# Patient Record
Sex: MALE | Race: WHITE | NOT HISPANIC OR LATINO | Employment: UNEMPLOYED | URBAN - METROPOLITAN AREA
[De-identification: names, ages, dates, MRNs, and addresses within clinical notes are randomized per-mention and may not be internally consistent; named-entity substitution may affect disease eponyms.]

---

## 2019-03-08 ENCOUNTER — OFFICE VISIT - HEALTHEAST (OUTPATIENT)
Dept: ADDICTION MEDICINE | Facility: CLINIC | Age: 34
End: 2019-03-08

## 2019-03-08 DIAGNOSIS — F10.20 ALCOHOL USE DISORDER, SEVERE, DEPENDENCE (H): ICD-10-CM

## 2019-03-19 ENCOUNTER — COMMUNICATION - HEALTHEAST (OUTPATIENT)
Dept: ADDICTION MEDICINE | Facility: CLINIC | Age: 34
End: 2019-03-19

## 2019-03-20 ENCOUNTER — COMMUNICATION - HEALTHEAST (OUTPATIENT)
Dept: ADDICTION MEDICINE | Facility: CLINIC | Age: 34
End: 2019-03-20

## 2019-03-22 ENCOUNTER — COMMUNICATION - HEALTHEAST (OUTPATIENT)
Dept: ADDICTION MEDICINE | Facility: CLINIC | Age: 34
End: 2019-03-22

## 2019-03-26 ENCOUNTER — HOSPITAL ENCOUNTER (INPATIENT)
Facility: CLINIC | Age: 34
LOS: 3 days | Discharge: HOME | DRG: 897 | End: 2019-03-30
Attending: EMERGENCY MEDICINE | Admitting: HOSPITALIST
Payer: COMMERCIAL

## 2019-03-26 DIAGNOSIS — E83.42 HYPOMAGNESEMIA: ICD-10-CM

## 2019-03-26 DIAGNOSIS — I10 ESSENTIAL HYPERTENSION: Primary | ICD-10-CM

## 2019-03-26 DIAGNOSIS — F10.930 ALCOHOL WITHDRAWAL SYNDROME WITHOUT COMPLICATION (H): ICD-10-CM

## 2019-03-26 DIAGNOSIS — F41.9 ANXIETY: ICD-10-CM

## 2019-03-26 DIAGNOSIS — H57.9 ALLERGIC EYE REACTION: ICD-10-CM

## 2019-03-26 LAB
ALBUMIN SERPL-MCNC: 4.2 G/DL (ref 3.4–5)
ALBUMIN UR-MCNC: 100 MG/DL
ALCOHOL BREATH TEST: 0 (ref 0–0.01)
ALP SERPL-CCNC: 108 U/L (ref 40–150)
ALT SERPL W P-5'-P-CCNC: 107 U/L (ref 0–70)
AMORPH CRY #/AREA URNS HPF: ABNORMAL /HPF
AMPHETAMINES UR QL SCN: NEGATIVE
ANION GAP SERPL CALCULATED.3IONS-SCNC: 17 MMOL/L (ref 3–14)
APPEARANCE UR: ABNORMAL
AST SERPL W P-5'-P-CCNC: 115 U/L (ref 0–45)
BARBITURATES UR QL: NEGATIVE
BASOPHILS # BLD AUTO: 0 10E9/L (ref 0–0.2)
BASOPHILS NFR BLD AUTO: 0.5 %
BENZODIAZ UR QL: NEGATIVE
BILIRUB SERPL-MCNC: 1.3 MG/DL (ref 0.2–1.3)
BILIRUB UR QL STRIP: NEGATIVE
BUN SERPL-MCNC: 11 MG/DL (ref 7–30)
CALCIUM SERPL-MCNC: 10.1 MG/DL (ref 8.5–10.1)
CANNABINOIDS UR QL SCN: NEGATIVE
CHLORIDE SERPL-SCNC: 98 MMOL/L (ref 94–109)
CO2 SERPL-SCNC: 24 MMOL/L (ref 20–32)
COCAINE UR QL: NEGATIVE
COLOR UR AUTO: ABNORMAL
CREAT SERPL-MCNC: 0.81 MG/DL (ref 0.66–1.25)
DIFFERENTIAL METHOD BLD: ABNORMAL
EOSINOPHIL # BLD AUTO: 0 10E9/L (ref 0–0.7)
EOSINOPHIL NFR BLD AUTO: 0.1 %
ERYTHROCYTE [DISTWIDTH] IN BLOOD BY AUTOMATED COUNT: 14.2 % (ref 10–15)
ETHANOL UR QL SCN: POSITIVE
GFR SERPL CREATININE-BSD FRML MDRD: >90 ML/MIN/{1.73_M2}
GLUCOSE SERPL-MCNC: 166 MG/DL (ref 70–99)
GLUCOSE UR STRIP-MCNC: 30 MG/DL
HCT VFR BLD AUTO: 42.7 % (ref 40–53)
HGB BLD-MCNC: 14.6 G/DL (ref 13.3–17.7)
HGB UR QL STRIP: NEGATIVE
HYALINE CASTS #/AREA URNS LPF: 5 /LPF (ref 0–2)
IMM GRANULOCYTES # BLD: 0 10E9/L (ref 0–0.4)
IMM GRANULOCYTES NFR BLD: 0.3 %
KETONES UR STRIP-MCNC: 40 MG/DL
LEUKOCYTE ESTERASE UR QL STRIP: NEGATIVE
LYMPHOCYTES # BLD AUTO: 0.5 10E9/L (ref 0.8–5.3)
LYMPHOCYTES NFR BLD AUTO: 7.3 %
MAGNESIUM SERPL-MCNC: 1.5 MG/DL (ref 1.6–2.3)
MCH RBC QN AUTO: 32.2 PG (ref 26.5–33)
MCHC RBC AUTO-ENTMCNC: 34.2 G/DL (ref 31.5–36.5)
MCV RBC AUTO: 94 FL (ref 78–100)
MONOCYTES # BLD AUTO: 0.6 10E9/L (ref 0–1.3)
MONOCYTES NFR BLD AUTO: 7.7 %
MUCOUS THREADS #/AREA URNS LPF: PRESENT /LPF
NEUTROPHILS # BLD AUTO: 6.1 10E9/L (ref 1.6–8.3)
NEUTROPHILS NFR BLD AUTO: 84.1 %
NITRATE UR QL: NEGATIVE
NRBC # BLD AUTO: 0 10*3/UL
NRBC BLD AUTO-RTO: 0 /100
OPIATES UR QL SCN: NEGATIVE
PH UR STRIP: 7.5 PH (ref 5–7)
PLATELET # BLD AUTO: 136 10E9/L (ref 150–450)
POTASSIUM SERPL-SCNC: 3.9 MMOL/L (ref 3.4–5.3)
PROT SERPL-MCNC: 7.8 G/DL (ref 6.8–8.8)
RBC # BLD AUTO: 4.54 10E12/L (ref 4.4–5.9)
RBC #/AREA URNS AUTO: 0 /HPF (ref 0–2)
SODIUM SERPL-SCNC: 139 MMOL/L (ref 133–144)
SOURCE: ABNORMAL
SP GR UR STRIP: 1.02 (ref 1–1.03)
UROBILINOGEN UR STRIP-MCNC: 2 MG/DL (ref 0–2)
WBC # BLD AUTO: 7.3 10E9/L (ref 4–11)
WBC #/AREA URNS AUTO: <1 /HPF (ref 0–5)

## 2019-03-26 PROCEDURE — 25000125 ZZHC RX 250: Performed by: EMERGENCY MEDICINE

## 2019-03-26 PROCEDURE — 96365 THER/PROPH/DIAG IV INF INIT: CPT | Performed by: EMERGENCY MEDICINE

## 2019-03-26 PROCEDURE — 25000128 H RX IP 250 OP 636: Performed by: EMERGENCY MEDICINE

## 2019-03-26 PROCEDURE — 80053 COMPREHEN METABOLIC PANEL: CPT | Performed by: EMERGENCY MEDICINE

## 2019-03-26 PROCEDURE — 25000132 ZZH RX MED GY IP 250 OP 250 PS 637: Performed by: EMERGENCY MEDICINE

## 2019-03-26 PROCEDURE — 83690 ASSAY OF LIPASE: CPT | Performed by: EMERGENCY MEDICINE

## 2019-03-26 PROCEDURE — 99285 EMERGENCY DEPT VISIT HI MDM: CPT | Mod: Z6 | Performed by: EMERGENCY MEDICINE

## 2019-03-26 PROCEDURE — 85025 COMPLETE CBC W/AUTO DIFF WBC: CPT | Performed by: EMERGENCY MEDICINE

## 2019-03-26 PROCEDURE — 83735 ASSAY OF MAGNESIUM: CPT | Performed by: EMERGENCY MEDICINE

## 2019-03-26 PROCEDURE — 80320 DRUG SCREEN QUANTALCOHOLS: CPT | Performed by: EMERGENCY MEDICINE

## 2019-03-26 PROCEDURE — 99285 EMERGENCY DEPT VISIT HI MDM: CPT | Mod: 25 | Performed by: EMERGENCY MEDICINE

## 2019-03-26 PROCEDURE — 96375 TX/PRO/DX INJ NEW DRUG ADDON: CPT | Performed by: EMERGENCY MEDICINE

## 2019-03-26 PROCEDURE — 81001 URINALYSIS AUTO W/SCOPE: CPT | Performed by: EMERGENCY MEDICINE

## 2019-03-26 PROCEDURE — 96361 HYDRATE IV INFUSION ADD-ON: CPT | Performed by: EMERGENCY MEDICINE

## 2019-03-26 PROCEDURE — 25800030 ZZH RX IP 258 OP 636: Performed by: EMERGENCY MEDICINE

## 2019-03-26 PROCEDURE — 96366 THER/PROPH/DIAG IV INF ADDON: CPT | Performed by: EMERGENCY MEDICINE

## 2019-03-26 PROCEDURE — 80307 DRUG TEST PRSMV CHEM ANLYZR: CPT | Performed by: EMERGENCY MEDICINE

## 2019-03-26 RX ORDER — FOLIC ACID 1 MG/1
1 TABLET ORAL ONCE
Status: COMPLETED | OUTPATIENT
Start: 2019-03-26 | End: 2019-03-26

## 2019-03-26 RX ORDER — LORAZEPAM 2 MG/ML
0.5 INJECTION INTRAMUSCULAR ONCE
Status: DISCONTINUED | OUTPATIENT
Start: 2019-03-26 | End: 2019-03-26

## 2019-03-26 RX ORDER — MULTIPLE VITAMINS W/ MINERALS TAB 9MG-400MCG
1 TAB ORAL ONCE
Status: COMPLETED | OUTPATIENT
Start: 2019-03-26 | End: 2019-03-26

## 2019-03-26 RX ORDER — LORAZEPAM 1 MG/1
1-4 TABLET ORAL EVERY 30 MIN PRN
Status: DISCONTINUED | OUTPATIENT
Start: 2019-03-26 | End: 2019-03-27 | Stop reason: ALTCHOICE

## 2019-03-26 RX ORDER — LORAZEPAM 2 MG/ML
1 INJECTION INTRAMUSCULAR ONCE
Status: COMPLETED | OUTPATIENT
Start: 2019-03-26 | End: 2019-03-26

## 2019-03-26 RX ORDER — CALCIUM CARBONATE 500 MG/1
500 TABLET, CHEWABLE ORAL ONCE
Status: COMPLETED | OUTPATIENT
Start: 2019-03-26 | End: 2019-03-26

## 2019-03-26 RX ORDER — LANOLIN ALCOHOL/MO/W.PET/CERES
100 CREAM (GRAM) TOPICAL ONCE
Status: COMPLETED | OUTPATIENT
Start: 2019-03-26 | End: 2019-03-26

## 2019-03-26 RX ORDER — LORAZEPAM 1 MG/1
1 TABLET ORAL ONCE
Status: COMPLETED | OUTPATIENT
Start: 2019-03-26 | End: 2019-03-26

## 2019-03-26 RX ORDER — SODIUM CHLORIDE 9 MG/ML
1000 INJECTION, SOLUTION INTRAVENOUS CONTINUOUS
Status: DISCONTINUED | OUTPATIENT
Start: 2019-03-26 | End: 2019-03-30 | Stop reason: HOSPADM

## 2019-03-26 RX ORDER — LORAZEPAM 1 MG/1
1 TABLET ORAL ONCE
Status: DISCONTINUED | OUTPATIENT
Start: 2019-03-26 | End: 2019-03-26

## 2019-03-26 RX ADMIN — LORAZEPAM 1 MG: 1 TABLET ORAL at 19:42

## 2019-03-26 RX ADMIN — Medication 2 G: at 17:08

## 2019-03-26 RX ADMIN — CALCIUM CARBONATE (ANTACID) CHEW TAB 500 MG 500 MG: 500 CHEW TAB at 23:47

## 2019-03-26 RX ADMIN — FOLIC ACID 1 MG: 1 TABLET ORAL at 17:02

## 2019-03-26 RX ADMIN — SODIUM CHLORIDE 1000 ML: 9 INJECTION, SOLUTION INTRAVENOUS at 18:22

## 2019-03-26 RX ADMIN — MULTIPLE VITAMINS W/ MINERALS TAB 1 TABLET: TAB at 17:02

## 2019-03-26 RX ADMIN — LORAZEPAM 1 MG: 1 TABLET ORAL at 22:25

## 2019-03-26 RX ADMIN — SODIUM CHLORIDE 1000 ML: 9 INJECTION, SOLUTION INTRAVENOUS at 22:27

## 2019-03-26 RX ADMIN — SODIUM CHLORIDE 1000 ML: 9 INJECTION, SOLUTION INTRAVENOUS at 16:17

## 2019-03-26 RX ADMIN — Medication 100 MG: at 17:02

## 2019-03-26 RX ADMIN — LORAZEPAM 1 MG: 2 INJECTION INTRAMUSCULAR; INTRAVENOUS at 17:02

## 2019-03-26 ASSESSMENT — ENCOUNTER SYMPTOMS
APPETITE CHANGE: 1
FEVER: 0
VOMITING: 1
HEADACHES: 0
NAUSEA: 1
ABDOMINAL PAIN: 1

## 2019-03-26 NOTE — ED PROVIDER NOTES
[facility] EMERGENCY DEPARTMENT ([facility])    3/26/19        History     Chief Complaint   Patient presents with     Withdrawal     last drink over 24 hours ago.      The history is provided by the patient, a relative and medical records.     Nikhil Rios is a 33 year old male with a past medical history significant for alcohol abuse who presents to the Emergency Department with alcohol withdrawal symptoms. Patient reports that he drinks 1 pint a day for around the past year. He reports that it has progressed over the year and that his last drink was 24 hours ago. Patient denies any other drug use or suicidal ideations. Patient is currently feeling nausea with intermittent vomiting in the last few days. He does state some mild abdominal discomfort and achiness. Patient reports that his last BM was yesterday. He states that he has been eating less than usual, but has been staying hydrated. He denies any fevers, chest pain or headache. Patient does report falling 1 week ago while drunk.  Did not strike his head during the time.  He is currently considering detox but is not sure yet.    I have reviewed the Medications, Allergies, Past Medical and Surgical History, and Social History in the [employer] system.    PAST MEDICAL HISTORY:   Past Medical History:   Diagnosis Date     Substance abuse (H)        PAST SURGICAL HISTORY: History reviewed. No pertinent surgical history.    FAMILY HISTORY: No family history on file.    SOCIAL HISTORY:   Social History     Tobacco Use     Smoking status: Current Some Day Smoker     Smokeless tobacco: Never Used   Substance Use Topics     Alcohol use: Yes     Comment: 1 to 750ml daily     Current Facility-Administered Medications   Medication     0.9% sodium chloride BOLUS    Followed by     sodium chloride 0.9% infusion     folic acid (FOLVITE) tablet 1 mg     LORazepam (ATIVAN) tablet 1 mg     multivitamin w/minerals (THERA-VIT-M) tablet 1 tablet     vitamin B1 (THIAMINE) tablet  100 mg     No current outpatient medications on file.      No Known Allergies      Review of Systems   Constitutional: Positive for appetite change (Loss). Negative for fever.   Cardiovascular: Negative for chest pain.   Gastrointestinal: Positive for abdominal pain, nausea and vomiting.   Neurological: Negative for headaches.   Psychiatric/Behavioral: Negative for suicidal ideas.   All other systems reviewed and are negative.      Physical Exam   BP: 118/61  Pulse: 79  Temp: 98.3  F (36.8  C)  Resp: 18  Weight: (unable to get)  SpO2: 98 %      Physical Exam   General: patient is alert and oriented , tremulous and anxious appearing  Head: atraumatic and normocephalic   EENT: dry mucus membranes without tonsillar erythema or exudates, pupils 3 mm, equal round and reactive, extraocular movements intact, no nystagmus  Neck: supple with full range of motion  Cardiovascular: regular rate and rhythm, no murmur appreciated, extremities warm and well perfused, no lower extremity edema  Pulmonary: lungs clear to auscultation bilaterally   Abdomen: soft, non-tender, nondistended  Musculoskeletal: normal range of motion   Neurological: alert and oriented, moving all extremities symmetrically, significant tongue fasciculations and tremulousness of the extremities  Skin: warm, dry   Psych: Maintains eye contact, normal speech in rate and tone, does not appear to be responding to internal stimuli, denies any visual or auditory hallucinations, denies any suicidal ideation      ED Course   3:55 PM  The patient was seen and examined by Tori Pa MD in Room ED09.        Procedures             Critical Care time:  none             Labs Ordered and Resulted from Time of ED Arrival Up to the Time of Departure from the ED   ALCOHOL BREATH TEST POCT - Normal   DRUG ABUSE SCREEN 6 CHEM DEP URINE ([facility])            Assessments & Plan (with Medical Decision Making)   33-year-old male with a history of alcohol dependence who presents to the  Emergency Department for withdrawal symptoms.  He currently is experiencing acute withdrawal symptoms with MSSA of 13.  He reports recent nausea and intermittent vomiting in the setting of alcohol use.  Denies any ongoing symptoms of GI bleed.  His abdominal exam is quite benign without any tenderness to palpation.  He denies any recent traumatic injury.  Labs obtained which were significant for ALT of 107, AST of 115, total bilirubin of 1.3 and lipase of 885.  Labs are consistent with chronic alcohol use.  His hemoglobin is within normal limits at 14.6 with no leukocytosis.  He was given IV fluids, thiamine, folate, multivitamin and lorazepam for withdrawal.  Discussed with detox and unfortunately we do not have a detox bed available at this time.  He denies any acute mental health concerns.  On reassessment continues to have significant withdrawal symptoms with elevated MSSA score despite lorazepam.  We will plan to admit to medicine for continued management of withdrawal symptoms.      This part of the medical record was transcribed by Sameer Butler Medical Scribe, from a dictation done by Tori Pa MD.       I have reviewed the nursing notes.    I have reviewed the findings, diagnosis, plan and need for follow up with the patient.       Medication List      There are no discharge medications for this visit.         Final diagnoses:   Alcohol withdrawal syndrome without complication (H)   Hypomagnesemia     I, Sameer Butler, am serving as a trained medical scribe to document services personally performed by Tori Pa MD, based on the provider's statements to me.   ITori MD, was physically present and have reviewed and verified the accuracy of this note documented by Sameer Butler.    3/26/2019   [facility], [place], EMERGENCY DEPARTMENT     Tori Pa MD  03/27/19 0224

## 2019-03-27 ENCOUNTER — APPOINTMENT (OUTPATIENT)
Dept: ULTRASOUND IMAGING | Facility: CLINIC | Age: 34
DRG: 897 | End: 2019-03-27
Attending: HOSPITALIST
Payer: COMMERCIAL

## 2019-03-27 PROBLEM — F10.939 ALCOHOL WITHDRAWAL (H): Status: ACTIVE | Noted: 2019-03-27

## 2019-03-27 LAB
ALBUMIN SERPL-MCNC: 3.9 G/DL (ref 3.4–5)
ALP SERPL-CCNC: 96 U/L (ref 40–150)
ALT SERPL W P-5'-P-CCNC: 96 U/L (ref 0–70)
ANION GAP SERPL CALCULATED.3IONS-SCNC: 13 MMOL/L (ref 3–14)
AST SERPL W P-5'-P-CCNC: 99 U/L (ref 0–45)
BILIRUB SERPL-MCNC: 1.1 MG/DL (ref 0.2–1.3)
BUN SERPL-MCNC: 8 MG/DL (ref 7–30)
CALCIUM SERPL-MCNC: 9.2 MG/DL (ref 8.5–10.1)
CHLORIDE SERPL-SCNC: 105 MMOL/L (ref 94–109)
CO2 SERPL-SCNC: 20 MMOL/L (ref 20–32)
CREAT SERPL-MCNC: 0.77 MG/DL (ref 0.66–1.25)
ERYTHROCYTE [DISTWIDTH] IN BLOOD BY AUTOMATED COUNT: 14.4 % (ref 10–15)
GFR SERPL CREATININE-BSD FRML MDRD: >90 ML/MIN/{1.73_M2}
GLUCOSE SERPL-MCNC: 95 MG/DL (ref 70–99)
HCT VFR BLD AUTO: 41.7 % (ref 40–53)
HGB BLD-MCNC: 14.1 G/DL (ref 13.3–17.7)
INTERPRETATION ECG - MUSE: NORMAL
LIPASE SERPL-CCNC: 1043 U/L (ref 73–393)
LIPASE SERPL-CCNC: 885 U/L (ref 73–393)
MAGNESIUM SERPL-MCNC: 2.3 MG/DL (ref 1.6–2.3)
MCH RBC QN AUTO: 32.3 PG (ref 26.5–33)
MCHC RBC AUTO-ENTMCNC: 33.8 G/DL (ref 31.5–36.5)
MCV RBC AUTO: 96 FL (ref 78–100)
PLATELET # BLD AUTO: 117 10E9/L (ref 150–450)
POTASSIUM SERPL-SCNC: 4.1 MMOL/L (ref 3.4–5.3)
PROT SERPL-MCNC: 7.2 G/DL (ref 6.8–8.8)
RBC # BLD AUTO: 4.36 10E12/L (ref 4.4–5.9)
SODIUM SERPL-SCNC: 138 MMOL/L (ref 133–144)
WBC # BLD AUTO: 6.8 10E9/L (ref 4–11)

## 2019-03-27 PROCEDURE — 83690 ASSAY OF LIPASE: CPT | Performed by: HOSPITALIST

## 2019-03-27 PROCEDURE — 76705 ECHO EXAM OF ABDOMEN: CPT

## 2019-03-27 PROCEDURE — 99207 ZZC CDG-MDM COMPONENT: MEETS LOW - DOWN CODED: CPT | Performed by: HOSPITALIST

## 2019-03-27 PROCEDURE — 85027 COMPLETE CBC AUTOMATED: CPT | Performed by: HOSPITALIST

## 2019-03-27 PROCEDURE — 12000001 ZZH R&B MED SURG/OB UMMC

## 2019-03-27 PROCEDURE — 25000132 ZZH RX MED GY IP 250 OP 250 PS 637: Performed by: INTERNAL MEDICINE

## 2019-03-27 PROCEDURE — 25000128 H RX IP 250 OP 636: Performed by: HOSPITALIST

## 2019-03-27 PROCEDURE — 83735 ASSAY OF MAGNESIUM: CPT | Performed by: HOSPITALIST

## 2019-03-27 PROCEDURE — HZ2ZZZZ DETOXIFICATION SERVICES FOR SUBSTANCE ABUSE TREATMENT: ICD-10-PCS | Performed by: INTERNAL MEDICINE

## 2019-03-27 PROCEDURE — 36415 COLL VENOUS BLD VENIPUNCTURE: CPT | Performed by: HOSPITALIST

## 2019-03-27 PROCEDURE — 25000132 ZZH RX MED GY IP 250 OP 250 PS 637: Performed by: HOSPITALIST

## 2019-03-27 PROCEDURE — 99222 1ST HOSP IP/OBS MODERATE 55: CPT | Mod: AI | Performed by: HOSPITALIST

## 2019-03-27 PROCEDURE — 80053 COMPREHEN METABOLIC PANEL: CPT | Performed by: HOSPITALIST

## 2019-03-27 PROCEDURE — 40000007 ZZH STATISTIC ADULT CD FACE TO FACE-NO CHRG

## 2019-03-27 RX ORDER — SODIUM CHLORIDE AND POTASSIUM CHLORIDE 150; 900 MG/100ML; MG/100ML
INJECTION, SOLUTION INTRAVENOUS CONTINUOUS
Status: DISCONTINUED | OUTPATIENT
Start: 2019-03-27 | End: 2019-03-28

## 2019-03-27 RX ORDER — LANOLIN ALCOHOL/MO/W.PET/CERES
100 CREAM (GRAM) TOPICAL DAILY
Status: DISCONTINUED | OUTPATIENT
Start: 2019-03-27 | End: 2019-03-30 | Stop reason: HOSPADM

## 2019-03-27 RX ORDER — PROCHLORPERAZINE MALEATE 5 MG
10 TABLET ORAL EVERY 6 HOURS PRN
Status: DISCONTINUED | OUTPATIENT
Start: 2019-03-27 | End: 2019-03-30 | Stop reason: HOSPADM

## 2019-03-27 RX ORDER — LABETALOL 20 MG/4 ML (5 MG/ML) INTRAVENOUS SYRINGE
10
Status: DISCONTINUED | OUTPATIENT
Start: 2019-03-27 | End: 2019-03-27

## 2019-03-27 RX ORDER — ONDANSETRON 4 MG/1
4 TABLET, ORALLY DISINTEGRATING ORAL EVERY 6 HOURS PRN
Status: DISCONTINUED | OUTPATIENT
Start: 2019-03-27 | End: 2019-03-30 | Stop reason: HOSPADM

## 2019-03-27 RX ORDER — ONDANSETRON 2 MG/ML
4 INJECTION INTRAMUSCULAR; INTRAVENOUS EVERY 6 HOURS PRN
Status: DISCONTINUED | OUTPATIENT
Start: 2019-03-27 | End: 2019-03-30 | Stop reason: HOSPADM

## 2019-03-27 RX ORDER — FOLIC ACID 1 MG/1
1 TABLET ORAL DAILY
Status: DISCONTINUED | OUTPATIENT
Start: 2019-03-27 | End: 2019-03-30 | Stop reason: HOSPADM

## 2019-03-27 RX ORDER — PROCHLORPERAZINE 25 MG
25 SUPPOSITORY, RECTAL RECTAL EVERY 12 HOURS PRN
Status: DISCONTINUED | OUTPATIENT
Start: 2019-03-27 | End: 2019-03-30 | Stop reason: HOSPADM

## 2019-03-27 RX ORDER — ATENOLOL 50 MG/1
50 TABLET ORAL DAILY PRN
Status: DISCONTINUED | OUTPATIENT
Start: 2019-03-27 | End: 2019-03-30 | Stop reason: HOSPADM

## 2019-03-27 RX ORDER — MULTIPLE VITAMINS W/ MINERALS TAB 9MG-400MCG
1 TAB ORAL DAILY
Status: DISCONTINUED | OUTPATIENT
Start: 2019-03-27 | End: 2019-03-30 | Stop reason: HOSPADM

## 2019-03-27 RX ORDER — HYDRALAZINE HYDROCHLORIDE 25 MG/1
25 TABLET, FILM COATED ORAL 4 TIMES DAILY PRN
Status: DISCONTINUED | OUTPATIENT
Start: 2019-03-27 | End: 2019-03-29

## 2019-03-27 RX ORDER — POLYETHYLENE GLYCOL 3350 17 G/17G
17 POWDER, FOR SOLUTION ORAL DAILY PRN
Status: DISCONTINUED | OUTPATIENT
Start: 2019-03-27 | End: 2019-03-30 | Stop reason: HOSPADM

## 2019-03-27 RX ORDER — ALUMINA, MAGNESIA, AND SIMETHICONE 2400; 2400; 240 MG/30ML; MG/30ML; MG/30ML
15 SUSPENSION ORAL EVERY 4 HOURS PRN
Status: DISCONTINUED | OUTPATIENT
Start: 2019-03-27 | End: 2019-03-30 | Stop reason: HOSPADM

## 2019-03-27 RX ORDER — ACETAMINOPHEN 325 MG/1
650 TABLET ORAL EVERY 4 HOURS PRN
Status: DISCONTINUED | OUTPATIENT
Start: 2019-03-27 | End: 2019-03-30 | Stop reason: HOSPADM

## 2019-03-27 RX ORDER — HYDRALAZINE HYDROCHLORIDE 25 MG/1
25 TABLET, FILM COATED ORAL 4 TIMES DAILY PRN
Status: DISCONTINUED | OUTPATIENT
Start: 2019-03-27 | End: 2019-03-27

## 2019-03-27 RX ORDER — DIAZEPAM 5 MG
5-20 TABLET ORAL EVERY 30 MIN PRN
Status: DISCONTINUED | OUTPATIENT
Start: 2019-03-27 | End: 2019-03-30 | Stop reason: HOSPADM

## 2019-03-27 RX ORDER — NALOXONE HYDROCHLORIDE 0.4 MG/ML
.1-.4 INJECTION, SOLUTION INTRAMUSCULAR; INTRAVENOUS; SUBCUTANEOUS
Status: DISCONTINUED | OUTPATIENT
Start: 2019-03-27 | End: 2019-03-30 | Stop reason: HOSPADM

## 2019-03-27 RX ORDER — LABETALOL 20 MG/4 ML (5 MG/ML) INTRAVENOUS SYRINGE
10 EVERY 4 HOURS PRN
Status: DISCONTINUED | OUTPATIENT
Start: 2019-03-27 | End: 2019-03-30 | Stop reason: HOSPADM

## 2019-03-27 RX ORDER — PANTOPRAZOLE SODIUM 40 MG/1
40 TABLET, DELAYED RELEASE ORAL
Status: DISCONTINUED | OUTPATIENT
Start: 2019-03-27 | End: 2019-03-30 | Stop reason: HOSPADM

## 2019-03-27 RX ADMIN — HYDRALAZINE HYDROCHLORIDE 25 MG: 25 TABLET ORAL at 17:13

## 2019-03-27 RX ADMIN — DEXTRAN 70 AND HYPROMELLOSE 2910 1 DROP: 1; 3 SOLUTION/ DROPS OPHTHALMIC at 17:36

## 2019-03-27 RX ADMIN — ENOXAPARIN SODIUM 40 MG: 40 INJECTION SUBCUTANEOUS at 11:40

## 2019-03-27 RX ADMIN — DIAZEPAM 10 MG: 5 TABLET ORAL at 05:56

## 2019-03-27 RX ADMIN — DEXTRAN 70 AND HYPROMELLOSE 2910 1 DROP: 1; 3 SOLUTION/ DROPS OPHTHALMIC at 18:58

## 2019-03-27 RX ADMIN — PANTOPRAZOLE SODIUM 40 MG: 40 TABLET, DELAYED RELEASE ORAL at 09:01

## 2019-03-27 RX ADMIN — DEXTRAN 70 AND HYPROMELLOSE 2910 1 DROP: 1; 3 SOLUTION/ DROPS OPHTHALMIC at 20:42

## 2019-03-27 RX ADMIN — DIAZEPAM 5 MG: 5 TABLET ORAL at 17:34

## 2019-03-27 RX ADMIN — DIAZEPAM 5 MG: 5 TABLET ORAL at 09:02

## 2019-03-27 RX ADMIN — POTASSIUM CHLORIDE AND SODIUM CHLORIDE: 900; 150 INJECTION, SOLUTION INTRAVENOUS at 01:22

## 2019-03-27 RX ADMIN — POTASSIUM CHLORIDE AND SODIUM CHLORIDE: 900; 150 INJECTION, SOLUTION INTRAVENOUS at 09:18

## 2019-03-27 RX ADMIN — DIAZEPAM 10 MG: 5 TABLET ORAL at 11:45

## 2019-03-27 RX ADMIN — DIAZEPAM 10 MG: 5 TABLET ORAL at 21:01

## 2019-03-27 RX ADMIN — FOLIC ACID 1 MG: 1 TABLET ORAL at 09:01

## 2019-03-27 RX ADMIN — HYDRALAZINE HYDROCHLORIDE 25 MG: 25 TABLET ORAL at 23:17

## 2019-03-27 RX ADMIN — HYDRALAZINE HYDROCHLORIDE 25 MG: 25 TABLET ORAL at 11:45

## 2019-03-27 RX ADMIN — DIAZEPAM 10 MG: 5 TABLET ORAL at 01:22

## 2019-03-27 RX ADMIN — Medication 100 MG: at 09:01

## 2019-03-27 RX ADMIN — MULTIPLE VITAMINS W/ MINERALS TAB 1 TABLET: TAB at 09:01

## 2019-03-27 RX ADMIN — POTASSIUM CHLORIDE AND SODIUM CHLORIDE: 900; 150 INJECTION, SOLUTION INTRAVENOUS at 17:07

## 2019-03-27 RX ADMIN — HYDRALAZINE HYDROCHLORIDE 25 MG: 25 TABLET ORAL at 09:18

## 2019-03-27 ASSESSMENT — ACTIVITIES OF DAILY LIVING (ADL)
ADLS_ACUITY_SCORE: 10
ADLS_ACUITY_SCORE: 12
ADLS_ACUITY_SCORE: 10

## 2019-03-27 NOTE — CONSULTS
3/27/2019    Writer met with pt to discuss CD options. Pt reported he had a CD assessment last week at [facility] [place], unsure of with who. Pt reports he is weighing is options between inpatient and outpatient at this time as he is trying to obtain a job and has interviews lined up. Pt was unable to sign a release as he was unsure of who he had an assessment with (writer would be able to help facilitate care). Pt did not want any additional resources from writer. Writer left pt with business card if he changes his mind or wants further resources.     Marisel Duenas, [facility]  [phone]

## 2019-03-27 NOTE — PLAN OF CARE
Patient A & O x 4. Neuros and CMS intact except patient is having withdrawal symptoms with tremors. Patient on MSSA protocol and last scored a 6. PO Valium given per protocol. VSS except BP elevated 159/110 and afebrile, SpO2 99% on room air (see flowsheet), MD aware. PO PRN Hydralazine given as ordered. LS clear, BS + x 4, patient passing flatus and had a BM yesterday. Patient urinating good amounts of clear yellow urine. Patient denies pain. Left PIV infusing. Patient on a regular diet and tolerating it well. Patient up ad sergio with assist of 1 to bathroom. Bed alarm on. Hgb 14.1 this AM, MD aware. Abd US completed this shift. Patient able to make needs known. Call light within reach. Will continue with POC.

## 2019-03-27 NOTE — H&P
[facility], [place]    History and Physical  Hospitalist       Date of Admission:  3/26/2019  Date of Service (when I saw the patient): 03/27/19    Assessment & Plan      Nikhil Rios is a 33 year old male who presents with shaking and vomiting, headache, sweating, dizzy.  He has not drunk for the last 24 hours because of the vomiting.  He drinks vodka on daily basis around a pint per day. He is noted to have alcohol withdrawal      Alcohol withdrawal.  Patient will be started on MSSA protocol with Valium.  His mag was low that will be repleted.  Correct electrolytes and received a banana bag.  Aggressive hydration.  Supportive care.  CD consult tomorrow.     Nausea and vomiting.  Could be from alcohol intoxication GERD gastritis esophagitis peptic ulcer disease pancreatitis ketoacidosis from alcohol starvation ketoacidosis etc. in the differential diagnosis.  Symptoms have abated.  Aggressive hydration recommended under    Alcohol abuse recommend cessation.  Counseling provided.    Hypomagnesemia will be repleted    Elevated liver enzymes likely from drinking.  Check liver ultrasound and acute hepatitis serologies.    Tobacco use he smokes 1-2 sick cigars while he is drinking; recommend cessation      Addendum: Lipase level is borderline high. He does not have much abdominal pain. Okay to feed. Likely elevated from vomiting itself or there may be mild pancreatitis which is now better. In either case okay to feed.       DVT Prophylaxis: Enoxaparin (Lovenox) SQ  Code Status: Full Code    Disposition: 2-3 days    Td Upton MD            Primary Care Physician   Physician No Ref-Primary    Chief Complaint   shaking and vomiting      History is obtained from the patient    History of Present Illness      Nikhil Rios is a 33 year old healthy male who presents with headache dizziness vomiting tremulous and shaking.  He is sweating as well.  He drinks of vodka a pint a day.  He drinks  most days.  He has been vomiting for the last 5 days.  He has not been able to keep anything down.  Today morning he started having dizziness and then started shaking tremors and not feeling well which prompted him to come to the ER for further evaluation.  His alcohol level now was 0 last alcoholic drink was 24 hours prior to my visit today.    He has not tried to quit drinking alcohol before.  There is no history of alcohol withdrawal seizure.  He has not tried any D addiction programs before but is now interested in 1 right now.    Denies any fever sweats or chills no cough or phlegm.  No abdominal pain or diarrhea no bowel bowel or bladder problems.     No bleeding in the stool no hematemesis.  No skin rash or joint aches.        Past Medical History    I have reviewed this patient's medical history and updated it with pertinent information if needed.   Past Medical History:   Diagnosis Date     Substance abuse (H)        Past Surgical History   I have reviewed this patient's surgical history and updated it with pertinent information if needed.  History reviewed. No pertinent surgical history.    Prior to Admission Medications   None     Allergies   No Known Allergies    Social History   I have reviewed this patient's social history and updated it with pertinent information if needed. Nikhil Rios  reports that he has been smoking.  he has never used smokeless tobacco. He reports that he drinks alcohol. He reports that he does not use drugs.    Family History   I have reviewed this patient's family history and updated it with pertinent information if needed.   No family history on file.    Review of Systems   The 10 point Review of Systems is negative other than noted in the HPI or here.     Physical Exam   Temp: 99.2  F (37.3  C) Temp src: Tympanic BP: (!) 150/105 Pulse: 88 Heart Rate: 85 Resp: 18 SpO2: 98 % O2 Device: None (Room air)    Vital Signs with Ranges  Temp:  [98.3  F (36.8  C)-99.2  F (37.3  C)] 99.2   F (37.3  C)  Pulse:  [79-93] 88  Heart Rate:  [85] 85  Resp:  [18] 18  BP: (118-163)/() 150/105  SpO2:  [96 %-100 %] 98 %  225 lbs 0 oz    Constitutional: Awake, alert, cooperative, no apparent distress.  Eyes: Conjunctiva and pupils examined and normal.  HEENT: dry mucous membranes  Respiratory: Clear to auscultation bilaterally, no crackles or wheezing.  Cardiovascular: Regular rate and rhythm, normal S1 and S2, and no murmur noted.  GI: Soft, non-distended, non-tender, normal bowel sounds.  Lymph/Hematologic: No anterior cervical or supraclavicular adenopathy.  Skin: No rashes, no cyanosis, no edema.  Musculoskeletal: No joint swelling, erythema or tenderness.  Neurologic: Cranial nerves 2-12 intact, normal strength and sensation.  Psychiatric: Anxious, Alert, oriented to person, place and time, no obvious anxiety or depression.      Data   Data reviewed today:  I personally reviewed no images or EKG's today.  Recent Labs   Lab 03/26/19  1542   WBC 7.3   HGB 14.6   MCV 94   *      POTASSIUM 3.9   CHLORIDE 98   CO2 24   BUN 11   CR 0.81   ANIONGAP 17*   KEELEY 10.1   *   ALBUMIN 4.2   PROTTOTAL 7.8   BILITOTAL 1.3   ALKPHOS 108   *   *       No results found for this or any previous visit (from the past 24 hour(s)).

## 2019-03-27 NOTE — PROGRESS NOTES
Social Work Services Progress Note    Hospital Day: 2    Collaborated with:  Marisel Duenas, [facility], 10A IDT, Discharge plan    Data:  Discharge plan, CD Resources, Mother requested to speak w/SW    Intervention:  Per pt's bedside RN Ignacia Liu, pt did not provide consent for SW to speak w/his mother. SW explained to mother that conversation could include general information resources or programs and that she could share information w/SW but SW could not confirm or elaborate on pt's care or treatment plan.     Pt's mother verbalized understanding and shared that pt has been working to get into CD treatment. He has had a CD Eval at [facility] as he was interested in pursuing outpt cd treatment at [facility] in [place].  Pt has been living with her for about 6 months and has been stating intent to find a job. She is grateful he is willing to enter CD treatment but [facility] was for inpt CD treatment.      SW educated mother on general information about process for getting into CD treatment, respecting/honoring what the patient is willing to do to engage in sobriety (ie: outpt vs inpt), medical team recommendations for CD treatment when people are hospitalized due to health issues related to or caused by their addiction or substance use disorder.   She identifies as being a support for pt to enter into cd treatment and engage in sobriety.        Assessment:  pt has been working to enter cd treatment    Plan:    Anticipated Disposition:  to be determined    Barriers to d/c plan:  Medical stability    Follow Up:  SW con't to follow for CD Resources and further assistance with discharge plan

## 2019-03-27 NOTE — ED NOTES
.  [facility], [place]   ED Nurse to Floor Handoff     Nikhil Rios is a 33 year old male who speaks English and lives with others,  in a home  They arrived in the ED by car from home    ED Chief Complaint: Withdrawal (last drink over 24 hours ago. )    ED Dx;   Final diagnoses:   Alcohol withdrawal syndrome without complication (H)   Hypomagnesemia         Needed?: No    Allergies: No Known Allergies.  Past Medical Hx:   Past Medical History:   Diagnosis Date     Substance abuse (H)       Baseline Mental status: WDL  Current Mental Status changes: at basesline    Infection present or suspected this encounter: no  Sepsis suspected: No  Isolation type: No active isolations     Activity level - Baseline/Home:  Independent  Activity Level - Current:   Stand with Assist  Pt is very shakey and wobbly due to withdrawals and is a fall risk    Bariatric equipment needed?: No    In the ED these meds were given:   Medications   0.9% sodium chloride BOLUS (0 mLs Intravenous Stopped 3/26/19 1822)     Followed by   sodium chloride 0.9% infusion (1,000 mLs Intravenous New Bag 3/26/19 2227)   LORazepam (ATIVAN) tablet 1-4 mg (1 mg Oral Given 3/26/19 2225)   vitamin B1 (THIAMINE) tablet 100 mg (100 mg Oral Given 3/26/19 1702)   folic acid (FOLVITE) tablet 1 mg (1 mg Oral Given 3/26/19 1702)   multivitamin w/minerals (THERA-VIT-M) tablet 1 tablet (1 tablet Oral Given 3/26/19 1702)   magnesium sulfate 2 g in NS intermittent infusion ([employer] or [facility]) (0 g Intravenous Stopped 3/26/19 1942)   LORazepam (ATIVAN) injection 1 mg (1 mg Intravenous Given 3/26/19 1702)   LORazepam (ATIVAN) tablet 1 mg (1 mg Oral Given 3/26/19 1942)       Drips running?  Yes    Home pump  No    Current LDAs  Peripheral IV 03/26/19 Left Hand (Active)   Number of days: 0       Labs results:   Labs Ordered and Resulted from Time of ED Arrival Up to the Time of Departure from the ED   DRUG ABUSE SCREEN 6 CHEM DEP URINE  ([facility]) - Abnormal; Notable for the following components:       Result Value    Ethanol Qual Urine Positive (*)     All other components within normal limits   CBC WITH PLATELETS DIFFERENTIAL - Abnormal; Notable for the following components:    Platelet Count 136 (*)     Absolute Lymphocytes 0.5 (*)     All other components within normal limits   COMPREHENSIVE METABOLIC PANEL - Abnormal; Notable for the following components:    Anion Gap 17 (*)     Glucose 166 (*)      (*)      (*)     All other components within normal limits   MAGNESIUM - Abnormal; Notable for the following components:    Magnesium 1.5 (*)     All other components within normal limits   ROUTINE UA WITH MICROSCOPIC - Abnormal; Notable for the following components:    Glucose Urine 30 (*)     Ketones Urine 40 (*)     pH Urine 7.5 (*)     Protein Albumin Urine 100 (*)     Mucous Urine Present (*)     Hyaline Casts 5 (*)     Amorphous Crystals Few (*)     All other components within normal limits   ALCOHOL BREATH TEST POCT - Normal   MSSA SCORE AND VS   NOTIFY       Imaging Studies: No results found for this or any previous visit (from the past 24 hour(s)).    Recent vital signs:   BP (!) 163/108   Pulse 85   Temp 99.2  F (37.3  C) (Tympanic)   Resp 18   Wt 102.1 kg (225 lb)   SpO2 97%     Cardiac Rhythm: Normal Sinus  Pt needs tele? No  Skin/wound Issues: None    Code Status: Full Code    Pain control: pt had none    Nausea control: good    Abnormal labs/tests/findings requiring intervention:   Needed magnesium replaced    Family present during ED course? Yes   Family Comments/Social Situation comments: Mom is here and very supportive and helpful  Mom's name is Veronique  [phone] home  [phone] cell    Tasks needing completion: None    Edyta Vasquez, RN  [phone]  [phone] [facility] ED

## 2019-03-27 NOTE — PROGRESS NOTES
[facility]    Medicine Progress Note - Hospitalist Service       Date of Admission:  3/26/2019  Assessment & Plan         Nikhil Rios is a 33 year old male who presents with shaking and vomiting, headache, sweating, dizzy.  He has not drunk for the last 24 hours because of the vomiting.  He drinks vodka on daily basis around a pint per day. He is noted to have alcohol withdrawal  Admitted 3/26/2019     Alcohol dependency, withdrawal.    -Continue MSSA with Valium   -Multivitamin, thiamine, folic acid daily   -, CD consult   -Fall precautions        Nausea and vomiting.  Could be from alcohol withdrawal, alcoholic gastropathy.   No pain abdomen this morning   Nausea vomiting better   Unlikely acute pancreatitis   Ultrasound abdomen: Fatty liver     -Supportive management  -IV fluids, antiemetics as needed  -Follow-up electrolytes and replace as needed    High blood pressure: No prior history.  Suspect related to alcohol withdrawal.  Hydralazine and labetalol as needed.  Treat alcohol withdrawal  Monitor     Hypomagnesemia: Replaced     Elevated liver enzymes likely from drinking.    Ultrasound abdomen: Fatty liver  LFTs trending down     Tobacco use he smokes 1-2 sick cigars while he is drinking; recommend cessation         DVT Prophylaxis: Enoxaparin (Lovenox) subcutaneous, discontinue as patient ambulatory  Code Status: Full Code  Full code  Disposition: 2-3 days        Disposition Plan   Expected discharge: 2 - 3 days, recommended to prior living arrangement versus inpatient treatment once Medically optimized..  Entered: Dion Welsh MD 03/27/2019, 5:23 PM       The patient's care was discussed with the Bedside Nurse and , mother at bedside..    Dion Welsh MD  Hospitalist Service  [facility]    ______________________________________________________________________    Interval History     HPI  reviewed  Overall feels better  Shakes, tremors present  No hallucinations  No fever chills  Nausea vomiting better  No pain abdomen  No chest pain or shortness of breath  Tolerating oral intake, voiding well.    Data reviewed today: I reviewed all medications, new labs and imaging results over the last 24 hours. I personally reviewed the Ultrasound abdomen image(s) showing Enlarged, fatty liver.    Physical Exam   Vital Signs: Temp: 98.6  F (37  C) Temp src: Oral BP: (!) 140/113 Pulse: 86 Heart Rate: 93 Resp: 18 SpO2: 98 % O2 Device: None (Room air)    Weight: 225 lbs 0 oz  General Appearance: Anxious, tremulous  Respiratory: Nonlabored, clear bilateral  Cardiovascular: S1-S2 regular, mild tachycardia  GI: Soft, nontender, nondistended, bowel sounds present  Skin: No new rash no jaundice.   Other: No pedal edema, distally warm, well-perfused    Data   Recent Labs   Lab 03/27/19  0541 03/26/19  1542   WBC 6.8 7.3   HGB 14.1 14.6   MCV 96 94   * 136*    139   POTASSIUM 4.1 3.9   CHLORIDE 105 98   CO2 20 24   BUN 8 11   CR 0.77 0.81   ANIONGAP 13 17*   KEELEY 9.2 10.1   GLC 95 166*   ALBUMIN 3.9 4.2   PROTTOTAL 7.2 7.8   BILITOTAL 1.1 1.3   ALKPHOS 96 108   ALT 96* 107*   AST 99* 115*   LIPASE 1,043* 885*     Recent Results (from the past 24 hour(s))   US Abdomen Limited    Narrative    US ABDOMEN LIMITED   3/27/2019 7:59 AM      HISTORY: abnormal LFTs, ETOH abuse, ETOH abuse, abnormal LFTs    COMPARISON: None    FINDINGS: The liver has a diffusely increased echotexture with no  focal abnormality. Liver span is 18.4. Visualized portions of the  pancreas are normal. The gallbladder is normal. Sonographic Cantrell's  sign is negative. There are no gallstones. Common duct measures 0.4  millimeters. The right kidney measures 12.9 centimeters. There is no  hydronephrosis.      Impression    IMPRESSION: Enlarged, fatty liver.    ALEXI SOLIS MD     Medications     0.9% sodium chloride + KCl 20 mEq/L 125 mL/hr at  03/27/19 1707     sodium chloride 1,000 mL (03/26/19 4529)       folic acid  1 mg Oral Daily     [START ON 3/28/2019] influenza vaccine adult (product based on age)  0.5 mL Intramuscular Prior to discharge     multivitamin w/minerals  1 tablet Oral Daily     pantoprazole  40 mg Oral QAM AC     vitamin B1  100 mg Oral Daily

## 2019-03-27 NOTE — PLAN OF CARE
Pt. admitted from ED at 0100 . Pt. accompanied by Mother, Veronique and arrived with personal belongings. Report was taken from Carmen HODGE RN.     Pt. is A&Ox4. VSS ex high BP. 02 sats are 98% on room air. Lung sounds are clear bilaterally with both anterior and posterior. Bowel sounds are active in all 4 quadrants. CMS and Neuros are intact. Denies numbness and tingling in all extremities. Pt denies pain. Pt. denies nausea, CP, SOB, lightheadedness, and dizziness. Pt is on a regular diet and appetite was good - pt ate [employer] before admission with no issue.    PIV is patent and infusing in L hand. Pt. educated on use and purpose of the incentive spirometer. Pt. is oriented to the room and call light system and the call light is within reach. Continue to monitor.    BP (!) 158/108   Pulse 98   Temp 98.7  F (37.1  C) (Oral)   Resp 18   Wt 102.1 kg (225 lb)   SpO2 97%     Pt is independent but is noticeably more tremulous when moving around and getting up - otherwise pretty steady on his feet.    MSSA at 0100: 10, given 10mg of Valium  MSSA at 0500: 13, given 10mg of Valium  Scoring mainly for tremor and sweating. Pt did sleep once he got settled and slept until lab came this am.

## 2019-03-27 NOTE — PROVIDER NOTIFICATION
Patients BP elevated this shift. 164/119, HR 99, 159/110, HR 97, 174/123, . Dr. Welsh is aware. PO PRN Hydralazine & IV Labetalol ordered. PO Hydralazine 25 mg given as ordered x 2, PO Valium given as ordered per MSSA protocol.

## 2019-03-28 LAB
ALBUMIN SERPL-MCNC: 3.4 G/DL (ref 3.4–5)
ALP SERPL-CCNC: 88 U/L (ref 40–150)
ALT SERPL W P-5'-P-CCNC: 73 U/L (ref 0–70)
ANION GAP SERPL CALCULATED.3IONS-SCNC: 10 MMOL/L (ref 3–14)
AST SERPL W P-5'-P-CCNC: 62 U/L (ref 0–45)
BILIRUB DIRECT SERPL-MCNC: 0.3 MG/DL (ref 0–0.2)
BILIRUB SERPL-MCNC: 0.9 MG/DL (ref 0.2–1.3)
BUN SERPL-MCNC: 6 MG/DL (ref 7–30)
CALCIUM SERPL-MCNC: 8.5 MG/DL (ref 8.5–10.1)
CHLORIDE SERPL-SCNC: 105 MMOL/L (ref 94–109)
CO2 SERPL-SCNC: 24 MMOL/L (ref 20–32)
CREAT SERPL-MCNC: 0.84 MG/DL (ref 0.66–1.25)
ERYTHROCYTE [DISTWIDTH] IN BLOOD BY AUTOMATED COUNT: 14.2 % (ref 10–15)
GFR SERPL CREATININE-BSD FRML MDRD: >90 ML/MIN/{1.73_M2}
GLUCOSE SERPL-MCNC: 86 MG/DL (ref 70–99)
HCT VFR BLD AUTO: 38.3 % (ref 40–53)
HGB BLD-MCNC: 13 G/DL (ref 13.3–17.7)
MAGNESIUM SERPL-MCNC: 2 MG/DL (ref 1.6–2.3)
MCH RBC QN AUTO: 32.7 PG (ref 26.5–33)
MCHC RBC AUTO-ENTMCNC: 33.9 G/DL (ref 31.5–36.5)
MCV RBC AUTO: 96 FL (ref 78–100)
PHOSPHATE SERPL-MCNC: 4.2 MG/DL (ref 2.5–4.5)
PLATELET # BLD AUTO: 95 10E9/L (ref 150–450)
POTASSIUM SERPL-SCNC: 4.1 MMOL/L (ref 3.4–5.3)
PROT SERPL-MCNC: 6.6 G/DL (ref 6.8–8.8)
RBC # BLD AUTO: 3.98 10E12/L (ref 4.4–5.9)
SODIUM SERPL-SCNC: 139 MMOL/L (ref 133–144)
WBC # BLD AUTO: 5.8 10E9/L (ref 4–11)

## 2019-03-28 PROCEDURE — 99232 SBSQ HOSP IP/OBS MODERATE 35: CPT | Performed by: INTERNAL MEDICINE

## 2019-03-28 PROCEDURE — 36415 COLL VENOUS BLD VENIPUNCTURE: CPT | Performed by: HOSPITALIST

## 2019-03-28 PROCEDURE — 25000132 ZZH RX MED GY IP 250 OP 250 PS 637: Performed by: HOSPITALIST

## 2019-03-28 PROCEDURE — 82248 BILIRUBIN DIRECT: CPT | Performed by: HOSPITALIST

## 2019-03-28 PROCEDURE — 85027 COMPLETE CBC AUTOMATED: CPT | Performed by: HOSPITALIST

## 2019-03-28 PROCEDURE — 83735 ASSAY OF MAGNESIUM: CPT | Performed by: HOSPITALIST

## 2019-03-28 PROCEDURE — 12000001 ZZH R&B MED SURG/OB UMMC

## 2019-03-28 PROCEDURE — 86709 HEPATITIS A IGM ANTIBODY: CPT | Performed by: HOSPITALIST

## 2019-03-28 PROCEDURE — 25000132 ZZH RX MED GY IP 250 OP 250 PS 637: Performed by: INTERNAL MEDICINE

## 2019-03-28 PROCEDURE — 84100 ASSAY OF PHOSPHORUS: CPT | Performed by: HOSPITALIST

## 2019-03-28 PROCEDURE — 25000128 H RX IP 250 OP 636: Performed by: HOSPITALIST

## 2019-03-28 PROCEDURE — 80053 COMPREHEN METABOLIC PANEL: CPT | Performed by: HOSPITALIST

## 2019-03-28 PROCEDURE — 87340 HEPATITIS B SURFACE AG IA: CPT | Performed by: HOSPITALIST

## 2019-03-28 PROCEDURE — 86705 HEP B CORE ANTIBODY IGM: CPT | Performed by: HOSPITALIST

## 2019-03-28 PROCEDURE — 86803 HEPATITIS C AB TEST: CPT | Performed by: HOSPITALIST

## 2019-03-28 RX ORDER — AMLODIPINE BESYLATE 5 MG/1
5 TABLET ORAL DAILY
Status: DISCONTINUED | OUTPATIENT
Start: 2019-03-28 | End: 2019-03-30 | Stop reason: HOSPADM

## 2019-03-28 RX ORDER — METOPROLOL TARTRATE 25 MG/1
25 TABLET, FILM COATED ORAL 2 TIMES DAILY
Status: DISCONTINUED | OUTPATIENT
Start: 2019-03-28 | End: 2019-03-28

## 2019-03-28 RX ORDER — HYDROXYZINE HYDROCHLORIDE 25 MG/1
25 TABLET, FILM COATED ORAL 3 TIMES DAILY PRN
Status: DISCONTINUED | OUTPATIENT
Start: 2019-03-28 | End: 2019-03-30 | Stop reason: HOSPADM

## 2019-03-28 RX ADMIN — DIAZEPAM 5 MG: 5 TABLET ORAL at 12:00

## 2019-03-28 RX ADMIN — DIAZEPAM 5 MG: 5 TABLET ORAL at 01:23

## 2019-03-28 RX ADMIN — DIAZEPAM 5 MG: 5 TABLET ORAL at 06:11

## 2019-03-28 RX ADMIN — MULTIPLE VITAMINS W/ MINERALS TAB 1 TABLET: TAB at 08:21

## 2019-03-28 RX ADMIN — DIAZEPAM 5 MG: 5 TABLET ORAL at 13:40

## 2019-03-28 RX ADMIN — HYDRALAZINE HYDROCHLORIDE 25 MG: 25 TABLET ORAL at 12:00

## 2019-03-28 RX ADMIN — Medication 100 MG: at 08:21

## 2019-03-28 RX ADMIN — HYDROXYZINE HYDROCHLORIDE 25 MG: 25 TABLET ORAL at 20:08

## 2019-03-28 RX ADMIN — KETOTIFEN FUMARATE 1 DROP: 0.35 SOLUTION/ DROPS OPHTHALMIC at 12:00

## 2019-03-28 RX ADMIN — LABETALOL 20 MG/4 ML (5 MG/ML) INTRAVENOUS SYRINGE 10 MG: at 08:28

## 2019-03-28 RX ADMIN — FOLIC ACID 1 MG: 1 TABLET ORAL at 08:21

## 2019-03-28 RX ADMIN — HYDRALAZINE HYDROCHLORIDE 25 MG: 25 TABLET ORAL at 06:12

## 2019-03-28 RX ADMIN — DIAZEPAM 5 MG: 5 TABLET ORAL at 21:59

## 2019-03-28 RX ADMIN — HYDROXYZINE HYDROCHLORIDE 25 MG: 25 TABLET ORAL at 13:29

## 2019-03-28 RX ADMIN — AMLODIPINE BESYLATE 5 MG: 5 TABLET ORAL at 13:40

## 2019-03-28 RX ADMIN — PANTOPRAZOLE SODIUM 40 MG: 40 TABLET, DELAYED RELEASE ORAL at 08:28

## 2019-03-28 RX ADMIN — DEXTRAN 70 AND HYPROMELLOSE 2910 1 DROP: 1; 3 SOLUTION/ DROPS OPHTHALMIC at 13:29

## 2019-03-28 RX ADMIN — HYDROXYZINE HYDROCHLORIDE 25 MG: 25 TABLET ORAL at 12:00

## 2019-03-28 ASSESSMENT — ACTIVITIES OF DAILY LIVING (ADL)
ADLS_ACUITY_SCORE: 10

## 2019-03-28 NOTE — PROVIDER NOTIFICATION
Patients BP's elevated again today. 162/109, HR 87, 150/114, HR 87, 160/102, HR 92, 150/114, . Dr. Welsh aware. IV Labetalol 10 mg x 1 given as ordered and PO Hydralazine 25 mg x 1 given as ordered. PO Norvasc 5 mg scheduled started this shift.

## 2019-03-28 NOTE — PLAN OF CARE
A/O x 4. Has mild tremors. MSSA 8 ,gave 5 mg valium. Up with SBA to the bathroom and voiding well. Mother is here. BP cont to be elevated. Cont to assess. Call light is in reach.     0614: MSSA score 9 and BP elevated. Gave 5 mg Valium and PO hydralazine. Will reassess. Up to bathroom to void. Reports sleeping well overnight.     0700: BP improved after hydralazine 154 98, less tremulous

## 2019-03-28 NOTE — PROGRESS NOTES
[facility]    Medicine Progress Note - Hospitalist Service       Date of Admission:  3/26/2019  Assessment & Plan         Nikhil Rios is a 33 year old male who presents with shaking and vomiting, headache, sweating, dizzy.  He has not drunk for the last 24 hours PTA  because of the vomiting.  He drinks vodka on daily basis around a pint per day. He is noted to have alcohol withdrawal  Admitted 3/26/2019     Alcohol dependency, withdrawal.    -Continue MSSA with Valium   -Multivitamin, thiamine, folic acid daily   -, CD consult   -Fall precautions        Nausea and vomiting.  Could be from alcohol withdrawal, alcoholic gastropathy.   No pain abdomen this morning   Nausea vomiting better   Unlikely acute pancreatitis   Ultrasound abdomen: enlarged. Fatty liver     -Supportive management  -IV fluids, antiemetics as needed   -Follow-up electrolytes and replace as needed    High blood pressure: No prior history.  Suspect related to alcohol withdrawal.  Hydralazine and labetalol as needed.  3/28/2019: amlodipine 5 mg daily  Treat alcohol withdrawal  Monitor     Hypomagnesemia: Replace as needed     Elevated liver enzymes likely from drinking.    Ultrasound abdomen: Fatty liver  LFTs trending down      Tobacco use he smokes 1-2 sick cigars while he is drinking; recommend cessation     Dry, itchy eyes  Allergies;     - hydroxyzine prn  - artificial tears prn  - Ketotifen ED      DVT Prophylaxis: PCD  Code Status: Full Code  Full code      Disposition Plan   Expected discharge: 2 - 3 days, recommended to prior living arrangement  Once detoxed.  Entered: Dion Welsh MD 03/28/2019, 4:46 PM       The patient's care was discussed with the Bedside Nurse and , mother at bedside..    Dion Welsh MD  Hospitalist Service  [facility]    ______________________________________________________________________    Interval History      Overall feels better  Shakes, tremors present  No hallucinations  No fever chills  Nausea vomiting better  No pain abdomen  Tolerating oral intake, voiding well.  bl itchy, dry eyes. No vision complaint.   No chest pain or shortness of breath    Data reviewed today: I reviewed all medications, new labs and imaging results over the last 24 hours. I personally reviewed 03.27:  the Ultrasound abdomen image(s) showing Enlarged, fatty liver.    Physical Exam   Vital Signs: Temp: 98.6  F (37  C) Temp src: Oral BP: (!) 141/96(nurse notified) Pulse: 87 Heart Rate: 111 Resp: 16 SpO2: 97 % O2 Device: None (Room air)    Weight: 225 lbs 0 oz     General Appearance: Anxious, tremulous  Respiratory: Nonlabored, clear bilateral  Cardiovascular: S1-S2 regular,   GI: Soft, nontender, nondistended, bowel sounds present  Skin: No new rash no jaundice.   Other: No pedal edema, distally warm, well-perfused    Data   Recent Labs   Lab 03/28/19  0638 03/27/19  0541 03/26/19  1542   WBC 5.8 6.8 7.3   HGB 13.0* 14.1 14.6   MCV 96 96 94   PLT 95* 117* 136*    138 139   POTASSIUM 4.1 4.1 3.9   CHLORIDE 105 105 98   CO2 24 20 24   BUN 6* 8 11   CR 0.84 0.77 0.81   ANIONGAP 10 13 17*   KEELEY 8.5 9.2 10.1   GLC 86 95 166*   ALBUMIN 3.4 3.9 4.2   PROTTOTAL 6.6* 7.2 7.8   BILITOTAL 0.9 1.1 1.3   ALKPHOS 88 96 108   ALT 73* 96* 107*   AST 62* 99* 115*   LIPASE  --  1,043* 885*     No results found for this or any previous visit (from the past 24 hour(s)).  Medications     sodium chloride 1,000 mL (03/26/19 2227)       amLODIPine  5 mg Oral Daily     folic acid  1 mg Oral Daily     influenza vaccine adult (product based on age)  0.5 mL Intramuscular Prior to discharge     multivitamin w/minerals  1 tablet Oral Daily     pantoprazole  40 mg Oral QAM AC     vitamin B1  100 mg Oral Daily

## 2019-03-28 NOTE — PLAN OF CARE
Pt A&O x's 4. BP elevated, hydralazine given x2.  Denies headache. Afebrile. 02 sats in 90 on RA. Lungs clear. Denies SOB, CP or nausea. Tolerating regular diet. Bowel sound active in all quadrants.last  BM on 3/27 passing gas. Voiding into toilet. No complain of pain. CMS intact, denies N/T. PIV patent and infusing.Pt slept between care and is able to make needs known, call light with in reach. Will continue to monitor.

## 2019-03-28 NOTE — PLAN OF CARE
Patient A & O x 4. Neuros and CMS intact except patient is having withdrawal symptoms with tremors. Patients eyes are red and very itchy today. Artificial tears and Zaditor anti-itch eye drops PRN given as ordered. PO Atarax 25 mg PRN given x 2 as ordered this shift. Patient on MSSA protocol and last scored a 9. PO Valium given per protocol. VSS except BP elevated 150/114 and afebrile, SpO2 96% on room air (see flowsheet), MD aware. PO PRN Hydralazine 25 mg and IV PRN Labetalol 10 mg given as ordered. Norvasc 5 mg po scheduled started this shift. LS clear, BS + x 4, patient passing flatus and had a BM yesterday. Patient urinating good amounts of clear yellow urine. Patient denies pain. Left PIV saline locked. Patient on a regular diet and tolerating it well. Patient up ad sergio independently to bathroom. Patient showered this shift. Hgb 13.0, K+ 4.1, Mg 2.0 this AM, MD aware. Patient able to make needs known. Call light within reach. Will continue with POC.

## 2019-03-29 LAB
HAV IGM SERPL QL IA: NONREACTIVE
HBV CORE IGM SERPL QL IA: NONREACTIVE
HBV SURFACE AG SERPL QL IA: NONREACTIVE
HCV AB SERPL QL IA: NONREACTIVE

## 2019-03-29 PROCEDURE — 12000001 ZZH R&B MED SURG/OB UMMC

## 2019-03-29 PROCEDURE — 25000132 ZZH RX MED GY IP 250 OP 250 PS 637: Performed by: HOSPITALIST

## 2019-03-29 PROCEDURE — 25000132 ZZH RX MED GY IP 250 OP 250 PS 637: Performed by: STUDENT IN AN ORGANIZED HEALTH CARE EDUCATION/TRAINING PROGRAM

## 2019-03-29 PROCEDURE — 99232 SBSQ HOSP IP/OBS MODERATE 35: CPT | Performed by: INTERNAL MEDICINE

## 2019-03-29 PROCEDURE — 25000132 ZZH RX MED GY IP 250 OP 250 PS 637: Performed by: INTERNAL MEDICINE

## 2019-03-29 RX ORDER — CARBOXYMETHYLCELLULOSE SODIUM 5 MG/ML
1 SOLUTION/ DROPS OPHTHALMIC 4 TIMES DAILY
Status: DISCONTINUED | OUTPATIENT
Start: 2019-03-29 | End: 2019-03-30 | Stop reason: HOSPADM

## 2019-03-29 RX ORDER — HYDRALAZINE HYDROCHLORIDE 25 MG/1
25 TABLET, FILM COATED ORAL EVERY 4 HOURS PRN
Status: DISCONTINUED | OUTPATIENT
Start: 2019-03-29 | End: 2019-03-30 | Stop reason: HOSPADM

## 2019-03-29 RX ADMIN — KETOTIFEN FUMARATE 1 DROP: 0.35 SOLUTION/ DROPS OPHTHALMIC at 20:44

## 2019-03-29 RX ADMIN — HYDRALAZINE HYDROCHLORIDE 25 MG: 25 TABLET ORAL at 13:38

## 2019-03-29 RX ADMIN — ATENOLOL 50 MG: 50 TABLET ORAL at 10:07

## 2019-03-29 RX ADMIN — AMLODIPINE BESYLATE 5 MG: 5 TABLET ORAL at 08:45

## 2019-03-29 RX ADMIN — FOLIC ACID 1 MG: 1 TABLET ORAL at 08:45

## 2019-03-29 RX ADMIN — KETOTIFEN FUMARATE 1 DROP: 0.35 SOLUTION/ DROPS OPHTHALMIC at 08:44

## 2019-03-29 RX ADMIN — DIAZEPAM 5 MG: 5 TABLET ORAL at 00:43

## 2019-03-29 RX ADMIN — DIAZEPAM 5 MG: 5 TABLET ORAL at 13:38

## 2019-03-29 RX ADMIN — DEXTRAN 70 AND HYPROMELLOSE 2910 1 DROP: 1; 3 SOLUTION/ DROPS OPHTHALMIC at 20:44

## 2019-03-29 RX ADMIN — KETOTIFEN FUMARATE 1 DROP: 0.35 SOLUTION/ DROPS OPHTHALMIC at 00:36

## 2019-03-29 RX ADMIN — Medication 100 MG: at 08:45

## 2019-03-29 RX ADMIN — DIAZEPAM 5 MG: 5 TABLET ORAL at 10:07

## 2019-03-29 RX ADMIN — HYDRALAZINE HYDROCHLORIDE 25 MG: 25 TABLET ORAL at 23:09

## 2019-03-29 RX ADMIN — PANTOPRAZOLE SODIUM 40 MG: 40 TABLET, DELAYED RELEASE ORAL at 08:45

## 2019-03-29 RX ADMIN — MULTIPLE VITAMINS W/ MINERALS TAB 1 TABLET: TAB at 08:45

## 2019-03-29 ASSESSMENT — ACTIVITIES OF DAILY LIVING (ADL)
ADLS_ACUITY_SCORE: 10

## 2019-03-29 NOTE — PLAN OF CARE
[id number]:pt is upset about discharge plan. Pt A&O x's 4. VSS, BP slightly elevated. Afebrile. 02 sats in the 90s on RA. Lungs clear. Denies SOB, chest or  nausea. Tolerating regular diet. Bowel sound active in all quadrants. No BM but passing gas. Voiding adequately into toilet. Denies Pain when asked.  CMS intact, denies N/T. PIV patent and SL. Pt up indepndely ambulating in hallway.  Pt is able to make needs known, call light with in reach. Will continue to monitor.     Pt was pacing in hallway, agitated , pt verbalized wanting to discharge.  Kesha updated and seen the pt.

## 2019-03-29 NOTE — PLAN OF CARE
BP better this shift. Appetite good. Valium given x1 this shift for MSSA 9. Pleasant and cooperative.. Up undependently. Complains of eyes itching not much relief from eye drops. Atarax given x1.

## 2019-03-29 NOTE — PROGRESS NOTES
[facility], [place]    Medicine Progress Note - Hospitalist Service       Date of Admission:  3/26/2019  Assessment & Plan         Nikhil Rios is a 33 year old male who presents with shaking and vomiting, headache, sweating, dizzy.  He has not drunk for the last 24 hours PTA  because of the vomiting.  He drinks vodka on daily basis around a pint per day. He is noted to have alcohol withdrawal  Admitted 3/26/2019     Alcohol dependency, withdrawal.    -Continue MSSA with Valium   -Multivitamin, thiamine, folic acid daily   -, CD consult   -Fall precautions        Nausea and vomiting.  Could be from alcohol withdrawal, alcoholic gastropathy.   Nausea vomiting better   Unlikely acute pancreatitis   Ultrasound abdomen: enlarged. Fatty liver     -Supportive management  -IV fluids, antiemetics as needed   -Follow-up electrolytes and replace as needed    High blood pressure: No prior history.  Suspect related to alcohol withdrawal.  Hydralazine and labetalol as needed. 3/28/2019: amlodipine 5 mg daily  Treat alcohol withdrawal  Monitor     Hypomagnesemia: Replace as needed     Elevated liver enzymes likely from drinking.  Ultrasound abdomen: Fatty liver  LFTs trending down      Tobacco use  recommend cessation     Dry, itchy eyes  Eyelid swelling. Conj injection:   ? Allergies vs infection.     - hydroxyzine prn  - artificial tears prn  - Ketotifen eye drops  - Ophthalmology consultation 3/29/2019        DVT Prophylaxis: PCD  Code Status: Full Code  Full code      Disposition Plan   Expected discharge: 2 - 3 days, recommended to prior living arrangement  Once detoxed.  Entered: Dion Welsh MD 03/29/2019, 9:22 AM       The patient's care was discussed with the Bedside Nurse and RN, mother on phone.      Addendum:     Pt still actively withdrawing and using diazepam. Tremulous, anxious. HBP. Insisted about discharge. Explained patient and mother on phone that he is not ready, not  safe for discharge, still needing diazepam, may put his life at risk if we discharge now.   May have to put him on 72 hr hold, if tries to leave. Mother agrees. D/w RN.     Dion Welsh MD  Hospitalist Service  [facility], [place]    ______________________________________________________________________    Interval History   Says  Feels better  Shakes, tremors still present  Anxious.   No hallucinations  No fever chills  Nausea vomiting better  No pain abdomen  Tolerating oral intake, voiding well.  bl itchy, dry eyes. Now some redness w swollen lower eyelid w mild discharge.   No vision complaint. No deep eye pain.   No chest pain or shortness of breath    Data reviewed today: I reviewed all medications, new labs and imaging results over the last 24 hours. I personally reviewed 03.27:  the Ultrasound abdomen image(s) showing Enlarged, fatty liver.    Physical Exam   Vital Signs: Temp: 98.5  F (36.9  C) Temp src: Oral BP: (!) 143/103 Pulse: 97 Heart Rate: 119 Resp: 14 SpO2: 93 % O2 Device: None (Room air)    Weight: 225 lbs 0 oz     General Appearance: Anxious, tremulous  HEENT: bl mild conj injection. Mild discharge. Lower eye lid L>R swelling.   Respiratory: Nonlabored, clear bilateral  Cardiovascular: S1-S2 regular,   GI: Soft, nontender, nondistended, bowel sounds present  Skin: No new rash no jaundice.   Other: No pedal edema, distally warm, well-perfused    Data   Recent Labs   Lab 03/28/19  0638 03/27/19  0541 03/26/19  1542   WBC 5.8 6.8 7.3   HGB 13.0* 14.1 14.6   MCV 96 96 94   PLT 95* 117* 136*    138 139   POTASSIUM 4.1 4.1 3.9   CHLORIDE 105 105 98   CO2 24 20 24   BUN 6* 8 11   CR 0.84 0.77 0.81   ANIONGAP 10 13 17*   KEELEY 8.5 9.2 10.1   GLC 86 95 166*   ALBUMIN 3.4 3.9 4.2   PROTTOTAL 6.6* 7.2 7.8   BILITOTAL 0.9 1.1 1.3   ALKPHOS 88 96 108   ALT 73* 96* 107*   AST 62* 99* 115*   LIPASE  --  1,043* 885*     No results found for this or any previous visit (from the  past 24 hour(s)).  Medications     sodium chloride 1,000 mL (03/26/19 2227)       amLODIPine  5 mg Oral Daily     folic acid  1 mg Oral Daily     influenza vaccine adult (product based on age)  0.5 mL Intramuscular Prior to discharge     multivitamin w/minerals  1 tablet Oral Daily     pantoprazole  40 mg Oral QAM AC     vitamin B1  100 mg Oral Daily

## 2019-03-29 NOTE — PLAN OF CARE
A/O x 4. No report of pain. Reports itchy eyes, eye gtt given.  VSS. BP still trends on the higher side, 150/99. MSSA 8, 5 mg valium given, tremulous. Up to bathroom independently. Call light in reach, cont to assess.

## 2019-03-29 NOTE — PLAN OF CARE
D: Pt  A/O x3. VSS ( HI BP - dr notified / NEW meds given). Lungs- CL, no c/o chest pain/ SOB. sats= 93 % RA.  Bowel+, passing gas. PP- +. No edema. CMS- intact.Pt has tremors . Pt taking PO REG food/ fluids well. Voiding Good amounts in BR. Pt up in room/ walks halls- INDEPENDENTLY. Pain/CAPA - denies. Pt MSSA= 13 and  14. Pt has very ITCHY eyes- eye consult put out.  A: con't to monitor. Call light in reach. Pt able to make needs known.

## 2019-03-29 NOTE — CONSULTS
OPHTHALMOLOGY CONSULT NOTE  03/29/19    Patient: Nikhil Rios  Consulted by: Dr. Welsh, primary medicine team  Reason for Consult: Bilateral eye itching, pain    HISTORY OF PRESENTING ILLNESS:     Nikhil Rios is a 33 year old male who is currently inpatient for treatment for EtOH withdrawal. He has been experiencing N, V - presumed d/t EtOH withdrawal. Also w/ hypomagnesemia, managed by primary team.    Primary team consults ophthalmology for evaluation of B/L eye itching, discomfort, mild eyelid mattering, and mild redness. Per pt's report, for the past 2-3 days his eyes have felt itching as primary syx, w/ mild mucus discharge present in the AM and not copious at any point during the day. He denies change in vision, loss of vision, or pain in the eyes besides itching on both eyes. Reports he has had itching of the eyes previously, but that it seems worse now than what he has experienced before. Has used Visine allergy previously w/ success for itching. Has not used this on this admission. Has used ATs TID for past 2 days w/ little help.    Does endorse mild cough, and longstanding seasonal allergies.    Review of systems were otherwise negative except for that which has been stated above.      OCULAR/MEDICAL/SURGICAL HISTORIES:     Past Ocular History:  denies    Past Medical History:   Diagnosis Date     Substance abuse (H)        History reviewed. No pertinent surgical history.    EXAMINATION:     Base Eye Exam     Visual Acuity (near card)       Right Left    Near sc J1+ J1+          Tonometry (Tonopen, 10:59 AM)       Right Left    Pressure 13 12          Pupils       Pupils Dark Light Shape React APD    Right PERRL 8 5 Round yes  None    Left PERRL 8 5 Round yes None          Visual Fields       Left Right     Full Full          Extraocular Movement       Right Left     Full, Ortho Full, Ortho          Neuro/Psych     Oriented x3:  Yes    Mood/Affect:  Normal          Dilation     Both eyes:  1.0%  Mydriacyl, 2.5% Demar Synephrine @ 11:00 AM            Slit Lamp and Fundus Exam     External Exam       Right Left    External noted mild tremor at rest noted mild tremor at rest          Slit Lamp Exam       Right Left    Lids/Lashes tr edema, erythema of lids, mild lash debris tr edema, erythema of lids, mild lash debris    Conjunctiva/Sclera mild injection, no discharge, no staining, few follicles on palpebral conj mild injection, no discharge, no staining, few follicles on palpebral conj    Cornea Clear Clear    Anterior Chamber Deep and quiet Deep and quiet    Iris round and reactive, pronounced hippus on pupil exam, dilates well round and reactive, pronounced hippus on pupil exam, dilates well    Lens Clear Clear    Vitreous Normal Normal          Fundus Exam       Right Left    Disc Normal Normal    C/D Ratio 0.2 0.2    Macula Normal Normal    Vessels Normal Normal    Periphery Normal Normal                Labs/Studies/Imaging Performed  None pertinent     ASSESSMENT/PLAN:     Nikhil Rios is a 33 year old male who presents with:    1. Allergic conjunctivitis, each eye  -Pt w/ itching as most prominent syx by far  -Noted to have h/o seasonal allergies  -Normal vision, normal eye exam including dilated eye exam    PLAN  -START Zaditor BID (antihistamine eyedrops) (ordered)  -START artificial tears QID (ordered)  -START oral antihistamines per primary team recommedations  -Will sign off - contact us if any further concerns or questions    It is our pleasure to participate in this patient's care and treatment. Please contact us with any further questions or concerns.    Seen and Discussed with Tim Leavitt MD - PGY3, Ophthalmology resident.    Dr. Alejandrina Victoria available for consultation    Js Tai MD   PGY-2 Ophthalmology  [phone]    Teaching Statement  I did not examine the patient, but was available to see the patient if needed. I have discussed the case with the resident and the assessment and  plan as documented seems reasonable to me.    Alejandrina Victoria MD  Comprehensive Ophthalmology & Ocular Pathology  Department of Ophthalmology and Visual Neurosciences  [email or web address]  Pager [phone]

## 2019-03-30 VITALS
WEIGHT: 225 LBS | HEART RATE: 98 BPM | DIASTOLIC BLOOD PRESSURE: 104 MMHG | RESPIRATION RATE: 18 BRPM | TEMPERATURE: 98.2 F | OXYGEN SATURATION: 97 % | SYSTOLIC BLOOD PRESSURE: 143 MMHG

## 2019-03-30 LAB
CREAT SERPL-MCNC: 1.07 MG/DL (ref 0.66–1.25)
GFR SERPL CREATININE-BSD FRML MDRD: >90 ML/MIN/{1.73_M2}
PLATELET # BLD AUTO: 153 10E9/L (ref 150–450)

## 2019-03-30 PROCEDURE — 36415 COLL VENOUS BLD VENIPUNCTURE: CPT | Performed by: HOSPITALIST

## 2019-03-30 PROCEDURE — 25000132 ZZH RX MED GY IP 250 OP 250 PS 637: Performed by: HOSPITALIST

## 2019-03-30 PROCEDURE — 82565 ASSAY OF CREATININE: CPT | Performed by: HOSPITALIST

## 2019-03-30 PROCEDURE — 99239 HOSP IP/OBS DSCHRG MGMT >30: CPT | Performed by: INTERNAL MEDICINE

## 2019-03-30 PROCEDURE — 85049 AUTOMATED PLATELET COUNT: CPT | Performed by: HOSPITALIST

## 2019-03-30 PROCEDURE — 25000132 ZZH RX MED GY IP 250 OP 250 PS 637: Performed by: STUDENT IN AN ORGANIZED HEALTH CARE EDUCATION/TRAINING PROGRAM

## 2019-03-30 PROCEDURE — 25000132 ZZH RX MED GY IP 250 OP 250 PS 637: Performed by: INTERNAL MEDICINE

## 2019-03-30 RX ORDER — CARBOXYMETHYLCELLULOSE SODIUM 5 MG/ML
1 SOLUTION/ DROPS OPHTHALMIC 4 TIMES DAILY
Qty: 1 BOTTLE | Refills: 1 | Status: ON HOLD | OUTPATIENT
Start: 2019-03-30 | End: 2019-06-04

## 2019-03-30 RX ORDER — MULTIPLE VITAMINS W/ MINERALS TAB 9MG-400MCG
1 TAB ORAL DAILY
Qty: 30 TABLET | Refills: 0 | Status: ON HOLD | OUTPATIENT
Start: 2019-03-31 | End: 2019-06-04

## 2019-03-30 RX ORDER — HYDROXYZINE HYDROCHLORIDE 25 MG/1
25 TABLET, FILM COATED ORAL 3 TIMES DAILY PRN
Qty: 30 TABLET | Refills: 0 | Status: ON HOLD | OUTPATIENT
Start: 2019-03-30 | End: 2019-06-04

## 2019-03-30 RX ORDER — AMLODIPINE BESYLATE 5 MG/1
5 TABLET ORAL DAILY
Qty: 30 TABLET | Refills: 0 | Status: ON HOLD | OUTPATIENT
Start: 2019-03-31 | End: 2019-06-04

## 2019-03-30 RX ORDER — FOLIC ACID 1 MG/1
1 TABLET ORAL DAILY
Qty: 30 TABLET | Refills: 0 | Status: ON HOLD | OUTPATIENT
Start: 2019-03-31 | End: 2019-06-04

## 2019-03-30 RX ADMIN — FOLIC ACID 1 MG: 1 TABLET ORAL at 08:13

## 2019-03-30 RX ADMIN — AMLODIPINE BESYLATE 5 MG: 5 TABLET ORAL at 08:13

## 2019-03-30 RX ADMIN — CARBOXYMETHYLCELLULOSE SODIUM 1 DROP: 5 SOLUTION/ DROPS OPHTHALMIC at 08:13

## 2019-03-30 RX ADMIN — Medication 100 MG: at 08:13

## 2019-03-30 RX ADMIN — KETOTIFEN FUMARATE 1 DROP: 0.35 SOLUTION/ DROPS OPHTHALMIC at 08:13

## 2019-03-30 RX ADMIN — PANTOPRAZOLE SODIUM 40 MG: 40 TABLET, DELAYED RELEASE ORAL at 06:59

## 2019-03-30 RX ADMIN — MULTIPLE VITAMINS W/ MINERALS TAB 1 TABLET: TAB at 08:13

## 2019-03-30 ASSESSMENT — ACTIVITIES OF DAILY LIVING (ADL)
ADLS_ACUITY_SCORE: 10

## 2019-03-30 NOTE — DISCHARGE SUMMARY
[facility], [place]  Hospitalist Discharge Summary       Date of Admission:  3/26/2019  Date of Discharge:  3/30/2019  Discharging Provider: Dion Welsh MD      Discharge Diagnoses     Alcohol dependency  Alcohol withdrawal  Anxiety disorder.   High bp ? Withdrawal, anxiety related.   Allergic eye disorder.     Follow-ups Needed After Discharge   Follow-up Appointments     Adult [facility] Follow-up and recommended labs and tests      Follow up with primary care provider, Physician No Ref-Primary, within 7   days for hospital follow- up.  The following labs/tests are recommended:   BP, LFT.  Follow-up on ? Anxiety disorder.     Ambulatory BP monitoring.     Outpatient Chemical dependency program for Alcohol dependency.   Appointments on [place] and/or [facility] (with [facility] or [facility]   provider or service). Call [phone] if you haven't heard regarding   these appointments within 7 days of discharge.             Unresulted Labs Ordered in the Past 30 Days of this Admission     No orders found from 1/25/2019 to 3/27/2019.          Discharge Disposition   Discharged to home  Condition at discharge: Stable    Hospital Course         Nikhil Rios is a 33 year old male who presents with shaking and vomiting, headache, sweating, dizzy.  He has not drunk for the last 24 hours PTA  because of the vomiting.  He drinks vodka on daily basis around a pint per day. He is noted to have alcohol withdrawal  Admitted 3/26/2019     Alcohol dependency, withdrawal.    -treated with MSSA with Valium   -Multivitamin, thiamine, folic acid daily   -, CD consult - pt to follow-up outpatient CD program. Sw, patient, mother on board.   -Fall precautions - no fall or seizure.   - on discharge day, pt still w tremors, anxious however not scoring high enough to get diazepam. Mother and patient comfortable with discharge planning.   Discharge on hydroxyzine prn for anxiety. Pt also takes it for  allergies.        Nausea and vomiting.  Could be from alcohol withdrawal, alcoholic gastropathy.   Nausea vomiting better   Unlikely acute pancreatitis   Ultrasound abdomen: enlarged. Fatty liver      -Supportive management  -IV fluids, antiemetics as needed given  -Follow-up electrolytes and replaced as needed     High blood pressure: No prior history.  Suspect related to alcohol withdrawal.  Hydralazine and labetalol as needed. 3/28/2019: amlodipine 5 mg daily  Treat alcohol withdrawal  Ambulatory bp monitor. Titrate med as needed.      Hypomagnesemia: Replaced as needed     Elevated liver enzymes likely from drinking.  Ultrasound abdomen: Fatty liver  LFTs trending down      Tobacco use  recommend cessation     Dry, itchy eyes  Eyelid swelling. Conj injection:   ? Allergies vs infection.      - hydroxyzine prn  - artificial tears prn  - Ketotifen eye drops  - Ophthalmology consultation 3/29/2019- done.          Consultations This Hospital Stay   CHEMICAL DEPENDENCY IP CONSULT  SOCIAL WORK IP CONSULT  OPHTHALMOLOGY IP CONSULT    Code Status   Full Code    Time Spent on this Encounter   Dion HOPSON, personally saw the patient today and spent greater than 30 minutes discharging this patient.       Dion Welsh MD  [facility], [place]  ______________________________________________________________________    Interval History     Says  Feels better  Mild Shakes, tremors still present.Anxious.   No hallucinations  No fever chills  Nausea vomiting better  No pain abdomen  Tolerating oral intake, voiding well.  bl itchy, dry eyes. Now some redness w swollen lower eyelid. No discharge now- improving.    No vision complaint. No deep eye pain.   No chest pain or shortness of breath    Requesting discharge home.   Mother present at bedside.     4 point ROS including Respi, CV, GI and , other than that noted above is negative      Physical Exam   Vital Signs: Temp: 98.2  F (36.8  C)  Temp src: Oral BP: (!) 143/104 Pulse: 98 Heart Rate: 94 Resp: 18 SpO2: 97 % O2 Device: None (Room air)    Weight: 225 lbs 0 oz    General Appearance: alert, interactive, Anxious,  HEENT: bl mild conj injection. Lower eye lid L>R swelling -- improving.   Respiratory: Nonlabored, clear bilateral  Cardiovascular: S1-S2 regular,   GI: Soft, nontender, nondistended,   Skin: No new rash no jaundice.   Other:  No pedal edema, distally warm, well-perfused  Neuro: grossly intact.              Primary Care Physician   Physician No Ref-Primary    Immunizations   Meds to Give Prior to Discharge (From admission, onward)    Start     Stop Status Route Frequency Ordered    03/28/19 1000  influenza quadrivalent (PF) vacc (FLUZONE or Flulaval or FLUARIX) injection 0.5 mL      -- Verified IM PRIOR TO DISCHARGE 03/27/19 0110          Discharge Orders      Reason for your hospital stay    Alcohol dependency, withdrawal.   Allergic eye reaction   Hypertension   ? Anxiety disorder.  Enlarged fatty liver.   Elevated liver functions: trending down.     Adult [facility] Follow-up and recommended labs and tests    Follow up with primary care provider, Physician No Ref-Primary, within 7 days for hospital follow- up.  The following labs/tests are recommended: BP, LFT.  Follow-up on ? Anxiety disorder.     Ambulatory BP monitoring.     Outpatient Chemical dependency program for Alcohol dependency.   Appointments on [place] and/or [facility] (with [facility] or [facility] provider or service). Call [phone] if you haven't heard regarding these appointments within 7 days of discharge.     Activity    Your activity upon discharge: activity as tolerated and no driving for today.     Monitor and record    blood pressure     Diet    Follow this diet upon discharge: Orders Placed This Encounter      Combination Diet Regular Diet Adult       Significant Results and Procedures   Most Recent 3 CBC's:  Recent Labs   Lab Test 03/30/19  0705 03/28/19  0638  03/27/19  0541 03/26/19  1542   WBC  --  5.8 6.8 7.3   HGB  --  13.0* 14.1 14.6   MCV  --  96 96 94    95* 117* 136*     Most Recent 3 BMP's:  Recent Labs   Lab Test 03/30/19  0705 03/28/19  0638 03/27/19  0541 03/26/19  1542   NA  --  139 138 139   POTASSIUM  --  4.1 4.1 3.9   CHLORIDE  --  105 105 98   CO2  --  24 20 24   BUN  --  6* 8 11   CR 1.07 0.84 0.77 0.81   ANIONGAP  --  10 13 17*   KEELEY  --  8.5 9.2 10.1   GLC  --  86 95 166*     Most Recent 2 LFT's:  Recent Labs   Lab Test 03/28/19  0638 03/27/19  0541   AST 62* 99*   ALT 73* 96*   ALKPHOS 88 96   BILITOTAL 0.9 1.1     Most Recent 3 INR's:No lab results found.,   Results for orders placed or performed during the hospital encounter of 03/26/19   US Abdomen Limited    Narrative    US ABDOMEN LIMITED   3/27/2019 7:59 AM      HISTORY: abnormal LFTs, ETOH abuse, ETOH abuse, abnormal LFTs    COMPARISON: None    FINDINGS: The liver has a diffusely increased echotexture with no  focal abnormality. Liver span is 18.4. Visualized portions of the  pancreas are normal. The gallbladder is normal. Sonographic Cantrell's  sign is negative. There are no gallstones. Common duct measures 0.4  millimeters. The right kidney measures 12.9 centimeters. There is no  hydronephrosis.      Impression    IMPRESSION: Enlarged, fatty liver.    ALEXI SOLIS MD       Discharge Medications   Current Discharge Medication List      START taking these medications    Details   amLODIPine (NORVASC) 5 MG tablet Take 1 tablet (5 mg) by mouth daily  Qty: 30 tablet, Refills: 0    Comments: Hold for systolic blood pressure <110, diastolic BP<60  Associated Diagnoses: Essential hypertension      Carboxymethylcellulose Sod PF (REFRESH PLUS) 0.5 % SOLN ophthalmic solution Place 1 drop into both eyes 4 times daily  Qty: 1 Bottle, Refills: 1    Associated Diagnoses: Allergic eye reaction      folic acid (FOLVITE) 1 MG tablet Take 1 tablet (1 mg) by mouth daily  Qty: 30 tablet, Refills: 0     Associated Diagnoses: Alcoholism /alcohol abuse (H)      hydrOXYzine (ATARAX) 25 MG tablet Take 1 tablet (25 mg) by mouth 3 times daily as needed for itching or anxiety  Qty: 30 tablet, Refills: 0    Associated Diagnoses: Allergic eye reaction; Anxiety      ketotifen (ZADITOR/REFRESH ANTI-ITCH) 0.025 % ophthalmic solution Place 1 drop into both eyes 2 times daily  Qty: 1 Bottle, Refills: 1    Associated Diagnoses: Allergic eye reaction      multivitamin w/minerals (THERA-VIT-M) tablet Take 1 tablet by mouth daily  Qty: 30 tablet, Refills: 0    Associated Diagnoses: Alcoholism /alcohol abuse (H)           Allergies   No Known Allergies

## 2019-03-30 NOTE — PLAN OF CARE
Pt is alert and oriented X4. BP elevated at bedtime 159/109 - PRN hydralazine administered - /91 on recheck. Vitals are otherwise stable and within normal limits. MSSA score 6 and 7 overnight - continues to be somewhat tremulous but gait is steady - ambulating independently in room and phipps. Lung sounds are clear. Bowel sounds normoacitve. Voiding spontaneously without difficulty. PIV is saline locked. CMS/neuros are intact. Able to make needs known. Will continue with plan of care.

## 2019-03-30 NOTE — PLAN OF CARE
Pt very anxious for discharge home. Discharge instructions reviewed with patient and mother. Blood pressure monitoring reviewed with patient. PT states he will purchase blood pressure machine to monitor blood pressure at home. Proper technique in using blood pressure machine reviewed. Pt to schedule appointment with primary MD on Monday 4/1/19. Pt states he does not have a primary MD ans has changed insurances. Pt to call insurance for MD availability of policy. Anxiety and withdrawal symptoms reviewed. Pt at present has tremors noted in hands but feels comfortable in going home. PT instructed not to drink. Pt to contact chemical dependency program for bed availability.Conjunctivitis of eye reviewed with patient. Care of eyes and eye gtt's reviewed. Pt states he understands all instructions. Discharged home with all belongings. Pt refused w/c or assist of  and wanted to walk to his mothers vehicle.    warm

## 2019-03-30 NOTE — DISCHARGE INSTRUCTIONS
CMP  Recent Labs   Lab 03/30/19  0705 03/28/19  0638 03/27/19  0541 03/26/19  1542   NA  --  139 138 139   POTASSIUM  --  4.1 4.1 3.9   CHLORIDE  --  105 105 98   CO2  --  24 20 24   ANIONGAP  --  10 13 17*   GLC  --  86 95 166*   BUN  --  6* 8 11   CR 1.07 0.84 0.77 0.81   GFRESTIMATED >90 >90 >90 >90   GFRESTBLACK >90 >90 >90 >90   KEELEY  --  8.5 9.2 10.1   MAG  --  2.0 2.3 1.5*   PHOS  --  4.2  --   --    PROTTOTAL  --  6.6* 7.2 7.8   ALBUMIN  --  3.4 3.9 4.2   BILITOTAL  --  0.9 1.1 1.3   ALKPHOS  --  88 96 108   AST  --  62* 99* 115*   ALT  --  73* 96* 107*     CBC  Recent Labs   Lab 03/30/19  0705 03/28/19  0638 03/27/19  0541 03/26/19  1542   WBC  --  5.8 6.8 7.3   RBC  --  3.98* 4.36* 4.54   HGB  --  13.0* 14.1 14.6   HCT  --  38.3* 41.7 42.7   MCV  --  96 96 94   MCH  --  32.7 32.3 32.2   MCHC  --  33.9 33.8 34.2   RDW  --  14.2 14.4 14.2    95* 117* 136*     Results for orders placed or performed during the hospital encounter of 03/26/19   US Abdomen Limited    Narrative    US ABDOMEN LIMITED   3/27/2019 7:59 AM      HISTORY: abnormal LFTs, ETOH abuse, ETOH abuse, abnormal LFTs    COMPARISON: None    FINDINGS: The liver has a diffusely increased echotexture with no  focal abnormality. Liver span is 18.4. Visualized portions of the  pancreas are normal. The gallbladder is normal. Sonographic Cantrell's  sign is negative. There are no gallstones. Common duct measures 0.4  millimeters. The right kidney measures 12.9 centimeters. There is no  hydronephrosis.      Impression    IMPRESSION: Enlarged, fatty liver.

## 2019-03-30 NOTE — PLAN OF CARE
Justo Johnston, RN   Registered Nurse   Nursing   Plan of Care   Signed   Date of Service:  3/29/2019  1:26 PM   Creation Time:  3/29/2019  1:26 PM                       ?Hide copied text    ?Vimal for details          D: Pt  A/O x3. VSS ( HI BP - dr notified). Lungs- CL, no c/o chest pain/ SOB. sats= 93 % RA.  Bowel+, passing gas. PP- +. No edema. CMS- intact.Pt has tremors . Pt taking PO REG food/ fluids well. Voiding Good amounts in BR. Pt up in room/ walks halls- INDEPENDENTLY. Pain/CAPA - denies. Pt MSSA= 7 Pt has very ITCHY eyes still eye gtt started.Pt DCd to home  all DCd instructions reviewed with pt. Med sent to HIS local pharmD.  A: con't to monitor. Call light in reach. Pt able to make needs known.Pt to DCd  soon

## 2019-03-31 ENCOUNTER — PATIENT OUTREACH (OUTPATIENT)
Dept: CARE COORDINATION | Facility: CLINIC | Age: 34
End: 2019-03-31

## 2019-04-02 NOTE — PROGRESS NOTES
Attempted to contact patient x3. Phone number on file is not working. Will close post-hospital call at this time.

## 2019-04-19 ENCOUNTER — TELEPHONE (OUTPATIENT)
Dept: FAMILY MEDICINE | Facility: CLINIC | Age: 34
End: 2019-04-19

## 2019-04-19 NOTE — TELEPHONE ENCOUNTER
I called to check up on the pt and help setup a hospital follow up.  The pt did not answer his phone, so I left a vm for the pt to give me a call back.

## 2019-04-19 NOTE — TELEPHONE ENCOUNTER
I called to cek up on the pt and help the pt setup a hospital follow up.  The pt did not answer his phone, so I left a vm for the pt to give me a call back.

## 2019-04-22 ENCOUNTER — TELEPHONE (OUTPATIENT)
Dept: FAMILY MEDICINE | Facility: CLINIC | Age: 34
End: 2019-04-22

## 2019-04-22 NOTE — TELEPHONE ENCOUNTER
I called to check up on the pt and help the pt setup a hospital follow up.  The pt did not answer his phone, so I left a vm for the pt to give me a call back.

## 2019-05-18 ENCOUNTER — HOSPITAL ENCOUNTER (INPATIENT)
Facility: CLINIC | Age: 34
LOS: 3 days | Discharge: HOME | DRG: 897 | End: 2019-05-21
Attending: INTERNAL MEDICINE | Admitting: INTERNAL MEDICINE
Payer: COMMERCIAL

## 2019-05-18 DIAGNOSIS — F10.230 ALCOHOL DEPENDENCE WITH UNCOMPLICATED WITHDRAWAL (H): Primary | ICD-10-CM

## 2019-05-18 DIAGNOSIS — F10.939 ALCOHOL WITHDRAWAL, WITH UNSPECIFIED COMPLICATION (H): ICD-10-CM

## 2019-05-18 DIAGNOSIS — H57.9 ALLERGIC EYE REACTION: ICD-10-CM

## 2019-05-18 DIAGNOSIS — F10.10 ALCOHOL ABUSE: ICD-10-CM

## 2019-05-18 DIAGNOSIS — F10.229 ALCOHOL DEPENDENCE WITH INTOXICATION WITH COMPLICATION (H): ICD-10-CM

## 2019-05-18 LAB
ALBUMIN SERPL-MCNC: 3.5 G/DL (ref 3.4–5)
ALBUMIN UR-MCNC: 30 MG/DL
ALCOHOL BREATH TEST: 0.22 (ref 0–0.01)
ALP SERPL-CCNC: 141 U/L (ref 40–150)
ALT SERPL W P-5'-P-CCNC: 62 U/L (ref 0–70)
AMPHETAMINES UR QL SCN: NEGATIVE
ANION GAP SERPL CALCULATED.3IONS-SCNC: 17 MMOL/L (ref 3–14)
APPEARANCE UR: CLEAR
AST SERPL W P-5'-P-CCNC: 109 U/L (ref 0–45)
BARBITURATES UR QL: NEGATIVE
BASOPHILS # BLD AUTO: 0.1 10E9/L (ref 0–0.2)
BASOPHILS NFR BLD AUTO: 0.5 %
BENZODIAZ UR QL: POSITIVE
BILIRUB SERPL-MCNC: 0.6 MG/DL (ref 0.2–1.3)
BILIRUB UR QL STRIP: NEGATIVE
BUN SERPL-MCNC: 5 MG/DL (ref 7–30)
CALCIUM SERPL-MCNC: 8.5 MG/DL (ref 8.5–10.1)
CANNABINOIDS UR QL SCN: NEGATIVE
CHLORIDE SERPL-SCNC: 100 MMOL/L (ref 94–109)
CO2 SERPL-SCNC: 23 MMOL/L (ref 20–32)
COCAINE UR QL: NEGATIVE
COLOR UR AUTO: YELLOW
CREAT SERPL-MCNC: 0.86 MG/DL (ref 0.66–1.25)
DIFFERENTIAL METHOD BLD: ABNORMAL
EOSINOPHIL # BLD AUTO: 0.2 10E9/L (ref 0–0.7)
EOSINOPHIL NFR BLD AUTO: 2.1 %
ERYTHROCYTE [DISTWIDTH] IN BLOOD BY AUTOMATED COUNT: 13.8 % (ref 10–15)
ETHANOL SERPL-MCNC: 0.26 G/DL
ETHANOL UR QL SCN: POSITIVE
GFR SERPL CREATININE-BSD FRML MDRD: >90 ML/MIN/{1.73_M2}
GLUCOSE SERPL-MCNC: 139 MG/DL (ref 70–99)
GLUCOSE UR STRIP-MCNC: 300 MG/DL
HCT VFR BLD AUTO: 44.1 % (ref 40–53)
HGB BLD-MCNC: 15.3 G/DL (ref 13.3–17.7)
HGB UR QL STRIP: NEGATIVE
IMM GRANULOCYTES # BLD: 0.1 10E9/L (ref 0–0.4)
IMM GRANULOCYTES NFR BLD: 0.6 %
KETONES UR STRIP-MCNC: NEGATIVE MG/DL
LACTATE BLD-SCNC: 2.9 MMOL/L (ref 0.7–2)
LACTATE BLD-SCNC: 3.4 MMOL/L (ref 0.7–2)
LACTATE BLD-SCNC: 4.7 MMOL/L (ref 0.7–2)
LEUKOCYTE ESTERASE UR QL STRIP: NEGATIVE
LIPASE SERPL-CCNC: 1442 U/L (ref 73–393)
LYMPHOCYTES # BLD AUTO: 2.7 10E9/L (ref 0.8–5.3)
LYMPHOCYTES NFR BLD AUTO: 28.6 %
MAGNESIUM SERPL-MCNC: 2 MG/DL (ref 1.6–2.3)
MCH RBC QN AUTO: 32.8 PG (ref 26.5–33)
MCHC RBC AUTO-ENTMCNC: 34.7 G/DL (ref 31.5–36.5)
MCV RBC AUTO: 95 FL (ref 78–100)
MONOCYTES # BLD AUTO: 0.5 10E9/L (ref 0–1.3)
MONOCYTES NFR BLD AUTO: 5.3 %
MUCOUS THREADS #/AREA URNS LPF: PRESENT /LPF
NEUTROPHILS # BLD AUTO: 5.8 10E9/L (ref 1.6–8.3)
NEUTROPHILS NFR BLD AUTO: 62.9 %
NITRATE UR QL: NEGATIVE
NRBC # BLD AUTO: 0 10*3/UL
NRBC BLD AUTO-RTO: 0 /100
OPIATES UR QL SCN: NEGATIVE
PH UR STRIP: 7.5 PH (ref 5–7)
PLATELET # BLD AUTO: 135 10E9/L (ref 150–450)
POTASSIUM SERPL-SCNC: 3.3 MMOL/L (ref 3.4–5.3)
PROCALCITONIN SERPL-MCNC: 0.07 NG/ML
PROT SERPL-MCNC: 8 G/DL (ref 6.8–8.8)
RBC # BLD AUTO: 4.66 10E12/L (ref 4.4–5.9)
RBC #/AREA URNS AUTO: 1 /HPF (ref 0–2)
SODIUM SERPL-SCNC: 140 MMOL/L (ref 133–144)
SOURCE: ABNORMAL
SP GR UR STRIP: 1.01 (ref 1–1.03)
UROBILINOGEN UR STRIP-MCNC: 4 MG/DL (ref 0–2)
WBC # BLD AUTO: 9.3 10E9/L (ref 4–11)
WBC #/AREA URNS AUTO: 1 /HPF (ref 0–5)

## 2019-05-18 PROCEDURE — 25000132 ZZH RX MED GY IP 250 OP 250 PS 637: Performed by: INTERNAL MEDICINE

## 2019-05-18 PROCEDURE — 80320 DRUG SCREEN QUANTALCOHOLS: CPT | Performed by: INTERNAL MEDICINE

## 2019-05-18 PROCEDURE — 99207 ZZC MOONLIGHTING INDICATOR: CPT | Performed by: HOSPITALIST

## 2019-05-18 PROCEDURE — 25800025 ZZH RX 258: Performed by: INTERNAL MEDICINE

## 2019-05-18 PROCEDURE — 82075 ASSAY OF BREATH ETHANOL: CPT | Performed by: INTERNAL MEDICINE

## 2019-05-18 PROCEDURE — 93005 ELECTROCARDIOGRAM TRACING: CPT | Performed by: INTERNAL MEDICINE

## 2019-05-18 PROCEDURE — 80307 DRUG TEST PRSMV CHEM ANLYZR: CPT | Performed by: INTERNAL MEDICINE

## 2019-05-18 PROCEDURE — 83605 ASSAY OF LACTIC ACID: CPT | Performed by: HOSPITALIST

## 2019-05-18 PROCEDURE — 81001 URINALYSIS AUTO W/SCOPE: CPT | Performed by: INTERNAL MEDICINE

## 2019-05-18 PROCEDURE — 12000001 ZZH R&B MED SURG/OB UMMC

## 2019-05-18 PROCEDURE — 84145 PROCALCITONIN (PCT): CPT | Performed by: HOSPITALIST

## 2019-05-18 PROCEDURE — 96375 TX/PRO/DX INJ NEW DRUG ADDON: CPT | Performed by: INTERNAL MEDICINE

## 2019-05-18 PROCEDURE — 87040 BLOOD CULTURE FOR BACTERIA: CPT | Performed by: HOSPITALIST

## 2019-05-18 PROCEDURE — 83735 ASSAY OF MAGNESIUM: CPT | Performed by: INTERNAL MEDICINE

## 2019-05-18 PROCEDURE — 25000128 H RX IP 250 OP 636: Performed by: INTERNAL MEDICINE

## 2019-05-18 PROCEDURE — 85025 COMPLETE CBC W/AUTO DIFF WBC: CPT | Performed by: INTERNAL MEDICINE

## 2019-05-18 PROCEDURE — 99285 EMERGENCY DEPT VISIT HI MDM: CPT | Mod: 25 | Performed by: INTERNAL MEDICINE

## 2019-05-18 PROCEDURE — 83690 ASSAY OF LIPASE: CPT | Performed by: HOSPITALIST

## 2019-05-18 PROCEDURE — 25800030 ZZH RX IP 258 OP 636

## 2019-05-18 PROCEDURE — 36415 COLL VENOUS BLD VENIPUNCTURE: CPT | Performed by: HOSPITALIST

## 2019-05-18 PROCEDURE — 93010 ELECTROCARDIOGRAM REPORT: CPT | Mod: Z6 | Performed by: INTERNAL MEDICINE

## 2019-05-18 PROCEDURE — 25000132 ZZH RX MED GY IP 250 OP 250 PS 637: Performed by: HOSPITALIST

## 2019-05-18 PROCEDURE — 96366 THER/PROPH/DIAG IV INF ADDON: CPT | Performed by: INTERNAL MEDICINE

## 2019-05-18 PROCEDURE — 80053 COMPREHEN METABOLIC PANEL: CPT | Performed by: INTERNAL MEDICINE

## 2019-05-18 PROCEDURE — HZ2ZZZZ DETOXIFICATION SERVICES FOR SUBSTANCE ABUSE TREATMENT: ICD-10-PCS | Performed by: INTERNAL MEDICINE

## 2019-05-18 PROCEDURE — 25000128 H RX IP 250 OP 636: Performed by: HOSPITALIST

## 2019-05-18 PROCEDURE — 25000125 ZZHC RX 250: Performed by: INTERNAL MEDICINE

## 2019-05-18 PROCEDURE — 96361 HYDRATE IV INFUSION ADD-ON: CPT | Performed by: INTERNAL MEDICINE

## 2019-05-18 PROCEDURE — 96365 THER/PROPH/DIAG IV INF INIT: CPT | Performed by: INTERNAL MEDICINE

## 2019-05-18 RX ORDER — PROCHLORPERAZINE 25 MG
25 SUPPOSITORY, RECTAL RECTAL EVERY 12 HOURS PRN
Status: DISCONTINUED | OUTPATIENT
Start: 2019-05-18 | End: 2019-05-21 | Stop reason: HOSPADM

## 2019-05-18 RX ORDER — DEXTROSE MONOHYDRATE, SODIUM CHLORIDE, AND POTASSIUM CHLORIDE 50; 1.49; 9 G/1000ML; G/1000ML; G/1000ML
INJECTION, SOLUTION INTRAVENOUS CONTINUOUS
Status: DISCONTINUED | OUTPATIENT
Start: 2019-05-18 | End: 2019-05-18

## 2019-05-18 RX ORDER — GABAPENTIN 300 MG/1
CAPSULE ORAL
Qty: 10 CAPSULE | Refills: 0 | Status: SHIPPED | OUTPATIENT
Start: 2019-05-18 | End: 2019-05-21

## 2019-05-18 RX ORDER — SODIUM CHLORIDE AND POTASSIUM CHLORIDE 150; 900 MG/100ML; MG/100ML
INJECTION, SOLUTION INTRAVENOUS CONTINUOUS
Status: DISCONTINUED | OUTPATIENT
Start: 2019-05-18 | End: 2019-05-21 | Stop reason: HOSPADM

## 2019-05-18 RX ORDER — LANOLIN ALCOHOL/MO/W.PET/CERES
100 CREAM (GRAM) TOPICAL DAILY
Status: DISCONTINUED | OUTPATIENT
Start: 2019-05-18 | End: 2019-05-21 | Stop reason: HOSPADM

## 2019-05-18 RX ORDER — ONDANSETRON 2 MG/ML
4 INJECTION INTRAMUSCULAR; INTRAVENOUS EVERY 6 HOURS PRN
Status: DISCONTINUED | OUTPATIENT
Start: 2019-05-18 | End: 2019-05-21 | Stop reason: HOSPADM

## 2019-05-18 RX ORDER — MULTIPLE VITAMINS W/ MINERALS TAB 9MG-400MCG
1 TAB ORAL DAILY
Status: DISCONTINUED | OUTPATIENT
Start: 2019-05-18 | End: 2019-05-21 | Stop reason: HOSPADM

## 2019-05-18 RX ORDER — ATENOLOL 50 MG/1
50 TABLET ORAL DAILY PRN
Status: DISCONTINUED | OUTPATIENT
Start: 2019-05-18 | End: 2019-05-18

## 2019-05-18 RX ORDER — DIAZEPAM 5 MG
5-20 TABLET ORAL EVERY 30 MIN PRN
Status: DISCONTINUED | OUTPATIENT
Start: 2019-05-18 | End: 2019-05-21 | Stop reason: HOSPADM

## 2019-05-18 RX ORDER — PROCHLORPERAZINE MALEATE 5 MG
10 TABLET ORAL EVERY 6 HOURS PRN
Status: DISCONTINUED | OUTPATIENT
Start: 2019-05-18 | End: 2019-05-21 | Stop reason: HOSPADM

## 2019-05-18 RX ORDER — LORAZEPAM 1 MG/1
1 TABLET ORAL ONCE
Status: DISCONTINUED | OUTPATIENT
Start: 2019-05-18 | End: 2019-05-21 | Stop reason: HOSPADM

## 2019-05-18 RX ORDER — CLONIDINE HYDROCHLORIDE 0.1 MG/1
0.1 TABLET ORAL 2 TIMES DAILY PRN
Status: DISCONTINUED | OUTPATIENT
Start: 2019-05-18 | End: 2019-05-21 | Stop reason: HOSPADM

## 2019-05-18 RX ORDER — SODIUM CHLORIDE 9 MG/ML
INJECTION, SOLUTION INTRAVENOUS
Status: DISCONTINUED
Start: 2019-05-18 | End: 2019-05-18 | Stop reason: HOSPADM

## 2019-05-18 RX ORDER — LORAZEPAM 1 MG/1
1 TABLET ORAL ONCE
Status: COMPLETED | OUTPATIENT
Start: 2019-05-18 | End: 2019-05-18

## 2019-05-18 RX ORDER — ALUMINA, MAGNESIA, AND SIMETHICONE 2400; 2400; 240 MG/30ML; MG/30ML; MG/30ML
15 SUSPENSION ORAL EVERY 4 HOURS PRN
Status: DISCONTINUED | OUTPATIENT
Start: 2019-05-18 | End: 2019-05-21 | Stop reason: HOSPADM

## 2019-05-18 RX ORDER — GABAPENTIN 300 MG/1
300 CAPSULE ORAL ONCE
Status: COMPLETED | OUTPATIENT
Start: 2019-05-18 | End: 2019-05-18

## 2019-05-18 RX ORDER — SODIUM CHLORIDE 9 MG/ML
1000 INJECTION, SOLUTION INTRAVENOUS CONTINUOUS
Status: DISCONTINUED | OUTPATIENT
Start: 2019-05-18 | End: 2019-05-21 | Stop reason: HOSPADM

## 2019-05-18 RX ORDER — ATENOLOL 50 MG/1
50 TABLET ORAL DAILY PRN
Status: DISCONTINUED | OUTPATIENT
Start: 2019-05-18 | End: 2019-05-21 | Stop reason: HOSPADM

## 2019-05-18 RX ORDER — PANTOPRAZOLE SODIUM 40 MG/1
40 TABLET, DELAYED RELEASE ORAL
Status: DISCONTINUED | OUTPATIENT
Start: 2019-05-18 | End: 2019-05-21 | Stop reason: HOSPADM

## 2019-05-18 RX ORDER — POTASSIUM CHLORIDE 750 MG/1
40 TABLET, EXTENDED RELEASE ORAL ONCE
Status: COMPLETED | OUTPATIENT
Start: 2019-05-18 | End: 2019-05-18

## 2019-05-18 RX ORDER — ONDANSETRON 4 MG/1
4 TABLET, ORALLY DISINTEGRATING ORAL EVERY 6 HOURS PRN
Status: DISCONTINUED | OUTPATIENT
Start: 2019-05-18 | End: 2019-05-21 | Stop reason: HOSPADM

## 2019-05-18 RX ORDER — FOLIC ACID 1 MG/1
1 TABLET ORAL DAILY
Status: DISCONTINUED | OUTPATIENT
Start: 2019-05-18 | End: 2019-05-21 | Stop reason: HOSPADM

## 2019-05-18 RX ORDER — LOPERAMIDE HCL 2 MG
2 CAPSULE ORAL 4 TIMES DAILY PRN
Status: DISCONTINUED | OUTPATIENT
Start: 2019-05-18 | End: 2019-05-21 | Stop reason: HOSPADM

## 2019-05-18 RX ORDER — NALOXONE HYDROCHLORIDE 0.4 MG/ML
.1-.4 INJECTION, SOLUTION INTRAMUSCULAR; INTRAVENOUS; SUBCUTANEOUS
Status: DISCONTINUED | OUTPATIENT
Start: 2019-05-18 | End: 2019-05-21 | Stop reason: HOSPADM

## 2019-05-18 RX ORDER — METOCLOPRAMIDE HYDROCHLORIDE 5 MG/ML
10 INJECTION INTRAMUSCULAR; INTRAVENOUS ONCE
Status: COMPLETED | OUTPATIENT
Start: 2019-05-18 | End: 2019-05-18

## 2019-05-18 RX ORDER — ONDANSETRON 2 MG/ML
4 INJECTION INTRAMUSCULAR; INTRAVENOUS ONCE
Status: COMPLETED | OUTPATIENT
Start: 2019-05-18 | End: 2019-05-18

## 2019-05-18 RX ADMIN — SODIUM CHLORIDE 1000 ML: 9 INJECTION, SOLUTION INTRAVENOUS at 10:30

## 2019-05-18 RX ADMIN — METOCLOPRAMIDE 10 MG: 5 INJECTION, SOLUTION INTRAMUSCULAR; INTRAVENOUS at 11:17

## 2019-05-18 RX ADMIN — PANTOPRAZOLE SODIUM 40 MG: 40 TABLET, DELAYED RELEASE ORAL at 19:09

## 2019-05-18 RX ADMIN — POTASSIUM CHLORIDE AND SODIUM CHLORIDE: 900; 150 INJECTION, SOLUTION INTRAVENOUS at 19:07

## 2019-05-18 RX ADMIN — SODIUM CHLORIDE 1000 ML: 9 INJECTION, SOLUTION INTRAVENOUS at 14:56

## 2019-05-18 RX ADMIN — SODIUM CHLORIDE 1500 ML: 9 INJECTION, SOLUTION INTRAVENOUS at 19:35

## 2019-05-18 RX ADMIN — SODIUM CHLORIDE 1000 ML: 9 INJECTION, SOLUTION INTRAVENOUS at 16:17

## 2019-05-18 RX ADMIN — ONDANSETRON 4 MG: 2 INJECTION INTRAMUSCULAR; INTRAVENOUS at 14:55

## 2019-05-18 RX ADMIN — GABAPENTIN 300 MG: 300 CAPSULE ORAL at 13:20

## 2019-05-18 RX ADMIN — DIAZEPAM 5 MG: 5 TABLET ORAL at 23:23

## 2019-05-18 RX ADMIN — FOLIC ACID 1 MG: 1 TABLET ORAL at 19:09

## 2019-05-18 RX ADMIN — LORAZEPAM 1 MG: 1 TABLET ORAL at 16:59

## 2019-05-18 RX ADMIN — ATENOLOL 50 MG: 50 TABLET ORAL at 19:34

## 2019-05-18 RX ADMIN — DIAZEPAM 5 MG: 5 TABLET ORAL at 19:09

## 2019-05-18 RX ADMIN — THIAMINE HCL TAB 100 MG 100 MG: 100 TAB at 19:09

## 2019-05-18 RX ADMIN — Medication 1000 ML: at 10:30

## 2019-05-18 RX ADMIN — LORAZEPAM 1 MG: 1 TABLET ORAL at 16:36

## 2019-05-18 RX ADMIN — ENOXAPARIN SODIUM 40 MG: 40 INJECTION SUBCUTANEOUS at 19:10

## 2019-05-18 RX ADMIN — POTASSIUM CHLORIDE 40 MEQ: 750 TABLET, EXTENDED RELEASE ORAL at 19:09

## 2019-05-18 RX ADMIN — THIAMINE HYDROCHLORIDE: 100 INJECTION, SOLUTION INTRAMUSCULAR; INTRAVENOUS at 11:47

## 2019-05-18 RX ADMIN — DIAZEPAM 5 MG: 5 TABLET ORAL at 21:27

## 2019-05-18 RX ADMIN — MULTIPLE VITAMINS W/ MINERALS TAB 1 TABLET: TAB at 19:09

## 2019-05-18 ASSESSMENT — ENCOUNTER SYMPTOMS
VOMITING: 1
ABDOMINAL PAIN: 0
NAUSEA: 1
TREMORS: 1

## 2019-05-18 ASSESSMENT — ACTIVITIES OF DAILY LIVING (ADL): ADLS_ACUITY_SCORE: 10

## 2019-05-18 NOTE — PHARMACY-ADMISSION MEDICATION HISTORY
Admission medication history for the May 18, 2019 admission has been completed by pharmacy.     Interview sources:  patient, pharmacy dispense records     Medication compliance: poor, filled meds     Preferred Outpatient Pharmacy: [facility]     Additional medication history information:   All PTA medications below were prescribed at hospital discharge on 3/30/19. Pt reports he refilled the medications but never took them due to ongoing ETOH. Med list consistent with [facility] discharge med list from 4/18/19.    [place] :   lorazepam 1mg tablets dispensed on 4-18-19 for quantity #6 tablets (2 day supply)   - Did not add to PTA list since only a 2 day supply in April      Prior to Admission Medication List:  Prior to Admission medications    Medication Sig Last Dose Taking? Auth Provider          amLODIPine (NORVASC) 5 MG tablet Take 1 tablet (5 mg) by mouth daily not taking  Dion Welsh MD   Carboxymethylcellulose Sod PF (REFRESH PLUS) 0.5 % SOLN ophthalmic solution Place 1 drop into both eyes 4 times daily not taking  Dion Welsh MD   folic acid (FOLVITE) 1 MG tablet Take 1 tablet (1 mg) by mouth daily not taking  Dion Welsh MD   hydrOXYzine (ATARAX) 25 MG tablet Take 1 tablet (25 mg) by mouth 3 times daily as needed for itching or anxiety not taking  Dion Welsh MD   ketotifen (ZADITOR/REFRESH ANTI-ITCH) 0.025 % ophthalmic solution Place 1 drop into both eyes 2 times daily not taking  Dion Welsh MD   multivitamin w/minerals (THERA-VIT-M) tablet Take 1 tablet by mouth daily not taking  Dion Welsh MD       Time spent: 15 minutes    Medication history completed by:   Tori Snyder, PharmD, BCPP  [facility]  Available daily from 1-9 PM: phone [phone], ascom *[phone], pager [phone]

## 2019-05-18 NOTE — DISCHARGE INSTRUCTIONS
Please make an appointment to follow up with Your Chemical Dependency Program as soon as possible even if entirely better.

## 2019-05-18 NOTE — ED NOTES
Pt states he wants help with his drinking but does not want to go a detox center and only wants to stay here.  Pt states the last time he was here he was set up to get treatment and didn't go.  Later the Pt mother in the rm and talked to about treatment options and stated in front of his mom that he was never given any follow up instructions at his recent ED visits and at detox, so he didn't know what to do.

## 2019-05-18 NOTE — ED NOTES
[facility], [place]   ED Nurse to Floor Handoff     Nikhil Rios is a 33 year old male who speaks English and lives with family members,  in a home  They arrived in the ED by car from home    ED Chief Complaint: Alcohol Intoxication (last drink - pt does not know.  has been vomting. dehydrated)    ED Dx;   Final diagnoses:   Alcohol abuse   Alcohol dependence with intoxication with complication (H)   Alcohol withdrawal, with unspecified complication (H)         Needed?: No    Allergies: No Known Allergies.  Past Medical Hx:   Past Medical History:   Diagnosis Date     Substance abuse (H)       Baseline Mental status: WDL  Current Mental Status changes: at basesline    Infection present or suspected this encounter: no  Sepsis suspected: No  Isolation type: No active isolations     Activity level - Baseline/Home:  Independent  Activity Level - Current:   Stand with Assist    Bariatric equipment needed?: No    In the ED these meds were given:   Medications   0.9% sodium chloride BOLUS (0 mLs Intravenous Stopped 5/18/19 1119)     Followed by   sodium chloride 0.9% infusion (has no administration in time range)   0.9% sodium chloride BOLUS (0 mLs Intravenous Hold 5/18/19 1126)   LORazepam (ATIVAN) tablet 1 mg (1 mg Oral Not Given 5/18/19 1630)   dextrose 5% and 0.45% NaCl 1,000 mL with INFUVITE ADULT 10 mL, thiamine 100 mg, folic acid 1 mg infusion ( Intravenous Stopped 5/18/19 1420)   metoclopramide (REGLAN) injection 10 mg (10 mg Intravenous Given 5/18/19 1117)   gabapentin (NEURONTIN) capsule 300 mg (300 mg Oral Given 5/18/19 1320)   ondansetron (ZOFRAN) injection 4 mg (4 mg Intravenous Given 5/18/19 1455)   0.9% sodium chloride BOLUS (0 mLs Intravenous Stopped 5/18/19 1633)   0.9% sodium chloride BOLUS (1,000 mLs Intravenous New Bag 5/18/19 1617)   LORazepam (ATIVAN) tablet 1 mg (1 mg Oral Given 5/18/19 1636)   LORazepam (ATIVAN) tablet 1 mg (1 mg Oral Given 5/18/19 1659)        Drips running?  Yes    Home pump  No    Current LDAs  Peripheral IV 05/18/19 Right Hand (Active)   Number of days: 0       Labs results:   Labs Ordered and Resulted from Time of ED Arrival Up to the Time of Departure from the ED   CBC WITH PLATELETS DIFFERENTIAL - Abnormal; Notable for the following components:       Result Value    Platelet Count 135 (*)     All other components within normal limits   COMPREHENSIVE METABOLIC PANEL - Abnormal; Notable for the following components:    Potassium 3.3 (*)     Anion Gap 17 (*)     Glucose 139 (*)     Urea Nitrogen 5 (*)      (*)     All other components within normal limits   ALCOHOL ETHYL - Abnormal; Notable for the following components:    Ethanol g/dL 0.26 (*)     All other components within normal limits   ALCOHOL BREATH TEST POCT - Abnormal; Notable for the following components:    Alcohol Breath Test 0.217 (*)     All other components within normal limits   MAGNESIUM   DRUG ABUSE SCREEN 6 CHEM DEP URINE ([facility])   ROUTINE UA WITH MICROSCOPIC   PERIPHERAL IV CATHETER   CARDIAC CONTINUOUS MONITORING       Imaging Studies: No results found for this or any previous visit (from the past 24 hour(s)).    Recent vital signs:   /64   Pulse 120   Temp 98.8  F (37.1  C) (Axillary)   Resp 18   Wt 101.6 kg (224 lb)   SpO2 90%             Cardiac Rhythm: Tachycardia  Pt needs tele? Yes  Skin/wound Issues: None    Code Status: Full Code    Pain control: pt had none    Nausea control: good    Abnormal labs/tests/findings requiring intervention: K+ Low, SHERITA 0.26    Family present during ED course? Yes   Family Comments/Social Situation comments: Mom not here at time of admit    Tasks needing completion: UA needs to be collected    Senait Baptiste, RN  [phone] [facility]

## 2019-05-18 NOTE — ED PROVIDER NOTES
History     Chief Complaint   Patient presents with     Alcohol Intoxication     last drink - pt does not know.  has been vomting. dehydrated     HPI  Nikhil Rios is a 33 year old male with a history of alcohol abuse & frequent ED visits (admitted to [facility] on 4/15/19 for alcohol & again on 5/3/19 with resultant detox at [facility]) who presents with his mother for evaluation of alcohol withdrawal. He states he has been drinking about 750 mL of vodka a day for the past 1.5 months. Patient reports his last drink was late last night, early this morning. Currently, he complains of tremors, nausea, and vomiting. Denies abdominal pain or history of withdrawal seizures.     Past Medical History:   Diagnosis Date     Substance abuse (H)        History reviewed. No pertinent surgical history.    No family history on file.    Social History     Tobacco Use     Smoking status: Current Some Day Smoker     Smokeless tobacco: Never Used   Substance Use Topics     Alcohol use: Yes     Comment: 1 to 750ml daily       Current Facility-Administered Medications   Medication     0.9% sodium chloride BOLUS     LORazepam (ATIVAN) tablet 1 mg     sodium chloride 0.9% infusion     Current Outpatient Medications   Medication     gabapentin (NEURONTIN) 300 MG capsule     amLODIPine (NORVASC) 5 MG tablet     Carboxymethylcellulose Sod PF (REFRESH PLUS) 0.5 % SOLN ophthalmic solution     folic acid (FOLVITE) 1 MG tablet     hydrOXYzine (ATARAX) 25 MG tablet     ketotifen (ZADITOR/REFRESH ANTI-ITCH) 0.025 % ophthalmic solution     multivitamin w/minerals (THERA-VIT-M) tablet      No Known Allergies    I have reviewed the Medications, Allergies, Past Medical and Surgical History, and Social History in the Epic system.    Review of Systems   Gastrointestinal: Positive for nausea and vomiting. Negative for abdominal pain.   Neurological: Positive for tremors.   All other systems reviewed and are negative.      Physical Exam   BP:  91/67  Pulse: 130  Temp: 98.5  F (36.9  C)  Resp: 16  Weight: 101.6 kg (224 lb)  SpO2: 98 %      Physical Exam   Constitutional: He is oriented to person, place, and time. No distress.   HENT:   Head: Atraumatic.   Mouth/Throat: Oropharynx is clear and moist.   Eyes: Pupils are equal, round, and reactive to light. No scleral icterus.   Neck: Neck supple. No JVD present.   Cardiovascular: Regular rhythm, normal heart sounds and intact distal pulses. Tachycardia present. Exam reveals no gallop and no friction rub.   No murmur heard.  Pulmonary/Chest: Effort normal and breath sounds normal. No stridor. No respiratory distress. He has no wheezes. He has no rales. He exhibits no tenderness.   Abdominal: Soft. Bowel sounds are normal. He exhibits no distension and no mass. There is no tenderness. There is no rebound and no guarding. No hernia.   Musculoskeletal: He exhibits no edema or tenderness.   Neurological: He is alert and oriented to person, place, and time. No cranial nerve deficit.   Skin: Skin is warm. No rash noted. He is not diaphoretic.   Psychiatric: He has a normal mood and affect. His behavior is normal. Judgment and thought content normal.       ED Course        Procedures       10:58 AM  The patient was seen and examined by Dr. Alfaro in [place] [address].          EKG Interpretation:      Interpreted by Zenon Alfaro M.D.  Time reviewed: 11:08  Symptoms at time of EKG: Tremors, nausea, vomiting   Rhythm: sinus tachycardia  Rate: Tachycardia, 106 bpm  Axis: Normal  Ectopy: none  Conduction: normal  ST Segments/ T Waves: No ST-T wave changes  Q Waves: none  Comparison to prior: Unchanged from 3/2019    Clinical Impression: no acute changes    Results for orders placed or performed during the hospital encounter of 05/18/19 (from the past 24 hour(s))   Alcohol breath test POCT     Status: Abnormal    Collection Time: 05/18/19 10:06 AM   Result Value Ref Range    Alcohol Breath Test 0.217 (A) 0.00 - 0.01   CBC with  platelets differential     Status: Abnormal    Collection Time: 05/18/19 10:36 AM   Result Value Ref Range    WBC 9.3 4.0 - 11.0 10e9/L    RBC Count 4.66 4.4 - 5.9 10e12/L    Hemoglobin 15.3 13.3 - 17.7 g/dL    Hematocrit 44.1 40.0 - 53.0 %    MCV 95 78 - 100 fl    MCH 32.8 26.5 - 33.0 pg    MCHC 34.7 31.5 - 36.5 g/dL    RDW 13.8 10.0 - 15.0 %    Platelet Count 135 (L) 150 - 450 10e9/L    Diff Method Automated Method     % Neutrophils 62.9 %    % Lymphocytes 28.6 %    % Monocytes 5.3 %    % Eosinophils 2.1 %    % Basophils 0.5 %    % Immature Granulocytes 0.6 %    Nucleated RBCs 0 0 /100    Absolute Neutrophil 5.8 1.6 - 8.3 10e9/L    Absolute Lymphocytes 2.7 0.8 - 5.3 10e9/L    Absolute Monocytes 0.5 0.0 - 1.3 10e9/L    Absolute Eosinophils 0.2 0.0 - 0.7 10e9/L    Absolute Basophils 0.1 0.0 - 0.2 10e9/L    Abs Immature Granulocytes 0.1 0 - 0.4 10e9/L    Absolute Nucleated RBC 0.0    Comprehensive metabolic panel     Status: Abnormal    Collection Time: 05/18/19 10:36 AM   Result Value Ref Range    Sodium 140 133 - 144 mmol/L    Potassium 3.3 (L) 3.4 - 5.3 mmol/L    Chloride 100 94 - 109 mmol/L    Carbon Dioxide 23 20 - 32 mmol/L    Anion Gap 17 (H) 3 - 14 mmol/L    Glucose 139 (H) 70 - 99 mg/dL    Urea Nitrogen 5 (L) 7 - 30 mg/dL    Creatinine 0.86 0.66 - 1.25 mg/dL    GFR Estimate >90 >60 mL/min/[1.73_m2]    GFR Estimate If Black >90 >60 mL/min/[1.73_m2]    Calcium 8.5 8.5 - 10.1 mg/dL    Bilirubin Total 0.6 0.2 - 1.3 mg/dL    Albumin 3.5 3.4 - 5.0 g/dL    Protein Total 8.0 6.8 - 8.8 g/dL    Alkaline Phosphatase 141 40 - 150 U/L    ALT 62 0 - 70 U/L     (H) 0 - 45 U/L   Alcohol ethyl     Status: Abnormal    Collection Time: 05/18/19 10:36 AM   Result Value Ref Range    Ethanol g/dL 0.26 (H) <0.01 g/dL   Magnesium     Status: None    Collection Time: 05/18/19 10:36 AM   Result Value Ref Range    Magnesium 2.0 1.6 - 2.3 mg/dL           Labs Ordered and Resulted from Time of ED Arrival Up to the Time of  Departure from the ED   CBC WITH PLATELETS DIFFERENTIAL - Abnormal; Notable for the following components:       Result Value    Platelet Count 135 (*)     All other components within normal limits   COMPREHENSIVE METABOLIC PANEL - Abnormal; Notable for the following components:    Potassium 3.3 (*)     Anion Gap 17 (*)     Glucose 139 (*)     Urea Nitrogen 5 (*)      (*)     All other components within normal limits   ALCOHOL ETHYL - Abnormal; Notable for the following components:    Ethanol g/dL 0.26 (*)     All other components within normal limits   ALCOHOL BREATH TEST POCT - Abnormal; Notable for the following components:    Alcohol Breath Test 0.217 (*)     All other components within normal limits   MAGNESIUM   DRUG ABUSE SCREEN 6 CHEM DEP URINE ([facility])   ROUTINE UA WITH MICROSCOPIC   PERIPHERAL IV CATHETER   CARDIAC CONTINUOUS MONITORING            Assessments & Plan (with Medical Decision Making)  Alcohol withdraw with early DT in the pt with dependence and intoxication, initially saved a Detox bed per his mother but pt declined and wish to be discharged to home with neurontin, given reglan and felt better-took neurontin then vomited and more tachycardic to 's-tolerated po ativan with improvement to , now changed his mind again to be admitted for Detox, no Detox bed available, D/W Hospitalist-admit on tele.       I have reviewed the nursing notes.    I have reviewed the findings, diagnosis, plan and need for follow up with the patient.       Medication List      Started    gabapentin 300 MG capsule  Commonly known as:  NEURONTIN  One every 6 hours for 4 times then every 8 hours for 3 times then every 12 hours for two times then once.            Final diagnoses:   Alcohol abuse   Alcohol dependence with intoxication with complication (H)   Alcohol withdrawal, with unspecified complication (H)   INéstor, am serving as a trained medical scribe to document services personally performed by  Zenon Alfaro MD, based on the provider's statements to me.   I, Zenon Alfaro MD, was physically present and have reviewed and verified the accuracy of this note documented by Néstor Mcgraw.    5/18/2019   [facility], [place], EMERGENCY DEPARTMENT     Zenon Alfaro MD  05/18/19 7113

## 2019-05-18 NOTE — H&P
[facility], [place]    History and Physical  Hospitalist       Date of Admission:  5/18/2019  Date of Service (when I saw the patient): 05/18/19    Assessment & Plan     quintin Rios is a 33 year old male with HX of Urticareia, Allergic rhinits and alcohol abuse & frequent ED visits (admitted to [facility] on 4/15/19 for alcohol & again on 5/3/19 with resultant detox at [facility]). He was brought in by his mom with  With tremors, nausea, and vomiting. He has been drinking about 750 mL of vodka a day for the past 1.5 months. Patient reports his last drink was late last night, early this morning.  BAL was 0.26 g/dl on admit.     Alcohol intoxication, Alcohol Dependence, Alcohol withdrawal    -[facility] protocol with valium. No ho withdrawal seziures. Last drink was 2 am on 05/18/19.  -mild AST elevation likely from ETOH. Check INR in Am  -liver US 2 months ago showed fatty liver Dz  -Protonix daily  -CD consult. May need commitment. His mom wants him to do inpatient treatment. He did out pt treatment program 3 years ago. He has been drinking for last two years and heavy lately.     High Lipase: He had vomiting. It may be early pancreatitis from ETOH or due to vomiting. Monitor. On clear liquid diet. Recheck tomorrow. If develops no abdominal pain overnight, ADAT to low fat in AM.     Hypokalemia: Replace. Not on protocol. Recheck in AM    Lactic Acidosis: He is not septic. High LA likely due to ETOH intoxication and withdrawal. Give 1.5 L IVF bolus. On maintenance IVF. Recheck in 2 hr. Septic work up ordered. (BC and UA)    DVT Prophylaxis: Enoxaparin (Lovenox) SQ and Defer to primary service  Code Status: Full Code    Disposition: 2-3 days    Td Upton MD    Primary Care Physician   Physician No Ref-Primary    Chief Complaint     Alcohol intoxication    History is obtained from the patient    History of Present Illness      Nikhil Rios is a 33 year old male with HX of Urticareia,  Allergic rhinits and alcohol abuse & frequent ED visits (admitted to [facility] on 4/15/19 for alcohol & again on 5/3/19 with resultant detox at [facility]). He was brought in by his mom with  With tremors, nausea, and vomiting. He has been drinking about 750 mL of vodka a day for the past 1.5 months. Patient reports his last drink was late last night, early this morning. Denies abdominal pain or history of withdrawal seizures.     Past Medical History       Varicella without mention of complication   childhood     Allergic rhinitis, cause unspecified   OTC med prn     Closed dislocation of shoulder, unspecified site 1999   right     Urticaria 3/25/2010     Past Surgical History         Hx wisdom teeth extraction     Prior to Admission Medications   Prior to Admission Medications   Prescriptions Last Dose Informant Patient Reported? Taking?   Carboxymethylcellulose Sod PF (REFRESH PLUS) 0.5 % SOLN ophthalmic solution not taking  No No   Sig: Place 1 drop into both eyes 4 times daily   amLODIPine (NORVASC) 5 MG tablet not taking  No No   Sig: Take 1 tablet (5 mg) by mouth daily   folic acid (FOLVITE) 1 MG tablet not taking  No No   Sig: Take 1 tablet (1 mg) by mouth daily   hydrOXYzine (ATARAX) 25 MG tablet not taking  No No   Sig: Take 1 tablet (25 mg) by mouth 3 times daily as needed for itching or anxiety   ketotifen (ZADITOR/REFRESH ANTI-ITCH) 0.025 % ophthalmic solution not taking  No No   Sig: Place 1 drop into both eyes 2 times daily   multivitamin w/minerals (THERA-VIT-M) tablet not taking  No No   Sig: Take 1 tablet by mouth daily      Facility-Administered Medications: None     Allergies   No Known Allergies    Social History   I have reviewed this patient's social history and updated it with pertinent information if needed. Nikhil Rios  reports that he has been smoking.  He has never used smokeless tobacco. He reports that he drinks alcohol. He reports that he does not use drugs.    Family History   I  have reviewed this patient's family history and updated it with pertinent information if needed.   No family history on file.    Review of Systems   The 10 point Review of Systems is negative other than noted in the HPI or here.     Physical Exam   Temp: 99.2  F (37.3  C) Temp src: Axillary BP: (!) 153/98 Pulse: 112 Heart Rate: 110 Resp: 18 SpO2: 97 % O2 Device: None (Room air)    Vital Signs with Ranges  Temp:  [98.5  F (36.9  C)-99.2  F (37.3  C)] 99.2  F (37.3  C)  Pulse:  [112-130] 112  Heart Rate:  [110-114] 110  Resp:  [15-21] 18  BP: ()/(64-98) 153/98  SpO2:  [90 %-100 %] 97 %  224 lbs 0 oz    Constitutional:  Awake, alert, cooperative, no apparent distress.  Eyes: Conjunctiva and pupils examined and normal.  HEENT: Moist mucous membranes, normal dentition.  Respiratory: Clear to auscultation bilaterally, no crackles or wheezing.  Cardiovascular: Regular rate and rhythm, normal S1 and S2, and no murmur noted.  GI: Soft, non-distended, non-tender, normal bowel sounds.  Lymph/Hematologic: No anterior cervical or supraclavicular adenopathy.  Skin: No rashes, no cyanosis, no edema.  Musculoskeletal: No joint swelling, erythema or tenderness.  Neurologic: Cranial nerves 2-12 intact, normal strength and sensation.  Psychiatric: Alert, oriented to person, place and time, no obvious anxiety or depression.    Data   Data reviewed today:  I personally reviewed no images or EKG's today.  Recent Labs   Lab 05/18/19  1036   WBC 9.3   HGB 15.3   MCV 95   *      POTASSIUM 3.3*   CHLORIDE 100   CO2 23   BUN 5*   CR 0.86   ANIONGAP 17*   KEELEY 8.5   *   ALBUMIN 3.5   PROTTOTAL 8.0   BILITOTAL 0.6   ALKPHOS 141   ALT 62   *       No results found for this or any previous visit (from the past 24 hour(s)).

## 2019-05-18 NOTE — ED NOTES
Pt nausea is improved and given more to drink.  ED provider came and talk to Pt about treatment options.

## 2019-05-19 LAB
ALBUMIN SERPL-MCNC: 2.9 G/DL (ref 3.4–5)
ALP SERPL-CCNC: 114 U/L (ref 40–150)
ALT SERPL W P-5'-P-CCNC: 49 U/L (ref 0–70)
ANION GAP SERPL CALCULATED.3IONS-SCNC: 8 MMOL/L (ref 3–14)
AST SERPL W P-5'-P-CCNC: 90 U/L (ref 0–45)
BILIRUB SERPL-MCNC: 0.9 MG/DL (ref 0.2–1.3)
BUN SERPL-MCNC: 5 MG/DL (ref 7–30)
C COLI+JEJUNI+LARI FUSA STL QL NAA+PROBE: NOT DETECTED
CALCIUM SERPL-MCNC: 7.8 MG/DL (ref 8.5–10.1)
CHLORIDE SERPL-SCNC: 107 MMOL/L (ref 94–109)
CO2 SERPL-SCNC: 24 MMOL/L (ref 20–32)
CREAT SERPL-MCNC: 0.69 MG/DL (ref 0.66–1.25)
EC STX1 GENE STL QL NAA+PROBE: NOT DETECTED
EC STX2 GENE STL QL NAA+PROBE: NOT DETECTED
ENTERIC PATHOGEN COMMENT: NORMAL
ERYTHROCYTE [DISTWIDTH] IN BLOOD BY AUTOMATED COUNT: 14.3 % (ref 10–15)
GFR SERPL CREATININE-BSD FRML MDRD: >90 ML/MIN/{1.73_M2}
GLUCOSE SERPL-MCNC: 88 MG/DL (ref 70–99)
HCT VFR BLD AUTO: 40.5 % (ref 40–53)
HGB BLD-MCNC: 13 G/DL (ref 13.3–17.7)
INTERPRETATION ECG - MUSE: NORMAL
LACTATE BLD-SCNC: 1.8 MMOL/L (ref 0.7–2)
LIPASE SERPL-CCNC: 1072 U/L (ref 73–393)
MCH RBC QN AUTO: 31.6 PG (ref 26.5–33)
MCHC RBC AUTO-ENTMCNC: 32.1 G/DL (ref 31.5–36.5)
MCV RBC AUTO: 99 FL (ref 78–100)
NOROV GI+II ORF1-ORF2 JNC STL QL NAA+PR: NOT DETECTED
PLATELET # BLD AUTO: 85 10E9/L (ref 150–450)
POTASSIUM SERPL-SCNC: 4.1 MMOL/L (ref 3.4–5.3)
PROT SERPL-MCNC: 6.6 G/DL (ref 6.8–8.8)
RBC # BLD AUTO: 4.11 10E12/L (ref 4.4–5.9)
RVA NSP5 STL QL NAA+PROBE: NOT DETECTED
SALMONELLA SP RPOD STL QL NAA+PROBE: NOT DETECTED
SHIGELLA SP+EIEC IPAH STL QL NAA+PROBE: NOT DETECTED
SODIUM SERPL-SCNC: 139 MMOL/L (ref 133–144)
V CHOL+PARA RFBL+TRKH+TNAA STL QL NAA+PR: NOT DETECTED
WBC # BLD AUTO: 8.3 10E9/L (ref 4–11)
Y ENTERO RECN STL QL NAA+PROBE: NOT DETECTED

## 2019-05-19 PROCEDURE — 80053 COMPREHEN METABOLIC PANEL: CPT | Performed by: HOSPITALIST

## 2019-05-19 PROCEDURE — 99233 SBSQ HOSP IP/OBS HIGH 50: CPT | Performed by: INTERNAL MEDICINE

## 2019-05-19 PROCEDURE — 83605 ASSAY OF LACTIC ACID: CPT | Performed by: HOSPITALIST

## 2019-05-19 PROCEDURE — 99207 ZZC CDG-HISTORY COMP: MEETS EXP. PROBLEM FOCUSED - DOWN CODED LACK OF HPI: CPT | Performed by: INTERNAL MEDICINE

## 2019-05-19 PROCEDURE — 85027 COMPLETE CBC AUTOMATED: CPT | Performed by: HOSPITALIST

## 2019-05-19 PROCEDURE — 25800030 ZZH RX IP 258 OP 636: Performed by: INTERNAL MEDICINE

## 2019-05-19 PROCEDURE — 83690 ASSAY OF LIPASE: CPT | Performed by: HOSPITALIST

## 2019-05-19 PROCEDURE — 12000001 ZZH R&B MED SURG/OB UMMC

## 2019-05-19 PROCEDURE — 25000132 ZZH RX MED GY IP 250 OP 250 PS 637: Performed by: HOSPITALIST

## 2019-05-19 PROCEDURE — 87506 IADNA-DNA/RNA PROBE TQ 6-11: CPT | Performed by: HOSPITALIST

## 2019-05-19 PROCEDURE — 25000128 H RX IP 250 OP 636: Performed by: HOSPITALIST

## 2019-05-19 RX ADMIN — LOPERAMIDE HYDROCHLORIDE 2 MG: 2 CAPSULE ORAL at 08:20

## 2019-05-19 RX ADMIN — Medication 1 MG: at 00:49

## 2019-05-19 RX ADMIN — FOLIC ACID 1 MG: 1 TABLET ORAL at 08:15

## 2019-05-19 RX ADMIN — ATENOLOL 50 MG: 50 TABLET ORAL at 20:12

## 2019-05-19 RX ADMIN — DIAZEPAM 10 MG: 5 TABLET ORAL at 13:24

## 2019-05-19 RX ADMIN — DIAZEPAM 10 MG: 5 TABLET ORAL at 08:14

## 2019-05-19 RX ADMIN — DIAZEPAM 5 MG: 5 TABLET ORAL at 20:12

## 2019-05-19 RX ADMIN — DIAZEPAM 10 MG: 5 TABLET ORAL at 10:21

## 2019-05-19 RX ADMIN — LOPERAMIDE HYDROCHLORIDE 2 MG: 2 CAPSULE ORAL at 13:24

## 2019-05-19 RX ADMIN — MULTIPLE VITAMINS W/ MINERALS TAB 1 TABLET: TAB at 08:15

## 2019-05-19 RX ADMIN — ENOXAPARIN SODIUM 40 MG: 40 INJECTION SUBCUTANEOUS at 19:06

## 2019-05-19 RX ADMIN — POTASSIUM CHLORIDE AND SODIUM CHLORIDE: 900; 150 INJECTION, SOLUTION INTRAVENOUS at 16:14

## 2019-05-19 RX ADMIN — DIAZEPAM 5 MG: 5 TABLET ORAL at 00:49

## 2019-05-19 RX ADMIN — SODIUM CHLORIDE 1000 ML: 9 INJECTION, SOLUTION INTRAVENOUS at 08:13

## 2019-05-19 RX ADMIN — PANTOPRAZOLE SODIUM 40 MG: 40 TABLET, DELAYED RELEASE ORAL at 08:15

## 2019-05-19 RX ADMIN — DIAZEPAM 5 MG: 5 TABLET ORAL at 16:16

## 2019-05-19 RX ADMIN — THIAMINE HCL TAB 100 MG 100 MG: 100 TAB at 08:15

## 2019-05-19 RX ADMIN — POTASSIUM CHLORIDE AND SODIUM CHLORIDE: 900; 150 INJECTION, SOLUTION INTRAVENOUS at 00:00

## 2019-05-19 RX ADMIN — DIAZEPAM 10 MG: 5 TABLET ORAL at 07:01

## 2019-05-19 ASSESSMENT — ACTIVITIES OF DAILY LIVING (ADL)
ADLS_ACUITY_SCORE: 10

## 2019-05-19 NOTE — PLAN OF CARE
"Pt feeling a little better. MSSA 16-22, valium given as ordered. Pt did not sleep \"all night\". Tolerated clear diet no nausea, but poor appetite. Still having loose stools. Up SBA. 11:00 pt sleeping. 13:30 up still poor appetite. MSSA 23. Still shaky, yawning. Stools decreasing.   "

## 2019-05-19 NOTE — PLAN OF CARE
Pt A/O x 4. Mother is resting at the bedside. IVF infusing. MSSA score was a 14, 5 mg valium given. Pt is tremulous, mildy unsteady on feet. Stool sample collected. No report of pain. Call light in reach. Cont to assess.

## 2019-05-19 NOTE — PLAN OF CARE
Pt arrived on unit from ED by cart at 18:20  Alert and oriented x4,  denies n/v, reflux, CP or SOB.  LA 3.4-->4.7--> NS 1.5 L bolus given -->2.9 ( moonlighter aware)  Up with A1 and gait belt, used bathroom , voiding w/o difficulty  but loose stool x4 ( moonlighter notified). Prn Imodium ordered.  Tele --ST and then NSR.  BP and HR controlled with atenolol 50mg prn.  MSSA 10-14, moderate tremors and mild diaphoresis.  Valium 5mg given x 3.   Tolerates clear diet well.   Lovenox given for DVT ppx.  Will continue to monitor.

## 2019-05-20 ENCOUNTER — APPOINTMENT (OUTPATIENT)
Dept: ULTRASOUND IMAGING | Facility: CLINIC | Age: 34
DRG: 897 | End: 2019-05-20
Attending: INTERNAL MEDICINE
Payer: COMMERCIAL

## 2019-05-20 LAB
HGB BLD-MCNC: 15.4 G/DL (ref 13.3–17.7)
LIPASE SERPL-CCNC: 904 U/L (ref 73–393)
TRIGL SERPL-MCNC: 292 MG/DL

## 2019-05-20 PROCEDURE — 40000007 ZZH STATISTIC ADULT CD FACE TO FACE-NO CHRG

## 2019-05-20 PROCEDURE — 25000132 ZZH RX MED GY IP 250 OP 250 PS 637: Performed by: HOSPITALIST

## 2019-05-20 PROCEDURE — 99207 ZZC CDG-EXAM COMPONENT: MEETS DETAILED - DOWN CODED: CPT | Performed by: INTERNAL MEDICINE

## 2019-05-20 PROCEDURE — 83690 ASSAY OF LIPASE: CPT | Performed by: INTERNAL MEDICINE

## 2019-05-20 PROCEDURE — 25000132 ZZH RX MED GY IP 250 OP 250 PS 637: Performed by: INTERNAL MEDICINE

## 2019-05-20 PROCEDURE — 25000128 H RX IP 250 OP 636: Performed by: HOSPITALIST

## 2019-05-20 PROCEDURE — 76700 US EXAM ABDOM COMPLETE: CPT

## 2019-05-20 PROCEDURE — 12000001 ZZH R&B MED SURG/OB UMMC

## 2019-05-20 PROCEDURE — 99232 SBSQ HOSP IP/OBS MODERATE 35: CPT | Performed by: INTERNAL MEDICINE

## 2019-05-20 PROCEDURE — 36415 COLL VENOUS BLD VENIPUNCTURE: CPT | Performed by: INTERNAL MEDICINE

## 2019-05-20 PROCEDURE — 85018 HEMOGLOBIN: CPT | Performed by: INTERNAL MEDICINE

## 2019-05-20 PROCEDURE — 84478 ASSAY OF TRIGLYCERIDES: CPT | Performed by: INTERNAL MEDICINE

## 2019-05-20 RX ORDER — DIPHENHYDRAMINE HCL 25 MG
25 CAPSULE ORAL ONCE
Status: COMPLETED | OUTPATIENT
Start: 2019-05-20 | End: 2019-05-20

## 2019-05-20 RX ORDER — GABAPENTIN 600 MG/1
300 TABLET ORAL 3 TIMES DAILY
Status: DISCONTINUED | OUTPATIENT
Start: 2019-05-20 | End: 2019-05-21 | Stop reason: HOSPADM

## 2019-05-20 RX ADMIN — MULTIPLE VITAMINS W/ MINERALS TAB 1 TABLET: TAB at 07:46

## 2019-05-20 RX ADMIN — DIAZEPAM 5 MG: 5 TABLET ORAL at 00:19

## 2019-05-20 RX ADMIN — DIPHENHYDRAMINE HYDROCHLORIDE 25 MG: 25 CAPSULE ORAL at 21:07

## 2019-05-20 RX ADMIN — DIAZEPAM 5 MG: 5 TABLET ORAL at 09:04

## 2019-05-20 RX ADMIN — DIAZEPAM 5 MG: 5 TABLET ORAL at 02:40

## 2019-05-20 RX ADMIN — Medication 300 MG: at 10:59

## 2019-05-20 RX ADMIN — PANTOPRAZOLE SODIUM 40 MG: 40 TABLET, DELAYED RELEASE ORAL at 07:46

## 2019-05-20 RX ADMIN — THIAMINE HCL TAB 100 MG 100 MG: 100 TAB at 07:46

## 2019-05-20 RX ADMIN — Medication 300 MG: at 21:07

## 2019-05-20 RX ADMIN — Medication 300 MG: at 14:02

## 2019-05-20 RX ADMIN — FOLIC ACID 1 MG: 1 TABLET ORAL at 07:46

## 2019-05-20 RX ADMIN — POTASSIUM CHLORIDE AND SODIUM CHLORIDE: 900; 150 INJECTION, SOLUTION INTRAVENOUS at 02:28

## 2019-05-20 ASSESSMENT — ACTIVITIES OF DAILY LIVING (ADL)
ADLS_ACUITY_SCORE: 10

## 2019-05-20 NOTE — PLAN OF CARE
Pt feeling much better today, scoring 11 on MSSA, medicated with 5mg Valium this am.  Pt declined Valium this afternoon due to wanting to discharge tomorrow and didn't feel he needed it.  Up I in room, tolerating reg diet, VSS, slightly tachy at times.  Pt states he has a treatment center picked out and is ready to go when able medically.  Will cont to monitor and medicate prn.

## 2019-05-20 NOTE — CONSULTS
5/20/2019    Writer met with pt and completed CD eval. Patient reported he has already tried calling [facility] (with [place]) as that is where he wants to go for treatment. Patient's motivation for treatment may not be a high as he has verbally reported. Patient did sign a MITRA so writer can fax assessment to [facility].     Marisel Duenas, [facility]  [phone]

## 2019-05-20 NOTE — PLAN OF CARE
's , afebrile, BP stable.   MSSA score 14, valium 5mg x2 , Atenolol 50mg given and HR down to 90's  Denies n/v, reflux, CP or SOB.    No abdominal pain.  Appetite improved.Tolerates clear liquid well. IVF infusing at 125ml/hr  Negative  enteric bacteria test --off isolation. No more diarrhea this evening.    Tele - ST or NSR. Potassium 4.1, Lactic acid 1.8, lipase trending down.  Mother staying room.   Up independently and steady gait . Pt  took a shower.  CD eval tomorrow. Will continue to monitor

## 2019-05-20 NOTE — PLAN OF CARE
Alert and oriented X 4. Able to make needs known. Call light in reach. Denies pain. MSSA score 11-14. Received Valium 5 mg PO X 2 per MSSA protocol. Tolerating clears. Denies nausea, headache, dizziness, chest pain or SOB. Up to BR independently. Voiding without difficulty. No BM this shift. PIV patent, IV fluids infusing as ordered. Appears to be resting off and on during night. Mom at bedside, rooming in. Continue with plan of care.

## 2019-05-20 NOTE — PROGRESS NOTES
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 are they willing to do to support you? Who is upset or angry about you making changes in your alcohol or drug use? How big a problem is this for you?      Couple friends and family.     7B. This table is provided to record information about the person s relationships and available support It is not necessary to ask each item; only to get a comprehensive picture of their support system.  How often can you count on the following people when you need someone?   Partner / Spouse The patient does not have a current partner or spouse.   Parent(s)/Aunt(s)/Uncle(s)/Grandparents Always supportive   Sibling(s)/Cousin(s) Usually supportive   Child(valencia) The patient doesn't have any children.   Other relative(s) The patient doesn't have any current contact with other relatives.   Friend(s)/neighbor(s) Usually supportive   Child(valencia) s father(s)/mother(s) The patient doesn't have any children.   Support group member(s) The patient denied having any current involvement with 12-step or other support group meetings.   Community of orlin members The patient denied having any current involvement with community orlin members.   /counselor/therapist/healer The patient denied having any current involvement with a  , counselor, therapist or healer.   Other (specify) No     8A. What is your current living situation?     Patient reports he lives with a roommate, also stays with his mom.     8B. What is your long term plan for where you will be living?     Pt did not report.     8C. Tell me about your living environment/neighborhood? Ask enough follow up questions to determine safety, criminal activity, availability of alcohol and drugs, supportive or antagonistic to the person making changes.      Patient denied concerns.     9. Criminal justice history: Gather current/recent history and any significant history related to substance use--Arrests? Convictions? Circumstances? Alcohol or drug involvement? Sentences? Still on probation or parole? Expectations of the court? Current court order? Any sex offenses - lifetime? What level? (DSM)    Disorderly Conduct 04/2017   [facility] - Not on probation, everything is resolved.     Commitment: None   Registered Sex Offender: Denies     10. What obstacles exist to participating in treatment? (Time off work, childcare, funding, transportation, pending [facility] time, living situation)     The patient denied having any obstacles for participating in substance abuse treatment.    Dimension VI Ratings   Recovery environment - The placing authority must use the criteria in Dimension VI to determine a client s recovery environment.   RISK DESCRIPTIONS - Severity rating: 3 Client is not engaged in structured, meaningful activity and the client's peers, family, significant other, and living environment are unsupportive, or there is significant criminal justice system involvement.    REASONS SEVERITY WAS ASSIGNED - (What support does the person have for making changes? What structure/stability does the person have in his or her daily life that will increase the likelihood that changes can be sustained? What problems exist in the person s environment that will jeopardize getting/staying  clean and sober?)     Patient is currently unemployed. Patient has not worked for about the past year and a half. Patient reports his drinking has increased since then. 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 Patient reports he lives with his roommate, but also stays with his mom. Patient lacks any daily structured, meaningful activity. Patient reports one past legal issue, denies any current legal concerns. Patient reports his family is supportive of him.       Client Choice/Exceptions   Would you like services specific to language, age, gender, culture, Restoration preference, race, ethnicity, sexual orientation or disability?  No    What particular treatment choices and options would you like to have? None    Do you have a preference for a particular treatment program? [facility]    Criteria for Diagnosis     Criteria for Diagnosis  DSM-5 Criteria for Substance Use Disorder  Instructions: Determine whether the client currently meets the criteria for Substance Use Disorder using the diagnostic criteria in the DSM-V pp.[phone]. Current means during the most recent 12 months outside a facility that controls access to substances    Category of Substance Severity (ICD-10 Code / DSM 5 Code)     Alcohol Use Disorder Severe  (10.20) (303.90)   Cannabis Use Disorder The patient does not meet the criteria for a Cannabis use disorder.   Hallucinogen Use Disorder The patient does not meet the criteria for a Hallucinogen use disorder.   Inhalant Use Disorder The patient does not meet the criteria for an Inhalant use disorder.   Opioid Use Disorder The patient does not meet the criteria for an Opioid use disorder.   Sedative, Hypnotic, or Anxiolytic Use Disorder The patient does not meet the criteria for a Sedative/Hypnotic use disorder.   Stimulant Related Disorder The patient does not meet the criteria for a Stimulant use disorder.   Tobacco Use Disorder The patient does not meet the criteria for a Tobacco use disorder.    Other (or unknown) Substance Use Disorder The patient does not meet the criteria for a Other (or unknown) Substance use disorder.       Collateral Contact Summary   Number of contacts made: 1    Contact with referring person:  No    If court related records were reviewed, summarize here: No court records had been reviewed at the time of this documentation.    Information from collateral contacts supported/largely agreed with information from the client and associated risk ratings.      Rule 25 Assessment Summary and Plan   's Recommendation    1) Abstain from alcohol and all illicit drugs and mood altering drugs unless prescribed by a healthcare giver familiar with their addiction.  2) Enter and complete residential treatment at [facility] or [facility] and follow counselor recommendations.  3) Arrange for sober supportive living upon discharge.  4) Develop a sober supportive network.  5) Continue to receive appropriate medical and psychiatric services as needed.     Collateral Contacts     Name:    [facility]   Relationship:    Medical Records   Phone Number:  None Releases:    Yes     [facility] [place]  Hospitalist Discharge Summary       Date of Admission:  5/18/2019  Date of Discharge:  5/21/2019  Discharging Provider: Jami Delacruz MD     Discharge Diagnoses     Etoh abuse   Etoh withdrawal  Elevated AST   Elevated Lipase  lactic acidosis    Discharge Disposition      Discharged to home  Condition at discharge: [facility]     Jack is a 33-year-old male with history of allergic rhinitis and alcohol abuse and frequent ER visits who was brought in by his mother with tremors nausea and vomiting he had been drinking about [address] for the last month and a half his BAL on admission was 0.26  he was started on ANKITA a protocol with Valium he does not have any history of withdrawal seizures he did have a mild AST elevation likely to alcohol he was seen by the  chemical counselor.  He was given IVF for lactic acidosis     Physical Exam     Vital Signs: Temp: 99.1  F (37.3  C) Temp src: Oral BP: 130/78 Pulse: 109   Resp: 18 SpO2: 94 % O2 Device: None (Room air)    Weight: 224 lbs 0 oz  General Appearance:  Alert, interactive  Respiratory: clear bilaterally   Cardiovascular: RRR  GI: soft , bs positive   Skin: no rash   Primary Care Physician      Physician No Ref-Primary    Discharge Orders             Reason for your hospital stay     You were admitted for detox from Etoh .you were given fluids and medication to help with symptoms.  You should refrain from any further etoh and should seek chem dep treatment. You also had elevation of your liver function tests and Lipase likely from toxic effects of etoh          Follow Up and recommended labs and tests     CBC , CMP in one week   PCP in one [facility]  Chem dep treatment          Activity     As tolerated         Diet     regular       Discharge Medications          Current Discharge Medication List            START taking these medications     Details   gabapentin (NEURONTIN) 300 MG capsule One every 6 hours for 4 times then every 8 hours for 3 times then every 12 hours for two times then once.  Qty: 10 capsule, Refills: 0           CONTINUE these medications which have NOT CHANGED     Details   amLODIPine (NORVASC) 5 MG tablet Take 1 tablet (5 mg) by mouth daily  Qty: 30 tablet, Refills: 0     Comments: Hold for systolic blood pressure <110, diastolic BP<60  Associated Diagnoses: Essential hypertension       Carboxymethylcellulose Sod PF (REFRESH PLUS) 0.5 % SOLN ophthalmic solution Place 1 drop into both eyes 4 times daily  Qty: 1 Bottle, Refills: 1     Associated Diagnoses: Allergic eye reaction       folic acid (FOLVITE) 1 MG tablet Take 1 tablet (1 mg) by mouth daily  Qty: 30 tablet, Refills: 0     Associated Diagnoses: Alcoholism /alcohol abuse (H)       hydrOXYzine (ATARAX) 25 MG tablet Take 1 tablet (25 mg) by mouth 3  times daily as needed for itching or anxiety  Qty: 30 tablet, Refills: 0     Associated Diagnoses: Allergic eye reaction; Anxiety       ketotifen (ZADITOR/REFRESH ANTI-ITCH) 0.025 % ophthalmic solution Place 1 drop into both eyes 2 times daily  Qty: 1 Bottle, Refills: 1     Associated Diagnoses: Allergic eye reaction       multivitamin w/minerals (THERA-VIT-M) tablet Take 1 tablet by mouth daily  Qty: 30 tablet, Refills: 0     Associated Diagnoses: Alcoholism /alcohol abuse (H)              ollateral Contacts      A problematic pattern of alcohol/drug use leading to clinically significant impairment or distress, as manifested by at least two of the following, occurring within a 12-month period:    1.) Alcohol/drug is often taken in larger amounts or over a longer period than was intended.  2.) There is a persistent desire or unsuccessful efforts to cut down or control alcohol/drug use  3.) A great deal of time is spent in activities necessary to obtain alcohol, use alcohol, or recover from its effects.  4.) Craving, or a strong desire or urge to use alcohol/drug  5.) Recurrent alcohol/drug use resulting in a failure to fulfill major role obligations at work, school or home.  6.) Continued alcohol use despite having persistent or recurrent social or interpersonal problems caused or exacerbated by the effects of alcohol/drug.  7.) Important social, occupational, or recreational activities are given up or reduced because of alcohol/drug use.  9.) Alcohol/drug use is continued despite knowledge of having a persistent or recurrent physical or psychological problem that is likely to have been caused or exacerbated by alcohol.  10.) Tolerance, as defined by either of the following: A need for markedly increased amounts of alcohol/drug to achieve intoxication or desired effect. and A markedly diminished effect with continued use of the same amount of alcohol/drug.  11.) Withdrawal, as manifested by either of the following:  The characteristic withdrawal syndrome for alcohol/drug (refer to Criteria A and B of the criteria set for alcohol/drug withdrawal). and Alcohol/drug (or a closely related substance, such as a benzodiazepine) is taken to relieve or avoid withdrawal symptoms.      Specify if: In early remission:  After full criteria for alcohol/drug use disorder were previously met, none of the criteria for alcohol/drug use disorder have been met for at least 3 months but for less than 12 months (with the exception that Criterion A4,  Craving or a strong desire or urge to use alcohol/drug  may be met).     In sustained remission:   After full criteria for alcohol use disorder were previously met, none of the criteria for alcohol/drug use disorder have been met at any time during a period of 12 months or longer (with the exception that Criterion A4,  Craving or strong desire or urge to use alcohol/drug  may be met).   Specify if:   This additional specifier is used if the individual is in an environment where access to alcohol is restricted.    Mild: Presence of 2-3 symptoms  Moderate: Presence of 4-5 symptoms  Severe: Presence of 6 or more symptoms

## 2019-05-20 NOTE — PROGRESS NOTES
[facility]    Medicine Progress Note - Hospitalist Service       Date of Admission:  5/18/2019  Assessment & Plan   # ETOH withdrawal:  - monitor on telemetry, seizure and fall precautions  - MSS alcohol withdrawal protocol. Add Gabapentin 300mg tid to help both withdrawal and anxiety  - Chemical dependency consult   - MVT, Thiamine, Folate.   - voluntary to go to treatment  - elevated AST: likely ETOH related- monitor, also getting USS    # elevaled lipase-no abdo pain-will get USS and triglyceride level    # LA- resolved  Diet: Regular Diet Adult    DVT Prophylaxis: Ambulate every shift  Mars Catheter: not present  Code Status: Full Code      Disposition Plan   Expected discharge: 1-2, recommended to prior living arrangement once done with detox and treatment center in place.  Entered: Emeterio Hopkins MD, MD 05/20/2019, 10:17 AM       The patient's care was discussed with the Bedside Nurse, Care Coordinator/ and Patient.    Emeterio Hopkins MD, MD  Hospitalist Service  [facility]    ______________________________________________________________________    Interval History   Feels better. Still required Valium last night. Denies any nausea/ abdo pain.   4 point ROS done and neg unless mentioned    Data reviewed today: I reviewed all medications, new labs and imaging results over the last 24 hours. I personally reviewed no images or EKG's today.    Physical Exam   Vital Signs: Temp: 98.6  F (37  C) Temp src: Oral BP: (!) 131/91(Lying) Pulse: 105   Resp: 18 SpO2: 96 % O2 Device: None (Room air)    Weight: 224 lbs 0 oz  HEENT- NAD, ELIJAH,MMM  Neck- supple, no JVD elevation  CVS- I+II+ no m/r/g  RS- CTABS  Abdo- soft, no tenderness . No g/r/r  Ext- no edema   Tremors+     Data   BMP  Recent Labs   Lab 05/19/19  0558 05/18/19  1036    140   POTASSIUM 4.1 3.3*   CHLORIDE 107 100   KEELEY 7.8* 8.5   CO2 24 23   BUN 5* 5*   CR 0.69  0.86   GLC 88 139*     CBC  Recent Labs   Lab 05/20/19  0543 05/19/19  0558 05/18/19  1036   WBC  --  8.3 9.3   RBC  --  4.11* 4.66   HGB 15.4 13.0* 15.3   HCT  --  40.5 44.1   MCV  --  99 95   MCH  --  31.6 32.8   MCHC  --  32.1 34.7   RDW  --  14.3 13.8   PLT  --  85* 135*     INRNo lab results found in last 7 days.  LFTs  Recent Labs   Lab 05/19/19  0558 05/18/19  1036   ALKPHOS 114 141   AST 90* 109*   ALT 49 62   BILITOTAL 0.9 0.6   PROTTOTAL 6.6* 8.0   ALBUMIN 2.9* 3.5      PANC  Recent Labs   Lab 05/20/19  0543 05/19/19  0558 05/18/19  1849   LIPASE 904* 1,072* 1,442*
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
## 2019-05-21 ENCOUNTER — PATIENT OUTREACH (OUTPATIENT)
Dept: CARE COORDINATION | Facility: CLINIC | Age: 34
End: 2019-05-21

## 2019-05-21 VITALS
HEART RATE: 69 BPM | WEIGHT: 224 LBS | DIASTOLIC BLOOD PRESSURE: 79 MMHG | OXYGEN SATURATION: 97 % | SYSTOLIC BLOOD PRESSURE: 126 MMHG | TEMPERATURE: 97.7 F | RESPIRATION RATE: 18 BRPM

## 2019-05-21 LAB
ALBUMIN SERPL-MCNC: 3 G/DL (ref 3.4–5)
ALP SERPL-CCNC: 113 U/L (ref 40–150)
ALT SERPL W P-5'-P-CCNC: 74 U/L (ref 0–70)
ANION GAP SERPL CALCULATED.3IONS-SCNC: 11 MMOL/L (ref 3–14)
AST SERPL W P-5'-P-CCNC: 93 U/L (ref 0–45)
BILIRUB SERPL-MCNC: 0.7 MG/DL (ref 0.2–1.3)
BUN SERPL-MCNC: 11 MG/DL (ref 7–30)
CALCIUM SERPL-MCNC: 8.8 MG/DL (ref 8.5–10.1)
CHLORIDE SERPL-SCNC: 105 MMOL/L (ref 94–109)
CO2 SERPL-SCNC: 21 MMOL/L (ref 20–32)
CREAT SERPL-MCNC: 0.89 MG/DL (ref 0.66–1.25)
ERYTHROCYTE [DISTWIDTH] IN BLOOD BY AUTOMATED COUNT: 14 % (ref 10–15)
GFR SERPL CREATININE-BSD FRML MDRD: >90 ML/MIN/{1.73_M2}
GLUCOSE SERPL-MCNC: 104 MG/DL (ref 70–99)
HCT VFR BLD AUTO: 42.6 % (ref 40–53)
HGB BLD-MCNC: 14.5 G/DL (ref 13.3–17.7)
MCH RBC QN AUTO: 33 PG (ref 26.5–33)
MCHC RBC AUTO-ENTMCNC: 34 G/DL (ref 31.5–36.5)
MCV RBC AUTO: 97 FL (ref 78–100)
PLATELET # BLD AUTO: 129 10E9/L (ref 150–450)
POTASSIUM SERPL-SCNC: 3.3 MMOL/L (ref 3.4–5.3)
PROT SERPL-MCNC: 7 G/DL (ref 6.8–8.8)
RBC # BLD AUTO: 4.39 10E12/L (ref 4.4–5.9)
SODIUM SERPL-SCNC: 137 MMOL/L (ref 133–144)
WBC # BLD AUTO: 6.8 10E9/L (ref 4–11)

## 2019-05-21 PROCEDURE — 85027 COMPLETE CBC AUTOMATED: CPT | Performed by: INTERNAL MEDICINE

## 2019-05-21 PROCEDURE — 25000132 ZZH RX MED GY IP 250 OP 250 PS 637: Performed by: INTERNAL MEDICINE

## 2019-05-21 PROCEDURE — 80053 COMPREHEN METABOLIC PANEL: CPT | Performed by: INTERNAL MEDICINE

## 2019-05-21 PROCEDURE — 36415 COLL VENOUS BLD VENIPUNCTURE: CPT | Performed by: INTERNAL MEDICINE

## 2019-05-21 PROCEDURE — 25000132 ZZH RX MED GY IP 250 OP 250 PS 637: Performed by: HOSPITALIST

## 2019-05-21 PROCEDURE — 99239 HOSP IP/OBS DSCHRG MGMT >30: CPT | Performed by: INTERNAL MEDICINE

## 2019-05-21 RX ORDER — GABAPENTIN 600 MG/1
300 TABLET ORAL 3 TIMES DAILY
Qty: 90 TABLET | Refills: 0 | Status: ON HOLD | OUTPATIENT
Start: 2019-05-21 | End: 2019-06-04

## 2019-05-21 RX ADMIN — Medication 300 MG: at 09:09

## 2019-05-21 RX ADMIN — THIAMINE HCL TAB 100 MG 100 MG: 100 TAB at 09:10

## 2019-05-21 RX ADMIN — PANTOPRAZOLE SODIUM 40 MG: 40 TABLET, DELAYED RELEASE ORAL at 09:10

## 2019-05-21 RX ADMIN — FOLIC ACID 1 MG: 1 TABLET ORAL at 09:10

## 2019-05-21 RX ADMIN — MULTIPLE VITAMINS W/ MINERALS TAB 1 TABLET: TAB at 09:10

## 2019-05-21 ASSESSMENT — ACTIVITIES OF DAILY LIVING (ADL)
ADLS_ACUITY_SCORE: 10

## 2019-05-21 NOTE — PLAN OF CARE
Pt independently walking up and down hallways and hanging out in the lounge. Denies pain, lungs CTA, voiding spontaneously and BS+. MSSA 6 this shift. Given one time dose of benadryl due to itching nose and eyes. Continue to monitor and follow plan of care. Able to make needs known and call light within reach. Mother in room. Plan to discharge tomorrow.

## 2019-05-21 NOTE — PLAN OF CARE
Pt A&Ox4, VSS, LS clear, BS active- pt tolerating a regular diet. CMS/Neuros intact, pt denies any numbness/tingling. Denies chest pain/SOB. No longer going through withdrawals- pt adequate for discharge, all discharge paperwork reviewed with patient and all questions answered- pt D/Shiv unit around 11:30am.

## 2019-05-21 NOTE — PLAN OF CARE
VSS, afebrile, A&Ox4 when awake, pleasant, sleeping the entire shift, family member in the room, denies pain, SOB, CP or dizziness, voiding per pt report, able to make needs known, will continue to monitor and assist.

## 2019-05-21 NOTE — DISCHARGE SUMMARY
[facility] [place]  Hospitalist Discharge Summary       Date of Admission:  5/18/2019  Date of Discharge:  5/21/2019  Discharging Provider: Jami Delacruz MD      Discharge Diagnoses   Etoh abuse   Etoh withdrawal  Elevated AST and ALT likely etoh related   Elevated Lipase with out abdominal pain   lactic acidosis ; resolved       Follow-ups Needed After Discharge   Triglycerides in 1 month   Cbc and cmp in one week         Discharge Disposition   Discharged to home  Condition at discharge: [facility]   Nikhil is a 33-year-old male with history of allergic rhinitis and alcohol abuse and frequent ER visits who was brought in by his mother with tremors nausea and vomiting he had been drinking about [address] for the last month and a half his BAL on admission was 0.26  he was started on ANKITA a protocol with Valium he does not have any history of withdrawal seizures he did have a mild AST elevation likely to alcohol he was seen by the chemical counselor.  He was given IVF for lactic acidosis     Consultations This Hospital Stay   MEDICATION HISTORY IP PHARMACY CONSULT  CHEMICAL DEPENDENCY IP CONSULT  CHEMICAL DEPENDENCY IP CONSULT    Code Status   Full Code    Time Spent on this Encounter   I, Jami Delacruz, personally saw the patient today and spent greater than 30 minutes discharging this patient.       Jami Delacruz MD  [facility] [place]  ______________________________________________________________________    Physical Exam   Vital Signs: Temp: 97.7  F (36.5  C) Temp src: Oral BP: 130/78 Pulse: 109   Resp: 18 SpO2: 94 % O2 Device: None (Room air)    Weight: 224 lbs 0 oz  General Appearance: Alert, interactive  Respiratory: clear bilaterally   Cardiovascular: RRR  GI: soft , bs positive   Skin: no rash          Primary Care Physician   Physician No Ref-Primary    Discharge Orders      Reason for your hospital stay    You  were admitted for detox from Etoh .you were given fluids and medication to help with symptoms.  You should refrain from any further etoh and should seek chem dep treatment. You also had elevation of your liver function tests and Lipase likely from toxic effects of etoh     Follow Up and recommended labs and tests    CBC , CMP in one week   PCP in one [facility]  Chem dep treatment     Activity    As tolerated     Diet    regular           Discharge Medications   Current Discharge Medication List      START taking these medications    Details   gabapentin (NEURONTIN) 600 MG tablet Take 0.5 tablets (300 mg) by mouth 3 times daily  Qty: 90 tablet, Refills: 0    Associated Diagnoses: Alcohol dependence with uncomplicated withdrawal (H)         CONTINUE these medications which have NOT CHANGED    Details   amLODIPine (NORVASC) 5 MG tablet Take 1 tablet (5 mg) by mouth daily  Qty: 30 tablet, Refills: 0    Comments: Hold for systolic blood pressure <110, diastolic BP<60  Associated Diagnoses: Essential hypertension      Carboxymethylcellulose Sod PF (REFRESH PLUS) 0.5 % SOLN ophthalmic solution Place 1 drop into both eyes 4 times daily  Qty: 1 Bottle, Refills: 1    Associated Diagnoses: Allergic eye reaction      folic acid (FOLVITE) 1 MG tablet Take 1 tablet (1 mg) by mouth daily  Qty: 30 tablet, Refills: 0    Associated Diagnoses: Alcoholism /alcohol abuse (H)      hydrOXYzine (ATARAX) 25 MG tablet Take 1 tablet (25 mg) by mouth 3 times daily as needed for itching or anxiety  Qty: 30 tablet, Refills: 0    Associated Diagnoses: Allergic eye reaction; Anxiety      ketotifen (ZADITOR/REFRESH ANTI-ITCH) 0.025 % ophthalmic solution Place 1 drop into both eyes 2 times daily  Qty: 1 Bottle, Refills: 1    Associated Diagnoses: Allergic eye reaction      multivitamin w/minerals (THERA-VIT-M) tablet Take 1 tablet by mouth daily  Qty: 30 tablet, Refills: 0    Associated Diagnoses: Alcoholism /alcohol abuse (H)           Allergies   No  Known Allergies

## 2019-05-22 ENCOUNTER — TELEPHONE (OUTPATIENT)
Dept: BEHAVIORAL HEALTH | Facility: CLINIC | Age: 34
End: 2019-05-22

## 2019-05-24 LAB
BACTERIA SPEC CULT: NO GROWTH
BACTERIA SPEC CULT: NO GROWTH
Lab: NORMAL
Lab: NORMAL
SPECIMEN SOURCE: NORMAL
SPECIMEN SOURCE: NORMAL

## 2019-05-30 ENCOUNTER — HOSPITAL ENCOUNTER (EMERGENCY)
Facility: CLINIC | Age: 34
Discharge: HOME | End: 2019-05-30
Payer: COMMERCIAL

## 2019-05-31 ENCOUNTER — HOSPITAL ENCOUNTER (INPATIENT)
Facility: CLINIC | Age: 34
LOS: 3 days | Discharge: PSYCHIATRIC OR SUBSTANCE USE FACILITY | DRG: 896 | End: 2019-06-04
Attending: EMERGENCY MEDICINE | Admitting: INTERNAL MEDICINE
Payer: COMMERCIAL

## 2019-05-31 DIAGNOSIS — I10 ESSENTIAL HYPERTENSION: ICD-10-CM

## 2019-05-31 DIAGNOSIS — F10.239 ALCOHOL DEPENDENCE WITH WITHDRAWAL WITH COMPLICATION (H): ICD-10-CM

## 2019-05-31 DIAGNOSIS — H57.9 ALLERGIC EYE REACTION: ICD-10-CM

## 2019-05-31 DIAGNOSIS — R79.89 ELEVATED TROPONIN I LEVEL: ICD-10-CM

## 2019-05-31 LAB
ALBUMIN SERPL-MCNC: 3.6 G/DL (ref 3.4–5)
ALP SERPL-CCNC: 130 U/L (ref 40–150)
ALT SERPL W P-5'-P-CCNC: 102 U/L (ref 0–70)
AMPHETAMINES UR QL SCN: NEGATIVE
ANION GAP SERPL CALCULATED.3IONS-SCNC: 16 MMOL/L (ref 3–14)
AST SERPL W P-5'-P-CCNC: 132 U/L (ref 0–45)
BARBITURATES UR QL: NEGATIVE
BASOPHILS # BLD AUTO: 0.1 10E9/L (ref 0–0.2)
BASOPHILS NFR BLD AUTO: 1.3 %
BENZODIAZ UR QL: POSITIVE
BILIRUB SERPL-MCNC: 0.7 MG/DL (ref 0.2–1.3)
BUN SERPL-MCNC: 5 MG/DL (ref 7–30)
CALCIUM SERPL-MCNC: 8.8 MG/DL (ref 8.5–10.1)
CANNABINOIDS UR QL SCN: NEGATIVE
CHLORIDE SERPL-SCNC: 102 MMOL/L (ref 94–109)
CO2 SERPL-SCNC: 23 MMOL/L (ref 20–32)
COCAINE UR QL: NEGATIVE
CREAT SERPL-MCNC: 0.84 MG/DL (ref 0.66–1.25)
DIFFERENTIAL METHOD BLD: NORMAL
EOSINOPHIL # BLD AUTO: 0 10E9/L (ref 0–0.7)
EOSINOPHIL NFR BLD AUTO: 0.5 %
ERYTHROCYTE [DISTWIDTH] IN BLOOD BY AUTOMATED COUNT: 13.8 % (ref 10–15)
ETHANOL SERPL-MCNC: 0.02 G/DL
ETHANOL UR QL SCN: POSITIVE
GFR SERPL CREATININE-BSD FRML MDRD: >90 ML/MIN/{1.73_M2}
GLUCOSE SERPL-MCNC: 111 MG/DL (ref 70–99)
HCT VFR BLD AUTO: 42.1 % (ref 40–53)
HGB BLD-MCNC: 14.2 G/DL (ref 13.3–17.7)
IMM GRANULOCYTES # BLD: 0 10E9/L (ref 0–0.4)
IMM GRANULOCYTES NFR BLD: 0.5 %
LIPASE SERPL-CCNC: 584 U/L (ref 73–393)
LYMPHOCYTES # BLD AUTO: 1.5 10E9/L (ref 0.8–5.3)
LYMPHOCYTES NFR BLD AUTO: 18.1 %
MCH RBC QN AUTO: 31.9 PG (ref 26.5–33)
MCHC RBC AUTO-ENTMCNC: 33.7 G/DL (ref 31.5–36.5)
MCV RBC AUTO: 95 FL (ref 78–100)
MONOCYTES # BLD AUTO: 0.6 10E9/L (ref 0–1.3)
MONOCYTES NFR BLD AUTO: 6.9 %
NEUTROPHILS # BLD AUTO: 6.2 10E9/L (ref 1.6–8.3)
NEUTROPHILS NFR BLD AUTO: 72.7 %
NRBC # BLD AUTO: 0 10*3/UL
NRBC BLD AUTO-RTO: 0 /100
OPIATES UR QL SCN: NEGATIVE
PLATELET # BLD AUTO: 312 10E9/L (ref 150–450)
POTASSIUM SERPL-SCNC: 3.6 MMOL/L (ref 3.4–5.3)
PROT SERPL-MCNC: 7.8 G/DL (ref 6.8–8.8)
RBC # BLD AUTO: 4.45 10E12/L (ref 4.4–5.9)
SODIUM SERPL-SCNC: 141 MMOL/L (ref 133–144)
TROPONIN I SERPL-MCNC: 0.08 UG/L (ref 0–0.04)
WBC # BLD AUTO: 8.5 10E9/L (ref 4–11)

## 2019-05-31 PROCEDURE — 93010 ELECTROCARDIOGRAM REPORT: CPT | Mod: Z6 | Performed by: EMERGENCY MEDICINE

## 2019-05-31 PROCEDURE — 80320 DRUG SCREEN QUANTALCOHOLS: CPT | Performed by: EMERGENCY MEDICINE

## 2019-05-31 PROCEDURE — 99291 CRITICAL CARE FIRST HOUR: CPT | Mod: 25 | Performed by: EMERGENCY MEDICINE

## 2019-05-31 PROCEDURE — 80053 COMPREHEN METABOLIC PANEL: CPT | Performed by: EMERGENCY MEDICINE

## 2019-05-31 PROCEDURE — 96366 THER/PROPH/DIAG IV INF ADDON: CPT | Performed by: EMERGENCY MEDICINE

## 2019-05-31 PROCEDURE — 93005 ELECTROCARDIOGRAM TRACING: CPT | Performed by: EMERGENCY MEDICINE

## 2019-05-31 PROCEDURE — 99285 EMERGENCY DEPT VISIT HI MDM: CPT | Mod: 25 | Performed by: EMERGENCY MEDICINE

## 2019-05-31 PROCEDURE — 25000128 H RX IP 250 OP 636: Performed by: EMERGENCY MEDICINE

## 2019-05-31 PROCEDURE — 84484 ASSAY OF TROPONIN QUANT: CPT | Performed by: EMERGENCY MEDICINE

## 2019-05-31 PROCEDURE — 83690 ASSAY OF LIPASE: CPT | Performed by: EMERGENCY MEDICINE

## 2019-05-31 PROCEDURE — 25000132 ZZH RX MED GY IP 250 OP 250 PS 637: Performed by: EMERGENCY MEDICINE

## 2019-05-31 PROCEDURE — 85025 COMPLETE CBC W/AUTO DIFF WBC: CPT | Performed by: EMERGENCY MEDICINE

## 2019-05-31 PROCEDURE — 25000125 ZZHC RX 250: Performed by: EMERGENCY MEDICINE

## 2019-05-31 PROCEDURE — 96375 TX/PRO/DX INJ NEW DRUG ADDON: CPT | Performed by: EMERGENCY MEDICINE

## 2019-05-31 PROCEDURE — 96365 THER/PROPH/DIAG IV INF INIT: CPT | Performed by: EMERGENCY MEDICINE

## 2019-05-31 PROCEDURE — 25800025 ZZH RX 258: Performed by: EMERGENCY MEDICINE

## 2019-05-31 PROCEDURE — 80307 DRUG TEST PRSMV CHEM ANLYZR: CPT | Performed by: EMERGENCY MEDICINE

## 2019-05-31 PROCEDURE — HZ2ZZZZ DETOXIFICATION SERVICES FOR SUBSTANCE ABUSE TREATMENT: ICD-10-PCS | Performed by: INTERNAL MEDICINE

## 2019-05-31 RX ORDER — LORAZEPAM 1 MG/1
1-4 TABLET ORAL EVERY 30 MIN PRN
Status: DISCONTINUED | OUTPATIENT
Start: 2019-05-31 | End: 2019-06-01

## 2019-05-31 RX ORDER — FOLIC ACID 1 MG/1
1 TABLET ORAL DAILY
Status: DISCONTINUED | OUTPATIENT
Start: 2019-06-01 | End: 2019-06-01

## 2019-05-31 RX ORDER — LANOLIN ALCOHOL/MO/W.PET/CERES
100 CREAM (GRAM) TOPICAL DAILY
Status: DISCONTINUED | OUTPATIENT
Start: 2019-06-01 | End: 2019-06-01

## 2019-05-31 RX ORDER — MULTIPLE VITAMINS W/ MINERALS TAB 9MG-400MCG
1 TAB ORAL DAILY
Status: DISCONTINUED | OUTPATIENT
Start: 2019-06-01 | End: 2019-06-01

## 2019-05-31 RX ORDER — LORAZEPAM 2 MG/ML
2 INJECTION INTRAMUSCULAR ONCE
Status: COMPLETED | OUTPATIENT
Start: 2019-05-31 | End: 2019-05-31

## 2019-05-31 RX ORDER — METOCLOPRAMIDE HYDROCHLORIDE 5 MG/ML
5 INJECTION INTRAMUSCULAR; INTRAVENOUS ONCE
Status: COMPLETED | OUTPATIENT
Start: 2019-05-31 | End: 2019-05-31

## 2019-05-31 RX ORDER — ATENOLOL 50 MG/1
50 TABLET ORAL DAILY PRN
Status: DISCONTINUED | OUTPATIENT
Start: 2019-05-31 | End: 2019-06-01

## 2019-05-31 RX ADMIN — ASPIRIN 325 MG: 325 TABLET, COATED ORAL at 22:28

## 2019-05-31 RX ADMIN — LORAZEPAM 2 MG: 2 INJECTION INTRAMUSCULAR; INTRAVENOUS at 19:32

## 2019-05-31 RX ADMIN — THIAMINE HYDROCHLORIDE: 100 INJECTION, SOLUTION INTRAMUSCULAR; INTRAVENOUS at 19:47

## 2019-05-31 RX ADMIN — LORAZEPAM 1 MG: 1 TABLET ORAL at 20:54

## 2019-05-31 RX ADMIN — METOCLOPRAMIDE 5 MG: 5 INJECTION, SOLUTION INTRAMUSCULAR; INTRAVENOUS at 19:36

## 2019-05-31 RX ADMIN — LORAZEPAM 1 MG: 1 TABLET ORAL at 22:56

## 2019-05-31 ASSESSMENT — ENCOUNTER SYMPTOMS
FEVER: 0
HEADACHES: 0
COUGH: 0
EYE REDNESS: 0
ARTHRALGIAS: 0
CHILLS: 0
TREMORS: 1
LIGHT-HEADEDNESS: 0
CONFUSION: 0
POLYDIPSIA: 0
BRUISES/BLEEDS EASILY: 0
COLOR CHANGE: 0
DIFFICULTY URINATING: 0
DIAPHORESIS: 1
VOMITING: 1
SHORTNESS OF BREATH: 0
NAUSEA: 1
ABDOMINAL PAIN: 0
ADENOPATHY: 0
NECK STIFFNESS: 0

## 2019-06-01 ENCOUNTER — APPOINTMENT (OUTPATIENT)
Dept: CARDIOLOGY | Facility: CLINIC | Age: 34
DRG: 896 | End: 2019-06-01
Payer: COMMERCIAL

## 2019-06-01 PROBLEM — I24.9 ACS (ACUTE CORONARY SYNDROME) (H): Status: ACTIVE | Noted: 2019-06-01

## 2019-06-01 LAB
ALBUMIN SERPL-MCNC: 3.4 G/DL (ref 3.4–5)
ALP SERPL-CCNC: 116 U/L (ref 40–150)
ALT SERPL W P-5'-P-CCNC: 94 U/L (ref 0–70)
ANION GAP SERPL CALCULATED.3IONS-SCNC: 7 MMOL/L (ref 3–14)
AST SERPL W P-5'-P-CCNC: 107 U/L (ref 0–45)
BILIRUB SERPL-MCNC: 0.9 MG/DL (ref 0.2–1.3)
BUN SERPL-MCNC: 6 MG/DL (ref 7–30)
CALCIUM SERPL-MCNC: 8.9 MG/DL (ref 8.5–10.1)
CHLORIDE SERPL-SCNC: 102 MMOL/L (ref 94–109)
CK SERPL-CCNC: 128 U/L (ref 30–300)
CO2 SERPL-SCNC: 27 MMOL/L (ref 20–32)
CREAT SERPL-MCNC: 0.79 MG/DL (ref 0.66–1.25)
ERYTHROCYTE [DISTWIDTH] IN BLOOD BY AUTOMATED COUNT: 14 % (ref 10–15)
GFR SERPL CREATININE-BSD FRML MDRD: >90 ML/MIN/{1.73_M2}
GLUCOSE SERPL-MCNC: 90 MG/DL (ref 70–99)
GRAM STN SPEC: ABNORMAL
GRAM STN SPEC: ABNORMAL
HCT VFR BLD AUTO: 40.6 % (ref 40–53)
HGB BLD-MCNC: 13.3 G/DL (ref 13.3–17.7)
LACTATE BLD-SCNC: 1.8 MMOL/L (ref 0.7–2)
Lab: ABNORMAL
MAGNESIUM SERPL-MCNC: 1.3 MG/DL (ref 1.6–2.3)
MAGNESIUM SERPL-MCNC: 3 MG/DL (ref 1.6–2.3)
MCH RBC QN AUTO: 31.8 PG (ref 26.5–33)
MCHC RBC AUTO-ENTMCNC: 32.8 G/DL (ref 31.5–36.5)
MCV RBC AUTO: 97 FL (ref 78–100)
PHOSPHATE SERPL-MCNC: 2.7 MG/DL (ref 2.5–4.5)
PLATELET # BLD AUTO: 240 10E9/L (ref 150–450)
POTASSIUM SERPL-SCNC: 3.5 MMOL/L (ref 3.4–5.3)
PROT SERPL-MCNC: 7.3 G/DL (ref 6.8–8.8)
RBC # BLD AUTO: 4.18 10E12/L (ref 4.4–5.9)
SODIUM SERPL-SCNC: 136 MMOL/L (ref 133–144)
SPECIMEN SOURCE: ABNORMAL
TROPONIN I SERPL-MCNC: 0.04 UG/L (ref 0–0.04)
WBC # BLD AUTO: 7.8 10E9/L (ref 4–11)

## 2019-06-01 PROCEDURE — 25800030 ZZH RX IP 258 OP 636: Performed by: HOSPITALIST

## 2019-06-01 PROCEDURE — 85027 COMPLETE CBC AUTOMATED: CPT | Performed by: INTERNAL MEDICINE

## 2019-06-01 PROCEDURE — 36415 COLL VENOUS BLD VENIPUNCTURE: CPT | Performed by: INTERNAL MEDICINE

## 2019-06-01 PROCEDURE — 87070 CULTURE OTHR SPECIMN AEROBIC: CPT | Performed by: HOSPITALIST

## 2019-06-01 PROCEDURE — 21400000 ZZH R&B CCU UMMC

## 2019-06-01 PROCEDURE — 25000132 ZZH RX MED GY IP 250 OP 250 PS 637: Performed by: INTERNAL MEDICINE

## 2019-06-01 PROCEDURE — 93306 TTE W/DOPPLER COMPLETE: CPT | Mod: 26 | Performed by: INTERNAL MEDICINE

## 2019-06-01 PROCEDURE — 25000128 H RX IP 250 OP 636: Performed by: INTERNAL MEDICINE

## 2019-06-01 PROCEDURE — 93005 ELECTROCARDIOGRAM TRACING: CPT

## 2019-06-01 PROCEDURE — 82550 ASSAY OF CK (CPK): CPT | Performed by: INTERNAL MEDICINE

## 2019-06-01 PROCEDURE — 99232 SBSQ HOSP IP/OBS MODERATE 35: CPT | Performed by: HOSPITALIST

## 2019-06-01 PROCEDURE — 87205 SMEAR GRAM STAIN: CPT | Performed by: HOSPITALIST

## 2019-06-01 PROCEDURE — 12000001 ZZH R&B MED SURG/OB UMMC

## 2019-06-01 PROCEDURE — 87077 CULTURE AEROBIC IDENTIFY: CPT | Performed by: HOSPITALIST

## 2019-06-01 PROCEDURE — 93010 ELECTROCARDIOGRAM REPORT: CPT | Performed by: INTERNAL MEDICINE

## 2019-06-01 PROCEDURE — 80053 COMPREHEN METABOLIC PANEL: CPT | Performed by: INTERNAL MEDICINE

## 2019-06-01 PROCEDURE — 25000132 ZZH RX MED GY IP 250 OP 250 PS 637: Performed by: HOSPITALIST

## 2019-06-01 PROCEDURE — 25800030 ZZH RX IP 258 OP 636: Performed by: INTERNAL MEDICINE

## 2019-06-01 PROCEDURE — 25000128 H RX IP 250 OP 636: Performed by: HOSPITALIST

## 2019-06-01 PROCEDURE — 83735 ASSAY OF MAGNESIUM: CPT | Performed by: INTERNAL MEDICINE

## 2019-06-01 PROCEDURE — 99207 ZZC CDG-MDM COMPONENT: MEETS LOW - DOWN CODED: CPT | Performed by: HOSPITALIST

## 2019-06-01 PROCEDURE — 25500064 ZZH RX 255 OP 636: Performed by: INTERNAL MEDICINE

## 2019-06-01 PROCEDURE — 40000264 ECHOCARDIOGRAM COMPLETE

## 2019-06-01 PROCEDURE — 87186 SC STD MICRODIL/AGAR DIL: CPT | Performed by: HOSPITALIST

## 2019-06-01 PROCEDURE — 84100 ASSAY OF PHOSPHORUS: CPT | Performed by: INTERNAL MEDICINE

## 2019-06-01 PROCEDURE — 84484 ASSAY OF TROPONIN QUANT: CPT | Performed by: INTERNAL MEDICINE

## 2019-06-01 PROCEDURE — 83605 ASSAY OF LACTIC ACID: CPT | Performed by: INTERNAL MEDICINE

## 2019-06-01 PROCEDURE — 83735 ASSAY OF MAGNESIUM: CPT | Performed by: STUDENT IN AN ORGANIZED HEALTH CARE EDUCATION/TRAINING PROGRAM

## 2019-06-01 PROCEDURE — 99222 1ST HOSP IP/OBS MODERATE 55: CPT | Mod: 25 | Performed by: INTERNAL MEDICINE

## 2019-06-01 RX ORDER — MAGNESIUM SULFATE HEPTAHYDRATE 40 MG/ML
4 INJECTION, SOLUTION INTRAVENOUS EVERY 4 HOURS PRN
Status: DISCONTINUED | OUTPATIENT
Start: 2019-06-01 | End: 2019-06-04 | Stop reason: HOSPADM

## 2019-06-01 RX ORDER — ALUMINA, MAGNESIA, AND SIMETHICONE 2400; 2400; 240 MG/30ML; MG/30ML; MG/30ML
30 SUSPENSION ORAL EVERY 4 HOURS PRN
Status: DISCONTINUED | OUTPATIENT
Start: 2019-06-01 | End: 2019-06-04 | Stop reason: HOSPADM

## 2019-06-01 RX ORDER — LANOLIN ALCOHOL/MO/W.PET/CERES
100 CREAM (GRAM) TOPICAL DAILY
Status: DISCONTINUED | OUTPATIENT
Start: 2019-06-01 | End: 2019-06-04 | Stop reason: HOSPADM

## 2019-06-01 RX ORDER — DIAZEPAM 5 MG
5-20 TABLET ORAL EVERY 30 MIN PRN
Status: DISCONTINUED | OUTPATIENT
Start: 2019-06-01 | End: 2019-06-04 | Stop reason: HOSPADM

## 2019-06-01 RX ORDER — POTASSIUM CHLORIDE 7.45 MG/ML
10 INJECTION INTRAVENOUS
Status: DISCONTINUED | OUTPATIENT
Start: 2019-06-01 | End: 2019-06-03

## 2019-06-01 RX ORDER — FOLIC ACID 1 MG/1
1 TABLET ORAL DAILY
Status: DISCONTINUED | OUTPATIENT
Start: 2019-06-01 | End: 2019-06-01

## 2019-06-01 RX ORDER — ACETAMINOPHEN 325 MG/1
650 TABLET ORAL EVERY 4 HOURS PRN
Status: DISCONTINUED | OUTPATIENT
Start: 2019-06-01 | End: 2019-06-04 | Stop reason: HOSPADM

## 2019-06-01 RX ORDER — HYDROXYZINE HYDROCHLORIDE 25 MG/1
25 TABLET, FILM COATED ORAL 3 TIMES DAILY PRN
Status: DISCONTINUED | OUTPATIENT
Start: 2019-06-01 | End: 2019-06-04 | Stop reason: HOSPADM

## 2019-06-01 RX ORDER — ASPIRIN 325 MG
325 TABLET ORAL DAILY
Status: DISCONTINUED | OUTPATIENT
Start: 2019-06-02 | End: 2019-06-01

## 2019-06-01 RX ORDER — GABAPENTIN 300 MG/1
300 CAPSULE ORAL 3 TIMES DAILY
Status: DISCONTINUED | OUTPATIENT
Start: 2019-06-01 | End: 2019-06-04 | Stop reason: HOSPADM

## 2019-06-01 RX ORDER — ASPIRIN 81 MG/1
81 TABLET, CHEWABLE ORAL DAILY
Status: DISCONTINUED | OUTPATIENT
Start: 2019-06-01 | End: 2019-06-04 | Stop reason: HOSPADM

## 2019-06-01 RX ORDER — LANOLIN ALCOHOL/MO/W.PET/CERES
100 CREAM (GRAM) TOPICAL DAILY
Status: DISCONTINUED | OUTPATIENT
Start: 2019-06-01 | End: 2019-06-01

## 2019-06-01 RX ORDER — LORAZEPAM 0.5 MG/1
1-2 TABLET ORAL EVERY 30 MIN PRN
Status: DISCONTINUED | OUTPATIENT
Start: 2019-06-01 | End: 2019-06-01

## 2019-06-01 RX ORDER — ACETAMINOPHEN 650 MG/1
650 SUPPOSITORY RECTAL EVERY 4 HOURS PRN
Status: DISCONTINUED | OUTPATIENT
Start: 2019-06-01 | End: 2019-06-01

## 2019-06-01 RX ORDER — POTASSIUM CHLORIDE 29.8 MG/ML
20 INJECTION INTRAVENOUS
Status: DISCONTINUED | OUTPATIENT
Start: 2019-06-01 | End: 2019-06-03

## 2019-06-01 RX ORDER — POTASSIUM CHLORIDE 1.5 G/1.58G
20-40 POWDER, FOR SOLUTION ORAL
Status: DISCONTINUED | OUTPATIENT
Start: 2019-06-01 | End: 2019-06-03

## 2019-06-01 RX ORDER — CLONIDINE HYDROCHLORIDE 0.1 MG/1
0.1 TABLET ORAL 2 TIMES DAILY
Status: DISCONTINUED | OUTPATIENT
Start: 2019-06-01 | End: 2019-06-04 | Stop reason: HOSPADM

## 2019-06-01 RX ORDER — LABETALOL 20 MG/4 ML (5 MG/ML) INTRAVENOUS SYRINGE
10 ONCE
Status: COMPLETED | OUTPATIENT
Start: 2019-06-01 | End: 2019-06-01

## 2019-06-01 RX ORDER — SODIUM CHLORIDE, SODIUM LACTATE, POTASSIUM CHLORIDE, CALCIUM CHLORIDE 600; 310; 30; 20 MG/100ML; MG/100ML; MG/100ML; MG/100ML
INJECTION, SOLUTION INTRAVENOUS CONTINUOUS
Status: DISCONTINUED | OUTPATIENT
Start: 2019-06-01 | End: 2019-06-01

## 2019-06-01 RX ORDER — MULTIPLE VITAMINS W/ MINERALS TAB 9MG-400MCG
1 TAB ORAL DAILY
Status: DISCONTINUED | OUTPATIENT
Start: 2019-06-01 | End: 2019-06-04 | Stop reason: HOSPADM

## 2019-06-01 RX ORDER — POTASSIUM CHLORIDE 750 MG/1
20-40 TABLET, EXTENDED RELEASE ORAL
Status: DISCONTINUED | OUTPATIENT
Start: 2019-06-01 | End: 2019-06-03

## 2019-06-01 RX ORDER — NALOXONE HYDROCHLORIDE 0.4 MG/ML
.1-.4 INJECTION, SOLUTION INTRAMUSCULAR; INTRAVENOUS; SUBCUTANEOUS
Status: DISCONTINUED | OUTPATIENT
Start: 2019-06-01 | End: 2019-06-04 | Stop reason: HOSPADM

## 2019-06-01 RX ORDER — LIDOCAINE 40 MG/G
CREAM TOPICAL
Status: DISCONTINUED | OUTPATIENT
Start: 2019-06-01 | End: 2019-06-04 | Stop reason: HOSPADM

## 2019-06-01 RX ORDER — POTASSIUM CL/LIDO/0.9 % NACL 10MEQ/0.1L
10 INTRAVENOUS SOLUTION, PIGGYBACK (ML) INTRAVENOUS
Status: DISCONTINUED | OUTPATIENT
Start: 2019-06-01 | End: 2019-06-03

## 2019-06-01 RX ORDER — FOLIC ACID 1 MG/1
1 TABLET ORAL DAILY
Status: DISCONTINUED | OUTPATIENT
Start: 2019-06-01 | End: 2019-06-04 | Stop reason: HOSPADM

## 2019-06-01 RX ORDER — ATENOLOL 50 MG/1
50 TABLET ORAL DAILY PRN
Status: DISCONTINUED | OUTPATIENT
Start: 2019-06-01 | End: 2019-06-04 | Stop reason: HOSPADM

## 2019-06-01 RX ORDER — CARBOXYMETHYLCELLULOSE SODIUM 5 MG/ML
1 SOLUTION/ DROPS OPHTHALMIC 4 TIMES DAILY
Status: DISCONTINUED | OUTPATIENT
Start: 2019-06-01 | End: 2019-06-04 | Stop reason: HOSPADM

## 2019-06-01 RX ORDER — OLANZAPINE 5 MG/1
5 TABLET, ORALLY DISINTEGRATING ORAL EVERY 6 HOURS PRN
Status: DISCONTINUED | OUTPATIENT
Start: 2019-06-01 | End: 2019-06-04 | Stop reason: HOSPADM

## 2019-06-01 RX ORDER — LABETALOL 20 MG/4 ML (5 MG/ML) INTRAVENOUS SYRINGE
10 EVERY 4 HOURS PRN
Status: DISCONTINUED | OUTPATIENT
Start: 2019-06-01 | End: 2019-06-04 | Stop reason: HOSPADM

## 2019-06-01 RX ORDER — AMLODIPINE BESYLATE 5 MG/1
5 TABLET ORAL DAILY
Status: DISCONTINUED | OUTPATIENT
Start: 2019-06-01 | End: 2019-06-02

## 2019-06-01 RX ORDER — SODIUM CHLORIDE 9 MG/ML
INJECTION, SOLUTION INTRAVENOUS CONTINUOUS
Status: DISCONTINUED | OUTPATIENT
Start: 2019-06-01 | End: 2019-06-02

## 2019-06-01 RX ORDER — PANTOPRAZOLE SODIUM 40 MG/1
40 TABLET, DELAYED RELEASE ORAL
Status: DISCONTINUED | OUTPATIENT
Start: 2019-06-01 | End: 2019-06-04 | Stop reason: HOSPADM

## 2019-06-01 RX ORDER — MULTIPLE VITAMINS W/ MINERALS TAB 9MG-400MCG
1 TAB ORAL DAILY
Status: DISCONTINUED | OUTPATIENT
Start: 2019-06-01 | End: 2019-06-01

## 2019-06-01 RX ORDER — CARBOXYMETHYLCELLULOSE SODIUM 10 MG/ML
1 GEL OPHTHALMIC 3 TIMES DAILY
Status: DISCONTINUED | OUTPATIENT
Start: 2019-06-01 | End: 2019-06-01

## 2019-06-01 RX ADMIN — POTASSIUM CHLORIDE 20 MEQ: 750 TABLET, EXTENDED RELEASE ORAL at 03:20

## 2019-06-01 RX ADMIN — LORAZEPAM 1 MG: 0.5 TABLET ORAL at 03:20

## 2019-06-01 RX ADMIN — CARBOXYMETHYLCELLULOSE SODIUM 1 DROP: 5 SOLUTION/ DROPS OPHTHALMIC at 11:58

## 2019-06-01 RX ADMIN — FOLIC ACID 1 MG: 1 TABLET ORAL at 08:25

## 2019-06-01 RX ADMIN — FOLIC ACID 1 MG: 1 TABLET ORAL at 18:41

## 2019-06-01 RX ADMIN — Medication 100 MG: at 18:41

## 2019-06-01 RX ADMIN — LABETALOL 20 MG/4 ML (5 MG/ML) INTRAVENOUS SYRINGE 10 MG: at 04:54

## 2019-06-01 RX ADMIN — LABETALOL 20 MG/4 ML (5 MG/ML) INTRAVENOUS SYRINGE 10 MG: at 19:05

## 2019-06-01 RX ADMIN — CARBOXYMETHYLCELLULOSE SODIUM 1 DROP: 5 SOLUTION/ DROPS OPHTHALMIC at 09:09

## 2019-06-01 RX ADMIN — GABAPENTIN 300 MG: 300 CAPSULE ORAL at 13:38

## 2019-06-01 RX ADMIN — GABAPENTIN 300 MG: 300 CAPSULE ORAL at 08:25

## 2019-06-01 RX ADMIN — KETOTIFEN FUMARATE 1 DROP: 0.35 SOLUTION/ DROPS OPHTHALMIC at 19:44

## 2019-06-01 RX ADMIN — LORAZEPAM 2 MG: 0.5 TABLET ORAL at 02:15

## 2019-06-01 RX ADMIN — ASPIRIN 81 MG CHEWABLE TABLET 81 MG: 81 TABLET CHEWABLE at 08:25

## 2019-06-01 RX ADMIN — CARBOXYMETHYLCELLULOSE SODIUM 1 DROP: 5 SOLUTION/ DROPS OPHTHALMIC at 19:43

## 2019-06-01 RX ADMIN — Medication 100 MG: at 08:25

## 2019-06-01 RX ADMIN — GABAPENTIN 300 MG: 300 CAPSULE ORAL at 19:44

## 2019-06-01 RX ADMIN — MULTIPLE VITAMINS W/ MINERALS TAB 1 TABLET: TAB at 08:25

## 2019-06-01 RX ADMIN — DIAZEPAM 5 MG: 5 TABLET ORAL at 19:44

## 2019-06-01 RX ADMIN — AMLODIPINE BESYLATE 5 MG: 5 TABLET ORAL at 06:25

## 2019-06-01 RX ADMIN — MAGNESIUM SULFATE HEPTAHYDRATE 4 G: 40 INJECTION, SOLUTION INTRAVENOUS at 09:37

## 2019-06-01 RX ADMIN — KETOTIFEN FUMARATE 1 DROP: 0.35 SOLUTION/ DROPS OPHTHALMIC at 08:26

## 2019-06-01 RX ADMIN — HUMAN ALBUMIN MICROSPHERES AND PERFLUTREN 6 ML: 10; .22 INJECTION, SOLUTION INTRAVENOUS at 09:15

## 2019-06-01 RX ADMIN — SODIUM CHLORIDE: 9 INJECTION, SOLUTION INTRAVENOUS at 20:52

## 2019-06-01 RX ADMIN — SODIUM CHLORIDE, POTASSIUM CHLORIDE, SODIUM LACTATE AND CALCIUM CHLORIDE 100 ML/HR: 600; 310; 30; 20 INJECTION, SOLUTION INTRAVENOUS at 02:22

## 2019-06-01 RX ADMIN — CARBOXYMETHYLCELLULOSE SODIUM 1 DROP: 5 SOLUTION/ DROPS OPHTHALMIC at 17:47

## 2019-06-01 RX ADMIN — MULTIPLE VITAMINS W/ MINERALS TAB 1 TABLET: TAB at 18:41

## 2019-06-01 RX ADMIN — CLONIDINE HYDROCHLORIDE 0.1 MG: 0.1 TABLET ORAL at 19:57

## 2019-06-01 RX ADMIN — LABETALOL 20 MG/4 ML (5 MG/ML) INTRAVENOUS SYRINGE 10 MG: at 06:26

## 2019-06-01 RX ADMIN — PANTOPRAZOLE SODIUM 40 MG: 40 TABLET, DELAYED RELEASE ORAL at 19:54

## 2019-06-01 ASSESSMENT — ACTIVITIES OF DAILY LIVING (ADL)
ADLS_ACUITY_SCORE: 10

## 2019-06-01 NOTE — PROVIDER NOTIFICATION
Moonlighter notified of /105 and . Prn labetolol and atenolol ordered.  Labetolol given per order.
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
## 2019-06-01 NOTE — ED NOTES
6C requested ED to treat BP. Dr. Villa informed of 6C RN request and declined to treat BP. Patient HR not sustaining over 100 bpm and does not meet parameters for atenolol. 6C updated on this information.

## 2019-06-01 NOTE — PROGRESS NOTES
Cardiology Transfer / Update Note:    Subjective:  - no acute events overnight  - mild anxiety / tremors this AM improved with overnight Ativan    BP (!) 139/99 (BP Location: Right arm)   Pulse 89   Temp 98.8  F (37.1  C) (Oral)   Resp 18   Wt 101.5 kg (223 lb 12.8 oz)   SpO2 97%    NAD, A&O x 3  CTAB, RRR, no LE edema, WWP    Labs / ECG  - reviewed, trop 0.045  - TTE: EF low/normal    Assessment and Plan:  1) Alcohol dependence with acute intoxication and withdrawal  - CIWA, still requiring Ativan  - replenish lytes    2) Troponinemia:  - incidentally found to be elevated  - TTE without acute issue, EF on some views looks low/normal (EF 50-55%)  - possibility of early / subtle alcoholic cardiomyopathy but currently compensated  - no need for particular treatment of this other than abstinence    3) Elevated LFT's / Lipase:  - smoldering alcoholic inflammation, improved    At this point, there are no cardiology needs. Abstinence will be paramount. Discussed with mother that she would prefer he stay inpatient if possible until his scheduled chemical dependency admission in [place] on June 5th. Explained admission inpatient may not be possible unless there are inpatient needs (still requiring Ativan).    Disposition: he will transfer to [facility] under internal medicine service, accepted by Dr. Sandip Frausto. Appreciate medicine team assistance.    Discussed with Dr. Macedo.  Willis Stratton, Cardiology Fellow

## 2019-06-01 NOTE — PROGRESS NOTES
D: Alcohol withdrawal with PMH of alcohol dependence and HTN.    I: Gave medications as scheduled. Oriented patient to room, unit, medications, etc. Gave IV labetalol x2 and 0800 atenolol early per MD. Gave ativan 3mg per [facility] protocol.    A: AOx4. High BPs. HR 70s-80s. NSR. RA. SBA. No complaints of pain. Voiding and had BM x1 this shift. [facility] protocol. Dual RN skin check completed upon admission. R lower extremity wound and scab. Numbness in left foot.     P: Treat for alcohol withdrawal and discharge to inpatient alcohol dependence program. Scheduled to go to treatment center on Wednesday. Continue to monitor and assess and notify team of any changes.

## 2019-06-01 NOTE — PROVIDER NOTIFICATION
Patient is having rising temperatures and blood pressures despite receiving 3mg ativan for CIWA protocol and having decreased symptoms of withdrawal. Provider requested an EKG be done and give IV labetalol 10mg. Gave IV labetalol at 0500 and released EKG orders around 0445. Will continue to monitor and assess.

## 2019-06-01 NOTE — PLAN OF CARE
VSS, except high BPs, SR.  A&Ox4, on RA.  No complaints of pain.  CIWA q4h.  Echo completed.  Magnesium (1.3) replaced.  Recheck 3.0.  Service notified.  Pt up ad sergio.  [facility] to transport pt to [facility] at 1700.  Report called to Irena GUO on 10A.

## 2019-06-01 NOTE — H&P
[facility]    History and Physical - Cards Night Float Service        Date of Admission:  5/31/2019    Assessment & Plan   Nikhil Rios is a 33 year old male with a PMHx of alcohol dependence and HTN with recent hospitalizations for withdrawal who present with alcohol withdrawal and found to have elevated troponin and EKG changes.    # Alcohol withdrawal  # Hx of alcohol dependence  Young male with 2 year h/o severe alcoholism with recent hospitalizations for withdrawal. P/w tremors, emesis, tachycardia, diaphoresis starting 12 hrs after last drink with patient seen around 36 hrs from last drink. Patient scheduled for inpatient alcohol treatment at [facility] in [place] on 6/02. He endorses intention to quit.   - [facility] protocol   - Daily PO thiamine and folate  - Consider chem dep consult in AM    # Elevated troponin   Troponin of 0.079 with sinus arrhythmia with T wave inversion I, aVL, V2, V3, V4 and V5. Suspect demand ischemia due to alcohol withdrawal.  - Repeat trop 0.045.  - TTE in AM    # Elevated lipase   No abdominal pain. Recent US abdomen with limited assessment of the pancreas. Has been elevated in past. Triglycerides elevated but not severely.   - Monitor    # Elevated liver enzymes  Likely alcohol hepatitis. Recent US showing hepatic steatosis.  - Daily LFTs    # HTN  - Continue amlodipine     Diet: NPO for Medical/Clinical Reasons Except for: Meds, Ice Chips    Fluids: LR at 100 ml/h  DVT Prophylaxis: Ambulate every shift  Mars Catheter: not present  Code Status: Full Code      Disposition Plan  Most likely transfer to medicine in AM.  Expected discharge: 2 - 3 days, recommended to prior living arrangement once resolution of alcohol withdrawal.  Entered: Oskar Escudero MD 06/01/2019, 2:21 AM       Patient to be staffed in AM.    Oskar Escudero MD  Cardiology Night Float Service  [facility]  Pager: [phone]  Please see  sticky note for cross cover information  ______________________________________________________________________    Chief Complaint   Alcohol withdrawal, Elevated troponin     History is obtained from the patient    History of Present Illness   Nikhil Rios is a 33 year old male with a PMHx of alcohol dependence with recent hospitalizations who present with alcohol withdrawal and found to have elevated troponin and EKG changes.    Patient says he started drinking about 2 years ago. He usually drinks 750 ml of Vodka every day. He has only ever been able to abstain for about 2 days. He develops withdrawal symptoms within 12 hours from his last drink. 2 nights ago, patient consumed 750 ml of Vodka. He woke up yesterday morning with nausea, NBNB emesis (up to 8 times), tremors and itching of his face. These are his usual withdrawal symptoms. He tried drinking Gatorade but could not keep anything down. He had moderate abdominal pain only when vomiting. He denies fever, chest pain, hallucinations, seizures or a history thereof, limb weakness, SOB, leg swelling or fall. He presented to [facility] for help.   Patient is scheduled to start an inpatient chemical dependency program at [facility] in [place] on 6/05.     At [facility] ED, his BP was 167/111, HR 95. He was very tremulous and diaphoretic. Labs notable for lipase 584, , , troponin 0.079,  And ethanol 0.02. EKG was reported as showing sinus arrhythmia with T wave inversion I, aVL, V2, V3, V4 and V5. Patient received  mg x1, IV ativan, folate and thiamine per MSSA scale. He was then transferred to cardiology for evaluation.      Review of Systems    The 10 point Review of Systems is negative other than noted in the HPI or here.     Past Medical History    I have reviewed this patient's medical history and updated it with pertinent information if needed.   Past Medical History:   Diagnosis Date     Hypertension      Substance abuse (H)          Past Surgical History   I have reviewed this patient's surgical history and updated it with pertinent information if needed.  History reviewed. No pertinent surgical history.     Social History   I have reviewed this patient's social history and updated it with pertinent information if needed. Nikhil Rios  reports that he has been smoking.  He has been smoking about 0.25 packs per day. He has never used smokeless tobacco. He reports that he drinks alcohol. He reports that he does not use drugs.    Family History   I have reviewed this patient's family history and updated it with pertinent information if needed.   History reviewed. No pertinent family history.    Prior to Admission Medications   Prior to Admission Medications   Prescriptions Last Dose Informant Patient Reported? Taking?   Carboxymethylcellulose Sod PF (REFRESH PLUS) 0.5 % SOLN ophthalmic solution   No No   Sig: Place 1 drop into both eyes 4 times daily   amLODIPine (NORVASC) 5 MG tablet   No No   Sig: Take 1 tablet (5 mg) by mouth daily   folic acid (FOLVITE) 1 MG tablet   No No   Sig: Take 1 tablet (1 mg) by mouth daily   gabapentin (NEURONTIN) 600 MG tablet   No No   Sig: Take 0.5 tablets (300 mg) by mouth 3 times daily   hydrOXYzine (ATARAX) 25 MG tablet   No No   Sig: Take 1 tablet (25 mg) by mouth 3 times daily as needed for itching or anxiety   ketotifen (ZADITOR/REFRESH ANTI-ITCH) 0.025 % ophthalmic solution   No No   Sig: Place 1 drop into both eyes 2 times daily   multivitamin w/minerals (THERA-VIT-M) tablet   No No   Sig: Take 1 tablet by mouth daily      Facility-Administered Medications: None     Allergies   Allergies   Allergen Reactions     Pollen Extract Rash     grass tree pollen       Physical Exam   Vital Signs: Temp: 98.7  F (37.1  C) Temp src: Oral BP: (!) 146/94 Pulse: 77 Heart Rate: 77 Resp: 18 SpO2: 96 % O2 Device: None (Room air)    Weight: 223 lbs 12.8 oz    Constitutional: awake, alert, cooperative, no apparent distress,  and appears stated age  HEENT: NCAT, PERRLA  Hematologic / Lymphatic: no cervical lymphadenopathy  Respiratory: No increased work of breathing, good air exchange, clear to auscultation bilaterally, no crackles or wheezing  Cardiovascular: Normal apical impulse, regular rate and rhythm, normal S1 and S2, no S3 or S4, and no murmur noted  GI: No scars, normal bowel sounds, soft, non-distended, non-tender, no masses palpated, no hepatosplenomegally  Skin: no bruising or bleeding. Diaphoretic.   Musculoskeletal: There is no redness, warmth, or swelling of the joints.  Full range of motion noted.  Motor strength is 5 out of 5 all extremities bilaterally.  Tone is normal.  Neurologic: Awake, alert, oriented to name, place and time.  Cranial nerves II-XII are grossly intact.  Motor is 5 out of 5 bilaterally. Coarse temors. No asterixis.   Neuropsychiatric: General: normal, calm and normal eye contact    Data   Data reviewed today: I reviewed all medications, new labs and imaging results over the last 24 hours. I personally reviewed no images or EKG's today.    Recent Labs   Lab 05/31/19 1927   WBC 8.5   HGB 14.2   MCV 95         POTASSIUM 3.6   CHLORIDE 102   CO2 23   BUN 5*   CR 0.84   ANIONGAP 16*   KEELEY 8.8   *   ALBUMIN 3.6   PROTTOTAL 7.8   BILITOTAL 0.7   ALKPHOS 130   *   *   LIPASE 584*   TROPI 0.079*

## 2019-06-01 NOTE — PROGRESS NOTES
Admission: 6/1/19 0031   Diagnosis: Alcohol withdrawal    Admitted from: ED [place]   Via: Stretcher  Accompanied: EMS  Belongings: Phone & , wallet, keys, electric shaver, two plastic straws, a bottle of water, a hat, boxers, tshirt, sandals. Sent medications home with mom.  Admission Profile: Complete  Teaching: Call don't fall, menu, patient rights handout, vitals Q4 hours, use urinal to measure output.  Access: Peripheral IV in left arm  Telemetry: On  Ht/Wt: 223 lb

## 2019-06-01 NOTE — ED PROVIDER NOTES
"  History     Chief Complaint   Patient presents with     Delirium Tremens (DTS)     HPI  Nikhil Rios is a 33 year old male with a history of alcohol dependence and hypertension, who presents to the Emergency Department with alcohol withdrawal  with nausea, vomiting, and tremors. The patient states \"I've been suffering all day, laying on the couch\" and his last alcohol was about 30 hours ago. The patient is shaking and reports vomiting all day, stating \"everything I drink, I throw up\". He reports mild to moderate, sharp, upper, mid-abdominal pain with vomiting only.  Otherwise, he denies any abdominal pain when not vomiting as well as chest pain or shortness of breath.  No fevers. He presents for help through his withdrawal. He reports alcohol use for 2 years. He also has a right lower extremity wound from a drunken night. He denies history of diabetes. He states he is not good at taking his medications but did take gabapentin today, however, is unsure how effective it has been since he has been vomiting often. He states that he is starting an inpatient program \"[facility] in [place]\" on Wednesday.    I have reviewed the Medications, Allergies, Past Medical and Surgical History, and Social History in the [employer] system.  Past Medical History:   Diagnosis Date     Hypertension      Substance abuse (H)        History reviewed. No pertinent surgical history.    History reviewed. No pertinent family history.    Social History     Tobacco Use     Smoking status: Current Some Day Smoker     Packs/day: 0.25     Smokeless tobacco: Never Used   Substance Use Topics     Alcohol use: Yes     Comment: 1 to 750ml daily       Current Facility-Administered Medications   Medication     atenolol (TENORMIN) tablet 50 mg     [START ON 6/1/2019] folic acid (FOLVITE) tablet 1 mg     LORazepam (ATIVAN) tablet 1-4 mg     [START ON 6/1/2019] multivitamin w/minerals (THERA-VIT-M) tablet 1 tablet     [START ON 6/1/2019] vitamin B1 (THIAMINE) " tablet 100 mg     Current Outpatient Medications   Medication     amLODIPine (NORVASC) 5 MG tablet     Carboxymethylcellulose Sod PF (REFRESH PLUS) 0.5 % SOLN ophthalmic solution     folic acid (FOLVITE) 1 MG tablet     gabapentin (NEURONTIN) 600 MG tablet     hydrOXYzine (ATARAX) 25 MG tablet     ketotifen (ZADITOR/REFRESH ANTI-ITCH) 0.025 % ophthalmic solution     multivitamin w/minerals (THERA-VIT-M) tablet        Allergies   Allergen Reactions     Pollen Extract Rash     grass tree pollen       Review of Systems   Constitutional: Positive for diaphoresis. Negative for chills and fever.   HENT: Negative for congestion.    Eyes: Negative for redness.   Respiratory: Negative for cough and shortness of breath.    Cardiovascular: Negative for chest pain.   Gastrointestinal: Positive for nausea and vomiting. Negative for abdominal pain.   Endocrine: Negative for polydipsia and polyuria.   Genitourinary: Negative for difficulty urinating.   Musculoskeletal: Negative for arthralgias and neck stiffness.   Skin: Negative for color change.   Allergic/Immunologic: Negative for immunocompromised state.   Neurological: Positive for tremors. Negative for light-headedness and headaches.   Hematological: Negative for adenopathy. Does not bruise/bleed easily.   Psychiatric/Behavioral: Negative for confusion.   All other systems reviewed and are negative.      Physical Exam   BP: 152/89  Pulse: 89  Heart Rate: 132  Temp: 98.3  F (36.8  C)  Resp: 16  Weight: 104.3 kg (230 lb)  SpO2: 96 %      Physical Exam   Constitutional: He is oriented to person, place, and time. He appears well-developed and well-nourished. He appears distressed ( Mild distress from nausea and tremors).   HENT:   Head: Normocephalic and atraumatic.   Mouth/Throat: No oropharyngeal exudate.   Dry mucous membranes   Eyes: Pupils are equal, round, and reactive to light. Conjunctivae and EOM are normal. No scleral icterus.   Neck: Normal range of motion. Neck  supple.   Cardiovascular: Normal heart sounds and intact distal pulses.   Tachycardia   Pulmonary/Chest: Effort normal and breath sounds normal. No respiratory distress.   Abdominal: Soft. Bowel sounds are normal. He exhibits no distension and no mass. There is no tenderness. There is no rebound and no guarding.   Musculoskeletal: Normal range of motion. He exhibits no edema or tenderness.   Neurological: He is alert and oriented to person, place, and time. No cranial nerve deficit or sensory deficit. He exhibits normal muscle tone. Coordination normal.   Mild to moderate tremor in upper extremities.   Skin: Skin is warm. No rash noted. He is not diaphoretic.   Healing abrasion wound on anterior right leg.   Psychiatric: He has a normal mood and affect. His behavior is normal. Judgment and thought content normal.   No suicidal homicidal ideations.  No signs of active hallucinations.   Nursing note and vitals reviewed.      ED Course        Procedures             EKG Interpretation:      Interpreted by Dayan Villa  Time reviewed: 8:20 PM  Symptoms at time of EKG: Nausea, vomiting, tachycardia, palpitations  Rhythm: sinus arrhythmia  Rate: Normal  Axis: Normal  Ectopy: none  Conduction: QT prolongation  ST Segments/ T Waves: T wave inversion I, aVL, V2, V3, V4 and V5  Q Waves: none  Comparison to prior: Different than old EKG done on May 18, 2019    Clinical Impression: Sinus arrhythmia with anterior-septal and lateral ischemic changes        Critical Care time:  was 45 minutes for this patient excluding procedures.             Labs Ordered and Resulted from Time of ED Arrival Up to the Time of Departure from the ED   COMPREHENSIVE METABOLIC PANEL - Abnormal; Notable for the following components:       Result Value    Anion Gap 16 (*)     Glucose 111 (*)     Urea Nitrogen 5 (*)      (*)      (*)     All other components within normal limits   LIPASE - Abnormal; Notable for the following components:     Lipase 584 (*)     All other components within normal limits   ALCOHOL ETHYL - Abnormal; Notable for the following components:    Ethanol g/dL 0.02 (*)     All other components within normal limits   DRUG ABUSE SCREEN 6 CHEM DEP URINE ([facility]) - Abnormal; Notable for the following components:    Benzodiazepine Qual Urine Positive (*)     Ethanol Qual Urine Positive (*)     All other components within normal limits   TROPONIN I - Abnormal; Notable for the following components:    Troponin I ES 0.079 (*)     All other components within normal limits   CBC WITH PLATELETS DIFFERENTIAL   PERIPHERAL IV CATHETER   CARDIAC CONTINUOUS MONITORING   PULSE OXIMETRY NURSING   MSSA SCORE AND VS   NOTIFY   PULSE OXIMETRY NURSING   ALCOHOL BREATH TEST POCT            Assessments & Plan (with Medical Decision Making)   This is a 33 year old male presenting with nausea, vomiting and tremors. Differential diagnosis: acute alcohol withdrawal, alcohol dependence, alcoholism, dehydration, electrolyte abnormalities, acute pancreatitis.    After thorough history and physical examination, patient appears to be in mild distress due to nausea and tremors. I believe that he is in acute alcohol withdrawal and I will treat him with IV ativan, IV Reglan, and a bolus of IV banana bag. I will obtain laboratory studies for further diagnostic evaluation. Patient agrees with the plan.    Patient's laboratory studies returned without any evidence of leukocytosis, WBC is normal at 8,500. There is no evidence of anemia, hemoglobin is normal at 14.2.  Electrolytes show no evidence of dehydration, creatinine is normal at 0.84. LFT's are elevated with AST of 132, and ALT of 102 consistent with alcoholic hepatitis. Lipase is somewhat elevated 584.  EKG was obtained due to concerning T wave in appearance on cardiac monitoring and it showed diffuse lateral and anterior-septal ischemic changes.  Troponin was obtained which was elevated 0.079.  I reassessed the  patient and he is adamant that he never had any chest pain or shortness of breath.  Given his acute alcohol withdrawal with significant hypertension I suspect that this is likely due to demand ischemia from elevated blood pressure and acute alcohol withdrawal.  I did discuss his case with cardiology staff on-call, Dr. Dee, who agreed to my assessment and accepted the patient to cardiology service for further observation and management.  He was given oral enteric-coated aspirin. Ethanol is minimally detectable at at 0.02. Patients symptoms did improve significantly after IV ativan and he was able to tolerate PO. Liquids subsequently. He was placed on MSSA scale and he will be given a dose of additional ativan.     This part of the medical record was transcribed by Chani Aguila, Medical Scribe, from a dictation done by Dr. Villa.   I have reviewed the nursing notes.    I have reviewed the findings, diagnosis, plan and need for follow up with the patient.       Medication List      There are no discharge medications for this visit.         Final diagnoses:   Alcohol dependence with withdrawal with complication (H)   Elevated troponin I level     I, Chani Aguila, am serving as a trained medical scribe to document services personally performed by Dayan Villa MD, based on the provider's statements to me.   I, Dayan Villa MD, was physically present and have reviewed and verified the accuracy of this note documented by Chani Aguila.    5/31/2019   [facility], [place], EMERGENCY DEPARTMENT     Dayan Villa MD  05/31/19 1571

## 2019-06-01 NOTE — PROVIDER NOTIFICATION
Dr. Escudero was notified of /94 1 hour post labetalol administration and of EKG findings. Ordered another one time dose of labetalol and to give 0800 atenolol early.

## 2019-06-02 LAB
ALBUMIN SERPL-MCNC: 3.1 G/DL (ref 3.4–5)
ALP SERPL-CCNC: 104 U/L (ref 40–150)
ALT SERPL W P-5'-P-CCNC: 78 U/L (ref 0–70)
ANION GAP SERPL CALCULATED.3IONS-SCNC: 10 MMOL/L (ref 3–14)
AST SERPL W P-5'-P-CCNC: 95 U/L (ref 0–45)
BILIRUB SERPL-MCNC: 0.9 MG/DL (ref 0.2–1.3)
BUN SERPL-MCNC: 6 MG/DL (ref 7–30)
CALCIUM SERPL-MCNC: 8.3 MG/DL (ref 8.5–10.1)
CHLORIDE SERPL-SCNC: 107 MMOL/L (ref 94–109)
CO2 SERPL-SCNC: 22 MMOL/L (ref 20–32)
CREAT SERPL-MCNC: 0.77 MG/DL (ref 0.66–1.25)
ERYTHROCYTE [DISTWIDTH] IN BLOOD BY AUTOMATED COUNT: 14.3 % (ref 10–15)
GFR SERPL CREATININE-BSD FRML MDRD: >90 ML/MIN/{1.73_M2}
GLUCOSE SERPL-MCNC: 94 MG/DL (ref 70–99)
HCT VFR BLD AUTO: 41.2 % (ref 40–53)
HGB BLD-MCNC: 13.6 G/DL (ref 13.3–17.7)
INTERPRETATION ECG - MUSE: NORMAL
LIPASE SERPL-CCNC: 1061 U/L (ref 73–393)
MCH RBC QN AUTO: 32.4 PG (ref 26.5–33)
MCHC RBC AUTO-ENTMCNC: 33 G/DL (ref 31.5–36.5)
MCV RBC AUTO: 98 FL (ref 78–100)
PLATELET # BLD AUTO: 157 10E9/L (ref 150–450)
POTASSIUM SERPL-SCNC: 3.8 MMOL/L (ref 3.4–5.3)
PROT SERPL-MCNC: 6.9 G/DL (ref 6.8–8.8)
RBC # BLD AUTO: 4.2 10E12/L (ref 4.4–5.9)
SODIUM SERPL-SCNC: 139 MMOL/L (ref 133–144)
WBC # BLD AUTO: 7.1 10E9/L (ref 4–11)

## 2019-06-02 PROCEDURE — 25000132 ZZH RX MED GY IP 250 OP 250 PS 637: Performed by: HOSPITALIST

## 2019-06-02 PROCEDURE — 93005 ELECTROCARDIOGRAM TRACING: CPT

## 2019-06-02 PROCEDURE — 36415 COLL VENOUS BLD VENIPUNCTURE: CPT | Performed by: HOSPITALIST

## 2019-06-02 PROCEDURE — 99207 ZZC CDG-MDM COMPONENT: MEETS LOW - DOWN CODED: CPT | Performed by: HOSPITALIST

## 2019-06-02 PROCEDURE — 25000132 ZZH RX MED GY IP 250 OP 250 PS 637: Performed by: INTERNAL MEDICINE

## 2019-06-02 PROCEDURE — 25000125 ZZHC RX 250: Performed by: HOSPITALIST

## 2019-06-02 PROCEDURE — 12000001 ZZH R&B MED SURG/OB UMMC

## 2019-06-02 PROCEDURE — 80053 COMPREHEN METABOLIC PANEL: CPT | Performed by: HOSPITALIST

## 2019-06-02 PROCEDURE — 83690 ASSAY OF LIPASE: CPT | Performed by: HOSPITALIST

## 2019-06-02 PROCEDURE — 93010 ELECTROCARDIOGRAM REPORT: CPT | Performed by: INTERNAL MEDICINE

## 2019-06-02 PROCEDURE — 85027 COMPLETE CBC AUTOMATED: CPT | Performed by: HOSPITALIST

## 2019-06-02 PROCEDURE — 99232 SBSQ HOSP IP/OBS MODERATE 35: CPT | Performed by: HOSPITALIST

## 2019-06-02 RX ORDER — AMLODIPINE BESYLATE 5 MG/1
5 TABLET ORAL ONCE
Status: COMPLETED | OUTPATIENT
Start: 2019-06-02 | End: 2019-06-02

## 2019-06-02 RX ORDER — NEOMYCIN SULFATE, POLYMYXIN B SULFATE, AND DEXAMETHASONE 3.5; 10000; 1 MG/G; [USP'U]/G; MG/G
OINTMENT OPHTHALMIC AT BEDTIME
Status: DISCONTINUED | OUTPATIENT
Start: 2019-06-05 | End: 2019-06-04 | Stop reason: HOSPADM

## 2019-06-02 RX ORDER — NEOMYCIN SULFATE, POLYMYXIN B SULFATE, AND DEXAMETHASONE 3.5; 10000; 1 MG/G; [USP'U]/G; MG/G
OINTMENT OPHTHALMIC
Status: DISCONTINUED | OUTPATIENT
Start: 2019-06-02 | End: 2019-06-02

## 2019-06-02 RX ORDER — NEOMYCIN SULFATE, POLYMYXIN B SULFATE, AND DEXAMETHASONE 3.5; 10000; 1 MG/G; [USP'U]/G; MG/G
OINTMENT OPHTHALMIC
Status: DISCONTINUED | OUTPATIENT
Start: 2019-06-02 | End: 2019-06-04 | Stop reason: HOSPADM

## 2019-06-02 RX ORDER — AMLODIPINE BESYLATE 10 MG/1
10 TABLET ORAL DAILY
Status: DISCONTINUED | OUTPATIENT
Start: 2019-06-03 | End: 2019-06-04 | Stop reason: HOSPADM

## 2019-06-02 RX ORDER — CLINDAMYCIN HCL 300 MG
300 CAPSULE ORAL EVERY 6 HOURS SCHEDULED
Status: DISCONTINUED | OUTPATIENT
Start: 2019-06-02 | End: 2019-06-04 | Stop reason: HOSPADM

## 2019-06-02 RX ADMIN — GABAPENTIN 300 MG: 300 CAPSULE ORAL at 13:58

## 2019-06-02 RX ADMIN — CARBOXYMETHYLCELLULOSE SODIUM 1 DROP: 5 SOLUTION/ DROPS OPHTHALMIC at 11:05

## 2019-06-02 RX ADMIN — CLONIDINE HYDROCHLORIDE 0.1 MG: 0.1 TABLET ORAL at 08:01

## 2019-06-02 RX ADMIN — CARBOXYMETHYLCELLULOSE SODIUM 1 DROP: 5 SOLUTION/ DROPS OPHTHALMIC at 22:28

## 2019-06-02 RX ADMIN — NEOMYCIN SULFATE, POLYMYXIN B SULFATE, AND DEXAMETHASONE: 3.5; 10000; 1 OINTMENT OPHTHALMIC at 17:50

## 2019-06-02 RX ADMIN — GABAPENTIN 300 MG: 300 CAPSULE ORAL at 22:27

## 2019-06-02 RX ADMIN — KETOTIFEN FUMARATE 1 DROP: 0.35 SOLUTION/ DROPS OPHTHALMIC at 08:23

## 2019-06-02 RX ADMIN — MULTIPLE VITAMINS W/ MINERALS TAB 1 TABLET: TAB at 07:59

## 2019-06-02 RX ADMIN — Medication 100 MG: at 08:00

## 2019-06-02 RX ADMIN — GABAPENTIN 300 MG: 300 CAPSULE ORAL at 08:01

## 2019-06-02 RX ADMIN — AMLODIPINE BESYLATE 5 MG: 5 TABLET ORAL at 09:30

## 2019-06-02 RX ADMIN — ASPIRIN 81 MG CHEWABLE TABLET 81 MG: 81 TABLET CHEWABLE at 08:00

## 2019-06-02 RX ADMIN — AMLODIPINE BESYLATE 5 MG: 5 TABLET ORAL at 07:59

## 2019-06-02 RX ADMIN — NEOMYCIN, POLYMYXIN B SULFATES, DEXAMETHASONE: 1; 3.5; 1 OINTMENT OPHTHALMIC at 11:05

## 2019-06-02 RX ADMIN — NEOMYCIN SULFATE, POLYMYXIN B SULFATE, AND DEXAMETHASONE: 3.5; 10000; 1 OINTMENT OPHTHALMIC at 12:00

## 2019-06-02 RX ADMIN — FOLIC ACID 1 MG: 1 TABLET ORAL at 08:01

## 2019-06-02 RX ADMIN — CARBOXYMETHYLCELLULOSE SODIUM 1 DROP: 5 SOLUTION/ DROPS OPHTHALMIC at 08:22

## 2019-06-02 RX ADMIN — CLINDAMYCIN HYDROCHLORIDE 300 MG: 300 CAPSULE ORAL at 17:50

## 2019-06-02 RX ADMIN — CLINDAMYCIN HYDROCHLORIDE 300 MG: 300 CAPSULE ORAL at 11:04

## 2019-06-02 RX ADMIN — PANTOPRAZOLE SODIUM 40 MG: 40 TABLET, DELAYED RELEASE ORAL at 08:01

## 2019-06-02 RX ADMIN — CLONIDINE HYDROCHLORIDE 0.1 MG: 0.1 TABLET ORAL at 22:27

## 2019-06-02 RX ADMIN — KETOTIFEN FUMARATE 1 DROP: 0.35 SOLUTION/ DROPS OPHTHALMIC at 22:28

## 2019-06-02 ASSESSMENT — ACTIVITIES OF DAILY LIVING (ADL)
ADLS_ACUITY_SCORE: 10

## 2019-06-02 NOTE — PROGRESS NOTES
[facility], [place]    Hospitalist Progress Note    Date of Service (when I saw the patient): 06/01/2019    Assessment & Plan      Nikhil Rios is a 33 year old male with a PMHx HTN, HX of Urticareia, Allergic rhinits and alcohol abuse & frequent ED visits (admitted to [facility] on 4/15/19 for alcohol & again on 5/3/19 with resultant detox at [facility]) with recent hospitalizations for withdrawal at [facility] from 05/18/19 to 05/21/19. He wanted to try out pt program for deaddiction but he failed. He started drinking again. He came back to the ER last night and was admitted to [place] cardiology for troponin elevation. He had several episodes of vomiting, abdominal pain when he presented to the ER. He was found to have elevated troponin and EKG changes. That lead to the transfer to the [place]. He was seen by cardiology. They cycled his trops which were trending down. ECHO was done when shoed mild decrease in EF which is felt to be from ETOH. He is transferred back to [facility] for withdrawal management.        # Alcohol withdrawal  # Hx of alcohol dependence    - [facility] protocol with valium. Star clonidine 0.1 mg BID with hold parameters.   - Daily PO thiamine and folate and MV  - plan to keep him inpatient until he goes to inpatient rehab. He has very high risk of relapsing back as noted by severe ER visits and hospitalization.   - Patient is scheduled to start an inpatient chemical dependency program at [facility] in [place] on 6/05.       # Elevated troponin: Troponin of 0.079 with sinus arrhythmia with T wave inversion I, aVL, V2, V3, V4 and V5. Suspect demand ischemia due to alcohol withdrawal. Was seen by cardiology and ECHO showed EF at 50 % with mild reduction in EF felt to be due to ETOH.        # Elevated lipase. He had abdominal pain on admit to ED. Lipase on Admit was 584. Recent US abdomen with limited assessment of the pancreas. Has been elevated in past.  Triglycerides elevated but not severely.   -no abdominal pain right now. Monitor.      # Elevated liver enzymes: Likely alcohol hepatitis. Recent US showing hepatic steatosis.     # HTN: Diastolic BP running high. Start clonidine for withdrawal. Continue amlodipine    # Red eyes: Allergies Vs conjunctivitis; Ct Kitotifen drop from home.     # Right shin wound. Has Scabbed. Pus drained out on pressing. Mild redness noted around the scab. The scab is big itself. We will send for cultures. Start oral clindamycin.      Diet: regular    Fluids: LR at 100 ml/h  DVT Prophylaxis: Ambulate every shift  Mars Catheter: not present  Code Status: Full Code       Disposition Plan: Transfer to inpatient rehab on coming wednesday    Td Upton MD    Interval History     Some tremors, no HA. No vomiting. No chest pain. No abdominal pain. Itchy, red eyes. Watery.     -Data reviewed today: I reviewed all new labs and imaging results over the last 24 hours. I personally reviewed no images or EKG's today.    Physical Exam   Temp: 98.5  F (36.9  C) Temp src: Oral BP: (!) 146/102 Pulse: 87 Heart Rate: 81 Resp: 16 SpO2: 95 % O2 Device: None (Room air)    Vitals:    05/31/19 1907 06/01/19 0021   Weight: 104.3 kg (230 lb) 101.5 kg (223 lb 12.8 oz)     Vital Signs with Ranges  Temp:  [98.5  F (36.9  C)-100.2  F (37.9  C)] 98.5  F (36.9  C)  Pulse:  [] 87  Heart Rate:  [] 81  Resp:  [13-26] 16  BP: (134-179)/() 146/102  SpO2:  [92 %-97 %] 95 %  I/O last 3 completed shifts:  In: 100 [I.V.:100]  Out: 100 [Urine:100]    BL eyes are red, watery and itchy  Constitutional:  Awake, alert, cooperative, no apparent distress  Respiratory: Clear to auscultation bilaterally, no crackles or wheezing  Cardiovascular: Regular rate and rhythm, normal S1 and S2, and no murmur noted  GI: Normal bowel sounds, soft, non-distended, non-tender  Skin/Integumen:  Wound noted on right shin. Has scabbed. Pus noted coming out on pressing. Mild  redness noted.     Medications        Reason anticoagulation order not selected       - MEDICATION INSTRUCTIONS -       ACE/ARB/ARNI NOT PRESCRIBED       BETA BLOCKER NOT PRESCRIBED       STATIN NOT PRESCRIBED         amLODIPine  5 mg Oral Daily     aspirin  81 mg Oral Daily     carboxymethylcellulose PF  1 drop Both Eyes 4x Daily     folic acid  1 mg Oral Daily     gabapentin  300 mg Oral TID     ketotifen  1 drop Both Eyes BID     multivitamin w/minerals  1 tablet Oral Daily     sodium chloride (PF)  10 mL Intracatheter Once     sodium chloride (PF)  3 mL Intracatheter Q8H     vitamin B1  100 mg Oral Daily       Data   Recent Labs   Lab 06/01/19  0356 06/01/19  0231 05/31/19  1927   WBC 7.8  --  8.5   HGB 13.3  --  14.2   MCV 97  --  95     --  312     --  141   POTASSIUM 3.5  --  3.6   CHLORIDE 102  --  102   CO2 27  --  23   BUN 6*  --  5*   CR 0.79  --  0.84   ANIONGAP 7  --  16*   KEELEY 8.9  --  8.8   GLC 90  --  111*   ALBUMIN 3.4  --  3.6   PROTTOTAL 7.3  --  7.8   BILITOTAL 0.9  --  0.7   ALKPHOS 116  --  130   ALT 94*  --  102*   *  --  132*   LIPASE  --   --  584*   TROPI  --  0.045 0.079*       No results found for this or any previous visit (from the past 24 hour(s)).

## 2019-06-02 NOTE — PROGRESS NOTES
[facility], [place]    Hospitalist Progress Note    Date of Service (when I saw the patient): 06/02/2019    Assessment & Plan      Nikhil Rios is a 33 year old male with a PMHx HTN, HX of Urticareia, Allergic rhinits and alcohol abuse & frequent ED visits (admitted to [facility] on 4/15/19 for alcohol & again on 5/3/19 with resultant detox at [facility]) with recent hospitalizations for withdrawal at [facility] from 05/18/19 to 05/21/19. He wanted to try out pt program for deaddiction but he failed. He started drinking again. He came back to the ER last night and was admitted to [place] cardiology for troponin elevation. He had several episodes of vomiting, abdominal pain when he presented to the ER. He was found to have elevated troponin and EKG changes. That lead to the transfer to the [place]. He was seen by cardiology. They cycled his trops which were trending down. ECHO was done when shoed mild decrease in EF which is felt to be from ETOH. He is transferred back to [facility] for withdrawal management.        # Alcohol withdrawal  # Hx of alcohol dependence    - Ct MSSA protocol with valium. Ct clonidine 0.1 mg BID with hold parameters.   - Daily PO thiamine and folate and MV  - He has very high risk of relapsing back as noted by severe ER visits and hospitalization. Favor  Keeping in till Wednesday but he does not want to stay. We will reassess tomorrow.   - Patient is scheduled to start an inpatient chemical dependency program at [facility] in [place] on 6/05.   - stop tele      # Elevated troponin: Troponin of 0.079 with sinus arrhythmia with T wave inversion I, aVL, V2, V3, V4 and V5. Suspect demand ischemia due to alcohol withdrawal. Was seen by cardiology and ECHO showed EF at 50 % with mild reduction in EF felt to be due to ETOH.        # Elevated lipase. He had abdominal pain on admit to ED. Lipase on Admit was 584. Recent US abdomen with limited assessment of the  pancreas. Has been elevated in past. Triglycerides elevated but not severely.   -no abdominal pain right now. Monitor.   -regular diet     # Elevated liver enzymes: Likely alcohol hepatitis. Recent US showing hepatic steatosis.     # HTN: Diastolic BP running high.Ct clonidine for withdrawal. Continue amlodipine, may increase dose to 10 mg daily    # Red eyes: Allergies Vs conjunctivitis; Ct Kitotifen drop from home.     # Right shin wound. Has Scabbed. Pus drained out on pressing. Mild redness noted around the scab. The scab is big itself. We will send for cultures. Start oral clindamycin.      Diet: regular    Fluids: Stop  DVT Prophylaxis: Ambulate every shift  Mars Catheter: not present  Code Status: Full Code       Disposition Plan: Transfer to inpatient rehab on coming wednesday    Td Upton MD    Interval History     Feels better. No HA. No tremors. Low on MSSA. Leg wound is wrapped.     -Data reviewed today: I reviewed all new labs and imaging results over the last 24 hours. I personally reviewed no images or EKG's today.    Physical Exam   Temp: 97.6  F (36.4  C) Temp src: Oral BP: (!) 152/101 Pulse: 78 Heart Rate: 81 Resp: 16 SpO2: 98 % O2 Device: None (Room air)    Vitals:    05/31/19 1907 06/01/19 0021 06/02/19 0026   Weight: 104.3 kg (230 lb) 101.5 kg (223 lb 12.8 oz) 101.9 kg (224 lb 11.2 oz)     Vital Signs with Ranges  Temp:  [97.6  F (36.4  C)-98.9  F (37.2  C)] 97.6  F (36.4  C)  Pulse:  [] 78  Heart Rate:  [81] 81  Resp:  [16] 16  BP: (134-152)/() 152/101  SpO2:  [95 %-98 %] 98 %  I/O last 3 completed shifts:  In: 470 [P.O.:370; I.V.:100]  Out: -     BL eyes are red, watery and itchy  Constitutional:  Awake, alert, cooperative, no apparent distress  Respiratory: Clear to auscultation bilaterally, no crackles or wheezing  Cardiovascular: Regular rate and rhythm, normal S1 and S2, and no murmur noted  GI: Normal bowel sounds, soft, non-distended, non-tender  Skin/Integumen:   Wound noted on right shin. Has scabbed. Pus noted coming out on pressing. Mild redness noted.     Medications        Reason anticoagulation order not selected       - MEDICATION INSTRUCTIONS -       ACE/ARB/ARNI NOT PRESCRIBED       BETA BLOCKER NOT PRESCRIBED       STATIN NOT PRESCRIBED         amLODIPine  5 mg Oral Daily     aspirin  81 mg Oral Daily     carboxymethylcellulose PF  1 drop Both Eyes 4x Daily     clindamycin  300 mg Oral Q6H RILEY     cloNIDine  0.1 mg Oral BID     folic acid  1 mg Oral Daily     gabapentin  300 mg Oral TID     ketotifen  1 drop Both Eyes BID     multivitamin w/minerals  1 tablet Oral Daily     pantoprazole  40 mg Oral QAM AC     sodium chloride (PF)  10 mL Intracatheter Once     sodium chloride (PF)  3 mL Intracatheter Q8H     vitamin B1  100 mg Oral Daily       Data   Recent Labs   Lab 06/02/19  0549 06/01/19  0356 06/01/19  0231 05/31/19  1927   WBC 7.1 7.8  --  8.5   HGB 13.6 13.3  --  14.2   MCV 98 97  --  95    240  --  312    136  --  141   POTASSIUM 3.8 3.5  --  3.6   CHLORIDE 107 102  --  102   CO2 22 27  --  23   BUN 6* 6*  --  5*   CR 0.77 0.79  --  0.84   ANIONGAP 10 7  --  16*   KEELEY 8.3* 8.9  --  8.8   GLC 94 90  --  111*   ALBUMIN 3.1* 3.4  --  3.6   PROTTOTAL 6.9 7.3  --  7.8   BILITOTAL 0.9 0.9  --  0.7   ALKPHOS 104 116  --  130   ALT 78* 94*  --  102*   AST 95* 107*  --  132*   LIPASE 1,061*  --   --  584*   TROPI  --   --  0.045 0.079*       No results found for this or any previous visit (from the past 24 hour(s)).

## 2019-06-02 NOTE — PLAN OF CARE
Assumed care of pt @ 1730 from [address] [place] [address]     VS: Elevated BP and HR; decreased w/prn labetalol and clonidine  Alert and oriented x4  MSSA 7 & 8; 5 mg valium given    O2: >95% on RA; continuous pulse ox    Output: Voiding spontaneously in toilet x1   Last BM: 6/1   Activity: Up with SBA  Ambulated in phipps x2   Skin: Intact w/exception of R shin wound/scab; wound culture collected r/t weeping from center of scab    Pain: Denies    CMS: Intact; denies n/t    Dressing: Lazo wound covered with gauze  Cleansed with microklenz    Diet: Regular; tolerating well with good appetite   LDA: L forearm PIV infusing NS @ 125 mL/hr    Equipment: IV pole    Plan: Inpatient treatment on Wednesday 6/5 per provider    Additional Info: Mother in room; staying overnight. Bilateral eye redness; scheduled drops given. Pt positive for tremor and is wearing telemetry. Able to make needs known. Calm and cooperative with nursing cares.

## 2019-06-02 NOTE — PLAN OF CARE
A/O x 4. MSSA 5, no valium  needed. No pain reported. Pt reported feeling much better. No nausea or emesis. Reports drinking adequate fluids and declines further IV fluids, asked that IV be discontinued. Was upset that IV was making it difficult to sleep because of periodic beeping due to placement of IV. Up independently in the room. Voiding well. Mother is sleeping at the bedside. Cont to assess.

## 2019-06-02 NOTE — PLAN OF CARE
Pt up ad sergio in room and hallway. Sometimes anxious and found pacing hallway. Expressing desire to leave hospital. Pt states he has chemical dependency treatment scheduled on Wednesday. Importance of staying in hospital reviewed with patient. MD discussed with patient. Plan to keep patient at least one more day in hospital and reevaluate. Tremors noted in hands. Refused valium. BP elevated  and MD aware. Additional dose of amlodipine  5mg given. Recheck of /98. Patient c/o itchy eyes. Eyes at times reddened because of rubbing. Eye ointment ordered and given. Mother staying in room. Abrasion on shin covered. CDI. Scab on knee healing and PALOMA. Denies numbness and tingling in leg.

## 2019-06-03 ENCOUNTER — APPOINTMENT (OUTPATIENT)
Dept: GENERAL RADIOLOGY | Facility: CLINIC | Age: 34
DRG: 896 | End: 2019-06-03
Attending: HOSPITALIST
Payer: COMMERCIAL

## 2019-06-03 LAB
BACTERIA SPEC CULT: ABNORMAL
BACTERIA SPEC CULT: ABNORMAL
INTERPRETATION ECG - MUSE: NORMAL
INTERPRETATION ECG - MUSE: NORMAL
Lab: ABNORMAL
SPECIMEN SOURCE: ABNORMAL

## 2019-06-03 PROCEDURE — G0463 HOSPITAL OUTPT CLINIC VISIT: HCPCS | Mod: 25

## 2019-06-03 PROCEDURE — 12000001 ZZH R&B MED SURG/OB UMMC

## 2019-06-03 PROCEDURE — 25000132 ZZH RX MED GY IP 250 OP 250 PS 637: Performed by: HOSPITALIST

## 2019-06-03 PROCEDURE — 25000125 ZZHC RX 250: Performed by: HOSPITALIST

## 2019-06-03 PROCEDURE — 97602 WOUND(S) CARE NON-SELECTIVE: CPT

## 2019-06-03 PROCEDURE — 73590 X-RAY EXAM OF LOWER LEG: CPT | Mod: RT

## 2019-06-03 PROCEDURE — 25000132 ZZH RX MED GY IP 250 OP 250 PS 637: Performed by: INTERNAL MEDICINE

## 2019-06-03 PROCEDURE — 99232 SBSQ HOSP IP/OBS MODERATE 35: CPT | Performed by: HOSPITALIST

## 2019-06-03 RX ADMIN — KETOTIFEN FUMARATE 1 DROP: 0.35 SOLUTION/ DROPS OPHTHALMIC at 20:46

## 2019-06-03 RX ADMIN — NEOMYCIN SULFATE, POLYMYXIN B SULFATE, AND DEXAMETHASONE: 3.5; 10000; 1 OINTMENT OPHTHALMIC at 13:24

## 2019-06-03 RX ADMIN — CLINDAMYCIN HYDROCHLORIDE 300 MG: 300 CAPSULE ORAL at 05:59

## 2019-06-03 RX ADMIN — GABAPENTIN 300 MG: 300 CAPSULE ORAL at 10:01

## 2019-06-03 RX ADMIN — CLINDAMYCIN HYDROCHLORIDE 300 MG: 300 CAPSULE ORAL at 00:17

## 2019-06-03 RX ADMIN — ASPIRIN 81 MG CHEWABLE TABLET 81 MG: 81 TABLET CHEWABLE at 10:00

## 2019-06-03 RX ADMIN — GABAPENTIN 300 MG: 300 CAPSULE ORAL at 13:11

## 2019-06-03 RX ADMIN — CLINDAMYCIN HYDROCHLORIDE 300 MG: 300 CAPSULE ORAL at 17:51

## 2019-06-03 RX ADMIN — GABAPENTIN 300 MG: 300 CAPSULE ORAL at 20:43

## 2019-06-03 RX ADMIN — Medication 100 MG: at 10:01

## 2019-06-03 RX ADMIN — NEOMYCIN SULFATE, POLYMYXIN B SULFATE, AND DEXAMETHASONE: 3.5; 10000; 1 OINTMENT OPHTHALMIC at 17:51

## 2019-06-03 RX ADMIN — AMLODIPINE BESYLATE 10 MG: 10 TABLET ORAL at 10:01

## 2019-06-03 RX ADMIN — CLONIDINE HYDROCHLORIDE 0.1 MG: 0.1 TABLET ORAL at 10:01

## 2019-06-03 RX ADMIN — NEOMYCIN SULFATE, POLYMYXIN B SULFATE, AND DEXAMETHASONE: 3.5; 10000; 1 OINTMENT OPHTHALMIC at 10:03

## 2019-06-03 RX ADMIN — CARBOXYMETHYLCELLULOSE SODIUM 1 DROP: 5 SOLUTION/ DROPS OPHTHALMIC at 10:02

## 2019-06-03 RX ADMIN — CARBOXYMETHYLCELLULOSE SODIUM 1 DROP: 5 SOLUTION/ DROPS OPHTHALMIC at 16:13

## 2019-06-03 RX ADMIN — CLONIDINE HYDROCHLORIDE 0.1 MG: 0.1 TABLET ORAL at 20:43

## 2019-06-03 RX ADMIN — CARBOXYMETHYLCELLULOSE SODIUM 1 DROP: 5 SOLUTION/ DROPS OPHTHALMIC at 20:43

## 2019-06-03 RX ADMIN — PANTOPRAZOLE SODIUM 40 MG: 40 TABLET, DELAYED RELEASE ORAL at 10:01

## 2019-06-03 RX ADMIN — FOLIC ACID 1 MG: 1 TABLET ORAL at 10:01

## 2019-06-03 RX ADMIN — KETOTIFEN FUMARATE 1 DROP: 0.35 SOLUTION/ DROPS OPHTHALMIC at 00:28

## 2019-06-03 RX ADMIN — MULTIPLE VITAMINS W/ MINERALS TAB 1 TABLET: TAB at 10:01

## 2019-06-03 RX ADMIN — CARBOXYMETHYLCELLULOSE SODIUM 1 DROP: 5 SOLUTION/ DROPS OPHTHALMIC at 13:12

## 2019-06-03 RX ADMIN — CLINDAMYCIN HYDROCHLORIDE 300 MG: 300 CAPSULE ORAL at 13:11

## 2019-06-03 ASSESSMENT — ACTIVITIES OF DAILY LIVING (ADL)
ADLS_ACUITY_SCORE: 10

## 2019-06-03 NOTE — CONSULTS
Social Work: Assessment with Discharge Plan    Patient Name:  Quyen Rios  :  1985  Age:  33 year old  MRN:  [id number]  Risk/Complexity Score:  Filed Complexity Screen Score: 7  Completed assessment with:  Pt, 10A IDT    Presenting Information   Reason for Referral:  Discharge plan  Date of Intake:  Valery 3, 2019  Referral Source:  Physician  Decision Maker:  pt  Alternate Decision Maker:  Not identified during interview    Living Situation:  House  Previous Functional Status:  Independent  Patient and family understanding of hospitalization:  Seeking detox prior to getting into CD Treatment on .  Cultural/Language/Spiritual Considerations:  , Agreeing to CD treatment through [place]  Adjustment to Illness:  Hoping to get to CD treatment Wed, 19    Physical Health  Reason for Admission:    1. Alcohol dependence with withdrawal with complication (H)    2. Elevated troponin I level      Services Needed/Recommended:  Other:  chem Dep Treatment    Mental Health/Chemical Dependency  Diagnosis:  Alcohol Use disorder, severe  Support/Services in Place:  Plans on CD treatment at [place]  Services Needed/Recommended:  CD treatment    Support System  Significant relationship at present time:  Pt has had supportive visitors from friends/family  Family of origin is available for support:  yes  Other support available:  friends  Gaps in support system:  None noted  Patient is caregiver to:  None     Provider Information   Primary Care Physician:  No Ref-Primary, Physician   None   Clinic:  No address on file      :  None noted    Financial   Income Source:  Not identified  Financial Concerns:  None noted  Insurance:    Payor/Plan Subscriber Name Rel Member # Group #   [employer] QUYEN RIOS  [id number]      [address]       Discharge Plan   Patient and family discharge goal:  Discharge home or [place] CD Treatment  Provided education on discharge plan:  YES  Patient  "agreeable to discharge plan:  YES  Barriers to discharge:  Medical stability    Discharge Recommendations   Anticipated Disposition:  [facility]  Transportation Needs:  Other:  to be determined    Additional comments   SW introduced role/reason for visit. Pt was receptive. He is excited about plan for entering CD Treatment on Wed, 6/5. He was hoping to be able to discharge home to \"take care of a few things prior to CD Treatment\". Pt verbalized need to be medically stable for treatment. He denied needing other resources or support, verbalized understanding SW is available per request/referral. YUSRA con't to follow  "

## 2019-06-03 NOTE — PROGRESS NOTES
[facility] [place]    Hospitalist Progress Note    Date of Service (when I saw the patient): 06/03/2019    Assessment & Plan      Nikhil Rios is a 33 year old male with a PMHx HTN, HX of Urticareia, Allergic rhinits and alcohol abuse & frequent ED visits (admitted to [facility] on 4/15/19 for alcohol & again on 5/3/19 with resultant detox at [facility]) with recent hospitalizations for withdrawal at [facility] from 05/18/19 to 05/21/19. He wanted to try out pt program for deaddiction but he failed. He started drinking again. He came back to the ER last night and was admitted to [place] cardiology for troponin elevation. He had several episodes of vomiting, abdominal pain when he presented to the ER. He was found to have elevated troponin and EKG changes. That lead to the transfer to the [place]. He was seen by cardiology. They cycled his trops which were trending down. ECHO was done when shoed mild decrease in EF which is felt to be from ETOH. He is transferred back to [facility] for withdrawal management.        # Alcohol withdrawal  # Hx of alcohol dependence    - Ct MSSA protocol with valium. Ct clonidine 0.1 mg BID with hold parameters.   - Daily PO thiamine and folate and MV  - He has very high risk of relapsing back as noted by severe ER visits and hospitalization. Favor  Keeping in till Wednesday but he does not want to stay. We will reassess tomorrow.   - Patient is scheduled to start an inpatient chemical dependency program at [facility] in [place] on 6/05.   - stop tele  - BP have improved. Can possibly come off clonidine and continue on 10 mg norvasc at discharge with follow up.       # Elevated troponin: Troponin of 0.079 with sinus arrhythmia with T wave inversion I, aVL, V2, V3, V4 and V5. Suspect demand ischemia due to alcohol withdrawal. Was seen by cardiology and ECHO showed EF at 50 % with mild reduction in EF felt to be due to ETOH.        # Elevated lipase. He  had abdominal pain on admit to ED. Lipase on Admit was 584, next day it went up to 1000. Recent US abdomen with limited assessment of the pancreas. He has fatty steatosis of liver.  Has been elevated in past. Triglycerides elevated but not severely.   -no abdominal pain right now. Monitor.   -regular diet     # Elevated liver enzymes: Likely alcohol hepatitis. Recent US showing hepatic steatosis.     # HTN: Diastolic BP running high while he was withdrawing. Has gotten better with clonidine.  Continue amlodipine dose of which was increased to 10 mg daily form 5 mg due to uncontrolled BP. At the discharge, may come off clonidine and continue 10 mg norvasc with close follow up    # Red eyes: Allergies Vs conjunctivitis; Ct Kitotifen drop from home.     # Right shin wound. Had Scabbed which is not deroofed. Pus drained out on pressing on admission. There was surrounding,  mild redness noted around the scab. The scab was big itself. Pus was sent for culture, is growing step and staph. Follow up susceptibility.  Recommend 10 day total course. The wound is sitting right on the tibia bone. Xray did not show osteomyelitis, but recommend treating with right Ab given the potential of osteomyelitis if not treated properly. Seen by WOCN. Dressing recs per WOCN.      Diet: regular    Fluids: Stop  DVT Prophylaxis: Ambulate every shift  Mars Catheter: not present  Code Status: Full Code       Disposition Plan: Transfer to inpatient rehab on coming Wednesday if patient willing to stay, otherwise, discharge home tomorrow when susceptibilities are back.     Td Upton MD    Interval History     His withdrawal is better. Shin wound I healing. Seen by WOCN. De roofed and drained pus. No fever.     -Data reviewed today: I reviewed all new labs and imaging results over the last 24 hours. I personally reviewed no images or EKG's today.    Physical Exam   Temp: 97  F (36.1  C) Temp src: Oral BP: 129/86 Pulse: 78 Heart Rate: 90  Resp: 16 SpO2: 98 % O2 Device: None (Room air)    Vitals:    06/01/19 0021 06/02/19 0026 06/03/19 0356   Weight: 101.5 kg (223 lb 12.8 oz) 101.9 kg (224 lb 11.2 oz) 101.2 kg (223 lb)     Vital Signs with Ranges  Temp:  [97  F (36.1  C)-99.2  F (37.3  C)] 97  F (36.1  C)  Pulse:  [64-86] 78  Heart Rate:  [90] 90  Resp:  [16] 16  BP: (129-148)/(86-96) 129/86  SpO2:  [98 %] 98 %  No intake/output data recorded.    BL eyes are red, watery and itchy  Constitutional:  Awake, alert, cooperative, no apparent distress  Respiratory: Clear to auscultation bilaterally, no crackles or wheezing  Cardiovascular: Regular rate and rhythm, normal S1 and S2, and no murmur noted  GI: Normal bowel sounds, soft, non-distended, non-tender  Skin/Integumen:  Wound noted on right shin. Looks better than I thought.Sourrind redness is better. Pus drained out.     Medications        Reason anticoagulation order not selected       - MEDICATION INSTRUCTIONS -       ACE/ARB/ARNI NOT PRESCRIBED       BETA BLOCKER NOT PRESCRIBED       STATIN NOT PRESCRIBED         amLODIPine  10 mg Oral Daily     aspirin  81 mg Oral Daily     carboxymethylcellulose PF  1 drop Both Eyes 4x Daily     clindamycin  300 mg Oral Q6H RILEY     cloNIDine  0.1 mg Oral BID     folic acid  1 mg Oral Daily     gabapentin  300 mg Oral TID     ketotifen  1 drop Both Eyes BID     multivitamin w/minerals  1 tablet Oral Daily     [START ON 6/5/2019] neomycin-polymyxin-dexamethasone   Both Eyes At Bedtime     neomycin-polymyxin-dexamethasone   Both Eyes TID     pantoprazole  40 mg Oral QAM AC     sodium chloride (PF)  10 mL Intracatheter Once     sodium chloride (PF)  3 mL Intracatheter Q8H     vitamin B1  100 mg Oral Daily       Data   Recent Labs   Lab 06/02/19  0549 06/01/19  0356 06/01/19  0231 05/31/19 1927   WBC 7.1 7.8  --  8.5   HGB 13.6 13.3  --  14.2   MCV 98 97  --  95    240  --  312    136  --  141   POTASSIUM 3.8 3.5  --  3.6   CHLORIDE 107 102  --  102    CO2 22 27  --  23   BUN 6* 6*  --  5*   CR 0.77 0.79  --  0.84   ANIONGAP 10 7  --  16*   KEELEY 8.3* 8.9  --  8.8   GLC 94 90  --  111*   ALBUMIN 3.1* 3.4  --  3.6   PROTTOTAL 6.9 7.3  --  7.8   BILITOTAL 0.9 0.9  --  0.7   ALKPHOS 104 116  --  130   ALT 78* 94*  --  102*   AST 95* 107*  --  132*   LIPASE 1,061*  --   --  584*   TROPI  --   --  0.045 0.079*       Recent Results (from the past 24 hour(s))   XR Tibia & Fibula Right 2 Views    Narrative    Exam: XR TIBIA & FIBULA RT 2 VW, 6/3/2019 1:02 PM    Indication: right leg wound    Comparison: None    Findings:   No displaced fracture. No periosteal reaction. No erosions.      Impression    Impression: No acute osseous abnormality.    ANGIE CRUZ MD

## 2019-06-03 NOTE — PROGRESS NOTES
A/Ox4, vss, LS clear denies CP, SOB, BS active LBM 6/2, regular diet. PIV SL, right shin dressing CDI. Daily dressing change. Independent in the room and hallway. Had X-ray today. See result review.  CD treatment  At [facility] on 6/5. Wanting to discharge home to take care of some business, before admitting to the CD treatment center. Able to call appropriately Will continue to monitor.

## 2019-06-03 NOTE — PROGRESS NOTES
[facility] Nurse Inpatient Wound Assessment   Reason for consultation: Evaluate and treat right shin wound     Assessment  Right shin wound due to Trauma- scraped leg on concrete step approximately 2 weeks ago  Status: initial assessment    Treatment Plan  Right shin wound: Daily: wash wound with wound cleanser and gauze. Apply a thin layer of Medihoney to wound bed followed with Adaptic or Vaseline gauze. Cover with gauze or Mepilex. OK to shower, perform dressing change after shower.    Orders Written  [facility] Nurse follow-up plan:weekly  Nursing to notify the Provider(s) and re-consult the [facility] Nurse if wound(s) deteriorates or new skin concern.    Patient History  According to provider note(s): Nikhil Rios is a 33 year old male with a PMHx HTN, HX of Urticareia, Allergic rhinits and alcohol abuse & frequent ED visits (admitted to [facility] on 4/15/19 for alcohol & again on 5/3/19 with resultant detox at [facility]) with recent hospitalizations for withdrawal at [facility] from 05/18/19 to 05/21/19. He wanted to try out pt program for deaddiction but he failed. He started drinking again. He came back to the ER last night and was admitted to [facility] cardiology for troponin elevation. He had several episodes of vomiting, abdominal pain when he presented to the ER. He was found to have elevated troponin and EKG changes. That lead to the transfer to the [facility]. He was seen by cardiology. They cycled his trops which were trending down. ECHO was done when shoed mild decrease in EF which is felt to be from ETOH. He is transferred back to [facility] for withdrawal management.     Objective Data  Containment of urine/stool: Continent of bowel and Continent of bladder    Active Diet Order  Orders Placed This Encounter      Regular Diet Adult      Output:   No intake/output data recorded.    Risk Assessment:   Sensory Perception: 4-->no impairment  Moisture: 4-->rarely moist  Activity: 3-->walks occasionally  Mobility: 4-->no  limitation  Nutrition: 3-->adequate  Friction and Shear: 3-->no apparent problem  Rashad Score: 21                          Labs:   Recent Labs   Lab 06/02/19  0549   ALBUMIN 3.1*   HGB 13.6   WBC 7.1       Physical Exam  Skin inspection: focused bilateral lower legs    Wound Location:  Right shin    6/3/19    Date of last photo 6/3/2019  Wound History: Per patient, he scraped his leg on a concrete step a few weeks ago. Wound in photo is after washing away thick, crusty scab.  Measurements (length x width x depth, in cm) 2.5 cm x 2.5 cm  x  0.3 cm   Wound Base: 100 % granulation tissue  Tunneling N/A  Undermining N/A  Palpation of the wound bed: normal   Periwound skin: intact with some adherent drainage, new scar tissue at edges  Color: pink  Temperature: normal   Drainage:, small  Description of drainage: serosanguinous  Odor: none  Pain: during dressing change, burning    Interventions  Current support surface: Standard  Atmos Air mattress  Current off-loading measures: Pillows under calves  Visual inspection of wound(s) completed  Wound Care: done per plan of care  Supplies: ordered: Medihoney and Mepilex  Education provided: wound progress and Infection prevention   Discussed plan of care with Patient and Nurse    Ana Mata RN, CWOCN

## 2019-06-03 NOTE — PLAN OF CARE
VSS. A & O x 4. Lung sounds clear. Denies SOB. Denies pain. Denies any n/t. MSSA score of 4. Pt reporting eyes itching, eye drops given. Lazo dressing CDI. Up independently. LBM 6/2/19. Regular diet. Family in room. Call light within reach, able to make needs known. Possible discharge today, plan is for treatment center Wednesday.

## 2019-06-04 ENCOUNTER — PATIENT OUTREACH (OUTPATIENT)
Dept: CARE COORDINATION | Facility: CLINIC | Age: 34
End: 2019-06-04

## 2019-06-04 VITALS
TEMPERATURE: 98.9 F | SYSTOLIC BLOOD PRESSURE: 130 MMHG | HEART RATE: 74 BPM | WEIGHT: 223 LBS | OXYGEN SATURATION: 97 % | RESPIRATION RATE: 16 BRPM | DIASTOLIC BLOOD PRESSURE: 86 MMHG

## 2019-06-04 PROCEDURE — 25000132 ZZH RX MED GY IP 250 OP 250 PS 637: Performed by: INTERNAL MEDICINE

## 2019-06-04 PROCEDURE — 99239 HOSP IP/OBS DSCHRG MGMT >30: CPT | Performed by: INTERNAL MEDICINE

## 2019-06-04 PROCEDURE — 25000132 ZZH RX MED GY IP 250 OP 250 PS 637: Performed by: HOSPITALIST

## 2019-06-04 RX ORDER — GABAPENTIN 300 MG/1
300 CAPSULE ORAL 3 TIMES DAILY
Qty: 90 CAPSULE | Refills: 0 | Status: ON HOLD | OUTPATIENT
Start: 2019-06-04 | End: 2019-07-16

## 2019-06-04 RX ORDER — NEOMYCIN SULFATE, POLYMYXIN B SULFATE, AND DEXAMETHASONE 3.5; 10000; 1 MG/G; [USP'U]/G; MG/G
0.5 OINTMENT OPHTHALMIC AT BEDTIME
Qty: 1 TUBE | Refills: 0 | Status: ON HOLD | OUTPATIENT
Start: 2019-06-05 | End: 2019-07-16

## 2019-06-04 RX ORDER — FOLIC ACID 1 MG/1
1 TABLET ORAL DAILY
Qty: 30 TABLET | Refills: 0 | Status: ON HOLD | OUTPATIENT
Start: 2019-06-04 | End: 2019-07-18

## 2019-06-04 RX ORDER — CLINDAMYCIN HCL 300 MG
300 CAPSULE ORAL EVERY 6 HOURS
Qty: 16 CAPSULE | Refills: 0 | Status: ON HOLD | OUTPATIENT
Start: 2019-06-04 | End: 2019-06-17

## 2019-06-04 RX ORDER — LANOLIN ALCOHOL/MO/W.PET/CERES
100 CREAM (GRAM) TOPICAL DAILY
Qty: 30 TABLET | Refills: 0 | Status: ON HOLD | OUTPATIENT
Start: 2019-06-05 | End: 2019-07-16

## 2019-06-04 RX ORDER — AMLODIPINE BESYLATE 5 MG/1
5 TABLET ORAL DAILY
Qty: 30 TABLET | Refills: 0 | Status: ON HOLD | OUTPATIENT
Start: 2019-06-04 | End: 2019-07-18

## 2019-06-04 RX ORDER — ASPIRIN 81 MG/1
81 TABLET, CHEWABLE ORAL DAILY
Qty: 30 TABLET | Refills: 0 | Status: ON HOLD | OUTPATIENT
Start: 2019-06-05 | End: 2019-07-18

## 2019-06-04 RX ORDER — CLONIDINE HYDROCHLORIDE 0.1 MG/1
0.1 TABLET ORAL 2 TIMES DAILY
Qty: 60 TABLET | Refills: 0 | Status: ON HOLD | OUTPATIENT
Start: 2019-06-04 | End: 2019-07-18

## 2019-06-04 RX ORDER — MULTIPLE VITAMINS W/ MINERALS TAB 9MG-400MCG
1 TAB ORAL DAILY
Qty: 30 TABLET | Refills: 0 | Status: ON HOLD | OUTPATIENT
Start: 2019-06-04 | End: 2019-07-16

## 2019-06-04 RX ORDER — PANTOPRAZOLE SODIUM 40 MG/1
40 TABLET, DELAYED RELEASE ORAL
Qty: 30 TABLET | Refills: 0 | Status: ON HOLD | OUTPATIENT
Start: 2019-06-05 | End: 2019-06-17

## 2019-06-04 RX ORDER — CARBOXYMETHYLCELLULOSE SODIUM 5 MG/ML
1 SOLUTION/ DROPS OPHTHALMIC 4 TIMES DAILY
Qty: 1 BOTTLE | Refills: 1 | Status: SHIPPED | OUTPATIENT
Start: 2019-06-04 | End: 2019-06-13

## 2019-06-04 RX ADMIN — AMLODIPINE BESYLATE 10 MG: 10 TABLET ORAL at 08:27

## 2019-06-04 RX ADMIN — CLINDAMYCIN HYDROCHLORIDE 300 MG: 300 CAPSULE ORAL at 06:39

## 2019-06-04 RX ADMIN — KETOTIFEN FUMARATE 1 DROP: 0.35 SOLUTION/ DROPS OPHTHALMIC at 06:42

## 2019-06-04 RX ADMIN — CLINDAMYCIN HYDROCHLORIDE 300 MG: 300 CAPSULE ORAL at 00:11

## 2019-06-04 RX ADMIN — CLONIDINE HYDROCHLORIDE 0.1 MG: 0.1 TABLET ORAL at 08:28

## 2019-06-04 RX ADMIN — MULTIPLE VITAMINS W/ MINERALS TAB 1 TABLET: TAB at 08:28

## 2019-06-04 RX ADMIN — GABAPENTIN 300 MG: 300 CAPSULE ORAL at 08:28

## 2019-06-04 RX ADMIN — Medication 100 MG: at 08:28

## 2019-06-04 RX ADMIN — NEOMYCIN SULFATE, POLYMYXIN B SULFATE, AND DEXAMETHASONE: 3.5; 10000; 1 OINTMENT OPHTHALMIC at 06:43

## 2019-06-04 RX ADMIN — KETOTIFEN FUMARATE 1 DROP: 0.35 SOLUTION/ DROPS OPHTHALMIC at 08:28

## 2019-06-04 RX ADMIN — PANTOPRAZOLE SODIUM 40 MG: 40 TABLET, DELAYED RELEASE ORAL at 08:28

## 2019-06-04 RX ADMIN — NEOMYCIN SULFATE, POLYMYXIN B SULFATE, AND DEXAMETHASONE: 3.5; 10000; 1 OINTMENT OPHTHALMIC at 08:28

## 2019-06-04 RX ADMIN — ASPIRIN 81 MG CHEWABLE TABLET 81 MG: 81 TABLET CHEWABLE at 08:27

## 2019-06-04 RX ADMIN — CARBOXYMETHYLCELLULOSE SODIUM 1 DROP: 5 SOLUTION/ DROPS OPHTHALMIC at 08:28

## 2019-06-04 RX ADMIN — FOLIC ACID 1 MG: 1 TABLET ORAL at 08:27

## 2019-06-04 ASSESSMENT — ACTIVITIES OF DAILY LIVING (ADL)
ADLS_ACUITY_SCORE: 10

## 2019-06-04 NOTE — DISCHARGE SUMMARY
[facility], [place]  Hospitalist Discharge Summary       Date of Admission:  5/31/2019  Date of Discharge:  6/4/2019  Discharging Provider: Jhonny Gao MD      Discharge Diagnoses    Acute alcohol withdrawal with chronic alcohol abuse  Alcohol Dependence  Elevated Troponins due to demand ischemia  Acute alcohol associated pancreatitis  Elevated liver enzymes  Hypertension  Rt shin wound with abscess formation         Follow-ups Needed After Discharge   Admission to inpatient chemical dependency unit on 6/5   Follow up with PCP after inpatient chemical dependency treatment plan       Discharge Disposition   Discharged to home  Condition at discharge: Stable    Hospital Course   Nikhil Rios is a 33 year old male with a PMHx HTN, HX of Urticareia, Allergic rhinits and alcohol abuse & frequent ED visits (admitted to [facility] on 4/15/19 for alcohol & again on 5/3/19 with resultant detox at [facility]) with recent hospitalizations for withdrawal at [facility] from 05/18/19 to 05/21/19. He wanted to try out pt program for deaddiction but he failed. He started drinking again. He came back to the ER  On 6/1/2019  and was admitted to [facility] cardiology team  for troponin elevation. He had several episodes of vomiting, abdominal pain when he presented to the ER on the [facility] . He was found to have elevated troponin and EKG changes. That lead to the transfer to the [facility]. He was seen by cardiology. They cycled his trops which trended  down. ECHO was done when shoed mild decrease in EF which is felt to be from ETOH. He was  transferred back to [facility] for withdrawal management.         # Alcohol withdrawal  # Hx of alcohol dependence   Treated with MSSA protocol with valium. Now stopped as phase of acute withdrawal is complete   Ct clonidine 0.1 mg BID with hold parameters.   - Daily PO thiamine and folate and MV  - He has very high risk of relapsing back as noted by  severe ER visits and hospitalization.  - Patient is scheduled to start an inpatient chemical dependency program at [facility] in [place] on 6/05.   -  Stable BP on clonidine and continue on 10 mg norvasc at discharge with follow up.       # Elevated troponin: Troponin of 0.079 with sinus arrhythmia with T wave inversion I, aVL, V2, V3, V4 and V5. Suspect demand ischemia due to alcohol withdrawal. Was seen by cardiology and ECHO showed EF at 50 % with mild reduction in EF felt to be due to ETOH.         #  Acute alcohol related pancreatitis  Evidebced by elevated lipase and  abdominal pain on admit to ED. Lipase on Admit was 584, next day it went up to 1000. Recent US abdomen with limited assessment of the pancreas. He has fatty steatosis of liver.  Has been elevated in past. Triglycerides elevated but not severely.   -no abdominal pain right now. Monitor.   -regular diet     # Elevated liver enzymes:   Likely alcohol hepatitis. Recent US showing hepatic steatosis.     # HTN: Diastolic BP running high while he was withdrawing. Has gotten better with clonidine.  Continue amlodipine dose of which was increased to 10 mg daily form 5 mg due to uncontrolled BP. At the discharge,  continued clonidine and continue 10 mg norvasc with close follow up     # Red eyes: Allergies Vs conjunctivitis; Ct Kitotifen drop from home.      # Right shin wound. Had Scabbed which is not deroofed. Pus drained out on pressing on admission. There was surrounding,  mild redness noted around the scab. The scab was big itself. Pus was sent for culture, is growing strep pyogens, pan sensitive   Recommend 10 day total course. The wound is sitting right on the tibia bone. Xray did not show osteomyelitis, but recommend treating with right Ab given the potential of osteomyelitis if not treated properly. Seen by WOCN. Dressing recs per WOCN.   Complete a total of 10 day course of clindamycin      Diet: regular    Fluids: Stop  DVT  Prophylaxis: Ambulate every shift  Mars Catheter: not present  Code Status: Full Code               Consultations This Hospital Stay   CARDIAC REHAB IP CONSULT  WOUND OSTOMY CONTINENCE NURSE  IP CONSULT  SMOKING CESSATION PROGRAM IP CONSULT    Code Status   Full Code    Time Spent on this Encounter   I, Cosmeelizabeth Aayush Gao, personally saw the patient today and spent greater than 30 minutes discharging this patient.       Jhonny Gao MD  [facility], [place]  ______________________________________________________________________    Physical Exam   Vital Signs: Temp: 98.9  F (37.2  C) Temp src: Oral BP: 130/86 Pulse: 74 Heart Rate: 101 Resp: 16 SpO2: 97 % O2 Device: None (Room air)    Weight: 223 lbs 0 oz  General Appearance: Awake, alert and not in distress  Respiratory: Clear breath sounds bilaterally   Cardiovascular: Normal heart sounds. No murmurs   GI: Soft, non tender. Normal bowel sounds   Skin: No bruising or bleeding          Primary Care Physician   Physician No Ref-Primary    Discharge Orders      Reason for your hospital stay    Acute alcohol withdrawal  Rt leg wound with abscess     Adult [facility] Follow-up and recommended labs and tests    Please present yourself to the inpatient chemical dependency unit as planned for tomorrow ( 6/5)  Follow up with your PCP after inpatient chem dep treatment     Appointments on [place] and/or [facility] (with [facility] provider or service). Call [phone] if you haven't heard regarding these appointments within 7 days of discharge.     Activity    Your activity upon discharge: activity as tolerated     When to contact your care team    Please seek medical attention if you develop chest pain/ SOB/ fever or chills     Wound care and dressings    Instructions to care for your wound at home:  Rt Leg wound  Right shin wound: Daily: wash wound with wound cleanser and gauze. Apply a thin layer of Medihoney  to wound bed followed with Adaptic or Vaseline gauze. Cover with gauze or Mepilex. OK to shower, perform dressing change after shower.     Full Code     Diet    Follow this diet upon discharge: Orders Placed This Encounter      Regular Diet Adult           Discharge Medications   Current Discharge Medication List      START taking these medications    Details   aspirin (ASA) 81 MG chewable tablet Take 1 tablet (81 mg) by mouth daily  Qty: 30 tablet, Refills: 0    Associated Diagnoses: Alcohol dependence with withdrawal with complication (H)      clindamycin (CLEOCIN) 300 MG capsule Take 1 capsule (300 mg) by mouth every 6 hours for 4 days  Qty: 16 capsule, Refills: 0    Associated Diagnoses: Alcohol dependence with withdrawal with complication (H)      cloNIDine (CATAPRES) 0.1 MG tablet Take 1 tablet (0.1 mg) by mouth 2 times daily  Qty: 60 tablet, Refills: 0    Associated Diagnoses: Alcohol dependence with withdrawal with complication (H)      gabapentin (NEURONTIN) 300 MG capsule Take 1 capsule (300 mg) by mouth 3 times daily  Qty: 90 capsule, Refills: 0    Associated Diagnoses: Alcohol dependence with withdrawal with complication (H)      neomycin-polymyxin-dexamethasone (MAXITROL) 3.5-29731-7.1 ophthalmic ointment Place 0.5 inches into both eyes At Bedtime  Qty: 1 Tube, Refills: 0    Associated Diagnoses: Alcohol dependence with withdrawal with complication (H)      pantoprazole (PROTONIX) 40 MG EC tablet Take 1 tablet (40 mg) by mouth every morning (before breakfast)  Qty: 30 tablet, Refills: 0    Associated Diagnoses: Alcohol dependence with withdrawal with complication (H)      vitamin B1 (THIAMINE) 100 MG tablet Take 1 tablet (100 mg) by mouth daily  Qty: 30 tablet, Refills: 0    Associated Diagnoses: Alcohol dependence with withdrawal with complication (H)         CONTINUE these medications which have CHANGED    Details   amLODIPine (NORVASC) 5 MG tablet Take 1 tablet (5 mg) by mouth daily  Qty: 30 tablet,  Refills: 0    Associated Diagnoses: Essential hypertension      carboxymethylcellulose PF (REFRESH PLUS) 0.5 % SOLN ophthalmic solution Place 1 drop into both eyes 4 times daily  Qty: 1 Bottle, Refills: 1    Associated Diagnoses: Allergic eye reaction      folic acid (FOLVITE) 1 MG tablet Take 1 tablet (1 mg) by mouth daily  Qty: 30 tablet, Refills: 0    Associated Diagnoses: Alcoholism /alcohol abuse (H)      ketotifen (ZADITOR/REFRESH ANTI-ITCH) 0.025 % ophthalmic solution Place 1 drop into both eyes 2 times daily  Qty: 1 Bottle, Refills: 1    Associated Diagnoses: Allergic eye reaction      multivitamin w/minerals (THERA-VIT-M) tablet Take 1 tablet by mouth daily  Qty: 30 tablet, Refills: 0    Associated Diagnoses: Alcoholism /alcohol abuse (H)         STOP taking these medications       gabapentin (NEURONTIN) 600 MG tablet Comments:   Reason for Stopping:         hydrOXYzine (ATARAX) 25 MG tablet Comments:   Reason for Stopping:             Allergies   Allergies   Allergen Reactions     Pollen Extract Rash     grass tree pollen

## 2019-06-04 NOTE — PLAN OF CARE
A/O x 4. No report of pain. Calm and cooperative. Dressing to anterior calf c/d/I. MSSA 5. PO clindamycin given as ordered. Cont to assess.

## 2019-06-04 NOTE — PLAN OF CARE
Pt up independently. Dressing change done yesterday and pt will complete today after shower at home. Tolerating regular diet. PIV removed. Denies pain. MSSA 3. Pt discharging home today. Given woundcare supplies. Writer reviewed all discharge paperwork and meds with pt. Pt given discharge meds. Discharging home at 1115.

## 2019-06-04 NOTE — PLAN OF CARE
Vitals: /86 (BP Location: Left arm)   Pulse 78   Temp 97  F (36.1  C) (Oral)   Resp 16   Wt 101.2 kg (223 lb)   SpO2 98%   A&O x 4.  Lung sounds clear. Denies CP, SOB.   Output: Voiding spontenously without difficulty.  Last BM: 6/2   Activity: Independent   Skin: Intact ex wound on chin and scab on knee   Pain: Denies   CMS/Neuro: Intact.   Dressing: CDI   Diet: Regular   LDA: L AC PIV - SL   Equipment:    Plan/Add'l info: Able to make needs known. Call light within reach. Continue with POC.  Discharge plan: home tomorrow pending wound cultures

## 2019-06-07 ENCOUNTER — TELEPHONE (OUTPATIENT)
Dept: NURSING | Facility: CLINIC | Age: 34
End: 2019-06-07

## 2019-06-07 NOTE — TELEPHONE ENCOUNTER
Mom Veronique calling for recommendations regarding her son and detox/treatment. Patient was not present so unable to give exact information. Advised ER if needed tonight, otherwise follow up with PCP next week.  Geneva Quesada RN [facility]

## 2019-06-13 ENCOUNTER — HOSPITAL ENCOUNTER (INPATIENT)
Facility: CLINIC | Age: 34
LOS: 3 days | Discharge: HOME | DRG: 897 | End: 2019-06-17
Attending: EMERGENCY MEDICINE | Admitting: PSYCHIATRY & NEUROLOGY
Payer: COMMERCIAL

## 2019-06-13 DIAGNOSIS — F10.239 ALCOHOL DEPENDENCE WITH WITHDRAWAL WITH COMPLICATION (H): ICD-10-CM

## 2019-06-13 LAB
ALBUMIN SERPL-MCNC: 4 G/DL (ref 3.4–5)
ALCOHOL BREATH TEST: 0 (ref 0–0.01)
ALP SERPL-CCNC: 116 U/L (ref 40–150)
ALT SERPL W P-5'-P-CCNC: 135 U/L (ref 0–70)
AMPHETAMINES UR QL SCN: NEGATIVE
ANION GAP SERPL CALCULATED.3IONS-SCNC: 15 MMOL/L (ref 3–14)
AST SERPL W P-5'-P-CCNC: 121 U/L (ref 0–45)
BARBITURATES UR QL: NEGATIVE
BASOPHILS # BLD AUTO: 0.1 10E9/L (ref 0–0.2)
BASOPHILS NFR BLD AUTO: 0.6 %
BENZODIAZ UR QL: POSITIVE
BILIRUB SERPL-MCNC: 1.1 MG/DL (ref 0.2–1.3)
BUN SERPL-MCNC: 5 MG/DL (ref 7–30)
CALCIUM SERPL-MCNC: 9.3 MG/DL (ref 8.5–10.1)
CANNABINOIDS UR QL SCN: NEGATIVE
CHLORIDE SERPL-SCNC: 98 MMOL/L (ref 94–109)
CO2 SERPL-SCNC: 24 MMOL/L (ref 20–32)
COCAINE UR QL: NEGATIVE
CREAT SERPL-MCNC: 0.9 MG/DL (ref 0.66–1.25)
DIFFERENTIAL METHOD BLD: NORMAL
EOSINOPHIL # BLD AUTO: 0 10E9/L (ref 0–0.7)
EOSINOPHIL NFR BLD AUTO: 0.3 %
ERYTHROCYTE [DISTWIDTH] IN BLOOD BY AUTOMATED COUNT: 13.8 % (ref 10–15)
ETHANOL UR QL SCN: NEGATIVE
GFR SERPL CREATININE-BSD FRML MDRD: >90 ML/MIN/{1.73_M2}
GLUCOSE SERPL-MCNC: 124 MG/DL (ref 70–99)
HCT VFR BLD AUTO: 45 % (ref 40–53)
HGB BLD-MCNC: 15.6 G/DL (ref 13.3–17.7)
IMM GRANULOCYTES # BLD: 0 10E9/L (ref 0–0.4)
IMM GRANULOCYTES NFR BLD: 0.1 %
LIPASE SERPL-CCNC: 414 U/L (ref 73–393)
LYMPHOCYTES # BLD AUTO: 2 10E9/L (ref 0.8–5.3)
LYMPHOCYTES NFR BLD AUTO: 20.1 %
MCH RBC QN AUTO: 32.8 PG (ref 26.5–33)
MCHC RBC AUTO-ENTMCNC: 34.7 G/DL (ref 31.5–36.5)
MCV RBC AUTO: 95 FL (ref 78–100)
MONOCYTES # BLD AUTO: 0.9 10E9/L (ref 0–1.3)
MONOCYTES NFR BLD AUTO: 8.9 %
NEUTROPHILS # BLD AUTO: 7 10E9/L (ref 1.6–8.3)
NEUTROPHILS NFR BLD AUTO: 70 %
NRBC # BLD AUTO: 0 10*3/UL
NRBC BLD AUTO-RTO: 0 /100
OPIATES UR QL SCN: NEGATIVE
PLATELET # BLD AUTO: 346 10E9/L (ref 150–450)
POTASSIUM SERPL-SCNC: 3.7 MMOL/L (ref 3.4–5.3)
PROT SERPL-MCNC: 8.1 G/DL (ref 6.8–8.8)
RBC # BLD AUTO: 4.76 10E12/L (ref 4.4–5.9)
SODIUM SERPL-SCNC: 137 MMOL/L (ref 133–144)
WBC # BLD AUTO: 10 10E9/L (ref 4–11)

## 2019-06-13 PROCEDURE — 25000128 H RX IP 250 OP 636: Performed by: EMERGENCY MEDICINE

## 2019-06-13 PROCEDURE — 99285 EMERGENCY DEPT VISIT HI MDM: CPT | Mod: 25 | Performed by: EMERGENCY MEDICINE

## 2019-06-13 PROCEDURE — 80307 DRUG TEST PRSMV CHEM ANLYZR: CPT | Performed by: EMERGENCY MEDICINE

## 2019-06-13 PROCEDURE — 25000125 ZZHC RX 250: Performed by: EMERGENCY MEDICINE

## 2019-06-13 PROCEDURE — 96374 THER/PROPH/DIAG INJ IV PUSH: CPT | Performed by: EMERGENCY MEDICINE

## 2019-06-13 PROCEDURE — 80320 DRUG SCREEN QUANTALCOHOLS: CPT | Performed by: EMERGENCY MEDICINE

## 2019-06-13 PROCEDURE — HZ2ZZZZ DETOXIFICATION SERVICES FOR SUBSTANCE ABUSE TREATMENT: ICD-10-PCS | Performed by: PSYCHIATRY & NEUROLOGY

## 2019-06-13 PROCEDURE — 96375 TX/PRO/DX INJ NEW DRUG ADDON: CPT | Performed by: EMERGENCY MEDICINE

## 2019-06-13 PROCEDURE — 25000132 ZZH RX MED GY IP 250 OP 250 PS 637: Performed by: EMERGENCY MEDICINE

## 2019-06-13 PROCEDURE — 99291 CRITICAL CARE FIRST HOUR: CPT | Mod: Z6 | Performed by: EMERGENCY MEDICINE

## 2019-06-13 PROCEDURE — 25800025 ZZH RX 258: Performed by: EMERGENCY MEDICINE

## 2019-06-13 PROCEDURE — 85025 COMPLETE CBC W/AUTO DIFF WBC: CPT | Performed by: EMERGENCY MEDICINE

## 2019-06-13 PROCEDURE — 80053 COMPREHEN METABOLIC PANEL: CPT | Performed by: EMERGENCY MEDICINE

## 2019-06-13 PROCEDURE — 25000128 H RX IP 250 OP 636: Performed by: FAMILY MEDICINE

## 2019-06-13 PROCEDURE — 86803 HEPATITIS C AB TEST: CPT | Performed by: EMERGENCY MEDICINE

## 2019-06-13 PROCEDURE — 83690 ASSAY OF LIPASE: CPT | Performed by: EMERGENCY MEDICINE

## 2019-06-13 PROCEDURE — 82075 ASSAY OF BREATH ETHANOL: CPT | Performed by: EMERGENCY MEDICINE

## 2019-06-13 RX ORDER — CLINDAMYCIN HCL 300 MG
300 CAPSULE ORAL EVERY 6 HOURS
Status: ON HOLD | COMMUNITY
End: 2019-06-17

## 2019-06-13 RX ORDER — LORAZEPAM 1 MG/1
1-4 TABLET ORAL EVERY 30 MIN PRN
Status: DISCONTINUED | OUTPATIENT
Start: 2019-06-13 | End: 2019-06-14 | Stop reason: ALTCHOICE

## 2019-06-13 RX ORDER — LORAZEPAM 2 MG/ML
2 INJECTION INTRAMUSCULAR ONCE
Status: COMPLETED | OUTPATIENT
Start: 2019-06-13 | End: 2019-06-13

## 2019-06-13 RX ORDER — LANOLIN ALCOHOL/MO/W.PET/CERES
100 CREAM (GRAM) TOPICAL DAILY
Status: DISCONTINUED | OUTPATIENT
Start: 2019-06-13 | End: 2019-06-14

## 2019-06-13 RX ORDER — ONDANSETRON 2 MG/ML
4 INJECTION INTRAMUSCULAR; INTRAVENOUS
Status: COMPLETED | OUTPATIENT
Start: 2019-06-13 | End: 2019-06-13

## 2019-06-13 RX ORDER — FOLIC ACID 1 MG/1
1 TABLET ORAL DAILY
Status: DISCONTINUED | OUTPATIENT
Start: 2019-06-13 | End: 2019-06-17 | Stop reason: HOSPADM

## 2019-06-13 RX ORDER — MULTIPLE VITAMINS W/ MINERALS TAB 9MG-400MCG
1 TAB ORAL DAILY
Status: DISCONTINUED | OUTPATIENT
Start: 2019-06-13 | End: 2019-06-14

## 2019-06-13 RX ADMIN — Medication 100 MG: at 16:54

## 2019-06-13 RX ADMIN — FOLIC ACID: 5 INJECTION, SOLUTION INTRAMUSCULAR; INTRAVENOUS; SUBCUTANEOUS at 11:59

## 2019-06-13 RX ADMIN — FOLIC ACID 1 MG: 1 TABLET ORAL at 16:54

## 2019-06-13 RX ADMIN — LORAZEPAM 2 MG: 1 TABLET ORAL at 16:54

## 2019-06-13 RX ADMIN — LORAZEPAM 2 MG: 1 TABLET ORAL at 21:12

## 2019-06-13 RX ADMIN — MULTIPLE VITAMINS W/ MINERALS TAB 1 TABLET: TAB at 16:54

## 2019-06-13 RX ADMIN — ONDANSETRON 4 MG: 2 INJECTION INTRAMUSCULAR; INTRAVENOUS at 11:30

## 2019-06-13 RX ADMIN — LORAZEPAM 2 MG: 2 INJECTION INTRAMUSCULAR; INTRAVENOUS at 11:26

## 2019-06-13 ASSESSMENT — ENCOUNTER SYMPTOMS
DIFFICULTY URINATING: 0
CONFUSION: 0
BACK PAIN: 0
COLOR CHANGE: 0
ARTHRALGIAS: 0
BRUISES/BLEEDS EASILY: 0
NAUSEA: 1
FEVER: 0
VOMITING: 1
HEADACHES: 0
ABDOMINAL PAIN: 0
SHORTNESS OF BREATH: 0
LIGHT-HEADEDNESS: 0
CHILLS: 0
ADENOPATHY: 0
NECK STIFFNESS: 0
POLYDIPSIA: 0
EYE REDNESS: 0

## 2019-06-13 ASSESSMENT — ACTIVITIES OF DAILY LIVING (ADL)
TOILETING: 0-->INDEPENDENT
BATHING: 0-->INDEPENDENT
PRIOR_FUNCTIONAL_LEVEL_COMMENT: FAIR
WHICH_OF_THE_ABOVE_FUNCTIONAL_RISKS_HAD_A_RECENT_ONSET_OR_CHANGE?: AMBULATION
RETIRED_COMMUNICATION: 0-->UNDERSTANDS/COMMUNICATES WITHOUT DIFFICULTY
RETIRED_EATING: 0-->INDEPENDENT
SWALLOWING: 0-->SWALLOWS FOODS/LIQUIDS WITHOUT DIFFICULTY
DRESS: 0-->INDEPENDENT

## 2019-06-13 NOTE — PHARMACY-ADMISSION MEDICATION HISTORY
Admission medication history for the June 13, 2019 admission has been completed by pharmacy.     Interview sources:  patient, 6/4 hospital discharge summary     Reliability of source: good    Medication compliance: poor d/t ETOH binge    Preferred Outpatient Pharmacy: [facility]     Changes made to PTA medication list (reason)  Added: clindamycin  Deleted: Refresh eye drops (pt reported he does not use)  Changed: none    Additional medication history information:   All PTA meds below were filled on 6/4/19 at hospital discharge. Patient reports he took them for a few days, but then stopped due to ETOH binge. Clindamycin was prescribed for shin infection, but he did not complete the last 3 days of therapy.     [place] :   Gabapentin 300mg capsules dispensed on 6-4-19 for quantity #90 capsules (30 day supply)     Prior to Admission Medication List:  Prior to Admission medications    Medication Sig Last Dose Taking? Auth Provider   amLODIPine (NORVASC) 5 MG tablet Take 1 tablet (5 mg) by mouth daily Past Week Yes Aayush Galeano MD   aspirin (ASA) 81 MG chewable tablet Take 1 tablet (81 mg) by mouth daily Past Week Yes Aayush Galeano MD   clindamycin (CLEOCIN) 300 MG capsule Take 300 mg by mouth every 6 hours For shin infection 6/4-6/8 6/4/2019 Yes Unknown, Entered By History   cloNIDine (CATAPRES) 0.1 MG tablet Take 1 tablet (0.1 mg) by mouth 2 times daily Past Week Yes Aayush Galeano MD   folic acid (FOLVITE) 1 MG tablet Take 1 tablet (1 mg) by mouth daily Past Week Yes Aayush Galeano MD   gabapentin (NEURONTIN) 300 MG capsule Take 1 capsule (300 mg) by mouth 3 times daily Past Week Yes Aayush Galeano MD   ketotifen (ZADITOR/REFRESH ANTI-ITCH) 0.025 % ophthalmic solution Place 1 drop into both eyes 2 times daily Past Week Yes Aayush Galeano MD   multivitamin w/minerals (THERA-VIT-M) tablet Take 1 tablet by mouth daily Past Week  Yes Aayush Galeano MD   neomycin-polymyxin-dexamethasone (MAXITROL) 3.5-66327-8.1 ophthalmic ointment Place 0.5 inches into both eyes At Bedtime Past Week Yes Aayush Galeano MD   pantoprazole (PROTONIX) 40 MG EC tablet Take 1 tablet (40 mg) by mouth every morning (before breakfast) Past Week Yes Aayush Galeano MD   vitamin B1 (THIAMINE) 100 MG tablet Take 1 tablet (100 mg) by mouth daily Past Week Yes Aayush Galeano MD       Time spent: 20 minutes    Medication history completed by:   Tori Snyder, PharmD, [facility]  [facility]  Available daily from 1-9 PM: phone [phone], ascom *[phone], pager [phone]

## 2019-06-13 NOTE — ED PROVIDER NOTES
History     Chief Complaint   Patient presents with     Alcohol Problem     pt is in withdrawal and 0.00.  mom is here and supportive,  He has a potential treatment place at [place].  Needs detox.  Has been here multiple times in last month     HPI  Nikhil Rios is a 33 year old male with a history of alcohol dependence and hypertension, who presents to the Emergency Department with concern for alcohol withdrawal, nausea, vomiting and tremulousness. Patient states that his last drink was approximately 24 hours ago. He drinks approximately a liter of vodka on a daily basis. Patient was seen and evaluated by me approximately two weeks ago and admitted to the hospital, however, after discharge from hospital he did not follow thorough with his original plan of checking into treatment plan. He continued drinking heavily. Since this morning he has been having non-bloody, non-bilious vomiting with nausea. He denies any specific site of abdominal pain. No fevers. No shortness of breath or chest pain. No other concerns or complaints.     This part of the medical record was transcribed by Geovanna Juárez Medical Scribe, from a dictation done by Noman Villa MD.       I have reviewed the Medications, Allergies, Past Medical and Surgical History, and Social History in the [employer] system.    Past Medical History:   Diagnosis Date     Hypertension      Substance abuse (H)        History reviewed. No pertinent surgical history.    No family history on file.    Social History     Tobacco Use     Smoking status: Current Some Day Smoker     Packs/day: 0.25     Smokeless tobacco: Never Used   Substance Use Topics     Alcohol use: Yes     Comment: 1 to 750ml daily     No current facility-administered medications for this encounter.      Current Outpatient Medications   Medication     amLODIPine (NORVASC) 5 MG tablet     aspirin (ASA) 81 MG chewable tablet     clindamycin (CLEOCIN) 300 MG capsule     cloNIDine (CATAPRES) 0.1 MG tablet      folic acid (FOLVITE) 1 MG tablet     gabapentin (NEURONTIN) 300 MG capsule     ketotifen (ZADITOR/REFRESH ANTI-ITCH) 0.025 % ophthalmic solution     multivitamin w/minerals (THERA-VIT-M) tablet     neomycin-polymyxin-dexamethasone (MAXITROL) 3.5-15727-9.1 ophthalmic ointment     pantoprazole (PROTONIX) 40 MG EC tablet     vitamin B1 (THIAMINE) 100 MG tablet        Allergies   Allergen Reactions     Pollen Extract Rash     grass tree pollen       Review of Systems   Constitutional: Negative for chills and fever.   HENT: Negative for congestion.    Eyes: Negative for redness.   Respiratory: Negative for shortness of breath.    Cardiovascular: Negative for chest pain.   Gastrointestinal: Positive for nausea and vomiting. Negative for abdominal pain.   Endocrine: Negative for polydipsia and polyuria.   Genitourinary: Negative for difficulty urinating.   Musculoskeletal: Negative for arthralgias, back pain and neck stiffness.   Skin: Negative for color change.   Allergic/Immunologic: Negative for immunocompromised state.   Neurological: Negative for light-headedness and headaches.   Hematological: Negative for adenopathy. Does not bruise/bleed easily.   Psychiatric/Behavioral: Negative for confusion.   All other systems reviewed and are negative.      Physical Exam   BP: 179/75  Pulse: 115  Temp: 98.4  F (36.9  C)  Resp: 22  Weight: 99.8 kg (220 lb)  SpO2: 99 %      Physical Exam   Constitutional: He is oriented to person, place, and time. He appears distressed ( Mild, from nausea and tremulousness).   HENT:   Head: Normocephalic and atraumatic.   Dry mucous membranes.   Eyes: Pupils are equal, round, and reactive to light. Conjunctivae and EOM are normal. No scleral icterus.   Neck: Normal range of motion.   Cardiovascular: Normal heart sounds and intact distal pulses.   Tachycardia   Pulmonary/Chest: Effort normal and breath sounds normal. No respiratory distress.   Abdominal: Soft. Bowel sounds are normal. He exhibits  no distension and no mass. There is no tenderness. There is no rebound and no guarding. No hernia.   Musculoskeletal: Normal range of motion. He exhibits no edema or tenderness.   Neurological: He is alert and oriented to person, place, and time. He has normal strength. No cranial nerve deficit or sensory deficit. He exhibits normal muscle tone. Coordination normal. GCS eye subscore is 4. GCS verbal subscore is 5. GCS motor subscore is 6.   Mild, diffuse body tremors, mainly prominent in upper extremities.   Skin: Skin is warm. No rash noted. He is not diaphoretic.   Psychiatric: He has a normal mood and affect. His behavior is normal. Judgment and thought content normal.   Nursing note and vitals reviewed.      ED Course        Procedures             Critical Care time:  was 35 minutes for this patient excluding procedures.             Labs Ordered and Resulted from Time of ED Arrival Up to the Time of Departure from the ED   COMPREHENSIVE METABOLIC PANEL - Abnormal; Notable for the following components:       Result Value    Anion Gap 15 (*)     Glucose 124 (*)     Urea Nitrogen 5 (*)      (*)      (*)     All other components within normal limits   LIPASE - Abnormal; Notable for the following components:    Lipase 414 (*)     All other components within normal limits   ALCOHOL BREATH TEST POCT - Normal   CBC WITH PLATELETS DIFFERENTIAL   DRUG ABUSE SCREEN 6 CHEM DEP URINE ([facility])   PERIPHERAL IV CATHETER   CARDIAC CONTINUOUS MONITORING   PULSE OXIMETRY NURSING            Assessments & Plan (with Medical Decision Making)   This is a 33 year old male presenting with nausea, vomiting and tremors. Differential diagnosis: alcohol withdrawal, alcohol dependence, dehydration, acute pancreatitis, electrolyte abnormalities.    After thorough history and physical examination, patient appears to be mildly distressed and I suspect that he is in alcohol withdrawal. I will treat him with IV Ativan and obtain  laboratory studies, and hydrate him with a bolus of intravenous banana bag. He agrees with the plan.     Patient's laboratory studies returned with no evidence of leukocytosis, WBC is normal at 10,000.  There is no anemia, hemoglobin is normal at 15.6.  Electrolytes show normal creatinine of 0.9, however, there is a slight elevation of anion gap at 15 likely due to starvation ketosis. LFTs are elevated which has been chronic for the patient likely due to his alcoholism. Lipase is slightly elevated 414. Alcohol level is undetectable. Patient s symptom have resolved after IV Ativan and his vital signs have normalized. At this time he is stable for detox admission.      This part of the medical record was transcribed by Geovanna Juárez, Medical Scribe, from a dictation done by Noman Villa MD.         I have reviewed the nursing notes.    I have reviewed the findings, diagnosis, plan and need for follow up with the patient.       Medication List      There are no discharge medications for this visit.         Final diagnoses:   Alcohol dependence with withdrawal with complication (H)   I was physically present and have reviewed and verified the accuracy of this note documented by my scribe.    Dayan Villa MD        6/13/2019   [facility], [place], EMERGENCY DEPARTMENT     Dayan Villa MD  06/13/19 6218

## 2019-06-14 PROCEDURE — 99221 1ST HOSP IP/OBS SF/LOW 40: CPT | Mod: AI | Performed by: PSYCHIATRY & NEUROLOGY

## 2019-06-14 PROCEDURE — H2032 ACTIVITY THERAPY, PER 15 MIN: HCPCS

## 2019-06-14 PROCEDURE — 12800008 ZZH R&B CD ADULT

## 2019-06-14 PROCEDURE — 99207 ZZC CDG-HISTORY COMP: MEETS EXP. PROBLEM FOCUSED - DOWN CODED LACK OF HPI: CPT | Performed by: PHYSICIAN ASSISTANT

## 2019-06-14 PROCEDURE — 25000132 ZZH RX MED GY IP 250 OP 250 PS 637: Performed by: CLINICAL NURSE SPECIALIST

## 2019-06-14 PROCEDURE — 25000132 ZZH RX MED GY IP 250 OP 250 PS 637: Performed by: EMERGENCY MEDICINE

## 2019-06-14 PROCEDURE — 99207 ZZC CDG-HISTORY COMP: MEETS EXP. PROB FOCUSED- DOWN CODED LACK OF PFSH: CPT | Performed by: PSYCHIATRY & NEUROLOGY

## 2019-06-14 PROCEDURE — 99232 SBSQ HOSP IP/OBS MODERATE 35: CPT | Performed by: PHYSICIAN ASSISTANT

## 2019-06-14 RX ORDER — DIAZEPAM 5 MG
5-20 TABLET ORAL EVERY 30 MIN PRN
Status: DISCONTINUED | OUTPATIENT
Start: 2019-06-14 | End: 2019-06-17

## 2019-06-14 RX ORDER — GABAPENTIN 300 MG/1
300 CAPSULE ORAL 3 TIMES DAILY
Status: DISCONTINUED | OUTPATIENT
Start: 2019-06-14 | End: 2019-06-17 | Stop reason: HOSPADM

## 2019-06-14 RX ORDER — HYDROXYZINE HYDROCHLORIDE 25 MG/1
25 TABLET, FILM COATED ORAL EVERY 4 HOURS PRN
Status: DISCONTINUED | OUTPATIENT
Start: 2019-06-14 | End: 2019-06-17 | Stop reason: HOSPADM

## 2019-06-14 RX ORDER — LOPERAMIDE HCL 2 MG
2 CAPSULE ORAL 4 TIMES DAILY PRN
Status: DISCONTINUED | OUTPATIENT
Start: 2019-06-14 | End: 2019-06-17 | Stop reason: HOSPADM

## 2019-06-14 RX ORDER — CLONIDINE HYDROCHLORIDE 0.1 MG/1
0.1 TABLET ORAL 2 TIMES DAILY
Status: DISCONTINUED | OUTPATIENT
Start: 2019-06-14 | End: 2019-06-17 | Stop reason: HOSPADM

## 2019-06-14 RX ORDER — MULTIPLE VITAMINS W/ MINERALS TAB 9MG-400MCG
1 TAB ORAL DAILY
Status: DISCONTINUED | OUTPATIENT
Start: 2019-06-14 | End: 2019-06-17 | Stop reason: HOSPADM

## 2019-06-14 RX ORDER — FOLIC ACID 1 MG/1
1 TABLET ORAL DAILY
Status: DISCONTINUED | OUTPATIENT
Start: 2019-06-14 | End: 2019-06-14

## 2019-06-14 RX ORDER — ATENOLOL 50 MG/1
50 TABLET ORAL DAILY PRN
Status: DISCONTINUED | OUTPATIENT
Start: 2019-06-14 | End: 2019-06-17

## 2019-06-14 RX ORDER — LANOLIN ALCOHOL/MO/W.PET/CERES
100 CREAM (GRAM) TOPICAL DAILY
Status: DISCONTINUED | OUTPATIENT
Start: 2019-06-14 | End: 2019-06-17 | Stop reason: HOSPADM

## 2019-06-14 RX ORDER — ASPIRIN 81 MG/1
81 TABLET, CHEWABLE ORAL DAILY
Status: DISCONTINUED | OUTPATIENT
Start: 2019-06-14 | End: 2019-06-17 | Stop reason: HOSPADM

## 2019-06-14 RX ORDER — AMLODIPINE BESYLATE 5 MG/1
5 TABLET ORAL DAILY
Status: DISCONTINUED | OUTPATIENT
Start: 2019-06-14 | End: 2019-06-17 | Stop reason: HOSPADM

## 2019-06-14 RX ORDER — NEOMYCIN SULFATE, POLYMYXIN B SULFATE, AND DEXAMETHASONE 3.5; 10000; 1 MG/G; [USP'U]/G; MG/G
0.5 OINTMENT OPHTHALMIC AT BEDTIME
Status: DISCONTINUED | OUTPATIENT
Start: 2019-06-14 | End: 2019-06-17 | Stop reason: HOSPADM

## 2019-06-14 RX ORDER — PANTOPRAZOLE SODIUM 40 MG/1
40 TABLET, DELAYED RELEASE ORAL
Status: DISCONTINUED | OUTPATIENT
Start: 2019-06-14 | End: 2019-06-14

## 2019-06-14 RX ADMIN — DIAZEPAM 10 MG: 5 TABLET ORAL at 06:14

## 2019-06-14 RX ADMIN — HYDROXYZINE HYDROCHLORIDE 25 MG: 25 TABLET ORAL at 01:56

## 2019-06-14 RX ADMIN — GABAPENTIN 300 MG: 300 CAPSULE ORAL at 16:21

## 2019-06-14 RX ADMIN — DIAZEPAM 10 MG: 5 TABLET ORAL at 08:28

## 2019-06-14 RX ADMIN — CLONIDINE HYDROCHLORIDE 0.1 MG: 0.1 TABLET ORAL at 20:43

## 2019-06-14 RX ADMIN — DIAZEPAM 10 MG: 5 TABLET ORAL at 16:13

## 2019-06-14 RX ADMIN — PANTOPRAZOLE SODIUM 40 MG: 40 TABLET, DELAYED RELEASE ORAL at 06:40

## 2019-06-14 RX ADMIN — DIAZEPAM 10 MG: 5 TABLET ORAL at 12:12

## 2019-06-14 RX ADMIN — GABAPENTIN 300 MG: 300 CAPSULE ORAL at 21:58

## 2019-06-14 RX ADMIN — CLONIDINE HYDROCHLORIDE 0.1 MG: 0.1 TABLET ORAL at 08:28

## 2019-06-14 RX ADMIN — MULTIPLE VITAMINS W/ MINERALS TAB 1 TABLET: TAB at 08:28

## 2019-06-14 RX ADMIN — DIAZEPAM 10 MG: 5 TABLET ORAL at 01:56

## 2019-06-14 RX ADMIN — FOLIC ACID 1 MG: 1 TABLET ORAL at 08:28

## 2019-06-14 RX ADMIN — GABAPENTIN 300 MG: 300 CAPSULE ORAL at 08:28

## 2019-06-14 RX ADMIN — THIAMINE HCL TAB 100 MG 100 MG: 100 TAB at 08:28

## 2019-06-14 RX ADMIN — ASPIRIN 81 MG CHEWABLE TABLET 81 MG: 81 TABLET CHEWABLE at 08:27

## 2019-06-14 RX ADMIN — AMLODIPINE BESYLATE 5 MG: 5 TABLET ORAL at 08:31

## 2019-06-14 ASSESSMENT — MIFFLIN-ST. JEOR: SCORE: 1975.04

## 2019-06-14 NOTE — PROGRESS NOTES
Writer met with pt to discuss aftercare plans. Pt reports he is interested in treatment at [place] ([facility]). Pt reports most recent assessment was 1.5 years prior. Pt will need new assessment and was shown paperwork to complete for assessment and referral. Pt reports he will work on this when feeling better and turn into RN when complete. Weekend CM please follow-up with [facility] assessment and fax to [facility] for funding and [facility] for review.  CM continues

## 2019-06-14 NOTE — CONSULTS
Consult Date:  06/14/2019      HISTORY OF PRESENT ILLNESS:  The patient is a 33-year-old male with history of alcohol use disorder admitted to station 3A for alcohol abuse and detoxification.  Drinking on average 750 mL of hard liquor daily, however, with breathalyzer on admission negative.  An Internal Medicine consultation was ordered by Dr. Sue to assess medical problems including hypertension.  At this time, Nikhil states he feels good and denies fever, chills, chest pain, shortness of breath, abdominal pain, nausea, bowel or bladder concerns.      PAST MEDICAL HISTORY:   1.  Alcohol use disorder and other psychiatric history per Dr. Sue.   2.  Hypertension.   3.  Allergic rhinitis and conjunctivitis.   4.  Denies history of other chronic medical problems including cardiopulmonary disease and diabetes.      PAST SURGICAL HISTORY:  None.      ADMISSION MEDICATIONS:  Reviewed and listed in the medication reconciliation list.      ALLERGIES:  NO KNOWN DRUG ALLERGIES.      SOCIAL HISTORY:  Single.  No children.  Lives in [place].  Currently unemployed.  Denies smoking cigarettes.      FAMILY HISTORY:  Reviewed and noncontributory.      REVIEW OF SYSTEMS:  Ten-point review of systems negative except as stated above in history of present illness.      PHYSICAL EXAMINATION:   GENERAL:  Nontoxic-appearing young man in no acute distress.   VITAL SIGNS:  Stable.  Temperature afebrile, pulse in the 90s, blood pressure 140s/90s.   HEENT:  Negative.   NECK:  Supple.  No cervical lymphadenopathy or thyromegaly.   LUNGS:  Clear.   CARDIOVASCULAR:  Regular rate and rhythm, no murmurs appreciated.   ABDOMEN:  Soft, nontender.   EXTREMITIES:  No edema.   SKIN:  Reveals scabbed-over abrasions on his lower extremities without signs of infection.   NEUROLOGIC:  He is awake, alert and oriented x3.  Cranial nerves grossly intact.  No acute focal deficits noted.      LABORATORY DATA:  Urine drug screen positive for  benzodiazepines.  Anion gap 15, glucose 124, , .  Otherwise, CBC and rest of comprehensive metabolic panel unremarkable.  Lipase level slightly elevated at 414.  Breathalyzer negative.      ASSESSMENT:     1.  Alcohol abuse and withdrawal per Dr. Sue.   2.  Hypertension, stable with current pressures elevated, likely due to withdrawal and anxiety.   3.  Acute transaminitis, most likely secondary to his heavy alcohol abuse prior to admission; however, not unusual; 1:2 ALT to AST ratio.      PLAN:  Continue with prior to admission antihypertensive medications with parameters to hold.  Blood pressures will be monitored closely.  Repeat hepatic panel tomorrow a.m.  Will add onto labs already drawn a hepatitis C screen.  No further medical intervention indicated at this time.  The patient is medically stable, and Medicine will follow up on results of repeat labs and if negative, will sign off.  Please feel free to call with questions.      Thank you for this consultation.      Khurram Dumas PA-C  Internal Medicine Hospitalist   [facility]  [phone]            D: 2019   T: 2019   MT:       Name:     QUYEN QUIROZ   MRN:      [id number]        Account:       [id number]   :      1985           Consult Date:  2019      Document: [id number]       cc: Daniel Sue MD

## 2019-06-14 NOTE — PROGRESS NOTES
Pt came seeking help detoxing from alcohol. Has been drinking 750 mg ml per day for about a year. Went to OP CD therapy in 2017. Has fallen several times in the last weeks, has an abrasion on R shin which is healing and for which he had been prescribed Cleosin (but did not complete the antibiotic). Area scabbed over and does not look infected at this time. Pt having alcohol withdrawal, scored 11, medicated with Valium 10 mg and Vistaril 25 mg. Denies any suicidal ideation or intent.

## 2019-06-14 NOTE — PROGRESS NOTES
06/14/19 0124   Patient Belongings   Did you bring any home meds/supplements to the hospital?  No   Patient Belongings locker   Patient Belongings Put in Hospital Secure Location (Security or Locker, etc.) clothing   Belongings Search Yes   Clothing Search Yes   Second Staff Yoseph Campos All patient's belongings are in a bin and his discharge bin.     BIN:  Shirts, underwears, can of pepsi, gatorade, powder, lotion. Slippers and razor.    DISCHARGE BIN:  Phone and     A               Admission:  I am responsible for any personal items that are not sent to the safe or pharmacy.  [facility] is not responsible for loss, theft or damage of any property in my possession.    Signature:  _________________________________ Date: _______  Time: _____                                              Staff Signature:  ____________________________ Date: ________  Time: _____      2nd Staff person, if patient is unable/unwilling to sign:    Signature: ________________________________ Date: ________  Time: _____     Discharge:  [facility] has returned all of my personal belongings:    Signature: _________________________________ Date: ________  Time: _____                                          Staff Signature:  ____________________________ Date: ________  Time: _____

## 2019-06-14 NOTE — PLAN OF CARE
Continues in detox status on alcohol withdrawal protocol. MSSA scores 10 and 9. Medicated x2 with Valium 10 mg. Appetite good Taking fluids well. Denies suicide ideation and thoughts of self harm. Low energy. Slept much of the day.

## 2019-06-14 NOTE — ED NOTES
ED to Behavioral Floor Handoff    SITUATION  Nikhil Rios is a 33 year old male who speaks English and lives in a home unknown The patient arrived in the ED by private car from home with a complaint of Alcohol Problem (pt is in withdrawal and 0.00.  mom is here and supportive,  He has a potential treatment place at [place].  Needs detox.  Has been here multiple times in last month)  .The patient's current symptoms started/worsened 2 year(s) ago and during this time the symptoms have increased.   In the ED, pt was diagnosed with   Final diagnoses:   Alcohol dependence with withdrawal with complication (H)        Initial vitals were: BP: 179/75  Pulse: 115  Temp: 98.4  F (36.9  C)  Resp: 22  Weight: 99.8 kg (220 lb)  SpO2: 99 %   --------  Is the patient diabetic? No   If yes, last blood glucose? --     If yes, was this treated in the ED? --  --------  Is the patient inebriated (ETOH) No or Impaired on other substances? No  MSSA done? Yes  Last MSSA score: 09  Were withdrawal symptoms treated? Yes  Does the patient have a seizure history? No. If yes, date of most recent seizure--  --------  Is the patient patient experiencing suicidal ideation? denies current or recent suicidal ideation     Homicidal ideation? denies current or recent homicidal ideation or behaviors.    Self-injurious behavior/urges? denies current or recent self injurious behavior or ideation.  ------  Was pt aggressive in the ED No  Was a code called No  Is the pt now cooperative? Yes  -------  Meds given in ED:   Medications   LORazepam (ATIVAN) tablet 1-4 mg (2 mg Oral Given 6/13/19 2112)   folic acid (FOLVITE) tablet 1 mg (1 mg Oral Given 6/13/19 1654)   multivitamin w/minerals (THERA-VIT-M) tablet 1 tablet (1 tablet Oral Given 6/13/19 1654)   vitamin B1 (THIAMINE) tablet 100 mg (100 mg Oral Given 6/13/19 1654)   dextrose 5% and 0.45% NaCl 1,000 mL with INFUVITE ADULT 10 mL, thiamine 100 mg, folic acid 1 mg infusion ( Intravenous Stopped 6/13/19  1200)   LORazepam (ATIVAN) injection 2 mg (2 mg Intravenous Given 6/13/19 1126)   ondansetron (ZOFRAN) injection 4 mg (4 mg Intravenous Given 6/13/19 1130)      Family present during ED course? Yes  Family currently present? No    BACKGROUND  Does the patient have a cognitive impairment or developmental disability? No  Allergies:   Allergies   Allergen Reactions     Pollen Extract Rash     grass tree pollen   .   Social demographics are   Social History     Socioeconomic History     Marital status: Single     Spouse name: None     Number of children: None     Years of education: None     Highest education level: None   Occupational History     None   Social Needs     Financial resource strain: None     Food insecurity:     Worry: None     Inability: None     Transportation needs:     Medical: None     Non-medical: None   Tobacco Use     Smoking status: Current Some Day Smoker     Packs/day: 0.25     Smokeless tobacco: Never Used   Substance and Sexual Activity     Alcohol use: Yes     Comment: 1 to 750ml daily     Drug use: No     Sexual activity: None   Lifestyle     Physical activity:     Days per week: None     Minutes per session: None     Stress: None   Relationships     Social connections:     Talks on phone: None     Gets together: None     Attends Jewish service: None     Active member of club or organization: None     Attends meetings of clubs or organizations: None     Relationship status: None     Intimate partner violence:     Fear of current or ex partner: None     Emotionally abused: None     Physically abused: None     Forced sexual activity: None   Other Topics Concern     None   Social History Narrative     None        ASSESSMENT  Labs results   Labs Ordered and Resulted from Time of ED Arrival Up to the Time of Departure from the ED   COMPREHENSIVE METABOLIC PANEL - Abnormal; Notable for the following components:       Result Value    Anion Gap 15 (*)     Glucose 124 (*)     Urea Nitrogen 5 (*)       (*)      (*)     All other components within normal limits   LIPASE - Abnormal; Notable for the following components:    Lipase 414 (*)     All other components within normal limits   ALCOHOL BREATH TEST POCT - Normal   CBC WITH PLATELETS DIFFERENTIAL   DRUG ABUSE SCREEN 6 CHEM DEP URINE ([facility])   PERIPHERAL IV CATHETER   CARDIAC CONTINUOUS MONITORING   PULSE OXIMETRY NURSING   MSSA SCORE AND VS   NOTIFY      Imaging Studies: No results found for this or any previous visit (from the past 24 hour(s)).   Most recent vital signs BP (!) 145/110   Pulse 107   Temp 99.2  F (37.3  C) (Oral)   Resp 18   Wt 99.8 kg (220 lb)   SpO2 96%    Abnormal labs/tests/findings requiring intervention:---   Pain control: good  Nausea control: good    RECOMMENDATION  Are any infection precautions needed (MRSA, VRE, etc.)? No If yes, what infection? --  ---  Does the patient have mobility issues? independently. If yes, what device does the pt use? ---  ---  Is patient on 72 hour hold or commitment? No If on 72 hour hold, have hold and rights been given to patient? N/A  Are admitting orders written if after 10 p.m. ?N/A  Tasks needing to be completed:---     Thelma Zapata   ascom-- [phone] [facility]   [phone] [facility]

## 2019-06-14 NOTE — PLAN OF CARE
Behavioral Team Discussion: (6/14/2019)    Continued Stay Criteria/Rationale: Patient admitted for Chemical Use Issues.  Plan: The following services will be provided to the patient; psychiatric assessment, medication management, therapeutic milieu, individual and group support, and skills groups.   Participants: 3A Provider: Dr. Daniel Sue MD; 3A RN's: Angelina Celeste RN; 3A CM's: Paula Jo.  Summary/Recommendation: Providers will assess today for treatment recommendations, discharge planning, and aftercare plans. CM will meet with pt for discharge planning.   Medical/Physical: History of hypertension  Precautions:   Behavioral Orders   Procedures     Code 1 - Restrict to Unit     Fall precautions     Routine Programming     As clinically indicated     Status 15     Every 15 minutes.     Withdrawal precautions     Rationale for change in precautions or plan: N/A  Progress: Initial.

## 2019-06-14 NOTE — H&P
"[facility], [place]   Psychiatric History and Physical  Admission date: 6/13/2019        Chief Complaint:   Alcohol        HPI:     The patient is a 34yo male with a history of alcohol use disorder who was admitted after drinking up to 1 liter daily for the past year. Was recently hospitalized medically for alcohol related complications. He reports that he is doing \"pretty cosby good.\" Says that he was having a lot of withdrawal symptoms this past Wednesday and Thursday but got Ativan and IV fluids in the ER so feels much better since then. Mood is good. Sleeping well. Appetite is better as his nausea has decreased. Denies SI or HI. Denies any history of withdrawal seizures or DTs. Somewhat open to the idea of Naltrexone.          Past Psychiatric History:     No history of suicide attempts.         Substance Use and History:     Alcohol use disorder. No history of withdrawal seizures or DTs.   Denies illicit drug use.   Saw a CD therapist in 2017        Past Medical History:   PAST MEDICAL HISTORY:   Past Medical History:   Diagnosis Date     Hypertension      Substance abuse (H)        PAST SURGICAL HISTORY: History reviewed. No pertinent surgical history.          Family History:   FAMILY HISTORY: No family history on file.        Social History:   Please see the full psychosocial profile from the clinical treatment coordinator.   SOCIAL HISTORY:   Social History     Tobacco Use     Smoking status: Current Some Day Smoker     Packs/day: 0.25     Smokeless tobacco: Never Used   Substance Use Topics     Alcohol use: Yes     Comment: 1 to 750ml daily            Physical ROS:   The patient endorsed nausea. The remainder of 10-point review of systems was negative except as noted in HPI.         PTA Medications:     Medications Prior to Admission   Medication Sig Dispense Refill Last Dose     amLODIPine (NORVASC) 5 MG tablet Take 1 tablet (5 mg) by mouth daily 30 tablet 0 Past Week     " aspirin (ASA) 81 MG chewable tablet Take 1 tablet (81 mg) by mouth daily 30 tablet 0 Past Week     clindamycin (CLEOCIN) 300 MG capsule Take 300 mg by mouth every 6 hours For shin infection -2019     cloNIDine (CATAPRES) 0.1 MG tablet Take 1 tablet (0.1 mg) by mouth 2 times daily 60 tablet 0 Past Week     folic acid (FOLVITE) 1 MG tablet Take 1 tablet (1 mg) by mouth daily 30 tablet 0 Past Week     gabapentin (NEURONTIN) 300 MG capsule Take 1 capsule (300 mg) by mouth 3 times daily 90 capsule 0 Past Week     ketotifen (ZADITOR/REFRESH ANTI-ITCH) 0.025 % ophthalmic solution Place 1 drop into both eyes 2 times daily 1 Bottle 1 Past Week     multivitamin w/minerals (THERA-VIT-M) tablet Take 1 tablet by mouth daily 30 tablet 0 Past Week     neomycin-polymyxin-dexamethasone (MAXITROL) 3.5-36510-2.1 ophthalmic ointment Place 0.5 inches into both eyes At Bedtime 1 Tube 0 Past Week     pantoprazole (PROTONIX) 40 MG EC tablet Take 1 tablet (40 mg) by mouth every morning (before breakfast) 30 tablet 0 Past Week     vitamin B1 (THIAMINE) 100 MG tablet Take 1 tablet (100 mg) by mouth daily 30 tablet 0 Past Week     [] clindamycin (CLEOCIN) 300 MG capsule Take 1 capsule (300 mg) by mouth every 6 hours for 4 days 16 capsule 0           Allergies:     Allergies   Allergen Reactions     Pollen Extract Rash     grass tree pollen          Labs:     Recent Results (from the past 48 hour(s))   Alcohol breath test POCT    Collection Time: 19 11:02 AM   Result Value Ref Range    Alcohol Breath Test 0.00 0.00 - 0.01   CBC with platelets differential    Collection Time: 19 11:22 AM   Result Value Ref Range    WBC 10.0 4.0 - 11.0 10e9/L    RBC Count 4.76 4.4 - 5.9 10e12/L    Hemoglobin 15.6 13.3 - 17.7 g/dL    Hematocrit 45.0 40.0 - 53.0 %    MCV 95 78 - 100 fl    MCH 32.8 26.5 - 33.0 pg    MCHC 34.7 31.5 - 36.5 g/dL    RDW 13.8 10.0 - 15.0 %    Platelet Count 346 150 - 450 10e9/L    Diff Method Automated  "Method     % Neutrophils 70.0 %    % Lymphocytes 20.1 %    % Monocytes 8.9 %    % Eosinophils 0.3 %    % Basophils 0.6 %    % Immature Granulocytes 0.1 %    Nucleated RBCs 0 0 /100    Absolute Neutrophil 7.0 1.6 - 8.3 10e9/L    Absolute Lymphocytes 2.0 0.8 - 5.3 10e9/L    Absolute Monocytes 0.9 0.0 - 1.3 10e9/L    Absolute Eosinophils 0.0 0.0 - 0.7 10e9/L    Absolute Basophils 0.1 0.0 - 0.2 10e9/L    Abs Immature Granulocytes 0.0 0 - 0.4 10e9/L    Absolute Nucleated RBC 0.0    Comprehensive metabolic panel    Collection Time: 06/13/19 11:22 AM   Result Value Ref Range    Sodium 137 133 - 144 mmol/L    Potassium 3.7 3.4 - 5.3 mmol/L    Chloride 98 94 - 109 mmol/L    Carbon Dioxide 24 20 - 32 mmol/L    Anion Gap 15 (H) 3 - 14 mmol/L    Glucose 124 (H) 70 - 99 mg/dL    Urea Nitrogen 5 (L) 7 - 30 mg/dL    Creatinine 0.90 0.66 - 1.25 mg/dL    GFR Estimate >90 >60 mL/min/[1.73_m2]    GFR Estimate If Black >90 >60 mL/min/[1.73_m2]    Calcium 9.3 8.5 - 10.1 mg/dL    Bilirubin Total 1.1 0.2 - 1.3 mg/dL    Albumin 4.0 3.4 - 5.0 g/dL    Protein Total 8.1 6.8 - 8.8 g/dL    Alkaline Phosphatase 116 40 - 150 U/L     (H) 0 - 70 U/L     (H) 0 - 45 U/L   Lipase    Collection Time: 06/13/19 11:22 AM   Result Value Ref Range    Lipase 414 (H) 73 - 393 U/L   Drug abuse screen 6 urine (chem dep)    Collection Time: 06/13/19  9:16 PM   Result Value Ref Range    Amphetamine Qual Urine Negative NEG^Negative    Barbiturates Qual Urine Negative NEG^Negative    Benzodiazepine Qual Urine Positive (A) NEG^Negative    Cannabinoids Qual Urine Negative NEG^Negative    Cocaine Qual Urine Negative NEG^Negative    Ethanol Qual Urine Negative NEG^Negative    Opiates Qualitative Urine Negative NEG^Negative          Physical and Psychiatric Examination:     BP (!) 142/96   Pulse 90   Temp 98.3  F (36.8  C) (Oral)   Resp 16   Ht 1.803 m (5' 11\")   Wt 99.8 kg (220 lb)   SpO2 97%   BMI 30.68 kg/m    Weight is 220 lbs 0 oz  Body mass " index is 30.68 kg/m .    Physical Exam:  I have reviewed the physical exam as documented by by the medical team and agree with findings and assessment and have no additional findings to add at this time.    Mental Status Exam:  Appearance: awake, alert and adequately groomed  Attitude:  cooperative  Eye Contact:  good  Mood:  good  Affect:  mood congruent  Speech:  clear, coherent  Language: fluent and intact in English  Psychomotor, Gait, Musculoskeletal:  no evidence of tardive dyskinesia, dystonia, or tics  Thought Process:  goal oriented  Associations:  no loose associations  Thought Content:  no evidence of suicidal ideation or homicidal ideation and no evidence of psychotic thought  Insight:  fair  Judgement:  intact  Oriented to:  time, person, and place  Attention Span and Concentration:  intact  Recent and Remote Memory:  fair  Fund of Knowledge:  appropriate         Admission Diagnoses:   Alcohol dependence with withdrawal with complication (H)         Assessment & Plan:     1) Continue MSSA protocol.   2) Patient to be provided information on Naltrexone.   3) Continue gabapentin.     Disposition Plan   Reason for ongoing admission: requires detoxification from substance that poses a risk of bodily harm during withdrawal period  Discharge location: Chemical dependency treatment facility  Discharge Medications: not ordered  Follow-up Appointments: not scheduled  Legal Status: voluntary  Entered by: Daniel Sue on 6/14/2019 at 9:34 AM

## 2019-06-15 LAB
ALBUMIN SERPL-MCNC: 3.3 G/DL (ref 3.4–5)
ALP SERPL-CCNC: 90 U/L (ref 40–150)
ALT SERPL W P-5'-P-CCNC: 126 U/L (ref 0–70)
AST SERPL W P-5'-P-CCNC: 113 U/L (ref 0–45)
BILIRUB DIRECT SERPL-MCNC: 0.3 MG/DL (ref 0–0.2)
BILIRUB SERPL-MCNC: 0.9 MG/DL (ref 0.2–1.3)
PROT SERPL-MCNC: 7 G/DL (ref 6.8–8.8)

## 2019-06-15 PROCEDURE — 25000132 ZZH RX MED GY IP 250 OP 250 PS 637: Performed by: CLINICAL NURSE SPECIALIST

## 2019-06-15 PROCEDURE — 80076 HEPATIC FUNCTION PANEL: CPT | Performed by: PHYSICIAN ASSISTANT

## 2019-06-15 PROCEDURE — 25000132 ZZH RX MED GY IP 250 OP 250 PS 637: Performed by: PHYSICIAN ASSISTANT

## 2019-06-15 PROCEDURE — 36415 COLL VENOUS BLD VENIPUNCTURE: CPT | Performed by: PHYSICIAN ASSISTANT

## 2019-06-15 PROCEDURE — 12800008 ZZH R&B CD ADULT

## 2019-06-15 PROCEDURE — 25000132 ZZH RX MED GY IP 250 OP 250 PS 637: Performed by: EMERGENCY MEDICINE

## 2019-06-15 PROCEDURE — 25000132 ZZH RX MED GY IP 250 OP 250 PS 637: Performed by: PSYCHIATRY & NEUROLOGY

## 2019-06-15 PROCEDURE — H2035 A/D TX PROGRAM, PER HOUR: HCPCS | Mod: HQ

## 2019-06-15 RX ORDER — LORATADINE 10 MG/1
10 TABLET ORAL DAILY
Status: DISCONTINUED | OUTPATIENT
Start: 2019-06-15 | End: 2019-06-17 | Stop reason: HOSPADM

## 2019-06-15 RX ADMIN — AMLODIPINE BESYLATE 5 MG: 5 TABLET ORAL at 08:21

## 2019-06-15 RX ADMIN — HYDROXYZINE HYDROCHLORIDE 25 MG: 25 TABLET ORAL at 22:01

## 2019-06-15 RX ADMIN — MULTIPLE VITAMINS W/ MINERALS TAB 1 TABLET: TAB at 08:21

## 2019-06-15 RX ADMIN — THIAMINE HCL TAB 100 MG 100 MG: 100 TAB at 08:21

## 2019-06-15 RX ADMIN — CLONIDINE HYDROCHLORIDE 0.1 MG: 0.1 TABLET ORAL at 08:21

## 2019-06-15 RX ADMIN — GABAPENTIN 300 MG: 300 CAPSULE ORAL at 22:01

## 2019-06-15 RX ADMIN — DIAZEPAM 10 MG: 5 TABLET ORAL at 00:07

## 2019-06-15 RX ADMIN — ASPIRIN 81 MG CHEWABLE TABLET 81 MG: 81 TABLET CHEWABLE at 08:21

## 2019-06-15 RX ADMIN — GABAPENTIN 300 MG: 300 CAPSULE ORAL at 08:21

## 2019-06-15 RX ADMIN — CLONIDINE HYDROCHLORIDE 0.1 MG: 0.1 TABLET ORAL at 20:08

## 2019-06-15 RX ADMIN — LORATADINE 10 MG: 10 TABLET ORAL at 14:34

## 2019-06-15 RX ADMIN — GABAPENTIN 300 MG: 300 CAPSULE ORAL at 16:14

## 2019-06-15 RX ADMIN — FOLIC ACID 1 MG: 1 TABLET ORAL at 08:21

## 2019-06-15 RX ADMIN — HYDROXYZINE HYDROCHLORIDE 25 MG: 25 TABLET ORAL at 00:07

## 2019-06-15 ASSESSMENT — ACTIVITIES OF DAILY LIVING (ADL)
HYGIENE/GROOMING: INDEPENDENT
DRESS: INDEPENDENT
ORAL_HYGIENE: INDEPENDENT
DRESS: INDEPENDENT
HYGIENE/GROOMING: INDEPENDENT
ORAL_HYGIENE: INDEPENDENT
LAUNDRY: WITH SUPERVISION

## 2019-06-15 NOTE — PROGRESS NOTES
Pt checked in with the group but sat at another table, he listened but did not work on art work. He seemed to be working on unit paperwork.

## 2019-06-16 LAB — HCV AB SERPL QL IA: NONREACTIVE

## 2019-06-16 PROCEDURE — 25000132 ZZH RX MED GY IP 250 OP 250 PS 637: Performed by: PSYCHIATRY & NEUROLOGY

## 2019-06-16 PROCEDURE — 25000132 ZZH RX MED GY IP 250 OP 250 PS 637: Performed by: PHYSICIAN ASSISTANT

## 2019-06-16 PROCEDURE — 25000132 ZZH RX MED GY IP 250 OP 250 PS 637: Performed by: CLINICAL NURSE SPECIALIST

## 2019-06-16 PROCEDURE — 25000132 ZZH RX MED GY IP 250 OP 250 PS 637: Performed by: EMERGENCY MEDICINE

## 2019-06-16 PROCEDURE — 12800008 ZZH R&B CD ADULT

## 2019-06-16 RX ADMIN — ASPIRIN 81 MG CHEWABLE TABLET 81 MG: 81 TABLET CHEWABLE at 09:13

## 2019-06-16 RX ADMIN — LORATADINE 10 MG: 10 TABLET ORAL at 09:16

## 2019-06-16 RX ADMIN — AMLODIPINE BESYLATE 5 MG: 5 TABLET ORAL at 09:16

## 2019-06-16 RX ADMIN — GABAPENTIN 300 MG: 300 CAPSULE ORAL at 16:06

## 2019-06-16 RX ADMIN — FOLIC ACID 1 MG: 1 TABLET ORAL at 09:14

## 2019-06-16 RX ADMIN — MULTIPLE VITAMINS W/ MINERALS TAB 1 TABLET: TAB at 09:16

## 2019-06-16 RX ADMIN — GABAPENTIN 300 MG: 300 CAPSULE ORAL at 21:44

## 2019-06-16 RX ADMIN — CLONIDINE HYDROCHLORIDE 0.1 MG: 0.1 TABLET ORAL at 09:16

## 2019-06-16 RX ADMIN — CLONIDINE HYDROCHLORIDE 0.1 MG: 0.1 TABLET ORAL at 20:28

## 2019-06-16 RX ADMIN — GABAPENTIN 300 MG: 300 CAPSULE ORAL at 09:16

## 2019-06-16 RX ADMIN — THIAMINE HCL TAB 100 MG 100 MG: 100 TAB at 09:17

## 2019-06-16 RX ADMIN — HYDROXYZINE HYDROCHLORIDE 25 MG: 25 TABLET ORAL at 21:44

## 2019-06-16 ASSESSMENT — ACTIVITIES OF DAILY LIVING (ADL)
ORAL_HYGIENE: INDEPENDENT
HYGIENE/GROOMING: INDEPENDENT
HYGIENE/GROOMING: INDEPENDENT
ORAL_HYGIENE: INDEPENDENT
DRESS: INDEPENDENT
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
## 2019-06-16 NOTE — PROGRESS NOTES
06/15/19 [id number]   Group Therapy Session   Group Attendance attended group session   Total Time (minutes) 40   Group Type psychotherapeutic   Group Topic Covered   (self-reflection)   Patient Participation/Contribution cooperative with task   Psychotherapy group goals: Identify and share responses to 4 questions (fears, loves/likes, things to let go, wants). Nikhil arrived late. He was actively engaged in the activity and group discussion. Mood pleasant, friendly. Affect congruent.

## 2019-06-16 NOTE — PLAN OF CARE
Pt continues to be monitored for alcohol withdrawal. MSSA scores this shift 7 and 6. Patient states that he wants to go home prior to going to treatment. Attempted to reflect with patient that he has been hospitalized four times recently with severe alcohol withdrawal and discharged stating he was going to go to treatment at [facility] and then relapsed, asked pt what would be different this time. Pt states that he cannot keep doing this, writer advised that it may be best then to go door to door to treatment as going home has not worked out in the past. Pt was not receptive of conversation and states he needs to go home first. Pt has seasonal allergies and will be given PRN hydroxyzine at [facility] to help.  Breanna Martinez RN
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
## 2019-06-16 NOTE — PROGRESS NOTES
Patient out of detox status.Spent most of the day in TV lounge socializing with peers on unit. Denies suicide ideation and thoughts of self harm. Pleasant and cooperative. Offers no complaints of physical discomfort. Met with  to discuss treatment plan.

## 2019-06-17 ENCOUNTER — RECORDS - HEALTHEAST (OUTPATIENT)
Dept: ADMINISTRATIVE | Facility: OTHER | Age: 34
End: 2019-06-17

## 2019-06-17 VITALS
WEIGHT: 220 LBS | RESPIRATION RATE: 16 BRPM | TEMPERATURE: 98.3 F | HEIGHT: 71 IN | DIASTOLIC BLOOD PRESSURE: 100 MMHG | SYSTOLIC BLOOD PRESSURE: 138 MMHG | HEART RATE: 107 BPM | BODY MASS INDEX: 30.8 KG/M2 | OXYGEN SATURATION: 96 %

## 2019-06-17 PROCEDURE — 25000132 ZZH RX MED GY IP 250 OP 250 PS 637: Performed by: PSYCHIATRY & NEUROLOGY

## 2019-06-17 PROCEDURE — 99238 HOSP IP/OBS DSCHRG MGMT 30/<: CPT | Performed by: PSYCHIATRY & NEUROLOGY

## 2019-06-17 PROCEDURE — 25000132 ZZH RX MED GY IP 250 OP 250 PS 637: Performed by: PHYSICIAN ASSISTANT

## 2019-06-17 PROCEDURE — 25000132 ZZH RX MED GY IP 250 OP 250 PS 637: Performed by: EMERGENCY MEDICINE

## 2019-06-17 PROCEDURE — 25000132 ZZH RX MED GY IP 250 OP 250 PS 637: Performed by: CLINICAL NURSE SPECIALIST

## 2019-06-17 RX ADMIN — MULTIPLE VITAMINS W/ MINERALS TAB 1 TABLET: TAB at 08:33

## 2019-06-17 RX ADMIN — FOLIC ACID 1 MG: 1 TABLET ORAL at 08:33

## 2019-06-17 RX ADMIN — THIAMINE HCL TAB 100 MG 100 MG: 100 TAB at 08:33

## 2019-06-17 RX ADMIN — AMLODIPINE BESYLATE 5 MG: 5 TABLET ORAL at 08:33

## 2019-06-17 RX ADMIN — GABAPENTIN 300 MG: 300 CAPSULE ORAL at 08:33

## 2019-06-17 RX ADMIN — LORATADINE 10 MG: 10 TABLET ORAL at 08:33

## 2019-06-17 RX ADMIN — CLONIDINE HYDROCHLORIDE 0.1 MG: 0.1 TABLET ORAL at 08:34

## 2019-06-17 RX ADMIN — ASPIRIN 81 MG CHEWABLE TABLET 81 MG: 81 TABLET CHEWABLE at 08:33

## 2019-06-17 ASSESSMENT — ACTIVITIES OF DAILY LIVING (ADL)
ORAL_HYGIENE: INDEPENDENT
LAUNDRY: WITH SUPERVISION
HYGIENE/GROOMING: INDEPENDENT
DRESS: INDEPENDENT

## 2019-06-17 NOTE — DISCHARGE SUMMARY
Patient discharged at 1300 to home. Patient picked up by Mother. Patient will be staying with his mother until a bed is available at [facility]. All patient belongings from room, locker, and security sent with patient. Patient escorted off the unit by psych associate, Tere.     This RN went over discharge instructions, teachings, patient's labs, and unit recommendations with patient. Patient reports he has all of his scheduled medications at home already, so he did not need any discharge medications. Patient verbalizes and demonstrates understanding of all teachings. Patient verbalizes understanding of medication instructions and indications. Patient denies thoughts of harm towards self or others. Patient denies auditory/visual hallucinations. Pt denies any current medication side effects or medical issues at this time. Prior to discharge, patient was quite pleasant and cooperative. Patient reports feeling happy and hopeful for the future.     Patient denies any further questions and is now discharged at this time.

## 2019-06-17 NOTE — DISCHARGE SUMMARY
"Psychiatric Discharge Summary    Nikhil Rios MRN# [id number]   Age: 33 year old YOB: 1985     Date of Admission:  6/13/2019  Date of Discharge:  6/17/2019  1:01 PM  Admitting Physician:  Daniel Sue MD  Discharge Physician:  Daniel Sue MD          Event Leading to Hospitalization:   The patient is a 32yo male with a history of alcohol use disorder who was admitted after drinking up to 1 liter daily for the past year. Was recently hospitalized medically for alcohol related complications. He reports that he is doing \"pretty cosby good.\" Says that he was having a lot of withdrawal symptoms this past Wednesday and Thursday but got Ativan and IV fluids in the ER so feels much better since then. Mood is good. Sleeping well. Appetite is better as his nausea has decreased. Denies SI or HI. Denies any history of withdrawal seizures or DTs. Somewhat open to the idea of Naltrexone.         See Admission note by Daniel Sue MD for additional details.          DIagnoses:     Alcohol dependence with withdrawal with complication (H)         Labs:        Lab Results   Component Value Date     06/13/2019    Lab Results   Component Value Date    CHLORIDE 98 06/13/2019    Lab Results   Component Value Date    BUN 5 06/13/2019      Lab Results   Component Value Date    POTASSIUM 3.7 06/13/2019    Lab Results   Component Value Date    CO2 24 06/13/2019    Lab Results   Component Value Date    CR 0.90 06/13/2019          Lab Results   Component Value Date    WBC 10.0 06/13/2019    HGB 15.6 06/13/2019    HCT 45.0 06/13/2019    MCV 95 06/13/2019     06/13/2019     Lab Results   Component Value Date     (H) 06/15/2019     (H) 06/15/2019    ALKPHOS 90 06/15/2019    BILITOTAL 0.9 06/15/2019     No results found for: TSH         Consults:   Consultation during this admission received from internal medicine:    ASSESSMENT:     1.  Alcohol abuse and withdrawal per Dr. Sue. "   2.  Hypertension, stable with current pressures elevated, likely due to withdrawal and anxiety.   3.  Acute transaminitis, most likely secondary to his heavy alcohol abuse prior to admission; however, not unusual; 1:2 ALT to AST ratio.      PLAN:  Continue with prior to admission antihypertensive medications with parameters to hold.  Blood pressures will be monitored closely.  Repeat hepatic panel tomorrow a.m.  Will add onto labs already drawn a hepatitis C screen.  No further medical intervention indicated at this time.  The patient is medically stable, and Medicine will follow up on results of repeat labs and if negative, will sign off.  Please feel free to call with questions.          Hospital Course:   Nikhil Rios was admitted to [facility] with attending Daniel Sue MD as a voluntary patient. The patient was placed under status 15 (15 minute checks) to ensure patient safety.   MSSA protocol was initiated due to the patient's history of alcohol abuse and concern for withdrawal symptoms.  CBC, BMP and utox obtained.    All outpatient medications were continued. The patient declined starting Antabuse or Naltrexone.     Nikhil Rios did participate in groups and was visible in the milieu.     The patient's symptoms of withdrawal improved. CTC spoke with patient's mother who was in agreement with discharge. Patient plans to stay with her until a bed opens up at [place].     Nikhil Rios was released to home. At the time of discharge Nikhil Rios was determined to not be a danger to himself or others. At the current time of discharge, the patient does not meet criteria for involuntary hospitalization. On the day of discharge, the patient reports that they do not have suicidal or homicidal ideation and would never hurt themselves or others. Steps taken to minimize risk include: assessing patient s behavior and thought process daily during hospital stay, discharging patient with adequate plan for follow up  for mental and physical health and discussing safety plan of returning to the hospital should the patient ever have thoughts of harming themselves or others. Therefore, based on all available evidence including the factors cited above, the patient does not appear to be at imminent risk for self-harm, and is appropriate for outpatient level of care.           Discharge Medications:     Current Discharge Medication List      CONTINUE these medications which have NOT CHANGED    Details   amLODIPine (NORVASC) 5 MG tablet Take 1 tablet (5 mg) by mouth daily  Qty: 30 tablet, Refills: 0    Associated Diagnoses: Essential hypertension      aspirin (ASA) 81 MG chewable tablet Take 1 tablet (81 mg) by mouth daily  Qty: 30 tablet, Refills: 0    Associated Diagnoses: Alcohol dependence with withdrawal with complication (H)      cloNIDine (CATAPRES) 0.1 MG tablet Take 1 tablet (0.1 mg) by mouth 2 times daily  Qty: 60 tablet, Refills: 0    Associated Diagnoses: Alcohol dependence with withdrawal with complication (H)      folic acid (FOLVITE) 1 MG tablet Take 1 tablet (1 mg) by mouth daily  Qty: 30 tablet, Refills: 0    Associated Diagnoses: Alcoholism /alcohol abuse (H)      gabapentin (NEURONTIN) 300 MG capsule Take 1 capsule (300 mg) by mouth 3 times daily  Qty: 90 capsule, Refills: 0    Associated Diagnoses: Alcohol dependence with withdrawal with complication (H)      ketotifen (ZADITOR/REFRESH ANTI-ITCH) 0.025 % ophthalmic solution Place 1 drop into both eyes 2 times daily  Qty: 1 Bottle, Refills: 1    Associated Diagnoses: Allergic eye reaction      multivitamin w/minerals (THERA-VIT-M) tablet Take 1 tablet by mouth daily  Qty: 30 tablet, Refills: 0    Associated Diagnoses: Alcoholism /alcohol abuse (H)      neomycin-polymyxin-dexamethasone (MAXITROL) 3.5-16506-0.1 ophthalmic ointment Place 0.5 inches into both eyes At Bedtime  Qty: 1 Tube, Refills: 0    Associated Diagnoses: Alcohol dependence with withdrawal with  complication (H)      vitamin B1 (THIAMINE) 100 MG tablet Take 1 tablet (100 mg) by mouth daily  Qty: 30 tablet, Refills: 0    Associated Diagnoses: Alcohol dependence with withdrawal with complication (H)         STOP taking these medications       clindamycin (CLEOCIN) 300 MG capsule Comments:   Reason for Stopping:         clindamycin (CLEOCIN) 300 MG capsule Comments:   Reason for Stopping:         pantoprazole (PROTONIX) 40 MG EC tablet Comments:   Reason for Stopping:                    Psychiatric Examination:   Appearance:  awake, alert and adequately groomed  Attitude:  cooperative  Eye Contact:  good  Mood:  good  Affect:  mood congruent  Speech:  clear, coherent  Psychomotor Behavior:  no evidence of tardive dyskinesia, dystonia, or tics  Thought Process:  goal oriented  Associations:  no loose associations  Thought Content:  no evidence of suicidal ideation or homicidal ideation and no evidence of psychotic thought  Insight:  partial  Judgment:  fair  Oriented to:  time, person, and place  Attention Span and Concentration:  intact  Recent and Remote Memory:  fair  Language: Able to read and write  Fund of Knowledge: appropriate  Muscle Strength and Tone: normal  Gait and Station: Normal         Discharge Plan:   Continue medications as above.     Major Treatments, Procedures and Findings: Detoxification and stabilization. Chemical dependency assessment and referral     Symptoms to Report:  If  you experience feeling more anxiety, confusion, losing more sleep, mood declining or thoughts of suicide, notify your treatment team or notify your primary physician. IF ANY OF THE SYMPTOMS YOU ARE EXPERIENCING ARE A MEDICAL EMERGENCY CALL 911 IMMEDIATELY.      Lifestyle Adjustment: Adjust your lifestyle to get enough sleep, relaxation, exercise and  good nutrition. Continue to develop healthy coping skills to  decrease stress and promote a sober living environment. No use of alcohol, illegal drugs or addictive  medications other than what is currently prescribed. AA, NA, and  Sponsor are excellent resources for support.                                                                  Primary Care Follow-up:  Dr. Philip Eastonemile Zeng  [phone]   July 15, 2019 @ 10:00AM      Treatment Follow-Up:  [facility]: [place]  [address] [place] [address]  [place] Phone: [phone]  *Please follow-up to discuss admission by calling the above number     Resources:   Crisis Intervention: 757.743.1090 or 135-719-0306 (TTY: 640.588.7828).  Call anytime for help.  National [place] on Mental Illness (www.[place].veronica.org): 375.488.5291 or 641-579-7951.  Alcoholics Anonymous (www.alcoholics-anonymous.org): Check your phone book for your local chapter.  Suicide Awareness Voices of Education (SAVE) (www.save.org): 430-491-LBGJ (0045)  National Suicide Prevention Line (www.mentalhealthmn.org): 745-370-ZVOS (0299)  Mental Health Consumer/Survivor Network of [place] (www.mhcsn.net): 819.966.1744 or 149-606-6448  Mental Health Association of [place] (www.mentalhealth.org): 295.755.5266 or 943-163-4773      General Medication Instructions:   See your medication sheet(s) for instructions.   Take all medicines as directed.  Make no changes unless your doctor suggests them.   Go to all your doctor visits.  Be sure to have all your required lab tests. This way, your medicines can be refilled on time.  Do not use any drugs not prescribed by your doctor.  Avoid alcohol.    Attestation:  The patient was seen and evaluated by me. I spent less than 30 minutes on discharge day activities. Daniel Sue MD

## 2019-06-17 NOTE — DISCHARGE INSTRUCTIONS
Behavioral Discharge Planning and Instructions    Summary: You were admitted to [facility] on 6/13/19 for detoxification from alcohol. A medical exam was performed that included lab work. Case management assessment with recommendation for continuing care. You are being discharged to home with a plan to follow-up with treatment at [place]. Please take care and make your recovery a priority Nikhil!    Main Diagnosis: Per Dr. Daniel Sue MD;  1. Alcohol use Disorder    Major Treatments, Procedures and Findings: Detoxification and stabilization. Chemical dependency assessment and referral    Symptoms to Report:  If  you experience feeling more anxiety, confusion, losing more sleep, mood declining or thoughts of suicide, notify your treatment team or notify your primary physician. IF ANY OF THE SYMPTOMS YOU ARE EXPERIENCING ARE A MEDICAL EMERGENCY CALL 911 IMMEDIATELY.     Lifestyle Adjustment: Adjust your lifestyle to get enough sleep, relaxation, exercise and  good nutrition. Continue to develop healthy coping skills to  decrease stress and promote a sober living environment. No use of alcohol, illegal drugs or addictive medications other than what is currently prescribed. AA, NA, and  Sponsor are excellent resources for support.                                                                  Primary Care Follow-up:  [facility]  [phone]   July 15, 2019 @ 10:00AM     Treatment Follow-Up:  [facility]: [place] [address] [place] Phone: [phone]  *Please follow-up to discuss admission by calling the above number    Resources:   Crisis Intervention: 388.673.2400 or 597-110-0486 (TTY: 259.992.8116).  Call anytime for help.  National [place] on Mental Illness (www.mn.veronica.org): 135.843.8273 or 448-277-7408.  Alcoholics Anonymous (www.alcoholics-anonymous.org): Check your phone book for your local chapter.  Suicide Awareness Voices of  Education (SAVE) (www.save.org): 357-356-MEPL (7283)  National Suicide Prevention Line (www.mentalhealthmn.org): 262-608-PBNI (8882)  Mental Health Consumer/Survivor Network of [place] (www.mhcsn.net): 296.652.4661 or 966-555-8367  Mental Health Association of [place] (www.mentalhealth.org): 221.422.7396 or 880-852-9326     General Medication Instructions:   See your medication sheet(s) for instructions.   Take all medicines as directed.  Make no changes unless your doctor suggests them.   Go to all your doctor visits.  Be sure to have all your required lab tests. This way, your medicines can be refilled on time.  Do not use any drugs not prescribed by your doctor.  Avoid alcohol.    Please Note:  If you have any questions at anytime after you are discharged please call the [facility] [place] detox unit 3A at [phone].  Please take this discharge folder with you to all your follow up appointments, it contains your lab results, diagnosis, medication list and discharge recommendations.

## 2019-06-17 NOTE — PROGRESS NOTES
VSS and eating well. Pleasant and cooperative. Sitting in the lounge much of pm and did attend AA meeting.

## 2019-06-17 NOTE — PROGRESS NOTES
Placed call to pt's mother, she is agreeable to have pt discharge to stay with her until bed is available at [place]. Pt will be discharged. AVS complete.

## 2019-06-21 NOTE — PROGRESS NOTES
Note      6/21/2019     Loretta with [facility] care coordination called and reported that pt is requesting to do outpatient treatment at this time. He wants his assessment faxed to [facility] in [place]. Loretta reports he has not been in contact with [facility].   Writer reported she could make referral to [facility], but is not changing recommendations as he and his mom gave verbal report earlier this week he would wait at his mom's until a bed opened at [facility]. He has had a detox admit and hospital admit since [facility] saw him on 05/20. Loretta reported she understood, and [facility] said they would review his assessment and is not sure they will accept him yet.   She asked if he wanted to go to + what would need to happen. Writer reported if it's within 45 days of his assessment on 05/20, he can contact me. If it's after 45 days and assuming he doesn't need detox he needs to contact Behavioral Intake and writer provided her with both numbers.      Marisel Duenas, NASIR

## 2019-06-28 ENCOUNTER — HOSPITAL ENCOUNTER (INPATIENT)
Facility: CLINIC | Age: 34
LOS: 2 days | Discharge: HOME | DRG: 897 | End: 2019-06-30
Attending: EMERGENCY MEDICINE | Admitting: PSYCHIATRY & NEUROLOGY
Payer: COMMERCIAL

## 2019-06-28 DIAGNOSIS — F19.20 CHEMICAL DEPENDENCY (H): Primary | ICD-10-CM

## 2019-06-28 DIAGNOSIS — F10.930 ALCOHOL WITHDRAWAL, UNCOMPLICATED (H): ICD-10-CM

## 2019-06-28 LAB
ALBUMIN SERPL-MCNC: 4 G/DL (ref 3.4–5)
ALCOHOL BREATH TEST: 0.11 (ref 0–0.01)
ALP SERPL-CCNC: 104 U/L (ref 40–150)
ALT SERPL W P-5'-P-CCNC: 96 U/L (ref 0–70)
ANION GAP SERPL CALCULATED.3IONS-SCNC: 20 MMOL/L (ref 3–14)
AST SERPL W P-5'-P-CCNC: 88 U/L (ref 0–45)
BASOPHILS # BLD AUTO: 0 10E9/L (ref 0–0.2)
BASOPHILS NFR BLD AUTO: 0.5 %
BILIRUB SERPL-MCNC: 0.8 MG/DL (ref 0.2–1.3)
BUN SERPL-MCNC: 7 MG/DL (ref 7–30)
CALCIUM SERPL-MCNC: 8.7 MG/DL (ref 8.5–10.1)
CHLORIDE SERPL-SCNC: 97 MMOL/L (ref 94–109)
CO2 SERPL-SCNC: 19 MMOL/L (ref 20–32)
CREAT SERPL-MCNC: 0.81 MG/DL (ref 0.66–1.25)
DIFFERENTIAL METHOD BLD: NORMAL
EOSINOPHIL # BLD AUTO: 0.1 10E9/L (ref 0–0.7)
EOSINOPHIL NFR BLD AUTO: 0.6 %
ERYTHROCYTE [DISTWIDTH] IN BLOOD BY AUTOMATED COUNT: 13.8 % (ref 10–15)
GFR SERPL CREATININE-BSD FRML MDRD: >90 ML/MIN/{1.73_M2}
GLUCOSE SERPL-MCNC: 101 MG/DL (ref 70–99)
HCT VFR BLD AUTO: 43 % (ref 40–53)
HGB BLD-MCNC: 15.1 G/DL (ref 13.3–17.7)
IMM GRANULOCYTES # BLD: 0 10E9/L (ref 0–0.4)
IMM GRANULOCYTES NFR BLD: 0.2 %
LYMPHOCYTES # BLD AUTO: 1.4 10E9/L (ref 0.8–5.3)
LYMPHOCYTES NFR BLD AUTO: 16 %
MCH RBC QN AUTO: 32.7 PG (ref 26.5–33)
MCHC RBC AUTO-ENTMCNC: 35.1 G/DL (ref 31.5–36.5)
MCV RBC AUTO: 93 FL (ref 78–100)
MONOCYTES # BLD AUTO: 0.5 10E9/L (ref 0–1.3)
MONOCYTES NFR BLD AUTO: 5.5 %
NEUTROPHILS # BLD AUTO: 6.7 10E9/L (ref 1.6–8.3)
NEUTROPHILS NFR BLD AUTO: 77.2 %
NRBC # BLD AUTO: 0 10*3/UL
NRBC BLD AUTO-RTO: 0 /100
PLATELET # BLD AUTO: 224 10E9/L (ref 150–450)
POTASSIUM SERPL-SCNC: 3.7 MMOL/L (ref 3.4–5.3)
PROT SERPL-MCNC: 8.1 G/DL (ref 6.8–8.8)
RBC # BLD AUTO: 4.62 10E12/L (ref 4.4–5.9)
SODIUM SERPL-SCNC: 136 MMOL/L (ref 133–144)
TSH SERPL DL<=0.005 MIU/L-ACNC: 1.58 MU/L (ref 0.4–4)
WBC # BLD AUTO: 8.7 10E9/L (ref 4–11)

## 2019-06-28 PROCEDURE — HZ2ZZZZ DETOXIFICATION SERVICES FOR SUBSTANCE ABUSE TREATMENT: ICD-10-PCS | Performed by: INTERNAL MEDICINE

## 2019-06-28 PROCEDURE — 25000132 ZZH RX MED GY IP 250 OP 250 PS 637: Performed by: INTERNAL MEDICINE

## 2019-06-28 PROCEDURE — 25800030 ZZH RX IP 258 OP 636: Performed by: INTERNAL MEDICINE

## 2019-06-28 PROCEDURE — 96361 HYDRATE IV INFUSION ADD-ON: CPT | Performed by: EMERGENCY MEDICINE

## 2019-06-28 PROCEDURE — 99285 EMERGENCY DEPT VISIT HI MDM: CPT | Mod: Z6 | Performed by: EMERGENCY MEDICINE

## 2019-06-28 PROCEDURE — 25000128 H RX IP 250 OP 636: Performed by: EMERGENCY MEDICINE

## 2019-06-28 PROCEDURE — 25000128 H RX IP 250 OP 636: Performed by: INTERNAL MEDICINE

## 2019-06-28 PROCEDURE — 96375 TX/PRO/DX INJ NEW DRUG ADDON: CPT | Performed by: EMERGENCY MEDICINE

## 2019-06-28 PROCEDURE — 99207 ZZC MOONLIGHTING INDICATOR: CPT | Performed by: INTERNAL MEDICINE

## 2019-06-28 PROCEDURE — 25000132 ZZH RX MED GY IP 250 OP 250 PS 637: Performed by: EMERGENCY MEDICINE

## 2019-06-28 PROCEDURE — 12000001 ZZH R&B MED SURG/OB UMMC

## 2019-06-28 PROCEDURE — 99285 EMERGENCY DEPT VISIT HI MDM: CPT | Mod: 25 | Performed by: EMERGENCY MEDICINE

## 2019-06-28 PROCEDURE — 85025 COMPLETE CBC W/AUTO DIFF WBC: CPT | Performed by: EMERGENCY MEDICINE

## 2019-06-28 PROCEDURE — 25000125 ZZHC RX 250: Performed by: INTERNAL MEDICINE

## 2019-06-28 PROCEDURE — 96374 THER/PROPH/DIAG INJ IV PUSH: CPT | Performed by: EMERGENCY MEDICINE

## 2019-06-28 PROCEDURE — 84443 ASSAY THYROID STIM HORMONE: CPT | Performed by: EMERGENCY MEDICINE

## 2019-06-28 PROCEDURE — 80053 COMPREHEN METABOLIC PANEL: CPT | Performed by: EMERGENCY MEDICINE

## 2019-06-28 RX ORDER — CLONIDINE HYDROCHLORIDE 0.1 MG/1
0.1 TABLET ORAL 2 TIMES DAILY
Status: DISCONTINUED | OUTPATIENT
Start: 2019-06-29 | End: 2019-06-28

## 2019-06-28 RX ORDER — LANOLIN ALCOHOL/MO/W.PET/CERES
100 CREAM (GRAM) TOPICAL DAILY
Status: DISCONTINUED | OUTPATIENT
Start: 2019-06-29 | End: 2019-06-30 | Stop reason: HOSPADM

## 2019-06-28 RX ORDER — LIDOCAINE 40 MG/G
CREAM TOPICAL
Status: DISCONTINUED | OUTPATIENT
Start: 2019-06-28 | End: 2019-06-30 | Stop reason: HOSPADM

## 2019-06-28 RX ORDER — MULTIPLE VITAMINS W/ MINERALS TAB 9MG-400MCG
1 TAB ORAL DAILY
Status: DISCONTINUED | OUTPATIENT
Start: 2019-06-29 | End: 2019-06-30 | Stop reason: HOSPADM

## 2019-06-28 RX ORDER — ATENOLOL 25 MG/1
50 TABLET ORAL DAILY PRN
Status: DISCONTINUED | OUTPATIENT
Start: 2019-06-28 | End: 2019-06-30 | Stop reason: HOSPADM

## 2019-06-28 RX ORDER — NALOXONE HYDROCHLORIDE 0.4 MG/ML
.1-.4 INJECTION, SOLUTION INTRAMUSCULAR; INTRAVENOUS; SUBCUTANEOUS
Status: DISCONTINUED | OUTPATIENT
Start: 2019-06-28 | End: 2019-06-30 | Stop reason: HOSPADM

## 2019-06-28 RX ORDER — LORAZEPAM 1 MG/1
1 TABLET ORAL
Status: DISCONTINUED | OUTPATIENT
Start: 2019-06-28 | End: 2019-06-28 | Stop reason: ALTCHOICE

## 2019-06-28 RX ORDER — ONDANSETRON 2 MG/ML
4 INJECTION INTRAMUSCULAR; INTRAVENOUS EVERY 6 HOURS PRN
Status: DISCONTINUED | OUTPATIENT
Start: 2019-06-28 | End: 2019-06-30 | Stop reason: HOSPADM

## 2019-06-28 RX ORDER — GABAPENTIN 300 MG/1
300 CAPSULE ORAL 3 TIMES DAILY
Status: DISCONTINUED | OUTPATIENT
Start: 2019-06-28 | End: 2019-06-28

## 2019-06-28 RX ORDER — CLONIDINE HYDROCHLORIDE 0.1 MG/1
0.1 TABLET ORAL ONCE
Status: COMPLETED | OUTPATIENT
Start: 2019-06-28 | End: 2019-06-28

## 2019-06-28 RX ORDER — DIAZEPAM 5 MG
5-20 TABLET ORAL EVERY 30 MIN PRN
Status: DISCONTINUED | OUTPATIENT
Start: 2019-06-28 | End: 2019-06-30 | Stop reason: HOSPADM

## 2019-06-28 RX ORDER — LORAZEPAM 1 MG/1
1 TABLET ORAL ONCE
Status: COMPLETED | OUTPATIENT
Start: 2019-06-28 | End: 2019-06-28

## 2019-06-28 RX ORDER — ONDANSETRON 4 MG/1
4 TABLET, ORALLY DISINTEGRATING ORAL EVERY 6 HOURS PRN
Status: DISCONTINUED | OUTPATIENT
Start: 2019-06-28 | End: 2019-06-30 | Stop reason: HOSPADM

## 2019-06-28 RX ORDER — LORAZEPAM 1 MG/1
1-4 TABLET ORAL EVERY 30 MIN PRN
Status: DISCONTINUED | OUTPATIENT
Start: 2019-06-28 | End: 2019-06-28

## 2019-06-28 RX ORDER — ACETAMINOPHEN 325 MG/1
650 TABLET ORAL EVERY 6 HOURS PRN
Status: DISCONTINUED | OUTPATIENT
Start: 2019-06-28 | End: 2019-06-30 | Stop reason: HOSPADM

## 2019-06-28 RX ORDER — FOLIC ACID 1 MG/1
1 TABLET ORAL DAILY
Status: DISCONTINUED | OUTPATIENT
Start: 2019-06-29 | End: 2019-06-30 | Stop reason: HOSPADM

## 2019-06-28 RX ORDER — LORAZEPAM 2 MG/ML
1 INJECTION INTRAMUSCULAR ONCE
Status: COMPLETED | OUTPATIENT
Start: 2019-06-28 | End: 2019-06-28

## 2019-06-28 RX ORDER — AMLODIPINE BESYLATE 5 MG/1
5 TABLET ORAL DAILY
Status: DISCONTINUED | OUTPATIENT
Start: 2019-06-29 | End: 2019-06-30 | Stop reason: HOSPADM

## 2019-06-28 RX ORDER — SODIUM CHLORIDE, SODIUM LACTATE, POTASSIUM CHLORIDE, CALCIUM CHLORIDE 600; 310; 30; 20 MG/100ML; MG/100ML; MG/100ML; MG/100ML
INJECTION, SOLUTION INTRAVENOUS CONTINUOUS
Status: DISCONTINUED | OUTPATIENT
Start: 2019-06-28 | End: 2019-06-30 | Stop reason: HOSPADM

## 2019-06-28 RX ORDER — HYDROXYZINE HYDROCHLORIDE 25 MG/1
25 TABLET, FILM COATED ORAL 3 TIMES DAILY PRN
Status: DISCONTINUED | OUTPATIENT
Start: 2019-06-28 | End: 2019-06-30 | Stop reason: HOSPADM

## 2019-06-28 RX ORDER — ONDANSETRON 2 MG/ML
8 INJECTION INTRAMUSCULAR; INTRAVENOUS ONCE
Status: COMPLETED | OUTPATIENT
Start: 2019-06-28 | End: 2019-06-28

## 2019-06-28 RX ORDER — CLONIDINE HYDROCHLORIDE 0.1 MG/1
0.1 TABLET ORAL 2 TIMES DAILY PRN
Status: DISCONTINUED | OUTPATIENT
Start: 2019-06-28 | End: 2019-06-30 | Stop reason: HOSPADM

## 2019-06-28 RX ORDER — CALCIUM CARBONATE 500 MG/1
1000 TABLET, CHEWABLE ORAL 4 TIMES DAILY PRN
Status: DISCONTINUED | OUTPATIENT
Start: 2019-06-28 | End: 2019-06-30 | Stop reason: HOSPADM

## 2019-06-28 RX ADMIN — LORAZEPAM 1 MG: 2 INJECTION INTRAMUSCULAR; INTRAVENOUS at 15:59

## 2019-06-28 RX ADMIN — SODIUM CHLORIDE, POTASSIUM CHLORIDE, SODIUM LACTATE AND CALCIUM CHLORIDE: 600; 310; 30; 20 INJECTION, SOLUTION INTRAVENOUS at 22:32

## 2019-06-28 RX ADMIN — LORAZEPAM 1 MG: 1 TABLET ORAL at 18:09

## 2019-06-28 RX ADMIN — LORAZEPAM 1 MG: 1 TABLET ORAL at 19:12

## 2019-06-28 RX ADMIN — DIAZEPAM 5 MG: 5 TABLET ORAL at 22:31

## 2019-06-28 RX ADMIN — HYDROXYZINE HYDROCHLORIDE 25 MG: 25 TABLET ORAL at 23:41

## 2019-06-28 RX ADMIN — SODIUM CHLORIDE 1000 ML: 9 INJECTION, SOLUTION INTRAVENOUS at 15:59

## 2019-06-28 RX ADMIN — ONDANSETRON 8 MG: 2 INJECTION INTRAMUSCULAR; INTRAVENOUS at 15:59

## 2019-06-28 RX ADMIN — CLONIDINE HYDROCHLORIDE 0.1 MG: 0.1 TABLET ORAL at 20:49

## 2019-06-28 RX ADMIN — LORAZEPAM 2 MG: 1 TABLET ORAL at 20:49

## 2019-06-28 RX ADMIN — FOLIC ACID: 5 INJECTION, SOLUTION INTRAMUSCULAR; INTRAVENOUS; SUBCUTANEOUS at 22:32

## 2019-06-28 ASSESSMENT — ENCOUNTER SYMPTOMS
CHILLS: 1
TREMORS: 1
DIAPHORESIS: 1
SHORTNESS OF BREATH: 0
FEVER: 0
VOMITING: 1
NAUSEA: 1

## 2019-06-28 NOTE — ED PROVIDER NOTES
[facility] EMERGENCY DEPARTMENT ([facility])    6/28/19     ED 17 3:19 PM   History     Chief Complaint   Patient presents with     Alcohol Intoxication     Pt has been drinking and states that he is in withdrawal     The history is provided by the patient, a parent and medical records. History limited by: intoxicated.     Nikhil Riso is a 33 year old male who presents with alcohol intoxication. He has a history of heavy alcohol use. He has been drinking alcohol every day except while in [place] detox a few weeks ago. He was inpatient from 6/13-6/17/19 and discharged to care of mother to wait for entry to treatment. He relapsed immediately afterwards. He has been drinking 750 mL vodka daily, last use was at 12-1AM today. He has nausea, vomiting, tremors, anxiety, diaphoresis. No suicidal or homicidal ideation, no auditory hallucinations or visual hallucinations. No other substances used. No history of alcohol withdrawal seizures. He is here because he wants to get into detox, and has outpatient treatment scheduled with [facility] on 7/1/19.   No fever, cough. He has cold sweats. No chest pain or shortness of breath. No history of kidney disease, heart disease, lung disease or liver disease.  He did not call ahead for detox bed. He asks if he could stay here in the Emergency Department and watch TV for a few days. He has had some falls in the distant past but none recently. He would like ice chips. Patient owns multiple properties but stays with mother.     I have reviewed the Medications, Allergies, Past Medical and Surgical History, and Social History in the [employer] system.  PAST MEDICAL HISTORY:   Past Medical History:   Diagnosis Date     Hypertension      Substance abuse (H)        PAST SURGICAL HISTORY: History reviewed. No pertinent surgical history.    FAMILY HISTORY: History reviewed. No pertinent family history.    SOCIAL HISTORY:   Social History     Tobacco Use     Smoking status: Current  Some Day Smoker     Packs/day: 0.25     Smokeless tobacco: Never Used   Substance Use Topics     Alcohol use: Yes     Comment: 1 to 750ml daily of vodka       Patient's Medications   New Prescriptions    No medications on file   Previous Medications    AMLODIPINE (NORVASC) 5 MG TABLET    Take 1 tablet (5 mg) by mouth daily    ASPIRIN (ASA) 81 MG CHEWABLE TABLET    Take 1 tablet (81 mg) by mouth daily    CLONIDINE (CATAPRES) 0.1 MG TABLET    Take 1 tablet (0.1 mg) by mouth 2 times daily    FOLIC ACID (FOLVITE) 1 MG TABLET    Take 1 tablet (1 mg) by mouth daily    GABAPENTIN (NEURONTIN) 300 MG CAPSULE    Take 1 capsule (300 mg) by mouth 3 times daily    KETOTIFEN (ZADITOR/REFRESH ANTI-ITCH) 0.025 % OPHTHALMIC SOLUTION    Place 1 drop into both eyes 2 times daily    MULTIVITAMIN W/MINERALS (THERA-VIT-M) TABLET    Take 1 tablet by mouth daily    NEOMYCIN-POLYMYXIN-DEXAMETHASONE (MAXITROL) 3.5-18469-5.1 OPHTHALMIC OINTMENT    Place 0.5 inches into both eyes At Bedtime    VITAMIN B1 (THIAMINE) 100 MG TABLET    Take 1 tablet (100 mg) by mouth daily   Modified Medications    No medications on file   Discontinued Medications    No medications on file          Allergies   Allergen Reactions     Pollen Extract Rash     grass tree pollen        Review of Systems   Constitutional: Positive for chills and diaphoresis. Negative for fever.   Respiratory: Negative for shortness of breath.    Cardiovascular: Negative for chest pain.   Gastrointestinal: Positive for nausea and vomiting.   Neurological: Positive for tremors.   All other systems reviewed and are negative.      Physical Exam   BP: 107/59  Pulse: 99  Temp: 98  F (36.7  C)  Resp: 18  Weight: 100.7 kg (222 lb)  SpO2: 100 %      Physical Exam  GEN:  Alert, well developed, no acute distress, but is tremulous  HEENT:  PERRL, EOMI, Mucous membranes are moist.   Cardio:  RRR, no murmur, radial pulses equal bilaterally  PULM:  Lungs clear, good air movement, no wheezes, rales    Abd:  Soft, normal bowel sounds, no focal tenderness  Back exam:  No CVA tenderness  Musculoskeletal:  normal range of motion, no lower extremity swelling or calf tenderness  Neuro:  Alert and oriented X3, Follows commands, moving all extremities spontaneously   Skin:  Warm, dry   ED Course        Procedures           Critical Care time:  none  He was given initially IV Ativan and IV Zofran for his nausea and withdrawal symptoms.  He was also given IV fluids because of recent vomiting and poor oral intake.  Patient was later able to tolerate oral fluids and oral Ativan.    Labs are normal except as shown.   Labs Ordered and Resulted from Time of ED Arrival Up to the Time of Departure from the ED   COMPREHENSIVE METABOLIC PANEL - Abnormal; Notable for the following components:       Result Value    Carbon Dioxide 19 (*)     Anion Gap 20 (*)     Glucose 101 (*)     ALT 96 (*)     AST 88 (*)     All other components within normal limits   ALCOHOL BREATH TEST POCT - Abnormal; Notable for the following components:    Alcohol Breath Test 0.114 (*)     All other components within normal limits   CBC WITH PLATELETS DIFFERENTIAL   TSH WITH FREE T4 REFLEX   DRUG ABUSE SCREEN 6 CHEM DEP URINE ([facility])           Assessments & Plan (with Medical Decision Making)   Patient presents seeking admission to detox.  He is having some alcohol withdrawal symptoms.  At this time, there are no beds available in the detox unit.  Patient is now tolerating oral liquids and oral Ativan, he continues to have tachycardia, tremulousness, somewhat diaphoretic.  I do think he is having significant alcohol withdrawal symptoms.  Since there are no beds available in the detox unit, patient will be admitted to medical bed.    I have reviewed the nursing notes.    I have reviewed the findings, diagnosis, plan and need for follow up with the patient.       Medication List      There are no discharge medications for this visit.         Final diagnoses:    Alcohol withdrawal, uncomplicated (H)     I, Tequila Banegas, am serving as a trained medical scribe to document services personally performed by Roxann Blevins MD based on the provider's statements to me on June 28, 2019.  This document has been checked and approved by the attending provider.    I, Roaxnn Blevins MD, was physically present and have reviewed and verified the accuracy of this note documented by Tequila Banegas, medical scribe.     6/28/2019   [facility], [place], EMERGENCY DEPARTMENT     Roxann Blevins MD  06/28/19 [id number]

## 2019-06-29 LAB
AMPHETAMINES UR QL SCN: NEGATIVE
ANION GAP SERPL CALCULATED.3IONS-SCNC: 12 MMOL/L (ref 3–14)
BARBITURATES UR QL: NEGATIVE
BENZODIAZ UR QL: POSITIVE
BUN SERPL-MCNC: 5 MG/DL (ref 7–30)
CALCIUM SERPL-MCNC: 8 MG/DL (ref 8.5–10.1)
CANNABINOIDS UR QL SCN: NEGATIVE
CHLORIDE SERPL-SCNC: 101 MMOL/L (ref 94–109)
CO2 SERPL-SCNC: 24 MMOL/L (ref 20–32)
COCAINE UR QL: NEGATIVE
CREAT SERPL-MCNC: 0.84 MG/DL (ref 0.66–1.25)
ERYTHROCYTE [DISTWIDTH] IN BLOOD BY AUTOMATED COUNT: 13.9 % (ref 10–15)
ETHANOL UR QL SCN: NEGATIVE
GFR SERPL CREATININE-BSD FRML MDRD: >90 ML/MIN/{1.73_M2}
GLUCOSE SERPL-MCNC: 92 MG/DL (ref 70–99)
HCT VFR BLD AUTO: 39.7 % (ref 40–53)
HGB BLD-MCNC: 13.1 G/DL (ref 13.3–17.7)
MCH RBC QN AUTO: 31.5 PG (ref 26.5–33)
MCHC RBC AUTO-ENTMCNC: 33 G/DL (ref 31.5–36.5)
MCV RBC AUTO: 95 FL (ref 78–100)
OPIATES UR QL SCN: NEGATIVE
PLATELET # BLD AUTO: 156 10E9/L (ref 150–450)
POTASSIUM SERPL-SCNC: 3.6 MMOL/L (ref 3.4–5.3)
RBC # BLD AUTO: 4.16 10E12/L (ref 4.4–5.9)
SODIUM SERPL-SCNC: 137 MMOL/L (ref 133–144)
WBC # BLD AUTO: 5.2 10E9/L (ref 4–11)

## 2019-06-29 PROCEDURE — 25800030 ZZH RX IP 258 OP 636: Performed by: INTERNAL MEDICINE

## 2019-06-29 PROCEDURE — 99222 1ST HOSP IP/OBS MODERATE 55: CPT | Mod: AI | Performed by: INTERNAL MEDICINE

## 2019-06-29 PROCEDURE — 85027 COMPLETE CBC AUTOMATED: CPT | Performed by: INTERNAL MEDICINE

## 2019-06-29 PROCEDURE — 80320 DRUG SCREEN QUANTALCOHOLS: CPT | Performed by: EMERGENCY MEDICINE

## 2019-06-29 PROCEDURE — 36415 COLL VENOUS BLD VENIPUNCTURE: CPT | Performed by: INTERNAL MEDICINE

## 2019-06-29 PROCEDURE — 80307 DRUG TEST PRSMV CHEM ANLYZR: CPT | Performed by: EMERGENCY MEDICINE

## 2019-06-29 PROCEDURE — 80048 BASIC METABOLIC PNL TOTAL CA: CPT | Performed by: INTERNAL MEDICINE

## 2019-06-29 PROCEDURE — 12000001 ZZH R&B MED SURG/OB UMMC

## 2019-06-29 PROCEDURE — 25000132 ZZH RX MED GY IP 250 OP 250 PS 637: Performed by: INTERNAL MEDICINE

## 2019-06-29 RX ORDER — PANTOPRAZOLE SODIUM 40 MG/1
40 TABLET, DELAYED RELEASE ORAL
Status: DISCONTINUED | OUTPATIENT
Start: 2019-06-29 | End: 2019-06-30 | Stop reason: HOSPADM

## 2019-06-29 RX ADMIN — FOLIC ACID 1 MG: 1 TABLET ORAL at 07:53

## 2019-06-29 RX ADMIN — MULTIPLE VITAMINS W/ MINERALS TAB 1 TABLET: TAB at 07:53

## 2019-06-29 RX ADMIN — CALCIUM CARBONATE (ANTACID) CHEW TAB 500 MG 1000 MG: 500 CHEW TAB at 06:23

## 2019-06-29 RX ADMIN — HYDROXYZINE HYDROCHLORIDE 25 MG: 25 TABLET ORAL at 10:11

## 2019-06-29 RX ADMIN — DIAZEPAM 5 MG: 5 TABLET ORAL at 10:08

## 2019-06-29 RX ADMIN — THIAMINE HCL TAB 100 MG 100 MG: 100 TAB at 07:53

## 2019-06-29 RX ADMIN — AMLODIPINE BESYLATE 5 MG: 5 TABLET ORAL at 07:53

## 2019-06-29 RX ADMIN — DIAZEPAM 5 MG: 5 TABLET ORAL at 06:23

## 2019-06-29 RX ADMIN — DIAZEPAM 5 MG: 5 TABLET ORAL at 02:35

## 2019-06-29 RX ADMIN — HYDROXYZINE HYDROCHLORIDE 25 MG: 25 TABLET ORAL at 18:34

## 2019-06-29 RX ADMIN — PANTOPRAZOLE SODIUM 40 MG: 40 TABLET, DELAYED RELEASE ORAL at 07:53

## 2019-06-29 RX ADMIN — DEXTRAN 70, AND HYPROMELLOSE 2910 1 DROP: 1; 3 SOLUTION/ DROPS OPHTHALMIC at 20:29

## 2019-06-29 RX ADMIN — SODIUM CHLORIDE, POTASSIUM CHLORIDE, SODIUM LACTATE AND CALCIUM CHLORIDE: 600; 310; 30; 20 INJECTION, SOLUTION INTRAVENOUS at 12:20

## 2019-06-29 ASSESSMENT — ACTIVITIES OF DAILY LIVING (ADL)
ADLS_ACUITY_SCORE: 10

## 2019-06-29 NOTE — PLAN OF CARE
Pt. A&Ox4. VSS. Afebrile. Pt states last drink was 6/28 around 12am or 1am. MSSA score 8-9 overnight. Given prn 5mg Valium x3. Lung sounds CTA. Maintaining sats on RA. Bowel sounds active, LBM 6/28 per pt report. PP+ DP+. CMS and neuro's are intact. Denies numbness and tingling in all extremities. Denies nausea, shortness of breath, and chest pain. Voided 600mL this am. Pt up with SBA. PIV is patent and infusing. Declined PCDs. Call light is within reach, pt able to make needs known and is resting comfortably between cares. Will continue to monitor.

## 2019-06-29 NOTE — ED NOTES
[facility], [place]   ED Nurse to Floor Handoff     Nikhil Rios is a 33 year old male who speaks English and lives with family members,  in a home  They arrived in the ED by car from home    ED Chief Complaint: Alcohol Intoxication (Pt has been drinking and states that he is in withdrawal)    ED Dx;   Final diagnoses:   Alcohol withdrawal, uncomplicated (H)         Needed?: No    Allergies:   Allergies   Allergen Reactions     Pollen Extract Rash     grass tree pollen   .  Past Medical Hx:   Past Medical History:   Diagnosis Date     Hypertension      Substance abuse (H)       Baseline Mental status: WDL  Current Mental Status changes: at basesline    Infection present or suspected this encounter: no  Sepsis suspected: No  Isolation type: No active isolations     Activity level - Baseline/Home:  Independent  Activity Level - Current:   Stand with Assist    Bariatric equipment needed?: No    In the ED these meds were given:   Medications   LORazepam (ATIVAN) tablet 1 mg (1 mg Oral Given 6/28/19 1912)   0.9% sodium chloride BOLUS (0 mLs Intravenous Stopped 6/28/19 1705)   ondansetron (ZOFRAN) injection 8 mg (8 mg Intravenous Given 6/28/19 1559)   LORazepam (ATIVAN) injection 1 mg (1 mg Intravenous Given 6/28/19 1559)   LORazepam (ATIVAN) tablet 1 mg (1 mg Oral Given 6/28/19 1809)       Drips running?  No    Home pump  No    Current LDAs  Peripheral IV 06/28/19 Right Upper forearm (Active)   Site Assessment WDL 6/28/2019  4:08 PM   Line Status Infusing 6/28/2019  4:08 PM   Phlebitis Scale 0-->no symptoms 6/28/2019  4:08 PM   Number of days: 0       Wound 05/20/19 Anterior;Mid Other (Comment) Abrasion(s) Anterior shin scab  (Active)   Number of days: 39       Wound Anterior;Right Tibial Other (comment) from a fall at home per pt report (Active)   Number of days:        Wound 06/03/19 Anterior;Right Knee Abrasion(s) Nickel sized scabbed area R knee (Active)   Number of days: 25        Labs results:   Labs Ordered and Resulted from Time of ED Arrival Up to the Time of Departure from the ED   COMPREHENSIVE METABOLIC PANEL - Abnormal; Notable for the following components:       Result Value    Carbon Dioxide 19 (*)     Anion Gap 20 (*)     Glucose 101 (*)     ALT 96 (*)     AST 88 (*)     All other components within normal limits   ALCOHOL BREATH TEST POCT - Abnormal; Notable for the following components:    Alcohol Breath Test 0.114 (*)     All other components within normal limits   CBC WITH PLATELETS DIFFERENTIAL   TSH WITH FREE T4 REFLEX   DRUG ABUSE SCREEN 6 CHEM DEP URINE ([facility])       Imaging Studies: No results found for this or any previous visit (from the past 24 hour(s)).    Recent vital signs:   /90   Pulse 131   Temp 99.2  F (37.3  C) (Oral)   Resp 18   Wt 100.7 kg (222 lb)   SpO2 95%   BMI 30.96 kg/m              Cardiac Rhythm: Other (no tele performed in ER)  Pt needs tele? No  Skin/wound Issues: None    Code Status: Full Code    Pain control: fair    Nausea control: good    Abnormal labs/tests/findings requiring intervention: Withdrawal treated with IV ativan x1, PO ativan x2. IV zofran x1.     Family present during ED course? Yes   Family Comments/Social Situation comments: Mom present     Tasks needing completion: None    SATHYA WINN RN  [facility] --   [phone] [place] [facility]  [phone] [facility]

## 2019-06-29 NOTE — PLAN OF CARE
VS: VSS   O2: >90% on RA   Output: Voiding adequately in BR   Last BM: 6/29   Activity: Up ind   Up for meals? Yes   Skin: Intact; blister L middle finger   Pain: Denies   CMS: Numbness L foot. Pt states it is d/t alcohol use and subsides with alcohol cessation   Diet: Regular.   LDA: PIV infusing LR at 125   Equipment: IV pole   Plan: TBD. Pt has OP treatment set up for Monday    Additional Info: MSSA 8 today. 5mg valium administered.

## 2019-06-29 NOTE — H&P
[facility], [place]    History and Physical - Hospitalist Service       Date of Admission:  6/28/2019    Assessment & Plan   Nikhil Rios is a 33 year old male admitted on 6/28/2019.     He has history of alcohol dependency, hypertension.   Admitted with alcohol withdrawal.    #Alcohol dependency  #Alcohol intoxication- ABT 0.114 on adm.   #Alcohol withdrawal  # Mild high anion gap metabolic acidosis and Mild transaminitis suspect related to alcohol use.     Last use few hours prior to coming to the ED  Patient with anxiety, tremors, shakes, nausea vomiting-now better  No hallucinations.  No history of withdrawal seizures  Hemodynamics stable except tachycardia.    - IV banana bag x 1 L.   - Treat alc withdrawal using CIWA/MSSA protocol.   - MVI, thiamine, folic acid daily.   - Fall precautions.  - Sw and CD consult.   - Antiemetics prn  - IVF until adequate oral intake  -Follow-up electrolytes and replace as needed  -Hydroxyzine as needed for anxiety, itching    # High blood pressure:   # Sinus tachycardia   No prior history.  Suspect related to alcohol withdrawal.   Patient was not taking medications at home (was not taking amlodipine or clonidine)  TSH: wnl    -Continue amlodipine 5 mg daily for now, titrate as needed  -Clonidine as needed for high blood pressure more than 170 systolic or more than 110 diastolic  -Atenolol as needed for tachycardia    # Allergic rhinitis and conjunctivitis.   - nasal saline spray prn  - Artificial tears prn.        Diet: Combination Diet Regular Diet Adult    DVT Prophylaxis: Pneumatic Compression Devices  Mars Catheter: not present  Code Status: Full Code      Disposition Plan   Expected discharge: 2 - 3 days, recommended to tbd once Detoxed, medical supination, safe disposition plan..  Entered: Dion Welsh MD 06/28/2019, 9:36 PM     The patient's care was discussed with the Patient and RN.    Dion Welsh MD  [facility]  [facility]  [place]  ______________________________________________________________________    Chief Complaint   Alcohol intoxication.   Alcohol withdrawal.     History is obtained from the patient, electronic health record and emergency department physician.  Limited as patient is a poor historian    History of Present Illness     Nikhil Rios is a 33 year old male who presents with alcohol intoxication. He has a history of heavy alcohol use. He has been drinking alcohol every day except while in [facility] a few weeks ago. He was inpatient from 6/13-6/17/19 and discharged to care of mother to wait for entry to treatment. He relapsed immediately afterwards. He has been drinking 750 mL vodka daily, last use was at 12-1AM today. He has nausea, vomiting, tremors, anxiety, diaphoresis. No suicidal or homicidal ideation, no auditory hallucinations or visual hallucinations. No other substances used. No history of alcohol withdrawal seizures.     He is here because he wants to get into detox, and has outpatient treatment scheduled with [facility] on 7/1/19.   No fever, cough. He has cold sweats. No chest pain or shortness of breath. No history of kidney disease, heart disease, lung disease or liver disease.  He did not call ahead for detox bed. He has had some falls in the distant past but none recently.       Review of Systems    The 10 point Review of Systems is negative other than noted in the HPI or here.     Past Medical History    I have reviewed this patient's medical history and updated it with pertinent information if needed.   Past Medical History:   Diagnosis Date     Hypertension      Substance abuse (H)        Past Surgical History   I have reviewed this patient's surgical history and updated it with pertinent information if needed.  History reviewed. No pertinent surgical history.    Social History   I have reviewed this patient's social history and updated it with pertinent  information if needed.  Social History     Tobacco Use     Smoking status: Current Some Day Smoker     Packs/day: 0.25     Smokeless tobacco: Never Used   Substance Use Topics     Alcohol use: Yes     Comment: 1 to 750ml daily of vodka     Drug use: No       Family History   I have reviewed this patient's family history and updated it with pertinent information if needed.   History reviewed. No pertinent family history.    Prior to Admission Medications   Prior to Admission Medications   Prescriptions Last Dose Informant Patient Reported? Taking?   amLODIPine (NORVASC) 5 MG tablet Past Month at Unknown time Pharmacy No Yes   Sig: Take 1 tablet (5 mg) by mouth daily   aspirin (ASA) 81 MG chewable tablet Past Month at Unknown time Pharmacy No Yes   Sig: Take 1 tablet (81 mg) by mouth daily   cloNIDine (CATAPRES) 0.1 MG tablet Past Month at Unknown time Pharmacy No Yes   Sig: Take 1 tablet (0.1 mg) by mouth 2 times daily   folic acid (FOLVITE) 1 MG tablet Past Month at Unknown time Pharmacy No Yes   Sig: Take 1 tablet (1 mg) by mouth daily   gabapentin (NEURONTIN) 300 MG capsule Past Month at Unknown time Pharmacy No Yes   Sig: Take 1 capsule (300 mg) by mouth 3 times daily   ketotifen (ZADITOR/REFRESH ANTI-ITCH) 0.025 % ophthalmic solution Past Month at Unknown time Pharmacy No Yes   Sig: Place 1 drop into both eyes 2 times daily   multivitamin w/minerals (THERA-VIT-M) tablet Past Month at Unknown time Pharmacy No Yes   Sig: Take 1 tablet by mouth daily   neomycin-polymyxin-dexamethasone (MAXITROL) 3.5-01594-7.1 ophthalmic ointment Past Month at Unknown time Pharmacy No Yes   Sig: Place 0.5 inches into both eyes At Bedtime   vitamin B1 (THIAMINE) 100 MG tablet Past Month at Unknown time Pharmacy No Yes   Sig: Take 1 tablet (100 mg) by mouth daily      Facility-Administered Medications: None     Allergies   Allergies   Allergen Reactions     Pollen Extract Rash     grass tree pollen       Physical Exam   Vital Signs:  Temp: 99.1  F (37.3  C) Temp src: Oral BP: (!) 161/96 Pulse: 129   Resp: 16 SpO2: 96 % O2 Device: None (Room air)    Weight: 222 lbs 0 oz      General: alert, interactive, anxious. tremulous  HEENT: AT/NC, Anicteric, Moist MM  Neck: Supple. JVD not appreciated.   Respi/Chest: Non labored, CTA BL.  CVS/Heart: S1S2 regular, tachycardia,   GI/Abd: Soft, non tender, non distended, BS +, No g/r .  MSK/Extremities: Distally warm, well perfused.     Neuro: AO x 4, tremors. Grossly intact.   Psychiatry: Stable mood.   Skin: no rash on exposed areas.         Data   Data reviewed today: I reviewed all medications, new labs and imaging results over the last 24 hours. I personally reviewed no images or EKG's today.    Recent Labs   Lab 06/28/19  1549   WBC 8.7   HGB 15.1   MCV 93         POTASSIUM 3.7   CHLORIDE 97   CO2 19*   BUN 7   CR 0.81   ANIONGAP 20*   KEELEY 8.7   *   ALBUMIN 4.0   PROTTOTAL 8.1   BILITOTAL 0.8   ALKPHOS 104   ALT 96*   AST 88*     No results found for this or any previous visit (from the past 24 hour(s)).

## 2019-06-29 NOTE — PHARMACY-ADMISSION MEDICATION HISTORY
Admission medication history for the June 28, 2019 admission is complete.     Interview Sources:  Patient, patient's family, discharged from [facility] on 06/17/19    Reliability of Source:  Good    Medication Adherence:  Poor - the patient has not take his medications since his discharge from the hospital on 06/17/19    Changes made to PTA medication list (reason)  Added: none  Deleted: none  Changed: none    Additional medication history information:   The patient was not a reliable historian. He reported not taking his medications since discharge from the hospital on 06/17/19. His family talked about the antibiotic he was taking for his shin but it was completed during his previous hospitalization.     Gabapentin 300 mg last filled 06/04/19 for quantity 90 for 30 day supply    Prior to Admission Medication List:  Prior to Admission medications    Medication Sig Last Dose Taking? Auth Provider   amLODIPine (NORVASC) 5 MG tablet Take 1 tablet (5 mg) by mouth daily Past Month at Unknown time Yes Aayush Galeano MD   aspirin (ASA) 81 MG chewable tablet Take 1 tablet (81 mg) by mouth daily Past Month at Unknown time Yes Aayush Galeano MD   cloNIDine (CATAPRES) 0.1 MG tablet Take 1 tablet (0.1 mg) by mouth 2 times daily Past Month at Unknown time Yes Aayush Galeano MD   folic acid (FOLVITE) 1 MG tablet Take 1 tablet (1 mg) by mouth daily Past Month at Unknown time Yes Aayush Galeano MD   gabapentin (NEURONTIN) 300 MG capsule Take 1 capsule (300 mg) by mouth 3 times daily Past Month at Unknown time Yes Aayush Galeano MD   ketotifen (ZADITOR/REFRESH ANTI-ITCH) 0.025 % ophthalmic solution Place 1 drop into both eyes 2 times daily Past Month at Unknown time Yes Aayush Galeano MD   multivitamin w/minerals (THERA-VIT-M) tablet Take 1 tablet by mouth daily Past Month at Unknown time Yes Aayush Galeano MD    neomycin-polymyxin-dexamethasone (MAXITROL) 3.5-78053-4.1 ophthalmic ointment Place 0.5 inches into both eyes At Bedtime Past Month at Unknown time Yes Aayush Galeano MD   vitamin B1 (THIAMINE) 100 MG tablet Take 1 tablet (100 mg) by mouth daily Past Month at Unknown time Yes Aayush Galeano MD       Time spent: 20 minutes    Medication history completed by:   Nena Alberts, PharmD, [facility]  [facility]  Available daily from 1-9 PM: phone [phone], ascom *[phone], pager [phone]

## 2019-06-29 NOTE — CONSULTS
Social Work: Assessment with Discharge Plan    Patient Name:  Quyen Rios  :  1985  Age:  33 year old  MRN:  [id number]  Risk/Complexity Score:     Completed assessment with:  Patient    Presenting Information   Reason for Referral:  Substance abuse concerns  Date of Intake:  2019  Referral Source:  Physician  Decision Maker:  Patient  Alternate Decision Maker:  YAHIRLORRAINE, Mother  Health Care Directive:  Declined completing  Living Situation:  House  Previous Functional Status:  Independent  Patient and family understanding of hospitalization:  Described hospitalization as being result of unmanageable withdrawal symptoms  Cultural/Language/Spiritual Considerations:  No considerations requested  Adjustment to Illness:  Pt reports excessive drinking due to boredom; feels able to control if has other options ie. work, socialization    Physical Health  Reason for Admission:    1. Alcohol withdrawal, uncomplicated (H)      Services Needed/Recommended:  Other:  CD Treatment recommended before hospital stay    Mental Health/Chemical Dependency  Diagnosis:  ETOH dependency  Support/Services in Place:  Currently scheduled for outpatient treatment through [facility]  Services Needed/Recommended:  Follow plan of treatment intake     Support System  Significant relationship at present time:  Mother  Family of origin is available for support:  yes  Other support available:  Has been in AA previously and willing to try inpatient treatment if recommneded  Gaps in support system:  Did not describe any  Patient is caregiver to:  None     Provider Information   Primary Care Physician:  No Ref-Primary, Physician   None   Clinic:  No address on file      :  unknown    Financial   Income Source:  No income  Financial Concerns:  Has not worked in last year and does not have any income  Insurance:    Payor/Plan Subscriber Name Rel Member # Group #   [employer] [facility]* QUYEN RIOS  [id number]       [address]       Discharge Plan   Patient and family discharge goal:  Pt would like to discharge to treatment program  Provided education on discharge plan:  YES  Patient agreeable to discharge plan:  YES  Barriers to discharge:  Pt was not clear on when intake appointment is on Monday    Discharge Recommendations   Anticipated Disposition:  discharge home to start outpatient treatment  Transportation Needs:  Family:  Mother will       Additional comments   Pt reports drinking 750ml of Vodka daily. Pt recently had a roommate and income is an issue as patient has not worked and does not receive sliding scale. Pt states his consumption would be greatly reduced if he had a job, as he only drinks because he is bored. He also stated he would be more motivated toward sobriety if he spent more time at the gym.. Pt reports needing to discharge Monday in order to start outpatient treatment program.    Joe CAST  Weekend SW  Pager: [phone]  On Call Pager: [phone] 4pm - Midnight

## 2019-06-30 ENCOUNTER — PATIENT OUTREACH (OUTPATIENT)
Dept: CARE COORDINATION | Facility: CLINIC | Age: 34
End: 2019-06-30

## 2019-06-30 VITALS
TEMPERATURE: 97.8 F | OXYGEN SATURATION: 95 % | BODY MASS INDEX: 30.96 KG/M2 | DIASTOLIC BLOOD PRESSURE: 87 MMHG | RESPIRATION RATE: 14 BRPM | HEART RATE: 84 BPM | WEIGHT: 222 LBS | SYSTOLIC BLOOD PRESSURE: 130 MMHG

## 2019-06-30 LAB
ALBUMIN SERPL-MCNC: 3.4 G/DL (ref 3.4–5)
ALP SERPL-CCNC: 80 U/L (ref 40–150)
ALT SERPL W P-5'-P-CCNC: 74 U/L (ref 0–70)
ANION GAP SERPL CALCULATED.3IONS-SCNC: 11 MMOL/L (ref 3–14)
AST SERPL W P-5'-P-CCNC: 70 U/L (ref 0–45)
BILIRUB SERPL-MCNC: 0.7 MG/DL (ref 0.2–1.3)
BUN SERPL-MCNC: 4 MG/DL (ref 7–30)
CALCIUM SERPL-MCNC: 9.1 MG/DL (ref 8.5–10.1)
CHLORIDE SERPL-SCNC: 104 MMOL/L (ref 94–109)
CO2 SERPL-SCNC: 22 MMOL/L (ref 20–32)
CREAT SERPL-MCNC: 0.82 MG/DL (ref 0.66–1.25)
GFR SERPL CREATININE-BSD FRML MDRD: >90 ML/MIN/{1.73_M2}
GLUCOSE SERPL-MCNC: 91 MG/DL (ref 70–99)
POTASSIUM SERPL-SCNC: 3.5 MMOL/L (ref 3.4–5.3)
PROT SERPL-MCNC: 6.9 G/DL (ref 6.8–8.8)
SODIUM SERPL-SCNC: 137 MMOL/L (ref 133–144)

## 2019-06-30 PROCEDURE — 36415 COLL VENOUS BLD VENIPUNCTURE: CPT | Performed by: INTERNAL MEDICINE

## 2019-06-30 PROCEDURE — 80053 COMPREHEN METABOLIC PANEL: CPT | Performed by: INTERNAL MEDICINE

## 2019-06-30 PROCEDURE — 99238 HOSP IP/OBS DSCHRG MGMT 30/<: CPT | Performed by: INTERNAL MEDICINE

## 2019-06-30 PROCEDURE — 25000132 ZZH RX MED GY IP 250 OP 250 PS 637: Performed by: INTERNAL MEDICINE

## 2019-06-30 RX ORDER — HYDROXYZINE HYDROCHLORIDE 25 MG/1
25 TABLET, FILM COATED ORAL 3 TIMES DAILY PRN
Qty: 20 TABLET | Refills: 0 | Status: ON HOLD | OUTPATIENT
Start: 2019-06-30 | End: 2019-07-16

## 2019-06-30 RX ADMIN — AMLODIPINE BESYLATE 5 MG: 5 TABLET ORAL at 08:47

## 2019-06-30 RX ADMIN — MULTIPLE VITAMINS W/ MINERALS TAB 1 TABLET: TAB at 08:47

## 2019-06-30 RX ADMIN — PANTOPRAZOLE SODIUM 40 MG: 40 TABLET, DELAYED RELEASE ORAL at 08:47

## 2019-06-30 RX ADMIN — DEXTRAN 70, AND HYPROMELLOSE 2910 1 DROP: 1; 3 SOLUTION/ DROPS OPHTHALMIC at 00:32

## 2019-06-30 RX ADMIN — HYDROXYZINE HYDROCHLORIDE 25 MG: 25 TABLET ORAL at 08:47

## 2019-06-30 RX ADMIN — DEXTRAN 70, AND HYPROMELLOSE 2910 1 DROP: 1; 3 SOLUTION/ DROPS OPHTHALMIC at 06:44

## 2019-06-30 RX ADMIN — DEXTRAN 70, AND HYPROMELLOSE 2910 1 DROP: 1; 3 SOLUTION/ DROPS OPHTHALMIC at 08:47

## 2019-06-30 RX ADMIN — FOLIC ACID 1 MG: 1 TABLET ORAL at 08:47

## 2019-06-30 RX ADMIN — THIAMINE HCL TAB 100 MG 100 MG: 100 TAB at 08:47

## 2019-06-30 RX ADMIN — HYDROXYZINE HYDROCHLORIDE 25 MG: 25 TABLET ORAL at 00:33

## 2019-06-30 ASSESSMENT — ACTIVITIES OF DAILY LIVING (ADL)
ADLS_ACUITY_SCORE: 10

## 2019-06-30 NOTE — PLAN OF CARE
Pt A/O X 4. Afebrile. VSS. /87 (BP Location: Left arm)   Pulse 85   Temp 99.5  F (37.5  C) (Oral)   Resp 16  SpO2 98%. Lungs- clear and equal bilaterally. Denies nausea, shortness of breath, and chest pain. MSSA score 5 x 2. Continues to have slight tremor. C/o itching, runny nose, and tearing eyes. PRN hydralazine administered and itching decreased. Artificial tears requested for irritated and watery eyes. Bowels active in all four quadrants. Voiding adequately per pt. Pt reported 5 episodes of diarrhea since midnight. Ambulated independently in hallway this shift with steady gait. Denies pain. Pt is on a Regular diet and appetite was good this shift. PIV is patent in the right upper forearm and saline locked per pt request. Continuous lactated ringer infused at 125cc/hr until 2000. Pt declined continued IV fluids at this time. PO fluid intake good. Continue to monitor.

## 2019-06-30 NOTE — PLAN OF CARE
VS: VSS   O2: >90% on RA   Output: Adequate in BR   Last BM: 6/30.    Activity: Up ind   Up for meals? Yes   Skin: CDI   Pain: Denies   CMS: L foot numbness improving   Dressing: NA   Diet: Regular   LDA: PIV removed   Equipment: NA   Plan: Home today   Additional Info: Pt was cleared for discharge to home. Pt was given medication, follow up and discharge instructions. Pt has OP treatment scheduled tomorrow.

## 2019-06-30 NOTE — DISCHARGE SUMMARY
[facility], [place]  Hospitalist Discharge Summary       Date of Admission:  6/28/2019  Date of Discharge:  6/30/2019  Discharging Provider: Jhonny Gao MD      Discharge Diagnoses   Chronic alcohol abuse with acute alcohol withdrawal syndrome   Alcohol dependency  Mild high anion gap metabolic acidosis and Mild transaminitis   Hypertension  Sinus tachycardia  Allergic rhinitis and conjunctivitis.    Follow-ups Needed After Discharge   Follow-up Appointments     Adult [facility] Follow-up and recommended labs and tests      Please follow up with your appointment for intake for chemical dependency   scheduled for tomorrow ( July 1st)    Please follow up with you PCP as necessary     Appointments on [place] and/or [facility] (with [facility] or [facility]   provider or service). Call [phone] if you haven't heard regarding   these appointments within 7 days of discharge.             Discharge Disposition   Discharged to home  Condition at discharge: Stable    Hospital Course    Nikhil Rios was admitted to the medical unit for detox as he was not suitable for a regular detox unit with high BP's, tachycardia and h/o significant withdrawal symptoms in the past   he was detoxed with MSSA protocol and Valium  He received a total of 20 mg of Valium, in addition  to Multivitamin IV infusion and BP medications  On the day of discharge, he felt well in himself  He already has an intake appointment for chemical dependency treatment tomorrow ( July 1st)   His labs were largely within normal limits     Consultations This Hospital Stay   MEDICATION HISTORY IP PHARMACY CONSULT  SOCIAL WORK IP CONSULT  CHEMICAL DEPENDENCY IP CONSULT    Code Status   Full Code    Time Spent on this Encounter   IJhonny, personally saw the patient today and spent less  than 30 minutes discharging this patient.       Jhonny Gao MD  [facility],  [place]  ______________________________________________________________________    Physical Exam   Vital Signs: Temp: 97.8  F (36.6  C) Temp src: Oral BP: 130/87 Pulse: 84   Resp: 14 SpO2: 95 % O2 Device: None (Room air)    Weight: 222 lbs 0 oz  CVS: Normal Heart sounds   RS: Clear breath sounds bilaterally   Abdomen: Soft and non tender. Normal bowel sounds.   Neuro: Alert and orientated X 3        Primary Care Physician   Physician No Ref-Primary    Discharge Orders      Reason for your hospital stay    Alcohol detox     Adult [facility] Follow-up and recommended labs and tests    Please follow up with your appointment for intake for chemical dependency scheduled for tomorrow ( July 1st)    Please follow up with you PCP as necessary     Appointments on [place] and/or [facility] (with [facility] or [facility] provider or service). Call [phone] if you haven't heard regarding these appointments within 7 days of discharge.     Activity    Your activity upon discharge: activity as tolerated     Full Code     Diet    Follow this diet upon discharge: Orders Placed This Encounter      Combination Diet Regular Diet Adult       Significant Results and Procedures   Results for orders placed or performed during the hospital encounter of 05/31/19   XR Tibia & Fibula Right 2 Views    Narrative    Exam: XR TIBIA & FIBULA RT 2 VW, 6/3/2019 1:02 PM    Indication: right leg wound    Comparison: None    Findings:   No displaced fracture. No periosteal reaction. No erosions.      Impression    Impression: No acute osseous abnormality.    ANGIE CRUZ MD       Discharge Medications   Current Discharge Medication List      START taking these medications    Details   hydrOXYzine (ATARAX) 25 MG tablet Take 1 tablet (25 mg) by mouth 3 times daily as needed for itching or anxiety  Qty: 20 tablet, Refills: 0    Associated Diagnoses: Chemical dependency (H)      hypromellose-dextran (ARTIFICAL TEARS) 0.1-0.3 % ophthalmic solution Place 1  drop into both eyes every hour as needed for dry eyes  Qty: 15 mL, Refills: 0    Associated Diagnoses: Chemical dependency (H)         CONTINUE these medications which have NOT CHANGED    Details   amLODIPine (NORVASC) 5 MG tablet Take 1 tablet (5 mg) by mouth daily  Qty: 30 tablet, Refills: 0    Associated Diagnoses: Essential hypertension      aspirin (ASA) 81 MG chewable tablet Take 1 tablet (81 mg) by mouth daily  Qty: 30 tablet, Refills: 0    Associated Diagnoses: Alcohol dependence with withdrawal with complication (H)      cloNIDine (CATAPRES) 0.1 MG tablet Take 1 tablet (0.1 mg) by mouth 2 times daily  Qty: 60 tablet, Refills: 0    Associated Diagnoses: Alcohol dependence with withdrawal with complication (H)      folic acid (FOLVITE) 1 MG tablet Take 1 tablet (1 mg) by mouth daily  Qty: 30 tablet, Refills: 0    Associated Diagnoses: Alcoholism /alcohol abuse (H)      gabapentin (NEURONTIN) 300 MG capsule Take 1 capsule (300 mg) by mouth 3 times daily  Qty: 90 capsule, Refills: 0    Associated Diagnoses: Alcohol dependence with withdrawal with complication (H)      ketotifen (ZADITOR/REFRESH ANTI-ITCH) 0.025 % ophthalmic solution Place 1 drop into both eyes 2 times daily  Qty: 1 Bottle, Refills: 1    Associated Diagnoses: Allergic eye reaction      multivitamin w/minerals (THERA-VIT-M) tablet Take 1 tablet by mouth daily  Qty: 30 tablet, Refills: 0    Associated Diagnoses: Alcoholism /alcohol abuse (H)      neomycin-polymyxin-dexamethasone (MAXITROL) 3.5-28583-9.1 ophthalmic ointment Place 0.5 inches into both eyes At Bedtime  Qty: 1 Tube, Refills: 0    Associated Diagnoses: Alcohol dependence with withdrawal with complication (H)      vitamin B1 (THIAMINE) 100 MG tablet Take 1 tablet (100 mg) by mouth daily  Qty: 30 tablet, Refills: 0    Associated Diagnoses: Alcohol dependence with withdrawal with complication (H)           Allergies   Allergies   Allergen Reactions     Pollen Extract Rash     grass tree  pollen

## 2019-06-30 NOTE — PLAN OF CARE
VS stable. O2 in upper 90s on room air. CMS intact ex mild numbness in LLE. MSSA score of 5 X1, continues to have slight tremor. Denies pain. Skin WDL. Tolerating regular diet. Experiencing loose stools, pt states this is improving. Up independently in room. PRN hydralazine and artifical tears given. Pt refused continuous fluids, PIV patent, saline locked. Plan is to discharge for outpatient chemical dependency treatment starting on 7/1/2019.

## 2019-07-07 ENCOUNTER — HOSPITAL ENCOUNTER (EMERGENCY)
Facility: CLINIC | Age: 34
Discharge: HOME | End: 2019-07-07
Attending: EMERGENCY MEDICINE | Admitting: EMERGENCY MEDICINE
Payer: COMMERCIAL

## 2019-07-07 VITALS
OXYGEN SATURATION: 97 % | RESPIRATION RATE: 17 BRPM | WEIGHT: 226.3 LBS | HEART RATE: 105 BPM | HEIGHT: 71 IN | TEMPERATURE: 97.7 F | BODY MASS INDEX: 31.68 KG/M2 | DIASTOLIC BLOOD PRESSURE: 86 MMHG | SYSTOLIC BLOOD PRESSURE: 139 MMHG

## 2019-07-07 DIAGNOSIS — F10.220 ALCOHOL DEPENDENCE WITH UNCOMPLICATED INTOXICATION (H): ICD-10-CM

## 2019-07-07 DIAGNOSIS — F10.920 ALCOHOLIC INTOXICATION WITHOUT COMPLICATION (H): ICD-10-CM

## 2019-07-07 LAB
ALBUMIN SERPL-MCNC: 3.8 G/DL (ref 3.4–5)
ALCOHOL BREATH TEST: 0.14 (ref 0–0.01)
ALCOHOL BREATH TEST: 0.33 (ref 0–0.01)
ALP SERPL-CCNC: 93 U/L (ref 40–150)
ALT SERPL W P-5'-P-CCNC: 138 U/L (ref 0–70)
ANION GAP SERPL CALCULATED.3IONS-SCNC: 12 MMOL/L (ref 3–14)
AST SERPL W P-5'-P-CCNC: 110 U/L (ref 0–45)
BASOPHILS # BLD AUTO: 0 10E9/L (ref 0–0.2)
BASOPHILS NFR BLD AUTO: 0.4 %
BILIRUB SERPL-MCNC: 0.4 MG/DL (ref 0.2–1.3)
BUN SERPL-MCNC: 6 MG/DL (ref 7–30)
CALCIUM SERPL-MCNC: 8.8 MG/DL (ref 8.5–10.1)
CHLORIDE SERPL-SCNC: 105 MMOL/L (ref 94–109)
CO2 SERPL-SCNC: 28 MMOL/L (ref 20–32)
CREAT SERPL-MCNC: 0.94 MG/DL (ref 0.66–1.25)
DIFFERENTIAL METHOD BLD: NORMAL
EOSINOPHIL # BLD AUTO: 0.1 10E9/L (ref 0–0.7)
EOSINOPHIL NFR BLD AUTO: 1.9 %
ERYTHROCYTE [DISTWIDTH] IN BLOOD BY AUTOMATED COUNT: 14.8 % (ref 10–15)
ETHANOL SERPL-MCNC: 0.3 G/DL
GFR SERPL CREATININE-BSD FRML MDRD: >90 ML/MIN/{1.73_M2}
GLUCOSE SERPL-MCNC: 94 MG/DL (ref 70–99)
HCT VFR BLD AUTO: 44.1 % (ref 40–53)
HGB BLD-MCNC: 14.9 G/DL (ref 13.3–17.7)
IMM GRANULOCYTES # BLD: 0 10E9/L (ref 0–0.4)
IMM GRANULOCYTES NFR BLD: 0.2 %
LYMPHOCYTES # BLD AUTO: 2.4 10E9/L (ref 0.8–5.3)
LYMPHOCYTES NFR BLD AUTO: 51.9 %
MAGNESIUM SERPL-MCNC: 2 MG/DL (ref 1.6–2.3)
MCH RBC QN AUTO: 32 PG (ref 26.5–33)
MCHC RBC AUTO-ENTMCNC: 33.8 G/DL (ref 31.5–36.5)
MCV RBC AUTO: 95 FL (ref 78–100)
MONOCYTES # BLD AUTO: 0.5 10E9/L (ref 0–1.3)
MONOCYTES NFR BLD AUTO: 11.7 %
NEUTROPHILS # BLD AUTO: 1.6 10E9/L (ref 1.6–8.3)
NEUTROPHILS NFR BLD AUTO: 33.9 %
NRBC # BLD AUTO: 0 10*3/UL
NRBC BLD AUTO-RTO: 0 /100
PLATELET # BLD AUTO: 284 10E9/L (ref 150–450)
POTASSIUM SERPL-SCNC: 3.6 MMOL/L (ref 3.4–5.3)
PROT SERPL-MCNC: 7.7 G/DL (ref 6.8–8.8)
RBC # BLD AUTO: 4.66 10E12/L (ref 4.4–5.9)
SODIUM SERPL-SCNC: 145 MMOL/L (ref 133–144)
WBC # BLD AUTO: 4.6 10E9/L (ref 4–11)

## 2019-07-07 PROCEDURE — 99284 EMERGENCY DEPT VISIT MOD MDM: CPT | Mod: Z6 | Performed by: EMERGENCY MEDICINE

## 2019-07-07 PROCEDURE — 25000125 ZZHC RX 250: Performed by: EMERGENCY MEDICINE

## 2019-07-07 PROCEDURE — 85025 COMPLETE CBC W/AUTO DIFF WBC: CPT | Performed by: EMERGENCY MEDICINE

## 2019-07-07 PROCEDURE — 83735 ASSAY OF MAGNESIUM: CPT | Performed by: EMERGENCY MEDICINE

## 2019-07-07 PROCEDURE — 82075 ASSAY OF BREATH ETHANOL: CPT

## 2019-07-07 PROCEDURE — 82075 ASSAY OF BREATH ETHANOL: CPT | Mod: 91

## 2019-07-07 PROCEDURE — 96365 THER/PROPH/DIAG IV INF INIT: CPT

## 2019-07-07 PROCEDURE — 99284 EMERGENCY DEPT VISIT MOD MDM: CPT | Mod: 25

## 2019-07-07 PROCEDURE — 80320 DRUG SCREEN QUANTALCOHOLS: CPT | Performed by: EMERGENCY MEDICINE

## 2019-07-07 PROCEDURE — 25800030 ZZH RX IP 258 OP 636: Performed by: EMERGENCY MEDICINE

## 2019-07-07 PROCEDURE — 25000132 ZZH RX MED GY IP 250 OP 250 PS 637: Performed by: EMERGENCY MEDICINE

## 2019-07-07 PROCEDURE — 80053 COMPREHEN METABOLIC PANEL: CPT | Performed by: EMERGENCY MEDICINE

## 2019-07-07 PROCEDURE — 25000128 H RX IP 250 OP 636: Performed by: EMERGENCY MEDICINE

## 2019-07-07 RX ORDER — GABAPENTIN 400 MG/1
400 CAPSULE ORAL 3 TIMES DAILY
Qty: 12 CAPSULE | Refills: 0 | Status: ON HOLD | OUTPATIENT
Start: 2019-07-07 | End: 2019-07-18

## 2019-07-07 RX ORDER — DIAZEPAM 5 MG
5 TABLET ORAL ONCE
Status: COMPLETED | OUTPATIENT
Start: 2019-07-07 | End: 2019-07-07

## 2019-07-07 RX ORDER — LORAZEPAM 1 MG/1
1 TABLET ORAL ONCE
Status: COMPLETED | OUTPATIENT
Start: 2019-07-07 | End: 2019-07-07

## 2019-07-07 RX ADMIN — LORAZEPAM 1 MG: 1 TABLET ORAL at 05:47

## 2019-07-07 RX ADMIN — FOLIC ACID: 5 INJECTION, SOLUTION INTRAMUSCULAR; INTRAVENOUS; SUBCUTANEOUS at 05:37

## 2019-07-07 RX ADMIN — DIAZEPAM 5 MG: 5 TABLET ORAL at 09:54

## 2019-07-07 ASSESSMENT — ENCOUNTER SYMPTOMS
SPEECH DIFFICULTY: 0
NECK STIFFNESS: 0
BRUISES/BLEEDS EASILY: 0
EYE REDNESS: 0
CONFUSION: 0
FEVER: 0
COUGH: 0
RHINORRHEA: 0
ABDOMINAL PAIN: 0
BACK PAIN: 0
SEIZURES: 0
AGITATION: 0
FACIAL SWELLING: 0
COLOR CHANGE: 0
ARTHRALGIAS: 0
MYALGIAS: 0
NAUSEA: 0
HALLUCINATIONS: 0
DIZZINESS: 0
SHORTNESS OF BREATH: 0
DIAPHORESIS: 0
APPETITE CHANGE: 0
NECK PAIN: 0
CHILLS: 0
NERVOUS/ANXIOUS: 1
WEAKNESS: 0
FLANK PAIN: 0
SORE THROAT: 0
DIARRHEA: 0
DIFFICULTY URINATING: 0
FATIGUE: 0
TREMORS: 1
VOMITING: 0
CHEST TIGHTNESS: 0
PALPITATIONS: 0
HEMATURIA: 0
LIGHT-HEADEDNESS: 0
WOUND: 0
HEADACHES: 0

## 2019-07-07 ASSESSMENT — MIFFLIN-ST. JEOR: SCORE: 2003.62

## 2019-07-07 NOTE — ED NOTES
The patient is now clinically sober.  He was given valium 5 mg po for early withdrawal symptoms.  His mother is here and they are ready to go home.  He has gabapentin at home but doesn't take it.  We talked about taking a 5 day gabapentin taper for alcohol withdrawal and he would like to do that.  This was prescribed. We discussed not taking it while take the gabapentin he has at home.  Again he doesn't take the gabapentin he has at home.      Génesis Le MD  07/07/19 1037

## 2019-07-07 NOTE — ED PROVIDER NOTES
"  History     Chief Complaint   Patient presents with     Alcohol Problem     drinks 750 ml of vodka daily. Seeking detox.     HPI  Nikhil Rios is a 33 year old male with history of alcohol dependence who presents after recent relapse. Drinks alcohol daily to 750 ml of vodka. He is not sure of time of last use. Denies drug use. No recent illness or injury. No history of seizures or delirium or hallucinations. He is scheduled to start an outpatient detox program tomorrow.     I have reviewed the Medications, Allergies, Past Medical and Surgical History, and Social History in the [facility] system.  Past Medical History:   Diagnosis Date     Hypertension      Substance abuse (H)        Review of Systems   Constitutional: Negative for appetite change, chills, diaphoresis, fatigue and fever.   HENT: Negative for congestion, facial swelling, mouth sores, rhinorrhea and sore throat.    Eyes: Negative for redness and visual disturbance.   Respiratory: Negative for cough, chest tightness and shortness of breath.    Cardiovascular: Negative for chest pain and palpitations.   Gastrointestinal: Negative for abdominal pain, diarrhea, nausea and vomiting.   Genitourinary: Negative for difficulty urinating, flank pain and hematuria.   Musculoskeletal: Negative for arthralgias, back pain, myalgias, neck pain and neck stiffness.   Skin: Negative for color change, rash and wound.   Allergic/Immunologic: Negative for immunocompromised state.   Neurological: Positive for tremors. Negative for dizziness, seizures, syncope, speech difficulty, weakness, light-headedness and headaches.   Hematological: Does not bruise/bleed easily.   Psychiatric/Behavioral: Negative for agitation, confusion, hallucinations, self-injury and suicidal ideas. The patient is nervous/anxious.    All other systems reviewed and are negative.      Physical Exam   BP: 136/88  Pulse: 121  Temp: 97.3  F (36.3  C)  Resp: 18  Height: 180.3 cm (5' 11\")  Weight: 102.6 kg (226 " lb 4.8 oz)  SpO2: 96 %      Physical Exam   Constitutional: He is oriented to person, place, and time. He appears well-developed and well-nourished. No distress.   HENT:   Head: Normocephalic and atraumatic.   Nose: Nose normal.   Mouth/Throat: Oropharynx is clear and moist.   Eyes: Pupils are equal, round, and reactive to light. Conjunctivae and EOM are normal.   Neck: Normal range of motion. Neck supple.   Cardiovascular: Normal rate, regular rhythm, normal heart sounds and intact distal pulses.   Pulmonary/Chest: Effort normal and breath sounds normal. No respiratory distress. He exhibits no tenderness.   Abdominal: Soft. There is no tenderness.   Musculoskeletal: Normal range of motion. He exhibits no edema or tenderness.        Cervical back: He exhibits no tenderness.        Thoracic back: He exhibits no tenderness.        Lumbar back: He exhibits no tenderness.   Neurological: He is oriented to person, place, and time. He has normal strength and normal reflexes. He displays no tremor. No cranial nerve deficit or sensory deficit. He displays no seizure activity. Coordination normal.   Drowsy, responds to voice.   Skin: Skin is warm and dry.   Psychiatric: His behavior is normal. His mood appears anxious. His speech is slurred. He is not actively hallucinating. Thought content is not paranoid and not delusional. Cognition and memory are impaired. He expresses no homicidal and no suicidal ideation.   Nursing note and vitals reviewed.      ED Course        Procedures             Critical Care time:  none             Labs Ordered and Resulted from Time of ED Arrival Up to the Time of Departure from the ED   ALCOHOL BREATH TEST POCT - Abnormal; Notable for the following components:       Result Value    Alcohol Breath Test 0.326 (*)     All other components within normal limits   ALCOHOL ETHYL   CBC WITH PLATELETS DIFFERENTIAL   COMPREHENSIVE METABOLIC PANEL   MAGNESIUM   DRUG ABUSE SCREEN 6 CHEM DEP URINE ([facility])    PULSE OXIMETRY NURSING     Medications   sodium chloride 0.9 % 1,000 mL with INFUVITE ADULT 10 mL, thiamine 100 mg, folic acid 1 mg infusion ( Intravenous New Bag 7/7/19 1137)   LORazepam (ATIVAN) tablet 1 mg (1 mg Oral Given 7/7/19 2603)            Assessments & Plan (with Medical Decision Making)   Alcohol intoxication. No evidence of severe withdrawal at this time. No detox beds are available. He is scheduled to start an outpatient program tomorrow. Will treat with banana bag and lorazepam if needed. Anticipate discharge when sober and with sober ride.    I have reviewed the nursing notes.    I have reviewed the findings, diagnosis, plan and need for follow up with the patient.       Medication List      There are no discharge medications for this visit.         Final diagnoses:   Alcoholic intoxication without complication (H)       7/7/2019   [facility], [place], EMERGENCY DEPARTMENT     Khoi Poon MD  07/07/19 0559

## 2019-07-07 NOTE — DISCHARGE INSTRUCTIONS
Avoid excessive alcohol use.  Do not drive.  Start treatment program tomorrow.  Return if persistent symptoms.  You can take gabapentin taper to help with alcohol withdrawal symptoms.  You have been prescribed gabapentin in the past.  However since you are not taking your gabapentin, take the prescribed taper for 5 days.

## 2019-07-07 NOTE — ED NOTES
Patient alert/oriented. Stable on feet. VSS on RA, mild tachycardia. AVS reviewed. Prescription provided for gabapentin. Patient discharged home with mom (). Safe to discharge.

## 2019-07-07 NOTE — ED AVS SNAPSHOT
[facility], [place], Emergency Department  [address]  [place] [address]  Phone:  [phone]  Fax:  [phone]                                    Nikhil Rios   MRN: [id number]    Department:  [facility], Emergency Department   Date of Visit:  7/7/2019           After Visit Summary Signature Page    I have received my discharge instructions, and my questions have been answered. I have discussed any challenges I see with this plan with the nurse or doctor.    ..........................................................................................................................................  Patient/Patient Representative Signature      ..........................................................................................................................................  Patient Representative Print Name and Relationship to Patient    ..................................................               ................................................  Date                                   Time    ..........................................................................................................................................  Reviewed by Signature/Title    ...................................................              ..............................................  Date                                               Time          22EPIC Rev 08/18

## 2019-07-15 ENCOUNTER — HOSPITAL ENCOUNTER (INPATIENT)
Facility: CLINIC | Age: 34
LOS: 3 days | Discharge: HOME | DRG: 897 | End: 2019-07-19
Attending: EMERGENCY MEDICINE | Admitting: PSYCHIATRY & NEUROLOGY
Payer: COMMERCIAL

## 2019-07-15 DIAGNOSIS — F10.230 ALCOHOL DEPENDENCE WITH UNCOMPLICATED WITHDRAWAL (H): Primary | ICD-10-CM

## 2019-07-15 DIAGNOSIS — I10 HYPERTENSION, UNSPECIFIED TYPE: ICD-10-CM

## 2019-07-15 DIAGNOSIS — F19.20 CHEMICAL DEPENDENCY (H): ICD-10-CM

## 2019-07-15 DIAGNOSIS — F10.220 ALCOHOL DEPENDENCE WITH UNCOMPLICATED INTOXICATION (H): ICD-10-CM

## 2019-07-15 DIAGNOSIS — F10.239 ALCOHOL DEPENDENCE WITH WITHDRAWAL WITH COMPLICATION (H): ICD-10-CM

## 2019-07-15 DIAGNOSIS — F10.920 ALCOHOL INTOXICATION, UNCOMPLICATED (H): ICD-10-CM

## 2019-07-15 DIAGNOSIS — I10 ESSENTIAL HYPERTENSION: ICD-10-CM

## 2019-07-15 LAB
ALBUMIN SERPL-MCNC: 3.8 G/DL (ref 3.4–5)
ALCOHOL BREATH TEST: 0.26 (ref 0–0.01)
ALCOHOL BREATH TEST: 0.31 (ref 0–0.01)
ALP SERPL-CCNC: 84 U/L (ref 40–150)
ALT SERPL W P-5'-P-CCNC: 133 U/L (ref 0–70)
ANION GAP SERPL CALCULATED.3IONS-SCNC: 11 MMOL/L (ref 3–14)
AST SERPL W P-5'-P-CCNC: 86 U/L (ref 0–45)
BASOPHILS # BLD AUTO: 0.1 10E9/L (ref 0–0.2)
BASOPHILS NFR BLD AUTO: 1.4 %
BILIRUB SERPL-MCNC: 0.3 MG/DL (ref 0.2–1.3)
BUN SERPL-MCNC: 5 MG/DL (ref 7–30)
CALCIUM SERPL-MCNC: 8.5 MG/DL (ref 8.5–10.1)
CHLORIDE SERPL-SCNC: 110 MMOL/L (ref 94–109)
CO2 SERPL-SCNC: 23 MMOL/L (ref 20–32)
CREAT SERPL-MCNC: 0.7 MG/DL (ref 0.66–1.25)
DIFFERENTIAL METHOD BLD: NORMAL
EOSINOPHIL # BLD AUTO: 0.2 10E9/L (ref 0–0.7)
EOSINOPHIL NFR BLD AUTO: 2.6 %
ERYTHROCYTE [DISTWIDTH] IN BLOOD BY AUTOMATED COUNT: 14.7 % (ref 10–15)
GFR SERPL CREATININE-BSD FRML MDRD: >90 ML/MIN/{1.73_M2}
GLUCOSE SERPL-MCNC: 96 MG/DL (ref 70–99)
HCT VFR BLD AUTO: 42.9 % (ref 40–53)
HGB BLD-MCNC: 14.6 G/DL (ref 13.3–17.7)
IMM GRANULOCYTES # BLD: 0 10E9/L (ref 0–0.4)
IMM GRANULOCYTES NFR BLD: 0.2 %
LIPASE SERPL-CCNC: 260 U/L (ref 73–393)
LYMPHOCYTES # BLD AUTO: 2.7 10E9/L (ref 0.8–5.3)
LYMPHOCYTES NFR BLD AUTO: 41.7 %
MAGNESIUM SERPL-MCNC: 2.3 MG/DL (ref 1.6–2.3)
MCH RBC QN AUTO: 32.4 PG (ref 26.5–33)
MCHC RBC AUTO-ENTMCNC: 34 G/DL (ref 31.5–36.5)
MCV RBC AUTO: 95 FL (ref 78–100)
MONOCYTES # BLD AUTO: 0.6 10E9/L (ref 0–1.3)
MONOCYTES NFR BLD AUTO: 8.8 %
NEUTROPHILS # BLD AUTO: 2.9 10E9/L (ref 1.6–8.3)
NEUTROPHILS NFR BLD AUTO: 45.3 %
NRBC # BLD AUTO: 0 10*3/UL
NRBC BLD AUTO-RTO: 0 /100
PLATELET # BLD AUTO: 359 10E9/L (ref 150–450)
POTASSIUM SERPL-SCNC: 3.7 MMOL/L (ref 3.4–5.3)
PROT SERPL-MCNC: 7.9 G/DL (ref 6.8–8.8)
RBC # BLD AUTO: 4.51 10E12/L (ref 4.4–5.9)
SODIUM SERPL-SCNC: 144 MMOL/L (ref 133–144)
TROPONIN I SERPL-MCNC: <0.015 UG/L (ref 0–0.04)
WBC # BLD AUTO: 6.5 10E9/L (ref 4–11)

## 2019-07-15 PROCEDURE — 85025 COMPLETE CBC W/AUTO DIFF WBC: CPT | Performed by: EMERGENCY MEDICINE

## 2019-07-15 PROCEDURE — 99285 EMERGENCY DEPT VISIT HI MDM: CPT | Mod: 25 | Performed by: EMERGENCY MEDICINE

## 2019-07-15 PROCEDURE — 84484 ASSAY OF TROPONIN QUANT: CPT | Performed by: EMERGENCY MEDICINE

## 2019-07-15 PROCEDURE — 83690 ASSAY OF LIPASE: CPT | Performed by: EMERGENCY MEDICINE

## 2019-07-15 PROCEDURE — 93010 ELECTROCARDIOGRAM REPORT: CPT | Mod: Z6 | Performed by: EMERGENCY MEDICINE

## 2019-07-15 PROCEDURE — 83735 ASSAY OF MAGNESIUM: CPT | Performed by: EMERGENCY MEDICINE

## 2019-07-15 PROCEDURE — 82075 ASSAY OF BREATH ETHANOL: CPT | Performed by: EMERGENCY MEDICINE

## 2019-07-15 PROCEDURE — 93005 ELECTROCARDIOGRAM TRACING: CPT | Performed by: EMERGENCY MEDICINE

## 2019-07-15 PROCEDURE — 82075 ASSAY OF BREATH ETHANOL: CPT | Mod: 91 | Performed by: EMERGENCY MEDICINE

## 2019-07-15 PROCEDURE — 80053 COMPREHEN METABOLIC PANEL: CPT | Performed by: EMERGENCY MEDICINE

## 2019-07-15 PROCEDURE — HZ2ZZZZ DETOXIFICATION SERVICES FOR SUBSTANCE ABUSE TREATMENT: ICD-10-PCS | Performed by: PSYCHIATRY & NEUROLOGY

## 2019-07-15 ASSESSMENT — ENCOUNTER SYMPTOMS
DIAPHORESIS: 1
VOMITING: 0
ABDOMINAL PAIN: 0
HALLUCINATIONS: 0
SHORTNESS OF BREATH: 0
CHILLS: 1
FEVER: 0
DIARRHEA: 0
NAUSEA: 1
BLOOD IN STOOL: 0
ANAL BLEEDING: 0

## 2019-07-15 NOTE — ED NOTES
Pt talking with the provider at this time.  Pt as in triage is stating he wants to go to treatment.

## 2019-07-15 NOTE — ED PROVIDER NOTES
"    [facility] [place]    7/15/19        History     Chief Complaint   Patient presents with     Alcohol Intoxication     Detox from alcohol, last drink just prior to arrival, intoxicated.     The history is provided by the patient and a parent. The history is limited by the condition of the patient (alcohol intoxication).     Nikhil Rios is a 33 year old male with a PMHx notable for HTN and substance abuse who presents to the Emergency Department due to alcohol intoxication. Patient reports that he does not necessarily want to stay in the hospital or go to detox. He states that the reason he came to the ED was because he was \"not feeling well\". He reports feeling nauseous but denies vomiting. He also denies shortness of breath, chest pain or abdominal pain. He has had diarrhea, but denies black or bloody stools. He notes occasionally feeling chilled and having the sweats but denies fevers. Patient states that he consumes a 750 ml bottle of liquor per day and has been doing so for the past two years. He denies recent falls and his mother notes that his last fall was one month ago. Patient denies withdrawal seizures or hallucinations. Patient's mother states that the patient was supposed to begin outpatient treatment but has not done so yet. She reports that he was taking gabapentin after his last ED visit which did help but he notes that he felt \"awful\" on the medication. Patient denies suicidal or homicidal ideation. He also denies illicit drug use.      No current facility-administered medications for this encounter.      Current Outpatient Medications   Medication     amLODIPine (NORVASC) 5 MG tablet     aspirin (ASA) 81 MG chewable tablet     cloNIDine (CATAPRES) 0.1 MG tablet     folic acid (FOLVITE) 1 MG tablet     gabapentin (NEURONTIN) 300 MG capsule     gabapentin (NEURONTIN) 400 MG capsule     hydrOXYzine (ATARAX) 25 MG tablet     hypromellose-dextran (ARTIFICAL TEARS) " 0.1-0.3 % ophthalmic solution     ketotifen (ZADITOR/REFRESH ANTI-ITCH) 0.025 % ophthalmic solution     multivitamin w/minerals (THERA-VIT-M) tablet     neomycin-polymyxin-dexamethasone (MAXITROL) 3.5-05660-2.1 ophthalmic ointment     vitamin B1 (THIAMINE) 100 MG tablet     Past Medical History:   Diagnosis Date     Hypertension      Substance abuse (H)        History reviewed. No pertinent surgical history.    History reviewed. No pertinent family history.    Social History     Tobacco Use     Smoking status: Current Some Day Smoker     Packs/day: 0.25     Smokeless tobacco: Never Used   Substance Use Topics     Alcohol use: Yes     Comment: 750ml daily of vodka     Allergies   Allergen Reactions     Pollen Extract Rash     grass tree pollen       I have reviewed the Medications, Allergies, Past Medical and Surgical History, and Social History in the Epic system.    Review of Systems   Constitutional: Positive for chills and diaphoresis. Negative for fever.   Respiratory: Negative for shortness of breath.    Cardiovascular: Negative for chest pain.   Gastrointestinal: Positive for nausea. Negative for abdominal pain, anal bleeding, blood in stool, diarrhea and vomiting.   Psychiatric/Behavioral: Negative for hallucinations and suicidal ideas.        Negative for homicidal ideations   All other systems reviewed and are negative.      Physical Exam   BP: 135/78  Pulse: 92  Heart Rate: 92  Resp: 18  Weight: 99.8 kg (220 lb)  SpO2: 93 %      Physical Exam  General: patient is alert and oriented and in no acute distress, slurring of speech  Head: atraumatic and normocephalic   EENT: moist mucus membranes without tonsillar erythema or exudates, pupils 3 mm, equal round and reactive, sclera anicteric  Neck: supple   Cardiovascular: regular rate and rhythm, no murmur appreciated, extremities warm and well perfused, no lower extremity edema  Pulmonary: lungs clear to auscultation bilaterally   Abdomen: soft, non-tender,  "nondistended  Musculoskeletal: normal range of motion   Neurological: alert, slurred speech, no facial droop, moving all extremities symmetrically, gait normal   Skin: warm, dry   Psych: Slurred speech but normal in rate and tone, linear thought process, does not appear to be responding to internal stimuli, denies suicidal ideation    ED Course   4:42 PM  The patient was seen and examined by Tori Pa MD in Room HW01.        Procedures             EKG Interpretation:      Interpreted by Tori Pa  Time reviewed: 8600  Symptoms at time of EKG: none   Rhythm: normal sinus   Rate: normal  Axis: normal  Ectopy: none  Conduction: normal  ST Segments/ T Waves: No ST-T wave changes  Q Waves: none  Comparison to prior: Unchanged    Clinical Impression: normal EKG          Critical Care time:  none             Labs Ordered and Resulted from Time of ED Arrival Up to the Time of Departure from the ED   ALCOHOL BREATH TEST POCT - Abnormal; Notable for the following components:       Result Value    Alcohol Breath Test 0.306 (*)     All other components within normal limits            Assessments & Plan (with Medical Decision Making)   Mr. Rios is a 33-year-old male with a history of alcohol dependence who presents to the Emergency Department with alcohol intoxication and considering possible detox from alcohol use.  The patient reports a general sense of feeling \"off\" after starting gabapentin for outpatient detox.  He has subsequently stopped taking gabapentin and has been drinking regularly.  Currently he is intoxicated with an alcohol level of 0.306.  He denies any recent falls or traumatic injury, and on exam he does not have any external evidence of trauma.  He is hemodynamically stable and afebrile.  Baseline labs were drawn which are notable for slight elevation in LFTs with an ALT of 133, AST of 86.  He denies any acute mental health concerns.  Certainly his symptoms may have been secondary to medication " side effect in the setting of initiation of gabapentin versus ongoing alcohol use.  He does not have any significant electrolyte abnormalities.  No evidence of pancreatitis.  His EKG shows no acute ischemic changes and troponin is negative.  He did have a recent echocardiogram with borderline function.  He does not appear volume overloaded at this time.  Patient was observed in the Emergency Department and on reevaluation is interested in completing detox at this time.  Patient will be admitted voluntarily to detox for further management.    This part of the medical record was transcribed by Emiliano Mcallister, Medical Scribe, from a dictation done by Tori Pa MD.       I have reviewed the nursing notes.    I have reviewed the findings, diagnosis, plan and need for follow up with the patient.       Medication List      There are no discharge medications for this visit.         Final diagnoses:   Alcohol intoxication, uncomplicated (H)   I, Mary Garcia, am serving as a trained medical scribe to document services personally performed by Tori Pa MD, based on the provider's statements to me.   I, Tori Pa MD, was physically present and have reviewed and verified the accuracy of this note documented by Mary Garcia.    7/15/2019   [facility], EMERGENCY DEPARTMENT     Tori Pa MD  07/16/19 0222

## 2019-07-15 NOTE — ED AVS SNAPSHOT
[facility], [place], Emergency Department  [address]  [place] [address]  Phone:  [phone]  Fax:  [phone]                                    Nikhil Rios   MRN: [id number]    Department:  [facility], Emergency Department   Date of Visit:  7/15/2019           After Visit Summary Signature Page    I have received my discharge instructions, and my questions have been answered. I have discussed any challenges I see with this plan with the nurse or doctor.    ..........................................................................................................................................  Patient/Patient Representative Signature      ..........................................................................................................................................  Patient Representative Print Name and Relationship to Patient    ..................................................               ................................................  Date                                   Time    ..........................................................................................................................................  Reviewed by Signature/Title    ...................................................              ..............................................  Date                                               Time          22EPIC Rev 08/18

## 2019-07-15 NOTE — ED NOTES
Pt stated to the providers about feeling not right recently and had been started on gabapentin.  It was decided that the Pt needs a cardiac workup and PT moved to room 20.  Another RN is going to start the PT IV and Pt report given to SAMUEL GUO who will be taking over the PT care.

## 2019-07-16 PROBLEM — Z92.89 S/P ALCOHOL DETOXIFICATION: Status: ACTIVE | Noted: 2019-07-16

## 2019-07-16 LAB
ALBUMIN UR-MCNC: 10 MG/DL
AMPHETAMINES UR QL SCN: NEGATIVE
APPEARANCE UR: CLEAR
BACTERIA #/AREA URNS HPF: ABNORMAL /HPF
BARBITURATES UR QL: NEGATIVE
BENZODIAZ UR QL: POSITIVE
BILIRUB UR QL STRIP: NEGATIVE
CANNABINOIDS UR QL SCN: NEGATIVE
COCAINE UR QL: NEGATIVE
COLOR UR AUTO: YELLOW
ETHANOL UR QL SCN: POSITIVE
GLUCOSE UR STRIP-MCNC: NEGATIVE MG/DL
HGB UR QL STRIP: NEGATIVE
HYALINE CASTS #/AREA URNS LPF: 1 /LPF (ref 0–2)
INTERPRETATION ECG - MUSE: NORMAL
KETONES UR STRIP-MCNC: NEGATIVE MG/DL
LEUKOCYTE ESTERASE UR QL STRIP: NEGATIVE
MUCOUS THREADS #/AREA URNS LPF: PRESENT /LPF
NITRATE UR QL: NEGATIVE
OPIATES UR QL SCN: NEGATIVE
PH UR STRIP: 6 PH (ref 5–7)
RBC #/AREA URNS AUTO: <1 /HPF (ref 0–2)
SOURCE: ABNORMAL
SP GR UR STRIP: 1.01 (ref 1–1.03)
SQUAMOUS #/AREA URNS AUTO: <1 /HPF (ref 0–1)
UROBILINOGEN UR STRIP-MCNC: NORMAL MG/DL (ref 0–2)
WBC #/AREA URNS AUTO: 2 /HPF (ref 0–5)

## 2019-07-16 PROCEDURE — 99207 ZZC NO BILLABLE SERVICE THIS VISIT: CPT | Performed by: PHYSICIAN ASSISTANT

## 2019-07-16 PROCEDURE — 25000132 ZZH RX MED GY IP 250 OP 250 PS 637: Performed by: NURSE PRACTITIONER

## 2019-07-16 PROCEDURE — 25000132 ZZH RX MED GY IP 250 OP 250 PS 637: Performed by: PSYCHIATRY & NEUROLOGY

## 2019-07-16 PROCEDURE — 12800008 ZZH R&B CD ADULT

## 2019-07-16 PROCEDURE — 25000132 ZZH RX MED GY IP 250 OP 250 PS 637: Performed by: PHYSICIAN ASSISTANT

## 2019-07-16 PROCEDURE — 99207 ZZC CDG-MDM COMPONENT: MEETS MODERATE - DOWN CODED: CPT | Performed by: PSYCHIATRY & NEUROLOGY

## 2019-07-16 PROCEDURE — 99222 1ST HOSP IP/OBS MODERATE 55: CPT | Mod: AI | Performed by: PSYCHIATRY & NEUROLOGY

## 2019-07-16 PROCEDURE — 80320 DRUG SCREEN QUANTALCOHOLS: CPT | Performed by: EMERGENCY MEDICINE

## 2019-07-16 PROCEDURE — 80307 DRUG TEST PRSMV CHEM ANLYZR: CPT | Performed by: EMERGENCY MEDICINE

## 2019-07-16 PROCEDURE — 81001 URINALYSIS AUTO W/SCOPE: CPT | Performed by: EMERGENCY MEDICINE

## 2019-07-16 RX ORDER — CLONIDINE HYDROCHLORIDE 0.1 MG/1
0.1 TABLET ORAL 2 TIMES DAILY
Status: DISCONTINUED | OUTPATIENT
Start: 2019-07-16 | End: 2019-07-19 | Stop reason: HOSPADM

## 2019-07-16 RX ORDER — ALUMINA, MAGNESIA, AND SIMETHICONE 2400; 2400; 240 MG/30ML; MG/30ML; MG/30ML
30 SUSPENSION ORAL EVERY 4 HOURS PRN
Status: DISCONTINUED | OUTPATIENT
Start: 2019-07-16 | End: 2019-07-19 | Stop reason: HOSPADM

## 2019-07-16 RX ORDER — BISACODYL 10 MG
10 SUPPOSITORY, RECTAL RECTAL DAILY PRN
Status: DISCONTINUED | OUTPATIENT
Start: 2019-07-16 | End: 2019-07-19 | Stop reason: HOSPADM

## 2019-07-16 RX ORDER — ACETAMINOPHEN 325 MG/1
650 TABLET ORAL EVERY 4 HOURS PRN
Status: DISCONTINUED | OUTPATIENT
Start: 2019-07-16 | End: 2019-07-19 | Stop reason: HOSPADM

## 2019-07-16 RX ORDER — ATENOLOL 50 MG/1
50 TABLET ORAL DAILY PRN
Status: DISCONTINUED | OUTPATIENT
Start: 2019-07-16 | End: 2019-07-19 | Stop reason: HOSPADM

## 2019-07-16 RX ORDER — DIAZEPAM 5 MG
5-20 TABLET ORAL EVERY 30 MIN PRN
Status: DISCONTINUED | OUTPATIENT
Start: 2019-07-16 | End: 2019-07-19 | Stop reason: HOSPADM

## 2019-07-16 RX ORDER — ASPIRIN 81 MG/1
81 TABLET, CHEWABLE ORAL DAILY
Status: DISCONTINUED | OUTPATIENT
Start: 2019-07-16 | End: 2019-07-19 | Stop reason: HOSPADM

## 2019-07-16 RX ORDER — MULTIPLE VITAMINS W/ MINERALS TAB 9MG-400MCG
1 TAB ORAL DAILY
Status: DISCONTINUED | OUTPATIENT
Start: 2019-07-16 | End: 2019-07-16

## 2019-07-16 RX ORDER — LANOLIN ALCOHOL/MO/W.PET/CERES
100 CREAM (GRAM) TOPICAL DAILY
Status: DISCONTINUED | OUTPATIENT
Start: 2019-07-16 | End: 2019-07-19 | Stop reason: HOSPADM

## 2019-07-16 RX ORDER — TRAZODONE HYDROCHLORIDE 50 MG/1
50 TABLET, FILM COATED ORAL
Status: DISCONTINUED | OUTPATIENT
Start: 2019-07-16 | End: 2019-07-19 | Stop reason: HOSPADM

## 2019-07-16 RX ORDER — CLONIDINE HYDROCHLORIDE 0.1 MG/1
0.1 TABLET ORAL DAILY PRN
Status: DISCONTINUED | OUTPATIENT
Start: 2019-07-16 | End: 2019-07-19 | Stop reason: HOSPADM

## 2019-07-16 RX ORDER — NEOMYCIN SULFATE, POLYMYXIN B SULFATE, AND DEXAMETHASONE 3.5; 10000; 1 MG/G; [USP'U]/G; MG/G
0.5 OINTMENT OPHTHALMIC AT BEDTIME
Status: DISCONTINUED | OUTPATIENT
Start: 2019-07-16 | End: 2019-07-16 | Stop reason: CLARIF

## 2019-07-16 RX ORDER — MULTIPLE VITAMINS W/ MINERALS TAB 9MG-400MCG
1 TAB ORAL DAILY
Status: DISCONTINUED | OUTPATIENT
Start: 2019-07-16 | End: 2019-07-19 | Stop reason: HOSPADM

## 2019-07-16 RX ORDER — HYDROXYZINE HYDROCHLORIDE 25 MG/1
25 TABLET, FILM COATED ORAL 3 TIMES DAILY PRN
Status: ON HOLD | COMMUNITY
End: 2019-07-18

## 2019-07-16 RX ORDER — CLONIDINE HYDROCHLORIDE 0.1 MG/1
0.1 TABLET ORAL 2 TIMES DAILY PRN
Status: DISCONTINUED | OUTPATIENT
Start: 2019-07-16 | End: 2019-07-16

## 2019-07-16 RX ORDER — AMLODIPINE BESYLATE 5 MG/1
5 TABLET ORAL DAILY
Status: DISCONTINUED | OUTPATIENT
Start: 2019-07-16 | End: 2019-07-19 | Stop reason: HOSPADM

## 2019-07-16 RX ORDER — HYDROXYZINE HYDROCHLORIDE 25 MG/1
25 TABLET, FILM COATED ORAL EVERY 4 HOURS PRN
Status: DISCONTINUED | OUTPATIENT
Start: 2019-07-16 | End: 2019-07-19 | Stop reason: HOSPADM

## 2019-07-16 RX ORDER — FOLIC ACID 1 MG/1
1 TABLET ORAL DAILY
Status: DISCONTINUED | OUTPATIENT
Start: 2019-07-16 | End: 2019-07-19 | Stop reason: HOSPADM

## 2019-07-16 RX ORDER — LANOLIN ALCOHOL/MO/W.PET/CERES
100 CREAM (GRAM) TOPICAL DAILY
Status: DISCONTINUED | OUTPATIENT
Start: 2019-07-16 | End: 2019-07-16

## 2019-07-16 RX ADMIN — THIAMINE HCL TAB 100 MG 100 MG: 100 TAB at 08:54

## 2019-07-16 RX ADMIN — DIAZEPAM 10 MG: 5 TABLET ORAL at 11:07

## 2019-07-16 RX ADMIN — KETOTIFEN FUMARATE 1 DROP: 0.35 SOLUTION/ DROPS OPHTHALMIC at 20:23

## 2019-07-16 RX ADMIN — HYDROXYZINE HYDROCHLORIDE 25 MG: 25 TABLET ORAL at 20:23

## 2019-07-16 RX ADMIN — DIAZEPAM 10 MG: 5 TABLET ORAL at 09:59

## 2019-07-16 RX ADMIN — CLONIDINE HYDROCHLORIDE 0.1 MG: 0.1 TABLET ORAL at 20:23

## 2019-07-16 RX ADMIN — FOLIC ACID 1 MG: 1 TABLET ORAL at 08:54

## 2019-07-16 RX ADMIN — DIAZEPAM 10 MG: 5 TABLET ORAL at 03:44

## 2019-07-16 RX ADMIN — DIAZEPAM 10 MG: 5 TABLET ORAL at 20:23

## 2019-07-16 RX ADMIN — DIAZEPAM 10 MG: 5 TABLET ORAL at 12:17

## 2019-07-16 RX ADMIN — DIAZEPAM 10 MG: 5 TABLET ORAL at 18:24

## 2019-07-16 RX ADMIN — MULTIPLE VITAMINS W/ MINERALS TAB 1 TABLET: TAB at 08:54

## 2019-07-16 RX ADMIN — DIAZEPAM 10 MG: 5 TABLET ORAL at 08:54

## 2019-07-16 RX ADMIN — ASPIRIN 81 MG CHEWABLE TABLET 81 MG: 81 TABLET CHEWABLE at 09:59

## 2019-07-16 RX ADMIN — DIAZEPAM 10 MG: 5 TABLET ORAL at 14:45

## 2019-07-16 RX ADMIN — DIAZEPAM 10 MG: 5 TABLET ORAL at 05:46

## 2019-07-16 RX ADMIN — AMLODIPINE BESYLATE 5 MG: 5 TABLET ORAL at 09:59

## 2019-07-16 RX ADMIN — TRAZODONE HYDROCHLORIDE 50 MG: 50 TABLET ORAL at 03:44

## 2019-07-16 ASSESSMENT — ACTIVITIES OF DAILY LIVING (ADL)
TOILETING: 0-->INDEPENDENT
DRESS: 0-->INDEPENDENT
HYGIENE/GROOMING: INDEPENDENT
RETIRED_EATING: 0-->INDEPENDENT
SWALLOWING: 0-->SWALLOWS FOODS/LIQUIDS WITHOUT DIFFICULTY
DRESS: INDEPENDENT
FALL_HISTORY_WITHIN_LAST_SIX_MONTHS: NO
HYGIENE/GROOMING: INDEPENDENT
BATHING: 0-->INDEPENDENT
RETIRED_COMMUNICATION: 0-->UNDERSTANDS/COMMUNICATES WITHOUT DIFFICULTY
LAUNDRY: WITH SUPERVISION
COGNITION: 0 - NO COGNITION ISSUES REPORTED
ORAL_HYGIENE: INDEPENDENT
DRESS: INDEPENDENT
TRANSFERRING: 0-->INDEPENDENT
HYGIENE/GROOMING: INDEPENDENT
ORAL_HYGIENE: INDEPENDENT
AMBULATION: 0-->INDEPENDENT
LAUNDRY: WITH SUPERVISION

## 2019-07-16 ASSESSMENT — MIFFLIN-ST. JEOR: SCORE: 1975.04

## 2019-07-16 NOTE — PLAN OF CARE
S: Pt.was admitted from [facility] adult ED for alcohol detox. His alcohol breath test was .257.    B: Pt.stated he started as a social drinker before his drinking became an addiction. He identified recent stressors as his girlfriend being deported and loss of employment.     A: Elevated AST & ALT. Appears to be in active alcohol withdrawal. Started on MSSA. 15 minutes safety checks initiated. Denied SI/SIB/hallucinations. Denied any medical concerns at this time.     R: Appears alert and oriented. Cooperative with admission search and unit orientation. Will continue to monitor.

## 2019-07-16 NOTE — DISCHARGE INSTRUCTIONS
Behavioral Discharge Planning and Instructions  THANK YOU FOR CHOOSING THE [address]  [phone]    Summary: You were admitted to [facility] on 7/15/19 for detoxification from alcohol.  A medical exam was performed that included lab work. You have met with a  and opted to enter [facility].  Please take care and make your recovery a priority, Nikhil!      Main Diagnosis: Per Dr. Ava Lew MD;  303.90 (F10.20) Alcohol Use Disorder Severe      Major Treatments, Procedures and Findings:  You were detoxed from alcohol with the Modified Selective Severity Protocol using Valium. You have met with a  to develop a treatment plan for discharge.  You have had labs drawn and a copy of those labs will be sent home with you.  Please bring your lab results with to your follow up doctor appointment.      Continue to monitor your blood pressure. Please review handouts provided on hypertension    Symptoms to Report:  If you experience more anxiety, confusion, sleeplessness, deep sadness or thoughts of suicide, notify your treatment team or notify your primary care physician. IF ANY OF THE SYMPTOMS YOU ARE EXPERIENCING ARE A MEDICAL EMERGENCY CALL 911 IMMEDIATELY.     Lifestyle Adjustment: Health Action Plan:  1.Create a daily schedule  2. Eat Healthy  3. Plan Enjoyable Sober Activities  4. Use Problem Solving Skills and Deal with Issues as they Arise.   5. Be Physically Active  6. Take your medications as prescribed  7. Get enough restful sleep  8. Practice Relaxation  9. Spend time with Supportive People  10. No use of alcohol, illegal drugs or addictive medications other than what is currently prescribed.   11.AA, NA Sponsor are excellent resources for support      Keeping hands clean is one of the most important steps we can take to avoid getting sick and spreading germs to others.  Please wash your hands frequently.       Primary Provider:    [facility] -  Azuc[facility]bs  Appointment 8- @ 10:00 am     Medical clinic in [place], [place]   Address: [address], [place] [address]   Phone: [phone]    Disposition: [facility]      DISCHARGE RESOURCES:  -SMART Recovery - self management for addiction recovery:  www.smartrecovery.org    -Pathways ~ A Health Crisis Resource & Support Center: 448.198.2643.  -[facility] [phone]   -[facility] Behavioral Intake 645-591-0821 or 010-363-8379.  -Suicide Awareness Voices of Education (SAVE) (www.save.org): 115-055-YBDM (1090)  -National Suicide Prevention Line (www.mentalhealthmn.org): 840-908-TSWS (4801)  -National Gabriels on Mental Illness (www.[place].veronica.org): 777.566.5550 or 205-817-1414.  -Zwcj6mhfo: text the word LIFE to 80391 for immediate support and crisis intervention  -Mental Health Consumer/Survivor Network of [place] (www.mhcsn.net): 368.812.1109 or 453-708-9912  -Mental Health Association of [place] (www.mentalhealth.org): 661.496.2227 or 037-973-4969     -Substance Abuse and Mental Health Services (www.samhsa.gov)  -Harm Reduction Coalition (www. Harmreduction.org)  -www.prescribetoprevent.org or http://prescribetoprevent.org/video  -Poison control 3-817-897-6737   **[place] Opioid Prevention Coalition: www.opioidcoalition.org    Sober Support Group Information:  AA/NA & Sponsor/Support  -Alcoholics Anonymous (www.alcoholics-anonymous.org): for local information 24 hours/day  -AA Intergroup service office in [place] (http://www.aastpaul.org/) 999.190.7448  -AA Intergroup service office in [facility]: 752.650.7760. (http://www.aamin[place].org/)  -Narcotics Anonymous (www.nam[place].org) (858) 355-7440   **Sober Fun Activities: www.sober"[employer]activities.OluKai.[employer]//[place]    [place] Recovery Connection (MRC)  [facility] connects people seeking recovery to resources that help foster and sustain long-term recovery.  Whether you are seeking  resources for treatment, transportation, housing, job training, education, health care or other pathways to recovery, [facility] is a great place to start.    Phone: 508.707.2988.  www.[place]Schedule Savvy.Unsilo (Great listing of all types of recovery and non-recovery related resources)      General Medication Instructions:   See your medication sheet(s) for instructions.   Take all medicines as directed.  Make no changes unless your doctor suggests them.   Go to all your doctor visits.  Be sure to have all your required lab tests. This way, your medicines can be refilled on time.  Do not use any drugs not prescribed by your provider.  AA/NA and Sponsors are excellent resources for support  Avoid alcohol.    Any follow up concerns:  Nursing questions call the Unit -[facility] [phone]  Medical Record call [phone]  Outpatient Behavioral Intake call [phone]  LP+ Wait List/Bed Availability call [phone]    The entire treatment team has appreciated the opportunity to work with you Nikhil.  We wish you the best in the future and with your lifelong recovery goals. Please bring this discharge folder with you to all follow up appointments.  It contains your lab results, diagnosis, medication list and discharge recommendations.    THANK YOU FOR CHOOSING THE [facility]

## 2019-07-16 NOTE — ED NOTES
ED to Behavioral Floor Handoff    SITUATION  Nikhil Rios is a 33 year old male who speaks English and lives in a home alone The patient arrived in the ED by private car from home with a complaint of Alcohol Intoxication (Detox from alcohol, last drink just prior to arrival, intoxicated.)  .The patient's current symptoms started/worsened 2 week(s) ago and during this time the symptoms have increased.   In the ED, pt was diagnosed with   Final diagnoses:   Alcohol intoxication, uncomplicated (H)        Initial vitals were: BP: 135/78  Pulse: 92  Heart Rate: 92  Temp: 97.9  F (36.6  C)  Resp: 18  Weight: 99.8 kg (220 lb)  SpO2: 93 %   --------  Is the patient diabetic? No   If yes, last blood glucose? --     If yes, was this treated in the ED? --  --------  Is the patient inebriated (ETOH) Yes or Impaired on other substances? No  MSSA done? Yes  Last MSSA score:3   Were withdrawal symptoms treated? No  Does the patient have a seizure history? No. If yes, date of most recent seizure--  --------  Is the patient patient experiencing suicidal ideation? denies current or recent suicidal ideation     Homicidal ideation? denies current or recent homicidal ideation or behaviors.    Self-injurious behavior/urges? denies current or recent self injurious behavior or ideation.  ------  Was pt aggressive in the ED No  Was a code called No  Is the pt now cooperative? Yes  -------  Meds given in ED: Medications - No data to display   Family present during ED course? Yes  Family currently present? No    BACKGROUND  Does the patient have a cognitive impairment or developmental disability? No  Allergies:   Allergies   Allergen Reactions     Pollen Extract Rash     grass tree pollen   .   Social demographics are   Social History     Socioeconomic History     Marital status: Single     Spouse name: None     Number of children: None     Years of education: None     Highest education level: None   Occupational History     None   Social  Needs     Financial resource strain: None     Food insecurity:     Worry: None     Inability: None     Transportation needs:     Medical: None     Non-medical: None   Tobacco Use     Smoking status: Current Some Day Smoker     Packs/day: 0.25     Smokeless tobacco: Never Used   Substance and Sexual Activity     Alcohol use: Yes     Comment: 750ml daily of vodka     Drug use: No     Sexual activity: None   Lifestyle     Physical activity:     Days per week: None     Minutes per session: None     Stress: None   Relationships     Social connections:     Talks on phone: None     Gets together: None     Attends Quaker service: None     Active member of club or organization: None     Attends meetings of clubs or organizations: None     Relationship status: None     Intimate partner violence:     Fear of current or ex partner: None     Emotionally abused: None     Physically abused: None     Forced sexual activity: None   Other Topics Concern     None   Social History Narrative     None        ASSESSMENT  Labs results   Labs Ordered and Resulted from Time of ED Arrival Up to the Time of Departure from the ED   COMPREHENSIVE METABOLIC PANEL - Abnormal; Notable for the following components:       Result Value    Chloride 110 (*)     Urea Nitrogen 5 (*)      (*)     AST 86 (*)     All other components within normal limits   DRUG ABUSE SCREEN 6 CHEM DEP URINE ([facility]) - Abnormal; Notable for the following components:    Benzodiazepine Qual Urine Positive (*)     Ethanol Qual Urine Positive (*)     All other components within normal limits   ROUTINE UA WITH MICROSCOPIC - Abnormal; Notable for the following components:    Protein Albumin Urine 10 (*)     Bacteria Urine Few (*)     Mucous Urine Present (*)     All other components within normal limits   ALCOHOL BREATH TEST POCT - Abnormal; Notable for the following components:    Alcohol Breath Test 0.306 (*)     All other components within normal limits   ALCOHOL BREATH  TEST POCT - Abnormal; Notable for the following components:    Alcohol Breath Test 0.257 (*)     All other components within normal limits   CBC WITH PLATELETS DIFFERENTIAL   TROPONIN I   MAGNESIUM   LIPASE      Imaging Studies: No results found for this or any previous visit (from the past 24 hour(s)).   Most recent vital signs BP (!) 151/94   Pulse 89   Temp 98.2  F (36.8  C) (Oral)   Resp 16   Wt 99.8 kg (220 lb)   SpO2 98%   BMI 30.68 kg/m     Abnormal labs/tests/findings requiring intervention:---   Pain control: good  Nausea control: pt had none    RECOMMENDATION  Are any infection precautions needed (MRSA, VRE, etc.)? No If yes, what infection? --  ---  Does the patient have mobility issues? independently. If yes, what device does the pt use? ---  ---  Is patient on 72 hour hold or commitment? No If on 72 hour hold, have hold and rights been given to patient? No  Are admitting orders written if after 10 p.m. ?N/A  Tasks needing to be completed:---     Amy Fuentes   [facility]-- [id number] [place] [facility]   3-2016 [facility] ED
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
## 2019-07-16 NOTE — PHARMACY-ADMISSION MEDICATION HISTORY
Admission medication history for the July 16, 2019 admission is complete.     Interview Sources:  Patient, dispense report    Reliability of Source: Good    Medication Adherence: Poor (see additional information below)    [facility]    Changes made to PTA medication list (reason)  Added: n/a  Deleted:     Gabapentin 300 mg cap - 1 tid, per pt report not taking    Multivitamin tab - 1 daily, per pt report not taking    Hypromellose-dextran 0.1-0.3% opht soln - 1 drop ou q1h prn dry eyes, per pt report not using    Neomycin-polymyxin-dexamethasone 3.5-61334-7.1 opht oint - 0.5 inch strip ou at bedtime, per pt report not using  Changed:     Hydroxyzine 25 mg tablet - clarified prn indication to itching and allergies per pt report    Ketotifen 0.025% opht soln from 1 drop ou bid to 1 drop daily prn itching    Additional medication history information:   Pt reports that he was previously not on any medications and then he was suddenly prescribed many and he tried them for a few days before stopping most of them. Pt reports that he is only taking gabapentin (prescribed 7/7/19 #12 for 5 DS, he reports having a couple left), hydroxyzine prn, and ketotifen eyedrops prn.    Prior to Admission Medication List:  Prior to Admission medications    Medication Sig Last Dose Taking? Auth Provider   gabapentin (NEURONTIN) 400 MG capsule Take 1 capsule (400 mg) by mouth 3 times daily Take 400 mg by mouth tid for 3 days, then take 400 mg by mouth bid for 1 day, then take 400 mg by mouth once daily for 1 day. Past Week Yes Génesis Le MD   hydrOXYzine (ATARAX) 25 MG tablet Take 25 mg by mouth 3 times daily as needed for itching or other (allergies)  Past Month Yes Unknown, Entered By History   ketotifen (ZADITOR/REFRESH ANTI-ITCH) 0.025 % ophthalmic solution Place 1 drop into both eyes daily as needed for itching or dry eyes 7/15/2019 Yes Unknown, Entered By History    amLODIPine (NORVASC) 5 MG tablet Take 1 tablet (5 mg) by mouth daily More than a month  Aayush Galeano MD   aspirin (ASA) 81 MG chewable tablet Take 1 tablet (81 mg) by mouth daily More than a month  Aayush Galeano MD   cloNIDine (CATAPRES) 0.1 MG tablet Take 1 tablet (0.1 mg) by mouth 2 times daily More than a month  Aayush Galeano MD   folic acid (FOLVITE) 1 MG tablet Take 1 tablet (1 mg) by mouth daily More than a month  Aayush Galeano MD       Time spent: 30 minutes    Medication history completed by:   Alyssa ArriagaD IV Student
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
## 2019-07-16 NOTE — CONSULTS
Circumstances of recent discharge and re-admittance noted. Please refer to recent medicine H&P by Dr. Welsh in charting dated 06/28 and discharge summary by Dr Barry dated 06/30, which was reviewed by this writer and is up to date. Please call the on-call [facility] for any f/u medical concerns if they arise.     In short, Nikhil Rios is a 33 year old male with a past medical history of alcohol abuse, detox, HTN who is admitted to station 3A for detox from alcohol. Reviewed past admissions and med recs.     Diagnoses:  #Alcohol abuse, detoxification: In ED, Utox positive for ethanol, breath test 0.306. Consumes 750 ml of vodka per day per ED notes. Recent detox 06/28-06/30 on medical unit. Denies h/o DTs or WD seizures. VS w/ elevated BP (147/104), normal- slightly elevated HR, afebrile.   -Agree with MSSA w/ valium  -Agree with MVi, folate, thiamine  -If significant HTN w/ detox (SBP >180 and/or DBP >110), Recommend Clonidine 0.1 mg PO daily PRN for BP >180/110 (ordered for you)  #HTN: Diagnosed at prior detox visits. At some point prescribed amlodipine 5 mg qday, clonidine 0.1 mg BID, but does not appear compliant.   -Continue amlodipine and scheduled clonidine  -Notify medicine if BP persistently >140/90 once out of detox (patient care order placed)  #Transaminitis: ALT of 133, AST of 86, improved from 1 week ago. US from 05/2019 w/ hepatomegaly with steatosis, biliary sludge w/o abnormality.   -Repeat w/ PCP w/ in 4 weeks of discharge to ensure continued improvement (order placed in navigator)    Chanell Handy PA-C

## 2019-07-16 NOTE — PROGRESS NOTES
07/16/19 0246   Patient Belongings   Did you bring any home meds/supplements to the hospital?  No   Patient Belongings locker;sent to security per site process   Patient Belongings Put in Hospital Secure Location (Security or Locker, etc.) clothing;cell phone/electronics;purse/wallet;other (see comments)   Belongings Search Yes   Clothing Search Yes   Second Staff Cedrick FISHER and Desmond CHOE   *Valuables sent to security in envelope #[id number]:  -[place], [employer] Visa debit card, [employer] card, insurance cards x 4 and cash = $1.00    Items placed in blue box in medication room:  -Black wallet, black iPhone in black case and phone  w/ wall port    Items placed in storage bin in even side storage room on 3AW:  -Black t-shirt, black short w/ strings, black sandals and keys x 7 on ring    A               Admission:  I am responsible for any personal items that are not sent to the safe or pharmacy.  [facility] is not responsible for loss, theft or damage of any property in my possession.    Signature:  _________________________________ Date: _______  Time: _____                                              Staff Signature:  ____________________________ Date: ________  Time: _____      2nd Staff person, if patient is unable/unwilling to sign:    Signature: ________________________________ Date: ________  Time: _____     Discharge:  [facility] has returned all of my personal belongings:    Signature: _________________________________ Date: ________  Time: _____                                          Staff Signature:  ____________________________ Date: ________  Time: _____

## 2019-07-16 NOTE — PLAN OF CARE
Behavioral Team Discussion: (7/16/2019)    Continued Stay Criteria/Rationale: Patient admitted for alcohol Use Disorder.  Plan: The following services will be provided to the patient; psychiatric assessment, medication management, therapeutic milieu, individual and group support, and skills groups.   Participants: 3A Provider: Dr. Ava Lew MD; 3A RN's: Donna Da Silva, RN; 3A CM's: Shelley Catalan .  Summary/Recommendation: Providers will assess today for treatment recommendations, discharge planning, and aftercare plans. CM will meet with pt for discharge planning.   Medical/Physical: HTN  Precautions:   Behavioral Orders   Procedures     Code 1 - Restrict to Unit     Routine Programming     As clinically indicated     Status 15     Every 15 minutes.     Withdrawal precautions     Rationale for change in precautions or plan: N/A  Progress: No Change.

## 2019-07-16 NOTE — PLAN OF CARE
Problem: Substance Withdrawal  Goal: Substance Withdrawal  Description  Signs and symptoms of listed problems will be absent or manageable.    1. Detoxification from Alcohol using the [facility] valium protocol  2. Patient will complete assessment paperwork  3.  Patient will meet with  to discuss treatment and discharge planning  4. Physical examination and Lab evaluation by MD  5. Patients oral intake will be greater than 75 % of meals to meet estimated needs  6. Adequate fluid intake     7/16/2019 0318 by Immanuel Horta RN  Outcome: No Change  Pt being monitored for alcohol withdrawal. Pt has significant hand and tongue tremors. Pt alert and oriented. Reports some anxiety. Denies hallucinations. VS elevated. Pt was started back on his antihypertensive medications. Pt states he prior to admission he was not taking his home medications.   Pt medicated x 4 with 10 mg of valium this shift so far. No oversedation noted.   Pt appetite is good. Denies SI. Pt states he was to start a outpatient CD program.   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   Discharge plans pending.
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
## 2019-07-17 LAB
CHOLEST SERPL-MCNC: 251 MG/DL
GGT SERPL-CCNC: 490 U/L (ref 0–75)
HDLC SERPL-MCNC: 53 MG/DL
LDLC SERPL CALC-MCNC: 154 MG/DL
NONHDLC SERPL-MCNC: 198 MG/DL
TRIGL SERPL-MCNC: 220 MG/DL
TSH SERPL DL<=0.005 MIU/L-ACNC: 2.58 MU/L (ref 0.4–4)
VIT B12 SERPL-MCNC: 504 PG/ML (ref 193–986)

## 2019-07-17 PROCEDURE — 80061 LIPID PANEL: CPT | Performed by: NURSE PRACTITIONER

## 2019-07-17 PROCEDURE — 82607 VITAMIN B-12: CPT | Performed by: NURSE PRACTITIONER

## 2019-07-17 PROCEDURE — 82977 ASSAY OF GGT: CPT | Performed by: NURSE PRACTITIONER

## 2019-07-17 PROCEDURE — 25000132 ZZH RX MED GY IP 250 OP 250 PS 637: Performed by: NURSE PRACTITIONER

## 2019-07-17 PROCEDURE — 84443 ASSAY THYROID STIM HORMONE: CPT | Performed by: NURSE PRACTITIONER

## 2019-07-17 PROCEDURE — 25000132 ZZH RX MED GY IP 250 OP 250 PS 637: Performed by: PSYCHIATRY & NEUROLOGY

## 2019-07-17 PROCEDURE — 36415 COLL VENOUS BLD VENIPUNCTURE: CPT | Performed by: NURSE PRACTITIONER

## 2019-07-17 PROCEDURE — 25000132 ZZH RX MED GY IP 250 OP 250 PS 637: Performed by: PHYSICIAN ASSISTANT

## 2019-07-17 PROCEDURE — 12800008 ZZH R&B CD ADULT

## 2019-07-17 RX ADMIN — THIAMINE HCL TAB 100 MG 100 MG: 100 TAB at 08:34

## 2019-07-17 RX ADMIN — DIAZEPAM 10 MG: 5 TABLET ORAL at 12:11

## 2019-07-17 RX ADMIN — DIAZEPAM 10 MG: 5 TABLET ORAL at 08:33

## 2019-07-17 RX ADMIN — MULTIPLE VITAMINS W/ MINERALS TAB 1 TABLET: TAB at 08:33

## 2019-07-17 RX ADMIN — FOLIC ACID 1 MG: 1 TABLET ORAL at 08:34

## 2019-07-17 RX ADMIN — ASPIRIN 81 MG CHEWABLE TABLET 81 MG: 81 TABLET CHEWABLE at 08:33

## 2019-07-17 RX ADMIN — HYDROXYZINE HYDROCHLORIDE 25 MG: 25 TABLET ORAL at 22:08

## 2019-07-17 RX ADMIN — DIAZEPAM 10 MG: 5 TABLET ORAL at 00:45

## 2019-07-17 RX ADMIN — CLONIDINE HYDROCHLORIDE 0.1 MG: 0.1 TABLET ORAL at 20:13

## 2019-07-17 RX ADMIN — DIAZEPAM 10 MG: 5 TABLET ORAL at 04:12

## 2019-07-17 RX ADMIN — CLONIDINE HYDROCHLORIDE 0.1 MG: 0.1 TABLET ORAL at 08:33

## 2019-07-17 RX ADMIN — AMLODIPINE BESYLATE 5 MG: 5 TABLET ORAL at 08:34

## 2019-07-17 RX ADMIN — KETOTIFEN FUMARATE 1 DROP: 0.35 SOLUTION/ DROPS OPHTHALMIC at 22:08

## 2019-07-17 ASSESSMENT — ACTIVITIES OF DAILY LIVING (ADL)
LAUNDRY: WITH SUPERVISION
HYGIENE/GROOMING: INDEPENDENT
ORAL_HYGIENE: INDEPENDENT
DRESS: INDEPENDENT
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
## 2019-07-17 NOTE — PLAN OF CARE
"  Problem: Substance Withdrawal  Goal: Substance Withdrawal  Description  Signs and symptoms of listed problems will be absent or manageable.    1. Detoxification from Alcohol using the [facility] valium protocol  2. Patient will complete assessment paperwork  3.  Patient will meet with  to discuss treatment and discharge planning  4. Physical examination and Lab evaluation by MD  5. Patients oral intake will be greater than 75 % of meals to meet estimated needs  6. Adequate fluid intake     Outcome: No Change  Pt medicated x 2 with valium 10 mg this shift. Pt has received a total of 130 mg of valium since admission. No oversedation.   Pt out on unit. Pt anxious to get discharged and go to [facility]. States he gets \"claustrophobic\" being locked up. Pt denies SI.   Pt declined offer of AA big book. BP is lower today.      "
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
## 2019-07-18 PROCEDURE — 25000132 ZZH RX MED GY IP 250 OP 250 PS 637: Performed by: PHYSICIAN ASSISTANT

## 2019-07-18 PROCEDURE — H2035 A/D TX PROGRAM, PER HOUR: HCPCS

## 2019-07-18 PROCEDURE — 25000132 ZZH RX MED GY IP 250 OP 250 PS 637: Performed by: PSYCHIATRY & NEUROLOGY

## 2019-07-18 PROCEDURE — H0001 ALCOHOL AND/OR DRUG ASSESS: HCPCS

## 2019-07-18 PROCEDURE — 12800008 ZZH R&B CD ADULT

## 2019-07-18 PROCEDURE — 99231 SBSQ HOSP IP/OBS SF/LOW 25: CPT | Performed by: PSYCHIATRY & NEUROLOGY

## 2019-07-18 PROCEDURE — 25000132 ZZH RX MED GY IP 250 OP 250 PS 637: Performed by: NURSE PRACTITIONER

## 2019-07-18 RX ORDER — ASPIRIN 81 MG/1
81 TABLET, CHEWABLE ORAL DAILY
Qty: 30 TABLET | Refills: 0 | Status: ON HOLD | OUTPATIENT
Start: 2019-07-18 | End: 2020-01-17

## 2019-07-18 RX ORDER — HYDROXYZINE HYDROCHLORIDE 25 MG/1
25 TABLET, FILM COATED ORAL 3 TIMES DAILY PRN
Qty: 30 TABLET | Refills: 0 | Status: SHIPPED | OUTPATIENT
Start: 2019-07-18 | End: 2020-01-07 | Stop reason: DRUGHIGH

## 2019-07-18 RX ORDER — CLONIDINE HYDROCHLORIDE 0.1 MG/1
0.1 TABLET ORAL 2 TIMES DAILY
Qty: 60 TABLET | Refills: 0 | Status: ON HOLD | OUTPATIENT
Start: 2019-07-18 | End: 2020-01-17

## 2019-07-18 RX ORDER — AMLODIPINE BESYLATE 5 MG/1
5 TABLET ORAL DAILY
Qty: 30 TABLET | Refills: 0 | Status: ON HOLD | OUTPATIENT
Start: 2019-07-18 | End: 2020-01-02

## 2019-07-18 RX ORDER — MULTIPLE VITAMINS W/ MINERALS TAB 9MG-400MCG
1 TAB ORAL DAILY
Qty: 30 TABLET | Refills: 0 | Status: ON HOLD | OUTPATIENT
Start: 2019-07-19 | End: 2020-01-17

## 2019-07-18 RX ADMIN — HYDROXYZINE HYDROCHLORIDE 25 MG: 25 TABLET ORAL at 22:16

## 2019-07-18 RX ADMIN — AMLODIPINE BESYLATE 5 MG: 5 TABLET ORAL at 08:40

## 2019-07-18 RX ADMIN — THIAMINE HCL TAB 100 MG 100 MG: 100 TAB at 08:41

## 2019-07-18 RX ADMIN — CLONIDINE HYDROCHLORIDE 0.1 MG: 0.1 TABLET ORAL at 08:41

## 2019-07-18 RX ADMIN — CLONIDINE HYDROCHLORIDE 0.1 MG: 0.1 TABLET ORAL at 20:12

## 2019-07-18 RX ADMIN — KETOTIFEN FUMARATE 1 DROP: 0.35 SOLUTION/ DROPS OPHTHALMIC at 22:16

## 2019-07-18 RX ADMIN — MULTIPLE VITAMINS W/ MINERALS TAB 1 TABLET: TAB at 08:41

## 2019-07-18 RX ADMIN — ASPIRIN 81 MG CHEWABLE TABLET 81 MG: 81 TABLET CHEWABLE at 08:41

## 2019-07-18 RX ADMIN — FOLIC ACID 1 MG: 1 TABLET ORAL at 08:41

## 2019-07-18 ASSESSMENT — ACTIVITIES OF DAILY LIVING (ADL)
DRESS: INDEPENDENT
LAUNDRY: WITH SUPERVISION
ORAL_HYGIENE: INDEPENDENT
HYGIENE/GROOMING: INDEPENDENT

## 2019-07-18 NOTE — PROGRESS NOTES
Met with Pt and completed assessment.  Pt will dishcarge to LP tomorrow, 7/19/19.  Clinical faxed to [employer] for funding.  Pt informed, RN informed.

## 2019-07-18 NOTE — PROGRESS NOTES
"[facility], [place]   Psychiatric Progress Note    Interim history   This is a 33 year old male withalcoholuse dx  severe.Pt seen in rounds. Patient's mood is good  Energy Level:MODERATE  Sleep:No concerns, sleeps well through night  Appetite:fair motivation interest improved  Denied any Suicidal/homicidal ideation/plan intent.  Denied any psychosis  No prior suicde attempts  No access to gun    Tolerating meds and has no side effects.              Medications:     Current Facility-Administered Medications   Medication     acetaminophen (TYLENOL) tablet 650 mg     alum & mag hydroxide-simethicone (MYLANTA ES/MAALOX  ES) suspension 30 mL     amLODIPine (NORVASC) tablet 5 mg     aspirin (ASA) chewable tablet 81 mg     atenolol (TENORMIN) tablet 50 mg     bisacodyl (DULCOLAX) Suppository 10 mg     cloNIDine (CATAPRES) tablet 0.1 mg     cloNIDine (CATAPRES) tablet 0.1 mg     diazepam (VALIUM) tablet 5-20 mg     folic acid (FOLVITE) tablet 1 mg     hydrOXYzine (ATARAX) tablet 25 mg     hypromellose-dextran (ARTIFICAL TEARS) 0.1-0.3 % ophthalmic solution 1 drop     ketotifen (ZADITOR/REFRESH ANTI-ITCH) 0.025 % ophthalmic solution 1 drop     magnesium hydroxide (MILK OF MAGNESIA) suspension 30 mL     multivitamin w/minerals (THERA-VIT-M) tablet 1 tablet     traZODone (DESYREL) tablet 50 mg     vitamin B1 (THIAMINE) tablet 100 mg             Allergies:     Allergies   Allergen Reactions     Pollen Extract Rash     grass tree pollen            Psychiatric Examination:   Blood pressure (!) 135/100, pulse 104, temperature 97.5  F (36.4  C), resp. rate 16, height 1.803 m (5' 11\"), weight 99.8 kg (220 lb), SpO2 99 %.  Weight is 220 lbs 0 oz  Body mass index is 30.68 kg/m .    Appearance:  awake, alert and adequately groomed  Attitude:  cooperative  Eye Contact:  good  Mood:  better  Affect:  appropriate and in normal range and mood congruent  Speech:  clear, coherent rate /rhythm are " good  Psychomotor Behavior:  no evidence of tardive dyskinesia, dystonia, or tics and intact station, gait and muscle tone  Throught Process:  logical  Associations:  no loose associations  Thought Content:  no evidence of suicidal ideation or homicidal ideation, no evidence of psychotic thought, no auditory hallucinations present and no visual hallucinations present  Insight:  fair  Judgement:  fair  Oriented to:  time, person, and place  Attention Span and Concentration:  intact  Recent and Remote Memory:  intact  Language fund of knowledge are adequate         Labs:     No results found for: NTBNPI, NTBNP  Lab Results   Component Value Date    WBC 6.5 07/15/2019    HGB 14.6 07/15/2019    HCT 42.9 07/15/2019    MCV 95 07/15/2019     07/15/2019     Lab Results   Component Value Date    TSH 2.58 07/17/2019            Dx alcohol use dx severe  Plan   Pt is out of detox     Continue present meds  Laboratory/Imaging: reviewed with patient   Consults: internal medicine consult reviewed  Patient will be treated in therapeutic milieu with appropriate individual and group therapies as described.  PDMP CHECKED     Medical diagnoses to be addressed this admission:    Plan:  Diagnoses:  #Alcohol abuse, detoxification: In ED, Utox positive for ethanol, breath test 0.306. Consumes 750 ml of vodka per day per ED notes. Recent detox 06/28-06/30 on medical unit. Denies h/o DTs or WD seizures. VS w/ elevated BP (147/104), normal- slightly elevated HR, afebrile.   -Agree with MSSA w/ valium  -Agree with MVi, folate, thiamine  -If significant HTN w/ detox (SBP >180 and/or DBP >110), Recommend Clonidine 0.1 mg PO daily PRN for BP >180/110 (ordered for you)  #HTN: Diagnosed at prior detox visits. At some point prescribed amlodipine 5 mg qday, clonidine 0.1 mg BID, but does not appear compliant.   -Continue amlodipine and scheduled clonidine  -Notify medicine if BP persistently >140/90 once out of detox (patient care order  placed)  #Transaminitis: ALT of 133, AST of 86, improved from 1 week ago. US from 05/2019 w/ hepatomegaly with steatosis, biliary sludge w/o abnormality.   -Repeat w/ PCP w/ in 4 weeks of discharge to ensure continued improvement (order placed in navigator)     Chanell Handy PA-C         Legal Status: voluntary    Safety Assessment:   Checks:  15 min  Precautions: withdrawal precautions  Pt has not required locked seclusion or restraints in the past 24 hours to maintain safety, please refer to RN documentation for further details.  Discussed with patient many issues of addiction,triggers, relapse, and establishing a solid recovery program.  Able to give informed consent:  YES   Discussed Risks/Benefits/Side Effects/Alternatives: YES    After discussion of the indications, risks, benefits, alternatives and consequences of no treatment, the patient elects to complete detox and do trt.

## 2019-07-18 NOTE — PLAN OF CARE
No medications for alcohol withdrawal x 24 hours. Alcohol withdrawal is complete. Pt plans to enter the LP program tomorrow. Reports his mood as calm and that he is ready to get going with the treatment process. Pt out on unit attending unit programming. Social with peers. BP is stable. Continues on BP medications. Pt assisted in connecting with a primary care provider and making an appointment for after discharge.

## 2019-07-18 NOTE — PLAN OF CARE
Problem: Substance Withdrawal  Goal: Substance Withdrawal  Description  Signs and symptoms of listed problems will be absent or manageable.    1. Detoxification from Alcohol using the [facility] valium protocol  2. Patient will complete assessment paperwork  3.  Patient will meet with  to discuss treatment and discharge planning  4. Physical examination and Lab evaluation by MD  5. Patients oral intake will be greater than 75 % of meals to meet estimated needs  6. Adequate fluid intake     Outcome: Improving

## 2019-07-19 ENCOUNTER — HOSPITAL ENCOUNTER (OUTPATIENT)
Dept: BEHAVIORAL HEALTH | Facility: CLINIC | Age: 34
End: 2019-07-19
Attending: FAMILY MEDICINE
Payer: COMMERCIAL

## 2019-07-19 ENCOUNTER — BEH TREATMENT PLAN (OUTPATIENT)
Dept: BEHAVIORAL HEALTH | Facility: CLINIC | Age: 34
End: 2019-07-19
Attending: FAMILY MEDICINE

## 2019-07-19 VITALS
SYSTOLIC BLOOD PRESSURE: 125 MMHG | WEIGHT: 220 LBS | DIASTOLIC BLOOD PRESSURE: 92 MMHG | OXYGEN SATURATION: 99 % | HEART RATE: 96 BPM | TEMPERATURE: 97.2 F | RESPIRATION RATE: 16 BRPM | HEIGHT: 71 IN | BODY MASS INDEX: 30.8 KG/M2

## 2019-07-19 PROCEDURE — 25000132 ZZH RX MED GY IP 250 OP 250 PS 637: Performed by: PHYSICIAN ASSISTANT

## 2019-07-19 PROCEDURE — 25000132 ZZH RX MED GY IP 250 OP 250 PS 637: Performed by: NURSE PRACTITIONER

## 2019-07-19 PROCEDURE — H2035 A/D TX PROGRAM, PER HOUR: HCPCS

## 2019-07-19 PROCEDURE — 10020000 ZZH LODGING PLUS FACILITY CHARGE ADULT

## 2019-07-19 PROCEDURE — 25000132 ZZH RX MED GY IP 250 OP 250 PS 637: Performed by: PSYCHIATRY & NEUROLOGY

## 2019-07-19 RX ORDER — MAGNESIUM HYDROXIDE/ALUMINUM HYDROXICE/SIMETHICONE 120; 1200; 1200 MG/30ML; MG/30ML; MG/30ML
30 SUSPENSION ORAL EVERY 6 HOURS PRN
COMMUNITY
End: 2019-08-10

## 2019-07-19 RX ORDER — ACETAMINOPHEN 325 MG/1
325-650 TABLET ORAL EVERY 4 HOURS PRN
COMMUNITY
End: 2019-08-10

## 2019-07-19 RX ORDER — AMOXICILLIN 250 MG
2 CAPSULE ORAL DAILY PRN
COMMUNITY
End: 2019-08-10

## 2019-07-19 RX ORDER — LORATADINE 10 MG/1
10 TABLET ORAL DAILY
COMMUNITY
End: 2019-08-10

## 2019-07-19 RX ORDER — IBUPROFEN 200 MG
200-400 TABLET ORAL EVERY 6 HOURS PRN
COMMUNITY
End: 2019-08-10

## 2019-07-19 RX ORDER — LANOLIN ALCOHOL/MO/W.PET/CERES
3 CREAM (GRAM) TOPICAL
COMMUNITY
End: 2019-08-10

## 2019-07-19 RX ADMIN — THIAMINE HCL TAB 100 MG 100 MG: 100 TAB at 08:27

## 2019-07-19 RX ADMIN — CLONIDINE HYDROCHLORIDE 0.1 MG: 0.1 TABLET ORAL at 08:27

## 2019-07-19 RX ADMIN — AMLODIPINE BESYLATE 5 MG: 5 TABLET ORAL at 08:27

## 2019-07-19 RX ADMIN — MULTIPLE VITAMINS W/ MINERALS TAB 1 TABLET: TAB at 08:27

## 2019-07-19 RX ADMIN — ASPIRIN 81 MG CHEWABLE TABLET 81 MG: 81 TABLET CHEWABLE at 08:27

## 2019-07-19 RX ADMIN — FOLIC ACID 1 MG: 1 TABLET ORAL at 08:27

## 2019-07-19 ASSESSMENT — ANXIETY QUESTIONNAIRES
3. WORRYING TOO MUCH ABOUT DIFFERENT THINGS: SEVERAL DAYS
1. FEELING NERVOUS, ANXIOUS, OR ON EDGE: SEVERAL DAYS
GAD7 TOTAL SCORE: 3
4. TROUBLE RELAXING: NOT AT ALL
6. BECOMING EASILY ANNOYED OR IRRITABLE: NOT AT ALL
7. FEELING AFRAID AS IF SOMETHING AWFUL MIGHT HAPPEN: NOT AT ALL
5. BEING SO RESTLESS THAT IT IS HARD TO SIT STILL: NOT AT ALL
2. NOT BEING ABLE TO STOP OR CONTROL WORRYING: SEVERAL DAYS

## 2019-07-19 ASSESSMENT — ACTIVITIES OF DAILY LIVING (ADL)
ORAL_HYGIENE: INDEPENDENT
HYGIENE/GROOMING: INDEPENDENT
DRESS: INDEPENDENT
LAUNDRY: WITH SUPERVISION

## 2019-07-19 NOTE — PROGRESS NOTES
Progress Note    This patient had a comprehensive/Rule 25 assessment on 7/17/2019 completed by CITLALLI Quevedo.  This patient was seen for a face to face update of the comprehensive/Rule 25 assessment on 7/19/2019 by CITLALLI More:  Yes    Alcohol/Drug use since the last CD evaluation (include date of last use):     No additional substances use since the last CD evaluation     Please note any other clinical changes since the last CD evaluation (such as medication changes, additional legal charges, detoxification admissions, overdoses, etc.)     No significant changes since the last CD evaluation       ASAM Dimensions Original scores Current Scores   I.) Intoxication and Withdrawal: 1 1   II.) Biomedical:  2 2   III.) Emotional and Behavioral:  2 2   IV.) Readiness to Change:  2 2   V.) Relapse Potential: 4 4   VI.) Recovery Environmental: 3 3     Please list clinical justifications for the above ASAM score changes since the original comprehensive assessment:     None of the ASAM scores on the six dimensions had changed since the original comprehensive assessment was completed on 7/17/2019.       Current SHERITA: Current UA:     Not taken     Not taken       PHQ-9, SILVANA-7   PHQ-9 on 7/19/2019 SILVANA-7 on 7/19/2019   Not taken   The patient's SILVANA-7 score was 3 out of 21, indicating minimal anxiety.       [place]-Suicide Severity Rating Scale Reassessment   Have you ever wished you were dead or that you could go to sleep and not wake up?  Past Month:  No     Have you actually had any thoughts of killing yourself?  Past Month:  No     Have you been thinking about how you might do this?     Past Month:  No   Lifetime:  No   Have you had these thoughts and had some intention of acting on them?     Past Month:  No   Lifetime:  No   Have you started to work out the details of how to kill yourself?   Past Month:  No   Lifetime:  No   Do you intend to carry out this plan?   No     When you have the thoughts how  "long do they last?  The patient denied ever having any suicidal thoughts in life.     Are there things - anyone or anything (i.e. family, Mosque, pain of death) that stopped you from wanting to die or acting on thoughts of suicide?  Does not apply       2008  [facility] [place] [facility] [employer]  Used with permission by Rona Rivas, PhD.       Guide to C-SSRS Risk Ratings   NO IDEATION:  with no active thoughts IDEATION: with a wish to die. IDEATION: with active thoughts. Risk Ratings   If Yes No No 0 - Very Low Risk   If NA Yes No 1 - Low Risk   If NA Yes Yes 2 - Low/moderate risk   IDEATION: associated thoughts of methods without intent or plan INTENT: Intent to follow through on suicide PLAN: Plan to follow through on suicide Risk Ratings cont...   If Yes No No 3 - Moderate Risk   If Yes Yes No 4 - High Risk   If Yes Yes Yes 5 - High Risk   The patient's ADDITIONAL RISK FACTORS and lack of PROTECTIVE FACTORS may increase their overall suicide risk ratings.     Additional Risk Factors:    Tendency to be socially isolated and/or cut off from the support of others     A recent loss that was significant to the patient, i.e. loss of job, loss of home, divorce, break-up, etc.     A triggering event(s) leading to humiliation, shame or despair   Protective Factors:    Having people in his/her life that would prevent the patient from considering a suicide attempt (i.e. young children, spouse, parents, etc.)     An absence of chronic health problems or stable and well treated chronic health issues     Having easy access to supportive family members      Risk Status   0. - Very Low Risk:  Evaluation Counselors:  Document in Epic / SBAR to counselor \"Very Low Risk\".      Treatment Counselors:  Reassess upon admission as applicable, assess weekly in progress notes under Dimension 3 and summarize in Discharge / Treatment summary under Dimension 3.     Additional information to support suicide risk rating: There " was no additional information to provide at this time.

## 2019-07-19 NOTE — PROGRESS NOTES
"  Patient:  Nikhil Rios    Date: July 19, 2019    Comprehensive Assessment UPDATE        Comprehensive Summary Update and Review  Counselor met with patient on 7/19/2019 and reviewed the Comprehensive Assessment.    The following updates have been made:Other:   Dimension 1: Due to no noted withdrawal concerns, patient's risk rating was lowered from \"1\" to \"0\".  Dimension 2: Patient denied to counselor any concerns that would interfere with full participation in treatment programming, thus patient's risk rating was lowered from \"2\" to \"1\".  Dimension 6: Due to having independent housing and denying legal concerns, as well as having supportive family members, patient's risk was lowered from \"3\" to \"2\"    CITLALLI Avery    "
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
## 2019-07-19 NOTE — PROGRESS NOTES
[facility] Nursing Health Assessment      Vital signs:     There were no vitals taken for this visit.      Transfer from 3A/detox    Counselor: Lina  Drug of Choice: alcohol  Last use: 7/15/2019  [facility] at 10am  Psychiatrist/therapist: none    Medical history/current conditions:  Hypertension    H&P Screen:  H&P within the last 90 days: Yes.  Date: 7/19/2019 Location: 3A/detox      Mental Health diagnosis: none  Medication compliant?: na  Recent sucidal thoughts? no     When? na  Current thought of self-harm? no    Plan? na    Pain assessment:   Pt. Experiencing pain at this time?  No      Nursing Assessment Summary:  No acute issues    On-going nursing intervention required?   No    Acute care visit recommended: no

## 2019-07-19 NOTE — PLAN OF CARE
"Pt being discharged to [facility]. Alcohol withdrawal is complete. Pt + hand tremors still. States he did have baseline hand tremors always.   Pt reports mood as\"Good, just anxious to get on with the second chapter and get it done.\"  Denies SI. Reports sleep and appetite as good.   Reviewed discharge instructions with patient and he verbalized understanding.   See discharge instructions.  Pt has a follow up medical appointment.   "

## 2019-07-19 NOTE — PROGRESS NOTES
Name: Nikhil Rios  Date: 7/19/2019  Medical Record: [id number]    Envelope Number: [id number]    List of Contents (List each item separately in new row):   Cell Phone Black      Admission:  I am responsible for any personal items that are not sent to the safe or pharmacy.  [facility] is not responsible for loss, theft or damage of any property in my possession.      Patient Signature:  ___________________________________________       Date/Time:__________________________    Staff Signature: __________________________________       Date/Time:__________________________    2nd Staff person, if patient is unable/unwilling to sign:      __________________________________________________________       Date/Time: __________________________      Discharge:  [facility] has returned all of my personal belongings:    Patient Signature: ________________________________________     Date/Time: ____________________________________    Staff Signature: ______________________________________     Date/Time:_____________________________________

## 2019-07-19 NOTE — DISCHARGE SUMMARY
Admit Date:     07/15/2019   Discharge Date:     07/19/2019      More than 35 minutes was spent on this summary, doing the discharge instructions, discharge medications, and discharge mental status examination.      DISCHARGE DIAGNOSES:   Axis I:  Alcohol use disorder, severe.      Please see detailed admission by Dr. Lew on 7/16/2019.        HOSPITAL COURSE:  During the hospitalization, the patient was seen by Chanell Dhaliwal.  On 7/16/2019, patient had hypertension.  He had elevated liver enzymes.  He completed detox.  His energy, motivation, sleep and interest improved.  He was initially ambivalent about treatment but later, he agreed to do treatment.  A civil commitment was not considered for that reason.  He had lab work done, which was normal.  Comprehensive metabolic panel:  Cholesterol is 251; HDL is 151; non-HDL was 198; triglycerides are 220.  During the hospitalization, the patient's energy, motivation, sleep and interest improved.  He did not have any suicidal or homicidal ideation, plan or intent.      DISCHARGE DISPOSITION:  The patient going to [facility].      DISCHARGE MENTAL STATUS EXAMINATION:  The patient is alert and oriented x3.  Recent and remote memory and language are all adequate.  No loose associations.  The patient is stable to be discharged.               DISCHARGE MENTAL STATUS EXAMINATION:  The patient is alert, oriented x3.  Good fund of knowledge.  Good use of language.  Recent and remote memory, language, fund of knowledge are all adequate.  Euthymic mood congruent affect  Speech normal rate/rhythm linear tp no loose asso,The patient does not have any active suicidal or homicidal ideation.  Does not have any auditory or visual hallucination.  Fair insight/judgment At this time, the patient was stable to be discharged.        Pt was not determined to not be a danger to himself or others. At the current time of discharge, the patient does not meet criteria for involuntary  hospitalization. On the day of discharge, the patient reports that they do not have suicidal or homicidal ideation and would never hurt themselves or others. Steps taken to minimize risk include: assessing patient s behavior and thought process daily during hospital stay, discharging patient with adequate plan for follow up for mental and physical health and discussing safety plan of returning to the hospital should the patient ever have thoughts of harming themselves or others. Therefore, based on all available evidence including the factors cited above, the patient does not appear to be at imminent risk for self-harm, and is appropriate for outpatient level of care.     Educated about side effects/risk vs benefits /alternative including non treatment.Pt consented to be on medication.     .Total time spent on discharge summary more than 35 min  More than  20 min  planning, coordination of care, medication reconciliation and performance of physical exam on day of discharge.Care was coordinated with unit RN and unit therapist       Nikhil Rios   Home Medication Instructions KEVIN:[id number]    Printed on:07/22/19 1028   Medication Information                      amLODIPine (NORVASC) 5 MG tablet  Take 1 tablet (5 mg) by mouth daily             aspirin (ASA) 81 MG chewable tablet  Take 1 tablet (81 mg) by mouth daily             cloNIDine (CATAPRES) 0.1 MG tablet  Take 1 tablet (0.1 mg) by mouth 2 times daily             hydrOXYzine (ATARAX) 25 MG tablet  Take 1 tablet (25 mg) by mouth 3 times daily as needed for itching or other (allergies)             hypromellose-dextran (ARTIFICAL TEARS) 0.1-0.3 % ophthalmic solution  Place 1 drop into both eyes every hour as needed for dry eyes             ketotifen (ZADITOR/REFRESH ANTI-ITCH) 0.025 % ophthalmic solution  Place 1 drop into both eyes daily as needed for itching or dry eyes             multivitamin w/minerals (THERA-VIT-M) tablet  Take 1 tablet by mouth daily                ELMIRA JETER MD             D: 2019   T: 2019   MT: SHELBIE      Name:     QUYEN QUIROZ   MRN:      [id number]        Account:        [id number]   :      1985           Admit Date:     07/15/2019                                  Discharge Date: 2019      Document: [id number]
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
## 2019-07-20 ENCOUNTER — HOSPITAL ENCOUNTER (OUTPATIENT)
Dept: BEHAVIORAL HEALTH | Facility: CLINIC | Age: 34
End: 2019-07-20
Attending: FAMILY MEDICINE
Payer: COMMERCIAL

## 2019-07-20 PROCEDURE — H2035 A/D TX PROGRAM, PER HOUR: HCPCS | Mod: HQ

## 2019-07-20 PROCEDURE — 10020000 ZZH LODGING PLUS FACILITY CHARGE ADULT

## 2019-07-20 ASSESSMENT — ANXIETY QUESTIONNAIRES: GAD7 TOTAL SCORE: 3

## 2019-07-21 ENCOUNTER — HOSPITAL ENCOUNTER (OUTPATIENT)
Dept: BEHAVIORAL HEALTH | Facility: CLINIC | Age: 34
End: 2019-07-21
Attending: FAMILY MEDICINE
Payer: COMMERCIAL

## 2019-07-21 PROCEDURE — 10020000 ZZH LODGING PLUS FACILITY CHARGE ADULT

## 2019-07-21 PROCEDURE — H2035 A/D TX PROGRAM, PER HOUR: HCPCS | Mod: HQ

## 2019-07-22 ENCOUNTER — HOSPITAL ENCOUNTER (OUTPATIENT)
Dept: BEHAVIORAL HEALTH | Facility: CLINIC | Age: 34
End: 2019-07-22
Attending: FAMILY MEDICINE
Payer: COMMERCIAL

## 2019-07-22 PROCEDURE — H2035 A/D TX PROGRAM, PER HOUR: HCPCS | Mod: HQ

## 2019-07-22 PROCEDURE — H2035 A/D TX PROGRAM, PER HOUR: HCPCS

## 2019-07-22 PROCEDURE — 10020000 ZZH LODGING PLUS FACILITY CHARGE ADULT

## 2019-07-22 NOTE — PROGRESS NOTES
Name: Nikhil Rios  Date: 7/21/2019  Medical Record: [id number]    Envelope Number: [id number]  Cellphone black   white         (Repackaged from envelope # [id number]    List of Contents (List each item separately in new row):    Admission:  I am responsible for any personal items that are not sent to the safe or pharmacy.  [facility] is not responsible for loss, theft or damage of any property in my possession.      Patient Signature:  ___________________________________________       Date/Time:__________________________    Staff Signature: __________________________________       Date/Time:__________________________    2nd Staff person, if patient is unable/unwilling to sign:      __________________________________________________________       Date/Time: __________________________      Discharge:  [facility] has returned all of my personal belongings:    Patient Signature: ________________________________________     Date/Time: ____________________________________    Staff Signature: ______________________________________     Date/Time:_____________________________________

## 2019-07-23 ENCOUNTER — HOSPITAL ENCOUNTER (OUTPATIENT)
Dept: BEHAVIORAL HEALTH | Facility: CLINIC | Age: 34
End: 2019-07-23
Attending: FAMILY MEDICINE
Payer: COMMERCIAL

## 2019-07-23 PROCEDURE — 10020000 ZZH LODGING PLUS FACILITY CHARGE ADULT

## 2019-07-23 PROCEDURE — H2035 A/D TX PROGRAM, PER HOUR: HCPCS

## 2019-07-23 PROCEDURE — H2035 A/D TX PROGRAM, PER HOUR: HCPCS | Mod: HQ
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
## 2019-07-23 NOTE — PROGRESS NOTES
[facility]                                                SAFETY PLAN:  Step 1: Warning signs / cues (Thoughts, images, mood, situation, behavior) that a crisis may be developing:    Thoughts: I drink in excess when I believe the withdrawal will be bad    Images: N/A    Thinking Processes: I go to get help or not, If not I will likely suffer    Mood: Uncomfortable    Behaviors: Bored, Isolated    Situations: False sense of enhancing an activity  Step 2: Coping strategies - Things I can do to take my mind off of my problems without contacting another person (relaxation technique, physical activity):    Distress Tolerance Strategies:  Breathing techniques, watch sports, cook, fishing    Physical Activities: Gardening, exercise, lift weights, walk    Focus on helpful thoughts:  Positive self-talk, writ down how I feel in that moment  Step 3: People and social settings that provide distraction:   Name: Mom  Phone: [phone]   Name: Yoseph Rios Phone: [phone]   Name: Jimmy Hurtado Phone: [phone]    Social Setting: [employer], sporting events, family get togethers   Step 4: Remind myself of people and things that are important to me and worth living for:    Laughing and enjoying good company, also making others laugh, the santos of loving others and being loved.   Step 5: When I am in crisis, I can ask these people to help me use my safety plan:   Name: Mom  Phone: [phone]   Name: Yoseph Rios Phone: [phone]   Name: Jimmy Hurtado Phone: [phone]  Step 6: Making the environment safe:     Being with people who respect my sobriety  Step 7: Professionals or agencies I can contact during a crisis:  911, crisis text line, health partners    Suicide Prevention Lifeline: 9-467-514-TALK (2740)    Crisis Text Line: Text [place] to 944786    Local Crisis Services: [facility]    Call 911 or go to my nearest emergency department.   I helped develop this safety plan and agree to use it when  needed.  I have been given a copy of this plan.      Client signature : Patient safety plan was entered into Epic. A signed copy will be scanned into the media section.  Today s date:  7/23/2019  Adapted from Safety Plan Template 2008 Heather Matos and Ricky Robertson is reprinted with the express permission of the authors.  No portion of the Safety Plan Template may be reproduced without the express, written permission.  You can contact the authors at [email or web address] or [email or web address].     CITLALLI Avery

## 2019-07-24 ENCOUNTER — HOSPITAL ENCOUNTER (OUTPATIENT)
Dept: BEHAVIORAL HEALTH | Facility: CLINIC | Age: 34
End: 2019-07-24
Attending: FAMILY MEDICINE
Payer: COMMERCIAL

## 2019-07-24 PROCEDURE — H2035 A/D TX PROGRAM, PER HOUR: HCPCS | Mod: HQ

## 2019-07-24 PROCEDURE — 10020000 ZZH LODGING PLUS FACILITY CHARGE ADULT

## 2019-07-24 PROCEDURE — H2035 A/D TX PROGRAM, PER HOUR: HCPCS

## 2019-07-25 ENCOUNTER — HOSPITAL ENCOUNTER (OUTPATIENT)
Dept: BEHAVIORAL HEALTH | Facility: CLINIC | Age: 34
End: 2019-07-25
Attending: FAMILY MEDICINE
Payer: COMMERCIAL

## 2019-07-25 PROCEDURE — 10020000 ZZH LODGING PLUS FACILITY CHARGE ADULT

## 2019-07-25 PROCEDURE — H2035 A/D TX PROGRAM, PER HOUR: HCPCS | Mod: HQ

## 2019-07-25 PROCEDURE — H2035 A/D TX PROGRAM, PER HOUR: HCPCS

## 2019-07-25 NOTE — PROGRESS NOTES
Patient:  Nikhil Rios            Adult CD Progress Note and Treatment Plan Review     Attendance  Please refer to OP BEH CD Adult Attendance Record Documentation Flowsheet    Support group attended this week: yes    Reporting sobriety:  yes    Treatment Plan     Treatment Plan Review competed on: 7/25/2019       Client preferred learning style:   Visual  Hands on  Demonstration    Staff Members contributing CITLALLI Avery                         Received Supervision: None    Client: contributed to goals and plan.    Client received copy of plan/revised plan: Yes    Client agrees with plan/revised plan: Yes    Changes to Treatment Plan: Yes    New Goals added since last review All goals are new due to this being patient's first progress note    Goals worked on since last review Patient is currently working to identify the consequences of his use and has focused on identifying healthy relational boundaries     Strategies effective: yes    Strategies need these changes: No changes needed at this time    1) Care Coordination Activities:  Patient is scheduled to meet with staff counselor  2) Medical, Mental Health and other appointments the client attended: Patient is scheduled to attend individual therapy with staff therapist.   3) Medication issues: No concerns at this time  4) Physical and mental health problems: Patient denies any concerns  5) Any changes in Vulnerable Adult Status?  No If yes, add to treatment plan and individual abuse prevention plan.  6) Review and evaluation of the individual abuse prevention plan: Current IAPP for this program is adequate for this client    ASAM Risk Rating:    Dimension 1, 0: Patient reports his substance of use as Alcohol. Patient reports his last date of use as 7/14/2019. Patient denies any withdrawal symptoms that would interfere with full participation in treatment programming at this time. Patient will continue to be monitored throughout treatment.     Dimension 2,  "1: Patient reports medical diagnoses of allergies and hypertension. Patient reports that these diagnoses do not interfere with full participation in treatment programming. Due to the above, patient's risk rating was lowered from \"1\" to \"0\". Patient reports that he is currently medication compliant. Patient appears able to access medical aid as needed.  Patient will continue to be monitored throughout treatment. Patient attended staff RN lecture on 7/21/2019.    Dimension 3, 2: Patient denies a formal mental health diagnosis. Patient is scheduled to meet with staff therapist while at [facility]. Upon admissions patient was given a suicidal risk screening. Patient was rated as \"Very Low Risk\". Patient denies any suicidal thoughts or ideations at this time. Patient will continue to be monitored throughout treatment.     Dimension 4, 2: Upon admission patient verbally reports being motivated for long-term recovery. Patient presents as engaged in group processing, however patient appears to minimize substance use. Patient is currently working to identify the consequences of his use in order to increase internal motivation for recovery.    Dimension 5, 4: Upon admission Patient denies any significant periods of sobriety. Patient appears to hold limited knowledge of relapse warning signs. Patient is unsure whether to consider outpatient programming at this time.     Dimension 6, 2: Upon admission patient reports living in independent housing. Upon admission patient reports being unemployed. Patient denies any legal concerns at this time. Patient attended and participated in a relationships workshop. Patient focused on identifying healthy relational boundaries. Patient continues to attend 12-step meetings and have positive interactions with male peers.     Guide to Risk Ratings for Suicidality:   IDEATION: Active thoughts of suicide? INTENT: Intent to follow on suicide? PLAN: Plan to follow through on suicide? Level of " Risk:   IF Yes Yes Yes Patient = High Emergent   IF Yes Yes No Patient = High Urgent/Non-Emergent   IF Yes No No Patient = Moderate Non-Urgent   IF No No   No Patient = Low Risk   The patient's ADDITIONAL RISK FACTORS and lack of PROTECTIVE FACTORS may increase their overall suicide risk ratings.     Patient's/client's current risk rating:  Low Risk    Family Involvement:   Patient declined to participate in family week programming.    Data:   offered feedback   good insight   client did actively participate    Intervention:   Aftercare planning  Cognitive Behavioral Therapy  Counselor feedback  Education  Emotional management  Group feedback  Relapse prevention  Twelve Step facilitation  Client & counselor reviewed and signed ISP & assessment summary  Mental health education    Assessment:   Stages of Change Model  Precontemplation    Appears/Sounds:  Cooperative  Engaged  Ambivalent    Plan:  Focus on recovery environment  Monitor emotional/physical health  Continue working through treatment plan goals.       CITLALLI Avery

## 2019-07-26 ENCOUNTER — HOSPITAL ENCOUNTER (OUTPATIENT)
Dept: BEHAVIORAL HEALTH | Facility: CLINIC | Age: 34
End: 2019-07-26
Attending: FAMILY MEDICINE
Payer: COMMERCIAL

## 2019-07-26 PROCEDURE — H2035 A/D TX PROGRAM, PER HOUR: HCPCS

## 2019-07-26 PROCEDURE — 10020000 ZZH LODGING PLUS FACILITY CHARGE ADULT

## 2019-07-26 PROCEDURE — H2035 A/D TX PROGRAM, PER HOUR: HCPCS | Mod: HQ

## 2019-07-26 NOTE — PROGRESS NOTES
Name: Nikhil Rios  Date: 7/26/2019  Medical Record: [id number]    Envelope Number: [id number]    List of Contents (List each item separately in new row):   Cell phone balck ;  white - Repackaged #[id number]    Admission:  I am responsible for any personal items that are not sent to the safe or pharmacy.  [facility] is not responsible for loss, theft or damage of any property in my possession.      Patient Signature:  ___________________________________________       Date/Time:__________________________    Staff Signature: __________________________________       Date/Time:__________________________    2nd Staff person, if patient is unable/unwilling to sign:      __________________________________________________________       Date/Time: __________________________      Discharge:  [facility] has returned all of my personal belongings:    Patient Signature: ________________________________________     Date/Time: ____________________________________    Staff Signature: ______________________________________     Date/Time:_____________________________________

## 2019-07-27 ENCOUNTER — HOSPITAL ENCOUNTER (OUTPATIENT)
Dept: BEHAVIORAL HEALTH | Facility: CLINIC | Age: 34
End: 2019-07-27
Attending: FAMILY MEDICINE
Payer: COMMERCIAL

## 2019-07-27 PROCEDURE — 10020000 ZZH LODGING PLUS FACILITY CHARGE ADULT

## 2019-07-27 PROCEDURE — H2035 A/D TX PROGRAM, PER HOUR: HCPCS | Mod: HQ

## 2019-07-28 ENCOUNTER — HOSPITAL ENCOUNTER (OUTPATIENT)
Dept: BEHAVIORAL HEALTH | Facility: CLINIC | Age: 34
End: 2019-07-28
Attending: FAMILY MEDICINE
Payer: COMMERCIAL

## 2019-07-28 PROCEDURE — 10020000 ZZH LODGING PLUS FACILITY CHARGE ADULT

## 2019-07-28 PROCEDURE — H2035 A/D TX PROGRAM, PER HOUR: HCPCS | Mod: HQ

## 2019-07-29 ENCOUNTER — HOSPITAL ENCOUNTER (OUTPATIENT)
Dept: BEHAVIORAL HEALTH | Facility: CLINIC | Age: 34
End: 2019-07-29
Attending: FAMILY MEDICINE
Payer: COMMERCIAL

## 2019-07-29 PROCEDURE — 10020000 ZZH LODGING PLUS FACILITY CHARGE ADULT

## 2019-07-29 PROCEDURE — H2035 A/D TX PROGRAM, PER HOUR: HCPCS

## 2019-07-29 PROCEDURE — H2035 A/D TX PROGRAM, PER HOUR: HCPCS | Mod: HQ

## 2019-07-30 ENCOUNTER — HOSPITAL ENCOUNTER (OUTPATIENT)
Dept: BEHAVIORAL HEALTH | Facility: CLINIC | Age: 34
End: 2019-07-30
Attending: FAMILY MEDICINE
Payer: COMMERCIAL

## 2019-07-30 PROCEDURE — H2035 A/D TX PROGRAM, PER HOUR: HCPCS | Mod: HQ

## 2019-07-30 PROCEDURE — 10020000 ZZH LODGING PLUS FACILITY CHARGE ADULT

## 2019-07-30 PROCEDURE — H2035 A/D TX PROGRAM, PER HOUR: HCPCS

## 2019-07-31 ENCOUNTER — HOSPITAL ENCOUNTER (OUTPATIENT)
Dept: BEHAVIORAL HEALTH | Facility: CLINIC | Age: 34
End: 2019-07-31
Attending: FAMILY MEDICINE
Payer: COMMERCIAL

## 2019-07-31 PROCEDURE — H2035 A/D TX PROGRAM, PER HOUR: HCPCS | Mod: HQ

## 2019-07-31 PROCEDURE — H2035 A/D TX PROGRAM, PER HOUR: HCPCS

## 2019-07-31 PROCEDURE — 10020000 ZZH LODGING PLUS FACILITY CHARGE ADULT
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
## 2019-07-31 NOTE — PROGRESS NOTES
Patient:  Nikhil Rios            Adult CD Progress Note and Treatment Plan Review     Attendance  Please refer to OP [facility] CD Adult Attendance Record Documentation Flowsheet    Support group attended this week: yes    Reporting sobriety:  yes    Treatment Plan     Treatment Plan Review competed on: 7/31/2019    Client preferred learning style:   Visual  Hands on  Demonstration    Staff Members contributing CITLALLI Avery                         Received Supervision: None    Client: contributed to goals and plan.    Client received copy of plan/revised plan: Yes    Client agrees with plan/revised plan: Yes    Changes to Treatment Plan: None    New Goals added since last review No additional G    Goals worked on since last review Patient is currently working to identify the consequences of his use and has focused on identifying healthy relational boundaries     Strategies effective: yes    Strategies need these changes: No changes needed at this time    1) Care Coordination Activities:  Patient is scheduled to meet with staff counselor  2) Medical, Mental Health and other appointments the client attended: Patient attended individual therapy with staff therapist.  3) Medication issues: No concerns at this time  4) Physical and mental health problems: Patient denies any concerns  5) Any changes in Vulnerable Adult Status?  No If yes, add to treatment plan and individual abuse prevention plan.  6) Review and evaluation of the individual abuse prevention plan: Current IAPP for this program is adequate for this client    ASAM Risk Rating:    Dimension 1, 0: Patient reports his substance of use as Alcohol. Patient reports his last date of use as 7/14/2019. Patient denies any withdrawal symptoms that would interfere with full participation in treatment programming at this time. Patient will continue to be monitored throughout treatment.     Dimension 2, 1: Patient reports medical diagnoses of allergies and  "hypertension. Patient reports that these diagnoses do not interfere with full participation in treatment programming. Patient reports that he is currently medication compliant. Patient appears able to access medical aid as needed.  Patient will continue to be monitored throughout treatment. Patient attended staff RN lecture on 7/28/2019.    Dimension 3, 2: Patient denies a formal mental health diagnosis.Patient met with staff therapist. Patient reports this as a healthy therapeutic relationship. Patient presents in an increased positive mood. Upon admissions patient was given a suicidal risk screening. Patient was rated as \"Very Low Risk\". Patient denies any suicidal thoughts or ideations at this time. Patient will continue to be monitored throughout treatment.     Dimension 4, 1: Upon admission patient verbally reports being motivated for long-term recovery. When patient entered into treatment patient presented as minimizing his use, however, following completion of assignment titled \"Drug Use History, patient presents with understanding of the severity of his addiction. Patient was able to name the consequences of his use and identify the progression of addictive behaviors. Due to the above, patient's risk rating was lowered from \"2\" to \"1\".     Dimension 5, 4: Upon admission Patient denies any significant periods of sobriety. Patient appears to hold limited knowledge of relapse warning signs. Patient is unsure whether to consider outpatient programming at this time. Patient attended and participated in a relapse prevention workshop on 7/27/2019. Patient focused on identifying the impact of the mental stage of relapse.     Dimension 6, 2: Upon admission patient reports living in independent housing. Upon admission patient reports being unemployed. Patient denies any legal concerns at this time. Patient continues to attend 12-step meetings and have positive interactions with male peers.     Guide to Risk Ratings " for Suicidality:   IDEATION: Active thoughts of suicide? INTENT: Intent to follow on suicide? PLAN: Plan to follow through on suicide? Level of Risk:   IF Yes Yes Yes Patient = High Emergent   IF Yes Yes No Patient = High Urgent/Non-Emergent   IF Yes No No Patient = Moderate Non-Urgent   IF No No   No Patient = Low Risk   The patient's ADDITIONAL RISK FACTORS and lack of PROTECTIVE FACTORS may increase their overall suicide risk ratings.     Patient's/client's current risk rating:  Low Risk    Family Involvement:   Patient declined to participate in family week programming.    Data:   offered feedback   good insight   client did actively participate    Intervention:   Aftercare planning  Cognitive Behavioral Therapy  Counselor feedback  Education  Emotional management  Group feedback  Relapse prevention  Twelve Step facilitation  Mental health education    Assessment:   Stages of Change Model  Precontemplation    Appears/Sounds:  Cooperative  Motivated  Engaged    Plan:  Focus on recovery environment  Monitor emotional/physical health  Continue working through treatment plan goals.       CITLALLI Avery

## 2019-08-01 ENCOUNTER — HOSPITAL ENCOUNTER (OUTPATIENT)
Dept: BEHAVIORAL HEALTH | Facility: CLINIC | Age: 34
End: 2019-08-01
Attending: FAMILY MEDICINE
Payer: COMMERCIAL

## 2019-08-01 PROCEDURE — H2035 A/D TX PROGRAM, PER HOUR: HCPCS | Mod: HQ

## 2019-08-01 PROCEDURE — H2035 A/D TX PROGRAM, PER HOUR: HCPCS

## 2019-08-01 PROCEDURE — 10020000 ZZH LODGING PLUS FACILITY CHARGE ADULT

## 2019-08-02 ENCOUNTER — HOSPITAL ENCOUNTER (OUTPATIENT)
Dept: BEHAVIORAL HEALTH | Facility: CLINIC | Age: 34
End: 2019-08-02
Attending: FAMILY MEDICINE
Payer: COMMERCIAL

## 2019-08-02 PROCEDURE — H2035 A/D TX PROGRAM, PER HOUR: HCPCS

## 2019-08-02 PROCEDURE — H2035 A/D TX PROGRAM, PER HOUR: HCPCS | Mod: HQ

## 2019-08-02 PROCEDURE — 10020000 ZZH LODGING PLUS FACILITY CHARGE ADULT

## 2019-08-03 ENCOUNTER — HOSPITAL ENCOUNTER (OUTPATIENT)
Dept: BEHAVIORAL HEALTH | Facility: CLINIC | Age: 34
End: 2019-08-03
Attending: FAMILY MEDICINE
Payer: COMMERCIAL

## 2019-08-03 PROCEDURE — H2035 A/D TX PROGRAM, PER HOUR: HCPCS

## 2019-08-03 PROCEDURE — 10020000 ZZH LODGING PLUS FACILITY CHARGE ADULT

## 2019-08-04 ENCOUNTER — HOSPITAL ENCOUNTER (OUTPATIENT)
Dept: BEHAVIORAL HEALTH | Facility: CLINIC | Age: 34
End: 2019-08-04
Attending: FAMILY MEDICINE
Payer: COMMERCIAL

## 2019-08-04 PROCEDURE — H2035 A/D TX PROGRAM, PER HOUR: HCPCS

## 2019-08-04 PROCEDURE — 10020000 ZZH LODGING PLUS FACILITY CHARGE ADULT

## 2019-08-05 ENCOUNTER — HOSPITAL ENCOUNTER (OUTPATIENT)
Dept: BEHAVIORAL HEALTH | Facility: CLINIC | Age: 34
End: 2019-08-05
Attending: FAMILY MEDICINE
Payer: COMMERCIAL

## 2019-08-05 PROCEDURE — H2035 A/D TX PROGRAM, PER HOUR: HCPCS

## 2019-08-05 PROCEDURE — 10020000 ZZH LODGING PLUS FACILITY CHARGE ADULT

## 2019-08-05 PROCEDURE — H2035 A/D TX PROGRAM, PER HOUR: HCPCS | Mod: HQ

## 2019-08-06 ENCOUNTER — HOSPITAL ENCOUNTER (OUTPATIENT)
Dept: BEHAVIORAL HEALTH | Facility: CLINIC | Age: 34
End: 2019-08-06
Attending: FAMILY MEDICINE
Payer: COMMERCIAL

## 2019-08-06 PROCEDURE — H2035 A/D TX PROGRAM, PER HOUR: HCPCS | Mod: HQ

## 2019-08-06 PROCEDURE — 10020000 ZZH LODGING PLUS FACILITY CHARGE ADULT

## 2019-08-06 PROCEDURE — H2035 A/D TX PROGRAM, PER HOUR: HCPCS

## 2019-08-07 ENCOUNTER — HOSPITAL ENCOUNTER (OUTPATIENT)
Dept: BEHAVIORAL HEALTH | Facility: CLINIC | Age: 34
End: 2019-08-07
Attending: FAMILY MEDICINE
Payer: COMMERCIAL

## 2019-08-07 PROCEDURE — H2035 A/D TX PROGRAM, PER HOUR: HCPCS

## 2019-08-07 PROCEDURE — H2035 A/D TX PROGRAM, PER HOUR: HCPCS | Mod: HQ

## 2019-08-07 PROCEDURE — 10020000 ZZH LODGING PLUS FACILITY CHARGE ADULT

## 2019-08-08 ENCOUNTER — HOSPITAL ENCOUNTER (OUTPATIENT)
Dept: BEHAVIORAL HEALTH | Facility: CLINIC | Age: 34
End: 2019-08-08
Attending: FAMILY MEDICINE
Payer: COMMERCIAL

## 2019-08-08 PROCEDURE — 10020000 ZZH LODGING PLUS FACILITY CHARGE ADULT

## 2019-08-08 PROCEDURE — H2035 A/D TX PROGRAM, PER HOUR: HCPCS | Mod: HQ

## 2019-08-08 PROCEDURE — H2035 A/D TX PROGRAM, PER HOUR: HCPCS

## 2019-08-08 NOTE — PROGRESS NOTES
Patient:  Nikhil Rios            Adult CD Progress Note and Treatment Plan Review     Attendance  Please refer to OP [facility] CD Adult Attendance Record Documentation Flowsheet    Support group attended this week: yes    Reporting sobriety:  yes    Treatment Plan     Treatment Plan Review competed on: 8/8/2019    Client preferred learning style:   Visual  Hands on  Demonstration    Staff Members contributing CITLALLI Avery                         Received Supervision: None    Client: contributed to goals and plan.    Client received copy of plan/revised plan: Yes    Client agrees with plan/revised plan: Yes    Changes to Treatment Plan: None    New Goals added since last review No additional Goals added    Goals worked on since last review Patient is working to identify the differences between triggers and warning signs. Patient is also working to identify alternatives for addictive behaviors.      Strategies effective: yes    Strategies need these changes: No changes needed at this time    1) Care Coordination Activities:  Patient signed MITRA for [facility] and information was faxed over  2) Medical, Mental Health and other appointments the client attended: Patient attended individual therapy with staff therapist.  3) Medication issues: No concerns at this time  4) Physical and mental health problems: Patient denies any concerns  5) Any changes in Vulnerable Adult Status?  No If yes, add to treatment plan and individual abuse prevention plan.  6) Review and evaluation of the individual abuse prevention plan: Current IAPP for this program is adequate for this client    ASAM Risk Rating:    Dimension 1, 0: Patient reports his substance of use as Alcohol. Patient reports his last date of use as 7/14/2019. Patient denies any withdrawal symptoms that would interfere with full participation in treatment programming at this time. Patient will continue to be monitored throughout treatment.     Dimension 2,  "0: Patient reports medical diagnoses of allergies and hypertension. Patient reports that these diagnoses do not interfere with full participation in treatment programming. Due to this, patient's risk rating was lowered from \"1\" to \"0\". Patient reports that he is currently medication compliant. Patient appears able to access medical aid as needed.  Patient will continue to be monitored throughout treatment. Patient attended staff RN lecture on 8/4/2019.    Dimension 3, 2: Patient denies a formal mental health diagnosis.Patient met with staff therapist Yasmeen Brown. Patient reports this as a healthy therapeutic relationship. Patient reports an increase in feelings of boredom. Upon admissions patient was given a suicidal risk screening. Patient was rated as \"Very Low Risk\". Patient denies any suicidal thoughts or ideations at this time. Patient will continue to be monitored throughout treatment.     Dimension 4, 1: Patient continues to present as motivated for recovery. Patient is encouraging to peers and presents as open to feedback from both counselors and peers.     Dimension 5, 4: Upon admission Patient denies any significant periods of sobriety.Patient is working with counselor to secure outpatient programming. Patient signed an MITRA for [facility] but, at the time of this note, has not heard back. Patient is working to complete assignment titled \"Alternatives to Addictive Behavior\". Patient attended and participated in a relapse prevention workshop. Patient focused on identifying the differences between relapse triggers and warning signs.     Dimension 6, 2: Upon admission patient reports living in independent housing and plans to return following treatment. Upon admission patient reports being unemployed. Patient denies any legal concerns at this time. Patient continues to attend 12-step meetings and have positive interactions with male peers.     Guide to Risk Ratings for Suicidality: "   IDEATION: Active thoughts of suicide? INTENT: Intent to follow on suicide? PLAN: Plan to follow through on suicide? Level of Risk:   IF Yes Yes Yes Patient = High Emergent   IF Yes Yes No Patient = High Urgent/Non-Emergent   IF Yes No No Patient = Moderate Non-Urgent   IF No No   No Patient = Low Risk   The patient's ADDITIONAL RISK FACTORS and lack of PROTECTIVE FACTORS may increase their overall suicide risk ratings.     Patient's/client's current risk rating:  Low Risk    Family Involvement:   Patient declined to participate in family week programming.    Data:   offered feedback   good insight   client did actively participate    Intervention:   Aftercare planning  Cognitive Behavioral Therapy  Counselor feedback  Education  Emotional management  Group feedback  Relapse prevention  Twelve Step facilitation  Mental health education    Assessment:   Stages of Change Model  Precontemplation    Appears/Sounds:  Cooperative  Motivated  Engaged    Plan:  Focus on recovery environment  Monitor emotional/physical health  Continue working through treatment plan goals.       CITLALLI Avery

## 2019-08-09 ENCOUNTER — HOSPITAL ENCOUNTER (OUTPATIENT)
Dept: BEHAVIORAL HEALTH | Facility: CLINIC | Age: 34
End: 2019-08-09
Attending: FAMILY MEDICINE
Payer: COMMERCIAL

## 2019-08-09 PROCEDURE — H2035 A/D TX PROGRAM, PER HOUR: HCPCS | Mod: HQ

## 2019-08-09 PROCEDURE — 10020000 ZZH LODGING PLUS FACILITY CHARGE ADULT

## 2019-08-09 PROCEDURE — H2035 A/D TX PROGRAM, PER HOUR: HCPCS

## 2019-08-09 NOTE — PROGRESS NOTES
Care Coordination Note  8/9/2019 11:43 AM  Length of Care Coordination service: 5 minutes    Person(s) contacted/involved: [facility]  Type of assistance provided: Facilitation of referrals to substance use disorder services.  Narrative/Outcome:   Patient signed MITRA for [facility]. Information was faxed and received. Patient accepted into their program. Start date and time is 10:00am on monday 8/19/2019. Phone: [phone]    CITLALLI Avery

## 2019-08-10 ENCOUNTER — HOSPITAL ENCOUNTER (OUTPATIENT)
Dept: BEHAVIORAL HEALTH | Facility: CLINIC | Age: 34
End: 2019-08-10
Attending: FAMILY MEDICINE
Payer: COMMERCIAL

## 2019-08-10 DIAGNOSIS — F41.9 ANXIETY: Primary | ICD-10-CM

## 2019-08-10 DIAGNOSIS — I24.9 ACS (ACUTE CORONARY SYNDROME) (H): ICD-10-CM

## 2019-08-10 DIAGNOSIS — F19.20 CHEMICAL DEPENDENCY (H): ICD-10-CM

## 2019-08-10 PROCEDURE — 10020000 ZZH LODGING PLUS FACILITY CHARGE ADULT

## 2019-08-10 PROCEDURE — H2035 A/D TX PROGRAM, PER HOUR: HCPCS

## 2019-08-10 RX ORDER — ASPIRIN 81 MG/1
81 TABLET, CHEWABLE ORAL DAILY
Qty: 30 TABLET | Refills: 0 | Status: ON HOLD | OUTPATIENT
Start: 2019-08-10 | End: 2020-01-02

## 2019-08-10 RX ORDER — HYDROXYZINE HYDROCHLORIDE 25 MG/1
25 TABLET, FILM COATED ORAL 3 TIMES DAILY PRN
Qty: 90 TABLET | Refills: 0 | Status: ON HOLD | OUTPATIENT
Start: 2019-08-10 | End: 2020-01-02

## 2019-08-10 RX ORDER — AMLODIPINE BESYLATE 5 MG/1
5 TABLET ORAL DAILY
Qty: 30 TABLET | Refills: 0 | Status: ON HOLD | OUTPATIENT
Start: 2019-08-10 | End: 2020-01-02

## 2019-08-10 RX ORDER — MULTIPLE VITAMINS W/ MINERALS TAB 9MG-400MCG
1 TAB ORAL DAILY
Qty: 30 TABLET | Refills: 0 | Status: ON HOLD | OUTPATIENT
Start: 2019-08-10 | End: 2020-01-02

## 2019-08-10 RX ORDER — CLONIDINE HYDROCHLORIDE 0.1 MG/1
0.1 TABLET ORAL 2 TIMES DAILY
Qty: 60 TABLET | Refills: 0 | Status: ON HOLD | OUTPATIENT
Start: 2019-08-10 | End: 2020-01-02

## 2019-08-10 NOTE — IP AVS SNAPSHOT
Medication List       Patient:  QUYEN QUIROZ   :  1985   Physician:  No Ref-Primary, Physician           This is your record.  Keep this with you and show to your community pharmacist(s) and physician(s) at each visit.     Allergies:  POLLEN EXTRACT - Rash               Medications  Valid as of: August 10, 2019 -  8:06 AM    Generic Name Brand Name Tablet Size Instructions for use    amLODIPine Besylate NORVASC 5 MG Take 1 tablet (5 mg) by mouth daily        Aspirin ASA 81 MG Take 1 tablet (81 mg) by mouth daily        cloNIDine HCl CATAPRES 0.1 MG Take 1 tablet (0.1 mg) by mouth 2 times daily        Dextran 70-Hypromellose ARTIFICAL TEARS 0.1-0.3 % Place 1 drop into both eyes every hour as needed for dry eyes        hydrOXYzine HCl ATARAX 25 MG Take 1 tablet (25 mg) by mouth 3 times daily as needed for itching or other (allergies)        Ketotifen Fumarate ZADITOR/REFRESH ANTI-ITCH 0.025 % Place 1 drop into both eyes daily as needed for itching or dry eyes        Multiple Vitamins-Minerals THERA-VIT-M  Take 1 tablet by mouth daily        .           .           .           .

## 2019-08-10 NOTE — IP AVS SNAPSHOT
"                  MRN:[id number]                      After Visit Summary   8/10/2019    Nikhil Rios    MRN: [id number]           Visit Information        Provider Department      8/10/2019  7:15 AM ADULT LODGING PLUS A [facility]        Your next 10 appointments already scheduled    Aug 11, 2019  7:15 AM CDT  Treatment with ADULT LODGING PLUS A  [facility] ([facility]) [address] [place] [phone]  [phone]      Aug 12, 2019  7:15 AM CDT  Treatment with ADULT LODGING PLUS A  [facility] ([facility]) [address] [place] [id number]  [phone]      Aug 13, 2019  7:15 AM CDT  Treatment with ADULT LODGING PLUS A  [facility] ([facility]) [address] [place] [id number]  [phone]      Aug 14, 2019  7:15 AM CDT  Treatment with ADULT LODGING PLUS A  [facility] ([facility]) [address] [place] [phone]  [phone]      Aug 15, 2019  7:15 AM CDT  Treatment with ADULT LODGING PLUS A  [facility] ([facility]) [address] [place] [id number]  [phone]         MyChart Information    [employer] lets you send messages to your doctor, view your test results, renew your prescriptions, schedule appointments and more. To sign up, go to [email or web address] . Click on \"Log in\" on the left side of the screen, which will take you to the Welcome page. Then click on \"Sign up Now\" on the right side of the page.     You will be asked to enter the access code listed below, as well as some personal information. Please follow the directions to create your username and password.     Your access code " is: [email or web address]  Expires: 10/9/2019  8:06 AM     Your access code will  in 60 days. If you need help or a new code, please call your [facility] clinic or [phone].       Care EveryWhere ID    This is your Care EveryWhere ID. This could be used by other organizations to access your [facility] medical records  [id number]       Equal Access to Services    HAMMAD BURDEN : Faheem Nicole, ny ceballos, carlo kaalmaedgardo duncan, laureano leong . So [facility] [phone].    ATENCIÓN: Si habla español, tiene a alarcon disposición servicios gratuitos de asistencia lingüística. Llame al [phone].    We comply with applicable federal civil rights laws and [place] laws. We do not discriminate on the basis of race, color, national origin, age, disability, sex, sexual orientation, or gender identity.

## 2019-08-10 NOTE — PROGRESS NOTES
Nursing Discharge Planning Meeting    Writer completed discharge planning meeting with patient. Discharge is planned for 8/16/19. Pt will return home with outpatient programming.     Discussed appropriate follow up care to manage MATHEW, MH, and to obtain medication refills. Patient given a copy of their current medications for reference. Questions were answered at this time and the patient verbalized an understanding of the post-discharge follow up plan.    Patient has an appt with PCP to establish care and manage refills on 8/27/19. Pt was started on multiple medications at detox 3A and will be out before appt. Specifically, Pt is out of Hydroxyzine and will be out of all other medications 8/18. RN sent message to Dr. Rosa addressing need for refills.     Continue to support patient in discharge planning as needed to assure appropriate continuity of care.     Tobacco Cessation  Patient participated in the nicotine replacement therapy for tobacco cessation or reduction during their treatment programming: No    The patient was provided with community resources for follow-up to continue tobacco cessation support once in the community. Also the patient was encouraged to discuss their tobacco cessation efforts with the primary care provider.

## 2019-08-11 ENCOUNTER — HOSPITAL ENCOUNTER (OUTPATIENT)
Dept: BEHAVIORAL HEALTH | Facility: CLINIC | Age: 34
End: 2019-08-11
Attending: FAMILY MEDICINE
Payer: COMMERCIAL

## 2019-08-11 PROCEDURE — 10020000 ZZH LODGING PLUS FACILITY CHARGE ADULT

## 2019-08-11 PROCEDURE — H2035 A/D TX PROGRAM, PER HOUR: HCPCS

## 2019-08-12 ENCOUNTER — HOSPITAL ENCOUNTER (OUTPATIENT)
Dept: BEHAVIORAL HEALTH | Facility: CLINIC | Age: 34
End: 2019-08-12
Attending: FAMILY MEDICINE
Payer: COMMERCIAL

## 2019-08-12 PROCEDURE — H2035 A/D TX PROGRAM, PER HOUR: HCPCS | Mod: HQ

## 2019-08-12 PROCEDURE — H2035 A/D TX PROGRAM, PER HOUR: HCPCS

## 2019-08-12 PROCEDURE — 10020000 ZZH LODGING PLUS FACILITY CHARGE ADULT

## 2019-08-13 ENCOUNTER — HOSPITAL ENCOUNTER (OUTPATIENT)
Dept: BEHAVIORAL HEALTH | Facility: CLINIC | Age: 34
End: 2019-08-13
Attending: FAMILY MEDICINE
Payer: COMMERCIAL

## 2019-08-13 PROCEDURE — H2035 A/D TX PROGRAM, PER HOUR: HCPCS | Mod: HQ

## 2019-08-13 PROCEDURE — H2035 A/D TX PROGRAM, PER HOUR: HCPCS

## 2019-08-13 PROCEDURE — 10020000 ZZH LODGING PLUS FACILITY CHARGE ADULT

## 2019-08-13 NOTE — PROGRESS NOTES
INDIVIDUAL SESSION SUMMARY    D) Met with client on 8/13/19 from 12:30pm-1:30pm.     Client discussed their recovery plan. Client expressed an understanding of the risks involved in having a minimal recovery plan. Client feels cautious and confident in their ability to stay sober without a strong sober support network and sponsor. Client and therapists discussed the risks involved in the patients recovery plan, the reasons as to why the client does not feel the need to have a strong sober support system, and the defense mechanisms that are being utilized by the client. Client and therapist discussed the consequences of the patients chemical use, along with their hopes for their future.    I) Individual session with client. Provided client with verbal interventions including: curiosity and support. Discussed the benefits of: sober support, self awareness, humility, and insight. Provided psycho-education on: defense mechanisms and relapse prevention.  Discussed the importance of recovery behaviors such as utilizing sponsorship, sober support network, going to meetings, daily rituals, and goal setting. Highlighted client's strengths including: intellect and motivation.      A) Client appears to have experienced early attachment disruption, resulting in lack of trust and emotional awareness, loss of self accountability. Client appears emotionally constricted and to lack skills for emotional regulation and stress management. Client appears to lack a sober support network. Client lacks daily structure. Client appears to have moderate motivation at this time and would benefit from continuing support to help with relapse prevention, emotional regulation, impulse control, and increasing self-esteem. Client tends towards self-defeating, self-sabotaging patterns as demonstrated by not connecting with people and sole use of their defenses.     P) No future sessions scheduled. This therapist has provided the client with several  therapist referrals to contact in the community.    Yasmeen Brown, Student Intern  8/13/2019

## 2019-08-14 ENCOUNTER — HOSPITAL ENCOUNTER (OUTPATIENT)
Dept: BEHAVIORAL HEALTH | Facility: CLINIC | Age: 34
End: 2019-08-14
Attending: FAMILY MEDICINE
Payer: COMMERCIAL

## 2019-08-14 PROCEDURE — H2035 A/D TX PROGRAM, PER HOUR: HCPCS | Mod: HQ

## 2019-08-14 PROCEDURE — 10020000 ZZH LODGING PLUS FACILITY CHARGE ADULT

## 2019-08-14 PROCEDURE — H2035 A/D TX PROGRAM, PER HOUR: HCPCS

## 2019-08-15 ENCOUNTER — HOSPITAL ENCOUNTER (OUTPATIENT)
Dept: BEHAVIORAL HEALTH | Facility: CLINIC | Age: 34
End: 2019-08-15
Attending: FAMILY MEDICINE
Payer: COMMERCIAL

## 2019-08-15 PROCEDURE — 10020000 ZZH LODGING PLUS FACILITY CHARGE ADULT

## 2019-08-15 PROCEDURE — H2035 A/D TX PROGRAM, PER HOUR: HCPCS

## 2019-08-15 PROCEDURE — H2035 A/D TX PROGRAM, PER HOUR: HCPCS | Mod: HQ

## 2019-08-16 NOTE — PROGRESS NOTES
[address] and [address]oors  [place] [address]      Nikhil Rios, 1985, was admitted for evaluation/treatment of chemical dependency at [facility].  This person took part in these program(s):    ______ The Inpatient Program   ______ The Outpatient Program   ___X__ The Lodging Plus Program   ______ Lodging Day Outpatient     Date admitted: 07/19/2019  Date discharged: 08/16/2019    Type of discharge:   ___X__ Satisfactory - completed evaluation / treatment   ______ Discharged without completing   ______ Behavioral discharge   ______ Transferred to another chemical dependency program   ______ Transferred to another type of service   ______ Left against medical advice (AMA) / [place]     Comments:   -Maintain abstinence from all mood altering substances  -Attend a minimum of 2 AA/NA/Support meetings each week  -Maintain regular contact with male sponsor/mentor  -Maintain medication compliance  -Remain law abiding   -Attend all medical appointments and follow all recommendations  -Obtain a mental health clinician and follow all recommendations  -Admit into [facility] and follow recommendations   -Date: 8/19/2019   -Time: 10:00am orientation   -Phone: [phone]   -Location: [address] [place] [address]      Counselor: CITLALLI Avery                       Date: 8/16/2019             Time: 7:10 AM

## 2019-08-16 NOTE — PROGRESS NOTES
[facility]     Patient: Nikhil Rios  MRN: [id number]   : 1985 Age: 33 year old Sex: male   -  Focus of Treatment / Discharge Recommendations  Personal Safety/ Management of Symptoms    * Follow your safety plan.  Report increased symptoms to your care team and /or go to the nearest Emergency Department.   Call crisis lines as needed:       [facility] [phone]                [facility] [phone]    [facility] [phone]               Crisis Connection [phone]    [facility] [phone]              [facility] [phone]    [facility] [phone]          National Suicide Prevention [phone]    [facility] [phone]            Suicide Prevention [phone]    [facility] [phone]    Abstinence/Relapse Prevention  * Take all medicines as directed.  Carry a current list of medicines with you.  * Use coping skills: nutrition, rest, exercise, spirituality, journal, meditation, engage in activities you enjoy, seek sober support  * Do not use illicit (street) drugs, controlled substances (narcotics) or alcohol.    Develop/Improve Independent Living/Socialization Skills: ensure living environment is conducive to recovery    Community Resources/Supports:     [facility] Alcoholics Anonymous:  [phone] ( 24 hours a day)    [place]  Alcoholics Anonymous : [phone]    Narcotics Anonymous : [address] ( [phone])    [place] Recovery Connection: Free recovery resources.  [phone]    Access chats, online meetings, discussions and healthy check-in activities any day, any time, from anywhere. Participate as anonymously as you like. In order to access this resource ,  Go to: [email or web address]   Click on the In recovery tab. Then click on Social Community. Click on The daily Pledge: people helping people  to join.      Discharge Planning:  -Maintain abstinence from  all mood altering substances  -Attend a minimum of 2 AA/NA/Support meetings each week  -Maintain regular contact with male sponsor/mentor  -Maintain medication compliance  -Remain law abiding   -Attend all medical appointments and follow all recommendations  -Obtain a mental health clinician and follow all recommendations  -Admit into [facility] and follow recommendations   -Date: 8/19/2019   -Time: 10:00am orientation   -Phone: [phone]   -Location: [address] [place] [address]       Client Signature:_______________________   Date / Time:___________    Staff Signature:________________________   Date / Time:___________

## 2019-08-16 NOTE — DISCHARGE SUMMARY
CHEMICAL DEPENDENCY DISCHARGE SUMMARY    PATIENT NAME:  Nikhil Rios  :  1985    EVALUATION COUNSELOR: CITLALLI Quevedo  TREATMENT COUNSELORS: CITLALLI Avery  REFERRAL SOURCE:  Self  PROGRAM:  [facility] Depend[facility]  ADMISSION DATE: 2019  DATE OF LAST SESSION: 08/15/2019  DISCHARGE DATE: 2019  ADMISSION DIAGNOSIS:    303.90 (F10.20) - Alcohol Use Disorder Severe    DISCHARGE DIAGNOSIS:  303.90 (F10.20) - Alcohol Use Disorder Severe    DISCHARGE STATUS: Patient completed the [facility] with staff approval  LAST USE DATE: Patient reported last date of use as 2019  DAYS OF TREATMENT COMPLETED:  Patient completed 28 days of treatment    PRESENTING INFORMATION:  Patient entered into [facility] on 2019. Patient transferred directly to [facility] on 2019. Patient reported having entered into one outpatient treatment prior to [facility], but no inpatient facilities. Patient denied any significant periods of sobriety. Patient denied any formal mental health diagnoses.    READMITTANCE INFORMATION: If additional substance use treatment is required, patient may re-admit into the [facility] program following 30 days from date of discharge.     SERVICES PROVIDED: Our services included assessment, treatment planning and education regarding chemical dependency, mental illness, relationships, and relapse prevention. The patient also participated in individual therapy, group therapy, recovery oriented workshops, spiritual care counseling, recovery skills training and aftercare planning.    ISSUES ADDRESS IN TREATMENT:    DIMENSION 1 - ACUTE INTOXICATION/WITHDRAWAL POTENTIAL  ADMISSION RISK RATIN  DISCHARGE RISK RATIN  Patient entered into [facility] on 2019. Patient transferred directly to [facility] on 2019. Patient reported his substance of use as Alcohol. Patient reported  "his last date of use as 2019. Patient denied any withdrawal symptoms throughout treatment.     DIMENSION 2 - BIOMEDICAL COMPLICATIONS AND CONDITIONS  ADMISSION RISK RATIN  DISCHARGE RISK RATIN  Upon admission patient reported having high blood pressure from use. Throughout treatment this resolved. Patient maintained medication compliance throughout treatment. At time of discharge, patient appeared able to access medical aid as needed.     DIMENSION 3 - EMOTIONAL, BEHAVIORAL, COGNITIVE CONDITIONS AND COMPLICATIONS  ADMISSION RISK RATIN  DISCHARGE RISK RATIN  Upon admission patient denied a formal mental health diagnosis. Upon admission patient reported increased stress due to unemployment. To address this concern patient met with staff therapist Yasmeen Brown. Patient reported this as a healthy therapeutic relationship. Patient was able to address his stress in a manner supportive to recovery through skills such as exercise. Due to developing skills to manage his stress, patient's risk rating was lowered from \"2\" to \"1\". Upon admission patient was given a suicidal risk screening. Patient was rated as \"Very Low Risk\". Upon discharge patient denied any suicidal thoughts or ideations.     DIMENSION 4 - READINESS FOR CHANGE  ADMISSION RISK RATIN  DISCHARGE RISK RATIN  Upon admission patient reported that his primary motivation for treatment was a desire to get better so he could return to work. Patient presented with minimal internal motivation for sobriety. To support patient in increasing his overall internal motivation, patient completed and presented assignment titled \"Drug Use History\". Patient focused on identifying the various consequences of his use so as to increase his overall motivation for sobriety and avoid future consequences. Patient also completed and presented assignment titled \"3 by 3\". Patient focused on identifying what his life would look like in three years if he " "remained sober versus three years if he continued to use. Patient worked to identify the positive impact that sobriety could have on his life. Lastly, patient completed and presented assignment titled \"First Step\" in which patient focused on identifying what it meant to be powerless to your addiction. Throughout treatment patient's understanding of the disease of addiction increased as patient was able to identify his addictive tendencies. Due to the above, patient's risk rating was lowered from \"2\" to \"1\".     DIMENSION 5 - RELAPSE, CONTINUED USE AND CONTINUED PROBLEM POTENTIAL   ADMISSION RISK RATIN  DISCHARGE RISK RATING: 3  Upon admission patient reported having attended one outpatient treatment prior to entering into [facility]. Patient denied any significant periods of sobriety. To support patient in obtaining extended sobriety, patient worked with staff counselor to review aftercare options. At time of discharge, patient secured outpatient programming through [facility] to begin on 2019. Due to patient denying any significant periods of sobriety, patient also presented with limited insight into relapse triggers and warning signs. To first support patient in identifying the differences between these two terms, Patient attended and participated in two relapse prevention workshops. Patient worked to identify coping skills to reduce the overall impact of individual triggers. patient also completed and presented assignment titled \"Relpse Prevention Plan\" which asked that patient combine all of his coping skills into one packet to have easy access once he has completed [facility]. Upon admission patient reported that boredom was difficult for him and supported his continued use. To address this, patient first completed assignment titled \"Self-Sabotage\". Patient focused on identifying various behaviors and thought patterns that had supported past use. Patient also completed and presented " "assignment titled \"Alternatives to Addictive Behavior\" in which patient focused on identifying the benefits of using so as to achieve these benefits in a manner supportive of recovery. Lastly, patient completed and presented assignment titled \"Feelings and Defenses\". As it was patient's first inpatient treatment, patient presented with multiple defense mechanisms. Through this assignment patient focused on reducing the use of these mechanisms. Due to patient securing aftercare programming, and due to patient having identified various warning singes, patient's risk rating was lowered from \"4\" to \"3\".     DIMENSION 6 - RECOVERY ENVIRONMENT  ADMISSION RISK RATIN  DISCHARGE RISK RATIN  Upon admission patient reported having independent housing which he planned to return to following treatment. Patient reported being unemployed upon entering treatment. To address this patient completed and presented assignment titled \"Setting and Pursuing Goals\" in which patient focused on identifying specific steps to gain employment. Patient was also giving the assignment titled \"Grandiosoty\" to review due to patient being uncertain about gaining employment at a lower level than previous jobs. Due to no significant periods of sobriety patient presented with a minimal sober support network. To support patient in growing this, patient was required to attend  Three AA/NA meetings each week. At time of admission patient reported damaged relationships due to his use. To begin healing these relationships, patient attended and participated in two relationships workshops. Patient focused on identifying the various relational boundaries and healthy communication skills.     STRENGTHS: Patient presented as highly intelligent. Patient was honest and engaged in group therapy. Patient was supportive of peers and timely on all groups and assignments.     LIVING ARRANGEMENTS AT DISCHARGE: Patient will return to his independent " housing.    PROGNOSIS:  Prognosis for this patient is Favorable at this time. This can be maintained if the patient follows the continuing care recommendations below.      CONTINUING CARE RECOMMENDATIONS AND REFERRALS:  1. Maintain abstinence from all mood altering substances  2. Attend a minimum of 2 AA/NA/Support meetings each week  3. Maintain regular contact with male sponsor/mentor  4. Maintain medication compliance  5. Remain law abiding   6. Attend all medical appointments and follow all recommendations  7. Obtain a mental health clinician and follow all recommendations  8. Admit into [facility] and follow recommendations              -Date: 8/19/2019              -Time: 10:00am orientation              -Phone: [phone]              -Location: [address] [place] [address]      This information has been disclosed to you from records protected by Federal confidentiality rules (42 CFR part 2). The Federal rules prohibit you from making any further disclosure of this information unless further disclosure is expressly permitted by the written consent of the person to whom it pertains or as otherwise permitted by 42 CFR part 2. A general authorization for the release of medical or other information is NOT sufficient for this purpose. The Federal rules restrict any use of the information to criminally investigate or prosecute any alcohol or drug abuse patient.       CITLALLI Avery

## 2019-08-27 ENCOUNTER — COMMUNICATION - HEALTHEAST (OUTPATIENT)
Dept: TELEHEALTH | Facility: CLINIC | Age: 34
End: 2019-08-27

## 2019-08-27 ENCOUNTER — OFFICE VISIT - HEALTHEAST (OUTPATIENT)
Dept: FAMILY MEDICINE | Facility: CLINIC | Age: 34
End: 2019-08-27

## 2019-08-27 DIAGNOSIS — I10 HYPERTENSION, UNSPECIFIED TYPE: ICD-10-CM

## 2019-08-27 DIAGNOSIS — F10.20 ALCOHOL DEPENDENCE, CONTINUOUS DRINKING BEHAVIOR (H): ICD-10-CM

## 2019-08-27 DIAGNOSIS — F41.9 ANXIETY: ICD-10-CM

## 2019-08-27 ASSESSMENT — MIFFLIN-ST. JEOR: SCORE: 1969.13

## 2019-09-10 ENCOUNTER — COMMUNICATION - HEALTHEAST (OUTPATIENT)
Dept: INTERNAL MEDICINE | Facility: CLINIC | Age: 34
End: 2019-09-10

## 2019-10-28 ENCOUNTER — COMMUNICATION - HEALTHEAST (OUTPATIENT)
Dept: PHARMACY | Facility: CLINIC | Age: 34
End: 2019-10-28

## 2019-12-20 ENCOUNTER — TRANSFERRED RECORDS (OUTPATIENT)
Dept: HEALTH INFORMATION MANAGEMENT | Facility: CLINIC | Age: 34
End: 2019-12-20

## 2020-01-01 ENCOUNTER — HOSPITAL ENCOUNTER (INPATIENT)
Facility: CLINIC | Age: 35
LOS: 1 days | Discharge: PSYCHIATRIC OR SUBSTANCE USE FACILITY | DRG: 897 | End: 2020-01-02
Attending: EMERGENCY MEDICINE | Admitting: PSYCHIATRY & NEUROLOGY
Payer: COMMERCIAL

## 2020-01-01 DIAGNOSIS — F10.920 ALCOHOLIC INTOXICATION WITHOUT COMPLICATION (H): ICD-10-CM

## 2020-01-01 DIAGNOSIS — F10.939 ALCOHOL WITHDRAWAL WITH COMPLICATION WITH INPATIENT TREATMENT, WITH UNSPECIFIED COMPLICATION (H): Primary | ICD-10-CM

## 2020-01-01 LAB
ALBUMIN SERPL-MCNC: 3.5 G/DL (ref 3.4–5)
ALCOHOL BREATH TEST: 0.29 (ref 0–0.01)
ALP SERPL-CCNC: 81 U/L (ref 40–150)
ALT SERPL W P-5'-P-CCNC: 68 U/L (ref 0–70)
AMPHETAMINES UR QL SCN: NEGATIVE
ANION GAP SERPL CALCULATED.3IONS-SCNC: 9 MMOL/L (ref 3–14)
AST SERPL W P-5'-P-CCNC: 72 U/L (ref 0–45)
AST SERPL W P-5'-P-CCNC: ABNORMAL U/L (ref 0–45)
BARBITURATES UR QL: NEGATIVE
BASOPHILS # BLD AUTO: 0.1 10E9/L (ref 0–0.2)
BASOPHILS NFR BLD AUTO: 0.9 %
BENZODIAZ UR QL: POSITIVE
BILIRUB SERPL-MCNC: 0.2 MG/DL (ref 0.2–1.3)
BUN SERPL-MCNC: 7 MG/DL (ref 7–30)
CALCIUM SERPL-MCNC: 8.2 MG/DL (ref 8.5–10.1)
CANNABINOIDS UR QL SCN: NEGATIVE
CHLORIDE SERPL-SCNC: 108 MMOL/L (ref 94–109)
CHOLEST SERPL-MCNC: 247 MG/DL
CO2 SERPL-SCNC: 25 MMOL/L (ref 20–32)
COCAINE UR QL: NEGATIVE
CREAT SERPL-MCNC: 0.79 MG/DL (ref 0.66–1.25)
DIFFERENTIAL METHOD BLD: ABNORMAL
EOSINOPHIL # BLD AUTO: 0.5 10E9/L (ref 0–0.7)
EOSINOPHIL NFR BLD AUTO: 6.1 %
ERYTHROCYTE [DISTWIDTH] IN BLOOD BY AUTOMATED COUNT: 14.1 % (ref 10–15)
ETHANOL SERPL-MCNC: 0.31 G/DL
ETHANOL UR QL SCN: POSITIVE
GFR SERPL CREATININE-BSD FRML MDRD: >90 ML/MIN/{1.73_M2}
GGT SERPL-CCNC: 254 U/L (ref 0–75)
GLUCOSE SERPL-MCNC: 121 MG/DL (ref 70–99)
HCT VFR BLD AUTO: 39.2 % (ref 40–53)
HDLC SERPL-MCNC: 29 MG/DL
HGB BLD-MCNC: 12.8 G/DL (ref 13.3–17.7)
IMM GRANULOCYTES # BLD: 0 10E9/L (ref 0–0.4)
IMM GRANULOCYTES NFR BLD: 0.3 %
LDLC SERPL CALC-MCNC: ABNORMAL MG/DL
LIPASE SERPL-CCNC: 980 U/L (ref 73–393)
LYMPHOCYTES # BLD AUTO: 3.4 10E9/L (ref 0.8–5.3)
LYMPHOCYTES NFR BLD AUTO: 46 %
MCH RBC QN AUTO: 31.9 PG (ref 26.5–33)
MCHC RBC AUTO-ENTMCNC: 32.7 G/DL (ref 31.5–36.5)
MCV RBC AUTO: 98 FL (ref 78–100)
MONOCYTES # BLD AUTO: 0.5 10E9/L (ref 0–1.3)
MONOCYTES NFR BLD AUTO: 6.5 %
NEUTROPHILS # BLD AUTO: 3 10E9/L (ref 1.6–8.3)
NEUTROPHILS NFR BLD AUTO: 40.2 %
NONHDLC SERPL-MCNC: 218 MG/DL
NRBC # BLD AUTO: 0 10*3/UL
NRBC BLD AUTO-RTO: 1 /100
OPIATES UR QL SCN: NEGATIVE
PLATELET # BLD AUTO: 229 10E9/L (ref 150–450)
POTASSIUM SERPL-SCNC: 4.2 MMOL/L (ref 3.4–5.3)
PROT SERPL-MCNC: 7.5 G/DL (ref 6.8–8.8)
RBC # BLD AUTO: 4.01 10E12/L (ref 4.4–5.9)
SODIUM SERPL-SCNC: 142 MMOL/L (ref 133–144)
T4 FREE SERPL-MCNC: 0.71 NG/DL (ref 0.76–1.46)
TRIGL SERPL-MCNC: 671 MG/DL
TSH SERPL DL<=0.005 MIU/L-ACNC: 4.01 MU/L (ref 0.4–4)
VIT B12 SERPL-MCNC: 345 PG/ML (ref 193–986)
WBC # BLD AUTO: 7.4 10E9/L (ref 4–11)

## 2020-01-01 PROCEDURE — 83690 ASSAY OF LIPASE: CPT | Performed by: EMERGENCY MEDICINE

## 2020-01-01 PROCEDURE — 80320 DRUG SCREEN QUANTALCOHOLS: CPT | Performed by: EMERGENCY MEDICINE

## 2020-01-01 PROCEDURE — 82607 VITAMIN B-12: CPT | Performed by: CLINICAL NURSE SPECIALIST

## 2020-01-01 PROCEDURE — 36415 COLL VENOUS BLD VENIPUNCTURE: CPT | Performed by: CLINICAL NURSE SPECIALIST

## 2020-01-01 PROCEDURE — 84443 ASSAY THYROID STIM HORMONE: CPT | Performed by: CLINICAL NURSE SPECIALIST

## 2020-01-01 PROCEDURE — 96365 THER/PROPH/DIAG IV INF INIT: CPT | Performed by: EMERGENCY MEDICINE

## 2020-01-01 PROCEDURE — 80307 DRUG TEST PRSMV CHEM ANLYZR: CPT | Performed by: EMERGENCY MEDICINE

## 2020-01-01 PROCEDURE — 12800008 ZZH R&B CD ADULT

## 2020-01-01 PROCEDURE — 85025 COMPLETE CBC W/AUTO DIFF WBC: CPT | Performed by: EMERGENCY MEDICINE

## 2020-01-01 PROCEDURE — 25000132 ZZH RX MED GY IP 250 OP 250 PS 637: Performed by: PHYSICIAN ASSISTANT

## 2020-01-01 PROCEDURE — 80061 LIPID PANEL: CPT | Performed by: CLINICAL NURSE SPECIALIST

## 2020-01-01 PROCEDURE — 99285 EMERGENCY DEPT VISIT HI MDM: CPT | Mod: 25 | Performed by: EMERGENCY MEDICINE

## 2020-01-01 PROCEDURE — 99207 ZZC CONSULT E&M CHANGED TO INITIAL LEVEL: CPT | Performed by: PHYSICIAN ASSISTANT

## 2020-01-01 PROCEDURE — 99285 EMERGENCY DEPT VISIT HI MDM: CPT | Mod: Z6 | Performed by: EMERGENCY MEDICINE

## 2020-01-01 PROCEDURE — 25800030 ZZH RX IP 258 OP 636: Performed by: EMERGENCY MEDICINE

## 2020-01-01 PROCEDURE — 80053 COMPREHEN METABOLIC PANEL: CPT | Performed by: EMERGENCY MEDICINE

## 2020-01-01 PROCEDURE — 25000132 ZZH RX MED GY IP 250 OP 250 PS 637: Performed by: CLINICAL NURSE SPECIALIST

## 2020-01-01 PROCEDURE — 99221 1ST HOSP IP/OBS SF/LOW 40: CPT | Performed by: PHYSICIAN ASSISTANT

## 2020-01-01 PROCEDURE — 84450 TRANSFERASE (AST) (SGOT): CPT | Performed by: EMERGENCY MEDICINE

## 2020-01-01 PROCEDURE — 84439 ASSAY OF FREE THYROXINE: CPT | Performed by: CLINICAL NURSE SPECIALIST

## 2020-01-01 PROCEDURE — 99222 1ST HOSP IP/OBS MODERATE 55: CPT | Mod: AI | Performed by: PSYCHIATRY & NEUROLOGY

## 2020-01-01 PROCEDURE — 25000128 H RX IP 250 OP 636: Performed by: EMERGENCY MEDICINE

## 2020-01-01 PROCEDURE — HZ2ZZZZ DETOXIFICATION SERVICES FOR SUBSTANCE ABUSE TREATMENT: ICD-10-PCS | Performed by: PSYCHIATRY & NEUROLOGY

## 2020-01-01 PROCEDURE — 82977 ASSAY OF GGT: CPT | Performed by: CLINICAL NURSE SPECIALIST

## 2020-01-01 RX ORDER — HYDROXYZINE HYDROCHLORIDE 25 MG/1
25 TABLET, FILM COATED ORAL EVERY 4 HOURS PRN
Status: DISCONTINUED | OUTPATIENT
Start: 2020-01-01 | End: 2020-01-02 | Stop reason: HOSPADM

## 2020-01-01 RX ORDER — TRAZODONE HYDROCHLORIDE 50 MG/1
50 TABLET, FILM COATED ORAL AT BEDTIME
Status: ON HOLD | COMMUNITY
End: 2020-01-17

## 2020-01-01 RX ORDER — ATENOLOL 50 MG/1
50 TABLET ORAL DAILY PRN
Status: DISCONTINUED | OUTPATIENT
Start: 2020-01-01 | End: 2020-01-01

## 2020-01-01 RX ORDER — ACETAMINOPHEN 325 MG/1
650 TABLET ORAL EVERY 4 HOURS PRN
Status: DISCONTINUED | OUTPATIENT
Start: 2020-01-01 | End: 2020-01-02 | Stop reason: HOSPADM

## 2020-01-01 RX ORDER — TRAZODONE HYDROCHLORIDE 50 MG/1
50 TABLET, FILM COATED ORAL
Status: DISCONTINUED | OUTPATIENT
Start: 2020-01-01 | End: 2020-01-01 | Stop reason: ALTCHOICE

## 2020-01-01 RX ORDER — TRAZODONE HYDROCHLORIDE 50 MG/1
50 TABLET, FILM COATED ORAL AT BEDTIME
Status: DISCONTINUED | OUTPATIENT
Start: 2020-01-01 | End: 2020-01-02 | Stop reason: HOSPADM

## 2020-01-01 RX ORDER — BISACODYL 10 MG
10 SUPPOSITORY, RECTAL RECTAL DAILY PRN
Status: DISCONTINUED | OUTPATIENT
Start: 2020-01-01 | End: 2020-01-02 | Stop reason: HOSPADM

## 2020-01-01 RX ORDER — FOLIC ACID 1 MG/1
1 TABLET ORAL DAILY
Status: DISCONTINUED | OUTPATIENT
Start: 2020-01-01 | End: 2020-01-02 | Stop reason: HOSPADM

## 2020-01-01 RX ORDER — ESCITALOPRAM OXALATE 10 MG/1
10 TABLET ORAL DAILY
COMMUNITY
End: 2020-01-16

## 2020-01-01 RX ORDER — ALUMINA, MAGNESIA, AND SIMETHICONE 2400; 2400; 240 MG/30ML; MG/30ML; MG/30ML
30 SUSPENSION ORAL EVERY 4 HOURS PRN
Status: DISCONTINUED | OUTPATIENT
Start: 2020-01-01 | End: 2020-01-02 | Stop reason: HOSPADM

## 2020-01-01 RX ORDER — AMLODIPINE BESYLATE 10 MG/1
10 TABLET ORAL DAILY
Status: DISCONTINUED | OUTPATIENT
Start: 2020-01-01 | End: 2020-01-02 | Stop reason: HOSPADM

## 2020-01-01 RX ORDER — ASPIRIN 81 MG/1
81 TABLET, CHEWABLE ORAL DAILY
Status: DISCONTINUED | OUTPATIENT
Start: 2020-01-01 | End: 2020-01-02 | Stop reason: HOSPADM

## 2020-01-01 RX ORDER — GABAPENTIN 300 MG/1
300 CAPSULE ORAL 3 TIMES DAILY
Status: DISCONTINUED | OUTPATIENT
Start: 2020-01-01 | End: 2020-01-02 | Stop reason: HOSPADM

## 2020-01-01 RX ORDER — PROCHLORPERAZINE MALEATE 5 MG
5 TABLET ORAL EVERY 8 HOURS PRN
Status: DISCONTINUED | OUTPATIENT
Start: 2020-01-01 | End: 2020-01-02 | Stop reason: HOSPADM

## 2020-01-01 RX ORDER — CLONIDINE HYDROCHLORIDE 0.1 MG/1
0.1 TABLET ORAL 2 TIMES DAILY
Status: DISCONTINUED | OUTPATIENT
Start: 2020-01-01 | End: 2020-01-02 | Stop reason: HOSPADM

## 2020-01-01 RX ORDER — LANOLIN ALCOHOL/MO/W.PET/CERES
100 CREAM (GRAM) TOPICAL DAILY
Status: DISCONTINUED | OUTPATIENT
Start: 2020-01-01 | End: 2020-01-02 | Stop reason: HOSPADM

## 2020-01-01 RX ORDER — METOPROLOL TARTRATE 50 MG
50 TABLET ORAL 2 TIMES DAILY
Status: ON HOLD | COMMUNITY
Start: 2019-12-19 | End: 2020-01-17

## 2020-01-01 RX ORDER — NALTREXONE HYDROCHLORIDE 50 MG/1
50 TABLET, FILM COATED ORAL DAILY
Status: DISCONTINUED | OUTPATIENT
Start: 2020-01-01 | End: 2020-01-02 | Stop reason: HOSPADM

## 2020-01-01 RX ORDER — NALTREXONE HYDROCHLORIDE 50 MG/1
50 TABLET, FILM COATED ORAL DAILY
Status: ON HOLD | COMMUNITY
Start: 2019-12-13 | End: 2020-01-17

## 2020-01-01 RX ORDER — NICOTINE 21 MG/24HR
1 PATCH, TRANSDERMAL 24 HOURS TRANSDERMAL DAILY
Status: DISCONTINUED | OUTPATIENT
Start: 2020-01-01 | End: 2020-01-02

## 2020-01-01 RX ORDER — MULTIPLE VITAMINS W/ MINERALS TAB 9MG-400MCG
1 TAB ORAL DAILY
Status: DISCONTINUED | OUTPATIENT
Start: 2020-01-01 | End: 2020-01-01

## 2020-01-01 RX ORDER — GABAPENTIN 100 MG/1
300 CAPSULE ORAL 3 TIMES DAILY
Status: ON HOLD | COMMUNITY
End: 2020-01-17

## 2020-01-01 RX ORDER — MENTHOL
LOZENGE MUCOUS MEMBRANE DAILY PRN
Status: DISCONTINUED | OUTPATIENT
Start: 2020-01-01 | End: 2020-01-02 | Stop reason: HOSPADM

## 2020-01-01 RX ORDER — MULTIPLE VITAMINS W/ MINERALS TAB 9MG-400MCG
1 TAB ORAL DAILY
Status: DISCONTINUED | OUTPATIENT
Start: 2020-01-01 | End: 2020-01-02 | Stop reason: HOSPADM

## 2020-01-01 RX ORDER — SODIUM CHLORIDE 9 MG/ML
1000 INJECTION, SOLUTION INTRAVENOUS CONTINUOUS
Status: DISCONTINUED | OUTPATIENT
Start: 2020-01-01 | End: 2020-01-01 | Stop reason: ALTCHOICE

## 2020-01-01 RX ORDER — DIAZEPAM 5 MG
5-20 TABLET ORAL EVERY 30 MIN PRN
Status: DISCONTINUED | OUTPATIENT
Start: 2020-01-01 | End: 2020-01-02 | Stop reason: HOSPADM

## 2020-01-01 RX ORDER — ESCITALOPRAM OXALATE 10 MG/1
10 TABLET ORAL DAILY
Status: DISCONTINUED | OUTPATIENT
Start: 2020-01-01 | End: 2020-01-02 | Stop reason: HOSPADM

## 2020-01-01 RX ORDER — METOPROLOL TARTRATE 100 MG
100 TABLET ORAL 2 TIMES DAILY
Status: DISCONTINUED | OUTPATIENT
Start: 2020-01-01 | End: 2020-01-02 | Stop reason: HOSPADM

## 2020-01-01 RX ADMIN — CLONIDINE HYDROCHLORIDE 0.1 MG: 0.1 TABLET ORAL at 08:34

## 2020-01-01 RX ADMIN — HYDROXYZINE HYDROCHLORIDE 25 MG: 25 TABLET, FILM COATED ORAL at 15:15

## 2020-01-01 RX ADMIN — GABAPENTIN 300 MG: 300 CAPSULE ORAL at 20:20

## 2020-01-01 RX ADMIN — MULTIPLE VITAMINS W/ MINERALS TAB 1 TABLET: TAB at 08:34

## 2020-01-01 RX ADMIN — FAMOTIDINE 20 MG: 20 INJECTION, SOLUTION INTRAVENOUS at 01:43

## 2020-01-01 RX ADMIN — GABAPENTIN 300 MG: 300 CAPSULE ORAL at 08:34

## 2020-01-01 RX ADMIN — AMLODIPINE BESYLATE 10 MG: 10 TABLET ORAL at 08:34

## 2020-01-01 RX ADMIN — CLONIDINE HYDROCHLORIDE 0.1 MG: 0.1 TABLET ORAL at 20:20

## 2020-01-01 RX ADMIN — NALTREXONE HYDROCHLORIDE 50 MG: 50 TABLET, FILM COATED ORAL at 08:33

## 2020-01-01 RX ADMIN — DIAZEPAM 10 MG: 5 TABLET ORAL at 16:13

## 2020-01-01 RX ADMIN — DIAZEPAM 10 MG: 5 TABLET ORAL at 11:52

## 2020-01-01 RX ADMIN — ESCITALOPRAM OXALATE 10 MG: 10 TABLET ORAL at 08:34

## 2020-01-01 RX ADMIN — METOPROLOL TARTRATE 100 MG: 100 TABLET, FILM COATED ORAL at 20:20

## 2020-01-01 RX ADMIN — FOLIC ACID 1 MG: 1 TABLET ORAL at 08:34

## 2020-01-01 RX ADMIN — DIAZEPAM 10 MG: 5 TABLET ORAL at 08:33

## 2020-01-01 RX ADMIN — OMEPRAZOLE 20 MG: 20 CAPSULE, DELAYED RELEASE ORAL at 08:33

## 2020-01-01 RX ADMIN — ASPIRIN 81 MG CHEWABLE TABLET 81 MG: 81 TABLET CHEWABLE at 08:33

## 2020-01-01 RX ADMIN — DIAZEPAM 10 MG: 5 TABLET ORAL at 20:20

## 2020-01-01 RX ADMIN — OMEPRAZOLE 20 MG: 20 CAPSULE, DELAYED RELEASE ORAL at 16:13

## 2020-01-01 RX ADMIN — NICOTINE 1 PATCH: 21 PATCH, EXTENDED RELEASE TRANSDERMAL at 08:34

## 2020-01-01 RX ADMIN — Medication 100 MG: at 08:34

## 2020-01-01 RX ADMIN — GABAPENTIN 300 MG: 300 CAPSULE ORAL at 13:07

## 2020-01-01 RX ADMIN — METOPROLOL TARTRATE 100 MG: 100 TABLET, FILM COATED ORAL at 10:48

## 2020-01-01 RX ADMIN — SODIUM CHLORIDE 1000 ML: 9 INJECTION, SOLUTION INTRAVENOUS at 01:15

## 2020-01-01 RX ADMIN — TRAZODONE HYDROCHLORIDE 50 MG: 50 TABLET ORAL at 21:47

## 2020-01-01 RX ADMIN — DIAZEPAM 10 MG: 5 TABLET ORAL at 06:00

## 2020-01-01 ASSESSMENT — ACTIVITIES OF DAILY LIVING (ADL)
COGNITION: 0 - NO COGNITION ISSUES REPORTED
HYGIENE/GROOMING: INDEPENDENT
DRESS: INDEPENDENT
RETIRED_EATING: 0-->INDEPENDENT
HYGIENE/GROOMING: INDEPENDENT
DRESS: 0-->INDEPENDENT
AMBULATION: 0-->INDEPENDENT
ORAL_HYGIENE: INDEPENDENT
DRESS: INDEPENDENT
SWALLOWING: 0-->SWALLOWS FOODS/LIQUIDS WITHOUT DIFFICULTY
LAUNDRY: WITH SUPERVISION
TRANSFERRING: 0-->INDEPENDENT
RETIRED_COMMUNICATION: 0-->UNDERSTANDS/COMMUNICATES WITHOUT DIFFICULTY
LAUNDRY: WITH SUPERVISION
ORAL_HYGIENE: INDEPENDENT
FALL_HISTORY_WITHIN_LAST_SIX_MONTHS: NO
TOILETING: 0-->INDEPENDENT
BATHING: 0-->INDEPENDENT

## 2020-01-01 ASSESSMENT — ENCOUNTER SYMPTOMS
VOMITING: 1
NAUSEA: 1
FLANK PAIN: 1

## 2020-01-01 ASSESSMENT — MIFFLIN-ST. JEOR: SCORE: 1992.72

## 2020-01-01 NOTE — ED PROVIDER NOTES
[facility] EMERGENCY DEPARTMENT ([facility])    1/01/20        History     Chief Complaint   Patient presents with     Alcohol Problem     Patient here for detox. Patient would like treatment following detox.      The history is provided by the patient and medical records.     Nikhil Rios is a 34 year old male with a history of substance use, alcohol-induced pancreatitis, and hypertension who presents to the Emergency Department seeking detox from alcohol. Tonight, he states that he drank 500 mL of hard alcohol. No other drug use. He denies any history of withdrawal seizures or DTs but does endorse prior hallucinations after given medication while int  hospital before (he thinks this was dilaudid). Patient denies any suicidal or homicidal ideation. He does report that he is angry and wants to punch someone that he met in [facility] but otherwise denies any thoughts of aggression here. Patient also adds that he has been vomiting every single day with the exception of today. He endorses nausea all day today but denies any episodes of emesis or abdominal pain here in the ED. Patient reports that he currently takes gabapentin 100 mg, amlodipine 5 mg, hydroxyzine 25 mg, omeprazole 20 mg, metoprolol 50 mg, naltrexone 50 mg, and lexapro 10 mg.       Records reviewed, patient has been admitted 5 times in the past month for alcohol-related symptoms.  He was most recently seen on 12/16/2019 at [facility] for alcohol withdrawal. Prior to his discharge, a SUDS referral was placed for him to complete a rule 25 assessment. He was also encouraged to follow up with his mental health  at [facility] He was encouraged to  Naltrexone prescription for his alcohol use disorder from the pharmacy and to continue thiamine, folic acid, multivitamin.        I have reviewed the Medications, Allergies, Past Medical and Surgical History, and Social History in the [facility] [address] [address] system.  Past Medical  History:   Diagnosis Date     Hypertension      Substance abuse (H)      History reviewed. No pertinent surgical history.  No current facility-administered medications for this encounter.      Current Outpatient Medications   Medication     amLODIPine (NORVASC) 5 MG tablet     amLODIPine (NORVASC) 5 MG tablet     aspirin (ASA) 81 MG chewable tablet     aspirin (ASA) 81 MG chewable tablet     cloNIDine (CATAPRES) 0.1 MG tablet     cloNIDine (CATAPRES) 0.1 MG tablet     hydrOXYzine (ATARAX) 25 MG tablet     hydrOXYzine (ATARAX) 25 MG tablet     hypromellose-dextran (ARTIFICAL TEARS) 0.1-0.3 % ophthalmic solution     ketotifen (ZADITOR/REFRESH ANTI-ITCH) 0.025 % ophthalmic solution     multivitamin w/minerals (THERA-VIT-M) tablet     multivitamin w/minerals (THERA-VIT-M) tablet        Allergies   Allergen Reactions     Pollen Extract Rash     grass tree pollen     Social History     Socioeconomic History     Marital status: Single     Spouse name: Not on file     Number of children: Not on file     Years of education: Not on file     Highest education level: Not on file   Occupational History     Not on file   Social Needs     Financial resource strain: Not on file     Food insecurity:     Worry: Not on file     Inability: Not on file     Transportation needs:     Medical: Not on file     Non-medical: Not on file   Tobacco Use     Smoking status: Current Some Day Smoker     Packs/day: 0.25     Smokeless tobacco: Never Used   Substance and Sexual Activity     Alcohol use: Yes     Comment: 750ml daily of vodka     Drug use: No     Sexual activity: Not on file   Lifestyle     Physical activity:     Days per week: Not on file     Minutes per session: Not on file     Stress: Not on file   Relationships     Social connections:     Talks on phone: Not on file     Gets together: Not on file     Attends Buddhist service: Not on file     Active member of club or organization: Not on file     Attends meetings of clubs or  organizations: Not on file     Relationship status: Not on file     Intimate partner violence:     Fear of current or ex partner: Not on file     Emotionally abused: Not on file     Physically abused: Not on file     Forced sexual activity: Not on file   Other Topics Concern     Not on file   Social History Narrative     Not on file       Review of Systems   Gastrointestinal: Positive for nausea and vomiting.   Genitourinary: Positive for flank pain (left).   Psychiatric/Behavioral: Negative for self-injury and suicidal ideas.   All other systems reviewed and are negative.  General: No fevers or chills  Skin: No rash or diaphoresis  Eyes: No eye redness or discharge  Ears/Nose/Throat: No rhinorrhea or nasal congestion  Respiratory: No cough or SOB  Cardiovascular: No chest pain or palpitations  Musculoskeletal: No arthralgias or myalgias  Neurologic: No numbness or weakness  Hematologic/Lymphatic/Immunologic: No leg swelling, no easy bruising/bleeding  Endocrine: No polyuria/polydypsia      Physical Exam   BP: (!) 140/82  Pulse: 114  Temp: 98.1  F (36.7  C)  SpO2: 95 %      Physical Exam  General: Well nourished, well developed, NAD  HEENT: EOMI, anicteric. NCAT, MMM  Neck: no jugular venous distension, supple, nl ROM  Cardiac: Regular rate and rhythm. No murmurs, rubs, or gallops. Normal S1, S2.  Intact peripheral pulses  Pulm: CTAB, no stridor, wheezes, rales, rhonchi  Abd: Soft, mild epigastric ttp without rebound or guarding, nondistended.  No masses palpated.    Skin: Warm and dry to the touch.  No rash  Extremities: No LE edema, no cyanosis, w/w/p  Neuro: A&Ox3, no gross focal deficits      ED Course          Procedures             Critical Care time:  none             Labs Ordered and Resulted from Time of ED Arrival Up to the Time of Departure from the ED   CBC WITH PLATELETS DIFFERENTIAL - Abnormal; Notable for the following components:       Result Value    RBC Count 4.01 (*)     Hemoglobin 12.8 (*)      Hematocrit 39.2 (*)     Nucleated RBCs 1 (*)     All other components within normal limits   COMPREHENSIVE METABOLIC PANEL - Abnormal; Notable for the following components:    Glucose 121 (*)     Calcium 8.2 (*)     All other components within normal limits   ALCOHOL ETHYL - Abnormal; Notable for the following components:    Ethanol g/dL 0.31 (*)     All other components within normal limits   DRUG ABUSE SCREEN 6 CHEM DEP URINE ([facility]) - Abnormal; Notable for the following components:    Benzodiazepine Qual Urine Positive (*)     Ethanol Qual Urine Positive (*)     All other components within normal limits   AST - Abnormal; Notable for the following components:    AST 72 (*)     All other components within normal limits   LIPASE - Abnormal; Notable for the following components:    Lipase 980 (*)     All other components within normal limits   ALCOHOL BREATH TEST POCT - Abnormal; Notable for the following components:    Alcohol Breath Test 0.290 (*)     All other components within normal limits          Results for orders placed or performed during the hospital encounter of 01/01/20 (from the past 24 hour(s))   Alcohol breath test POCT   Result Value Ref Range    Alcohol Breath Test 0.290 (A) 0.00 - 0.01   CBC with platelets differential   Result Value Ref Range    WBC 7.4 4.0 - 11.0 10e9/L    RBC Count 4.01 (L) 4.4 - 5.9 10e12/L    Hemoglobin 12.8 (L) 13.3 - 17.7 g/dL    Hematocrit 39.2 (L) 40.0 - 53.0 %    MCV 98 78 - 100 fl    MCH 31.9 26.5 - 33.0 pg    MCHC 32.7 31.5 - 36.5 g/dL    RDW 14.1 10.0 - 15.0 %    Platelet Count 229 150 - 450 10e9/L    Diff Method Automated Method     % Neutrophils 40.2 %    % Lymphocytes 46.0 %    % Monocytes 6.5 %    % Eosinophils 6.1 %    % Basophils 0.9 %    % Immature Granulocytes 0.3 %    Nucleated RBCs 1 (H) 0 /100    Absolute Neutrophil 3.0 1.6 - 8.3 10e9/L    Absolute Lymphocytes 3.4 0.8 - 5.3 10e9/L    Absolute Monocytes 0.5 0.0 - 1.3 10e9/L    Absolute Eosinophils 0.5 0.0 -  0.7 10e9/L    Absolute Basophils 0.1 0.0 - 0.2 10e9/L    Abs Immature Granulocytes 0.0 0 - 0.4 10e9/L    Absolute Nucleated RBC 0.0    Comprehensive metabolic panel   Result Value Ref Range    Sodium 142 133 - 144 mmol/L    Potassium 4.2 3.4 - 5.3 mmol/L    Chloride 108 94 - 109 mmol/L    Carbon Dioxide 25 20 - 32 mmol/L    Anion Gap 9 3 - 14 mmol/L    Glucose 121 (H) 70 - 99 mg/dL    Urea Nitrogen 7 7 - 30 mg/dL    Creatinine 0.79 0.66 - 1.25 mg/dL    GFR Estimate >90 >60 mL/min/[1.73_m2]    GFR Estimate If Black >90 >60 mL/min/[1.73_m2]    Calcium 8.2 (L) 8.5 - 10.1 mg/dL    Bilirubin Total 0.2 0.2 - 1.3 mg/dL    Albumin 3.5 3.4 - 5.0 g/dL    Protein Total 7.5 6.8 - 8.8 g/dL    Alkaline Phosphatase 81 40 - 150 U/L    ALT 68 0 - 70 U/L    AST Unsatisfactory specimen - hemolyzed 0 - 45 U/L   Alcohol ethyl   Result Value Ref Range    Ethanol g/dL 0.31 (HH) <0.01 g/dL   Lipase   Result Value Ref Range    Lipase 980 (H) 73 - 393 U/L   Drug abuse screen 6 urine (chem dep)   Result Value Ref Range    Amphetamine Qual Urine Negative NEG^Negative    Barbiturates Qual Urine Negative NEG^Negative    Benzodiazepine Qual Urine Positive (A) NEG^Negative    Cannabinoids Qual Urine Negative NEG^Negative    Cocaine Qual Urine Negative NEG^Negative    Ethanol Qual Urine Positive (A) NEG^Negative    Opiates Qualitative Urine Negative NEG^Negative   AST   Result Value Ref Range    AST 72 (H) 0 - 45 U/L       Labs, vital signs, and imaging studies were reviewed by me.    Assessments & Plan (with Medical Decision Making)   The pt is a 35 yo male who presents for alcohol detox. Labs ordered for medical clearance. ETOH 0.31    0053 Pt was d/w detox intake, they have a bed on hold for him    UDS positive for alcohol and benzodiazepines. Labs also remarkable for elevated lipase, however this appears to be chronically elevated and pt's nausea symptoms are at baseline.     I have reviewed the nursing notes.    I have reviewed the findings,  diagnosis, plan and need for follow up with the patient.  Patient discussed with detox intake, to be admitted to their service for further management. Plan was discussed with patient who understands and agrees with plan.       Current Discharge Medication List          Final diagnoses:   Alcoholic intoxication without complication (H)       1/1/2020   [facility] [place], EMERGENCY DEPARTMENT     Vanda Azul MD  01/01/20 0736

## 2020-01-01 NOTE — PROGRESS NOTES
Writer met with pt to discuss aftercare plans. Pt reports that he is scheduled to admit to [facility] when out-of-detox. Reports that [facility] is aware he is at [facility] and they have a bed saved for him. Pt signed MITRA for [facility], placed in chart. CM to follow-up with coordinating admission with [facility] on 1/2/20 when business is open after the holiday. Team note will be completed on 1/2/20 when physician is in.

## 2020-01-01 NOTE — PLAN OF CARE
Problem: Substance Withdrawal  Goal: Substance Withdrawal  Description  Signs and symptoms of listed problems will be absent or manageable.  Outcome: No Change     Problem: Substance Withdrawal  Goal: Social and Therapeutic (Substance Withdrawal)  Description  Signs and symptoms of listed problems will be absent or manageable.  Outcome: No Change    Admission Notes:    Admitted in [facility] (CD Tx) this 34 year old male patient from [facility] ED seeking for detox from alcohol. Patient reports that he drinks  approximately 500-750 ml of Vodka daily and his last use was last night at 10. Patient denies any chemical use but uses tobacco at least daily. Patient has history of alcohol-induced pancreatitis; hypertension and substance abuse.  Patient's utox was positive for benzodiazepines. Patient denies SI/SIB nor hallucinations. Patient is jobless and lives with his mother. Patient says that he has [facility]. He reports that he was angry that he wanted to punch someone he met in the [facility]. Before hospitalization for detox, patient reports that he is ready to be admitted in [facility] in [place], however, he wants to be detoxed first here on the unit. Patient is alert and oriented x 4. His MSSA score upon admission was 9. His /85; P-108. Valium 10 mgs given orally as withdrawal protocol. Patient was made comfortable in bed after a brief orientation to the unit.

## 2020-01-01 NOTE — PROGRESS NOTES
01/01/20 0517   Patient Belongings   Did you bring any home meds/supplements to the hospital?  No   Patient Belongings locker   Patient Belongings Put in Hospital Secure Location (Security or Locker, etc.) cell phone/electronics;clothing;keys;wallet;shoes   Belongings Search Yes   Clothing Search Yes   Second Staff Cedrick   Comment All of patient's belongings are in a bin and his discharge bin. His important cards were sent to security.     BIN: Shorts, sweater and shoes    DISCHARGE BIN:  Phone, , Keys and  Wallet     SECURITY ENV # [id number] - [place] Drivers License and a Health [employer] Card    A               Admission:  I am responsible for any personal items that are not sent to the safe or pharmacy.  [facility] is not responsible for loss, theft or damage of any property in my possession.    Signature:  _________________________________ Date: _______  Time: _____                                              Staff Signature:  ____________________________ Date: ________  Time: _____      2nd Staff person, if patient is unable/unwilling to sign:    Signature: ________________________________ Date: ________  Time: _____     Discharge:  [facility] has returned all of my personal belongings:    Signature: _________________________________ Date: ________  Time: _____                                          Staff Signature:  ____________________________ Date: ________  Time: _____

## 2020-01-01 NOTE — H&P
"Admitted:     01/01/2020      LEGAL STATUS AND REASON FOR ADMISSION:  The patient was admitted on a voluntary status after presenting to the Emergency Department seeking admission for alcohol detoxification.      SOURCES FOR INTAKE:  The patient's interview and review of available medical records.      CHIEF COMPLAINT:  \"I'm scheduled to go back to treatment and they wanted me to detox first.\"      HISTORY OF PRESENT ILLNESS:  Nikhil Rios is a 34-year-old  male with history of alcoholism and alcoholic pancreatitis, who presented to the Emergency Department seeking admission for alcohol detoxification.  The patient lives with his mother.  He has been unemployed for 2 years.  He denies legal history.  [facility] was in [facility].  He completed CD treatment at [facility] last summer.  He was at [facility] for detox about a week ago.  He stayed there for about 3-4 days but tells me that he relapsed on alcohol immediately after discharge.  He has been in contact with [facility] and plans to start treatment once he completed detox.  Denies legal history.      The patient quit smoking about a year ago.  He has had history of heavy cannabis use, but been sober for about 5 years.  No history of cocaine, methamphetamine and heroin use.  No prescribed or over-the-counter medication misuse.  The patient's drug of choice is alcohol.  He stopped drinking when he was 16 years old, became habitual in his 20s.  Longest sobriety was for only 30 days.  He has been drinking quite heavily in the past 2 years.  He stated that since relapsing he has been drinking daily about 500-750 mL of hard liquor.  He has had severe withdrawals, but no DTs or seizures.  He acknowledged progressive use, use despite negative consequences, and lack of control.  He has history of alcoholic pancreatitis.  He plans to start CD treatment at [facility] after he completes detoxification.      PSYCHIATRIC HISTORY:  The patient had a psychiatrist and a therapist " in the past but not recently.  He was recently started on Lexapro and gabapentin.  He has been diagnosed with depression.  He denies prior suicidal attempts and self-harming behavior.  No inpatient hospitalization for mental illness.      Apart from the recent relapse and inability to maintain sobriety, the patient reported that he has been doing actually well.  He denied any recent bouts of depression, anxiety or suicidal thoughts.  He is sleeping well.  Appetite has been poor.  Energy, motivation, concentration and focus fluctuates but generally at baseline.  He reported no history or symptoms related to luisa, psychosis, PTSD or OCD.  No panic attacks or agoraphobia.      PAST MEDICAL HISTORY:  The patient tells me that he usually experiences hypertension only when he is going through detoxification.  He has history of alcoholic pancreatitis.  He denied surgical history.  Denied medical allergy.  Denied history of head trauma, concussion and seizures.      FAMILY HISTORY:  The patient denied family history of mental illness and chemical dependency.      SOCIAL HISTORY:  The patient grew up in [place], was raised by both parents with 2 siblings.  Denied childhood abuse and neglect.  Graduated high school on time and completed 4 years of college.  He is not , has no kids.  He currently resides with his mother and is unemployed.  He denies legal history.      PHYSICAL EXAMINATION:  Please refer to the physical exam done by Chanell Handy done on 01/01/2020.      REVIEW OF SYSTEMS:  A 12-point review of system was obtained and negative except for HPI.      LABORATORY DATA:  CBC, CMP, lipid profile were within normal limits except for calcium 8.2, AST 72, cholesterol 247, , HDL 29.  Lipase 980, nonHDL 218.  Nonfasting glucose 121.  Hemoglobin 12.8, hematocrit 39.2, RBC count 4.01.  TSH was 4.01, free T4 of 0.71, triglyceride 671.  Urine drug screen was positive for alcohol and  benzodiazepine.  Alcohol level at the Emergency Department was 0.31.      VITAL SIGNS:  Temperature 98.1, respiratory rate 16, heart rate 122, blood pressure 149/111.      PSYCHIATRIC EXAMINATION:  A 34-year-old  male appears his stated age, cooperative, pleasant, and fully engaged.  Exhibited limited hygiene and grooming level.  Tremulous, but no other major psychomotor abnormalities or tics noted.  Speech:  Normal pace, volume and clarity.  Gait and muscle tone within normal limits.  Mood described as euthymic with full, reactive and engaged affect.  His thought process was linear and goal directed.  Thought content:  He denied current thought of suicide and urges to self-harm, denied hallucination and perceptual disturbances, no paranoid, grandiose or looseness of association noted.  His sensorium was clear.  He was oriented to time, place, person and situation.  Immediate and remote memory intact.  Language and fund of knowledge adequate for age and level of training.  Concentration and focus intact.  Insight and judgment fair to good for safety at the current level of care.      DIAGNOSES:   1.  Alcohol use disorder, severe.   2.  Major depressive disorder versus alcohol-induced mood disorder.   3.  History of alcoholic pancreatitis.      PLAN AND RECOMMENDATIONS:  The patient was admitted on a voluntary status after presenting to the Emergency Department seeking admission for detox.  The patient is set up to start CD treatment at [facility] after he completes detoxification.  After reviewing proposed plan and medication profile, patient agreed to the followin.  Lexapro continued at 10 mg daily.   2.  Gabapentin continued at 300 mg 3 times a day.   3.  Naltrexone continued at 50 mg daily.   4.  Trazodone continued at 50 mg at bedtime.   5.  The patient was placed on alcohol withdrawal protocol including MSSA-Valium p.r.n., multivitamin, folic acid and thiamine.   6.  P.r.n. hydroxyzine for anxiety  will be offered.      Internal Medicine consult was obtained to address physical complaint and for health maintenance.  Psychosocial assessment was obtained by clinical  who will assist with setting up post-discharge care.  The patient will be granted discharge once completed detox and establishes safety in the community.         CHRISTOPHER MUNOZ MD             D: 2020   T: 2020   MT: MANDO      Name:     QUYEN QUIROZ   MRN:      [id number]        Account:      [id number]   :      1985        Admitted:     2020                   Document: [id number]

## 2020-01-01 NOTE — CONSULTS
"  Internal Medicine Initial Visit    Nikhil Rios MRN# [id number]   Age: 34 year old YOB: 1985   Date of Admission: 1/1/2020    ADMIT DATE: 1/1/2020  DATE OF CONSULT: 1/1/2020    PCP: No Ref-Primary, Physician    REQUESTING SERVICE: chem dep  REASON FOR CONSULT: alcohol abuse, abnormal labs, h/o pancreatitis    CHIEF COMPLAINT: alcohol    HPI: Nikhil Rios is a 34 year old male with a past medical history of HTN, alcohol dependence c/b pancreatitis, withdrawal who is admitted to station 3A for detox from alcohol.     Per ED note, \"Nikhil Rios is a 34 year old male with a history of substance use, alcohol-induced pancreatitis, and hypertension who presents to the Emergency Department seeking detox from alcohol. Tonight, he states that he drank 500 mL of hard alcohol. No other drug use. He denies any history of withdrawal seizures or DTs but does endorse prior hallucinations after given medication while int he hospital before (he thinks this was dilaudid). Patient denies any suicidal or homicidal ideation. He does report that he is angry and wants to punch someone that he met in [facility] but otherwise denies any thoughts of aggression here. Patient also adds that he has been vomiting every single day with the exception of today. He endorses nausea all day today but denies any episodes of emesis or abdominal pain here in the ED. Patient reports that he currently takes gabapentin 100 mg, amlodipine 5 mg, hydroxyzine 25 mg, omeprazole 20 mg, metoprolol 50 mg, naltrexone 50 mg, and lexapro 10 mg.     Records reviewed, patient has been admitted 5 times in the past month for alcohol-related symptoms.  He was most recently seen on 12/16/2019 at [facility] for alcohol withdrawal. Prior to his discharge, a SUDS referral was placed for him to complete a rule 25 assessment. He was also encouraged to follow up with his mental health  at [facility] ([facility]). He was encouraged to  " "Naltrexone prescription for his alcohol use disorder from the pharmacy and to continue thiamine, folic acid, multivitamin\".    The patient notes he has been consuming 1/2 L to 750 ml of vodka daily for some time, last use last night, no h/o DTs or WD seizures. He notes he currently is feeling tremulous, but no other WD symptoms. He denies any abdominal pain, notes he has been having some nausea and vomiting almost daily, last vomited this morning- it was the OJ he had consumed. No hematemesis. Notes normal BMs. Going daily, no dark tarry stool or blood noted. He notes a h/o pancreatitis, no current abdominal pain. Notes he has a h/o HTN with detox, but not when he isn't drinking. He denies any dyspnea or trouble breathing. Notes some mild nasal congestion since coming in and feeling like it's dry in here. No fevers/chills or sore throat. No h/o endoscopies. Denies any other medical issues. No HLD, DM2, kidney issues.     ROS:   10 point ROS asked and otherwise negative, with exceptions as noted above in HPI.     PMH:  Past Medical History:   Diagnosis Date     Hypertension      Substance abuse (H)        PSH:  History reviewed. No pertinent surgical history.    MEDICATIONS  No current facility-administered medications on file prior to encounter.   amLODIPine (NORVASC) 5 MG tablet, Take 1 tablet (5 mg) by mouth daily (Patient taking differently: Take 10 mg by mouth daily )  escitalopram (LEXAPRO) 10 MG tablet, Take 10 mg by mouth daily  folic acid 5 MG CAPS, Take 1 mg by mouth daily   gabapentin (NEURONTIN) 100 MG capsule, Take 300 mg by mouth 3 times daily  hydrOXYzine (ATARAX) 25 MG tablet, Take 1 tablet (25 mg) by mouth 3 times daily as needed for itching or other (allergies) (Patient taking differently: Take 10 mg by mouth every 6 hours as needed for itching or other (allergies) )  metoprolol tartrate (LOPRESSOR) 50 MG tablet, Take 100 mg by mouth 2 times daily  naltrexone (DEPADE/REVIA) 50 MG tablet, Take 50 mg " by mouth daily  omeprazole (PRILOSEC) 20 MG DR capsule, Take 1 capsule by mouth 2 times daily (before meals)  amLODIPine (NORVASC) 5 MG tablet, Take 1 tablet (5 mg) by mouth daily  aspirin (ASA) 81 MG chewable tablet, Take 1 tablet (81 mg) by mouth daily  aspirin (ASA) 81 MG chewable tablet, Take 1 tablet (81 mg) by mouth daily  cloNIDine (CATAPRES) 0.1 MG tablet, Take 1 tablet (0.1 mg) by mouth 2 times daily  cloNIDine (CATAPRES) 0.1 MG tablet, Take 1 tablet (0.1 mg) by mouth 2 times daily  hydrOXYzine (ATARAX) 25 MG tablet, Take 1 tablet (25 mg) by mouth 3 times daily as needed for itching or anxiety  hypromellose-dextran (ARTIFICAL TEARS) 0.1-0.3 % ophthalmic solution, Place 1 drop into both eyes every hour as needed for dry eyes  ketotifen (ZADITOR/REFRESH ANTI-ITCH) 0.025 % ophthalmic solution, Place 1 drop into both eyes daily as needed for itching or dry eyes  multivitamin w/minerals (THERA-VIT-M) tablet, Take 1 tablet by mouth daily  multivitamin w/minerals (THERA-VIT-M) tablet, Take 1 tablet by mouth daily  traZODone (DESYREL) 50 MG tablet, Take 50 mg by mouth At Bedtime        ALLERGIES:     Allergies   Allergen Reactions     Pollen Extract Rash     grass tree pollen       FAMILY HISTORY:  Reviewed and updated in Epic.     SOCIAL HISTORY:  Social History     Socioeconomic History     Marital status: Single     Spouse name: None     Number of children: None     Years of education: None     Highest education level: None   Occupational History     None   Social Needs     Financial resource strain: None     Food insecurity:     Worry: None     Inability: None     Transportation needs:     Medical: None     Non-medical: None   Tobacco Use     Smoking status: Current Some Day Smoker     Packs/day: 0.25     Smokeless tobacco: Never Used   Substance and Sexual Activity     Alcohol use: Yes     Comment: 750ml daily of vodka     Drug use: No     Sexual activity: None   Lifestyle     Physical activity:     Days per  "week: None     Minutes per session: None     Stress: None   Relationships     Social connections:     Talks on phone: None     Gets together: None     Attends Mandaen service: None     Active member of club or organization: None     Attends meetings of clubs or organizations: None     Relationship status: None     Intimate partner violence:     Fear of current or ex partner: None     Emotionally abused: None     Physically abused: None     Forced sexual activity: None   Other Topics Concern     None   Social History Narrative     None       PHYSICAL EXAM:  Blood pressure (!) 149/111, pulse 108, temperature 98.1  F (36.7  C), temperature source Oral, resp. rate 16, height 1.803 m (5' 11\"), weight 102.1 kg (225 lb), SpO2 95 %.    GENERAL: Alert and orientated x 3. Appears in no acute distress. Lying comfortably in bed.   HEENT: Anicteric sclera. Mucous membranes moist and without lesions.   CV: Tachycardic rate, regular rhythm, S1S2, no murmurs appreciated.  RESPIRATORY: Respirations regular, even, and unlabored. Lungs CTAB with no wheezing, rales, rhonchi.   GI: Soft and non distended with normoactive bowel sounds present in all quadrants. No tenderness, rebound, guarding. Liver palpated 2 cm inferior to distal rib, no tenderness.   EXTREMITIES: No peripheral edema.  NEUROLOGIC: No focal deficits.   MUSCULOSKELETAL: No joint swelling or tenderness.   SKIN: No jaundice. No rashes or lesions.     LABS:  CMP  Recent Labs   Lab 01/01/20  0207 01/01/20  0116   NA  --  142   POTASSIUM  --  4.2   CHLORIDE  --  108   CO2  --  25   ANIONGAP  --  9   GLC  --  121*   BUN  --  7   CR  --  0.79   GFRESTIMATED  --  >90   GFRESTBLACK  --  >90   KEELEY  --  8.2*   PROTTOTAL  --  7.5   ALBUMIN  --  3.5   BILITOTAL  --  0.2   ALKPHOS  --  81   AST 72* Unsatisfactory specimen - hemolyzed   ALT  --  68     CBC  Recent Labs   Lab 01/01/20  0116   WBC 7.4   RBC 4.01*   HGB 12.8*   HCT 39.2*   MCV 98   MCH 31.9   MCHC 32.7   RDW 14.1   PLT " 229     INRNo lab results found in last 7 days.    IMAGING: None to review from this admission  CT scan OSH 11/29/2019  1.  Moderate inflammatory stranding and edema about the pancreas, mostly about the pancreatic head. Findings would be in keeping with the provided history of pancreatitis. No evidence for drainable fluid collections or abscess formation. No obvious   pseudocyst or focal pancreatic lesions allowing for limitations of a noncontrast study.  2.  Diffuse fatty infiltration of the liver.  3.  High density material in the gallbladder likely reflecting stone or sludge material. No biliary dilatation.    ASSESSMENT & RECOMMENDATIONS:  #Alcohol abuse, detoxification: In ED, Utox positive for ethanol, breath test 0.290. Consumes 0.5-.75 L per day. Last use yesterday. Denies h/o DTs or WD seizures. Current WD symptoms include tremor. VS w/ elevated BP (149/111 this AM), elevated HR, afebrile. AST mildly elevated, .   -Agree with MSSA w/ Valium  -Agree with MVi, folate, thiamine  -If elevated BP, Recommend Clonidine 0.1 mg PO BID PRN for BP >180/110  -F/U with PCP w/ in 4 weeks of discharge for repeat heaptic panel  #Hypertriglyceridemia: TG of 671, Tchol of 247. Previously 250 in 07/2019. Likely due to alcohol excess. Also on BB. No h/o DM2. Thyroid studies just mildly abnormal.   -Repeat w/ PCP w/ in 2-3 weeks of discharge & when abstaining from alcohol, if still elevated will need to consider medication management  #Borderline TFTs: TSH mildly elevated at 4.01 w/ free T4 mildly low at 0.71.   -Repeat w/ PCP w/ in 6 weeks of discharge  #Elevated lipase: Lipase elevated to 980. TG as above. Denies abdominal pain. N/V.   -Monitor for abdominal pain, persistent n/v   -Consider CT scan if fevers, abdominal pain, persistent n/v to assess for complications of previous pancreatitis (patient care order placed)  #Normocytic anemia: Hgb of 12.8 w/ MCV of 98. Previously 14.6 in 07/2019, but c/w labs done at  in  12/2019. Denies s/s of blood loss.   -Repeat w/ PCP w/ in 2-3 weeks of discharge  #HTN: PTA maintained on amlodipine and metoprolol. Off of metoprolol when sober per patient. BP elevated this AM.  -Continue meds  -Notify medicine if BP persistently >160/90 (>1 hour after meds) despite being out of detox   #GERD: Continue omeprazole. Could be cause of N/V. Add GI cocktail PRN. Notify medicine if n/v persists.     I appreciate the opportunity to participate in the care of this patient. Medicine will sign off, please notify on call REZA if any intercurrent medical issues arise.     Chanell Handy PA-C

## 2020-01-01 NOTE — ED NOTES
ED to Behavioral Floor Handoff    SITUATION  Nikhil Rios is a 34 year old male who speaks English and lives in a home with family members The patient arrived in the ED by private car from home with a complaint of Alcohol Problem (Patient here for detox. Patient would like treatment following detox. )  .The patient's current symptoms started/worsened 3 year(s) ago and during this time the symptoms have been the same.  In the ED, pt was diagnosed with   Final diagnoses:   None        Initial vitals were: BP: (!) 140/82  Pulse: 114  Temp: 98.1  F (36.7  C)  SpO2: 95 %   --------  Is the patient diabetic? No   If yes, last blood glucose? --     If yes, was this treated in the ED? --  --------  Is the patient inebriated (ETOH) Yes or Impaired on other substances? No  MSSA done? Yes  Last MSSA score: --    Were withdrawal symptoms treated? No  Does the patient have a seizure history? No. If yes, date of most recent seizure--  --------  Is the patient patient experiencing suicidal ideation? denies current or recent suicidal ideation     Homicidal ideation? denies current or recent homicidal ideation or behaviors.    Self-injurious behavior/urges? denies current or recent self injurious behavior or ideation.  ------  Was pt aggressive in the ED No  Was a code called No  Is the pt now cooperative? Yes  -------  Meds given in ED:   Medications   0.9% sodium chloride BOLUS (1,000 mLs Intravenous New Bag 1/1/20 0115)     Followed by   sodium chloride 0.9% infusion (has no administration in time range)   famotidine (PEPCID) infusion 20 mg (20 mg Intravenous New Bag 1/1/20 0143)      Family present during ED course? Yes  Family currently present? No    BACKGROUND  Does the patient have a cognitive impairment or developmental disability? No  Allergies:   Allergies   Allergen Reactions     Pollen Extract Rash     grass tree pollen   .   Social demographics are   Social History     Socioeconomic History     Marital status: Single      Spouse name: None     Number of children: None     Years of education: None     Highest education level: None   Occupational History     None   Social Needs     Financial resource strain: None     Food insecurity:     Worry: None     Inability: None     Transportation needs:     Medical: None     Non-medical: None   Tobacco Use     Smoking status: Current Some Day Smoker     Packs/day: 0.25     Smokeless tobacco: Never Used   Substance and Sexual Activity     Alcohol use: Yes     Comment: 750ml daily of vodka     Drug use: No     Sexual activity: None   Lifestyle     Physical activity:     Days per week: None     Minutes per session: None     Stress: None   Relationships     Social connections:     Talks on phone: None     Gets together: None     Attends Latter day service: None     Active member of club or organization: None     Attends meetings of clubs or organizations: None     Relationship status: None     Intimate partner violence:     Fear of current or ex partner: None     Emotionally abused: None     Physically abused: None     Forced sexual activity: None   Other Topics Concern     None   Social History Narrative     None        ASSESSMENT  Labs results   Labs Ordered and Resulted from Time of ED Arrival Up to the Time of Departure from the ED   CBC WITH PLATELETS DIFFERENTIAL - Abnormal; Notable for the following components:       Result Value    RBC Count 4.01 (*)     Hemoglobin 12.8 (*)     Hematocrit 39.2 (*)     Nucleated RBCs 1 (*)     All other components within normal limits   COMPREHENSIVE METABOLIC PANEL - Abnormal; Notable for the following components:    Glucose 121 (*)     Calcium 8.2 (*)     All other components within normal limits   ALCOHOL ETHYL - Abnormal; Notable for the following components:    Ethanol g/dL 0.31 (*)     All other components within normal limits   DRUG ABUSE SCREEN 6 CHEM DEP URINE ([facility]) - Abnormal; Notable for the following components:    Benzodiazepine Qual Urine  Positive (*)     All other components within normal limits   LIPASE - Abnormal; Notable for the following components:    Lipase 980 (*)     All other components within normal limits   ALCOHOL BREATH TEST POCT - Abnormal; Notable for the following components:    Alcohol Breath Test 0.290 (*)     All other components within normal limits   AST      Imaging Studies: No results found for this or any previous visit (from the past 24 hour(s)).   Most recent vital signs BP (!) 140/82   Pulse 114   Temp 98.1  F (36.7  C) (Oral)   SpO2 95%    Abnormal labs/tests/findings requiring intervention:---   Pain control: pt had none  Nausea control: pt had none    RECOMMENDATION  Are any infection precautions needed (MRSA, VRE, etc.)? No If yes, what infection? --  ---  Does the patient have mobility issues? independently. If yes, what device does the pt use? ---  ---  Is patient on 72 hour hold or commitment? Yes If on 72 hour hold, have hold and rights been given to patient? N/A  Are admitting orders written if after 10 p.m. ?N/A  Tasks needing to be completed:---     Za Mariee RN   ascom--    [phone] [place] ED   [phone] [facility] ED

## 2020-01-02 VITALS
SYSTOLIC BLOOD PRESSURE: 128 MMHG | DIASTOLIC BLOOD PRESSURE: 84 MMHG | BODY MASS INDEX: 31.5 KG/M2 | WEIGHT: 225 LBS | OXYGEN SATURATION: 98 % | HEART RATE: 84 BPM | RESPIRATION RATE: 16 BRPM | TEMPERATURE: 99.1 F | HEIGHT: 71 IN

## 2020-01-02 PROCEDURE — 99239 HOSP IP/OBS DSCHRG MGMT >30: CPT | Performed by: PSYCHIATRY & NEUROLOGY

## 2020-01-02 PROCEDURE — 25000132 ZZH RX MED GY IP 250 OP 250 PS 637: Performed by: CLINICAL NURSE SPECIALIST

## 2020-01-02 PROCEDURE — 25000132 ZZH RX MED GY IP 250 OP 250 PS 637: Performed by: PHYSICIAN ASSISTANT

## 2020-01-02 RX ORDER — AMLODIPINE BESYLATE 10 MG/1
10 TABLET ORAL DAILY
Qty: 30 TABLET | Refills: 0 | Status: ON HOLD | OUTPATIENT
Start: 2020-01-03 | End: 2020-01-17

## 2020-01-02 RX ADMIN — AMLODIPINE BESYLATE 10 MG: 10 TABLET ORAL at 08:35

## 2020-01-02 RX ADMIN — FOLIC ACID 1 MG: 1 TABLET ORAL at 08:35

## 2020-01-02 RX ADMIN — ESCITALOPRAM OXALATE 10 MG: 10 TABLET ORAL at 08:35

## 2020-01-02 RX ADMIN — MULTIPLE VITAMINS W/ MINERALS TAB 1 TABLET: TAB at 08:35

## 2020-01-02 RX ADMIN — ASPIRIN 81 MG CHEWABLE TABLET 81 MG: 81 TABLET CHEWABLE at 08:35

## 2020-01-02 RX ADMIN — METOPROLOL TARTRATE 100 MG: 100 TABLET, FILM COATED ORAL at 20:23

## 2020-01-02 RX ADMIN — CLONIDINE HYDROCHLORIDE 0.1 MG: 0.1 TABLET ORAL at 20:23

## 2020-01-02 RX ADMIN — NALTREXONE HYDROCHLORIDE 50 MG: 50 TABLET, FILM COATED ORAL at 08:35

## 2020-01-02 RX ADMIN — METOPROLOL TARTRATE 100 MG: 100 TABLET, FILM COATED ORAL at 08:35

## 2020-01-02 RX ADMIN — Medication 100 MG: at 08:35

## 2020-01-02 RX ADMIN — GABAPENTIN 300 MG: 300 CAPSULE ORAL at 20:23

## 2020-01-02 RX ADMIN — GABAPENTIN 300 MG: 300 CAPSULE ORAL at 14:02

## 2020-01-02 RX ADMIN — CLONIDINE HYDROCHLORIDE 0.1 MG: 0.1 TABLET ORAL at 08:35

## 2020-01-02 RX ADMIN — OMEPRAZOLE 20 MG: 20 CAPSULE, DELAYED RELEASE ORAL at 08:35

## 2020-01-02 RX ADMIN — OMEPRAZOLE 20 MG: 20 CAPSULE, DELAYED RELEASE ORAL at 16:21

## 2020-01-02 RX ADMIN — GABAPENTIN 300 MG: 300 CAPSULE ORAL at 08:35

## 2020-01-02 ASSESSMENT — ACTIVITIES OF DAILY LIVING (ADL)
HYGIENE/GROOMING: INDEPENDENT
ORAL_HYGIENE: INDEPENDENT
DRESS: INDEPENDENT

## 2020-01-02 NOTE — DISCHARGE INSTRUCTIONS
Behavioral Discharge Planning and Instructions  THANK YOU FOR CHOOSING THE [facility]  3A  [phone]    Summary: You were admitted to Station 3A on 1/1/20 for detoxification from alcohol.  A medical exam was performed that included lab work. You have met with a  and opted to enter treatment at [facility].  Please take care and make your recovery a priority, Nikhil!    [facility] / [facility] [address] , [place] [address] /[phone]      Main Diagnosis: Per Dr. Ava Lew MD;  303.90 (F10.20) Alcohol Use Disorder Severe      Major Treatments, Procedures and Findings:  You were detoxed from alcohol with the [facility] protocol using Valium. You have met with a  to develop a treatment plan for discharge.  You have had labs drawn and a copy of those labs will be sent home with you.  Please bring your lab results with to your follow up doctor appointment.    Symptoms to Report:  If you experience more anxiety, confusion, sleeplessness, deep sadness or thoughts of suicide, notify your treatment team or notify your primary care physician. IF ANY OF THE SYMPTOMS YOU ARE EXPERIENCING ARE A MEDICAL EMERGENCY CALL 911 IMMEDIATELY.     Lifestyle Adjustment: Adjust your lifestyle to get enough sleep, relaxation, exercise and  good nutrition. Continue to develop healthy coping skills to decrease stress and promote a sober living environment. Do not use alcohol, illegal drugs or addictive medications other than what is currently prescribed. AA, NA, and  Sponsor are excellent resources for support.     Primary Provider:  Awilda Bailey MD    [address] [place] [address]    Phone: [phone]    Wednesday, Jan 15th @ 5:40 PM.    Disposition: [facility]      DISCHARGE RESOURCES:  -SMART Recovery - self management for addiction recovery:  www.smartrecovery.org    -Pathways ~ A Health Crisis Resource & Support Center: 959.542.6699.  -[facility] [phone]   -[facility] [facility] 542-568-7698 or [phone].  -Suicide Awareness Voices of Education (SAVE) (www.save.org): 360-084-MSVT (4548)  -National Suicide Prevention Line (www.mentalhealthmn.org): 661-834-IAHY (6396)  -National [place] on Mental Illness (www.[place].veronica.org): 221.915.8951 or 856-339-0481.  -Hufe8kaqj: text the word LIFE to 04113 for immediate support and crisis intervention  -Mental Health Consumer/Survivor Network of [place] (www.mhcsn.net): 801.478.3371 or 949-492-9935  -Mental Health Association of [place] (www.mentalhealth.org): 100.530.2775 or 374-179-3527     -Substance Abuse and Mental Health Services (www.samhsa.gov)  -Harm Reduction Coalition (www. Harmreduction.org)  -www.prescribetoprevent.org or http://prescribetoprevent.org/video  -Poison control 6-456-833-0363   **[place] Opioid Prevention Coalition: www.opioidcoalition.org    Sober Support Group Information:  AA/RINA & Sponsor/Support  -Alcoholics Anonymous (www.alcoholics-anonymous.org): for local information 24 hours/day  -AA Intergroup service office in [place] (http://www.aastpaul.org/) 198.616.1499  -AA Intergroup service office in [facility]: 208.343.2576. (http://www.aamin[place].org/)  -Narcotics Anonymous (www.nam[place].org) (217) 860-5146   **Sober Fun Activities: www.sober-activities.[employer].NumberFour/[facility]//[facility] Recovery Connection (MRC)  [facility] connects people seeking recovery to resources that help foster and sustain long-term recovery.  Whether you are seeking resources for treatment, transportation, housing, job training, education, health care or other pathways to recovery, [facility] is a great place to start.    Phone: 394.435.6086.  www.[place]Material Mix.KKBOX (Great listing of all types of recovery and non-recovery related resources)      General Medication Instructions:   See your medication sheet(s) for instructions.   Take all medicines as directed.  Make  no changes unless your doctor suggests them.   Avoid alcohol.    Any follow up concerns:  Nursing questions call the [facility] [phone]  Medical Record call [phone]  Outpatient Behavioral Intake call [phone]  LP+ Wait List/Bed Availability call [phone]    The entire treatment team has appreciated the opportunity to work with you Nikhil.  We wish you the best in the future and with your lifelong recovery goals. Please bring this discharge folder with you to all follow up appointments.  It contains your lab results, diagnosis, medication list and discharge recommendations.    THANK YOU FOR CHOOSING THE [facility]

## 2020-01-02 NOTE — PLAN OF CARE
Behavioral Team Discussion: (1/2/2020)    Continued Stay Criteria/Rationale: Patient admitted for alcohol withdrawal and  Use Disorder.  Plan: The following services will be provided to the patient; psychiatric assessment, medication management, therapeutic milieu, individual and group support, and skills groups.   Participants: 3A Provider: Dr. Ava Lew MD; 3A RN's: Saurabh Gamboa, RN; 3A CM's: Shelley Catalan .  Summary/Recommendation: Providers will assess today for treatment recommendations, discharge planning, and aftercare plans. CM will meet with pt for discharge planning.   Medical/Physical: alcohol-induced pancreatitis, and hypertension  Precautions:   Behavioral Orders   Procedures     Assault precautions     Code 1 - Restrict to Unit     Routine Programming     As clinically indicated     Status 15     Every 15 minutes.     Withdrawal precautions     Rationale for change in precautions or plan: N/A  Progress: No Change.

## 2020-01-02 NOTE — DISCHARGE SUMMARY
Admit Date:     01/01/2020   Discharge Date:     1/2/20      More than 35 minutes spent on discharge summary, doing the discharge instructions, discharge medications, discharge mental status examination.      DISCHARGE DIAGNOSES:   1.  Alcohol use disorder, severe.   2.  Alcohol withdrawal, completed.  It was complicated by the fact that the patient had low hemoglobin.  Lipase was 980, AST 72.    3.  Major depressive disorder, recurrent, without psychotic features.   4.  History of alcoholic pancreatitis.  Please see detailed admission note by Dr. Sandhu on 01/01/2020.      HOSPITAL COURSE:  During the hospitalization, the patient was detoxed off alcohol using [facility] protocol on Valium.  The patient is anticipated to finish detoxification.  He was seen by Chanell Handy on 01/01/2020.  The patient was continued on his medications.  He was not sure what he wanted to do in terms of treatment.  He just wants to finish the detox.  He was continued on Lexapro during his stay and metoprolol and naltrexone.           DISCHARGE FOLLOWUP:  He is going to treatment at [facility].      DISCHARGE MENTAL STATUS EXAMINATION:  The patient is alert, oriented x3.  Recent and remote memory, language and fund of knowledge are all adequate.  No loose associations.  The patient does not have any active suicidal or homicidal ideation, plan or intent.  The patient is stable to be discharged.               DISCHARGE MENTAL STATUS EXAMINATION:  The patient is alert, oriented x3.  Good fund of knowledge.  Good use of language.  Recent and remote memory, language, fund of knowledge are all adequate.  Euthymic mood congruent affect  Speech normal rate/rhythm linear tp no loose asso,The patient does not have any active suicidal or homicidal ideation.  Does not have any auditory or visual hallucination.  Fair insight/judgment At this time, the patient was stable to be discharged.        Pt was not determined to not be a danger to himself or  others. At the current time of discharge, the patient does not meet criteria for involuntary hospitalization. On the day of discharge, the patient reports that they do not have suicidal or homicidal ideation and would never hurt themselves or others. Steps taken to minimize risk include: assessing patient s behavior and thought process daily during hospital stay, discharging patient with adequate plan for follow up for mental and physical health and discussing safety plan of returning to the hospital should the patient ever have thoughts of harming themselves or others. Therefore, based on all available evidence including the factors cited above, the patient does not appear to be at imminent risk for self-harm, and is appropriate for outpatient level of care.     Educated about side effects/risk vs benefits /alternative including non treatment.Pt consented to be on medication.     .Total time spent on discharge summary more than 35 min  More than  20 min  planning, coordination of care, medication reconciliation and performance of physical exam on day of discharge.Care was coordinated with unit RN and unit therapist       Nikhil Rios   Home Medication Instructions KEVIN:[id number]    Printed on:01/03/20 6537   Medication Information                      amLODIPine (NORVASC) 10 MG tablet  Take 1 tablet (10 mg) by mouth daily             aspirin (ASA) 81 MG chewable tablet  Take 1 tablet (81 mg) by mouth daily             cloNIDine (CATAPRES) 0.1 MG tablet  Take 1 tablet (0.1 mg) by mouth 2 times daily             escitalopram (LEXAPRO) 10 MG tablet  Take 10 mg by mouth daily             folic acid 5 MG CAPS  Take 1 mg by mouth daily              gabapentin (NEURONTIN) 100 MG capsule  Take 300 mg by mouth 3 times daily             hydrOXYzine (ATARAX) 25 MG tablet  Take 1 tablet (25 mg) by mouth 3 times daily as needed for itching or other (allergies)             hypromellose-dextran (ARTIFICAL TEARS) 0.1-0.3 %  ophthalmic solution  Place 1 drop into both eyes every hour as needed for dry eyes             ketotifen (ZADITOR/REFRESH ANTI-ITCH) 0.025 % ophthalmic solution  Place 1 drop into both eyes daily as needed for itching or dry eyes             metoprolol tartrate (LOPRESSOR) 50 MG tablet  Take 100 mg by mouth 2 times daily             multivitamin w/minerals (THERA-VIT-M) tablet  Take 1 tablet by mouth daily             naltrexone (DEPADE/REVIA) 50 MG tablet  Take 50 mg by mouth daily             omeprazole (PRILOSEC) 20 MG DR capsule  Take 1 capsule by mouth 2 times daily (before meals)             sodium chloride (OCEAN) 0.65 % nasal spray  Spray 1 spray into both nostrils every hour as needed for congestion             traZODone (DESYREL) 50 MG tablet  Take 50 mg by mouth At Bedtime             Primary Provider:  Awilda Bailey MD    [address] [place] [address]    Phone: [phone]     @ 5:40 PM.     Disposition: [facility]  ELMIRA JETER MD             D: 2020   T: 2020   MT: FRANCISCA      Name:     QUYEN QUIROZ   MRN:      [id number]        Account:        [id number]   :      1985           Admit Date:     2020                                  Discharge Date:       Document: [id number]

## 2020-01-07 ENCOUNTER — HOSPITAL ENCOUNTER (INPATIENT)
Facility: CLINIC | Age: 35
LOS: 1 days | Discharge: HOME | DRG: 897 | End: 2020-01-07
Attending: EMERGENCY MEDICINE | Admitting: PSYCHIATRY & NEUROLOGY
Payer: COMMERCIAL

## 2020-01-07 VITALS
SYSTOLIC BLOOD PRESSURE: 143 MMHG | TEMPERATURE: 97.3 F | DIASTOLIC BLOOD PRESSURE: 88 MMHG | RESPIRATION RATE: 18 BRPM | OXYGEN SATURATION: 93 % | HEART RATE: 98 BPM

## 2020-01-07 DIAGNOSIS — F10.920 ALCOHOLIC INTOXICATION WITHOUT COMPLICATION (H): ICD-10-CM

## 2020-01-07 PROBLEM — F10.239 ALCOHOL DEPENDENCE WITH WITHDRAWAL WITH COMPLICATION (H): Status: ACTIVE | Noted: 2020-01-07

## 2020-01-07 LAB
ALBUMIN SERPL-MCNC: 3.8 G/DL (ref 3.4–5)
ALCOHOL BREATH TEST: 0.33 (ref 0–0.01)
ALP SERPL-CCNC: 89 U/L (ref 40–150)
ALT SERPL W P-5'-P-CCNC: 75 U/L (ref 0–70)
ANION GAP SERPL CALCULATED.3IONS-SCNC: 12 MMOL/L (ref 3–14)
AST SERPL W P-5'-P-CCNC: 68 U/L (ref 0–45)
BASOPHILS # BLD AUTO: 0.1 10E9/L (ref 0–0.2)
BASOPHILS NFR BLD AUTO: 1.5 %
BILIRUB SERPL-MCNC: 0.2 MG/DL (ref 0.2–1.3)
BUN SERPL-MCNC: 8 MG/DL (ref 7–30)
CALCIUM SERPL-MCNC: 8.3 MG/DL (ref 8.5–10.1)
CHLORIDE SERPL-SCNC: 107 MMOL/L (ref 94–109)
CO2 SERPL-SCNC: 24 MMOL/L (ref 20–32)
CREAT SERPL-MCNC: 0.84 MG/DL (ref 0.66–1.25)
DIFFERENTIAL METHOD BLD: ABNORMAL
EOSINOPHIL # BLD AUTO: 0.3 10E9/L (ref 0–0.7)
EOSINOPHIL NFR BLD AUTO: 5.7 %
ERYTHROCYTE [DISTWIDTH] IN BLOOD BY AUTOMATED COUNT: 14.5 % (ref 10–15)
GFR SERPL CREATININE-BSD FRML MDRD: >90 ML/MIN/{1.73_M2}
GLUCOSE SERPL-MCNC: 137 MG/DL (ref 70–99)
HCT VFR BLD AUTO: 41.9 % (ref 40–53)
HGB BLD-MCNC: 14 G/DL (ref 13.3–17.7)
IMM GRANULOCYTES # BLD: 0 10E9/L (ref 0–0.4)
IMM GRANULOCYTES NFR BLD: 0.4 %
LYMPHOCYTES # BLD AUTO: 2.7 10E9/L (ref 0.8–5.3)
LYMPHOCYTES NFR BLD AUTO: 50.2 %
MCH RBC QN AUTO: 32.7 PG (ref 26.5–33)
MCHC RBC AUTO-ENTMCNC: 33.4 G/DL (ref 31.5–36.5)
MCV RBC AUTO: 98 FL (ref 78–100)
MONOCYTES # BLD AUTO: 0.3 10E9/L (ref 0–1.3)
MONOCYTES NFR BLD AUTO: 4.8 %
NEUTROPHILS # BLD AUTO: 2.1 10E9/L (ref 1.6–8.3)
NEUTROPHILS NFR BLD AUTO: 37.4 %
NRBC # BLD AUTO: 0 10*3/UL
NRBC BLD AUTO-RTO: 0 /100
PLATELET # BLD AUTO: 240 10E9/L (ref 150–450)
POTASSIUM SERPL-SCNC: 3.7 MMOL/L (ref 3.4–5.3)
PROT SERPL-MCNC: 7.9 G/DL (ref 6.8–8.8)
RBC # BLD AUTO: 4.28 10E12/L (ref 4.4–5.9)
SODIUM SERPL-SCNC: 143 MMOL/L (ref 133–144)
WBC # BLD AUTO: 5.5 10E9/L (ref 4–11)

## 2020-01-07 PROCEDURE — 82977 ASSAY OF GGT: CPT | Performed by: PSYCHIATRY & NEUROLOGY

## 2020-01-07 PROCEDURE — 85025 COMPLETE CBC W/AUTO DIFF WBC: CPT | Performed by: EMERGENCY MEDICINE

## 2020-01-07 PROCEDURE — 12000001 ZZH R&B MED SURG/OB UMMC

## 2020-01-07 PROCEDURE — HZ2ZZZZ DETOXIFICATION SERVICES FOR SUBSTANCE ABUSE TREATMENT: ICD-10-PCS | Performed by: EMERGENCY MEDICINE

## 2020-01-07 PROCEDURE — 82075 ASSAY OF BREATH ETHANOL: CPT | Performed by: EMERGENCY MEDICINE

## 2020-01-07 PROCEDURE — 99284 EMERGENCY DEPT VISIT MOD MDM: CPT | Mod: Z6 | Performed by: EMERGENCY MEDICINE

## 2020-01-07 PROCEDURE — 80053 COMPREHEN METABOLIC PANEL: CPT | Performed by: EMERGENCY MEDICINE

## 2020-01-07 PROCEDURE — 99284 EMERGENCY DEPT VISIT MOD MDM: CPT | Performed by: EMERGENCY MEDICINE

## 2020-01-07 PROCEDURE — 25000132 ZZH RX MED GY IP 250 OP 250 PS 637: Performed by: EMERGENCY MEDICINE

## 2020-01-07 PROCEDURE — 99283 EMERGENCY DEPT VISIT LOW MDM: CPT | Performed by: EMERGENCY MEDICINE

## 2020-01-07 RX ORDER — BISACODYL 10 MG
10 SUPPOSITORY, RECTAL RECTAL DAILY PRN
Status: DISCONTINUED | OUTPATIENT
Start: 2020-01-07 | End: 2020-01-07 | Stop reason: CLARIF

## 2020-01-07 RX ORDER — FOLIC ACID 1 MG/1
1 TABLET ORAL DAILY
Status: DISCONTINUED | OUTPATIENT
Start: 2020-01-08 | End: 2020-01-07 | Stop reason: CLARIF

## 2020-01-07 RX ORDER — ONDANSETRON 4 MG/1
4 TABLET, ORALLY DISINTEGRATING ORAL EVERY 6 HOURS PRN
Status: DISCONTINUED | OUTPATIENT
Start: 2020-01-07 | End: 2020-01-07 | Stop reason: CLARIF

## 2020-01-07 RX ORDER — FOLIC ACID 1 MG/1
1 TABLET ORAL ONCE
Status: COMPLETED | OUTPATIENT
Start: 2020-01-07 | End: 2020-01-07

## 2020-01-07 RX ORDER — DIAZEPAM 5 MG
5-20 TABLET ORAL EVERY 30 MIN PRN
Status: DISCONTINUED | OUTPATIENT
Start: 2020-01-07 | End: 2020-01-07

## 2020-01-07 RX ORDER — HYDROXYZINE HYDROCHLORIDE 10 MG/1
10 TABLET, FILM COATED ORAL EVERY 6 HOURS PRN
Status: ON HOLD | COMMUNITY
End: 2020-01-17

## 2020-01-07 RX ORDER — CETIRIZINE HYDROCHLORIDE 5 MG/1
5 TABLET ORAL DAILY
Status: DISCONTINUED | OUTPATIENT
Start: 2020-01-08 | End: 2020-01-07 | Stop reason: CLARIF

## 2020-01-07 RX ORDER — TRAZODONE HYDROCHLORIDE 50 MG/1
50 TABLET, FILM COATED ORAL
Status: DISCONTINUED | OUTPATIENT
Start: 2020-01-07 | End: 2020-01-07 | Stop reason: CLARIF

## 2020-01-07 RX ORDER — HYDROXYZINE HYDROCHLORIDE 25 MG/1
25 TABLET, FILM COATED ORAL EVERY 4 HOURS PRN
Status: DISCONTINUED | OUTPATIENT
Start: 2020-01-07 | End: 2020-01-07 | Stop reason: CLARIF

## 2020-01-07 RX ORDER — ATENOLOL 50 MG/1
50 TABLET ORAL DAILY PRN
Status: DISCONTINUED | OUTPATIENT
Start: 2020-01-07 | End: 2020-01-07 | Stop reason: CLARIF

## 2020-01-07 RX ORDER — LANOLIN ALCOHOL/MO/W.PET/CERES
100 CREAM (GRAM) TOPICAL DAILY
Status: DISCONTINUED | OUTPATIENT
Start: 2020-01-08 | End: 2020-01-07 | Stop reason: CLARIF

## 2020-01-07 RX ORDER — LANOLIN ALCOHOL/MO/W.PET/CERES
100 CREAM (GRAM) TOPICAL ONCE
Status: COMPLETED | OUTPATIENT
Start: 2020-01-07 | End: 2020-01-07

## 2020-01-07 RX ORDER — ALUMINA, MAGNESIA, AND SIMETHICONE 2400; 2400; 240 MG/30ML; MG/30ML; MG/30ML
30 SUSPENSION ORAL EVERY 4 HOURS PRN
Status: DISCONTINUED | OUTPATIENT
Start: 2020-01-07 | End: 2020-01-07 | Stop reason: CLARIF

## 2020-01-07 RX ORDER — MULTIPLE VITAMINS W/ MINERALS TAB 9MG-400MCG
1 TAB ORAL DAILY
Status: DISCONTINUED | OUTPATIENT
Start: 2020-01-08 | End: 2020-01-07 | Stop reason: CLARIF

## 2020-01-07 RX ORDER — ACETAMINOPHEN 325 MG/1
650 TABLET ORAL EVERY 4 HOURS PRN
Status: DISCONTINUED | OUTPATIENT
Start: 2020-01-07 | End: 2020-01-07 | Stop reason: CLARIF

## 2020-01-07 RX ORDER — LOPERAMIDE HCL 2 MG
2 CAPSULE ORAL 4 TIMES DAILY PRN
Status: DISCONTINUED | OUTPATIENT
Start: 2020-01-07 | End: 2020-01-07 | Stop reason: CLARIF

## 2020-01-07 RX ORDER — DIAZEPAM 5 MG
5-20 TABLET ORAL EVERY 30 MIN PRN
Status: DISCONTINUED | OUTPATIENT
Start: 2020-01-07 | End: 2020-01-07 | Stop reason: CLARIF

## 2020-01-07 RX ADMIN — Medication 100 MG: at 17:53

## 2020-01-07 RX ADMIN — FOLIC ACID 1 MG: 1 TABLET ORAL at 17:53

## 2020-01-07 ASSESSMENT — ENCOUNTER SYMPTOMS: HEADACHES: 1

## 2020-01-07 NOTE — ED PROVIDER NOTES
[facility] [place] [place] [facility])     January 7, 2020    History     Chief Complaint   Patient presents with     Drug / Alcohol Assessment     HPI  Nikhil Rios is a 34 year old male with a history of HTN and alcohol use disorder who presents to the ED seeking detox for alcohol use. Patient reports he is planning on going to [facility] for rehab, but has to go through detox first. He states he has been drinking 500-750 mL of vodka daily for at least the past 5 days, but he does not specify how many days he has been drinking. Patient reports his last drink was just prior to arrival. He currently complains of a headache. He denies head injury. He denies previous history of alcohol withdrawal seizures or DTs.     PAST MEDICAL HISTORY  Past Medical History:   Diagnosis Date     Hypertension      Substance abuse (H)      PAST SURGICAL HISTORY  History reviewed. No pertinent surgical history.  FAMILY HISTORY  History reviewed. No pertinent family history.  SOCIAL HISTORY  Social History     Tobacco Use     Smoking status: Current Some Day Smoker     Packs/day: 0.25     Smokeless tobacco: Never Used   Substance Use Topics     Alcohol use: Yes     Comment: 750ml daily of vodka     MEDICATIONS  No current facility-administered medications for this encounter.      Current Outpatient Medications   Medication     amLODIPine (NORVASC) 10 MG tablet     aspirin (ASA) 81 MG chewable tablet     cloNIDine (CATAPRES) 0.1 MG tablet     escitalopram (LEXAPRO) 10 MG tablet     folic acid 5 MG CAPS     gabapentin (NEURONTIN) 100 MG capsule     hydrOXYzine (ATARAX) 25 MG tablet     hypromellose-dextran (ARTIFICAL TEARS) 0.1-0.3 % ophthalmic solution     ketotifen (ZADITOR/REFRESH ANTI-ITCH) 0.025 % ophthalmic solution     metoprolol tartrate (LOPRESSOR) 50 MG tablet     multivitamin w/minerals (THERA-VIT-M) tablet     naltrexone (DEPADE/REVIA) 50 MG tablet     omeprazole (PRILOSEC) 20 MG DR capsule     sodium chloride  (OCEAN) 0.65 % nasal spray     traZODone (DESYREL) 50 MG tablet     ALLERGIES  Allergies   Allergen Reactions     Pollen Extract Rash     grass tree pollen       I have reviewed the Medications, Allergies, Past Medical and Surgical History, and Social History in the Epic system.    Review of Systems   Neurological: Positive for headaches.        Positive for alcohol intoxication.   All other systems reviewed and are negative.      Physical Exam   BP: (!) 143/81  Pulse: 96  Temp: 96.5  F (35.8  C)  Resp: 18  SpO2: 100 %      Physical Exam     Physical Exam   Constitutional: oriented to person, place, and time. appears well-developed and well-nourished.   HENT:   Head: Normocephalic and atraumatic.   Neck: Normal range of motion.   Pulmonary/Chest: Effort normal. No respiratory distress.   Cardiac: No murmurs, rubs, gallops. RRR.  Abdominal: Abdomen soft, nontender, nondistended. No rebound tenderness.  MSK: Long bones without deformity or evidence of trauma  Neurological: alert and oriented to person, place, and time. No nystagmus.  No clonus.  Gait is stable.  Pupils 4 mm reactive to light bilaterally.  Skin: Skin is warm and dry.   Psychiatric:  normal mood and affect.  behavior is normal. Thought content normal.       ED Course        Procedures               Labs Ordered and Resulted from Time of ED Arrival Up to the Time of Departure from the ED   ALCOHOL BREATH TEST POCT - Abnormal; Notable for the following components:       Result Value    Alcohol Breath Test 0.332 (*)     All other components within normal limits            Assessments & Plan (with Medical Decision Making)   MDM  Patient is presenting with request for detox from alcohol.  He is intoxicated however he is ambulating.  He has no signs of acute withdrawal.  He is given oral vitamins.  Recently had labs, will not repeat as he has no symptoms at this point.  Patient be admitted detox for further care.  I have reviewed the nursing notes.    I have  reviewed the findings, diagnosis, plan and need for follow up with the patient.  Addendum: Patient would like to be discharged.  He is not exhibiting signs of withdrawal at this point.  Patient is aware that he may go into withdrawal if he goes home.  Discussed that he may return at any point and he may call to reserve a bed.  Patient agrees with this plan.  He is able to get a ride home with mother.  She is sober.  He has no acute needs, he is not suicidal homicidal.  New Prescriptions    No medications on file       Final diagnoses:   Alcoholic intoxication without complication (H)     ILeni, am serving as a trained medical scribe to document services personally performed by Arsalan Joy MD, based on the provider's statements to me.      Arsalan HOPSON MD, was physically present and have reviewed and verified the accuracy of this note documented by Leni Pearce.     1/7/2020   [facility], EMERGENCY DEPARTMENT     Arsalan Joy MD  01/07/20 1229       Arsalan Joy MD  01/07/20 2815

## 2020-01-07 NOTE — PHARMACY-ADMISSION MEDICATION HISTORY
Admission medication history for the January 7, 2020 admission is complete.     Interview Sources:  - Patient  - Patient's mother  - Outside Meds Dispense Report  - [facility]    Reliability of Source:  - Moderate historian; patient/patient's mother were able to verify medication names, strengths, and doses, but seemed unsure about last doses.    Medication Adherence:  - Poor; patient reports frequently missing doses due to EtOH consumption.    Current Outpatient Pharmacy:   - [facility] [place]    Changes made to PTA medication list (reason)  Added: None  Deleted:   - Artificial Tears solution; 1 drop into both eyes Q1H PRN dry eyes (stopped per patient)  - Ketotifen 0.025% solution; 1 drop into both eyes every day PRN dry eyes (stopped per patient)  - Sodium Chloride 0.65% nasal spray; 1 spray into both nostrils Q1H PRN congestion (stopped per patient)  Changed: None    Additional medication history information:   None    Prior to Admission Medication List:  Prior to Admission medications    Medication Sig Last Dose Taking? Auth Provider   amLODIPine (NORVASC) 10 MG tablet Take 1 tablet (10 mg) by mouth daily 1/7/2020 at AM Yes Ava Lew MD   cloNIDine (CATAPRES) 0.1 MG tablet Take 1 tablet (0.1 mg) by mouth 2 times daily Past Month Yes Ava Lew MD   escitalopram (LEXAPRO) 10 MG tablet Take 10 mg by mouth daily Past Month Yes Reported, Patient   folic acid 5 MG CAPS Take 1 mg by mouth daily  1/7/2020 at AM Yes Reported, Patient   gabapentin (NEURONTIN) 100 MG capsule Take 300 mg by mouth 3 times daily 1/7/2020 at AM Yes Reported, Patient   hydrOXYzine (ATARAX) 10 MG tablet Take 10 mg by mouth every 6 hours as needed for anxiety 1/7/2020 at AM Yes Unknown, Entered By History   metoprolol tartrate (LOPRESSOR) 50 MG tablet Take 100 mg by mouth 2 times daily Past Week Yes Reported, Patient   multivitamin w/minerals (THERA-VIT-M) tablet Take 1 tablet by mouth daily Past Week Yes Vipin  MD Ava   naltrexone (DEPADE/REVIA) 50 MG tablet Take 50 mg by mouth daily 1/6/2020 Yes Reported, Patient   omeprazole (PRILOSEC) 20 MG DR capsule Take 1 capsule by mouth 2 times daily (before meals) 1/6/2020 Yes Reported, Patient   traZODone (DESYREL) 50 MG tablet Take 50 mg by mouth At Bedtime Past Week Yes Reported, Patient   aspirin (ASA) 81 MG chewable tablet Take 1 tablet (81 mg) by mouth daily More than a month  Ava Lew MD     Time spent: 20 minutes    Medication history completed by:   Tasha Schumacher, Pharmacy Intern

## 2020-01-07 NOTE — ED AVS SNAPSHOT
[facility], [place], Emergency Department  [address]  [facility] [place] [address]  Phone:  [phone]  Fax:  [phone]                                    Nikhil Rios   MRN: [id number]    Department:  [facility], Emergency Department   Date of Visit:  1/7/2020           After Visit Summary Signature Page    I have received my discharge instructions, and my questions have been answered. I have discussed any challenges I see with this plan with the nurse or doctor.    ..........................................................................................................................................  Patient/Patient Representative Signature      ..........................................................................................................................................  Patient Representative Print Name and Relationship to Patient    ..................................................               ................................................  Date                                   Time    ..........................................................................................................................................  Reviewed by Signature/Title    ...................................................              ..............................................  Date                                               Time          22EPIC Rev 08/18

## 2020-01-08 NOTE — ED NOTES
ED to Behavioral Floor Handoff    SITUATION  Nikhil Rios is a 34 year old male who speaks English and lives in a home with others The patient arrived in the ED by private car from home with a complaint of Drug / Alcohol Assessment  .The patient's current symptoms started/worsened 3 week(s) ago and during this time the symptoms have increased.   In the ED, pt was diagnosed with   Final diagnoses:   Alcoholic intoxication without complication (H)        Initial vitals were: BP: (!) 143/81  Pulse: 96  Temp: 96.5  F (35.8  C)  Resp: 18  SpO2: 100 %   --------  Is the patient diabetic? No   If yes, last blood glucose? --     If yes, was this treated in the ED? --  --------  Is the patient inebriated (ETOH) Yes or Impaired on other substances? No  MSSA done? Yes  Last MSSA score: --    Were withdrawal symptoms treated? No  Does the patient have a seizure history? No. If yes, date of most recent seizure--  --------  Is the patient patient experiencing suicidal ideation? denies current or recent suicidal ideation     Homicidal ideation? denies current or recent homicidal ideation or behaviors.    Self-injurious behavior/urges? denies current or recent self injurious behavior or ideation.  ------  Was pt aggressive in the ED No  Was a code called No  Is the pt now cooperative? Yes  -------  Meds given in ED:   Medications   vitamin B1 (THIAMINE) tablet 100 mg (100 mg Oral Given 1/7/20 1753)   folic acid (FOLVITE) tablet 1 mg (1 mg Oral Given 1/7/20 1753)      Family present during ED course? Yes  Family currently present? Yes    BACKGROUND  Does the patient have a cognitive impairment or developmental disability? No  Allergies:   Allergies   Allergen Reactions     Pollen Extract Rash     grass tree pollen   .   Social demographics are   Social History     Socioeconomic History     Marital status: Single     Spouse name: None     Number of children: None     Years of education: None     Highest education level: None    Occupational History     None   Social Needs     Financial resource strain: None     Food insecurity:     Worry: None     Inability: None     Transportation needs:     Medical: None     Non-medical: None   Tobacco Use     Smoking status: Current Some Day Smoker     Packs/day: 0.25     Smokeless tobacco: Never Used   Substance and Sexual Activity     Alcohol use: Yes     Comment: 750ml daily of vodka     Drug use: No     Sexual activity: None   Lifestyle     Physical activity:     Days per week: None     Minutes per session: None     Stress: None   Relationships     Social connections:     Talks on phone: None     Gets together: None     Attends Sikhism service: None     Active member of club or organization: None     Attends meetings of clubs or organizations: None     Relationship status: None     Intimate partner violence:     Fear of current or ex partner: None     Emotionally abused: None     Physically abused: None     Forced sexual activity: None   Other Topics Concern     None   Social History Narrative     None        ASSESSMENT  Labs results   Labs Ordered and Resulted from Time of ED Arrival Up to the Time of Departure from the ED   ALCOHOL BREATH TEST POCT - Abnormal; Notable for the following components:       Result Value    Alcohol Breath Test 0.332 (*)     All other components within normal limits      Imaging Studies: No results found for this or any previous visit (from the past 24 hour(s)).   Most recent vital signs BP (!) 143/81   Pulse 96   Temp 96.5  F (35.8  C) (Oral)   Resp 18   SpO2 100%    Abnormal labs/tests/findings requiring intervention:---   Pain control: pt had none  Nausea control: good    RECOMMENDATION  Are any infection precautions needed (MRSA, VRE, etc.)? No If yes, what infection? --  ---  Does the patient have mobility issues? independently. If yes, what device does the pt use? ---  ---  Is patient on 72 hour hold or commitment? No If on 72 hour hold, have hold and  rights been given to patient? N/A  Are admitting orders written if after 10 p.m. ?N/A  Tasks needing to be completed:---     Emmanuel Hurd RN    [phone] [facility]

## 2020-01-08 NOTE — DISCHARGE INSTRUCTIONS
Please call emergency department to see if there is a bed available for detox prior to coming to the emergency department    You may always return if you have any further concerns

## 2020-01-09 ENCOUNTER — HOSPITAL ENCOUNTER (EMERGENCY)
Facility: CLINIC | Age: 35
Discharge: HOME | End: 2020-01-09
Attending: EMERGENCY MEDICINE | Admitting: EMERGENCY MEDICINE
Payer: COMMERCIAL

## 2020-01-09 VITALS
SYSTOLIC BLOOD PRESSURE: 129 MMHG | OXYGEN SATURATION: 95 % | RESPIRATION RATE: 16 BRPM | DIASTOLIC BLOOD PRESSURE: 80 MMHG | TEMPERATURE: 98.6 F | HEART RATE: 80 BPM

## 2020-01-09 DIAGNOSIS — F10.220 ALCOHOL DEPENDENCE WITH UNCOMPLICATED INTOXICATION (H): ICD-10-CM

## 2020-01-09 LAB
ALCOHOL BREATH TEST: 0.37 (ref 0–0.01)
AMPHETAMINES UR QL SCN: NEGATIVE
BARBITURATES UR QL: NEGATIVE
BENZODIAZ UR QL: POSITIVE
CANNABINOIDS UR QL SCN: NEGATIVE
COCAINE UR QL: NEGATIVE
ETHANOL UR QL SCN: POSITIVE
OPIATES UR QL SCN: NEGATIVE

## 2020-01-09 PROCEDURE — 80307 DRUG TEST PRSMV CHEM ANLYZR: CPT | Performed by: FAMILY MEDICINE

## 2020-01-09 PROCEDURE — 99284 EMERGENCY DEPT VISIT MOD MDM: CPT | Mod: Z6 | Performed by: EMERGENCY MEDICINE

## 2020-01-09 PROCEDURE — 80320 DRUG SCREEN QUANTALCOHOLS: CPT | Performed by: FAMILY MEDICINE

## 2020-01-09 PROCEDURE — 82075 ASSAY OF BREATH ETHANOL: CPT | Performed by: EMERGENCY MEDICINE

## 2020-01-09 PROCEDURE — 99283 EMERGENCY DEPT VISIT LOW MDM: CPT | Performed by: EMERGENCY MEDICINE

## 2020-01-09 PROCEDURE — 25000132 ZZH RX MED GY IP 250 OP 250 PS 637: Performed by: EMERGENCY MEDICINE

## 2020-01-09 RX ORDER — MULTIPLE VITAMINS W/ MINERALS TAB 9MG-400MCG
1 TAB ORAL ONCE
Status: COMPLETED | OUTPATIENT
Start: 2020-01-09 | End: 2020-01-09

## 2020-01-09 RX ORDER — FOLIC ACID 1 MG/1
1 TABLET ORAL ONCE
Status: COMPLETED | OUTPATIENT
Start: 2020-01-09 | End: 2020-01-09

## 2020-01-09 RX ORDER — LANOLIN ALCOHOL/MO/W.PET/CERES
100 CREAM (GRAM) TOPICAL ONCE
Status: COMPLETED | OUTPATIENT
Start: 2020-01-09 | End: 2020-01-09

## 2020-01-09 RX ADMIN — MULTIPLE VITAMINS W/ MINERALS TAB 1 TABLET: TAB at 19:25

## 2020-01-09 RX ADMIN — Medication 100 MG: at 19:25

## 2020-01-09 RX ADMIN — FOLIC ACID 1 MG: 1 TABLET ORAL at 19:25

## 2020-01-09 ASSESSMENT — ENCOUNTER SYMPTOMS
NECK STIFFNESS: 0
FEVER: 0
HEADACHES: 0
ABDOMINAL PAIN: 0
DIFFICULTY URINATING: 0
CONFUSION: 0
ARTHRALGIAS: 0
COLOR CHANGE: 0
EYE REDNESS: 0
SHORTNESS OF BREATH: 0

## 2020-01-09 NOTE — ED AVS SNAPSHOT
[facility], [place], Emergency Department  [address]  [facility] [place] [address]  Phone:  [phone]  Fax:  [phone]                                    Nikhil Rios   MRN: [id number]    Department:  [facility], Emergency Department   Date of Visit:  1/9/2020           After Visit Summary Signature Page    I have received my discharge instructions, and my questions have been answered. I have discussed any challenges I see with this plan with the nurse or doctor.    ..........................................................................................................................................  Patient/Patient Representative Signature      ..........................................................................................................................................  Patient Representative Print Name and Relationship to Patient    ..................................................               ................................................  Date                                   Time    ..........................................................................................................................................  Reviewed by Signature/Title    ...................................................              ..............................................  Date                                               Time          22EPIC Rev 08/18

## 2020-01-10 NOTE — ED PROVIDER NOTES
History     Chief Complaint   Patient presents with     Addiction Problem     detox: etoh: 750 ml daily: no hx seizure: last drink today:     HPI  Nikhil Rios is a 34 year old male who presents to the emergency department seeking detox from alcohol.  The patient states he is been drinking alcohol daily for the past 7 days.  Patient states he has been drinking 750 mL of vodka per day.  He reports a history of alcohol withdrawal that is included tremors in the past.  He denies a history of alcohol withdrawal seizures and DTs.  The patient denies any symptoms of depression or suicide ideation.  He denies any recent fall or injury.  He denies any recent illness including fever, cough, vomiting, and diarrhea.  He was here 2 days ago and a detox bed was obtained for him but he ultimately left before being admitted to detox.    I have reviewed the Medications, Allergies, Past Medical and Surgical History, and Social History in the Epic system.    Review of Systems   Constitutional: Negative for fever.   HENT: Negative for congestion.    Eyes: Negative for redness.   Respiratory: Negative for shortness of breath.    Cardiovascular: Negative for chest pain.   Gastrointestinal: Negative for abdominal pain.   Genitourinary: Negative for difficulty urinating.   Musculoskeletal: Negative for arthralgias and neck stiffness.   Skin: Negative for color change.   Neurological: Negative for headaches.   Psychiatric/Behavioral: Negative for confusion.   All other systems reviewed and are negative.      Physical Exam   BP: 136/80  Pulse: 88  Temp: 97.2  F (36.2  C)  Resp: 16  SpO2: 97 %      Physical Exam  Vitals signs and nursing note reviewed.   Constitutional:       General: He is not in acute distress.     Appearance: Normal appearance. He is not diaphoretic.   HENT:      Head: Normocephalic and atraumatic.      Nose: Nose normal.      Mouth/Throat:      Mouth: Mucous membranes are moist.   Eyes:      General: No scleral  icterus.     Pupils: Pupils are equal, round, and reactive to light.   Cardiovascular:      Rate and Rhythm: Normal rate and regular rhythm.      Heart sounds: Normal heart sounds.   Pulmonary:      Effort: Pulmonary effort is normal. No respiratory distress.      Breath sounds: Normal breath sounds.   Abdominal:      General: Bowel sounds are normal.      Palpations: Abdomen is soft.      Tenderness: There is no abdominal tenderness.   Musculoskeletal: Normal range of motion.         General: No tenderness.   Skin:     General: Skin is warm.      Capillary Refill: Capillary refill takes less than 2 seconds.      Findings: No rash.   Neurological:      Mental Status: He is alert.      Motor: Motor function is intact.   Psychiatric:         Mood and Affect: Mood normal.         Speech: Speech is slurred.         ED Course        Procedures            Critical Care time:    Results for orders placed or performed during the hospital encounter of 01/09/20   Drug abuse screen 6 urine (tox)     Status: Abnormal   Result Value Ref Range    Amphetamine Qual Urine Negative NEG^Negative    Barbiturates Qual Urine Negative NEG^Negative    Benzodiazepine Qual Urine Positive (A) NEG^Negative    Cannabinoids Qual Urine Negative NEG^Negative    Cocaine Qual Urine Negative NEG^Negative    Ethanol Qual Urine Positive (A) NEG^Negative    Opiates Qualitative Urine Negative NEG^Negative   Alcohol breath test POCT     Status: Abnormal   Result Value Ref Range    Alcohol Breath Test 0.370 (A) 0.00 - 0.01        No detox beds here.    Brother found bed at [facility] and will transport patient.    Medications   vitamin B1 (THIAMINE) tablet 100 mg (100 mg Oral Given 1/9/20 1925)   folic acid (FOLVITE) tablet 1 mg (1 mg Oral Given 1/9/20 1925)   multivitamin w/minerals (THERA-VIT-M) tablet 1 tablet (1 tablet Oral Given 1/9/20 1925)           Assessments & Plan (with Medical Decision Making)   34 year old male with history of alcohol  abuse and dependence to the emergency department seeking detox.  He was here 2 days ago and declined a bed that was available to him.  He now returns with an additional 2 days of daily alcohol use.  His alcohol withdrawal symptoms have included tremors in the past.  No history of alcohol withdrawal seizures or DTs.  The patient is intoxicated here with a breathalyzer of 0.37.  He does not have any medical concerns and has a normal physical exam.  There are no detox beds available here.  He was given oral thiamine, folate, multivitamins.  His brother was able to find a bed at [facility] and will transport the patient there.  He was given the [place] recovery services phone number to arrange intake as the patient would like [facility] after detox.    I have reviewed the nursing notes.    I have reviewed the findings, diagnosis, plan and need for follow up with the patient.    Discharge Medication List as of 1/9/2020  7:21 PM          Final diagnoses:   Alcohol dependence with uncomplicated intoxication (H)       1/9/2020   [facility], [place], EMERGENCY DEPARTMENT     Tim Kennedy MD  01/10/20 0122

## 2020-01-10 NOTE — ED NOTES
Patient has bed on hold at [facility]. Report called to Luzmaria GUO. Patient's brother Yoseph to transport patient.

## 2020-01-10 NOTE — ED NOTES
Patient presents to the ed requesting detox. Patient states he would eventually [facility] into [facility].

## 2020-01-10 NOTE — DISCHARGE INSTRUCTIONS
Follow-up for outpatient assessment for [facility] after you complete detox.  Call [facility] services to schedule: [phone].    Return to the emergency department if any concerns.

## 2020-01-13 ENCOUNTER — RECORDS - HEALTHEAST (OUTPATIENT)
Dept: ADMINISTRATIVE | Facility: OTHER | Age: 35
End: 2020-01-13

## 2020-01-13 ENCOUNTER — TELEPHONE (OUTPATIENT)
Dept: BEHAVIORAL HEALTH | Facility: CLINIC | Age: 35
End: 2020-01-13

## 2020-01-13 NOTE — TELEPHONE ENCOUNTER
LP Screening phone call to: Nikhil Rios, MR #: [id number]    MALE - A voice message was left for this patient on 1/13/2020 at 12:20 pm instructing him to call this counselor back.  Additional information was needed to complete his LP screening.  This counselor will await a return call from this patient.      Funding:   The patient does NOT have a current funding source and CAN NOT be screening for the LP program at this time   Recent use history:   Alc : TBD   Withdrawal risk:   TBD when the patient calls back.   Medical issues:   TBD when the patient calls back.   Mental Health issues:   TBD when the patient calls back.   Current medications:    TBD when the patient calls back.   SI/HI:   TBD when the patient calls back.   Registered offender:   TBD when the patient calls back.   Results:   TBD when the patient calls back.

## 2020-01-16 ENCOUNTER — HOSPITAL ENCOUNTER (INPATIENT)
Facility: CLINIC | Age: 35
LOS: 4 days | Discharge: PSYCHIATRIC OR SUBSTANCE USE FACILITY | DRG: 897 | End: 2020-01-20
Attending: EMERGENCY MEDICINE | Admitting: PSYCHIATRY & NEUROLOGY
Payer: COMMERCIAL

## 2020-01-16 DIAGNOSIS — F10.239 ALCOHOL DEPENDENCE WITH WITHDRAWAL WITH COMPLICATION (H): ICD-10-CM

## 2020-01-16 DIAGNOSIS — F10.230 ALCOHOL DEPENDENCE WITH UNCOMPLICATED WITHDRAWAL (H): ICD-10-CM

## 2020-01-16 DIAGNOSIS — F10.939 ALCOHOL WITHDRAWAL WITH COMPLICATION WITH INPATIENT TREATMENT, WITH UNSPECIFIED COMPLICATION (H): ICD-10-CM

## 2020-01-16 DIAGNOSIS — F10.10 ALCOHOL ABUSE: ICD-10-CM

## 2020-01-16 LAB
ALBUMIN SERPL-MCNC: 4.2 G/DL (ref 3.4–5)
ALCOHOL BREATH TEST: 0.33 (ref 0–0.01)
ALP SERPL-CCNC: 94 U/L (ref 40–150)
ALT SERPL W P-5'-P-CCNC: 70 U/L (ref 0–70)
AMPHETAMINES UR QL SCN: NEGATIVE
ANION GAP SERPL CALCULATED.3IONS-SCNC: 13 MMOL/L (ref 3–14)
AST SERPL W P-5'-P-CCNC: ABNORMAL U/L (ref 0–45)
BARBITURATES UR QL: NEGATIVE
BASOPHILS # BLD AUTO: 0.1 10E9/L (ref 0–0.2)
BASOPHILS NFR BLD AUTO: 1.7 %
BENZODIAZ UR QL: POSITIVE
BILIRUB SERPL-MCNC: 0.4 MG/DL (ref 0.2–1.3)
BUN SERPL-MCNC: 9 MG/DL (ref 7–30)
CALCIUM SERPL-MCNC: 7.9 MG/DL (ref 8.5–10.1)
CANNABINOIDS UR QL SCN: NEGATIVE
CHLORIDE SERPL-SCNC: 105 MMOL/L (ref 94–109)
CO2 SERPL-SCNC: 22 MMOL/L (ref 20–32)
COCAINE UR QL: NEGATIVE
CREAT SERPL-MCNC: 0.88 MG/DL (ref 0.66–1.25)
DIFFERENTIAL METHOD BLD: NORMAL
EOSINOPHIL # BLD AUTO: 0.4 10E9/L (ref 0–0.7)
EOSINOPHIL NFR BLD AUTO: 8.1 %
ERYTHROCYTE [DISTWIDTH] IN BLOOD BY AUTOMATED COUNT: 14.5 % (ref 10–15)
ETHANOL UR QL SCN: POSITIVE
GFR SERPL CREATININE-BSD FRML MDRD: >90 ML/MIN/{1.73_M2}
GLUCOSE SERPL-MCNC: 149 MG/DL (ref 70–99)
HCT VFR BLD AUTO: 45.9 % (ref 40–53)
HGB BLD-MCNC: 15.3 G/DL (ref 13.3–17.7)
IMM GRANULOCYTES # BLD: 0.1 10E9/L (ref 0–0.4)
IMM GRANULOCYTES NFR BLD: 1 %
LYMPHOCYTES # BLD AUTO: 1.9 10E9/L (ref 0.8–5.3)
LYMPHOCYTES NFR BLD AUTO: 39.7 %
MCH RBC QN AUTO: 32.8 PG (ref 26.5–33)
MCHC RBC AUTO-ENTMCNC: 33.3 G/DL (ref 31.5–36.5)
MCV RBC AUTO: 98 FL (ref 78–100)
MONOCYTES # BLD AUTO: 0.2 10E9/L (ref 0–1.3)
MONOCYTES NFR BLD AUTO: 4.8 %
NEUTROPHILS # BLD AUTO: 2.2 10E9/L (ref 1.6–8.3)
NEUTROPHILS NFR BLD AUTO: 44.7 %
NRBC # BLD AUTO: 0 10*3/UL
NRBC BLD AUTO-RTO: 0 /100
OPIATES UR QL SCN: NEGATIVE
PLATELET # BLD AUTO: 214 10E9/L (ref 150–450)
POTASSIUM SERPL-SCNC: 4.2 MMOL/L (ref 3.4–5.3)
PROT SERPL-MCNC: 8.3 G/DL (ref 6.8–8.8)
RBC # BLD AUTO: 4.67 10E12/L (ref 4.4–5.9)
SODIUM SERPL-SCNC: 140 MMOL/L (ref 133–144)
WBC # BLD AUTO: 4.8 10E9/L (ref 4–11)

## 2020-01-16 PROCEDURE — 80053 COMPREHEN METABOLIC PANEL: CPT | Performed by: EMERGENCY MEDICINE

## 2020-01-16 PROCEDURE — 85025 COMPLETE CBC W/AUTO DIFF WBC: CPT | Performed by: EMERGENCY MEDICINE

## 2020-01-16 PROCEDURE — 80307 DRUG TEST PRSMV CHEM ANLYZR: CPT | Performed by: FAMILY MEDICINE

## 2020-01-16 PROCEDURE — 82075 ASSAY OF BREATH ETHANOL: CPT | Performed by: EMERGENCY MEDICINE

## 2020-01-16 PROCEDURE — 99284 EMERGENCY DEPT VISIT MOD MDM: CPT | Mod: Z6 | Performed by: EMERGENCY MEDICINE

## 2020-01-16 PROCEDURE — 12800008 ZZH R&B CD ADULT

## 2020-01-16 PROCEDURE — 99285 EMERGENCY DEPT VISIT HI MDM: CPT | Performed by: EMERGENCY MEDICINE

## 2020-01-16 PROCEDURE — 25000132 ZZH RX MED GY IP 250 OP 250 PS 637: Performed by: NURSE PRACTITIONER

## 2020-01-16 PROCEDURE — HZ2ZZZZ DETOXIFICATION SERVICES FOR SUBSTANCE ABUSE TREATMENT: ICD-10-PCS | Performed by: PSYCHIATRY & NEUROLOGY

## 2020-01-16 PROCEDURE — 80320 DRUG SCREEN QUANTALCOHOLS: CPT | Performed by: FAMILY MEDICINE

## 2020-01-16 RX ORDER — AMLODIPINE BESYLATE 10 MG/1
10 TABLET ORAL DAILY
Status: DISCONTINUED | OUTPATIENT
Start: 2020-01-17 | End: 2020-01-17

## 2020-01-16 RX ORDER — GABAPENTIN 300 MG/1
300 CAPSULE ORAL 3 TIMES DAILY
Status: DISCONTINUED | OUTPATIENT
Start: 2020-01-16 | End: 2020-01-20 | Stop reason: HOSPADM

## 2020-01-16 RX ORDER — ACETAMINOPHEN 325 MG/1
650 TABLET ORAL EVERY 4 HOURS PRN
Status: DISCONTINUED | OUTPATIENT
Start: 2020-01-16 | End: 2020-01-20 | Stop reason: HOSPADM

## 2020-01-16 RX ORDER — ESCITALOPRAM OXALATE 10 MG/1
10 TABLET ORAL DAILY
Status: DISCONTINUED | OUTPATIENT
Start: 2020-01-17 | End: 2020-01-20 | Stop reason: HOSPADM

## 2020-01-16 RX ORDER — FOLIC ACID 1 MG/1
1 TABLET ORAL DAILY
Status: ON HOLD | COMMUNITY
End: 2020-01-17

## 2020-01-16 RX ORDER — DIAZEPAM 5 MG
5-20 TABLET ORAL EVERY 30 MIN PRN
Status: DISCONTINUED | OUTPATIENT
Start: 2020-01-16 | End: 2020-01-20 | Stop reason: HOSPADM

## 2020-01-16 RX ORDER — MULTIPLE VITAMINS W/ MINERALS TAB 9MG-400MCG
1 TAB ORAL DAILY
Status: DISCONTINUED | OUTPATIENT
Start: 2020-01-17 | End: 2020-01-20 | Stop reason: HOSPADM

## 2020-01-16 RX ORDER — ALUMINA, MAGNESIA, AND SIMETHICONE 2400; 2400; 240 MG/30ML; MG/30ML; MG/30ML
30 SUSPENSION ORAL EVERY 4 HOURS PRN
Status: DISCONTINUED | OUTPATIENT
Start: 2020-01-16 | End: 2020-01-20 | Stop reason: HOSPADM

## 2020-01-16 RX ORDER — ATENOLOL 50 MG/1
50 TABLET ORAL DAILY PRN
Status: DISCONTINUED | OUTPATIENT
Start: 2020-01-16 | End: 2020-01-17

## 2020-01-16 RX ORDER — ASPIRIN 81 MG/1
81 TABLET, CHEWABLE ORAL DAILY
Status: DISCONTINUED | OUTPATIENT
Start: 2020-01-17 | End: 2020-01-20 | Stop reason: HOSPADM

## 2020-01-16 RX ORDER — ESCITALOPRAM OXALATE 10 MG/1
10 TABLET ORAL DAILY
Status: ON HOLD | COMMUNITY
End: 2020-01-17

## 2020-01-16 RX ORDER — CLONIDINE HYDROCHLORIDE 0.1 MG/1
0.1 TABLET ORAL 2 TIMES DAILY
Status: DISCONTINUED | OUTPATIENT
Start: 2020-01-16 | End: 2020-01-20 | Stop reason: HOSPADM

## 2020-01-16 RX ORDER — FOLIC ACID 1 MG/1
1 TABLET ORAL DAILY
Status: DISCONTINUED | OUTPATIENT
Start: 2020-01-17 | End: 2020-01-20 | Stop reason: HOSPADM

## 2020-01-16 RX ORDER — TRAZODONE HYDROCHLORIDE 50 MG/1
50 TABLET, FILM COATED ORAL
Status: DISCONTINUED | OUTPATIENT
Start: 2020-01-16 | End: 2020-01-20 | Stop reason: HOSPADM

## 2020-01-16 RX ORDER — HYDROXYZINE HYDROCHLORIDE 25 MG/1
25 TABLET, FILM COATED ORAL EVERY 4 HOURS PRN
Status: DISCONTINUED | OUTPATIENT
Start: 2020-01-16 | End: 2020-01-16 | Stop reason: ALTCHOICE

## 2020-01-16 RX ORDER — BISACODYL 10 MG
10 SUPPOSITORY, RECTAL RECTAL DAILY PRN
Status: DISCONTINUED | OUTPATIENT
Start: 2020-01-16 | End: 2020-01-20 | Stop reason: HOSPADM

## 2020-01-16 RX ORDER — LANOLIN ALCOHOL/MO/W.PET/CERES
100 CREAM (GRAM) TOPICAL DAILY
Status: DISCONTINUED | OUTPATIENT
Start: 2020-01-17 | End: 2020-01-20 | Stop reason: HOSPADM

## 2020-01-16 RX ORDER — ONDANSETRON 4 MG/1
4 TABLET, ORALLY DISINTEGRATING ORAL EVERY 6 HOURS PRN
Status: DISCONTINUED | OUTPATIENT
Start: 2020-01-16 | End: 2020-01-20 | Stop reason: HOSPADM

## 2020-01-16 RX ORDER — ESCITALOPRAM OXALATE 20 MG/1
20 TABLET ORAL DAILY
COMMUNITY
End: 2020-01-16

## 2020-01-16 RX ORDER — HYDROXYZINE HYDROCHLORIDE 10 MG/1
10 TABLET, FILM COATED ORAL EVERY 6 HOURS PRN
Status: DISCONTINUED | OUTPATIENT
Start: 2020-01-16 | End: 2020-01-17

## 2020-01-16 RX ADMIN — TRAZODONE HYDROCHLORIDE 50 MG: 50 TABLET ORAL at 22:25

## 2020-01-16 RX ADMIN — HYDROXYZINE HYDROCHLORIDE 10 MG: 10 TABLET ORAL at 23:40

## 2020-01-16 RX ADMIN — ATENOLOL 50 MG: 50 TABLET ORAL at 22:25

## 2020-01-16 RX ADMIN — DIAZEPAM 10 MG: 5 TABLET ORAL at 23:40

## 2020-01-16 RX ADMIN — GABAPENTIN 300 MG: 300 CAPSULE ORAL at 22:25

## 2020-01-16 RX ADMIN — DIAZEPAM 10 MG: 5 TABLET ORAL at 22:25

## 2020-01-16 ASSESSMENT — ENCOUNTER SYMPTOMS
CONFUSION: 0
ABDOMINAL PAIN: 0
NECK STIFFNESS: 0
HEADACHES: 0
DIFFICULTY URINATING: 0
EYE REDNESS: 0
SHORTNESS OF BREATH: 0
FEVER: 0
ARTHRALGIAS: 0
COLOR CHANGE: 0

## 2020-01-16 ASSESSMENT — ACTIVITIES OF DAILY LIVING (ADL)
DRESS: INDEPENDENT
HYGIENE/GROOMING: INDEPENDENT
ORAL_HYGIENE: INDEPENDENT
LAUNDRY: WITH SUPERVISION

## 2020-01-16 NOTE — TELEPHONE ENCOUNTER
Patient called to check on his status for L+. Requested another message be sent to Venkata MARTINEZ Patient has been calling daily regarding L+.

## 2020-01-16 NOTE — TELEPHONE ENCOUNTER
LP SCREEN TELEPHONE NOTE   Nikhil Rios paperwork was reviewed by CITLALLI Flanenry and the patient was deemed ELIGIBLE for the LP program.     Medical: The patient is medically stable and did not appear to need a medical screening with the LP RN at this time.     Insurance: Health [facility]STLD[facility] [place] - A prior authorization is needed for the LP program. Alejandrina from [facility] has authorized 21 days of the LP program.  The admission counselor NEEDS to notify [facility] of the patient's admission date on the day/evening of the admission with a routed telephone note.  [facility] will notify [facility] of the admission to activate the authorization for treatment services.      This will be a: HALF evaluation. (LP UPDATE NOTE, ADDENDUM, VAA, MITRA's, ISP, DANBRITTANI, & SBAR)     IV use or Pregnant: This patient is not pregnant or an IV drug user.     Business office: The patient has [facility] [place]/PMAP and would NOT need to consult with anyone in [place]'s business office about the out of pocket costs of the LP program.     List: This patient CAN be placed on the COMMUNITY LP Waiting List at this time.       Group: [facility]     Additional Info as needed: NA     The best current contact telephone number for the patient is: [phone]       Thanks,  CITLALLI Flannery   1/16/2020

## 2020-01-16 NOTE — TELEPHONE ENCOUNTER
LP SCREEN TELEPHONE NOTE   Nikhil Rios paperwork was reviewed by CITLALLI Flannery and the patient was deemed ELIGIBLE for the LP program.     Medical: The patient is medically stable and did not appear to need a medical screening with the LP RN at this time.     Insurance: Health [email or web address]: [place] - A prior authorization is needed for the LP program. Alejandrina from [facility] has authorized 21 days of the LP program.  The admission counselor NEEDS to notify [facility] of the patient's admission date on the day/evening of the admission with a routed telephone note.  [facility] will notify [facility] of the admission to activate the authorization for treatment services.      This will be a: HALF evaluation. (LP UPDATE NOTE, ADDENDUM, VAA, MITRA's, ISP, DANBRITTANI, & SBAR)     IV use or Pregnant: This patient is not pregnant or an IV drug user.     Business office: The patient has [facility] [place]/PMAP and would NOT need to consult with anyone in [place]'s business office about the out of pocket costs of the LP program.     List: This patient CAN be placed on the COMMUNITY LP Waiting List at this time.       Group: [facility]     Additional Info as needed: NA     The best current contact telephone number for the patient is: [phone]       Thanks,  CITLALLI Flannery   1/16/2020

## 2020-01-16 NOTE — TELEPHONE ENCOUNTER
Email received from UR team on 1/16/20:    Received a voicemail from Alejandrina at  at [phone] who received a fax from [facility] regarding admission request for L+. An authorization was issued on 12/23/19 and the last day for patient to be admitted is 1/21/20. Once admitted, call the triage number of [phone] to initiate the auth for the specific location.    Please screen if appropriate for L+. Message routed to Venkata SAENZ for screening.

## 2020-01-17 PROCEDURE — 25000132 ZZH RX MED GY IP 250 OP 250 PS 637: Performed by: PSYCHIATRY & NEUROLOGY

## 2020-01-17 PROCEDURE — 12800008 ZZH R&B CD ADULT

## 2020-01-17 PROCEDURE — 25000132 ZZH RX MED GY IP 250 OP 250 PS 637: Performed by: NURSE PRACTITIONER

## 2020-01-17 PROCEDURE — 99223 1ST HOSP IP/OBS HIGH 75: CPT | Mod: AI | Performed by: PSYCHIATRY & NEUROLOGY

## 2020-01-17 PROCEDURE — 25000132 ZZH RX MED GY IP 250 OP 250 PS 637: Performed by: PHYSICIAN ASSISTANT

## 2020-01-17 RX ORDER — HYDROXYZINE HYDROCHLORIDE 25 MG/1
25 TABLET, FILM COATED ORAL EVERY 4 HOURS PRN
Status: DISCONTINUED | OUTPATIENT
Start: 2020-01-17 | End: 2020-01-20 | Stop reason: HOSPADM

## 2020-01-17 RX ORDER — CLONIDINE HYDROCHLORIDE 0.1 MG/1
0.1 TABLET ORAL 2 TIMES DAILY
Qty: 60 TABLET | Refills: 0 | Status: SHIPPED | OUTPATIENT
Start: 2020-01-17 | End: 2020-01-20

## 2020-01-17 RX ORDER — ESCITALOPRAM OXALATE 10 MG/1
10 TABLET ORAL DAILY
Qty: 30 TABLET | Refills: 0 | Status: SHIPPED | OUTPATIENT
Start: 2020-01-17 | End: 2020-01-20

## 2020-01-17 RX ORDER — ASPIRIN 81 MG/1
81 TABLET, CHEWABLE ORAL DAILY
Qty: 30 TABLET | Refills: 0 | Status: SHIPPED | OUTPATIENT
Start: 2020-01-17 | End: 2020-01-20

## 2020-01-17 RX ORDER — TRAZODONE HYDROCHLORIDE 50 MG/1
50 TABLET, FILM COATED ORAL AT BEDTIME
Qty: 30 TABLET | Refills: 0 | Status: SHIPPED | OUTPATIENT
Start: 2020-01-17 | End: 2020-01-20

## 2020-01-17 RX ORDER — MULTIPLE VITAMINS W/ MINERALS TAB 9MG-400MCG
1 TAB ORAL DAILY
Qty: 30 TABLET | Refills: 0 | Status: SHIPPED | OUTPATIENT
Start: 2020-01-17 | End: 2020-01-20

## 2020-01-17 RX ORDER — GABAPENTIN 300 MG/1
300 CAPSULE ORAL 3 TIMES DAILY
Qty: 90 CAPSULE | Refills: 0 | Status: SHIPPED | OUTPATIENT
Start: 2020-01-17 | End: 2020-01-20

## 2020-01-17 RX ORDER — METOPROLOL TARTRATE 50 MG
50 TABLET ORAL 2 TIMES DAILY
Qty: 30 TABLET | Refills: 0 | Status: SHIPPED | OUTPATIENT
Start: 2020-01-17 | End: 2020-01-20

## 2020-01-17 RX ORDER — LANOLIN ALCOHOL/MO/W.PET/CERES
100 CREAM (GRAM) TOPICAL DAILY
Qty: 30 TABLET | Refills: 0 | Status: SHIPPED | OUTPATIENT
Start: 2020-01-17 | End: 2020-03-26

## 2020-01-17 RX ORDER — AMLODIPINE BESYLATE 10 MG/1
10 TABLET ORAL DAILY
Qty: 30 TABLET | Refills: 0 | Status: SHIPPED | OUTPATIENT
Start: 2020-01-17 | End: 2020-03-26

## 2020-01-17 RX ORDER — NALTREXONE HYDROCHLORIDE 50 MG/1
50 TABLET, FILM COATED ORAL DAILY
Qty: 30 TABLET | Refills: 0 | Status: SHIPPED | OUTPATIENT
Start: 2020-01-17 | End: 2020-01-20

## 2020-01-17 RX ADMIN — ASPIRIN 81 MG CHEWABLE TABLET 81 MG: 81 TABLET CHEWABLE at 09:07

## 2020-01-17 RX ADMIN — DIAZEPAM 10 MG: 5 TABLET ORAL at 09:08

## 2020-01-17 RX ADMIN — Medication 50 MG: at 10:04

## 2020-01-17 RX ADMIN — HYDROXYZINE HYDROCHLORIDE 25 MG: 25 TABLET, FILM COATED ORAL at 20:30

## 2020-01-17 RX ADMIN — MULTIPLE VITAMINS W/ MINERALS TAB 1 TABLET: TAB at 09:07

## 2020-01-17 RX ADMIN — GABAPENTIN 300 MG: 300 CAPSULE ORAL at 14:13

## 2020-01-17 RX ADMIN — CLONIDINE HYDROCHLORIDE 0.1 MG: 0.1 TABLET ORAL at 20:30

## 2020-01-17 RX ADMIN — TRAZODONE HYDROCHLORIDE 50 MG: 50 TABLET ORAL at 22:02

## 2020-01-17 RX ADMIN — OMEPRAZOLE 20 MG: 20 CAPSULE, DELAYED RELEASE ORAL at 09:07

## 2020-01-17 RX ADMIN — ESCITALOPRAM OXALATE 10 MG: 10 TABLET ORAL at 09:08

## 2020-01-17 RX ADMIN — DIAZEPAM 10 MG: 5 TABLET ORAL at 20:31

## 2020-01-17 RX ADMIN — DIAZEPAM 10 MG: 5 TABLET ORAL at 12:41

## 2020-01-17 RX ADMIN — Medication 50 MG: at 20:37

## 2020-01-17 RX ADMIN — OMEPRAZOLE 20 MG: 20 CAPSULE, DELAYED RELEASE ORAL at 16:16

## 2020-01-17 RX ADMIN — GABAPENTIN 300 MG: 300 CAPSULE ORAL at 09:08

## 2020-01-17 RX ADMIN — FOLIC ACID 1 MG: 1 TABLET ORAL at 09:07

## 2020-01-17 RX ADMIN — SALINE NASAL SPRAY 1 SPRAY: 1.5 SOLUTION NASAL at 22:11

## 2020-01-17 RX ADMIN — CLONIDINE HYDROCHLORIDE 0.1 MG: 0.1 TABLET ORAL at 09:08

## 2020-01-17 RX ADMIN — GABAPENTIN 300 MG: 300 CAPSULE ORAL at 20:32

## 2020-01-17 RX ADMIN — AMLODIPINE BESYLATE 10 MG: 10 TABLET ORAL at 09:07

## 2020-01-17 RX ADMIN — DIAZEPAM 10 MG: 5 TABLET ORAL at 03:50

## 2020-01-17 RX ADMIN — THIAMINE HCL TAB 100 MG 100 MG: 100 TAB at 09:09

## 2020-01-17 ASSESSMENT — ACTIVITIES OF DAILY LIVING (ADL)
HYGIENE/GROOMING: INDEPENDENT
LAUNDRY: WITH SUPERVISION
DRESS: INDEPENDENT
ORAL_HYGIENE: INDEPENDENT

## 2020-01-17 NOTE — PLAN OF CARE
The patient continues in withdrawal and has been medicated twice with Valium this shift.  He denies that he has any thoughts of suicide.  He is hoping that he can go to [facility] on Monday.  He states that he does have support.

## 2020-01-17 NOTE — PLAN OF CARE
"ASHLEE Rios is a 34 year old year old male with a chief complaint of Alcohol Problem (seeking detox from alcohol; last drink was today at about 1400; has detox bed on hold per intake; pt is loud but cooperative and swearing; drinks 750 mL of vodka daily. No hx of seizures. )    S = Situation:   Drinking 750 ml vodka daily. Last drink 2 pm today. BAL 0.327 at 5:40 pm. Utox positive benzos and ETOH. Denies hx of withdrawal seizures or DT's.  B  = Background:   Pt last on detox unit 1/02/20. States he was unable to get into [facility] after detox and started drinking. [facility] and hopes to go there after detox.  Denies SI/SIB/HI.    Vital Signs: /78   Pulse 130   Temp 98.2  F (36.8  C) (Oral)   Resp 16   Ht 1.803 m (5' 11\")   Wt 108 kg (238 lb 1.6 oz)   SpO2 95%   BMI 33.21 kg/m    A  =  Assessment:   Cooperative with admission.  Alert and orientated x 3.  MSSA score 13. Pulse 130. Valium 10 mg and Atenolol 50 mg given.  R =   Request or Recommendation:   Continue to monitor for withdrawal symptoms.  15 minute checks for safety.  Placed on withdrawal precautions.  MSSA for alcohol withdrawal.    Pt is on 3 antihypertensive medications, Catapres, Lopressor, and Norvasc. Please evaluate in am.  "

## 2020-01-17 NOTE — PLAN OF CARE
Behavioral Team Discussion: (1/17/2020)    Continued Stay Criteria/Rationale: Patient admitted for Chemical Use Issues.  Plan: The following services will be provided to the patient; psychiatric assessment, medication management, therapeutic milieu, individual and group support, and skills groups.   Participants: 3A Provider: Dr. Ava Lew MD; 3A RN's: Sally Selisker, RN; 3A CM's: Paula Jo.  Summary/Recommendation: Providers will assess today for treatment recommendations, discharge planning, and aftercare plans. CM will meet with pt for discharge planning.   Medical/Physical: Per ED note:  history of substance use, alcohol-induced pancreatitis, and hypertension   Precautions:   Behavioral Orders   Procedures     Code 1 - Restrict to Unit     Routine Programming     As clinically indicated     Status 15     Every 15 minutes.     Withdrawal precautions     Rationale for change in precautions or plan: N/A  Progress: Initial.

## 2020-01-17 NOTE — ED PROVIDER NOTES
History     Chief Complaint   Patient presents with     Alcohol Problem     seeking detox from alcohol; last drink was today at about 1400; has detox bed on hold per intake; pt is loud but cooperative and swearing; drinks 750 mL of vodka daily. No hx of seizures.      HPI  Nikhil Rios is a 34 year old male who presents for detox from alcohol.  Patient states he drinks a liter bottle of vodka per day.  He does have a history of withdrawal symptoms he gets shakes if he stops drinking but is never had a seizure.  No other drug use.  Last drink was approximately 1400 today.  Patient denies any suicidal ideation.  He has no homicidal ideation does state he is angry with a friend but is not planning to harm him.  Patient does have a detox bed on hold.  Other symptoms noted.    I have reviewed the Medications, Allergies, Past Medical and Surgical History, and Social History in the Epic system.    Review of Systems   Constitutional: Negative for fever.   HENT: Negative for congestion.    Eyes: Negative for redness.   Respiratory: Negative for shortness of breath.    Cardiovascular: Negative for chest pain.   Gastrointestinal: Negative for abdominal pain.   Genitourinary: Negative for difficulty urinating.   Musculoskeletal: Negative for arthralgias and neck stiffness.   Skin: Negative for color change.   Neurological: Negative for headaches.   Psychiatric/Behavioral: Negative for confusion.       Physical Exam   BP: 130/85  Pulse: 135  Temp: 98.1  F (36.7  C)  Resp: 20  Weight: 108 kg (238 lb 1.6 oz)  SpO2: 95 %      Physical Exam  Vitals signs and nursing note reviewed.   Constitutional:       General: He is not in acute distress.     Appearance: He is well-developed. He is not diaphoretic.   HENT:      Head: Normocephalic and atraumatic.      Mouth/Throat:      Pharynx: No oropharyngeal exudate.   Eyes:      General: No scleral icterus.        Right eye: No discharge.         Left eye: No discharge.      Pupils: Pupils  are equal, round, and reactive to light.   Neck:      Musculoskeletal: Normal range of motion and neck supple.   Cardiovascular:      Rate and Rhythm: Regular rhythm. Tachycardia present.      Heart sounds: Normal heart sounds. No murmur. No friction rub. No gallop.    Pulmonary:      Effort: Pulmonary effort is normal. No respiratory distress.      Breath sounds: Normal breath sounds. No wheezing.   Chest:      Chest wall: No tenderness.   Abdominal:      General: Bowel sounds are normal. There is no distension.      Palpations: Abdomen is soft.      Tenderness: There is no abdominal tenderness.   Musculoskeletal: Normal range of motion.         General: No tenderness or deformity.   Skin:     General: Skin is warm and dry.      Coloration: Skin is not pale.      Findings: No erythema or rash.   Neurological:      Mental Status: He is alert and oriented to person, place, and time.      Cranial Nerves: No cranial nerve deficit.         ED Course        Procedures             Critical Care time:  none             Labs Ordered and Resulted from Time of ED Arrival Up to the Time of Departure from the ED   ALCOHOL BREATH TEST POCT - Abnormal; Notable for the following components:       Result Value    Alcohol Breath Test 0.327 (*)     All other components within normal limits   DRUG ABUSE SCREEN 6 CHEM DEP URINE ([facility])   CBC WITH PLATELETS DIFFERENTIAL   COMPREHENSIVE METABOLIC PANEL            Assessments & Plan (with Medical Decision Making)   This is a 34-year-old male presents for detox and alcohol.  No other drug use.  Last drink was approximately 4 hours ago.  Patient is currently awake alert and cooperative.  He is somewhat tachycardic.  Alcohol level is 0.327.  Lab work is pending at this time. We will admit to detox.    I have reviewed the nursing notes.    I have reviewed the findings, diagnosis, plan and need for follow up with the patient.    New Prescriptions    No medications on file       Final diagnoses:    Alcohol abuse       1/16/2020   [facility], [place], EMERGENCY DEPARTMENT     Urban Montoya,   01/17/20 0114

## 2020-01-17 NOTE — ED NOTES
ED to Behavioral Floor Handoff    SITUATION  Nikhil Rios is a 34 year old male who speaks English and lives in a home with others The patient arrived in the ED by private car from home with a complaint of Alcohol Problem (seeking detox from alcohol; last drink was today at about 1400; has detox bed on hold per intake; pt is loud but cooperative and swearing; drinks 750 mL of vodka daily. No hx of seizures. )  .The patient's current symptoms started/worsened 1 week(s) ago and during this time the symptoms have increased.   In the ED, pt was diagnosed with   Final diagnoses:   Alcohol abuse        Initial vitals were: BP: 130/85  Pulse: 135  Temp: 98.1  F (36.7  C)  Resp: 20  Weight: 108 kg (238 lb 1.6 oz)  SpO2: 95 %   --------  Is the patient diabetic? No   If yes, last blood glucose? --     If yes, was this treated in the ED? --  --------  Is the patient inebriated (ETOH) Yes or Impaired on other substances? No  MSSA done? Yes  Last MSSA score: --    Were withdrawal symptoms treated? N/A  Does the patient have a seizure history? No. If yes, date of most recent seizure--  --------  Is the patient patient experiencing suicidal ideation? denies current or recent suicidal ideation     Homicidal ideation? denies current or recent homicidal ideation or behaviors.    Self-injurious behavior/urges? denies current or recent self injurious behavior or ideation.  ------  Was pt aggressive in the ED No  Was a code called No  Is the pt now cooperative? Yes  -------  Meds given in ED: Medications - No data to display   Family present during ED course? Yes  Family currently present? Yes    BACKGROUND  Does the patient have a cognitive impairment or developmental disability? No  Allergies:   Allergies   Allergen Reactions     Pollen Extract Rash     grass tree pollen   .   Social demographics are   Social History     Socioeconomic History     Marital status: Single     Spouse name: None     Number of children: None     Years of  education: None     Highest education level: None   Occupational History     None   Social Needs     Financial resource strain: None     Food insecurity:     Worry: None     Inability: None     Transportation needs:     Medical: None     Non-medical: None   Tobacco Use     Smoking status: Former Smoker     Packs/day: 0.25     Smokeless tobacco: Never Used   Substance and Sexual Activity     Alcohol use: Yes     Comment: 750ml daily of vodka     Drug use: No     Sexual activity: None   Lifestyle     Physical activity:     Days per week: None     Minutes per session: None     Stress: None   Relationships     Social connections:     Talks on phone: None     Gets together: None     Attends Mu-ism service: None     Active member of club or organization: None     Attends meetings of clubs or organizations: None     Relationship status: None     Intimate partner violence:     Fear of current or ex partner: None     Emotionally abused: None     Physically abused: None     Forced sexual activity: None   Other Topics Concern     None   Social History Narrative     None        ASSESSMENT  Labs results   Labs Ordered and Resulted from Time of ED Arrival Up to the Time of Departure from the ED   COMPREHENSIVE METABOLIC PANEL - Abnormal; Notable for the following components:       Result Value    Glucose 149 (*)     Calcium 7.9 (*)     All other components within normal limits   ALCOHOL BREATH TEST POCT - Abnormal; Notable for the following components:    Alcohol Breath Test 0.327 (*)     All other components within normal limits   CBC WITH PLATELETS DIFFERENTIAL   DRUG ABUSE SCREEN 6 CHEM DEP URINE ([facility])      Imaging Studies: No results found for this or any previous visit (from the past 24 hour(s)).   Most recent vital signs /85   Pulse 132   Temp 98.1  F (36.7  C) (Oral)   Resp 20   Wt 108 kg (238 lb 1.6 oz)   SpO2 95%   BMI 33.21 kg/m     Abnormal labs/tests/findings requiring intervention:---   Pain control:  pt had none  Nausea control: pt had none    RECOMMENDATION  Are any infection precautions needed (MRSA, VRE, etc.)? No If yes, what infection? --  ---  Does the patient have mobility issues? independently. If yes, what device does the pt use? ---  ---  Is patient on 72 hour hold or commitment? No If on 72 hour hold, have hold and rights been given to patient? N/A  Are admitting orders written if after 10 p.m. ?N/A  Tasks needing to be completed:---     Emmanuel Hurd, RN    [phone] [facility]

## 2020-01-17 NOTE — CONSULTS
[facility] Medicine Note  January 17, 2020    Circumstances of recent discharge and re-admittance noted. Please refer to recent medicine H&P in charting dated 1/1/2020, which was reviewed by this writer and is up to date. Please call the on-call PA-C for any f/u medical concerns if they arise.   Diagnoses:    Alcohol dependence and withdrawal. No hx of DTs or seizures. Defer to psych, primary team.   Hypertriglyceridemia. Likely 2/2 ETOH, needs to follow up with PCP for repeat labs once abstaining from alcohol.   Borderline TFTs. TSH was mildly elevated at 4.01 and free T4 was mildly low at 0.71 on last admission. Needs repeat TFTs with PCP within 6 weeks of discharge.   HTN. BPs normotensive. Cont pta norvasc and metoprolol.   GERD. Cont PPI.     Rolanda Clarke PA-C  Internal Medicine [facility] Hospitalist  [phone]  January 17, 2020

## 2020-01-17 NOTE — H&P
Admitted:     01/16/2020      IDENTIFYING INFORMATION:  The patient is a 34-year-old  male.  He is known to me from previous admissions.      CHIEF COMPLAINT:  Alcohol.      HISTORY OF PRESENT ILLNESS:  The patient came to the emergency room because he was drinking alcohol.  He was in the emergency room on 01/19, but left and continued to drink.  He realized that he needs to get help and came here.  Alcohol is his drug of choice.  He has been drinking 750 mL.  Last drink was yesterday with 0.327.  His U-tox was positive for benzos.      The patient started drinking alcohol at 16.  He has tolerance.  It became more of a problem in the last 3 years.  He was in [facility] around July and was sober for a month and then relapsed.  He has tolerance to alcohol, withdrawal, progressive use with loss of control, spent more time, more amount, tried to quit unsuccessfully, despite negative experiences with withdrawal when he stops drinking, including shaking, vomiting, diarrhea.  He does not smoke drugs, does not use any street drugs, does not have any suicidal or homicidal ideation, plan or intent.  He takes Lexapro for his anxiety.  He says it is situational.  The patient denies any depression, luisa, or psychosis.        PAST PSYCHIATRIC HOSPITALIZATIONS:  None.  He was in CD treatments twice, once 3 years ago and once as above.  Never had ECT, never had any civil commitment.  No access to guns.      SOCIAL HISTORY:  Born in the [facility], bachelor's from [employer].  He is unemployed, worked in IT.  He is .      PAST MEDICAL HISTORY:  A 10-point review of systems was reviewed and is negative.  The patient's vitals are as below:  Temperature of 98, pulse of 88, respiratory rate of 16, blood pressure 131/89.  The patient scored 9 yesterday on his MSSA and received 20 mg of Valium.  In all, he has required 30 mg of Valium.      MENTAL STATUS EXAMINATION:  The patient is a 34-year-old  male who  appears his stated age.  He has adequate grooming, adequate hygiene, maintains good eye contact.  He is cooperative.  He has no psychomotor abnormalities.  No gait problems.  Mood is euthymic.  Affect is congruent.  Speech is spontaneous, normal rate.  Linear thought process, no loosening of associations.  Does not have any active suicidal or homicidal ideation, plan or intent.  No auditory or visual hallucinations.  Insight and judgment are limited.  Alert, oriented x 3.  Recent and remote memory, language, and fund of knowledge are all adequate.  Attention and concentration are adequate.      DIAGNOSES:     Axis I:   1.  Alcohol use disorder, severe.   2.  Alcohol withdrawal complicated by relapsing.  He was in the emergency room on  and now continued to drink.  She has no history of DTs or seizures.      PLAN:  The patient will be detoxed off alcohol using [facility] protocol.  The patient will be seen by case management and Internal Medicine.  The patient at this time does not have any active suicidal or homicidal ideation, plan, or intent.  The patient was also in our detox on .         ELMIRA JETER MD             D: 2020   T: 2020   MT: FRANCISCA      Name:     QUYEN QUIROZ   MRN:      [id number]        Account:      [id number]   :      1985        Admitted:     2020                   Document: [id number]

## 2020-01-17 NOTE — PROGRESS NOTES
01/16/20 225   Patient Belongings   Did you bring any home meds/supplements to the hospital?  No   Patient Belongings other (see comments)   Belongings Search Yes   Clothing Search Yes   Second Staff [employer]in [employer] Jimmy   Comment See notes.    Storage bin: unsearched bag, jacket, pants, shoes.   Discharge bin: phone, wallet, keys.   Security envelope: [place] license, state farm card, [facility] ([id number])  A               Admission:  I am responsible for any personal items that are not sent to the safe or pharmacy.  [facility] is not responsible for loss, theft or damage of any property in my possession.    Signature:  _________________________________ Date: _______  Time: _____                                              Staff Signature:  ____________________________ Date: ________  Time: _____      2nd Staff person, if patient is unable/unwilling to sign:    Signature: ________________________________ Date: ________  Time: _____     Discharge:  [facility] has returned all of my personal belongings:    Signature: _________________________________ Date: ________  Time: _____                                          Staff Signature:  ____________________________ Date: ________  Time: _____

## 2020-01-17 NOTE — PHARMACY-ADMISSION MEDICATION HISTORY
Admission medication history for the 1/16/2020 admission is complete.     Interview Sources: Patient, Patient's mother, Surescripts, Chart Review (Discharge summary 1/2/20 and info from [facility] 1/13/20)    Reliability of Source: Poor - The patient was not a reliable medication historian. He states that he takes whatever is prescribed but does not know names or doses of most of his medications.    Medication Adherence: Moderate - He reports he typically takes his medications as prescribed but misses doses when consuming alcohol.    Current Outpatient Pharmacy: [id number] [place] [id number] [facility] [address]    Changes made to PTA medication list (reason)  Added: none    Deleted: none    Changed: none    Additional medication history information:   -The patient denies any additional prescription or over-the-counter medications.    Prior to Admission Medication List:  Prior to Admission medications    Medication Sig Last Dose Taking? Auth Provider   amLODIPine (NORVASC) 10 MG tablet Take 1 tablet (10 mg) by mouth daily 1/15/2020 Yes Ava Lew MD   aspirin (ASA) 81 MG chewable tablet Take 1 tablet (81 mg) by mouth daily 1/15/2020 Yes Ava Lew MD   cloNIDine (CATAPRES) 0.1 MG tablet Take 1 tablet (0.1 mg) by mouth 2 times daily 1/15/2020 Yes Ava Lew MD   escitalopram (LEXAPRO) 10 MG tablet Take 10 mg by mouth daily 1/15/2020 Yes Unknown, Entered By History   folic acid (FOLVITE) 1 MG tablet Take 1 mg by mouth daily 1/15/2020 Yes Unknown, Entered By History   gabapentin (NEURONTIN) 100 MG capsule Take 300 mg by mouth 3 times daily 1/15/2020 Yes Reported, Patient   hydrOXYzine (ATARAX) 10 MG tablet Take 10 mg by mouth every 6 hours as needed for anxiety 1/15/2020 Yes Unknown, Entered By History   metoprolol tartrate (LOPRESSOR) 50 MG tablet Take 50 mg by mouth 2 times daily  1/15/2020 Yes Reported, Patient   multivitamin w/minerals  (THERA-VIT-M) tablet Take 1 tablet by mouth daily 1/15/2020 Yes Ava Lew MD   naltrexone (DEPADE/REVIA) 50 MG tablet Take 50 mg by mouth daily 1/15/2020 Yes Reported, Patient   omeprazole (PRILOSEC) 20 MG DR capsule Take 1 capsule by mouth 2 times daily (before meals) 1/15/2020 Yes Reported, Patient   traZODone (DESYREL) 50 MG tablet Take 50 mg by mouth At Bedtime 1/15/2020 Yes Reported, Patient       Time spent: 30 minutes    Medication history completed by: Ghislaine Arteaga, PD3

## 2020-01-17 NOTE — PROGRESS NOTES
Writer met with pt to discuss aftercare plans. Pt reports he is set up to admit to LP+ when out-of-detox. Per chart review it looks as though pt has been deemed eligible for LP+ and assessment was reviewed and funding is in order through [employer]. Writer placed call to LP+ admissions coordinator Venkata to verify if additional work is needed regarding admission. Left voicemail message with Venkata and waiting for return call. AVS initiated.    Update: Received return call from Venkata in LP+ admission. Reports pt is set to admit and no further needs for admission at this time. Pt will transfer to men's bed when out-of-detox and bed is available.

## 2020-01-18 PROCEDURE — 25000132 ZZH RX MED GY IP 250 OP 250 PS 637: Performed by: PSYCHIATRY & NEUROLOGY

## 2020-01-18 PROCEDURE — 12800008 ZZH R&B CD ADULT

## 2020-01-18 PROCEDURE — 25000132 ZZH RX MED GY IP 250 OP 250 PS 637: Performed by: PHYSICIAN ASSISTANT

## 2020-01-18 PROCEDURE — 25000132 ZZH RX MED GY IP 250 OP 250 PS 637: Performed by: NURSE PRACTITIONER

## 2020-01-18 RX ADMIN — HYDROXYZINE HYDROCHLORIDE 25 MG: 25 TABLET, FILM COATED ORAL at 16:19

## 2020-01-18 RX ADMIN — MULTIPLE VITAMINS W/ MINERALS TAB 1 TABLET: TAB at 08:44

## 2020-01-18 RX ADMIN — CLONIDINE HYDROCHLORIDE 0.1 MG: 0.1 TABLET ORAL at 20:20

## 2020-01-18 RX ADMIN — GABAPENTIN 300 MG: 300 CAPSULE ORAL at 14:21

## 2020-01-18 RX ADMIN — GABAPENTIN 300 MG: 300 CAPSULE ORAL at 08:44

## 2020-01-18 RX ADMIN — TRAZODONE HYDROCHLORIDE 50 MG: 50 TABLET ORAL at 22:28

## 2020-01-18 RX ADMIN — GABAPENTIN 300 MG: 300 CAPSULE ORAL at 20:21

## 2020-01-18 RX ADMIN — OMEPRAZOLE 20 MG: 20 CAPSULE, DELAYED RELEASE ORAL at 08:44

## 2020-01-18 RX ADMIN — SALINE NASAL SPRAY 1 SPRAY: 1.5 SOLUTION NASAL at 12:27

## 2020-01-18 RX ADMIN — HYDROXYZINE HYDROCHLORIDE 25 MG: 25 TABLET, FILM COATED ORAL at 20:20

## 2020-01-18 RX ADMIN — OMEPRAZOLE 20 MG: 20 CAPSULE, DELAYED RELEASE ORAL at 16:19

## 2020-01-18 RX ADMIN — SALINE NASAL SPRAY 1 SPRAY: 1.5 SOLUTION NASAL at 22:28

## 2020-01-18 RX ADMIN — SALINE NASAL SPRAY 1 SPRAY: 1.5 SOLUTION NASAL at 18:57

## 2020-01-18 RX ADMIN — THIAMINE HCL TAB 100 MG 100 MG: 100 TAB at 08:44

## 2020-01-18 RX ADMIN — CLONIDINE HYDROCHLORIDE 0.1 MG: 0.1 TABLET ORAL at 08:44

## 2020-01-18 RX ADMIN — ESCITALOPRAM OXALATE 10 MG: 10 TABLET ORAL at 08:44

## 2020-01-18 RX ADMIN — HYDROXYZINE HYDROCHLORIDE 25 MG: 25 TABLET, FILM COATED ORAL at 11:42

## 2020-01-18 RX ADMIN — ASPIRIN 81 MG CHEWABLE TABLET 81 MG: 81 TABLET CHEWABLE at 08:44

## 2020-01-18 RX ADMIN — FOLIC ACID 1 MG: 1 TABLET ORAL at 08:44

## 2020-01-18 RX ADMIN — SALINE NASAL SPRAY 1 SPRAY: 1.5 SOLUTION NASAL at 16:19

## 2020-01-18 RX ADMIN — Medication 50 MG: at 08:44

## 2020-01-18 RX ADMIN — Medication 50 MG: at 20:20

## 2020-01-18 ASSESSMENT — ACTIVITIES OF DAILY LIVING (ADL)
LAUNDRY: WITH SUPERVISION
HYGIENE/GROOMING: INDEPENDENT
DRESS: INDEPENDENT
ORAL_HYGIENE: INDEPENDENT

## 2020-01-18 NOTE — PLAN OF CARE
The patient has spent much of the morning in bed.  He has needed Valium so far this shift and he may be able to be taken out of detox later tonight.  No thoughts of self harm.  He is optimistic about going to treatment at [facility], hopefully Monday.  Sleep and appetite good.

## 2020-01-19 PROCEDURE — 12800008 ZZH R&B CD ADULT

## 2020-01-19 PROCEDURE — 25000132 ZZH RX MED GY IP 250 OP 250 PS 637: Performed by: NURSE PRACTITIONER

## 2020-01-19 PROCEDURE — H2032 ACTIVITY THERAPY, PER 15 MIN: HCPCS

## 2020-01-19 PROCEDURE — 25000132 ZZH RX MED GY IP 250 OP 250 PS 637: Performed by: PHYSICIAN ASSISTANT

## 2020-01-19 PROCEDURE — 25000132 ZZH RX MED GY IP 250 OP 250 PS 637: Performed by: PSYCHIATRY & NEUROLOGY

## 2020-01-19 RX ADMIN — SALINE NASAL SPRAY 1 SPRAY: 1.5 SOLUTION NASAL at 20:57

## 2020-01-19 RX ADMIN — GABAPENTIN 300 MG: 300 CAPSULE ORAL at 21:58

## 2020-01-19 RX ADMIN — GABAPENTIN 300 MG: 300 CAPSULE ORAL at 08:18

## 2020-01-19 RX ADMIN — ASPIRIN 81 MG CHEWABLE TABLET 81 MG: 81 TABLET CHEWABLE at 08:18

## 2020-01-19 RX ADMIN — HYDROXYZINE HYDROCHLORIDE 25 MG: 25 TABLET, FILM COATED ORAL at 14:15

## 2020-01-19 RX ADMIN — ESCITALOPRAM OXALATE 10 MG: 10 TABLET ORAL at 08:18

## 2020-01-19 RX ADMIN — OMEPRAZOLE 20 MG: 20 CAPSULE, DELAYED RELEASE ORAL at 16:17

## 2020-01-19 RX ADMIN — HYDROXYZINE HYDROCHLORIDE 25 MG: 25 TABLET, FILM COATED ORAL at 08:20

## 2020-01-19 RX ADMIN — MULTIPLE VITAMINS W/ MINERALS TAB 1 TABLET: TAB at 08:18

## 2020-01-19 RX ADMIN — HYDROXYZINE HYDROCHLORIDE 25 MG: 25 TABLET, FILM COATED ORAL at 22:42

## 2020-01-19 RX ADMIN — CLONIDINE HYDROCHLORIDE 0.1 MG: 0.1 TABLET ORAL at 08:18

## 2020-01-19 RX ADMIN — Medication 50 MG: at 21:59

## 2020-01-19 RX ADMIN — TRAZODONE HYDROCHLORIDE 50 MG: 50 TABLET ORAL at 22:42

## 2020-01-19 RX ADMIN — SALINE NASAL SPRAY 1 SPRAY: 1.5 SOLUTION NASAL at 13:45

## 2020-01-19 RX ADMIN — THIAMINE HCL TAB 100 MG 100 MG: 100 TAB at 08:18

## 2020-01-19 RX ADMIN — Medication 50 MG: at 08:18

## 2020-01-19 RX ADMIN — GABAPENTIN 300 MG: 300 CAPSULE ORAL at 13:45

## 2020-01-19 RX ADMIN — CLONIDINE HYDROCHLORIDE 0.1 MG: 0.1 TABLET ORAL at 21:58

## 2020-01-19 RX ADMIN — FOLIC ACID 1 MG: 1 TABLET ORAL at 08:18

## 2020-01-19 RX ADMIN — HYDROXYZINE HYDROCHLORIDE 25 MG: 25 TABLET, FILM COATED ORAL at 18:26

## 2020-01-19 RX ADMIN — SALINE NASAL SPRAY 1 SPRAY: 1.5 SOLUTION NASAL at 22:43

## 2020-01-19 RX ADMIN — SALINE NASAL SPRAY 1 SPRAY: 1.5 SOLUTION NASAL at 08:20

## 2020-01-19 RX ADMIN — OMEPRAZOLE 20 MG: 20 CAPSULE, DELAYED RELEASE ORAL at 08:18

## 2020-01-19 NOTE — PLAN OF CARE
The patient continues much the same.  He spends much of his time in bed, but he is pleasant and cooperative when he is up.  His appetite and sleep are good.  He exhibits a range of affect.  He is hoping to go to [facility] tomorrow and says he is optimistic regarding treatment.  No thoughts of harm to himself.

## 2020-01-19 NOTE — PROGRESS NOTES
Patient MSSA scores less than 8 for over 24 hours, for alcohol withdrawal. Patient has not required any valium for alcohol withdrawal for over 24 hours. Patient is removed from active detox monitoring.

## 2020-01-20 ENCOUNTER — HOSPITAL ENCOUNTER (OUTPATIENT)
Dept: BEHAVIORAL HEALTH | Facility: CLINIC | Age: 35
Discharge: HOME | DRG: 897 | End: 2020-01-20
Attending: SOCIAL WORKER | Admitting: SOCIAL WORKER
Payer: COMMERCIAL

## 2020-01-20 ENCOUNTER — TELEPHONE (OUTPATIENT)
Dept: BEHAVIORAL HEALTH | Facility: CLINIC | Age: 35
End: 2020-01-20

## 2020-01-20 VITALS
WEIGHT: 238.1 LBS | HEART RATE: 73 BPM | RESPIRATION RATE: 16 BRPM | TEMPERATURE: 97.2 F | BODY MASS INDEX: 33.33 KG/M2 | HEIGHT: 71 IN | OXYGEN SATURATION: 99 % | DIASTOLIC BLOOD PRESSURE: 82 MMHG | SYSTOLIC BLOOD PRESSURE: 120 MMHG

## 2020-01-20 DIAGNOSIS — F41.9 ANXIETY: Primary | ICD-10-CM

## 2020-01-20 PROCEDURE — 25000132 ZZH RX MED GY IP 250 OP 250 PS 637: Performed by: NURSE PRACTITIONER

## 2020-01-20 PROCEDURE — 99239 HOSP IP/OBS DSCHRG MGMT >30: CPT | Performed by: PSYCHIATRY & NEUROLOGY

## 2020-01-20 PROCEDURE — 25000132 ZZH RX MED GY IP 250 OP 250 PS 637: Performed by: PHYSICIAN ASSISTANT

## 2020-01-20 PROCEDURE — H0001 ALCOHOL AND/OR DRUG ASSESS: HCPCS

## 2020-01-20 PROCEDURE — 10020000 ZZH LODGING PLUS FACILITY CHARGE ADULT

## 2020-01-20 PROCEDURE — 25000132 ZZH RX MED GY IP 250 OP 250 PS 637: Performed by: PSYCHIATRY & NEUROLOGY

## 2020-01-20 RX ORDER — LORATADINE 10 MG/1
10 TABLET ORAL DAILY PRN
COMMUNITY
End: 2020-02-14

## 2020-01-20 RX ORDER — NALTREXONE HYDROCHLORIDE 50 MG/1
50 TABLET, FILM COATED ORAL DAILY
Qty: 30 TABLET | Refills: 0 | Status: SHIPPED | OUTPATIENT
Start: 2020-01-20 | End: 2020-03-26

## 2020-01-20 RX ORDER — CLONIDINE HYDROCHLORIDE 0.1 MG/1
0.1 TABLET ORAL 2 TIMES DAILY
Qty: 60 TABLET | Refills: 0 | Status: SHIPPED | OUTPATIENT
Start: 2020-01-20 | End: 2020-03-26

## 2020-01-20 RX ORDER — ESCITALOPRAM OXALATE 10 MG/1
10 TABLET ORAL DAILY
Qty: 30 TABLET | Refills: 0 | Status: SHIPPED | OUTPATIENT
Start: 2020-01-20 | End: 2020-03-26

## 2020-01-20 RX ORDER — AMOXICILLIN 250 MG
2 CAPSULE ORAL DAILY PRN
COMMUNITY
End: 2020-02-14

## 2020-01-20 RX ORDER — METOPROLOL TARTRATE 50 MG
50 TABLET ORAL 2 TIMES DAILY
Qty: 60 TABLET | Refills: 0 | Status: SHIPPED | OUTPATIENT
Start: 2020-01-20 | End: 2020-03-26

## 2020-01-20 RX ORDER — LANOLIN ALCOHOL/MO/W.PET/CERES
3 CREAM (GRAM) TOPICAL
COMMUNITY
End: 2020-02-14

## 2020-01-20 RX ORDER — IBUPROFEN 200 MG
400 TABLET ORAL EVERY 6 HOURS PRN
COMMUNITY
End: 2020-02-14

## 2020-01-20 RX ORDER — ACETAMINOPHEN 325 MG/1
325-650 TABLET ORAL EVERY 4 HOURS PRN
COMMUNITY
End: 2020-02-14

## 2020-01-20 RX ORDER — HYDROXYZINE HYDROCHLORIDE 25 MG/1
25 TABLET, FILM COATED ORAL EVERY 4 HOURS PRN
Qty: 60 TABLET | Refills: 0 | Status: ON HOLD | OUTPATIENT
Start: 2020-01-20 | End: 2020-06-16

## 2020-01-20 RX ORDER — MAGNESIUM HYDROXIDE/ALUMINUM HYDROXICE/SIMETHICONE 120; 1200; 1200 MG/30ML; MG/30ML; MG/30ML
30 SUSPENSION ORAL EVERY 6 HOURS PRN
COMMUNITY
End: 2020-02-14

## 2020-01-20 RX ORDER — GABAPENTIN 300 MG/1
300 CAPSULE ORAL 3 TIMES DAILY
Qty: 90 CAPSULE | Refills: 0 | Status: ON HOLD | OUTPATIENT
Start: 2020-01-20 | End: 2020-10-25

## 2020-01-20 RX ORDER — MULTIPLE VITAMINS W/ MINERALS TAB 9MG-400MCG
1 TAB ORAL DAILY
Qty: 30 TABLET | Refills: 0 | Status: SHIPPED | OUTPATIENT
Start: 2020-01-20 | End: 2020-03-26

## 2020-01-20 RX ORDER — ASPIRIN 81 MG/1
81 TABLET, CHEWABLE ORAL DAILY
Qty: 30 TABLET | Refills: 0 | Status: SHIPPED | OUTPATIENT
Start: 2020-01-20 | End: 2020-03-26

## 2020-01-20 RX ORDER — TRAZODONE HYDROCHLORIDE 50 MG/1
50 TABLET, FILM COATED ORAL AT BEDTIME
Qty: 30 TABLET | Refills: 0 | Status: SHIPPED | OUTPATIENT
Start: 2020-01-20 | End: 2020-05-11

## 2020-01-20 RX ADMIN — CLONIDINE HYDROCHLORIDE 0.1 MG: 0.1 TABLET ORAL at 09:16

## 2020-01-20 RX ADMIN — OMEPRAZOLE 20 MG: 20 CAPSULE, DELAYED RELEASE ORAL at 09:16

## 2020-01-20 RX ADMIN — GABAPENTIN 300 MG: 300 CAPSULE ORAL at 14:14

## 2020-01-20 RX ADMIN — GABAPENTIN 300 MG: 300 CAPSULE ORAL at 09:15

## 2020-01-20 RX ADMIN — ESCITALOPRAM OXALATE 10 MG: 10 TABLET ORAL at 09:16

## 2020-01-20 RX ADMIN — FOLIC ACID 1 MG: 1 TABLET ORAL at 09:16

## 2020-01-20 RX ADMIN — SALINE NASAL SPRAY 1 SPRAY: 1.5 SOLUTION NASAL at 11:13

## 2020-01-20 RX ADMIN — ASPIRIN 81 MG CHEWABLE TABLET 81 MG: 81 TABLET CHEWABLE at 09:16

## 2020-01-20 RX ADMIN — MULTIPLE VITAMINS W/ MINERALS TAB 1 TABLET: TAB at 09:16

## 2020-01-20 RX ADMIN — Medication 50 MG: at 09:16

## 2020-01-20 RX ADMIN — THIAMINE HCL TAB 100 MG 100 MG: 100 TAB at 09:16

## 2020-01-20 RX ADMIN — HYDROXYZINE HYDROCHLORIDE 25 MG: 25 TABLET, FILM COATED ORAL at 11:13

## 2020-01-20 ASSESSMENT — ANXIETY QUESTIONNAIRES
GAD7 TOTAL SCORE: 3
7. FEELING AFRAID AS IF SOMETHING AWFUL MIGHT HAPPEN: NOT AT ALL
IF YOU CHECKED OFF ANY PROBLEMS ON THIS QUESTIONNAIRE, HOW DIFFICULT HAVE THESE PROBLEMS MADE IT FOR YOU TO DO YOUR WORK, TAKE CARE OF THINGS AT HOME, OR GET ALONG WITH OTHER PEOPLE: SOMEWHAT DIFFICULT
3. WORRYING TOO MUCH ABOUT DIFFERENT THINGS: SEVERAL DAYS
4. TROUBLE RELAXING: NOT AT ALL
6. BECOMING EASILY ANNOYED OR IRRITABLE: NOT AT ALL
1. FEELING NERVOUS, ANXIOUS, OR ON EDGE: SEVERAL DAYS
5. BEING SO RESTLESS THAT IT IS HARD TO SIT STILL: NOT AT ALL
2. NOT BEING ABLE TO STOP OR CONTROL WORRYING: SEVERAL DAYS

## 2020-01-20 ASSESSMENT — ACTIVITIES OF DAILY LIVING (ADL)
ORAL_HYGIENE: INDEPENDENT
HYGIENE/GROOMING: INDEPENDENT
LAUNDRY: WITH SUPERVISION
DRESS: INDEPENDENT

## 2020-01-20 ASSESSMENT — PATIENT HEALTH QUESTIONNAIRE - PHQ9: SUM OF ALL RESPONSES TO PHQ QUESTIONS 1-9: 5

## 2020-01-20 NOTE — TELEPHONE ENCOUNTER
Attn: Central Intake     This patient was admitted to the LP program on 1/20/2020.  Please call the [facility] triage line @ tel #: [phone] to notify [facility] of this patient's admission to the LP program so the authorization from [facility] for the LP program will be activated.

## 2020-01-20 NOTE — PROGRESS NOTES
Name: Nikhil Rios  Date: 1/20/2020  Medical Record: [id number]    Envelope Number: [id number]    List of Contents (List each item separately in new row):   One Cell Phone & .    Admission:  I am responsible for any personal items that are not sent to the safe or pharmacy.  [facility] is not responsible for loss, theft or damage of any property in my possession.      Patient Signature:  ___________________________________________       Date/Time:__________________________    Staff Signature: __________________________________       Date/Time:__________________________    2nd Staff person, if patient is unable/unwilling to sign:      __________________________________________________________       Date/Time: __________________________      Discharge:  [facility] has returned all of my personal belongings:    Patient Signature: ________________________________________     Date/Time: ____________________________________    Staff Signature: ______________________________________     Date/Time:_____________________________________

## 2020-01-20 NOTE — PROGRESS NOTES
01/19/20 2000   Therapeutic Recreation   Type of Intervention structured groups   Activity game   Response Participates, initiates socially appropriate   Hours 1     Pt participated in a structured Therapeutic Recreation group with a focus on leisure participation, stress reduction, and social engagement via a group game. Pt remained focused and engaged throughout full duration of group.  Showed progress in session goals. Pt mood was calm and was appropriate with interactions.  Pt added to the group discussion about the importance of healthy recreational outlets and personal experiences that help him stay sober.

## 2020-01-20 NOTE — PROGRESS NOTES
"[facility]  [address] [place] [id number]        ADULT CD ASSESSMENT ADDENDUM      Patient Name: Nikhil Rios  Cell Phone:   Home: [phone] (home)    Mobile:   Telephone Information:   Mobile [phone]       Email:  The patient is not willing to share his/her e-mail address.  Emergency Contact: Veronique Rios   Tel: [phone]    The patient reported being:  Single, no serious involvement    With which race do you identify? White    Initial Screening Questions     1. Are you currently having severe withdrawal symptoms that are putting yourself or others in danger?  No    2. Are you currently having severe medical problems that require immediate attention?  No    3. Are you currently having severe emotional or behavioral problems that are putting yourself or others at risk of harm?  No    4. Do you have sufficient reading skills that will enable you to understand written materials, including the program rules and client rights materials?  Yes     Family History and other additional information     Who raised you? (parents, grandparents, adoptive parents, step-parents, etc.)    Both Parents    Please tell me what it was like growing up in your family. (please include any history of substance abuse, mental health issues, emotional/physical/sexual abuse, forms of discipline, and support)     Pt reports he was raised by both parents, pt reports he is the youngest child. Pt reports his father passed away at age 18 by a heart attack. Pt denies family history of substance abuse or mental health. Pt denies history of childhood abuse and neglect. Pt reports childhood was good, \"we played multiple sports and lived in a good neighborhood, spent a lot of time with kids my age.\" Pt reports he felt supported by family and friends. Pt reports discipline was \"maybe being sent to my room now and again.\"    Do you have any children or Stepchildren? No    Are you being investigated by Child Protection " "Services? No    Do you have a child protection worker, probation office or ?  No    How would you describe your current finances?  Just making it    If you are having problems, (unpaid bills, bankruptcy, IRS problems) please explain:  Yes, explain: credit card debt    If working or a student are you able to function appropriately in that setting? No, explain: unemployed and not in school.     Describe your preferred learning style:  by reading, by hands-on practice and by watching someone else demonstrate    What are your some of your personal strengths?  Sociable, quick to introduce myself, personable, harmonizer \"I like to bring people together.\"    Do you currently participate in community orlin activities, such as attending Synagogue, temple, Mormonism or Jehovah's witness services?  No    How does your spirituality impact your recovery?  \"I've said some prayers in the past\"    Do you currently self-administer your medications?  Yes    Have you ever had to lie to people important to you about how much you ramirez?   No   Have you ever felt the need to bet more and more money?   No   Have you ever attempted treatment for a gambling problem?   No   Have you ever touched or fondled someone else inappropriately or forced them to have sex with you against their will?   No   Are you or have you ever been a registered sex offender?   No   Is there any history of sexual abuse in your family? No   Have you ever felt obsessed by your sexual behavior, such as having sex with many partners, masturbating often, using pornography often?   No     Have you ever received therapy or stayed in the hospital for mental health problems?   No     Have you ever hurt yourself, such as cutting, burning or hitting yourself?   No     Have you ever purged, binged or restricted yourself as a way to control your weight?   No     Are you on a special diet?   No     Do you have any concerns regarding your nutritional status?   Yes, explain: " "alcohol suppressed my appetite.      Have you had any appetite changes in the last 3 months?   Yes, explain: eating less   Have you had weight loss or weight gain of more than 10 lbs in the last 3 months?   If patient gained or lost more than 10 lbs, then refer to program RN / attending Physician for assessment.   No   Was the patient informed of BMI?    Normal, No Intervention   No   Have you engaged in any risk-taking behavior that would put you at risk for exposure to blood-borne or sexually transmitted diseases?   No   Do you have any dental problems?   No   Have you ever lived through any trauma or stressful life events?   Yes, explain: father passed way of heart attack in front of me   In the past month, have you had any of the following symptoms related to the trauma listed above? (dreams, intense memories, flashbacks, physical reactions, etc.)   Yes, explain: \"yes but unsure how intense it's been\"   Have you ever believed people were spying on you, or that someone was plotting against you or trying to hurt you?   Yes, explain: a drug addict from my past was spying on me   Have you ever believed someone was reading your mind or could hear your thoughts or that you could actually read someone's mind or hear what another person was thinking?   No   Have you ever believed that someone of some force outside of yourself was putting thoughts into your mind or made you act in a way that was not your usual self?  Have you ever though you were possessed?   No   Have you ever believed you were being sent special messages through the TV, radio or newspaper?   No   Have you ever heard things other people couldn't hear, such as voices or other noises?   No   Have you ever had visions when you were awake?  Or have you ever seen things other people couldn't see?   No   Do you have a valid 's license?    Yes     PHQ-9, SILVANA-7 and Suicide Risk Assessment   PHQ-9 on 1/20/2020 SILVANA-7 on 1/20/2020   The patient's PHQ-9 score " was 6 out of 27, indicating mild depression.   The patient's SILVANA-7 score was 3 out of 21, indicating minimal anxiety.       [place]-Suicide Severity Rating Scale   Suicide Ideation   1.) Have you ever wished you were dead or that you could go to sleep and not wake up?     Lifetime:  No   Past Month:  No     2.) Have you actually had any thoughts of killing yourself?   Lifetime:  No   Past Month:  No     3.) Have you been thinking about how you might do this?     Lifetime:  No   Past Month:  No     4.) Have you had these thoughts and had some intention of acting on them?     Lifetime:  No   Past Month:  No     5.) Have you started to work out the details of how to kill yourself?   Lifetime:  No   Past Month:  No     6.) Do you intend to carry out this plan?      Lifetime:  No   Past Month:  No     Intensity of Ideation   Intensity of ideation (1 being least severe, 5 being most severe):     Lifetime:  The patient denied ever having any suicidal thoughts in life.   Past Month:  The patient denied ever having any suicidal thoughts in life.     How often do you have these thoughts?  The patient denied ever having any suicidal thoughts in life.     When you have the thoughts how long do they last?  The patient denied ever having any suicidal thoughts in life.     Can you stop thinking about killing yourself or wanting to die if you want to?  The patient denied ever having any suicidal thoughts in life.     Are there things - anyone or anything (i.e. family, Anabaptist, pain of death) that stopped you from wanting to die or acting on thoughts of suicide?  Does not apply     What sort of reasons did you have for thinking about wanting to die or killing yourself (ie end pain, stop how you were feeling, get attention or reaction, revenge)?  Does not apply     Suicidal Behavior   (Suicide Attempt) - Have you made a suicide attempt?     Lifetime:  The patient had never made a suicide attempt.   Past Month:  The patient had  never made a suicide attempt.     Have you engaged in self-harm (non-suicidal self-injury)?  The patient denied having any history of engaging in self-harm (non-suicidal self-injury).     (Interrupted Attempt) - Has there been a time when you started to do something to end your life but someone or something stopped you before you actually did anything?  No     (Aborted or Self-Interrupted Attempt) - Has there been a time when you started to do something to try to end your life but you stopped yourself before you actually did anything?  No     (Preparatory Acts of Behavior) - Have you taken any steps towards making suicide attempt or preparing to kill yourself (such as collecting pills, getting a gun, giving valuables away or writing a suicide note)?  No     Actual Lethality/Medical Damage:  The patient denied ever making a suicidal attempt.       2008  [employer]  Used with permission by Rona Rivas, PhD.       Guide to C-SSRS Risk Ratings   NO IDEATION:  with no active thoughts IDEATION: with a wish to die. IDEATION: with active thoughts. Risk Ratings   If Yes No No 0 - Very Low Risk   If NA Yes No 1 - Low Risk   If NA Yes Yes 2 - Low/moderate risk   IDEATION: associated thoughts of methods without intent or plan INTENT: Intent to follow through on suicide PLAN: Plan to follow through on suicide Risk Ratings cont...   If Yes No No 3 - Moderate Risk   If Yes Yes No 4 - High Risk   If Yes Yes Yes 5 - High Risk   The patient's ADDITIONAL RISK FACTORS and lack of PROTECTIVE FACTORS may increase their overall suicide risk ratings.     Additional Risk Factors:    No additional risk factors   Protective Factors:    Having people in his/her life that would prevent the patient from considering a suicide attempt (i.e. young children, spouse, parents, etc.)     An absence of mental health issues or stable and well treated mental health issues     An absence of chronic health problems or  "stable and well treated chronic health issues     Having easy access to supportive family members     Having a good community support network     Risk Status   Past month: 0. - Very Low Risk:  Evaluation Counselors:  Document in Epic / SBAR to counselor \"Very Low Risk\".      Treatment Counselors:  Reassess upon admission as applicable, assess weekly in progress notes under Dimension 3 and summarize in Discharge / Treatment summary under Dimension 3.    Past 24 hours: 0. - Very Low Risk:  Evaluation Counselors:  Document in Epic / SBAR to counselor \"Very Low Risk\".      Treatment Counselors:  Reassess upon admission as applicable, assess weekly in progress notes under Dimension 3 and summarize in Discharge / Treatment summary under Dimension 3.   Additional information to support suicide risk rating: There was no additional information to provide at this time.     Mental Health Status   Physical Appearance/Attire: Appears stated age   Hygiene: well groomed   Eye Contact: at examiner   Speech Rate:  regular   Speech Volume: regular   Speech Quality: fluid   Cognitive/Perceptual:  reality based   Cognition: memory intact    Judgment: intact   Insight: intact   Orientation:  time, place, person and situation   Thought: logical    Hallucinations:  none   General Behavioral Tone: cooperative   Psychomotor Activity: no problem noted   Gait:  no problem   Mood: normal   Affect: congruence/appropriate   Counselor Notes: NA     Criteria for Diagnosis: DSM-5 Criteria for Substance Use Disorders      Alcohol Use Disorder Severe - 303.90 (F10.20)    Level of Care   I.) Intoxication and Withdrawal: 0   II.) Biomedical:  1   III.) Emotional and Behavioral:  1   IV.) Readiness to Change:  2   V.) Relapse Potential: 4   VI.) Recovery Environmental: 3     Initial Problem List     The patient lacks relapse prevention skills  The patient has poor coping skills  The patient has poor refusal skills     Patient/Client is willing to follow " treatment recommendations.  Yes    Counselor: Urszula Jo, MAXX

## 2020-01-20 NOTE — PROGRESS NOTES
Name: Nikhil Rios  Date: 1/20/2020  Medical Record: [id number]    Envelope Number: [id number]    List of Contents (List each item separately in new row):   Ketotifen Fumarate Ophthalmic Solution (Eye Drops)  Eye Allergy Relief,   Diphenhydramine HCL 25mg    Admission:  I am responsible for any personal items that are not sent to the safe or pharmacy.  [facility] is not responsible for loss, theft or damage of any property in my possession.      Patient Signature:  ___________________________________________       Date/Time:__________________________    Staff Signature: __________________________________       Date/Time:__________________________    2nd Staff person, if patient is unable/unwilling to sign:      __________________________________________________________       Date/Time: __________________________      Discharge:  [facility] has returned all of my personal belongings:    Patient Signature: ________________________________________     Date/Time: ____________________________________    Staff Signature: ______________________________________     Date/Time:_____________________________________

## 2020-01-20 NOTE — PROGRESS NOTES
Progress Note    This patient had a Assessment and Placement Summary Update on 12/23/2019 completed by Atul Ramírez.  This patient was seen for a face to face update of the Assessment and Placement Summary Update on 1/20/2020 by CITLALLI Flannery.  OUTSIDE: A paper copy of the Assessment and Placement Summary Update will be placed in the patient's paper chart until being scanned into the patient's electronic medical record in Epic under the Media tab.    Alcohol/Drug use since the last CD evaluation (include date of last use):     Alcohol: daily I liter  ESTELITA:1/16/2020 1 liter     Please note any other clinical changes since the last CD evaluation (such as medication changes, additional legal charges, detoxification admissions, overdoses, etc.)     No significant changes since the last CD evaluation       ASAM Dimensions Original scores Current Scores   I.) Intoxication and Withdrawal: 0 0   II.) Biomedical:  1 1   III.) Emotional and Behavioral:  1 1   IV.) Readiness to Change:  2 2   V.) Relapse Potential: 4 4   VI.) Recovery Environmental: 3 3     Please list clinical justifications for the above ASAM score changes since the original comprehensive assessment:     None of the ASAM scores on the six dimensions had changed since the Assessment and Placement Summary Update was completed on 12/23.2019.       Current SHERITA: Current UA:     0.000     No UA screen as the patient was a direct transfer from [facility] detoxification unit at [facility] in [place], [place].        PHQ-9, SILVANA-7   PHQ-9 on 1/20/2020 SILVANA-7 on 1/20/2020   The patient's PHQ-9 score was 5 out of 27, indicating mild depression.   The patient's SILVANA-7 score was 3 out of 21, indicating minimal anxiety.       [place]-Suicide Severity Rating Scale Reassessment   Have you ever wished you were dead or that you could go to sleep and not wake up?  Past Month:  No     Have you actually had any thoughts of killing yourself?  Past Month:  No     Have  you been thinking about how you might do this?     Past Month:  No   Lifetime:  No   Have you had these thoughts and had some intention of acting on them?     Past Month:  No   Lifetime:  No   Have you started to work out the details of how to kill yourself?   Past Month:  No   Lifetime:  No   Do you intend to carry out this plan?   No     When you have the thoughts how long do they last?  The patient denied ever having any suicidal thoughts in life.     Are there things - anyone or anything (i.e. family, Yarsanism, pain of death) that stopped you from wanting to die or acting on thoughts of suicide?  Does not apply       2008  [employer]  Used with permission by Rona Rivas, PhD.       Guide to C-SSRS Risk Ratings   NO IDEATION:  with no active thoughts IDEATION: with a wish to die. IDEATION: with active thoughts. Risk Ratings   If Yes No No 0 - Very Low Risk   If NA Yes No 1 - Low Risk   If NA Yes Yes 2 - Low/moderate risk   IDEATION: associated thoughts of methods without intent or plan INTENT: Intent to follow through on suicide PLAN: Plan to follow through on suicide Risk Ratings cont...   If Yes No No 3 - Moderate Risk   If Yes Yes No 4 - High Risk   If Yes Yes Yes 5 - High Risk   The patient's ADDITIONAL RISK FACTORS and lack of PROTECTIVE FACTORS may increase their overall suicide risk ratings.     Additional Risk Factors:    No additional risk factors   Protective Factors:    Having people in his/her life that would prevent the patient from considering a suicide attempt (i.e. young children, spouse, parents, etc.)     An absence of mental health issues or stable and well treated mental health issues     An absence of chronic health problems or stable and well treated chronic health issues     Having easy access to supportive family members     Having a good community support network     Risk Status   0. - Very Low Risk:  Evaluation Counselors:  Document in Epic / SBAR  "to counselor \"Very Low Risk\".      Treatment Counselors:  Reassess upon admission as applicable, assess weekly in progress notes under Dimension 3 and summarize in Discharge / Treatment summary under Dimension 3.     Additional information to support suicide risk rating: There was no additional information to provide at this time.     "

## 2020-01-20 NOTE — DISCHARGE SUMMARY
Admit Date:     01/16/2020   Discharge Date:    1/20/20 20     More than 35 minutes spent on discharge summary, doing the discharge instructions, discharge medication, discharge mental status examination.      DISCHARGE DIAGNOSES:  AXIS I:  Alcohol use disorder, severe;  Alcohol withdrawal, completed.      Please see detailed admission note by Dr. Lew on 01/17/2020.      HOSPITAL COURSE:  During the hospitalization, the patient was detoxed off alcohol using [facility] protocol.  He had completed detoxification.  He required 60 mg of Valium to finish the detoxification.  His energy, motivation, sleep and interest improved.  He did not have any suicidal or homicidal ideation, plan or intent.  He was continued on gabapentin, clonidine, Lexapro, aspirin.  During the hospitalization, the patient met with the  and his plan is to do treatment.  He is going to treatment at [facility].      DISCHARGE MENTAL STATUS EXAMINATION:  The patient is alert, oriented x 3.  Recent memory, language are all adequate.  No loose associations.  The patient does not have any active suicidal or  homicidal ideation, plan or intent.  The patient is stable to be discharged.            DISCHARGE MENTAL STATUS EXAMINATION:  The patient is alert, oriented x3.  Good fund of knowledge.  Good use of language.  Recent and remote memory, language, fund of knowledge are all adequate.  Euthymic mood congruent affect  Speech normal rate/rhythm linear tp no loose asso,The patient does not have any active suicidal or homicidal ideation.  Does not have any auditory or visual hallucination.  Fair insight/judgment At this time, the patient was stable to be discharged.        Pt was not determined to not be a danger to himself or others. At the current time of discharge, the patient does not meet criteria for involuntary hospitalization. On the day of discharge, the patient reports that they do not have suicidal or homicidal ideation and would  never hurt themselves or others. Steps taken to minimize risk include: assessing patient s behavior and thought process daily during hospital stay, discharging patient with adequate plan for follow up for mental and physical health and discussing safety plan of returning to the hospital should the patient ever have thoughts of harming themselves or others. Therefore, based on all available evidence including the factors cited above, the patient does not appear to be at imminent risk for self-harm, and is appropriate for outpatient level of care.     Educated about side effects/risk vs benefits /alternative including non treatment.Pt consented to be on medication.     .Total time spent on discharge summary more than 35 min  More than  20 min  planning, coordination of care, medication reconciliation and performance of physical exam on day of discharge.Care was coordinated with unit RN and unit therapist     Nikhil Rios   Home Medication Instructions KEVIN:[id number]    Printed on:01/20/20 1101   Medication Information                      amLODIPine (NORVASC) 10 MG tablet  Take 1 tablet (10 mg) by mouth daily             aspirin (ASA) 81 MG chewable tablet  Take 1 tablet (81 mg) by mouth daily             cloNIDine (CATAPRES) 0.1 MG tablet  Take 1 tablet (0.1 mg) by mouth 2 times daily             escitalopram (LEXAPRO) 10 MG tablet  Take 1 tablet (10 mg) by mouth daily             gabapentin (NEURONTIN) 300 MG capsule  Take 1 capsule (300 mg) by mouth 3 times daily             hypromellose-dextran (HYPROMELLOSE-DEXTRAN 0.3-0.1%) 0.1-0.3 % ophthalmic solution  Place 1 drop into the right eye daily as needed for dry eyes             metoprolol tartrate (LOPRESSOR) 50 MG tablet  Take 1 tablet (50 mg) by mouth 2 times daily             multivitamin w/minerals (THERA-VIT-M) tablet  Take 1 tablet by mouth daily             naltrexone (DEPADE/REVIA) 50 MG tablet  Take 1 tablet (50 mg) by mouth daily             omeprazole  (PRILOSEC) 20 MG DR capsule  Take 1 capsule (20 mg) by mouth 2 times daily (before meals)             sodium chloride (OCEAN) 0.65 % nasal spray  Spray 1 spray into both nostrils every hour as needed for congestion             traZODone (DESYREL) 50 MG tablet  Take 1 tablet (50 mg) by mouth At Bedtime             vitamin B1 (THIAMINE) 100 MG tablet  Take 1 tablet (100 mg) by mouth daily               Pt going to       [facility]             D: 2020   T: 2020   MT: MANDO      Name:     QUYEN QUIROZ   MRN:      [id number]        Account:        [id number]   :      1985           Admit Date:     2020                                  Discharge Date:       Document: [id number]

## 2020-01-20 NOTE — PROGRESS NOTES
Name: Nikhil Rios  Date: 1/20/2020  Medical Record: [id number]    Envelope Number: [id number]    List of Contents (List each item separately in new row):   Body Powder (Triple Action Relief)   2 Bottle of [place]ist (Dry [place] Plus Itch Relief)    Admission:  I am responsible for any personal items that are not sent to the safe or pharmacy.  [facility] is not responsible for loss, theft or damage of any property in my possession.      Patient Signature:  ___________________________________________       Date/Time:__________________________    Staff Signature: __________________________________       Date/Time:__________________________    [facility] Staff person, if patient is unable/unwilling to sign:      __________________________________________________________       Date/Time: __________________________      Discharge:  [facility] has returned all of my personal belongings:    Patient Signature: ________________________________________     Date/Time: ____________________________________    Staff Signature: ______________________________________     Date/Time:_____________________________________

## 2020-01-20 NOTE — PROGRESS NOTES
Discharge instructions were given and were explained to the patient who stated that he understood them.  The patient denied any thoughts of self harm.  The patient is to be escorted to the [facility] intake office at 1530.  Medications will be sent to [facility] with the patient.  He exhibits a range of affect.

## 2020-01-20 NOTE — PROGRESS NOTES
Initial Services Plan        Service Initiation Date: 1/20/2020    Immediate health and/or safety concerns: No    Identify health and safety concern(s) below and include plan to address:    None Identified    Treatment suggestions for client during the time between intake (admit date) and completion of the individual treatment plan:     Look for a sober support network, i.e. 12 step, Smart Recovery, Celebrate Recovery, etc  Tour the treatment center or outpatient clinic  Introduce yourself to your treatment group. Spend time getting to know your peers  Review your patient or client handbook  Begin working on your treatment goal list    Completed by: Ricky Bhardwaj [facility]  Date completed: 1/20/2020 at 4:12 PM

## 2020-01-20 NOTE — DISCHARGE INSTRUCTIONS
Behavioral Discharge Planning and Instructions  THANK YOU FOR CHOOSING [facility]  3A  [phone]    Summary: You were admitted to Station 3A on 1/16/20 for detoxification from alcohol.  A medical exam was performed that included lab work. You have met with a  and opted to attend treatment at [facility].  Please take care and make your recovery a daily priority, Nikhil! It was a pleasure working with you and the entire treatment team here wishes you the very best in your recovery!     Recommendation:  28 days treatment in [facility]    Main Diagnoses:  Per Dr. Ava Lew MD:  Alcohol Use Disorder-Severe    Major Treatments, Procedures and Findings:  You were treated for alcohol detoxification using [facility] protocol. You had a chemical dependency assessment. You had labs drawn and those results were reviewed with you. Please take a copy of your lab work with you to your next primary care physician appointment.    Symptoms to Report:  If you experience more anxiety, confusion, sleeplessness, deep sadness or thoughts of suicide, notify your treatment team or notify your primary care physician. IF ANY OF THE SYMPTOMS YOU ARE EXPERIENCING ARE A MEDICAL EMERGENCY CALL 911 IMMEDIATELY.     Lifestyle Adjustment: Adjust your lifestyle to get enough sleep, relaxation, exercise and good nutrition. Continue to develop healthy coping skills to decrease stress and promote a sober living environment. Do not use mood altering substances including alcohol, illegal drugs or addictive medications other than what is currently prescribed.     Disposition: [facility]    Follow-up Appointment:   [facility]  Address: [address] [place] [address]   Phone: [phone]  You are to make an appointment to be seen within 30 days of discharge in order to obtain refills of your medications.    [facility]  Admission: Monday January 20th at 3:30 pm  [address] [facility]  [place]-5th [place] [address]  [phone]    Resources:   AA/NA and Sponsors are excellent resources for support and you can find one at any support group meeting.   Alcoholics Anonymous (www.alcoholics-anonymous.org): for local information 24 hours/day  AA Intergroup service office in [place] (http://www.aastpaul.org/) [phone]  AA Intergroup service office in [facility]: [phone]. (http://www.a[place].org/)  Narcotics Anonymous (www.nam[place].org) (810) 385-6634  https://aafairviewriverside.org/meetings  SMART Recovery - self management for addiction recovery:  www.eucl3D.org  Pathways ~ A Health Crisis Resource & Support Center:  808.424.8048.  https://prescribetoprevent.org/patient-education/videos/  http://www.harmreduction.org  [facility] [phone]  Support Group:  AA/NA and Sponsor/support.  National Joshua on Mental Illness (www.[place].veronica.org): 713.528.7430 or 304-450-1686.  Alcoholics Anonymous (www.alcoholics-anonymous.org): Check your phone book for your local chapter.  Suicide Awareness Voices of Education (SAVE) (www.save.org): 744-857-UGKL (1183)  National Suicide Prevention Line (www.mentalhealthmn.org): 005-277-SVOQ (9233)  Mental Health Consumer/Survivor Network of [place] (www.mhcsn.net): 213.802.6908 or 640-106-8787  Mental Health Association of [place] (www.mentalhealth.org): 353.343.2884 or 090-297-3462   Substance Abuse and Mental Health Services (www.samhsa.gov)  [place] Opioid Prevention Coalition: www.opioidcoalition.org    [place] Recovery Connection (MRC)  [facility] connects people seeking recovery to resources that help foster and sustain long-term recovery.  Whether you are seeking resources for treatment, transportation, housing, job training, education, health care or other pathways to recovery, [facility] is a great place to start.  311.591.4161.  www.[place]arecovery.org    General Medication Instructions:   See your medication sheet(s)  for instructions.   Take all medications as prescribed.  Make no changes unless your doctor suggests them.   Go to all your doctor visits.  Be sure to have all your required lab tests. This way, your medicines can be refilled on time.  Do not use any forms of alcohol.    Please Note:  If you have any questions at anytime after you are discharged please call [facility] detox unit 3AW at [phone].  [facility], Behavioral Intake [phone]  Medical Records call [phone]  Outpatient Behavioral Intake call [phone]  [facility] Wait List/Bed Availability call [phone]    Please remember to take all of your behavioral discharge planning and lab paperwork to any follow up appointments, it contains your lab results, diagnosis, medication list and discharge recommendations.      THANK YOU FOR CHOOSING [facility]

## 2020-01-20 NOTE — PROGRESS NOTES
[facility] Nursing Health Assessment        Vital signs:      There were no vitals taken for this visit.        Transfer from 3A/detox     Counselor: Vidal  Drug of Choice: alcohol  Last use: 1/16/2020  Home clinic/MD:[facility]ota Roman  Psychiatrist/therapist: none     Medical history/current conditions:  Hypertension--related ETOH withdrawl     H&P Screen:  H&P within the last 90 days: Yes.  Date: 1/20/20 Location: 3A/detox        Mental Health diagnosis: none  Medication compliant?: na  Recent sucidal thoughts? no                                                  When? na  Current thought of self-harm? no                                           Plan? na     Pain assessment:   Pt. Experiencing pain at this time?  No        Nursing Assessment Summary:  No acute issues   declined flu shot  On-going nursing intervention required?   No     Acute care visit recommended: no

## 2020-01-20 NOTE — PROGRESS NOTES
Patient escorted to [facility] at 1530 with a psychiatric associate. Patient had no additional concerns noted.

## 2020-01-21 ENCOUNTER — HOSPITAL ENCOUNTER (OUTPATIENT)
Dept: BEHAVIORAL HEALTH | Facility: CLINIC | Age: 35
End: 2020-01-21
Attending: FAMILY MEDICINE
Payer: COMMERCIAL

## 2020-01-21 PROCEDURE — H2035 A/D TX PROGRAM, PER HOUR: HCPCS | Mod: HQ

## 2020-01-21 PROCEDURE — H2035 A/D TX PROGRAM, PER HOUR: HCPCS

## 2020-01-21 PROCEDURE — 10020000 ZZH LODGING PLUS FACILITY CHARGE ADULT

## 2020-01-21 ASSESSMENT — ANXIETY QUESTIONNAIRES: GAD7 TOTAL SCORE: 3

## 2020-01-22 ENCOUNTER — HOSPITAL ENCOUNTER (OUTPATIENT)
Dept: BEHAVIORAL HEALTH | Facility: CLINIC | Age: 35
End: 2020-01-22
Attending: FAMILY MEDICINE
Payer: COMMERCIAL

## 2020-01-22 PROCEDURE — 10020000 ZZH LODGING PLUS FACILITY CHARGE ADULT

## 2020-01-22 PROCEDURE — H2035 A/D TX PROGRAM, PER HOUR: HCPCS

## 2020-01-22 PROCEDURE — H2035 A/D TX PROGRAM, PER HOUR: HCPCS | Mod: HQ

## 2020-01-22 NOTE — PROGRESS NOTES
"Comprehensive Assessment Summary     Based on client interview, review of previous assessments and   comprehensive assessment interview the following diagnosis and recommendations are:     Patient: Nikhil Rios  MRN; [id number]   : 1985  Age: 34 year old Sex: male  Client meets criteria for:  303.90 Alcohol Dependence    Dimension One: Acute Intoxication/Withdrawal Potential     Ratin     (Consider the client's ability to cope with withdrawal symptoms and current state of intoxication)     Patient reports her substance of use is alcohol. He reports his last date of use as 2020 prior to entering unit 3A detox. No concerns.     Dimension Two: Biomedical Condition and Complications    Ratin  (Consider the degree to which any physical disorder would interfere with treatment for substance abuse, and the client's ability to tolerate any related discomfort; determine the impact of continued substance use on the unborn child if the client is pregnant)    Patient reports a history of high blood pressure due to use. Patient is able to seek medical services as needed independently.     Dimension Three: Emotional/Behavioral/Cognitive Conditions & Complications  Ratin   (Determine the degree to which any condition or complications are likely to interfere with treatment for substance abuse or with functioning in significant life areas and the likelihood of risk of harm to self or others)     Patient denies a formal mental health diagnosis. Patient reports unresesolved grief and loss related to witnessing the death of his father. Patient verbalizes insecurity and low self-esteem regarding his weight and loss of employment. Patient's SILVANA-7 indicated minimal anxiety; his PHQ-9 indicated mild depression. Patient's SI assessment at time of admission indicated \"low-risk.\" Patient denies current SI or thoughts of self-harm.     Dimension Four: Treatment Acceptance/Resistance     Ratin  (Consider the amount " "of support and encouragement necessary to keep the client involved in treatment)     Patient reports challenges with internal motivation for continued sobriety. Patient acknowledges his life is unmanageable due to alcohol use. Patient reports his family as his motivation for completing treatment. Patient appears to be in the contemplation stage of change at this time.     Dimension Five: Continued Use/Relaspe Prevention     Ratin   (Consider the degree to which the client's recognizes relapse issues and has the skills to prevent relapse of either substance use or mental health problems)     Patient reports this is his second admission to [facility] in the past six months. Patient reports he returned to alcohol use immediately following his discharge in 2019. Patient appears to have some knowledge of coping skills but difficulty with applying tools for sobriety. Patient appears to be a high risk for relapse at this time.     Dimension Six: Recovery Environment     Rating:     (Consider the degree to which key areas of the client's life are supportive of or antagonistic to treatment participation and recovery)   Patient denies current AA attendance and lacks sponsorship. Patient reports he owns his own home, although lives with his mother for \"accountability\" in sobriety. Patient lacks daily structured activity or recovery related hobbies Patient reports he is open to researching options for outpatient treatment once completing [facility].     I have reviewed the information on the assessment, psychosocial and medical history and checklist:        it is current  "

## 2020-01-22 NOTE — PROGRESS NOTES
Patient:  Nikhil Rios    Date: January 22, 2020    Comprehensive Assessment UPDATE       Comprehensive Summary Update and Review  Counselor met with patient on 1/22/2020 and reviewed the Comprehensive Assessment.    There were no changes/updates identified by patient or in chart entries.

## 2020-01-22 NOTE — PROGRESS NOTES
Writer spoke with Jose J from [facility] regarding this patient Insurance and authorization. Writer was informed that this patient was approved for 21 days effective 1/20/20. Writer notice a pending aproval. Patient insurance will up-dated/approved per Jose J.

## 2020-01-23 ENCOUNTER — HOSPITAL ENCOUNTER (OUTPATIENT)
Dept: BEHAVIORAL HEALTH | Facility: CLINIC | Age: 35
End: 2020-01-23
Attending: FAMILY MEDICINE
Payer: COMMERCIAL

## 2020-01-23 PROCEDURE — H2035 A/D TX PROGRAM, PER HOUR: HCPCS | Mod: HQ

## 2020-01-23 PROCEDURE — 10020000 ZZH LODGING PLUS FACILITY CHARGE ADULT

## 2020-01-23 PROCEDURE — H2035 A/D TX PROGRAM, PER HOUR: HCPCS

## 2020-01-23 NOTE — PROGRESS NOTES
Acknowledgement of Current Treatment Plan - Initial Treatment Plan     INITIAL TREATMENT PLAN:     1. I have participated in creating my treatment plan with my therapist / counselor on 1/23/20.     I agree with the plan as it is written in the electronic health record.    Name Signature/Date   Nikhil Rios    Name of Therapist / Counselor Signature/Date   CITLALLI Lopez      2. I have completed and reviewed my Safety Plan with my counselor and signed this on 1/21/20. I have been given the hard copy of this plan.    Patient signature/date:      _____________________________________________________________________________    3. Last Use Date: 1/16/20    Patient signature/date:     _____________________________________________________________________________

## 2020-01-24 ENCOUNTER — HOSPITAL ENCOUNTER (OUTPATIENT)
Dept: BEHAVIORAL HEALTH | Facility: CLINIC | Age: 35
End: 2020-01-24
Attending: FAMILY MEDICINE
Payer: COMMERCIAL

## 2020-01-24 PROCEDURE — H2035 A/D TX PROGRAM, PER HOUR: HCPCS | Mod: HQ

## 2020-01-24 PROCEDURE — 10020000 ZZH LODGING PLUS FACILITY CHARGE ADULT

## 2020-01-24 NOTE — PROGRESS NOTES
"Spoke with patient regarding time spent with female peer. Patient verbalized understanding that he was not to be spending 1:1 time with females on unit. Patient reported he was \"the reason why the patient is still here\" and that he talked her out of leaving [facility]. Patient will continue working on developing relationship skills as part of his treatment plan. Counselor also called and left voicemail for Kamryn Johnson at [facility] regarding Recovery Coaching as part of patient's aftercare planning.     CITLALLI Lopez  "

## 2020-01-24 NOTE — PROGRESS NOTES
Pt is spending lots of time together with one of female pt. in cafeteria one on one.  Around 9:30pm writer saw Pt. was sitting too close to female pt. in cafeteria watching ZULMA game and also walked together at 6th floor to get medications.

## 2020-01-25 ENCOUNTER — HOSPITAL ENCOUNTER (OUTPATIENT)
Dept: BEHAVIORAL HEALTH | Facility: CLINIC | Age: 35
End: 2020-01-25
Attending: FAMILY MEDICINE
Payer: COMMERCIAL

## 2020-01-25 PROCEDURE — 10020000 ZZH LODGING PLUS FACILITY CHARGE ADULT

## 2020-01-25 PROCEDURE — H2035 A/D TX PROGRAM, PER HOUR: HCPCS | Mod: HQ

## 2020-01-26 ENCOUNTER — HOSPITAL ENCOUNTER (OUTPATIENT)
Dept: BEHAVIORAL HEALTH | Facility: CLINIC | Age: 35
End: 2020-01-26
Attending: FAMILY MEDICINE
Payer: COMMERCIAL

## 2020-01-26 PROCEDURE — 10020000 ZZH LODGING PLUS FACILITY CHARGE ADULT

## 2020-01-26 PROCEDURE — H2035 A/D TX PROGRAM, PER HOUR: HCPCS | Mod: HQ

## 2020-01-27 ENCOUNTER — HOSPITAL ENCOUNTER (OUTPATIENT)
Dept: BEHAVIORAL HEALTH | Facility: CLINIC | Age: 35
End: 2020-01-27
Attending: FAMILY MEDICINE
Payer: COMMERCIAL

## 2020-01-27 PROCEDURE — 10020000 ZZH LODGING PLUS FACILITY CHARGE ADULT

## 2020-01-27 PROCEDURE — H2035 A/D TX PROGRAM, PER HOUR: HCPCS | Mod: HQ

## 2020-01-27 PROCEDURE — H2035 A/D TX PROGRAM, PER HOUR: HCPCS

## 2020-01-28 ENCOUNTER — HOSPITAL ENCOUNTER (OUTPATIENT)
Dept: BEHAVIORAL HEALTH | Facility: CLINIC | Age: 35
End: 2020-01-28
Attending: FAMILY MEDICINE
Payer: COMMERCIAL

## 2020-01-28 PROCEDURE — H2035 A/D TX PROGRAM, PER HOUR: HCPCS

## 2020-01-28 PROCEDURE — H2035 A/D TX PROGRAM, PER HOUR: HCPCS | Mod: HQ

## 2020-01-28 PROCEDURE — 10020000 ZZH LODGING PLUS FACILITY CHARGE ADULT

## 2020-01-28 NOTE — IP AVS SNAPSHOT
Medication List       Patient:  QUYEN QUIROZ   :  1985   Physician:  No Ref-Primary, Physician           This is your record.  Keep this with you and show to your community pharmacist(s) and physician(s) at each visit.     Allergies:  POLLEN EXTRACT - Rash               Medications  Valid as of: 2020 -  2:37 PM    Generic Name Brand Name Tablet Size Instructions for use    Acetaminophen TYLENOL 325 MG Take 325-650 mg by mouth every 4 hours as needed for mild pain        Alum & Mag Hydroxide-Simeth MYLANTA/MAALOX 200-200-20 MG/5ML Take 30 mLs by mouth every 6 hours as needed for indigestion        amLODIPine Besylate NORVASC 10 MG Take 1 tablet (10 mg) by mouth daily        Aspirin ASA 81 MG Take 1 tablet (81 mg) by mouth daily        cloNIDine HCl CATAPRES 0.1 MG Take 1 tablet (0.1 mg) by mouth 2 times daily        Dextran 70-Hypromellose ARTIFICAL TEARS 0.1-0.3 % Place 1 drop into the right eye daily as needed for dry eyes        Escitalopram Oxalate LEXAPRO 10 MG Take 1 tablet (10 mg) by mouth daily        Gabapentin NEURONTIN 300 MG Take 1 capsule (300 mg) by mouth 3 times daily        guaiFENesin ROBITUSSIN 20 mg/mL Take 10 mLs by mouth every 4 hours as needed for cough        hydrOXYzine HCl ATARAX 25 MG Take 1 tablet (25 mg) by mouth every 4 hours as needed for anxiety        Ibuprofen ADVIL/MOTRIN 200 MG Take 400 mg by mouth every 6 hours as needed for moderate pain        Loratadine CLARITIN 10 MG Take 10 mg by mouth daily as needed for allergies        Melatonin melatonin 3 MG Take 3 mg by mouth nightly as needed for sleep        Metoprolol Tartrate LOPRESSOR 50 MG Take 1 tablet (50 mg) by mouth 2 times daily        Multiple Vitamins-Minerals THERA-VIT-M  Take 1 tablet by mouth daily        Naltrexone HCl DEPADE/REVIA 50 MG Take 1 tablet (50 mg) by mouth daily        Omeprazole priLOSEC 20 MG Take 1 capsule (20 mg) by mouth 2 times daily (before meals)        Saline OCEAN 0.65  % Spray 1 spray into both nostrils every hour as needed for congestion        Sennosides-Docusate Sodium SENOKOT-S/PERICOLACE 8.6-50 MG Take 2 tablets by mouth daily as needed for constipation        Thiamine HCl THIAMINE 100 MG Take 1 tablet (100 mg) by mouth daily        Throat Lozenges CEPASTAT 14.5 MG Place 1 lozenge inside cheek every 2 hours as needed for moderate pain        traZODone HCl DESYREL 50 MG Take 1 tablet (50 mg) by mouth At Bedtime        .           .           .           .

## 2020-01-28 NOTE — IP AVS SNAPSHOT
MRN:[id number]                      After Visit Summary   1/28/2020    Nikhil Rios    MRN: [id number]           Visit Information        Provider Department      1/28/2020  7:15 AM [facility]ULT [facility]        Your next 10 appointments already scheduled    Jan 29, 2020  7:15 AM CST  Treatment with [facility]ULT [facility]  [facility] ([facility]) [address] [place] [address]  [phone]      Jan 30, 2020  7:15 AM CST  Treatment with [facility]ULT [facility] A  [facility] ([facility]) [address] [place] [address]  [phone]      Jan 31, 2020  7:15 AM CST  Treatment with ADULT [facility] A  [facility] ([facility]) [address] [place] [address]  [phone]      Feb 01, 2020  7:15 AM CST  Treatment with ADULT [facility] A  [facility] ([facility]) [address] [place] [address]  [phone]      Feb 02, 2020  7:15 AM CST  Treatment with ADULT [facility] A  [facility] ([facility]) [address] [place] [address]  [phone]      Feb 03, 2020  7:15 AM CST  Treatment with ADULT [facility] A  [facility] ([facility]) [address] [place] [address]  [phone]      Feb 04, 2020  7:15 AM CST  Treatment with ADULT [facility] A  [facility] ([facility]) [address] [place] [address]  [phone]      Feb 05, 2020  7:15 AM CST  Treatment with ADULT  "[facility] A  [facility] ([facility]) [facility] [address] [address]  [phone]      2020  7:15 AM CST  Treatment with ADULT [facility] A  [facility] ([facility]) [address] [place] [address]  [phone]      2020  7:15 AM CST  Treatment with ADULT [facility] A  [facility] ([facility]) [address] [place] [address]  [phone]         MyChart Information    [facility] lets you send messages to your doctor, view your test results, renew your prescriptions, schedule appointments and more. To sign up, go to [email or web address] . Click on \"Log in\" on the left side of the screen, which will take you to the Welcome page. Then click on \"Sign up Now\" on the right side of the page.     You will be asked to enter the access code listed below, as well as some personal information. Please follow the directions to create your username and password.     Your access code is: [email or web address]  Expires: 3/2/2020 12:19 PM     Your access code will  in 60 days. If you need help or a new code, please call your [facility] clinic or [phone].       Care EveryWhere ID    This is your Care EveryWhere ID. This could be used by other organizations to access your [facility] medical records  [id number]       Equal Access to Services    HAMMAD BURDEN : Hadii jackson ham hadsaltyo Sofedericaali, waaxda luqadaha, qaybta kaalmada adedread, laureano leong . So [facility] [phone].    ATENCIÓN: Si habla español, tiene a alarcon disposición servicios gratuitos de asistencia lingüística. Llame al [phone].    We comply with applicable federal and state civil rights laws, including the [place] Human Rights Act. We do not discriminate " on the basis of race, color, creed, Mormon, national origin, marital status, age, disability, sex, sexual orientation, or gender identity.

## 2020-01-28 NOTE — PROGRESS NOTES
INDIVIDUAL SESSION SUMMARY    D) Met with client on 1/28/20 from 12:30-1:30pm. Client reported some recent therapy out in [place] but was unable to remember the name of the therapist. Client reported he has not been in a relationship for 2 years and has no children. Client spoke of employment including: unemployed having been terminated from his FT job in 4/2017 and from a temp job in 2/2018. Client reported no issues with sleep. Client identified resources including: mom, 2 brothers and male friends that he has reconnected with in the past month. Client identified strengths including: intelligent, articulate, family involvement, readiness to learn, and motivated for health reasons. Client reported self-care activities including: exercise and talking to his peers. Client spoke about childhood including: rowing up with both parents, two brothers and a sister; that he watched his dad have a heart attack when he was 18 years old and performed CPR on his dad; that he has been numbing his emotions since age 16 with alcohol or pot and has never really processed all his losses. Client reported losses including: his career, end of relationship with fiance, losing his home possessions, and losing his pride. Client spoke of relationship history including: a history of moving quickly in relationships before getting to know the person. Client spoke of stressors including: fear of relapse, unemployment, guilt, and lack of stress management skills. Client reported past traumatic experience(s) or abuse including: death of his father. Client reported that he relapsed by going to the liquor store via [employer] the day he left this treatment program. Client reported to have improved motivation for sobriety demonstrated by allowing visitors to see him in treatment and being honest with his male friends about being in treatment and losing his jobs due to to his alcoholism.     I) Individual session with client. Provided client with  verbal interventions including: validation, nurturing, compassion and support. Discussed the importance of recovery behaviors such as utilizing sponsorship, sober support network, going to meetings, daily rituals, and goal setting.     A) Client appears to have unprocessed grief related to his father's death and loss of dreams for himself.  Client appears to have trouble identifying emotions and to lack skills for emotional regulation and stress management. Client appears to better understand the importance of a sober support network. Client appears to lack a daily routine, meaningful activities, and a sense of purpose. Client appears to have external and internal motivation at this time and would benefit from continuing support to help with processing past traumas, stress management, emotional regulation, impulse control, increasing resiliency and self-esteem. Client tends towards self-defeating, self-sabotaging patterns as demonstrated by not being honest.     P) Next session is scheduled for 2/4/20.  Vanda Gandhi, PATO  1/28/2020

## 2020-01-29 ENCOUNTER — HOSPITAL ENCOUNTER (OUTPATIENT)
Dept: BEHAVIORAL HEALTH | Facility: CLINIC | Age: 35
End: 2020-01-29
Attending: FAMILY MEDICINE
Payer: COMMERCIAL

## 2020-01-29 PROCEDURE — 10020000 ZZH LODGING PLUS FACILITY CHARGE ADULT

## 2020-01-29 PROCEDURE — H2035 A/D TX PROGRAM, PER HOUR: HCPCS | Mod: HQ

## 2020-01-30 ENCOUNTER — HOSPITAL ENCOUNTER (OUTPATIENT)
Dept: BEHAVIORAL HEALTH | Facility: CLINIC | Age: 35
End: 2020-01-30
Attending: FAMILY MEDICINE
Payer: COMMERCIAL

## 2020-01-30 PROCEDURE — 10020000 ZZH LODGING PLUS FACILITY CHARGE ADULT

## 2020-01-30 PROCEDURE — H2035 A/D TX PROGRAM, PER HOUR: HCPCS | Mod: HQ

## 2020-01-31 ENCOUNTER — HOSPITAL ENCOUNTER (OUTPATIENT)
Dept: BEHAVIORAL HEALTH | Facility: CLINIC | Age: 35
End: 2020-01-31
Attending: FAMILY MEDICINE
Payer: COMMERCIAL

## 2020-01-31 PROCEDURE — H2035 A/D TX PROGRAM, PER HOUR: HCPCS | Mod: HQ

## 2020-01-31 PROCEDURE — 10020000 ZZH LODGING PLUS FACILITY CHARGE ADULT

## 2020-01-31 NOTE — PROGRESS NOTES
Patient:  Nikhil Rios            Adult CD Progress Note and Treatment Plan Review     Attendance  Please refer to OP BEH CD Adult Attendance Record Documentation Flowsheet    Support group attended this week: yes    Reporting sobriety:  yes    Treatment Plan     Treatment Plan Review competed on: 1/31/20       Client preferred learning style: Hands on  Demonstration    Staff Members contributing Bettye Álvarez [facility]; Jennifer Cox [facility]; Tejal Villanueva [facility]; PATO Briggs                       Received Supervision: Yes    Client: contributed to goals and plan.    Client received copy of plan/revised plan: Yes    Client agrees with plan/revised plan: Yes    Changes to Treatment Plan: No    New Goals added since last review No additional goals added at this time    Goals worked on since last review Healthy versus unhealthy relationships, individual therapy, spirituality group, and maintaining stabilization    Strategies effective: yes    Strategies need these changes: No changes needed at this time    1) Care Coordination Activities:  None this week  2) Medical, Mental Health and other appointments the client attended: Patient met with staff therapist PATO Briggs  3) Medication issues: none reported  4) Physical and mental health problems: See dimension 2 and 3  5) Any changes in Vulnerable Adult Status?  No If yes, add to treatment plan and individual abuse prevention plan.  6) Review and evaluation of the individual abuse prevention plan: Current IAPP for this program is adequate for this client    ASAM Risk Rating:    Dimension 1, 0: Patient reports his substance of use as alcohol. Patient reports his last date of use as 1/16/20. Patient denies any withdrawal symptoms that would interfere with full participation in treatment programming at this time. Patient will continue to be monitored throughout treatment.     Dimension 2, 0: Patient denies any biomedical concerns that would interfere with full  participation in treatment programming at this time. Patient reports symptoms of vertigo and is working on scheduling Patient will continue to be monitored throughout treatment. Patient attended staff RN lecture on HIV/AIDS.    Dimension 3, 1:  Patient met with staff therapist PATO Briggs for individual therapy sessions. Patient reports daily exercise has continued to improve his mood. Patient denies any suicidal thoughts or ideations at this time. Patient will continue to be monitored throughout treatment.     Dimension 4, 2: Upon admission patient verbally reports being motivated for long-term recovery. Patient appears somewhat ambivalent regarding the need for behavioral changes in order to sustain sobriety. Patient appears to be in the contemplation stage of change at this time.     Dimension 5, 4: Patient is working to gain insight into his relapse process and obtain coping skills. Patient is practicing mindfulness based skills and reports he is maintaining focus on his recovery goals. Patient continues to be a high risk for relapse at this time.     Dimension 6, 3: Patient is attending 12-step support groups and building relationships with female peers. 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 Patient is considering outpatient treatment at [facility] after discharge from [facility].     Guide to Risk Ratings for Suicidality:   IDEATION: Active thoughts of suicide? INTENT: Intent to follow on suicide? PLAN: Plan to follow through on suicide? Level of Risk:   IF Yes Yes Yes Patient = High Emergent   IF Yes Yes No Patient = High Urgent/Non-Emergent   IF Yes No No Patient = Moderate Non-Urgent   IF No No   No Patient = Low Risk   The patient's ADDITIONAL RISK FACTORS and lack of PROTECTIVE FACTORS may increase their overall suicide risk ratings.     Patient's/client's current risk rating:  Low Risk    Family Involvement:   MITRA signed    Data:   offered  feedback client did participate  Minimal insight  Challenges with vulnerability     Intervention:   Aftercare planning  Behavior modification  Cognitive Behavioral Therapy  Counselor feedback  Education  Emotional management  Group feedback  Motivational Enhancement Therapy  Relapse prevention  Twelve Step facilitation  Mental health education    Assessment:   Stages of Change Model  Contemplation    Appears/Sounds:  Cooperative  Ambivalent  Depressed    Plan:  Focus on recovery environment  Monitor emotional/physical health  Continue working through treatment plan goals.       Bettye Álvarez, [facility]

## 2020-02-01 ENCOUNTER — HOSPITAL ENCOUNTER (OUTPATIENT)
Dept: BEHAVIORAL HEALTH | Facility: CLINIC | Age: 35
End: 2020-02-01
Attending: FAMILY MEDICINE
Payer: COMMERCIAL

## 2020-02-01 PROCEDURE — H2035 A/D TX PROGRAM, PER HOUR: HCPCS | Mod: HQ

## 2020-02-01 PROCEDURE — 10020000 ZZH LODGING PLUS FACILITY CHARGE ADULT

## 2020-02-02 ENCOUNTER — HOSPITAL ENCOUNTER (OUTPATIENT)
Dept: BEHAVIORAL HEALTH | Facility: CLINIC | Age: 35
End: 2020-02-02
Attending: FAMILY MEDICINE
Payer: COMMERCIAL

## 2020-02-02 PROCEDURE — 10020000 ZZH LODGING PLUS FACILITY CHARGE ADULT

## 2020-02-02 PROCEDURE — H2035 A/D TX PROGRAM, PER HOUR: HCPCS | Mod: HQ

## 2020-02-03 ENCOUNTER — HOSPITAL ENCOUNTER (OUTPATIENT)
Dept: BEHAVIORAL HEALTH | Facility: CLINIC | Age: 35
End: 2020-02-03
Attending: FAMILY MEDICINE
Payer: COMMERCIAL

## 2020-02-03 PROCEDURE — 10020000 ZZH LODGING PLUS FACILITY CHARGE ADULT

## 2020-02-03 PROCEDURE — H2035 A/D TX PROGRAM, PER HOUR: HCPCS

## 2020-02-03 PROCEDURE — H2035 A/D TX PROGRAM, PER HOUR: HCPCS | Mod: HQ

## 2020-02-04 ENCOUNTER — HOSPITAL ENCOUNTER (OUTPATIENT)
Dept: BEHAVIORAL HEALTH | Facility: CLINIC | Age: 35
End: 2020-02-04
Attending: FAMILY MEDICINE
Payer: COMMERCIAL

## 2020-02-04 PROCEDURE — H2035 A/D TX PROGRAM, PER HOUR: HCPCS | Mod: HQ

## 2020-02-04 PROCEDURE — 10020000 ZZH LODGING PLUS FACILITY CHARGE ADULT

## 2020-02-04 PROCEDURE — H2035 A/D TX PROGRAM, PER HOUR: HCPCS

## 2020-02-04 NOTE — PROGRESS NOTES
Patient presented drug history. Main concern generated was that when he was in treatment last year, he was discharged and went immediately to a bar and drank. Patient presents well but does not seem to have much insight into his use. He talks about losing very good paying job and living off savings but does not seemed concerned about his predicament. Continue to encourage patient to delve more deeply into consequences of his use and promote more self-honesty.

## 2020-02-05 ENCOUNTER — HOSPITAL ENCOUNTER (OUTPATIENT)
Dept: BEHAVIORAL HEALTH | Facility: CLINIC | Age: 35
End: 2020-02-05
Attending: FAMILY MEDICINE
Payer: COMMERCIAL

## 2020-02-05 PROCEDURE — 10020000 ZZH LODGING PLUS FACILITY CHARGE ADULT

## 2020-02-05 PROCEDURE — H2035 A/D TX PROGRAM, PER HOUR: HCPCS

## 2020-02-05 PROCEDURE — H2035 A/D TX PROGRAM, PER HOUR: HCPCS | Mod: HQ

## 2020-02-05 NOTE — PROGRESS NOTES
Patient: Nikhil Rios            Adult CD Progress Note and Treatment Plan Review     Attendance  Please refer to OP BEH CD Adult Attendance Record Documentation Flowsheet    Support group attended this week: Yes    Reporting sobriety:  Yes    Treatment Plan     Treatment Plan Review completed on: 2/5/2020  This review covers 2/1/2020-2/5/2020       Client preferred learning style: Hands on  Demonstration    Staff Members contributing: Bettye Álvarez, [facility]; Lore Cox, [facility]; Tejal Villanueva, [facility]; Vanda Gandhi LMFT; Gilles Keith [facility]                      Received Supervision: No    Client: Patient contributed to goals and plan.    Client received copy of plan/revised plan: Yes    Client agrees with plan/revised plan: Yes    Changes to Treatment Plan: None    New Goals added since last review: No additional goals added at this time    Goals worked on since last review: Sobriety, aftercare planning, group therapy, tx plan assignments, building a sober support network, psychoeducation.     Strategies effective: Yes    Strategies need these changes: No changes needed at this time    1) Care Coordination Activities: Patient will continue to work with LP staff to develop an aftercare plan that is conducive to recovery.  2) Medical, Mental Health and other appointments the client attended: Patient met with Addiction MD for medication management.  3) Medication issues: None reported.  4) Physical and mental health problems: None reported.  5) Any changes in Vulnerable Adult Status?  No If yes, add to treatment plan and individual abuse prevention plan.  6) Review and evaluation of the individual abuse prevention plan: Current IAPP for this program is adequate for this client    ASAM Risk Rating:    Dimension 1, 0: Patient reports his substance of use as alcohol. Patient reports his last date of use as 1/16/2020. Patient denies any withdrawal symptoms that would interfere with full participation in treatment  "programming at this time. Patient will continue to be monitored throughout treatment.     Dimension 2, 0: Patient denies any biomedical concerns that would interfere with full participation in treatment programming at this time. Patient reports that he is currently medication compliant. Patient appears able to access medical aid as needed.  Patient will continue to be monitored throughout treatment.     Dimension 3, 1: Patient denies experiencing any change in mood or stress level this week.  Patient reports \"I focus on my objective to not let things bother me and make this experience about me and my sobriety\" as his primary coping skills he uses to manage stress and difficult emotions.  Patient denies any suicidal thoughts or ideations at this time. Patient will continue to be monitored throughout treatment.     Dimension 4, 2: Patient verbally reports being motivated for long-term recovery. Patient reports \"My mood, the thoughts of getting back to my sober self in everyday life\" as his primary motivation to stay sober and in treatment this week.  Patient's group attendance and participation have been positive.  Patient presented his drug use history assignment this week.  Patient also completed \"Ending our Resentments\" and \"Forming Stable Relationships\".      Dimension 5, 4: Patient denied experiencing any cravings this week.  Patient continues to work on gaining insight into his relapse process as well as developing sober coping skills.    Dimension 6, 3: Patient's aftercare plan currently includes outpatient programming.  Patient will continue to work with  staff to develop an aftercare plan that is conducive to recovery.    Guide to Risk Ratings for Suicidality:   IDEATION: Active thoughts of suicide? INTENT: Intent to follow on suicide? PLAN: Plan to follow through on suicide? Level of Risk:   IF Yes Yes Yes Patient = High Emergent   IF Yes Yes No Patient = High Urgent/Non-Emergent   IF Yes No No Patient = " Moderate Non-Urgent   IF No No   No Patient = Low Risk   The patient's ADDITIONAL RISK FACTORS and lack of PROTECTIVE FACTORS may increase their overall suicide risk ratings.     Patient's/client's current risk rating:  Very Low Risk    Family Involvement:   None reported this week.    Data:   offered feedback client did actively participate    Intervention:   Aftercare planning  Behavior modification  Cognitive Behavioral Therapy  Counselor feedback  Education  Emotional management  Group feedback  Motivational Enhancement Therapy  Relapse prevention  Twelve Step facilitation  Mental health education    Assessment:   Stages of Change Model  Contemplation    Appears/Sounds:  Cooperative  Engaged  Ambivalent    Plan:  Focus on recovery environment  Monitor emotional/physical health  Continue working through treatment plan goals.   Continue group therapy, go to AA/NA meetings, work with sponsor, build sober support network, engage in daily structured activities, and have sober fun.    CITLALLI Posada

## 2020-02-06 ENCOUNTER — HOSPITAL ENCOUNTER (OUTPATIENT)
Dept: BEHAVIORAL HEALTH | Facility: CLINIC | Age: 35
End: 2020-02-06
Attending: FAMILY MEDICINE
Payer: COMMERCIAL

## 2020-02-06 PROCEDURE — H2035 A/D TX PROGRAM, PER HOUR: HCPCS

## 2020-02-06 PROCEDURE — 10020000 ZZH LODGING PLUS FACILITY CHARGE ADULT

## 2020-02-06 PROCEDURE — H2035 A/D TX PROGRAM, PER HOUR: HCPCS | Mod: HQ

## 2020-02-06 NOTE — PROGRESS NOTES
"INDIVIDUAL SESSION SUMMARY    D) Met with client on 2/6/20 from 9:45-10:30am. Client shared that he has been feeling positive and continues to exercise, daily. Client reported that he met with the IOP counselor, Mary Morales, and expects to start the daytime IOP group here at [place]. Client discussed how he's been working through feelings of jealousy and acceptance; that he's new to having \"platonic\" relationships with women and is wanting to change his habit of moving forward with relationships too quickly.     I) Individual session with client. Provided client with verbal interventions including: validation, nurturing, compassion and support. Discussed the importance of recovery behaviors such as utilizing sponsorship, sober support network, going to meetings, daily rituals, and goal setting. Discussed the benefits of: healthy boundaries and managing feelings of loneliness using healthy coping skills.  Therapist encouraged client to give friendships that formed in this program room to breathe; to stay focused on his recovery and increasing his network of support.      A) Client appears to have unprocessed grief related to his father's death.  Client appears to have trouble identifying emotions and to lack skills for emotional regulation and stress management. Client appears to have new insight into the importance of a an aftercare plan and a sober support network. Client appears to lack a daily routine, meaningful activities, and a sense of purpose. Client appears to have external and internal motivation at this time and would benefit from continuing support to help with processing past traumas, stress management, emotional regulation, impulse control, increasing resiliency and self-esteem. Client tends towards self-defeating, self-sabotaging patterns as demonstrated by not being honest or following though on recovery behaviors.     P) Next session is scheduled for 2/12/20.   Vanda Gandhi, " [facility]  2/6/2020

## 2020-02-07 ENCOUNTER — HOSPITAL ENCOUNTER (OUTPATIENT)
Dept: BEHAVIORAL HEALTH | Facility: CLINIC | Age: 35
End: 2020-02-07
Attending: FAMILY MEDICINE
Payer: COMMERCIAL

## 2020-02-07 PROCEDURE — 10020000 ZZH LODGING PLUS FACILITY CHARGE ADULT

## 2020-02-07 PROCEDURE — H2035 A/D TX PROGRAM, PER HOUR: HCPCS | Mod: HQ

## 2020-02-08 ENCOUNTER — HOSPITAL ENCOUNTER (OUTPATIENT)
Dept: BEHAVIORAL HEALTH | Facility: CLINIC | Age: 35
End: 2020-02-08
Attending: FAMILY MEDICINE
Payer: COMMERCIAL

## 2020-02-08 PROCEDURE — 10020000 ZZH LODGING PLUS FACILITY CHARGE ADULT

## 2020-02-08 PROCEDURE — H2035 A/D TX PROGRAM, PER HOUR: HCPCS | Mod: HQ

## 2020-02-09 ENCOUNTER — HOSPITAL ENCOUNTER (OUTPATIENT)
Dept: BEHAVIORAL HEALTH | Facility: CLINIC | Age: 35
End: 2020-02-09
Attending: FAMILY MEDICINE
Payer: COMMERCIAL

## 2020-02-09 PROCEDURE — 10020000 ZZH LODGING PLUS FACILITY CHARGE ADULT

## 2020-02-09 PROCEDURE — H2035 A/D TX PROGRAM, PER HOUR: HCPCS | Mod: HQ

## 2020-02-10 ENCOUNTER — TRANSFERRED RECORDS (OUTPATIENT)
Dept: HEALTH INFORMATION MANAGEMENT | Facility: CLINIC | Age: 35
End: 2020-02-10

## 2020-02-10 ENCOUNTER — HOSPITAL ENCOUNTER (OUTPATIENT)
Dept: BEHAVIORAL HEALTH | Facility: CLINIC | Age: 35
End: 2020-02-10
Attending: FAMILY MEDICINE
Payer: COMMERCIAL

## 2020-02-10 PROCEDURE — H2035 A/D TX PROGRAM, PER HOUR: HCPCS | Mod: HQ

## 2020-02-10 PROCEDURE — H2035 A/D TX PROGRAM, PER HOUR: HCPCS

## 2020-02-10 PROCEDURE — 10020000 ZZH LODGING PLUS FACILITY CHARGE ADULT

## 2020-02-10 NOTE — PROGRESS NOTES
Care Coordination Note  2/10/2020 3:58 PM  Length of Care Coordination service: 15 min    Person(s) contacted/involved: Kamryn from [facility] and patient's mom Veronique Rios  Type of assistance provided: Assistance in coordination with significant others to help in treatment planning process., Support accessing treatment follow up.  Narrative/Outcome: Patient's  Kamryn will meet with patient at 2pm on 2/11/20 for coordination of care. Patient's mom will meet with patient and counselor on 2/12/20 at 4:30pm for care conference.     CITLALLI Lopez

## 2020-02-11 ENCOUNTER — HOSPITAL ENCOUNTER (OUTPATIENT)
Dept: BEHAVIORAL HEALTH | Facility: CLINIC | Age: 35
End: 2020-02-11
Attending: FAMILY MEDICINE
Payer: COMMERCIAL

## 2020-02-11 PROCEDURE — H2035 A/D TX PROGRAM, PER HOUR: HCPCS | Mod: HQ

## 2020-02-11 PROCEDURE — H2035 A/D TX PROGRAM, PER HOUR: HCPCS

## 2020-02-11 PROCEDURE — 10020000 ZZH LODGING PLUS FACILITY CHARGE ADULT

## 2020-02-11 NOTE — PROGRESS NOTES
Name: Nikhil Rios  Date: 2/10/2020  Medical Record: [id number]  Envelope Number: [id number]  List of Contents (List each item separately in new row):   1 bottle Metoprolol Tartrate 50mg Tabs  Admission:  I am responsible for any personal items that are not sent to the safe or pharmacy.  [facility] is not responsible for loss, theft or damage of any property in my possession.  Patient Signature:  ___________________________________________       Date/Time:__________________________    Staff Signature: __________________________________       Date/Time:__________________________    2nd Staff person, if patient is unable/unwilling to sign:      __________________________________________________________       Date/Time: __________________________  Discharge:  [facility] has returned all of my personal belongings:  Patient Signature: ________________________________________     Date/Time: ____________________________________    Staff Signature: ______________________________________     Date/Time:_____________________________________

## 2020-02-12 ENCOUNTER — HOSPITAL ENCOUNTER (OUTPATIENT)
Dept: BEHAVIORAL HEALTH | Facility: CLINIC | Age: 35
End: 2020-02-12
Attending: FAMILY MEDICINE
Payer: COMMERCIAL

## 2020-02-12 PROCEDURE — 10020000 ZZH LODGING PLUS FACILITY CHARGE ADULT

## 2020-02-12 PROCEDURE — H2035 A/D TX PROGRAM, PER HOUR: HCPCS | Mod: HQ

## 2020-02-12 NOTE — PROGRESS NOTES
"Patient declined to attend care conference with his mother, Veronique as scheduled at 4:30p. Patient stated \"It's a waste of time\" and verbalized consent for his mom to meet with counselor. (MITRA on file). Patient's mom inquired about patient's aftercare plan, as told to her by patient. Counselor confirmed patient's plan and that patient had been strongly encouraged to look into sober living options. Counselor voiced concern regarding patient's relapse history and returning home. This writer gave patient's mom information for the [place] [facility] and the [facility] program as requested by patient's mom. Patient's discharge date is planned for 2/17/20.     CITLALLI Lopez  "

## 2020-02-12 NOTE — PROGRESS NOTES
Patient:  Nikhil Rios            Adult CD Progress Note and Treatment Plan Review     Attendance  Please refer to OP BEH CD Adult Attendance Record Documentation Flowsheet    Support group attended this week: yes    Reporting sobriety:  yes    Treatment Plan     Treatment Plan Review competed on: 2/12/20    Client preferred learning style: Visual  Hands on  Demonstration    Staff Members contributing CITLALLI Lopez; DEBBIE SrC                         Received Supervision: YES    Client: contributed to goals and plan: Yes    Client received copy of plan/revised plan: Yes    Client agrees with plan/revised plan: Yes    Changes to Treatment Plan: No    New Goals added since last review No additional goals added at this time    Goals worked on since last review Exercise, healthy nutrition, spirituality, boundary breaking, individual therapy and care conference with .   Strategies effective: yes    Strategies need these changes: No changes needed at this time    1) Care Coordination Activities:  Patient met with Kamryn from [facility] for care conference.   2) Medical, Mental Health and other appointments the client attended: None this week  3) Medication issues: No concerns  4) Physical and mental health problems: See Dimension 2 and 3  5) Any changes in Vulnerable Adult Status?  No If yes, add to treatment plan and individual abuse prevention plan.  6) Review and evaluation of the individual abuse prevention plan: Current IAPP for this program is adequate for this client    ASAM Risk Rating:  Dimension 1, 0: Patient reports their last date of use as 1/13/20. Patient denies any withdrawal symptoms that would interfere with full participation in treatment programming at this time. Patient will continue to be monitored throughout treatment.   Dimension 2, 1: Patient denies any biomedical concerns that would interfere with full participation in treatment programming at this time. Patient  "reports that he is currently medication compliant. Patient appears able to access medical aid as needed and will continue to be monitored throughout treatment. Patient attended staff RN lecture on Self-care.  Dimension 3, 1: Patient denies any changes in mood or stress this week. Patient denies any suicidal thoughts or ideations at this time. Patient will continue to be monitored throughout treatment.   Dimension 4, 2: Patient reports current motivation for treatment is for \"a sober future.\". Patient is participating in all groups and lectures and appears to be gaining internal motivation for change. Patient appears to be in the contemplation stage of change at this time.   Dimension 5, 4: Patient rates urges and cravings this week a \" 1\" on a scale of 1-10(high). Patient continues to learn and practice coping skills and verbalizes exercise and distraction techniques  have been helpful.   Dimension 6, 3: Patient is attending sober support meetings and building relationships with peers. Patient's aftercare plan at this time includes to return home and attend [facility].     Guide to Risk Ratings for Suicidality:   IDEATION: Active thoughts of suicide? INTENT: Intent to follow on suicide? PLAN: Plan to follow through on suicide? Level of Risk:   IF Yes Yes Yes Patient = High Emergent   IF Yes Yes No Patient = High Urgent/Non-Emergent   IF Yes No No Patient = Moderate Non-Urgent   IF No No   No Patient = Low Risk   The patient's ADDITIONAL RISK FACTORS and lack of PROTECTIVE FACTORS may increase their overall suicide risk ratings.     Patient's/client's current risk rating:  Low Risk    Family Involvement:   MITRA signed    Data:   offered feedback client did participate guarded    Intervention:   Aftercare planning  Behavior modification  Cognitive Behavioral Therapy  Counselor feedback  Education  Emotional management  Group feedback  Motivational Enhancement Therapy  Relapse prevention  Twelve Step " facilitation    Assessment:   Stages of Change Model  Contemplation    Appears/Sounds:  Cooperative  Engaged  Ambivalent    Plan:  Focus on recovery environment  Monitor emotional/physical health  Continue working through treatment plan goals.     CITLALLI Lopez

## 2020-02-12 NOTE — PROGRESS NOTES
"INDIVIDUAL SESSION SUMMARY    D) Met with client on 2/12/20 from 10:00-10:30am. Client shared how he's taking precautions to \"take it slow\" with a peer that recently left this treatment program. Client spoke to how he will continue to set healthy boundaries once he leaves this program and focus on his recovery as his primary concern. Client reported that he hopes to start the daytime IOP group on Tuesday 2/18. Client asked for individual therapy referrals in the community.     I) Individual session with client. Provided client with verbal interventions including: validation and support.  Therapist encouraged client to continue with individual therapy and provided several therapy referrals.     A) Client appears to have unprocessed grief related to his father's death.  Client appears to have trouble identifying emotions and to lack skills for emotional regulation and stress management. Client appears to lack insight into the dangers of starting a new relationship so early in recovery. Client appears to have new insight into the importance of a an aftercare plan and a sober support network. Client appears to lack a daily routine, meaningful activities, and a sense of purpose and may isolate in his romantic relationships. Client appears to have external and internal motivation at this time and would benefit from continuing support to help with processing past traumas, stress management, emotional regulation, impulse control, increasing resiliency and self-esteem. Client tends towards self-defeating, self-sabotaging patterns as demonstrated by not being honest or following through on recovery behaviors.     P) Next session is scheduled for 2/17/20. Provided the client with several therapy referrals.   Vanda Gandhi, PATO  2/12/2020    "

## 2020-02-13 ENCOUNTER — HOSPITAL ENCOUNTER (OUTPATIENT)
Dept: BEHAVIORAL HEALTH | Facility: CLINIC | Age: 35
End: 2020-02-13
Attending: FAMILY MEDICINE
Payer: COMMERCIAL

## 2020-02-13 PROCEDURE — H2035 A/D TX PROGRAM, PER HOUR: HCPCS | Mod: HQ

## 2020-02-13 PROCEDURE — 10020000 ZZH LODGING PLUS FACILITY CHARGE ADULT

## 2020-02-13 PROCEDURE — H2035 A/D TX PROGRAM, PER HOUR: HCPCS

## 2020-02-14 ENCOUNTER — HOSPITAL ENCOUNTER (OUTPATIENT)
Dept: BEHAVIORAL HEALTH | Facility: CLINIC | Age: 35
End: 2020-02-14
Attending: FAMILY MEDICINE
Payer: COMMERCIAL

## 2020-02-14 PROCEDURE — H2035 A/D TX PROGRAM, PER HOUR: HCPCS | Mod: HQ

## 2020-02-14 PROCEDURE — 10020000 ZZH LODGING PLUS FACILITY CHARGE ADULT

## 2020-02-15 ENCOUNTER — HOSPITAL ENCOUNTER (OUTPATIENT)
Dept: BEHAVIORAL HEALTH | Facility: CLINIC | Age: 35
End: 2020-02-15
Attending: FAMILY MEDICINE
Payer: COMMERCIAL

## 2020-02-15 PROCEDURE — H2035 A/D TX PROGRAM, PER HOUR: HCPCS | Mod: HQ

## 2020-02-15 PROCEDURE — 10020000 ZZH LODGING PLUS FACILITY CHARGE ADULT

## 2020-02-15 RX ORDER — ACAMPROSATE CALCIUM 333 MG/1
666 TABLET, DELAYED RELEASE ORAL 3 TIMES DAILY
COMMUNITY
End: 2020-03-26

## 2020-02-15 NOTE — PROGRESS NOTES
Nursing Discharge Planning Meeting    Writer completed discharge planning meeting with patient. Discharge is planned for MOnday, 2/17/20.    Discussed appropriate follow up care to manage MATHEW, MH, Medical and to obtain medication refills. Patient given a copy of their current medications for reference. Questions were answered at this time and the patient verbalized an understanding of the post-discharge follow up plan.    Patient to schedule an appointment with their PCP Dr. Sean Nuñez from [facility].    Continue to support patient in discharge planning as needed to assure appropriate continuity of care.     Tobacco Cessation  Patient participated in the nicotine replacement therapy for tobacco cessation or reduction during their treatment programming: No.  Pt does not use tobacco products

## 2020-02-16 ENCOUNTER — HOSPITAL ENCOUNTER (OUTPATIENT)
Dept: BEHAVIORAL HEALTH | Facility: CLINIC | Age: 35
End: 2020-02-16
Attending: FAMILY MEDICINE
Payer: COMMERCIAL

## 2020-02-16 PROCEDURE — 10020000 ZZH LODGING PLUS FACILITY CHARGE ADULT

## 2020-02-16 PROCEDURE — H2035 A/D TX PROGRAM, PER HOUR: HCPCS | Mod: HQ

## 2020-02-17 NOTE — PROGRESS NOTES
[facility]     Patient: Nikhil Rios  MRN: [id number]   : 1985 Age: 34 year old Sex: male   -  Focus of Treatment / Discharge Recommendations    Personal Safety/ Management of Symptoms    * Follow your safety plan.  Report increased symptoms to your care team and /or go to the nearest Emergency Department.    * Call crisis lines as needed    [facility] [phone]                [facility] [phone]  [facility] [phone]              Crisis Connection [phone]  [facility] [phone]              [facility] [phone]  [facility] [phone]          National Suicide Prevention [phone]  [facility] [phone]            Suicide Prevention [phone]  [facility] [phone]    Abstinence/Relapse Prevention  * Take all medicines as directed.  Carry a current list of medicines with you.  * Use coping skills: Physical exercise, breathing skills, mindfulness, sober support, 12-step groups, nutrition, sleep hygiene, and other sober related activities you enjoy.   * Do not use illicit (street) drugs, controlled substances (narcotics) or alcohol.    Develop/Improve Independent Living/Socialization Skills: Ensure living environment is conducive to sobriety.     Community Resources/Supports and Discharge Planning:      Follow up with psychiatrist / main caregiver: Dr. Nuñez  Next visit: TBD    Follow up with your therapist: See referrals     Go to group therapy and / or support groups at: [facility] program with CITLALLI Espitia and CITLALLI Bowie    See your medical doctor about:  Any physical or mental health concerns you may have.       Client Signature:_______________________   Date / Time:___________    Staff Signature:________________________   Date / Time:___________

## 2020-02-17 NOTE — PROGRESS NOTES
Name: Nikhil Rios  Date: 2/17/2020  Medical Record: [id number]    Envelope Number: [id number]    List of Contents (List each item separately in new row):     Metoprolol Tartrate 50 mg tabs-FOR DESTRUCTION    Admission:  I am responsible for any personal items that are not sent to the safe or pharmacy.  [facility] is not responsible for loss, theft or damage of any property in my possession.      Patient Signature:  ___________________________________________       Date/Time:__________________________    Staff Signature: __________________________________       Date/Time:__________________________    2nd Staff person, if patient is unable/unwilling to sign:      __________________________________________________________       Date/Time: __________________________      Discharge:  [facility] has returned all of my personal belongings:    Patient Signature: ________________________________________     Date/Time: ____________________________________    Staff Signature: ______________________________________     Date/Time:_____________________________________

## 2020-02-17 NOTE — PROGRESS NOTES
D) Met with client on 2/17/20 from 9:00-9:30am. Client has the community therapist referrals this therapist provided last week and intends to call them. Client reported that he will start the [facility] group tomorrow, 2/18, and will have individual sessions as past of that program. Client spoke about how he's been challenged navigating several romantic interests while in this program.     I) Therapist reinforced the importance of healthy boundaries while he's in early recovery.     A) Client appears to have trouble identifying and connecting with emotions. Client appears to lack skills for emotional regulation and stress management. Client appears to lack insight into the dangers of starting a new relationship so early in recovery. Client appears to gained understanding as to the importance of a an aftercare plan and a sober support network. Client appears to lack a daily routine, meaningful activities, and a sense of purpose and may isolate in his romantic relationships. Client appears to have external and internal motivation at this time and would benefit from continuing support to help with processing past traumas, stress management, emotional regulation, healthy relationships, impulse control, increasing resiliency and self-esteem. Client tends towards self-defeating, self-sabotaging patterns as demonstrated by not being honest or following through on recovery behaviors.     P) Client will start [facility] group tomorrow 2/18.     PATO Clay

## 2020-02-17 NOTE — PROGRESS NOTES
[address] [place] [address]          Nikhil Rios, 1985, was admitted for evaluation/treatment of chemical dependency at [facility].  This person took part in these program(s):    ______ The Inpatient Program   ______ The Outpatient Program   __X___ The Lodging Plus Program   ______ Lodging Day Outpatient       Date admitted: 1/20/2020  Date discharged: 2/17/2020    Type of discharge:   __X___ Satisfactory - completed evaluation / treatment   ______ Discharged without completing   ______ Behavioral discharge   ______ Transferred to another chemical dependency program   ______ Transferred to another type of service   ______ Left against medical advice (AMA) / [facility]             Counselor: CITLALLI Lopez                       Date: 2/17/2020             Time: 4:10 PM

## 2020-02-18 ENCOUNTER — HOSPITAL ENCOUNTER (OUTPATIENT)
Dept: BEHAVIORAL HEALTH | Facility: CLINIC | Age: 35
End: 2020-02-18
Attending: SOCIAL WORKER
Payer: COMMERCIAL

## 2020-02-18 PROCEDURE — H2035 A/D TX PROGRAM, PER HOUR: HCPCS

## 2020-02-18 PROCEDURE — H2035 A/D TX PROGRAM, PER HOUR: HCPCS | Mod: HQ

## 2020-02-18 NOTE — PROGRESS NOTES
Acknowledgement of Initial Treatment Plan     INITIAL TREATMENT PLAN:     1. I have participated in creating my treatment plan with my therapist / counselor on __________.     I agree with the plan as it is written in the electronic health record.    Name  Nikhil Rios Signature/Date   Patient     Name of Therapist / Counselor:   Mary Morales MFA, MA, [facility] Signature/Date   Counselor/Therapist        2. I have completed and reviewed my Safety Plan with my counselor and signed this on _________. I have been given the hard copy of this plan.    Patient signature/date:      _____________________________________________________________________________    3. Last Use Date: __________    Patient signature/date:     _____________________________________________________________________________

## 2020-02-18 NOTE — PROGRESS NOTES
CHEMICAL DEPENDENCY DISCHARGE SUMMARY   NAME: Nikhil Rios  : 1985    MR #: [id number]      EVALUATION COUNSELOR: This patient had a Assessment and Placement Summary Update on2019 completed by Atul Ramírez.     TREATMENT COUNSELOR:  Bettye Álvarez [facility]; Jarrod Cox  [facility];    REFERRAL SOURCE:  Transfer from [facility] (Detox)             PROGRAM:  Adult Chemical Dependency Lodging Plus    ADMISSION DATE: 2020   LAST SESSION DATE: 2020  DISCHARGE DATE: 2020     ADMISSION DIAGNOSIS:   F10.20 Alcohol Use Disorder, Moderate/Severe    DISCHARGE DIAGNOSIS:   F10.20 Alcohol Use Disorder, Moderate/Severe    DISCHARGE STATUS: Patient was discharged with staff approval.    LAST USE DATE:  2020  DAYS OF TREATMENT COMPLETED: 28     PRESENTING INFORMATION: Per ED note on 2020: Nikhil Rios is a 34 year old male who presents for detox from alcohol.  Patient states he drinks a liter bottle of vodka per day.  He does have a history of withdrawal symptoms he gets shakes if he stops drinking but is never had a seizure.  No other drug use.  Last drink was approximately 1400 today.  Patient denies any suicidal ideation.  He has no homicidal ideation does state he is angry with a friend but is not planning to harm him.  Patient does have a detox bed on hold.  Other symptoms noted.    SERVICES PROVIDED:  Services included assessment, orientation, treatment planning, individual counseling, group therapy sessions, family therapy, spiritual care counseling, aftercare planning, acupuncture, nutrition in recovery lecture, workshops focusing on relapse prevention and relationships, education on chemical dependency, mental illness, and AIDS/HIV awareness.     ISSUES ADDRESSED IN TREATMENT:    DIMENSION 1/ACUTE WITHDRAWAL ISSUES/DETOX:    Admit RR: 0        DC RR: 0   Patient reports last use date of 2020  Patient denied symptoms of post-acute withdrawal while in treatment. He attended an  "educational lecture on post-acute withdrawal and verbalized understanding of the timeline of symptoms.        DIMENSION 2/BIOMEDICAL:  Admit RR: 1           DC RR:1  Patient denied any biomedical concerns that would interfere with treatment.Patient reports a history of high blood pressure due to use. Patient plans to follow up with his primary provider as needed.     DIMENSION 3/EMOTIONAL/BEHAVIORAL:   Admit RR: 2    DC RR: 2   Patient denied a formal mental health diagnosis. Patient participated in a suicide risk screening on admission and was assessed as \"Low Risk\".  Patient completed a required safety plan the first week of treatment with primary counselor. Patient denied suicidal or homicidal ideation while in treatment. Patient worked to develop a more positive self-image by completing her  Book of Me  assignment and presented in group. Patient reported unresesolved grief and loss related to witnessing the death of his father, to process feelings of grief and loss, he completed an assignment  \"Write a letter to your father\", and to share the impact of this letter in group. He also completed another assignment \"Ending resentment\" to help his healing process. Patient while in treatment attended \"Spirituality\" group weekly to enhance his spiritual growth. He met with staff therapist a couple of times while in [facility] to process his mental health needs. Due to patient's difficulty with emotional regulation and low distress tolerance, she remains at risk rating  2.      DIMENSION 4/READINESS TO CHANGE:  Admit RR: 2        DC RR: 1    At time of admission, patient verbalized her primary motivation for treatment was for himself. Patient completed his  Consequences  assignment and identified how his alcohol use has contributed to violating his personal value system.  Patient's risk rating in this area lowered  to    1\" as he is cooperative, motivated, and ready to change.     DIMENSION 5/RELAPSE & CONTINUED " "PROBLEM POTENTIAL:  Admit RR:     4   DC RR: 3      Patient worked on assignments to identify his relapse patterns and read material on emotional regulation. Patient also worked on developing   long term sober skills by writing a page paper to identify the skills he plans to implement following treatment. Patient participated in two weekend workshops on relapse prevention and completed a relapse prevention plan. Patient completed a comprehensive daily calender for post-treatment to determine daily activities that will circumvent him being in isolation. He demonstrated the ability to verbalize high risk situations for relapse and identified detailed coping skills for triggering situations. He monitored any urges throughout treatment and discussed his Urge Tracker worksheet with counselors. Patient learned about boundaries and co-dependency and challenged his tendency to isolate by engaging with peers while in treatment. Patient continues to struggle with setting boundaries with unsupportive people in his life and would benefit from individual therapy. Patient's risk rating in this dimension lowered to a  3  based on his completion of all treatment plan goals in this area.     DIMENSION 6/RECOVERY ENVIRONMENT: Admit RR      4      DC RR: 3  Patient identified ways to improve his sober environment by  attending weekly 3; 12-step support group meetings.  Patient worked to build supportive relationships while in treatment with male peers. Patient Completed  \"Forming Stable Relationships\" assignment and attended two relationships workshops and received education on family roles in addiction, healthy versus unhealthy relationships, and codependency. Patient's risk rating lowered to a  3  based on his aftercare plans and safe housing.    STRENGTHS: The patient appeared sincere in her desire to establish a recovery lifestyle, put forth consistent effort in reaching treatment plan goals and following staff recommendations, " expressed genuine concern for other group members and freely offered support as well as encouragement, gained considerable insight into relapse prevention strategies and resources which can be utilized in recovery. Patient was honest about the struggle/ambivalence about sobriety in the beginning of treatment. Patient was able to respectfully challenge peers, about addictive thinking patterns using self as an example. Patient added spontaneity to the group using honesty and creative approach.     PROGNOSIS:  Patient has a favorable assuming that she follows all the aftercare recommendations and continues to build sober support network.    LIVING ARRANGEMENTS AT DISCHARGE: Patient was discharged home to continue treatment with [facility], starting 2/17/2020.     CONTINUING CARE RECOMMENDATIONS/REFERRALS:   1. Remain abstinent from all mood altering chemicals.  Enter aftercare at [facility]   2. Comply with expectations of extended care.   3. Monitor and comply with the advice of your doctor regarding mental and physical health, remain medication compliant.  4. Attend a minimum of two 12-step meetings weekly and obtain a male sponsor.  5. Continue investment in building sober support network in recovery.  6. Seek individual therapy with referrals given.  7. Continue to practice coping skills for emotional health and substance use relapse prevention.  8. Continue to pursue employment or volunteer opportunities.

## 2020-02-18 NOTE — PROGRESS NOTES
Client:  Nikhil Rios  MRN: [id number]  Date: 2/18/2020    Comprehensive Assessment UPDATE        Comprehensive Summary Update and Review  Counselor/Therapist met with Client on 2/18/2020 and reviewed the Comprehensive Assessment.    There were no changes/updates identified by patient or in chart entries. Client reports he completed LP+ yesterday and that there is no changes other than that.       [place] Re-Assessment:     Have you ever wished you were dead or that you could go to sleep and not wake up? Lifetime?  No   Past Month?  No     Have you actually had any thoughts of killing yourself?  Lifetime?  No   Past Month?  No     Have you been thinking about how you might do this? Lifetime?  No   Past Month?  No     Have you had these thoughts and had some intention of acting on them?  Lifetime?  No   Past Month?  No     Have you started to work out the details of how to kill yourself?  Lifetime?  No   Past Month?  No     Do you intend to carry out this plan?   N/A    When you have the thoughts how long do they last?   N/A - Denies every having suicidal thoughts at all.     Are there things - anyone or anything (ie Family, Jewish, pain of death) that stopped you from wanting to die or acting on thoughts of suicide?   Does not apply        2008  [employer]  Used with permission by Rona Rivas, PhD.        Erasto Fernandez MA, LMFT, [facility]  Licensed Psychotherapist

## 2020-02-18 NOTE — ADDENDUM NOTE
Encounter addended by: Haley Davis [facility] on: 2/18/2020 12:59 PM   Actions taken: Clinical Note Signed

## 2020-02-18 NOTE — ADDENDUM NOTE
Encounter addended by: Haley Davis [facility] on: 2/18/2020 12:44 PM   Actions taken: Pend clinical note

## 2020-02-18 NOTE — PROGRESS NOTES
Initial Services Plan        Service Initiation Date: 2/18/2020    Immediate health and/or safety concerns: No    Identify health and safety concern(s) below and include plan to address:    None Identified    Treatment suggestions for client during the time between intake (admit date) and completion of the individual treatment plan:     Look for a sober support network, i.e. 12 step, Smart Recovery, [facility] Recovery, etc  Tour the treatment center or outpatient clinic  Introduce yourself to your treatment group. Spend time getting to know your peers  Review your patient or client handbook  Begin working on your treatment goal list    Completed by: PATO Bui, [facility]  Date completed: 2/18/2020 at 9:04 AM

## 2020-02-18 NOTE — ADDENDUM NOTE
Encounter addended by: [facility] on: 2/18/2020 11:55 AM   Actions taken: Pend clinical note

## 2020-02-19 ENCOUNTER — HOSPITAL ENCOUNTER (OUTPATIENT)
Dept: BEHAVIORAL HEALTH | Facility: CLINIC | Age: 35
End: 2020-02-19
Attending: SOCIAL WORKER
Payer: COMMERCIAL

## 2020-02-19 PROCEDURE — H2035 A/D TX PROGRAM, PER HOUR: HCPCS | Mod: HQ

## 2020-02-19 ASSESSMENT — ANXIETY QUESTIONNAIRES
1. FEELING NERVOUS, ANXIOUS, OR ON EDGE: SEVERAL DAYS
4. TROUBLE RELAXING: NOT AT ALL
5. BEING SO RESTLESS THAT IT IS HARD TO SIT STILL: NOT AT ALL
IF YOU CHECKED OFF ANY PROBLEMS ON THIS QUESTIONNAIRE, HOW DIFFICULT HAVE THESE PROBLEMS MADE IT FOR YOU TO DO YOUR WORK, TAKE CARE OF THINGS AT HOME, OR GET ALONG WITH OTHER PEOPLE: NOT DIFFICULT AT ALL
6. BECOMING EASILY ANNOYED OR IRRITABLE: NOT AT ALL
GAD7 TOTAL SCORE: 1
7. FEELING AFRAID AS IF SOMETHING AWFUL MIGHT HAPPEN: NOT AT ALL
3. WORRYING TOO MUCH ABOUT DIFFERENT THINGS: NOT AT ALL
2. NOT BEING ABLE TO STOP OR CONTROL WORRYING: NOT AT ALL

## 2020-02-19 ASSESSMENT — PATIENT HEALTH QUESTIONNAIRE - PHQ9: SUM OF ALL RESPONSES TO PHQ QUESTIONS 1-9: 0

## 2020-02-19 NOTE — GROUP NOTE
Group Therapy Documentation    PATIENT'S NAME: Nikhil Rios  MRN:   [id number]  :   1985  [facility]. NUMBER: [id number]  DATE OF SERVICE: 20  START TIME:  9:00 AM  END TIME: 12:00 PM  FACILITATOR(S): Erasto Fernandez LMFT, LADC  TOPIC: BEH Group Therapy  Number of patients attending the group:  4  Group Length:  3 Hours    Group Therapy Type: Addiction, Life skill(s), Psychoeducation, and Skills/Education    Summary of Group / Topics Discussed:    Psychoeducation/Skills Personal Growth and Development (MH)  This topic will address various ways to implement and sustain overall health and wellness by focusing on the various aspects of spirituality.    Objective(s):    Patient will learn strategies to reduce stress and increase overall health and wellness.    Patient will identify a personal understanding of spirituality.     Structure (modalities, homework, worksheets, etc)     Facilitate group discussion on spirituality and how our individual values impact our thoughts, feelings and actions.    Provide psychoeducation on the need for a balanced lifestyle and ways to achieve this.    Use teach-back techniques to ensure patients understanding.    Patient will be provided handouts to enhance learning.    Expected therapeutic outcome(s):  Patient will:    Patient will sustain improved health and wellness.    Patient will integrate spirituality, values, and stress management into their daily life.     Therapeutic outcome(s) measured by:     Patient s ability to teach-back the techniques learned in group.       Group Attendance:  Attended group session    Patient's response to the group topic/interactions:  cooperative with task, discussed personal experience with topic, expressed understanding of topic, gave appropriate feedback to peers, listened actively and offered helpful suggestions to peers    Patient appeared to be Actively participating, Attentive and Engaged.        Client specific details:  Client was  able to successfully teach back skills he learned on values, sleep hygiene, and the importance of proper nutrition.

## 2020-02-19 NOTE — GROUP NOTE
Group Therapy Documentation    PATIENT'S NAME: Nikhil Rios  MRN:   [id number]  [facility]   1985  [id number]  DATE OF SERVICE: 20  START TIME:  9:00 AM  END TIME: 12:00 PM  FACILITATOR(S): Mary Morales LADC  TOPIC: BEH Group Therapy  Number of patients attending the group:  4  Group Length:  3 Hours    Group Therapy Type: Addiction and Psychotherapeutic    Summary of Group / Topics Discussed:    Cognitive Therapy Techniques, Coping Skills/Lifestyle Managemet, Choices in Recovery, Distress Tolerance, and Mindfulness      Group Attendance:  Attended group session    Patient's response to the group topic/interactions:  discussed personal experience with topic, appeared to have euphoric recall of use and verbalizations were off topic    Patient appeared to be Distracted and Off-topic.        Client specific details:  Client struggles to stay focused on topic, repeatedly goes off-topic and attempts to re-direct group, as well as persistently trying to engage group members in cross-talk.

## 2020-02-20 ASSESSMENT — ANXIETY QUESTIONNAIRES: GAD7 TOTAL SCORE: 1

## 2020-02-20 NOTE — ADDENDUM NOTE
Encounter addended by: [facility] on: 2/19/2020 6:23 PM   Actions taken: Episode edited, Charge Capture section accepted

## 2020-02-24 ENCOUNTER — HOSPITAL ENCOUNTER (OUTPATIENT)
Dept: BEHAVIORAL HEALTH | Facility: CLINIC | Age: 35
End: 2020-02-24
Attending: SOCIAL WORKER
Payer: COMMERCIAL

## 2020-02-24 PROCEDURE — H2035 A/D TX PROGRAM, PER HOUR: HCPCS | Mod: HQ

## 2020-02-24 PROCEDURE — H2035 A/D TX PROGRAM, PER HOUR: HCPCS

## 2020-02-24 NOTE — GROUP NOTE
Group Therapy Documentation    PATIENT'S NAME: Nikhil Rios  MRN:   [id number]  :   1985  [facility]. NUMBER: [id number]  DATE OF SERVICE: 20  START TIME:  9:00 AM  END TIME: 12:00 PM  FACILITATOR(S): Mary Morales LADC  TOPIC: BEH Group Therapy  Number of patients attending the group:  5  Group Length:  3 Hours    Group Therapy Type: Addiction and Psychotherapeutic    Summary of Group / Topics Discussed:    Cognitive Therapy Techniques and Thinking Errors/Negative Self-Talk                                                                              Today's group participated in a check-in process, each providing a brief update on current status. One group member requested time to process feelings of high anxiety about life decision. The group provided intense listening and presence for the group peer, and shared process was helpful and was applying to similar distress about personal decisions. The group was presented information on negative self-talk, and identified one or more automatic negative messages that impede recovery. Continued exploring topic by learning about The 5Rs of Changed Thinking to refute and replace negative self-talk.     Group Attendance:  Attended group session    Patient's response to the group topic/interactions:  discussed personal experience with topic    Patient appeared to be Actively participating.        Client specific details:  After reviewing 5Rs of Changed Thinking, Nikhil shared that his experience with addiction has made him feel like he's a failure, and he identified challenge thoughts to begin changing automatic negative thoughts.

## 2020-02-24 NOTE — ADDENDUM NOTE
Encounter addended by: Mary Morales LADC on: 2/24/2020 4:43 PM   Actions taken: Charge Capture section accepted

## 2020-02-25 ENCOUNTER — HOSPITAL ENCOUNTER (OUTPATIENT)
Dept: BEHAVIORAL HEALTH | Facility: CLINIC | Age: 35
End: 2020-02-25
Attending: SOCIAL WORKER
Payer: COMMERCIAL

## 2020-02-25 PROCEDURE — H2035 A/D TX PROGRAM, PER HOUR: HCPCS | Mod: HQ

## 2020-02-25 ASSESSMENT — PATIENT HEALTH QUESTIONNAIRE - PHQ9: SUM OF ALL RESPONSES TO PHQ QUESTIONS 1-9: 1

## 2020-02-25 ASSESSMENT — ANXIETY QUESTIONNAIRES
GAD7 TOTAL SCORE: 2
2. NOT BEING ABLE TO STOP OR CONTROL WORRYING: NOT AT ALL
3. WORRYING TOO MUCH ABOUT DIFFERENT THINGS: NOT AT ALL
7. FEELING AFRAID AS IF SOMETHING AWFUL MIGHT HAPPEN: NOT AT ALL
IF YOU CHECKED OFF ANY PROBLEMS ON THIS QUESTIONNAIRE, HOW DIFFICULT HAVE THESE PROBLEMS MADE IT FOR YOU TO DO YOUR WORK, TAKE CARE OF THINGS AT HOME, OR GET ALONG WITH OTHER PEOPLE: NOT DIFFICULT AT ALL
6. BECOMING EASILY ANNOYED OR IRRITABLE: SEVERAL DAYS
1. FEELING NERVOUS, ANXIOUS, OR ON EDGE: SEVERAL DAYS
5. BEING SO RESTLESS THAT IT IS HARD TO SIT STILL: NOT AT ALL
4. TROUBLE RELAXING: NOT AT ALL

## 2020-02-26 ENCOUNTER — HOSPITAL ENCOUNTER (OUTPATIENT)
Dept: BEHAVIORAL HEALTH | Facility: CLINIC | Age: 35
End: 2020-02-26
Attending: SOCIAL WORKER
Payer: COMMERCIAL

## 2020-02-26 PROCEDURE — H2035 A/D TX PROGRAM, PER HOUR: HCPCS | Mod: HQ

## 2020-02-26 ASSESSMENT — ANXIETY QUESTIONNAIRES: GAD7 TOTAL SCORE: 2

## 2020-02-26 NOTE — GROUP NOTE
Group Therapy Documentation    PATIENT'S NAME: Nikhil Rios  MRN:   [id number]  :   1985  [facility]. NUMBER: [id number]  DATE OF SERVICE: 20  START TIME:  9:00 AM  END TIME: 12:00 PM  FACILITATOR(S): Erasto Fernandez LMFT, LADC  TOPIC: BEH Group Therapy  Number of patients attending the group:  5  Group Length:  3 Hours    Group Therapy Type: Addiction, Life skill(s), and Psychoeducation    Summary of Group / Topics Discussed:    Cognitive Therapy Techniques, Co-occurring Illness, Coping Skills/Lifestyle Managemet, and making connections socially, environmentally, and within the community.       Group Attendance:  Attended group session    Patient's response to the group topic/interactions:  cooperative with task, expressed understanding of topic and listened actively    Patient appeared to be Attentive and Engaged.        Client specific details:  Client learned about the positive impact of making connections socially, environmentally, and within the community. Client was able to identify people as well as environments in their lives that would be supportive for their overall recovery.     Plan: Client will actively reach out to one person identified during today's group for support over the next week.

## 2020-02-26 NOTE — GROUP NOTE
Group Therapy Documentation    PATIENT'S NAME: Nikhil Rios  MRN:   [id number]  :   1985  [facility]. NUMBER: [id number]  DATE OF SERVICE: 20  START TIME:  9:00 AM  END TIME: 12:00 PM  FACILITATOR(S): Erasto Fernandez LMFT, LADC  TOPIC: BEH Group Therapy  Number of patients attending the group:  6  Group Length:  3 Hours    Group Therapy Type: Addiction, Life skill(s), and Psychoeducation    Summary of Group / Topics Discussed:    Psychoeducation/Skills Personal Growth and Development (MH)  This topic will address various ways to implement and sustain overall health and wellness by focusing on the various aspects of spirituality.    Objective(s):    Patient will learn strategies to reduce stress and increase overall health and wellness.    Patient will identify a personal understanding of spirituality.     Structure (modalities, homework, worksheets, etc)     Facilitate group discussion on spirituality and how our individual values impact our thoughts, feelings and actions.    Provide psychoeducation on the need for a balanced lifestyle and ways to achieve this.    Use teach-back techniques to ensure patients understanding.    Patient will be provided handouts to enhance learning.    Expected therapeutic outcome(s):  Patient will:    Patient will sustain improved health and wellness.    Patient will integrate spirituality, values, and stress management into their daily life.     Therapeutic outcome(s) measured by:     Patient s ability to teach-back the techniques learned in group.       Group Attendance:  Attended group session    Patient's response to the group topic/interactions:  cooperative with task, discussed personal experience with topic, expressed readiness to alter behaviors, expressed understanding of topic and listened actively    Patient appeared to be Actively participating, Attentive and Engaged.        Client specific details:    > Client also learned and practiced the following skills in  "group sessions this week; Client was taught the importance of social support as it relates to coping skills and more specifically \"Daily Social Support\" and \"Crisis Social Support\". Client was taught the \"Diversion\" coping skill and client identified situation where he could use this coping skill. Client was taught the \"Building New Habits\" coping skill as it relates to \"fostering new relationships, developing new professional skills and refocusing on existing relationships\". Client was then taught the \"Prevention\" coping skill. Client was taught about \"Avoiding Triggers/Risky Situations\" and \"Establishing a Healthy Lifestyle\". Client learned about the importance of \"Managing Emotions and Relaxation\" as it relates to coping. Client was then taught \"Deep Breathing (4-4-6)\" coping skill and client practiced using this skill in group for 3-5 minutes. Client was taught how to determine if they are breathing appropriately by placing a hand on his stomach to ensure he is breathing with belly breaths. Client was then taught the \"Journaling\" coping skill. Client was introduced to \"Therapeutic Journaling Guidelines\" and \"30 Journaling Prompts for Self-Discovery\". Client practiced \"daily log, letter writing, and gratitude\" journaling in group. Client then learned about the \"Imagery\" coping skill and practiced this skill in group for 5-minutes.   > Client was then taught about \"Healthy vs. Unhealthy Coping Strategies\". Client (group activity) identified the following unhealthy coping skills; \"drug or alcohol use, overeating, procrastination, sleeping too much or too little, social withdrawal, self-harm, and aggression\". Client along with fellow group members came up with the following healthy coping skills; \"exercise, talking about your problem/craving, healthy eating, seeking professional help, relaxation techniques (imagery, progressive muscle relaxation, and breathing exercises), using social support, and problem-solving " "techniques (including playing tape forward, pro's and con's list, and riding out the craving)\". Client then practiced writing out a \"Healthy vs. Unhealthy Coping Strategies\" decision making tree.   > Client was then taught \"How To [place] - Improving Coping Skills\". Client then learned about the following questions related to the aforementioned topic; \"So what are coping skills anyway?, Is there a wrong way to cope?, Psychological Stressors, Coping with Stressors, Improving Coping Skills, and At a loss as to what to do?\".  Client was then given a list of \"Coping Idea's\" that relate to the following areas; \"Things to Distract, Things to Soothe and Calm, Things to Help Express the Pain and Deep Emotion, Things to Help Release Physical Tension and Distress, and Things to Help Feel Supported and Connected.\" Client was then given a listing of \"101 Ways to [place] With Stress\" and circled the coping skills he already uses and underlined the coping skills he would like to try.   > Client was then taught more detailed \"Deep Breathing\" skills and/or techniques such as; \"Box Breathing/4-Squared (4x4x4), Cool Air In & Warm Air Out, Abdominal Breathing, Breathing for Stressful Situations, Breathing for Relaxation While in Traffic, Breathing to Aid Sleep, Breathing Rhythmically with Relaxation, and When Ready to End any Breathing Exercise.\"     Assessment:    > Client was observed using the body scan and reported it helped with relaxation and anxiety. Client was observed using breathing exercises that were covered again during this weeks group sessions and client reported it helped with relaxation. Client appeared very open to continued learning for lifelong sobriety.    > Client was able to \"teach-back\" the skills he learned on the Mental Health Coping Skills Group by identifying \"25 Coping Skills\" that he will use regularly, completing a \"Coping Skills Alphabet\" exercise, and then client completed a detailed \"Coping Skills Plan\". " "Client identified the following things in the following categories of his \"Coping Skills Plan\";    Client identified 3 people who are good supporters.    Client identified 3 things that may contribute to returning to use or unskillful behaviors (both mental health and/or substance use).    Client identified 3 warning signs that signal to others or to him that he isn't at his best.    Client identified that when feeling \"depressed or down\" 3 ways he can deal with this feeling along with 3 ways of dealing with the feeling of \"anxiousness\".     Client identified 3 things he can do or places he can go to feel better.    Client identified 3 person/places/things that he needs to stay away from.    Client identified 3 symptoms that other people can look for that signal things aren't going well.    Client identified 3 ways that other people can support him when symptoms return or get worse.    Client identified 3 things that would take place that would signal that he needed emergency assistance or hospitalization.      Client appeared to gain insight into the importance of identifying, practicing and using coping skills regularly to avoid daily stress and to live a life in lifelong recovery. Client along with fellow group members role played with each other how they would utilize their self identified coping skills. Client then shared with fellow group members his list of \"25 Coping Skills\" and \"Coping Skills Alphabet\". Client received feedback on his \"Coping Skills Plan\" along with his list of \"25 Coping Skills\" and \"Coping Skills Alphabet\". Client responded to all the aforementioned things he learned in today's group very favorably and reported feeling confident that he would actually utilize his self identified coping skills to gain a higher level of mastery. Client also verbalized his understanding of today's material and was able to demonstrate appropriately the use of the various breathing exercises coping skills. "     Plan: Client will practice three of the coping skills learned in group everyday for the next week.

## 2020-02-27 NOTE — PROGRESS NOTES
Case consultation:  D)  Therapist reviewed case.  DOC alcohol.  Ct is single with no children.  Doesn't appear to have any mental health issues.  P)  Consider possible transfer to [facility] if only needing [facility] tx.    Sharon Cha, RANJAN, LICSW, [facility] [place]  Clinical

## 2020-02-28 NOTE — PROGRESS NOTES
Treatment Plan Review      Total # of Phase 1 Group Sessions: 2                                 Total # of Phase 2 Group Sessions:   Total # of Phase 3 Group Sessions:   Total # of 1:1 Sessions: 1     Support group attended this week: yes     Reporting sobriety: Yes     Treatment Plan Review completed on:  2/24/2020 - Date client admitted to IOP Group     Projected discharge date: 7/12/2020     Client preferred learning style: Visual  Hands on  Verbal     Staff member(s) contributing: Mary Morales MFA, MA, CITLALLI      Received supervision: No.     Client involvement with treatment planning: contributed to goals and plan.     Client received copy of plan/revised plan: Client received copy of Initial Services Treatment Plan; admitted on 2/18/2020. Client met with counselor and completed Treatment Planning session and Treatment Plan on 2/24/2020.   Client agrees with plan/revised plan: Yes     Changes to Treatment Plan: No     New Goals added since last review: None.     Goal(s) worked on since last review: Initial plans for IOP Treatment.     Strategies effective: Yes     Treatment Coordination Activities: Consultation with Vanda Gandhi MA, PATO, psychotherapist with [facility].     Medical, Mental Health and other appointments the client attended: None.     Medication issues: Client recommended to seek consultation with medical care provider for previously prescribed Acamprosate, 333mg, to help manage urges/cravings to use alcohol.  Client prescribed Hydroxyzine, 10mg.     Physical and mental health problems: Yes - Client reports history of grief and loss. Vanda Gandhi MA, LMFT, [facility], reports that client would benefit from continuing support to help with processing past traumas, stress management, and emotional regulation.      Review and evaluation of the individual abuse prevention plan: The program's individual abuse prevention plan (IAPP) is sufficient for this client.      Substance Use  "Disorders:    303.90 (F10.20) Alcohol Use Disorder Severe     ASAM Risk Rating: (Note the rationale for risk rating changes)    Dimension 1 1 Client denies current symptoms of withdrawal. Client reports taking Acamprosate as prescribed to help manage cravings/urges to use. Reports previous use of Naltrexone was not effective so physician prescribed Acamprosate.    Dimension 2 0 Client reports history of high blood pressure due to use of alcohol.     Dimension 3 2 Client risk rating did not change at transition from [facility]: \"Due to patient's difficulty with emotional regulation and low distress tolerance, she remains at risk rating '2.'.\"    Dimension 4 2 Client verbalizes motivation for recovery, both internal and external. Client's actions present with some concern about commitment to focus on his recovery and maintain healthy boundaries with relationships and female peers in recovery.    Dimension 5 3 Risk rating provided at transition from [facility] due to client's continuing struggle with setting boundaries with unsupportive people, unhealthy boundaries with peers in recovery, and need for individual therapy to address concerns that keep him at high risk of relapse.    Dimension 6 3 Client risk rating at transition from [facility] based on aftercare plans and need for safe housing as client was recommended for sober housing but elected to return to his mother's home. Client encouraged to build supportive relationships with male peers.     Mary Morales MFA, MA, [facility] [place]  February 24, 2020    "

## 2020-02-28 NOTE — PROGRESS NOTES
Treatment Plan Review      Total # of Phase 1 Group Sessions: 2                                 Total # of Phase 2 Group Sessions:   Total # of Phase 3 Group Sessions:   Total # of 1:1 Sessions: 1     Support group attended this week: yes     Reporting sobriety: Yes     Treatment Plan Review completed on:  2/18/2020 - Date client admitted to IOP Group     Projected discharge date: 7/12/2020     Client preferred learning style: Visual  Hands on  Verbal     Staff member(s) contributing: Mary Morales MFA, MA, CITLALLI      Received supervision: No.     Client involvement with treatment planning: contributed to goals and plan.     Client received copy of plan/revised plan: Client received copy of Initial Services Treatment Plan; admitted on 2/18/2020. Client will meet with counselor to complete Treatment Plan according to schedule.     Client agrees with plan/revised plan: Yes     Changes to Treatment Plan: No     New Goals added since last review: None.     Goal(s) worked on since last review: Initial plans for IOP Treatment.     Strategies effective: Yes     Treatment Coordination Activities: Consultation with Vanda Gandhi MA, LMFT, psychotherapist with [facility].     Medical, Mental Health and other appointments the client attended: None.     Medication issues: Client recommended to seek consultation with medical care provider for previously prescribed Acamprosate, 333mg, to help manage urges/cravings to use alcohol.  Client prescribed Hydroxyzine, 10mg.     Physical and mental health problems: Yes - Client reports history of grief and loss. Vanda Gandhi MA, LMFT, [facility], reports that client would benefit from continuing support to help with processing past traumas, stress management, and emotional regulation.      Review and evaluation of the individual abuse prevention plan: The program's individual abuse prevention plan (IAPP) is sufficient for this client.      Substance Use Disorders:    303.90  "(F10.20) Alcohol Use Disorder Severe     ASAM Risk Rating: (Note the rationale for risk rating changes)    Dimension 1 1 Client denies current symptoms of withdrawal. Client reports taking Acamprosate as prescribed to help manage cravings/urges to use. Reports previous use of Naltrexone was not effective so physician prescribed Acamprosate.    Dimension 2 0 Client reports history of high blood pressure due to use of alcohol.     Dimension 3 2 Client risk rating did not change at transition from [facility]: \"Due to patient's difficulty with emotional regulation and low distress tolerance, she remains at risk rating '2.'.\"    Dimension 4 2 Client verbalizes motivation for recovery, both internal and external. Client's actions present with some concern about commitment to focus on his recovery and maintain healthy boundaries with relationships and female peers in recovery.    Dimension 5 3 Risk rating provided at transition from [facility] due to client's continuing struggle with setting boundaries with unsupportive people, unhealthy boundaries with peers in recovery, and need for individual therapy to address concerns that keep him at high risk of relapse.    Dimension 6 3 Client risk rating at transition from [facility] based on aftercare plans and need for safe housing as client was recommended for sober housing but elected to return to his mother's home. Client encouraged to build supportive relationships with male peers.     Mary Morales MFA, MA, [facility]  February 18, 2020    "

## 2020-03-02 ENCOUNTER — HOSPITAL ENCOUNTER (OUTPATIENT)
Dept: BEHAVIORAL HEALTH | Facility: CLINIC | Age: 35
End: 2020-03-02
Attending: SOCIAL WORKER
Payer: COMMERCIAL

## 2020-03-02 PROCEDURE — H2035 A/D TX PROGRAM, PER HOUR: HCPCS | Mod: HQ

## 2020-03-02 NOTE — PROGRESS NOTES
Treatment Plan Review      Treatment Plan Review: 3/02/2020      Total # of Phase 1 Group Sessions: 6                                Total # of Phase 2 Group Sessions:   Total # of Phase 3 Group Sessions:   Total # of 1:1 Sessions: 2     Support group attended this week: yes     Reporting sobriety: Yes     Treatment Plan Review completed on: 3/02/2020     Projected discharge date: 8/12/2020     Client preferred learning style: Visual  Hands on  Verbal     Staff member(s) contributing: Mary Morales MFA, MA, [facility]      Received supervision: Yes: consulted with Clinical , Sharon Cha, [facility], [facility]     Client involvement with treatment planning: contributed to goals and plan.     Client received copy of plan/revised plan: Yes    Client agrees with plan/revised plan: Yes     Changes to Treatment Plan: No     New Goals added since last review: None.     Goal(s) worked on since last review: Initial plans for IOP Treatment.     Strategies effective: Yes     Treatment Coordination Activities: PATO Bow[place], [facility]     Medical, Mental Health and other appointments the client attended: Reports medical appointment for 3/2/2020 to review prescribed medications.     Medication issues: Client recommended to seek consultation with medical care provider for previously prescribed Acamprosate, 333mg, to help manage urges/cravings to use alcohol.      Physical and mental health problems: Yes - Client reports history of grief and loss. Vanda Gandhi MA, LMFT, [facility], reports that client would benefit from continuing support to help with processing past traumas, stress management, and emotional regulation.      Review and evaluation of the individual abuse prevention plan: The program's individual abuse prevention plan (IAPP) is sufficient for this client.      Substance Use Disorders:    303.90 (F10.20) Alcohol Use Disorder Severe     ASAM Risk Rating: (Note the rationale for risk rating changes)     "Dimension 1 0 Client denies current symptoms of withdrawal. Client reports taking Acamprosate as prescribed to help manage cravings/urges to use. Reports previous use of Naltrexone was not effective so physician prescribed Acamprosate. Client denies symptoms of PAWS, but rates the following: mood swings (1), fatigue (3), irritability (1), difficulty concentrating (1).    Dimension 2 2 Client reports history of hypertension; client medical records document Alcohol-induced acute pancreatitis. Client rates current health at 8/10, noting that he is experiencing slight fatigue.     Dimension 3 2 Client risk rating did not change at transition from [facility]: \"Due to patient's difficulty with emotional regulation and low distress tolerance, she remains at risk rating '2.'.\" Client denies current SI, intent, or thoughts of self-harm. Client reports taking Lexapro and hydroxyzine as prescribed for symptoms of mental health.    Dimension 4 2 Client verbalizes motivation for recovery, both internal and external. Client's actions present with some concern about commitment to focus on his recovery and maintain healthy boundaries with relationships and female peers in recovery. Client reports primary motivation for recovery is desire for happiness.     Dimension 5 3 Risk rating provided at transition from [facility] due to client's continuing struggle with setting boundaries with unsupportive people, unhealthy boundaries with peers in recovery, and need for individual therapy to address concerns that keep him at high risk of relapse. Client reports attending more than 10 sober support meetings in the past week.     Dimension 6 3 Client reports current unemployment, applying for jobs, waiting to hear about possible interview. Client reports that he is currently living with his mother; he rates his current living situation at 1/10, with 1 being very helpful and 10 being very stressful.      Mary Morales MFA, MA, [facility]  March " 2, 2020

## 2020-03-03 NOTE — GROUP NOTE
Group Therapy Documentation    PATIENT'S NAME: Nikhil Rios  MRN:   [id number]  [facility]   1985  [id number]  DATE OF SERVICE: 3/02/20  START TIME:  9:00 AM  END TIME: 12:00 PM  FACILITATOR(S): Mary Morales LADC  TOPIC: BEH Group Therapy  Number of patients attending the group:  6  Group Length:  3 Hours    Group Therapy Type: Addiction    Summary of Group / Topics Discussed:    Cognitive Therapy Techniques and Coping Skills/Lifestyle Managemet    Group Attendance:  Attended group session    Patient's response to the group topic/interactions:  refused to comply with staff direction and verbalizations were off topic    Patient appeared to be Engaged. Distracted.       Client specific details:  Client repeatedly made sarcastic comments in response to counselor, repeatedly interrupted counselor, and did not follow group participation guidelines. Client made off-topic remarks, repeated sexual innuendoes, throughout the session.

## 2020-03-03 NOTE — PROGRESS NOTES
INDIVIDUAL THERAPY NOTE  TIME: 12:00 PM  Duration: 31 Minutes    Data: Requested client meet following group session to discuss behaviors in group. Client previously denied that he has any mental health diagnosis, and reported that he did not want to attend individual sessions with psychotherapist as required for this IOP Co-Occurring Group.    Intervention: In brief meeting following group, expressed concern about whether this group is a good fit for client, and also described how client's behaviors was detrimental to recovery of group members. Client expressed interest in transitioning to an evening IOP group.    Assessment: Previously, client was provided information on group guidelines, and he signed a copy indicating that he understood expectations. Client was also previously informed about expectations for respectful boundaries of female peers in mixed gender group. Client continues to present with disrespectful attitude and behaviors, makes frequent sarcastic and negative comments, and encourages other group members to participate. Client reports motivation for sobriety, but at this time presents as challenged by attitudes that impede his treatment and recovery.    Plan: Consulted with  Sharon Cha and JESE Glaser Counselor at [facility] for transition to evening group at that location. Left VM message for client: provided information for client to contact Jv Chapa to schedule an appointment; also requested client return call to confirm next steps.      Mary Morales MFA, MA, [facility]  March 2, 2020

## 2020-03-06 NOTE — PROGRESS NOTES
Contacted client as follow-up to transition planning, as discussed on March 2, 2020; requested client call to report decision about transition to evening IOP Group, either at [place] or [place]. Client did not contact counselor, so reached out to him. Client reported that he had not made a decision, and that he was seeking treatment options other than outpatient at [facility]. Requested client call counselor to report decision, and further to call if needed any assistance in seeking evening outpatient. Client confirmed that he would call tomorrow, March 4, 2020.    Mary Morales MFA, MA, [facility]  March 3, 2020

## 2020-03-25 ENCOUNTER — HOSPITAL ENCOUNTER (EMERGENCY)
Facility: CLINIC | Age: 35
Discharge: HOME | End: 2020-03-25
Attending: EMERGENCY MEDICINE | Admitting: EMERGENCY MEDICINE
Payer: COMMERCIAL

## 2020-03-25 ENCOUNTER — TELEPHONE (OUTPATIENT)
Dept: BEHAVIORAL HEALTH | Facility: CLINIC | Age: 35
End: 2020-03-25

## 2020-03-25 VITALS
WEIGHT: 238 LBS | SYSTOLIC BLOOD PRESSURE: 134 MMHG | RESPIRATION RATE: 18 BRPM | BODY MASS INDEX: 33.19 KG/M2 | DIASTOLIC BLOOD PRESSURE: 72 MMHG | HEART RATE: 101 BPM | OXYGEN SATURATION: 98 % | TEMPERATURE: 98.1 F

## 2020-03-25 DIAGNOSIS — F10.220 ALCOHOL DEPENDENCE WITH UNCOMPLICATED INTOXICATION (H): ICD-10-CM

## 2020-03-25 LAB
ALBUMIN SERPL-MCNC: 4.6 G/DL (ref 3.4–5)
ALP SERPL-CCNC: 86 U/L (ref 40–150)
ALT SERPL W P-5'-P-CCNC: 28 U/L (ref 0–70)
ANION GAP SERPL CALCULATED.3IONS-SCNC: 13 MMOL/L (ref 3–14)
AST SERPL W P-5'-P-CCNC: 41 U/L (ref 0–45)
BASOPHILS # BLD AUTO: 0 10E9/L (ref 0–0.2)
BASOPHILS NFR BLD AUTO: 0.3 %
BILIRUB SERPL-MCNC: 0.3 MG/DL (ref 0.2–1.3)
BUN SERPL-MCNC: 12 MG/DL (ref 7–30)
CALCIUM SERPL-MCNC: 9 MG/DL (ref 8.5–10.1)
CHLORIDE SERPL-SCNC: 105 MMOL/L (ref 94–109)
CO2 SERPL-SCNC: 24 MMOL/L (ref 20–32)
CREAT SERPL-MCNC: 0.81 MG/DL (ref 0.66–1.25)
DIFFERENTIAL METHOD BLD: NORMAL
EOSINOPHIL # BLD AUTO: 0 10E9/L (ref 0–0.7)
EOSINOPHIL NFR BLD AUTO: 0.2 %
ERYTHROCYTE [DISTWIDTH] IN BLOOD BY AUTOMATED COUNT: 12.9 % (ref 10–15)
ETHANOL SERPL-MCNC: 0.48 G/DL
GFR SERPL CREATININE-BSD FRML MDRD: >90 ML/MIN/{1.73_M2}
GLUCOSE SERPL-MCNC: 94 MG/DL (ref 70–99)
HCT VFR BLD AUTO: 48.3 % (ref 40–53)
HGB BLD-MCNC: 16.4 G/DL (ref 13.3–17.7)
IMM GRANULOCYTES # BLD: 0 10E9/L (ref 0–0.4)
IMM GRANULOCYTES NFR BLD: 0.2 %
LYMPHOCYTES # BLD AUTO: 2.6 10E9/L (ref 0.8–5.3)
LYMPHOCYTES NFR BLD AUTO: 23.9 %
MCH RBC QN AUTO: 29.9 PG (ref 26.5–33)
MCHC RBC AUTO-ENTMCNC: 34 G/DL (ref 31.5–36.5)
MCV RBC AUTO: 88 FL (ref 78–100)
MONOCYTES # BLD AUTO: 0.4 10E9/L (ref 0–1.3)
MONOCYTES NFR BLD AUTO: 3.9 %
NEUTROPHILS # BLD AUTO: 7.6 10E9/L (ref 1.6–8.3)
NEUTROPHILS NFR BLD AUTO: 71.5 %
NRBC # BLD AUTO: 0 10*3/UL
NRBC BLD AUTO-RTO: 0 /100
PLATELET # BLD AUTO: 344 10E9/L (ref 150–450)
POTASSIUM SERPL-SCNC: 4 MMOL/L (ref 3.4–5.3)
PROT SERPL-MCNC: 8.2 G/DL (ref 6.8–8.8)
RBC # BLD AUTO: 5.48 10E12/L (ref 4.4–5.9)
SODIUM SERPL-SCNC: 142 MMOL/L (ref 133–144)
WBC # BLD AUTO: 10.7 10E9/L (ref 4–11)

## 2020-03-25 PROCEDURE — 96360 HYDRATION IV INFUSION INIT: CPT | Performed by: EMERGENCY MEDICINE

## 2020-03-25 PROCEDURE — 96361 HYDRATE IV INFUSION ADD-ON: CPT | Performed by: FAMILY MEDICINE

## 2020-03-25 PROCEDURE — 80053 COMPREHEN METABOLIC PANEL: CPT | Performed by: FAMILY MEDICINE

## 2020-03-25 PROCEDURE — 99284 EMERGENCY DEPT VISIT MOD MDM: CPT | Mod: 25 | Performed by: EMERGENCY MEDICINE

## 2020-03-25 PROCEDURE — 99285 EMERGENCY DEPT VISIT HI MDM: CPT | Mod: Z6 | Performed by: EMERGENCY MEDICINE

## 2020-03-25 PROCEDURE — 85025 COMPLETE CBC W/AUTO DIFF WBC: CPT | Performed by: FAMILY MEDICINE

## 2020-03-25 PROCEDURE — 80320 DRUG SCREEN QUANTALCOHOLS: CPT | Performed by: FAMILY MEDICINE

## 2020-03-25 PROCEDURE — 99284 EMERGENCY DEPT VISIT MOD MDM: CPT | Mod: 25 | Performed by: FAMILY MEDICINE

## 2020-03-25 PROCEDURE — 25800030 ZZH RX IP 258 OP 636

## 2020-03-25 PROCEDURE — 96361 HYDRATE IV INFUSION ADD-ON: CPT | Performed by: EMERGENCY MEDICINE

## 2020-03-25 PROCEDURE — 96360 HYDRATION IV INFUSION INIT: CPT | Performed by: FAMILY MEDICINE

## 2020-03-25 RX ORDER — SODIUM CHLORIDE 9 MG/ML
INJECTION, SOLUTION INTRAVENOUS
Status: DISCONTINUED
Start: 2020-03-25 | End: 2020-03-25 | Stop reason: HOSPADM

## 2020-03-25 RX ORDER — SODIUM CHLORIDE 9 MG/ML
1000 INJECTION, SOLUTION INTRAVENOUS CONTINUOUS
Status: DISCONTINUED | OUTPATIENT
Start: 2020-03-25 | End: 2020-03-25 | Stop reason: HOSPADM

## 2020-03-25 RX ADMIN — SODIUM CHLORIDE 1000 ML: 9 INJECTION, SOLUTION INTRAVENOUS at 11:45

## 2020-03-25 RX ADMIN — Medication 1000 ML: at 11:45

## 2020-03-25 NOTE — ED PROVIDER NOTES
[facility] EMERGENCY DEPARTMENT ([facility])    3/25/20       History     Chief Complaint   Patient presents with     Drug / Alcohol Assessment     The history is provided by a parent and medical records. The history is limited by the condition of the patient (Intoxicated).     Nikhil Rios is a 34 year old male with a medical history significant for alcohol abuse who presents to the Emergency Department for evaluation of alcohol intoxication.  We were unable to obtain any history from the patient due to the level of intoxication.  The mother dropped the patient off and did tell the nurse that the patient passed out while in the car.  There is no concern for trauma according to the mother.    Per review of patient's chart, patient was recently at [facility] Emergency Department on 3/23/2024 acute alcohol intoxication.  At that time, it was documented that the patient had been sober for 69 days and relapsed the day prior to this Emergency Department visit, patient had drank 1 L of liquor.  The patient became uncontrollable at home and mother called 911 at that time.  Patient's blood alcohol level at that time was 501.    I have reviewed the Medications, Allergies, Past Medical and Surgical History, and Social History in the [employer] system.  PAST MEDICAL HISTORY:   Past Medical History:   Diagnosis Date     Hypertension      Substance abuse (H)        PAST SURGICAL HISTORY: No past surgical history on file.    Past medical history, past surgical history, medications, and allergies were reviewed with the patient. Additional pertinent items: None    FAMILY HISTORY: No family history on file.    SOCIAL HISTORY:   Social History     Tobacco Use     Smoking status: Former Smoker     Packs/day: 0.25     Smokeless tobacco: Never Used   Substance Use Topics     Alcohol use: Yes     Comment: 750ml daily of vodka     Social history was reviewed with the patient. Additional pertinent items: None      Patient's Medications    New Prescriptions    No medications on file   Previous Medications    ACAMPROSATE (CAMPRAL) 333 MG EC TABLET    Take 666 mg by mouth 3 times daily Provider Dr. Nuñez from     AMLODIPINE (NORVASC) 10 MG TABLET    Take 1 tablet (10 mg) by mouth daily    ASPIRIN (ASA) 81 MG CHEWABLE TABLET    Take 1 tablet (81 mg) by mouth daily    CLONIDINE (CATAPRES) 0.1 MG TABLET    Take 1 tablet (0.1 mg) by mouth 2 times daily    ESCITALOPRAM (LEXAPRO) 10 MG TABLET    Take 1 tablet (10 mg) by mouth daily    GABAPENTIN (NEURONTIN) 300 MG CAPSULE    Take 1 capsule (300 mg) by mouth 3 times daily    HYDROXYZINE (ATARAX) 25 MG TABLET    Take 1 tablet (25 mg) by mouth every 4 hours as needed for anxiety    HYPROMELLOSE-DEXTRAN (HYPROMELLOSE-DEXTRAN 0.3-0.1%) 0.1-0.3 % OPHTHALMIC SOLUTION    Place 1 drop into the right eye daily as needed for dry eyes    METOPROLOL TARTRATE (LOPRESSOR) 50 MG TABLET    Take 1 tablet (50 mg) by mouth 2 times daily    MULTIVITAMIN W/MINERALS (THERA-VIT-M) TABLET    Take 1 tablet by mouth daily    NALTREXONE (DEPADE/REVIA) 50 MG TABLET    Take 1 tablet (50 mg) by mouth daily    OMEPRAZOLE (PRILOSEC) 20 MG DR CAPSULE    Take 1 capsule (20 mg) by mouth 2 times daily (before meals)    SODIUM CHLORIDE (OCEAN) 0.65 % NASAL SPRAY    Spray 1 spray into both nostrils every hour as needed for congestion    TRAZODONE (DESYREL) 50 MG TABLET    Take 1 tablet (50 mg) by mouth At Bedtime    VITAMIN B1 (THIAMINE) 100 MG TABLET    Take 1 tablet (100 mg) by mouth daily   Modified Medications    No medications on file   Discontinued Medications    No medications on file          Allergies   Allergen Reactions     Pollen Extract Rash     grass tree pollen        Review of Systems   Unable to perform ROS: Mental status change (Intoxicated)       Physical Exam   BP: 125/87  Pulse: 85  Temp: 98.1  F (36.7  C)  Resp: 16  Weight: 108 kg (238 lb)  SpO2: 98 %      Physical Exam  Vitals signs and nursing note reviewed.    Constitutional:       General: He is not in acute distress.     Appearance: He is well-developed.   HENT:      Head: Normocephalic.   Eyes:      Extraocular Movements: Extraocular movements intact.   Neck:      Musculoskeletal: Neck supple.   Cardiovascular:      Rate and Rhythm: Normal rate.   Pulmonary:      Effort: No respiratory distress.      Breath sounds: Normal breath sounds.   Musculoskeletal:         General: No deformity.   Skin:     General: Skin is dry.   Neurological:      Mental Status: He is alert.      Comments: Patient appears very intoxicated, makes eye contact not answering questions   Psychiatric:         Behavior: Behavior normal.         ED Course   12:30 PM  The patient was seen and examined by Silas Dick DO in Room ED10.        Procedures           Results for orders placed or performed during the hospital encounter of 03/25/20 (from the past 24 hour(s))   Comprehensive metabolic panel   Result Value Ref Range    Sodium 142 133 - 144 mmol/L    Potassium 4.0 3.4 - 5.3 mmol/L    Chloride 105 94 - 109 mmol/L    Carbon Dioxide 24 20 - 32 mmol/L    Anion Gap 13 3 - 14 mmol/L    Glucose 94 70 - 99 mg/dL    Urea Nitrogen 12 7 - 30 mg/dL    Creatinine 0.81 0.66 - 1.25 mg/dL    GFR Estimate >90 >60 mL/min/[1.73_m2]    GFR Estimate If Black >90 >60 mL/min/[1.73_m2]    Calcium 9.0 8.5 - 10.1 mg/dL    Bilirubin Total 0.3 0.2 - 1.3 mg/dL    Albumin 4.6 3.4 - 5.0 g/dL    Protein Total 8.2 6.8 - 8.8 g/dL    Alkaline Phosphatase 86 40 - 150 U/L    ALT 28 0 - 70 U/L    AST 41 0 - 45 U/L   CBC with platelets differential   Result Value Ref Range    WBC 10.7 4.0 - 11.0 10e9/L    RBC Count 5.48 4.4 - 5.9 10e12/L    Hemoglobin 16.4 13.3 - 17.7 g/dL    Hematocrit 48.3 40.0 - 53.0 %    MCV 88 78 - 100 fl    MCH 29.9 26.5 - 33.0 pg    MCHC 34.0 31.5 - 36.5 g/dL    RDW 12.9 10.0 - 15.0 %    Platelet Count 344 150 - 450 10e9/L    Diff Method Automated Method     % Neutrophils 71.5 %    % Lymphocytes 23.9 %     % Monocytes 3.9 %    % Eosinophils 0.2 %    % Basophils 0.3 %    % Immature Granulocytes 0.2 %    Nucleated RBCs 0 0 /100    Absolute Neutrophil 7.6 1.6 - 8.3 10e9/L    Absolute Lymphocytes 2.6 0.8 - 5.3 10e9/L    Absolute Monocytes 0.4 0.0 - 1.3 10e9/L    Absolute Eosinophils 0.0 0.0 - 0.7 10e9/L    Absolute Basophils 0.0 0.0 - 0.2 10e9/L    Abs Immature Granulocytes 0.0 0 - 0.4 10e9/L    Absolute Nucleated RBC 0.0    Alcohol ethyl   Result Value Ref Range    Ethanol g/dL 0.48 (HH) <0.01 g/dL     Medications   0.9% sodium chloride BOLUS (0 mLs Intravenous Stopped 3/25/20 1610)     Followed by   sodium chloride 0.9% infusion (has no administration in time range)             Assessments & Plan (with Medical Decision Making)   34-year-old male presents to us with a chief complaint of alcohol intoxication.  His mother reports he needs to get into treatment.  He is very intoxicated here and is not communicating with us or providing history.  There are no external signs of trauma.  Vital signs are normal and patient does make eye contact but then go back to sleep.  We will observe him until he becomes more alert and is communicating with us at which point we will see if he wants to go into the detox unit.      Patient reevaluated approximately 5 and half hours after arrival.  He is now communicating and speaking to me.  He states that he does not want to go to detox.  He still appears quite intoxicated so we will continue to observe him until he is clinically sober at which point he can be discharged.  Patient's care will be signed out to evening provider pending clinical sobriety.      I have reviewed the nursing notes.    I have reviewed the findings, diagnosis, plan and need for follow up with the patient.    New Prescriptions    No medications on file       Final diagnoses:   Alcohol dependence with uncomplicated intoxication (H)     I, Emiliano Mcallister, am serving as a trained medical scribe to document services  personally performed by Silas Dick DO, based on the provider's statements to me.     I, Silas Dick DO, was physically present and have reviewed and verified the accuracy of this note documented by Emiliano Mcallister.    3/25/2020   [facility], [place], EMERGENCY DEPARTMENT     Silas Dick DO  03/25/20 4961

## 2020-03-25 NOTE — ED AVS SNAPSHOT
[facility], [place], Emergency Department  [address]  [facility] [place] [address]  Phone:  [phone]  Fax:  [phone]                                    Nikhil Rios   MRN: [id number]    Department:  [facility], Emergency Department   Date of Visit:  3/25/2020           After Visit Summary Signature Page    I have received my discharge instructions, and my questions have been answered. I have discussed any challenges I see with this plan with the nurse or doctor.    ..........................................................................................................................................  Patient/Patient Representative Signature      ..........................................................................................................................................  Patient Representative Print Name and Relationship to Patient    ..................................................               ................................................  Date                                   Time    ..........................................................................................................................................  Reviewed by Signature/Title    ...................................................              ..............................................  Date                                               Time          22EPIC Rev 08/18

## 2020-03-25 NOTE — ED TRIAGE NOTES
Pt was brought in by his mother and had to be extracated from the car due to the Pt being to intoxicated to ambulate.  Pt was placed in w/c and brought back to room 10.  Pt responds to pain by grimacing and flinching. HOB elevated slightly.  IV placed and labs obtained.  IV fluid infusing.

## 2020-03-25 NOTE — DISCHARGE INSTRUCTIONS
Follow-up with your primary care provider.  Return to the emergency department as needed for any new or worsening symptoms.    -If you change your mind about detox please contact a Detox Center as soon as can be arranged:   --- [facility] (here): [phone]  --- [address] [place] [address]: [phone]  --- [facility]: [phone] (They do not use suboxone or methadone)  --- [place] [facility]: [phone] (They do not use suboxone or methadone)

## 2020-03-26 ENCOUNTER — HOSPITAL ENCOUNTER (INPATIENT)
Facility: CLINIC | Age: 35
LOS: 2 days | Discharge: HOME | DRG: 897 | End: 2020-03-28
Attending: EMERGENCY MEDICINE | Admitting: PSYCHIATRY & NEUROLOGY
Payer: COMMERCIAL

## 2020-03-26 ENCOUNTER — TELEPHONE (OUTPATIENT)
Dept: BEHAVIORAL HEALTH | Facility: CLINIC | Age: 35
End: 2020-03-26

## 2020-03-26 DIAGNOSIS — F10.239 ALCOHOL DEPENDENCE WITH WITHDRAWAL WITH COMPLICATION (H): Primary | ICD-10-CM

## 2020-03-26 DIAGNOSIS — F10.220 ALCOHOL DEPENDENCE WITH UNCOMPLICATED INTOXICATION (H): ICD-10-CM

## 2020-03-26 PROBLEM — F19.20 CHEMICAL DEPENDENCY (H): Status: ACTIVE | Noted: 2019-06-14

## 2020-03-26 LAB
ALBUMIN SERPL-MCNC: 4.2 G/DL (ref 3.4–5)
ALCOHOL BREATH TEST: 0.21 (ref 0–0.01)
ALCOHOL BREATH TEST: 0.36 (ref 0–0.01)
ALP SERPL-CCNC: 81 U/L (ref 40–150)
ALT SERPL W P-5'-P-CCNC: 28 U/L (ref 0–70)
AMPHETAMINES UR QL SCN: NEGATIVE
ANION GAP SERPL CALCULATED.3IONS-SCNC: 11 MMOL/L (ref 3–14)
AST SERPL W P-5'-P-CCNC: 44 U/L (ref 0–45)
BARBITURATES UR QL: NEGATIVE
BASOPHILS # BLD AUTO: 0 10E9/L (ref 0–0.2)
BASOPHILS NFR BLD AUTO: 0.4 %
BENZODIAZ UR QL: NEGATIVE
BILIRUB SERPL-MCNC: 0.2 MG/DL (ref 0.2–1.3)
BUN SERPL-MCNC: 12 MG/DL (ref 7–30)
CALCIUM SERPL-MCNC: 9 MG/DL (ref 8.5–10.1)
CANNABINOIDS UR QL SCN: NEGATIVE
CHLORIDE SERPL-SCNC: 107 MMOL/L (ref 94–109)
CO2 SERPL-SCNC: 28 MMOL/L (ref 20–32)
COCAINE UR QL: NEGATIVE
CREAT SERPL-MCNC: 0.83 MG/DL (ref 0.66–1.25)
DIFFERENTIAL METHOD BLD: NORMAL
EOSINOPHIL # BLD AUTO: 0 10E9/L (ref 0–0.7)
EOSINOPHIL NFR BLD AUTO: 0.1 %
ERYTHROCYTE [DISTWIDTH] IN BLOOD BY AUTOMATED COUNT: 12.7 % (ref 10–15)
ETHANOL UR QL SCN: POSITIVE
GFR SERPL CREATININE-BSD FRML MDRD: >90 ML/MIN/{1.73_M2}
GLUCOSE SERPL-MCNC: 91 MG/DL (ref 70–99)
HCT VFR BLD AUTO: 44.8 % (ref 40–53)
HGB BLD-MCNC: 15.7 G/DL (ref 13.3–17.7)
IMM GRANULOCYTES # BLD: 0 10E9/L (ref 0–0.4)
IMM GRANULOCYTES NFR BLD: 0.1 %
LYMPHOCYTES # BLD AUTO: 3.1 10E9/L (ref 0.8–5.3)
LYMPHOCYTES NFR BLD AUTO: 34.4 %
MCH RBC QN AUTO: 30.4 PG (ref 26.5–33)
MCHC RBC AUTO-ENTMCNC: 35 G/DL (ref 31.5–36.5)
MCV RBC AUTO: 87 FL (ref 78–100)
MONOCYTES # BLD AUTO: 0.3 10E9/L (ref 0–1.3)
MONOCYTES NFR BLD AUTO: 2.9 %
NEUTROPHILS # BLD AUTO: 5.7 10E9/L (ref 1.6–8.3)
NEUTROPHILS NFR BLD AUTO: 62.1 %
NRBC # BLD AUTO: 0 10*3/UL
NRBC BLD AUTO-RTO: 0 /100
OPIATES UR QL SCN: NEGATIVE
PLATELET # BLD AUTO: 317 10E9/L (ref 150–450)
POTASSIUM SERPL-SCNC: 4.1 MMOL/L (ref 3.4–5.3)
PROT SERPL-MCNC: 7.7 G/DL (ref 6.8–8.8)
RBC # BLD AUTO: 5.17 10E12/L (ref 4.4–5.9)
SODIUM SERPL-SCNC: 146 MMOL/L (ref 133–144)
WBC # BLD AUTO: 9.1 10E9/L (ref 4–11)

## 2020-03-26 PROCEDURE — 80053 COMPREHEN METABOLIC PANEL: CPT | Performed by: EMERGENCY MEDICINE

## 2020-03-26 PROCEDURE — 25000132 ZZH RX MED GY IP 250 OP 250 PS 637: Performed by: EMERGENCY MEDICINE

## 2020-03-26 PROCEDURE — HZ2ZZZZ DETOXIFICATION SERVICES FOR SUBSTANCE ABUSE TREATMENT: ICD-10-PCS | Performed by: PSYCHIATRY & NEUROLOGY

## 2020-03-26 PROCEDURE — 25000132 ZZH RX MED GY IP 250 OP 250 PS 637: Performed by: NURSE PRACTITIONER

## 2020-03-26 PROCEDURE — 80320 DRUG SCREEN QUANTALCOHOLS: CPT | Performed by: FAMILY MEDICINE

## 2020-03-26 PROCEDURE — 12800008 ZZH R&B CD ADULT

## 2020-03-26 PROCEDURE — 82075 ASSAY OF BREATH ETHANOL: CPT | Mod: 91 | Performed by: EMERGENCY MEDICINE

## 2020-03-26 PROCEDURE — 80307 DRUG TEST PRSMV CHEM ANLYZR: CPT | Performed by: FAMILY MEDICINE

## 2020-03-26 PROCEDURE — 99284 EMERGENCY DEPT VISIT MOD MDM: CPT | Mod: Z6 | Performed by: EMERGENCY MEDICINE

## 2020-03-26 PROCEDURE — 36415 COLL VENOUS BLD VENIPUNCTURE: CPT | Performed by: EMERGENCY MEDICINE

## 2020-03-26 PROCEDURE — 99285 EMERGENCY DEPT VISIT HI MDM: CPT | Mod: 25 | Performed by: EMERGENCY MEDICINE

## 2020-03-26 PROCEDURE — 82075 ASSAY OF BREATH ETHANOL: CPT | Performed by: EMERGENCY MEDICINE

## 2020-03-26 PROCEDURE — 85025 COMPLETE CBC W/AUTO DIFF WBC: CPT | Performed by: EMERGENCY MEDICINE

## 2020-03-26 RX ORDER — BISACODYL 10 MG
10 SUPPOSITORY, RECTAL RECTAL DAILY PRN
Status: DISCONTINUED | OUTPATIENT
Start: 2020-03-26 | End: 2020-03-28 | Stop reason: HOSPADM

## 2020-03-26 RX ORDER — HYDROXYZINE HYDROCHLORIDE 25 MG/1
25 TABLET, FILM COATED ORAL EVERY 4 HOURS PRN
Status: DISCONTINUED | OUTPATIENT
Start: 2020-03-26 | End: 2020-03-28 | Stop reason: HOSPADM

## 2020-03-26 RX ORDER — ATENOLOL 50 MG/1
50 TABLET ORAL DAILY PRN
Status: DISCONTINUED | OUTPATIENT
Start: 2020-03-26 | End: 2020-03-28

## 2020-03-26 RX ORDER — TRAZODONE HYDROCHLORIDE 50 MG/1
50 TABLET, FILM COATED ORAL AT BEDTIME
Status: DISCONTINUED | OUTPATIENT
Start: 2020-03-26 | End: 2020-03-28 | Stop reason: HOSPADM

## 2020-03-26 RX ORDER — FOLIC ACID 1 MG/1
1 TABLET ORAL DAILY
Status: DISCONTINUED | OUTPATIENT
Start: 2020-03-27 | End: 2020-03-28 | Stop reason: HOSPADM

## 2020-03-26 RX ORDER — GABAPENTIN 300 MG/1
300 CAPSULE ORAL 3 TIMES DAILY
Status: DISCONTINUED | OUTPATIENT
Start: 2020-03-26 | End: 2020-03-28 | Stop reason: HOSPADM

## 2020-03-26 RX ORDER — DIAZEPAM 5 MG
5 TABLET ORAL ONCE
Status: COMPLETED | OUTPATIENT
Start: 2020-03-26 | End: 2020-03-26

## 2020-03-26 RX ORDER — MULTIPLE VITAMINS W/ MINERALS TAB 9MG-400MCG
1 TAB ORAL DAILY
Status: DISCONTINUED | OUTPATIENT
Start: 2020-03-27 | End: 2020-03-28 | Stop reason: HOSPADM

## 2020-03-26 RX ORDER — DIAZEPAM 5 MG
5-20 TABLET ORAL EVERY 30 MIN PRN
Status: DISCONTINUED | OUTPATIENT
Start: 2020-03-26 | End: 2020-03-28

## 2020-03-26 RX ORDER — ONDANSETRON 4 MG/1
4 TABLET, ORALLY DISINTEGRATING ORAL EVERY 6 HOURS PRN
Status: DISCONTINUED | OUTPATIENT
Start: 2020-03-26 | End: 2020-03-28 | Stop reason: HOSPADM

## 2020-03-26 RX ORDER — LOPERAMIDE HCL 2 MG
4 CAPSULE ORAL 4 TIMES DAILY PRN
Status: DISCONTINUED | OUTPATIENT
Start: 2020-03-26 | End: 2020-03-28 | Stop reason: HOSPADM

## 2020-03-26 RX ORDER — HYDROXYZINE HYDROCHLORIDE 25 MG/1
25 TABLET, FILM COATED ORAL 4 TIMES DAILY PRN
Status: DISCONTINUED | OUTPATIENT
Start: 2020-03-26 | End: 2020-03-26

## 2020-03-26 RX ORDER — ALUMINA, MAGNESIA, AND SIMETHICONE 2400; 2400; 240 MG/30ML; MG/30ML; MG/30ML
30 SUSPENSION ORAL EVERY 4 HOURS PRN
Status: DISCONTINUED | OUTPATIENT
Start: 2020-03-26 | End: 2020-03-28 | Stop reason: HOSPADM

## 2020-03-26 RX ORDER — ACETAMINOPHEN 325 MG/1
650 TABLET ORAL EVERY 4 HOURS PRN
Status: DISCONTINUED | OUTPATIENT
Start: 2020-03-26 | End: 2020-03-28 | Stop reason: HOSPADM

## 2020-03-26 RX ORDER — LANOLIN ALCOHOL/MO/W.PET/CERES
100 CREAM (GRAM) TOPICAL DAILY
Status: DISCONTINUED | OUTPATIENT
Start: 2020-03-27 | End: 2020-03-28 | Stop reason: HOSPADM

## 2020-03-26 RX ADMIN — HYDROXYZINE HYDROCHLORIDE 25 MG: 25 TABLET ORAL at 15:39

## 2020-03-26 RX ADMIN — DIAZEPAM 5 MG: 5 TABLET ORAL at 16:33

## 2020-03-26 RX ADMIN — GABAPENTIN 300 MG: 300 CAPSULE ORAL at 22:14

## 2020-03-26 RX ADMIN — TRAZODONE HYDROCHLORIDE 50 MG: 50 TABLET ORAL at 22:14

## 2020-03-26 RX ADMIN — HYDROXYZINE HYDROCHLORIDE 25 MG: 25 TABLET ORAL at 12:55

## 2020-03-26 ASSESSMENT — MIFFLIN-ST. JEOR: SCORE: 1933.76

## 2020-03-26 ASSESSMENT — ACTIVITIES OF DAILY LIVING (ADL)
HYGIENE/GROOMING: HANDWASHING;INDEPENDENT
DRESS: STREET CLOTHES;INDEPENDENT
ORAL_HYGIENE: INDEPENDENT

## 2020-03-26 NOTE — ED NOTES
ED to Behavioral Floor Handoff    SITUATION  Nikhil Rios is a 34 year old male who speaks English and lives in a home with family members The patient arrived in the ED by private car from home with a complaint of Alcohol Intoxication (Pt appears highly intoxicated, requesting detox, dropped off by his mother. Pt denies history of seizures. Unable to state how much he's been drinking. )  .The patient's current symptoms started/worsened 2 week(s) ago and during this time the symptoms have increased.   In the ED, pt was diagnosed with   Final diagnoses:   None        Initial vitals were: BP: 130/86  Pulse: 82  Temp: 97.7  F (36.5  C)  Resp: 16  SpO2: 94 %   --------  Is the patient diabetic? No   If yes, last blood glucose? --     If yes, was this treated in the ED? --  --------  Is the patient inebriated (ETOH) Yes or Impaired on other substances? No  MSSA done? Yes  Last MSSA score: --    Were withdrawal symptoms treated? N/A  Does the patient have a seizure history? No. If yes, date of most recent seizure--  --------  Is the patient patient experiencing suicidal ideation? denies current or recent suicidal ideation     Homicidal ideation? denies current or recent homicidal ideation or behaviors.    Self-injurious behavior/urges? denies current or recent self injurious behavior or ideation.  ------  Was pt aggressive in the ED No  Was a code called No  Is the pt now cooperative? Yes  -------  Meds given in ED:   Medications   hydrOXYzine (ATARAX) tablet 25 mg (25 mg Oral Given 3/26/20 1255)      Family present during ED course? No  Family currently present? No    BACKGROUND  Does the patient have a cognitive impairment or developmental disability? No  Allergies:   Allergies   Allergen Reactions     Pollen Extract Rash     grass tree pollen   .   Social demographics are   Social History     Socioeconomic History     Marital status: Single     Spouse name: None     Number of children: None     Years of education: None      Highest education level: None   Occupational History     None   Social Needs     Financial resource strain: None     Food insecurity     Worry: None     Inability: None     Transportation needs     Medical: None     Non-medical: None   Tobacco Use     Smoking status: Former Smoker     Packs/day: 0.25     Smokeless tobacco: Never Used   Substance and Sexual Activity     Alcohol use: Yes     Comment: 750ml daily of vodka     Drug use: No     Sexual activity: None   Lifestyle     Physical activity     Days per week: None     Minutes per session: None     Stress: None   Relationships     Social connections     Talks on phone: None     Gets together: None     Attends [employer] service: None     Active member of club or organization: None     Attends meetings of clubs or organizations: None     Relationship status: None     Intimate partner violence     Fear of current or ex partner: None     Emotionally abused: None     Physically abused: None     Forced sexual activity: None   Other Topics Concern     None   Social History Narrative     None        ASSESSMENT  Labs results   Labs Ordered and Resulted from Time of ED Arrival Up to the Time of Departure from the ED   ALCOHOL BREATH TEST POCT - Abnormal; Notable for the following components:       Result Value    Alcohol Breath Test 0.364 (*)     All other components within normal limits   DRUG ABUSE SCREEN 6 CHEM DEP URINE ([facility])      Imaging Studies: No results found for this or any previous visit (from the past 24 hour(s)).   Most recent vital signs /86   Pulse 87   Temp 98.2  F (36.8  C) (Oral)   Resp 16   SpO2 99%    Abnormal labs/tests/findings requiring intervention:---   Pain control: pt had none  Nausea control: pt had none    RECOMMENDATION  Are any infection precautions needed (MRSA, VRE, etc.)? No If yes, what infection? --  ---  Does the patient have mobility issues? independently. If yes, what device does the pt use? ---  ---  Is patient on 72  hour hold or commitment? No If on 72 hour hold, have hold and rights been given to patient? N/A  Are admitting orders written if after 10 p.m. ?N/A  Tasks needing to be completed:---     TATIANNA SANON, SARI    [phone] [facility]   [phone] [facility]

## 2020-03-26 NOTE — ED PROVIDER NOTES
"    [place] EMERGENCY DEPARTMENT ([facility])  3/26/20    History     Chief Complaint   Patient presents with     Alcohol Intoxication     Pt appears highly intoxicated, requesting detox, dropped off by his mother. Pt denies history of seizures. Unable to state how much he's been drinking.      The history is provided by the patient and medical records. The history is limited by the condition of the patient (Alcohol intoxication).     Nikhil Rios is a 34 year old male with a past medical history significant for alcohol abuse who presents here to the Emergency Department due to alcohol intoxication. Patient reports that he is \"not good\". Reports that his mother dropped him off. Patient doesn't want detox. Denies other drug use. Patient appears highly intoxicated.    Per chart review patient was seen here yesterday in the ED intoxicated. Mother reported that he needed to get into treatment. Patient was very intoxicated on arrival and was unable to communicate or provide history. There was no signs of trauma. After 5 1/2 hours patient was reevaluated and began communicating and speaking. He didn't want to go to detox at that time. He was discharged when clinically sober.    I have reviewed the Medications, Allergies, Past Medical and Surgical History, and Social History in the [employer] system.    PAST MEDICAL HISTORY:   Past Medical History:   Diagnosis Date     Hypertension      Substance abuse (H)        PAST SURGICAL HISTORY: History reviewed. No pertinent surgical history.    Past medical history, past surgical history, medications, and allergies were reviewed with the patient. Additional pertinent items: None    FAMILY HISTORY: History reviewed. No pertinent family history.    SOCIAL HISTORY:   Social History     Tobacco Use     Smoking status: Former Smoker     Packs/day: 0.25     Smokeless tobacco: Never Used   Substance Use Topics     Alcohol use: Yes     Comment: 750ml daily of vodka     Social history " was reviewed with the patient. Additional pertinent items: None      Patient's Medications   New Prescriptions    No medications on file   Previous Medications    ACAMPROSATE (CAMPRAL) 333 MG EC TABLET    Take 666 mg by mouth 3 times daily Provider Dr. Nuñez from     AMLODIPINE (NORVASC) 10 MG TABLET    Take 1 tablet (10 mg) by mouth daily    ASPIRIN (ASA) 81 MG CHEWABLE TABLET    Take 1 tablet (81 mg) by mouth daily    CLONIDINE (CATAPRES) 0.1 MG TABLET    Take 1 tablet (0.1 mg) by mouth 2 times daily    ESCITALOPRAM (LEXAPRO) 10 MG TABLET    Take 1 tablet (10 mg) by mouth daily    GABAPENTIN (NEURONTIN) 300 MG CAPSULE    Take 1 capsule (300 mg) by mouth 3 times daily    HYDROXYZINE (ATARAX) 25 MG TABLET    Take 1 tablet (25 mg) by mouth every 4 hours as needed for anxiety    HYPROMELLOSE-DEXTRAN (HYPROMELLOSE-DEXTRAN 0.3-0.1%) 0.1-0.3 % OPHTHALMIC SOLUTION    Place 1 drop into the right eye daily as needed for dry eyes    METOPROLOL TARTRATE (LOPRESSOR) 50 MG TABLET    Take 1 tablet (50 mg) by mouth 2 times daily    MULTIVITAMIN W/MINERALS (THERA-VIT-M) TABLET    Take 1 tablet by mouth daily    NALTREXONE (DEPADE/REVIA) 50 MG TABLET    Take 1 tablet (50 mg) by mouth daily    OMEPRAZOLE (PRILOSEC) 20 MG DR CAPSULE    Take 1 capsule (20 mg) by mouth 2 times daily (before meals)    SODIUM CHLORIDE (OCEAN) 0.65 % NASAL SPRAY    Spray 1 spray into both nostrils every hour as needed for congestion    TRAZODONE (DESYREL) 50 MG TABLET    Take 1 tablet (50 mg) by mouth At Bedtime    VITAMIN B1 (THIAMINE) 100 MG TABLET    Take 1 tablet (100 mg) by mouth daily   Modified Medications    No medications on file   Discontinued Medications    No medications on file          Allergies   Allergen Reactions     Pollen Extract Rash     grass tree pollen        Review of Systems   Unable to perform ROS: Mental status change (Alcohol intoxication)       Physical Exam   BP: 130/86  Pulse: 82  Temp: 97.7  F (36.5  C)  Resp: 16  SpO2:  94 %      Physical Exam  Vitals signs and nursing note reviewed.   Constitutional:       General: He is not in acute distress.     Appearance: He is well-developed.   HENT:      Head: Normocephalic.   Eyes:      Extraocular Movements: Extraocular movements intact.   Neck:      Musculoskeletal: Neck supple.   Pulmonary:      Effort: No respiratory distress.   Musculoskeletal:         General: No deformity.   Skin:     General: Skin is dry.   Neurological:      Mental Status: He is alert.      Comments: Oriented, slurred speech consistent with intoxication   Psychiatric:         Behavior: Behavior normal.         ED Course   9:31 AM  The patient was seen and examined by Silas Dick DO in Room ED09.        Procedures           Results for orders placed or performed during the hospital encounter of 03/26/20 (from the past 24 hour(s))   Alcohol breath test POCT   Result Value Ref Range    Alcohol Breath Test 0.364 (A) 0.00 - 0.01   CBC with platelets differential   Result Value Ref Range    WBC 9.1 4.0 - 11.0 10e9/L    RBC Count 5.17 4.4 - 5.9 10e12/L    Hemoglobin 15.7 13.3 - 17.7 g/dL    Hematocrit 44.8 40.0 - 53.0 %    MCV 87 78 - 100 fl    MCH 30.4 26.5 - 33.0 pg    MCHC 35.0 31.5 - 36.5 g/dL    RDW 12.7 10.0 - 15.0 %    Platelet Count 317 150 - 450 10e9/L    Diff Method Automated Method     % Neutrophils 62.1 %    % Lymphocytes 34.4 %    % Monocytes 2.9 %    % Eosinophils 0.1 %    % Basophils 0.4 %    % Immature Granulocytes 0.1 %    Nucleated RBCs 0 0 /100    Absolute Neutrophil 5.7 1.6 - 8.3 10e9/L    Absolute Lymphocytes 3.1 0.8 - 5.3 10e9/L    Absolute Monocytes 0.3 0.0 - 1.3 10e9/L    Absolute Eosinophils 0.0 0.0 - 0.7 10e9/L    Absolute Basophils 0.0 0.0 - 0.2 10e9/L    Abs Immature Granulocytes 0.0 0 - 0.4 10e9/L    Absolute Nucleated RBC 0.0    Comprehensive metabolic panel   Result Value Ref Range    Sodium 146 (H) 133 - 144 mmol/L    Potassium 4.1 3.4 - 5.3 mmol/L    Chloride 107 94 - 109 mmol/L     Carbon Dioxide PENDING 20 - 32 mmol/L    Anion Gap PENDING 3 - 14 mmol/L    Glucose PENDING 70 - 99 mg/dL    Urea Nitrogen PENDING 7 - 30 mg/dL    Creatinine PENDING 0.66 - 1.25 mg/dL    GFR Estimate PENDING >60 mL/min/[1.73_m2]    GFR Estimate If Black PENDING >60 mL/min/[1.73_m2]    Calcium PENDING 8.5 - 10.1 mg/dL    Bilirubin Total PENDING 0.2 - 1.3 mg/dL    Albumin PENDING 3.4 - 5.0 g/dL    Protein Total PENDING 6.8 - 8.8 g/dL    Alkaline Phosphatase PENDING 40 - 150 U/L    ALT PENDING 0 - 70 U/L    AST PENDING 0 - 45 U/L     Medications   hydrOXYzine (ATARAX) tablet 25 mg (25 mg Oral Given 3/26/20 1255)             Assessments & Plan (with Medical Decision Making)   34-year-old male presents to us with a chief complaint of alcohol intoxication.  He was quite intoxicated and initially difficult to communicate with.  He was observed for several hours and reassessed.  At this point he is willing to come into the detox unit.  Patient is not actively suicidal or homicidal and has no significant withdrawal symptoms at this time.  Patient does not have a recent history of alcohol withdrawal seizures or DTs.  Patient is medically cleared and clinical report was given to mental health intake.  We do have a bed available on the detox unit.    I have reviewed the nursing notes.    I have reviewed the findings, diagnosis, plan and need for follow up with the patient.    New Prescriptions    No medications on file       Final diagnoses:   Alcohol dependence with uncomplicated intoxication (H)     Sameer HOPSON, am serving as a trained medical scribe to document services personally performed by Silas Dick DO, based on the provider's statements to me.   Silas HOPSON DO, was physically present and have reviewed and verified the accuracy of this note documented by Sameer Butler.    3/26/2020   [facility], [place], EMERGENCY DEPARTMENT     Silas Dick DO  03/26/20 8549

## 2020-03-26 NOTE — TELEPHONE ENCOUNTER
Update:    4:56PM- ED staff called to confirm that Pt ambulates independently w/ a stable gait.  Pt is medically cleared.     Drug screen was positive for alcohol.  Alcohol breath was 0.364.  Labs were normal.     Travel screen was completed on 3/25/20.      5:27PM- [facility] accepted for [facility].  Unit and ED notified. Unit will call ED when ready.      Patient cleared and ready for behavioral bed placement: Yes

## 2020-03-26 NOTE — PHARMACY-ADMISSION MEDICATION HISTORY
Admission medication history for the March 26, 2020 admission is complete.     Interview Sources:  Surescripts, patient, past prescriber notes    Reliability of Source: Moderate   -  Reliable information came from Surescripts and past prescriber notes  - The patient was a poor historian of his medications    Medication Adherence: Not assessed    Current Outpatient Pharmacy: [id number] in [place], [place]    Changes made to PTA medication list (reason)  Added: N/A  Deleted:   - Acamprosate 333 mg EC tab: Take 666 mg PO TID (not taking per patient - last filled 2/10/20 for 30 DS)  - Amlodipine 10 mg tab: Take 1 tab PO every day (Dr. Kennedy per patient - last filled 1/2/20 for 30 DS)  - Aspirin 81 mg chewable: Take 1 tab PO every day (DR. Kennedy per Family Practice note on 3/3/20)  - Clonidine 0.1 mg tab: Take 1 tab PO BID (Dr. Kennedy per patient  - last filled 1/17/20 for 30 DS)  - Escitalopram 10 mg tab: Take 1 tab PO every day (Dr. Kennedy per Family Practice note on 3/3/20)  - Metoprolol tartrate 50 mg tab: Take 1 tab POQD (Dr. Kennedy per Family Practice note on 3/3/20)  - Naltrexone 50 mg tab: Take 1 tab PO every day (not taking per patient)  - Omeprazole 20 mg cap: Take 1 cap PO BID before meals (Not taking per patient - last filled 1/17/20 for 30 DS)  -  Sodium chloride 0.65% nasal spray: Spray 1 spray into both nostrils every hour as needed for congestion (not taking per patient)  - Vitamin B1 (thiamine) 100 mg tab: Take 1 tab PO every day (Dr. Kennedy per Family Practice note on 3/3/20)  Changed: N/A    Additional medication history information:   - Amlodipine: The patient reports that this medication was discontinued because it is only prescribed for his blood pressure when he is going through withdrawal.  - Clonidine: The patient reports experiencing side effects from this medication and this is the reason it was discontinued by the provider.  - Gabapentin: The patient reports only taking one dose today.      Prior to  Admission Medication List:  Prior to Admission medications    Medication Sig Last Dose Taking? Auth Provider   gabapentin (NEURONTIN) 300 MG capsule Take 1 capsule (300 mg) by mouth 3 times daily 3/26/2020 Yes Ava Lew MD   hydrOXYzine (ATARAX) 25 MG tablet Take 1 tablet (25 mg) by mouth every 4 hours as needed for anxiety 3/26/2020 Yes Ava Lew MD   hypromellose-dextran (HYPROMELLOSE-DEXTRAN 0.3-0.1%) 0.1-0.3 % ophthalmic solution Place 1 drop into the right eye daily as needed for dry eyes 3/26/2020 Yes Ava Lew MD   traZODone (DESYREL) 50 MG tablet Take 1 tablet (50 mg) by mouth At Bedtime 3/25/2020 Yes Ava Lew MD       Time spent: 20 minutes    Medication history completed by:   Swati Maldonado - Pharmacy Intern

## 2020-03-26 NOTE — ED NOTES
Pt stated he wanted to leave and contacted his mother after the ED MD stated the Pt could go home with a sober ride. Pt mother arrived and Pt cleared to be discharge.

## 2020-03-26 NOTE — TELEPHONE ENCOUNTER
S: Dr Gipson gave clinical saying pt was bib his mom to er seeking etoh detox.  B: pt was in the er seeking detox yesterday but wanted to leave after he was eval'ed so was discharged. Pt has reportedly been drinking 1 liter of vodka per day for the past few weeks. Pt denies drug use. Utox pendng collection. Breath is .365. No hx of seizures. No chronic med prob's.   A: vol, coop, oriented x3, denies SI, NOT MED CLEARED, NOT WALKING INDEPENDENTLY; at 12:50 pm, Brandan said he expects pt to be cleared in about 2 more hours.   R: Brandan agreed to call intake back once pt is med cleared. kobe

## 2020-03-27 PROBLEM — F10.982 ALCOHOL-INDUCED INSOMNIA (H): Status: ACTIVE | Noted: 2020-03-03

## 2020-03-27 PROBLEM — K85.20 ALCOHOL-INDUCED ACUTE PANCREATITIS: Status: ACTIVE | Noted: 2019-11-28

## 2020-03-27 PROBLEM — F10.20 ALCOHOL USE DISORDER, SEVERE, DEPENDENCE (H): Status: ACTIVE | Noted: 2019-04-15

## 2020-03-27 PROBLEM — J30.9 ALLERGIC RHINITIS: Status: ACTIVE | Noted: 2020-03-27

## 2020-03-27 PROBLEM — F10.939 ALCOHOL WITHDRAWAL SYNDROME (H): Status: ACTIVE | Noted: 2019-03-27

## 2020-03-27 PROCEDURE — 99221 1ST HOSP IP/OBS SF/LOW 40: CPT | Performed by: PHYSICIAN ASSISTANT

## 2020-03-27 PROCEDURE — H2032 ACTIVITY THERAPY, PER 15 MIN: HCPCS

## 2020-03-27 PROCEDURE — 25000132 ZZH RX MED GY IP 250 OP 250 PS 637: Performed by: NURSE PRACTITIONER

## 2020-03-27 PROCEDURE — 99207 ZZC CONSULT E&M CHANGED TO INITIAL LEVEL: CPT | Performed by: PHYSICIAN ASSISTANT

## 2020-03-27 PROCEDURE — 12800008 ZZH R&B CD ADULT

## 2020-03-27 PROCEDURE — 25000132 ZZH RX MED GY IP 250 OP 250 PS 637: Performed by: PSYCHIATRY & NEUROLOGY

## 2020-03-27 RX ORDER — FOLIC ACID 1 MG/1
1 TABLET ORAL DAILY
Qty: 90 TABLET | Refills: 3 | Status: SHIPPED | OUTPATIENT
Start: 2020-03-28 | End: 2020-05-11

## 2020-03-27 RX ORDER — MULTIPLE VITAMINS W/ MINERALS TAB 9MG-400MCG
1 TAB ORAL DAILY
Qty: 90 EACH | Refills: 3 | Status: SHIPPED | OUTPATIENT
Start: 2020-03-28 | End: 2020-05-11

## 2020-03-27 RX ORDER — ACAMPROSATE CALCIUM 333 MG/1
666 TABLET, DELAYED RELEASE ORAL 3 TIMES DAILY
Qty: 180 TABLET | Refills: 3 | Status: ON HOLD | OUTPATIENT
Start: 2020-03-27 | End: 2020-10-25

## 2020-03-27 RX ORDER — ACAMPROSATE CALCIUM 333 MG/1
666 TABLET, DELAYED RELEASE ORAL 3 TIMES DAILY
Status: DISCONTINUED | OUTPATIENT
Start: 2020-03-27 | End: 2020-03-28 | Stop reason: HOSPADM

## 2020-03-27 RX ORDER — LANOLIN ALCOHOL/MO/W.PET/CERES
100 CREAM (GRAM) TOPICAL DAILY
Qty: 90 TABLET | Refills: 3 | Status: SHIPPED | OUTPATIENT
Start: 2020-03-28 | End: 2020-05-11

## 2020-03-27 RX ADMIN — GABAPENTIN 300 MG: 300 CAPSULE ORAL at 20:15

## 2020-03-27 RX ADMIN — GABAPENTIN 300 MG: 300 CAPSULE ORAL at 09:21

## 2020-03-27 RX ADMIN — GABAPENTIN 300 MG: 300 CAPSULE ORAL at 14:04

## 2020-03-27 RX ADMIN — MULTIPLE VITAMINS W/ MINERALS TAB 1 TABLET: TAB at 09:21

## 2020-03-27 RX ADMIN — HYDROXYZINE HYDROCHLORIDE 25 MG: 25 TABLET ORAL at 04:25

## 2020-03-27 RX ADMIN — THIAMINE HCL TAB 100 MG 100 MG: 100 TAB at 09:21

## 2020-03-27 RX ADMIN — HYDROXYZINE HYDROCHLORIDE 25 MG: 25 TABLET ORAL at 20:15

## 2020-03-27 RX ADMIN — ACAMPROSATE CALCIUM ENTERIC-COATED 666 MG: 333 TABLET, DELAYED RELEASE ORAL at 14:04

## 2020-03-27 RX ADMIN — FOLIC ACID 1 MG: 1 TABLET ORAL at 09:21

## 2020-03-27 RX ADMIN — ACAMPROSATE CALCIUM ENTERIC-COATED 666 MG: 333 TABLET, DELAYED RELEASE ORAL at 20:15

## 2020-03-27 RX ADMIN — TRAZODONE HYDROCHLORIDE 50 MG: 50 TABLET ORAL at 21:52

## 2020-03-27 ASSESSMENT — ACTIVITIES OF DAILY LIVING (ADL)
HYGIENE/GROOMING: INDEPENDENT
ORAL_HYGIENE: INDEPENDENT

## 2020-03-27 NOTE — PLAN OF CARE
Behavioral Team Discussion: (3/27/2020)    Continued Stay Criteria/Rationale: Patient admitted for alcohol withdrawal and alcohol Use Disorder.  Plan: The following services will be provided to the patient; psychiatric assessment, medication management, therapeutic milieu, individual and group support, and skills groups.   Participants: 3A Provider: Dr. Daniel Sue MD; 3A RN's: Saurabh Gamboa RN; 3A CM's: Shelley Catalan .  Summary/Recommendation: Providers will assess today for treatment recommendations, discharge planning, and aftercare plans. CM will meet with pt for discharge planning.   Medical/Physical: none noted  Precautions:   Behavioral Orders   Procedures     Code 1 - Restrict to Unit     Routine Programming     As clinically indicated     Status 15     Every 15 minutes.     Withdrawal precautions     Rationale for change in precautions or plan: N/A  Progress: No Change.

## 2020-03-27 NOTE — PROGRESS NOTES
Met with Pt to initiate discharge planning.  Pt is declining assessment and referral to treatment.  Pt states AA was working well for him and he attends sometimes 2 meetings a day.  Pt is hoping for discharge as soon as possible.

## 2020-03-27 NOTE — PROGRESS NOTES
Pt report from [facility]        Nikhil Rios, 34M Powered by   Narx Report   Resources  Date: 3/27/2020   Download PDF   Nikhil Rios          Risk Indicators   NARX SCORES   Narcotic   040   Sedative   070   Stimulant   000   Explanation and Guidance   OVERDOSE RISK SCORE   150   (Range 000-999)   Explanation and Guidance   ADDITIONAL RISK INDICATORS ( 0 )   Explanation and Guidance   This NarxCare report is based on search criteria supplied and the data entered by the dispensing pharmacy. For more information about any prescription, please contact the dispensing pharmacy or the prescriber. NarxCare scores and reports are intended to aid, not replace, medical decision making. None of the information presented should be used as sole justification for providing or refusing to provide medications. The information on this report is not warranted as accurate or complete.   Graphs   RX GRAPH   Narcotic Buprenorphine Sedative Stimulant Other   All PrescribersPrescribers6 - Ava Lew5 - Daisy Jj4 - Génesis Le, M3 - Aayush Campos2 - Jami Delacruz MD1 - Daisy AgudelooTimeline03/[id number]     Buprenorphine mg   [id number]   Morphine MgEq (MME)   [id number]   Lorazepam MgEq (LME)   [id number]   *Per CDC guidance, the MME conversion factors prescribed or provided as part of the medication-assisted treatment for opioid use disorder should not be used to benchmark against dosage thresholds meant for opioids prescribed for pain. Buprenorphine products have no agreed upon morphine equivalency, and as partial opioid agonists, are not expected to be associated with overdose risk in the same dose-dependent manner as doses for full agonist opioids. MME = morphine milligram equivalents. LME = Lorazepam milligram equivalents. mg = dose in milligrams.   Summary   Summary   Total Prescriptions: 6   Total Prescribers: 6   Total Pharmacies: 2   Narcotics*   (excluding  buprenorphine)  Current Qty: 0   Current MME/day: 0.00   30 Day Avg MME/day: 0.00   Sedatives*   Current Qty: 0   Current LME/day: 0.00   30 Day Avg LME/day: 0.00   Buprenorphine*   Current Qty: 0   Current mg/day: 0.00   30 Day Avg mg/day: 0.00   Rx Data   PRESCRIPTIONS   Total Prescriptions: 6   Total Private Pay: 0     Fill Date ID Written Drug Qty Days Prescriber Rx # Pharmacy Refill Daily Dose * Pymt Type    01/17/2020  2  01/17/2020  Gabapentin 300 Mg Capsule    90.00 30 Sr Ari  [id number]  [facility] ([id number])  0/0  [place]icaid [place]   11/23/2019  1  11/23/2019  Lorazepam 1 Mg Tablet    6.00 2 Ca Anusha  [id number]  [facility] ([id number])  0/0 3.00 LME [place]   07/07/2019  2  07/07/2019  Gabapentin 400 Mg Capsule    12.00 5 Ca Hal  [id number]  [facility] ([id number])  0/0  [place]icaid [place]   06/04/2019  2  06/04/2019  Gabapentin 300 Mg Capsule    90.00 30 Th Jose  [id number]  [facility] ([id number])  0/0  [place]icaid [place]   05/21/2019  1  05/21/2019  Gabapentin 600 Mg Tablet    45.00 30 Am Sha  [id number]  [facility] ([id number])  0/0  [place]   04/18/2019  1  04/18/2019  Lorazepam 1 Mg Tablet    6.00 2 He Dale  [id number]  [facility] ([id number])  0/0 3.00 L[place]   *Per CDC guidance, the MME conversion factors prescribed or provided as part of the medication-assisted treatment for opioid use disorder should not be used to benchmark against dosage thresholds meant for opioids prescribed for pain. Buprenorphine products have no agreed upon morphine equivalency, and as partial opioid agonists, are not expected to be associated with overdose risk in the same dose-dependent manner as doses for full agonist opioids. MME = morphine milligram equivalents. LME = Lorazepam milligram equivalents. mg = dose in milligrams.   Providers   Total Providers: 6     Name Address City State Zipcode Phone   Daisy Colbert [address] [place] [address] [phone]   Jami Delacruz MD [address] [facility] [address] [phone]   [facility] [address]  [facility] [address] [phone]   Génesis Le MD [address] [facility] [address] [phone]   Daisy Jj  [address] [address] [facility] [address] [phone]   Ava Lew [address] [facility] [address] [phone]   Pharmacies   Total Pharmacies: 2     Name Address City State Zipcode Phone   [facility] [phone]) [address] [place] [address] [phone]   [facility] [phone]) [address] [facility] [address] [phone]       The report provided is based upon the search criteria entered and the corresponding data as it has been reported by dispenser(s). If erroneous information is identified or additional information is needed, please contact the dispenser or the prescriber provided on the report. Note, federal regulation (CFR Title 42: Part 2) prevents federally funded opioid treatment programs (OTPs) from releasing certain patient data. As such, controlled substances dispensed from OTPs for medication-assisted treatment will not appear in the [place]  report. Morphine milligram equivalent (MME) conversion factors published by the CDC are used in the MME calculation. Per the CDC, the MME conversion factor is intended only for analytic purposes where prescription data are used to retrospectively calculate daily MME to inform analyses of risks associated with opioid prescribing. This value does not constitute clinical guidance or recommendations for converting patients from one form of opioid analgesic to another. Per the CDC, the conversion factors for drugs prescribed or provided, as part of medication-assisted treatment for opioid use disorder should not be used to benchmark against MME dosage thresholds meant for opioids prescribed for pain. Buprenorphine products listed in the CDC s MME file do not have an associated conversion factor. Lastly, the CDC notes, in clinical practice, calculating MME for methadone  often involves a sliding-scale approach, whereby the conversion factor increases with increasing dose. The conversion factor of 3 for methadone presented in this file could underestimate MME for a given patient. This report contains confidential information, including patient identifiers, and is not a public record. The information on this report must be treated as protected health information and is only to be disclosed to others as authorized by applicable state and Federal regulations.   Powered By     [place] Prescription Monitoring Program  [place] [employer]  [address] [place] [address] [address]  [place] [address]  6 [phone]     [employer] 2020. All Rights Reserved.

## 2020-03-27 NOTE — CONSULTS
Consult Date:  03/27/2020      HISTORY OF PRESENT ILLNESS:  The patient is a pleasant 34-year-old gentleman admitted to station 3A for alcohol abuse and detoxification.  He states she was drinking very hard for 2 days in a row approximately starting 4 days ago.  Blood alcohol level on admission 2 days ago 0.48.  An Internal Medicine consultation was ordered by Dr. Sue to assess medical problems including hypernatremia.  At this time, Nikhil feels much better since admission and denies fever, chills, chest pain, shortness of breath, cough, abdominal pain, nausea, bowel or bladder concerns.      PAST MEDICAL HISTORY:   1.  Alcohol use disorder and other psychiatric history per Dr. Sue.   2.  Allergic rhinitis.   3.  Denies history of major medical problems including cardiopulmonary disease, hypertension and diabetes.      PAST SURGICAL HISTORY:  Ponca teeth extraction.      ADMISSION MEDICATIONS:  Reviewed and listed in the medication reconciliation list.      ALLERGIES:  NO KNOWN DRUG ALLERGIES.      SOCIAL HISTORY:  Single.  No children.  Currently living with his mother.  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  Denies smoking cigarettes.      FAMILY HISTORY:  Reviewed and noncontributory.      REVIEW OF SYSTEMS:  Ten-point review of systems negative except as stated above in history of present illness.      PHYSICAL EXAMINATION:   GENERAL:  Nontoxic appearing young man in no acute distress.   VITAL SIGNS:  Stable.  Temperature afebrile, pulse in the 80s, blood pressure 117/82.   HEENT:  Negative.   NECK:  Supple.  No cervical lymphadenopathy or thyromegaly.   LUNGS:  Clear.   CARDIOVASCULAR:  Regular rate and rhythm.   ABDOMEN:  Soft, nontender.   EXTREMITIES:  No edema.   SKIN:  No rash on exposed areas.   NEUROLOGIC:  He is awake, alert and oriented x3.  Cranial nerves grossly intact.  Motor strength is symmetric.  He is not tremulous.      LABORATORY DATA:  Blood alcohol level on admission  0.48.  Urine drug screen positive for alcohol.  Repeat comprehensive metabolic panel reveals a slightly elevated sodium of 146, otherwise CBC and rest of comprehensive metabolic panel normal.      ASSESSMENT:   1.  Alcohol abuse and withdrawal per Dr. Sue.   2.  Hypernatremia, mild and most likely secondary to alcohol-induced hypovolemia and expect to resolve as patient increases p.o. fluid intake.   3.  History of allergic rhinitis, seasonal and currently without symptoms.   4.  Otherwise, overall apparent normally good physical health.      PLAN:  No medical intervention indicated at this time.  Encourage increased p.o. fluid intake.  No further medical recommendations at this time.  The patient is medically stable and Medicine signing off.  Please feel free to call with questions.      Thank you for this consultation.         TRAVIS IGNACIO PA-C             D: 2020   T: 2020   MT: FRANCISCA      Name:     QUYEN QUIROZ   MRN:      [id number]        Account:       [id number]   :      1985           Consult Date:  2020      Document: [id number]

## 2020-03-27 NOTE — PLAN OF CARE
Problem: Substance Withdrawal  Goal: Substance Withdrawal  Description: Signs and symptoms of listed problems will be absent or manageable.  Outcome: No Change     S:Pt is admitted via ED for Detox from alcohol.  B: Pt reports drinking 1 pint a day for 2 days.  He denies any medical issues or h/o withdrawal seizures. He denies depression but endorses some anxiety. He reports that his PCP stopped a bunch of his medications as he was doing well, sober for 65 days.  Recently completed treatment at  and reports he was doing IOP but did not get along with his counselor so left the program.  He is not interested in treatment and plans to return to  and get a sponsor. He has not worked for 2 years and lives his mother.  He reports he can return home.  A: Pt MSSA 5 on admission. Affect is irritable, minimal answers to admission questions. Pt denies any depression, no SI and denies any needs or concerns.   P: Monitor and TX for alcohol withdrawal per MSSA.

## 2020-03-27 NOTE — H&P
"Admitted:     03/26/2020      HISTORY OF PRESENT ILLNESS:  Mr. Nikhil Rios, date of birth, 1985, is a 34-year-old man admitted to [facility] detox unit on 03/26/2020.  He was admitted to detoxify from alcohol.      He states that he put himself in treatment now because his use was becoming out of control.  He states, \"I wanted to put it into check.\"      He had previously been at [facility] on 03/23/2020 intoxicated and presented to the Emergency Room intoxicated last night.      He notes that he had been \"clean 70 days\" and \"made a stupid decision that led to [facility],\" and then relapsed again.  He has been in [facility] twice.  Last time he left was 70 days ago.  He was also in [facility] last summer.  Alcohol problems have been worse for the last 2 years, correlating with situational issues regarding his girlfriend who was deported.  He also had a friend move into his house who turned out to be a meth and heroin addict, and the friend and his friend's cleaned out everything of value from the patient's house.      He has been prescribed naltrexone in the past and is currently prescribed Campral.  He has not taken Antabuse.      He does suffer from some anxiety, which he feels in both his head and body with ruminating and worry.      VITAL SIGNS:  Temperature 98.5, blood pressure 137/89, pulse 66, respirations 16, SpO2 of 95 on room air.        His problem list consists primarily of alcohol issues.      LABORATORY:  Labwork shows an unremarkable chemistry profile with glucose of 91, CBC unremarkable, toxicology positive for and alcohol level in the Emergency Room of 0.364.  EKG was reviewed from 07/2019, which showed QTc of 452 and normal sinus rhythm.  His chart lists Lexapro as a medication for him.  He states he is taking gabapentin for the anxiety and feels that it works well.      REVIEW OF SYSTEMS:  Reviewed from the Emergency Room, as well as physical examination, " which was positive only for intoxication.      ALLERGIES:  HE IS ALLERGIC TO POLLEN EXTRACT.      MEDICATIONS:  Prilosec, trazodone, Lopressor, metoprolol, eyedrops, clonidine, Lexapro and Campral, all as noted above.      MENTAL STATUS EXAMINATION:  Shows an alert, cooperative man.  He states that he would like to leave as soon as possible, even by tonight.  He is slightly disheveled.  Speech rate and volume are normal.  There are no signs of psychosis.  His orientation is intact, and cognitions are intact.  He denies being suicidal.      Since admission, he has received no doses of Valium so far.      DIAGNOSTIC IMPRESSION:   1.  Alcohol use disorder, severe with withdrawal.   2.  Generalized anxiety disorder.  His alcohol use has complicated by the anxiety and the severe intoxication.      PLAN:  Discharge to his outpatient program when he is out of detoxification.  He states the Campral works when he takes it, and we will encourage him to take it on a more regular basis as well.         JEANNINE SUE MD             D: 2020   T: 2020   MT:       Name:     QUYEN QUIROZ   MRN:      [id number]        Account:      [id number]   :      1985        Admitted:     2020                   Document: [id number]       cc: Primary Care Physician

## 2020-03-27 NOTE — PROGRESS NOTES
03/26/20 1929   Patient Belongings   Patient Belongings other (see comments)   Belongings Search Yes   Clothing Search Yes   Second Staff Jimmy     STORAGE BIN:   Shoes, coat, earbuds, sweatpants    MED ROOM BIN:   Cell phone  A             Admission:  I am responsible for any personal items that are not sent to the safe or pharmacy.  [facility] is not responsible for loss, theft or damage of any property in my possession.    Signature:  _________________________________ Date: _______  Time: _____                                              Staff Signature:  ____________________________ Date: ________  Time: _____      2nd Staff person, if patient is unable/unwilling to sign:    Signature: ________________________________ Date: ________  Time: _____   Discharge:  [facility] has returned all of my personal belongings:    Signature: _________________________________ Date: ________  Time: _____                                          Staff Signature:  ____________________________ Date: ________  Time: _____

## 2020-03-27 NOTE — PROGRESS NOTES
Patient's MSSA scores were 2 and 4. Patient did not receive prn Valium this shift. Will continue to monitor and update if there are changes.

## 2020-03-28 VITALS
BODY MASS INDEX: 29.68 KG/M2 | RESPIRATION RATE: 16 BRPM | WEIGHT: 212 LBS | TEMPERATURE: 98.6 F | OXYGEN SATURATION: 98 % | DIASTOLIC BLOOD PRESSURE: 102 MMHG | HEIGHT: 71 IN | SYSTOLIC BLOOD PRESSURE: 149 MMHG | HEART RATE: 72 BPM

## 2020-03-28 PROCEDURE — 25000132 ZZH RX MED GY IP 250 OP 250 PS 637: Performed by: PSYCHIATRY & NEUROLOGY

## 2020-03-28 PROCEDURE — 99239 HOSP IP/OBS DSCHRG MGMT >30: CPT | Mod: GT | Performed by: PSYCHIATRY & NEUROLOGY

## 2020-03-28 PROCEDURE — 25000132 ZZH RX MED GY IP 250 OP 250 PS 637: Performed by: NURSE PRACTITIONER

## 2020-03-28 RX ADMIN — MULTIPLE VITAMINS W/ MINERALS TAB 1 TABLET: TAB at 08:27

## 2020-03-28 RX ADMIN — ACAMPROSATE CALCIUM ENTERIC-COATED 666 MG: 333 TABLET, DELAYED RELEASE ORAL at 08:27

## 2020-03-28 RX ADMIN — ACAMPROSATE CALCIUM ENTERIC-COATED 666 MG: 333 TABLET, DELAYED RELEASE ORAL at 13:23

## 2020-03-28 RX ADMIN — THIAMINE HCL TAB 100 MG 100 MG: 100 TAB at 08:26

## 2020-03-28 RX ADMIN — GABAPENTIN 300 MG: 300 CAPSULE ORAL at 13:23

## 2020-03-28 RX ADMIN — GABAPENTIN 300 MG: 300 CAPSULE ORAL at 08:26

## 2020-03-28 RX ADMIN — FOLIC ACID 1 MG: 1 TABLET ORAL at 08:26

## 2020-03-28 ASSESSMENT — ACTIVITIES OF DAILY LIVING (ADL)
HYGIENE/GROOMING: INDEPENDENT
ORAL_HYGIENE: INDEPENDENT
DRESS: INDEPENDENT
LAUNDRY: WITH SUPERVISION

## 2020-03-28 NOTE — PLAN OF CARE
Discharge instructions were given and were explained to the patient who stated that he understood them.  The patient denied any thoughts of self harm.  The patient left the unit at 1330 and was going to be brought back to his mother's home by his mother.  He plans to return to his 12 step groups upon discharge. Campral was sent home with the patient, and the vitamins were returned to the pharmacy at the patient's request.

## 2020-03-28 NOTE — DISCHARGE SUMMARY
"Psychiatric Discharge Summary    Nikhil Rios MRN# [id number]   Age: 34 year old YOB: 1985     Date of Admission:  3/26/2020  Date of Discharge:  3/28/2020  1:36 PM  Admitting Physician:  Mark Alberto MD  Discharge Physician:  Daniel Sue MD          Event Leading to Hospitalization:   HISTORY OF PRESENT ILLNESS:  Mr. Nikhil Rios, date of birth, 1985, is a 34-year-old man admitted to [facility] detox unit on 03/26/2020.  He was admitted to detoxify from alcohol.      He states that he put himself in treatment now because his use was becoming out of control.  He states, \"I wanted to put it into check.\"      He had previously been at [facility] on 03/23/2020 intoxicated and presented to the Emergency Room intoxicated last night.      He notes that he had been \"clean 70 days\" and \"made a stupid decision that led to [facility],\" and then relapsed again.  He has been in [facility] twice.  Last time he left was 70 days ago.  He was also in [facility] last summer.  Alcohol problems have been worse for the last 2 years, correlating with situational issues regarding his girlfriend who was deported.  He also had a friend move into his house who turned out to be a meth and heroin addict, and the friend and his friend's cleaned out everything of value from the patient's house.      He has been prescribed naltrexone in the past and is currently prescribed Campral.  He has not taken Antabuse.      He does suffer from some anxiety, which he feels in both his head and body with ruminating and worry.        See Admission note for additional details.          Diagnoses:     1.  Alcohol use disorder, severe with withdrawal.   2.  Generalized anxiety disorder.  His alcohol use has complicated by the anxiety and the severe intoxication.          Labs:        Lab Results   Component Value Date     03/26/2020    Lab Results   Component Value Date    CHLORIDE 107 " 03/26/2020    Lab Results   Component Value Date    BUN 12 03/26/2020      Lab Results   Component Value Date    POTASSIUM 4.1 03/26/2020    Lab Results   Component Value Date    CO2 28 03/26/2020    Lab Results   Component Value Date    CR 0.83 03/26/2020          Lab Results   Component Value Date    WBC 9.1 03/26/2020    HGB 15.7 03/26/2020    HCT 44.8 03/26/2020    MCV 87 03/26/2020     03/26/2020     Lab Results   Component Value Date    AST 44 03/26/2020    ALT 28 03/26/2020     (H) 01/01/2020    ALKPHOS 81 03/26/2020    BILITOTAL 0.2 03/26/2020     Lab Results   Component Value Date    TSH 4.01 (H) 01/01/2020            Consults:   Consultation during this admission received from internal medicine:  ASSESSMENT:   1.  Alcohol abuse and withdrawal per Dr. Sue.   2.  Hypernatremia, mild and most likely secondary to alcohol-induced hypovolemia and expect to resolve as patient increases p.o. fluid intake.   3.  History of allergic rhinitis, seasonal and currently without symptoms.   4.  Otherwise, overall apparent normally good physical health.      PLAN:  No medical intervention indicated at this time.  Encourage increased p.o. fluid intake.  No further medical recommendations at this time.  The patient is medically stable and Medicine signing off.  Please feel free to call with questions.               Hospital Course:   Nikhil Rios was admitted to [facility] with attending Daniel Sue MD as a voluntary patient. The patient was placed under status 15 (15 minute checks) to ensure patient safety.   MSSA protocol was initiated due to the patient's history of alcohol abuse and concern for withdrawal symptoms.  CBC, BMP and utox obtained.    All outpatient medications were continued. The patient resumed Campral after detox was completed.     Nikhil Rios did participate in groups and was visible in the milieu.     The patient's symptoms of withdrawal improved.     Nikhil iRos was released to  home. At the time of discharge Nikhil Rios was determined to not be a danger to himself or others. At the current time of discharge, the patient does not meet criteria for involuntary hospitalization. On the day of discharge, the patient reports that they do not have suicidal or homicidal ideation and would never hurt themselves or others. Steps taken to minimize risk include: assessing patient s behavior and thought process daily during hospital stay, discharging patient with adequate plan for follow up for mental and physical health and discussing safety plan of returning to the hospital should the patient ever have thoughts of harming themselves or others. Therefore, based on all available evidence including the factors cited above, the patient does not appear to be at imminent risk for self-harm, and is appropriate for outpatient level of care.           Discharge Medications:     Current Discharge Medication List      START taking these medications    Details   acamprosate (CAMPRAL) 333 MG EC tablet Take 2 tablets (666 mg) by mouth 3 times daily  Qty: 180 tablet, Refills: 3    Associated Diagnoses: Alcohol dependence with withdrawal with complication (H)      folic acid (FOLVITE) 1 MG tablet Take 1 tablet (1 mg) by mouth daily  Qty: 90 tablet, Refills: 3    Associated Diagnoses: Alcohol dependence with withdrawal with complication (H)      multivitamin w/minerals (THERA-VIT-M) tablet Take 1 tablet by mouth daily  Qty: 90 each, Refills: 3    Associated Diagnoses: Alcohol dependence with withdrawal with complication (H)      vitamin B1 (THIAMINE) 100 MG tablet Take 1 tablet (100 mg) by mouth daily  Qty: 90 tablet, Refills: 3    Associated Diagnoses: Alcohol dependence with withdrawal with complication (H)         CONTINUE these medications which have NOT CHANGED    Details   gabapentin (NEURONTIN) 300 MG capsule Take 1 capsule (300 mg) by mouth 3 times daily  Qty: 90 capsule, Refills: 0    Associated Diagnoses:  Alcohol dependence with withdrawal with complication (H)      hydrOXYzine (ATARAX) 25 MG tablet Take 1 tablet (25 mg) by mouth every 4 hours as needed for anxiety  Qty: 60 tablet, Refills: 0    Comments: LP delivery  Associated Diagnoses: Anxiety      hypromellose-dextran (HYPROMELLOSE-DEXTRAN 0.3-0.1%) 0.1-0.3 % ophthalmic solution Place 1 drop into the right eye daily as needed for dry eyes  Qty: 15 mL, Refills: 0    Associated Diagnoses: Alcohol dependence with uncomplicated withdrawal (H)      traZODone (DESYREL) 50 MG tablet Take 1 tablet (50 mg) by mouth At Bedtime  Qty: 30 tablet, Refills: 0    Associated Diagnoses: Alcohol dependence with withdrawal with complication (H)                  Psychiatric Examination:   Appearance:  awake, alert and adequately groomed  Attitude:  cooperative  Eye Contact:  good  Mood:  good  Affect:  mood congruent  Speech:  clear, coherent  Psychomotor Behavior:  no evidence of tardive dyskinesia, dystonia, or tics  Thought Process:  goal oriented  Associations:  no loose associations  Thought Content:  no evidence of suicidal ideation or homicidal ideation and no evidence of psychotic thought  Insight:  fair  Judgment:  intact  Oriented to:  time, person, and place  Attention Span and Concentration:  intact  Recent and Remote Memory:  fair  Language: Able to read and write  Fund of Knowledge: appropriate  Muscle Strength and Tone: normal  Gait and Station: Normal         Discharge Plan:   Continue medications as above.     Major Treatments, Procedures and Findings:  You were detoxed from alcohol with the Modified Selective Severity Protocol using Valium. You have met with a  to develop a treatment plan for discharge.  You have had labs drawn and a copy of those labs will be sent home with you.  Please bring your lab results with to your follow up doctor appointment.     Symptoms to Report:  If you experience more anxiety, confusion, sleeplessness, deep sadness or  thoughts of suicide, notify your treatment team or notify your primary care physician. IF ANY OF THE SYMPTOMS YOU ARE EXPERIENCING ARE A MEDICAL EMERGENCY CALL 911 IMMEDIATELY.      Lifestyle Adjustment: Adjust your lifestyle to get enough sleep, relaxation, exercise and  good nutrition. Continue to develop healthy coping skills to decrease stress and promote a sober living environment. Do not use alcohol, illegal drugs or addictive medications other than what is currently prescribed. AA, NA, and  Sponsor are excellent resources for support.      Primary Provider:  Dr. Sean Nuñez  [facility]  [address] [place] [address]  Phone:  [phone]  Keep you appointment for next week in order to obtain refills of your medications.     Disposition: Home/AA        DISCHARGE RESOURCES:  -SMART Recovery - self management for addiction recovery:  www.smartrecovery.org    -Pathways ~ A Health Crisis Resource & Support Center: 402.960.9392.  -[facility] [phone]   -[facility] Behavioral Intake 061-546-9276 or 072-831-9769.  -Suicide Awareness Voices of Education (SAVE) (www.save.org): 033-818-YDHL (2383)  -National Suicide Prevention Line (www.mentalhealthmn.org): 075-361-HYYZ (5412)  -National [place] on Mental Illness (www.[place].veronica.org): 964.930.2095 or 826-163-3709.  -Xpaz1pqvn: text the word LIFE to 08765 for immediate support and crisis intervention  -Mental Health Consumer/Survivor Network of [place] (www.mhcsn.net): 794.697.6547 or 185-521-1663  -Mental Health Association of [place] (www.mentalhealth.org): 902.457.3027 or 392-582-1997     -Substance Abuse and Mental Health Services (www.samhsa.gov)  -Harm Reduction Coalition (www. Harmreduction.org)  -www.prescribetoprevent.org or http://prescribetoprevent.org/video  -Poison control 5-750-750-6454   **[place] Opioid Prevention Coalition: www.opioidcoalition.org    Attestation:    Telemedicine Visit: The patient's  condition can be safely assessed and treated via synchronous audio and visual telemedicine encounter.      Start Time: 1230  Stop Time: 1235    Reason for Telemedicine Visit: Covid-19    Originating Site (Patient Location): [facility]    Distant Site (Provider Location): Provider Remote Setting    Consent:  The patient/guardian has verbally consented to: the potential risks and benefits of telemedicine (video visit) versus in person care; bill my insurance or make self-payment for services provided; and responsibility for payment of non-covered services.     Mode of Communication:  Video Conference via Skype    As the provider I attest to compliance with applicable laws and regulations related to telemedicine.          The patient was seen and evaluated by me. I spent more than 30 minutes on discharge day activities. Daniel Sue MD

## 2020-03-28 NOTE — DISCHARGE INSTRUCTIONS
Behavioral Discharge Planning and Instructions  THANK YOU FOR CHOOSING THE [facility]  3A  [phone]    Summary: You were admitted to [facility] on 3/26/20 for detoxification from alcohol.  A medical exam was performed that included lab work. You have met with a  and opted to discharge to home.  You have declined assessment and referral.  Please take care and make your recovery a priority, Nikhil!    Recommendation:  If you need more support to maintain sobriety call [phone] to schedule a chemical assessment and follow the recommendations of that assessment.      Main Diagnosis: Per Dr. Linette MD;  303.90 (F10.20) Alcohol Use Disorder Severe      Major Treatments, Procedures and Findings:  You were detoxed from alcohol with the Modified Selective Severity Protocol using Valium. You have met with a  to develop a treatment plan for discharge.  You have had labs drawn and a copy of those labs will be sent home with you.  Please bring your lab results with to your follow up doctor appointment.    Symptoms to Report:  If you experience more anxiety, confusion, sleeplessness, deep sadness or thoughts of suicide, notify your treatment team or notify your primary care physician. IF ANY OF THE SYMPTOMS YOU ARE EXPERIENCING ARE A MEDICAL EMERGENCY CALL 911 IMMEDIATELY.     Lifestyle Adjustment: Adjust your lifestyle to get enough sleep, relaxation, exercise and  good nutrition. Continue to develop healthy coping skills to decrease stress and promote a sober living environment. Do not use alcohol, illegal drugs or addictive medications other than what is currently prescribed. AA, NA, and  Sponsor are excellent resources for support.     Primary Provider:  Dr. Sean Nuñez  [place] [place] [id number] [address] [place], [place] [address] [place] [address]  Phone:  [phone]  Keep you appointment for next week in order to obtain refills of your medications.    Disposition:  Home/AA      DISCHARGE RESOURCES:  -SMART Recovery - self management for addiction recovery:  www.smartrecovery.org    -Pathways ~ A Health Crisis Resource & Support Center: 118.733.5825.  -[facility] 183-463-2993   -[facility] Behavioral Intake 168-095-8220 or 185-402-3156.  -Suicide Awareness Voices of Education (SAVE) (www.save.org): 512-542-HKWF (5383)  -National Suicide Prevention Line (www.mentalhealthmn.org): 822-729-CUCL (0224)  -National [place] on Mental Illness (www.[place].veronica.org): 172.351.1906 or 809-798-7113.  -Ntmm3vfrn: text the word LIFE to 10940 for immediate support and crisis intervention  -Mental Health Consumer/Survivor Network of [place] (www.mhcsn.net): 832.255.4598 or 490-510-7065  -Mental Health Association of [place] (www.mentalhealth.org): 253.164.7352 or 166-539-2780     -Substance Abuse and Mental Health Services (www.samhsa.gov)  -Harm Reduction Coalition (www. Harmreduction.org)  -www.prescribetoprevent.org or http://prescribetoprevent.org/video  -Poison control 5-955-035-3466   **[place] Opioid Prevention Coalition: www.opioidcoalition.org    Sober Support Group Information:  AA/NA & Sponsor/Support  -Alcoholics Anonymous (www.alcoholics-anonymous.org): for local information 24 hours/day  -AA Intergroup service office in [place] (http://www.aastpaul.org/) [phone]  -AA Intergroup service office in [facility]: [phone]. (http://www.aamin[place].org/)  -Narcotics Anonymous (www.naminn[place].org) (991) 518-7498   **Sober Fun Activities: www.soberMaxTrafficactivities.[employer]/[facility]//[facility] Recovery Connection (MRC)  MRC connects people seeking recovery to resources that help foster and sustain long-term recovery.  Whether you are seeking resources for treatment, transportation, housing, job training, education, health care or other pathways to recovery, [facility] is a great place to start.    Phone: 695.791.2901.  www.[facility]  (Great listing of all types of recovery and non-recovery related resources)      General Medication Instructions:   See your medication sheet(s) for instructions.   Take all medicines as directed.  Make no changes unless your doctor suggests them.   Go to all your doctor visits.  Be sure to have all your required lab tests. This way, your medicines can be refilled on time.  Do not use any drugs not prescribed by your provider.  AA/NA and Sponsors are excellent resources for support  Avoid alcohol.    Any follow up concerns:  Nursing questions call the Unit -[facility] [phone]  Medical Record call [phone]  Outpatient Behavioral Intake call [phone]  LP+ Wait List/Bed Availability call [phone]    The entire treatment team has appreciated the opportunity to work with you Nikhil.  We wish you the best in the future and with your lifelong recovery goals. Please bring this discharge folder with you to all follow up appointments.  It contains your lab results, diagnosis, medication list and discharge recommendations.    THANK YOU FOR CHOOSING THE [facility]

## 2020-04-02 ENCOUNTER — HOSPITAL ENCOUNTER (EMERGENCY)
Facility: CLINIC | Age: 35
Discharge: LEFT BEFORE DISCHARGE | End: 2020-04-02
Payer: COMMERCIAL

## 2020-04-02 VITALS — TEMPERATURE: 96.9 F | OXYGEN SATURATION: 97 %

## 2020-04-02 NOTE — ED TRIAGE NOTES
"Patient stated that all he wants is his vitals to be checked. He states \"I don't want to be here, and I don't want to be locked down.\" Writer explained to patient and reminded him that any MH/CD patients would be searched, and that if he blew over the legal limit he would be placed on a [place] until cleared by and MD. Patient states \"Well I want to leave now\". Patient denies SI/HI and does not appear obviously intoxicated. Patient stepped outside to to talk to mother.   "

## 2020-04-02 NOTE — ED TRIAGE NOTES
"Patient returns and remains hesitiant to come in. He states he does not want to be locked up. And states that he is lonely person and does not want to be in a room by himself. Writer re-explained what our policy was regarding ETOH patients. Explained that if his behavior was apprproate there would be no reason for him to be \"locked down\". Explained that we are happy to have him evaluated if he so choose. Patient now sitting debating whether to come in or not.   "

## 2020-04-02 NOTE — ED NOTES
Bed: ED16A  Expected date:   Expected time:   Means of arrival:   Comments:  [facility] [address] 34 yo m elbow lac/narc use

## 2020-04-05 ENCOUNTER — HOSPITAL ENCOUNTER (EMERGENCY)
Facility: CLINIC | Age: 35
Discharge: HOME | End: 2020-04-05
Attending: EMERGENCY MEDICINE | Admitting: FAMILY MEDICINE
Payer: COMMERCIAL

## 2020-04-05 VITALS
DIASTOLIC BLOOD PRESSURE: 77 MMHG | TEMPERATURE: 97.7 F | HEART RATE: 100 BPM | RESPIRATION RATE: 16 BRPM | OXYGEN SATURATION: 96 % | SYSTOLIC BLOOD PRESSURE: 137 MMHG

## 2020-04-05 DIAGNOSIS — F10.229 ALCOHOL DEPENDENCE WITH INTOXICATION WITH COMPLICATION (H): ICD-10-CM

## 2020-04-05 LAB
ALBUMIN SERPL-MCNC: 3.9 G/DL (ref 3.4–5)
ALCOHOL BREATH TEST: NORMAL (ref 0–0.01)
ALP SERPL-CCNC: 77 U/L (ref 40–150)
ALT SERPL W P-5'-P-CCNC: 44 U/L (ref 0–70)
AMPHETAMINES UR QL SCN: NEGATIVE
ANION GAP SERPL CALCULATED.3IONS-SCNC: 8 MMOL/L (ref 3–14)
AST SERPL W P-5'-P-CCNC: 38 U/L (ref 0–45)
BARBITURATES UR QL: NEGATIVE
BASOPHILS # BLD AUTO: 0 10E9/L (ref 0–0.2)
BASOPHILS NFR BLD AUTO: 0.4 %
BENZODIAZ UR QL: NEGATIVE
BILIRUB SERPL-MCNC: 0.3 MG/DL (ref 0.2–1.3)
BUN SERPL-MCNC: 8 MG/DL (ref 7–30)
CALCIUM SERPL-MCNC: 8.6 MG/DL (ref 8.5–10.1)
CANNABINOIDS UR QL SCN: NEGATIVE
CHLORIDE SERPL-SCNC: 110 MMOL/L (ref 94–109)
CO2 SERPL-SCNC: 28 MMOL/L (ref 20–32)
COCAINE UR QL: NEGATIVE
CREAT SERPL-MCNC: 0.82 MG/DL (ref 0.66–1.25)
DIFFERENTIAL METHOD BLD: NORMAL
EOSINOPHIL # BLD AUTO: 0.1 10E9/L (ref 0–0.7)
EOSINOPHIL NFR BLD AUTO: 1.6 %
ERYTHROCYTE [DISTWIDTH] IN BLOOD BY AUTOMATED COUNT: 13.1 % (ref 10–15)
ETHANOL SERPL-MCNC: 0.39 G/DL
ETHANOL UR QL SCN: POSITIVE
GFR SERPL CREATININE-BSD FRML MDRD: >90 ML/MIN/{1.73_M2}
GLUCOSE SERPL-MCNC: 99 MG/DL (ref 70–99)
HCT VFR BLD AUTO: 45.1 % (ref 40–53)
HGB BLD-MCNC: 15.3 G/DL (ref 13.3–17.7)
IMM GRANULOCYTES # BLD: 0 10E9/L (ref 0–0.4)
IMM GRANULOCYTES NFR BLD: 0.2 %
LIPASE SERPL-CCNC: 94 U/L (ref 73–393)
LYMPHOCYTES # BLD AUTO: 1.9 10E9/L (ref 0.8–5.3)
LYMPHOCYTES NFR BLD AUTO: 42.2 %
MCH RBC QN AUTO: 29.6 PG (ref 26.5–33)
MCHC RBC AUTO-ENTMCNC: 33.9 G/DL (ref 31.5–36.5)
MCV RBC AUTO: 87 FL (ref 78–100)
MONOCYTES # BLD AUTO: 0.3 10E9/L (ref 0–1.3)
MONOCYTES NFR BLD AUTO: 6.4 %
NEUTROPHILS # BLD AUTO: 2.2 10E9/L (ref 1.6–8.3)
NEUTROPHILS NFR BLD AUTO: 49.2 %
NRBC # BLD AUTO: 0 10*3/UL
NRBC BLD AUTO-RTO: 0 /100
OPIATES UR QL SCN: NEGATIVE
PLATELET # BLD AUTO: 193 10E9/L (ref 150–450)
POTASSIUM SERPL-SCNC: 4 MMOL/L (ref 3.4–5.3)
PROT SERPL-MCNC: 7.3 G/DL (ref 6.8–8.8)
RBC # BLD AUTO: 5.17 10E12/L (ref 4.4–5.9)
SODIUM SERPL-SCNC: 146 MMOL/L (ref 133–144)
WBC # BLD AUTO: 4.5 10E9/L (ref 4–11)

## 2020-04-05 PROCEDURE — 25000125 ZZHC RX 250: Performed by: FAMILY MEDICINE

## 2020-04-05 PROCEDURE — 80307 DRUG TEST PRSMV CHEM ANLYZR: CPT | Performed by: FAMILY MEDICINE

## 2020-04-05 PROCEDURE — 85025 COMPLETE CBC W/AUTO DIFF WBC: CPT | Performed by: FAMILY MEDICINE

## 2020-04-05 PROCEDURE — 80320 DRUG SCREEN QUANTALCOHOLS: CPT | Performed by: FAMILY MEDICINE

## 2020-04-05 PROCEDURE — 99285 EMERGENCY DEPT VISIT HI MDM: CPT | Mod: 25 | Performed by: FAMILY MEDICINE

## 2020-04-05 PROCEDURE — 82075 ASSAY OF BREATH ETHANOL: CPT | Performed by: FAMILY MEDICINE

## 2020-04-05 PROCEDURE — 83690 ASSAY OF LIPASE: CPT | Performed by: FAMILY MEDICINE

## 2020-04-05 PROCEDURE — 80053 COMPREHEN METABOLIC PANEL: CPT | Performed by: FAMILY MEDICINE

## 2020-04-05 PROCEDURE — 25800025 ZZH RX 258: Performed by: FAMILY MEDICINE

## 2020-04-05 PROCEDURE — 36415 COLL VENOUS BLD VENIPUNCTURE: CPT | Performed by: FAMILY MEDICINE

## 2020-04-05 PROCEDURE — 96365 THER/PROPH/DIAG IV INF INIT: CPT | Performed by: FAMILY MEDICINE

## 2020-04-05 PROCEDURE — 25800030 ZZH RX IP 258 OP 636: Performed by: FAMILY MEDICINE

## 2020-04-05 PROCEDURE — 96366 THER/PROPH/DIAG IV INF ADDON: CPT | Performed by: FAMILY MEDICINE

## 2020-04-05 PROCEDURE — 99284 EMERGENCY DEPT VISIT MOD MDM: CPT | Mod: Z6 | Performed by: FAMILY MEDICINE

## 2020-04-05 PROCEDURE — 25000128 H RX IP 250 OP 636: Performed by: FAMILY MEDICINE

## 2020-04-05 RX ADMIN — THIAMINE HYDROCHLORIDE: 100 INJECTION, SOLUTION INTRAMUSCULAR; INTRAVENOUS at 14:17

## 2020-04-05 RX ADMIN — SODIUM CHLORIDE 1000 ML: 9 INJECTION, SOLUTION INTRAVENOUS at 16:19

## 2020-04-05 ASSESSMENT — ENCOUNTER SYMPTOMS
DYSPHORIC MOOD: 0
BLOOD IN STOOL: 0
DIARRHEA: 0
CHILLS: 1
DYSURIA: 0
HEADACHES: 0
FREQUENCY: 0
FEVER: 0
NERVOUS/ANXIOUS: 0
CONSTIPATION: 0
DIFFICULTY URINATING: 0
ABDOMINAL PAIN: 0
HEMATURIA: 0

## 2020-04-05 NOTE — DISCHARGE INSTRUCTIONS
Patient being discharged from the emergency room clinically sober with plans to follow-up with Vanessa he will utilize gabapentin to help with withdrawal symptoms until he starts his treatment program.  Patient has been advised to return to the emergency room if he has any marked increase in withdrawal symptoms.

## 2020-04-05 NOTE — ED PROVIDER NOTES
[facility] EMERGENCY DEPARTMENT ([facility])    4/05/20        History     Chief Complaint   Patient presents with     Drug / Alcohol Assessment     The history is provided by the patient and medical records.     Nikhil Rios is a 34 year old male with a past medical history significant for alcohol abuse, alcohol induced acute pancreatitis, acute coronary syndrome and HTN who presents here to the Emergency Department due to alcohol intoxication. Patient was reportedly dropped of by his mother due to his alcohol intoxication. Patient reportedly does not want treatment but wants to sober up here and then go to treatment at [facility]. He was recently seen at [facility] ED yesterday due to alcohol intoxication and was last in detox here from 3/26/2020-3/28/2020. States that he has been drinking daily for the past 2 weeks and prior was sober for 70 days. States that he is using 750 mL of Vodka daily and that his last drink was earlier this morning. Denies history of depression or anxiety. Denies SI or HI. Denies other substance use. He is currently complaining of chills. Denies fevers, headache, abdominal pain, urinary symptoms or change in bowel habits.     I have reviewed the Medications, Allergies, Past Medical and Surgical History, and Social History in the [facility] system.  PAST MEDICAL HISTORY:   Past Medical History:   Diagnosis Date     Hypertension      Substance abuse (H)        PAST SURGICAL HISTORY: History reviewed. No pertinent surgical history.    Past medical history, past surgical history, medications, and allergies were reviewed with the patient. Additional pertinent items: None    FAMILY HISTORY: History reviewed. No pertinent family history.    SOCIAL HISTORY:   Social History     Tobacco Use     Smoking status: Former Smoker     Packs/day: 0.25     Smokeless tobacco: Never Used   Substance Use Topics     Alcohol use: Yes     Comment: 750ml daily of vodka     Social history was reviewed with the  patient. Additional pertinent items: None      Patient's Medications   New Prescriptions    No medications on file   Previous Medications    ACAMPROSATE (CAMPRAL) 333 MG EC TABLET    Take 2 tablets (666 mg) by mouth 3 times daily    FOLIC ACID (FOLVITE) 1 MG TABLET    Take 1 tablet (1 mg) by mouth daily    GABAPENTIN (NEURONTIN) 300 MG CAPSULE    Take 1 capsule (300 mg) by mouth 3 times daily    HYDROXYZINE (ATARAX) 25 MG TABLET    Take 1 tablet (25 mg) by mouth every 4 hours as needed for anxiety    HYPROMELLOSE-DEXTRAN (HYPROMELLOSE-DEXTRAN 0.3-0.1%) 0.1-0.3 % OPHTHALMIC SOLUTION    Place 1 drop into the right eye daily as needed for dry eyes    MULTIVITAMIN W/MINERALS (THERA-VIT-M) TABLET    Take 1 tablet by mouth daily    TRAZODONE (DESYREL) 50 MG TABLET    Take 1 tablet (50 mg) by mouth At Bedtime    VITAMIN B1 (THIAMINE) 100 MG TABLET    Take 1 tablet (100 mg) by mouth daily   Modified Medications    No medications on file   Discontinued Medications    No medications on file          Allergies   Allergen Reactions     Pollen Extract Rash     grass tree pollen        Review of Systems   Constitutional: Positive for chills. Negative for fever.   Gastrointestinal: Negative for abdominal pain, blood in stool, constipation and diarrhea.   Genitourinary: Negative for decreased urine volume, difficulty urinating, dysuria, frequency, hematuria and urgency.   Neurological: Negative for headaches.   Psychiatric/Behavioral: Negative for dysphoric mood and suicidal ideas. The patient is not nervous/anxious.         Negative for HI   All other systems reviewed and are negative.    Physical Exam   BP: 134/85  Pulse: 104  Temp: 97.6  F (36.4  C)  Resp: 18  SpO2: 99 %      Physical Exam  Constitutional:       General: He is not in acute distress.     Appearance: He is not diaphoretic.   HENT:      Head: Atraumatic.   Eyes:      General: No scleral icterus.     Pupils: Pupils are equal, round, and reactive to light.    Cardiovascular:      Heart sounds: Normal heart sounds.   Pulmonary:      Effort: No respiratory distress.      Breath sounds: Normal breath sounds.   Abdominal:      General: Bowel sounds are normal.      Palpations: Abdomen is soft.      Tenderness: There is no abdominal tenderness.   Musculoskeletal:         General: No tenderness.   Skin:     General: Skin is warm.      Findings: No rash.   Neurological:      General: No focal deficit present.      Mental Status: He is oriented to person, place, and time.      Motor: No weakness.      Coordination: Coordination normal.   Psychiatric:         Mood and Affect: Mood normal.         Behavior: Behavior is cooperative.         ED Course   2:27 PM  The patient was seen and examined by Josep Luu MD in Room ED16B.        Procedures               Results for orders placed or performed during the hospital encounter of 04/05/20 (from the past 24 hour(s))   CBC with platelets differential   Result Value Ref Range    WBC 4.5 4.0 - 11.0 10e9/L    RBC Count 5.17 4.4 - 5.9 10e12/L    Hemoglobin 15.3 13.3 - 17.7 g/dL    Hematocrit 45.1 40.0 - 53.0 %    MCV 87 78 - 100 fl    MCH 29.6 26.5 - 33.0 pg    MCHC 33.9 31.5 - 36.5 g/dL    RDW 13.1 10.0 - 15.0 %    Platelet Count 193 150 - 450 10e9/L    Diff Method Automated Method     % Neutrophils 49.2 %    % Lymphocytes 42.2 %    % Monocytes 6.4 %    % Eosinophils 1.6 %    % Basophils 0.4 %    % Immature Granulocytes 0.2 %    Nucleated RBCs 0 0 /100    Absolute Neutrophil 2.2 1.6 - 8.3 10e9/L    Absolute Lymphocytes 1.9 0.8 - 5.3 10e9/L    Absolute Monocytes 0.3 0.0 - 1.3 10e9/L    Absolute Eosinophils 0.1 0.0 - 0.7 10e9/L    Absolute Basophils 0.0 0.0 - 0.2 10e9/L    Abs Immature Granulocytes 0.0 0 - 0.4 10e9/L    Absolute Nucleated RBC 0.0    Comprehensive metabolic panel   Result Value Ref Range    Sodium 146 (H) 133 - 144 mmol/L    Potassium 4.0 3.4 - 5.3 mmol/L    Chloride 110 (H) 94 - 109 mmol/L    Carbon Dioxide 28 20  - 32 mmol/L    Anion Gap 8 3 - 14 mmol/L    Glucose 99 70 - 99 mg/dL    Urea Nitrogen 8 7 - 30 mg/dL    Creatinine 0.82 0.66 - 1.25 mg/dL    GFR Estimate >90 >60 mL/min/[1.73_m2]    GFR Estimate If Black >90 >60 mL/min/[1.73_m2]    Calcium 8.6 8.5 - 10.1 mg/dL    Bilirubin Total 0.3 0.2 - 1.3 mg/dL    Albumin 3.9 3.4 - 5.0 g/dL    Protein Total 7.3 6.8 - 8.8 g/dL    Alkaline Phosphatase 77 40 - 150 U/L    ALT 44 0 - 70 U/L    AST 38 0 - 45 U/L   Lipase   Result Value Ref Range    Lipase 94 73 - 393 U/L   Alcohol ethyl   Result Value Ref Range    Ethanol g/dL 0.39 (HH) <0.01 g/dL   Alcohol breath test POCT   Result Value Ref Range    Alcohol Breath Test 0.3`13 0.00 - 0.01   Drug abuse screen 6 urine (chem dep)   Result Value Ref Range    Amphetamine Qual Urine Negative NEG^Negative    Barbiturates Qual Urine Negative NEG^Negative    Benzodiazepine Qual Urine Negative NEG^Negative    Cannabinoids Qual Urine Negative NEG^Negative    Cocaine Qual Urine Negative NEG^Negative    Ethanol Qual Urine Positive (A) NEG^Negative    Opiates Qualitative Urine Negative NEG^Negative     Medications   0.9% sodium chloride BOLUS (1,000 mLs Intravenous New Bag 4/5/20 3979)   dextrose 5% and 0.45% NaCl 1,000 mL with Infuvite Adult 10 mL, thiamine 100 mg, folic acid 1 mg infusion ( Intravenous New Bag 4/5/20 1417)             Assessments & Plan (with Medical Decision Making)             I have reviewed the nursing notes.    I have reviewed the findings, diagnosis, plan and need for follow up with the patient.    Patient with chronic alcohol dependence and acute intoxication we offered a detox bed for the patient however further discussion patient states that he does not want to come to inpatient detox and will try detox at home using gabapentin that he already has and following up with [facility] for outpatient treatment patient will be discharged clinically sober.    Final diagnoses:   Alcohol dependence with intoxication with complication  (H)     I, Sameer Butler, am serving as a trained medical scribe to document services personally performed by Josep Luu MD, based on the provider's statements to me.   I, Josep Luu MD, was physically present and have reviewed and verified the accuracy of this note documented by Sameer Butler.    4/5/2020   [facility], EMERGENCY DEPARTMENT     Josep Luu MD  04/05/20 6868

## 2020-04-05 NOTE — ED AVS SNAPSHOT
[facility], [place], Emergency Department  [address] [place] [address]  Phone:  [phone]  Fax:  [phone]                                    Nikhil Rios   MRN: [id number]    Department:  [facility], Emergency Department   Date of Visit:  4/5/2020           After Visit Summary Signature Page    I have received my discharge instructions, and my questions have been answered. I have discussed any challenges I see with this plan with the nurse or doctor.    ..........................................................................................................................................  Patient/Patient Representative Signature      ..........................................................................................................................................  Patient Representative Print Name and Relationship to Patient    ..................................................               ................................................  Date                                   Time    ..........................................................................................................................................  Reviewed by Signature/Title    ...................................................              ..............................................  Date                                               Time          22EPIC Rev 08/18

## 2020-05-10 ENCOUNTER — APPOINTMENT (OUTPATIENT)
Dept: GENERAL RADIOLOGY | Facility: CLINIC | Age: 35
End: 2020-05-10
Attending: EMERGENCY MEDICINE
Payer: COMMERCIAL

## 2020-05-10 ENCOUNTER — HOSPITAL ENCOUNTER (EMERGENCY)
Facility: CLINIC | Age: 35
Discharge: HOME | End: 2020-05-10
Attending: EMERGENCY MEDICINE | Admitting: EMERGENCY MEDICINE
Payer: COMMERCIAL

## 2020-05-10 VITALS
BODY MASS INDEX: 30.82 KG/M2 | HEART RATE: 103 BPM | SYSTOLIC BLOOD PRESSURE: 138 MMHG | DIASTOLIC BLOOD PRESSURE: 84 MMHG | RESPIRATION RATE: 16 BRPM | WEIGHT: 221 LBS | OXYGEN SATURATION: 94 % | TEMPERATURE: 96.2 F

## 2020-05-10 DIAGNOSIS — F10.10 ETOH ABUSE: ICD-10-CM

## 2020-05-10 LAB
ALBUMIN SERPL-MCNC: 4 G/DL (ref 3.4–5)
ALBUMIN UR-MCNC: NEGATIVE MG/DL
ALCOHOL BREATH TEST: 0.4 (ref 0–0.01)
ALP SERPL-CCNC: 88 U/L (ref 40–150)
ALT SERPL W P-5'-P-CCNC: 27 U/L (ref 0–70)
AMPHETAMINES UR QL SCN: NEGATIVE
ANION GAP SERPL CALCULATED.3IONS-SCNC: 9 MMOL/L (ref 3–14)
APPEARANCE UR: CLEAR
AST SERPL W P-5'-P-CCNC: 36 U/L (ref 0–45)
BARBITURATES UR QL: NEGATIVE
BASOPHILS # BLD AUTO: 0.1 10E9/L (ref 0–0.2)
BASOPHILS NFR BLD AUTO: 0.9 %
BENZODIAZ UR QL: NEGATIVE
BILIRUB SERPL-MCNC: 0.3 MG/DL (ref 0.2–1.3)
BILIRUB UR QL STRIP: NEGATIVE
BUN SERPL-MCNC: 10 MG/DL (ref 7–30)
CALCIUM SERPL-MCNC: 8.6 MG/DL (ref 8.5–10.1)
CANNABINOIDS UR QL SCN: NEGATIVE
CHLORIDE SERPL-SCNC: 107 MMOL/L (ref 94–109)
CO2 SERPL-SCNC: 25 MMOL/L (ref 20–32)
COCAINE UR QL: NEGATIVE
COLOR UR AUTO: ABNORMAL
CREAT SERPL-MCNC: 0.8 MG/DL (ref 0.66–1.25)
DIFFERENTIAL METHOD BLD: ABNORMAL
EOSINOPHIL # BLD AUTO: 0.2 10E9/L (ref 0–0.7)
EOSINOPHIL NFR BLD AUTO: 3 %
ERYTHROCYTE [DISTWIDTH] IN BLOOD BY AUTOMATED COUNT: 15.1 % (ref 10–15)
ETHANOL UR QL SCN: POSITIVE
GFR SERPL CREATININE-BSD FRML MDRD: >90 ML/MIN/{1.73_M2}
GLUCOSE SERPL-MCNC: 130 MG/DL (ref 70–99)
GLUCOSE UR STRIP-MCNC: NEGATIVE MG/DL
HCT VFR BLD AUTO: 43.9 % (ref 40–53)
HGB BLD-MCNC: 15.2 G/DL (ref 13.3–17.7)
HGB UR QL STRIP: NEGATIVE
IMM GRANULOCYTES # BLD: 0 10E9/L (ref 0–0.4)
IMM GRANULOCYTES NFR BLD: 0.2 %
KETONES UR STRIP-MCNC: NEGATIVE MG/DL
LEUKOCYTE ESTERASE UR QL STRIP: NEGATIVE
LYMPHOCYTES # BLD AUTO: 2.6 10E9/L (ref 0.8–5.3)
LYMPHOCYTES NFR BLD AUTO: 45.2 %
MCH RBC QN AUTO: 29.5 PG (ref 26.5–33)
MCHC RBC AUTO-ENTMCNC: 34.6 G/DL (ref 31.5–36.5)
MCV RBC AUTO: 85 FL (ref 78–100)
MONOCYTES # BLD AUTO: 0.4 10E9/L (ref 0–1.3)
MONOCYTES NFR BLD AUTO: 6.1 %
MUCOUS THREADS #/AREA URNS LPF: PRESENT /LPF
NEUTROPHILS # BLD AUTO: 2.6 10E9/L (ref 1.6–8.3)
NEUTROPHILS NFR BLD AUTO: 44.6 %
NITRATE UR QL: NEGATIVE
NRBC # BLD AUTO: 0 10*3/UL
NRBC BLD AUTO-RTO: 0 /100
OPIATES UR QL SCN: NEGATIVE
PH UR STRIP: 5.5 PH (ref 5–7)
PLATELET # BLD AUTO: 313 10E9/L (ref 150–450)
POTASSIUM SERPL-SCNC: 4.3 MMOL/L (ref 3.4–5.3)
PROT SERPL-MCNC: 8 G/DL (ref 6.8–8.8)
RBC # BLD AUTO: 5.16 10E12/L (ref 4.4–5.9)
RBC #/AREA URNS AUTO: 0 /HPF (ref 0–2)
SODIUM SERPL-SCNC: 141 MMOL/L (ref 133–144)
SOURCE: ABNORMAL
SP GR UR STRIP: 1.01 (ref 1–1.03)
UROBILINOGEN UR STRIP-MCNC: NORMAL MG/DL (ref 0–2)
WBC # BLD AUTO: 5.7 10E9/L (ref 4–11)
WBC #/AREA URNS AUTO: <1 /HPF (ref 0–5)

## 2020-05-10 PROCEDURE — 93010 ELECTROCARDIOGRAM REPORT: CPT | Mod: Z6 | Performed by: EMERGENCY MEDICINE

## 2020-05-10 PROCEDURE — 80053 COMPREHEN METABOLIC PANEL: CPT | Performed by: EMERGENCY MEDICINE

## 2020-05-10 PROCEDURE — 71045 X-RAY EXAM CHEST 1 VIEW: CPT

## 2020-05-10 PROCEDURE — 80320 DRUG SCREEN QUANTALCOHOLS: CPT | Performed by: FAMILY MEDICINE

## 2020-05-10 PROCEDURE — 99284 EMERGENCY DEPT VISIT MOD MDM: CPT | Mod: 25 | Performed by: EMERGENCY MEDICINE

## 2020-05-10 PROCEDURE — 85025 COMPLETE CBC W/AUTO DIFF WBC: CPT | Performed by: EMERGENCY MEDICINE

## 2020-05-10 PROCEDURE — 96360 HYDRATION IV INFUSION INIT: CPT | Performed by: EMERGENCY MEDICINE

## 2020-05-10 PROCEDURE — 81001 URINALYSIS AUTO W/SCOPE: CPT | Mod: XU | Performed by: FAMILY MEDICINE

## 2020-05-10 PROCEDURE — 82075 ASSAY OF BREATH ETHANOL: CPT | Performed by: EMERGENCY MEDICINE

## 2020-05-10 PROCEDURE — 25800030 ZZH RX IP 258 OP 636: Performed by: EMERGENCY MEDICINE

## 2020-05-10 PROCEDURE — 80307 DRUG TEST PRSMV CHEM ANLYZR: CPT | Performed by: FAMILY MEDICINE

## 2020-05-10 PROCEDURE — 93005 ELECTROCARDIOGRAM TRACING: CPT | Performed by: EMERGENCY MEDICINE

## 2020-05-10 RX ORDER — SODIUM CHLORIDE 9 MG/ML
INJECTION, SOLUTION INTRAVENOUS CONTINUOUS
Status: DISCONTINUED | OUTPATIENT
Start: 2020-05-10 | End: 2020-05-11 | Stop reason: HOSPADM

## 2020-05-10 RX ADMIN — SODIUM CHLORIDE 1000 ML: 9 INJECTION, SOLUTION INTRAVENOUS at 21:28

## 2020-05-10 ASSESSMENT — ENCOUNTER SYMPTOMS
ABDOMINAL PAIN: 0
FATIGUE: 0
FEVER: 0
SHORTNESS OF BREATH: 0

## 2020-05-10 NOTE — ED PROVIDER NOTES
[facility] EMERGENCY DEPARTMENT ([facility])     May 10, 2020    ED Provider Note  [facility]      History     Chief Complaint   Patient presents with     Drug / Alcohol Assessment     has been drinking daily , unsure how much he's had, has no bed reserved, blew .40, denies any seizure hx from etoh withdrawal, denies any other substance abuse     HPI  Nikhil Rios is a 34 year old male with a history of alcohol use disorder and HTN who presents to the ED seeking detox for  alcohol use.  Patient tells me that he has been sober for 70 days in the last few months that he recently slipped.  Patient says he does not want detox and wants to be discharged back to home with his mom.  Patient says he lives with his mom and his mother brought him to the hospital today.  Patient says that he is been drinking heavily but has no complaints of any physical issues.  Patient denies any shortness of breath or chest pain.  Denies any fevers or injuries.  Patient denies any other drug use.  Patient denies any SI or HI.    Past Medical History  Past Medical History:   Diagnosis Date     Hypertension      Substance abuse (H)      No past surgical history on file.  acamprosate (CAMPRAL) 333 MG EC tablet  folic acid (FOLVITE) 1 MG tablet  gabapentin (NEURONTIN) 300 MG capsule  hydrOXYzine (ATARAX) 25 MG tablet  hypromellose-dextran (HYPROMELLOSE-DEXTRAN 0.3-0.1%) 0.1-0.3 % ophthalmic solution  multivitamin w/minerals (THERA-VIT-M) tablet  traZODone (DESYREL) 50 MG tablet  vitamin B1 (THIAMINE) 100 MG tablet      Allergies   Allergen Reactions     Pollen Extract Rash     grass tree pollen     Past medical history, past surgical history, medications, and allergies were reviewed with the patient. Additional pertinent items: None    Family History  No family history on file.  Family history was reviewed with the patient. Additional pertinent items: None    Social History  Social History     Tobacco Use      Smoking status: Former Smoker     Packs/day: 0.25     Smokeless tobacco: Never Used   Substance Use Topics     Alcohol use: Yes     Comment: 750ml daily of vodka     Drug use: No      Social history was reviewed with the patient. Additional pertinent items: None    Review of Systems   Constitutional: Negative for fatigue and fever.   Respiratory: Negative for shortness of breath.    Cardiovascular: Negative for chest pain.   Gastrointestinal: Negative for abdominal pain.     A complete review of systems was performed with pertinent positives and negatives noted in the HPI, and all other systems negative.    Physical Exam   BP: 137/89  Heart Rate: 135  Temp: 97.8  F (36.6  C)  Weight: 100.2 kg (221 lb)  SpO2: 90 %  Physical Exam  Constitutional:       Appearance: Normal appearance. He is well-developed. He is not ill-appearing, toxic-appearing or diaphoretic.   HENT:      Head: Normocephalic and atraumatic.      Mouth/Throat:      Mouth: Mucous membranes are moist.   Neck:      Musculoskeletal: Normal range of motion and neck supple.   Cardiovascular:      Rate and Rhythm: Normal rate and regular rhythm.      Heart sounds: Normal heart sounds.   Pulmonary:      Effort: Pulmonary effort is normal. No respiratory distress.      Breath sounds: Normal breath sounds. No stridor. No wheezing or rhonchi.   Abdominal:      General: There is no distension.      Palpations: Abdomen is soft.      Tenderness: There is no abdominal tenderness. There is no rebound.   Skin:     General: Skin is warm.   Neurological:      Mental Status: He is alert and oriented to person, place, and time.   Psychiatric:         Behavior: Behavior normal.         Thought Content: Thought content normal.         ED Course      Procedures             EKG Interpretation:      Interpreted by Winnie Hernandez MD  Time reviewed: 2111  Symptoms at time of EKG: none   Rhythm: normal sinus   Rate: normal  Axis: normal  Ectopy: none  Conduction:  normal  ST Segments/ T Waves: No ST-T wave changes  Q Waves: none  Comparison to prior: No old EKG available    Clinical Impression: normal EKG                    Results for orders placed or performed during the hospital encounter of 05/10/20   XR Chest Port 1 View     Status: None    Narrative    One view of chest radiograph  5/10/2020 6:56 PM    History:  Shortness of breath.     Comparison: None    Findings:   Portable upright AP chest radiograph.   The trachea is midline. The cardio mediastinal silhouette is not  enlarged. There are no focal pulmonary opacities. No pleural  effusions. No pneumothorax. Visualized upper abdomen is unremarkable.  There are no acute bony abnormalities.      Impression    IMPRESSION:  No acute cardiopulmonary process.    I have personally reviewed the examination and initial interpretation  and I agree with the findings.    DEJAN ENRIQUE MD   Drug abuse screen 6 urine (tox)     Status: Abnormal   Result Value Ref Range    Amphetamine Qual Urine Negative NEG^Negative    Barbiturates Qual Urine Negative NEG^Negative    Benzodiazepine Qual Urine Negative NEG^Negative    Cannabinoids Qual Urine Negative NEG^Negative    Cocaine Qual Urine Negative NEG^Negative    Ethanol Qual Urine Positive (A) NEG^Negative    Opiates Qualitative Urine Negative NEG^Negative   UA with Microscopic     Status: Abnormal   Result Value Ref Range    Color Urine Light Yellow     Appearance Urine Clear     Glucose Urine Negative NEG^Negative mg/dL    Bilirubin Urine Negative NEG^Negative    Ketones Urine Negative NEG^Negative mg/dL    Specific Gravity Urine 1.011 1.003 - 1.035    Blood Urine Negative NEG^Negative    pH Urine 5.5 5.0 - 7.0 pH    Protein Albumin Urine Negative NEG^Negative mg/dL    Urobilinogen mg/dL Normal 0.0 - 2.0 mg/dL    Nitrite Urine Negative NEG^Negative    Leukocyte Esterase Urine Negative NEG^Negative    Source Midstream Urine     WBC Urine <1 0 - 5 /HPF    RBC Urine 0 0 - 2 /HPF     Mucous Urine Present (A) NEG^Negative /LPF   CBC with platelets differential     Status: Abnormal   Result Value Ref Range    WBC 5.7 4.0 - 11.0 10e9/L    RBC Count 5.16 4.4 - 5.9 10e12/L    Hemoglobin 15.2 13.3 - 17.7 g/dL    Hematocrit 43.9 40.0 - 53.0 %    MCV 85 78 - 100 fl    MCH 29.5 26.5 - 33.0 pg    MCHC 34.6 31.5 - 36.5 g/dL    RDW 15.1 (H) 10.0 - 15.0 %    Platelet Count 313 150 - 450 10e9/L    Diff Method Automated Method     % Neutrophils 44.6 %    % Lymphocytes 45.2 %    % Monocytes 6.1 %    % Eosinophils 3.0 %    % Basophils 0.9 %    % Immature Granulocytes 0.2 %    Nucleated RBCs 0 0 /100    Absolute Neutrophil 2.6 1.6 - 8.3 10e9/L    Absolute Lymphocytes 2.6 0.8 - 5.3 10e9/L    Absolute Monocytes 0.4 0.0 - 1.3 10e9/L    Absolute Eosinophils 0.2 0.0 - 0.7 10e9/L    Absolute Basophils 0.1 0.0 - 0.2 10e9/L    Abs Immature Granulocytes 0.0 0 - 0.4 10e9/L    Absolute Nucleated RBC 0.0    Comprehensive metabolic panel     Status: Abnormal   Result Value Ref Range    Sodium 141 133 - 144 mmol/L    Potassium 4.3 3.4 - 5.3 mmol/L    Chloride 107 94 - 109 mmol/L    Carbon Dioxide 25 20 - 32 mmol/L    Anion Gap 9 3 - 14 mmol/L    Glucose 130 (H) 70 - 99 mg/dL    Urea Nitrogen 10 7 - 30 mg/dL    Creatinine 0.80 0.66 - 1.25 mg/dL    GFR Estimate >90 >60 mL/min/[1.73_m2]    GFR Estimate If Black >90 >60 mL/min/[1.73_m2]    Calcium 8.6 8.5 - 10.1 mg/dL    Bilirubin Total 0.3 0.2 - 1.3 mg/dL    Albumin 4.0 3.4 - 5.0 g/dL    Protein Total 8.0 6.8 - 8.8 g/dL    Alkaline Phosphatase 88 40 - 150 U/L    ALT 27 0 - 70 U/L    AST 36 0 - 45 U/L   Alcohol breath test POCT     Status: Abnormal   Result Value Ref Range    Alcohol Breath Test 0.400 (A) 0.00 - 0.01     Medications   0.9% sodium chloride BOLUS (1,000 mLs Intravenous New Bag 5/10/20 2126)     Followed by   0.9% sodium chloride BOLUS (has no administration in time range)     Followed by   sodium chloride 0.9% infusion (has no administration in time range)           Results for orders placed or performed during the hospital encounter of 05/10/20   Alcohol breath test POCT     Status: Abnormal   Result Value Ref Range    Alcohol Breath Test 0.400 (A) 0.00 - 0.01     Medications - No data to display     Assessments & Plan (with Medical Decision Making)   Patient is a 34-year-old male who presented to the ER due to acute alcohol intoxication.  Patient was initially very intoxicated with alcohol level of 0.4.  Patient was watched for several hours and then his mom came to pick him up so he can go home.  We are trying to discharge the patient home but on discharge vital signs he was tachycardic to the 140s.  This was when he was sitting up or standing.  When patient laying down or his heart rate went back to normal.  Feel that he was tachycardic due to being orthostatic from dehydration.  We therefore give the patient some IV fluids and check some labs which are stable.  Patient also initially had some pulse ox is in the low 90s.  Did obtain a chest x-ray which is stable.  Patient has no complaint of shortness of breath no fever no cough.  Patient sats are in the mid 90s with ambulation and he still has no dyspnea.  We will therefore discharge the patient home with instructions to return the ER if any other symptoms.  Patient stable for discharge    I have reviewed the nursing notes. I have reviewed the findings, diagnosis, plan and need for follow up with the patient.    New Prescriptions    No medications on file       Final diagnoses:   ETOH abuse     ILeni, am serving as a trained medical scribe to document services personally performed by Winnie Hernandez MD, based on the provider's statements to me.      IWinnie MD, was physically present and have reviewed and verified the accuracy of this note documented by Leni Pearce.     --  Winnie Hernandez MD  Emergency Medicine   [facility], [place], EMERGENCY DEPARTMENT  5/10/2020     Winnie Hernandez MD  05/10/20  4529

## 2020-05-10 NOTE — ED AVS SNAPSHOT
[facility], [place], Emergency Department  [address]  [place] [address]  Phone:  [phone]  Fax:  [phone]                                    Nikhil Rios   MRN: [id number]    Department:  [facility], Emergency Department   Date of Visit:  5/10/2020           After Visit Summary Signature Page    I have received my discharge instructions, and my questions have been answered. I have discussed any challenges I see with this plan with the nurse or doctor.    ..........................................................................................................................................  Patient/Patient Representative Signature      ..........................................................................................................................................  Patient Representative Print Name and Relationship to Patient    ..................................................               ................................................  Date                                   Time    ..........................................................................................................................................  Reviewed by Signature/Title    ...................................................              ..............................................  Date                                               Time          22EPIC Rev 08/18

## 2020-05-11 ENCOUNTER — HOSPITAL ENCOUNTER (EMERGENCY)
Facility: CLINIC | Age: 35
Discharge: HOME | End: 2020-05-11
Attending: EMERGENCY MEDICINE | Admitting: EMERGENCY MEDICINE
Payer: COMMERCIAL

## 2020-05-11 VITALS
DIASTOLIC BLOOD PRESSURE: 83 MMHG | OXYGEN SATURATION: 98 % | TEMPERATURE: 97.2 F | RESPIRATION RATE: 18 BRPM | SYSTOLIC BLOOD PRESSURE: 129 MMHG | HEART RATE: 104 BPM

## 2020-05-11 DIAGNOSIS — F10.129 ALCOHOL ABUSE WITH INTOXICATION (H): ICD-10-CM

## 2020-05-11 DIAGNOSIS — F10.220 ALCOHOL DEPENDENCE WITH UNCOMPLICATED INTOXICATION (H): ICD-10-CM

## 2020-05-11 DIAGNOSIS — Z03.818 ENCNTR FOR OBS FOR SUSP EXPSR TO OTH BIOLG AGENTS RULED OUT: ICD-10-CM

## 2020-05-11 LAB
ALCOHOL BREATH TEST: 0.43 (ref 0–0.01)
AMPHETAMINES UR QL SCN: NEGATIVE
BARBITURATES UR QL: NEGATIVE
BENZODIAZ UR QL: NEGATIVE
CANNABINOIDS UR QL SCN: NEGATIVE
COCAINE UR QL: NEGATIVE
ETHANOL UR QL SCN: POSITIVE
INTERPRETATION ECG - MUSE: NORMAL
OPIATES UR QL SCN: NEGATIVE
SARS-COV-2 RNA SPEC QL NAA+PROBE: NORMAL
SPECIMEN SOURCE: NORMAL

## 2020-05-11 PROCEDURE — 80320 DRUG SCREEN QUANTALCOHOLS: CPT | Performed by: FAMILY MEDICINE

## 2020-05-11 PROCEDURE — 80307 DRUG TEST PRSMV CHEM ANLYZR: CPT | Performed by: FAMILY MEDICINE

## 2020-05-11 PROCEDURE — 99285 EMERGENCY DEPT VISIT HI MDM: CPT

## 2020-05-11 PROCEDURE — 25000132 ZZH RX MED GY IP 250 OP 250 PS 637: Performed by: EMERGENCY MEDICINE

## 2020-05-11 PROCEDURE — 87635 SARS-COV-2 COVID-19 AMP PRB: CPT | Performed by: EMERGENCY MEDICINE

## 2020-05-11 PROCEDURE — 99284 EMERGENCY DEPT VISIT MOD MDM: CPT | Mod: Z6 | Performed by: EMERGENCY MEDICINE

## 2020-05-11 PROCEDURE — 82075 ASSAY OF BREATH ETHANOL: CPT

## 2020-05-11 RX ORDER — LANOLIN ALCOHOL/MO/W.PET/CERES
100 CREAM (GRAM) TOPICAL ONCE
Status: COMPLETED | OUTPATIENT
Start: 2020-05-11 | End: 2020-05-11

## 2020-05-11 RX ORDER — MULTIVITAMIN,THERAPEUTIC
1 TABLET ORAL ONCE
Status: COMPLETED | OUTPATIENT
Start: 2020-05-11 | End: 2020-05-11

## 2020-05-11 RX ORDER — HYDROXYZINE HYDROCHLORIDE 25 MG/1
25 TABLET, FILM COATED ORAL ONCE
Status: COMPLETED | OUTPATIENT
Start: 2020-05-11 | End: 2020-05-11

## 2020-05-11 RX ORDER — FOLIC ACID 1 MG/1
1 TABLET ORAL ONCE
Status: COMPLETED | OUTPATIENT
Start: 2020-05-11 | End: 2020-05-11

## 2020-05-11 RX ORDER — MAGNESIUM OXIDE 400 MG/1
800 TABLET ORAL ONCE
Status: COMPLETED | OUTPATIENT
Start: 2020-05-11 | End: 2020-05-11

## 2020-05-11 RX ORDER — TRAZODONE HYDROCHLORIDE 100 MG/1
100 TABLET ORAL AT BEDTIME
Status: ON HOLD | COMMUNITY
End: 2020-10-27

## 2020-05-11 RX ADMIN — FOLIC ACID 1 MG: 1 TABLET ORAL at 19:30

## 2020-05-11 RX ADMIN — HYDROXYZINE HYDROCHLORIDE 25 MG: 25 TABLET, FILM COATED ORAL at 20:50

## 2020-05-11 RX ADMIN — THERA TABS 1 TABLET: TAB at 19:30

## 2020-05-11 RX ADMIN — THIAMINE HCL TAB 100 MG 100 MG: 100 TAB at 19:30

## 2020-05-11 RX ADMIN — Medication 800 MG: at 19:30

## 2020-05-11 ASSESSMENT — ENCOUNTER SYMPTOMS
NAUSEA: 0
SORE THROAT: 0
VOMITING: 0
DIFFICULTY URINATING: 0
CHILLS: 0
FEVER: 0
NERVOUS/ANXIOUS: 1
ARTHRALGIAS: 0
SHORTNESS OF BREATH: 0
HEADACHES: 0
ABDOMINAL PAIN: 0
DIARRHEA: 0
CONFUSION: 0
COLOR CHANGE: 0

## 2020-05-11 NOTE — DISCHARGE INSTRUCTIONS
Please call [phone] to ask about detox bed availability.   Call every morning until a bed becomes available.   No current detox beds are available.     Return to the ER if any other problems/concerns.       Please return to the ER if you develop any cough, difficulty  breathing or fevers.      Currently your chest xray is normal.

## 2020-05-11 NOTE — ED PROVIDER NOTES
"    [facility] EMERGENCY DEPARTMENT ([facility])    5/11/20      History     Chief Complaint   Patient presents with     Alcohol Intoxication     HPI  Nikhil Rios is a 34 year old male with a past medical history of severe alcohol use disorder, alcohol withdrawal syndrome, urticaria, alcohol induced pancreatitis, acute coronary syndrome, hypertension, and substance abuse who presents to the Emergency Department for detox from alcohol.     Patient initially reported that he was here for detox.  He admits to drinking \"1 pint\" of vodka daily, but when asked how long he has been drinking for, he says \"1 day\", even though the patient was here yesterday for the same issue.  He continues to claim he has been drinking for just 1 day despite the fact that he agrees that he has been seen on multiple occasions for alcohol abuse and has been through treatment at least twice (he cannot tell me where or how long ago his treatment was).  He denies legal consequences from his drinking.  He denies any prior alcohol withdrawal seizures and denies any history of DTs.  He denies any prior mental health history though believes he has been depressed for 10 years.  He denies taking any medications for mental health.  He denies any suicidal ideation, no hallucinations.  He denies drug use.  He specifically denies fever, cough, chest pain, or shortness of breath.  He has had allergy symptoms which is typical for him this time of year with some rhinorrhea.  He denies any exposure to known ill contacts.    I spoke with his mother via phone after he consented to me calling her.  His mother states that he had some sobriety after one of his treatments, and then had a brief relapse.  He was able to quit drinking again and then relapsed again.  She estimates he has been drinking daily now for 5 days.  She is concerned about the amount he is drinking and the fact that he cannot seem to stop.  The patient has been with his sponsor most of the " day and it was quite difficult for them to convince him to come into the hospital here for detox.  Mother did call ahead for a bed and did reserve a bed.    I have reviewed the Medications, Allergies, Past Medical and Surgical History, and Social History in the [email or web address] system.  PAST MEDICAL HISTORY:   Past Medical History:   Diagnosis Date     Hypertension      Substance abuse (H)        PAST SURGICAL HISTORY: History reviewed. No pertinent surgical history.    Past medical history, past surgical history, medications, and allergies were reviewed with the patient. Additional pertinent items: None    FAMILY HISTORY: History reviewed. No pertinent family history.    SOCIAL HISTORY:   Social History     Tobacco Use     Smoking status: Former Smoker     Packs/day: 0.25     Smokeless tobacco: Never Used   Substance Use Topics     Alcohol use: Yes     Comment: 750ml daily of vodka     Social history was reviewed with the patient. Additional pertinent items: None      Patient's Medications   New Prescriptions    No medications on file   Previous Medications    ACAMPROSATE (CAMPRAL) 333 MG EC TABLET    Take 2 tablets (666 mg) by mouth 3 times daily    GABAPENTIN (NEURONTIN) 300 MG CAPSULE    Take 1 capsule (300 mg) by mouth 3 times daily    HYDROXYZINE (ATARAX) 25 MG TABLET    Take 1 tablet (25 mg) by mouth every 4 hours as needed for anxiety    HYPROMELLOSE-DEXTRAN (HYPROMELLOSE-DEXTRAN 0.3-0.1%) 0.1-0.3 % OPHTHALMIC SOLUTION    Place 1 drop into the right eye daily as needed for dry eyes    TRAZODONE (DESYREL) 100 MG TABLET    Take 100 mg by mouth At Bedtime   Modified Medications    No medications on file   Discontinued Medications    FOLIC ACID (FOLVITE) 1 MG TABLET    Take 1 tablet (1 mg) by mouth daily    MULTIVITAMIN W/MINERALS (THERA-VIT-M) TABLET    Take 1 tablet by mouth daily    TRAZODONE (DESYREL) 50 MG TABLET    Take 1 tablet (50 mg) by mouth At Bedtime    VITAMIN B1 (THIAMINE) 100 MG TABLET    Take 1 tablet (100  mg) by mouth daily          Allergies   Allergen Reactions     Pollen Extract Rash     grass tree pollen        Review of Systems   Constitutional: Negative for chills and fever.   HENT: Negative for congestion and sore throat.    Respiratory: Negative for shortness of breath.    Cardiovascular: Negative for chest pain.   Gastrointestinal: Negative for abdominal pain, diarrhea, nausea and vomiting.   Genitourinary: Negative for difficulty urinating.   Musculoskeletal: Negative for arthralgias.   Skin: Negative for color change.   Neurological: Negative for headaches.   Psychiatric/Behavioral: Negative for confusion and suicidal ideas. The patient is nervous/anxious.    All other systems reviewed and are negative.    A complete review of systems was performed with pertinent positives and negatives noted in the HPI, and all other systems negative.    Physical Exam   BP: 139/86  Pulse: 86  Heart Rate: 93  Temp: 97.2  F (36.2  C)  Resp: 18  SpO2: 100 %      Physical Exam  Vitals signs and nursing note reviewed.   Constitutional:       Appearance: He is well-developed.      Comments: Intoxicated. Slurred speech.  Having difficulty following a conversation or providing meaningful answers to questions at times.    HENT:      Head: Normocephalic.   Eyes:      Pupils: Pupils are equal, round, and reactive to light.      Comments: Vertical nystagmus noted; conjunctival injection   Cardiovascular:      Rate and Rhythm: Normal rate and regular rhythm.      Heart sounds: Normal heart sounds. No murmur. No gallop.    Pulmonary:      Effort: Pulmonary effort is normal. No respiratory distress.      Breath sounds: Normal breath sounds. No wheezing or rales.   Abdominal:      General: Bowel sounds are normal. There is no distension.      Palpations: Abdomen is soft.      Tenderness: There is no abdominal tenderness. There is no guarding or rebound.   Skin:     General: Skin is warm and dry.   Neurological:      Comments: Slurred  speech   Psychiatric:      Comments: Intoxicated. Slurred speech. Cooperative. Having difficulty following the conversation at times.  No SI.  Insight impaired.          ED Course   6:10 PM  The patient was seen and examined by Nadine Connell MD in Room ED16C.        Procedures                           Results for orders placed or performed during the hospital encounter of 05/11/20 (from the past 24 hour(s))   Alcohol breath test POCT   Result Value Ref Range    Alcohol Breath Test 0.429 (A) 0.00 - 0.01   Drug abuse screen 6 urine (tox)   Result Value Ref Range    Amphetamine Qual Urine Negative NEG^Negative    Barbiturates Qual Urine Negative NEG^Negative    Benzodiazepine Qual Urine Negative NEG^Negative    Cannabinoids Qual Urine Negative NEG^Negative    Cocaine Qual Urine Negative NEG^Negative    Ethanol Qual Urine Positive (A) NEG^Negative    Opiates Qualitative Urine Negative NEG^Negative     Medications   multivitamin, therapeutic (THERA-VIT) tablet 1 tablet (1 tablet Oral Given 5/11/20 1930)   folic acid (FOLVITE) tablet 1 mg (1 mg Oral Given 5/11/20 1930)   vitamin B1 (THIAMINE) tablet 100 mg (100 mg Oral Given 5/11/20 1930)   magnesium oxide (MAG-OX) tablet 800 mg (800 mg Oral Given 5/11/20 1930)   hydrOXYzine (ATARAX) tablet 25 mg (25 mg Oral Given 5/11/20 2050)             Assessments & Plan (with Medical Decision Making)   Patient presents for the above complaints.  On my evaluation he is obviously quite intoxicated, breathalyzer is 0.432.  He is having difficulty answering even basic questions, likely related to his severe intoxication.  He did have labs drawn yesterday, CBC and CMP are within normal limits from yesterday.  We have ordered a urine tox screen today and I have also ordered an oral rally pack for him.    I do think the patient would benefit from detox.  When he was first interviewed he stated that he was here for detox.  During the course of the interview he changed his mind and states  he wants to be discharged.  I pointed out to him that he was here yesterday for this same issue and came back today due to his need for detox.  However he continues to claim he is not interested in detox.  He is asking for discharge.  He is clearly too intoxicated to be discharged at this point.  I will keep him here in the emergency department and continue to reassess.  If at some point he opts for detox we will subsequently make arrangements for admission.    After a period of time he did indicate that he was interested in detox so we did make arrangements to admit him.  Intake was given the clinical information.  Patient began to become very medication obsessed and medication focused.  He first asked for hydroxyzine and then asked for Valium.  His breathalyzer was 0.429, he absolutely does not require benzodiazepines at this time.  Patient was dissatisfied with this.  After a period of time, he indicated to the nurse that he is no longer interested in detox and would like to be discharged.  We again double checked with the patient to ensure that he was not interested in the detox bed that he already had available and he said no.  Therefore that bed was given up.  He will be discharged in the care of a sober ride.      I have reviewed the nursing notes.    I have reviewed the findings, diagnosis, plan and need for follow up with the patient.    New Prescriptions    No medications on file       Final diagnoses:   Alcohol abuse with intoxication (H)   IJono, am serving as a trained medical scribe to document services personally performed by Nadine Connell MD, based on the provider's statements to me.      Nadine HOPSON MD, was physically present and have reviewed and verified the accuracy of this note documented by Jono Silva.     5/11/2020   [facility], EMERGENCY DEPARTMENT     Nadine Connell MD  05/11/20 5534

## 2020-05-11 NOTE — ED AVS SNAPSHOT
[facility], [place], Emergency Department  [address]  [facility] [place] [place] [address]  Phone:  [phone]  Fax:  [phone]                                    Nikhil Rios   MRN: [id number]    Department:  [facility], Emergency Department   Date of Visit:  5/11/2020           After Visit Summary Signature Page    I have received my discharge instructions, and my questions have been answered. I have discussed any challenges I see with this plan with the nurse or doctor.    ..........................................................................................................................................  Patient/Patient Representative Signature      ..........................................................................................................................................  Patient Representative Print Name and Relationship to Patient    ..................................................               ................................................  Date                                   Time    ..........................................................................................................................................  Reviewed by Signature/Title    ...................................................              ..............................................  Date                                               Time          22EPIC Rev 08/18

## 2020-05-12 LAB
SARS-COV-2 PCR COMMENT: NORMAL
SARS-COV-2 RNA SPEC QL NAA+PROBE: NEGATIVE
SPECIMEN SOURCE: NORMAL

## 2020-05-12 NOTE — ED NOTES
Patient requesting to leave and declines bed to detox. RN notified. Patient states his mother on way to come get him.

## 2020-05-12 NOTE — DISCHARGE INSTRUCTIONS
You have been seen in the emergency department today for alcohol intoxication and alcohol abuse.  We did have a detox bed available for you and made arrangements for you to be admitted.  You have changed your mind and again have indicated you do not wish to be admitted.  Please follow-up with outpatient resources at this time if you are not interested in detox here at [place].    Rule 25 Information -     To access chemical dependency treatment you must have an assessment done or updated within 30 days of starting any program. If you are intoxicated to may be required to detox at a detox facility before starting treatment.     [facility]: [phone]  [facility]: [phone]  [facility]: [phone]  [facility]: [phone]   [facility]: [phone]    The following facilities offer Rule 25 assessments -     [facility]  [phone]  [facility]   [address] [place] [address]    [facility] ([facility])  [phone]  [address] [place] [address]  *Walk in assessments also available M-F starting at 8 am.     [facility]  [phone]  [address] [facility] [address]      [facility]   [phone]  [facility]  [address] [facility] [address]    [facility] [phone]  [address] [facility] [address]    [facility]  [phone] - ahcfa 1  Multiple clinic locations    [facility] [place]   [phone]  [address] [place] [address] E  [facility] [address]    [facility]  [phone]  [address] [facility] [address]    [facility]  [phone]  [address] [facility] [address]    [facility] RULE 25 INFORMATION -   [place] - [phone]  [place] - [phone]  [place] - [phone]  [place] - [phone]  [facility] [phone]  [facility] [phone]  [place]  - [phone]  [place] - [phone]

## 2020-05-12 NOTE — ED NOTES
ED to Behavioral Floor Handoff    SITUATION  Nikhil Rios is a 34 year old male who speaks English and lives in a home with family members The patient arrived in the ED by private car from home with a complaint of Alcohol Intoxication  .The patient's current symptoms started/worsened 2 year(s) ago and during this time the symptoms have increased.   In the ED, pt was diagnosed with   Final diagnoses:   Alcohol abuse with intoxication (H)        Initial vitals were: BP: 139/86  Pulse: 86  Temp: 97.2  F (36.2  C)  Resp: 18  SpO2: 100 %   --------  Is the patient diabetic? No   If yes, last blood glucose? --     If yes, was this treated in the ED? --  --------  Is the patient inebriated (ETOH) Yes or Impaired on other substances? No  MSSA done? No  Last MSSA score: --    Were withdrawal symptoms treated? No  Does the patient have a seizure history? Yes. If yes, date of most recent seizure--  --------  Is the patient patient experiencing suicidal ideation? denies current or recent suicidal ideation     Homicidal ideation? denies current or recent homicidal ideation or behaviors.    Self-injurious behavior/urges? denies current or recent self injurious behavior or ideation.  ------  Was pt aggressive in the ED No  Was a code called No  Is the pt now cooperative? Yes  -------  Meds given in ED:   Medications   multivitamin, therapeutic (THERA-VIT) tablet 1 tablet (1 tablet Oral Given 5/11/20 1930)   folic acid (FOLVITE) tablet 1 mg (1 mg Oral Given 5/11/20 1930)   vitamin B1 (THIAMINE) tablet 100 mg (100 mg Oral Given 5/11/20 1930)   magnesium oxide (MAG-OX) tablet 800 mg (800 mg Oral Given 5/11/20 1930)   hydrOXYzine (ATARAX) tablet 25 mg (25 mg Oral Given 5/11/20 2050)      Family present during ED course? No  Family currently present? No    BACKGROUND  Does the patient have a cognitive impairment or developmental disability? No  Allergies:   Allergies   Allergen Reactions     Pollen Extract Rash     grass tree pollen   .    Social demographics are   Social History     Socioeconomic History     Marital status: Single     Spouse name: None     Number of children: None     Years of education: None     Highest education level: None   Occupational History     None   Social Needs     Financial resource strain: None     Food insecurity     Worry: None     Inability: None     Transportation needs     Medical: None     Non-medical: None   Tobacco Use     Smoking status: Former Smoker     Packs/day: 0.25     Smokeless tobacco: Never Used   Substance and Sexual Activity     Alcohol use: Yes     Comment: 750ml daily of vodka     Drug use: No     Sexual activity: None   Lifestyle     Physical activity     Days per week: None     Minutes per session: None     Stress: None   Relationships     Social connections     Talks on phone: None     Gets together: None     Attends [employer] service: None     Active member of club or organization: None     Attends meetings of clubs or organizations: None     Relationship status: None     Intimate partner violence     Fear of current or ex partner: None     Emotionally abused: None     Physically abused: None     Forced sexual activity: None   Other Topics Concern     None   Social History Narrative     None        ASSESSMENT  Labs results   Labs Ordered and Resulted from Time of ED Arrival Up to the Time of Departure from the ED   DRUG ABUSE SCREEN 6 CHEM DEP URINE ([facility]) - Abnormal; Notable for the following components:       Result Value    Ethanol Qual Urine Positive (*)     All other components within normal limits   ALCOHOL BREATH TEST POCT - Abnormal; Notable for the following components:    Alcohol Breath Test 0.429 (*)     All other components within normal limits   COVID-19 VIRUS (CORONAVIRUS) BY PCR      Imaging Studies: No results found for this or any previous visit (from the past 24 hour(s)).   Most recent vital signs /86   Pulse 98   Temp 97.2  F (36.2  C) (Oral)   Resp 18   SpO2 95%     Abnormal labs/tests/findings requiring intervention:---   Pain control: pt had none  Nausea control: pt had none    RECOMMENDATION  Are any infection precautions needed (MRSA, VRE, etc.)? No If yes, what infection? --  ---  Does the patient have mobility issues? independently. If yes, what device does the pt use? ---  ---  Is patient on 72 hour hold or commitment? No If on 72 hour hold, have hold and rights been given to patient? N/A  Are admitting orders written if after 10 p.m. ?N/A  Tasks needing to be completed:---     Za Mariee RN   ascom--    [phone] [place] ED   [phone] [facility] ED

## 2020-05-12 NOTE — PHARMACY-ADMISSION MEDICATION HISTORY
Admission medication history for the May 11, 2020 admission is complete.     Interview Sources:    Patient    Dispense Report    [facility] in [place] - [phone]    Changes made to PTA medication list (reason)  Added: None  Deleted:     Folic acid 1 mg tabs; 1T PO every day (stopped per patient)    Multivitamin tabs; 1T PO every day (stopped per patient)    Thiamine 100 mg tabs; 1T PO every day (stopped per patient)  Changed:     Trazodone 50 mg tabs; 1T PO at bedtime --> 100 mg tabs; 1T PO at bedtime (per patient and fill history)    Additional medication history information:     Reliability of Source: moderate; patient was able to verify medication names and most strengths    Current Outpatient Pharmacy: [facility] in [place], [place]    Prior to Admission Medication List:  Prior to Admission medications    Medication Sig Last Dose Taking? Auth Provider   acamprosate (CAMPRAL) 333 MG EC tablet Take 2 tablets (666 mg) by mouth 3 times daily 5/10/2020 Yes Mark Alberto MD   gabapentin (NEURONTIN) 300 MG capsule Take 1 capsule (300 mg) by mouth 3 times daily 5/10/2020 Yes Ava Lew MD   hydrOXYzine (ATARAX) 25 MG tablet Take 1 tablet (25 mg) by mouth every 4 hours as needed for anxiety 5/10/2020 Yes Ava Lew MD   hypromellose-dextran (HYPROMELLOSE-DEXTRAN 0.3-0.1%) 0.1-0.3 % ophthalmic solution Place 1 drop into the right eye daily as needed for dry eyes Past Week Yes Ava Lew MD   traZODone (DESYREL) 100 MG tablet Take 100 mg by mouth At Bedtime 5/9/2020 Yes Unknown, Entered By History     Medication history completed by:   Tasha Schumacher, Pharmacy Intern

## 2020-05-26 ENCOUNTER — COMMUNICATION - HEALTHEAST (OUTPATIENT)
Dept: SCHEDULING | Facility: CLINIC | Age: 35
End: 2020-05-26

## 2020-06-16 ENCOUNTER — HOSPITAL ENCOUNTER (INPATIENT)
Facility: CLINIC | Age: 35
LOS: 2 days | Discharge: PSYCHIATRIC OR SUBSTANCE USE FACILITY | DRG: 897 | End: 2020-06-18
Attending: EMERGENCY MEDICINE | Admitting: PSYCHIATRY & NEUROLOGY
Payer: COMMERCIAL

## 2020-06-16 ENCOUNTER — TELEPHONE (OUTPATIENT)
Dept: BEHAVIORAL HEALTH | Facility: CLINIC | Age: 35
End: 2020-06-16

## 2020-06-16 DIAGNOSIS — F10.20 ALCOHOL USE DISORDER, SEVERE, DEPENDENCE (H): ICD-10-CM

## 2020-06-16 DIAGNOSIS — Z03.818 ENCNTR FOR OBS FOR SUSP EXPSR TO OTH BIOLG AGENTS RULED OUT: ICD-10-CM

## 2020-06-16 DIAGNOSIS — F19.20 CHEMICAL DEPENDENCY (H): Primary | ICD-10-CM

## 2020-06-16 LAB
ALBUMIN SERPL-MCNC: 3.9 G/DL (ref 3.4–5)
ALCOHOL BREATH TEST: 0.29 (ref 0–0.01)
ALP SERPL-CCNC: 86 U/L (ref 40–150)
ALT SERPL W P-5'-P-CCNC: 38 U/L (ref 0–70)
ANION GAP SERPL CALCULATED.3IONS-SCNC: 9 MMOL/L (ref 3–14)
AST SERPL W P-5'-P-CCNC: 63 U/L (ref 0–45)
BASOPHILS # BLD AUTO: 0.1 10E9/L (ref 0–0.2)
BASOPHILS NFR BLD AUTO: 1.2 %
BILIRUB SERPL-MCNC: 0.3 MG/DL (ref 0.2–1.3)
BUN SERPL-MCNC: 17 MG/DL (ref 7–30)
CALCIUM SERPL-MCNC: 8.4 MG/DL (ref 8.5–10.1)
CHLORIDE SERPL-SCNC: 104 MMOL/L (ref 94–109)
CO2 SERPL-SCNC: 26 MMOL/L (ref 20–32)
CREAT SERPL-MCNC: 0.75 MG/DL (ref 0.66–1.25)
DIFFERENTIAL METHOD BLD: ABNORMAL
EOSINOPHIL # BLD AUTO: 0.3 10E9/L (ref 0–0.7)
EOSINOPHIL NFR BLD AUTO: 8.2 %
ERYTHROCYTE [DISTWIDTH] IN BLOOD BY AUTOMATED COUNT: 17.3 % (ref 10–15)
ETHANOL SERPL-MCNC: 0.33 G/DL
GFR SERPL CREATININE-BSD FRML MDRD: >90 ML/MIN/{1.73_M2}
GLUCOSE SERPL-MCNC: 105 MG/DL (ref 70–99)
HCT VFR BLD AUTO: 40.6 % (ref 40–53)
HGB BLD-MCNC: 13.8 G/DL (ref 13.3–17.7)
IMM GRANULOCYTES # BLD: 0 10E9/L (ref 0–0.4)
IMM GRANULOCYTES NFR BLD: 0.2 %
LIPASE SERPL-CCNC: 226 U/L (ref 73–393)
LYMPHOCYTES # BLD AUTO: 1.3 10E9/L (ref 0.8–5.3)
LYMPHOCYTES NFR BLD AUTO: 31.7 %
MCH RBC QN AUTO: 29.4 PG (ref 26.5–33)
MCHC RBC AUTO-ENTMCNC: 34 G/DL (ref 31.5–36.5)
MCV RBC AUTO: 87 FL (ref 78–100)
MONOCYTES # BLD AUTO: 0.5 10E9/L (ref 0–1.3)
MONOCYTES NFR BLD AUTO: 12.9 %
NEUTROPHILS # BLD AUTO: 1.9 10E9/L (ref 1.6–8.3)
NEUTROPHILS NFR BLD AUTO: 45.8 %
NRBC # BLD AUTO: 0 10*3/UL
NRBC BLD AUTO-RTO: 0 /100
PLATELET # BLD AUTO: 125 10E9/L (ref 150–450)
POTASSIUM SERPL-SCNC: 3.9 MMOL/L (ref 3.4–5.3)
PROT SERPL-MCNC: 7.8 G/DL (ref 6.8–8.8)
RBC # BLD AUTO: 4.69 10E12/L (ref 4.4–5.9)
SARS-COV-2 PCR COMMENT: NORMAL
SARS-COV-2 RNA SPEC QL NAA+PROBE: NEGATIVE
SARS-COV-2 RNA SPEC QL NAA+PROBE: NORMAL
SODIUM SERPL-SCNC: 139 MMOL/L (ref 133–144)
SPECIMEN SOURCE: NORMAL
SPECIMEN SOURCE: NORMAL
WBC # BLD AUTO: 4.2 10E9/L (ref 4–11)

## 2020-06-16 PROCEDURE — 83690 ASSAY OF LIPASE: CPT | Performed by: EMERGENCY MEDICINE

## 2020-06-16 PROCEDURE — 80320 DRUG SCREEN QUANTALCOHOLS: CPT | Performed by: EMERGENCY MEDICINE

## 2020-06-16 PROCEDURE — H2032 ACTIVITY THERAPY, PER 15 MIN: HCPCS

## 2020-06-16 PROCEDURE — U0003 INFECTIOUS AGENT DETECTION BY NUCLEIC ACID (DNA OR RNA); SEVERE ACUTE RESPIRATORY SYNDROME CORONAVIRUS 2 (SARS-COV-2) (CORONAVIRUS DISEASE [COVID-19]), AMPLIFIED PROBE TECHNIQUE, MAKING USE OF HIGH THROUGHPUT TECHNOLOGIES AS DESCRIBED BY CMS-2020-01-R: HCPCS | Performed by: EMERGENCY MEDICINE

## 2020-06-16 PROCEDURE — HZ2ZZZZ DETOXIFICATION SERVICES FOR SUBSTANCE ABUSE TREATMENT: ICD-10-PCS | Performed by: PSYCHIATRY & NEUROLOGY

## 2020-06-16 PROCEDURE — 99285 EMERGENCY DEPT VISIT HI MDM: CPT | Mod: 25 | Performed by: EMERGENCY MEDICINE

## 2020-06-16 PROCEDURE — 99284 EMERGENCY DEPT VISIT MOD MDM: CPT | Mod: Z6 | Performed by: EMERGENCY MEDICINE

## 2020-06-16 PROCEDURE — C9803 HOPD COVID-19 SPEC COLLECT: HCPCS | Performed by: EMERGENCY MEDICINE

## 2020-06-16 PROCEDURE — 80053 COMPREHEN METABOLIC PANEL: CPT | Performed by: EMERGENCY MEDICINE

## 2020-06-16 PROCEDURE — 25000128 H RX IP 250 OP 636: Performed by: EMERGENCY MEDICINE

## 2020-06-16 PROCEDURE — 12800008 ZZH R&B CD ADULT

## 2020-06-16 PROCEDURE — 25000132 ZZH RX MED GY IP 250 OP 250 PS 637: Performed by: PSYCHIATRY & NEUROLOGY

## 2020-06-16 PROCEDURE — 85025 COMPLETE CBC W/AUTO DIFF WBC: CPT | Performed by: EMERGENCY MEDICINE

## 2020-06-16 PROCEDURE — 82075 ASSAY OF BREATH ETHANOL: CPT | Performed by: EMERGENCY MEDICINE

## 2020-06-16 RX ORDER — LANOLIN ALCOHOL/MO/W.PET/CERES
100 CREAM (GRAM) TOPICAL DAILY
Status: DISCONTINUED | OUTPATIENT
Start: 2020-06-16 | End: 2020-06-18 | Stop reason: HOSPADM

## 2020-06-16 RX ORDER — ACETAMINOPHEN 325 MG/1
650 TABLET ORAL EVERY 4 HOURS PRN
Status: DISCONTINUED | OUTPATIENT
Start: 2020-06-16 | End: 2020-06-18 | Stop reason: HOSPADM

## 2020-06-16 RX ORDER — GABAPENTIN 300 MG/1
300 CAPSULE ORAL 3 TIMES DAILY
Status: DISCONTINUED | OUTPATIENT
Start: 2020-06-16 | End: 2020-06-18 | Stop reason: HOSPADM

## 2020-06-16 RX ORDER — BISACODYL 10 MG
10 SUPPOSITORY, RECTAL RECTAL DAILY PRN
Status: DISCONTINUED | OUTPATIENT
Start: 2020-06-16 | End: 2020-06-18 | Stop reason: HOSPADM

## 2020-06-16 RX ORDER — DIAZEPAM 5 MG
5-20 TABLET ORAL EVERY 30 MIN PRN
Status: DISCONTINUED | OUTPATIENT
Start: 2020-06-16 | End: 2020-06-18 | Stop reason: HOSPADM

## 2020-06-16 RX ORDER — HYDROXYZINE HYDROCHLORIDE 25 MG/1
25 TABLET, FILM COATED ORAL EVERY 4 HOURS PRN
Status: DISCONTINUED | OUTPATIENT
Start: 2020-06-16 | End: 2020-06-18 | Stop reason: HOSPADM

## 2020-06-16 RX ORDER — ATENOLOL 50 MG/1
50 TABLET ORAL DAILY PRN
Status: DISCONTINUED | OUTPATIENT
Start: 2020-06-16 | End: 2020-06-18 | Stop reason: HOSPADM

## 2020-06-16 RX ORDER — ACAMPROSATE CALCIUM 333 MG/1
666 TABLET, DELAYED RELEASE ORAL 3 TIMES DAILY
Status: DISCONTINUED | OUTPATIENT
Start: 2020-06-16 | End: 2020-06-18 | Stop reason: HOSPADM

## 2020-06-16 RX ORDER — HYDROXYZINE HYDROCHLORIDE 25 MG/1
25 TABLET, FILM COATED ORAL 3 TIMES DAILY PRN
Status: ON HOLD | COMMUNITY
End: 2020-10-27

## 2020-06-16 RX ORDER — ALUMINA, MAGNESIA, AND SIMETHICONE 2400; 2400; 240 MG/30ML; MG/30ML; MG/30ML
30 SUSPENSION ORAL EVERY 4 HOURS PRN
Status: DISCONTINUED | OUTPATIENT
Start: 2020-06-16 | End: 2020-06-18 | Stop reason: HOSPADM

## 2020-06-16 RX ORDER — TRAZODONE HYDROCHLORIDE 50 MG/1
100 TABLET, FILM COATED ORAL AT BEDTIME
Status: DISCONTINUED | OUTPATIENT
Start: 2020-06-16 | End: 2020-06-18 | Stop reason: HOSPADM

## 2020-06-16 RX ORDER — MULTIPLE VITAMINS W/ MINERALS TAB 9MG-400MCG
1 TAB ORAL DAILY
Status: DISCONTINUED | OUTPATIENT
Start: 2020-06-16 | End: 2020-06-18 | Stop reason: HOSPADM

## 2020-06-16 RX ORDER — ONDANSETRON 4 MG/1
4 TABLET, ORALLY DISINTEGRATING ORAL ONCE
Status: COMPLETED | OUTPATIENT
Start: 2020-06-16 | End: 2020-06-16

## 2020-06-16 RX ORDER — FOLIC ACID 1 MG/1
1 TABLET ORAL DAILY
Status: DISCONTINUED | OUTPATIENT
Start: 2020-06-16 | End: 2020-06-18 | Stop reason: HOSPADM

## 2020-06-16 RX ADMIN — DIAZEPAM 10 MG: 5 TABLET ORAL at 16:33

## 2020-06-16 RX ADMIN — ONDANSETRON 4 MG: 4 TABLET, ORALLY DISINTEGRATING ORAL at 12:22

## 2020-06-16 RX ADMIN — HYDROXYZINE HYDROCHLORIDE 25 MG: 25 TABLET, FILM COATED ORAL at 22:31

## 2020-06-16 RX ADMIN — ACAMPROSATE CALCIUM ENTERIC-COATED 666 MG: 333 TABLET, DELAYED RELEASE ORAL at 20:05

## 2020-06-16 RX ADMIN — TRAZODONE HYDROCHLORIDE 100 MG: 50 TABLET ORAL at 22:31

## 2020-06-16 RX ADMIN — DIAZEPAM 10 MG: 5 TABLET ORAL at 20:05

## 2020-06-16 RX ADMIN — GABAPENTIN 300 MG: 300 CAPSULE ORAL at 20:05

## 2020-06-16 ASSESSMENT — ACTIVITIES OF DAILY LIVING (ADL)
FALL_HISTORY_WITHIN_LAST_SIX_MONTHS: NO
DRESS: 0-->INDEPENDENT
HYGIENE/GROOMING: INDEPENDENT
TOILETING: 0-->INDEPENDENT
RETIRED_EATING: 0-->INDEPENDENT
SWALLOWING: 0-->SWALLOWS FOODS/LIQUIDS WITHOUT DIFFICULTY
NUMBER_OF_TIMES_PATIENT_HAS_FALLEN_WITHIN_LAST_SIX_MONTHS: 0
RETIRED_COMMUNICATION: 0-->UNDERSTANDS/COMMUNICATES WITHOUT DIFFICULTY
BATHING: 0-->INDEPENDENT

## 2020-06-16 ASSESSMENT — ENCOUNTER SYMPTOMS
SHORTNESS OF BREATH: 0
VOMITING: 0
FEVER: 0
NAUSEA: 1
ABDOMINAL PAIN: 0

## 2020-06-16 ASSESSMENT — LIFESTYLE VARIABLES: INTOXICATION: 1

## 2020-06-16 NOTE — PROGRESS NOTES
ASHLEE Rios is a 34 year old year old male with a chief complaint of Alcohol Intoxication    S = Situation:   Admit  B  = Background:   Admit for alcohol withdrawal.  Pt reports drinking 750 ml of vodka a day for the last month.  Pt was admitting to [facility] today but his breathilizer there was 0.5--he was then referred to detox.  On arrival his breathalizer > 0.3.  Pt reports that [facility] didn't work for him and he needs to try another program.    A  =  Assessment:   Vital Signs: /72   Pulse 127   Temp 98.1  F (36.7  C) (Oral)   Wt 103.1 kg (227 lb 6.4 oz)   SpO2 95%   BMI 31.72 kg/m    Alert and oriented X 3, no SI, no SIB.  Pt endorses depression and anxiety.  He reports that he medicates both with alcohol.  Pt uses continuous profanity through interview.  He makes several requests for valium and complains about the lack of care help he received in the ER.  Pt is tremulous, tachycardic and irritable--his affect is labile, his mood is irritable.  R =   Request or Recommendation:   Alcohol withdrawal monitoring, Dr. Lew to evaluate, case management to confirm progress [place] plans

## 2020-06-16 NOTE — PROGRESS NOTES
06/16/20 1547   Patient Belongings   Did you bring any home meds/supplements to the hospital?  Yes   Disposition of meds  Other (see comment)   Patient Belongings other (see comments)   Belongings Search Yes   Clothing Search Yes   Second Staff kehinde     Storage bin: shoes, sunglasses    Box in med room: wallet, phone, watch, black lace    Security env # [id number]: $40.00 cash, 2 visa cards, mastercard, american express card    Security env # [id number]: meds    A               Admission:  I am responsible for any personal items that are not sent to the safe or pharmacy.  [facility] is not responsible for loss, theft or damage of any property in my possession.    Signature:  _________________________________ Date: _______  Time: _____                                              Staff Signature:  ____________________________ Date: ________  Time: _____      2nd Staff person, if patient is unable/unwilling to sign:    Signature: ________________________________ Date: ________  Time: _____     Discharge:  [facility] has returned all of my personal belongings:    Signature: _________________________________ Date: ________  Time: _____                                          Staff Signature:  ____________________________ Date: ________  Time: _____

## 2020-06-16 NOTE — ED PROVIDER NOTES
[facility] EMERGENCY DEPARTMENT ([facility])   June 16, 2020  ED 11  11:55 AM   History     Chief Complaint   Patient presents with     Alcohol Intoxication     The history is provided by the patient and medical records.     Nikhil Rios is a 34 year old male who presents with alcohol intoxication seeking detox. Patient has a bed on hold at [facility] in [place]. He was supposed to start treatment today but went on a tenorio last night, last drank 10:30PM. They won't take him in intoxicated and he needs detox before he could enter. He states he has been drinking varying amounts, but at most he drank 750 mL of vodka a day. He's not entirely sure what his withdrawal symptoms are, notes that he had a period of sobriety for a while and that prior to that when he stopped drinking he'd develop profound shakes. He is afraid this will happen again. He does have mild nausea at this time. No abdominal pain, vomiting. No other substance abuse; no marijuana or cocaine use. No recent falls or injuries, no COVID-19 contacts, no loss of taste or smell. He lives by himself in an apartment. He does not work, is unemployed. 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 He notes he hasn't eaten all day, asks if he could have something to eat like a turkey sandwich.     PAST MEDICAL HISTORY:   Past Medical History:   Diagnosis Date     Hypertension      Substance abuse (H)        PAST SURGICAL HISTORY: No past surgical history on file.    Past medical history, past surgical history, medications, and allergies were reviewed with the patient. Additional pertinent items: None    FAMILY HISTORY: No family history on file.    SOCIAL HISTORY:   Social History     Tobacco Use     Smoking status: Former Smoker     Packs/day: 0.25     Smokeless tobacco: Never Used   Substance Use Topics     Alcohol use: Yes     Comment: 750ml daily of vodka     Social history  was reviewed with the patient. Additional pertinent items: None      Patient's Medications   New Prescriptions    No medications on file   Previous Medications    ACAMPROSATE (CAMPRAL) 333 MG EC TABLET    Take 2 tablets (666 mg) by mouth 3 times daily    GABAPENTIN (NEURONTIN) 300 MG CAPSULE    Take 1 capsule (300 mg) by mouth 3 times daily    HYDROXYZINE (ATARAX) 25 MG TABLET    Take 1 tablet (25 mg) by mouth every 4 hours as needed for anxiety    HYPROMELLOSE-DEXTRAN (HYPROMELLOSE-DEXTRAN 0.3-0.1%) 0.1-0.3 % OPHTHALMIC SOLUTION    Place 1 drop into the right eye daily as needed for dry eyes    TRAZODONE (DESYREL) 100 MG TABLET    Take 100 mg by mouth At Bedtime   Modified Medications    No medications on file   Discontinued Medications    No medications on file          Allergies   Allergen Reactions     Pollen Extract Rash     grass tree pollen        Review of Systems   Constitutional: Negative for fever.   Respiratory: Negative for shortness of breath.    Cardiovascular: Negative for chest pain.   Gastrointestinal: Positive for nausea. Negative for abdominal pain and vomiting.   All other systems reviewed and are negative.    A complete review of systems was performed with pertinent positives and negatives noted in the HPI, and all other systems negative.    Physical Exam   Pulse: 126  Temp: 99.2  F (37.3  C)  Weight: 103.1 kg (227 lb 6.4 oz)  SpO2: 97 %      Physical Exam  Constitutional:       General: He is not in acute distress.     Appearance: Normal appearance. He is not diaphoretic.   HENT:      Head: Normocephalic and atraumatic.   Eyes:      General: No scleral icterus.     Pupils: Pupils are equal, round, and reactive to light.   Cardiovascular:      Rate and Rhythm: Normal rate and regular rhythm.      Heart sounds: Normal heart sounds.   Pulmonary:      Effort: No respiratory distress.      Breath sounds: Normal breath sounds.   Abdominal:      General: Bowel sounds are normal.      Palpations:  Abdomen is soft.      Tenderness: There is no abdominal tenderness.   Musculoskeletal:         General: No tenderness.   Skin:     General: Skin is warm.      Findings: No rash.   Neurological:      General: No focal deficit present.      Mental Status: He is alert and oriented to person, place, and time.         ED Course        Procedures                   Results for orders placed or performed during the hospital encounter of 06/16/20   CBC with platelets differential     Status: Abnormal   Result Value Ref Range    WBC 4.2 4.0 - 11.0 10e9/L    RBC Count 4.69 4.4 - 5.9 10e12/L    Hemoglobin 13.8 13.3 - 17.7 g/dL    Hematocrit 40.6 40.0 - 53.0 %    MCV 87 78 - 100 fl    MCH 29.4 26.5 - 33.0 pg    MCHC 34.0 31.5 - 36.5 g/dL    RDW 17.3 (H) 10.0 - 15.0 %    Platelet Count 125 (L) 150 - 450 10e9/L    Diff Method Automated Method     % Neutrophils 45.8 %    % Lymphocytes 31.7 %    % Monocytes 12.9 %    % Eosinophils 8.2 %    % Basophils 1.2 %    % Immature Granulocytes 0.2 %    Nucleated RBCs 0 0 /100    Absolute Neutrophil 1.9 1.6 - 8.3 10e9/L    Absolute Lymphocytes 1.3 0.8 - 5.3 10e9/L    Absolute Monocytes 0.5 0.0 - 1.3 10e9/L    Absolute Eosinophils 0.3 0.0 - 0.7 10e9/L    Absolute Basophils 0.1 0.0 - 0.2 10e9/L    Abs Immature Granulocytes 0.0 0 - 0.4 10e9/L    Absolute Nucleated RBC 0.0    Comprehensive metabolic panel     Status: Abnormal   Result Value Ref Range    Sodium 139 133 - 144 mmol/L    Potassium 3.9 3.4 - 5.3 mmol/L    Chloride 104 94 - 109 mmol/L    Carbon Dioxide 26 20 - 32 mmol/L    Anion Gap 9 3 - 14 mmol/L    Glucose 105 (H) 70 - 99 mg/dL    Urea Nitrogen 17 7 - 30 mg/dL    Creatinine 0.75 0.66 - 1.25 mg/dL    GFR Estimate >90 >60 mL/min/[1.73_m2]    GFR Estimate If Black >90 >60 mL/min/[1.73_m2]    Calcium 8.4 (L) 8.5 - 10.1 mg/dL    Bilirubin Total 0.3 0.2 - 1.3 mg/dL    Albumin 3.9 3.4 - 5.0 g/dL    Protein Total 7.8 6.8 - 8.8 g/dL    Alkaline Phosphatase 86 40 - 150 U/L    ALT 38 0 - 70 U/L     AST 63 (H) 0 - 45 U/L   Lipase     Status: None   Result Value Ref Range    Lipase 226 73 - 393 U/L   Asymptomatic COVID-19 Virus (Coronavirus) by PCR     Status: None    Specimen: Nasopharyngeal   Result Value Ref Range    COVID-19 Virus PCR to [facility] - Source Nasopharyngeal     COVID-19 Virus PCR to [facility] - Result       Test received-See reflex to IDDL test SARS CoV2 (COVID-19) Virus RT-PCR   Alcohol level blood     Status: Abnormal   Result Value Ref Range    Ethanol g/dL 0.33 (HH) <0.01 g/dL   SARS-CoV-2 COVID-19 Virus (Coronavirus) RT-PCR Nasopharyngeal     Status: None    Specimen: Nasopharyngeal   Result Value Ref Range    SARS-CoV-2 Virus Specimen Source Nasopharyngeal     SARS-CoV-2 PCR Result NEGATIVE     SARS-CoV-2 PCR Comment       This automated, real-time RT-PCR assay by [employer] on the [employer] has   been given Emergency Use Authorization (EUA) for the in vitro qualitative detection of RNA   from the SARS-CoV2 virus in nasopharyngeal swabs in viral transport medium from patients   with signs and symptoms of infection who are suspected of COVID-19. The performance is   unknown in asymptomatic patients.     Alcohol breath test POCT     Status: Abnormal   Result Value Ref Range    Alcohol Breath Test 0.290 (A) 0.00 - 0.01     Medications   hydrOXYzine (ATARAX) tablet 25 mg (has no administration in time range)   diazepam (VALIUM) tablet 5-20 mg (has no administration in time range)   atenolol (TENORMIN) tablet 50 mg (has no administration in time range)   vitamin B1 (THIAMINE) tablet 100 mg (has no administration in time range)   folic acid (FOLVITE) tablet 1 mg (has no administration in time range)   multivitamin w/minerals (THERA-VIT-M) tablet 1 tablet (has no administration in time range)   acetaminophen (TYLENOL) tablet 650 mg (has no administration in time range)   alum & mag hydroxide-simethicone (MAALOX  ES) suspension 30 mL (has no administration in time range)    magnesium hydroxide (MILK OF MAGNESIA) suspension 30 mL (has no administration in time range)   bisacodyl (DULCOLAX) Suppository 10 mg (has no administration in time range)   acamprosate (CAMPRAL) EC tablet 666 mg (has no administration in time range)   gabapentin (NEURONTIN) capsule 300 mg (has no administration in time range)   traZODone (DESYREL) tablet 100 mg (has no administration in time range)   ondansetron (ZOFRAN-ODT) ODT tab 4 mg (4 mg Oral Given 6/16/20 1222)               No results found for this or any previous visit (from the past 24 hour(s)).  Medications - No data to display          Assessments & Plan (with Medical Decision Making)   Patient is a 34-year-old male who presents to the ER seeking alcohol detox bed.  Patient says he was trying to go to a residential treatment facility but he drank too much last night and they are unable to accept him until he has gone through detox.  Patient here is has no acute medical or psychiatric concerns.  The inpatient CD treatment for further care    I have reviewed the nursing notes.    I have reviewed the findings, diagnosis, plan and need for follow up with the patient.    New Prescriptions    No medications on file       Final diagnoses:   Alcoholism /alcohol abuse (H)   I, Tequila Banegas, am serving as a trained medical scribe to document services personally performed by Winnie Hernandez MD based on the provider's statements to me on June 16, 2020.  This document has been checked and approved by the attending provider.    IWinnie MD, was physically present and have reviewed and verified the accuracy of this note documented by Tequila Banegas medical scribe.       6/16/2020   [facility], EMERGENCY DEPARTMENT     Winnie Hernandez MD  06/16/20 3050

## 2020-06-16 NOTE — DISCHARGE INSTRUCTIONS
Behavioral Discharge Planning and Instructions  THANK YOU FOR CHOOSING THE [facility]  3A  [phone]    Summary: You were admitted to Station 3A on 6/16/20 for detoxification from alcohol.  A medical exam was performed that included lab work. You have met with a  and opted to enter [facility].  Please take care and make your recovery a priority, Nikhil!    Main Diagnosis: Per Dr. Ava Lew MD;  303.90 (F10.20) Alcohol Use Disorder Severe      Major Treatments, Procedures and Findings:  You were detoxed from alcohol with the Modified Selective Severity Protocol using Valium. You have met with a  to develop a treatment plan for discharge.  You have had labs drawn and a copy of those labs will be sent home with you.  Please bring your lab results with to your follow up doctor appointment.    Symptoms to Report:  If you experience more anxiety, confusion, sleeplessness, deep sadness or thoughts of suicide, notify your treatment team or notify your primary care physician. IF ANY OF THE SYMPTOMS YOU ARE EXPERIENCING ARE A MEDICAL EMERGENCY CALL 911 IMMEDIATELY.     Lifestyle Adjustment: Adjust your lifestyle to get enough sleep, relaxation, exercise and  good nutrition. Continue to develop healthy coping skills to decrease stress and promote a sober living environment. Do not use alcohol, illegal drugs or addictive medications other than what is currently prescribed. AA, NA, and  Sponsor are excellent resources for support.     Primary Provider:  [facility]  Sean Nuñez MD    [address], [place] [address] [phone]    Please follow up with primary MD  within 30 days for post hospitalization checkup.    Disposition: [facility]      Facts about COVID19 at www.cdc.gov/COVID19 and www.[place].gov/covid19    Keeping hands clean is one of the most important steps we can take to avoid getting sick and spreading germs to  "others.  Please wash your hands frequently and lather with soap for at least 20 seconds!    Resources:     Resources for on line recovery meetings:      *due to covid-19 AA/NA meetings are being held online*      AA meetings can be found online; search for them at: http://aa-intergroup.org/directory.php  AA meetings via [place] for [place] area can be found online at: https://a[place].org/find-a-meeting/holiday-closings/  NA meetings via [place] for [place] area can be found online at: https://sites.[employer].com/view/mnregionofnarcoticsanonymous/home?authuser=2    Www.iCAD  has online resources for meeting and recovery care including Podcast \"Let's Talk:Addiction & Recovery Podcasts    Www.mnrecConjur.org     DISCHARGE RESOURCES:  -SMART Recovery - self management for addiction recovery:  www.smartrecBar Harbor BioTechnologyy.org    -Pathways ~ A Health Crisis Resource & Support Center: 514.727.1911.  -[facility] 485-916-5708   -[facility] Behavioral Intake 764-104-3811 or 642-877-1028.  -Suicide Awareness Voices of Education (SAVE) (www.save.org): 893-915-AXMZ (5912)  -National Suicide Prevention Line (www.mentalhealthmn.org): 590-420-JGTY (1436)  -National [place] on Mental Illness (www.[place].veronica.org): 701.182.4129 or 401-861-1896.  -Otpp3puam: text the word LIFE to 33112 for immediate support and crisis intervention  -Mental Health Consumer/Survivor Network of [place] (www.mhcsn.net): 581.159.8825 or 776-767-3290  -Mental Health Association of [place] (www.mentalhealth.org): 306.305.2553 or 955-912-6925     -Substance Abuse and Mental Health Services (www.samhsa.gov)  -Harm Reduction Coalition (www. Harmreduction.org)  -www.prescribetoprevent.org or http://prescribetoprevent.org/video  -Poison control 1-181-715-0375   **[place] Opioid Prevention Coalition: www.opioidcoalition.org    Sober Support Group Information:  AA/RINA & Sponsor/Support  -Alcoholics Anonymous (www.alcoholics-anonymous.org): for local " information 24 hours/day  -AA Intergroup service office in [place] (http://www.aastpaul.org/) 781.123.6970  -AA Intergroup service office in [facility]: 414.965.1853. (http://www.aamin[place].org/)  -Narcotics Anonymous (www.naminn[place].org) (216) 635-8262   **Sober Fun Activities: www.soberWis.dmactivities.[employer]/[facility]//[facility] Recovery Connection ([facility])  [facility] connects people seeking recovery to resources that help foster and sustain long-term recovery.  Whether you are seeking resources for treatment, transportation, housing, job training, education, health care or other pathways to recovery, [facility] is a great place to start.    Phone: 231.643.4419.  www.[facility] (Great listing of all types of recovery and non-recovery related resources)      General Medication Instructions:   See your medication sheet(s) for instructions.   Take all medicines as directed.  Make no changes unless your doctor suggests them.     Any follow up concerns:  Nursing questions call the Unit -[facility] ([place], [place]) [phone]  Medical Record call [phone]  Outpatient Behavioral Intake call [phone]  LP+ Wait List/Bed Availability call [phone]    The entire treatment team has appreciated the opportunity to work with you Nikhil.  We wish you the best in the future and with your lifelong recovery goals. Please bring this discharge folder with you to all follow up appointments.  It contains your lab results, diagnosis, medication list and discharge recommendations.    THANK YOU FOR CHOOSING THE [facility]

## 2020-06-16 NOTE — TELEPHONE ENCOUNTER
S: [facility] ED calling with a 34 yr old male seeking detox from alcohol    B: pt is a 34 yr old male seeking detox from alcohol. Pt SHERITA upon arrival is 0.291. pt ESTELITA before arriving to the ED. Pt is drinking 750 ml for several months per MD in ED. Pt was to go to 90 day tx @ [facility] in [place] but needs to complete detox first.  Pt denies hx of DTs/seizures. Pt denies MH concerns. Pt is asymptomatic for COVID.   Labs /COVID collected and pending. Pt is medically cleared and ambulating   Utox to be collected    A: vol    R:  Awaiting labs   Patient cleared and ready for behavioral bed placement: Yes    Labs completed @ 1:15 pm    AST  0 - 45 U/L  63High      Glucose  70 - 99 mg/dL  105High      RDW  10.0 - 15.0 %  17.3High          Platelet Count  150 - 450 10e9/L  125     Paged provider @ 1:33 pm  3A/Vipin  Notified unit @ 1:35 pm  Paged ED @ 1:40 pm   Please collect utox

## 2020-06-17 PROCEDURE — 99231 SBSQ HOSP IP/OBS SF/LOW 25: CPT | Performed by: PHYSICIAN ASSISTANT

## 2020-06-17 PROCEDURE — 12800008 ZZH R&B CD ADULT

## 2020-06-17 PROCEDURE — 99207 ZZC CONSULT E&M CHANGED TO SUBSEQUENT LEVEL: CPT | Performed by: PHYSICIAN ASSISTANT

## 2020-06-17 PROCEDURE — 25000132 ZZH RX MED GY IP 250 OP 250 PS 637: Performed by: PHYSICIAN ASSISTANT

## 2020-06-17 PROCEDURE — 25000132 ZZH RX MED GY IP 250 OP 250 PS 637: Performed by: PSYCHIATRY & NEUROLOGY

## 2020-06-17 PROCEDURE — 25000132 ZZH RX MED GY IP 250 OP 250 PS 637: Performed by: NURSE PRACTITIONER

## 2020-06-17 PROCEDURE — 99223 1ST HOSP IP/OBS HIGH 75: CPT | Mod: GT | Performed by: PSYCHIATRY & NEUROLOGY

## 2020-06-17 RX ORDER — LOPERAMIDE HCL 2 MG
2 CAPSULE ORAL 4 TIMES DAILY PRN
Status: DISCONTINUED | OUTPATIENT
Start: 2020-06-17 | End: 2020-06-18 | Stop reason: HOSPADM

## 2020-06-17 RX ORDER — CARBOXYMETHYLCELLULOSE SODIUM 5 MG/ML
1 SOLUTION/ DROPS OPHTHALMIC
Status: DISCONTINUED | OUTPATIENT
Start: 2020-06-17 | End: 2020-06-18 | Stop reason: HOSPADM

## 2020-06-17 RX ADMIN — CARBOXYMETHYLCELLULOSE SODIUM 1 DROP: 5 SOLUTION/ DROPS OPHTHALMIC at 22:33

## 2020-06-17 RX ADMIN — FOLIC ACID 1 MG: 1 TABLET ORAL at 08:25

## 2020-06-17 RX ADMIN — ACAMPROSATE CALCIUM ENTERIC-COATED 666 MG: 333 TABLET, DELAYED RELEASE ORAL at 20:24

## 2020-06-17 RX ADMIN — TRAZODONE HYDROCHLORIDE 100 MG: 50 TABLET ORAL at 22:35

## 2020-06-17 RX ADMIN — CARBOXYMETHYLCELLULOSE SODIUM 1 DROP: 5 SOLUTION/ DROPS OPHTHALMIC at 16:57

## 2020-06-17 RX ADMIN — DIAZEPAM 10 MG: 5 TABLET ORAL at 00:42

## 2020-06-17 RX ADMIN — DIAZEPAM 10 MG: 5 TABLET ORAL at 12:32

## 2020-06-17 RX ADMIN — CARBOXYMETHYLCELLULOSE SODIUM 1 DROP: 5 SOLUTION/ DROPS OPHTHALMIC at 21:17

## 2020-06-17 RX ADMIN — MULTIPLE VITAMINS W/ MINERALS TAB 1 TABLET: TAB at 08:25

## 2020-06-17 RX ADMIN — HYDROXYZINE HYDROCHLORIDE 25 MG: 25 TABLET, FILM COATED ORAL at 22:56

## 2020-06-17 RX ADMIN — DIAZEPAM 10 MG: 5 TABLET ORAL at 04:21

## 2020-06-17 RX ADMIN — GABAPENTIN 300 MG: 300 CAPSULE ORAL at 20:24

## 2020-06-17 RX ADMIN — DIAZEPAM 10 MG: 5 TABLET ORAL at 09:19

## 2020-06-17 RX ADMIN — HYDROXYZINE HYDROCHLORIDE 25 MG: 25 TABLET, FILM COATED ORAL at 19:09

## 2020-06-17 RX ADMIN — HYDROXYZINE HYDROCHLORIDE 25 MG: 25 TABLET, FILM COATED ORAL at 09:25

## 2020-06-17 RX ADMIN — GABAPENTIN 300 MG: 300 CAPSULE ORAL at 08:25

## 2020-06-17 RX ADMIN — GABAPENTIN 300 MG: 300 CAPSULE ORAL at 13:11

## 2020-06-17 RX ADMIN — LOPERAMIDE HYDROCHLORIDE 2 MG: 2 CAPSULE ORAL at 20:25

## 2020-06-17 RX ADMIN — CARBOXYMETHYLCELLULOSE SODIUM 1 DROP: 5 SOLUTION/ DROPS OPHTHALMIC at 19:04

## 2020-06-17 RX ADMIN — ACAMPROSATE CALCIUM ENTERIC-COATED 666 MG: 333 TABLET, DELAYED RELEASE ORAL at 13:11

## 2020-06-17 RX ADMIN — DIAZEPAM 10 MG: 5 TABLET ORAL at 06:56

## 2020-06-17 RX ADMIN — ACAMPROSATE CALCIUM ENTERIC-COATED 666 MG: 333 TABLET, DELAYED RELEASE ORAL at 08:25

## 2020-06-17 RX ADMIN — THIAMINE HCL TAB 100 MG 100 MG: 100 TAB at 08:25

## 2020-06-17 ASSESSMENT — ACTIVITIES OF DAILY LIVING (ADL)
ORAL_HYGIENE: INDEPENDENT
HYGIENE/GROOMING: INDEPENDENT
DRESS: INDEPENDENT

## 2020-06-17 NOTE — PROGRESS NOTES
Met with Pt to initiate discharge planning.  Pt has a bed held at [facility].  Pt signed MITRA and call placed to [facility] to confirm admission.  [facility] continues to hold Pt's bed.  Request we call when able to predict discharge to coordinate admission.  Pt reports his mother will transport him.  [facility] not take admissions on the weekend.

## 2020-06-17 NOTE — PLAN OF CARE
Behavioral Team Discussion: (6/17/2020)    Continued Stay Criteria/Rationale: Patient admitted for alcohol withdrawal and alcohol Use Disorder.  Plan: The following services will be provided to the patient; psychiatric assessment, medication management, therapeutic milieu, individual and group support, and skills groups.   Participants: 3A Provider: Dr. vAa Lew MD; 3A RN's: Donna Da Silva RN; 3A CM's: Shelley Catalan .  Summary/Recommendation: Providers will assess today for treatment recommendations, discharge planning, and aftercare plans. CM will meet with pt for discharge planning.   Medical/Physical: None noted  Precautions:   Behavioral Orders   Procedures     Code 1 - Restrict to Unit     Routine Programming     As clinically indicated     Status 15     Every 15 minutes.     Withdrawal precautions     Rationale for change in precautions or plan: N/A  Progress: No Change.
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
## 2020-06-17 NOTE — PROVIDER NOTIFICATION
S:  Pt complained of itchy eyes, stated that it was caused by allergies.  There is no sign of infection (redness, matter).  He requested anti-allergy eye drops, states that he uses Loratadine at home.    B:  Pt admitted for alcohol withdrawal and detoxification.  A:  Pt with complaints of eye irritation.  R:  Writer contacted on-call Internal Medicine provider (Rolanda Clarke).  She responded and put an order in for carboxymethylcellulose PF 0.5% ophthalmic solution.  Writer requested a dose from the pharmacy, and administered it.  Pt may have it every hour PRN.

## 2020-06-17 NOTE — PLAN OF CARE
MSSA's 10, 8 & 7 this shift.  Received prn valium 10mg for first 2 MSSA's.  Pt requested/received prn hydroxyzine at 0925 for complaint of anxiety and allergy symptoms.  Medication compliant.  Denies SI/SIB/HI.  Denies A/VH's.  Pt cooperative, behaviorally controlled.    BP's elevated: @0740: 157/101, HR 94, @0913: 155/96, , @1120: 157/104, HR 86, @1445: 153/102, HR 97.  Pt has received 70mg valium since admit yesterday evening.  Pt awake/alert, reports some tiredness--does not appear overly sedated, currently watching TV in [place] w/ peers.  Will continue to monitor and support plan of care.

## 2020-06-17 NOTE — CONSULTS
[facility], [place]    Internal Medicine Initial Consult       Date of Admission: 6/16/2020  Consult Requested by: Ava Lew MD  Reason for Consult: General Medical Evaluation    Assessment & Recommendations  Nikhil Rios is a 34 year old man with a history of alcohol abuse who is admitted to [facility] for detox from alcohol.        Alcohol Abuse & Withdrawal. Management per psychiatry team.     Elevated Blood Pressure. BP 130s-160s/80s-100s. Denies baseline HTN dx but reports BPs run high in context of ETOH w/d which is likely the case currently.   - Treat ETOH w/d. Notify IM if BP persistently > 170/110.     Transaminitis. AST 63, other LFTs wnl. Suspect d/t ETOH.   - No recheck needed here unless change in clinical status.     Mild Pancytopenia. Plt 125K. Also suspect d/t ETOH abuse. No s/s bleeding.   - No recheck needed here unless change in clinical status.     Medicine will sign off, please page with any additional concerns.     Tori Alanis PA-C  Hospitalist Service  [facility]  Pager: [phone]  ______________________________________________________________________    Reason for Admission  Alcohol Abuse    Chief Complaint   Alcohol Withdrawal    History of Present Illness   History is obtained from the patient and medical record.     Nikhil Rios is a 34 year old year old man with a history of alcohol abuse admitted to behavioral health for detox from alcohol. He has a treatment bed hold but could not enter there unless detoxed.     Internal Medicine service was asked to see patient for a general medical evaluation. Currently, patient is feeling ok. Thinks his detox will be mild given his relatively recent relapse. Denies any physical concerns or recent illnesses/injuries.     Review of Systems   10 point ROS performed and negative unless otherwise noted in HPI     Past Medical History    I have reviewed this patient's medical history and updated it  "with pertinent information if needed.   Past Medical History:   Diagnosis Date     Alcohol abuse         Past Surgical History   I have reviewed this patient's surgical history and updated it with pertinent information if needed.  Past Surgical History:   Procedure Laterality Date     NO HISTORY OF SURGERY          Social History   Social History     Tobacco Use     Smoking status: Former Smoker     Packs/day: 0.25     Smokeless tobacco: Never Used   Substance Use Topics     Alcohol use: Yes     Comment: 750ml daily of vodka     Drug use: No       Family History   Reviewed and non contributory.    Medications   Medications Prior to Admission   Medication Sig Dispense Refill Last Dose     acamprosate (CAMPRAL) 333 MG EC tablet Take 2 tablets (666 mg) by mouth 3 times daily 180 tablet 3 6/15/2020 at PM     gabapentin (NEURONTIN) 300 MG capsule Take 1 capsule (300 mg) by mouth 3 times daily 90 capsule 0 6/15/2020 at PM     hydrOXYzine (ATARAX) 25 MG tablet Take 25 mg by mouth 3 times daily as needed for anxiety   Past Week     hypromellose-dextran (HYPROMELLOSE-DEXTRAN 0.3-0.1%) 0.1-0.3 % ophthalmic solution Place 1 drop into the right eye daily as needed for dry eyes 15 mL 0 6/15/2020     traZODone (DESYREL) 100 MG tablet Take 100 mg by mouth At Bedtime   6/15/2020 at PM       Allergies   Allergies   Allergen Reactions     Pollen Extract Rash     grass tree pollen       Physical Exam   BP (!) 157/104 (BP Location: Left arm)   Pulse 86   Temp 98.3  F (36.8  C) (Temporal)   Resp 16   Ht 1.803 m (5' 11\")   Wt 103.1 kg (227 lb 6.4 oz)   SpO2 94%   BMI 31.72 kg/m     GENERAL: Alert and oriented x 3. Ambulatory on unit, appears comfortable. Conversant.   HEENT: Anicteric sclera. Mucous membranes moist.   CV: RRR. S1, S2. No murmurs appreciated.   RESPIRATORY: Effort normal on room air. Lungs CTAB with no wheezing, rales, rhonchi.   GI: Abdomen soft, non distended, non tender.   NEUROLOGICAL: No focal deficits. Moves " all extremities.   EXTREMITIES: No peripheral edema. Warm and well perfused.   SKIN: No jaundice. No rashes.     Data   Data reviewed today: I reviewed all medications, new labs and imaging results over the last 24 hours.

## 2020-06-17 NOTE — PROGRESS NOTES
06/16/20 2200   Therapeutic Recreation   Type of Intervention structured groups   Activity leisure education   Response Participates, initiates socially appropriate   Hours 1     Pt participated in Therapeutic Recreation group with focus on leisure education and healthy coping strategies. Pt was fully engaged and cooperative in a group discussion. Pt participated through the entire duration of the group. Pt discussed a healthy interest, weight lifting, that he enjoyed during free time during group discussion. Pt shared with the group a positive coping strategy that was helpful with sobriety. At times, pt would join in on side talk, minimizing and laughing about ways he'd would get away with drinking. Pt shared a lot throughout group, often making it difficult for others to join in the discussion.

## 2020-06-17 NOTE — PHARMACY-ADMISSION MEDICATION HISTORY
Admission Medication History Completed by Pharmacy    See [facility] Admission Navigator for allergy information, preferred outpatient pharmacy, prior to admission medications and immunization status.     Medication History Sources:     Patient, Surescripts    Changes made to PTA medication list (reason):    Added: None    Deleted: None    Changed: Hydroxyzine - q4h prn >>> TID prn (New Rx 6/10).    Additional Information:    Patient was a reliable medication historian. Patient does not report any over the counter medication use. Fill records consistent with patient report.    Prior to Admission medications    Medication Sig Last Dose Taking? Auth Provider   acamprosate (CAMPRAL) 333 MG EC tablet Take 2 tablets (666 mg) by mouth 3 times daily 6/15/2020 at PM Yes Mark Alberto MD   gabapentin (NEURONTIN) 300 MG capsule Take 1 capsule (300 mg) by mouth 3 times daily 6/15/2020 at PM Yes Ava Lew MD   hydrOXYzine (ATARAX) 25 MG tablet Take 25 mg by mouth 3 times daily as needed for anxiety Past Week Yes Unknown, Entered By History   hypromellose-dextran (HYPROMELLOSE-DEXTRAN 0.3-0.1%) 0.1-0.3 % ophthalmic solution Place 1 drop into the right eye daily as needed for dry eyes 6/15/2020 Yes Ava Lew MD   traZODone (DESYREL) 100 MG tablet Take 100 mg by mouth At Bedtime 6/15/2020 at PM Yes Unknown, Entered By History       Date completed: 06/16/20    Medication history completed by: Yoseph Ospina

## 2020-06-18 VITALS
TEMPERATURE: 97.4 F | HEART RATE: 74 BPM | HEIGHT: 71 IN | SYSTOLIC BLOOD PRESSURE: 148 MMHG | DIASTOLIC BLOOD PRESSURE: 84 MMHG | WEIGHT: 227.4 LBS | OXYGEN SATURATION: 99 % | RESPIRATION RATE: 16 BRPM | BODY MASS INDEX: 31.84 KG/M2

## 2020-06-18 PROCEDURE — 99239 HOSP IP/OBS DSCHRG MGMT >30: CPT | Mod: GT | Performed by: PSYCHIATRY & NEUROLOGY

## 2020-06-18 PROCEDURE — 25000132 ZZH RX MED GY IP 250 OP 250 PS 637: Performed by: PHYSICIAN ASSISTANT

## 2020-06-18 PROCEDURE — 25000132 ZZH RX MED GY IP 250 OP 250 PS 637: Performed by: PSYCHIATRY & NEUROLOGY

## 2020-06-18 RX ORDER — MULTIPLE VITAMINS W/ MINERALS TAB 9MG-400MCG
1 TAB ORAL DAILY
Qty: 30 TABLET | Refills: 0 | Status: ON HOLD | OUTPATIENT
Start: 2020-06-19 | End: 2020-10-25

## 2020-06-18 RX ORDER — LANOLIN ALCOHOL/MO/W.PET/CERES
100 CREAM (GRAM) TOPICAL DAILY
Qty: 30 TABLET | Refills: 0 | Status: ON HOLD | OUTPATIENT
Start: 2020-06-19 | End: 2020-10-25

## 2020-06-18 RX ADMIN — THIAMINE HCL TAB 100 MG 100 MG: 100 TAB at 08:44

## 2020-06-18 RX ADMIN — MULTIPLE VITAMINS W/ MINERALS TAB 1 TABLET: TAB at 08:44

## 2020-06-18 RX ADMIN — HYDROXYZINE HYDROCHLORIDE 25 MG: 25 TABLET, FILM COATED ORAL at 10:18

## 2020-06-18 RX ADMIN — CARBOXYMETHYLCELLULOSE SODIUM 1 DROP: 5 SOLUTION/ DROPS OPHTHALMIC at 10:20

## 2020-06-18 RX ADMIN — CARBOXYMETHYLCELLULOSE SODIUM 1 DROP: 5 SOLUTION/ DROPS OPHTHALMIC at 08:20

## 2020-06-18 RX ADMIN — GABAPENTIN 300 MG: 300 CAPSULE ORAL at 08:44

## 2020-06-18 RX ADMIN — ACAMPROSATE CALCIUM ENTERIC-COATED 666 MG: 333 TABLET, DELAYED RELEASE ORAL at 08:44

## 2020-06-18 RX ADMIN — FOLIC ACID 1 MG: 1 TABLET ORAL at 08:44

## 2020-06-18 NOTE — PLAN OF CARE
Pt verbalized complete understanding of all discharge instructions. Pt discharged to [facility] transported by his family.

## 2020-06-18 NOTE — DISCHARGE SUMMARY
Telemedicine Visit: The patient's condition can be safely assessed and treated via synchronous audio and visual telemedicine encounter.   Start Time: 8.48  Stop Time: 8.54  Reason for Telemedicine Visit: Covid-19   Originating Site (Patient Location): [facility]  Distant Site (Provider Location): Provider Remote Setting   Consent: The patient/guardian has verbally consented to: the potential risks and benefits of telemedicine (video visit) versus in person care; bill my insurance or make self-payment for services provided; and responsibility for payment of non-covered services.   Mode of Communication: Video Conference via polycom  As the provider I attest to compliance with applicable laws and regulations related to telemedicine.       The patient/guardian has been notified of the following:   This telemedicine visit is conducted live between you and your clinician. We have found that certain health care needs can be provided without the need for a physical exam. This service lets us provide the care you need with a telemedicine conversation.

## 2020-06-18 NOTE — DISCHARGE SUMMARY
Admit Date:     06/16/2020   Discharge Date:     06/18/2020      The patient's condition can be safely treated via synchronous audiovisual telemedicine encounter.  Start time 8:48, stop time 8:54.      REASON FOR TELEMEDICINE:  COVID-19.        ORIGINATING SITE:  [address] [place] [address].      DISTANT SITE:  Provider remote setting.      DISCHARGE DIAGNOSES:   Axis I:   1.  Alcohol use disorder, severe.   2.  Alcohol withdrawal, completed.      Please see detailed admission by Dr. Lew on 06/17/2020.      HOSPITAL COURSE:  During the hospitalization, the patient completed detox.  His energy, motivation, sleep and interest improved.  He met with the [facility] for Internal Medicine consult.  The patient completed detox.      DISCHARGE DISPOSITION:  The patient going to treatment.      DISCHARGE MENTAL STATUS EXAMINATION:  The patient is alert, oriented x3.  Recent and remote memory, language and fund of knowledge adequate.  No loose associations.  The patient is stable to be discharged.                      Labs:     Recent Results (from the past 48 hour(s))   Alcohol breath test POCT    Collection Time: 06/20/20  5:26 PM   Result Value Ref Range    Alcohol Breath Test 0.443 (A) 0.00 - 0.01   Drug abuse screen 6 urine (tox)    Collection Time: 06/20/20  5:38 PM   Result Value Ref Range    Amphetamine Qual Urine Negative NEG^Negative    Barbiturates Qual Urine Negative NEG^Negative    Benzodiazepine Qual Urine Positive (A) NEG^Negative    Cannabinoids Qual Urine Negative NEG^Negative    Cocaine Qual Urine Negative NEG^Negative    Ethanol Qual Urine Positive (A) NEG^Negative    Opiates Qualitative Urine Negative NEG^Negative     Because this patient meets criteria for an Alcohol Use Disorder, I performed the following brief intervention on the date of this note:              1) Expressed concern that the patient is drinking at unhealthy levels known to increase their risk of alcohol related problems              2) Gave feedback  linking alcohol use and health, including personalized feedback explaining how alcohol use can interact with their medical and/or psychiatric problems, and with prescribed medications.              3) Advised patient to abstain.      DISCHARGE MENTAL STATUS EXAMINATION:  The patient is alert, oriented x3.  Good fund of knowledge.  Good use of language.  Recent and remote memory, language, fund of knowledge are all adequate.  Euthymic mood congruent affect  Speech normal rate/rhythm linear tp no loose asso,The patient does not have any active suicidal or homicidal ideation.  Does not have any auditory or visual hallucination.  Fair insight/judgment At this time, the patient was stable to be discharged.        Pt was not determined to not be a danger to himself or others. At the current time of discharge, the patient does not meet criteria for involuntary hospitalization. On the day of discharge, the patient reports that they do not have suicidal or homicidal ideation and would never hurt themselves or others. Steps taken to minimize risk include: assessing patient s behavior and thought process daily during hospital stay, discharging patient with adequate plan for follow up for mental and physical health and discussing safety plan of returning to the hospital should the patient ever have thoughts of harming themselves or others. Therefore, based on all available evidence including the factors cited above, the patient does not appear to be at imminent risk for self-harm, and is appropriate for outpatient level of care.     Educated about side effects/risk vs benefits /alternative including non treatment.Pt consented to be on medication.     .Total time spent on discharge summary more than 35 min  More than  20 min  planning, coordination of care, medication reconciliation and performance of physical exam on day of discharge.Care was coordinated with unit RN and unit therapist    .   Nikhil Rios   Home Medication  Instructions KEVIN:[id number]    Printed on:20 1214   Medication Information                      acamprosate (CAMPRAL) 333 MG EC tablet  Take 2 tablets (666 mg) by mouth 3 times daily             gabapentin (NEURONTIN) 300 MG capsule  Take 1 capsule (300 mg) by mouth 3 times daily             hydrOXYzine (ATARAX) 25 MG tablet  Take 25 mg by mouth 3 times daily as needed for anxiety             hypromellose-dextran (HYPROMELLOSE-DEXTRAN 0.3-0.1%) 0.1-0.3 % ophthalmic solution  Place 1 drop into the right eye daily as needed for dry eyes             multivitamin w/minerals (THERA-VIT-M) tablet  Take 1 tablet by mouth daily             traZODone (DESYREL) 100 MG tablet  Take 100 mg by mouth At Bedtime             vitamin B1 (THIAMINE) 100 MG tablet  Take 1 tablet (100 mg) by mouth daily             Primary Provider:  [facility]  Sean Nuñez MD    [address] [place] [address] [phone]    Please follow up with primary MD  within 30 days for post hospitalization checkup.     Disposition: [facility]    ELMIRA JETER MD             D: 2020   T: 2020   MT: PK      Name:     QUYEN QUIROZ   MRN:      [id number]        Account:        [id number]   :      1985           Admit Date:     2020                                  Discharge Date: 2020      Document: [id number]

## 2020-06-18 NOTE — PROGRESS NOTES
Spoke with Ritu Saba as Pt is likely OOD at noon today.  Latest Pt can arrive for admission is 1330.  Requesting MD place Pt care order for discharge when OOD and order medications if needed for smooth transition to treatment today.

## 2020-06-20 ENCOUNTER — HOSPITAL ENCOUNTER (EMERGENCY)
Facility: CLINIC | Age: 35
Discharge: HOME | End: 2020-06-21
Attending: EMERGENCY MEDICINE | Admitting: EMERGENCY MEDICINE
Payer: COMMERCIAL

## 2020-06-20 DIAGNOSIS — F10.229 ALCOHOL DEPENDENCE WITH INTOXICATION WITH COMPLICATION (H): ICD-10-CM

## 2020-06-20 DIAGNOSIS — F10.220 ALCOHOL DEPENDENCE WITH UNCOMPLICATED INTOXICATION (H): ICD-10-CM

## 2020-06-20 LAB
ALCOHOL BREATH TEST: 0.44 (ref 0–0.01)
AMPHETAMINES UR QL SCN: NEGATIVE
BARBITURATES UR QL: NEGATIVE
BENZODIAZ UR QL: POSITIVE
CANNABINOIDS UR QL SCN: NEGATIVE
COCAINE UR QL: NEGATIVE
ETHANOL UR QL SCN: POSITIVE
OPIATES UR QL SCN: NEGATIVE

## 2020-06-20 PROCEDURE — 80320 DRUG SCREEN QUANTALCOHOLS: CPT | Performed by: FAMILY MEDICINE

## 2020-06-20 PROCEDURE — 80307 DRUG TEST PRSMV CHEM ANLYZR: CPT | Performed by: FAMILY MEDICINE

## 2020-06-20 PROCEDURE — 99285 EMERGENCY DEPT VISIT HI MDM: CPT

## 2020-06-20 PROCEDURE — 82075 ASSAY OF BREATH ETHANOL: CPT

## 2020-06-20 PROCEDURE — 99283 EMERGENCY DEPT VISIT LOW MDM: CPT | Mod: Z6 | Performed by: EMERGENCY MEDICINE

## 2020-06-20 NOTE — ED AVS SNAPSHOT
[facility], [place], Emergency Department  [address]  [place] [address]  Phone:  [phone]  Fax:  [phone]                                    Nikhil Rios   MRN: [id number]    Department:  [facility], Emergency Department   Date of Visit:  6/20/2020           After Visit Summary Signature Page    I have received my discharge instructions, and my questions have been answered. I have discussed any challenges I see with this plan with the nurse or doctor.    ..........................................................................................................................................  Patient/Patient Representative Signature      ..........................................................................................................................................  Patient Representative Print Name and Relationship to Patient    ..................................................               ................................................  Date                                   Time    ..........................................................................................................................................  Reviewed by Signature/Title    ...................................................              ..............................................  Date                                               Time          22EPIC Rev 08/18

## 2020-06-21 VITALS
SYSTOLIC BLOOD PRESSURE: 137 MMHG | DIASTOLIC BLOOD PRESSURE: 89 MMHG | WEIGHT: 235 LBS | TEMPERATURE: 95.7 F | OXYGEN SATURATION: 95 % | HEART RATE: 101 BPM | BODY MASS INDEX: 32.78 KG/M2 | RESPIRATION RATE: 16 BRPM

## 2020-06-21 NOTE — ED PROVIDER NOTES
"ED Provider Note  [facility]      History     Chief Complaint   Patient presents with     Addiction Problem     ETOH:      HPI  Nikhil Rios is a 34 year old male who has a PMH notable for alcohol abuse and previous withdrawal, previous etoh-related pancreatitis, urticaria, allergic rhinitis, et al, presenting to ED in setting of alcohol intoxication (recent admission to CD/Detox 4 days prior, multiple ED visits per month for such).     Patient denies any physical symptoms or concerns, does not think is in withdrawal, though reports having a history of such. Pt had initially voiced to triage that he may want detox, and has since changed is mind, and so would just like to stay until safe/sober enough for discharge.  Denies any MH issues. Denies SI or HI. Denies any physical complaints. No fevers, chills, colds/flu illnesses, no known ill contacts. No pain, traumas or falls. No GI sx's. No other symptoms or complaints at this time. Please see ROS for further details.    Last drink was prior to arrival. Denies any other drug use. No \"street drugs\".     Past Medical History  Past Medical History:   Diagnosis Date     Alcohol abuse      Past Surgical History:   Procedure Laterality Date     NO HISTORY OF SURGERY       acamprosate (CAMPRAL) 333 MG EC tablet  gabapentin (NEURONTIN) 300 MG capsule  hydrOXYzine (ATARAX) 25 MG tablet  hypromellose-dextran (HYPROMELLOSE-DEXTRAN 0.3-0.1%) 0.1-0.3 % ophthalmic solution  multivitamin w/minerals (THERA-VIT-M) tablet  traZODone (DESYREL) 100 MG tablet  vitamin B1 (THIAMINE) 100 MG tablet      Allergies   Allergen Reactions     Pollen Extract Rash     grass tree pollen     Past medical history, past surgical history, medications, and allergies were reviewed with the patient.   Family History  No family history on file.  Family history was reviewed with the patient.     Social History  Social History     Tobacco Use     Smoking status: Former Smoker     " "Packs/day: 0.25     Smokeless tobacco: Never Used   Substance Use Topics     Alcohol use: Yes     Comment: 750ml daily of vodka     Drug use: No      Social history was reviewed with the patient. Etoh use, denies \"street drug\" use.     Review of Systems   Constitutional: Negative for fever.   Respiratory: Negative for cough and shortness of breath.    Gastrointestinal: Negative for abdominal pain, blood in stool and vomiting.   Musculoskeletal: Negative for neck pain and neck stiffness.   Skin: Negative.    Allergic/Immunologic: Negative for immunocompromised state.   Psychiatric/Behavioral: Negative for suicidal ideas.   All other systems reviewed and are negative.        Physical Exam   BP: 132/87  Pulse: 107  Heart Rate: 98  Temp: 98.7  F (37.1  C)  Resp: 16  Weight: 106.6 kg (235 lb)  SpO2: 95 %  Physical Exam  CONSTITUTIONAL: Well-developed and well-nourished. Awake and alert. Non-toxic appearance. No acute distress. Intoxicated.   HENT:   - Head: Normocephalic and atraumatic.   - Ears: Hearing and external ear grossly normal.   - Nose: Nose normal. No rhinorrhea. No epistaxis.   - Mouth/Throat: MMM  EYES: Conjunctivae and lids are normal. No scleral icterus.   NECK: Normal range of motion and phonation normal. Neck supple.  No tracheal deviation, no stridor. No edema or erythema noted.  CARDIOVASCULAR: Normal rate, regular rhythm   PULMONARY/CHEST: Normal work of breathing. No accessory muscle usage or stridor. No respiratory distress.  .  ABDOMEN: Soft, non-distended. No tenderness. No rigidity, rebound or guarding.   MUSCULOSKELETAL: Extremities warm and seemingly well perfused.   NEUROLOGIC: Awake, alert. Not disoriented. He displays no atrophy and no tremor. Normal tone. No seizure activity. GCS 15 (though intoxicated).  SKIN: Skin is warm and dry. No rash noted. No diaphoresis. No pallor.   PSYCHIATRIC: Normal mood and affect. Speech and behavior normal. Thought processes linear. Cognition and memory are " "normal.     ED Course     ED Course as of Jun 21 1303   Sat Jun 20, 2020   1907 Patient visibly intoxicated but says this alcohol level \"is weak for me\" and laughs/scoffs.   Alcohol Breath Test(!): 0.443        Assessments & Plan (with Medical Decision Making)   IMPRESSION: 34 year old male w/ PMH notable for  alcohol abuse and previous withdrawal, previous etoh-related pancreatitis, urticaria, allergic rhinitis, et al, presenting to ED in setting of alcohol intoxication (recent admission to CD/Detox 4 days prior, multiple ED visits per month for such).     Clinically, patient appears quite well. Vitals reassuring. No physical complaints or obvious findings on exam outside of intoxication.     PLAN: Clinically monitor until safe/sober. Address any needs as they come up during ED course.     RE-EVALUATION:  - Pt otherwise continues to do well here in the ED, no acute issues or apparent concerning changes in vitals or clinical appearance.    SIGNOUT:  - Patient signed out to overnight EM Physician.  - Impression at shift change: Intoxicated (etoh)  - Pending at shift change: Clinical monitoring  - Tentative plan: Monitor until sober, reasses for any med/MH/CD needs as yari and manage/advise accordingly.        ______________________________________________________________________      --  Cassidy Tirado MD   Emergency Medicine   [facility] EMERGENCY DEPARTMENT  6/20/2020     Cassidy Tirado MD  06/22/20 1714    "

## 2020-06-21 NOTE — DISCHARGE INSTRUCTIONS
If you decide you are interested in detox, call [phone] to check for inpatient bed availability.

## 2020-06-21 NOTE — ED NOTES
The patient was accepted at shift change signout with a plan to allow him to metabolize his alcohol overnight, then discharge in the morning.  He is able to ambulate independently and appears clinically sober at this point time.  He is not percent acute complaints, is discharged home at this time per plan at time of signout.  He is given the phone number for chemical dependency intake and instructed to call to check for inpatient bed availability if he decides he is interested in detox at a later time.  He is agreeable to the plan.    Dictation Disclaimer: Some of this Note has been completed with voice-recognition dictation software. Although errors are generally corrected real-time, there is the potential for a rare error to be present in the completed chart.       Christa Myers MD  06/21/20 0644

## 2020-06-22 ENCOUNTER — TELEPHONE (OUTPATIENT)
Dept: BEHAVIORAL HEALTH | Facility: CLINIC | Age: 35
End: 2020-06-22

## 2020-06-22 ASSESSMENT — ENCOUNTER SYMPTOMS
VOMITING: 0
NECK STIFFNESS: 0
ABDOMINAL PAIN: 0
COUGH: 0
SHORTNESS OF BREATH: 0
NECK PAIN: 0
FEVER: 0
BLOOD IN STOOL: 0

## 2020-06-25 ENCOUNTER — HOSPITAL ENCOUNTER (EMERGENCY)
Facility: CLINIC | Age: 35
Discharge: HOME | End: 2020-06-25
Attending: EMERGENCY MEDICINE | Admitting: EMERGENCY MEDICINE
Payer: COMMERCIAL

## 2020-06-25 VITALS
SYSTOLIC BLOOD PRESSURE: 160 MMHG | TEMPERATURE: 98 F | OXYGEN SATURATION: 100 % | RESPIRATION RATE: 16 BRPM | DIASTOLIC BLOOD PRESSURE: 95 MMHG | HEART RATE: 86 BPM

## 2020-06-25 DIAGNOSIS — F10.930 ALCOHOL WITHDRAWAL SYNDROME WITHOUT COMPLICATION (H): ICD-10-CM

## 2020-06-25 LAB
ALBUMIN SERPL-MCNC: 4.5 G/DL (ref 3.4–5)
ALCOHOL BREATH TEST: 0 (ref 0–0.01)
ALP SERPL-CCNC: 122 U/L (ref 40–150)
ALT SERPL W P-5'-P-CCNC: 44 U/L (ref 0–70)
ANION GAP SERPL CALCULATED.3IONS-SCNC: 14 MMOL/L (ref 3–14)
AST SERPL W P-5'-P-CCNC: 43 U/L (ref 0–45)
AST SERPL W P-5'-P-CCNC: ABNORMAL U/L (ref 0–45)
BASOPHILS # BLD AUTO: 0.1 10E9/L (ref 0–0.2)
BASOPHILS NFR BLD AUTO: 0.7 %
BILIRUB SERPL-MCNC: 1.6 MG/DL (ref 0.2–1.3)
BUN SERPL-MCNC: 12 MG/DL (ref 7–30)
CALCIUM SERPL-MCNC: 9.4 MG/DL (ref 8.5–10.1)
CHLORIDE SERPL-SCNC: 98 MMOL/L (ref 94–109)
CO2 SERPL-SCNC: 24 MMOL/L (ref 20–32)
CREAT SERPL-MCNC: 0.83 MG/DL (ref 0.66–1.25)
DIFFERENTIAL METHOD BLD: ABNORMAL
EOSINOPHIL # BLD AUTO: 0 10E9/L (ref 0–0.7)
EOSINOPHIL NFR BLD AUTO: 0.4 %
ERYTHROCYTE [DISTWIDTH] IN BLOOD BY AUTOMATED COUNT: 16.1 % (ref 10–15)
ETHANOL SERPL-MCNC: <0.01 G/DL
GFR SERPL CREATININE-BSD FRML MDRD: >90 ML/MIN/{1.73_M2}
GLUCOSE SERPL-MCNC: 121 MG/DL (ref 70–99)
HCT VFR BLD AUTO: 42.7 % (ref 40–53)
HGB BLD-MCNC: 14.9 G/DL (ref 13.3–17.7)
IMM GRANULOCYTES # BLD: 0 10E9/L (ref 0–0.4)
IMM GRANULOCYTES NFR BLD: 0.4 %
LIPASE SERPL-CCNC: 97 U/L (ref 73–393)
LYMPHOCYTES # BLD AUTO: 1.6 10E9/L (ref 0.8–5.3)
LYMPHOCYTES NFR BLD AUTO: 18.8 %
MCH RBC QN AUTO: 30.3 PG (ref 26.5–33)
MCHC RBC AUTO-ENTMCNC: 34.9 G/DL (ref 31.5–36.5)
MCV RBC AUTO: 87 FL (ref 78–100)
MONOCYTES # BLD AUTO: 1.2 10E9/L (ref 0–1.3)
MONOCYTES NFR BLD AUTO: 13.6 %
NEUTROPHILS # BLD AUTO: 5.6 10E9/L (ref 1.6–8.3)
NEUTROPHILS NFR BLD AUTO: 66.1 %
NRBC # BLD AUTO: 0 10*3/UL
NRBC BLD AUTO-RTO: 0 /100
PLATELET # BLD AUTO: 254 10E9/L (ref 150–450)
POTASSIUM SERPL-SCNC: 3.7 MMOL/L (ref 3.4–5.3)
POTASSIUM SERPL-SCNC: 4.2 MMOL/L (ref 3.4–5.3)
PROT SERPL-MCNC: 8.8 G/DL (ref 6.8–8.8)
RBC # BLD AUTO: 4.91 10E12/L (ref 4.4–5.9)
SODIUM SERPL-SCNC: 136 MMOL/L (ref 133–144)
WBC # BLD AUTO: 8.5 10E9/L (ref 4–11)

## 2020-06-25 PROCEDURE — 80053 COMPREHEN METABOLIC PANEL: CPT | Performed by: FAMILY MEDICINE

## 2020-06-25 PROCEDURE — 25800030 ZZH RX IP 258 OP 636: Performed by: FAMILY MEDICINE

## 2020-06-25 PROCEDURE — 99285 EMERGENCY DEPT VISIT HI MDM: CPT | Mod: Z6 | Performed by: EMERGENCY MEDICINE

## 2020-06-25 PROCEDURE — 82075 ASSAY OF BREATH ETHANOL: CPT | Performed by: EMERGENCY MEDICINE

## 2020-06-25 PROCEDURE — 25000128 H RX IP 250 OP 636: Performed by: EMERGENCY MEDICINE

## 2020-06-25 PROCEDURE — 96375 TX/PRO/DX INJ NEW DRUG ADDON: CPT | Performed by: EMERGENCY MEDICINE

## 2020-06-25 PROCEDURE — 80320 DRUG SCREEN QUANTALCOHOLS: CPT | Performed by: FAMILY MEDICINE

## 2020-06-25 PROCEDURE — 96374 THER/PROPH/DIAG INJ IV PUSH: CPT | Performed by: EMERGENCY MEDICINE

## 2020-06-25 PROCEDURE — 84450 TRANSFERASE (AST) (SGOT): CPT | Performed by: FAMILY MEDICINE

## 2020-06-25 PROCEDURE — 25000132 ZZH RX MED GY IP 250 OP 250 PS 637: Performed by: EMERGENCY MEDICINE

## 2020-06-25 PROCEDURE — 85025 COMPLETE CBC W/AUTO DIFF WBC: CPT | Performed by: FAMILY MEDICINE

## 2020-06-25 PROCEDURE — 25000128 H RX IP 250 OP 636: Performed by: FAMILY MEDICINE

## 2020-06-25 PROCEDURE — 84132 ASSAY OF SERUM POTASSIUM: CPT | Performed by: FAMILY MEDICINE

## 2020-06-25 PROCEDURE — 99285 EMERGENCY DEPT VISIT HI MDM: CPT | Mod: 25 | Performed by: EMERGENCY MEDICINE

## 2020-06-25 PROCEDURE — 83690 ASSAY OF LIPASE: CPT | Performed by: FAMILY MEDICINE

## 2020-06-25 PROCEDURE — 96361 HYDRATE IV INFUSION ADD-ON: CPT | Performed by: EMERGENCY MEDICINE

## 2020-06-25 RX ORDER — LORAZEPAM 2 MG/ML
1 INJECTION INTRAMUSCULAR ONCE
Status: COMPLETED | OUTPATIENT
Start: 2020-06-25 | End: 2020-06-25

## 2020-06-25 RX ORDER — ONDANSETRON 2 MG/ML
4 INJECTION INTRAMUSCULAR; INTRAVENOUS EVERY 30 MIN PRN
Status: DISCONTINUED | OUTPATIENT
Start: 2020-06-25 | End: 2020-06-25 | Stop reason: HOSPADM

## 2020-06-25 RX ORDER — LANOLIN ALCOHOL/MO/W.PET/CERES
100 CREAM (GRAM) TOPICAL DAILY
Status: DISCONTINUED | OUTPATIENT
Start: 2020-06-25 | End: 2020-06-25 | Stop reason: HOSPADM

## 2020-06-25 RX ORDER — SODIUM CHLORIDE 9 MG/ML
1000 INJECTION, SOLUTION INTRAVENOUS CONTINUOUS
Status: DISCONTINUED | OUTPATIENT
Start: 2020-06-25 | End: 2020-06-25 | Stop reason: HOSPADM

## 2020-06-25 RX ORDER — ONDANSETRON 2 MG/ML
4 INJECTION INTRAMUSCULAR; INTRAVENOUS ONCE
Status: COMPLETED | OUTPATIENT
Start: 2020-06-25 | End: 2020-06-25

## 2020-06-25 RX ORDER — MULTIPLE VITAMINS W/ MINERALS TAB 9MG-400MCG
1 TAB ORAL DAILY
Status: DISCONTINUED | OUTPATIENT
Start: 2020-06-25 | End: 2020-06-25 | Stop reason: HOSPADM

## 2020-06-25 RX ORDER — FOLIC ACID 1 MG/1
1 TABLET ORAL DAILY
Status: DISCONTINUED | OUTPATIENT
Start: 2020-06-25 | End: 2020-06-25 | Stop reason: HOSPADM

## 2020-06-25 RX ORDER — DIAZEPAM 5 MG
5-20 TABLET ORAL EVERY 30 MIN PRN
Status: DISCONTINUED | OUTPATIENT
Start: 2020-06-25 | End: 2020-06-25 | Stop reason: HOSPADM

## 2020-06-25 RX ADMIN — DIAZEPAM 10 MG: 5 TABLET ORAL at 16:22

## 2020-06-25 RX ADMIN — MULTIPLE VITAMINS W/ MINERALS TAB 1 TABLET: TAB at 16:36

## 2020-06-25 RX ADMIN — SODIUM CHLORIDE 1000 ML: 9 INJECTION, SOLUTION INTRAVENOUS at 16:24

## 2020-06-25 RX ADMIN — ONDANSETRON 4 MG: 2 INJECTION INTRAMUSCULAR; INTRAVENOUS at 16:05

## 2020-06-25 RX ADMIN — SODIUM CHLORIDE 1000 ML: 9 INJECTION, SOLUTION INTRAVENOUS at 16:05

## 2020-06-25 RX ADMIN — FOLIC ACID 1 MG: 1 TABLET ORAL at 16:36

## 2020-06-25 RX ADMIN — LORAZEPAM 1 MG: 2 INJECTION INTRAMUSCULAR; INTRAVENOUS at 16:09

## 2020-06-25 RX ADMIN — THIAMINE HCL TAB 100 MG 100 MG: 100 TAB at 16:36

## 2020-06-25 ASSESSMENT — ENCOUNTER SYMPTOMS
ABDOMINAL PAIN: 0
VOMITING: 1
NAUSEA: 1
SEIZURES: 0

## 2020-06-25 NOTE — ED PROVIDER NOTES
[facility] EMERGENCY DEPARTMENT ([facility])    6/25/20        History     Chief Complaint   Patient presents with     Alcohol Intoxication     last drink was yesterday 6/24/2020 at 1500, denies hx of seizures     The history is provided by the patient and medical records.     Nikhil Rios is a 34 year old male with a past medical history significant for alcohol abuse, alcohol induced acute pancreatitis, acute coronary syndrome, and HTN who presents here to the Emergency Department due to alcohol intoxication.   Patient had relapsed in March and has been struggling since that time with drinking.  He states he got out of detox here about a week and a half ago.  Reports that he stayed sober for 2 to 3 days and has since been drinking for the past 1 week.  He states he has been drinking 750 mL to 1 L of vodka daily since that time.  States his last drink was yesterday afternoon.  Reports that this morning around 11 AM he began vomiting.  He denies abdominal pain.  Patient denies history of alcohol withdrawal seizures. Patient is seeking detox for alcohol.  Patient's mother called intake earlier today and they are holding a bed on detox.  States that he smoked marijuana for the first time in 5 years recently.    I have reviewed the Medications, Allergies, Past Medical and Surgical History, and Social History in the [employer] system.  PAST MEDICAL HISTORY:   Past Medical History:   Diagnosis Date     Alcohol abuse        PAST SURGICAL HISTORY:   Past Surgical History:   Procedure Laterality Date     NO HISTORY OF SURGERY         Past medical history, past surgical history, medications, and allergies were reviewed with the patient. Additional pertinent items: Alcohol abuse, alcohol induced acute pancreatitis, acute coronary syndrome, and HTN    FAMILY HISTORY: No family history on file.    SOCIAL HISTORY:   Social History     Tobacco Use     Smoking status: Former Smoker     Packs/day: 0.25     Smokeless tobacco: Never  Used   Substance Use Topics     Alcohol use: Yes     Comment: 750ml daily of vodka     Social history was reviewed with the patient. Additional pertinent items: None      Discharge Medication List as of 6/25/2020  8:07 PM      CONTINUE these medications which have NOT CHANGED    Details   acamprosate (CAMPRAL) 333 MG EC tablet Take 2 tablets (666 mg) by mouth 3 times daily, Disp-180 tablet,R-3, E-Prescribe      gabapentin (NEURONTIN) 300 MG capsule Take 1 capsule (300 mg) by mouth 3 times daily, Disp-90 capsule, R-0, E-Prescribe      hydrOXYzine (ATARAX) 25 MG tablet Take 25 mg by mouth 3 times daily as needed for anxiety, Historical      hypromellose-dextran (HYPROMELLOSE-DEXTRAN 0.3-0.1%) 0.1-0.3 % ophthalmic solution Place 1 drop into the right eye daily as needed for dry eyes, Disp-15 mL, R-0, E-Prescribe      multivitamin w/minerals (THERA-VIT-M) tablet Take 1 tablet by mouth daily, Disp-30 tablet,R-0, E-Prescribe      traZODone (DESYREL) 100 MG tablet Take 100 mg by mouth At Bedtime, Historical      vitamin B1 (THIAMINE) 100 MG tablet Take 1 tablet (100 mg) by mouth daily, Disp-30 tablet,R-0, E-Prescribe                Allergies   Allergen Reactions     Pollen Extract Rash     grass tree pollen        Review of Systems   Gastrointestinal: Positive for nausea and vomiting. Negative for abdominal pain.   Neurological: Negative for seizures.   All other systems reviewed and are negative.      Physical Exam   BP: (!) 160/95  Pulse: 107  Temp: 98  F (36.7  C)  Resp: 18  SpO2: 96 %      Physical Exam  Constitutional:       General: He is not in acute distress.     Appearance: He is not diaphoretic.   HENT:      Head: Normocephalic.      Mouth/Throat:      Pharynx: No oropharyngeal exudate.   Eyes:      Extraocular Movements: Extraocular movements intact.   Neck:      Musculoskeletal: Neck supple.   Cardiovascular:      Heart sounds: Normal heart sounds.   Pulmonary:      Effort: No respiratory distress.      Breath  sounds: Normal breath sounds.   Abdominal:      General: There is no distension.      Palpations: Abdomen is soft.      Tenderness: There is no abdominal tenderness.   Musculoskeletal:         General: No deformity.   Skin:     General: Skin is dry.   Neurological:      Mental Status: He is alert.      Comments: Alert, moderate anxiety and mild tremors   Psychiatric:         Behavior: Behavior normal.         ED Course   4:26 PM  The patient was seen and examined by Silas Dick DO in Room ED11.     ED Course as of Jun 25 2058   Thu Jun 25, 2020   1820 Absolute Basophils: 0.1     Procedures        Results for orders placed or performed during the hospital encounter of 06/25/20 (from the past 24 hour(s))   Comprehensive metabolic panel   Result Value Ref Range    Sodium 136 133 - 144 mmol/L    Potassium 4.2 3.4 - 5.3 mmol/L    Chloride 98 94 - 109 mmol/L    Carbon Dioxide 24 20 - 32 mmol/L    Anion Gap 14 3 - 14 mmol/L    Glucose 121 (H) 70 - 99 mg/dL    Urea Nitrogen 12 7 - 30 mg/dL    Creatinine 0.83 0.66 - 1.25 mg/dL    GFR Estimate >90 >60 mL/min/[1.73_m2]    GFR Estimate If Black >90 >60 mL/min/[1.73_m2]    Calcium 9.4 8.5 - 10.1 mg/dL    Bilirubin Total 1.6 (H) 0.2 - 1.3 mg/dL    Albumin 4.5 3.4 - 5.0 g/dL    Protein Total 8.8 6.8 - 8.8 g/dL    Alkaline Phosphatase 122 40 - 150 U/L    ALT 44 0 - 70 U/L    AST Unsatisfactory specimen - hemolyzed 0 - 45 U/L   CBC with platelets differential   Result Value Ref Range    WBC 8.5 4.0 - 11.0 10e9/L    RBC Count 4.91 4.4 - 5.9 10e12/L    Hemoglobin 14.9 13.3 - 17.7 g/dL    Hematocrit 42.7 40.0 - 53.0 %    MCV 87 78 - 100 fl    MCH 30.3 26.5 - 33.0 pg    MCHC 34.9 31.5 - 36.5 g/dL    RDW 16.1 (H) 10.0 - 15.0 %    Platelet Count 254 150 - 450 10e9/L    Diff Method Automated Method     % Neutrophils 66.1 %    % Lymphocytes 18.8 %    % Monocytes 13.6 %    % Eosinophils 0.4 %    % Basophils 0.7 %    % Immature Granulocytes 0.4 %    Nucleated RBCs 0 0 /100    Absolute  Neutrophil 5.6 1.6 - 8.3 10e9/L    Absolute Lymphocytes 1.6 0.8 - 5.3 10e9/L    Absolute Monocytes 1.2 0.0 - 1.3 10e9/L    Absolute Eosinophils 0.0 0.0 - 0.7 10e9/L    Absolute Basophils 0.1 0.0 - 0.2 10e9/L    Abs Immature Granulocytes 0.0 0 - 0.4 10e9/L    Absolute Nucleated RBC 0.0    Alcohol ethyl   Result Value Ref Range    Ethanol g/dL <0.01 <0.01 g/dL   Lipase   Result Value Ref Range    Lipase 97 73 - 393 U/L   Alcohol breath test POCT   Result Value Ref Range    Alcohol Breath Test 0.00 0.00 - 0.01   AST   Result Value Ref Range    AST 43 0 - 45 U/L   Potassium   Result Value Ref Range    Potassium 3.7 3.4 - 5.3 mmol/L     Medications   0.9% sodium chloride BOLUS (0 mLs Intravenous Stopped 6/25/20 1625)     Followed by   sodium chloride 0.9% infusion (0 mLs Intravenous Stopped 6/25/20 2007)   diazepam (VALIUM) tablet 5-20 mg (10 mg Oral Given 6/25/20 1622)   vitamin B1 (THIAMINE) tablet 100 mg (100 mg Oral Given 6/25/20 1636)   multivitamin w/minerals (THERA-VIT-M) tablet 1 tablet (1 tablet Oral Given 6/25/20 1636)   folic acid (FOLVITE) tablet 1 mg (1 mg Oral Given 6/25/20 1636)   ondansetron (ZOFRAN) injection 4 mg (has no administration in time range)   ondansetron (ZOFRAN) injection 4 mg (4 mg Intravenous Given 6/25/20 1605)   LORazepam (ATIVAN) injection 1 mg (1 mg Intravenous Given 6/25/20 1609)        Results for orders placed or performed during the hospital encounter of 06/25/20   Comprehensive metabolic panel     Status: Abnormal   Result Value Ref Range    Sodium 136 133 - 144 mmol/L    Potassium 4.2 3.4 - 5.3 mmol/L    Chloride 98 94 - 109 mmol/L    Carbon Dioxide 24 20 - 32 mmol/L    Anion Gap 14 3 - 14 mmol/L    Glucose 121 (H) 70 - 99 mg/dL    Urea Nitrogen 12 7 - 30 mg/dL    Creatinine 0.83 0.66 - 1.25 mg/dL    GFR Estimate >90 >60 mL/min/[1.73_m2]    GFR Estimate If Black >90 >60 mL/min/[1.73_m2]    Calcium 9.4 8.5 - 10.1 mg/dL    Bilirubin Total 1.6 (H) 0.2 - 1.3 mg/dL    Albumin 4.5 3.4 -  5.0 g/dL    Protein Total 8.8 6.8 - 8.8 g/dL    Alkaline Phosphatase 122 40 - 150 U/L    ALT 44 0 - 70 U/L    AST Unsatisfactory specimen - hemolyzed 0 - 45 U/L   CBC with platelets differential     Status: Abnormal   Result Value Ref Range    WBC 8.5 4.0 - 11.0 10e9/L    RBC Count 4.91 4.4 - 5.9 10e12/L    Hemoglobin 14.9 13.3 - 17.7 g/dL    Hematocrit 42.7 40.0 - 53.0 %    MCV 87 78 - 100 fl    MCH 30.3 26.5 - 33.0 pg    MCHC 34.9 31.5 - 36.5 g/dL    RDW 16.1 (H) 10.0 - 15.0 %    Platelet Count 254 150 - 450 10e9/L    Diff Method Automated Method     % Neutrophils 66.1 %    % Lymphocytes 18.8 %    % Monocytes 13.6 %    % Eosinophils 0.4 %    % Basophils 0.7 %    % Immature Granulocytes 0.4 %    Nucleated RBCs 0 0 /100    Absolute Neutrophil 5.6 1.6 - 8.3 10e9/L    Absolute Lymphocytes 1.6 0.8 - 5.3 10e9/L    Absolute Monocytes 1.2 0.0 - 1.3 10e9/L    Absolute Eosinophils 0.0 0.0 - 0.7 10e9/L    Absolute Basophils 0.1 0.0 - 0.2 10e9/L    Abs Immature Granulocytes 0.0 0 - 0.4 10e9/L    Absolute Nucleated RBC 0.0    Alcohol ethyl     Status: None   Result Value Ref Range    Ethanol g/dL <0.01 <0.01 g/dL   Lipase     Status: None   Result Value Ref Range    Lipase 97 73 - 393 U/L   AST     Status: None   Result Value Ref Range    AST 43 0 - 45 U/L   Potassium     Status: None   Result Value Ref Range    Potassium 3.7 3.4 - 5.3 mmol/L   Alcohol breath test POCT     Status: Normal   Result Value Ref Range    Alcohol Breath Test 0.00 0.00 - 0.01              Assessments & Plan (with Medical Decision Making)   34-year-old male presents us with a chief complaint of nausea vomiting and alcohol withdrawal.  Differential includes but not limited to alcoholic gastritis, alcohol withdrawal, vomiting.  Alcohol level was 0 corresponding with his stated last drink of yesterday.  CMP CBC and lipase are normal.  Patient was given Zofran in addition to the Ativan followed by an oral Ativan for withdrawal.  Patient's symptoms  significantly improved.  He was feeling much better and no longer tremulous.  Explained to the patient that unfortunately we do not have a bed available in our detox unit.  We were able to locate a bed at [facility] in [place] for detox.  Patient declined to go there.  He stated he did not want to be transferred and did not want to go to [facility] detox either.  Since we did not have a bed specifically here for him he stated he want to be discharged.  I did explain to him my concern that he would go back into withdrawal or need to start drinking again.  I did strongly encourage him to take the open bed available at [facility].  He again declined.  We will discharge him with information to call for detox tomorrow morning when hopefully a bed will be available.    I have reviewed the nursing notes.    I have reviewed the findings, diagnosis, plan and need for follow up with the patient.    Discharge Medication List as of 6/25/2020  8:07 PM          Final diagnoses:   Alcohol withdrawal syndrome without complication (H)     ISameer, am serving as a trained medical scribe to document services personally performed by Silas Dick DO, based on the provider's statements to me.   Silas HOPSON DO, was physically present and have reviewed and verified the accuracy of this note documented by Sameer Butler.    6/25/2020   [facility], [facility], EMERGENCY DEPARTMENT     Silas Dick DO  06/25/20 2058

## 2020-06-25 NOTE — ED NOTES
Performed security screen. Removed Sandals, wallet, keys, sunglasses to security locker. Filled in white board.

## 2020-06-25 NOTE — ED AVS SNAPSHOT
[facility], [place], Emergency Department  [address] [place] [address]  Phone:  [phone]  Fax:  [phone]                                    Nikhil Rios   MRN: [id number]    Department:  [facility], Emergency Department   Date of Visit:  6/25/2020           After Visit Summary Signature Page    I have received my discharge instructions, and my questions have been answered. I have discussed any challenges I see with this plan with the nurse or doctor.    ..........................................................................................................................................  Patient/Patient Representative Signature      ..........................................................................................................................................  Patient Representative Print Name and Relationship to Patient    ..................................................               ................................................  Date                                   Time    ..........................................................................................................................................  Reviewed by Signature/Title    ...................................................              ..............................................  Date                                               Time          22EPIC Rev 08/18

## 2020-06-26 NOTE — DISCHARGE INSTRUCTIONS
Follow-up with your primary care provider.  Return to the emergency department as needed for any new or worsening symptoms.      - Contact a Detox Center as soon as can be arranged:   --- [facility] ([facility]): [phone]  --- [address] [place] [address]: [phone]  --- [facility]: [phone] (They do not use suboxone or methadone)  --- [facility]: [phone] (They do not use suboxone or methadone)

## 2020-08-21 ENCOUNTER — COMMUNICATION - HEALTHEAST (OUTPATIENT)
Dept: SCHEDULING | Facility: CLINIC | Age: 35
End: 2020-08-21

## 2020-10-06 ENCOUNTER — COMMUNICATION - HEALTHEAST (OUTPATIENT)
Dept: SCHEDULING | Facility: CLINIC | Age: 35
End: 2020-10-06

## 2020-10-24 ENCOUNTER — HOSPITAL ENCOUNTER (INPATIENT)
Facility: CLINIC | Age: 35
LOS: 2 days | Discharge: HOME | DRG: 897 | End: 2020-10-27
Attending: EMERGENCY MEDICINE | Admitting: PSYCHIATRY & NEUROLOGY
Payer: COMMERCIAL

## 2020-10-24 DIAGNOSIS — Z20.828 EXPOSURE TO SARS-ASSOCIATED CORONAVIRUS: ICD-10-CM

## 2020-10-24 DIAGNOSIS — F10.920 ALCOHOLIC INTOXICATION WITHOUT COMPLICATION (H): ICD-10-CM

## 2020-10-24 DIAGNOSIS — F10.230 ALCOHOL DEPENDENCE WITH UNCOMPLICATED WITHDRAWAL (H): ICD-10-CM

## 2020-10-24 PROCEDURE — 99285 EMERGENCY DEPT VISIT HI MDM: CPT | Mod: 25 | Performed by: EMERGENCY MEDICINE

## 2020-10-24 PROCEDURE — 99284 EMERGENCY DEPT VISIT MOD MDM: CPT | Performed by: EMERGENCY MEDICINE

## 2020-10-25 PROBLEM — F10.920 ALCOHOLIC INTOXICATION WITHOUT COMPLICATION (H): Status: ACTIVE | Noted: 2020-10-25

## 2020-10-25 LAB
ALBUMIN SERPL-MCNC: 3.7 G/DL (ref 3.4–5)
ALCOHOL BREATH TEST: 0.25 (ref 0–0.01)
ALP SERPL-CCNC: 104 U/L (ref 40–150)
ALT SERPL W P-5'-P-CCNC: 51 U/L (ref 0–70)
AMPHETAMINES UR QL SCN: NEGATIVE
ANION GAP SERPL CALCULATED.3IONS-SCNC: 12 MMOL/L (ref 3–14)
AST SERPL W P-5'-P-CCNC: 63 U/L (ref 0–45)
BARBITURATES UR QL: NEGATIVE
BASOPHILS # BLD AUTO: 0.1 10E9/L (ref 0–0.2)
BASOPHILS NFR BLD AUTO: 1.3 %
BENZODIAZ UR QL: POSITIVE
BILIRUB SERPL-MCNC: 0.3 MG/DL (ref 0.2–1.3)
BUN SERPL-MCNC: 10 MG/DL (ref 7–30)
CALCIUM SERPL-MCNC: 8.5 MG/DL (ref 8.5–10.1)
CANNABINOIDS UR QL SCN: NEGATIVE
CHLORIDE SERPL-SCNC: 101 MMOL/L (ref 94–109)
CHOLEST SERPL-MCNC: 215 MG/DL
CO2 SERPL-SCNC: 28 MMOL/L (ref 20–32)
COCAINE UR QL: NEGATIVE
CREAT SERPL-MCNC: 0.93 MG/DL (ref 0.66–1.25)
DIFFERENTIAL METHOD BLD: NORMAL
EOSINOPHIL # BLD AUTO: 0 10E9/L (ref 0–0.7)
EOSINOPHIL NFR BLD AUTO: 0.2 %
ERYTHROCYTE [DISTWIDTH] IN BLOOD BY AUTOMATED COUNT: 13.3 % (ref 10–15)
ETHANOL UR QL SCN: NEGATIVE
GFR SERPL CREATININE-BSD FRML MDRD: >90 ML/MIN/{1.73_M2}
GGT SERPL-CCNC: 118 U/L (ref 0–75)
GLUCOSE SERPL-MCNC: 100 MG/DL (ref 70–99)
HCT VFR BLD AUTO: 41.9 % (ref 40–53)
HDLC SERPL-MCNC: 77 MG/DL
HGB BLD-MCNC: 14.1 G/DL (ref 13.3–17.7)
IMM GRANULOCYTES # BLD: 0 10E9/L (ref 0–0.4)
IMM GRANULOCYTES NFR BLD: 0.2 %
LDLC SERPL CALC-MCNC: 114 MG/DL
LYMPHOCYTES # BLD AUTO: 1.6 10E9/L (ref 0.8–5.3)
LYMPHOCYTES NFR BLD AUTO: 33.8 %
MCH RBC QN AUTO: 28.9 PG (ref 26.5–33)
MCHC RBC AUTO-ENTMCNC: 33.7 G/DL (ref 31.5–36.5)
MCV RBC AUTO: 86 FL (ref 78–100)
MONOCYTES # BLD AUTO: 0.4 10E9/L (ref 0–1.3)
MONOCYTES NFR BLD AUTO: 8.2 %
NEUTROPHILS # BLD AUTO: 2.7 10E9/L (ref 1.6–8.3)
NEUTROPHILS NFR BLD AUTO: 56.3 %
NONHDLC SERPL-MCNC: 138 MG/DL
NRBC # BLD AUTO: 0 10*3/UL
NRBC BLD AUTO-RTO: 0 /100
OPIATES UR QL SCN: NEGATIVE
PLATELET # BLD AUTO: 172 10E9/L (ref 150–450)
POTASSIUM SERPL-SCNC: 3.4 MMOL/L (ref 3.4–5.3)
PROT SERPL-MCNC: 7.4 G/DL (ref 6.8–8.8)
RBC # BLD AUTO: 4.88 10E12/L (ref 4.4–5.9)
SARS-COV-2 RNA SPEC QL NAA+PROBE: NOT DETECTED
SODIUM SERPL-SCNC: 141 MMOL/L (ref 133–144)
SPECIMEN SOURCE: NORMAL
TRIGL SERPL-MCNC: 121 MG/DL
TSH SERPL DL<=0.005 MIU/L-ACNC: 1.3 MU/L (ref 0.4–4)
VIT B12 SERPL-MCNC: 405 PG/ML (ref 193–986)
WBC # BLD AUTO: 4.7 10E9/L (ref 4–11)

## 2020-10-25 PROCEDURE — 82075 ASSAY OF BREATH ETHANOL: CPT | Performed by: EMERGENCY MEDICINE

## 2020-10-25 PROCEDURE — C9803 HOPD COVID-19 SPEC COLLECT: HCPCS | Performed by: EMERGENCY MEDICINE

## 2020-10-25 PROCEDURE — 82977 ASSAY OF GGT: CPT | Performed by: EMERGENCY MEDICINE

## 2020-10-25 PROCEDURE — 80320 DRUG SCREEN QUANTALCOHOLS: CPT | Performed by: EMERGENCY MEDICINE

## 2020-10-25 PROCEDURE — 250N000013 HC RX MED GY IP 250 OP 250 PS 637: Performed by: EMERGENCY MEDICINE

## 2020-10-25 PROCEDURE — 84443 ASSAY THYROID STIM HORMONE: CPT | Performed by: EMERGENCY MEDICINE

## 2020-10-25 PROCEDURE — 82607 VITAMIN B-12: CPT | Performed by: EMERGENCY MEDICINE

## 2020-10-25 PROCEDURE — 80053 COMPREHEN METABOLIC PANEL: CPT | Performed by: EMERGENCY MEDICINE

## 2020-10-25 PROCEDURE — 80061 LIPID PANEL: CPT | Performed by: EMERGENCY MEDICINE

## 2020-10-25 PROCEDURE — 250N000013 HC RX MED GY IP 250 OP 250 PS 637: Performed by: PSYCHIATRY & NEUROLOGY

## 2020-10-25 PROCEDURE — 85025 COMPLETE CBC W/AUTO DIFF WBC: CPT | Performed by: EMERGENCY MEDICINE

## 2020-10-25 PROCEDURE — U0003 INFECTIOUS AGENT DETECTION BY NUCLEIC ACID (DNA OR RNA); SEVERE ACUTE RESPIRATORY SYNDROME CORONAVIRUS 2 (SARS-COV-2) (CORONAVIRUS DISEASE [COVID-19]), AMPLIFIED PROBE TECHNIQUE, MAKING USE OF HIGH THROUGHPUT TECHNOLOGIES AS DESCRIBED BY CMS-2020-01-R: HCPCS | Performed by: EMERGENCY MEDICINE

## 2020-10-25 PROCEDURE — 128N000004 HC R&B CD ADULT

## 2020-10-25 PROCEDURE — 99223 1ST HOSP IP/OBS HIGH 75: CPT | Mod: GT | Performed by: PSYCHIATRY & NEUROLOGY

## 2020-10-25 PROCEDURE — H2032 ACTIVITY THERAPY, PER 15 MIN: HCPCS

## 2020-10-25 PROCEDURE — 99207 PR CONSULT E&M CHANGED TO SUBSEQUENT LEVEL: CPT | Performed by: PHYSICIAN ASSISTANT

## 2020-10-25 PROCEDURE — 99232 SBSQ HOSP IP/OBS MODERATE 35: CPT | Performed by: PHYSICIAN ASSISTANT

## 2020-10-25 PROCEDURE — HZ2ZZZZ DETOXIFICATION SERVICES FOR SUBSTANCE ABUSE TREATMENT: ICD-10-PCS | Performed by: PSYCHIATRY & NEUROLOGY

## 2020-10-25 PROCEDURE — 80307 DRUG TEST PRSMV CHEM ANLYZR: CPT | Performed by: EMERGENCY MEDICINE

## 2020-10-25 RX ORDER — FOLIC ACID 1 MG/1
1 TABLET ORAL DAILY
Status: DISCONTINUED | OUTPATIENT
Start: 2020-10-25 | End: 2020-10-27 | Stop reason: HOSPADM

## 2020-10-25 RX ORDER — BISACODYL 10 MG
10 SUPPOSITORY, RECTAL RECTAL DAILY PRN
Status: DISCONTINUED | OUTPATIENT
Start: 2020-10-25 | End: 2020-10-27 | Stop reason: HOSPADM

## 2020-10-25 RX ORDER — LANOLIN ALCOHOL/MO/W.PET/CERES
100 CREAM (GRAM) TOPICAL DAILY
Status: DISCONTINUED | OUTPATIENT
Start: 2020-10-25 | End: 2020-10-27 | Stop reason: HOSPADM

## 2020-10-25 RX ORDER — ACETAMINOPHEN 325 MG/1
650 TABLET ORAL EVERY 4 HOURS PRN
Status: DISCONTINUED | OUTPATIENT
Start: 2020-10-25 | End: 2020-10-27 | Stop reason: HOSPADM

## 2020-10-25 RX ORDER — DIAZEPAM 5 MG
5-20 TABLET ORAL EVERY 30 MIN PRN
Status: DISCONTINUED | OUTPATIENT
Start: 2020-10-25 | End: 2020-10-27 | Stop reason: HOSPADM

## 2020-10-25 RX ORDER — ATENOLOL 50 MG/1
50 TABLET ORAL DAILY PRN
Status: DISCONTINUED | OUTPATIENT
Start: 2020-10-25 | End: 2020-10-27 | Stop reason: HOSPADM

## 2020-10-25 RX ORDER — HYDROXYZINE HYDROCHLORIDE 25 MG/1
25 TABLET, FILM COATED ORAL 3 TIMES DAILY PRN
Status: DISCONTINUED | OUTPATIENT
Start: 2020-10-25 | End: 2020-10-27 | Stop reason: HOSPADM

## 2020-10-25 RX ORDER — ALUMINA, MAGNESIA, AND SIMETHICONE 2400; 2400; 240 MG/30ML; MG/30ML; MG/30ML
30 SUSPENSION ORAL EVERY 4 HOURS PRN
Status: DISCONTINUED | OUTPATIENT
Start: 2020-10-25 | End: 2020-10-27 | Stop reason: HOSPADM

## 2020-10-25 RX ORDER — ESCITALOPRAM OXALATE 20 MG/1
20 TABLET ORAL DAILY
Status: DISCONTINUED | OUTPATIENT
Start: 2020-10-26 | End: 2020-10-27 | Stop reason: HOSPADM

## 2020-10-25 RX ORDER — LOPERAMIDE HCL 2 MG
2 CAPSULE ORAL 4 TIMES DAILY PRN
Status: DISCONTINUED | OUTPATIENT
Start: 2020-10-25 | End: 2020-10-27 | Stop reason: HOSPADM

## 2020-10-25 RX ORDER — ESCITALOPRAM OXALATE 10 MG/1
10 TABLET ORAL DAILY
Status: ON HOLD | COMMUNITY
End: 2020-10-27

## 2020-10-25 RX ORDER — DIAZEPAM 5 MG
5-20 TABLET ORAL EVERY 30 MIN PRN
Status: DISCONTINUED | OUTPATIENT
Start: 2020-10-25 | End: 2020-10-25 | Stop reason: CLARIF

## 2020-10-25 RX ORDER — ESCITALOPRAM OXALATE 10 MG/1
10 TABLET ORAL DAILY
Status: DISCONTINUED | OUTPATIENT
Start: 2020-10-25 | End: 2020-10-25

## 2020-10-25 RX ORDER — ONDANSETRON 4 MG/1
4 TABLET, ORALLY DISINTEGRATING ORAL EVERY 6 HOURS PRN
Status: DISCONTINUED | OUTPATIENT
Start: 2020-10-25 | End: 2020-10-27 | Stop reason: HOSPADM

## 2020-10-25 RX ORDER — TRAZODONE HYDROCHLORIDE 100 MG/1
100 TABLET ORAL AT BEDTIME
Status: DISCONTINUED | OUTPATIENT
Start: 2020-10-25 | End: 2020-10-27 | Stop reason: HOSPADM

## 2020-10-25 RX ORDER — MULTIPLE VITAMINS W/ MINERALS TAB 9MG-400MCG
1 TAB ORAL DAILY
Status: DISCONTINUED | OUTPATIENT
Start: 2020-10-25 | End: 2020-10-27 | Stop reason: HOSPADM

## 2020-10-25 RX ADMIN — MULTIPLE VITAMINS W/ MINERALS TAB 1 TABLET: TAB at 08:42

## 2020-10-25 RX ADMIN — DIAZEPAM 10 MG: 5 TABLET ORAL at 01:03

## 2020-10-25 RX ADMIN — HYDROXYZINE HYDROCHLORIDE 25 MG: 25 TABLET, FILM COATED ORAL at 18:51

## 2020-10-25 RX ADMIN — DIAZEPAM 10 MG: 5 TABLET ORAL at 08:41

## 2020-10-25 RX ADMIN — DIAZEPAM 10 MG: 5 TABLET ORAL at 11:25

## 2020-10-25 RX ADMIN — HYDROXYZINE HYDROCHLORIDE 25 MG: 25 TABLET, FILM COATED ORAL at 21:37

## 2020-10-25 RX ADMIN — DIAZEPAM 10 MG: 5 TABLET ORAL at 04:15

## 2020-10-25 RX ADMIN — THIAMINE HCL TAB 100 MG 100 MG: 100 TAB at 08:42

## 2020-10-25 RX ADMIN — ATENOLOL 50 MG: 50 TABLET ORAL at 05:53

## 2020-10-25 RX ADMIN — FOLIC ACID 1 MG: 1 TABLET ORAL at 08:41

## 2020-10-25 RX ADMIN — DIAZEPAM 10 MG: 5 TABLET ORAL at 16:04

## 2020-10-25 RX ADMIN — ESCITALOPRAM OXALATE 10 MG: 10 TABLET ORAL at 08:42

## 2020-10-25 RX ADMIN — TRAZODONE HYDROCHLORIDE 100 MG: 100 TABLET ORAL at 21:37

## 2020-10-25 RX ADMIN — HYDROXYZINE HYDROCHLORIDE 25 MG: 25 TABLET, FILM COATED ORAL at 14:55

## 2020-10-25 ASSESSMENT — ENCOUNTER SYMPTOMS
EYE PAIN: 0
ABDOMINAL PAIN: 0
DYSURIA: 0
FEVER: 0
DIFFICULTY URINATING: 0
HEADACHES: 0
COLOR CHANGE: 0
CHILLS: 0
DIARRHEA: 0
FREQUENCY: 0
NAUSEA: 1
ABDOMINAL DISTENTION: 0
BACK PAIN: 0
NECK PAIN: 0
CHEST TIGHTNESS: 0
SORE THROAT: 0
CONSTIPATION: 0
DYSPHORIC MOOD: 1
SHORTNESS OF BREATH: 0
ARTHRALGIAS: 0
MYALGIAS: 0
COUGH: 0
CONFUSION: 0
DIZZINESS: 0
FATIGUE: 0
PALPITATIONS: 0
WEAKNESS: 0
HALLUCINATIONS: 1
VOMITING: 1

## 2020-10-25 NOTE — ED PROVIDER NOTES
"    [facility] EMERGENCY DEPARTMENT ([facility])     October 24, 2020    History     Chief Complaint   Patient presents with     Alcohol Problem     Patient presents to ED with mother. Patient requesting detox. Last drink 10:00 in the morning. Patient drinks 750 ml of Vodka daily. Patient reports having hallucinations today.       Suicidal     The history is provided by the patient and medical records.     Nikhil Rios is a 35 year old male with a PMH for alcoholism and major depressive disorder who presents to the ED with complaint of acute alcohol intoxication seeking detoxification and suicidal ideation.    Patient reports he has been drinking 750 mL of alcohol per day with his last drink at 1000 yesterday. He notes having withdrawal seizures previously and he states seeing \"stuff\" for the last day or two. He states \"sees two door knobs\" instead of one. He denies SI or HI at this time, he states he reported SI to the triage nurse because it is situational due to him recently going from making \"90k a year to 0 a year\". Patient reports he has been in detox often, and recently got out of treatment on 10/01 from [facility] in [place]. He states he started drinking right away but did have sobriety in between. He denies fever but notes having nausea and vomiting earlier in the day due to drinking alcohol. He notes using tobacco but denies illicit drug use. Patient takes hydroxyzine, lexapro and trazodone.    PAST MEDICAL HISTORY:   Past Medical History:   Diagnosis Date     Alcohol abuse        PAST SURGICAL HISTORY:   Past Surgical History:   Procedure Laterality Date     NO HISTORY OF SURGERY         Past medical history, past surgical history, medications, and allergies were reviewed with the patient. Additional pertinent items: None    FAMILY HISTORY: No family history on file.    SOCIAL HISTORY:   Social History     Tobacco Use     Smoking status: Former Smoker     Packs/day: 0.25     Smokeless tobacco: " Never Used   Substance Use Topics     Alcohol use: Yes     Comment: 750ml daily of vodka     Social history was reviewed with the patient. Additional pertinent items: None      Patient's Medications   New Prescriptions    No medications on file   Previous Medications    ACAMPROSATE (CAMPRAL) 333 MG EC TABLET    Take 2 tablets (666 mg) by mouth 3 times daily    GABAPENTIN (NEURONTIN) 300 MG CAPSULE    Take 1 capsule (300 mg) by mouth 3 times daily    HYDROXYZINE (ATARAX) 25 MG TABLET    Take 25 mg by mouth 3 times daily as needed for anxiety    HYPROMELLOSE-DEXTRAN (HYPROMELLOSE-DEXTRAN 0.3-0.1%) 0.1-0.3 % OPHTHALMIC SOLUTION    Place 1 drop into the right eye daily as needed for dry eyes    MULTIVITAMIN W/MINERALS (THERA-VIT-M) TABLET    Take 1 tablet by mouth daily    TRAZODONE (DESYREL) 100 MG TABLET    Take 100 mg by mouth At Bedtime    VITAMIN B1 (THIAMINE) 100 MG TABLET    Take 1 tablet (100 mg) by mouth daily   Modified Medications    No medications on file   Discontinued Medications    No medications on file          Allergies   Allergen Reactions     Pollen Extract Rash     grass tree pollen        Review of Systems   Constitutional: Negative for chills, fatigue and fever.   HENT: Negative for congestion and sore throat.    Eyes: Negative for pain and visual disturbance.   Respiratory: Negative for cough, chest tightness and shortness of breath.    Cardiovascular: Negative for chest pain and palpitations.   Gastrointestinal: Positive for nausea and vomiting. Negative for abdominal distention, abdominal pain, constipation and diarrhea.   Genitourinary: Negative for difficulty urinating, dysuria, frequency and urgency.   Musculoskeletal: Negative for arthralgias, back pain, myalgias and neck pain.   Skin: Negative for color change and rash.   Neurological: Negative for dizziness, weakness and headaches.   Psychiatric/Behavioral: Positive for dysphoric mood and hallucinations. Negative for confusion and  suicidal ideas.        Addiction related problem - EtOH     A complete review of systems was performed with pertinent positives and negatives noted in the HPI, and all other systems negative.    Physical Exam   BP: (!) 158/93  Pulse: 120  Temp: 99.6  F (37.6  C)  Resp: 16  Weight: 91.7 kg (202 lb 1.6 oz)  SpO2: 98 %      Physical Exam  Vitals signs and nursing note reviewed.   Constitutional:       General: He is not in acute distress.     Appearance: Normal appearance.      Comments: Intoxicated, etoh smell   HENT:      Head: Normocephalic.      Nose: Nose normal.   Eyes:      Pupils: Pupils are equal, round, and reactive to light.   Neck:      Musculoskeletal: Normal range of motion.   Cardiovascular:      Rate and Rhythm: Normal rate and regular rhythm.   Pulmonary:      Effort: Pulmonary effort is normal.   Abdominal:      General: There is no distension.   Musculoskeletal: Normal range of motion.         General: No deformity.   Skin:     General: Skin is warm.   Neurological:      General: No focal deficit present.      Mental Status: He is alert and oriented to person, place, and time.      Gait: Gait normal.   Psychiatric:         Mood and Affect: Mood is anxious.         Behavior: Behavior is agitated.         ED Course   12:36 AM  The patient was seen and examined by Dr. Gilles Callejas in Room ED16A.         Results for orders placed or performed during the hospital encounter of 10/24/20 (from the past 24 hour(s))   Alcohol breath test POCT   Result Value Ref Range    Alcohol Breath Test 0.249 (A) 0.00 - 0.01     Medications   diazepam (VALIUM) tablet 5-20 mg (10 mg Oral Given 10/25/20 0103)             Assessments & Plan (with Medical Decision Making)   Patient presents seeking detox from acute alcohol intoxication.  Drinks approximately 750 to 1 L of hard alcohol per day.  Has a history of withdrawal seizures.  On arrival, patient is tachycardic and anxious, otherwise normal vital signs.  Overall appears  nontoxic.  His physical exam is nonfocal and unremarkable.  He is overall nondistressed.  He has no additional medical complaints at this time.    Patient will be admitted for alcohol detox.  Will monitor closely in the emergency department and treat any signs of withdrawal.  All findings and plan discussed with patient and mom.  Labs and asymptomatic Covid swab are pending.      I have reviewed the nursing notes.    I have reviewed the findings, diagnosis, plan and need for follow up with the patient.    New Prescriptions    No medications on file       Final diagnoses:   Alcoholic intoxication without complication (H)     ISreekanth, am serving as a trained medical scribe to document services personally performed by Gilles Callejas DO, based on the provider's statements to me.     Gilles HOPSON DO, was physically present and have reviewed and verified the accuracy of this note documented by Sreekanth Shook.    10/24/2020   [facility] EMERGENCY DEPARTMENT     Gilles Callejas DO  10/25/20 0109

## 2020-10-25 NOTE — PLAN OF CARE
"SBAR    S = Situation:   Nikhil Rios is a 35 year old year old male voluntarily admitted to detox from alcohol.    B  = Background:   Patient reported drinking 750ml to 1L of vodka daily; last drink was at 10am on 10/24/20. Breathalyzer in the ED was 0.249. Patient reported recently being discharged from rehab on 10/1/20 and stayed sober for one day. Stated reason for admission: \"I am here because I'm punishing myself.\" Patient endorsed visual hallucinations in the ED; denied VH on the unit. Prior seizure history.    Recent job loss; \"90k a year to 0 year.\" Patient endorsed SI to triage nurse, but declined upon admission to the unit.    A  =  Assessment:   Patient cooperative with the admission process. AOx4.      Patient denied SI/SIB/Hallucinations.    MSSA=10 at 0431; patient was given valium for the score of 11 prior to being sent up to the unit. Patient will be assessed again by morning staff. Atenolol was given for the elevated heart rate.    Patient refused flu vaccine.    Vital Signs: /84   Pulse 123   Temp 97.7  F (36.5  C) (Oral)   Resp 16   Wt 91.7 kg (202 lb 1.6 oz)   SpO2 96%   BMI 28.19 kg/m    R =   Request or Recommendation:   MSSA protocol with valium was initiated. Psychiatrist and internal medicine to assess. Precautions: withdrawal and seizure. Patient wants detox only.  "

## 2020-10-25 NOTE — ED NOTES
ED to Behavioral Floor Handoff    SITUATION  Nikhil Rios is a 35 year old male who speaks English and lives in a home with family members The patient arrived in the ED by private car from home with a complaint of Alcohol Problem (Patient presents to ED with mother. Patient requesting detox. Last drink 10:00 in the morning. Patient drinks 750 ml of Vodka daily. Patient reports having hallucinations today.  ) and Suicidal  .The patient's current symptoms started/worsened couple of days ago and during this time the symptoms have increased.   In the ED, pt was diagnosed with   Final diagnoses:   Alcoholic intoxication without complication (H)        Initial vitals were: BP: (!) 158/93  Pulse: 120  Temp: 99.6  F (37.6  C)  Resp: 16  Weight: 91.7 kg (202 lb 1.6 oz)  SpO2: 98 %   --------  Is the patient diabetic? No   If yes, last blood glucose? --     If yes, was this treated in the ED? --  --------  Is the patient inebriated (ETOH) Yes or Impaired on other substances? Yes  MSSA done? Yes  Last MSSA score: --    Were withdrawal symptoms treated? Yes  Does the patient have a seizure history? Yes. If yes, date of most recent seizure--  --------  Is the patient patient experiencing suicidal ideation? Suicidal ideatin    Homicidal ideation? denies current or recent homicidal ideation or behaviors.    Self-injurious behavior/urges? denies current or recent self injurious behavior or ideation.  ------  Was pt aggressive in the ED No  Was a code called No  Is the pt now cooperative? Yes  -------  Meds given in ED:   Medications   diazepam (VALIUM) tablet 5-20 mg (10 mg Oral Given 10/25/20 0103)      Family present during ED course? Yes  Family currently present? Yes    BACKGROUND  Does the patient have a cognitive impairment or developmental disability? No  Allergies:   Allergies   Allergen Reactions     Pollen Extract Rash     grass tree pollen   .   Social demographics are   Social History     Socioeconomic History      Marital status: Single     Spouse name: None     Number of children: None     Years of education: None     Highest education level: None   Occupational History     None   Social Needs     Financial resource strain: None     Food insecurity     Worry: None     Inability: None     Transportation needs     Medical: None     Non-medical: None   Tobacco Use     Smoking status: Former Smoker     Packs/day: 0.25     Smokeless tobacco: Never Used   Substance and Sexual Activity     Alcohol use: Yes     Comment: 750ml daily of vodka     Drug use: No     Sexual activity: None   Lifestyle     Physical activity     Days per week: None     Minutes per session: None     Stress: None   Relationships     Social connections     Talks on phone: None     Gets together: None     Attends Oriental orthodox service: None     Active member of club or organization: None     Attends meetings of clubs or organizations: None     Relationship status: None     Intimate partner violence     Fear of current or ex partner: None     Emotionally abused: None     Physically abused: None     Forced sexual activity: None   Other Topics Concern     None   Social History Narrative     None        ASSESSMENT  Labs results   Labs Ordered and Resulted from Time of ED Arrival Up to the Time of Departure from the ED   COMPREHENSIVE METABOLIC PANEL - Abnormal; Notable for the following components:       Result Value    Glucose 100 (*)     AST 63 (*)     All other components within normal limits   ALCOHOL BREATH TEST POCT - Abnormal; Notable for the following components:    Alcohol Breath Test 0.249 (*)     All other components within normal limits   CBC WITH PLATELETS DIFFERENTIAL   COVID-19 VIRUS (CORONAVIRUS) BY PCR   DRUG ABUSE SCREEN 6 CHEM DEP URINE ([facility])   MSSA SCORE AND VS   NOTIFY      Imaging Studies: No results found for this or any previous visit (from the past 24 hour(s)).   Most recent vital signs BP (!) 158/93   Pulse 133   Temp 99.6  F (37.6  C)  (Oral)   Resp 16   Wt 91.7 kg (202 lb 1.6 oz)   SpO2 98%   BMI 28.19 kg/m     Abnormal labs/tests/findings requiring intervention:---   Pain control: pt had none  Nausea control: good    RECOMMENDATION  Are any infection precautions needed (MRSA, VRE, etc.)? No If yes, what infection? --  ---  Does the patient have mobility issues? independently. If yes, what device does the pt use? ---  ---  Is patient on 72 hour hold or commitment? No If on 72 hour hold, have hold and rights been given to patient? N/A  Are admitting orders written if after 10 p.m. ?N/A  Tasks needing to be completed:---     Cleopatra Paredes, RN   [phone] [facility]

## 2020-10-25 NOTE — CONSULTS
"[facility], [place]    Internal Medicine Initial Consult       Date of Admission: 10/24/2020  Consult Requested by: Ava Lew MD  Reason for Consult: Co-Management of Chronic Medical Conditions     Assessment & Recommendations  Nikhil Rios is a 35 year old man with a past medical history of alcohol abuse, alcohol withdrawal seizures, anxiety, depression who is admitted to station 3A with alcohol withdrawal.        Alcohol Withdrawal  Anxiety/Depression - Management per psychiatry team.     Elevated AST, mild - AST 63.  Elevation likely secondary to alcohol abuse.   -Follow up with PCP at discharge    Elevated Cholesterol, LDL - Cholesterol 215, .    -Encourage lifestyle modifications  -Did not start statin therapy due to ongoing alcohol abuse and mild AST elevation  -Defer to primary care to initiate statin.     Medicine will sign off, please page with any additional concerns.     Asmita Green PA-C  Hospitalist Service  [facility]  Pager: [phone]  ______________________________________________________________________    Reason for Admission  Alcohol withdrawal; suicidal ideations    Chief Complaint   \"Alcohol Problem\"    History of Present Illness   History is obtained from the patient and medical record.     Nikihl Rios is a 35 year old year old man with a PMH as listed above admitted to behavioral health for alcohol withdrawal. Internal Medicine service was asked to see patient for a general medical evaluation. Patient states his only complaint at this time are \"pins and needles\" all over with shaking.  He believes it's related to his withdrawal.  He has had one episode of loose stool. He thinks he's had withdrawal seizures from alcohol in the past, the last being several months ago.  He denies further fever, chills, chest pain, palpitations, SOB, cough, nausea, vomiting, abdominal pain, change in urinary habit.s     Review of Systems   10 point " ROS performed and negative unless otherwise noted in HPI     Past Medical History    I have reviewed this patient's medical history and updated it with pertinent information if needed.   Past Medical History:   Diagnosis Date     Alcohol abuse         Past Surgical History   I have reviewed this patient's surgical history and updated it with pertinent information if needed.  Past Surgical History:   Procedure Laterality Date     NO HISTORY OF SURGERY          Social History   Social History     Tobacco Use     Smoking status: Former Smoker     Packs/day: 0.25     Smokeless tobacco: Never Used   Substance Use Topics     Alcohol use: Yes     Comment: 750ml daily of vodka     Drug use: No       Family History   I have reviewed this patient's family history and updated it with pertinent information if needed.   No family history on file.    Medications   Medications Prior to Admission   Medication Sig Dispense Refill Last Dose     escitalopram (LEXAPRO) 10 MG tablet Take 10 mg by mouth daily   10/24/2020 at Unknown time     hydrOXYzine (ATARAX) 25 MG tablet Take 25 mg by mouth 3 times daily as needed for anxiety   Past Week at Unknown time     traZODone (DESYREL) 100 MG tablet Take 100 mg by mouth At Bedtime   10/24/2020 at Unknown time     acamprosate (CAMPRAL) 333 MG EC tablet Take 2 tablets (666 mg) by mouth 3 times daily 180 tablet 3 Unknown at Unknown time     gabapentin (NEURONTIN) 300 MG capsule Take 1 capsule (300 mg) by mouth 3 times daily 90 capsule 0 Unknown at Unknown time     hypromellose-dextran (HYPROMELLOSE-DEXTRAN 0.3-0.1%) 0.1-0.3 % ophthalmic solution Place 1 drop into the right eye daily as needed for dry eyes 15 mL 0      multivitamin w/minerals (THERA-VIT-M) tablet Take 1 tablet by mouth daily 30 tablet 0      vitamin B1 (THIAMINE) 100 MG tablet Take 1 tablet (100 mg) by mouth daily 30 tablet 0        Allergies      Allergies   Allergen Reactions     Pollen Extract Rash     grass tree pollen        Physical Exam   /84   Pulse 123   Temp 97.7  F (36.5  C) (Oral)   Resp 16   Wt 91.7 kg (202 lb 1.6 oz)   SpO2 96%   BMI 28.19 kg/m     GENERAL: Cooperative, in NAD  HEENT: Anicteric sclera. Mucous membranes moist.   CV: RRR. S1, S2. No murmurs appreciated.   RESPIRATORY: Effort normal on room air. Lungs CTAB with no wheezing, rales, rhonchi.   GI: Abdomen soft, non distended, non tender.   NEUROLOGICAL: No focal deficits. Moves all extremities.   EXTREMITIES: No peripheral edema. Warm and well perfused.   SKIN: No jaundice. No rashes.     Data   Data reviewed today: I reviewed all medications, new labs and imaging results over the last 24 hours.   Results for QUYEN QUIROZ (MRN [id number]) as of 10/25/2020 08:04   Ref. Range 10/25/2020 01:08   Sodium Latest Ref Range: 133 - 144 mmol/L 141   Potassium Latest Ref Range: 3.4 - 5.3 mmol/L 3.4   Chloride Latest Ref Range: 94 - 109 mmol/L 101   Carbon Dioxide Latest Ref Range: 20 - 32 mmol/L 28   Urea Nitrogen Latest Ref Range: 7 - 30 mg/dL 10   Creatinine Latest Ref Range: 0.66 - 1.25 mg/dL 0.93   GFR Estimate Latest Ref Range: >60 mL/min/1.73_m2 >90   GFR Estimate If Black Latest Ref Range: >60 mL/min/1.73_m2 >90   Calcium Latest Ref Range: 8.5 - 10.1 mg/dL 8.5   Anion Gap Latest Ref Range: 3 - 14 mmol/L 12   Albumin Latest Ref Range: 3.4 - 5.0 g/dL 3.7   Protein Total Latest Ref Range: 6.8 - 8.8 g/dL 7.4   Bilirubin Total Latest Ref Range: 0.2 - 1.3 mg/dL 0.3   Alkaline Phosphatase Latest Ref Range: 40 - 150 U/L 104   ALT Latest Ref Range: 0 - 70 U/L 51   AST Latest Ref Range: 0 - 45 U/L 63 (H)   Cholesterol Latest Ref Range: <200 mg/dL 215 (H)   GGT Latest Ref Range: 0 - 75 U/L 118 (H)   HDL Cholesterol Latest Ref Range: >39 mg/dL 77   LDL Cholesterol Calculated Latest Ref Range: <100 mg/dL 114 (H)   Non HDL Cholesterol Latest Ref Range: <130 mg/dL 138 (H)   Triglycerides Latest Ref Range: <150 mg/dL 121   TSH Latest Ref Range: 0.40 - 4.00 mU/L 1.30    Glucose Latest Ref Range: 70 - 99 mg/dL 100 (H)   WBC Latest Ref Range: 4.0 - 11.0 10e9/L 4.7   Hemoglobin Latest Ref Range: 13.3 - 17.7 g/dL 14.1   Hematocrit Latest Ref Range: 40.0 - 53.0 % 41.9   Platelet Count Latest Ref Range: 150 - 450 10e9/L 172   RBC Count Latest Ref Range: 4.4 - 5.9 10e12/L 4.88   MCV Latest Ref Range: 78 - 100 fl 86   MCH Latest Ref Range: 26.5 - 33.0 pg 28.9   MCHC Latest Ref Range: 31.5 - 36.5 g/dL 33.7   RDW Latest Ref Range: 10.0 - 15.0 % 13.3   Diff Method Unknown Automated Method   % Neutrophils Latest Units: % 56.3   % Lymphocytes Latest Units: % 33.8   % Monocytes Latest Units: % 8.2   % Eosinophils Latest Units: % 0.2   % Basophils Latest Units: % 1.3   % Immature Granulocytes Latest Units: % 0.2   Nucleated RBCs Latest Ref Range: 0 /100 0   Absolute Neutrophil Latest Ref Range: 1.6 - 8.3 10e9/L 2.7   Absolute Lymphocytes Latest Ref Range: 0.8 - 5.3 10e9/L 1.6   Absolute Monocytes Latest Ref Range: 0.0 - 1.3 10e9/L 0.4   Absolute Eosinophils Latest Ref Range: 0.0 - 0.7 10e9/L 0.0   Absolute Basophils Latest Ref Range: 0.0 - 0.2 10e9/L 0.1   Abs Immature Granulocytes Latest Ref Range: 0 - 0.4 10e9/L 0.0   Absolute Nucleated RBC Unknown 0.0

## 2020-10-25 NOTE — ED NOTES
Doctor cleared pt from continuous observation.  Pt continues to be under observation until order received.
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
## 2020-10-25 NOTE — PROGRESS NOTES
10/25/20 0509   Patient Belongings   Did you bring any home meds/supplements to the hospital?  No   Patient Belongings locker   Patient Belongings Put in Hospital Secure Location (Security or Locker, etc.) cash/credit card;cell phone/electronics;clothing;shoes;wallet   Belongings Search Yes   Clothing Search Yes   T-shirt, sweatshirt, shoes, socks, shorts, sweatpants, cell phone, wallet.    Sent to Security: VISA Debit Card [id number], VISA Credit Card [id number], MasterCard Debit Card [id number].    A               Admission:  I am responsible for any personal items that are not sent to the safe or pharmacy.  [facility] is not responsible for loss, theft or damage of any property in my possession.    Signature:  _________________________________ Date: _______  Time: _____                                              Staff Signature:  ____________________________ Date: ________  Time: _____      2nd Staff person, if patient is unable/unwilling to sign:    Signature: ________________________________ Date: ________  Time: _____     Discharge:  [facility] has returned all of my personal belongings:    Signature: _________________________________ Date: ________  Time: _____                                          Staff Signature:  ____________________________ Date: ________  Time: _____

## 2020-10-25 NOTE — PHARMACY-ADMISSION MEDICATION HISTORY
Admission Medication History Completed by Pharmacy    See [facility] Admission Navigator for allergy information, preferred outpatient pharmacy, prior to admission medications and immunization status.     Medication history sources:  Patient via phone, Surescripts dispense report    Changes made to PTA medication list (reason)  Added:    Ketotifen 0.025% eye drops    Saline Nasal Spray   Deleted:     Acamprosate 333 mg EC tablets - per patient, no longer taking     Gabapentin 300 mg capsules - per patient, no longer taking     Multivitamins     Thiamine tablets   Changed: N/A    Additional medication history information:   - Patient is a moderate historian and denies taking any additional Rx/OTC medications other than the ones listed below.    Prior to Admission medications    Medication Sig Last Dose Taking? Auth Provider   escitalopram (LEXAPRO) 10 MG tablet Take 10 mg by mouth daily Past Week Yes Reported, Patient   hydrOXYzine (ATARAX) 25 MG tablet Take 25 mg by mouth 3 times daily as needed for anxiety 10/24/2020 Yes Unknown, Entered By History   hypromellose-dextran (HYPROMELLOSE-DEXTRAN 0.3-0.1%) 0.1-0.3 % ophthalmic solution Place 1 drop into the right eye daily as needed for dry eyes PRN at Unknown time Yes Ava Lew MD   ketotifen (ZADITOR) 0.025 % ophthalmic solution Place 1 drop into both eyes 2 times daily as needed for itching PRN Yes Unknown, Entered By History   sodium chloride (OCEAN) 0.65 % nasal spray Spray 1 spray into both nostrils daily as needed for congestion PRN Yes Unknown, Entered By History   traZODone (DESYREL) 100 MG tablet Take 100 mg by mouth At Bedtime 10/24/2020 Yes Unknown, Entered By History          Date completed: 10/25/20    Medication history completed by:   Kelle Youngblood, PharmD   PGY-1 Pharmacy Resident - Behavioral Health Services   General acute hospital  Emergency Department: Ascom *[phone]

## 2020-10-25 NOTE — PROGRESS NOTES
[facility] evaluation counselor met with pt to initiate discharge planning.  Pt has had a substance use evaluation over the past two. Pt reports that he plans to work with his counselor at [facility] whom he meets with once a week to get his rule 25 updated to got to treatment. He states that he just graduated on 10/1/20 at [facility] in [place], relapsed due life stressors. Pt was coached to complete assessment and referral paperwork while on the unit.  Pt's living situation is  Living arrangements: With family: with mom most the time. Pt's funding source is insurance list [employer]: [employer].    CITLALLI Rivero

## 2020-10-26 PROCEDURE — 250N000013 HC RX MED GY IP 250 OP 250 PS 637: Performed by: PSYCHIATRY & NEUROLOGY

## 2020-10-26 PROCEDURE — 128N000004 HC R&B CD ADULT

## 2020-10-26 PROCEDURE — 99232 SBSQ HOSP IP/OBS MODERATE 35: CPT | Performed by: PSYCHIATRY & NEUROLOGY

## 2020-10-26 RX ADMIN — TRAZODONE HYDROCHLORIDE 100 MG: 100 TABLET ORAL at 22:28

## 2020-10-26 RX ADMIN — FOLIC ACID 1 MG: 1 TABLET ORAL at 08:38

## 2020-10-26 RX ADMIN — ESCITALOPRAM OXALATE 20 MG: 20 TABLET ORAL at 08:38

## 2020-10-26 RX ADMIN — MULTIPLE VITAMINS W/ MINERALS TAB 1 TABLET: TAB at 08:38

## 2020-10-26 RX ADMIN — HYDROXYZINE HYDROCHLORIDE 25 MG: 25 TABLET, FILM COATED ORAL at 22:28

## 2020-10-26 RX ADMIN — THIAMINE HCL TAB 100 MG 100 MG: 100 TAB at 08:38

## 2020-10-26 RX ADMIN — HYDROXYZINE HYDROCHLORIDE 25 MG: 25 TABLET, FILM COATED ORAL at 11:03

## 2020-10-26 RX ADMIN — HYDROXYZINE HYDROCHLORIDE 25 MG: 25 TABLET, FILM COATED ORAL at 16:04

## 2020-10-26 ASSESSMENT — ACTIVITIES OF DAILY LIVING (ADL)
DRESS: SCRUBS (BEHAVIORAL HEALTH)
LAUNDRY: WITH SUPERVISION
HYGIENE/GROOMING: INDEPENDENT
ORAL_HYGIENE: INDEPENDENT

## 2020-10-26 NOTE — PLAN OF CARE
Behavioral Team Discussion: (10/26/2020)    Continued Stay Criteria/Rationale: Patient admitted for alcohol withdrawal, complicated.  Plan: The following services will be provided to the patient; psychiatric assessment, medication management, therapeutic milieu, individual and group support, and skills groups.   Participants: 3A Provider: Dr. Ava Lew MD; 3A RN's: Parveen Mike, RN; 3A CM's: Hoda Malin [facility]  Summary/Recommendation: Providers will assess today for treatment recommendations, discharge planning, and aftercare plans. CM will meet with pt for discharge planning.   Medical/Physical: Internal medicine consult completed 10/25/20.  Precautions:   Behavioral Orders   Procedures     Code 1 - Restrict to Unit     Routine Programming     As clinically indicated     Seizure precautions     Status 15     Every 15 minutes.     Withdrawal precautions     Rationale for change in precautions or plan: N/A  Progress: Improving.

## 2020-10-26 NOTE — PROGRESS NOTES
"Pt reports feeling happiness and hopeful today. Denies suicidal ideation plans or intent. MSSA scores today are 5 and 4, no valium indicated.  States plan for after detox as \"right now I'm doing 5 days a week of outpatient so I'm going to continue doing that\" at [facility].   "

## 2020-10-26 NOTE — PROGRESS NOTES
Patient woke up at around 4:15am incontinent of urine. He was given clean scrubs and sheets to change his bed.

## 2020-10-26 NOTE — PROGRESS NOTES
10/25/20 2100   Therapeutic Recreation   Type of Intervention structured groups   Activity leisure education   Response Participates, initiates socially appropriate   Hours 1     Pt actively participated in a life skills group involving a self-reflecting, group discussion on healthy leisure outlets. Pt appeared comfortable sharing positive past experiences and personal information with peers, and was respectful in listening and responding to peers. He was thoughtful with his responses to the question prompts, and was pleasant with a congruent affect.

## 2020-10-26 NOTE — DISCHARGE INSTRUCTIONS
Behavioral Discharge Planning and Instructions  THANK YOU FOR CHOOSING [address]  [phone]    Summary: You were admitted to [facility] on 10/24/2020 for detoxification from alcohol.  A medical exam was performed that included lab work. You have met with a  and opted to return to [facility] and work with your outpatient providers.  Please take care and make your recovery a daily priority, Nikhil! It was a pleasure working with you and the entire treatment team here wishes you the very best in your recovery!     Recommendation:  [facility] and follow all recommendations of your treatment providers.      Main Diagnoses:  Per Dr. Bobby Auguste;  Alcohol use disorder, severe  Alcohol withdrawal  Major depressive disorder-recurrent, moderate  Unspecified anxiety disorder    Major Treatments, Procedures and Findings:  You were treated for alcohol detoxification using valium administered based on the [facility] protocol. You did not complete a chemical dependency assessment. You had labs drawn and those results were reviewed with you. Please take a copy of your lab work with you to your next primary care physician appointment.    Symptoms to Report:  If you experience more anxiety, confusion, sleeplessness, deep sadness or thoughts of suicide, notify your treatment team or notify your primary care physician. IF ANY OF THE SYMPTOMS YOU ARE EXPERIENCING ARE A MEDICAL EMERGENCY CALL 911 IMMEDIATELY.     Lifestyle Adjustment: Adjust your lifestyle to get enough sleep, relaxation, exercise and good nutrition. Continue to develop healthy coping skills to decrease stress and promote a sober living environment. Do not use mood altering substances including alcohol, illegal drugs or addictive medications other than what is currently prescribed.     Disposition: Home    Treatment Follow-Up:   [facility]  Address: [address] [place] [id number]  Phone:  [phone]  About: Located in the heart of the recovery community, [facility] has served more than 40,000 individuals over the past fifty plus years. [facility] utilizes evidence-based practices designed to treat co-occurring substance use and mental health disorders. These include but are not limited to: group and individual counseling, gender-sensitive programming, individualized lengths of stay, recovery management skills, a connection to community resources and trauma work (i.e. Pro-longed exposure therapy, Accelerated Resolution Therapy, trauma skills groups).    Vivitrol Appointment:   Appointment date/time: Tuesday, November 3rd @ 2:40 PM  Provider/Clinic: Dr. Nuñez  Address: [facility]  Phone: [address] [facility], [place] [address]  Fax: [phone]   Indication: Vivitrol  (naltrexone for extended-release injectable suspension) is a prescription medicine used for the:    Treatment of alcohol dependence in patients who are able to abstain from alcohol in an outpatient setting. Patients should not be actively drinking at the time of initial Vivitrol administration    Prevention of relapse to opioid dependence, following opioid detoxification    Patients must be opioid-free for a minimum of 7-10 days before starting Vivitrol treatment.    Vivitrol should be part of a comprehensive management program that includes psychosocial support     Psychologist Appointment:   Appointment Date/Time: Thursday, 11/5/20, please follow up with [facility] to clarify time of appointment.         Facts about COVID19 at www.cdc.gov/COVID19 and www.[place].gov/covid19    Keeping hands clean is one of the most important steps we can take to avoid getting sick and spreading germs to others.  Please wash your hands frequently and lather with soap for at least 20 seconds!      Recovery apps for your phone to locate current in person and zoom recovery meetings  [place] - meeting palmira  AA  - meeting  palmira  Meeting guide - meeting palmira  Quick NA meeting - meeting palmira  Nimisha- has various apps    Resources:  *due to covid-19 most AA/NA meetings are being held online*  AA meetings can be found online; search for them at: http://aa-intergroup.org/directory.php  AA meetings via ZOOM for [place] area can be found online at: https://TRIA Beauty.org/find-a-meeting/holiday-closings/  NA meetings via ZOOM for [place] area can be found online at: https://sites.MyPermissions.com/view/[place]regionofnarcoticsanonymous/home?authuser=2  AA/NA and Sponsors are excellent resources for support and you can find one at any support group meeting.   Alcoholics Anonymous (www.alcoholics-anonymous.org): for local information 24 hours/day  AA Intergroup service office in [place] (http://www.aastpaul.org/) [phone]  AA Intergroup service office in [facility]: [phone]. (http://www.TRIA Beauty.org/)  Narcotics Anonymous (www.nam[place].org) (144) 346-2023  https://aafairviewriverside.org/meetings  SMART Recovery - self management for addiction recovery:  www.TekLinksrecovery.org  Pathways ~ A Health Crisis Resource & Support Center:  768.823.5467.  https://prescribetoprevent.org/patient-education/videos/  http://www.harmreduction.org  [facility] [place] [phone]  Support Group:  AA/NA and Sponsor/support.  National [place] on Mental Illness (www.[place].veronica.org): 782.369.5903 or 898-613-3708.  Alcoholics Anonymous (www.alcoholics-anonymous.org): Check your phone book for your local chapter.  Suicide Awareness Voices of Education (SAVE) (www.save.org): 697-689-QSAU (6317)  National Suicide Prevention Line (www.mentalhealthmn.org): 919-835-MUYP (4892)  Mental Health Consumer/Survivor Network of [place] (www.mhcsn.net): 719.227.2794 or 525-029-7513  Mental Health Association of [place] (www.mentalhealth.org): 147-458-8444 or 704-068-5627   Substance Abuse and Mental Health Services (www.samhsa.gov)  [place] Opioid Prevention Coalition:  www.opioidcoalition.org    [place] Recovery Connection ([facility])  [facility] connects people seeking recovery to resources that help foster and sustain long-term recovery.  Whether you are seeking resources for treatment, transportation, housing, job training, education, health care or other pathways to recovery, [facility] is a great place to start.  754.781.7241.  www.minnesotarecProspectStream.ItrybeforeIbuy    Great Pod casts for nutrition and wellness  Listen on Apple Podcasts  Dishing Up Nutrition   [facility]   Nutrition       Understand the connection between what you eat and how you feel. Hosted by licensed nutritionists and dietitians from [facility] we share practical, real-life solutions for healthier living through nutrition.     General Medication Instructions:   See your medication sheet(s) for instructions.   Take all medications as prescribed.  Make no changes unless your doctor suggests them.   Go to all your doctor visits.  Be sure to have all your required lab tests. This way, your medicines can be refilled on time.  Do not use any forms of alcohol.    Please Note:  If you have any questions at anytime after you are discharged please call [facility] detox unit [facility] at [phone].  [facility], Behavioral Intake [phone]  Medical Records call [phone]  Outpatient Behavioral Intake call [phone]  LP+ Wait List/Bed Availability call [phone]    Please remember to take all of your behavioral discharge planning and lab paperwork to any follow up appointments, it contains your lab results, diagnosis, medication list and discharge recommendations.      THANK YOU FOR CHOOSING [facility]

## 2020-10-26 NOTE — PROGRESS NOTES
Case Management Note  10/26/2020    Met with pt, discussed discharge planning. Pt reports he is enrolled in [facility] 5 days/week. Pt sees an addiction therapist @ [facility] mostly weekly. Pt has just started working with a psychologist for MH therapy through [facility], reports next appointment is 11/5. Pt has an addiction medicine provider, Dr. Nuñez @ the [facility]. Pt reports he got a vivitrol injection during his last [facility] stay 10/6-10/9. Pt willing to make appt for new Vivitrol injection.     Pt made Vivitrol appt for 11/3. Pt reports his therapist @ [facility] is looking into sober living options in conjunction with [facility] and he will likely add this as well to his recovery plan. Pt has a sponsor and also plans to attend frequent meetings. Declines any further resources needed at this time. AVS updated.     Hoda Malin, MAXX, [facility]

## 2020-10-26 NOTE — PLAN OF CARE
"  Problem: Substance Withdrawal  Goal: Substance Withdrawal  Description: Signs and symptoms of listed problems will be absent or manageable.  Outcome: No Change       Pt remains on MSSA for alcohol use. Pt scores of 8, 4 and has received 10 mg valium as of 20:00 check-in. Pt appears mildly agitated and states withdrawals are making him \"itchy\"   "

## 2020-10-26 NOTE — PROGRESS NOTES
[facility], [place]   Psychiatric Progress Note               Interim history   This is a 35 year old male with Alcohol use disorder, severe  Alcohol withdrawal  Major depressive disorder-recurrent, moderate  Unspecified anxiety disorder.Pt seen in rounds.   The patient's care was discussed with the treatment team during the daily team meeting and/or staff's chart notes were reviewed.  Staff report patient has been visible in the milieu,  no acute eventsovernight.   Staff report at 4:50 AM he was incontinent of urine also complained of itchiness    Patient reports his mood is good his sleep is good his energy and motivation are good his appetite is up to par  Good motivation interest   Denied suicidal/homicidal ideation/plan intent.denied psychosis  No prior suicde attempts  No access to gun  Pt is in alcohol withdrawal still being monitered every 4 hrs for it,   Pt mssa score are monitered  Tolerating meds and has no side effects.              Medications:     Current Facility-Administered Medications   Medication     acetaminophen (TYLENOL) tablet 650 mg     alum & mag hydroxide-simethicone (MAALOX  ES) suspension 30 mL     atenolol (TENORMIN) tablet 50 mg     bisacodyl (DULCOLAX) Suppository 10 mg     diazepam (VALIUM) tablet 5-20 mg     escitalopram (LEXAPRO) tablet 20 mg     folic acid (FOLVITE) tablet 1 mg     hydrOXYzine (ATARAX) tablet 25 mg     loperamide (IMODIUM) capsule 2 mg     magnesium hydroxide (MILK OF MAGNESIA) suspension 30 mL     multivitamin w/minerals (THERA-VIT-M) tablet 1 tablet     ondansetron (ZOFRAN-ODT) ODT tab 4 mg     thiamine (B-1) tablet 100 mg     traZODone (DESYREL) tablet 100 mg     Facility-Administered Medications Ordered in Other Encounters   Medication     Self Administer Medications: Behavioral Services             Allergies:     Allergies   Allergen Reactions     Pollen Extract Rash     grass tree pollen            Psychiatric Examination:   Blood  pressure 116/64, pulse 52, temperature 97.6  F (36.4  C), temperature source Temporal, resp. rate 16, weight 91.7 kg (202 lb 1.6 oz), SpO2 95 %.  Weight is 202 lbs 1.6 oz  Body mass index is 28.19 kg/m .    Appearance:  awake, alert and adequately groomed  Attitude:  cooperative  Eye Contact:  good  Mood:  better  Affect:  appropriate and in normal range and mood congruent  Speech:  clear, coherent rate /rhythm are good  Psychomotor Behavior:  no evidence of tardive dyskinesia, dystonia, or tics and intact station, gait and muscle tone  Throught Process:  logical  Associations:  no loose associations  Thought Content:  no evidence of suicidal ideation or homicidal ideation, no evidence of psychotic thought, no auditory hallucinations present and no visual hallucinations present  Insight:  fair  Judgement:  intact  Oriented to:  time, person, and place  Attention Span and Concentration:  intact  Recent and Remote Memory:  intact  Language fund of knowledge are adequate         Labs:     Recent Results (from the past 24 hour(s))   Drug abuse screen 6 urine (tox)    Collection Time: 10/25/20  3:00 PM   Result Value Ref Range    Amphetamine Qual Urine Negative NEG^Negative    Barbiturates Qual Urine Negative NEG^Negative    Benzodiazepine Qual Urine Positive (A) NEG^Negative    Cannabinoids Qual Urine Negative NEG^Negative    Cocaine Qual Urine Negative NEG^Negative    Ethanol Qual Urine Negative NEG^Negative    Opiates Qualitative Urine Negative NEG^Negative         DX Alcohol use disorder, severe  Alcohol withdrawal  Major depressive disorder-recurrent, moderate  Unspecified anxiety disorder     PLAN   Alcohol intoxication/withdrawal, presently is on MSSA protocol with Valium. Continue the same MSSA protocol as ordered. Continue thiamine 100 mg p.o. daily, M.V.I. one p.o. daily and folate 1 mg p.o. Daily  Will continue mssa protocal to detox off alcohol on valium,  Pt is c/o of termor , agitation poor sleep and poor  appetite, he has sweats, feels shakey  On mssa client scored scored 5 today and  needed needed 0 mg po as of yet , total dose since admission was 50    MSSA    Eating Disturbances: ate and enjoyed all of it or not applicable  Tremor: 1 - not visibly apparent but can be felt by the examiner placing his fingertip slightly against the patient's fingertips  Sleep Disturbance: slept through the night or not applicable  Clouding of Sensorium: no evidence  Hallucinations: 0 - none  Quality of Contact: 0 - awareness of examiner and people around him/her  Agitation: 0 - normal activity  Paroxysmal Sweats: 1 - barely perceptible sweating  Temperature: 99.5 or below  Pulse: 0 - 69 or below  Total MSSA Score: 3  Continue present medications  Laboratory/Imaging: reviewed with patient   Consults: internal medicine consult reviewed  Patient will be treated in therapeutic milieu with appropriate individual and group therapies as described.  PDMP CHECKED     Supportive psychotheraoy provided, ana talked about recovery enviroment, relapse prevention, triggers to use.  Discussed with patient many issues of addiction,triggers, relapse, and establishing a solid recovery program.  Asked pt to be med complinat   Medical diagnoses to be addressed this admission:    Plan:    Alcohol Withdrawal  Anxiety/Depression - Management per psychiatry team.      Elevated AST, mild - AST 63.  Elevation likely secondary to alcohol abuse.   -Follow up with PCP at discharge     Elevated Cholesterol, LDL - Cholesterol 215, .    -Encourage lifestyle modifications  -Did not start statin therapy due to ongoing alcohol abuse and mild AST elevation  -Defer to primary care to initiate statin.      Medicine will sign off, please page with any additional concern  Legal Status: voluntary    Safety Assessment:   Checks:  15 min  Precautions: withdrawal precautions  Pt has not required locked seclusion or restraints in the past 24 hours to maintain safety,  please refer to RN documentation for further details.  Discussed with patient many issues of addiction,triggers, relapse, and establishing a solid recovery program.  Able to give informed consent:  YES   Discussed Risks/Benefits/Side Effects/Alternatives: YES    After discussion of the indications, risks, benefits, alternatives and consequences of no treatment, the patient elects to complete detox and do treatment

## 2020-10-26 NOTE — PROGRESS NOTES
PDMP as of 10/26/2020:     Nikhil Rios      NARX SCORES  Narcotic  100  Sedative  170  Stimulant  000    OVERDOSE RISK SCORE     350    (Range 000-999)    ADDITIONAL RISK INDICATORS ( 0 )      This [facility] report is based on search criteria supplied and the data entered by the dispensing pharmacy. For more information about any prescription, please contact the dispensing pharmacy or the prescriber. [facility] scores and reports are intended to aid, not replace, medical decision making. None of the information presented should be used as sole justification for providing or refusing to provide medications. The information on this report is not warranted as accurate or complete.     All Prescribers    Prescribers    5 - CHAIM Green    4 - Sean Tolbert    3 - Kanchan He    2 - Ava Lew    1 - Daisy Jj    *Per CDC guidance, the MME conversion factors prescribed or provided as part of the medication-assisted treatment for opioid use disorder should not be used to benchmark against dosage thresholds meant for opioids prescribed for pain. Buprenorphine products have no agreed upon morphine equivalency, and as partial opioid agonists, are not expected to be associated with overdose risk in the same dose-dependent manner as doses for full agonist opioids. MME = morphine milligram equivalents. LME = Lorazepam milligram equivalents. mg = dose in milligrams.     Summary  Total Prescriptions:     6   Total Prescribers:     5   Total Pharmacies:     4   Narcotics* (excluding Buprenorphine)  Current Qty:     0   Current MME/day:     0.00  30 Day Avg MME/day:     0.00   Sedatives*  Current Qty:     0   Current LME/day:    0.00  30 Day Avg LME/day:    0.00  Buprenorphine*  Current Qty:     0   Current mg/day:    0.00  30 Day Avg mg/day:    0.00    PRESCRIPTIONS  Total Prescriptions:  6  Total Private Pay:  0    08/25/2020   2   08/25/2020 08/25/2020   Diazepam 10 Mg Tablet   30.00  8  Er Bernstein    [id number]   [id number]   0/0  3.75 [place]  [place]    07/14/2020   3   07/14/2020 07/14/2020   Diazepam 10 Mg Tablet   6.00  2  Ch Tomasz   [id number]   [facility] (6280[place]   0/0  3.[place]    05/25/2020   3   05/25/2020 05/25/2020   Diazepam 10 Mg Tablet   10.00  4  Sa Mary Ann   [id number]   [facility] ([id number])   0/0  2.50 [place]    04/24/2020   3   04/24/2020 04/24/2020   Gabapentin 300 Mg Capsule   90.00  30  Ch Tomasz   [id number]   [place] (6280[place]   0/2  [place]    01/17/2020   1   01/17/2020 01/22/2020   Gabapentin 300 Mg Capsule   90.00  30  Sr Ari   [id number]   [place] (1359)   0/[place]  [place]    11/23/2019   3   11/23/2019 11/23/2019   Lorazepam 1 Mg Tablet   6.00  2  Ca Anusha   [id number]   [facility] (6280)   0/0  3.00 [place]  *Per CDC guidance, the MME conversion factors prescribed or provided as part of the medication-assisted treatment for opioid use disorder should not be used to benchmark against dosage thresholds meant for opioids prescribed for pain. Buprenorphine products have no agreed upon morphine equivalency, and as partial opioid agonists, are not expected to be associated with overdose risk in the same dose-dependent manner as doses for full agonist opioids. MME = morphine milligram equivalents. LME = Lorazepam milligram equivalents. mg = dose in milligrams.     Providers  Total Providers: 5     Ava Lew  [address] [facility]  [phone]    Gustabo Emery MD  [address]   [facility]  [phone]    Kanchan He  [address] [place]  [phone]    Daisy Jj MD  [address] [address]   [facility]  [phone]    Sean Nuñez MD  [address]   [facility]  [phone]    Pharmacies  Total Pharmacies: 4     [facility] ([id number])  [address]   [facility]  [phone]    [facility] (5742) [address] [facility]  [facility]  [address]  [phone]    [facility] (8041) [address] [place]  [place]  [phone]    [employer] [facility] (7937) [address]   [place]  [place]  [address]  [phone]    The report provided is based upon the search criteria entered and the corresponding data as it has been reported by dispenser(s). If erroneous information is identified or additional information is needed, please contact the dispenser or the prescriber provided on the report. Date Sold signifies the date the prescription was sold (left the pharmacy). The absence of Date Sold does not necessarily indicate the prescription was not dispensed. Fill Date represents the date the medication was filled or prepared by the pharmacy. Note, federal regulation (CFR Title 42: Part 2) requires patient consent prior to releasing certain patient data from federally funded opioid treatment programs (OTPs). As such, controlled substances dispensed from OTPs for medication-assisted treatment may not appear in the [place]  report. Morphine milligram equivalent (MME) conversion factors published by the CDC are used in the MME calculation. Per the CDC, the MME conversion factor is intended only for analytic purposes where prescription data are used to retrospectively calculate daily MME to inform analyses of risks associated with opioid prescribing. This value does not constitute clinical guidance or recommendations for converting patients from one form of opioid analgesic to another. Per the CDC, the conversion factors for drugs prescribed or provided, as part of medication-assisted treatment for opioid use disorder should not be used to benchmark against MME dosage thresholds meant for opioids prescribed for pain. Buprenorphine products listed in the CDC s MME file do not have an associated conversion factor. Lastly, the CDC notes, in clinical practice, calculating MME for methadone often involves a sliding-scale approach, whereby the  conversion factor increases with increasing dose. The conversion factor of 3 for methadone presented in this file could underestimate MME for a given patient. This report contains confidential information, including patient identifiers, and is not a public record. The information on this report must be treated as protected health information and is only to be disclosed to others as authorized by applicable state and Federal regulations.     Powered By       [place] Prescription Monitoring Program  [place] Board of [employer]acy  [address] [place] [address] [address]  [place] [address]  6 [phone]     [employer] 2020. All Rights Reserved. Privacy Policy

## 2020-10-27 VITALS
BODY MASS INDEX: 28.19 KG/M2 | SYSTOLIC BLOOD PRESSURE: 124 MMHG | TEMPERATURE: 98 F | DIASTOLIC BLOOD PRESSURE: 87 MMHG | RESPIRATION RATE: 16 BRPM | WEIGHT: 202.1 LBS | HEART RATE: 87 BPM | OXYGEN SATURATION: 100 %

## 2020-10-27 PROCEDURE — 250N000013 HC RX MED GY IP 250 OP 250 PS 637: Performed by: PHYSICIAN ASSISTANT

## 2020-10-27 PROCEDURE — 250N000013 HC RX MED GY IP 250 OP 250 PS 637: Performed by: PSYCHIATRY & NEUROLOGY

## 2020-10-27 PROCEDURE — 99239 HOSP IP/OBS DSCHRG MGMT >30: CPT | Performed by: PSYCHIATRY & NEUROLOGY

## 2020-10-27 RX ORDER — CARBOXYMETHYLCELLULOSE SODIUM 5 MG/ML
1 SOLUTION/ DROPS OPHTHALMIC
Status: DISCONTINUED | OUTPATIENT
Start: 2020-10-27 | End: 2020-10-27 | Stop reason: HOSPADM

## 2020-10-27 RX ORDER — DIPHENHYDRAMINE HCL 25 MG
1 CAPSULE ORAL DAILY PRN
Qty: 15 ML | Refills: 0 | Status: SHIPPED | OUTPATIENT
Start: 2020-10-27 | End: 2020-10-31

## 2020-10-27 RX ORDER — TRAZODONE HYDROCHLORIDE 100 MG/1
100 TABLET ORAL AT BEDTIME
Qty: 30 TABLET | Refills: 0 | Status: ON HOLD | OUTPATIENT
Start: 2020-10-27 | End: 2021-07-20

## 2020-10-27 RX ORDER — HYDROXYZINE HYDROCHLORIDE 25 MG/1
25 TABLET, FILM COATED ORAL 3 TIMES DAILY PRN
Qty: 30 TABLET | Refills: 0 | Status: ON HOLD | OUTPATIENT
Start: 2020-10-27 | End: 2021-07-20

## 2020-10-27 RX ORDER — MULTIPLE VITAMINS W/ MINERALS TAB 9MG-400MCG
1 TAB ORAL DAILY
Qty: 30 TABLET | Refills: 0 | Status: ON HOLD | OUTPATIENT
Start: 2020-10-27 | End: 2021-07-20

## 2020-10-27 RX ORDER — ESCITALOPRAM OXALATE 20 MG/1
20 TABLET ORAL DAILY
Qty: 30 TABLET | Refills: 0 | Status: ON HOLD | OUTPATIENT
Start: 2020-10-27 | End: 2021-07-20

## 2020-10-27 RX ORDER — LANOLIN ALCOHOL/MO/W.PET/CERES
100 CREAM (GRAM) TOPICAL DAILY
Qty: 30 TABLET | Refills: 0 | Status: ON HOLD | OUTPATIENT
Start: 2020-10-27 | End: 2021-07-20

## 2020-10-27 RX ADMIN — MULTIPLE VITAMINS W/ MINERALS TAB 1 TABLET: TAB at 08:41

## 2020-10-27 RX ADMIN — FOLIC ACID 1 MG: 1 TABLET ORAL at 08:41

## 2020-10-27 RX ADMIN — THIAMINE HCL TAB 100 MG 100 MG: 100 TAB at 08:41

## 2020-10-27 RX ADMIN — CARBOXYMETHYLCELLULOSE SODIUM 1 DROP: 5 SOLUTION/ DROPS OPHTHALMIC at 10:04

## 2020-10-27 RX ADMIN — ESCITALOPRAM OXALATE 20 MG: 20 TABLET ORAL at 08:41

## 2020-10-27 NOTE — PROGRESS NOTES
Pt given copy of their discharge instructions and medication administration instructions. All discharge plans and labs were discussed with patient. Pt reports no questions at this time regarding discharge plans, labs or medications. Pt denies any suicidal ideation, plans or intent at this time. Patient discharge assisted via P.A Samanthay A directly to the lobby. Patient plan is to return home and resume his outpatient services including treatment at [facility]. Patient is discharged at this time.

## 2020-10-27 NOTE — PLAN OF CARE
Problem: Substance Withdrawal  Goal: Substance Withdrawal  Description: Signs and symptoms of listed problems will be absent or manageable.  Outcome: Completed     Pt has not required valium for 24 hours and is now Out Of Detox

## 2020-10-27 NOTE — DISCHARGE SUMMARY
"Admit Date:     10/24/2020   Discharge Date:     10/27/2020      More than 35 minutes spent on discharge summary, doing the discharge instructions, discharge medications, discharge mental status examination.    Disposition: Home     Treatment Follow-Up:   [facility]  Address: [address] [place] [address]  Phone: [phone]  About: Located in the heart of the [facility] community, [facility] has served more than 40,000 individuals over the past fifty plus years. [facility] utilizes evidence-based practices designed to treat co-occurring substance use and mental health disorders. These include but are not limited to: group and individual counseling, gender-sensitive programming, individualized lengths of stay, recovery management skills, a connection to community resources and trauma work (i.e. Pro-longed exposure therapy, Accelerated Resolution Therapy, trauma skills groups).     Vivitrol Appointment:   Appointment date/time: Tuesday, November 3rd @ 2:40 PM  Provider/Clinic: Dr. Nuñez  DISCHARGE DIAGNOSES:   Axis I:   1.  Alcohol use disorder, severe.   2.  Alcohol withdrawal, completed.   3.  Major depressive disorder, recurrent, moderate without psychosis.      Please see detailed admission note by Dr. Auguste on 10/25/2020.      HOSPITAL COURSE:  During the hospitalization, the patient's Lexapro increased to 20 mg.  He completed detox.  His energy, motivation, sleep and interest improved.  He saw Christy Green for Internal Medicine consult. \"Alcohol Withdrawal  Anxiety/Depression - Management per psychiatry team.      Elevated AST, mild - AST 63.  Elevation likely secondary to alcohol abuse.   -Follow up with PCP at discharge     Elevated Cholesterol, LDL - Cholesterol 215, .    -Encourage lifestyle modifications  -Did not start statin therapy due to ongoing alcohol abuse and mild AST elevation  -Defer to primary care to initiate statin.      Medicine will sign off,  The patient " had mildly elevated AST .  He completed detox.  His energy, motivation, sleep and interest improved.  Lexapro was changed to 30 mg.  He met with the  and his plan is to do treatment.      DISCHARGE DISPOSITION:  The patient going to mother's home.      DISCHARGE FOLLOWUP:  With [facility].  He has an appointment on 11/03 with Dr. Nuñez.  The patient is stable to be discharged.             Recent Results (from the past 240 hour(s))   Alcohol breath test POCT    Collection Time: 10/25/20 12:09 AM   Result Value Ref Range    Alcohol Breath Test 0.249 (A) 0.00 - 0.01   CBC with platelets differential    Collection Time: 10/25/20  1:08 AM   Result Value Ref Range    WBC 4.7 4.0 - 11.0 10e9/L    RBC Count 4.88 4.4 - 5.9 10e12/L    Hemoglobin 14.1 13.3 - 17.7 g/dL    Hematocrit 41.9 40.0 - 53.0 %    MCV 86 78 - 100 fl    MCH 28.9 26.5 - 33.0 pg    MCHC 33.7 31.5 - 36.5 g/dL    RDW 13.3 10.0 - 15.0 %    Platelet Count 172 150 - 450 10e9/L    Diff Method Automated Method     % Neutrophils 56.3 %    % Lymphocytes 33.8 %    % Monocytes 8.2 %    % Eosinophils 0.2 %    % Basophils 1.3 %    % Immature Granulocytes 0.2 %    Nucleated RBCs 0 0 /100    Absolute Neutrophil 2.7 1.6 - 8.3 10e9/L    Absolute Lymphocytes 1.6 0.8 - 5.3 10e9/L    Absolute Monocytes 0.4 0.0 - 1.3 10e9/L    Absolute Eosinophils 0.0 0.0 - 0.7 10e9/L    Absolute Basophils 0.1 0.0 - 0.2 10e9/L    Abs Immature Granulocytes 0.0 0 - 0.4 10e9/L    Absolute Nucleated RBC 0.0    Comprehensive metabolic panel    Collection Time: 10/25/20  1:08 AM   Result Value Ref Range    Sodium 141 133 - 144 mmol/L    Potassium 3.4 3.4 - 5.3 mmol/L    Chloride 101 94 - 109 mmol/L    Carbon Dioxide 28 20 - 32 mmol/L    Anion Gap 12 3 - 14 mmol/L    Glucose 100 (H) 70 - 99 mg/dL    Urea Nitrogen 10 7 - 30 mg/dL    Creatinine 0.93 0.66 - 1.25 mg/dL    GFR Estimate >90 >60 mL/min/[1.73_m2]    GFR Estimate If Black >90 >60 mL/min/[1.73_m2]     Calcium 8.5 8.5 - 10.1 mg/dL    Bilirubin Total 0.3 0.2 - 1.3 mg/dL    Albumin 3.7 3.4 - 5.0 g/dL    Protein Total 7.4 6.8 - 8.8 g/dL    Alkaline Phosphatase 104 40 - 150 U/L    ALT 51 0 - 70 U/L    AST 63 (H) 0 - 45 U/L   Lipid Profile    Collection Time: 10/25/20  1:08 AM   Result Value Ref Range    Cholesterol 215 (H) <200 mg/dL    Triglycerides 121 <150 mg/dL    HDL Cholesterol 77 >39 mg/dL    LDL Cholesterol Calculated 114 (H) <100 mg/dL    Non HDL Cholesterol 138 (H) <130 mg/dL   GGT    Collection Time: 10/25/20  1:08 AM   Result Value Ref Range     (H) 0 - 75 U/L   TSH with free T4 reflex    Collection Time: 10/25/20  1:08 AM   Result Value Ref Range    TSH 1.30 0.40 - 4.00 mU/L   Vitamin B12    Collection Time: 10/25/20  1:08 AM   Result Value Ref Range    Vitamin B12 405 193 - 986 pg/mL   Asymptomatic COVID-19 Virus (Coronavirus) by PCR    Collection Time: 10/25/20  1:09 AM    Specimen: Nasopharyngeal   Result Value Ref Range    COVID-19 Virus PCR to [facility] - Source Nasopharyngeal     COVID-19 Virus PCR to [facility] [place] - Result Not Detected    Drug abuse screen 6 urine (tox)    Collection Time: 10/25/20  3:00 PM   Result Value Ref Range    Amphetamine Qual Urine Negative NEG^Negative    Barbiturates Qual Urine Negative NEG^Negative    Benzodiazepine Qual Urine Positive (A) NEG^Negative    Cannabinoids Qual Urine Negative NEG^Negative    Cocaine Qual Urine Negative NEG^Negative    Ethanol Qual Urine Negative NEG^Negative    Opiates Qualitative Urine Negative NEG^Negative                 Labs:   No results found for this or any previous visit (from the past 48 hour(s)).  Because this patient meets criteria for an Alcohol Use Disorder, I performed the following brief intervention on the date of this note:              1) Expressed concern that the patient is drinking at unhealthy levels known to increase their risk of alcohol related problems              2) Gave feedback linking alcohol use and health,  including personalized feedback explaining how alcohol use can interact with their medical and/or psychiatric problems, and with prescribed medications.              3) Advised patient to abstain.      DISCHARGE MENTAL STATUS EXAMINATION:  The patient is alert, oriented x3.  Good fund of knowledge.  Good use of language.  Recent and remote memory, language, fund of knowledge are all adequate.  Euthymic mood congruent affect  Speech normal rate/rhythm linear tp no loose asso,The patient does not have any active suicidal or homicidal ideation.  Does not have any auditory or visual hallucination.  Fair insight/judgment At this time, the patient was stable to be discharged.        Pt was not determined to not be a danger to himself or others. At the current time of discharge, the patient does not meet criteria for involuntary hospitalization. On the day of discharge, the patient reports that they do not have suicidal or homicidal ideation and would never hurt themselves or others. Steps taken to minimize risk include: assessing patient s behavior and thought process daily during hospital stay, discharging patient with adequate plan for follow up for mental and physical health and discussing safety plan of returning to the hospital should the patient ever have thoughts of harming themselves or others. Therefore, based on all available evidence including the factors cited above, the patient does not appear to be at imminent risk for self-harm, and is appropriate for outpatient level of care.     Educated about side effects/risk vs benefits /alternative including non treatment.Pt consented to be on medication.     .Total time spent on discharge summary more than 35 min  More than  20 min  planning, coordination of care, medication reconciliation and performance of physical exam on day of discharge.Care was coordinated with unit RN and unit therapist    .   Nikhil Rios   Home Medication Instructions KEVIN:[id number]     Printed on:10/31/20 0948   Medication Information                      escitalopram (LEXAPRO) 20 MG tablet  Take 1 tablet (20 mg) by mouth daily             hydrOXYzine (ATARAX) 25 MG tablet  Take 1 tablet (25 mg) by mouth 3 times daily as needed for anxiety             hypromellose-dextran (HYPROMELLOSE-DEXTRAN 0.3-0.1%) 0.1-0.3 % ophthalmic solution  Place 1 drop into the right eye daily as needed for dry eyes             ketotifen (ZADITOR) 0.025 % ophthalmic solution  Place 1 drop into both eyes 2 times daily as needed for itching             multivitamin w/minerals (THERA-VIT-M) tablet  Take 1 tablet by mouth daily             sodium chloride (OCEAN) 0.65 % nasal spray  Spray 1 spray into both nostrils daily as needed for congestion             thiamine (B-1) 100 MG tablet  Take 1 tablet (100 mg) by mouth daily             traZODone (DESYREL) 100 MG tablet  Take 1 tablet (100 mg) by mouth At Bedtime               ELMIRA JETER MD             D: 10/27/2020   T: 10/27/2020   MT: LUIS      Name:     QUYEN QUIROZ   MRN:      [id number]        Account:        [id number]   :      1985           Admit Date:     10/24/2020                                  Discharge Date: 10/27/2020      Document: [id number]

## 2020-10-27 NOTE — PROGRESS NOTES
Pt having red, dry/itchy eyes. Updated Alisa with internal medicine who is going to place and eye drop order.

## 2020-10-31 ENCOUNTER — HOSPITAL ENCOUNTER (INPATIENT)
Facility: CLINIC | Age: 35
LOS: 2 days | Discharge: HOME | DRG: 897 | End: 2020-11-02
Attending: EMERGENCY MEDICINE | Admitting: PSYCHIATRY & NEUROLOGY
Payer: COMMERCIAL

## 2020-10-31 DIAGNOSIS — R45.89 DEPRESSED MOOD: ICD-10-CM

## 2020-10-31 DIAGNOSIS — F10.120 ALCOHOL ABUSE WITH UNCOMPLICATED INTOXICATION (H): ICD-10-CM

## 2020-10-31 DIAGNOSIS — Z20.828 EXPOSURE TO SARS-ASSOCIATED CORONAVIRUS: ICD-10-CM

## 2020-10-31 DIAGNOSIS — E87.6 HYPOKALEMIA: ICD-10-CM

## 2020-10-31 DIAGNOSIS — F10.929 ALCOHOLIC INTOXICATION WITH COMPLICATION (H): ICD-10-CM

## 2020-10-31 DIAGNOSIS — F10.10 ALCOHOL ABUSE: ICD-10-CM

## 2020-10-31 DIAGNOSIS — R74.01 TRANSAMINITIS: ICD-10-CM

## 2020-10-31 LAB
ALBUMIN SERPL-MCNC: 3.6 G/DL (ref 3.4–5)
ALCOHOL BREATH TEST: 0.41 (ref 0–0.01)
ALP SERPL-CCNC: 114 U/L (ref 40–150)
ALT SERPL W P-5'-P-CCNC: 115 U/L (ref 0–70)
AMPHETAMINES UR QL SCN: NEGATIVE
ANION GAP SERPL CALCULATED.3IONS-SCNC: 10 MMOL/L (ref 3–14)
AST SERPL W P-5'-P-CCNC: 98 U/L (ref 0–45)
BARBITURATES UR QL: NEGATIVE
BASOPHILS # BLD AUTO: 0.1 10E9/L (ref 0–0.2)
BASOPHILS NFR BLD AUTO: 1.4 %
BENZODIAZ UR QL: POSITIVE
BILIRUB SERPL-MCNC: 0.3 MG/DL (ref 0.2–1.3)
BUN SERPL-MCNC: 9 MG/DL (ref 7–30)
CALCIUM SERPL-MCNC: 8.2 MG/DL (ref 8.5–10.1)
CANNABINOIDS UR QL SCN: NEGATIVE
CHLORIDE SERPL-SCNC: 105 MMOL/L (ref 94–109)
CO2 SERPL-SCNC: 27 MMOL/L (ref 20–32)
COCAINE UR QL: NEGATIVE
CREAT SERPL-MCNC: 0.76 MG/DL (ref 0.66–1.25)
DIFFERENTIAL METHOD BLD: NORMAL
EOSINOPHIL # BLD AUTO: 0.1 10E9/L (ref 0–0.7)
EOSINOPHIL NFR BLD AUTO: 1.1 %
ERYTHROCYTE [DISTWIDTH] IN BLOOD BY AUTOMATED COUNT: 13.9 % (ref 10–15)
ETHANOL SERPL-MCNC: >0.3 G/DL
ETHANOL UR QL SCN: POSITIVE
GFR SERPL CREATININE-BSD FRML MDRD: >90 ML/MIN/{1.73_M2}
GGT SERPL-CCNC: 133 U/L (ref 0–75)
GLUCOSE SERPL-MCNC: 118 MG/DL (ref 70–99)
HCT VFR BLD AUTO: 46.1 % (ref 40–53)
HGB BLD-MCNC: 15 G/DL (ref 13.3–17.7)
IMM GRANULOCYTES # BLD: 0 10E9/L (ref 0–0.4)
IMM GRANULOCYTES NFR BLD: 0.2 %
LYMPHOCYTES # BLD AUTO: 1.8 10E9/L (ref 0.8–5.3)
LYMPHOCYTES NFR BLD AUTO: 40.1 %
MCH RBC QN AUTO: 28.4 PG (ref 26.5–33)
MCHC RBC AUTO-ENTMCNC: 32.5 G/DL (ref 31.5–36.5)
MCV RBC AUTO: 87 FL (ref 78–100)
MONOCYTES # BLD AUTO: 0.4 10E9/L (ref 0–1.3)
MONOCYTES NFR BLD AUTO: 9.6 %
NEUTROPHILS # BLD AUTO: 2.1 10E9/L (ref 1.6–8.3)
NEUTROPHILS NFR BLD AUTO: 47.6 %
NRBC # BLD AUTO: 0 10*3/UL
NRBC BLD AUTO-RTO: 0 /100
OPIATES UR QL SCN: NEGATIVE
PLATELET # BLD AUTO: 261 10E9/L (ref 150–450)
POTASSIUM SERPL-SCNC: 3.3 MMOL/L (ref 3.4–5.3)
PROT SERPL-MCNC: 7.5 G/DL (ref 6.8–8.8)
RBC # BLD AUTO: 5.29 10E12/L (ref 4.4–5.9)
SARS-COV-2 RNA SPEC QL NAA+PROBE: NORMAL
SODIUM SERPL-SCNC: 142 MMOL/L (ref 133–144)
SPECIMEN SOURCE: NORMAL
TSH SERPL DL<=0.005 MIU/L-ACNC: 0.72 MU/L (ref 0.4–4)
WBC # BLD AUTO: 4.4 10E9/L (ref 4–11)

## 2020-10-31 PROCEDURE — 85025 COMPLETE CBC W/AUTO DIFF WBC: CPT | Performed by: EMERGENCY MEDICINE

## 2020-10-31 PROCEDURE — 80053 COMPREHEN METABOLIC PANEL: CPT | Performed by: EMERGENCY MEDICINE

## 2020-10-31 PROCEDURE — 250N000013 HC RX MED GY IP 250 OP 250 PS 637: Performed by: PSYCHIATRY & NEUROLOGY

## 2020-10-31 PROCEDURE — 128N000004 HC R&B CD ADULT

## 2020-10-31 PROCEDURE — HZ2ZZZZ DETOXIFICATION SERVICES FOR SUBSTANCE ABUSE TREATMENT: ICD-10-PCS | Performed by: PSYCHIATRY & NEUROLOGY

## 2020-10-31 PROCEDURE — U0003 INFECTIOUS AGENT DETECTION BY NUCLEIC ACID (DNA OR RNA); SEVERE ACUTE RESPIRATORY SYNDROME CORONAVIRUS 2 (SARS-COV-2) (CORONAVIRUS DISEASE [COVID-19]), AMPLIFIED PROBE TECHNIQUE, MAKING USE OF HIGH THROUGHPUT TECHNOLOGIES AS DESCRIBED BY CMS-2020-01-R: HCPCS | Performed by: EMERGENCY MEDICINE

## 2020-10-31 PROCEDURE — 36415 COLL VENOUS BLD VENIPUNCTURE: CPT | Performed by: EMERGENCY MEDICINE

## 2020-10-31 PROCEDURE — 84443 ASSAY THYROID STIM HORMONE: CPT | Performed by: EMERGENCY MEDICINE

## 2020-10-31 PROCEDURE — 82977 ASSAY OF GGT: CPT | Performed by: EMERGENCY MEDICINE

## 2020-10-31 PROCEDURE — 250N000013 HC RX MED GY IP 250 OP 250 PS 637: Performed by: EMERGENCY MEDICINE

## 2020-10-31 PROCEDURE — C9803 HOPD COVID-19 SPEC COLLECT: HCPCS | Performed by: EMERGENCY MEDICINE

## 2020-10-31 PROCEDURE — 82075 ASSAY OF BREATH ETHANOL: CPT | Performed by: EMERGENCY MEDICINE

## 2020-10-31 PROCEDURE — 80320 DRUG SCREEN QUANTALCOHOLS: CPT | Performed by: EMERGENCY MEDICINE

## 2020-10-31 PROCEDURE — 99285 EMERGENCY DEPT VISIT HI MDM: CPT | Performed by: EMERGENCY MEDICINE

## 2020-10-31 PROCEDURE — 82607 VITAMIN B-12: CPT | Performed by: EMERGENCY MEDICINE

## 2020-10-31 PROCEDURE — 80307 DRUG TEST PRSMV CHEM ANLYZR: CPT | Performed by: EMERGENCY MEDICINE

## 2020-10-31 PROCEDURE — 99285 EMERGENCY DEPT VISIT HI MDM: CPT | Mod: 25 | Performed by: EMERGENCY MEDICINE

## 2020-10-31 RX ORDER — HYDROXYZINE HYDROCHLORIDE 25 MG/1
25 TABLET, FILM COATED ORAL EVERY 4 HOURS PRN
Status: DISCONTINUED | OUTPATIENT
Start: 2020-10-31 | End: 2020-11-02 | Stop reason: HOSPADM

## 2020-10-31 RX ORDER — LOPERAMIDE HCL 2 MG
2 CAPSULE ORAL 4 TIMES DAILY PRN
Status: DISCONTINUED | OUTPATIENT
Start: 2020-10-31 | End: 2020-11-02 | Stop reason: HOSPADM

## 2020-10-31 RX ORDER — POTASSIUM CHLORIDE 750 MG/1
40 TABLET, EXTENDED RELEASE ORAL ONCE
Status: COMPLETED | OUTPATIENT
Start: 2020-10-31 | End: 2020-10-31

## 2020-10-31 RX ORDER — ONDANSETRON 4 MG/1
4 TABLET, ORALLY DISINTEGRATING ORAL EVERY 6 HOURS PRN
Status: DISCONTINUED | OUTPATIENT
Start: 2020-10-31 | End: 2020-11-02 | Stop reason: HOSPADM

## 2020-10-31 RX ORDER — ACETAMINOPHEN 325 MG/1
650 TABLET ORAL EVERY 4 HOURS PRN
Status: DISCONTINUED | OUTPATIENT
Start: 2020-10-31 | End: 2020-11-02 | Stop reason: HOSPADM

## 2020-10-31 RX ORDER — ESCITALOPRAM OXALATE 20 MG/1
20 TABLET ORAL DAILY
Status: DISCONTINUED | OUTPATIENT
Start: 2020-11-01 | End: 2020-11-02 | Stop reason: HOSPADM

## 2020-10-31 RX ORDER — LANOLIN ALCOHOL/MO/W.PET/CERES
100 CREAM (GRAM) TOPICAL DAILY
Status: DISCONTINUED | OUTPATIENT
Start: 2020-11-01 | End: 2020-11-02 | Stop reason: HOSPADM

## 2020-10-31 RX ORDER — MULTIPLE VITAMINS W/ MINERALS TAB 9MG-400MCG
1 TAB ORAL DAILY
Status: DISCONTINUED | OUTPATIENT
Start: 2020-11-01 | End: 2020-11-02 | Stop reason: HOSPADM

## 2020-10-31 RX ORDER — DIAZEPAM 5 MG
5-20 TABLET ORAL EVERY 30 MIN PRN
Status: DISCONTINUED | OUTPATIENT
Start: 2020-10-31 | End: 2020-11-02 | Stop reason: HOSPADM

## 2020-10-31 RX ORDER — ALUMINA, MAGNESIA, AND SIMETHICONE 2400; 2400; 240 MG/30ML; MG/30ML; MG/30ML
30 SUSPENSION ORAL EVERY 4 HOURS PRN
Status: DISCONTINUED | OUTPATIENT
Start: 2020-10-31 | End: 2020-11-02 | Stop reason: HOSPADM

## 2020-10-31 RX ORDER — TRAZODONE HYDROCHLORIDE 100 MG/1
100 TABLET ORAL AT BEDTIME
Status: DISCONTINUED | OUTPATIENT
Start: 2020-10-31 | End: 2020-11-02 | Stop reason: HOSPADM

## 2020-10-31 RX ORDER — ATENOLOL 50 MG/1
50 TABLET ORAL DAILY PRN
Status: DISCONTINUED | OUTPATIENT
Start: 2020-10-31 | End: 2020-11-02 | Stop reason: HOSPADM

## 2020-10-31 RX ORDER — FOLIC ACID 1 MG/1
1 TABLET ORAL DAILY
Status: DISCONTINUED | OUTPATIENT
Start: 2020-11-01 | End: 2020-11-02 | Stop reason: HOSPADM

## 2020-10-31 RX ORDER — NICOTINE 21 MG/24HR
1 PATCH, TRANSDERMAL 24 HOURS TRANSDERMAL ONCE
Status: DISCONTINUED | OUTPATIENT
Start: 2020-10-31 | End: 2020-10-31

## 2020-10-31 RX ORDER — BISACODYL 10 MG
10 SUPPOSITORY, RECTAL RECTAL DAILY PRN
Status: DISCONTINUED | OUTPATIENT
Start: 2020-10-31 | End: 2020-11-02 | Stop reason: HOSPADM

## 2020-10-31 RX ADMIN — DIAZEPAM 5 MG: 5 TABLET ORAL at 21:47

## 2020-10-31 RX ADMIN — POTASSIUM CHLORIDE 40 MEQ: 750 TABLET, EXTENDED RELEASE ORAL at 15:36

## 2020-10-31 RX ADMIN — TRAZODONE HYDROCHLORIDE 100 MG: 100 TABLET ORAL at 21:47

## 2020-10-31 ASSESSMENT — MIFFLIN-ST. JEOR: SCORE: 1874.32

## 2020-10-31 NOTE — ED PROVIDER NOTES
"  History     Chief Complaint   Patient presents with     Depression     Alcohol Intoxication     HPI  Nikhil Rios is a 35 year old male with a past medical history of alcohol abuse, depression who presents to the emergency department with a chief complaint of depressed mood and alcohol intoxication.. The pt   Was discharged from here on 10/27 after being admitted on 10/24 for detox.  During the hospitalization, his Lexapro dose was increased to 20 mg.  The patient also takes hydroxyzine and trazodone.  There was an internal medicine consult during that hospitalization as well for elevated AST (63), thought to be secondary to patient's known alcohol abuse.  Patient also found to have elevated cholesterol, but statins were not initiated due to elevated AST and patient's history of alcohol abuse.  Patient was directed to follow-up with his PCP.  Patient was scheduled to follow-up with [facility] and was scheduled for an appointment on 11/3 with Dr. Nuñez.  Before that admission, the patient was drinking 750 mL of vodka daily.  He was also noted to have some suicidal ideation when he initially presented to the emergency department (patient reports this is due to a recent significant drop in income).  He also reported hallucinations.  The patient is again brought in by his mother due to depressed mood and alcohol intoxication.  The patient states that he is seeking detox placement again today.  The patient is unable to tell me how many days he has been drinking for, when I asked, he replies \"94.\"  However, he left detox 4 days ago.  Patient has slurred speech and is very intoxicated on arrival. He does not report any specific suicidal ideation to me today. Does have history of alcohol withdrawal seizures per chart review.    The patient was also recently admitted to treatment at [facility] on 10/1.  Patient uses tobacco products, but denies illicit drug use.    I have reviewed the " Medications, Allergies, Past Medical and Surgical History, and Social History in the [facility] [employer] system.    Past Medical History:   Diagnosis Date     Alcohol abuse      Past Surgical History:   Procedure Laterality Date     NO HISTORY OF SURGERY       No current facility-administered medications for this encounter.      Current Outpatient Medications   Medication     escitalopram (LEXAPRO) 20 MG tablet     hydrOXYzine (ATARAX) 25 MG tablet     hypromellose-dextran (HYPROMELLOSE-DEXTRAN 0.3-0.1%) 0.1-0.3 % ophthalmic solution     ketotifen (ZADITOR) 0.025 % ophthalmic solution     multivitamin w/minerals (THERA-VIT-M) tablet     sodium chloride (OCEAN) 0.65 % nasal spray     thiamine (B-1) 100 MG tablet     traZODone (DESYREL) 100 MG tablet     Facility-Administered Medications Ordered in Other Encounters   Medication     Self Administer Medications: Behavioral Services     Allergies   Allergen Reactions     Pollen Extract Rash     grass tree pollen     Past medical history, past surgical history, medications, and allergies were reviewed with the patient. Additional pertinent items: None    Social History     Socioeconomic History     Marital status: Single     Spouse name: Not on file     Number of children: Not on file     Years of education: Not on file     Highest education level: Not on file   Occupational History     Not on file   Social Needs     Financial resource strain: Not on file     Food insecurity     Worry: Not on file     Inability: Not on file     Transportation needs     Medical: Not on file     Non-medical: Not on file   Tobacco Use     Smoking status: Former Smoker     Packs/day: 0.25     Smokeless tobacco: Never Used   Substance and Sexual Activity     Alcohol use: Yes     Comment: 750ml daily of vodka     Drug use: No     Sexual activity: Not on file   Lifestyle     Physical activity     Days per week: Not on file     Minutes per session: Not on file     Stress: Not on file   Relationships     Social  connections     Talks on phone: Not on file     Gets together: Not on file     Attends [employer] service: Not on file     Active member of club or organization: Not on file     Attends meetings of clubs or organizations: Not on file     Relationship status: Not on file     Intimate partner violence     Fear of current or ex partner: Not on file     Emotionally abused: Not on file     Physically abused: Not on file     Forced sexual activity: Not on file   Other Topics Concern     Not on file   Social History Narrative     Not on file     Social history was reviewed with the patient. Additional pertinent items: None    Review of Systems  General: No fevers or chills  Skin: No rash or diaphoresis  Eyes: No eye redness or discharge  Ears/Nose/Throat: No rhinorrhea or nasal congestion  Respiratory: No cough or SOB  Cardiovascular: No chest pain or palpitations  Gastrointestinal: No nausea, vomiting, or diarrhea  Genitourinary: No urinary frequency, hematuria, or dysuria  Musculoskeletal: No arthralgias or myalgias  Neurologic: No numbness or weakness  Psychiatric: See HPI  Hematologic/Lymphatic/Immunologic: No leg swelling, no easy bruising/bleeding  Endocrine: No polyuria/polydypsia    A complete review of systems was performed with pertinent positives and negatives noted in the HPI, and all other systems negative.    Physical Exam   BP: (!) 151/98  Pulse: 107  Temp: 98.5  F (36.9  C)  Resp: 20  SpO2: 94 %      General: Well nourished, well developed, NAD  HEENT: EOMI, anicteric. NCAT, MMM  Neck: no jugular venous distension, supple, nl ROM  Cardiac: Tachycardic rate, regular rhythm. Intact peripheral pulses  Pulm: Airway patent, nonlabored breathing  Skin: Warm and dry to the touch.  No rash  Extremities: No LE edema, no cyanosis, w/w/p  Neuro: Alert, slurred speech consistent with alcohol intoxication    ED Course        Procedures                           Labs Ordered and Resulted from Time of ED Arrival Up to the  Time of Departure from the ED   DRUG ABUSE SCREEN 6 CHEM DEP URINE ([facility]) - Abnormal; Notable for the following components:       Result Value    Benzodiazepine Qual Urine Positive (*)     All other components within normal limits   COMPREHENSIVE METABOLIC PANEL - Abnormal; Notable for the following components:    Potassium 3.3 (*)     Glucose 118 (*)     Calcium 8.2 (*)      (*)     AST 98 (*)     All other components within normal limits   ALCOHOL ETHYL - Abnormal; Notable for the following components:    Ethanol g/dL >0.30 (*)     All other components within normal limits   ALCOHOL BREATH TEST POCT - Abnormal; Notable for the following components:    Alcohol Breath Test 0.411 (*)     All other components within normal limits   CBC WITH PLATELETS DIFFERENTIAL            Results for orders placed or performed during the hospital encounter of 10/31/20 (from the past 24 hour(s))   CBC with platelets differential   Result Value Ref Range    WBC 4.4 4.0 - 11.0 10e9/L    RBC Count 5.29 4.4 - 5.9 10e12/L    Hemoglobin 15.0 13.3 - 17.7 g/dL    Hematocrit 46.1 40.0 - 53.0 %    MCV 87 78 - 100 fl    MCH 28.4 26.5 - 33.0 pg    MCHC 32.5 31.5 - 36.5 g/dL    RDW 13.9 10.0 - 15.0 %    Platelet Count 261 150 - 450 10e9/L    Diff Method Automated Method     % Neutrophils 47.6 %    % Lymphocytes 40.1 %    % Monocytes 9.6 %    % Eosinophils 1.1 %    % Basophils 1.4 %    % Immature Granulocytes 0.2 %    Nucleated RBCs 0 0 /100    Absolute Neutrophil 2.1 1.6 - 8.3 10e9/L    Absolute Lymphocytes 1.8 0.8 - 5.3 10e9/L    Absolute Monocytes 0.4 0.0 - 1.3 10e9/L    Absolute Eosinophils 0.1 0.0 - 0.7 10e9/L    Absolute Basophils 0.1 0.0 - 0.2 10e9/L    Abs Immature Granulocytes 0.0 0 - 0.4 10e9/L    Absolute Nucleated RBC 0.0    Comprehensive metabolic panel   Result Value Ref Range    Sodium 142 133 - 144 mmol/L    Potassium 3.3 (L) 3.4 - 5.3 mmol/L    Chloride 105 94 - 109 mmol/L    Carbon Dioxide 27 20 - 32 mmol/L    Anion Gap 10  3 - 14 mmol/L    Glucose 118 (H) 70 - 99 mg/dL    Urea Nitrogen 9 7 - 30 mg/dL    Creatinine 0.76 0.66 - 1.25 mg/dL    GFR Estimate >90 >60 mL/min/[1.73_m2]    GFR Estimate If Black >90 >60 mL/min/[1.73_m2]    Calcium 8.2 (L) 8.5 - 10.1 mg/dL    Bilirubin Total 0.3 0.2 - 1.3 mg/dL    Albumin 3.6 3.4 - 5.0 g/dL    Protein Total 7.5 6.8 - 8.8 g/dL    Alkaline Phosphatase 114 40 - 150 U/L     (H) 0 - 70 U/L    AST 98 (H) 0 - 45 U/L   Alcohol ethyl   Result Value Ref Range    Ethanol g/dL >0.30 (HH) <0.01 g/dL   Alcohol breath test POCT   Result Value Ref Range    Alcohol Breath Test 0.411 (A) 0.00 - 0.01   Drug abuse screen 6 urine (chem dep)   Result Value Ref Range    Amphetamine Qual Urine Negative NEG^Negative    Barbiturates Qual Urine Negative NEG^Negative    Benzodiazepine Qual Urine Positive (A) NEG^Negative    Cannabinoids Qual Urine Negative NEG^Negative    Cocaine Qual Urine Negative NEG^Negative    Ethanol Qual Urine PENDING NEG^Negative    Opiates Qualitative Urine Negative NEG^Negative       Labs, vital signs, and imaging studies were reviewed by me.    Medications - No data to display    Assessments & Plan (with Medical Decision Making)   Nikhil Rios is a 35 year old male who presents with depression and alcohol intoxication.      1215 patient was discussed with detox intake, they will hold a detox bed for the patient.  Medical clearance labs were ordered.    Laboratory work-up is remarkable for significantly elevated alcohol level, transaminitis, hypokalemia (potassium was replaced in the emergency department).    I have reviewed the nursing notes.    I have reviewed the findings, diagnosis, plan and need for follow up with the patient.    Patient discussed with detox intake, to be admitted to their service for further management. Plan was discussed with patient who understands and agrees with plan.     New Prescriptions    No medications on file       Final diagnoses:   Depressed mood   Alcohol  abuse   Alcoholic intoxication with complication (H)   Hypokalemia   Transaminitis       10/31/2020   [facility] EMERGENCY DEPARTMENT     Vanda Azul MD  10/31/20 7105

## 2020-10-31 NOTE — ED NOTES
ED to Behavioral Floor Handoff    SITUATION  Nikihl Rios is a 35 year old male who speaks English and lives in a home with family members The patient arrived in the ED by private car from home with a complaint of Depression and Alcohol Intoxication  .The patient's current symptoms started/worsened a few days ago and during this time the symptoms have remained the same.   In the ED, pt was diagnosed with   Final diagnoses:   Depressed mood   Alcohol abuse   Alcoholic intoxication with complication (H)   Hypokalemia   Transaminitis        Initial vitals were: BP: (!) 151/98  Pulse: 107  Temp: 98.5  F (36.9  C)  Resp: 20  SpO2: 94 %   --------  Is the patient diabetic? No   If yes, last blood glucose? --     If yes, was this treated in the ED? --  --------  Is the patient inebriated (ETOH) Yes or Impaired on other substances? No  MSSA done? Yes  Last MSSA score: 3    Were withdrawal symptoms treated? N/A  Does the patient have a seizure history? Yes. If yes, date of most recent seizure--  --------  Is the patient patient experiencing suicidal ideation? denies current or recent suicidal ideation     Homicidal ideation? denies current or recent homicidal ideation or behaviors.    Self-injurious behavior/urges? denies current or recent self injurious behavior or ideation.  ------  Was pt aggressive in the ED No  Was a code called No  Is the pt now cooperative? Yes  -------  Meds given in ED:   Medications   nicotine (NICODERM CQ) 21 MG/24HR 24 hr patch 1 patch (1 patch Transdermal Not Given 10/31/20 1532)   nicotine polacrilex (NICORETTE) gum 4 mg (has no administration in time range)   potassium chloride ER (KLOR-CON M) CR tablet 40 mEq (40 mEq Oral Given 10/31/20 1536)      Family present during ED course? Yes  Family currently present? Yes    BACKGROUND  Does the patient have a cognitive impairment or developmental disability? No  Allergies:   Allergies   Allergen Reactions     Gramineae Pollens Itching     Pollen  Extract Rash     grass tree pollen   .   Social demographics are   Social History     Socioeconomic History     Marital status: Single     Spouse name: None     Number of children: None     Years of education: None     Highest education level: None   Occupational History     None   Social Needs     Financial resource strain: None     Food insecurity     Worry: None     Inability: None     Transportation needs     Medical: None     Non-medical: None   Tobacco Use     Smoking status: Former Smoker     Packs/day: 0.25     Smokeless tobacco: Never Used   Substance and Sexual Activity     Alcohol use: Yes     Comment: 750ml daily of vodka     Drug use: No     Sexual activity: None   Lifestyle     Physical activity     Days per week: None     Minutes per session: None     Stress: None   Relationships     Social connections     Talks on phone: None     Gets together: None     Attends [employer] service: None     Active member of club or organization: None     Attends meetings of clubs or organizations: None     Relationship status: None     Intimate partner violence     Fear of current or ex partner: None     Emotionally abused: None     Physically abused: None     Forced sexual activity: None   Other Topics Concern     None   Social History Narrative     None        ASSESSMENT  Labs results   Labs Ordered and Resulted from Time of ED Arrival Up to the Time of Departure from the ED   DRUG ABUSE SCREEN 6 CHEM DEP URINE ([facility]) - Abnormal; Notable for the following components:       Result Value    Benzodiazepine Qual Urine Positive (*)     Ethanol Qual Urine Positive (*)     All other components within normal limits   COMPREHENSIVE METABOLIC PANEL - Abnormal; Notable for the following components:    Potassium 3.3 (*)     Glucose 118 (*)     Calcium 8.2 (*)      (*)     AST 98 (*)     All other components within normal limits   ALCOHOL ETHYL - Abnormal; Notable for the following components:    Ethanol g/dL >0.30 (*)      All other components within normal limits   ALCOHOL BREATH TEST POCT - Abnormal; Notable for the following components:    Alcohol Breath Test 0.411 (*)     All other components within normal limits   CBC WITH PLATELETS DIFFERENTIAL   COVID-19 VIRUS (CORONAVIRUS) BY PCR      Imaging Studies: No results found for this or any previous visit (from the past 24 hour(s)).   Most recent vital signs BP (!) 146/109   Pulse 83   Temp 97.5  F (36.4  C) (Oral)   Resp 16   SpO2 98%    Abnormal labs/tests/findings requiring intervention:---   Pain control: pt had none  Nausea control: pt had none    RECOMMENDATION  Are any infection precautions needed (MRSA, VRE, etc.)? No If yes, what infection? --  ---  Does the patient have mobility issues? independently. If yes, what device does the pt use? ---  ---  Is patient on 72 hour hold or commitment? No If on 72 hour hold, have hold and rights been given to patient? N/A  Are admitting orders written if after 10 p.m. ?N/A  Tasks needing to be completed:---     Arcelia Contreras, RN    [phone] [place] [facility]   [phone] [facility]

## 2020-10-31 NOTE — ED NOTES
Spoke with mother of pt. She states he has had depression for a long time, got out of treatment on Oct 1. Has a few mental health appts in November.  Pt lives with mother.

## 2020-11-01 LAB
LABORATORY COMMENT REPORT: NORMAL
SARS-COV-2 RNA SPEC QL NAA+PROBE: NEGATIVE
SPECIMEN SOURCE: NORMAL
VIT B12 SERPL-MCNC: 422 PG/ML (ref 193–986)

## 2020-11-01 PROCEDURE — 128N000004 HC R&B CD ADULT

## 2020-11-01 PROCEDURE — 99221 1ST HOSP IP/OBS SF/LOW 40: CPT | Mod: AI | Performed by: PSYCHIATRY & NEUROLOGY

## 2020-11-01 PROCEDURE — H2032 ACTIVITY THERAPY, PER 15 MIN: HCPCS

## 2020-11-01 PROCEDURE — 250N000013 HC RX MED GY IP 250 OP 250 PS 637: Performed by: PSYCHIATRY & NEUROLOGY

## 2020-11-01 PROCEDURE — 99207 PR DOWN CODE DUE TO SUBSEQUENT EXAM: CPT | Performed by: PSYCHIATRY & NEUROLOGY

## 2020-11-01 RX ADMIN — THIAMINE HCL TAB 100 MG 100 MG: 100 TAB at 08:30

## 2020-11-01 RX ADMIN — MULTIPLE VITAMINS W/ MINERALS TAB 1 TABLET: TAB at 08:30

## 2020-11-01 RX ADMIN — HYDROXYZINE HYDROCHLORIDE 25 MG: 25 TABLET, FILM COATED ORAL at 21:55

## 2020-11-01 RX ADMIN — DIAZEPAM 10 MG: 5 TABLET ORAL at 04:40

## 2020-11-01 RX ADMIN — TRAZODONE HYDROCHLORIDE 100 MG: 100 TABLET ORAL at 21:55

## 2020-11-01 RX ADMIN — HYDROXYZINE HYDROCHLORIDE 25 MG: 25 TABLET, FILM COATED ORAL at 11:44

## 2020-11-01 RX ADMIN — DIAZEPAM 10 MG: 5 TABLET ORAL at 01:07

## 2020-11-01 RX ADMIN — FOLIC ACID 1 MG: 1 TABLET ORAL at 08:30

## 2020-11-01 RX ADMIN — DIAZEPAM 10 MG: 5 TABLET ORAL at 08:30

## 2020-11-01 RX ADMIN — HYDROXYZINE HYDROCHLORIDE 25 MG: 25 TABLET, FILM COATED ORAL at 16:08

## 2020-11-01 RX ADMIN — ESCITALOPRAM OXALATE 20 MG: 20 TABLET ORAL at 08:30

## 2020-11-01 RX ADMIN — POLYETHYLENE GLYCOL 400 AND PROPYLENE GLYCOL 1 DROP: 4; 3 SOLUTION/ DROPS OPHTHALMIC at 22:43

## 2020-11-01 ASSESSMENT — ACTIVITIES OF DAILY LIVING (ADL)
LAUNDRY: UNABLE TO COMPLETE
HYGIENE/GROOMING: INDEPENDENT
DRESS: INDEPENDENT
LAUNDRY: UNABLE TO COMPLETE
ORAL_HYGIENE: INDEPENDENT
ORAL_HYGIENE: INDEPENDENT
DRESS: INDEPENDENT
HYGIENE/GROOMING: INDEPENDENT

## 2020-11-01 NOTE — PROGRESS NOTES
10/31/20 1949   Patient Belongings   Did you bring any home meds/supplements to the hospital?  No   Patient Belongings other (see comments)  (storage bin, medroom bin)   Patient Belongings Put in Hospital Secure Location (Security or Locker, etc.) other (see comments)   Belongings Search Yes   Clothing Search Yes   Second Staff Pio Hanson   Storage bin: shoes, sweat pants and jacket, cigar, matches  Medroom bin: cell phone, wallet, env [id number]: 2 visa, mastercard  NO CASH  A               Admission:  I am responsible for any personal items that are not sent to the safe or pharmacy.  [facility] is not responsible for loss, theft or damage of any property in my possession.    Signature:  _________________________________ Date: _______  Time: _____                                              Staff Signature:  ____________________________ Date: ________  Time: _____      2nd Staff person, if patient is unable/unwilling to sign:    Signature: ________________________________ Date: ________  Time: _____     Discharge:  [facility] has returned all of my personal belongings:    Signature: _________________________________ Date: ________  Time: _____                                          Staff Signature:  ____________________________ Date: ________  Time: _____

## 2020-11-01 NOTE — PROGRESS NOTES
"Patient presents as agitated, angry. Patient stated, \"I don't need to be here.\" Patient is requesting discharge. Dr. Rivera (on call MD) paged and notified.   "

## 2020-11-01 NOTE — H&P
"Admitted:     10/31/2020      PSYCHIATRIC EVALUATION      The patient was seen for 31 minutes face-to-face on station 3A of [facility].  Greater than 50% of this time was spent on counseling, coordinating care, and discussing his plans to maintain sobriety in community, especially in light of his recent relapse.      HISTORY OF PRESENT ILLNESS:  The patient presents as only a partially reliable historian.  He is irritable and focused on being discharged from this unit as soon as possible.  Stated that he had drunk alcohol \"only once\" after his discharge from this facility on 10/27.  Apparently, the patient was scheduled to up with [facility] and was scheduled for an appointment on 11/03 with Dr. Nuñez.  Patient at this time was brought in by his mother due to depressed mood and alcohol intoxication.  He said that he was looking for detox placement again.  He was unable to tell the emergency room doctor how many days he had been drinking.  He had very slurred speech.  He was very intoxicated on arrival.  During the interview, it was his desire to be discharged.  He referred to Dr. Lew's, who apparently has threatened him with commitment in the past, would follow-through with this threat.      PAST PSYCHIATRIC HISTORY:  The patient has a history of consuming approximately 750 mL of vodka on a daily basis.  He reported no prior inpatient psychiatric hospitalizations and no prior suicide attempt.  However, he had been receiving outpatient treatment for major depressive disorder, anxiety.  He was receiving prescriptions of Lexapro, hydroxyzine and trazodone.  Lexapro was increased to 20 mg daily during a previous stay in detox.  The patient has a therapist and medication prescriber.  He endorses tolerance, loss of control over usage, excessive usage, withdrawal symptoms with shakes, poor appetite, and malaise.  A history of seizures is reported in computer records; " however, today, patient denied ever experiencing seizures and delirium tremens.      PAST MEDICAL HISTORY:  Alcohol use disorder.      PAST SURGICAL HISTORY:  No history of surgeries.      HOME MEDICATIONS:   1.  Lexapro 20 mg daily.   2.  Hydroxyzine 25 mg 2 times a day p.r.n. for anxiety.   3.  Artificial tears 1 drop to both eyes daily as needed for dry eyes.     4.  Ketotifen ophthalmic solution 1 drop into both eyes 2 times a day as needed for itch.   5.  Multivitamin with minerals 1 tablet daily.   6.  Thiamine 100 mg daily.   7.  Trazodone 100 mg at bedtime.     8.  Ocean 0.625% nasal spray 1 spray into both nostrils daily as needed for congestion.      LABORATORY DATA:  Potassium was decreased at 3.2, calcium 8.2, , AST 98 and .  Ethanol more than 0.3 g/dL and breathalyzer was 0.411.  Urine drug screen positive for benzodiazepines and ethanol.      For physical examination and 12-point review of systems, please refer to Dr. Azul's note from 10/31/2020.  I reviewed this note and agree with it.      VITAL SIGNS:  Temperature 98.1, respirations 16, heart rate 88, blood pressure 137/86.      MENTAL STATUS EXAMINATION:  The patient is a  male dressed in hospital garbs, was interviewed in his room.  He was sitting calmly on his bed.  Maintained good, at times tense, eye contact.  Overall, he was only partially cooperative, not very forthcoming with information about his chemical use.  He was very focused on being discharged from the hospital.  He describes his mood as okay.  Affect was restricted, irritable.  Denied suicidal or homicidal ideations.  Denied auditory or visual hallucinations.  Thought processes were linear and goal directed.  Associations are tight.  He was alert and oriented x 3.  Fund of knowledge appeared to be average with proper usage of vocabulary.  Ability to focus and concentrate and immediate short and long-term memories were intact.  Gait and posture were all  within normal limits.  Insight and judgment moderately impaired.      IMPRESSION:   1.  Alcohol use disorder, severe, with alcohol withdrawal.     2.  Major depressive disorder, recurrent, moderate severity.   3.  Unspecified anxiety disorder.      TREATMENT PLAN:  We will continue [facility] protocol with Valium for management of alcohol withdrawal symptoms.  We will continue prior to admission medications.  I had a long detailed discussion with the patient about his request to be discharged, and I advised that doing so would be unsafe given the fact of his prior history of alcohol withdrawal.  He was informed that he could file a 72-hour intent to be discharged from the hospital.  He was also advised about his right if he wants to see a different provider to request one.         JAZZ MEJIA MD             D: 2020   T: 2020   MT:       Name:     QUYEN QUIROZ   MRN:      [id number]        Account:      [id number]   :      1985        Admitted:     10/31/2020                   Document: [id number]

## 2020-11-01 NOTE — PLAN OF CARE
"Problem: Substance Withdrawal  Intervention: Substance Withdrawal  Recent Flowsheet Documentation  Taken 11/1/2020 0933 by Marlyn Driver, RN  Substance Withdrawal Interventions:   interventions implemented as appropriate   monitor substance withdrawal process   seizure precautions   encourage nutrition and hydration   maintain safety precautions   monitor need to revise level of observation   maintain safe secure environment   assist patient in completing CD Evaluation / Rule 25 Evaluation   assess medication adherance   assess patient response to medication  Alcohol Withdrawl Interventions:   monitor need for prn medication   assist patient in following safety plan   encourage participation / independence with adls   establish therapeutic relationship   assist with developing & utilizing healthy coping strategies   build upon strengths   monitory confusion, memory loss   monitor decision making ability   reorient/intervent as needed   assist with developing and utilizing coping strategies   provide emotional support    Patient is withdrawn to room this am, awaiting covid results (Pt is asymptomatic at this time). Pt reports feeling \"pissed\" and \"frustrated\" that he brought himself back to the hospital. Patient is not attending/participating in unit programming. Pt is selectively social with peers. Affect is blunted/flat, mood is tense/irritable. Patient reports feeling depressed. Patient denies SI/SIB/HI. Pt denies auditory/visual hallucinations. Patient contracts for safety on the unit. Patient is tolerating medications well, denies any current side effects.     Pt's MSSA = 9 upon first morning withdrawal assessment, patient medicated with 10mg valium x 1 per unit protocol (see MAR). Patient is visibly tremulous and diaphoretic. Patient reports a poor appetite, and fair sleep. Patient wants detox only and reports \"I have plenty of support at home.\" Rest, fluids, and food encouraged. Status 15 checks remain. " "    Blood pressure 136/82, pulse 77, temperature 98.9  F (37.2  C), temperature source Temporal, resp. rate 16, height 1.803 m (5' 11\"), weight 90.7 kg (200 lb), SpO2 98 %.   "

## 2020-11-01 NOTE — PROGRESS NOTES
"35yr M admitted to station 3A under Dr. Lew for alcohol withdrawal.    Pt was on station 3a from 10/24-10/27/20. Pt discharged and relapsed. Pt has been drinking 750ml of vodka daily for the past 4 days. In the ED pts Alcohol breath test was 0.411. In the ED pt had second thoughts about coming to 3A, but the ED doctor was able to change his mind.     Pt was cooperative during the admission process. Pt appeared intoxicated. VSS, and minimal withdrawal symptoms. Pt scored a 8 on MSSA and was given 5mg of valium at 2147.     Pt states that he came to the hospital for his depression, not to detox. Pt states he is depressed and \"stuck.\" Pt states that he drinks because he is depressed. Pt denies SI and SIB. Pt was poor historian regarding hx of withdrawal seizures, per chart review pt does have hx of seizures. Pt was placed on seizure precautions.    Continue to monitor for alcohol withdrawal per MSSA protocol.    "

## 2020-11-01 NOTE — CONSULTS
[facility] Medicine Note  November 1, 2020    Circumstances of recent discharge and re-admittance noted. Please refer to recent medicine H&P in charting dated 10/25/20, which was reviewed by this writer and is up to date. Please call the on-call PA-C for any f/u medical concerns if they arise.   Diagnoses:      # Alcohol abuse, dependence, acute withdrawal  # Transaminitis- 2/2 ongoing alcohol abuse  # Hypokalemia- potassium replaced in ED    Alis Henriquez PA-C  Internal Medicine [facility] Hospitalist  [phone]  November 1, 2020

## 2020-11-01 NOTE — PROVIDER NOTIFICATION
Patient signed a 72-hour intent to leave 11/01/20 at 1106. On call MD, Dr. Rivera, paged and notified. Nursing supervisor paged and notified.

## 2020-11-01 NOTE — PHARMACY-ADMISSION MEDICATION HISTORY
Admission Medication History Completed by Pharmacy    See [facility] Admission Navigator for allergy information, preferred outpatient pharmacy, prior to admission medications and immunization status.     Medication History Sources:     Patient, Discharge note from this hospital 10/27/2020.    Changes made to PTA medication list (reason):    Added: None    Deleted: None    Changed: artificial tears: 1 drop in right eye daily PRN ---changed to--> 1 drop in both eyes daily PRN (per pt)    Additional Information:    Pt interviewed by phone/iPad in ED. Pt was alert and able to answer questions with much concentration.    Pt reports that he needs to use his eye drops (artificial tears and ketotifen) about once a week, and his Ocean nasal spray about once a month.    Pt reports that he does not take a multivitamin or thiamine, but both of these medications were last administered here on his day of discharge (10/27/2020).     Pt reports that he forgets to take his escitalopram about once a week.    Preferred pharmacy is [id number] in [place], [place].    Prior to Admission medications    Medication Sig Last Dose Taking? Auth Provider   escitalopram (LEXAPRO) 20 MG tablet Take 1 tablet (20 mg) by mouth daily 10/31/2020 at AM Yes Ava Lew MD   hydrOXYzine (ATARAX) 25 MG tablet Take 1 tablet (25 mg) by mouth 3 times daily as needed for anxiety 10/30/2020 at Unknown time Yes Ava Lew MD   hypromellose-dextran (HYPROMELLOSE-DEXTRAN 0.3-0.1%) 0.1-0.3 % ophthalmic solution Place 1 drop into the right eye daily as needed for dry eyes 10/27/2020 at AM Yes Ava Lew MD   multivitamin w/minerals (THERA-VIT-M) tablet Take 1 tablet by mouth daily 10/27/2020 at AM Yes Ava Lew MD   thiamine (B-1) 100 MG tablet Take 1 tablet (100 mg) by mouth daily 10/27/2020 at AM Yes Ava Lew MD   traZODone (DESYREL) 100 MG tablet Take 1 tablet (100 mg) by mouth At Bedtime 10/30/2020 at HS Yes  Ava Lew MD   ketotifen (ZADITOR) 0.025 % ophthalmic solution Place 1 drop into both eyes 2 times daily as needed for itching Unknown at Unknown time  Ava Lew MD   sodium chloride (OCEAN) 0.65 % nasal spray Spray 1 spray into both nostrils daily as needed for congestion More than a month at Unknown time  Ava Lew MD       Date completed: 10/31/20    Medication history completed by: Sonia España, PD3 pharmacy intern

## 2020-11-02 VITALS
TEMPERATURE: 97.8 F | WEIGHT: 200 LBS | HEART RATE: 90 BPM | OXYGEN SATURATION: 99 % | BODY MASS INDEX: 28 KG/M2 | RESPIRATION RATE: 16 BRPM | SYSTOLIC BLOOD PRESSURE: 147 MMHG | HEIGHT: 71 IN | DIASTOLIC BLOOD PRESSURE: 98 MMHG

## 2020-11-02 PROCEDURE — 250N000013 HC RX MED GY IP 250 OP 250 PS 637: Performed by: PSYCHIATRY & NEUROLOGY

## 2020-11-02 PROCEDURE — 99239 HOSP IP/OBS DSCHRG MGMT >30: CPT | Performed by: PSYCHIATRY & NEUROLOGY

## 2020-11-02 RX ADMIN — ESCITALOPRAM OXALATE 20 MG: 20 TABLET ORAL at 08:11

## 2020-11-02 RX ADMIN — FOLIC ACID 1 MG: 1 TABLET ORAL at 08:11

## 2020-11-02 RX ADMIN — MULTIPLE VITAMINS W/ MINERALS TAB 1 TABLET: TAB at 08:11

## 2020-11-02 RX ADMIN — THIAMINE HCL TAB 100 MG 100 MG: 100 TAB at 08:11

## 2020-11-02 NOTE — DISCHARGE SUMMARY
Admit Date:     10/31/2020   Discharge Date:           More than 35 minutes spent on discharge summary, doing the discharge instructions, discharge medications, discharge mental status examination.      DISCHARGE DIAGNOSES:   Axis I:   1.  Alcohol use disorder, severe.   2.  Alcohol withdrawal, completed.      Please see detailed admission note by Dr. Rivera on 11/01/2020.      HOSPITAL COURSE:  During the hospitalization, the patient was detoxed off alcohol using MSSA protocol.  He was continued on Lexapro for his depression.  He had hypokalemia, which was replaced in the emergency room.  His energy, motivation, sleep and interest improved.  He did not have any suicidal or homicidal ideation, plan or intent.  He met with the  and his plan is to go back to his sober house.     He was seen by medicine on 11 1  As per their noteCircumstances of recent discharge and re-admittance noted. Please refer to recent medicine H&P in charting dated 10/25/20, which was reviewed by this writer and is up to date. Please call the on-call PA-C for any f/u medical concerns if they arise.   Diagnoses:       # Alcohol abuse, dependence, acute withdrawal  # Transaminitis- 2/2 ongoing alcohol abuse  # Hypokalemia- potassium replaced in ED     Labs done as below            Recent Results (from the past 240 hour(s))   Drug abuse screen 6 urine (tox)    Collection Time: 10/25/20  3:00 PM   Result Value Ref Range    Amphetamine Qual Urine Negative NEG^Negative    Barbiturates Qual Urine Negative NEG^Negative    Benzodiazepine Qual Urine Positive (A) NEG^Negative    Cannabinoids Qual Urine Negative NEG^Negative    Cocaine Qual Urine Negative NEG^Negative    Ethanol Qual Urine Negative NEG^Negative    Opiates Qualitative Urine Negative NEG^Negative   CBC with platelets differential    Collection Time: 10/31/20 12:26 PM   Result Value Ref Range    WBC 4.4 4.0 - 11.0 10e9/L    RBC Count 5.29 4.4 - 5.9 10e12/L    Hemoglobin 15.0  13.3 - 17.7 g/dL    Hematocrit 46.1 40.0 - 53.0 %    MCV 87 78 - 100 fl    MCH 28.4 26.5 - 33.0 pg    MCHC 32.5 31.5 - 36.5 g/dL    RDW 13.9 10.0 - 15.0 %    Platelet Count 261 150 - 450 10e9/L    Diff Method Automated Method     % Neutrophils 47.6 %    % Lymphocytes 40.1 %    % Monocytes 9.6 %    % Eosinophils 1.1 %    % Basophils 1.4 %    % Immature Granulocytes 0.2 %    Nucleated RBCs 0 0 /100    Absolute Neutrophil 2.1 1.6 - 8.3 10e9/L    Absolute Lymphocytes 1.8 0.8 - 5.3 10e9/L    Absolute Monocytes 0.4 0.0 - 1.3 10e9/L    Absolute Eosinophils 0.1 0.0 - 0.7 10e9/L    Absolute Basophils 0.1 0.0 - 0.2 10e9/L    Abs Immature Granulocytes 0.0 0 - 0.4 10e9/L    Absolute Nucleated RBC 0.0    Comprehensive metabolic panel    Collection Time: 10/31/20 12:26 PM   Result Value Ref Range    Sodium 142 133 - 144 mmol/L    Potassium 3.3 (L) 3.4 - 5.3 mmol/L    Chloride 105 94 - 109 mmol/L    Carbon Dioxide 27 20 - 32 mmol/L    Anion Gap 10 3 - 14 mmol/L    Glucose 118 (H) 70 - 99 mg/dL    Urea Nitrogen 9 7 - 30 mg/dL    Creatinine 0.76 0.66 - 1.25 mg/dL    GFR Estimate >90 >60 mL/min/[1.73_m2]    GFR Estimate If Black >90 >60 mL/min/[1.73_m2]    Calcium 8.2 (L) 8.5 - 10.1 mg/dL    Bilirubin Total 0.3 0.2 - 1.3 mg/dL    Albumin 3.6 3.4 - 5.0 g/dL    Protein Total 7.5 6.8 - 8.8 g/dL    Alkaline Phosphatase 114 40 - 150 U/L     (H) 0 - 70 U/L    AST 98 (H) 0 - 45 U/L   Alcohol ethyl    Collection Time: 10/31/20 12:26 PM   Result Value Ref Range    Ethanol g/dL >0.30 (HH) <0.01 g/dL   GGT    Collection Time: 10/31/20 12:26 PM   Result Value Ref Range     (H) 0 - 75 U/L   TSH with free T4 reflex    Collection Time: 10/31/20 12:26 PM   Result Value Ref Range    TSH 0.72 0.40 - 4.00 mU/L   Vitamin B12    Collection Time: 10/31/20 12:26 PM   Result Value Ref Range    Vitamin B12 422 193 - 986 pg/mL   Alcohol breath test POCT    Collection Time: 10/31/20  1:56 PM   Result Value Ref Range    Alcohol Breath Test 0.411  (A) 0.00 - 0.01   Drug abuse screen 6 urine (chem dep)    Collection Time: 10/31/20  3:56 PM   Result Value Ref Range    Amphetamine Qual Urine Negative NEG^Negative    Barbiturates Qual Urine Negative NEG^Negative    Benzodiazepine Qual Urine Positive (A) NEG^Negative    Cannabinoids Qual Urine Negative NEG^Negative    Cocaine Qual Urine Negative NEG^Negative    Ethanol Qual Urine Positive (A) NEG^Negative    Opiates Qualitative Urine Negative NEG^Negative   Asymptomatic COVID-19 Virus (Coronavirus) by PCR    Collection Time: 10/31/20  7:16 PM    Specimen: Nasopharyngeal   Result Value Ref Range    COVID-19 Virus PCR to [facility] - Source Nasopharyngeal     COVID-19 Virus PCR to [facility] - Result       Test received-See reflex to IDDL test SARS CoV2 (COVID-19) Virus RT-PCR   SARS-CoV-2 COVID-19 Virus (Coronavirus) RT-PCR Nasopharyngeal    Collection Time: 10/31/20  7:16 PM    Specimen: Nasopharyngeal   Result Value Ref Range    SARS-CoV-2 Virus Specimen Source Nasopharyngeal     SARS-CoV-2 PCR Result NEGATIVE     SARS-CoV-2 PCR Comment       Testing was performed using the Simplexa COVID-19 Direct Assay on the [employer] Liaison MDX   instrument. Additional information about this Emergency Use Authorization (EUA) assay can   be found via the Lab Guide.                 Labs:   No results found for this or any previous visit (from the past 48 hour(s)).  Because this patient meets criteria for an Alcohol Use Disorder, I performed the following brief intervention on the date of this note:              1) Expressed concern that the patient is drinking at unhealthy levels known to increase their risk of alcohol related problems              2) Gave feedback linking alcohol use and health, including personalized feedback explaining how alcohol use can interact with their medical and/or psychiatric problems, and with prescribed medications.              3) Advised patient to abstain.      DISCHARGE MENTAL STATUS EXAMINATION:   The patient is alert, oriented x3.  Good fund of knowledge.  Good use of language.  Recent and remote memory, language, fund of knowledge are all adequate.  Euthymic mood congruent affect  Speech normal rate/rhythm linear tp no loose asso,The patient does not have any active suicidal or homicidal ideation.  Does not have any auditory or visual hallucination.  Fair insight/judgment At this time, the patient was stable to be discharged.        Pt was not determined to not be a danger to himself or others. At the current time of discharge, the patient does not meet criteria for involuntary hospitalization. On the day of discharge, the patient reports that they do not have suicidal or homicidal ideation and would never hurt themselves or others. Steps taken to minimize risk include: assessing patient s behavior and thought process daily during hospital stay, discharging patient with adequate plan for follow up for mental and physical health and discussing safety plan of returning to the hospital should the patient ever have thoughts of harming themselves or others. Therefore, based on all available evidence including the factors cited above, the patient does not appear to be at imminent risk for self-harm, and is appropriate for outpatient level of care.     Educated about side effects/risk vs benefits /alternative including non treatment.Pt consented to be on medication.     .Total time spent on discharge summary more than 35 min  More than  20 min  planning, coordination of care, medication reconciliation and performance of physical exam on day of discharge.Care was coordinated with unit RN and unit therapist    .   Nikhil Rios   Home Medication Instructions KEVIN:[id number]    Printed on:11/04/20 0983   Medication Information                      escitalopram (LEXAPRO) 20 MG tablet  Take 1 tablet (20 mg) by mouth daily             hydrOXYzine (ATARAX) 25 MG tablet  Take 1 tablet (25 mg) by mouth 3 times daily as  needed for anxiety             hypromellose-dextran (ARTIFICAL TEARS) 0.1-0.3 % ophthalmic solution  Place 1 drop into both eyes daily as needed for dry eyes             ketotifen (ZADITOR) 0.025 % ophthalmic solution  Place 1 drop into both eyes 2 times daily as needed for itching             multivitamin w/minerals (THERA-VIT-M) tablet  Take 1 tablet by mouth daily             sodium chloride (OCEAN) 0.65 % nasal spray  Spray 1 spray into both nostrils daily as needed for congestion             thiamine (B-1) 100 MG tablet  Take 1 tablet (100 mg) by mouth daily             traZODone (DESYREL) 100 MG tablet  Take 1 tablet (100 mg) by mouth At Bedtime                 Disposition: Home     Treatment Follow-Up:   [facility]  Address: [address] [place] [address]  Phone: [phone]  About: Located in the heart of the [facility] community, [facility] has served more than 40,000 individuals over the past fifty plus years. [facility] utilizes evidence-based practices designed to treat co-occurring substance use and mental health disorders. These include but are not limited to: group and individual counseling, gender-sensitive programming, individualized lengths of stay, recovery management skills, a connection to community resources and trauma work (i.e. Pro-longed exposure therapy, Accelerated Resolution Therapy, trauma skills groups).     Vivitrol Appointment:   Appointment date/time: Tuesday, November 3rd @ 2:40 PM  Provider/Clinic: Dr. Nuñez  Address: [facility]  Phone: [address], [place] [address]  Fax: [phone]     Vivitrol  Indication: Vivitrol  (naltrexone for extended-release injectable suspension) is a prescription medicine used for the:       Treatment of alcohol dependence in patients who are able to abstain from alcohol in an outpatient setting. Patients should not be actively drinking at the time of initial Vivitrol  administration    Prevention of relapse to opioid dependence, following opioid detoxification    Patients must be opioid-free for a minimum of 7-10 days before starting Vivitrol treatment.    Vivitrol should be part of a comprehensive management program that includes psychosocial support      Psychologist Appointment:  Appointment Date/Time: Thursday, 20, please follow up with [facility] to clarify time of appointment.     ELMIRA JETER MD             D: 2020   T: 2020   MT: FRANCISCA      Name:     QUYEN QUIROZ   MRN:      [id number]        Account:        [id number]   :      1985           Admit Date:     10/31/2020                                  Discharge Date:       Document: [id number]

## 2020-11-02 NOTE — PROGRESS NOTES
PDMP as of 11/2/2020:     Nikhil Rios      NARX SCORES  Narcotic  100  Sedative  170  Stimulant  000    OVERDOSE RISK SCORE     350    (Range 000-999)    ADDITIONAL RISK INDICATORS ( 0 )     This NarxCare report is based on search criteria supplied and the data entered by the dispensing pharmacy. For more information about any prescription, please contact the dispensing pharmacy or the prescriber. NarxCare scores and reports are intended to aid, not replace, medical decision making. None of the information presented should be used as sole justification for providing or refusing to provide medications. The information on this report is not warranted as accurate or complete.     Prescribers    5 - CHAIM Green    4 - Sean Tolbert    3 - Kanchan He    2 - Ava Lew    1 - Daisy Jj    *Per CDC guidance, the MME conversion factors prescribed or provided as part of the medication-assisted treatment for opioid use disorder should not be used to benchmark against dosage thresholds meant for opioids prescribed for pain. Buprenorphine products have no agreed upon morphine equivalency, and as partial opioid agonists, are not expected to be associated with overdose risk in the same dose-dependent manner as doses for full agonist opioids. MME = morphine milligram equivalents. LME = Lorazepam milligram equivalents. mg = dose in milligrams.     Summary     Total Prescriptions:     6   Total Prescribers:     5   Total Pharmacies:     4   Narcotics* (excluding Buprenorphine)  Current Qty:     0   Current MME/day:     0.00  30 Day Avg MME/day:     0.00   Sedatives*  Current Qty:     0   Current LME/day:    0.00  30 Day Avg LME/day:    0.00  Buprenorphine*  Current Qty:     0   Current mg/day:    0.00  30 Day Avg mg/day:    0.00  Rx Data   PRESCRIPTIONS  Total Prescriptions:  6  Total Private Pay:  0    08/25/2020   2   08/25/2020 08/25/2020   Diazepam 10 Mg Tablet   30.00  8  Er Bernstein   [id number]   [place]  ([id number][place]   0/0  3.75 [place]  [place]    07/14/2020   3   07/14/2020 07/14/2020   Diazepam 10 Mg Tablet   6.00  2  Ch Tomasz   [id number]   Wal ([id number])   0/0  3.00 [place]    05/25/2020   3   05/25/2020 05/25/2020   Diazepam 10 Mg Tablet   10.00  4  Sa Mary Ann   [id number]   Wal ([id number])   0/0  2.50 [place]    04/24/2020   3   04/24/2020 04/24/2020   Gabapentin 300 Mg Capsule   90.00  30  Ch Tomasz   [id number]   [place] (6280)   0/2  [place]    01/17/2020   1   01/17/2020 01/22/2020   Gabapentin 300 Mg Capsule   90.00  30  Sr Ari   [id number]   [place] (7609)   0/0  [place]    11/23/2019   3   11/23/2019 11/23/2019   Lorazepam 1 Mg Tablet   6.00  2  Ca Anusha   [id number]   Wal (6280)   0/0  3.00 LME  [place]  *Per CDC guidance, the MME conversion factors prescribed or provided as part of the medication-assisted treatment for opioid use disorder should not be used to benchmark against dosage thresholds meant for opioids prescribed for pain. Buprenorphine products have no agreed upon morphine equivalency, and as partial opioid agonists, are not expected to be associated with overdose risk in the same dose-dependent manner as doses for full agonist opioids. MME = morphine milligram equivalents. LME = Lorazepam milligram equivalents. mg = dose in milligrams.     Providers  Total Providers: 5     Ava Lew  [address] [facility]  [phone]    Gustabo Emery MD  [address] [facility]    [facility]  [id number] [phone]    Kanchan He  [address] [place]  [address] [phone]    Daisy Jj MD  [address] [address]   [facility]  [address] [phone]    Sean Nuñez MD  [address] [facility]  [phone]    Pharmacies  Total Pharmacies: 4     [facility] ([id number])  [address]   [facility]  [address] [phone]    [facility] ([id number]) [address]   [place]  [facility]  [address]  [phone]    [employer] ([id number]) [address] [place]  [place]  [phone]    [facility] ([id number]) [address]   [place]  [place]  [address]  [phone]    The report provided is based upon the search criteria entered and the corresponding data as it has been reported by dispenser(s). If erroneous information is identified or additional information is needed, please contact the dispenser or the prescriber provided on the report. Date Sold signifies the date the prescription was sold (left the pharmacy). The absence of Date Sold does not necessarily indicate the prescription was not dispensed. Fill Date represents the date the medication was filled or prepared by the pharmacy. Note, federal regulation (CFR Title 42: Part 2) requires patient consent prior to releasing certain patient data from federally funded opioid treatment programs (OTPs). As such, controlled substances dispensed from OTPs for medication-assisted treatment may not appear in the [place]  report. Morphine milligram equivalent (MME) conversion factors published by the CDC are used in the MME calculation. Per the CDC, the MME conversion factor is intended only for analytic purposes where prescription data are used to retrospectively calculate daily MME to inform analyses of risks associated with opioid prescribing. This value does not constitute clinical guidance or recommendations for converting patients from one form of opioid analgesic to another. Per the CDC, the conversion factors for drugs prescribed or provided, as part of medication-assisted treatment for opioid use disorder should not be used to benchmark against MME dosage thresholds meant for opioids prescribed for pain. Buprenorphine products listed in the CDC s MME file do not have an associated conversion factor. Lastly, the CDC notes, in clinical practice, calculating MME for methadone often involves a sliding-scale approach, whereby the  conversion factor increases with increasing dose. The conversion factor of 3 for methadone presented in this file could underestimate MME for a given patient. This report contains confidential information, including patient identifiers, and is not a public record. The information on this report must be treated as protected health information and is only to be disclosed to others as authorized by applicable state and Federal regulations.     Powered By       [place] Prescription Monitoring Program  [place] Board of [employer]acy  [address] [place] [address] [address]  [place] [address]  [phone]     [employer] 2020. All Rights Reserved. Privacy Policy

## 2020-11-02 NOTE — PROGRESS NOTES
S: Patient scored less than 8 for greater than 24 hours. Pt has required no medication for withdrawal for > 24 hours,.  B: Patient admitted for alcohol withdrawal and detoxification.  A: Patient stable in the alcohol withdrawal process AEB MSSA scores < 8 for > 24 hours.  R: Patient removed from detox status per unit Protocol. Patient continues to request discharge.

## 2020-11-02 NOTE — PLAN OF CARE
Behavioral Team Discussion: (11/2/2020)    Continued Stay Criteria/Rationale: Patient admitted for alcohol withdrawal, complicated.  Plan: The following services will be provided to the patient; psychiatric assessment, medication management, therapeutic milieu, individual and group support, and skills groups.   Participants: 3A Provider: Dr. Ava Lew MD; 3A RN's: Marlyn Driver, RN; 3A CM's: Hoda Malin LPC [facility]  Summary/Recommendation: Providers will assess today for treatment recommendations, discharge planning, and aftercare plans. CM will meet with pt for discharge planning.   Medical/Physical: Internal medicine consult completed 11/1/20.  Precautions:   Behavioral Orders   Procedures     Code 1 - Restrict to Unit     Routine Programming     As clinically indicated     Seizure precautions     Status 15     Every 15 minutes.     Withdrawal precautions     Rationale for change in precautions or plan: N/A  Progress: Improving.

## 2020-11-02 NOTE — PLAN OF CARE
"  Problem: General Rehab Plan of Care  Goal: Therapeutic Recreation/Music Therapy Goal  Description: The patient and/or their representative will achieve their patient-specific goals related to the plan of care.  The patient-specific goals include: emotional expression, emotional regulation, trauma containment  Outcome: Improving   AT directive was to create a drawing of self as a landscape using drawing media of pts choice. Goals of directive are to create a personal self narrative, emotional expression and regulation, trauma containment, identifying positive strengths and goals.   Pt was an active participant, focused on task for the full duration of group.  Pt created an image of a golf course/golf green and briefly shared with group. Pt shared how sports are a passion of his. Pt also briefly shared his difficulties with consuming alcohol and \"one drink always leads to another.\"    "

## 2020-11-02 NOTE — PROGRESS NOTES
Patient discharged at 1115 to home. Patient reports he will be picked up by his Mother. All patient belongings from room, locker, and security sent with patient. Patient escorted off the unit by Psych Associate, Tere.     This RN went over discharge instructions, teachings, patient's labs, and unit recommendations with patient. This RN went over patient's prescribed medications being sent home with patient by pharmacy. Patient verbalizes and demonstrates understanding of all teachings. Patient verbalizes understanding of medication instructions and indications. Patient denies thoughts of harm towards self or others. Patient denies access to guns or drugs at home. Patient denies auditory/visual hallucinations. Pt denies any current medication side effects or medical issues at this time. Patient was encouraged to make a follow-up appointment with Primary Care Provider within one week of discharge, patient reported he would do so but declined to make an appointment while on the unit. Patient reported feeling happy and hopeful for the future prior to discharge.     Patient denies any further questions and is now discharged at this time.

## 2020-11-02 NOTE — PLAN OF CARE
"  Problem: Substance Withdrawal  Goal: Substance Withdrawal  Description: Signs and symptoms of listed problems will be absent or manageable.  Outcome: Improving     Pt MSSA scores of 6 and 4 given no valium during shift. Pt denies SI/HI/SIB. Pt attended groups this evening and interacted with peers and staff in the milieu. Pt has requested a change in psychiatrist and would like to be seen by Dr. Alberto for the rest of his stay. Pt denies any other complaints. Continue to monitor for withdrawal and BP.       BP (!) 154/94 (BP Location: Left arm)   Pulse 75   Temp 98.7  F (37.1  C) (Temporal)   Resp 16   Ht 1.803 m (5' 11\")   Wt 90.7 kg (200 lb)   SpO2 98%   BMI 27.89 kg/m      "

## 2020-12-02 ENCOUNTER — HOSPITAL ENCOUNTER (EMERGENCY)
Facility: CLINIC | Age: 35
Discharge: HOME | End: 2020-12-03
Attending: EMERGENCY MEDICINE | Admitting: EMERGENCY MEDICINE
Payer: COMMERCIAL

## 2020-12-02 DIAGNOSIS — F10.220 ALCOHOL DEPENDENCE WITH UNCOMPLICATED INTOXICATION (H): ICD-10-CM

## 2020-12-02 DIAGNOSIS — F10.920 ALCOHOLIC INTOXICATION WITHOUT COMPLICATION (H): ICD-10-CM

## 2020-12-02 LAB
ALBUMIN SERPL-MCNC: 4.3 G/DL (ref 3.4–5)
ALCOHOL BREATH TEST: 0.27 (ref 0–0.01)
ALCOHOL BREATH TEST: 0.36 (ref 0–0.01)
ALP SERPL-CCNC: 87 U/L (ref 40–150)
ALT SERPL W P-5'-P-CCNC: 92 U/L (ref 0–70)
ANION GAP SERPL CALCULATED.3IONS-SCNC: 14 MMOL/L (ref 3–14)
AST SERPL W P-5'-P-CCNC: 115 U/L (ref 0–45)
BASOPHILS # BLD AUTO: 0.1 10E9/L (ref 0–0.2)
BASOPHILS NFR BLD AUTO: 2 %
BILIRUB SERPL-MCNC: 0.6 MG/DL (ref 0.2–1.3)
BUN SERPL-MCNC: 11 MG/DL (ref 7–30)
CALCIUM SERPL-MCNC: 8.6 MG/DL (ref 8.5–10.1)
CHLORIDE SERPL-SCNC: 100 MMOL/L (ref 94–109)
CO2 SERPL-SCNC: 27 MMOL/L (ref 20–32)
CREAT SERPL-MCNC: 0.72 MG/DL (ref 0.66–1.25)
DIFFERENTIAL METHOD BLD: ABNORMAL
EOSINOPHIL # BLD AUTO: 0 10E9/L (ref 0–0.7)
EOSINOPHIL NFR BLD AUTO: 0.6 %
ERYTHROCYTE [DISTWIDTH] IN BLOOD BY AUTOMATED COUNT: 16.9 % (ref 10–15)
GFR SERPL CREATININE-BSD FRML MDRD: >90 ML/MIN/{1.73_M2}
GLUCOSE SERPL-MCNC: 147 MG/DL (ref 70–99)
HCT VFR BLD AUTO: 45.5 % (ref 40–53)
HGB BLD-MCNC: 15.3 G/DL (ref 13.3–17.7)
IMM GRANULOCYTES # BLD: 0 10E9/L (ref 0–0.4)
IMM GRANULOCYTES NFR BLD: 0.3 %
LYMPHOCYTES # BLD AUTO: 0.9 10E9/L (ref 0.8–5.3)
LYMPHOCYTES NFR BLD AUTO: 25.7 %
MCH RBC QN AUTO: 27.7 PG (ref 26.5–33)
MCHC RBC AUTO-ENTMCNC: 33.6 G/DL (ref 31.5–36.5)
MCV RBC AUTO: 82 FL (ref 78–100)
MONOCYTES # BLD AUTO: 0.3 10E9/L (ref 0–1.3)
MONOCYTES NFR BLD AUTO: 7.1 %
NEUTROPHILS # BLD AUTO: 2.3 10E9/L (ref 1.6–8.3)
NEUTROPHILS NFR BLD AUTO: 64.3 %
NRBC # BLD AUTO: 0 10*3/UL
NRBC BLD AUTO-RTO: 0 /100
PLATELET # BLD AUTO: 132 10E9/L (ref 150–450)
POTASSIUM SERPL-SCNC: 3.6 MMOL/L (ref 3.4–5.3)
PROT SERPL-MCNC: 7.8 G/DL (ref 6.8–8.8)
RBC # BLD AUTO: 5.53 10E12/L (ref 4.4–5.9)
SODIUM SERPL-SCNC: 141 MMOL/L (ref 133–144)
WBC # BLD AUTO: 3.5 10E9/L (ref 4–11)

## 2020-12-02 PROCEDURE — 258N000001 HC RX 258: Performed by: EMERGENCY MEDICINE

## 2020-12-02 PROCEDURE — 99284 EMERGENCY DEPT VISIT MOD MDM: CPT | Performed by: EMERGENCY MEDICINE

## 2020-12-02 PROCEDURE — 250N000009 HC RX 250: Performed by: EMERGENCY MEDICINE

## 2020-12-02 PROCEDURE — 99284 EMERGENCY DEPT VISIT MOD MDM: CPT | Mod: 25 | Performed by: EMERGENCY MEDICINE

## 2020-12-02 PROCEDURE — 80053 COMPREHEN METABOLIC PANEL: CPT | Performed by: EMERGENCY MEDICINE

## 2020-12-02 PROCEDURE — 250N000011 HC RX IP 250 OP 636: Performed by: EMERGENCY MEDICINE

## 2020-12-02 PROCEDURE — 96365 THER/PROPH/DIAG IV INF INIT: CPT | Performed by: EMERGENCY MEDICINE

## 2020-12-02 PROCEDURE — 85025 COMPLETE CBC W/AUTO DIFF WBC: CPT | Performed by: EMERGENCY MEDICINE

## 2020-12-02 RX ADMIN — FOLIC ACID: 5 INJECTION, SOLUTION INTRAMUSCULAR; INTRAVENOUS; SUBCUTANEOUS at 20:38

## 2020-12-02 ASSESSMENT — ENCOUNTER SYMPTOMS: CHILLS: 1

## 2020-12-02 NOTE — ED AVS SNAPSHOT
[facility] Emergency Department  [address]  [place] [address]  Phone: [phone]  Fax: [phone]                                    Nikhil Rios   MRN: [id number]    Department: [facility] Emergency Department   Date of Visit: 12/2/2020           After Visit Summary Signature Page    I have received my discharge instructions, and my questions have been answered. I have discussed any challenges I see with this plan with the nurse or doctor.    ..........................................................................................................................................  Patient/Patient Representative Signature      ..........................................................................................................................................  Patient Representative Print Name and Relationship to Patient    ..................................................               ................................................  Date                                   Time    ..........................................................................................................................................  Reviewed by Signature/Title    ...................................................              ..............................................  Date                                               Time          22EPIC Rev 08/18

## 2020-12-03 VITALS
OXYGEN SATURATION: 98 % | SYSTOLIC BLOOD PRESSURE: 144 MMHG | TEMPERATURE: 98.2 F | RESPIRATION RATE: 18 BRPM | DIASTOLIC BLOOD PRESSURE: 94 MMHG | HEART RATE: 109 BPM

## 2020-12-03 PROCEDURE — 250N000013 HC RX MED GY IP 250 OP 250 PS 637: Performed by: EMERGENCY MEDICINE

## 2020-12-03 RX ORDER — LORAZEPAM 1 MG/1
1 TABLET ORAL ONCE
Status: COMPLETED | OUTPATIENT
Start: 2020-12-03 | End: 2020-12-03

## 2020-12-03 RX ADMIN — LORAZEPAM 1 MG: 1 TABLET ORAL at 02:19

## 2020-12-03 NOTE — ED NOTES
I did except shift change signout on this patient, with a plan to discharge in the morning when sober.  He declined detox.  I was informed by the nurse that the patient was starting to look shaky.  I did go and evaluate him, he did look somewhat tremulous, though there was question of how much of this may be volitional as the tremors clearly seem to worsen while he was talking to me.  Regardless, he was given a dose of oral Ativan.  On repeat evaluation the tremors had ceased.  He initially did continue to decline detox, however, then stated that he talk to his mother and she really wanted to go to detox.  He reluctantly did agree to go to detox.  I did at that time check for detox bed availability here, and there unfortunately is no availability at this time.  We did phone [address] [place] [address] as well as [facility].  There is no availability at [facility], though there was a bed available at [facility].  The patient is declining transfer to [facility] at this time.  He does appear clinically sober, is not currently showing any significant evidence for active withdrawal.  Given that he is declining to transfer to [facility], and there were no beds available here, he will be discharged home.  He is encouraged to seek detox in the future.  I did give him the phone number to call intake to check for inpatient bed availability.  He is encouraged to return with concerns, follow-up with outpatient provider.    Dictation Disclaimer: Some of this Note has been completed with voice-recognition dictation software. Although errors are generally corrected real-time, there is the potential for a rare error to be present in the completed chart.       Christa Myers MD  12/03/20 0337

## 2020-12-03 NOTE — ED NOTES
Pt given discharge instructions. i called for patient. Gave pt blanket to wrap around himself. Pt had shoes with him. Encouraged pt to call for bed availability.

## 2020-12-03 NOTE — ED NOTES
Offered pt bed at [address] and pt declined. Pt's mother would like us to keep Nikhil until bed opens up. No bed at [facility].
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
## 2020-12-03 NOTE — ED TRIAGE NOTES
Paramedics state that mother called because son is intoxicated all the time and that he needs help. Pt states that he does not want detox.

## 2020-12-03 NOTE — DISCHARGE INSTRUCTIONS
I recommend you get into detox and then into a treatment program    There are no detox beds available at this time at [facility]  You may call the following numbers tomorrow to check on bed availability:    [facility]    [phone]  [address] [place] [address] Detox         [phone]  [facility]        [phone]

## 2021-02-26 NOTE — ED TRIAGE NOTES
Pt states he is here for detox and his last drink was PTA.  Pt states no hx of seizures coming off alcohol. Pt states he has a bed on reserve in detox.   Tarsorrhaphy Text: A tarsorrhaphy was performed using Frost sutures.

## 2021-04-01 ENCOUNTER — COMMUNICATION - HEALTHEAST (OUTPATIENT)
Dept: SCHEDULING | Facility: CLINIC | Age: 36
End: 2021-04-01

## 2021-05-08 ENCOUNTER — COMMUNICATION - HEALTHEAST (OUTPATIENT)
Dept: SCHEDULING | Facility: CLINIC | Age: 36
End: 2021-05-08

## 2021-05-27 ENCOUNTER — RECORDS - HEALTHEAST (OUTPATIENT)
Dept: ADMINISTRATIVE | Facility: CLINIC | Age: 36
End: 2021-05-27

## 2021-05-31 NOTE — PROGRESS NOTES
"Assessment/Plan:     1. Alcohol dependence, continuous drinking behavior (H)  Patient continues to actively drink alcohol post inpatient treatment.  No evidence of withdrawal at this time.  Patient declines a referral to our chemical dependency group.  He would like to speak with the care coordinator who will be visiting him today.  Declines a referral for counseling.  Highly encouraged abstinence from alcohol.    2. Hypertension, unspecified type  Well controlled in clinic today.  Encouraged better compliance with Amlodipine.  - amLODIPine (NORVASC) 10 MG tablet; Take 1 tablet (10 mg total) by mouth daily.  Dispense: 90 tablet; Refill: 1    3. Anxiety  Exacerbated by alcohol use.  No acute depressive symptoms.  May continue Hydroxyzine three times a day as needed.  - hydrOXYzine HCl (ATARAX) 10 MG tablet; Take 1 tablet (10 mg total) by mouth 3 (three) times a day as needed for anxiety.  Dispense: 90 tablet; Refill: 1        Subjective:     Nikhil Rios is a 34 y.o. male who presents for hospitalization follow up.  Patient was discharged 11 days ago from a 28 day inpatient treatment program from [facility].  I do not have any records from his treatment available to me.  He has been in the ER multiple times this year for alcohol withdrawal.  Unfortunately, patient started drinking not long after his discharge from treatment.  He is drinking hard liquor.  Unable to quantify how much alcohol he is drinking.  Says he is taking in multiple \"nips\" per day.  States his alcohol use is much less than prior to treatment.  Last alcoholic drink was yesterday.  Reports nausea and vomiting with drinking.  Denies any abdominal pain.  Voiding normally.  Patient denies any withdrawal symptoms, as he has been drinking daily.  Reports he has a \"care coordinator\" from his treatment center coming to his house today for an assessment and to help him get set up with a psychiatrist.    Patient has his own house, but also stays with " "his mom in [place] a lot.  She is his main support person.  He is not currently working.  Denies any drug use.  Smokes a cigar at times.    Patient was discharge on amlodipine, clonidine, hydroxyzine, aspirin, and a multivitamin.  He has not been taking any of these since discharge.  Thinks his blood pressure has previously been high due to withdrawal symptoms.  He does bring me in all of the prescriptions, however they have been on opened.    Patient reports feeling anxious and \"uncomfortable\".  Reports some situational depression, mostly related to his drinking.  He did not see a psychiatrist or counselor and patient treatment.  Denies any suicidal ideations.      The following portions of the patient's history were reviewed and updated as appropriate: allergies, current medications, past family history, past medical history, past social history, past surgical history and problem list.    Review of Systems  A comprehensive review of systems was performed and was otherwise negative    Objective:     /88   Pulse 100   Ht 5' 11\" (1.803 m)   Wt 220 lb 14.4 oz (100.2 kg)   BMI 30.81 kg/m      General Appearance: Alert, cooperative, no distress  Neck: Supple, symmetrical, trachea midline, no adenopathy  Lungs: Clear to auscultation bilaterally, respirations unlabored  Heart: Regular rate and rhythm, S1 and S2 normal, no murmur, rub, or gallop   Abdomen: Soft, non-tender, bowel sounds active all four quadrants,  no masses, no organomegaly  Psychologic: appropriate affective, answers all of my questions appropriately. No hallucinations, delusion, or suicidal ideations.    PHQ-9 Total Score: 7 (8/27/2019 10:00 AM)    SILVANA 7 Total Score: 6 (8/27/2019 10:00 AM)        Zoe Hobbs NP    "

## 2021-06-01 NOTE — TELEPHONE ENCOUNTER
Unfortunately, this is not something that I have experience with nor would I be comfortable managing.  I would be happy to place a referral for a chemical dependency consult.  I am not sure if this is something that they offer, but I think he could benefit regardless.    Zoe Hobbs, NP

## 2021-06-01 NOTE — TELEPHONE ENCOUNTER
Left message for patient/ patient's mother reagrding clinician's message below. Instructed to call back to let us know if they would like that chemical dependency consult.

## 2021-06-01 NOTE — TELEPHONE ENCOUNTER
Who is calling:  Patient's motherVeronique  Reason for Call:    Patient's mother is questioning if clinic offers the shot that helps decrease alcohol cravings.  Patient gave permission to share health information with his mother, Veronique.  Please reach out to patient and advise.  Date of last appointment with primary care: 8/27/19  Okay to leave a detailed message: Yes

## 2021-06-02 NOTE — TELEPHONE ENCOUNTER
Received MTM referral from patient's insurance ( Medicaid)     Attempt: 1, 10/28/2019  Result: LM    Thank you for the referral,    Nano Myers MTM Coordinator Intern           Received MTM referral from patient's insurance ( Medicaid)     Attempt: 2, 1/6/2019  Result: No answer, VM is full    Thank you for the referral,    SAMI Zaragoza Coordinator Intern

## 2021-06-03 VITALS — WEIGHT: 220.9 LBS | HEIGHT: 71 IN | BODY MASS INDEX: 30.93 KG/M2

## 2021-06-04 VITALS — WEIGHT: 225 LBS | BODY MASS INDEX: 31.38 KG/M2

## 2021-06-08 NOTE — TELEPHONE ENCOUNTER
Nikhil's mom is calling to see if he can take his Valium, even though his blood alcohol level is 0.5.  Advised to follow instructions, which are 10 mg Q 6 hours, and to follow the taper.  Per mother, patient is awake, but somewhat intoxicated.  Patient awaiting rehab.  Had been taking the Valium throughout the day as prescribed.  Mother will be around to check on him.
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
## 2021-06-12 NOTE — TELEPHONE ENCOUNTER
"Mom questions if she can bring pt back to the ED to evaluate for depression.  Pt seen in the ED today for:      Alcohol dependence with uncomplicated intoxication (H)  Clinical impression  Alcohol Intoxication  Chief complaint      Mom states depression was talked about.  Pt declined detox because he did not want to go to the [facility].     Mom states pt has not been himself for the past 5 days, \"He drinks, smokes, and sits on the couch\".  He denies suicidal ideation at this time.  Mom states his symptoms are not worse than when he was discharged today.      Disposition: See a provider within 24 hours.  Advised Mom that if she/pt want to be evaluated in the ED they can do so tonight.  Otherwise, f/u with PCP clinic tomorrow.  Call back with any new/worsening symptoms.  Patient verbalized understanding and had no further questions.      Rolanda Crum RN, FNA     Reason for Disposition    [1] Drinks alcohol daily AND [2] prior alcohol withdrawal seizures    [1] Depression AND [2] worsening (e.g.,sleeping poorly, less able to do activities of daily living)    Additional Information    Negative: Patient attempted suicide    Negative: Patient is threatening suicide now    Negative: Violent behavior, or threatening to physically hurt or kill someone    Negative: [1] Patient is very confused (disoriented, slurred speech) AND [2] no other adult (e.g., friend or family member) available    Negative: [1] Difficult to awaken or acting very confused (disoriented, slurred speech) AND [2] new onset    Negative: Sounds like a life-threatening emergency to the triager    Alcohol use, abuse or dependence, question or problem related to    Negative: Coma (e.g., not moving, not talking, not responding to stimuli)    Negative: Difficult to awaken or acting confused (e.g., disoriented, slurred speech)    Negative: Seeing, hearing, or feeling things that are not there (i.e., visual, auditory, or tactile hallucinations)    " "Negative: Slow, shallow and weak breathing    Negative: Seizure    Negative: Violent behavior, or threatening to physically hurt or kill someone    Negative: Patient attempted suicide    Negative: Threatening suicide    Negative: Sounds like a life-threatening emergency to the triager    Negative: SEVERE abdominal pain    Negative: [1] Constant abdominal pain AND [2] present > 2 hours    Negative: Blood in bowel movements (e.g., black, tarry or red blood)  (Exception: Blood on surface of BM with constipation)    Negative: [1] Vomiting AND [2] contains red blood or black (\"coffee ground\") material  (Exception: few red streaks in vomit that only happened once)    Negative: [1] Vomiting or dry heaves AND [2] occurring frequently (i.e., vomiting > 4 times in last 4 hours)    Negative: Feeling very shaky (i.e., visible tremors of hands)    Negative: Patient sounds very sick or weak to the triager    Negative: Fever > 101.5 F (38.6 C)    Negative: White of the eyes have turned yellow (i.e., jaundice)    Negative: [1] Depression AND [2] unable to do any of normal activities (e.g., self care, school, work; in comparison to baseline).    Negative: Very strange or confused behavior    Negative: Patient sounds very sick or weak to the triager    Protocols used: ALCOHOL ABUSE AND [id number], DEPRESSION-A-      "

## 2021-06-17 NOTE — TELEPHONE ENCOUNTER
"Patient's mother calling reporting patient is \"alcoholic\" and worried about him having seizure due to alcohol withdrawal. States patient is currently sleeping and does not have any withdrawal symptoms. Advised to call back if patient has symptoms for triage when patient is awake. Verbalized understanding.     Saleem Zimmerman RN  [facility]     "

## 2021-06-24 ENCOUNTER — HOSPITAL ENCOUNTER (EMERGENCY)
Dept: EMERGENCY MEDICINE | Facility: HOSPITAL | Age: 36
Discharge: HOME | End: 2021-06-25
Attending: EMERGENCY MEDICINE
Payer: MEDICAID

## 2021-06-24 DIAGNOSIS — K85.20 ALCOHOL-INDUCED ACUTE PANCREATITIS, UNSPECIFIED COMPLICATION STATUS: ICD-10-CM

## 2021-06-24 DIAGNOSIS — F10.939 ALCOHOL WITHDRAWAL SYNDROME WITH COMPLICATION (H): ICD-10-CM

## 2021-06-24 DIAGNOSIS — E83.42 HYPOMAGNESEMIA: ICD-10-CM

## 2021-06-24 DIAGNOSIS — F10.939: ICD-10-CM

## 2021-06-24 LAB
ALBUMIN SERPL-MCNC: 4.5 G/DL (ref 3.5–5)
ALP SERPL-CCNC: 90 U/L (ref 45–120)
ALT SERPL W P-5'-P-CCNC: 98 U/L (ref 0–45)
ANION GAP SERPL CALCULATED.3IONS-SCNC: 23 MMOL/L (ref 5–18)
AST SERPL W P-5'-P-CCNC: 119 U/L (ref 0–40)
BASOPHILS # BLD AUTO: 0.1 THOU/UL (ref 0–0.2)
BASOPHILS NFR BLD AUTO: 2 % (ref 0–2)
BILIRUB DIRECT SERPL-MCNC: 0.6 MG/DL
BILIRUB SERPL-MCNC: 1.5 MG/DL (ref 0–1)
BUN SERPL-MCNC: 8 MG/DL (ref 8–22)
CALCIUM SERPL-MCNC: 9.3 MG/DL (ref 8.5–10.5)
CHLORIDE BLD-SCNC: 97 MMOL/L (ref 98–107)
CO2 SERPL-SCNC: 21 MMOL/L (ref 22–31)
CREAT SERPL-MCNC: 0.82 MG/DL (ref 0.7–1.3)
EOSINOPHIL # BLD AUTO: 0 THOU/UL (ref 0–0.4)
EOSINOPHIL NFR BLD AUTO: 1 % (ref 0–6)
ERYTHROCYTE [DISTWIDTH] IN BLOOD BY AUTOMATED COUNT: 15 % (ref 11–14.5)
ETHANOL SERPL-MCNC: 30 MG/DL
GFR SERPL CREATININE-BSD FRML MDRD: >60 ML/MIN/1.73M2
GLUCOSE BLD-MCNC: 122 MG/DL (ref 70–125)
HCT VFR BLD AUTO: 39.6 % (ref 40–54)
HGB BLD-MCNC: 13.8 G/DL (ref 14–18)
IMM GRANULOCYTES # BLD: 0 THOU/UL
IMM GRANULOCYTES NFR BLD: 1 %
LIPASE SERPL-CCNC: 313 U/L (ref 0–52)
LYMPHOCYTES # BLD AUTO: 0.8 THOU/UL (ref 0.8–4.4)
LYMPHOCYTES NFR BLD AUTO: 17 % (ref 20–40)
MAGNESIUM SERPL-MCNC: 1.1 MG/DL (ref 1.8–2.6)
MCH RBC QN AUTO: 30.9 PG (ref 27–34)
MCHC RBC AUTO-ENTMCNC: 34.8 G/DL (ref 32–36)
MCV RBC AUTO: 89 FL (ref 80–100)
MONOCYTES # BLD AUTO: 0.4 THOU/UL (ref 0–0.9)
MONOCYTES NFR BLD AUTO: 9 % (ref 2–10)
NEUTROPHILS # BLD AUTO: 3.1 THOU/UL (ref 2–7.7)
NEUTROPHILS NFR BLD AUTO: 70 % (ref 50–70)
PHOSPHATE SERPL-MCNC: 3 MG/DL (ref 2.5–4.5)
PLATELET # BLD AUTO: 152 THOU/UL (ref 140–440)
PMV BLD AUTO: 9.6 FL (ref 8.5–12.5)
POTASSIUM BLD-SCNC: 3.8 MMOL/L (ref 3.5–5)
PROT SERPL-MCNC: 7.5 G/DL (ref 6–8)
RBC # BLD AUTO: 4.47 MILL/UL (ref 4.4–6.2)
SODIUM SERPL-SCNC: 141 MMOL/L (ref 136–145)
WBC: 4.4 THOU/UL (ref 4–11)

## 2021-06-24 ASSESSMENT — MIFFLIN-ST. JEOR: SCORE: 1858.44

## 2021-06-24 NOTE — PROGRESS NOTES
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 insight into his use and the issues it has cause in all aspects of his life up to this time. Patient's ongoing stress, lack of structured activity, and lack of support may indicate an increased risk of relapse/continued use at this time.          DIMENSION VI - Recovery Environment   1. Are you employed/attending school? Tell me about that.     Patient has not worked in about a year.   He reports that he is currently trying to find a job.     2A. Describe a typical day; evening for you. Work, school, social, leisure, volunteer, spiritual practices. Include time spent obtaining, using, recovering from drugs or alcohol. (DSM)     Patient reports that a typical day consists of;     Wake up, got to [place] to get coffee and to get out of the house, apply for jobs for hours, have lunch, drink, have dinner, watch TV.     2B. How often do you spend more time than you planned using or use more than you planned? (DSM)     Patient reports that he doesn't think about the amount of time he spends on drinking.   He does endorse drinking more than planned, but states that it doesn't happen very often. He reports a few times in the past year, but that he always buys what he plans on drinking.     3. How important is  "using to your social connections? Do many of your family or friends use?     Most friends drink socially.   Family drinks on occasions.     4A. Are you currently in a significant relationship?  no  4B.  If yes, how long?    4C. Sexual Orientation:  Heterosexual    5A. Who do you live with? Roommate, or stays with mom.  5B. Tell me about their alcohol/drug use and mental health issues. None.  5C. Are you concerned for your safety there? no  5D. Are you concerned about the safety of anyone else who lives with you? no    6A. Do you have children who live with you? no  If the person lives with children, ask follow-up questions to determine the person's relationship and responsibility, both legal and care giving; and what arrangements are made for supervision for the children when the person is not available.      6B. Do you have children who do not live with you?  no  If yes, ask follow up questions to learn where the children are, who has custody and what the person't relaltionship and responsibility is with these children and what hopes the person has for his or her future with these children.    7A. Who supports you in making changes in your alcohol or drug use? What are they willing to do to support you? Who is upset or angry about you making changes in your alcohol or drug use? How big a problem is this for you?      Patient reports that \"most people\" would be supportive.   No one would be upset if he stopped drinking.     7B. This table is provided to record information about the person s relationships and available support It is not necessary to ask each item; only to get a comprehensive picture of their support system.  How often can you count on the following people when you need someone?   Partner / Spouse    Parent(s)/Aunt(s)/Uncle(s)/Grandparents Always supportive   Sibling(s)/Cousin(s) Always supportive   Child(valencia)    Other relative(s)    Friend(s)/neighbor(s) Always supportive   Child(valencia) s " father(s)/mother(s)    Support group member(s)    Community of orlin members    /counselor/therapist/healer    Other (specify)      8A. What is your current living situation?     Patient lives in a house that he owns.   He recently got a roommate, and that has been going well.   Patient states that he has been spending a lot of time with his mother/at her house recently.     8B. What is your long term plan for where you will be living?    Patient plants to stay where he is currently living.     8C. Tell me about your living environment/neighborhood? Ask enough follow up questions to determine safety, criminal activity, availability of alcohol and drugs, supportive or antagonistic to the person making changes.      Safe, no concerns mentioned at this time.     9. Criminal justice history: Gather current/recent history and any significant history related to substance use--Arrests? Convictions? Circumstances? Alcohol or drug involvement? Sentences? Still on probation or parole? Expectations of the court? Current court order? Any sex offenses - lifetime? What level? (DSM)    Patient reports one charge for;   Disorderly conduct    10. What obstacles exist to participating in treatment? (Time off work, childcare, funding, transportation, pending senior living time, living situation)    Funding possibly     Dimension VI Ratings   Recovery environment - The placing authority must use the criteria in Dimension VI to determine a client s recovery environment.   RISK DESCRIPTIONS - Severity rating: 3  Client is not engaged in structured, meaningful activity and the client's peers, family , significant other, and living environment are unsupportive, or there is significant criminal justice system involvement.    REASONS SEVERITY WAS ASSIGNED - (What support does the person have for making changes? What structure/stability does the person have in his or her daily life that willincrease the likelihood that changes can be  sustained? What problems exist in the person s environment that will jeopardize getting/staying clean and sober?)     Patient is not currently working, and so appears to lack any structured or meaningful activity for his daily life currently. Patient reports that his friends and family are supportive of him and his sobriety, however he states that he has been withdrawn from them recently, and so it does not appear that he has much active support for sobriety at this time. Patient states that he frequently stays with his mother, despite having a home of his own, and so his home may not be the most supportive for his sobriety at this time. Patient is currently on probation [facility].                Client Choice/Exceptions     Would you like services specific to language, age, gender, culture, Quaker preference, race, ethnicity, sexual orientation or disability?  yes    If yes, specify:  Men's program    What particular treatment choices and options would you like to have?  Outpatient, days until he gets a job.     Do you have a preference for a particular treatment program?  No preference.         DSM-V Criteria for Substance Abuse  Instructions  Determine whether the client currently meets the criteria for a Substance Use Disorder using the diagnostic criteria in the DSM-V, pp. 481-589. Current means during the most recent 12 months outside a facility that controls access to substances.    Category of substance Severity ICD-10 Code/DSM V Code   [x]  Alcohol Use Disorder [] Mild  [] Moderate  [x] Severe [] (F10.10) (305.00)  [] (F10.20) (303.90)  [x] (F10.20) (303.90)   []  Cannabis Use Disorder [] Mild  [] Moderate  [] Severe [] (F12.10) (305.20)  [] (F12.20) (304.30)  [] (F12.20) (304.30)   [] Hallucinogen Use Disorder [] Mild  [] Moderate  [] Severe [] (F16.10) (305.30)  [] (F16.20) (304.50)  [] (F16.20) (304.50)   []  Inhalant Use Disorder [] Mild  [] Moderate  [] Severe [] (F18.10) (305.90)  []  (F18.20) (304.60)  [] (F18.20) (304.60)   []  Opioid Use Disorder [] Mild  [] Moderate  [] Severe [] (F11.10) (305.50)  [] (F11.20) (304.00)  [] (F11.20) (304.00)   []  Sedative, Hypnotic, or Anxiolytic Use Disorder [] Mild  [] Moderate  [] Severe [] (F13.10) (305.40)   [] (F13.20) (304.10)  [] (F13.20) (304.10)   []  Stimulant Related Disorders [] Mild  [] Moderate  [] Severe [] (F15.10) (305.70) Amphetamine type substance  [] (F14.10) (305.60) Cocaine  [] (F15.10) (305.70) Other or unspecified stimulant  [] (F15.20) (304.40) Amphetamine type substance  [] (F14.20) (304.20) Cocaine  [] (F15.20) (304.40) Other or unspecified stimulant  [] (F15.20) (304.40) Amphetamine type substance  [] (F14.20) (304.20) Cocaine  [] (F15.20) (304.40) Other or unspecified stimulant   []  Tobacco use Disorder [] Mild  [] Moderate  [] Severe [] (Z72.0) (305.1)  [] (F17.200) (305.1)  [] (F17.200) (305.1)   []  Other (or unknown) Substance Use Disorder [] Mild  [] Moderate  [] Severe [] (F19.10) (305.90)  [] (F19.20) (304.90)  [] (F19.20) (304.90)       Suggested Level of Care Necessary for Recovery  [x]  Inpatient  []  Extended Care [x]  Residential []  Outpatient  []  None            Collateral Contact Summary   Number of contacts made:  1  Contact with referring person:  yes     If court related records were reviewed, summarize here:  NA     [x]   Information from collateral contacts supported/largely agreed with information from the client and associated risk ratings.   []   Information from collateral contacts was significantly different from information from the client and lead to different risk ratings.      Summarize new information here:      Rule 25 Assessment Summary and Plan   's Recommendation    Based on the information gathered in this assessment and from collateral information, the client meets DSM IV criteria for Alcohol Use Disorder, Severe (F10.20).  Patient is recommended to Inpatient treatment, or Detox  followed by Residential Treatment.   Patient to comply with all probation requirements and recommendations.   Patient to remain free from use of all mood altering substances.   This assessment can be updated with additional information within a 6 month period.      Collateral Contacts     Please duplicate this page for each contact.  If this includes information which is sensitive and not public, separate this page from the rest of the assessment before sharing.  Retain the page in the assessment file.   Name    Veronique Rios Relationship    Mother Phone Number    [phone] Releases    yes       Information Provided:      Veronique reported the following;     Patient drinks vodka daily.   She believes that he drinks about a liter per day.   Patient has been drinking daily for at least the last month.   His use has peaked in the past few weeks.   Patient broke up with his fiance which has not helped things.   She feels that he should have asked for a psychiatrist/therapist along with help with his use.   Patient has mental health and substance use problems.   Patient tells her that he needs alcohol to help him sleep.   He sleeps a lot.   Patient is depressed.   He is not currently working.   He is trying to find work, and has gone on interviews, but she is unsure of his ability to work at this time.   She feels that he needs to find a job so he has a daily schedule.   She also feels that he needs to find Hoahaoism or spirituality of some kind.   He needs to get a place of his own/go back to his own home, and he needs to be independent again.   She is afraid that he may drink too much to the point of crisis.     Attempted to contact Veronique 3/11/19 @ 9331. She stated that now was not a good time, and she will return call when it works better for her.     Collateral Contacts     Name    RINA   Relationship    NA Phone Number    NA Releases    no       Information Provided:      Patient declined to sign any additional released  of information. No additional collateral information gathered at this time.           A problematic pattern of alcohol/drug use leading to clinically significant impairment or distress, as manifested by at least two of the following, occurring within a 12-month period:      Specify if: In early remission:  After full criteria for alcohol/drug use disorder were previously met, none of the criteria for alcohol/drug use disorder have been met for at least 3 months but for less than 12 months (with the exception that Criterion A4,  Craving or a strong desire or urge to use alcohol/drug  may be met).     In sustained remission:   After full criteria for alcohol use disorder were previously met, none of the criteria for alcohol/drug use disorder have been met at any time during a period of 12 months or longer (with the exception that Criterion A4,  Craving or strong desire or urge to use alcohol/drug  may be met).   Specify if:   This additional specifier is used if the individual is in an environment where access to alcohol is restricted.    Mild: Presence of 2-3 symptoms  Moderate: Presence of 4-5 symptoms  Severe: Presence of 6 or more symptoms-ALCOHOL          Staff Name and Title:  CITLALLI Tellez    Date:  3/8/2019  Time:  10:20 AM

## 2021-06-25 ENCOUNTER — COMMUNICATION - HEALTHEAST (OUTPATIENT)
Dept: SCHEDULING | Facility: CLINIC | Age: 36
End: 2021-06-25

## 2021-06-25 LAB
ANION GAP SERPL CALCULATED.3IONS-SCNC: 10 MMOL/L (ref 5–18)
ATRIAL RATE - MUSE: 78 BPM
BUN SERPL-MCNC: 8 MG/DL (ref 8–22)
CALCIUM SERPL-MCNC: 8.9 MG/DL (ref 8.5–10.5)
CHLORIDE BLD-SCNC: 101 MMOL/L (ref 98–107)
CO2 SERPL-SCNC: 28 MMOL/L (ref 22–31)
CREAT SERPL-MCNC: 0.75 MG/DL (ref 0.7–1.3)
DIASTOLIC BLOOD PRESSURE - MUSE: NORMAL
GFR SERPL CREATININE-BSD FRML MDRD: >60 ML/MIN/1.73M2
GLUCOSE BLD-MCNC: 88 MG/DL (ref 70–125)
INTERPRETATION ECG - MUSE: NORMAL
P AXIS - MUSE: 45 DEGREES
POTASSIUM BLD-SCNC: 3.8 MMOL/L (ref 3.5–5)
PR INTERVAL - MUSE: 148 MS
QRS DURATION - MUSE: 84 MS
QT - MUSE: 434 MS
QTC - MUSE: 494 MS
R AXIS - MUSE: 77 DEGREES
SARS-COV-2 PCR RESULT-HE - HISTORICAL: NEGATIVE
SODIUM SERPL-SCNC: 139 MMOL/L (ref 136–145)
SYSTOLIC BLOOD PRESSURE - MUSE: NORMAL
T AXIS - MUSE: 90 DEGREES
VENTRICULAR RATE- MUSE: 78 BPM

## 2021-06-25 NOTE — ED NOTES
"Pt states, \" I think I need to stay another night,\" pt requests to speak to doctor, Dr. Gonzalez notified.  "

## 2021-06-25 NOTE — ED NOTES
Dr Gonzalez notified pt having tremors, able to ambulate to bathroom with standby assist, pt tolerating 12 oz of water with no nausea/vomiting. Heart rate 116/ min. Mother will provided ride to pt home.

## 2021-06-26 NOTE — PROGRESS NOTES
Pharmacy Note - Admission Medication History    Pertinent Provider Information:      ______________________________________________________________________    Prior To Admission (PTA) med list completed and updated in EMR.       PTA Med List   Medication Sig Last Dose     chlordiazePOXIDE (LIBRIUM) 25 MG capsule Take 50 mg by mouth 4 (four) times a day as needed (withdrawl symptoms). prn     hydrOXYzine pamoate (VISTARIL) 50 MG capsule Take 50 mg by mouth 3 (three) times a day as needed for itching. 6/24/2021 at Unknown time     mirtazapine (REMERON) 15 MG tablet Take 15 mg by mouth at bedtime. More than a month at Unknown time     multivitamin therapeutic tablet Take 1 tablet by mouth daily. More than a month at Unknown time     ondansetron (ZOFRAN) 4 MG tablet Take 4 mg by mouth every 8 (eight) hours as needed for nausea. prn     thiamine 100 MG tablet Take 1 tablet (100 mg total) by mouth daily. More than a month at Unknown time       Information source(s): Patient, Family member and [facility]  Method of interview communication: in-person    Summary of Changes to PTA Med List  New: hydroxyzine pamoate, ondansetron  Discontinued: n/a  Changed: n/a    Patient was asked about OTC/herbal products specifically.  PTA med list reflects this.    Allergies were reviewed, assessed, and updated with the patient.      Patient does not use any multi-dose medications prior to admission.    The information provided in this note is only as accurate as the sources available at the time of the update(s).    Thank you for the opportunity to participate in the care of this patient.    Alis Allison, PharmD  6/24/2021 10:26 PM

## 2021-06-26 NOTE — ED PROVIDER NOTES
[facility] Patient Transition of Care Note    Assuming care from:  Dr Gilliam    Brief history: The patient is here for alcohol withdrawal and anion gap acidosis with DTs.  CIWA protocol.    Any outstanding medical concerns:  No.  Repeat BMP is within normal limits.  The patient feels symptomatically resolved.  Anion gap has resolved.    Has SW seen the patient:  Not needed.    Is the patient on a hold:  pt not holdable    Current plan for disposition:  Pending repeat BMP and resolution of withdrawal symptoms, patient could either be admitted if labs have not normalized her discharge home.    Safety concerns:  No, pt has been cooperative    Dietary restrictions:  None, pt can eat and drink ad sergio    Have daily medications been ordered:  no daily meds needed    Significant events during shift: No significant events.  He received fluids.  BMP in the morning shows resolution of his anion gap.  No further symptoms of withdrawal and he was transitioned to oral Valium.  We discussed alcohol treatment.  Referral was provided for the [facility] and [facility] abuse [facility].  We discussed warning signs and indications to return to the emergency department.  He understands these warning signs and will return with any concerns.      1. Alcohol withdrawal syndrome with complication (H)    2. Alcohol-induced acute pancreatitis, unspecified complication status    3. Hypomagnesemia             Kanchan He MD  06/25/21 3362

## 2021-06-26 NOTE — ED PROVIDER NOTES
EMERGENCY DEPARTMENT ENCOUNTER      NAME: Nikhil Rios  AGE: 35 y.o. male  YOB: 1985  MRN: [id number]  EVALUATION DATE & TIME: 2021  8:30 PM    PCP: Lisandro Graham DO    ED PROVIDER: Jen Gilliam DO      Chief Complaint   Patient presents with     Delirium Tremens (DTS)         FINAL IMPRESSION:  1. Alcohol withdrawal syndrome with complication (H)    2. Alcohol-induced acute pancreatitis, unspecified complication status    3. Hypomagnesemia          ED COURSE & MEDICAL DECISION MAKIN:52 PM Met with patient for initial interview and exam. Discussed initial plan for care for their stay in the emergency department.  10:10 PM I checked in and revaluated patient who reports no change in symptoms. Discussed work up results. Patient has not yet been administered valium.   10:50 PM Admitted to the Hospitalist, Dr. Le.    The patient was interviewed and examined.  History in the chart was reviewed.  35 y.o. male presents to the Emergency Department for evaluation of alcohol withdrawal.  Last drink around 24 hours prior to arrival.  Patient reports she stopped drinking so he could attend a friend's  later this week.  History of significant withdrawal including seizures.  Patient quite tremulous, initial CIWA is 25.  Labs with elevated lipase concerning for pancreatitis.  Patient without any abdominal pain.  Magnesium is low at 1.1, replaced.  QT is prolonged.  Given IV Valium, fluids, magnesium replacement.  Reportedly fell and hit his head last night, CT imaging is unremarkable.  Given severity of his withdrawal and need for close monitoring, admit to the hospitalist.  Pending bed placement, patient to be boarding in the ED.    At the conclusion of the encounter I discussed  the results of all of the tests and the disposition with patient and mother.   All questions were answered.  The patient and mother acknowledged understanding and was involved in the decision making regarding the  overall care plan.      MEDICATIONS GIVEN IN THE EMERGENCY:  Medications   cloNIDine HCL tablet 0.1 mg (CATAPRES) (0.1 mg Oral Given 6/24/21 2216)   flumazeniL injection 0.2 mg (ROMAZICON) (has no administration in time range)   folic acid tablet 1 mg (FOLVITE) (1 mg Oral Given 6/24/21 2216)   thiamine tablet 200 mg (200 mg Oral Given 6/24/21 2215)     Followed by   thiamine tablet 100 mg (has no administration in time range)     Followed by   thiamine tablet 100 mg (has no administration in time range)   multivitamin therapeutic tablet 1 tablet (1 tablet Oral Given 6/24/21 2216)   melatonin tablet 5 mg (has no administration in time range)   gabapentin capsule 1,200 mg (NEURONTIN) (1,200 mg Oral Given 6/24/21 2216)     Followed by   gabapentin capsule 900 mg (NEURONTIN) (has no administration in time range)     Followed by   gabapentin capsule 600 mg (NEURONTIN) (has no administration in time range)     Followed by   gabapentin capsule 300 mg (NEURONTIN) (has no administration in time range)     Followed by   gabapentin capsule 100 mg (NEURONTIN) (has no administration in time range)   diazePAM tablet 10 mg (VALIUM) ( Oral See Alternative 6/24/21 2328)     Or   diazePAM injection syringe 5-10 mg (for VALIUM) (10 mg Intravenous Given 6/24/21 2328)   lactated Ringers 1,000 mL (has no administration in time range)   lactated Ringers 1,000 mL (0 mL Intravenous Stopped 6/24/21 2327)   magnesium sulfate in water 2 gram/50 mL (4 %) IVPB 2 g (0 g Intravenous Stopped 6/24/21 2302)       NEW PRESCRIPTIONS STARTED AT TODAY'S ER VISIT  Current Discharge Medication List      CONTINUE these medications which have NOT CHANGED    Details   chlordiazePOXIDE (LIBRIUM) 25 MG capsule Take 50 mg by mouth 4 (four) times a day as needed (withdrawl symptoms).      hydrOXYzine pamoate (VISTARIL) 50 MG capsule Take 50 mg by mouth 3 (three) times a day as needed for itching.      mirtazapine (REMERON) 15 MG tablet Take 15 mg by mouth at  bedtime.      multivitamin therapeutic tablet Take 1 tablet by mouth daily.  Qty:  , Refills: 0    Associated Diagnoses: Alcohol abuse; Alcohol withdrawal syndrome, uncomplicated (H)      ondansetron (ZOFRAN) 4 MG tablet Take 4 mg by mouth every 8 (eight) hours as needed for nausea.      thiamine 100 MG tablet Take 1 tablet (100 mg total) by mouth daily.  Qty:  , Refills: 0    Associated Diagnoses: Alcohol abuse; Alcohol withdrawal syndrome, uncomplicated (H)                =================================================================    HPI    Patient information was obtained from: Patient    Use of : N/A      Nikhil Rios is a 35 y.o. male who presents with delirium tremens.     Heathernet reports being in alcohol withdrawl with concern for seizures. Has been drinking 750 mL of alcohol daily. Last drink was over 24 hours ago. Notes he stopped drinking after he found out his friend recently passed away. Endorses multiple emesis epsiodes. No hematemesis. Additionally concerned over two falls recently, noting yesterday evening he hit his head on a door.     Last withdrawl to this extent was on 04/20/2021. Patient reports interest in getting sober. Paitent denies fever, chills, cough, chest pain shortness of breath, hallucinations, and any other complaints at this time.     Per Chart Review: Patient was seen on 04/20/2021 for alcoholic intoxication without complication. Alcohol returned at 462.  Labs unremarkable other than borderline lipase, which is likely secondary to his continuing and chronic alcohol use. Patient given dose of Ativan x2 in the ED, as well as Zofran and LR bolus. Patient discharged home on Librium with recommendation for outpatient treatment.     REVIEW OF SYSTEMS   Review of Systems   Constitutional: Negative for chills, fatigue and fever.        Positive for alcohol withdrawl   HENT: Negative for sore throat and trouble swallowing.    Eyes: Negative for visual disturbance.    Respiratory: Negative for cough and shortness of breath.    Cardiovascular: Negative for chest pain.   Gastrointestinal: Positive for vomiting. Negative for abdominal pain, diarrhea and nausea.   Genitourinary: Negative for difficulty urinating and dysuria.   Musculoskeletal: Negative for arthralgias.   Skin: Negative for rash.   Neurological: Negative for light-headedness, numbness and headaches.   All other systems reviewed and are negative.       PAST MEDICAL HISTORY:  Past Medical History:   Diagnosis Date     Alcohol abuse      Alcohol withdrawal (H)      HLD (hyperlipidemia)      HTN (hypertension)        PAST SURGICAL HISTORY:  Past Surgical History:   Procedure Laterality Date     none             CURRENT MEDICATIONS:    No current facility-administered medications on file prior to encounter.      Current Outpatient Medications on File Prior to Encounter   Medication Sig     chlordiazePOXIDE (LIBRIUM) 25 MG capsule Take 50 mg by mouth 4 (four) times a day as needed (withdrawl symptoms).     hydrOXYzine pamoate (VISTARIL) 50 MG capsule Take 50 mg by mouth 3 (three) times a day as needed for itching.     mirtazapine (REMERON) 15 MG tablet Take 15 mg by mouth at bedtime.     multivitamin therapeutic tablet Take 1 tablet by mouth daily.     ondansetron (ZOFRAN) 4 MG tablet Take 4 mg by mouth every 8 (eight) hours as needed for nausea.     thiamine 100 MG tablet Take 1 tablet (100 mg total) by mouth daily.     [DISCONTINUED] chlordiazePOXIDE (LIBRIUM) 25 MG capsule Take 2 capsules (50 mg total) by mouth 4 (four) times a day as needed (withdrawl symptoms).       ALLERGIES:  Allergies   Allergen Reactions     Pollen Extracts Rash     grass tree pollen       FAMILY HISTORY:  Family History   Problem Relation Age of Onset     Heart attack Father        SOCIAL HISTORY:   Social History     Socioeconomic History     Marital status: Single     Spouse name: Not on file     Number of children: Not on file     Years of  "education: Not on file     Highest education level: Not on file   Occupational History     Occupation: sales   Social Needs     Financial resource strain: Not on file     Food insecurity     Worry: Not on file     Inability: Not on file     Transportation needs     Medical: Not on file     Non-medical: Not on file   Tobacco Use     Smoking status: Former Smoker     Types: Cigars     Smokeless tobacco: Never Used     Tobacco comment: occasional   Substance and Sexual Activity     Alcohol use: Yes     Comment: hx etoh abuse and withdrawal, 750 mLs vodka per day     Drug use: Not Currently     Sexual activity: Not on file   Lifestyle     Physical activity     Days per week: Not on file     Minutes per session: Not on file     Stress: Not on file   Relationships     Social connections     Talks on phone: Not on file     Gets together: Not on file     Attends [employer] service: Not on file     Active member of club or organization: Not on file     Attends meetings of clubs or organizations: Not on file     Relationship status: Not on file     Intimate partner violence     Fear of current or ex partner: Not on file     Emotionally abused: Not on file     Physically abused: Not on file     Forced sexual activity: Not on file   Other Topics Concern     Not on file   Social History Narrative     Not on file         PHYSICAL EXAM    VITALS  /79   Pulse 93   Temp 97.8  F (36.6  C) (Temporal)   Resp 17   Ht 5' 10\" (1.778 m)   Wt 200 lb (90.7 kg)   SpO2 97%   BMI 28.70 kg/m    Physical Exam  Vitals signs and nursing note reviewed.   Constitutional:       General: He is not in acute distress.     Appearance: Normal appearance. He is well-developed. He is not diaphoretic.   HENT:      Head: Normocephalic and atraumatic.      Mouth/Throat:      Comments: Tongue fasciculations visualized  Eyes:      Conjunctiva/sclera: Conjunctivae normal.      Pupils: Pupils are equal, round, and reactive to light.   Neck:      " Musculoskeletal: Neck supple.      Trachea: No tracheal deviation.   Cardiovascular:      Rate and Rhythm: Regular rhythm. Tachycardia present.      Heart sounds: Normal heart sounds.   Pulmonary:      Effort: Pulmonary effort is normal. No respiratory distress.      Breath sounds: Normal breath sounds.   Abdominal:      General: Bowel sounds are normal. There is no distension.      Palpations: Abdomen is soft.      Tenderness: There is no abdominal tenderness.   Musculoskeletal: Normal range of motion.   Skin:     General: Skin is warm and dry.   Neurological:      Mental Status: He is alert.      Motor: Tremor present.            LAB:  All pertinent labs reviewed and interpreted.  Results for orders placed or performed during the hospital encounter of 06/24/21   Basic Metabolic Panel   Result Value Ref Range    Sodium 141 136 - 145 mmol/L    Potassium 3.8 3.5 - 5.0 mmol/L    Chloride 97 (L) 98 - 107 mmol/L    CO2 21 (L) 22 - 31 mmol/L    Anion Gap, Calculation 23 (H) 5 - 18 mmol/L    Glucose 122 70 - 125 mg/dL    Calcium 9.3 8.5 - 10.5 mg/dL    BUN 8 8 - 22 mg/dL    Creatinine 0.82 0.70 - 1.30 mg/dL    GFR MDRD Af Amer >60 >60 mL/min/1.73m2    GFR MDRD Non Af Amer >60 >60 mL/min/1.73m2   Hepatic Profile   Result Value Ref Range    Bilirubin, Total 1.5 (H) 0.0 - 1.0 mg/dL    Bilirubin, Direct 0.6 (H) <=0.5 mg/dL    Protein, Total 7.5 6.0 - 8.0 g/dL    Albumin 4.5 3.5 - 5.0 g/dL    Alkaline Phosphatase 90 45 - 120 U/L     (H) 0 - 40 U/L    ALT 98 (H) 0 - 45 U/L   Lipase   Result Value Ref Range    Lipase 313 (H) 0 - 52 U/L   ETOH Level   Result Value Ref Range    Alcohol, Blood 30 (H) None detected mg/dL   Magnesium   Result Value Ref Range    Magnesium 1.1 (L) 1.8 - 2.6 mg/dL   Phosphorus   Result Value Ref Range    Phosphorus 3.0 2.5 - 4.5 mg/dL   HM1 (CBC with Diff)   Result Value Ref Range    WBC 4.4 4.0 - 11.0 thou/uL    RBC 4.47 4.40 - 6.20 mill/uL    Hemoglobin 13.8 (L) 14.0 - 18.0 g/dL    Hematocrit  39.6 (L) 40.0 - 54.0 %    MCV 89 80 - 100 fL    MCH 30.9 27.0 - 34.0 pg    MCHC 34.8 32.0 - 36.0 g/dL    RDW 15.0 (H) 11.0 - 14.5 %    Platelets 152 140 - 440 thou/uL    MPV 9.6 8.5 - 12.5 fL    Neutrophils % 70 50 - 70 %    Lymphocytes % 17 (L) 20 - 40 %    Monocytes % 9 2 - 10 %    Eosinophils % 1 0 - 6 %    Basophils % 2 0 - 2 %    Immature Granulocyte % 1 (H) <=0 %    Neutrophils Absolute 3.1 2.0 - 7.7 thou/uL    Lymphocytes Absolute 0.8 0.8 - 4.4 thou/uL    Monocytes Absolute 0.4 0.0 - 0.9 thou/uL    Eosinophils Absolute 0.0 0.0 - 0.4 thou/uL    Basophils Absolute 0.1 0.0 - 0.2 thou/uL    Immature Granulocyte Absolute 0.0 <=0.0 thou/uL       RADIOLOGY:  Reviewed all pertinent imaging. Please see official radiology report.  Ct Head Without Contrast    Result Date: 6/24/2021  EXAM: CT HEAD WO CONTRAST LOCATION: [facility] DATE/TIME: 6/24/2021 9:50 PM INDICATION: Altered mental status Fall, alcohol abuse COMPARISON: 3/12/2021 TECHNIQUE: Routine CT Head without IV contrast. Multiplanar reformats. Dose reduction techniques were used. FINDINGS: INTRACRANIAL CONTENTS: No intracranial hemorrhage, extraaxial collection, or mass effect.  No CT evidence of acute infarct. Normal parenchymal attenuation. Moderate generalized volume loss. No hydrocephalus. VISUALIZED ORBITS/SINUSES/MASTOIDS: No intraorbital abnormality. No paranasal sinus mucosal disease. No middle ear or mastoid effusion. BONES/SOFT TISSUES: No acute abnormality.     1.  No acute intracranial process. 2.  Advanced atrophy for age.      EKG:    Performed at: 22:07     Impression: Sinus rhythm with marked sinus arrhythmia. Low voltage QRS. Nonspecific T wave abnormality. Prolonged QT. Abnormal EKG. When compared with EKG from 27-NOV-2020, no significant change was found.     Rate: 78 bpm  Rhythm: Sinus rhythm with marked sinus arrhythmia.   Axis: 45  77  90  ND: 148 ms  QRS: 84 ms  QTC: 494 ms    Comparison: Compared to previous from  27-NOV-2020, no significant change was found.     I have independently review and interpreted the EKG(s)      I, Lexy Ferrara , am serving as a scribe to document services personally performed by Dr. Gilliam based on my observation and the provider's statements to me. I, Jen Gilliam, DO attest that Lexy Ferrara  is acting in a scribe capacity, has observed my performance of the services and has documented them in accordance with my direction.    Jen Gilliam DO  Emergency Medicine  [facility] EMERGENCY DEPARTMENT  [address]  [facility] [address]  Dept: [phone]  Loc: [phone]     Tejal Gilliam DO  06/24/21 8605

## 2021-06-26 NOTE — H&P
Admission History & Physical  Nikhil Rios, 1985, [id number]    [facility]  Lisandro Graham DO, [phone]   Code Status:  Prior   Extended Emergency Contact Information  Primary Emergency Contact: Veronique Rios   [facility]  Home Phone: [phone]  Mobile Phone: [phone]  Relation: Mother     Assessment and Plan:   34 yo male presents with     Alcohol withdrawal   -start on CIWA protocol  -Valium prn  -seizure precaution    Hypomagnesia  -replace as per protocol    Alcohol abuse  -SW   -encourage alcohol cessation    Elevated lipase  -?mild pancreatitis due to alcohol abuse  -pain control and recheck lipase in am    DVT prop. Low risk. Early ambulance.    Disposition plan: More than 2 midnights hospitalization expected.      Active Problems:    * No active hospital problems. *      Chief Complaint: alcohol withdrawl with concern for seizures     HPI:    Nikhil Rios is a 35 y.o. old male who presents with alcohol withdrawl with concern for seizures.      Patinet reports being in alcohol withdrawl with concern for seizures. Has been drinking 750 mL of alcohol daily. Last drink was over 24 hours ago. Notes he stopped drinking after he found out his friend recently passed away. Endorses multiple emesis epsiodes. No hematemesis. Additionally concerned over two falls recently, noting yesterday evening he hit his head on a door.      Last withdrawl to this extent was on 04/20/2021. Patient reports interest in getting sober. Paitent denies fever, chills, cough, chest pain shortness of breath, hallucinations, and any other complaints at this time.      Per Chart Review: Patient was seen on 04/20/2021 for alcoholic intoxication without complication. Alcohol returned at 462.  Labs unremarkable other than borderline lipase, which is likely secondary to his continuing and chronic alcohol use. Patient given dose of Ativan x2 in the ED, as well as Zofran and LR bolus. Patient discharged  home on Librium with recommendation for outpatient treatment.    History is provided by pt/chart review.     Medical History  Active Ambulatory (Non-Hospital) Problems    Diagnosis     Fever     Normocytic anemia     Alcohol abuse     Substance induced mood disorder (H)     Anxiety     Sleep difficulties     Withdrawal symptoms, alcohol, with unspecified complication (H)     Alcoholic fatty liver     Constipation     Internal hemorrhoid, bleeding     Thrombocytopenia (H)     Generalized anxiety disorder     Hypomagnesemia     Elevation of levels of liver transaminase levels     Hypokalemia     Alcoholic intoxication (H)     Alcohol withdrawal syndrome, uncomplicated (H)     Moderate episode of recurrent major depressive disorder (H)     Allergic rhinitis     Alcohol-induced acute pancreatitis, unspecified complication status     Alcohol-induced acute pancreatitis without infection or necrosis     Alcohol withdrawal syndrome with complication (H)     Chemical dependency (H)     Alcohol dependence with uncomplicated withdrawal (H)     Alcohol withdrawal, uncomplicated (H)     Seasonal allergies     Urticaria     Family history of diabetes mellitus     Past Medical History:   Diagnosis Date     Alcohol abuse      Alcohol withdrawal (H)      HLD (hyperlipidemia)      HTN (hypertension)      Patient Active Problem List    Diagnosis Date Noted     Fever 04/03/2021     Normocytic anemia 04/02/2021     Alcohol abuse 04/01/2021     Substance induced mood disorder (H)      Anxiety      Sleep difficulties      Withdrawal symptoms, alcohol, with unspecified complication (H) 03/31/2021     Alcoholic fatty liver 03/26/2021     Constipation 03/23/2021     Internal hemorrhoid, bleeding 03/23/2021     Thrombocytopenia (H) 01/27/2021     Generalized anxiety disorder 12/03/2020     Hypomagnesemia 12/03/2020     Elevation of levels of liver transaminase levels 10/31/2020     Hypokalemia 10/31/2020     Alcoholic intoxication (H)  10/25/2020     Alcohol withdrawal syndrome, uncomplicated (H) 08/19/2020     Moderate episode of recurrent major depressive disorder (H) 07/14/2020     Allergic rhinitis 03/27/2020     Alcohol-induced acute pancreatitis, unspecified complication status 12/09/2019     Alcohol-induced acute pancreatitis without infection or necrosis 11/28/2019     Alcohol withdrawal syndrome with complication (H) 11/02/2019     Chemical dependency (H) 06/14/2019     Alcohol dependence with uncomplicated withdrawal (H) 05/18/2019     Alcohol withdrawal, uncomplicated (H) 04/15/2019     Seasonal allergies 11/26/2013     Urticaria 03/25/2010     Family history of diabetes mellitus 11/09/2007     Surgical History  He  has a past surgical history that includes none.     Past Surgical History:   Procedure Laterality Date     none      Social History  Reviewed, and he  reports that he has quit smoking. His smoking use included cigars. He has never used smokeless tobacco. He reports current alcohol use. He reports previous drug use.  Social History     Tobacco Use     Smoking status: Former Smoker     Types: Cigars     Smokeless tobacco: Never Used     Tobacco comment: occasional   Substance Use Topics     Alcohol use: Yes     Comment: hx etoh abuse and withdrawal, 750 mLs vodka per day      Allergies  Allergies   Allergen Reactions     Pollen Extracts Rash     grass tree pollen    Family History  Reviewed, and family history includes Heart attack in his father.   Psychosocial Needs  Social History     Social History Narrative     Not on file     Additional psychosocial needs reviewed per nursing assessment.       Prior to Admission Medications   PTA Med List   Medication Sig Last Dose     chlordiazePOXIDE (LIBRIUM) 25 MG capsule Take 50 mg by mouth 4 (four) times a day as needed (withdrawl symptoms). prn     hydrOXYzine pamoate (VISTARIL) 50 MG capsule Take 50 mg by mouth 3 (three) times a day as needed for itching. 6/24/2021 at Unknown time  "    mirtazapine (REMERON) 15 MG tablet Take 15 mg by mouth at bedtime. More than a month at Unknown time     multivitamin therapeutic tablet Take 1 tablet by mouth daily. More than a month at Unknown time     ondansetron (ZOFRAN) 4 MG tablet Take 4 mg by mouth every 8 (eight) hours as needed for nausea. prn     thiamine 100 MG tablet Take 1 tablet (100 mg total) by mouth daily. More than a month at Unknown time           Review of Systems  As per HPI and other 12 ROS negative.    /83   Pulse (!) 125   Temp 97.8  F (36.6  C) (Temporal)   Resp 20   Ht 5' 10\" (1.778 m)   Wt 200 lb (90.7 kg)   SpO2 97%   BMI 28.70 kg/m      Physical Exam  Vitals signs and nursing note reviewed.   Constitutional:       Appearance: He is ill-appearing.      Comments: Tremors all over and anxious   HENT:      Head: Normocephalic and atraumatic.      Right Ear: External ear normal.      Left Ear: External ear normal.      Nose: Nose normal.      Mouth/Throat:      Mouth: Mucous membranes are moist.   Eyes:      General: No scleral icterus.        Right eye: No discharge.         Left eye: No discharge.      Pupils: Pupils are equal, round, and reactive to light.   Neck:      Musculoskeletal: Normal range of motion and neck supple. No neck rigidity.   Cardiovascular:      Rate and Rhythm: Normal rate and regular rhythm.      Pulses: Normal pulses.      Heart sounds: No murmur.   Pulmonary:      Effort: Pulmonary effort is normal. No respiratory distress.      Breath sounds: No stridor. No wheezing.   Abdominal:      General: Abdomen is flat. There is no distension.      Tenderness: There is no abdominal tenderness.   Musculoskeletal: Normal range of motion.         General: No swelling or tenderness.   Skin:     General: Skin is warm and dry.      Coloration: Skin is not jaundiced.   Neurological:      Mental Status: He is alert.      Motor: No weakness.   Psychiatric:         Mood and Affect: Mood is anxious. "         Results:  Results for QUYEN QUIROZ (MRN [id number]) as of 6/25/2021 01:13   Ref. Range 6/24/2021 20:43 6/24/2021 21:50 6/25/2021 00:06   Sodium Latest Ref Range: 136 - 145 mmol/L 141     Potassium Latest Ref Range: 3.5 - 5.0 mmol/L 3.8     Chloride Latest Ref Range: 98 - 107 mmol/L 97 (L)     CO2 Latest Ref Range: 22 - 31 mmol/L 21 (L)     Anion Gap, Calculation Latest Ref Range: 5 - 18 mmol/L 23 (H)     BUN Latest Ref Range: 8 - 22 mg/dL 8     Creatinine Latest Ref Range: 0.70 - 1.30 mg/dL 0.82     GFR MDRD Af Amer Latest Ref Range: >60 mL/min/1.73m2 >60     GFR MDRD Non Af Amer Latest Ref Range: >60 mL/min/1.73m2 >60     Calcium Latest Ref Range: 8.5 - 10.5 mg/dL 9.3     AST Latest Ref Range: 0 - 40 U/L 119 (H)     ALT Latest Ref Range: 0 - 45 U/L 98 (H)     ALBUMIN Latest Ref Range: 3.5 - 5.0 g/dL 4.5     Protein, Total Latest Ref Range: 6.0 - 8.0 g/dL 7.5     Alkaline Phosphatase Latest Ref Range: 45 - 120 U/L 90     Bilirubin, Total Latest Ref Range: 0.0 - 1.0 mg/dL 1.5 (H)     Bilirubin, Direct Latest Ref Range: <=0.5 mg/dL 0.6 (H)     Magnesium Latest Ref Range: 1.8 - 2.6 mg/dL 1.1 (L)     Phosphorus Latest Ref Range: 2.5 - 4.5 mg/dL 3.0     Alcohol, Blood Latest Ref Range: None detected mg/dL 30 (H)     Glucose Latest Ref Range: 70 - 125 mg/dL 122     Lipase Latest Ref Range: 0 - 52 U/L 313 (H)     WBC Latest Ref Range: 4.0 - 11.0 thou/uL 4.4     RBC Latest Ref Range: 4.40 - 6.20 mill/uL 4.47     Hemoglobin Latest Ref Range: 14.0 - 18.0 g/dL 13.8 (L)     Hematocrit Latest Ref Range: 40.0 - 54.0 % 39.6 (L)     MCV Latest Ref Range: 80 - 100 fL 89     MCH Latest Ref Range: 27.0 - 34.0 pg 30.9     MCHC Latest Ref Range: 32.0 - 36.0 g/dL 34.8     RDW Latest Ref Range: 11.0 - 14.5 % 15.0 (H)     Platelets Latest Ref Range: 140 - 440 thou/uL 152     MPV Latest Ref Range: 8.5 - 12.5 fL 9.6     Neutrophils % Latest Ref Range: 50 - 70 % 70     Lymphocytes % Latest Ref Range: 20 - 40 % 17 (L)     Monocytes %  Latest Ref Range: 2 - 10 % 9     Eosinophils % Latest Ref Range: 0 - 6 % 1     Basophils % Latest Ref Range: 0 - 2 % 2     Neutrophils Absolute Latest Ref Range: 2.0 - 7.7 thou/uL 3.1     Lymphocytes Absolute Latest Ref Range: 0.8 - 4.4 thou/uL 0.8     Monocytes Absolute Latest Ref Range: 0.0 - 0.9 thou/uL 0.4     Eosinophils Absolute Latest Ref Range: 0.0 - 0.4 thou/uL 0.0     Basophils Absolute Latest Ref Range: 0.0 - 0.2 thou/uL 0.1     SARS-COV-2 (COVID-19)-PCR Unknown   Rpt   SARS-CoV-2 PCR Result Latest Ref Range: Negative, Invalid    Negative   EXAM: CT HEAD WO CONTRAST  LOCATION: [facility]  DATE/TIME: 6/24/2021 9:50 PM     INDICATION: Altered mental status Fall, alcohol abuse  COMPARISON: 3/12/2021     IMPRESSION:   1.  No acute intracranial process.  2.  Advanced atrophy for age.    Lab Results personally reviewed   Imaging Results personally reviewed   Discussed with patient  Reviewed old records   Discharge Planning discussed with patient.

## 2021-07-06 VITALS — BODY MASS INDEX: 28.63 KG/M2 | HEIGHT: 70 IN | WEIGHT: 200 LBS

## 2021-07-19 ENCOUNTER — HOSPITAL ENCOUNTER (EMERGENCY)
Facility: HOSPITAL | Age: 36
Discharge: ANOTHER HOSPITAL | End: 2021-07-20
Attending: EMERGENCY MEDICINE | Admitting: EMERGENCY MEDICINE
Payer: COMMERCIAL

## 2021-07-19 DIAGNOSIS — F10.930 ALCOHOL WITHDRAWAL SYNDROME WITHOUT COMPLICATION (H): ICD-10-CM

## 2021-07-19 PROCEDURE — 96366 THER/PROPH/DIAG IV INF ADDON: CPT

## 2021-07-19 PROCEDURE — 36592 COLLECT BLOOD FROM PICC: CPT | Performed by: EMERGENCY MEDICINE

## 2021-07-19 PROCEDURE — 96375 TX/PRO/DX INJ NEW DRUG ADDON: CPT

## 2021-07-19 PROCEDURE — 96365 THER/PROPH/DIAG IV INF INIT: CPT

## 2021-07-19 PROCEDURE — 99285 EMERGENCY DEPT VISIT HI MDM: CPT | Mod: 25

## 2021-07-19 PROCEDURE — 85014 HEMATOCRIT: CPT | Performed by: EMERGENCY MEDICINE

## 2021-07-19 PROCEDURE — 96361 HYDRATE IV INFUSION ADD-ON: CPT

## 2021-07-19 PROCEDURE — 96376 TX/PRO/DX INJ SAME DRUG ADON: CPT

## 2021-07-19 PROCEDURE — C9803 HOPD COVID-19 SPEC COLLECT: HCPCS

## 2021-07-19 PROCEDURE — 36415 COLL VENOUS BLD VENIPUNCTURE: CPT | Performed by: EMERGENCY MEDICINE

## 2021-07-19 RX ORDER — LORAZEPAM 2 MG/ML
1 INJECTION INTRAMUSCULAR ONCE
Status: COMPLETED | OUTPATIENT
Start: 2021-07-20 | End: 2021-07-20

## 2021-07-20 ENCOUNTER — HOSPITAL ENCOUNTER (INPATIENT)
Facility: CLINIC | Age: 36
LOS: 3 days | Discharge: HOME | DRG: 897 | End: 2021-07-23
Payer: COMMERCIAL

## 2021-07-20 VITALS
DIASTOLIC BLOOD PRESSURE: 80 MMHG | TEMPERATURE: 97.5 F | HEART RATE: 101 BPM | OXYGEN SATURATION: 98 % | SYSTOLIC BLOOD PRESSURE: 137 MMHG | HEIGHT: 71 IN | RESPIRATION RATE: 20 BRPM | BODY MASS INDEX: 27.89 KG/M2

## 2021-07-20 PROBLEM — F10.239 ALCOHOL DEPENDENCE WITH WITHDRAWAL WITH COMPLICATION (H): Status: RESOLVED | Noted: 2020-01-07 | Resolved: 2021-07-20

## 2021-07-20 PROBLEM — F10.939 ALCOHOL WITHDRAWAL SYNDROME (H): Status: RESOLVED | Noted: 2019-03-27 | Resolved: 2021-07-20

## 2021-07-20 PROBLEM — F10.10 ALCOHOL ABUSE: Status: RESOLVED | Noted: 2020-01-16 | Resolved: 2021-07-20

## 2021-07-20 PROBLEM — K70.0 ALCOHOLIC FATTY LIVER: Status: ACTIVE | Noted: 2021-03-26

## 2021-07-20 PROBLEM — R74.01 TRANSAMINITIS: Status: ACTIVE | Noted: 2020-10-31

## 2021-07-20 PROBLEM — F41.1 GENERALIZED ANXIETY DISORDER: Status: ACTIVE | Noted: 2020-12-03

## 2021-07-20 PROBLEM — F33.1 MODERATE EPISODE OF RECURRENT MAJOR DEPRESSIVE DISORDER (H): Status: ACTIVE | Noted: 2020-07-14

## 2021-07-20 PROBLEM — F10.929 ALCOHOL USE WITH INTOXICATION WITH COMPLICATION (H): Status: RESOLVED | Noted: 2020-10-31 | Resolved: 2021-07-20

## 2021-07-20 PROBLEM — F10.920 ALCOHOLIC INTOXICATION WITHOUT COMPLICATION (H): Status: RESOLVED | Noted: 2020-10-25 | Resolved: 2021-07-20

## 2021-07-20 PROBLEM — F10.939 ALCOHOL WITHDRAWAL WITH COMPLICATION WITH INPATIENT TREATMENT, WITH UNSPECIFIED COMPLICATION (H): Status: RESOLVED | Noted: 2020-01-01 | Resolved: 2021-07-20

## 2021-07-20 PROBLEM — F10.929 ALCOHOLIC INTOXICATION WITH COMPLICATION (H): Status: RESOLVED | Noted: 2020-10-31 | Resolved: 2021-07-20

## 2021-07-20 LAB
ALBUMIN SERPL-MCNC: 4.6 G/DL (ref 3.5–5)
ALP SERPL-CCNC: 101 U/L (ref 45–120)
ALT SERPL W P-5'-P-CCNC: 73 U/L (ref 0–45)
ANION GAP SERPL CALCULATED.3IONS-SCNC: 19 MMOL/L (ref 5–18)
ANION GAP SERPL CALCULATED.3IONS-SCNC: 27 MMOL/L (ref 5–18)
AST SERPL W P-5'-P-CCNC: 97 U/L (ref 0–40)
BILIRUB DIRECT SERPL-MCNC: 0.3 MG/DL
BILIRUB SERPL-MCNC: 0.7 MG/DL (ref 0–1)
BUN SERPL-MCNC: 11 MG/DL (ref 8–22)
BUN SERPL-MCNC: 12 MG/DL (ref 8–22)
CALCIUM SERPL-MCNC: 7.8 MG/DL (ref 8.5–10.5)
CALCIUM SERPL-MCNC: 8.8 MG/DL (ref 8.5–10.5)
CHLORIDE BLD-SCNC: 101 MMOL/L (ref 98–107)
CHLORIDE BLD-SCNC: 96 MMOL/L (ref 98–107)
CO2 SERPL-SCNC: 16 MMOL/L (ref 22–31)
CO2 SERPL-SCNC: 18 MMOL/L (ref 22–31)
CREAT SERPL-MCNC: 0.66 MG/DL (ref 0.7–1.3)
CREAT SERPL-MCNC: 0.77 MG/DL (ref 0.7–1.3)
ERYTHROCYTE [DISTWIDTH] IN BLOOD BY AUTOMATED COUNT: 13.6 % (ref 10–15)
ETHANOL SERPL-MCNC: 207 MG/DL
GFR SERPL CREATININE-BSD FRML MDRD: >90 ML/MIN/1.73M2
GFR SERPL CREATININE-BSD FRML MDRD: >90 ML/MIN/1.73M2
GLUCOSE BLD-MCNC: 104 MG/DL (ref 70–125)
GLUCOSE BLD-MCNC: 122 MG/DL (ref 70–125)
HCT VFR BLD AUTO: 42.8 % (ref 40–53)
HGB BLD-MCNC: 14.9 G/DL (ref 13.3–17.7)
KETONES BLD-SCNC: 0.6 MMOL/L (ref 0–0.6)
LIPASE SERPL-CCNC: 257 U/L (ref 0–52)
MAGNESIUM SERPL-MCNC: 1.8 MG/DL (ref 1.8–2.6)
MCH RBC QN AUTO: 30.8 PG (ref 26.5–33)
MCHC RBC AUTO-ENTMCNC: 34.8 G/DL (ref 31.5–36.5)
MCV RBC AUTO: 88 FL (ref 78–100)
PLATELET # BLD AUTO: 260 10E3/UL (ref 150–450)
POTASSIUM BLD-SCNC: 4.3 MMOL/L (ref 3.5–5)
POTASSIUM BLD-SCNC: 4.9 MMOL/L (ref 3.5–5)
PROT SERPL-MCNC: 7.9 G/DL (ref 6–8)
RBC # BLD AUTO: 4.84 10E6/UL (ref 4.4–5.9)
SARS-COV-2 RNA RESP QL NAA+PROBE: NEGATIVE
SODIUM SERPL-SCNC: 138 MMOL/L (ref 136–145)
SODIUM SERPL-SCNC: 139 MMOL/L (ref 136–145)
WBC # BLD AUTO: 8.7 10E3/UL (ref 4–11)

## 2021-07-20 PROCEDURE — 82077 ASSAY SPEC XCP UR&BREATH IA: CPT | Performed by: EMERGENCY MEDICINE

## 2021-07-20 PROCEDURE — 250N000011 HC RX IP 250 OP 636: Performed by: EMERGENCY MEDICINE

## 2021-07-20 PROCEDURE — 82435 ASSAY OF BLOOD CHLORIDE: CPT | Performed by: EMERGENCY MEDICINE

## 2021-07-20 PROCEDURE — 80076 HEPATIC FUNCTION PANEL: CPT | Performed by: EMERGENCY MEDICINE

## 2021-07-20 PROCEDURE — 99223 1ST HOSP IP/OBS HIGH 75: CPT | Mod: AI

## 2021-07-20 PROCEDURE — 250N000009 HC RX 250: Performed by: EMERGENCY MEDICINE

## 2021-07-20 PROCEDURE — 36415 COLL VENOUS BLD VENIPUNCTURE: CPT | Performed by: EMERGENCY MEDICINE

## 2021-07-20 PROCEDURE — 87635 SARS-COV-2 COVID-19 AMP PRB: CPT | Performed by: EMERGENCY MEDICINE

## 2021-07-20 PROCEDURE — 83735 ASSAY OF MAGNESIUM: CPT | Performed by: EMERGENCY MEDICINE

## 2021-07-20 PROCEDURE — 250N000011 HC RX IP 250 OP 636: Performed by: PHYSICIAN ASSISTANT

## 2021-07-20 PROCEDURE — 36592 COLLECT BLOOD FROM PICC: CPT | Performed by: EMERGENCY MEDICINE

## 2021-07-20 PROCEDURE — 258N000003 HC RX IP 258 OP 636: Performed by: EMERGENCY MEDICINE

## 2021-07-20 PROCEDURE — 82010 KETONE BODYS QUAN: CPT | Performed by: PHYSICIAN ASSISTANT

## 2021-07-20 PROCEDURE — 99207 PR APP CREDIT; MD BILLING SHARED VISIT: CPT | Performed by: PHYSICIAN ASSISTANT

## 2021-07-20 PROCEDURE — 120N000001 HC R&B MED SURG/OB

## 2021-07-20 PROCEDURE — 83690 ASSAY OF LIPASE: CPT | Performed by: EMERGENCY MEDICINE

## 2021-07-20 PROCEDURE — 258N000003 HC RX IP 258 OP 636: Performed by: PHYSICIAN ASSISTANT

## 2021-07-20 PROCEDURE — 250N000013 HC RX MED GY IP 250 OP 250 PS 637: Performed by: PHYSICIAN ASSISTANT

## 2021-07-20 PROCEDURE — 36415 COLL VENOUS BLD VENIPUNCTURE: CPT | Performed by: PHYSICIAN ASSISTANT

## 2021-07-20 PROCEDURE — HZ2ZZZZ DETOXIFICATION SERVICES FOR SUBSTANCE ABUSE TREATMENT: ICD-10-PCS

## 2021-07-20 RX ORDER — ONDANSETRON 2 MG/ML
4 INJECTION INTRAMUSCULAR; INTRAVENOUS ONCE
Status: COMPLETED | OUTPATIENT
Start: 2021-07-20 | End: 2021-07-20

## 2021-07-20 RX ORDER — GABAPENTIN 600 MG/1
1200 TABLET ORAL ONCE
Status: COMPLETED | OUTPATIENT
Start: 2021-07-20 | End: 2021-07-20

## 2021-07-20 RX ORDER — LANOLIN ALCOHOL/MO/W.PET/CERES
100 CREAM (GRAM) TOPICAL DAILY
Status: DISCONTINUED | OUTPATIENT
Start: 2021-07-20 | End: 2021-07-23 | Stop reason: HOSPADM

## 2021-07-20 RX ORDER — HYDROXYZINE PAMOATE 50 MG/1
1 CAPSULE ORAL 3 TIMES DAILY PRN
COMMUNITY
Start: 2021-04-03 | End: 2021-08-07

## 2021-07-20 RX ORDER — PROCHLORPERAZINE 25 MG
25 SUPPOSITORY, RECTAL RECTAL EVERY 12 HOURS PRN
Status: DISCONTINUED | OUTPATIENT
Start: 2021-07-20 | End: 2021-07-23 | Stop reason: HOSPADM

## 2021-07-20 RX ORDER — GABAPENTIN 300 MG/1
300 CAPSULE ORAL EVERY 8 HOURS
Status: DISCONTINUED | OUTPATIENT
Start: 2021-07-25 | End: 2021-07-23 | Stop reason: HOSPADM

## 2021-07-20 RX ORDER — CLONIDINE HYDROCHLORIDE 0.1 MG/1
0.1 TABLET ORAL EVERY 8 HOURS
Status: DISCONTINUED | OUTPATIENT
Start: 2021-07-20 | End: 2021-07-20

## 2021-07-20 RX ORDER — HYDROXYZINE HYDROCHLORIDE 50 MG/1
50 TABLET, FILM COATED ORAL 3 TIMES DAILY PRN
Status: DISCONTINUED | OUTPATIENT
Start: 2021-07-20 | End: 2021-07-23 | Stop reason: HOSPADM

## 2021-07-20 RX ORDER — SODIUM CHLORIDE, SODIUM LACTATE, POTASSIUM CHLORIDE, CALCIUM CHLORIDE 600; 310; 30; 20 MG/100ML; MG/100ML; MG/100ML; MG/100ML
INJECTION, SOLUTION INTRAVENOUS CONTINUOUS
Status: DISCONTINUED | OUTPATIENT
Start: 2021-07-20 | End: 2021-07-22

## 2021-07-20 RX ORDER — MULTIPLE VITAMINS W/ MINERALS TAB 9MG-400MCG
1 TAB ORAL DAILY
Status: DISCONTINUED | OUTPATIENT
Start: 2021-07-20 | End: 2021-07-23 | Stop reason: HOSPADM

## 2021-07-20 RX ORDER — AMOXICILLIN 250 MG
1 CAPSULE ORAL 2 TIMES DAILY PRN
Status: DISCONTINUED | OUTPATIENT
Start: 2021-07-20 | End: 2021-07-23 | Stop reason: HOSPADM

## 2021-07-20 RX ORDER — AMOXICILLIN 250 MG
2 CAPSULE ORAL 2 TIMES DAILY PRN
Status: DISCONTINUED | OUTPATIENT
Start: 2021-07-20 | End: 2021-07-23 | Stop reason: HOSPADM

## 2021-07-20 RX ORDER — ACETAMINOPHEN 650 MG/1
650 SUPPOSITORY RECTAL EVERY 6 HOURS PRN
Status: DISCONTINUED | OUTPATIENT
Start: 2021-07-20 | End: 2021-07-23 | Stop reason: HOSPADM

## 2021-07-20 RX ORDER — LIDOCAINE 40 MG/G
CREAM TOPICAL
Status: DISCONTINUED | OUTPATIENT
Start: 2021-07-20 | End: 2021-07-23 | Stop reason: HOSPADM

## 2021-07-20 RX ORDER — LORAZEPAM 2 MG/ML
1-2 INJECTION INTRAMUSCULAR EVERY 30 MIN PRN
Status: DISCONTINUED | OUTPATIENT
Start: 2021-07-20 | End: 2021-07-23 | Stop reason: HOSPADM

## 2021-07-20 RX ORDER — FLUMAZENIL 0.1 MG/ML
0.2 INJECTION, SOLUTION INTRAVENOUS
Status: DISCONTINUED | OUTPATIENT
Start: 2021-07-20 | End: 2021-07-23 | Stop reason: HOSPADM

## 2021-07-20 RX ORDER — ACETAMINOPHEN 325 MG/1
650 TABLET ORAL EVERY 6 HOURS PRN
Status: DISCONTINUED | OUTPATIENT
Start: 2021-07-20 | End: 2021-07-23 | Stop reason: HOSPADM

## 2021-07-20 RX ORDER — GABAPENTIN 100 MG/1
100 CAPSULE ORAL EVERY 8 HOURS
Status: DISCONTINUED | OUTPATIENT
Start: 2021-07-27 | End: 2021-07-23 | Stop reason: HOSPADM

## 2021-07-20 RX ORDER — HALOPERIDOL 5 MG/ML
2.5-5 INJECTION INTRAMUSCULAR EVERY 6 HOURS PRN
Status: DISCONTINUED | OUTPATIENT
Start: 2021-07-20 | End: 2021-07-23 | Stop reason: HOSPADM

## 2021-07-20 RX ORDER — POLYETHYLENE GLYCOL 3350 17 G/17G
17 POWDER, FOR SOLUTION ORAL DAILY PRN
Status: DISCONTINUED | OUTPATIENT
Start: 2021-07-20 | End: 2021-07-23 | Stop reason: HOSPADM

## 2021-07-20 RX ORDER — GABAPENTIN 300 MG/1
900 CAPSULE ORAL EVERY 8 HOURS
Status: COMPLETED | OUTPATIENT
Start: 2021-07-20 | End: 2021-07-23

## 2021-07-20 RX ORDER — OLANZAPINE 5 MG/1
5-10 TABLET, ORALLY DISINTEGRATING ORAL EVERY 6 HOURS PRN
Status: DISCONTINUED | OUTPATIENT
Start: 2021-07-20 | End: 2021-07-23 | Stop reason: HOSPADM

## 2021-07-20 RX ORDER — ONDANSETRON 4 MG/1
4 TABLET, ORALLY DISINTEGRATING ORAL EVERY 6 HOURS PRN
Status: DISCONTINUED | OUTPATIENT
Start: 2021-07-20 | End: 2021-07-23 | Stop reason: HOSPADM

## 2021-07-20 RX ORDER — LORAZEPAM 2 MG/ML
1 INJECTION INTRAMUSCULAR ONCE
Status: COMPLETED | OUTPATIENT
Start: 2021-07-20 | End: 2021-07-20

## 2021-07-20 RX ORDER — LORAZEPAM 1 MG/1
1-2 TABLET ORAL EVERY 30 MIN PRN
Status: DISCONTINUED | OUTPATIENT
Start: 2021-07-20 | End: 2021-07-23 | Stop reason: HOSPADM

## 2021-07-20 RX ORDER — ONDANSETRON 2 MG/ML
4 INJECTION INTRAMUSCULAR; INTRAVENOUS EVERY 6 HOURS PRN
Status: DISCONTINUED | OUTPATIENT
Start: 2021-07-20 | End: 2021-07-23 | Stop reason: HOSPADM

## 2021-07-20 RX ORDER — GABAPENTIN 300 MG/1
600 CAPSULE ORAL EVERY 8 HOURS
Status: DISCONTINUED | OUTPATIENT
Start: 2021-07-23 | End: 2021-07-23 | Stop reason: HOSPADM

## 2021-07-20 RX ORDER — FOLIC ACID 1 MG/1
1 TABLET ORAL DAILY
Status: DISCONTINUED | OUTPATIENT
Start: 2021-07-20 | End: 2021-07-23 | Stop reason: HOSPADM

## 2021-07-20 RX ORDER — PROCHLORPERAZINE MALEATE 5 MG
10 TABLET ORAL EVERY 6 HOURS PRN
Status: DISCONTINUED | OUTPATIENT
Start: 2021-07-20 | End: 2021-07-23 | Stop reason: HOSPADM

## 2021-07-20 RX ORDER — LORAZEPAM 2 MG/ML
2 INJECTION INTRAMUSCULAR ONCE
Status: COMPLETED | OUTPATIENT
Start: 2021-07-20 | End: 2021-07-20

## 2021-07-20 RX ADMIN — LORAZEPAM 1 MG: 2 INJECTION INTRAMUSCULAR; INTRAVENOUS at 02:10

## 2021-07-20 RX ADMIN — SODIUM CHLORIDE, POTASSIUM CHLORIDE, SODIUM LACTATE AND CALCIUM CHLORIDE: 600; 310; 30; 20 INJECTION, SOLUTION INTRAVENOUS at 08:00

## 2021-07-20 RX ADMIN — LORAZEPAM 1 MG: 2 INJECTION INTRAMUSCULAR; INTRAVENOUS at 00:00

## 2021-07-20 RX ADMIN — FOLIC ACID 1 MG: 1 TABLET ORAL at 14:23

## 2021-07-20 RX ADMIN — ONDANSETRON 4 MG: 2 INJECTION INTRAMUSCULAR; INTRAVENOUS at 04:29

## 2021-07-20 RX ADMIN — LORAZEPAM 1 MG: 2 INJECTION INTRAMUSCULAR; INTRAVENOUS at 04:17

## 2021-07-20 RX ADMIN — ACETAMINOPHEN 650 MG: 325 TABLET, FILM COATED ORAL at 14:23

## 2021-07-20 RX ADMIN — LORAZEPAM 2 MG: 2 INJECTION INTRAMUSCULAR; INTRAVENOUS at 05:51

## 2021-07-20 RX ADMIN — GABAPENTIN 900 MG: 300 CAPSULE ORAL at 16:47

## 2021-07-20 RX ADMIN — LORAZEPAM 1 MG: 1 TABLET ORAL at 21:47

## 2021-07-20 RX ADMIN — LORAZEPAM 2 MG: 2 INJECTION INTRAMUSCULAR; INTRAVENOUS at 08:00

## 2021-07-20 RX ADMIN — THIAMINE HCL TAB 100 MG 100 MG: 100 TAB at 14:23

## 2021-07-20 RX ADMIN — LORAZEPAM 1 MG: 2 INJECTION INTRAMUSCULAR; INTRAVENOUS at 10:53

## 2021-07-20 RX ADMIN — Medication 1 TABLET: at 14:23

## 2021-07-20 RX ADMIN — ONDANSETRON 4 MG: 2 INJECTION INTRAMUSCULAR; INTRAVENOUS at 02:22

## 2021-07-20 RX ADMIN — SODIUM CHLORIDE 1000 ML: 9 INJECTION, SOLUTION INTRAVENOUS at 00:01

## 2021-07-20 RX ADMIN — ONDANSETRON 4 MG: 2 INJECTION INTRAMUSCULAR; INTRAVENOUS at 08:06

## 2021-07-20 RX ADMIN — GABAPENTIN 1200 MG: 600 TABLET, FILM COATED ORAL at 08:30

## 2021-07-20 RX ADMIN — LORAZEPAM 2 MG: 2 INJECTION INTRAMUSCULAR; INTRAVENOUS at 09:56

## 2021-07-20 RX ADMIN — SODIUM CHLORIDE, POTASSIUM CHLORIDE, SODIUM LACTATE AND CALCIUM CHLORIDE: 600; 310; 30; 20 INJECTION, SOLUTION INTRAVENOUS at 16:49

## 2021-07-20 RX ADMIN — SODIUM CHLORIDE 1000 ML: 9 INJECTION, SOLUTION INTRAVENOUS at 02:18

## 2021-07-20 RX ADMIN — FOLIC ACID: 5 INJECTION, SOLUTION INTRAMUSCULAR; INTRAVENOUS; SUBCUTANEOUS at 02:59

## 2021-07-20 ASSESSMENT — ACTIVITIES OF DAILY LIVING (ADL)
ADLS_ACUITY_SCORE: 11

## 2021-07-20 ASSESSMENT — MIFFLIN-ST. JEOR: SCORE: 1907.13

## 2021-07-20 NOTE — ED NOTES
Pt tolerating oral liquids well and still no emesis since just prior to first zofran admi. Pt also ambulated to restroom with assist of 1 due to pt's balance offset.

## 2021-07-20 NOTE — PROGRESS NOTES
Patient resting this afternoon. Capnography monitoring continued. Patient 93% on room air while sleeping.

## 2021-07-20 NOTE — ED NOTES
"Pt endorses He does not wish to go to detox and does not usually have to go. \"as soon as you guys think I am stable enough\" provider notified  "

## 2021-07-20 NOTE — PROVIDER NOTIFICATION
"Updated KEON Cuellar regarding current status of patient. Patient ambulated to bathroom with assist x 2 with walker and gait belt. Patient very unsteady. Vital signs recheck and patient rescored on CIWA scale. Patient now requiring 3 L nasal cannula with oxygen sat 93%.  BP (!) 150/89   Pulse 97   Temp 99.6  F (37.6  C) (Oral)   Resp 20   Ht 1.803 m (5' 11\")   Wt 94 kg (207 lb 3.7 oz)   SpO2 90%   BMI 28.90 kg/m       CIWA: 14 Nausea 1, tremor 5, sweats 1, anxiety 4, tactile disturbances (left arm and bilateral lower extremity numbness and tingling)  3 = Total 14. 2 mg IV Ativan given.    Instructed per KEON Cuellar to follow CIWA protocol and she would be assessing patient soon.     "

## 2021-07-20 NOTE — PROGRESS NOTES
Patient noted to have two brief episodes of tachycardia to the 150's.  PA updated.  Order to telemetry.

## 2021-07-20 NOTE — ED TRIAGE NOTES
Pt reports lives with mom. Last drank 2 days ago. C/o n/v. Pt states he stopped drinking because his mom dumped his alcohol out.

## 2021-07-20 NOTE — H&P
[facility]    History and Physical - Hospitalist Service       Date of Admission:  7/20/2021    Assessment & Plan      Nikhil Rios is a 35 year old male admitted on 7/20/2021. He has history of severe alcohol dependence and prior alcohol withdrawal including withdrawal seizure, PTSD, anxiety and depression.  He presents due to concerns of alcohol withdrawal.    Alcohol Dependence with acute alcohol withdrawal  Reports drinking 750 mL for past 2-3 years. Last drink ~36 hours prior to arrival. History of prior episodes of withdrawal as well as seizures. On presentation, tremulous, hypertensive, tachycardic, somewhat flushed, nauseous and some tingling of his finger. On admission, appears to be going through withdrawal, CIWA on arrival 15. Previously has been on monthly naltrexone injections and acamprosate. Has been evaluated by addition medicine previously. Also has been to various treatment facilities as well as AA.   - CIWA protocol in place with PRN ativan and scheduled gabapentin  - daily thiamine, folic acid, multivitamin therapy initiated  - monitor CBC, electrolytes, CMP  - encouraged cessation, care transition consult placed to help with resources. Patient is interested in something that focuses on his mental health as he feels this is a major component of his drinking.  - may need transfer to ICU for closer monitoring if lorazepam needs increase    Intermittent Hypoxia  Hypoxic when sleeping, requires 3 LPM.  I would awaken talking oxygenation is 94 to 96% on room air.  May have undiagnosed KRISTA vs related to lorazepam dosing (no concerning observed respiratory depression). Denies history of snoring or daytime sleepiness.  - monitor O2, continuous pulse ox  - oxygen as needed for hypoxia   - continue to monitor    ADDENDUM 3:40 PM:  Episode of tachycardia  Nursing went to evaluate due to capnography beeping, noted to have tachycardia up to 150 though resolved by the time a radial  pulse was taken.  - telemetry     Elevated Lipase  Lipase 257. Has been elevated on previous admission with alcohol withdrawal. No symptoms suggestive of pancreatitis. Suspect due to alcohol use.  - AM lipase     Mild Transaminitis  Alcoholic liver disease  ALT 73, AST 97, normal bilirubin and alk phos. Likely due to alcoholic liver disease.  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 Does not affect his ability to ambulate. He has tingling in one finger on his left hand which started with the withdrawal symptoms.  - encouraged abstinence    Anxiety and Depression  PTSD  Insomina  Not currently taking medications for this. Previously on Lexapro and trazodone. As above, hopes to find a treatment program that focuses on mental health to assist with his chemical health.  - encouraged patient to follow up on mental health, care transitions to help with resources     Diet: Regular Diet Adult  DVT Prophylaxis: Low Risk/Ambulatory with no VTE prophylaxis indicated  Mars Catheter: Not present  Central Lines: None  Code Status: Full Code    Disposition Plan   Expected discharge: 2-4 days recommended to prior living arrangement once alcohol withdrawal safely managed.     The patient's care was discussed with the Attending Physician, Dr. Mark Viera and Patient.    Ails Collado PA-C  [facility]  Securely message with the [employer] Web Console (learn more here)  Text page via [employer] Paging/Directory  ______________________________________________________________________    Chief Complaint   Alcohol withdrawal    History is obtained from the patient, review of chart    History of Present Illness   Nikhil Rios is a 35 year old male who presents with concerns of alcohol withdrawal.     Last reported drink was likely about 36 hours ago. He has been drinking about 750 mL of vodka daily for the past 2-3  years. He feels he drinks out of boredom due to his unemployment status as well as to help cope with his mental health issues. He previously has tried various treatment programs but has found that they are not helpful. He does continue to go to AA. AA actually helped him maintain a healthier relationship with alcohol for a period of time, however it no longer has been doing so.     He presented to the ED due to concerns of alcohol withdrawal with tremors, anxiety, nasuea and emesis.  He additionally has noticed tingling of his left ring finger which started with the symptoms as well.  He does have a history of numbness and tingling in his lower extremities which is longstanding, not too bothersome and does not affect his ability to ambulate.  Recently he noticed he has to start drinking earlier and earlier due to withdrawal symptoms starting.     He has many prior evaluations for alcohol withdrawal, states about a month ago he presented to [facility] for withdrawal and had a seizure at that time. He is unsure if he has previously had alcohol withdrawal related seizure.  He does not believe he has ever required the intensive care unit for his withdrawal symptoms.    Otherwise has been in his usual state of health.  States he has not been ill in quite some time.  Denies headache, fevers, chills, cold symptoms, shortness of breath, chest pain, abdominal pain or urinary changes.    On admission he has a CIWA of 15.  He has required 5 mg of Ativan so far in the first 3 hours of admission.  He feels the Ativan is helping his withdrawal symptoms.  The tingling in his finger feels mostly better.  He states he now has diarrhea which is common for him to develop when he stopped drinking.  He has required oxygen after receiving Ativan and on drifting off to sleep.  He states he does not feel short of breath or have any respiratory symptoms.  He denies any history of sleep apnea and does not have history of snoring or daytime  sleepiness.    Review of Systems    The 10 point Review of Systems is negative other than noted in the HPI or here.     Past Medical History    I have reviewed this patient's medical history and updated it with pertinent information if needed.   Past Medical History:   Diagnosis Date     Alcohol abuse      Alcohol abuse      Alcohol withdrawal (H)      Depressive disorder      HLD (hyperlipidemia)      HTN (hypertension)        Past Surgical History   I have reviewed this patient's surgical history and updated it with pertinent information if needed.  Past Surgical History:   Procedure Laterality Date     NO HISTORY OF SURGERY       OTHER SURGICAL HISTORY      none       Social History   I have reviewed this patient's social history and updated it with pertinent information if needed.  Social History     Tobacco Use     Smoking status: Former Smoker     Packs/day: 0.25     Types: Cigars, Cigars     Smokeless tobacco: Never Used     Tobacco comment: occasional   Substance Use Topics     Alcohol use: Yes     Comment: Alcoholic Drinks/day: hx etoh abuse and withdrawal, 750 mLs vodka per day     Drug use: Not Currently       Family History   I have reviewed this patient's family history and updated it with pertinent information if needed.  Family History   Problem Relation Age of Onset     Coronary Artery Disease Father        Prior to Admission Medications   Prior to Admission Medications   Prescriptions Last Dose Informant Patient Reported? Taking?   hydrOXYzine (VISTARIL) 50 MG capsule Past Week at Unknown time Self Yes Yes   Sig: Take 1 capsule by mouth 3 times daily as needed for allergies   hypromellose-dextran (ARTIFICAL TEARS) 0.1-0.3 % ophthalmic solution Past Week at Unknown time Self Yes Yes   Sig: Place 1 drop into both eyes daily as needed for dry eyes   ketotifen (ZADITOR) 0.025 % ophthalmic solution Past Week at Unknown time Self No Yes   Sig: Place 1 drop into both eyes 2 times daily as needed for  itching   naltrexone (VIVITROL) 380 MG SUSR More than a month at Unknown time Self Yes No   Sig: Inject 380 mg into the muscle every 28 days   sodium chloride (OCEAN) 0.65 % nasal spray Past Week at Unknown time Self No Yes   Sig: Spray 1 spray into both nostrils daily as needed for congestion      Facility-Administered Medications: None     Allergies   Allergies   Allergen Reactions     Gramineae Pollens Itching     Pollen Extract Rash     grass tree pollen       Physical Exam   Vital Signs: Temp: 99.6  F (37.6  C) Temp src: Oral BP: (!) 150/89 Pulse: 97   Resp: 20 SpO2: 90 % O2 Device: Nasal cannula Oxygen Delivery: 3 LPM  Weight: 207 lbs 3.72 oz    Constitutional: Laying down comfortably in bed, face flushed and somewhat sweaty, awake, alert, cooperative, no apparent distress, and appears stated age  Respiratory: No increased work of breathing, good air exchange, clear to auscultation bilaterally, no crackles or wheezing  Cardiovascular: Slightly fast rate and regular rhythm, and no murmur noted.  No lower extremity edema.  GI: normal bowel sounds, soft, non-distended, non-tender  Skin: Warm, flushed, slightly diaphoretic.  Musculoskeletal: Resting tremor noted.  Normal bulk and tone.  Neurologic: Awake, alert, oriented to name, place and time.  Resting tremor as above.  Neuropsychiatric: Calm, pleasant, cooperative.  Appropriate thought process and content.  Short-term memory is intact.    Data   Data reviewed today: I reviewed all medications, new labs and imaging results over the last 24 hours. I personally reviewed no images or EKG's today.    Recent Labs   Lab 07/20/21  0420 07/20/21  0050 07/19/21  2355   WBC  --   --  8.7   HGB  --   --  14.9   MCV  --   --  88   PLT  --   --  260    139  --    POTASSIUM 4.9 4.3  --    CHLORIDE 101 96*  --    CO2 18* 16*  --    BUN 11 12  --    CR 0.66* 0.77  --    ANIONGAP 19* 27*  --    KEELEY 7.8* 8.8  --     122  --    ALBUMIN  --  4.6  --    PROTTOTAL  --   7.9  --    BILITOTAL  --  0.7  --    ALKPHOS  --  101  --    ALT  --  73*  --    AST  --  97*  --    LIPASE  --  257*  --      No results found for this or any previous visit (from the past 24 hour(s)).

## 2021-07-20 NOTE — PLAN OF CARE
"[facility] [facility] ADMISSION NOTE    Patient admitted to room 2302 at approximately 0703 via cart from [facility]. Patient was accompanied by other:Ambulance Team.     Verbal SBAR report received from Tati GUO prior to patient arrival.     Patient trasferred to bed via Ambulance Team. Patient alert and oriented X 3. The patient is not having any pain.  . Admission vital signs: Blood pressure 119/86, pulse 101, temperature 99  F (37.2  C), temperature source Oral, resp. rate 20, height 1.803 m (5' 11\"), weight 94 kg (207 lb 3.7 oz), SpO2 94 %. Patient was oriented to plan of care, call light, bed controls, tv, telephone, bathroom, and visiting hours.     Risk Assessment    The following safety risks were identified during admission: fall. Yellow risk band applied: YES.     Skin Initial Assessment    Passed on to oncoming RN         Education    Patient has a [place] to Observation order: No  Observation education completed and documented: N/A      Leisa Nayak RN      "

## 2021-07-20 NOTE — ED PROVIDER NOTES
Emergency Department Encounter     Evaluation Date & Time:   No admission date for patient encounter.    CHIEF COMPLAINT:  Withdrawal      Triage Note:Pt reports lives with mom. Last drank 2 days ago. C/o n/v. Pt states he stopped drinking because his mom dumped his alcohol out.         Impression and Plan       FINAL IMPRESSION:    ICD-10-CM    1. Alcohol withdrawal syndrome without complication (H)  F10.230          ED COURSE & MEDICAL DECISION MAKIN:35 PM I met the patient and performed my initial interview and exam.     34 yo M, history of chronic alcohol abuse with previous withdrawal including seizures and hepatic steatosis, who presents for evaluation of alcohol withdrawal that started yesterday. He reports shaking / tremors as well as nausea with multiple episodes of vomiting. No associated abdominal pain, diarrhea, fevers. He thinks that his last drink was ~24 hours ago. He has been drinking about 750 ml of alcohol a day for the last 4-5 years.     On exam he has tachycardic rate with regular rhythm.  He does have BL hand tremors and tongue fasciculations consistent with alcohol withdrawal.    Patient placed on cardiac monitor, IV access established, blood sent for labs and IVF initiated.  Patient was given IV Ativan for withdrawal symptoms.    Labs remarkable for no leukocytosis or anemia.  He has acidosis with bicarb of 16 and elevated anion gap (27), likely secondary to dehydration from vomiting; repeat BMP after IVF with improvement in his acidosis (bicarb 18, anion gap 19).  He has mildly elevated (AST 97, ALT 73) with no laboratory evidence of biliary obstruction.  Lipase is elevated to 257; consider pancreatitis as cause of nausea and vomiting, however he denies abdominal pain and exam is benign.  Blood alcohol level is elevated to 207.    Patient requiring multiple doses of IV Ativan for withdrawal symptoms, thus decision made to admit patient for further management.  Unfortunately there are  no beds available at [facility], thus patient transferred to [facility].  Dr. Meesala accepted the patient in transfer and EMTALA forms signed.  Patient hemodynamically stable throughout ED course.      At the conclusion of the encounter I discussed the results of all the tests and the disposition. The questions were answered. The patient and family acknowledged understanding and was agreeable with the care plan.      MEDICATIONS GIVEN IN THE EMERGENCY DEPARTMENT:  Medications   0.9% sodium chloride BOLUS (0 mLs Intravenous Stopped 7/20/21 0236)   0.9% sodium chloride BOLUS (0 mLs Intravenous Stopped 7/20/21 0421)   LORazepam (ATIVAN) injection 1 mg (1 mg Intravenous Given 7/20/21 0000)   LORazepam (ATIVAN) injection 1 mg (1 mg Intravenous Given 7/20/21 0210)   ondansetron (ZOFRAN) injection 4 mg (4 mg Intravenous Given 7/20/21 0222)   sodium chloride 0.9 % 1,000 mL with Infuvite Adult 10 mL, thiamine 100 mg, folic acid 1 mg infusion ( Intravenous New Bag 7/20/21 0259)   LORazepam (ATIVAN) injection 1 mg (1 mg Intravenous Given 7/20/21 0417)   ondansetron (ZOFRAN) injection 4 mg (4 mg Intravenous Given 7/20/21 0429)   LORazepam (ATIVAN) injection 2 mg (2 mg Intravenous Given 7/20/21 0551)       NEW PRESCRIPTIONS STARTED AT TODAY'S ED VISIT:  Discharge Medication List as of 7/20/2021  6:29 AM          HPI     Information obtained from patient. No interpretor was used.            Nikhil Rios is a 35 year old male, history of chronic alcohol abuse with previous withdrawal including seizures and hepatic steatosis, who presents to this ED by car for evaluation of alcohol withdrawal.  He does not recall exactly when was his last drink, however he thinks it was approximately 24 hours ago.  His withdrawal symptoms started yesterday with shaking  / tremors.  He reports associated nausea with multiple episodes of vomiting; he was unable to keep anything down today.  He reports abdominal soreness just from vomiting,  "otherwise denies abdominal pain.  No associated diarrhea.    He has been drinking about 750 ml of alcohol a day for the last 4-5 years.     He has otherwise been in his usual state of health and denies chest pain, shortness of breath, cough, fever or other concerns.      REVIEW OF SYSTEMS:  All other systems reviewed and are negative.      Medical History     Past Medical History:   Diagnosis Date     Alcohol abuse      Alcohol abuse      Alcohol withdrawal (H)      Depressive disorder      HLD (hyperlipidemia)      HTN (hypertension)        Past Surgical History:   Procedure Laterality Date     NO HISTORY OF SURGERY       OTHER SURGICAL HISTORY      none       Family History   Problem Relation Age of Onset     Coronary Artery Disease Father        Social History     Tobacco Use     Smoking status: Former Smoker     Packs/day: 0.25     Types: Cigars, Cigars     Smokeless tobacco: Never Used     Tobacco comment: occasional   Substance Use Topics     Alcohol use: Yes     Comment: Alcoholic Drinks/day: hx etoh abuse and withdrawal, 750 mLs vodka per day     Drug use: Not Currently       No current outpatient medications on file.      Physical Exam     First Vitals:  Patient Vitals for the past 24 hrs:   BP Temp Temp src Pulse Resp SpO2 Height   07/20/21 0600 137/80 -- -- 101 20 98 % --   07/20/21 0505 (!) 149/84 -- -- -- 18 93 % --   07/20/21 0300 (!) 146/81 -- -- 110 17 96 % --   07/20/21 0100 (!) 156/81 -- -- 117 27 98 % --   07/20/21 0045 (!) 150/91 -- -- 113 15 100 % --   07/20/21 0015 139/86 -- -- 102 15 90 % --   07/20/21 0000 (!) 144/94 -- -- -- 22 97 % --   07/19/21 2345 (!) 162/88 -- -- 115 21 99 % --   07/19/21 2012 120/83 97.5  F (36.4  C) Temporal (!) 125 -- 95 % 1.803 m (5' 11\")       PHYSICAL EXAM:   Physical Exam    GENERAL: Awake, alert.  In mild to moderate acute distress.  Patient has BL hand tremors and tongue fasciculations  HEENT: Normocephalic, atraumatic. Pupils equal, round and reactive. " Conjunctiva normal.  NECK: No stridor.  PULMONARY: Symmetrical breath sounds without distress.  Lungs clear to auscultation bilaterally without wheezes, rhonchi or rales.  CARDIO: Tachycardic rate with regular rhythm.  No significant murmur, rub or gallop.  Radial pulses strong and symmetrical.  ABDOMINAL: Abdomen soft, non-distended and non-tender to palpation.   EXTREMITIES: No lower extremity swelling or edema.      NEURO: Alert and oriented to person, place and time.  Cranial nerves grossly intact.  No focal motor deficit.  He has BL hand tremors and tongue fasciculations consistent with alcohol withdrawal.  PSYCH: Patient is cooperative.    SKIN: No rashes.     Results     LAB:  All pertinent labs reviewed and interpreted  Labs Ordered and Resulted from Time of ED Arrival Up to the Time of Departure from the ED   BASIC METABOLIC PANEL - Abnormal; Notable for the following components:       Result Value    Chloride 96 (*)     Carbon Dioxide (CO2) 16 (*)     Anion Gap 27 (*)     All other components within normal limits   HEPATIC FUNCTION PANEL - Abnormal; Notable for the following components:    AST 97 (*)     ALT 73 (*)     All other components within normal limits   LIPASE - Abnormal; Notable for the following components:    Lipase 257 (*)     All other components within normal limits   ETHYL ALCOHOL LEVEL - Abnormal; Notable for the following components:    Alcohol, Blood 207 (*)     All other components within normal limits   BASIC METABOLIC PANEL - Abnormal; Notable for the following components:    Carbon Dioxide (CO2) 18 (*)     Anion Gap 19 (*)     Creatinine 0.66 (*)     Calcium 7.8 (*)     All other components within normal limits   CBC WITH PLATELETS - Normal   MAGNESIUM - Normal   PERIPHERAL IV CATHETER   CARDIAC CONTINUOUS MONITORING   MAY SALINE LOCK IV         [facility] Documentation       I, Prakash Hernandez am serving as a scribe to document services personally performed by Kristina Box  MD based on my observation and the provider's statements to me. I, Kristina Box MD attest that Prakashryanne Hernandez is acting in a scribe capacity, has observed my performance of the services and has documented them in accordance with my direction.    Kristina Box MD  Emergency Medicine  [facility] EMERGENCY DEPARTMENT         Kristina Box MD  07/20/21 6877

## 2021-07-21 LAB
ALBUMIN SERPL-MCNC: 3.3 G/DL (ref 3.4–5)
ALP SERPL-CCNC: 81 U/L (ref 40–150)
ALT SERPL W P-5'-P-CCNC: 58 U/L (ref 0–70)
ANION GAP SERPL CALCULATED.3IONS-SCNC: 7 MMOL/L (ref 3–14)
AST SERPL W P-5'-P-CCNC: 67 U/L (ref 0–45)
BILIRUB SERPL-MCNC: 0.7 MG/DL (ref 0.2–1.3)
BUN SERPL-MCNC: 8 MG/DL (ref 7–30)
CALCIUM SERPL-MCNC: 8.6 MG/DL (ref 8.5–10.1)
CHLORIDE BLD-SCNC: 103 MMOL/L (ref 94–109)
CO2 SERPL-SCNC: 27 MMOL/L (ref 20–32)
CREAT SERPL-MCNC: 0.73 MG/DL (ref 0.66–1.25)
ERYTHROCYTE [DISTWIDTH] IN BLOOD BY AUTOMATED COUNT: 13.2 % (ref 10–15)
GFR SERPL CREATININE-BSD FRML MDRD: >90 ML/MIN/1.73M2
GLUCOSE BLD-MCNC: 90 MG/DL (ref 70–99)
HCT VFR BLD AUTO: 35.1 % (ref 40–53)
HGB BLD-MCNC: 11.7 G/DL (ref 13.3–17.7)
INR PPP: 1.05 (ref 0.85–1.15)
MAGNESIUM SERPL-MCNC: 1.5 MG/DL (ref 1.6–2.3)
MCH RBC QN AUTO: 30.5 PG (ref 26.5–33)
MCHC RBC AUTO-ENTMCNC: 33.3 G/DL (ref 31.5–36.5)
MCV RBC AUTO: 92 FL (ref 78–100)
PHOSPHATE SERPL-MCNC: 2.8 MG/DL (ref 2.5–4.5)
PLATELET # BLD AUTO: 90 10E3/UL (ref 150–450)
POTASSIUM BLD-SCNC: 3.4 MMOL/L (ref 3.4–5.3)
PROT SERPL-MCNC: 6.4 G/DL (ref 6.8–8.8)
RBC # BLD AUTO: 3.83 10E6/UL (ref 4.4–5.9)
SODIUM SERPL-SCNC: 137 MMOL/L (ref 133–144)
WBC # BLD AUTO: 5.7 10E3/UL (ref 4–11)

## 2021-07-21 PROCEDURE — 84100 ASSAY OF PHOSPHORUS: CPT | Performed by: PHYSICIAN ASSISTANT

## 2021-07-21 PROCEDURE — 120N000001 HC R&B MED SURG/OB

## 2021-07-21 PROCEDURE — 250N000013 HC RX MED GY IP 250 OP 250 PS 637: Performed by: PHYSICIAN ASSISTANT

## 2021-07-21 PROCEDURE — 258N000003 HC RX IP 258 OP 636: Performed by: FAMILY MEDICINE

## 2021-07-21 PROCEDURE — 85027 COMPLETE CBC AUTOMATED: CPT | Performed by: PHYSICIAN ASSISTANT

## 2021-07-21 PROCEDURE — 36415 COLL VENOUS BLD VENIPUNCTURE: CPT | Performed by: PHYSICIAN ASSISTANT

## 2021-07-21 PROCEDURE — 250N000013 HC RX MED GY IP 250 OP 250 PS 637: Performed by: FAMILY MEDICINE

## 2021-07-21 PROCEDURE — 82040 ASSAY OF SERUM ALBUMIN: CPT | Performed by: PHYSICIAN ASSISTANT

## 2021-07-21 PROCEDURE — 258N000003 HC RX IP 258 OP 636: Performed by: PHYSICIAN ASSISTANT

## 2021-07-21 PROCEDURE — 36415 COLL VENOUS BLD VENIPUNCTURE: CPT | Performed by: FAMILY MEDICINE

## 2021-07-21 PROCEDURE — 85610 PROTHROMBIN TIME: CPT | Performed by: PHYSICIAN ASSISTANT

## 2021-07-21 PROCEDURE — 83735 ASSAY OF MAGNESIUM: CPT | Performed by: PHYSICIAN ASSISTANT

## 2021-07-21 PROCEDURE — 99233 SBSQ HOSP IP/OBS HIGH 50: CPT | Performed by: FAMILY MEDICINE

## 2021-07-21 RX ORDER — MAGNESIUM OXIDE 400 MG/1
400 TABLET ORAL 3 TIMES DAILY
Status: COMPLETED | OUTPATIENT
Start: 2021-07-21 | End: 2021-07-22

## 2021-07-21 RX ORDER — LOPERAMIDE HCL 2 MG
2 CAPSULE ORAL 4 TIMES DAILY PRN
Status: DISCONTINUED | OUTPATIENT
Start: 2021-07-21 | End: 2021-07-23 | Stop reason: HOSPADM

## 2021-07-21 RX ADMIN — LORAZEPAM 1 MG: 1 TABLET ORAL at 08:54

## 2021-07-21 RX ADMIN — LOPERAMIDE HYDROCHLORIDE 2 MG: 2 CAPSULE ORAL at 10:48

## 2021-07-21 RX ADMIN — GABAPENTIN 900 MG: 300 CAPSULE ORAL at 00:42

## 2021-07-21 RX ADMIN — SODIUM CHLORIDE, POTASSIUM CHLORIDE, SODIUM LACTATE AND CALCIUM CHLORIDE: 600; 310; 30; 20 INJECTION, SOLUTION INTRAVENOUS at 14:36

## 2021-07-21 RX ADMIN — LORAZEPAM 1 MG: 1 TABLET ORAL at 23:05

## 2021-07-21 RX ADMIN — MAGNESIUM OXIDE 400 MG: 400 TABLET ORAL at 19:50

## 2021-07-21 RX ADMIN — THIAMINE HCL TAB 100 MG 100 MG: 100 TAB at 08:45

## 2021-07-21 RX ADMIN — GABAPENTIN 900 MG: 300 CAPSULE ORAL at 08:44

## 2021-07-21 RX ADMIN — MAGNESIUM OXIDE 400 MG: 400 TABLET ORAL at 13:47

## 2021-07-21 RX ADMIN — MAGNESIUM OXIDE 400 MG: 400 TABLET ORAL at 09:48

## 2021-07-21 RX ADMIN — SODIUM CHLORIDE, POTASSIUM CHLORIDE, SODIUM LACTATE AND CALCIUM CHLORIDE: 600; 310; 30; 20 INJECTION, SOLUTION INTRAVENOUS at 02:19

## 2021-07-21 RX ADMIN — LORAZEPAM 1 MG: 1 TABLET ORAL at 20:18

## 2021-07-21 RX ADMIN — LOPERAMIDE HYDROCHLORIDE 2 MG: 2 CAPSULE ORAL at 16:29

## 2021-07-21 RX ADMIN — FOLIC ACID 1 MG: 1 TABLET ORAL at 08:45

## 2021-07-21 RX ADMIN — LORAZEPAM 1 MG: 1 TABLET ORAL at 02:18

## 2021-07-21 RX ADMIN — LOPERAMIDE HYDROCHLORIDE 2 MG: 2 CAPSULE ORAL at 21:26

## 2021-07-21 RX ADMIN — Medication 1 TABLET: at 08:44

## 2021-07-21 RX ADMIN — GABAPENTIN 900 MG: 300 CAPSULE ORAL at 16:27

## 2021-07-21 RX ADMIN — LORAZEPAM 1 MG: 1 TABLET ORAL at 12:28

## 2021-07-21 SDOH — ECONOMIC STABILITY: TRANSPORTATION INSECURITY
IN THE PAST 12 MONTHS, HAS LACK OF TRANSPORTATION KEPT YOU FROM MEETINGS, WORK, OR FROM GETTING THINGS NEEDED FOR DAILY LIVING?: NO

## 2021-07-21 SDOH — ECONOMIC STABILITY: TRANSPORTATION INSECURITY
IN THE PAST 12 MONTHS, HAS THE LACK OF TRANSPORTATION KEPT YOU FROM MEDICAL APPOINTMENTS OR FROM GETTING MEDICATIONS?: NO

## 2021-07-21 SDOH — ECONOMIC STABILITY: INCOME INSECURITY: IN THE LAST 12 MONTHS, WAS THERE A TIME WHEN YOU WERE NOT ABLE TO PAY THE MORTGAGE OR RENT ON TIME?: NO

## 2021-07-21 ASSESSMENT — ACTIVITIES OF DAILY LIVING (ADL)
ADLS_ACUITY_SCORE: 12
ADLS_ACUITY_SCORE: 12
ADLS_ACUITY_SCORE: 14
ADLS_ACUITY_SCORE: 14
ADLS_ACUITY_SCORE: 12
ADLS_ACUITY_SCORE: 14
DEPENDENT_IADLS:: INDEPENDENT

## 2021-07-21 ASSESSMENT — LIFESTYLE VARIABLES
HOW MANY STANDARD DRINKS CONTAINING ALCOHOL DO YOU HAVE ON A TYPICAL DAY: 10 OR MORE
HOW OFTEN DO YOU HAVE SIX OR MORE DRINKS ON ONE OCCASION: DAILY OR ALMOST DAILY
HOW OFTEN DO YOU HAVE A DRINK CONTAINING ALCOHOL: 4 OR MORE TIMES A WEEK

## 2021-07-21 NOTE — CONSULTS
Care Management Initial Consult    General Information  Assessment completed with: Nikhil Jordan  Type of CM/SW Visit: Initial Assessment    Primary Care Provider verified and updated as needed: Yes   Readmission within the last 30 days: no previous admission in last 30 days      Reason for Consult: substance use concerns  Advance Care Planning:  Pt lacks a HCD     Communication Assessment  Patient's communication style: spoken language (English or Bilingual)    Hearing Difficulty or Deaf: no   Wear Glasses or Blind: no    Cognitive  Cognitive/Neuro/Behavioral: WDL  Level of Consciousness: alert                   Living Environment:   People in home: parent(s)  Veronique  Current living Arrangements: house      Able to return to prior arrangements: yes    Family/Social Support:  Care provided by: self  Provides care for: no one  Marital Status: Single  Parent(s)          Description of Support System: Supportive, Involved    Support Assessment: Adequate family and caregiver support, Adequate social supports    Current Resources:   Patient receiving home care services: No     Community Resources: Chemical Dependency Services, OP Mental Health,   Equipment currently used at home: none  Supplies currently used at home: None    Employment/Financial:  Employment Status: unemployed     Employment/ Comments: 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  Financial Concerns: No concerns identified   Referral to Financial Counselor: No     Lifestyle & Psychosocial Needs:  Social Determinants of Health     Tobacco Use: Medium Risk     Smoking Tobacco Use: Former Smoker     Smokeless Tobacco Use: Never Used   Alcohol Use: Heavy Drinker     Frequency of Alcohol Consumption: 4 or more times a week     Average Number of Drinks: 10 or more     Frequency of Binge Drinking: Daily or almost daily   Financial Resource Strain:      Difficulty of Paying Living Expenses:    Food Insecurity:      Worried  About Running Out of Food in the Last Year:      Ran Out of Food in the Last Year:    Transportation Needs: No Transportation Needs     Lack of Transportation (Medical): No     Lack of Transportation (Non-Medical): No   Physical Activity:      Days of Exercise per Week:      Minutes of Exercise per Session:    Stress: Stress Concern Present     Feeling of Stress : Very much   Social Connections:      Frequency of Communication with Friends and Family:      Frequency of Social Gatherings with Friends and Family:      Attends Restoration Services:      Active Member of Clubs or Organizations:      Attends Club or Organization Meetings:      Marital Status:    Intimate Partner Violence:      Fear of Current or Ex-Partner:      Emotionally Abused:      Physically Abused:      Sexually Abused:    Depression: Not at risk     PHQ-2 Score: 0   Housing Stability: Unknown     Unable to Pay for Housing in the Last Year: No     Number of Places Lived in the Last Year: Not on file     Unstable Housing in the Last Year: No     Functional Status:  Prior to admission patient needed assistance:   Dependent ADLs:: Independent  Dependent IADLs:: Independent     Mental Health Status:  Mental Health Status: Current Concern  Mental Health Management: Hx of Medication use, Individual Therapy,     Chemical Dependency Status:  Chemical Dependency Status: Current Concern  Chemical Dependency Management: Previous treatment, previous AA, Previous Sponsor        Values/Beliefs:  Spiritual, Cultural Beliefs, Restoration Practices, Values that affect care: no         Additional Information:  Care Management received referral to assist pt with SUDS resources.  SW reviewed pt's EMR and then met with pt at bedside to introduce self/role, perform assessment, and discuss resources.    Pt shared that he has his own home, but often stays with his mom at her home in [place] & thus was sent to [facility] from [place]'s ED.    Pt shared he has been  to CD treatment in the past & is not willing to go back to inpatient treatment.  SW and pt discussed pt's ETOH use & pt shared he drinks a 750 ml of vodka/day.  Pt has had 80 days of sobriety at 1 time & states he attended AA meetings 2-3 times/day during those 80 days & had a Sponsor.    Discussed resources, including getting a Rule 25 assessment while hospitalized & pt declined, stating he has been working with a chemical health , Jeniffer Viera at [facility] for a Rule 25 assessment & together with his mom, have gotten him into a program at [facility] on Monday, July 19th, which pt states this program is 4 hours long, 4 days/week and does random UA.  SW requested to speak with either Jeniffer or [facility] regarding this plan and how our team can be supportive to pt's needs & pt hesitated, stating he needed to think about this request.  SW to check back with pt prior to discharge.    Care Management team to follow for discharge plans, but at this time, pt declines the need for [facility] SUDS resources upon discharge as pt states he has a plan of care established.    SEGUN Clark

## 2021-07-21 NOTE — PLAN OF CARE
"Pt is A/O, able to make needs known. Blood pressure 117/80, pulse 82, temperature 98  F (36.7  C), resp. rate 16, height 1.803 m (5' 11\"), weight 94 kg (207 lb 3.7 oz), SpO2 96 %.  Stable on room air. CIWA scores vary. Pt has tremors. No signs of seizure activity. Ativan given per scoring. See E-MAR. Pt denies pain. Up with one assist, walker, and gait belt. Pt having loose stools. States that this is usual when withdrawing. Tolerating PO intake without nausea or emesis. Bed alarm on for safety. Continue to assess per plan of care.  "

## 2021-07-21 NOTE — PROGRESS NOTES
Patient given 1 mg PO Ativan for CIWA = 8.  Continues to have loose stools.  Up to bathroom with assist of 1, gait belt, and walker.

## 2021-07-21 NOTE — PROGRESS NOTES
[facility]      Subjective:  From [place].  For previous CD treatments.  Previous withdrawal seizure at [facility].  Currently unemployed.    Patient has anxiety and significant tremor.  Had diarrhea this morning.  Anorexic.    Nurses feel withdrawal has improved in the last 24 hours.    Review of Systems:  CONSTITUTIONAL: Anorexia  INTEGUMENTARY/SKIN: NEGATIVE for worrisome rashes, moles or lesions  EYES: NEGATIVE for vision changes or irritation  ENT/MOUTH: NEGATIVE for ear, mouth and throat problems  RESP: NEGATIVE for significant cough or SOB  BREAST: NEGATIVE for masses, tenderness or discharge  CV: NEGATIVE for chest pain, palpitations or peripheral edema  GI: Diarrhea  : NEGATIVE for frequency, dysuria, or hematuria  MUSCULOSKELETAL: NEGATIVE for significant arthralgias or myalgia  NEURO: Anxiety, tremor, chronic paresthesias feet  ENDOCRINE: NEGATIVE for temperature intolerance, skin/hair changes  HEME: NEGATIVE for bleeding problems  PSYCHIATRIC: NEGATIVE for changes in mood or affect    Physical Exam:  Vitals Were Reviewed    Patient Vitals for the past 16 hrs:   BP Temp Temp src Pulse Resp SpO2   07/21/21 0755 (!) 135/91 98.5  F (36.9  C) Oral 87 18 97 %   07/21/21 0643 116/88 98  F (36.7  C) Oral 73 15 96 %   07/21/21 0000 117/80 98  F (36.7  C) -- 82 16 96 %   07/20/21 2330 -- -- -- 87 -- --   07/20/21 1917 138/76 98.5  F (36.9  C) Oral 105 18 97 %         Intake/Output Summary (Last 24 hours) at 7/21/2021 0902  Last data filed at 7/21/2021 0800  Gross per 24 hour   Intake 2200 ml   Output 1650 ml   Net 550 ml       GENERAL APPEARANCE: Anxious, tremor  EYES: conjunctiva clear, eyes grossly normal  RESP: Crackles right base  CV: regular rate and rhythm, normal S1 S2, no S3 or S4 and no murmur, click or rub   ABDOMEN: soft, nontender, no HSM or masses and bowel sounds normal  MS: no clubbing, cyanosis; no edema  SKIN: clear without significant rashes or lesions  NEURO: Alert,  anxious, coarse tremor    Lab:  Recent Labs   Lab Test 07/21/21  0434 07/20/21  0420    138   POTASSIUM 3.4 4.9   CHLORIDE 103 101   CO2 27 18*   ANIONGAP 7 19*   GLC 90 104   BUN 8 11   CR 0.73 0.66*   KEELEY 8.6 7.8*     CBC RESULTS:   Recent Labs   Lab Test 07/21/21 0434 07/19/21  2355   WBC 5.7 8.7   RBC 3.83* 4.84   HGB 11.7* 14.9   HCT 35.1* 42.8   PLT 90* 260       Results for orders placed or performed during the hospital encounter of 07/20/21 (from the past 24 hour(s))   CBC with platelets   Result Value Ref Range    WBC Count 5.7 4.0 - 11.0 10e3/uL    RBC Count 3.83 (L) 4.40 - 5.90 10e6/uL    Hemoglobin 11.7 (L) 13.3 - 17.7 g/dL    Hematocrit 35.1 (L) 40.0 - 53.0 %    MCV 92 78 - 100 fL    MCH 30.5 26.5 - 33.0 pg    MCHC 33.3 31.5 - 36.5 g/dL    RDW 13.2 10.0 - 15.0 %    Platelet Count 90 (L) 150 - 450 10e3/uL   INR   Result Value Ref Range    INR 1.05 0.85 - 1.15   Magnesium   Result Value Ref Range    Magnesium 1.5 (L) 1.6 - 2.3 mg/dL   Phosphorus   Result Value Ref Range    Phosphorus 2.8 2.5 - 4.5 mg/dL   Comprehensive metabolic panel   Result Value Ref Range    Sodium 137 133 - 144 mmol/L    Potassium 3.4 3.4 - 5.3 mmol/L    Chloride 103 94 - 109 mmol/L    Carbon Dioxide (CO2) 27 20 - 32 mmol/L    Anion Gap 7 3 - 14 mmol/L    Urea Nitrogen 8 7 - 30 mg/dL    Creatinine 0.73 0.66 - 1.25 mg/dL    Calcium 8.6 8.5 - 10.1 mg/dL    Glucose 90 70 - 99 mg/dL    Alkaline Phosphatase 81 40 - 150 U/L    AST 67 (H) 0 - 45 U/L    ALT 58 0 - 70 U/L    Protein Total 6.4 (L) 6.8 - 8.8 g/dL    Albumin 3.3 (L) 3.4 - 5.0 g/dL    Bilirubin Total 0.7 0.2 - 1.3 mg/dL    GFR Estimate >90 >60 mL/min/1.73m2       Assessment and Plan:    Nikhil Rios is a 35 year old male admitted on 7/20/2021. He has history of severe alcohol dependence and prior alcohol withdrawal including withdrawal seizure, PTSD, anxiety and depression.  He presents due to concerns of alcohol withdrawal.     Alcohol Dependence with acute alcohol  withdrawal  Reports drinking 750 mL for past 2-3 years. Last drink ~36 hours prior to arrival. History of prior episodes of withdrawal as well as seizures. On presentation, tremulous, hypertensive, tachycardic, somewhat flushed, nauseous and some tingling of his finger. On admission, appears to be going through withdrawal, CIWA on arrival 15. Previously has been on monthly naltrexone injections and acamprosate. Has been evaluated by addition medicine previously. Also has been to various treatment facilities as well as AA.   - CIWA protocol in place with PRN ativan and scheduled gabapentin  - daily thiamine, folic acid, multivitamin therapy initiated  - monitor CBC, electrolytes, CMP    CIWA variable overnight.  Tremor noted.  Has received lorazepam overnight.    Intermittent Hypoxia  Hypoxic when sleeping, requires 3 LPM.  I would awaken talking oxygenation is 94 to 96% on room air.  May have undiagnosed KRISTA vs related to lorazepam dosing (no concerning observed respiratory depression). Denies history of snoring or daytime sleepiness.  - monitor O2, continuous pulse ox  - oxygen as needed for hypoxia     Sat currently 97% on room air.     Episode of tachycardia  Nursing went to evaluate due to capnography beeping, noted to have tachycardia up to 150 though resolved by the time a radial pulse was taken.  - telemetry      Elevated Lipase  Lipase 257. Has been elevated on previous admission with alcohol withdrawal. No symptoms suggestive of pancreatitis. Suspect due to alcohol use.     Mild Transaminitis  Alcoholic liver disease  ALT 73, AST 97, normal bilirubin and alk phos. Likely due to alcoholic liver disease.  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 Does not affect his ability to ambulate. He has tingling in one finger on his left hand which started with the  withdrawal symptoms.  - encouraged abstinence     Anxiety and Depression  PTSD  Insomina  Not currently taking medications for this. Previously on Lexapro and trazodone. As above, hopes to find a treatment program that focuses on mental health to assist with his chemical health.  - encouraged patient to follow up on mental health, care transitions to help with resources        Diet: Regular Diet Adult  DVT Prophylaxis: Low Risk/Ambulatory with no VTE prophylaxis indicated  Mars Catheter: Not present  Central Lines: None  Code Status: Full Code     Plan  On lr -reduce rate to 50.  Continue CIWA protocol.  Only 1-2 more days here.

## 2021-07-22 LAB
ALBUMIN SERPL-MCNC: 3.6 G/DL (ref 3.4–5)
ALP SERPL-CCNC: 94 U/L (ref 40–150)
ALT SERPL W P-5'-P-CCNC: 103 U/L (ref 0–70)
ANION GAP SERPL CALCULATED.3IONS-SCNC: 8 MMOL/L (ref 3–14)
AST SERPL W P-5'-P-CCNC: 99 U/L (ref 0–45)
BILIRUB SERPL-MCNC: 0.7 MG/DL (ref 0.2–1.3)
BUN SERPL-MCNC: 7 MG/DL (ref 7–30)
CALCIUM SERPL-MCNC: 9.2 MG/DL (ref 8.5–10.1)
CHLORIDE BLD-SCNC: 103 MMOL/L (ref 94–109)
CO2 SERPL-SCNC: 27 MMOL/L (ref 20–32)
CREAT SERPL-MCNC: 0.81 MG/DL (ref 0.66–1.25)
ERYTHROCYTE [DISTWIDTH] IN BLOOD BY AUTOMATED COUNT: 12.9 % (ref 10–15)
GFR SERPL CREATININE-BSD FRML MDRD: >90 ML/MIN/1.73M2
GLUCOSE BLD-MCNC: 83 MG/DL (ref 70–99)
HCT VFR BLD AUTO: 39.1 % (ref 40–53)
HGB BLD-MCNC: 13.2 G/DL (ref 13.3–17.7)
MCH RBC QN AUTO: 31.1 PG (ref 26.5–33)
MCHC RBC AUTO-ENTMCNC: 33.8 G/DL (ref 31.5–36.5)
MCV RBC AUTO: 92 FL (ref 78–100)
PLATELET # BLD AUTO: 96 10E3/UL (ref 150–450)
POTASSIUM BLD-SCNC: 3.5 MMOL/L (ref 3.4–5.3)
PROT SERPL-MCNC: 7.2 G/DL (ref 6.8–8.8)
RBC # BLD AUTO: 4.25 10E6/UL (ref 4.4–5.9)
SODIUM SERPL-SCNC: 138 MMOL/L (ref 133–144)
WBC # BLD AUTO: 7.1 10E3/UL (ref 4–11)

## 2021-07-22 PROCEDURE — 85027 COMPLETE CBC AUTOMATED: CPT | Performed by: FAMILY MEDICINE

## 2021-07-22 PROCEDURE — 250N000013 HC RX MED GY IP 250 OP 250 PS 637: Performed by: PHYSICIAN ASSISTANT

## 2021-07-22 PROCEDURE — 82565 ASSAY OF CREATININE: CPT | Performed by: FAMILY MEDICINE

## 2021-07-22 PROCEDURE — 250N000013 HC RX MED GY IP 250 OP 250 PS 637: Performed by: FAMILY MEDICINE

## 2021-07-22 PROCEDURE — 99233 SBSQ HOSP IP/OBS HIGH 50: CPT | Performed by: FAMILY MEDICINE

## 2021-07-22 PROCEDURE — 120N000001 HC R&B MED SURG/OB

## 2021-07-22 PROCEDURE — 36415 COLL VENOUS BLD VENIPUNCTURE: CPT | Performed by: FAMILY MEDICINE

## 2021-07-22 PROCEDURE — 82040 ASSAY OF SERUM ALBUMIN: CPT | Performed by: FAMILY MEDICINE

## 2021-07-22 RX ADMIN — GABAPENTIN 900 MG: 300 CAPSULE ORAL at 00:15

## 2021-07-22 RX ADMIN — FOLIC ACID 1 MG: 1 TABLET ORAL at 08:04

## 2021-07-22 RX ADMIN — MAGNESIUM OXIDE 400 MG: 400 TABLET ORAL at 20:05

## 2021-07-22 RX ADMIN — MAGNESIUM OXIDE 400 MG: 400 TABLET ORAL at 08:04

## 2021-07-22 RX ADMIN — LOPERAMIDE HYDROCHLORIDE 2 MG: 2 CAPSULE ORAL at 11:00

## 2021-07-22 RX ADMIN — LOPERAMIDE HYDROCHLORIDE 2 MG: 2 CAPSULE ORAL at 16:28

## 2021-07-22 RX ADMIN — GABAPENTIN 900 MG: 300 CAPSULE ORAL at 08:03

## 2021-07-22 RX ADMIN — LORAZEPAM 1 MG: 1 TABLET ORAL at 13:39

## 2021-07-22 RX ADMIN — GABAPENTIN 900 MG: 300 CAPSULE ORAL at 16:28

## 2021-07-22 RX ADMIN — THIAMINE HCL TAB 100 MG 100 MG: 100 TAB at 08:04

## 2021-07-22 RX ADMIN — Medication 1 TABLET: at 08:04

## 2021-07-22 RX ADMIN — MAGNESIUM OXIDE 400 MG: 400 TABLET ORAL at 13:33

## 2021-07-22 RX ADMIN — LOPERAMIDE HYDROCHLORIDE 2 MG: 2 CAPSULE ORAL at 08:10

## 2021-07-22 RX ADMIN — LORAZEPAM 1 MG: 1 TABLET ORAL at 00:36

## 2021-07-22 ASSESSMENT — ACTIVITIES OF DAILY LIVING (ADL)
ADLS_ACUITY_SCORE: 12

## 2021-07-22 NOTE — PROGRESS NOTES
"Care Management Follow Up    Length of Stay (days): 2    Expected Discharge Date:       Concerns to be Addressed: discharge planning, substance/tobacco abuse/use     Patient plan of care discussed at interdisciplinary rounds: Yes    Anticipated Discharge Disposition: Outpatient Mental Health, Outpatient Chemical Dependency     Anticipated Discharge Services: Chemical Dependency Resources, Mental Health Resources  Anticipated Discharge DME: [facility]    Patient/family educated on Medicare website which has current facility and service quality ratings: no  Education Provided on the Discharge Plan:    Patient/Family in Agreement with the Plan: yes    Referrals Placed by CM/SW: External Care Coordination  Private pay costs discussed: Not applicable    Additional Information:  Writer met w/ Nikhil to follow up on CTS visit from 7/21. Per Nikhil, he still plans to attend the day program at [facility]  once he is discharged from the facility. Writer asked to contact either Jeniffer and/or [facility]  to discuss his plan to attend the day program. Nikhil declined stating \"I have it all under control.\" Nikhil declined any further assistance from [facility] regarding CD resources or discharge planning.      Patients Mother, Veronique did leave a message w/ [facility] this AM requesting a call back. Writer asked Nikhil for permission for [facility] to speak w/ Veronique but patient declined stating all information needed to come directly from him.       Miladys Currie, SEGUN  Care Management, [facility]  [phone]         "

## 2021-07-22 NOTE — PROGRESS NOTES
[facility]      Subjective:  He reports frequent stooling-but says he gets this with withdrawal.  Reports tremor.  Is weak.  Received Ativan overnight.  No real abdominal pain.  Drinking a lot of water and urinating a lot.    Review of Systems:  CONSTITUTIONAL: Weak tremor  INTEGUMENTARY/SKIN: NEGATIVE for worrisome rashes, moles or lesions  EYES: NEGATIVE for vision changes or irritation  ENT/MOUTH: NEGATIVE for ear, mouth and throat problems  RESP: NEGATIVE for significant cough or SOB  BREAST: NEGATIVE for masses, tenderness or discharge  CV: NEGATIVE for chest pain, palpitations or peripheral edema  GI: Diarrhea  : NEGATIVE for frequency, dysuria, or hematuria  MUSCULOSKELETAL: Sacral pain  NEURO: NEGATIVE for weakness, dizziness or paresthesias  ENDOCRINE: NEGATIVE for temperature intolerance, skin/hair changes  HEME: NEGATIVE for bleeding problems  PSYCHIATRIC: NEGATIVE for changes in mood or affect    Physical Exam:  Vitals Were Reviewed    Patient Vitals for the past 16 hrs:   BP Temp Temp src Pulse Resp SpO2   07/22/21 0306 (!) 134/91 98.3  F (36.8  C) Oral 94 16 95 %   07/21/21 2328 -- -- -- 96 -- --   07/21/21 2249 (!) 130/91 97.9  F (36.6  C) Oral 93 18 97 %   07/21/21 1930 125/84 98.7  F (37.1  C) Oral 83 18 95 %         Intake/Output Summary (Last 24 hours) at 7/22/2021 0806  Last data filed at 7/22/2021 0650  Gross per 24 hour   Intake 1712 ml   Output 850 ml   Net 862 ml       GENERAL APPEARANCE: Alert, tremor  EYES: conjunctiva clear, eyes grossly normal  RESP: lungs clear to auscultation - no rales, rhonchi or wheezes  CV: regular rate and rhythm, normal S1 S2, no S3 or S4 and no murmur, click or rub   ABDOMEN: soft, nontender, no HSM or masses and bowel sounds normal  MS: no clubbing, cyanosis; no edema  SKIN: clear without significant rashes or lesions  NEURO: Tremor, oriented, ambulatory.    Lab:  Recent Labs   Lab Test 07/22/21  0457 07/21/21  0434    137    POTASSIUM 3.5 3.4   CHLORIDE 103 103   CO2 27 27   ANIONGAP 8 7   GLC 83 90   BUN 7 8   CR 0.81 0.73   KEELEY 9.2 8.6     CBC RESULTS:   Recent Labs   Lab Test 07/22/21  0457 07/21/21  0434   WBC 7.1 5.7   RBC 4.25* 3.83*   HGB 13.2* 11.7*   HCT 39.1* 35.1*   PLT 96* 90*       Results for orders placed or performed during the hospital encounter of 07/20/21 (from the past 24 hour(s))   CBC with platelets   Result Value Ref Range    WBC Count 7.1 4.0 - 11.0 10e3/uL    RBC Count 4.25 (L) 4.40 - 5.90 10e6/uL    Hemoglobin 13.2 (L) 13.3 - 17.7 g/dL    Hematocrit 39.1 (L) 40.0 - 53.0 %    MCV 92 78 - 100 fL    MCH 31.1 26.5 - 33.0 pg    MCHC 33.8 31.5 - 36.5 g/dL    RDW 12.9 10.0 - 15.0 %    Platelet Count 96 (L) 150 - 450 10e3/uL   Comprehensive metabolic panel   Result Value Ref Range    Sodium 138 133 - 144 mmol/L    Potassium 3.5 3.4 - 5.3 mmol/L    Chloride 103 94 - 109 mmol/L    Carbon Dioxide (CO2) 27 20 - 32 mmol/L    Anion Gap 8 3 - 14 mmol/L    Urea Nitrogen 7 7 - 30 mg/dL    Creatinine 0.81 0.66 - 1.25 mg/dL    Calcium 9.2 8.5 - 10.1 mg/dL    Glucose 83 70 - 99 mg/dL    Alkaline Phosphatase 94 40 - 150 U/L    AST 99 (H) 0 - 45 U/L     (H) 0 - 70 U/L    Protein Total 7.2 6.8 - 8.8 g/dL    Albumin 3.6 3.4 - 5.0 g/dL    Bilirubin Total 0.7 0.2 - 1.3 mg/dL    GFR Estimate >90 >60 mL/min/1.73m2       Assessment and Plan:    Nikhil Rios is a 35 year old male admitted on 7/20/2021. He has history of severe alcohol dependence and prior alcohol withdrawal including withdrawal seizure, PTSD, anxiety and depression.  He presents due to concerns of alcohol withdrawal.     Alcohol Dependence with acute alcohol withdrawal  Reports drinking 750 mL for past 2-3 years. Last drink ~36 hours prior to arrival. History of prior episodes of withdrawal as well as seizures. On presentation, tremulous, hypertensive, tachycardic, somewhat flushed, nauseous and some tingling of his finger. On admission, appears to be going through  "withdrawal, CIWA on arrival 15. Previously has been on monthly naltrexone injections and acamprosate. Has been evaluated by addition medicine previously. Also has been to various treatment facilities as well as AA.   - CIWA protocol in place with PRN ativan and scheduled gabapentin  - daily thiamine, folic acid, multivitamin therapy initiated  - monitor CBC, electrolytes, CMP     CIWA 5-9 overnight.  Tremor noted.  Has received lorazepam times three overnight.     Intermittent Hypoxia  Hypoxic when sleeping, requires 3 LPM.  I would awaken talking oxygenation is 94 to 96% on room air.  May have undiagnosed KRISTA vs related to lorazepam dosing (no concerning observed respiratory depression). Denies history of snoring or daytime sleepiness.  - monitor O2, continuous pulse ox  - oxygen as needed for hypoxia      Sat currently 97% on room air.     Episode of tachycardia  Nursing went to evaluate due to capnography beeping, noted to have tachycardia up to 150 though resolved by the time a radial pulse was taken.  - telemetry     Sacral pain  Pain reported \"in the tailbone\".  Pain seems to be more in the sacral area.  He has had it for a couple of days.  It is better now.  Will observe.    Diarrhea  Persistent loose stools reported.  He says he gets this with withdrawal.  Will monitor.     Elevated Lipase  Lipase 257. Has been elevated on previous admission with alcohol withdrawal. No symptoms suggestive of pancreatitis. Suspect due to alcohol use.     Mild Transaminitis  Alcoholic liver disease  ALT 73, AST 97, normal bilirubin and alk phos. Likely due to alcoholic liver disease.  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 Does not affect his ability to ambulate. He has tingling in one finger on his left hand which started with the withdrawal symptoms.  - encouraged " abstinence     Anxiety and Depression  PTSD  Insomina  Not currently taking medications for this. Previously on Lexapro and trazodone. As above, hopes to find a treatment program that focuses on mental health to assist with his chemical health.  - encouraged patient to follow up on mental health, care transitions to help with resources        Diet: Regular Diet Adult  DVT Prophylaxis: Low Risk/Ambulatory with no VTE prophylaxis indicated  Mars Catheter: Not present  Central Lines: None  Code Status: Full Code     Plan  Stop IV fluids Continue CIWA protocol.  Only 1-2 more days here.

## 2021-07-22 NOTE — PLAN OF CARE
"Patient alert and oriented x4. Assist of 1 with gait belt and walker. Continues to have loose stools. Imodium given x1. Denies pain. CIWA scores 5-9. Ativan given x3.     BP (!) 134/91   Pulse 94   Temp 98.3  F (36.8  C) (Oral)   Resp 16   Ht 1.803 m (5' 11\")   Wt 94 kg (207 lb 3.7 oz)   SpO2 95%   BMI 28.90 kg/m      "

## 2021-07-22 NOTE — PLAN OF CARE
"Patient is oriented x4, up with SBA and walker, although improving in stability. PRN ativan administered for withdrawal symptoms around 1330. Ongoing loose stools, PRN immodium administered with minimal results in decreasing diarrhea. Clear lung sounds, VS stable, afebrile. BP (!) 129/97 (BP Location: Left arm)   Pulse 88   Temp 98.4  F (36.9  C) (Oral)   Resp 16   Ht 1.803 m (5' 11\")   Wt 94 kg (207 lb 3.7 oz)   SpO2 95%   BMI 28.90 kg/m      "

## 2021-07-23 VITALS
BODY MASS INDEX: 29.01 KG/M2 | TEMPERATURE: 98.2 F | WEIGHT: 207.23 LBS | RESPIRATION RATE: 18 BRPM | HEART RATE: 75 BPM | HEIGHT: 71 IN | SYSTOLIC BLOOD PRESSURE: 140 MMHG | DIASTOLIC BLOOD PRESSURE: 93 MMHG | OXYGEN SATURATION: 98 %

## 2021-07-23 LAB
ALBUMIN SERPL-MCNC: 3.8 G/DL (ref 3.4–5)
ALP SERPL-CCNC: 93 U/L (ref 40–150)
ALT SERPL W P-5'-P-CCNC: 95 U/L (ref 0–70)
ANION GAP SERPL CALCULATED.3IONS-SCNC: 6 MMOL/L (ref 3–14)
AST SERPL W P-5'-P-CCNC: 79 U/L (ref 0–45)
BILIRUB SERPL-MCNC: 0.5 MG/DL (ref 0.2–1.3)
BUN SERPL-MCNC: 7 MG/DL (ref 7–30)
CALCIUM SERPL-MCNC: 9.1 MG/DL (ref 8.5–10.1)
CHLORIDE BLD-SCNC: 103 MMOL/L (ref 94–109)
CO2 SERPL-SCNC: 26 MMOL/L (ref 20–32)
CREAT SERPL-MCNC: 0.72 MG/DL (ref 0.66–1.25)
ERYTHROCYTE [DISTWIDTH] IN BLOOD BY AUTOMATED COUNT: 12.9 % (ref 10–15)
GFR SERPL CREATININE-BSD FRML MDRD: >90 ML/MIN/1.73M2
GLUCOSE BLD-MCNC: 99 MG/DL (ref 70–99)
HCT VFR BLD AUTO: 39.1 % (ref 40–53)
HGB BLD-MCNC: 13.1 G/DL (ref 13.3–17.7)
MAGNESIUM SERPL-MCNC: 2.1 MG/DL (ref 1.6–2.3)
MCH RBC QN AUTO: 30.5 PG (ref 26.5–33)
MCHC RBC AUTO-ENTMCNC: 33.5 G/DL (ref 31.5–36.5)
MCV RBC AUTO: 91 FL (ref 78–100)
PLATELET # BLD AUTO: 125 10E3/UL (ref 150–450)
POTASSIUM BLD-SCNC: 3.5 MMOL/L (ref 3.4–5.3)
PROT SERPL-MCNC: 7.3 G/DL (ref 6.8–8.8)
RBC # BLD AUTO: 4.29 10E6/UL (ref 4.4–5.9)
SODIUM SERPL-SCNC: 135 MMOL/L (ref 133–144)
WBC # BLD AUTO: 7.5 10E3/UL (ref 4–11)

## 2021-07-23 PROCEDURE — 85014 HEMATOCRIT: CPT | Performed by: FAMILY MEDICINE

## 2021-07-23 PROCEDURE — 250N000013 HC RX MED GY IP 250 OP 250 PS 637: Performed by: PHYSICIAN ASSISTANT

## 2021-07-23 PROCEDURE — 83735 ASSAY OF MAGNESIUM: CPT | Performed by: FAMILY MEDICINE

## 2021-07-23 PROCEDURE — 36415 COLL VENOUS BLD VENIPUNCTURE: CPT | Performed by: FAMILY MEDICINE

## 2021-07-23 PROCEDURE — 99239 HOSP IP/OBS DSCHRG MGMT >30: CPT | Performed by: FAMILY MEDICINE

## 2021-07-23 PROCEDURE — 82040 ASSAY OF SERUM ALBUMIN: CPT | Performed by: FAMILY MEDICINE

## 2021-07-23 RX ADMIN — THIAMINE HCL TAB 100 MG 100 MG: 100 TAB at 08:46

## 2021-07-23 RX ADMIN — Medication 5 MG: at 00:14

## 2021-07-23 RX ADMIN — GABAPENTIN 900 MG: 300 CAPSULE ORAL at 08:46

## 2021-07-23 RX ADMIN — GABAPENTIN 600 MG: 300 CAPSULE ORAL at 14:54

## 2021-07-23 RX ADMIN — FOLIC ACID 1 MG: 1 TABLET ORAL at 08:46

## 2021-07-23 RX ADMIN — LORAZEPAM 1 MG: 1 TABLET ORAL at 00:14

## 2021-07-23 RX ADMIN — GABAPENTIN 900 MG: 300 CAPSULE ORAL at 00:06

## 2021-07-23 RX ADMIN — Medication 1 TABLET: at 08:46

## 2021-07-23 ASSESSMENT — ACTIVITIES OF DAILY LIVING (ADL)
ADLS_ACUITY_SCORE: 12

## 2021-07-23 NOTE — PROGRESS NOTES
[facility]      Subjective:  He says he is better.  He was treated with Ativan overnight.  But he seems to have minimal symptoms this morning.  He has some slight tremor but some of this is perhaps chronic per his history.  Diarrhea is better.    Review of Systems:  CONSTITUTIONAL: NEGATIVE for fever, chills, change in weight  ENT/MOUTH: NEGATIVE for ear, mouth and throat problems  RESP: NEGATIVE for significant cough or SOB  CV: NEGATIVE for chest pain, palpitations or peripheral edema    Physical Exam:  Vitals Were Reviewed    Patient Vitals for the past 16 hrs:   BP Temp Temp src Pulse Resp SpO2   07/23/21 0715 126/85 98.2  F (36.8  C) Oral 77 18 96 %   07/23/21 0440 127/89 98.2  F (36.8  C) Oral 89 18 95 %   07/22/21 2357 -- -- -- 79 -- --   07/22/21 2255 (!) 137/102 98.7  F (37.1  C) Oral 79 18 98 %   07/22/21 1912 (!) 147/96 -- -- 82 -- --   07/22/21 1905 (!) 144/101 98.8  F (37.1  C) Oral 95 18 96 %       No intake or output data in the 24 hours ending 07/23/21 0830    GENERAL APPEARANCE: healthy, alert and no distress  EYES: conjunctiva clear, eyes grossly normal  RESP: lungs clear to auscultation - no rales, rhonchi or wheezes  CV: regular rate and rhythm, normal S1 S2, no S3 or S4 and no murmur, click or rub   ABDOMEN: soft, nontender, no HSM or masses and bowel sounds normal  MS: no clubbing, cyanosis; no edema  SKIN: clear without significant rashes or lesions  NEURO: Mild tremor but otherwise normal exam.  Calm, alert pleasant.    Lab:  Recent Labs   Lab Test 07/23/21  0547 07/22/21  0457    138   POTASSIUM 3.5 3.5   CHLORIDE 103 103   CO2 26 27   ANIONGAP 6 8   GLC 99 83   BUN 7 7   CR 0.72 0.81   KEELEY 9.1 9.2     CBC RESULTS:   Recent Labs   Lab Test 07/23/21  0547 07/22/21  0457   WBC 7.5 7.1   RBC 4.29* 4.25*   HGB 13.1* 13.2*   HCT 39.1* 39.1*   * 96*       Results for orders placed or performed during the hospital encounter of 07/20/21 (from the past 24 hour(s))    CBC with platelets   Result Value Ref Range    WBC Count 7.5 4.0 - 11.0 10e3/uL    RBC Count 4.29 (L) 4.40 - 5.90 10e6/uL    Hemoglobin 13.1 (L) 13.3 - 17.7 g/dL    Hematocrit 39.1 (L) 40.0 - 53.0 %    MCV 91 78 - 100 fL    MCH 30.5 26.5 - 33.0 pg    MCHC 33.5 31.5 - 36.5 g/dL    RDW 12.9 10.0 - 15.0 %    Platelet Count 125 (L) 150 - 450 10e3/uL   Comprehensive metabolic panel   Result Value Ref Range    Sodium 135 133 - 144 mmol/L    Potassium 3.5 3.4 - 5.3 mmol/L    Chloride 103 94 - 109 mmol/L    Carbon Dioxide (CO2) 26 20 - 32 mmol/L    Anion Gap 6 3 - 14 mmol/L    Urea Nitrogen 7 7 - 30 mg/dL    Creatinine 0.72 0.66 - 1.25 mg/dL    Calcium 9.1 8.5 - 10.1 mg/dL    Glucose 99 70 - 99 mg/dL    Alkaline Phosphatase 93 40 - 150 U/L    AST 79 (H) 0 - 45 U/L    ALT 95 (H) 0 - 70 U/L    Protein Total 7.3 6.8 - 8.8 g/dL    Albumin 3.8 3.4 - 5.0 g/dL    Bilirubin Total 0.5 0.2 - 1.3 mg/dL    GFR Estimate >90 >60 mL/min/1.73m2   Magnesium   Result Value Ref Range    Magnesium 2.1 1.6 - 2.3 mg/dL       Assessment and Plan:    Nikhil Rios is a 35 year old male admitted on 7/20/2021. He has history of severe alcohol dependence and prior alcohol withdrawal including withdrawal seizure, PTSD, anxiety and depression.  He presents due to concerns of alcohol withdrawal.     Alcohol Dependence with acute alcohol withdrawal  Reports drinking 750 mL for past 2-3 years. Last drink ~36 hours prior to arrival. History of prior episodes of withdrawal as well as seizures. On presentation, tremulous, hypertensive, tachycardic, somewhat flushed, nauseous and some tingling of his finger. On admission, appears to be going through withdrawal, CIWA on arrival 15. Previously has been on monthly naltrexone injections and acamprosate. Has been evaluated by addition medicine previously. Also has been to various treatment facilities as well as AA.   - CIWA protocol in place with PRN ativan and scheduled gabapentin  - daily thiamine, folic acid,  "multivitamin therapy initiated  - monitor CBC, electrolytes, CMP     CIWA up to 11 overnight.  Minimal symptoms currently.     Intermittent Hypoxia  Hypoxic when sleeping, requires 3 LPM.  I would awaken talking oxygenation is 94 to 96% on room air.  May have undiagnosed KRISTA vs related to lorazepam dosing (no concerning observed respiratory depression). Denies history of snoring or daytime sleepiness.  - monitor O2, continuous pulse ox  - oxygen as needed for hypoxia      Sat currently 96% on room air.     Episode of tachycardia  Nursing went to evaluate due to capnography beeping, noted to have tachycardia up to 150 though resolved by the time a radial pulse was taken.  - telemetry      Sacral pain  Pain reported \"in the tailbone\".  Pain seems to be more in the sacral area.  He has had it for a couple of days.  It is better now.  Will observe.     Diarrhea  Persistent loose stools reported.  He says he gets this with withdrawal.  Will monitor.     Elevated Lipase  Lipase 257. Has been elevated on previous admission with alcohol withdrawal. No symptoms suggestive of pancreatitis. Suspect due to alcohol use.     Mild Transaminitis  Alcoholic liver disease  ALT 73, AST 97, normal bilirubin and alk phos. Likely due to alcoholic liver disease.  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 Does not affect his ability to ambulate. He has tingling in one finger on his left hand which started with the withdrawal symptoms.  - encouraged abstinence     Anxiety and Depression  PTSD  Insomina  Not currently taking medications for this. Previously on Lexapro and trazodone. As above, hopes to find a treatment program that focuses on mental health to assist with his chemical health.  - encouraged " patient to follow up on mental health, care transitions to help with resources        Diet: Regular Diet Adult  DVT Prophylaxis: Low Risk/Ambulatory with no VTE prophylaxis indicated  Mars Catheter: Not present  Central Lines: None  Code Status: Full Code     Plan  Observe this morning with no medication.  Discharge this afternoon if stable.

## 2021-07-23 NOTE — PROGRESS NOTES
[facility] DISCHARGE NOTE    Patient discharged to home at 4:21 PM via wheel chair. Accompanied by mother and staff. Discharge instructions reviewed with patient, opportunity offered to ask questions. Prescriptions - None ordered for discharge. All belongings sent with patient.    Marianna Mario RN

## 2021-07-23 NOTE — PROGRESS NOTES
CIWA 11 now. Most of score is for shakiness and patients anxiety. BP has been elevated all day as well. Unsure oif this correlates to withdrawl or is a new problem. Monitor.

## 2021-07-23 NOTE — PROGRESS NOTES
Went through discharge paperwork with patient and patient does not have any prescriptions to . All belongings have been returned to patient.

## 2021-07-26 NOTE — DISCHARGE SUMMARY
Service Date: 07/23/2021  Discharge Date: 07/23/2021    Nikhil Rios is a 35-year-old male with a history of chronic severe alcohol abuse.  He has had multiple episodes of withdrawal that have required hospitalization.  He had a probable alcohol-related seizure during one of those hospital stays.  His mother with whom he lives with poured out his alcohol supply on the day of admission.  He came to the ED for evaluation of nausea, vomiting, anxiety and tremor similar to his previous episodes of withdrawal.  In the ED, his alcohol level was 0.207.    He appeared to be in withdrawal and was admitted and placed on a CIWA protocol with lorazepam.  Thiamine, multivitamins, folic acid and gabapentin were used.    The patient went on to have active withdrawal and ultimately had resolution of this.  Initially, he had some hypoxia, was sleeping.  This dissipated during his stay.  He had diarrhea.  He associated this with his withdrawal and says he had it before.  Lipase was 237, but he had no symptoms suggestive of pancreatitis.    He did have some mild transaminitis related to alcohol that remained stable.  He also had thrombocytopenia that was thought to be alcohol related.    His magnesium was replaced.    He has bilateral lower extremity paresthesias, and alcohol-related neuropathy is suspected.  Ultimately, the symptoms lifted and the patient discharged to home.    ASSESSMENT:     1.  Alcohol dependence with acute alcohol withdrawal.  2.  Initial hypoxia with sleeping -- resolved.  3.  Diarrhea -- related to withdrawal.  4.  Elevated lipase -- related to alcohol use.  5.  Mild transaminitis and alcoholic liver disease.  6.  Thrombocytopenia -- suspect alcohol related.  7.  Hypomagnesemia.  8.  Bilateral lower extremity paresthesias -- suspect alcohol-related neuropathy.  9.  Anxiety, depression/posttraumatic stress disorder/insomnia.    PLAN:  The patient was discharged to home.  He was planning on abstinence.  He was  advised to seek CD treatment and go to Alcoholics Anonymous.     Discharge Medication List as of 2021 12:38 PM      CONTINUE these medications which have NOT CHANGED    Details   hydrOXYzine (VISTARIL) 50 MG capsule Take 1 capsule by mouth 3 times daily as needed for allergies, Historical      hypromellose-dextran (ARTIFICAL TEARS) 0.1-0.3 % ophthalmic solution Place 1 drop into both eyes daily as needed for dry eyes, Historical      ketotifen (ZADITOR) 0.025 % ophthalmic solution Place 1 drop into both eyes 2 times daily as needed for itching, Disp-5 mL, R-0, E-Prescribe      naltrexone (VIVITROL) 380 MG SUSR Inject 380 mg into the muscle every 28 days, Historical      sodium chloride (OCEAN) 0.65 % nasal spray Spray 1 spray into both nostrils daily as needed for congestion, Disp-15 mL, R-0, E-Prescribe           Unresulted Labs Ordered in the Past 30 Days of this Admission     No orders found from 2021 to 2021.             Greater than 30 minutes spent on this.    Mark Whaley MD        D: 2021   T: 2021   MT: MELISA    Name:     QUYEN QUIROZ  MRN:      [id number]        Account:      [id number]   :      1985           Service Date: 2021                                  Discharge Date: 2021     Document: [id number]

## 2021-07-28 ENCOUNTER — APPOINTMENT (OUTPATIENT)
Dept: CT IMAGING | Facility: HOSPITAL | Age: 36
End: 2021-07-28
Attending: EMERGENCY MEDICINE
Payer: COMMERCIAL

## 2021-07-28 ENCOUNTER — HOSPITAL ENCOUNTER (INPATIENT)
Facility: CLINIC | Age: 36
LOS: 2 days | Discharge: HOME | DRG: 897 | End: 2021-07-30
Attending: HOSPITALIST | Admitting: STUDENT IN AN ORGANIZED HEALTH CARE EDUCATION/TRAINING PROGRAM
Payer: COMMERCIAL

## 2021-07-28 ENCOUNTER — HOSPITAL ENCOUNTER (EMERGENCY)
Facility: HOSPITAL | Age: 36
Discharge: OTHER INSTITUTION | End: 2021-07-28
Attending: EMERGENCY MEDICINE | Admitting: EMERGENCY MEDICINE
Payer: COMMERCIAL

## 2021-07-28 VITALS
HEART RATE: 87 BPM | DIASTOLIC BLOOD PRESSURE: 73 MMHG | OXYGEN SATURATION: 94 % | SYSTOLIC BLOOD PRESSURE: 117 MMHG | WEIGHT: 200 LBS | TEMPERATURE: 97 F | HEIGHT: 71 IN | BODY MASS INDEX: 28 KG/M2 | RESPIRATION RATE: 18 BRPM

## 2021-07-28 DIAGNOSIS — F10.930 ALCOHOL WITHDRAWAL, UNCOMPLICATED (H): ICD-10-CM

## 2021-07-28 DIAGNOSIS — F10.920 ALCOHOL INTOXICATION, UNCOMPLICATED (H): ICD-10-CM

## 2021-07-28 LAB
ALBUMIN SERPL-MCNC: 4.1 G/DL (ref 3.5–5)
ALP SERPL-CCNC: 92 U/L (ref 45–120)
ALT SERPL W P-5'-P-CCNC: 82 U/L (ref 0–45)
ANION GAP SERPL CALCULATED.3IONS-SCNC: 15 MMOL/L (ref 5–18)
AST SERPL W P-5'-P-CCNC: 69 U/L (ref 0–40)
BASOPHILS # BLD AUTO: 0.1 10E3/UL (ref 0–0.2)
BASOPHILS NFR BLD AUTO: 2 %
BILIRUB DIRECT SERPL-MCNC: 0.2 MG/DL
BILIRUB SERPL-MCNC: 0.5 MG/DL (ref 0–1)
BUN SERPL-MCNC: 9 MG/DL (ref 8–22)
CALCIUM SERPL-MCNC: 8.6 MG/DL (ref 8.5–10.5)
CHLORIDE BLD-SCNC: 106 MMOL/L (ref 98–107)
CO2 SERPL-SCNC: 20 MMOL/L (ref 22–31)
CREAT SERPL-MCNC: 0.73 MG/DL (ref 0.7–1.3)
EOSINOPHIL # BLD AUTO: 0.1 10E3/UL (ref 0–0.7)
EOSINOPHIL NFR BLD AUTO: 1 %
ERYTHROCYTE [DISTWIDTH] IN BLOOD BY AUTOMATED COUNT: 13.1 % (ref 10–15)
ETHANOL SERPL-MCNC: 434 MG/DL
GFR SERPL CREATININE-BSD FRML MDRD: >90 ML/MIN/1.73M2
GLUCOSE BLD-MCNC: 101 MG/DL (ref 70–125)
HCT VFR BLD AUTO: 39 % (ref 40–53)
HGB BLD-MCNC: 13.5 G/DL (ref 13.3–17.7)
IMM GRANULOCYTES # BLD: 0 10E3/UL
IMM GRANULOCYTES NFR BLD: 0 %
INR PPP: 0.98 (ref 0.85–1.15)
LIPASE SERPL-CCNC: 131 U/L (ref 0–52)
LYMPHOCYTES # BLD AUTO: 2.2 10E3/UL (ref 0.8–5.3)
LYMPHOCYTES NFR BLD AUTO: 52 %
MAGNESIUM SERPL-MCNC: 1.7 MG/DL (ref 1.8–2.6)
MCH RBC QN AUTO: 30.9 PG (ref 26.5–33)
MCHC RBC AUTO-ENTMCNC: 34.6 G/DL (ref 31.5–36.5)
MCV RBC AUTO: 89 FL (ref 78–100)
MONOCYTES # BLD AUTO: 0.5 10E3/UL (ref 0–1.3)
MONOCYTES NFR BLD AUTO: 11 %
NEUTROPHILS # BLD AUTO: 1.4 10E3/UL (ref 1.6–8.3)
NEUTROPHILS NFR BLD AUTO: 34 %
NRBC # BLD AUTO: 0 10E3/UL
NRBC BLD AUTO-RTO: 0 /100
PHOSPHATE SERPL-MCNC: 2.1 MG/DL (ref 2.5–4.5)
PLATELET # BLD AUTO: 421 10E3/UL (ref 150–450)
POTASSIUM BLD-SCNC: 3.4 MMOL/L (ref 3.5–5)
POTASSIUM BLD-SCNC: 3.6 MMOL/L (ref 3.4–5.3)
PROT SERPL-MCNC: 7.3 G/DL (ref 6–8)
RBC # BLD AUTO: 4.37 10E6/UL (ref 4.4–5.9)
SARS-COV-2 RNA RESP QL NAA+PROBE: NEGATIVE
SODIUM SERPL-SCNC: 141 MMOL/L (ref 136–145)
WBC # BLD AUTO: 4.2 10E3/UL (ref 4–11)

## 2021-07-28 PROCEDURE — 82077 ASSAY SPEC XCP UR&BREATH IA: CPT | Performed by: EMERGENCY MEDICINE

## 2021-07-28 PROCEDURE — 250N000011 HC RX IP 250 OP 636: Performed by: EMERGENCY MEDICINE

## 2021-07-28 PROCEDURE — 96365 THER/PROPH/DIAG IV INF INIT: CPT

## 2021-07-28 PROCEDURE — 82248 BILIRUBIN DIRECT: CPT | Performed by: EMERGENCY MEDICINE

## 2021-07-28 PROCEDURE — 36415 COLL VENOUS BLD VENIPUNCTURE: CPT | Performed by: PHYSICIAN ASSISTANT

## 2021-07-28 PROCEDURE — 258N000003 HC RX IP 258 OP 636: Performed by: EMERGENCY MEDICINE

## 2021-07-28 PROCEDURE — 84100 ASSAY OF PHOSPHORUS: CPT | Performed by: EMERGENCY MEDICINE

## 2021-07-28 PROCEDURE — 96366 THER/PROPH/DIAG IV INF ADDON: CPT

## 2021-07-28 PROCEDURE — HZ2ZZZZ DETOXIFICATION SERVICES FOR SUBSTANCE ABUSE TREATMENT: ICD-10-PCS | Performed by: STUDENT IN AN ORGANIZED HEALTH CARE EDUCATION/TRAINING PROGRAM

## 2021-07-28 PROCEDURE — 84132 ASSAY OF SERUM POTASSIUM: CPT | Performed by: PHYSICIAN ASSISTANT

## 2021-07-28 PROCEDURE — 36415 COLL VENOUS BLD VENIPUNCTURE: CPT | Performed by: EMERGENCY MEDICINE

## 2021-07-28 PROCEDURE — 250N000011 HC RX IP 250 OP 636: Performed by: PHYSICIAN ASSISTANT

## 2021-07-28 PROCEDURE — 99285 EMERGENCY DEPT VISIT HI MDM: CPT | Mod: 25

## 2021-07-28 PROCEDURE — 85041 AUTOMATED RBC COUNT: CPT | Performed by: EMERGENCY MEDICINE

## 2021-07-28 PROCEDURE — 96375 TX/PRO/DX INJ NEW DRUG ADDON: CPT

## 2021-07-28 PROCEDURE — 250N000009 HC RX 250: Performed by: EMERGENCY MEDICINE

## 2021-07-28 PROCEDURE — 99223 1ST HOSP IP/OBS HIGH 75: CPT | Mod: AI | Performed by: PHYSICIAN ASSISTANT

## 2021-07-28 PROCEDURE — 85025 COMPLETE CBC W/AUTO DIFF WBC: CPT | Performed by: EMERGENCY MEDICINE

## 2021-07-28 PROCEDURE — 258N000003 HC RX IP 258 OP 636: Performed by: PHYSICIAN ASSISTANT

## 2021-07-28 PROCEDURE — C9803 HOPD COVID-19 SPEC COLLECT: HCPCS

## 2021-07-28 PROCEDURE — 83690 ASSAY OF LIPASE: CPT | Performed by: EMERGENCY MEDICINE

## 2021-07-28 PROCEDURE — 120N000001 HC R&B MED SURG/OB

## 2021-07-28 PROCEDURE — 87635 SARS-COV-2 COVID-19 AMP PRB: CPT | Performed by: EMERGENCY MEDICINE

## 2021-07-28 PROCEDURE — 250N000013 HC RX MED GY IP 250 OP 250 PS 637: Performed by: EMERGENCY MEDICINE

## 2021-07-28 PROCEDURE — 85610 PROTHROMBIN TIME: CPT | Performed by: EMERGENCY MEDICINE

## 2021-07-28 PROCEDURE — 80053 COMPREHEN METABOLIC PANEL: CPT | Performed by: EMERGENCY MEDICINE

## 2021-07-28 PROCEDURE — 83735 ASSAY OF MAGNESIUM: CPT | Performed by: EMERGENCY MEDICINE

## 2021-07-28 PROCEDURE — 250N000013 HC RX MED GY IP 250 OP 250 PS 637: Performed by: PHYSICIAN ASSISTANT

## 2021-07-28 PROCEDURE — 70450 CT HEAD/BRAIN W/O DYE: CPT

## 2021-07-28 RX ORDER — HALOPERIDOL 5 MG/ML
2.5-5 INJECTION INTRAMUSCULAR EVERY 6 HOURS PRN
Status: DISCONTINUED | OUTPATIENT
Start: 2021-07-28 | End: 2021-07-30 | Stop reason: HOSPADM

## 2021-07-28 RX ORDER — FOLIC ACID 1 MG/1
1 TABLET ORAL DAILY
Status: DISCONTINUED | OUTPATIENT
Start: 2021-07-28 | End: 2021-07-28 | Stop reason: HOSPADM

## 2021-07-28 RX ORDER — GABAPENTIN 300 MG/1
300 CAPSULE ORAL EVERY 8 HOURS
Status: DISCONTINUED | OUTPATIENT
Start: 2021-08-02 | End: 2021-07-29

## 2021-07-28 RX ORDER — MULTIPLE VITAMINS W/ MINERALS TAB 9MG-400MCG
1 TAB ORAL DAILY
Status: DISCONTINUED | OUTPATIENT
Start: 2021-07-29 | End: 2021-07-30 | Stop reason: HOSPADM

## 2021-07-28 RX ORDER — GABAPENTIN 100 MG/1
100 CAPSULE ORAL EVERY 8 HOURS
Status: DISCONTINUED | OUTPATIENT
Start: 2021-08-04 | End: 2021-07-29

## 2021-07-28 RX ORDER — PROCHLORPERAZINE MALEATE 5 MG
10 TABLET ORAL EVERY 6 HOURS PRN
Status: DISCONTINUED | OUTPATIENT
Start: 2021-07-28 | End: 2021-07-30 | Stop reason: HOSPADM

## 2021-07-28 RX ORDER — IBUPROFEN 600 MG/1
600 TABLET, FILM COATED ORAL EVERY 6 HOURS PRN
Status: DISCONTINUED | OUTPATIENT
Start: 2021-07-28 | End: 2021-07-30 | Stop reason: HOSPADM

## 2021-07-28 RX ORDER — MULTIPLE VITAMINS W/ MINERALS TAB 9MG-400MCG
1 TAB ORAL DAILY
Status: DISCONTINUED | OUTPATIENT
Start: 2021-07-28 | End: 2021-07-28 | Stop reason: HOSPADM

## 2021-07-28 RX ORDER — POLYETHYLENE GLYCOL 3350 17 G/17G
17 POWDER, FOR SOLUTION ORAL DAILY PRN
Status: DISCONTINUED | OUTPATIENT
Start: 2021-07-28 | End: 2021-07-30 | Stop reason: HOSPADM

## 2021-07-28 RX ORDER — LORAZEPAM 2 MG/ML
1 INJECTION INTRAMUSCULAR ONCE
Status: COMPLETED | OUTPATIENT
Start: 2021-07-28 | End: 2021-07-28

## 2021-07-28 RX ORDER — POTASSIUM CHLORIDE 1500 MG/1
40 TABLET, EXTENDED RELEASE ORAL ONCE
Status: COMPLETED | OUTPATIENT
Start: 2021-07-28 | End: 2021-07-28

## 2021-07-28 RX ORDER — LIDOCAINE 40 MG/G
CREAM TOPICAL
Status: DISCONTINUED | OUTPATIENT
Start: 2021-07-28 | End: 2021-07-30 | Stop reason: HOSPADM

## 2021-07-28 RX ORDER — HYDROXYZINE HYDROCHLORIDE 25 MG/1
50 TABLET, FILM COATED ORAL 3 TIMES DAILY PRN
Status: DISCONTINUED | OUTPATIENT
Start: 2021-07-28 | End: 2021-07-30 | Stop reason: HOSPADM

## 2021-07-28 RX ORDER — OLANZAPINE 5 MG/1
5-10 TABLET, ORALLY DISINTEGRATING ORAL EVERY 6 HOURS PRN
Status: DISCONTINUED | OUTPATIENT
Start: 2021-07-28 | End: 2021-07-30 | Stop reason: HOSPADM

## 2021-07-28 RX ORDER — GABAPENTIN 300 MG/1
900 CAPSULE ORAL EVERY 8 HOURS
Status: DISCONTINUED | OUTPATIENT
Start: 2021-07-28 | End: 2021-07-30 | Stop reason: HOSPADM

## 2021-07-28 RX ORDER — PROCHLORPERAZINE 25 MG
25 SUPPOSITORY, RECTAL RECTAL EVERY 12 HOURS PRN
Status: DISCONTINUED | OUTPATIENT
Start: 2021-07-28 | End: 2021-07-30 | Stop reason: HOSPADM

## 2021-07-28 RX ORDER — FOLIC ACID 1 MG/1
1 TABLET ORAL DAILY
Status: DISCONTINUED | OUTPATIENT
Start: 2021-07-29 | End: 2021-07-30 | Stop reason: HOSPADM

## 2021-07-28 RX ORDER — POTASSIUM CHLORIDE 1500 MG/1
40 TABLET, EXTENDED RELEASE ORAL ONCE
Status: DISCONTINUED | OUTPATIENT
Start: 2021-07-28 | End: 2021-07-28

## 2021-07-28 RX ORDER — DIAZEPAM 10 MG/2ML
5-10 INJECTION, SOLUTION INTRAMUSCULAR; INTRAVENOUS EVERY 30 MIN PRN
Status: DISCONTINUED | OUTPATIENT
Start: 2021-07-28 | End: 2021-07-30 | Stop reason: HOSPADM

## 2021-07-28 RX ORDER — DIAZEPAM 10 MG/2ML
5-10 INJECTION, SOLUTION INTRAMUSCULAR; INTRAVENOUS EVERY 30 MIN PRN
Status: DISCONTINUED | OUTPATIENT
Start: 2021-07-28 | End: 2021-07-28 | Stop reason: HOSPADM

## 2021-07-28 RX ORDER — LANOLIN ALCOHOL/MO/W.PET/CERES
100 CREAM (GRAM) TOPICAL DAILY
Status: DISCONTINUED | OUTPATIENT
Start: 2021-07-29 | End: 2021-07-30 | Stop reason: HOSPADM

## 2021-07-28 RX ORDER — FLUMAZENIL 0.1 MG/ML
0.2 INJECTION, SOLUTION INTRAVENOUS
Status: DISCONTINUED | OUTPATIENT
Start: 2021-07-28 | End: 2021-07-30 | Stop reason: HOSPADM

## 2021-07-28 RX ORDER — ACETAMINOPHEN 650 MG/1
650 SUPPOSITORY RECTAL EVERY 6 HOURS PRN
Status: DISCONTINUED | OUTPATIENT
Start: 2021-07-28 | End: 2021-07-30 | Stop reason: HOSPADM

## 2021-07-28 RX ORDER — DIAZEPAM 5 MG
10 TABLET ORAL EVERY 30 MIN PRN
Status: DISCONTINUED | OUTPATIENT
Start: 2021-07-28 | End: 2021-07-30 | Stop reason: HOSPADM

## 2021-07-28 RX ORDER — DIAZEPAM 5 MG
10 TABLET ORAL EVERY 30 MIN PRN
Status: DISCONTINUED | OUTPATIENT
Start: 2021-07-28 | End: 2021-07-28 | Stop reason: HOSPADM

## 2021-07-28 RX ORDER — SODIUM CHLORIDE, SODIUM LACTATE, POTASSIUM CHLORIDE, CALCIUM CHLORIDE 600; 310; 30; 20 MG/100ML; MG/100ML; MG/100ML; MG/100ML
INJECTION, SOLUTION INTRAVENOUS CONTINUOUS
Status: DISCONTINUED | OUTPATIENT
Start: 2021-07-28 | End: 2021-07-29

## 2021-07-28 RX ORDER — ONDANSETRON 4 MG/1
4 TABLET, ORALLY DISINTEGRATING ORAL EVERY 6 HOURS PRN
Status: DISCONTINUED | OUTPATIENT
Start: 2021-07-28 | End: 2021-07-30 | Stop reason: HOSPADM

## 2021-07-28 RX ORDER — GABAPENTIN 600 MG/1
1200 TABLET ORAL ONCE
Status: COMPLETED | OUTPATIENT
Start: 2021-07-28 | End: 2021-07-28

## 2021-07-28 RX ORDER — GABAPENTIN 300 MG/1
600 CAPSULE ORAL EVERY 8 HOURS
Status: DISCONTINUED | OUTPATIENT
Start: 2021-07-31 | End: 2021-07-29

## 2021-07-28 RX ORDER — CLONIDINE HYDROCHLORIDE 0.1 MG/1
0.1 TABLET ORAL EVERY 8 HOURS
Status: DISCONTINUED | OUTPATIENT
Start: 2021-07-28 | End: 2021-07-29

## 2021-07-28 RX ORDER — ACETAMINOPHEN 325 MG/1
650 TABLET ORAL EVERY 6 HOURS PRN
Status: DISCONTINUED | OUTPATIENT
Start: 2021-07-28 | End: 2021-07-30 | Stop reason: HOSPADM

## 2021-07-28 RX ORDER — ONDANSETRON 2 MG/ML
4 INJECTION INTRAMUSCULAR; INTRAVENOUS EVERY 6 HOURS PRN
Status: DISCONTINUED | OUTPATIENT
Start: 2021-07-28 | End: 2021-07-30 | Stop reason: HOSPADM

## 2021-07-28 RX ADMIN — FOLIC ACID: 5 INJECTION, SOLUTION INTRAMUSCULAR; INTRAVENOUS; SUBCUTANEOUS at 03:38

## 2021-07-28 RX ADMIN — POTASSIUM CHLORIDE 40 MEQ: 1500 TABLET, EXTENDED RELEASE ORAL at 12:19

## 2021-07-28 RX ADMIN — LORAZEPAM 1 MG: 2 INJECTION INTRAMUSCULAR; INTRAVENOUS at 03:37

## 2021-07-28 RX ADMIN — Medication 100 MG: at 08:25

## 2021-07-28 RX ADMIN — DIAZEPAM 10 MG: 5 TABLET ORAL at 14:56

## 2021-07-28 RX ADMIN — POTASSIUM & SODIUM PHOSPHATES POWDER PACK 280-160-250 MG 1 PACKET: 280-160-250 PACK at 12:18

## 2021-07-28 RX ADMIN — SODIUM CHLORIDE, POTASSIUM CHLORIDE, SODIUM LACTATE AND CALCIUM CHLORIDE: 600; 310; 30; 20 INJECTION, SOLUTION INTRAVENOUS at 13:29

## 2021-07-28 RX ADMIN — DIAZEPAM 10 MG: 5 TABLET ORAL at 20:40

## 2021-07-28 RX ADMIN — POTASSIUM & SODIUM PHOSPHATES POWDER PACK 280-160-250 MG 1 PACKET: 280-160-250 PACK at 21:24

## 2021-07-28 RX ADMIN — ONDANSETRON 4 MG: 2 INJECTION INTRAMUSCULAR; INTRAVENOUS at 17:51

## 2021-07-28 RX ADMIN — SODIUM CHLORIDE, POTASSIUM CHLORIDE, SODIUM LACTATE AND CALCIUM CHLORIDE: 600; 310; 30; 20 INJECTION, SOLUTION INTRAVENOUS at 21:24

## 2021-07-28 RX ADMIN — DIAZEPAM 10 MG: 5 TABLET ORAL at 11:18

## 2021-07-28 RX ADMIN — CLONIDINE HYDROCHLORIDE 0.1 MG: 0.1 TABLET ORAL at 12:19

## 2021-07-28 RX ADMIN — CLONIDINE HYDROCHLORIDE 0.1 MG: 0.1 TABLET ORAL at 20:28

## 2021-07-28 RX ADMIN — GABAPENTIN 1200 MG: 600 TABLET, FILM COATED ORAL at 12:19

## 2021-07-28 RX ADMIN — GABAPENTIN 900 MG: 300 CAPSULE ORAL at 20:28

## 2021-07-28 RX ADMIN — DIAZEPAM 10 MG: 5 TABLET ORAL at 20:02

## 2021-07-28 RX ADMIN — FOLIC ACID 1 MG: 1 TABLET ORAL at 08:25

## 2021-07-28 RX ADMIN — DIAZEPAM 5 MG: 5 INJECTION, SOLUTION INTRAMUSCULAR; INTRAVENOUS at 17:51

## 2021-07-28 ASSESSMENT — ACTIVITIES OF DAILY LIVING (ADL)
ADLS_ACUITY_SCORE: 12

## 2021-07-28 ASSESSMENT — MIFFLIN-ST. JEOR: SCORE: 1874.32

## 2021-07-28 ASSESSMENT — ENCOUNTER SYMPTOMS
VOMITING: 0
DIARRHEA: 1

## 2021-07-28 NOTE — H&P
[facility]    History and Physical - Hospitalist Service       Date of Admission:  07/28/2021    Assessment & Plan    Nikhil Rios is a 35 year old male past medical history significant for alcohol dependence with withdrawal and history of alcohol-related seizures, alcohol detoxification, alcoholic fatty liver, transaminitis, thrombocytopenia, realized anxiety disorder, major depressive disorder, and internal hemorrhoid bleeding was directly admitted to [facility] from [facility] emergency department with severe alcohol intoxication with concern of withdrawal. He was transferred to [facility] to the suds unit under observation status as no beds available at [facility].    Alcohol intoxication with withdrawal  Severe alcohol use disorder   History of alcohol-related seizures  Multiple prior hospitalizations for alcohol intoxication and withdrawal. Acutely intoxicated with alcohol level over 400. Last drink yesterday, unsure if he feels intoxicated at this point. Drinks 0.75L Vodka daily. Staff concerned he will start to withdrawal soon.  He seeks mental health and chemical dependency consultations.  He has a bruise on his head therefore head CT was completed and negative in the emergency department. Basic labs including CMP, CBC, and INR revealed mild hypokalemia, hypomagnesemia, prophosphatemia, stable elevation of AST/ALT, lipase 131. INR 0.98. Blood alcohol level 434 Per chart review the highest his alcohol level has been in our records is 504 in October 2020. No N/V, SOB, lightheadedness, dizziness, or changes to bowel or bladder. Frequent diarrhea during withdrawal historically. Hx alcohol related seizure in the past. Frequent admissions for alcohol withdrawal.  -Admit to inpatient as he is already scoring on CIWA and requiring Valium  -CIWA scoring, as needed Valium  -Gabapentin taper and PRN Zyprexa available  -Folate, thiamine, MVI, received banana bag in the  ED  -Psychiatry and chemical dependency consultations  -Regular diet, IV fluids  -Repeat labs in AM    ADDENDUM 1200  - CD prefers re-consult when almost through withdrawal an scoring low on CIWA. Reorder at that time.     Electrolyte disturbances related to alcohol intoxication - hypokalemia, hypomagnesemia, hypophosphatemia  Potassium 3.4, magnesium 1.7, phosphorus 2.1 in the ED.  -Check and replete per protocol    Generalized anxiety disorder  Major depressive disorder  PTSD  Insomnia  Previously on Lexapro and trazodone. Patient has endorsed need for mental health services.  -Psychiatry consultation as above    Other relevant history  History of transaminitis and alcoholic liver disease  History of alcohol related thrombocytopenia  Tobacco use disorder: Smokes cigars when he drinks, declines nicotine patch    COVID-19 admission screen: Negative 7/28.       Diet: Regular Diet Adult  DVT Prophylaxis: Ambulate every shift  Mars Catheter: Not present  Central Lines: None  Code Status: Full Code    Risk Factors Present on Admission                   Disposition Plan   Expected discharge: 2+ days recommended to prior living arrangement once through withdrawal and cleared by psychiatry/chemical dependency..     The patient's care was discussed with the Attending Physician, Dr. Recinos, Bedside Nurse and Patient.    Amarilys Germain PA-C  [facility]  Securely message with the [employer] Web Console (learn more here)  Text page via [employer]zi[employer] Paging/Directory      ______________________________________________________________________    Chief Complaint   Alcohol intoxication    History is obtained from the patient, electronic health record and emergency department physician    History of Present Illness   Nikhil Rios is a 35 year old male past medical history significant for severe alcohol use disorder with withdrawal and history of alcohol-related seizures, alcohol detoxification, alcoholic fatty  liver, transaminitis, thrombocytopenia, realized anxiety disorder, major depressive disorder, and internal hemorrhoid bleeding who was directly admitted to [facility] from [facility] emergency department with severe alcohol intoxication with concern of withdrawal.  Patient with multiple prior hospitalizations for alcohol intoxication and withdrawal.  Presentation today to the emergency department he is accompanied by his mother.  He is acutely intoxicated with alcohol level over 400.  Staff concerned he will start to withdrawal soon.  He seeks mental health and chemical dependency consultations.  Was noticed he is a bruise on his head therefore head CT was completed and negative in the emergency department.  Basic labs including CMP, CBC, and INR revealed mild hypokalemia, hypomagnesemia, prophosphatemia, stable elevation of AST/ALT, lipase 131.  COVID-19 -7/28.  INR 0.98.  Blood alcohol level 434 Per chart review the highest his alcohol level has been in our records is 504 in October 2020. He was given banana bag and ativan in the ED. He was transferred to [facility] to the suds unit under observation status as no beds available at [facility].    This is at least his 9th ED presentation for alcohol intoxication and withdrawal.  He was recently admitted to [facility] 7/20-7/23 with similar.  That time he was noted to have acute alcohol withdrawal, transient hypoxia sleeping, diarrhea related to withdrawal, elevated lipase and mild transaminitis, alcohol-related thrombocytopenia, alcohol related lower extremity paresthesias.  He was planning on abstinence on discharge and advised to seek chemical dependency treatment and attend alcohol Anonymous meetings.  Unfortunately he has already relapsed.    On arrival to [facility], patient was hemodynamically stable.  He denies nausea, vomiting, changes to bowel or bladder.  No lightheadedness or dizziness.  Reports he drinks 0.75 L of  vodka daily, last drink was yesterday.  Scored high in SHERITA 400 range as above however patient states he does not feel intoxicated at this time however he is unsure.  He has bruise over his right eyelid and does not know how he got this.  As above this is already been evaluated via CT which was negative.  No abdominal right upper quadrant pain.  He has already eaten a meal and peeing as usual.  He reports he would like assistance with staying sober and is frustrated with frequent admissions and continuing to drink.  He has had history of alcohol withdrawal seizures and states he has had both inpatient and outpatient treatment.  Alcohol is been a problem for him for the past 4 years approximately since his fiancée was deported.  Previously had a full-time career and was doing well however he has been unable to hold a job in the last few years.  He has supportive family at home.    Review of Systems    The 10 point Review of Systems is negative other than noted in the HPI or here.     Past Medical History    I have reviewed this patient's medical history and updated it with pertinent information if needed.   Past Medical History:   Diagnosis Date     Alcohol abuse      Alcohol abuse      Alcohol withdrawal (H)      Depressive disorder      HLD (hyperlipidemia)      HTN (hypertension)        Past Surgical History   I have reviewed this patient's surgical history and updated it with pertinent information if needed.  Past Surgical History:   Procedure Laterality Date     NO HISTORY OF SURGERY       OTHER SURGICAL HISTORY      none       Social History   I have reviewed this patient's social history and updated it with pertinent information if needed.  Social History     Tobacco Use     Smoking status: Former Smoker     Packs/day: 0.25     Types: Cigars, Cigars     Smokeless tobacco: Never Used     Tobacco comment: occasional   Substance Use Topics     Alcohol use: Yes     Alcohol/week: 17.0 standard drinks     Types: 17  Shots of liquor per week     Comment: Drinks 750ml/day or 17 shots/day; hx of withdrawl seizures     Drug use: Not Currently       Family History   I have reviewed this patient's family history and updated it with pertinent information if needed.  Family History   Problem Relation Age of Onset     Coronary Artery Disease Father        Prior to Admission Medications   Prior to Admission Medications   Prescriptions Last Dose Informant Patient Reported? Taking?   hydrOXYzine (VISTARIL) 50 MG capsule  Self Yes No   Sig: Take 1 capsule by mouth 3 times daily as needed for allergies   hypromellose-dextran (ARTIFICAL TEARS) 0.1-0.3 % ophthalmic solution  Self Yes No   Sig: Place 1 drop into both eyes daily as needed for dry eyes   ketotifen (ZADITOR) 0.025 % ophthalmic solution  Self No No   Sig: Place 1 drop into both eyes 2 times daily as needed for itching   naltrexone (VIVITROL) 380 MG SUSR  Self Yes No   Sig: Inject 380 mg into the muscle every 28 days   sodium chloride (OCEAN) 0.65 % nasal spray  Self No No   Sig: Spray 1 spray into both nostrils daily as needed for congestion      Facility-Administered Medications: None     Allergies   Allergies   Allergen Reactions     Gramineae Pollens Itching     Pollen Extract Rash     grass tree pollen       Physical Exam   Vital Signs: Temp: 98.1  F (36.7  C) Temp src: Oral BP: 122/75 Pulse: 87   Resp: 20        Weight: 0 lbs 0 oz    Physical Exam  Vitals reviewed.   Constitutional:       General: He is not in acute distress.     Appearance: Normal appearance.   Eyes:      Extraocular Movements: Extraocular movements intact.   Cardiovascular:      Rate and Rhythm: Normal rate and regular rhythm.      Pulses: Normal pulses.      Heart sounds: No murmur heard.     Pulmonary:      Effort: Pulmonary effort is normal. No respiratory distress.      Breath sounds: Normal breath sounds. No wheezing, rhonchi or rales.   Abdominal:      General: Abdomen is flat. Bowel sounds are normal.  There is no distension.      Palpations: Abdomen is soft.      Tenderness: There is no abdominal tenderness. There is no guarding or rebound.   Musculoskeletal:         General: Normal range of motion.      Right lower leg: No edema.      Left lower leg: No edema.   Skin:     General: Skin is warm and dry.   Neurological:      General: No focal deficit present.      Mental Status: He is alert.   Psychiatric:         Mood and Affect: Mood is anxious.         Speech: Speech is rapid and pressured and tangential.         Behavior: Behavior is agitated and hyperactive. Behavior is cooperative.         Judgment: Judgment is impulsive.           Data   Data reviewed today: I reviewed all medications, new labs and imaging results over the last 24 hours. I personally reviewed no images or EKG's today.    Recent Labs   Lab 07/28/21  0357 07/28/21  0207 07/23/21  0547 07/22/21  0457   WBC  --  4.2 7.5 7.1   HGB  --  13.5 13.1* 13.2*   MCV  --  89 91 92   PLT  --  421 125* 96*   INR 0.98  --   --   --      --  135 138   POTASSIUM 3.4*  --  3.5 3.5   CHLORIDE 106  --  103 103   CO2 20*  --  26 27   BUN 9  --  7 7   CR 0.73  --  0.72 0.81   ANIONGAP 15  --  6 8   KEELEY 8.6  --  9.1 9.2     --  99 83   ALBUMIN 4.1  --  3.8 3.6   PROTTOTAL 7.3  --  7.3 7.2   BILITOTAL 0.5  --  0.5 0.7   ALKPHOS 92  --  93 94   ALT 82*  --  95* 103*   AST 69*  --  79* 99*   LIPASE 131*  --   --   --      Recent Results (from the past 24 hour(s))   Head CT w/o contrast    Narrative    EXAM: CT HEAD W/O CONTRAST  LOCATION: [facility]  DATE/TIME: 7/28/2021 4:05 AM    INDICATION: Trauma. Head injury. Pain.  COMPARISON: 06/24/2021.  TECHNIQUE: Routine CT Head without IV contrast. Multiplanar reformats. Dose reduction techniques were used.    FINDINGS:  INTRACRANIAL CONTENTS: No intracranial hemorrhage, extraaxial collection, or mass effect.  No CT evidence of acute infarct. Normal parenchymal attenuation. Mild  generalized volume loss. No hydrocephalus.     VISUALIZED ORBITS/SINUSES/MASTOIDS: No intraorbital abnormality. No paranasal sinus mucosal disease. No middle ear or mastoid effusion.    BONES/SOFT TISSUES: No acute osseous abnormality. Left posterior scalp soft tissue swelling/contusion.      Impression    IMPRESSION:  1.  No acute intracranial process.  2.  Mild generalized cerebral volume loss.

## 2021-07-28 NOTE — ED TRIAGE NOTES
Pt arrives stating he feels he is in alcohol withdrawal, last drank yesterday but appears intoxicated. He is here with his mother

## 2021-07-28 NOTE — CONSULTS
7/28/2021    CD consult acknowledged and closing.  Pt's SHERITA was .434 not even 12 hours ago, pt likely still intoxicated and has not completed withdrawals. Pt has a history of needing a couple days to detox.  CD consults should not be ordered until pt is almost through withdrawal (scoring low).   Patients must be able to actively and appropriately participate in the evaluation process including: maintaining cognitive focus & recall over hour-long interview, providing responses that are coherent and accurate, and managing emotional upset.  A consult can be reordered when pt meets criteria above and requests inpatient treatment.   Per previous hospitalization, he declined inpatient treatment and was supposed to start an outpatient treatment with [facility].     CITLALLI Leyva  [email or web address]  [phone]

## 2021-07-28 NOTE — ED PROVIDER NOTES
EMERGENCY DEPARTMENT ENCOUNTER      NAME: Nikhil Rios  AGE: 35 year old male  YOB: 1985  MRN: [id number]  EVALUATION DATE & TIME: 7/28/2021  2:17 AM    PCP: Lisandro Graham    ED PROVIDER: Zelda Velez M.D.      Chief Complaint   Patient presents with     Alcohol Problem         FINAL IMPRESSION:  1. Alcohol intoxication, uncomplicated (H)    2. Alcohol withdrawal, uncomplicated (H)        MEDICAL DECISION MAKING:    Pertinent Labs & Imaging studies reviewed. (See chart for details)  ED Course as of Jul 28 0455   Wed Jul 28, 2021   0219 Afebrile.  Vital signs here with tachycardia, otherwise within normal limits.  Patient presenting intoxicated to the emergency department feeling as if he is starting to go through withdrawal.  Just discharged 5 days ago for similar.  Noted previous history of possible withdrawal seizure while hospitalized.  Patient states he last drank yesterday, he states that he has already had diarrhea throughout the day today which usually precedes his withdrawal.  He has been drinking daily since he has been discharged in the hospital.  He does report that he feels he is depressed and is trying to use alcohol as self-medication for his depression.Did review patient's admissions, has been admitted for this multiple times in the past, does go through to withdrawal.  Here he does appear to be intoxicated, does have evidence of trauma though he cannot remember his fall he is here with his mother today.Physical exam for patient today appears diaphoretic, intoxicated but no acute cardiopulmonary distress.  He has evidence of hematoma over his right eye.  Slight anisocoria with right pupil measuring 4 mm and left pupil measuring 3 mm but extraocular movements are intact without signs of entrapment.  TMs are clear bilaterally no hemotympanum.  There is no midline C-spine tenderness.  No other bony tenderness to palpation.  Otherwise exam here pertinent only for tachycardia.Patient  with evidence for fall, does have some slight anisocoria but no focal neurologic findings.  Will get CT scan of his head, start on withdrawal protocol.  Likely plan on admission.      0326 Patient here not scoring very high on CIWA at this time, blood alcohol significantly elevated at 434.  Patient agitated in the room, up and pacing constantly.  Difficult to redirect.  Given 1 mg of Ativan here so that we can continue work-up.  Still believe that he will likely need admission as he has gone through withdrawal every time he has stopped drinking.  We will plan on finding a bed.          Patient CT scan here does not reveal any abscess.  Spoke with Dr. Jacobs at [facility] who agrees to accept patient for transfer.    Critical care: 0 minutes excluding separately billable procedures.  Includes bedside management, time reviewing test results, review of records, discussing the case with staff, documenting the medical record and time spent with family members (or surrogate decision makers) discussing specific treatment issues.          ED COURSE:  2:23 AM I met with the patient for the initial interview and physical examination. Discussed plan for treatment and workup in the ED.    4:08 AM Discussed the case with Dr. Jacobs, Hospitalist at [facility].     PPE: Provider wore gloves, N95 mask, eye protection, surgical cap, and paper mask.    The importance of close follow up was discussed. We reviewed warning signs and symptoms, and I instructed Mr. Rios to return to the emergency department immediately if he develops any new or worsening symptoms. I provided additional verbal discharge instructions. Mr. Rios expressed understanding and agreement with this plan of care, his questions were answered, and he was discharged in stable condition.     MEDICATIONS GIVEN IN THE EMERGENCY:  Medications   melatonin tablet 5 mg (has no administration in time range)   diazepam (VALIUM) tablet 10 mg (has no administration in  "time range)     Or   diazepam (VALIUM) injection 5-10 mg (has no administration in time range)   thiamine tablet 100 mg (has no administration in time range)   folic acid (FOLVITE) tablet 1 mg (has no administration in time range)   multivitamin w/minerals (THERA-VIT-M) tablet 1 tablet (has no administration in time range)   sodium chloride 0.9 % 1,000 mL with Infuvite Adult 10 mL, thiamine 100 mg, folic acid 1 mg infusion ( Intravenous New Bag 7/28/21 0338)   LORazepam (ATIVAN) injection 1 mg (1 mg Intravenous Given 7/28/21 0337)       NEW PRESCRIPTIONS STARTED AT TODAY'S ER VISIT:  New Prescriptions    No medications on file          =================================================================    HPI    Patient information was obtained from: the patient and his mother    Use of : N/A       Nikhil Rios is a 35 year old male with a medical history of alcohol withdrawal, acute coronary syndrome, s/p alcohol detoxification, thrombocytopenia, and internal hemorrhoid bleeding, who presents for evaluation of an alcohol problem.    Per chart review, the patient was seen on 7/20 at [facility] by Dr. Mark Whaley for alcohol withdrawal. He was admitted for alcohol withdrawal treatment. The patient was discharged on 7/23.    The patient began the initial meeting by saying \"my life sucks.\" He was seen a week ago for alcohol withdrawal and states that he has been drinking since his discharge. He states that he has been going through the \"diarrhea-stage of withdrawal.\" The patient has experienced seizures with withdrawal before but has not had any during this episode of withdrawal. The patient denies remembering any falls, but has a bruise on his right eyelid that looks new. Patient states that his last drink was yesterday around 3-4 PM. Patient states he is all for withdrawal treatment and looking into mental health referrals.    The patient's mother has requested a referral or consult for the " patient's mental health. The patient mentioned seeing many of people in his life die, and thinks it is a contributing factor in his drinking.     REVIEW OF SYSTEMS   Review of Systems   HENT:        Positive for bruising of right eyelid   Gastrointestinal: Positive for diarrhea. Negative for vomiting.   All other systems reviewed and are negative.      PAST MEDICAL HISTORY:  Past Medical History:   Diagnosis Date     Alcohol abuse      Alcohol abuse      Alcohol withdrawal (H)      Depressive disorder      HLD (hyperlipidemia)      HTN (hypertension)        PAST SURGICAL HISTORY:  Past Surgical History:   Procedure Laterality Date     NO HISTORY OF SURGERY       OTHER SURGICAL HISTORY      none       CURRENT MEDICATIONS:      Current Facility-Administered Medications:      diazepam (VALIUM) tablet 10 mg, 10 mg, Oral, Q30 Min PRN **OR** diazepam (VALIUM) injection 5-10 mg, 5-10 mg, Intravenous, Q30 Min PRN, Cheryle Velez MD     folic acid (FOLVITE) tablet 1 mg, 1 mg, Oral, Daily, Cheryle Velez MD     melatonin tablet 5 mg, 5 mg, Oral, QPM PRN, Cheryle Velez MD     multivitamin w/minerals (THERA-VIT-M) tablet 1 tablet, 1 tablet, Oral, Daily, Cheryle Velez MD     thiamine tablet 100 mg, 100 mg, Oral, Daily, Cheryle Velez MD    Current Outpatient Medications:      hydrOXYzine (VISTARIL) 50 MG capsule, Take 1 capsule by mouth 3 times daily as needed for allergies, Disp: , Rfl:      hypromellose-dextran (ARTIFICAL TEARS) 0.1-0.3 % ophthalmic solution, Place 1 drop into both eyes daily as needed for dry eyes, Disp: , Rfl:      ketotifen (ZADITOR) 0.025 % ophthalmic solution, Place 1 drop into both eyes 2 times daily as needed for itching, Disp: 5 mL, Rfl: 0     naltrexone (VIVITROL) 380 MG SUSR, Inject 380 mg into the muscle every 28 days, Disp: , Rfl:      sodium chloride (OCEAN) 0.65 % nasal spray, Spray 1 spray into both nostrils daily as needed for congestion, Disp: 15 mL, Rfl: 0    Facility-Administered Medications  Ordered in Other Encounters:      Self Administer Medications: Behavioral Services, , Does not apply, See Admin Instructions, Kenny Forte MD    ALLERGIES:  Allergies   Allergen Reactions     Gramineae Pollens Itching     Pollen Extract Rash     grass tree pollen       FAMILY HISTORY:  Family History   Problem Relation Age of Onset     Coronary Artery Disease Father        SOCIAL HISTORY:   Social History     Socioeconomic History     Marital status: Single     Spouse name: Not on file     Number of children: Not on file     Years of education: Not on file     Highest education level: Not on file   Occupational History     Occupation: Rental property owner   Tobacco Use     Smoking status: Former Smoker     Packs/day: 0.25     Types: Cigars, Cigars     Smokeless tobacco: Never Used     Tobacco comment: occasional   Substance and Sexual Activity     Alcohol use: Yes     Alcohol/week: 17.0 standard drinks     Types: 17 Shots of liquor per week     Comment: Drinks 750ml/day or 17 shots/day; hx of withdrawl seizures     Drug use: Not Currently     Sexual activity: Not on file   Other Topics Concern     Not on file   Social History Narrative     Not on file     Social Determinants of Health     Financial Resource Strain:      Difficulty of Paying Living Expenses:    Food Insecurity:      Worried About Running Out of Food in the Last Year:      Ran Out of Food in the Last Year:    Transportation Needs: No Transportation Needs     Lack of Transportation (Medical): No     Lack of Transportation (Non-Medical): No   Physical Activity:      Days of Exercise per Week:      Minutes of Exercise per Session:    Stress: Stress Concern Present     Feeling of Stress : Very much   Social Connections:      Frequency of Communication with Friends and Family:      Frequency of Social Gatherings with Friends and Family:      Attends Rastafari Services:      Active Member of Clubs or Organizations:      Attends Club or Organization  "Meetings:      Marital Status:    Intimate Partner Violence:      Fear of Current or Ex-Partner:      Emotionally Abused:      Physically Abused:      Sexually Abused:        PHYSICAL EXAM:    Vitals: /77   Pulse 87   Temp 97  F (36.1  C)   Resp 18   Ht 1.803 m (5' 11\")   Wt 90.7 kg (200 lb)   SpO2 91%   BMI 27.89 kg/m     General:. Appears diaphoretic, intoxicated but no acute cardiopulmonary distress  HENT: Oropharynx without erythema or exudates. MMM.  TMs clear bilaterally. Evidence of hematoma over right eye  Eyes: Slight anisocoria with right pupil measuring 4 mm and left pupil measuring 3 mm but EOM are intact without signs of entrapment.   Neck: Full AROM.  No midline tenderness to palpation.  Cardiovascular: Tachycardic. Peripheral pulses 2+ bilaterally.  Chest/Pulmonary: Normal work of breathing. Lung sounds clear and equal throughout, no wheezes or crackles. No chest wall tenderness or deformities.  Abdomen: Soft, nondistended. Nontender without guarding or rebound.  Back/Spine: No CVA or midline tenderness.  Extremities: Normal ROM of all major joints. No lower extremity edema.   Skin: Warm and dry. Normal skin color.   Neuro: Speech clear. CNs grossly intact. Moves all extremities appropriately. Strength and sensation grossly intact to all extremities.   Psych: Normal affect/mood, cooperative, memory appropriate.     LAB:  All pertinent labs reviewed and interpreted.  Labs Ordered and Resulted from Time of ED Arrival Up to the Time of Departure from the ED   BASIC METABOLIC PANEL - Abnormal; Notable for the following components:       Result Value    Potassium 3.4 (*)     Carbon Dioxide (CO2) 20 (*)     All other components within normal limits   ETHYL ALCOHOL LEVEL - Abnormal; Notable for the following components:    Alcohol, Blood 434 (*)     All other components within normal limits   LIPASE - Abnormal; Notable for the following components:    Lipase 131 (*)     All other components " within normal limits   CBC WITH PLATELETS AND DIFFERENTIAL - Abnormal; Notable for the following components:    RBC Count 4.37 (*)     Hematocrit 39.0 (*)     Absolute Neutrophils 1.4 (*)     All other components within normal limits   HEPATIC FUNCTION PANEL - Abnormal; Notable for the following components:    AST 69 (*)     ALT 82 (*)     All other components within normal limits   MAGNESIUM - Abnormal; Notable for the following components:    Magnesium 1.7 (*)     All other components within normal limits   PHOSPHORUS - Abnormal; Notable for the following components:    Phosphorus 2.1 (*)     All other components within normal limits   INR - Normal   MAY SALINE LOCK IV   CIWA-AR SCALE AND VS   NOTIFY PROVIDER   COVID-19 VIRUS (CORONAVIRUS) BY PCR   CBC WITH PLATELETS & DIFFERENTIAL    Narrative:     The following orders were created for panel order CBC with platelets + differential.  Procedure                               Abnormality         Status                     ---------                               -----------         ------                     CBC with platelets and d...[[id number]]  Abnormal            Final result                 Please view results for these tests on the individual orders.       RADIOLOGY:  Head CT w/o contrast   Final Result   IMPRESSION:   1.  No acute intracranial process.   2.  Mild generalized cerebral volume loss.              I, Sonia Mack, am serving as a scribe to document services personally performed by Dr. Zelda Velez  based on my observation and the provider's statements to me. I, Zelda Velez MD attest that Sonia Mack is acting in a scribe capacity, has observed my performance of the services and has documented them in accordance with my direction.      Zelda Velez M.D.  Emergency Medicine  [facility] EMERGENCY DEPARTMENT  [address] [place] [id number] [phone]  Dept: [phone]     Agustin  MD Cheryle  07/28/21 045

## 2021-07-28 NOTE — PLAN OF CARE
Patient arrived as direct admit ~1030. A&Ox4. Up with assist of 1. Tolerating diet earlier in day, nauseated at this time, zofran given. CIWAs, valium given PRN. Denies pain.

## 2021-07-29 LAB
ALBUMIN SERPL-MCNC: 3.1 G/DL (ref 3.4–5)
ALP SERPL-CCNC: 76 U/L (ref 40–150)
ALT SERPL W P-5'-P-CCNC: 71 U/L (ref 0–70)
ANION GAP SERPL CALCULATED.3IONS-SCNC: 4 MMOL/L (ref 3–14)
AST SERPL W P-5'-P-CCNC: 52 U/L (ref 0–45)
BILIRUB SERPL-MCNC: 0.7 MG/DL (ref 0.2–1.3)
BUN SERPL-MCNC: 5 MG/DL (ref 7–30)
CALCIUM SERPL-MCNC: 8.9 MG/DL (ref 8.5–10.1)
CHLORIDE BLD-SCNC: 105 MMOL/L (ref 94–109)
CO2 SERPL-SCNC: 30 MMOL/L (ref 20–32)
CREAT SERPL-MCNC: 0.79 MG/DL (ref 0.66–1.25)
ERYTHROCYTE [DISTWIDTH] IN BLOOD BY AUTOMATED COUNT: 12.9 % (ref 10–15)
GFR SERPL CREATININE-BSD FRML MDRD: >90 ML/MIN/1.73M2
GLUCOSE BLD-MCNC: 88 MG/DL (ref 70–99)
HCT VFR BLD AUTO: 32.8 % (ref 40–53)
HGB BLD-MCNC: 10.8 G/DL (ref 13.3–17.7)
MAGNESIUM SERPL-MCNC: 1.6 MG/DL (ref 1.6–2.3)
MCH RBC QN AUTO: 29.7 PG (ref 26.5–33)
MCHC RBC AUTO-ENTMCNC: 32.9 G/DL (ref 31.5–36.5)
MCV RBC AUTO: 90 FL (ref 78–100)
PHOSPHATE SERPL-MCNC: 3.5 MG/DL (ref 2.5–4.5)
PLATELET # BLD AUTO: 344 10E3/UL (ref 150–450)
POTASSIUM BLD-SCNC: 3.6 MMOL/L (ref 3.4–5.3)
PROT SERPL-MCNC: 6 G/DL (ref 6.8–8.8)
RBC # BLD AUTO: 3.64 10E6/UL (ref 4.4–5.9)
SODIUM SERPL-SCNC: 139 MMOL/L (ref 133–144)
WBC # BLD AUTO: 4.7 10E3/UL (ref 4–11)

## 2021-07-29 PROCEDURE — 99233 SBSQ HOSP IP/OBS HIGH 50: CPT | Performed by: HOSPITALIST

## 2021-07-29 PROCEDURE — 83735 ASSAY OF MAGNESIUM: CPT | Performed by: PHYSICIAN ASSISTANT

## 2021-07-29 PROCEDURE — 120N000001 HC R&B MED SURG/OB

## 2021-07-29 PROCEDURE — 250N000013 HC RX MED GY IP 250 OP 250 PS 637: Performed by: PHYSICIAN ASSISTANT

## 2021-07-29 PROCEDURE — 85027 COMPLETE CBC AUTOMATED: CPT | Performed by: PHYSICIAN ASSISTANT

## 2021-07-29 PROCEDURE — 36415 COLL VENOUS BLD VENIPUNCTURE: CPT | Performed by: PHYSICIAN ASSISTANT

## 2021-07-29 PROCEDURE — 84100 ASSAY OF PHOSPHORUS: CPT | Performed by: PHYSICIAN ASSISTANT

## 2021-07-29 PROCEDURE — 80053 COMPREHEN METABOLIC PANEL: CPT | Performed by: PHYSICIAN ASSISTANT

## 2021-07-29 PROCEDURE — 250N000011 HC RX IP 250 OP 636: Performed by: HOSPITALIST

## 2021-07-29 PROCEDURE — 258N000003 HC RX IP 258 OP 636: Performed by: PHYSICIAN ASSISTANT

## 2021-07-29 RX ORDER — MAGNESIUM SULFATE HEPTAHYDRATE 40 MG/ML
2 INJECTION, SOLUTION INTRAVENOUS ONCE
Status: COMPLETED | OUTPATIENT
Start: 2021-07-29 | End: 2021-07-29

## 2021-07-29 RX ORDER — LOPERAMIDE HCL 2 MG
2 CAPSULE ORAL 4 TIMES DAILY PRN
Status: DISCONTINUED | OUTPATIENT
Start: 2021-07-29 | End: 2021-07-30 | Stop reason: HOSPADM

## 2021-07-29 RX ADMIN — GABAPENTIN 900 MG: 300 CAPSULE ORAL at 03:03

## 2021-07-29 RX ADMIN — GABAPENTIN 900 MG: 300 CAPSULE ORAL at 20:50

## 2021-07-29 RX ADMIN — DIAZEPAM 10 MG: 5 TABLET ORAL at 10:37

## 2021-07-29 RX ADMIN — THIAMINE HCL TAB 100 MG 100 MG: 100 TAB at 09:03

## 2021-07-29 RX ADMIN — CLONIDINE HYDROCHLORIDE 0.1 MG: 0.1 TABLET ORAL at 03:03

## 2021-07-29 RX ADMIN — CLONIDINE HYDROCHLORIDE 0.1 MG: 0.1 TABLET ORAL at 10:31

## 2021-07-29 RX ADMIN — Medication 1 TABLET: at 09:03

## 2021-07-29 RX ADMIN — GABAPENTIN 900 MG: 300 CAPSULE ORAL at 13:05

## 2021-07-29 RX ADMIN — SODIUM CHLORIDE, POTASSIUM CHLORIDE, SODIUM LACTATE AND CALCIUM CHLORIDE: 600; 310; 30; 20 INJECTION, SOLUTION INTRAVENOUS at 13:05

## 2021-07-29 RX ADMIN — FOLIC ACID 1 MG: 1 TABLET ORAL at 09:03

## 2021-07-29 RX ADMIN — SODIUM CHLORIDE, POTASSIUM CHLORIDE, SODIUM LACTATE AND CALCIUM CHLORIDE: 600; 310; 30; 20 INJECTION, SOLUTION INTRAVENOUS at 05:14

## 2021-07-29 RX ADMIN — MAGNESIUM SULFATE HEPTAHYDRATE 2 G: 40 INJECTION, SOLUTION INTRAVENOUS at 15:11

## 2021-07-29 ASSESSMENT — ACTIVITIES OF DAILY LIVING (ADL)
ADLS_ACUITY_SCORE: 12

## 2021-07-29 NOTE — CONSULTS
[facility] Consult & Liaison   7/28/2021  Nikhil Rios 1985     [facility] Consult Diagnostic Assessment:    Started at: 8:55 AM  Completed at: 10:45 AM  Patient was assessed via in-person.  Duration of face to face time with patient in minutes: 1.75 hrs  CPT code(s) utilized: 94520 - Standard diagnostic assessment    Chief Complaint and History of Presenting Problem  Patient is a  35 year old year old  male who is currently at [facility] for alcohol withdrawal. Patient was referred for consult today due to alcohol use and depression.     History of presenting problem:history of alcohol dependence with recurrent alcohol withdrawal, alcohol withdrawal seizures, alcoholic fatty liver, alcohol-induced pancreatitis, hypokalemia and hypomagnesemia, thrombocytopenia, HTN, SILVANA and depression, who presents to this ED for anxiety in the setting of alcohol use concerns. Per chart review, patient with >20 ED visits in the past year related to alcohol use, three within the last month. He reports he has been to treatment four times in the past and is not currently interested in attending another. Nikhil states he has a /LADC through [employer] who he follows with regularly. Nikhil also has been working with an addiction doctor for two years, reports being prescribed several medications, taking them inconsistently, however he does get injection of vivatrol monthly from doctor. He reports he does not find this to be helpful.     Relevant collateral information: Mom reports concerns about him relapsing again. Stating she knows that routine is going to be most important, has offered to have him volunteer at golf course and golf for free. Mom states she would like him to have official diagnostic testing to figure out what he has. Stating she thinks he has ADHD, he has never been able to sit still, wonders if his impulsivity plays role into his relapses.     Demographics, Social, and Environmental Conditions    Patient lives with family members and is their own legal guardian. Patient receives income from Other: savings and owns rental property. Patient reports he stopped working in 2017. He had been working in IT sales for several years, reports after this he started drinking heavily. Patient is not currently a student. Patient is not a  member or . Patient identifies significant interpersonal relationships which include his mom, brother and  Few friends..     Legal History  Patient has a history of or current involvement with the legal system (I.e probation, parole, arrests, incarceration): No, no DUIs or charges related to alcohol use.. There is not a history of civil commitment. However, patient does report at hospitalization at [facility] in February , they did petition and start process, however, was not supported due to him scheduling and attending treatment on his own.      Mental Health Symptoms and Substance Use  Patient identifies historical diagnoses of anxiety, depression, alcohol use disorder, and PTSD. Patient describes current mental health symptoms as: low energy, lack of motivation, negative self-talk, poor appetite, poor sleep.    Current Symptoms and Mental Health History    GAIN Short Screener (GAIN-SS) administered? NA    Attention, Hyperactivity, and Impulsivity Symptoms      Patient reported symptoms related to hyperactivity, inattention, or impulsivity? No       Anxiety Symptoms    Patient reported anxiety symptoms? Yes: Generalized Symptoms: Cognitive anxiety - feelings of doom, racing thoughts, difficulty concentrating  and Excessive worry , avoidance      Behavioral Difficulties    Patient reported behavioral difficulties? No       Mood Symptoms    Patient reported mood disorder symptoms? Yes: Appetite change/weight change , Decreased libido , Excessive guilt , Feelings of helplessness , Feelings of hopelessness , Feelings of worthlessness , Impaired concentration, Impaired  decision making , Isolative , Loss of interest / Anhedonia , Low self esteem , Risky behaviors, Sad, depressed mood  and Sleep disturbance        Eating Disorders and Appetite Disturbance      Patient reported appetite symptoms? Yes: Loss of Appetite       Patient reported appetite or eating disorder symptoms? No      Sleep Disturbance    Patient reported difficulties with sleep? Yes: Difficulty falling asleep , Difficulty staying sleep  and Other: wakes up early and has difficulty falling back asleep        Interpersonal Functioning     Patient reported difficulties that may be associated with personality and interpersonal functioning? No      Learning Disabilities/Cognitive/Developmental Disorders    Patient reported concerns related to learning disabilities, cognitive challenges, and/or developmental disorders? No       General Cognitive Impairments    Patient reported symptoms of cognitive impairments? No  If yes, complete Mini-Cog Assessment.    Psychosis Symptoms    Patient reported symptoms of psychosis? No        Trauma and Post-Traumatic Stress Disorder    Physical Abuse: No   Emotional/Psychological Abuse: No  Sexual Abuse: No  Loss of a friend or family member to suicide: No  Other Traumatic Event: Yes witnessed father have heart attack and attempted mouth to mouth CPR on him to revive him but was not able, this was in 2010.     Patient reported trauma related symptoms? Yes: Intrusions: Recurrent memories of the trauma, Recurrent distressing dreams and Intense psychological distress when you are exposed to reminders/cues of the event and Negative Cognitions/Mood: Persistent negative beliefs about oneself, others, or the world, Persistent distorted cognitions about cause/consequences of the trauma (e.g., self-blame), Persistent negative emotional state (e.g., fear, anger, shame), Diminished activity interest/participation and Feelings of detachment from others       Substance Use  Is there a history of, or  current, substance use? Yes, patient reports struggling with alcohol use for the past four years, stating it has increased overtime. More recently, patient has been drinking about 750ml of vodka daily. If not drinking patient begins to experience withdrawal symptoms. Patient reports increased tolerance. Patient reports he wakes up in the morning and had difficulty falling back to sleep. Stating he will usually wait until 8am when liquor store opens and go get bottle. He reports he does attend AA daily, going around 9am, then drinks after. Patient has required hospitalization over 20 times during the past year for alcohol use.     Have you been to chemical dependency treatment or detox before? Yes: 4 times, the last time was in February.      CAGE-AID    Have you felt you ought to cut down on your drinking or drug use? Yes     Have people annoyed you by criticizing your drinking or drug use? Yes   Have you felt bad or guilty about your drinking or drug use? Yes  Have you ever had a drink or used drugs first thing in the morning to steady your nerves or to get rid of a hangover? Yes   CAGE-AID Score: 4/4    Drug screen completed? no  BAL/Breathalyzer completed? Yes .4       History of Suicidal Ideation, Suicide Attempts, and Risk Formulation:   Suicide and Self-Harm    ESS-6  1.a. Over the past 2 weeks, have you had thoughts of killing yourself? No   1.b. Have you ever attempted to kill yourself and, if yes, when did this last happen? No  2. Recent or current suicide plan? No  3. Recent or current intent to act on ideation? No  4. Lifetime psychiatric hospitalization? No  5. Pattern of excessive substance use? Yes  6. Current irritability, agitation, or aggression? No  ESS-6 Score: 1/6    SI: N/A  Plan: No  Intent: No   Prior Attempts: No     Protective Factors: supportive family    Hopes and goals for the future: wants to find a job within field he likes and with other people    Coping Skills: What helps and doesn't  help? Working out, cooking, avoiding triggers (going to stores that have liquor stores inside)    Additional Risk Factors Related to Safety and Suicide: Yes: Active substance abuse, Depressive symptoms, Isolation, Poor decision making and Poor impulse control    Is the patient engaged in self injurious behaviors? No     Risk to Others    Aggressive/Assaultive/Homicidal Risk Factors: No     Duty to Warn? No     Was a Child Protection Report Made? No       Was a Adult Protection Report Made? No        Sexually inappropriate behavior? No        Vulnerability to sexual exploitation? No     Current Providers  Primary Care Provider: Yes [facility] Dr[facility] Graham  Psychiatrist: Yes [facility] doctor[facility] , Dr Nuñez  Therapist: No   : Yes [facility]: No   ACT Team: No   Other: No    Current psychotropic medications? No  Medication Compliant? No  Recent medication changes? No  History of Commitment? No  History of Psychiatric Hospitalizations? No   History of programmatic care? Yes 4 treatments    Relevant Medical Concerns  Patient identifies concerns with completing ADLs? No  Patient can ambulate independently? Yes  Other medical health concerns? No  History of concussion or TBI? No     Family Mental Health History   Family History of Mental Health or Chemical Dependency Issues? Yes alcohol use and mental health concerns on fathers side    Mental Status Exam:  Affect: Appropriate  Appearance: Appropriate   Attention Span/Concentration: Attentive    Eye Contact: Engaged  Fund of Knowledge: Appropriate   Language /Speech Content: Fluent  Language /Speech Volume: Normal   Language /Speech Rate/Productions: Normal   Recent Memory: variable  Remote Memory: Variable  Mood: Sad   Orientation:   Person: Yes   Place: Yes  Time of Day: Yes   Date: Yes   Situation (Do they understand why they are here?): Yes   Psychomotor Behavior: Normal , some tremors due to alcohol  withdrawal  Content: Clear  Thought Form: Intact    Therapeutic Methodologies Utilized in Assessment    Psychotherapy techniques and/or interventions used: Establishing rapport, Active listening, Apply solution-focused therapy to address current crisis, Identify additional supports and alternative coping skills, Establish a discharge plan and Brief Supportive Therapy    Clinical Summary and Recommendation  Clinical summary: Patient is a 35 year old male seen for alcohol dependence and withdrawal. Patient has insight into these concerns and expresses desire to stop drinking.Patient reports he does not have cravings or enjoy drinking anymore, he reports he mostly does it out of boredom and to manage stress he feels related to finances. Patient denied safety concerns. Patient was open to referral to therapy. Patient also reported he would talk with his doctor about starting Remron. Patient engaged in conversation about coping skills and how to create routine to prevent relapse.       Diagnosis:  F10.20 Alcohol Use disorder, severe  F33.2 Major depressive disorder, recurrent, severe  F43.12 Posttraumatic stress disorder    Recommendation/Plan:   1. Continue coordination with providers, including addiction doctor,  and primary doctor. Talk with addiction doctor about starting Remron.   2. Avoid going to stores that have liquor stores. Create a schedule and build routine to keep busy. Include going to gym, cooking, AA, and searching for jobs.     3. Appointments scheduled:  Neuropsych testing-   [address]   [phone]  8/10/2021 2:00 pm   Outpatient addiction clinic for MH and MATHEW services. Ok if addiction and mental health are concurrent, just no straight psychiatry. Services provided: Opioid Tx with suboxone, taper off pain pills, alcohol meds such as campral or vivitrol, stimulant use disorders, and more. Immediate Rule25 assessments. 245G IOP (in-person group therapy)  with AM or PM option. Therapy, Watson, MMPI testing, ADHD testing and more. CAP accredited lab tests all UA samples for levels.    EMDR Therapy- they will call to schedule with you  [facility], [facility].  [address] [place] [address], [place] [address] [phone]  4. Re-consult psychiatry as needed.

## 2021-07-29 NOTE — PLAN OF CARE
Pt is alert and oriented, has mild anxiety. Up with SBA of 1. Denies SOB & chest pain. C/O intermittent nausea. CIWA checks with valium administration. IV infusing. Will continue to monitor.

## 2021-07-29 NOTE — PLAN OF CARE
Patient alert and oriented, SBA, CIWAR protocol, valium given one time. Iv infusing, patient will discharge tomorrow.

## 2021-07-29 NOTE — UTILIZATION REVIEW
[facility] Utilization Review  Admission Status; Secondary Review Determination     Admission Date: 7/28/2021 10:36 AM      Under the authority of the Utilization Management Committee, the utilization review process indicated a secondary review on the above patient.  The review outcome is based on review of the medical records, discussions with staff, and applying clinical experience noted on the date of the review.        (X)      Inpatient Status Appropriate - This patient's medical care is consistent with medical management for inpatient care and reasonable inpatient medical practice.          RATIONALE FOR DETERMINATION   34 yo M with h/o alcohol dependence and withdrawal, and history of alcohol-related seizures, detoxification, fatty liver, transaminitis, thrombocytopenia, anxiety disorder, depression, admitted with severe alcohol intoxication and concern for withdrawal.  Alcohol level greater than 400, CT head was unremarkable, elevated INR, hypokalemia, hypomagnesemia, hypophosphatemia, he does have history of alcohol withdrawal seizures in the past.  Scoring greater than 10 on CIWA, started on Valium, gabapentin taper, thiamine, folate, multivitamin, IV fluid hydration.  Patient needs psychiatry and chemical dependency evaluations.  Patient also needs aggressive replacement of electrolytes, with anticipated hospital stay greater than 2 days, requiring IV Valium for withdrawals, needs close monitoring in the hospital and is at risk of acute decompensation, agree with inpatient status      The severity of illness, intensity of service provided, expected LOS and risk for adverse outcome make the care complex, high risk and appropriate for hospital admission.The patient requires hospital based medical care which is anticipated to require a stay of 2 or more midnights.        The information on this document is developed by the utilization review team in order for the business office to ensure  compliance.  This only denotes the appropriateness of proper admission status and does not reflect the quality of care rendered.              Sincerely,       Chema Barton MD  Physician Advisor  Utilization Review-[place]    Phone: [phone]

## 2021-07-29 NOTE — PROGRESS NOTES
[facility]    Medicine Progress Note - Hospitalist Service       Date of Admission:  7/28/2021    Assessment & Plan            Nikhil Rios is a 35 year old male past medical history significant for alcohol dependence with withdrawal and history of alcohol-related seizures, alcohol detoxification, alcoholic fatty liver, transaminitis, thrombocytopenia, realized anxiety disorder, major depressive disorder, and internal hemorrhoid bleeding was directly admitted to [facility] from [facility] emergency department with severe alcohol intoxication with concern of withdrawal. He was transferred to [facility] to the suds unit under observation status as no beds available at [facility].    Alcohol intoxication with withdrawal  Severe alcohol use disorder   History of alcohol-related seizures  Multiple prior hospitalizations for alcohol intoxication and withdrawal. Acutely intoxicated with alcohol level over 400. Last drink yesterday, unsure if he feels intoxicated at this point. Drinks 0.75L Vodka daily. Staff concerned he will start to withdrawal soon.  He seeks mental health and chemical dependency consultations.  He has a bruise on his head therefore head CT was completed and negative in the emergency department. Basic labs including CMP, CBC, and INR revealed mild hypokalemia, hypomagnesemia, prophosphatemia, stable elevation of AST/ALT, lipase 131. INR 0.98. Blood alcohol level 434 Per chart review the highest his alcohol level has been in our records is 504 in October 2020. No N/V, SOB, lightheadedness, dizziness, or changes to bowel or bladder. Frequent diarrhea during withdrawal historically. Hx alcohol related seizure in the past. Frequent admissions for alcohol withdrawal.  -continue CIWA scoring, as needed Valium  -Gabapentin and PRN Zyprexa available  -Folate, thiamine, MVI  -Psychiatry consult recs pending. Will follow up.  -Regular diet  -re-consult chem dep as  patient able to participate    Electrolyte disturbances related to alcohol intoxication - hypokalemia, hypomagnesemia, hypophosphatemia  -repleted. Discontinue fluids. Discontinue protocols.  -Repeat labs in AM    Generalized anxiety disorder  Major depressive disorder  PTSD  Insomnia  Previously on Lexapro and trazodone. Patient has endorsed need for mental health services.  -Psychiatry consult completed - note pending    Other relevant history  History of transaminitis and alcoholic liver disease  History of alcohol related thrombocytopenia  Tobacco use disorder: Smokes cigars when he drinks, declines nicotine patch    COVID-19 admission screen: Negative 7/28.         Diet: Regular Diet Adult    DVT Prophylaxis: Low Risk/Ambulatory with no VTE prophylaxis indicated  Mars Catheter: Not present  Central Lines: None  Code Status: Full Code      Disposition Plan   Expected discharge: tomorrow recommended to prior living arrangement once withdrawal resolved.      Time Spent on this Encounter   I spent >35 minutes on the unit/floor managing the care of this patient. Over 50% of my time was spent on the following:   - Counseling the patient and/or family regarding: diagnostic results, prognosis, risks and benefits of treatment options, recommended follow-up and medical compliance    The patient's care was discussed with the Bedside Nurse, Care Coordinator/, Patient and Patient's Family.    Kassi Sharma MD  Hospitalist Service  [facility]  Securely message with the Vocera Web Console (learn more here)  Text page via [employer] Paging/Directory        Clinically Significant Risk Factors Present on Admission               ______________________________________________________________________    Interval History   Patient said he felt well. [place] like diarrhea might start soon. Was interested in imodium prn. Notably tremulous but denies symptoms. Mother at bedside, requested Empath  evaluation. Per RN, psych/dec  was in the room for an hour this morning. Patient also has outpatient  or someone stopping by this afternoon. Endorses that he did not follow up on outpatient treatment appt after last discharge. He is tolerating po. Denies N/V.     Data reviewed today: I reviewed all medications, new labs and imaging results over the last 24 hours. I personally reviewed no images or EKG's today.    Physical Exam   Vital Signs: Temp: 98.2  F (36.8  C) Temp src: Oral BP: (!) 114/90 Pulse: 81   Resp: 16 SpO2: 98 % O2 Device: None (Room air)    Weight: 0 lbs 0 oz  Constitutional: Awake, alert, cooperative, no apparent distress. Tremulous.  HEENT: neck supple, no LAD noted, moist mucous membranes  Respiratory: Clear to auscultation bilaterally, no crackles or wheezing  Cardiovascular: Regular rate and rhythm, normal S1 and S2, and no murmur noted  GI: Normal bowel sounds, soft, non-distended, non-tender  Skin/Integumen: No rashes, no cyanosis, no edema  Ext: no edema, moving all extremities  Neuro: CN 2-12 intact, non focal exam    Data   Recent Labs   Lab 07/29/21  0643 07/28/21  1633 07/28/21  0357 07/28/21  0207 07/23/21  0547   WBC 4.7  --   --  4.2 7.5   HGB 10.8*  --   --  13.5 13.1*   MCV 90  --   --  89 91     --   --  421 125*   INR  --   --  0.98  --   --      --  141  --  135   POTASSIUM 3.6 3.6 3.4*  --  3.5   CHLORIDE 105  --  106  --  103   CO2 30  --  20*  --  26   BUN 5*  --  9  --  7   CR 0.79  --  0.73  --  0.72   ANIONGAP 4  --  15  --  6   KEELEY 8.9  --  8.6  --  9.1   GLC 88  --  101  --  99   ALBUMIN 3.1*  --  4.1  --  3.8   PROTTOTAL 6.0*  --  7.3  --  7.3   BILITOTAL 0.7  --  0.5  --  0.5   ALKPHOS 76  --  92  --  93   ALT 71*  --  82*  --  95*   AST 52*  --  69*  --  79*   LIPASE  --   --  131*  --   --      No results found for this or any previous visit (from the past 24 hour(s)).  Medications       folic acid  1 mg Oral Daily     gabapentin  900 mg  Oral Q8H     magnesium sulfate  2 g Intravenous Once     multivitamin w/minerals  1 tablet Oral Daily     sodium chloride (PF)  3 mL Intracatheter Q8H     thiamine  100 mg Oral Daily

## 2021-07-29 NOTE — DISCHARGE INSTRUCTIONS
Neuropsych testing-   [address]   [phone]  8/10/2021 2:00 pm   Outpatient addiction clinic for MH and MATHEW services. Ok if addiction and mental health are concurrent, just no straight psychiatry. Services provided: Opioid Tx with suboxone, taper off pain pills, alcohol meds such as campral or vivitrol, stimulant use disorders, and more. Immediate Rule25 assessments. 245G IOP (in-person group therapy) with AM or PM option. Therapy, Watson, MMPI testing, ADHD testing and more. CAP accredited lab tests all UA samples for levels.    EMDR Therapy- they will call to schedule with you  [facility], [facility].  [facility], [facility] [phone]

## 2021-07-29 NOTE — PHARMACY-ADMISSION MEDICATION HISTORY
Pharmacy Medication History  Admission medication history interview status for the 7/28/2021  admission is complete. See EPIC admission navigator for prior to admission medications     Location of Interview: Patient room  Medication history sources: Patient    Significant changes made to the medication list:  Removed artifical tears, naltrexone  Not taking ketotifen, sodium chloride nasal spray    Additional medication history information:   None    Medication reconciliation completed by provider prior to medication history? No    Time spent in this activity: 10 mintues    Prior to Admission medications    Medication Sig Last Dose Taking? Auth Provider   hydrOXYzine (VISTARIL) 50 MG capsule Take 1 capsule by mouth 3 times daily as needed for allergies PRN Yes Reported, Patient   ketotifen (ZADITOR) 0.025 % ophthalmic solution Place 1 drop into both eyes 2 times daily as needed for itching  Patient not taking: Reported on 7/29/2021 Not Taking at Unknown time  Ava Lew MD   sodium chloride (OCEAN) 0.65 % nasal spray Spray 1 spray into both nostrils daily as needed for congestion  Patient not taking: Reported on 7/29/2021 Not Taking at Unknown time  Ava Lew MD       The information provided in this note is only as accurate as the sources available at the time of update(s)

## 2021-07-30 VITALS
DIASTOLIC BLOOD PRESSURE: 84 MMHG | HEART RATE: 55 BPM | TEMPERATURE: 97.8 F | SYSTOLIC BLOOD PRESSURE: 120 MMHG | OXYGEN SATURATION: 96 % | RESPIRATION RATE: 16 BRPM

## 2021-07-30 LAB
ANION GAP SERPL CALCULATED.3IONS-SCNC: 3 MMOL/L (ref 3–14)
BUN SERPL-MCNC: 5 MG/DL (ref 7–30)
CALCIUM SERPL-MCNC: 9 MG/DL (ref 8.5–10.1)
CHLORIDE BLD-SCNC: 110 MMOL/L (ref 94–109)
CO2 SERPL-SCNC: 28 MMOL/L (ref 20–32)
CREAT SERPL-MCNC: 0.77 MG/DL (ref 0.66–1.25)
GFR SERPL CREATININE-BSD FRML MDRD: >90 ML/MIN/1.73M2
GLUCOSE BLD-MCNC: 89 MG/DL (ref 70–99)
POTASSIUM BLD-SCNC: 3.8 MMOL/L (ref 3.4–5.3)
SODIUM SERPL-SCNC: 141 MMOL/L (ref 133–144)

## 2021-07-30 PROCEDURE — 250N000013 HC RX MED GY IP 250 OP 250 PS 637: Performed by: PHYSICIAN ASSISTANT

## 2021-07-30 PROCEDURE — 36415 COLL VENOUS BLD VENIPUNCTURE: CPT | Performed by: HOSPITALIST

## 2021-07-30 PROCEDURE — 999N000216 HC STATISTIC ADULT CD FACE TO FACE-NO CHRG

## 2021-07-30 PROCEDURE — 99239 HOSP IP/OBS DSCHRG MGMT >30: CPT | Performed by: HOSPITALIST

## 2021-07-30 PROCEDURE — 80048 BASIC METABOLIC PNL TOTAL CA: CPT | Performed by: HOSPITALIST

## 2021-07-30 RX ADMIN — GABAPENTIN 900 MG: 300 CAPSULE ORAL at 04:45

## 2021-07-30 RX ADMIN — FOLIC ACID 1 MG: 1 TABLET ORAL at 08:43

## 2021-07-30 RX ADMIN — Medication 1 TABLET: at 08:43

## 2021-07-30 RX ADMIN — GABAPENTIN 900 MG: 300 CAPSULE ORAL at 12:36

## 2021-07-30 RX ADMIN — HYDROXYZINE HYDROCHLORIDE 50 MG: 25 TABLET ORAL at 00:17

## 2021-07-30 RX ADMIN — THIAMINE HCL TAB 100 MG 100 MG: 100 TAB at 08:43

## 2021-07-30 ASSESSMENT — ACTIVITIES OF DAILY LIVING (ADL)
ADLS_ACUITY_SCORE: 12

## 2021-07-30 NOTE — DISCHARGE SUMMARY
[facility]  Hospitalist Discharge Summary      Date of Admission:  7/28/2021  Date of Discharge:  7/30/2021  Discharging Provider: Kassi Sharma MD      Discharge Diagnoses   Alcohol intoxication with withdrawal  Severe alcohol use disorder   History of alcohol-related seizures  Generalized anxiety disorder  Major depressive disorder  PTSD  Insomnia    Follow-ups Needed After Discharge   Follow-up Appointments     Follow-up and recommended labs and tests       Follow up with primary care provider, Lisandro Graham, within 7 days for   hospital follow- up.  No follow up labs or test are needed. Follow up with   therapy and chem dep resources as planned.             Discharge Disposition   Discharged to home  Condition at discharge: Stable    Hospital Course           Nikhil Rios is a 35 year old male past medical history significant for alcohol dependence with withdrawal and history of alcohol-related seizures, alcohol detoxification, alcoholic fatty liver, transaminitis, thrombocytopenia, realized anxiety disorder, major depressive disorder, and internal hemorrhoid bleeding was directly admitted to [facility] from [facility] emergency department with severe alcohol intoxication with concern of withdrawal.     Alcohol intoxication with withdrawal  Severe alcohol use disorder   History of alcohol-related seizures  Multiple prior hospitalizations for alcohol intoxication and withdrawal. Acutely intoxicated with alcohol level over 400. Last drink yesterday, unsure if he feels intoxicated at this point. Drinks 0.75L Vodka daily. Staff concerned he will start to withdrawal soon.  He seeks mental health and chemical dependency consultations.  He has a bruise on his head therefore head CT was completed and negative in the emergency department. Basic labs including CMP, CBC, and INR revealed mild hypokalemia, hypomagnesemia, prophosphatemia, stable elevation of AST/ALT,  lipase 131. INR 0.98. Blood alcohol level 434 Per chart review the highest his alcohol level has been in our records is 504 in October 2020. No N/V, SOB, lightheadedness, dizziness, or changes to bowel or bladder. Frequent diarrhea during withdrawal historically. Hx alcohol related seizure in the past. Frequent admissions for alcohol withdrawal.  -has not required valium for 24 hours at time of discharge. Mildly tremulous.  -saw DEC  and chem dep and has follow up outpatient  -Regular diet    Electrolyte disturbances related to alcohol intoxication - hypokalemia, hypomagnesemia, hypophosphatemia  -repleted.     Generalized anxiety disorder  Major depressive disorder  PTSD  Insomnia  Previously on Lexapro and trazodone. Patient has endorsed need for mental health services.  -outpatient follow up per psych here    Other relevant history  History of transaminitis and alcoholic liver disease  History of alcohol related thrombocytopenia  Tobacco use disorder: Smokes cigars when he drinks, declines nicotine patch    COVID-19 admission screen: Negative 7/28.        Consultations This Hospital Stay   CARE MANAGEMENT / SOCIAL WORK IP CONSULT  CHEMICAL DEPENDENCY IP CONSULT  PSYCHIATRY IP CONSULT  CHEMICAL DEPENDENCY IP CONSULT    Code Status   Full Code    Time Spent on this Encounter   I, Kassi Sharma MD, personally saw the patient today and spent greater than 30 minutes discharging this patient.       Kassi Sharma MD  [place] [address] [facility]  [address] [place] [address]  Phone: [phone]  ______________________________________________________________________    Physical Exam   Vital Signs: Temp: 97.8  F (36.6  C) Temp src: Oral BP: 120/84 Pulse: 55   Resp: 16 SpO2: 96 % O2 Device: None (Room air)    Weight: 0 lbs 0 oz         Primary Care Physician   Lisandro Graham    Discharge Orders      Reason for your hospital stay    Alcohol withdrawal     Follow-up and  recommended labs and tests     Follow up with primary care provider, Lisandro Graham, within 7 days for hospital follow- up.  No follow up labs or test are needed. Follow up with therapy and chem dep resources as planned.     Activity    Your activity upon discharge: activity as tolerated     Diet    Follow this diet upon discharge: Orders Placed This Encounter      Regular Diet Adult       Significant Results and Procedures   Most Recent 3 CBC's:Recent Labs   Lab Test 07/29/21  0643 07/28/21  0207 07/23/21  0547   WBC 4.7 4.2 7.5   HGB 10.8* 13.5 13.1*   MCV 90 89 91    421 125*     Most Recent 3 BMP's:Recent Labs   Lab Test 07/30/21  0728 07/29/21  0643 07/28/21  1633 07/28/21  0357    139  --  141   POTASSIUM 3.8 3.6 3.6 3.4*   CHLORIDE 110* 105  --  106   CO2 28 30  --  20*   BUN 5* 5*  --  9   CR 0.77 0.79  --  0.73   ANIONGAP 3 4  --  15   KEELEY 9.0 8.9  --  8.6   GLC 89 88  --  101     Most Recent 2 LFT's:Recent Labs   Lab Test 07/29/21  0643 07/28/21  0357   AST 52* 69*   ALT 71* 82*   ALKPHOS 76 92   BILITOTAL 0.7 0.5   ,   Results for orders placed or performed during the hospital encounter of 07/28/21   Head CT w/o contrast    Narrative    EXAM: CT HEAD W/O CONTRAST  LOCATION: [facility]  DATE/TIME: 7/28/2021 4:05 AM    INDICATION: Trauma. Head injury. Pain.  COMPARISON: 06/24/2021.  TECHNIQUE: Routine CT Head without IV contrast. Multiplanar reformats. Dose reduction techniques were used.    FINDINGS:  INTRACRANIAL CONTENTS: No intracranial hemorrhage, extraaxial collection, or mass effect.  No CT evidence of acute infarct. Normal parenchymal attenuation. Mild generalized volume loss. No hydrocephalus.     VISUALIZED ORBITS/SINUSES/MASTOIDS: No intraorbital abnormality. No paranasal sinus mucosal disease. No middle ear or mastoid effusion.    BONES/SOFT TISSUES: No acute osseous abnormality. Left posterior scalp soft tissue swelling/contusion.      Impression     IMPRESSION:  1.  No acute intracranial process.  2.  Mild generalized cerebral volume loss.       Discharge Medications   Current Discharge Medication List      CONTINUE these medications which have NOT CHANGED    Details   hydrOXYzine (VISTARIL) 50 MG capsule Take 1 capsule by mouth 3 times daily as needed (allergies/anxiety)          STOP taking these medications       hypromellose-dextran (ARTIFICAL TEARS) 0.1-0.3 % ophthalmic solution Comments:   Reason for Stopping:         ketotifen (ZADITOR) 0.025 % ophthalmic solution Comments:   Reason for Stopping:         sodium chloride (OCEAN) 0.65 % nasal spray Comments:   Reason for Stopping:             Allergies   Allergies   Allergen Reactions     Gramineae Pollens Itching     Pollen Extract Rash     grass tree pollen

## 2021-07-30 NOTE — CONSULTS
7/30/2021      Spoke with pt to screen for CD services. Pt reports he is not interested in attending inpatient CD treatment at this time. Pt reports he attends AA meetings regularly and plans to continue this. Pt reports he has appointments set up for CD services. Pt declined resources at this time. Pt reports he has providers he is following up with for CD issues. Pt is able to call Mental Health and Addiction Services Line: [phone] and make an appt through [facility] after he is discharged if he would like an evaluation.    CITLALLI Badillo  Phone: [phone]  Email: [email or web address]

## 2021-08-02 ENCOUNTER — PATIENT OUTREACH (OUTPATIENT)
Dept: CARE COORDINATION | Facility: CLINIC | Age: 36
End: 2021-08-02

## 2021-08-02 DIAGNOSIS — Z71.89 OTHER SPECIFIED COUNSELING: ICD-10-CM

## 2021-08-03 ENCOUNTER — HOSPITAL ENCOUNTER (EMERGENCY)
Facility: HOSPITAL | Age: 36
Discharge: STILL ADMITTED OR ADMINISTRATIVE | End: 2021-08-04
Attending: EMERGENCY MEDICINE
Payer: COMMERCIAL

## 2021-08-03 PROCEDURE — 96360 HYDRATION IV INFUSION INIT: CPT

## 2021-08-03 PROCEDURE — 99284 EMERGENCY DEPT VISIT MOD MDM: CPT | Mod: 25

## 2021-08-03 ASSESSMENT — MIFFLIN-ST. JEOR: SCORE: 1875.3

## 2021-08-03 NOTE — PROGRESS NOTES
Clinic Care Coordination Contact  [facility]/Voicemail       Clinical Data: Care Coordinator Outreach  Outreach attempted x 2.  Left message on patient's voicemail with call back information and requested return call.  Plan: Care Coordinator will do no further outreaches at this time.

## 2021-08-04 ENCOUNTER — HOSPITAL ENCOUNTER (OUTPATIENT)
Dept: CT IMAGING | Facility: HOSPITAL | Age: 36
Discharge: HOME | End: 2021-08-04
Attending: EMERGENCY MEDICINE | Admitting: EMERGENCY MEDICINE
Payer: COMMERCIAL

## 2021-08-04 ENCOUNTER — HOSPITAL ENCOUNTER (EMERGENCY)
Facility: HOSPITAL | Age: 36
Discharge: PSYCHIATRIC OR SUBSTANCE USE FACILITY | End: 2021-08-04
Attending: EMERGENCY MEDICINE
Payer: COMMERCIAL

## 2021-08-04 ENCOUNTER — HOSPITAL ENCOUNTER (EMERGENCY)
Facility: HOSPITAL | Age: 36
Discharge: LEFT BEFORE DISCHARGE | End: 2021-08-05
Payer: COMMERCIAL

## 2021-08-04 VITALS
RESPIRATION RATE: 16 BRPM | WEIGHT: 196 LBS | DIASTOLIC BLOOD PRESSURE: 66 MMHG | SYSTOLIC BLOOD PRESSURE: 111 MMHG | BODY MASS INDEX: 27.34 KG/M2 | TEMPERATURE: 97.3 F | HEART RATE: 88 BPM | OXYGEN SATURATION: 95 %

## 2021-08-04 VITALS
HEIGHT: 70 IN | OXYGEN SATURATION: 94 % | BODY MASS INDEX: 28.06 KG/M2 | SYSTOLIC BLOOD PRESSURE: 101 MMHG | TEMPERATURE: 97.3 F | RESPIRATION RATE: 16 BRPM | WEIGHT: 196 LBS | DIASTOLIC BLOOD PRESSURE: 75 MMHG | HEART RATE: 89 BPM

## 2021-08-04 VITALS
WEIGHT: 197 LBS | BODY MASS INDEX: 28.2 KG/M2 | HEIGHT: 70 IN | HEART RATE: 120 BPM | TEMPERATURE: 98.3 F | OXYGEN SATURATION: 96 % | SYSTOLIC BLOOD PRESSURE: 150 MMHG | RESPIRATION RATE: 20 BRPM | DIASTOLIC BLOOD PRESSURE: 74 MMHG

## 2021-08-04 DIAGNOSIS — S09.90XA HEAD TRAUMA: ICD-10-CM

## 2021-08-04 DIAGNOSIS — F10.920 ALCOHOLIC INTOXICATION WITHOUT COMPLICATION (H): ICD-10-CM

## 2021-08-04 LAB
ALBUMIN SERPL-MCNC: 3.8 G/DL (ref 3.5–5)
ALP SERPL-CCNC: 73 U/L (ref 45–120)
ALT SERPL W P-5'-P-CCNC: 65 U/L (ref 0–45)
ANION GAP SERPL CALCULATED.3IONS-SCNC: 14 MMOL/L (ref 5–18)
AST SERPL W P-5'-P-CCNC: 76 U/L (ref 0–40)
BASOPHILS # BLD AUTO: 0.1 10E3/UL (ref 0–0.2)
BASOPHILS NFR BLD AUTO: 2 %
BILIRUB SERPL-MCNC: 0.3 MG/DL (ref 0–1)
BUN SERPL-MCNC: 7 MG/DL (ref 8–22)
CALCIUM SERPL-MCNC: 8.1 MG/DL (ref 8.5–10.5)
CHLORIDE BLD-SCNC: 110 MMOL/L (ref 98–107)
CO2 SERPL-SCNC: 21 MMOL/L (ref 22–31)
CREAT SERPL-MCNC: 0.74 MG/DL (ref 0.7–1.3)
EOSINOPHIL # BLD AUTO: 0.2 10E3/UL (ref 0–0.7)
EOSINOPHIL NFR BLD AUTO: 3 %
ERYTHROCYTE [DISTWIDTH] IN BLOOD BY AUTOMATED COUNT: 13.9 % (ref 10–15)
ETHANOL SERPL-MCNC: 434 MG/DL
GFR SERPL CREATININE-BSD FRML MDRD: >90 ML/MIN/1.73M2
GLUCOSE BLD-MCNC: 86 MG/DL (ref 70–125)
HCT VFR BLD AUTO: 38.2 % (ref 40–53)
HGB BLD-MCNC: 12.9 G/DL (ref 13.3–17.7)
IMM GRANULOCYTES # BLD: 0 10E3/UL
IMM GRANULOCYTES NFR BLD: 1 %
LYMPHOCYTES # BLD AUTO: 2.6 10E3/UL (ref 0.8–5.3)
LYMPHOCYTES NFR BLD AUTO: 38 %
MCH RBC QN AUTO: 30.5 PG (ref 26.5–33)
MCHC RBC AUTO-ENTMCNC: 33.8 G/DL (ref 31.5–36.5)
MCV RBC AUTO: 90 FL (ref 78–100)
MONOCYTES # BLD AUTO: 0.6 10E3/UL (ref 0–1.3)
MONOCYTES NFR BLD AUTO: 8 %
NEUTROPHILS # BLD AUTO: 3.4 10E3/UL (ref 1.6–8.3)
NEUTROPHILS NFR BLD AUTO: 48 %
NRBC # BLD AUTO: 0 10E3/UL
NRBC BLD AUTO-RTO: 0 /100
PLATELET # BLD AUTO: 201 10E3/UL (ref 150–450)
POTASSIUM BLD-SCNC: 4 MMOL/L (ref 3.5–5)
PROT SERPL-MCNC: 6.9 G/DL (ref 6–8)
RBC # BLD AUTO: 4.23 10E6/UL (ref 4.4–5.9)
SODIUM SERPL-SCNC: 145 MMOL/L (ref 136–145)
WBC # BLD AUTO: 6.9 10E3/UL (ref 4–11)

## 2021-08-04 PROCEDURE — 82077 ASSAY SPEC XCP UR&BREATH IA: CPT | Performed by: EMERGENCY MEDICINE

## 2021-08-04 PROCEDURE — 70450 CT HEAD/BRAIN W/O DYE: CPT

## 2021-08-04 PROCEDURE — 258N000003 HC RX IP 258 OP 636: Performed by: EMERGENCY MEDICINE

## 2021-08-04 PROCEDURE — 80053 COMPREHEN METABOLIC PANEL: CPT | Performed by: EMERGENCY MEDICINE

## 2021-08-04 PROCEDURE — 85025 COMPLETE CBC W/AUTO DIFF WBC: CPT | Performed by: EMERGENCY MEDICINE

## 2021-08-04 PROCEDURE — 36415 COLL VENOUS BLD VENIPUNCTURE: CPT | Performed by: EMERGENCY MEDICINE

## 2021-08-04 RX ADMIN — SODIUM CHLORIDE 1000 ML: 9 INJECTION, SOLUTION INTRAVENOUS at 01:51

## 2021-08-04 ASSESSMENT — ENCOUNTER SYMPTOMS
VOMITING: 0
CHILLS: 0
SHORTNESS OF BREATH: 0
FATIGUE: 0
DYSURIA: 0
COUGH: 0
ABDOMINAL PAIN: 0
HEADACHES: 0
COLOR CHANGE: 1
WEAKNESS: 0
FEVER: 0

## 2021-08-04 ASSESSMENT — MIFFLIN-ST. JEOR: SCORE: 1844.84

## 2021-08-04 NOTE — ED NOTES
Patient requesting water. Writer informed MD. Per MD, okdiya for patient to have water. Writer brought ice water to bedside for patient.

## 2021-08-04 NOTE — ED TRIAGE NOTES
Time Event Details User              03:00 Vitals Vital Signs - BP: 113/71 (Device Time: 03:00:00) BP - Mean: 85 (Device Time: 03:00:00) Pulse: 88 (Device Time: 03:00:00) Oximeter Heart Rate: 89 bpm (Device Time: 03:00:00) Resp: 11 (Device Time: 03:00:00) SpO2: 95 % (Device Time: 03:00:00)      03:00 Vitals Reassessment Vitals Assessment - Automatic Restart Vitals Timer: Yes       02:52 Specimens Collected Comprehensive metabolic panel - ID: [id number] Type: Blood RH     02:52 Collect Comprehensive metabolic panel Completed Comprehensive metabolic panel - Type: Blood Source: Arm, Right RH     02:52 Collect Comprehensive metabolic panel Completed Comprehensive metabolic panel - Type: Blood Source: Arm, Right RH     02:45 Imaging Exam Ended Head CT w/o contrast JOSIE     02:45 Vitals Vital Signs - BP: 101/75 (Device Time: 02:51:00) BP - Mean: 85 (Device Time: 02:51:00)      02:45 Vitals Reassessment Vitals Assessment - Automatic Restart Vitals Timer: Yes       02:41 Print Label for Comprehensive metabolic panel (Redraw - Hemolyzed) Completed Comprehensive metabolic panel - Type: Blood Source: Arm, Right RH     02:41 Imaging Exam Started Head CT w/o contrast JOSIE     02:41 0.9% sodium chloride BOLUS Stopped Route: Intravenous  Line: Peripheral IV 08/04/21 Right Lower forearm RH     02:37 Orders Acknowledged New - Head CT w/o contrast RH     02:30 Vitals Vital Signs - BP: 99/53 (Device Time: 02:30:00) BP - Mean: 71 (Device Time: 02:30:00) RH     02:30 Vitals Vital Signs - Pulse: 89 (Device Time: 02:29:00) Oximeter Heart Rate: 89 bpm (Device Time: 02:29:00) Resp: 16 (Device Time: 02:29:00) SpO2: 94 % (Device Time: 02:29:00)      02:30 Vitals Reassessment Vitals Assessment - Automatic Restart Vitals Timer: Yes  RH     02:26 Patient Ready for Rad? Patient ready? - Pt ready for radiology?: Yes  RH     02:15 [UNVALIDATED DEVICE DATA] Vital Signs - Pulse: 87  Oximeter Heart Rate: 88 bpm  Resp: 17  SpO2: 94 %        02:11 Orders Placed Head CT w/o contrast CS     02:00 Vitals Vital Signs - BP: 98/56 (Device Time: 02:00:00) BP - Mean: 70 (Device Time: 02:00:00) Pulse: 90 (Device Time: 02:00:00) Oximeter Heart Rate: 90 bpm (Device Time: 02:00:00) Resp: 17 (Device Time: 02:00:00) SpO2: 95 % (Device Time: 02:00:00) RH     02:00 Vitals Reassessment Vitals Assessment - Automatic Restart Vitals Timer: Yes       01:55 ED Notes Filed Patient requesting water. Writer informed MD. Per shirley ARBOLEDA for patient to have water. Writer brought ice water to bedside for patient.      01:55 Rounds Rounding - Rounds done on this Patient?: Yes  Call Light Within Reach?: Yes  Head of Bed Up?: Yes  Comfort Measures Offered: Fluids       01:51 Orders Completed Peripheral IV catheter RH     01:51 Orders Acknowledged New - 0.9% sodium chloride BOLUS RH     01:51 Peripheral IV 08/04/21 Right Lower forearm Assessment Infiltration Scale: 0 - No symptoms  Line Status: Saline locked  Phlebitis Scale: 0-->no symptoms  Infiltration Site Treatment Method : None  Dressing Intervention: New dressing   Site Assessment: WDL  RH     01:51 0.9% sodium chloride BOLUS New Bag Dose: 1,000 mL Route: Intravenous  Line: Peripheral IV 08/04/21 Right Lower forearm RH     01:50 Peripheral IV 08/04/21 Right Lower forearm Placed Placement Date/Time: 08/04/21 0150 Size Gauge/Length: 20 G Orientation: Right Location: Lower forearm Site Prep: Chlorhexidine Inserted by: Jaja Moreira RN Insertion attempts without ultrasound: 1 Patient Tolerance: Tolerated well RH     01:45 Vitals Vital Signs - BP: 100/55 (Device Time: 01:51:00) BP - Mean: 73 (Device Time: 01:51:00) Pulse: 94 (Device Time: 01:45:00) Oximeter Heart Rate: 95 bpm (Device Time: 01:45:00) SpO2: 95 % (Device Time: 01:45:00) RH     01:45 Vitals Reassessment Vitals Assessment - Automatic Restart Vitals Timer: Yes       01:24 CBC with platelets differential Resulted Collected: 8/4/2021 00:54  Last updated: 8/4/2021  01:24  No components filed BL     01:24 CBC with platelets and differential Resulted Abnormal Result -   Collected: 8/4/2021 00:54  Last updated: 8/4/2021 01:24  WBC Count 6.9 10e3/uL [Ref Range: 4.0 - 11.0]  RBC Count   4.23 10e6/uL  [Ref Range: 4.40 - 5.90]  Hemoglobin   12.9 g/dL  [Ref Range: 13.3 - 17.7]  Hematocrit   38.2 %  [Ref Range: 40.0 - 53.0]  MCV 90 fL [Ref Range: 78 - 100]  MCH 30.5 pg [Ref Range: 26.5 - 33.0]  MCHC 33.8 g/dL [Ref Range: 31.5 - 36.5]  RDW 13.9 % [Ref Range: 10.0 - 15.0]  Platelet Count 201 10e3/uL [Ref Range: 150 - 450]  % Neutrophils 48 %  % Lymphocytes 38 %  % Monocytes 8 %  % Eosinophils 3 %  % Basophils 2 %  % Immature Granulocytes 1 %  NRBCs per 100 WBC 0 /100 [Ref Range: <1]  Absolute Neutrophils 3.4 10e3/uL [Ref Range: 1.6 - 8.3]  Absolute Lymphocytes 2.6 10e3/uL [Ref Range: 0.8 - 5.3]  Absolute Monocytes 0.6 10e3/uL [Ref Range: 0.0 - 1.3]  Absolute Eosinophils 0.2 10e3/uL [Ref Range: 0.0 - 0.7]  Absolute Basophils 0.1 10e3/uL [Ref Range: 0.0 - 0.2]  Absolute Immature Granulocytes 0.0 10e3/uL [Ref Range: <=0.0]  Absolute NRBCs 0.0 10e3/uL BL     01:23 Ethyl Alcohol Level Resulted Abnormal Result -   Collected: 8/4/2021 00:54  Last updated: 8/4/2021 01:23  Alcohol, Blood   434 mg/dL  [Ref Range: None detected]      00:57 Vitals Vital Signs - Pulse: 93 (Device Time: 00:56:00) Oximeter Heart Rate: 93 bpm (Device Time: 00:56:00) Resp: 18 (Device Time: 00:56:00) SpO2: 90 % (Device Time: 00:56:00)      00:57 Vitals Reassessment Vitals Assessment - Automatic Restart Vitals Timer: Yes  LM     00:56 Rounds Rounding - Rounds done on this Patient?: Yes  Side Rails Up?: Yes x2  Call Light Within Reach?: Yes  Head of Bed Up?: Yes       00:54 Specimens Collected CBC with platelets and differential - ID: [id number] Type: Blood      00:54 Specimens Collected Ethyl Alcohol Level - ID: [id number] Type: Blood      00:54 Collect Ethyl Alcohol Level Completed Ethyl Alcohol Level -  Type: Blood Source: Arm, Right MH     00:54 Collect CBC with platelets and differential Completed CBC with platelets and differential - Type: Blood Source: Arm, Right MH     00:42 Orders Acknowledged New - Peripheral IV catheter MH     00:42 Orders Acknowledged New - CBC with platelets differential      00:42 Orders Acknowledged New - Comprehensive metabolic panel MH     00:42 Orders Acknowledged New - Ethyl Alcohol Level      00:42 Print Label for CBC with platelets differential Completed CBC with platelets differential - Type: Blood Source: Arm, Right MH     00:42 Print Label for Comprehensive metabolic panel Completed Comprehensive metabolic panel - Type: Blood Source: Arm, Right      00:42 Print Label for Ethyl Alcohol Level Completed Ethyl Alcohol Level - Type: Blood Source: Arm, Right      00:34 History Reviewed Sections Reviewed: Medical, Surgical, Tobacco, Alcohol, Drug Use, Sexual Activity, Custom, Social Documentation, Family, Socioeconomic MP     00:19 Orders Placed Peripheral IV catheter; CBC with platelets differential; Comprehensive metabolic panel; Ethyl Alcohol Level; 0.9% sodium chloride BOLUS CS     00:10 ED Notes Filed MD at bedside.      00:00 Vitals Vital Signs - BP: 112/63 (Device Time: 00:00:00) BP - Mean: 79 (Device Time: 00:00:00) Pulse: 103 (Device Time: 00:00:00) Oximeter Heart Rate: 104 bpm (Device Time: 00:00:00) Resp: 22 (Device Time: 00:00:00) SpO2: 91 % (Device Time: 00:00:00)      00:00 Vitals Reassessment Vitals Assessment - Automatic Restart Vitals Timer: Yes       23:45 Vitals Vital Signs - BP: 127/78 (Device Time: 23:45:00) BP - Mean: 97 (Device Time: 23:45:00) Pulse: 97 (Device Time: 23:45:00) Oximeter Heart Rate: 97 bpm (Device Time: 23:45:00) Resp: 21 (Device Time: 23:45:00) SpO2: 91 % (Device Time: 23:45:00)      23:45 Vitals Reassessment Vitals Assessment - Automatic Restart Vitals Timer: Yes       23:37 Assign Nurse Ruiz Kumar RN assigned as  "Registered Nurse      23:33 Patient roomed in ED To room [id number] NO     23:32 Triage Completed       23:32 Triage Plan Triage Plan - Patient Acuity: 2  ED Destination: Main ED   Wrist Bands Applied - Patient ID Band Applied: Yes  Allergy Band Applied: N/A  Fall Risk Band Applied: N/A  Restricted Use Band Applied: N/A       23:32 Observed Emotional State/Risk of Violent Behavior Observed Emotional State - Observed Emotional State: restless       23:31 Suicide Risk PSS-3 Suicide Risk Screen - Over the past 2 weeks have you felt down, depressed, or hopeless?: yes  Over the past 2 weeks have you had thoughts of killing yourself?: no  Have you ever attempted to kill yourself?: no       23:29 Vitals Other flowsheet entries - Restart Pain Assessment Timer: Yes       23:29 Triage Vitals Vital Signs - BP: 130/81  Patient Position: Sitting  Pulse: 122 Pulse Rate Source: Monitor  Resp: 18  SpO2: 94 %  O2 Device: None (Room air)  Temp: 97.3  F (36.3  C)  Temp src: Temporal   Reji Coma Scale - Best Eye Response: 4-->(E4) spontaneous  Best Motor Response: 6-->(M6) obeys commands  Best Verbal Response: 5-->(V5) oriented  Reji Coma Scale Score: 15   Pain/Comfort - Patient Currently in Pain: no  RASS (Ball Agitation-Sedation Scale): 0-->alert and calm   RASS - RASS (Ball Agitation-Sedation Scale): 0-->alert and calm   Weight & Height - Weight: 88.9 kg (196 lb)  Height: 177.8 cm (5' 10\")  Height Method: Stated       23:29 Vitals Reassessment Vitals Assessment - Automatic Restart Vitals Timer: Yes       23:29 Custom Formula Data CAM-ICU - Next step for nurse to take:: Continue test   Other flowsheet entries - BSA (Calculated - sq m): 2.1  BMI (Calculated): 28.12       23:26 ED Triage Notes Filed Male patient presents to ED with ETOH intoxication.  Wants to stop drinking.  Patient has been in treatment in the past. Patient states his last drink was last night.  Patient appears to be intoxicated at this " time.  Patient reports withdrawal seizures in the past.      23:26 Allergies Reviewed       23:26 Triage Started       23:26 Chief Complaints Updated Alcohol Intoxication      23:23 Arrival Complaint ETOH       23:23 Travel Screening In the last month, have you been in contact with someone who was confirmed or suspected to have Coronavirus / COVID-19? No / Unsure Have you had a COVID-19 viral test in the last 14 days? No Do you have any of the following new or worsening symptoms? None of these Have you traveled internationally or domestically in the last month? No BL     23:22 Patient arrived in ED  BL

## 2021-08-04 NOTE — ED PROVIDER NOTES
Wrong patient. Disregard any charting or lab results or imaging.     Tejal Gilliam,   08/04/21 0505

## 2021-08-04 NOTE — ED PROVIDER NOTES
EMERGENCY DEPARTMENT ENCOUNTER      NAME: Nikhil Rios  AGE: 35 year old male  YOB: 1985  MRN: [id number]  EVALUATION DATE & TIME: 8/4/2021  3:16 AM    PCP: Lisandro Graham    ED PROVIDER: Tejal Gilliam DO      Chief Complaint   Patient presents with     Alcohol Problem         FINAL IMPRESSION:  1. Alcoholic intoxication without complication (H)          ED COURSE & MEDICAL DECISION MAKING:    Pertinent Labs & Imaging studies reviewed. (See chart for details)  12:08 AM I met with the patient for the initial interview and physical examination. Discussed plan for treatment and workup in the ED. PPE: Provider wore surgical mask and gloves.   3:45 AM I rechecked and updated the patient. I aroused the patient from his slumber. He says he will go to detox.      35 year old male presents to the Emergency Department for evaluation of feeling unwell and concerned that he is in alcohol withdrawal.  Patient with history of alcohol abuse.  He presents with his mother.  He clinically is intoxicated on exam.  He denies any fevers, chills, abdominal pain, vomiting.  He reports he was seen at [facility] 1 week ago after a fall with head injury with head CT imaging.  I am able to see this.  I see a faint old appearing bruise to his right lateral orbit.  Pending some time trying to find patient's chart, given his intoxication, head CT was obtained.  Is unremarkable.  Did discover that patient's initial birthdate was wrong in the chart and his chart was merged.  Patient was several admissions for alcohol intoxication, electrolyte abnormalities, alcohol withdrawal.  Last admission within the past week.  No significant electrolyte abnormalities today.  Patient is intoxicated.  Not suicidal.  No signs of withdrawal.  Initial offered to detox, patient declined.  Offered again and he said he would go.  [facility] called and patient to be transported to detox.  If he refuses, he could be discharged with a sober  ride.    At the conclusion of the encounter I discussed the results of all of the tests and the disposition. The questions were answered. The patient or family acknowledged understanding and was agreeable with the care plan.       MEDICATIONS GIVEN IN THE EMERGENCY:  Medications - No data to display    NEW PRESCRIPTIONS STARTED AT TODAY'S ER VISIT  New Prescriptions    No medications on file          =================================================================    HPI    Patient information was obtained from: Patient and patient's mother    Use of : N/A         Nikhil Rios is a 35 year old male with a pertinent history of HTN, HLD, and alcohol abuse who presents to this ED by walk in for evaluation of alcohol withdrawal.     Per chart review, patient was seen in this ED on 7/19/2021 for evaluation of alcohol withdrawal with shaking/tremors, nausea, and vomiting. Patient had acidosis with bicarb of 16 and elevated anion gap (27). After IVF, repeat BMP with improvement in his acidosis (bicarb 18, anion gap 19). Lipase elevated to 257. Blood alcohol level 207. Given multiple doses of IV Ativan for withdrawal symptoms. Patient was transferred to [facility] for further management. Placed on a CIWA protocol with lorazepam. Thiamine, multivitamins, folic acid, and gabapentin were used. Ultimately symptoms lifted and patient was discharged to home on 7/23/2021. Patient was then seen in this ED on 7/28/2021 feeling like he was starting to go through withdrawal and with evidence of hematoma over his right eye. Blood alcohol level 434. Head CT showed no acute intracranial process. Patient was transferred to [facility] for further management. Saw DEC  and chem dep with plan for follow up outpatient. Discharged home on 7/30/2021.     Patient reports that he was prompted to present to the ER tonight because he feels unwell and like he is in alcohol withdrawal. He reports that his last drink was  "yesterday. He states, \"I do not like drinking, but when I stop I start to withdraw.\" He reports that he has had problems with alcohol use for the past 4 years and he has tried both inpatient and outpatient treatment. Mother states that the patient has been in treatment 5 times in the past 3 years, most recently in early January 2021. Patient reports that the longest he has been sober after treatment was ~80 days. He states that he resumed drinking soon after his most recent treatment earlier this year. He currently lives with his mother. Denies other drug use. Patient adds that he falls frequently, pointing to a bruise near his right eye from a fall that occurred last week. He denies suicidal ideation or additional symptoms at this time.     REVIEW OF SYSTEMS   Review of Systems   Constitutional: Negative for chills, fatigue and fever.        +Feeling generally unwell  +Reported alcohol withdrawal   Respiratory: Negative for cough and shortness of breath.    Cardiovascular: Negative for chest pain.   Gastrointestinal: Negative for abdominal pain and vomiting.   Genitourinary: Negative for dysuria.   Skin: Positive for color change (bruising near right eye).   Neurological: Negative for syncope, weakness and headaches.   All other systems reviewed and are negative.       PAST MEDICAL HISTORY:  Past Medical History:   Diagnosis Date     Alcohol abuse      Alcohol abuse      Alcohol withdrawal (H)      Depressive disorder      HLD (hyperlipidemia)      HTN (hypertension)        PAST SURGICAL HISTORY:  Past Surgical History:   Procedure Laterality Date     NO HISTORY OF SURGERY       OTHER SURGICAL HISTORY      none           CURRENT MEDICATIONS:    hydrOXYzine (VISTARIL) 50 MG capsule         ALLERGIES:  Allergies   Allergen Reactions     Gramineae Pollens Itching     Pollen Extract Rash     grass tree pollen       FAMILY HISTORY:  Family History   Problem Relation Age of Onset     Coronary Artery Disease Father  "       SOCIAL HISTORY:   Social History     Socioeconomic History     Marital status: Single     Spouse name: Not on file     Number of children: Not on file     Years of education: Not on file     Highest education level: Not on file   Occupational History     Occupation: Rental property owner   Tobacco Use     Smoking status: Current Some Day Smoker     Packs/day: 0.25     Types: Cigars     Smokeless tobacco: Never Used     Tobacco comment: occasional   Substance and Sexual Activity     Alcohol use: Yes     Alcohol/week: 17.0 standard drinks     Types: 17 Shots of liquor per week     Comment: Drinks 750ml/day or 17 shots/day; hx of withdrawl seizures     Drug use: Not Currently     Sexual activity: Not on file   Other Topics Concern     Not on file   Social History Narrative     Not on file     Social Determinants of Health     Financial Resource Strain:      Difficulty of Paying Living Expenses:    Food Insecurity:      Worried About Running Out of Food in the Last Year:      Ran Out of Food in the Last Year:    Transportation Needs: No Transportation Needs     Lack of Transportation (Medical): No     Lack of Transportation (Non-Medical): No   Physical Activity:      Days of Exercise per Week:      Minutes of Exercise per Session:    Stress: Stress Concern Present     Feeling of Stress : Very much   Social Connections:      Frequency of Communication with Friends and Family:      Frequency of Social Gatherings with Friends and Family:      Attends Buddhism Services:      Active Member of Clubs or Organizations:      Attends Club or Organization Meetings:      Marital Status:    Intimate Partner Violence:      Fear of Current or Ex-Partner:      Emotionally Abused:      Physically Abused:      Sexually Abused:        VITALS:  /63   Pulse 86   Temp 97.3  F (36.3  C) (Oral)   Resp 16   Wt 88.9 kg (196 lb)   SpO2 94%   BMI 27.34 kg/m      PHYSICAL EXAM    Physical Exam  Constitutional:       General: He  is not in acute distress.  HENT:      Head: Normocephalic and atraumatic.      Comments: Faint old-appearing bruise to right lateral orbit     Mouth/Throat:      Pharynx: Oropharynx is clear.   Eyes:      Pupils: Pupils are equal, round, and reactive to light.   Cardiovascular:      Rate and Rhythm: Normal rate and regular rhythm.      Pulses: Normal pulses.      Heart sounds: Normal heart sounds.   Pulmonary:      Effort: Pulmonary effort is normal.      Breath sounds: Normal breath sounds.   Abdominal:      General: Abdomen is flat.      Palpations: Abdomen is soft.   Musculoskeletal:         General: Normal range of motion.   Skin:     General: Skin is warm and dry.      Capillary Refill: Capillary refill takes less than 2 seconds.   Neurological:      General: No focal deficit present.      Mental Status: He is alert and oriented to person, place, and time.   Psychiatric:         Thought Content: Thought content does not include suicidal ideation.      Comments: Intoxicated           LAB:  Labs Ordered and Resulted from Time of ED Arrival Up to the Time of Departure from the ED - No data to display   All pertinent labs reviewed and interpreted.  Labs Ordered and Resulted from Time of ED Arrival Up to the Time of Departure from the ED   COMPREHENSIVE METABOLIC PANEL - Abnormal; Notable for the following components:       Result Value    Chloride 110 (*)     Carbon Dioxide (CO2) 21 (*)     Urea Nitrogen 7 (*)     Calcium 8.1 (*)     AST 76 (*)     ALT 65 (*)     All other components within normal limits   ETHYL ALCOHOL LEVEL - Abnormal; Notable for the following components:    Alcohol, Blood 434 (*)     All other components within normal limits   CBC WITH PLATELETS AND DIFFERENTIAL - Abnormal; Notable for the following components:    RBC Count 4.23 (*)     Hemoglobin 12.9 (*)     Hematocrit 38.2 (*)     All other components within normal limits   PERIPHERAL IV CATHETER   CBC WITH PLATELETS & DIFFERENTIAL     Narrative:     The following orders were created for panel order CBC with platelets differential.  Procedure                               Abnormality         Status                     ---------                               -----------         ------                     CBC with platelets and d...[[id number]]  Abnormal            Final result                 Please view results for these tests on the individual orders.         RADIOLOGY:  Reviewed all pertinent imaging. Please see official radiology report.  No orders to display   Reviewed all pertinent imaging. Please see official radiology report.  Head CT w/o contrast   Final Result   IMPRESSION:   1.  No acute intracranial process.          I, Odilia Brush, am serving as a scribe to document services personally performed by Dr. Tejal Gilliam based on my observation and the provider's statements to me. ITejal, DO attest that Odilia Brush is acting in a scribe capacity, has observed my performance of the services and has documented them in accordance with my direction.    Tejal Gilliam DO  Emergency Medicine  [facility] EMERGENCY DEPARTMENT  [address] [place] [id number] [phone]  Dept: [phone]     Tejal Gilliam DO  08/04/21 0449

## 2021-08-05 ENCOUNTER — HOSPITAL ENCOUNTER (EMERGENCY)
Facility: HOSPITAL | Age: 36
End: 2021-08-05
Payer: COMMERCIAL

## 2021-08-07 ENCOUNTER — HOSPITAL ENCOUNTER (EMERGENCY)
Facility: HOSPITAL | Age: 36
Discharge: HOME | End: 2021-08-07
Attending: EMERGENCY MEDICINE | Admitting: EMERGENCY MEDICINE
Payer: COMMERCIAL

## 2021-08-07 VITALS
RESPIRATION RATE: 18 BRPM | BODY MASS INDEX: 28.7 KG/M2 | SYSTOLIC BLOOD PRESSURE: 118 MMHG | WEIGHT: 200 LBS | HEART RATE: 84 BPM | DIASTOLIC BLOOD PRESSURE: 81 MMHG | TEMPERATURE: 97.7 F | OXYGEN SATURATION: 96 %

## 2021-08-07 DIAGNOSIS — F10.920 ALCOHOLIC INTOXICATION WITHOUT COMPLICATION (H): ICD-10-CM

## 2021-08-07 LAB
ANION GAP SERPL CALCULATED.3IONS-SCNC: 18 MMOL/L (ref 5–18)
BUN SERPL-MCNC: 6 MG/DL (ref 8–22)
CALCIUM SERPL-MCNC: 9.5 MG/DL (ref 8.5–10.5)
CHLORIDE BLD-SCNC: 104 MMOL/L (ref 98–107)
CO2 SERPL-SCNC: 26 MMOL/L (ref 22–31)
CREAT SERPL-MCNC: 0.85 MG/DL (ref 0.7–1.3)
ERYTHROCYTE [DISTWIDTH] IN BLOOD BY AUTOMATED COUNT: 13.7 % (ref 10–15)
ETHANOL SERPL-MCNC: 416 MG/DL
GFR SERPL CREATININE-BSD FRML MDRD: >90 ML/MIN/1.73M2
GLUCOSE BLD-MCNC: 96 MG/DL (ref 70–125)
HCT VFR BLD AUTO: 43.3 % (ref 40–53)
HGB BLD-MCNC: 14.6 G/DL (ref 13.3–17.7)
MCH RBC QN AUTO: 31.1 PG (ref 26.5–33)
MCHC RBC AUTO-ENTMCNC: 33.7 G/DL (ref 31.5–36.5)
MCV RBC AUTO: 92 FL (ref 78–100)
PLATELET # BLD AUTO: 325 10E3/UL (ref 150–450)
POTASSIUM BLD-SCNC: 4.5 MMOL/L (ref 3.5–5)
RBC # BLD AUTO: 4.7 10E6/UL (ref 4.4–5.9)
SODIUM SERPL-SCNC: 148 MMOL/L (ref 136–145)
WBC # BLD AUTO: 4.4 10E3/UL (ref 4–11)

## 2021-08-07 PROCEDURE — 96361 HYDRATE IV INFUSION ADD-ON: CPT

## 2021-08-07 PROCEDURE — 96376 TX/PRO/DX INJ SAME DRUG ADON: CPT

## 2021-08-07 PROCEDURE — 99285 EMERGENCY DEPT VISIT HI MDM: CPT | Mod: 25

## 2021-08-07 PROCEDURE — 82077 ASSAY SPEC XCP UR&BREATH IA: CPT | Performed by: EMERGENCY MEDICINE

## 2021-08-07 PROCEDURE — 85027 COMPLETE CBC AUTOMATED: CPT | Performed by: EMERGENCY MEDICINE

## 2021-08-07 PROCEDURE — 96375 TX/PRO/DX INJ NEW DRUG ADDON: CPT

## 2021-08-07 PROCEDURE — 96374 THER/PROPH/DIAG INJ IV PUSH: CPT

## 2021-08-07 PROCEDURE — C9803 HOPD COVID-19 SPEC COLLECT: HCPCS

## 2021-08-07 PROCEDURE — 250N000011 HC RX IP 250 OP 636: Performed by: EMERGENCY MEDICINE

## 2021-08-07 PROCEDURE — 36415 COLL VENOUS BLD VENIPUNCTURE: CPT | Performed by: EMERGENCY MEDICINE

## 2021-08-07 PROCEDURE — 96365 THER/PROPH/DIAG IV INF INIT: CPT

## 2021-08-07 PROCEDURE — 80048 BASIC METABOLIC PNL TOTAL CA: CPT | Performed by: EMERGENCY MEDICINE

## 2021-08-07 PROCEDURE — 258N000003 HC RX IP 258 OP 636: Performed by: EMERGENCY MEDICINE

## 2021-08-07 RX ORDER — LORAZEPAM 2 MG/ML
1 INJECTION INTRAMUSCULAR ONCE
Status: COMPLETED | OUTPATIENT
Start: 2021-08-07 | End: 2021-08-07

## 2021-08-07 RX ORDER — DIAZEPAM 5 MG
5 TABLET ORAL ONCE
Status: COMPLETED | OUTPATIENT
Start: 2021-08-07 | End: 2021-08-08

## 2021-08-07 RX ORDER — DIPHENHYDRAMINE HCL 25 MG
25 TABLET ORAL EVERY 6 HOURS PRN
COMMUNITY
End: 2021-08-21

## 2021-08-07 RX ORDER — HYDROXYZINE PAMOATE 50 MG/1
50 CAPSULE ORAL 3 TIMES DAILY PRN
Qty: 15 CAPSULE | Refills: 0 | Status: SHIPPED | OUTPATIENT
Start: 2021-08-07 | End: 2023-05-22

## 2021-08-07 RX ORDER — LORAZEPAM 0.5 MG/1
1 TABLET ORAL EVERY 6 HOURS PRN
Qty: 3 TABLET | Refills: 0 | Status: SHIPPED | OUTPATIENT
Start: 2021-08-07 | End: 2022-06-05

## 2021-08-07 RX ADMIN — LORAZEPAM 1 MG: 2 INJECTION INTRAMUSCULAR; INTRAVENOUS at 14:29

## 2021-08-07 RX ADMIN — LORAZEPAM 1 MG: 2 INJECTION INTRAMUSCULAR; INTRAVENOUS at 16:12

## 2021-08-07 RX ADMIN — SODIUM CHLORIDE 1000 ML: 9 INJECTION, SOLUTION INTRAVENOUS at 17:10

## 2021-08-07 RX ADMIN — SODIUM CHLORIDE 1000 ML: 9 INJECTION, SOLUTION INTRAVENOUS at 14:36

## 2021-08-07 ASSESSMENT — ACTIVITIES OF DAILY LIVING (ADL): DEPENDENT_IADLS:: TRANSPORTATION

## 2021-08-07 NOTE — DISCHARGE INSTRUCTIONS
Follow-up with your detox plan on Monday as scheduled.  In the meantime you may take the prescribed medications to help with any symptoms.  Return to the ER if you have any worsening symptoms or other concerns.

## 2021-08-07 NOTE — CONSULTS
"Care Management Initial Consult    General Information  Assessment completed with: Patient, Jack via i pad  Type of CM/SW Visit: Initial Assessment    Primary Care Provider verified and updated as needed: Yes   Readmission within the last 30 days: no previous admission in last 30 days      Reason for Consult: discharge planning  Advance Care Planning: Advance Care Planning Reviewed: other (comment) (\"don't have one\")          Communication Assessment  Patient's communication style: spoken language (English or Bilingual)             Cognitive  Cognitive/Neuro/Behavioral: WDL                      Living Environment:   People in home: alone, parent(s) (\"live alone in my own house; but often stay at Mom's house\")     Current living Arrangements: house      Able to return to prior arrangements: yes       Family/Social Support:  Care provided by: self  Provides care for: no one  Marital Status: Single  Parent(s) (\"mother\")          Description of Support System: Supportive, Involved    Support Assessment: Adequate family and caregiver support, Adequate social supports, Patient communicates needs well met    Current Resources:   Patient receiving home care services: No     Community Resources: Chemical Dependency Services, OP Mental Health,   Equipment currently used at home: none  Supplies currently used at home: None    Employment/Financial:  Employment Status: unemployed        Financial Concerns:     Referral to Financial Counselor: No       Lifestyle & Psychosocial Needs:  Social Determinants of Health     Tobacco Use: High Risk     Smoking Tobacco Use: Current Some Day Smoker     Smokeless Tobacco Use: Never Used   Alcohol Use: Heavy Drinker     Frequency of Alcohol Consumption: 4 or more times a week     Average Number of Drinks: 10 or more     Frequency of Binge Drinking: Daily or almost daily   Financial Resource Strain:      Difficulty of Paying Living Expenses:    Food Insecurity:      Worried About " "Running Out of Food in the Last Year:      Ran Out of Food in the Last Year:    Transportation Needs: No Transportation Needs     Lack of Transportation (Medical): No     Lack of Transportation (Non-Medical): No   Physical Activity:      Days of Exercise per Week:      Minutes of Exercise per Session:    Stress: Stress Concern Present     Feeling of Stress : Very much   Social Connections:      Frequency of Communication with Friends and Family:      Frequency of Social Gatherings with Friends and Family:      Attends Pentecostal Services:      Active Member of Clubs or Organizations:      Attends Club or Organization Meetings:      Marital Status:    Intimate Partner Violence:      Fear of Current or Ex-Partner:      Emotionally Abused:      Physically Abused:      Sexually Abused:    Depression: Not at risk     PHQ-2 Score: 0   Housing Stability: Unknown     Unable to Pay for Housing in the Last Year: No     Number of Places Lived in the Last Year: Not on file     Unstable Housing in the Last Year: No       Functional Status:  Prior to admission patient needed assistance:   Dependent ADLs:: Independent  Dependent IADLs:: Transportation (\"mom helps\")       Mental Health Status:          Chemical Dependency Status:                Values/Beliefs:  Spiritual, Cultural Beliefs, Pentecostal Practices, Values that affect care:                 Additional Information:  Nikhil lives in his own house or with his mother in her house some of the time also. He is independent with ADLs at baseline.    May need CD resources at discharge. He has been a CD inpatient in the past.    Mother to transport at discharge.    Tori Nayak RN      "

## 2021-08-07 NOTE — PHARMACY-ADMISSION MEDICATION HISTORY
Pharmacy Note - Admission Medication History    Pertinent Provider Information: none     ______________________________________________________________________    Prior To Admission (PTA) med list completed and updated in EMR.       PTA Med List   Medication Sig Last Dose     diphenhydrAMINE (BENADRYL) 25 MG tablet Take 25 mg by mouth every 6 hours as needed for itching or allergies unknown     hydrOXYzine (VISTARIL) 50 MG capsule Take 1 capsule by mouth 3 times daily as needed (allergies/anxiety)  unknown       Information source(s): Patient and CareEverywhere/SureScripts  Method of interview communication: in-person    Summary of Changes to PTA Med List  New: diphenhydramine  Discontinued: none  Changed: none    Patient was asked about OTC/herbal products specifically.  PTA med list reflects this.    In the past week, patient estimated taking medication this percent of the time:  greater than 90%.    Allergies were reviewed, assessed, and updated with the patient.      Patient does not use any multi-dose medications prior to admission.    The information provided in this note is only as accurate as the sources available at the time of the update(s).    Thank you for the opportunity to participate in the care of this patient.    Breanna Thomas  8/7/2021 2:52 PM

## 2021-08-07 NOTE — ED PROVIDER NOTES
EMERGENCY DEPARTMENT ENCOUnter      NAME: Nikhil Rios  AGE: 35 year old male  YOB: 1985  MRN: [id number]  EVALUATION DATE & TIME: 2021  1:46 PM    PCP: Lisandro Graham    ED PROVIDER: Basilio Medrano DO      Chief Complaint   Patient presents with     Alcohol Intoxication         FINAL IMPRESSION:  1. Alcoholic intoxication without complication (H)          ED COURSE & MEDICAL DECISION MAKIN:09 PM I performed my initial history and physical exam as well as discussed ED course and plan.      The patient presents emergency department today with alcohol intoxication.  He has had several previous ER visits for the same.  His last drink was early this morning and he is somewhat tremulous.  He has plans to follow-up as an outpatient with a detox center in 2 days.  Here in the emergency department he was given IV Ativan with good improvement in his symptoms.  Laboratory testing was fairly unremarkable aside from a significantly elevated alcohol level.  His mother is here at the bedside.  Since he is feeling so much better, he would like to go home.  I feel that this is reasonable and I will prescribe a few Ativan in case he has any further withdrawal symptoms.  He has been advised to return right away if there are any worsening symptoms or other concerns.      At the conclusion of the encounter I discussed the results of all of the tests and the disposition. The questions were answered. The patient or family acknowledged understanding and was agreeable with the care plan.         MEDICATIONS GIVEN IN THE EMERGENCY:  Medications   0.9% sodium chloride BOLUS (0 mLs Intravenous Stopped 21 1707)   LORazepam (ATIVAN) injection 1 mg (1 mg Intravenous Given 21 1429)   LORazepam (ATIVAN) injection 1 mg (1 mg Intravenous Given 21 1612)   0.9% sodium chloride BOLUS (0 mLs Intravenous Stopped 21 1812)       NEW PRESCRIPTIONS STARTED AT TODAY'S ER VISIT  Discharge Medication List as of  "8/7/2021  6:15 PM      START taking these medications    Details   LORazepam (ATIVAN) 0.5 MG tablet Take 2 tablets (1 mg) by mouth every 6 hours as needed for anxiety, Disp-3 tablet, R-0, E-Prescribe                =================================================================    HPI  HPI limited due to patients intoxification      Nikhil Rios is a 35 year old male with a pertinent history of alcohol abuse, hypertension, hyperlipidemia, depressive disorder, alcohol withdrawal,s/p alcohol detoxification, hypokalemia,fatty liver, and ACS. who presents to this ED via private car for evaluation of alcohol intoxification.    Patient reports that he is here at the ED because he is experiencing alcohol withdrawal, stating that he is \"fed up\", \"feels terrible\", and \"watns to die\". He notes that his last drink was this morning and has been working on a 750ml of hard alcohol for the past week.Patient has been in treatment 4 times before,his last time being earlier thi year in he beginning of January. He comments that he would like to go home and \"will not go to [facility]\". Patient reports being hospitalized several times in the past month for this but denies any pain.       REVIEW OF SYSTEMS     Constitutional:  Denies fever or chills. Positive for shakiness and generally feeling bad,denies pain  HENT:  Denies sore throat   Respiratory:  Denies cough or shortness of breath   Cardiovascular:  Denies chest pain or palpitations  GI:  Denies abdominal pain, nausea, or vomiting  Musculoskeletal:  Denies any new extremity pain   Skin:  Denies rash   Neurologic:  Denies headache, focal weakness or sensory changes    All other systems reviewed and are negative      PAST MEDICAL HISTORY:  Past Medical History:   Diagnosis Date     Alcohol abuse      Alcohol abuse      Alcohol withdrawal (H)      Depressive disorder      HLD (hyperlipidemia)      HTN (hypertension)        PAST SURGICAL HISTORY:  Past Surgical History: "   Procedure Laterality Date     NO HISTORY OF SURGERY       OTHER SURGICAL HISTORY      none           CURRENT MEDICATIONS:    diphenhydrAMINE (BENADRYL) 25 MG tablet  hydrOXYzine (VISTARIL) 50 MG capsule  LORazepam (ATIVAN) 0.5 MG tablet        ALLERGIES:  Allergies   Allergen Reactions     Gramineae Pollens Itching     Pollen Extract Rash     grass tree pollen       FAMILY HISTORY:  Family History   Problem Relation Age of Onset     Coronary Artery Disease Father        SOCIAL HISTORY:   Social History     Socioeconomic History     Marital status: Single     Spouse name: Not on file     Number of children: Not on file     Years of education: Not on file     Highest education level: Not on file   Occupational History     Occupation: Rental property owner   Tobacco Use     Smoking status: Current Some Day Smoker     Packs/day: 0.25     Types: Cigars     Smokeless tobacco: Never Used     Tobacco comment: occasional   Substance and Sexual Activity     Alcohol use: Yes     Alcohol/week: 17.0 standard drinks     Types: 17 Shots of liquor per week     Comment: Drinks 750ml/day or 17 shots/day; hx of withdrawl seizures     Drug use: Not Currently     Sexual activity: Not on file   Other Topics Concern     Not on file   Social History Narrative     Not on file     Social Determinants of Health     Financial Resource Strain:      Difficulty of Paying Living Expenses:    Food Insecurity:      Worried About Running Out of Food in the Last Year:      Ran Out of Food in the Last Year:    Transportation Needs: No Transportation Needs     Lack of Transportation (Medical): No     Lack of Transportation (Non-Medical): No   Physical Activity:      Days of Exercise per Week:      Minutes of Exercise per Session:    Stress: Stress Concern Present     Feeling of Stress : Very much   Social Connections:      Frequency of Communication with Friends and Family:      Frequency of Social Gatherings with Friends and Family:      Attends  Alevism Services:      Active Member of Clubs or Organizations:      Attends Club or Organization Meetings:      Marital Status:    Intimate Partner Violence:      Fear of Current or Ex-Partner:      Emotionally Abused:      Physically Abused:      Sexually Abused:        VITALS:  Patient Vitals for the past 24 hrs:   BP Temp Pulse Resp SpO2 Weight   08/07/21 1800 118/81 -- 84 18 96 % --   08/07/21 1713 -- -- -- -- -- 90.7 kg (200 lb)   08/07/21 1700 (!) 134/92 -- 102 21 98 % --   08/07/21 1600 135/77 -- 85 16 95 % --   08/07/21 1500 127/77 -- 89 19 92 % --   08/07/21 1440 131/79 -- 88 23 96 % --   08/07/21 1415 -- -- 92 -- 98 % --   08/07/21 1400 (!) 147/100 -- 94 -- 99 % --   08/07/21 1304 (!) 160/92 97.7  F (36.5  C) (!) 135 20 98 % --       PHYSICAL EXAM    Constitutional:  Well developed, Well nourished, Tremulous, appears intoxicated, no signs of traumatic injury  HENT:  Normocephalic, Atraumatic, Bilateral external ears normal, Oropharynx moist, Nose normal.   Neck:  Normal range of motion, No meningismus, No stridor.   Eyes:  EOMI, Conjunctiva normal, No discharge.   Respiratory:  Normal breath sounds, No respiratory distress, No wheezing, No chest tenderness.   Cardiovascular:  Tachycardic, Normal rhythm, No murmurs  GI:  Soft, No tenderness, No guarding, No CVA tenderness.   Musculoskeletal:  Neurovascularly intact distally, No edema, No tenderness, No cyanosis, Good range of motion in all major joints. No tenderness to palpation or major deformities noted.   Integument:  Warm, Dry, No erythema, No rash.   Lymphatic:  No lymphadenopathy noted.   Neurologic:  Alert & oriented x 3, Normal motor function, Normal sensory function, No focal deficits noted.   Psychiatric:  Affect normal, Judgment normal, Mood normal.      LAB:  All pertinent labs reviewed and interpreted.  Results for orders placed or performed during the hospital encounter of 08/07/21   CBC with platelets   Result Value Ref Range    WBC Count  4.4 4.0 - 11.0 10e3/uL    RBC Count 4.70 4.40 - 5.90 10e6/uL    Hemoglobin 14.6 13.3 - 17.7 g/dL    Hematocrit 43.3 40.0 - 53.0 %    MCV 92 78 - 100 fL    MCH 31.1 26.5 - 33.0 pg    MCHC 33.7 31.5 - 36.5 g/dL    RDW 13.7 10.0 - 15.0 %    Platelet Count 325 150 - 450 10e3/uL   Basic metabolic panel   Result Value Ref Range    Sodium 148 (H) 136 - 145 mmol/L    Potassium 4.5 3.5 - 5.0 mmol/L    Chloride 104 98 - 107 mmol/L    Carbon Dioxide (CO2) 26 22 - 31 mmol/L    Anion Gap 18 5 - 18 mmol/L    Urea Nitrogen 6 (L) 8 - 22 mg/dL    Creatinine 0.85 0.70 - 1.30 mg/dL    Calcium 9.5 8.5 - 10.5 mg/dL    Glucose 96 70 - 125 mg/dL    GFR Estimate >90 >60 mL/min/1.73m2   Ethyl Alcohol Level   Result Value Ref Range    Alcohol, Blood 416 (H) None detected mg/dL           I, Adriano Victor, am serving as a scribe to document services personally performed by Dr. Medrano based on my observation and the provider's statements to me. I, Basilio Medrano, DO attest that Adriano Victor is acting in a scribe capacity, has observed my performance of the services and has documented them in accordance with my direction.    Basilio Medrano, DO  Emergency Medicine  [facility] EMERGENCY DEPARTMENT  [facility] [facility] [id number] [phone]  Dept: [phone]       Basilio Medrano MD  08/07/21 1523

## 2021-08-07 NOTE — ED NOTES
"Patient resting comfortably in bed with mother at bedside. States that he feels much improvement after receiving IV fluids and a total of 2mg of IV ativan. Reports improvement in nausea and feeling \"shaky.\"   "

## 2021-08-08 ENCOUNTER — HOSPITAL ENCOUNTER (EMERGENCY)
Facility: HOSPITAL | Age: 36
Discharge: OTHER INSTITUTION | End: 2021-08-08
Attending: EMERGENCY MEDICINE | Admitting: EMERGENCY MEDICINE
Payer: COMMERCIAL

## 2021-08-08 VITALS
DIASTOLIC BLOOD PRESSURE: 81 MMHG | TEMPERATURE: 98.2 F | HEART RATE: 96 BPM | SYSTOLIC BLOOD PRESSURE: 122 MMHG | BODY MASS INDEX: 27.26 KG/M2 | OXYGEN SATURATION: 96 % | RESPIRATION RATE: 23 BRPM | WEIGHT: 190 LBS

## 2021-08-08 DIAGNOSIS — F10.930 ALCOHOL WITHDRAWAL SYNDROME WITHOUT COMPLICATION (H): ICD-10-CM

## 2021-08-08 DIAGNOSIS — E83.39 HYPOPHOSPHATEMIA: ICD-10-CM

## 2021-08-08 DIAGNOSIS — F10.920 ALCOHOLIC INTOXICATION WITHOUT COMPLICATION (H): ICD-10-CM

## 2021-08-08 LAB
ALBUMIN SERPL-MCNC: 4 G/DL (ref 3.5–5)
ALP SERPL-CCNC: 77 U/L (ref 45–120)
ALT SERPL W P-5'-P-CCNC: 48 U/L (ref 0–45)
ANION GAP SERPL CALCULATED.3IONS-SCNC: 15 MMOL/L (ref 5–18)
AST SERPL W P-5'-P-CCNC: 59 U/L (ref 0–40)
BILIRUB SERPL-MCNC: 0.3 MG/DL (ref 0–1)
BUN SERPL-MCNC: 5 MG/DL (ref 8–22)
CALCIUM SERPL-MCNC: 8.4 MG/DL (ref 8.5–10.5)
CHLORIDE BLD-SCNC: 107 MMOL/L (ref 98–107)
CO2 SERPL-SCNC: 23 MMOL/L (ref 22–31)
CREAT SERPL-MCNC: 0.74 MG/DL (ref 0.7–1.3)
ERYTHROCYTE [DISTWIDTH] IN BLOOD BY AUTOMATED COUNT: 13.6 % (ref 10–15)
ETHANOL SERPL-MCNC: 361 MG/DL
GFR SERPL CREATININE-BSD FRML MDRD: >90 ML/MIN/1.73M2
GLUCOSE BLD-MCNC: 131 MG/DL (ref 70–125)
HCT VFR BLD AUTO: 38.4 % (ref 40–53)
HGB BLD-MCNC: 13.1 G/DL (ref 13.3–17.7)
LIPASE SERPL-CCNC: 117 U/L (ref 0–52)
MAGNESIUM SERPL-MCNC: 1.8 MG/DL (ref 1.8–2.6)
MCH RBC QN AUTO: 31 PG (ref 26.5–33)
MCHC RBC AUTO-ENTMCNC: 34.1 G/DL (ref 31.5–36.5)
MCV RBC AUTO: 91 FL (ref 78–100)
PHOSPHATE SERPL-MCNC: 2.3 MG/DL (ref 2.5–4.5)
PLATELET # BLD AUTO: 279 10E3/UL (ref 150–450)
POTASSIUM BLD-SCNC: 3.5 MMOL/L (ref 3.5–5)
PROT SERPL-MCNC: 6.9 G/DL (ref 6–8)
RBC # BLD AUTO: 4.23 10E6/UL (ref 4.4–5.9)
SARS-COV-2 RNA RESP QL NAA+PROBE: NEGATIVE
SODIUM SERPL-SCNC: 145 MMOL/L (ref 136–145)
WBC # BLD AUTO: 4.6 10E3/UL (ref 4–11)

## 2021-08-08 PROCEDURE — 85027 COMPLETE CBC AUTOMATED: CPT | Performed by: EMERGENCY MEDICINE

## 2021-08-08 PROCEDURE — 80053 COMPREHEN METABOLIC PANEL: CPT | Performed by: EMERGENCY MEDICINE

## 2021-08-08 PROCEDURE — 84100 ASSAY OF PHOSPHORUS: CPT | Performed by: EMERGENCY MEDICINE

## 2021-08-08 PROCEDURE — 82077 ASSAY SPEC XCP UR&BREATH IA: CPT | Performed by: EMERGENCY MEDICINE

## 2021-08-08 PROCEDURE — 83690 ASSAY OF LIPASE: CPT | Performed by: EMERGENCY MEDICINE

## 2021-08-08 PROCEDURE — 250N000009 HC RX 250: Performed by: EMERGENCY MEDICINE

## 2021-08-08 PROCEDURE — 250N000013 HC RX MED GY IP 250 OP 250 PS 637: Performed by: EMERGENCY MEDICINE

## 2021-08-08 PROCEDURE — 36415 COLL VENOUS BLD VENIPUNCTURE: CPT | Performed by: EMERGENCY MEDICINE

## 2021-08-08 PROCEDURE — 250N000013 HC RX MED GY IP 250 OP 250 PS 637: Performed by: STUDENT IN AN ORGANIZED HEALTH CARE EDUCATION/TRAINING PROGRAM

## 2021-08-08 PROCEDURE — 83735 ASSAY OF MAGNESIUM: CPT | Performed by: EMERGENCY MEDICINE

## 2021-08-08 PROCEDURE — 258N000003 HC RX IP 258 OP 636: Performed by: EMERGENCY MEDICINE

## 2021-08-08 PROCEDURE — 87635 SARS-COV-2 COVID-19 AMP PRB: CPT | Performed by: EMERGENCY MEDICINE

## 2021-08-08 PROCEDURE — 250N000011 HC RX IP 250 OP 636: Performed by: EMERGENCY MEDICINE

## 2021-08-08 RX ORDER — DIAZEPAM 10 MG/2ML
5-10 INJECTION, SOLUTION INTRAMUSCULAR; INTRAVENOUS EVERY 30 MIN PRN
Status: DISCONTINUED | OUTPATIENT
Start: 2021-08-08 | End: 2021-08-08 | Stop reason: HOSPADM

## 2021-08-08 RX ORDER — ONDANSETRON 2 MG/ML
4 INJECTION INTRAMUSCULAR; INTRAVENOUS ONCE
Status: COMPLETED | OUTPATIENT
Start: 2021-08-08 | End: 2021-08-08

## 2021-08-08 RX ORDER — FLUMAZENIL 0.1 MG/ML
0.2 INJECTION, SOLUTION INTRAVENOUS
Status: DISCONTINUED | OUTPATIENT
Start: 2021-08-08 | End: 2021-08-08 | Stop reason: HOSPADM

## 2021-08-08 RX ORDER — DIAZEPAM 5 MG
10 TABLET ORAL EVERY 30 MIN PRN
Status: DISCONTINUED | OUTPATIENT
Start: 2021-08-08 | End: 2021-08-08 | Stop reason: HOSPADM

## 2021-08-08 RX ADMIN — DIAZEPAM 5 MG: 5 TABLET ORAL at 00:55

## 2021-08-08 RX ADMIN — DIAZEPAM 10 MG: 5 TABLET ORAL at 02:14

## 2021-08-08 RX ADMIN — FOLIC ACID: 5 INJECTION, SOLUTION INTRAMUSCULAR; INTRAVENOUS; SUBCUTANEOUS at 01:06

## 2021-08-08 RX ADMIN — ONDANSETRON 4 MG: 2 INJECTION INTRAMUSCULAR; INTRAVENOUS at 00:48

## 2021-08-08 RX ADMIN — DIAZEPAM 10 MG: 5 TABLET ORAL at 01:01

## 2021-08-08 ASSESSMENT — ENCOUNTER SYMPTOMS: TREMORS: 1

## 2021-08-08 NOTE — ED TRIAGE NOTES
Pt presents to ED after discharge today due to inability to  discharge medications for ETOH withdrawal sx. Pt is restricted to PCP approval to obtain medications from pharmacy. Mom attempted to call PCP and has not received a call back for authorization for medications. Pt has inpt tx set up to go for Monday.

## 2021-08-08 NOTE — ED PROVIDER NOTES
"EMERGENCY DEPARTMENT ENCOUNTER      NAME: Nikhil Rios  AGE: 35 year old male  YOB: 1985  MRN: [id number]  EVALUATION DATE & TIME: 8/8/2021 12:02 AM    PCP: Lisandro Graham    ED PROVIDER: Zelda Velez M.D.      Chief Complaint   Patient presents with     Medication Refill     Withdrawal         FINAL IMPRESSION:  1. Alcohol withdrawal syndrome without complication (H)    2. Alcoholic intoxication without complication (H)    3. Hypophosphatemia        MEDICAL DECISION MAKING:    Pertinent Labs & Imaging studies reviewed. (See chart for details)  ED Course as of Aug 08 0130   Sun Aug 08, 2021   0023 Afebrile.  Vital signs here with tachycardia, mild hypertension.  Patient well-known to the emergency department coming in looking for assistance with alcohol withdrawal.  Was seen here earlier today, felt better after medications given and wanted to try to be discharged.  He is restricted to a single provider and was unable to  prescriptions prescribed in the emergency department.  Feels he is starting to go through withdrawal.  Reports that he has had nausea and vomiting.  Is present with his mother.  Is refusing to go to detox centers because \"we do not get along, no me gusta\".  He is quite irritated here in the emergency department.  Argumentative with provider, states that he may argue if we do not send him to a place that he wants to go.  Was discussed with patient very frankly that if we are able to find a bed for him then he will go, he does have a history of withdrawal and seizures however he should not declined if we are able to find him a bed that we will treat him for his withdrawal until he can get into his detox center on Monday.Physical exam for patient here intoxicated male lying in bed without acute cardiopulmonary distress.  He has tachycardia.  He does not have any significant fasciculations.  Mucous membranes seem slightly dry but without tongue fasciculations.  Remainder of his " exam is unremarkable.We will start withdrawal protocol, will search for beds.      0026 Patient's last eye care was lipase is elevated, elevated LFTs.  No significant electrolyte abnormalities.  Phosphorus here is low but is getting multivitamin here.  Did get a phone call for [facility] and will be transferred up there.  Initial score on CIWA scale was a 16.  Patient blood alcohol at that level of withdrawal was 361.              Critical care: 0 minutes excluding separately billable procedures.  Includes bedside management, time reviewing test results, review of records, discussing the case with staff, documenting the medical record and time spent with family members (or surrogate decision makers) discussing specific treatment issues.          ED COURSE:  12:07 AM I met with the patient to gather history and to perform my initial exam. I discussed the plan for care while in the Emergency Department. PPE (gloves, surgical cap, N95 mask, surgical mask) was worn during patient encounters.   1:21 AM I discussed the case with hospitalist at [facility], Dr. Montoya, who accepts the patient for admission.  1:27 AM I updated patient on bed placement at [facility].    The importance of close follow up was discussed. We reviewed warning signs and symptoms, and I instructed Mr. Rios to return to the emergency department immediately if he develops any new or worsening symptoms. I provided additional verbal discharge instructions. Mr. Rios expressed understanding and agreement with this plan of care, his questions were answered, and he was discharged in stable condition.     MEDICATIONS GIVEN IN THE EMERGENCY:  Medications   flumazenil (ROMAZICON) injection 0.2 mg (has no administration in time range)   melatonin tablet 5 mg (has no administration in time range)   diazepam (VALIUM) tablet 10 mg (10 mg Oral Given 8/8/21 0101)     Or   diazepam (VALIUM) injection 5-10 mg ( Intravenous See Alternative 8/8/21 0101)   diazepam  "(VALIUM) tablet 5 mg (5 mg Oral Given 8/8/21 0055)   sodium chloride 0.9 % 1,000 mL with Infuvite Adult 10 mL, thiamine 100 mg, folic acid 1 mg infusion ( Intravenous New Bag 8/8/21 0106)   ondansetron (ZOFRAN) injection 4 mg (4 mg Intravenous Given 8/8/21 0048)       NEW PRESCRIPTIONS STARTED AT TODAY'S ER VISIT:  New Prescriptions    No medications on file          =================================================================    HPI    Patient information was obtained from: Patient    Use of : N/A        Nikhil iRos is a 35 year old male who has pertinent medical history of alcohol abuse, hypertension, hyperlipidemia, depressive disorder, alcohol withdrawal, s/p alcohol detoxification, hypokalemia, fatty liver, and ACS presents for evaluation of alcohol withdrawal.     Per chart review, patient has an extensive ED history for alcohol withdrawal with his most recent visit on 8/7(~1 day ago). Patient reports last drink was earlier in the morning and he has been tremulous. He plans ot follow-up with detox center in 2 days. He was given IV Ativan with good improvement in symptoms. Lab work unremarkable besides elevated alcohol level of 416. Patient discharged home in stable condition with prescription for Ativan.     Patient reports he was discharged from the ED yesterday with Ativan prescription for ETOH withdrawal symptoms, but he is restricted to PCP approval to obtain medications from pharmacy. Mother attempted to call PCP, but never received a call back for authorization. Patient had been offered admission during his previous visit yesterday, but he felt better after medications, so he decided to go home. However, patient is still feeling that he is going through withdrawal as he is tremulous. He came back for concern of withdrawal. Currently patient is looking into out patient detox for Monday, because he does not \"get along\" with [facility] and other detox places due to his history of alcohol " withdrawal seizures. Patient denies any knew trauma since his last visit.      REVIEW OF SYSTEMS   Review of Systems   Neurological: Positive for tremors (due to withdrawal).   All other systems reviewed and are negative.        PAST MEDICAL HISTORY:  Past Medical History:   Diagnosis Date     Alcohol abuse      Alcohol abuse      Alcohol withdrawal (H)      Depressive disorder      HLD (hyperlipidemia)      HTN (hypertension)        PAST SURGICAL HISTORY:  Past Surgical History:   Procedure Laterality Date     NO HISTORY OF SURGERY       OTHER SURGICAL HISTORY      none       CURRENT MEDICATIONS:      Current Facility-Administered Medications:      diazepam (VALIUM) tablet 10 mg, 10 mg, Oral, Q30 Min PRN, 10 mg at 08/08/21 0101 **OR** diazepam (VALIUM) injection 5-10 mg, 5-10 mg, Intravenous, Q30 Min PRN, Cheryle Velez MD     flumazenil (ROMAZICON) injection 0.2 mg, 0.2 mg, Intravenous, q1 min prn, Cheryle Velez MD     melatonin tablet 5 mg, 5 mg, Oral, QPM PRN, Cheryle Velez MD    Current Outpatient Medications:      diphenhydrAMINE (BENADRYL) 25 MG tablet, Take 25 mg by mouth every 6 hours as needed for itching or allergies, Disp: , Rfl:      hydrOXYzine (VISTARIL) 50 MG capsule, Take 1 capsule (50 mg) by mouth 3 times daily as needed for anxiety, Disp: 15 capsule, Rfl: 0     LORazepam (ATIVAN) 0.5 MG tablet, Take 2 tablets (1 mg) by mouth every 6 hours as needed for anxiety, Disp: 3 tablet, Rfl: 0    Facility-Administered Medications Ordered in Other Encounters:      Self Administer Medications: Behavioral Services, , Does not apply, See Admin Instructions, Kenny Forte MD    ALLERGIES:  Allergies   Allergen Reactions     Gramineae Pollens Itching     Pollen Extract Rash     grass tree pollen       FAMILY HISTORY:  Family History   Problem Relation Age of Onset     Coronary Artery Disease Father        SOCIAL HISTORY:   Social History     Socioeconomic History     Marital status: Single     Spouse name:  Not on file     Number of children: Not on file     Years of education: Not on file     Highest education level: Not on file   Occupational History     Occupation: Rental property owner   Tobacco Use     Smoking status: Current Some Day Smoker     Packs/day: 0.25     Types: Cigars     Smokeless tobacco: Never Used     Tobacco comment: occasional   Substance and Sexual Activity     Alcohol use: Yes     Alcohol/week: 17.0 standard drinks     Types: 17 Shots of liquor per week     Comment: Drinks 750ml/day or 17 shots/day; hx of withdrawl seizures     Drug use: Not Currently     Sexual activity: Not on file   Other Topics Concern     Not on file   Social History Narrative     Not on file     Social Determinants of Health     Financial Resource Strain:      Difficulty of Paying Living Expenses:    Food Insecurity:      Worried About Running Out of Food in the Last Year:      Ran Out of Food in the Last Year:    Transportation Needs: No Transportation Needs     Lack of Transportation (Medical): No     Lack of Transportation (Non-Medical): No   Physical Activity:      Days of Exercise per Week:      Minutes of Exercise per Session:    Stress: Stress Concern Present     Feeling of Stress : Very much   Social Connections:      Frequency of Communication with Friends and Family:      Frequency of Social Gatherings with Friends and Family:      Attends Baptism Services:      Active Member of Clubs or Organizations:      Attends Club or Organization Meetings:      Marital Status:    Intimate Partner Violence:      Fear of Current or Ex-Partner:      Emotionally Abused:      Physically Abused:      Sexually Abused:        PHYSICAL EXAM:    Vitals: /79   Pulse 91   Temp 98.2  F (36.8  C) (Temporal)   Resp 25   Wt 86.2 kg (190 lb)   SpO2 96%   BMI 27.26 kg/m     General:. Alert and interactive, comfortable appearing. Intoxicated male laying in bed without acute cardiopulmonary distress.   HENT: Oropharynx without  erythema or exudates. Mucous membranes slightly dry without tongue fasciculations.  TMs clear bilaterally.  Eyes: Pupils mid-sized and equally reactive.   Neck: Full AROM.  No midline tenderness to palpation.  Cardiovascular: Tachycardic and regular rhythm. Peripheral pulses 2+ bilaterally.  Chest/Pulmonary: Normal work of breathing. Lung sounds clear and equal throughout, no wheezes or crackles. No chest wall tenderness or deformities.  Abdomen: Soft, nondistended. Nontender without guarding or rebound.  Back/Spine: No CVA or midline tenderness.  Extremities: Normal ROM of all major joints. No lower extremity edema. No significant fasciculations.   Skin: Warm and dry. Normal skin color.   Neuro: Speech clear. CNs grossly intact. Moves all extremities appropriately. Strength and sensation grossly intact to all extremities.   Psych: Normal affect/mood, cooperative, memory appropriate.     LAB:  All pertinent labs reviewed and interpreted.  Labs Ordered and Resulted from Time of ED Arrival Up to the Time of Departure from the ED   CBC WITH PLATELETS - Abnormal; Notable for the following components:       Result Value    RBC Count 4.23 (*)     Hemoglobin 13.1 (*)     Hematocrit 38.4 (*)     All other components within normal limits   COMPREHENSIVE METABOLIC PANEL - Abnormal; Notable for the following components:    Urea Nitrogen 5 (*)     Calcium 8.4 (*)     Glucose 131 (*)     AST 59 (*)     ALT 48 (*)     All other components within normal limits   LIPASE - Abnormal; Notable for the following components:    Lipase 117 (*)     All other components within normal limits   ETHYL ALCOHOL LEVEL - Abnormal; Notable for the following components:    Alcohol, Blood 361 (*)     All other components within normal limits   PHOSPHORUS - Abnormal; Notable for the following components:    Phosphorus 2.3 (*)     All other components within normal limits   MAGNESIUM - Normal   SARS-COV2 (COVID-19) VIRUS RT-PCR   CIWA-AR SCALE AND VS    NOTIFY PROVIDER   NO   COVID-19 VIRUS (CORONAVIRUS) BY PCR    Narrative:     The following orders were created for panel order Asymptomatic COVID-19 Virus (Coronavirus) by PCR Nasopharyngeal.  Procedure                               Abnormality         Status                     ---------                               -----------         ------                     SARS-COV2 (COVID-19) Vir...[[id number]]                      In process                   Please view results for these tests on the individual orders.       RADIOLOGY:  No orders to display       EKG:   None      PROCEDURES:   None      I, Kanchan López, am serving as a scribe to document services personally performed by Dr. Zelda Velez  based on my observation and the provider's statements to me. I, Zelda Velez MD attest that Kanchan López is acting in a scribe capacity, has observed my performance of the services and has documented them in accordance with my direction.      Zelda Velez M.D.  Emergency Medicine  [facility] EMERGENCY DEPARTMENT  [facility] [facility] [id number] [phone]  Dept: [phone]       Cheryle Velez MD  08/08/21 0130

## 2021-08-08 NOTE — ED NOTES
Pt reports 4 x hx of ETOH treatment attempts and once was sober for 80 days. Pt endorses drinking 750 mL of vodka daily. Pt indicates he last drank before lunch on 8/7/21.

## 2021-08-15 ENCOUNTER — HOSPITAL ENCOUNTER (EMERGENCY)
Facility: HOSPITAL | Age: 36
Discharge: LEFT BEFORE DISCHARGE | End: 2021-08-15
Payer: COMMERCIAL

## 2021-08-15 VITALS
RESPIRATION RATE: 20 BRPM | HEART RATE: 119 BPM | OXYGEN SATURATION: 95 % | SYSTOLIC BLOOD PRESSURE: 120 MMHG | DIASTOLIC BLOOD PRESSURE: 82 MMHG | WEIGHT: 190 LBS | TEMPERATURE: 97.6 F | BODY MASS INDEX: 27.26 KG/M2

## 2021-08-15 RX ORDER — DIAZEPAM 10 MG/2ML
5 INJECTION, SOLUTION INTRAMUSCULAR; INTRAVENOUS ONCE
Status: DISCONTINUED | OUTPATIENT
Start: 2021-08-15 | End: 2021-08-15 | Stop reason: HOSPADM

## 2021-08-15 NOTE — ED PROVIDER NOTES
EMERGENCY DEPARTMENT ENCOUNTER      NAME: Nikhil Rios  AGE: 35 year old male  YOB: 1985  MRN: [id number]  EVALUATION DATE & TIME: 8/15/2021  1:28 PM    PCP: Lisandro Graham    ED PROVIDER: Daisy Jj M.D.      Chief Complaint   Patient presents with     Alcohol Problem         FINAL IMPRESSION:  No diagnosis found.      ED COURSE & MEDICAL DECISION MAKING:    Pertinent Labs & Imaging studies reviewed. (See chart for details)     *** I met with the patient for the initial interview and physical examination. Discussed plan for treatment and workup in the ED.     At the conclusion of the encounter I discussed  the results of all of the tests and the disposition with patient.   All questions were answered.  The patient acknowledged understanding and was involved in the decision making regarding the overall care plan.      I discussed with patient the utility, limitations and findings of the exam/interventions/studies done during this visit as well as the list of differential diagnosis and symptoms to monitor/return to ER for.  Additional verbal discharge instructions were provided.       *** minutes of critical care time     Personal Protective Equipment: N-95 mask, hair cap  MEDICATIONS GIVEN IN THE EMERGENCY:  Medications   0.9% sodium chloride BOLUS (has no administration in time range)   diazepam (VALIUM) injection 5 mg (has no administration in time range)       NEW PRESCRIPTIONS STARTED AT TODAY'S ER VISIT  New Prescriptions    No medications on file          =================================================================    HPI    Triage Note: etoh withdrawal per pt.  Restless.  Has been here many times for same.  States he wants help.   for [facility].  IV 18g L AC        Patient information was obtained from: Patient    Use of : N/A      Nikhil Rios is a 35 year old male with a pertinent medical history of alcohol dependence, alcohol-induced pancreatitis and insomnia,  hypokalemia, anxiety, and depression who presents to the ED via EMS for evaluation of an alcohol problem.      REVIEW OF SYSTEMS   Except as stated in the HPI all other systems reviewed and are negative.    PAST MEDICAL HISTORY:  Past Medical History:   Diagnosis Date     Alcohol abuse      Alcohol abuse      Alcohol withdrawal (H)      Depressive disorder      HLD (hyperlipidemia)      HTN (hypertension)        PAST SURGICAL HISTORY:  Past Surgical History:   Procedure Laterality Date     NO HISTORY OF SURGERY       OTHER SURGICAL HISTORY      none       CURRENT MEDICATIONS:      Current Facility-Administered Medications:      0.9% sodium chloride BOLUS, 1,000 mL, Intravenous, Once, Daisy Jj MD     diazepam (VALIUM) injection 5 mg, 5 mg, Intravenous, Once, Daisy Jj MD    Current Outpatient Medications:      diphenhydrAMINE (BENADRYL) 25 MG tablet, Take 25 mg by mouth every 6 hours as needed for itching or allergies, Disp: , Rfl:      hydrOXYzine (VISTARIL) 50 MG capsule, Take 1 capsule (50 mg) by mouth 3 times daily as needed for anxiety, Disp: 15 capsule, Rfl: 0     LORazepam (ATIVAN) 0.5 MG tablet, Take 2 tablets (1 mg) by mouth every 6 hours as needed for anxiety, Disp: 3 tablet, Rfl: 0    Facility-Administered Medications Ordered in Other Encounters:      Self Administer Medications: Behavioral Services, , Does not apply, See Admin Instructions, Kenny Forte MD    ALLERGIES:  Allergies   Allergen Reactions     Gramineae Pollens Itching     Pollen Extract Rash     grass tree pollen       FAMILY HISTORY:  Family History   Problem Relation Age of Onset     Coronary Artery Disease Father        SOCIAL HISTORY:   Social History     Socioeconomic History     Marital status: Single     Spouse name: Not on file     Number of children: Not on file     Years of education: Not on file     Highest education level: Not on file   Occupational History     Occupation: Rental property  owner   Tobacco Use     Smoking status: Current Some Day Smoker     Packs/day: 0.25     Types: Cigars     Smokeless tobacco: Never Used     Tobacco comment: occasional   Substance and Sexual Activity     Alcohol use: Yes     Alcohol/week: 17.0 standard drinks     Types: 17 Shots of liquor per week     Comment: Drinks 750ml/day or 17 shots/day; hx of withdrawl seizures     Drug use: Not Currently     Sexual activity: Not on file   Other Topics Concern     Not on file   Social History Narrative     Not on file     Social Determinants of Health     Financial Resource Strain:      Difficulty of Paying Living Expenses:    Food Insecurity:      Worried About Running Out of Food in the Last Year:      Ran Out of Food in the Last Year:    Transportation Needs: No Transportation Needs     Lack of Transportation (Medical): No     Lack of Transportation (Non-Medical): No   Physical Activity:      Days of Exercise per Week:      Minutes of Exercise per Session:    Stress: Stress Concern Present     Feeling of Stress : Very much   Social Connections:      Frequency of Communication with Friends and Family:      Frequency of Social Gatherings with Friends and Family:      Attends Orthodox Services:      Active Member of Clubs or Organizations:      Attends Club or Organization Meetings:      Marital Status:    Intimate Partner Violence:      Fear of Current or Ex-Partner:      Emotionally Abused:      Physically Abused:      Sexually Abused:        PHYSICAL EXAM    VITAL SIGNS: /82   Pulse 119   Temp 97.6  F (36.4  C) (Temporal)   Resp 20   Wt 86.2 kg (190 lb)   SpO2 95%   BMI 27.26 kg/m     GENERAL: Awake, Alert, answering questions, No acute distress, Well nourished  HEENT: Normal cephalic, Atraumatic, bilateral external ears normal, No scleral icterus, mask in place  NECK: No obvious swelling or abnormality, No stridor  PULMONARY:Normal and symmetric breath sounds, No respiratory distress, Lungs clear to  auscultation bilaterally. No wheezing  CARDIOVASCULAR: Regular rate and rhythm, Distal pulses present and normal.  ABDOMINAL: Soft, Nondistended, Nontender, No flank tenderness, No palpable masses  BACK: No bruising or tenderness.  EXTREMITIES: Moves all extremities spontaneously, warm, no edema, No major deformities  NEURO: No facial droop, normal motor function, Normal speech   PSYCH: Normal mood and affect  SKIN: No rashes on visualized skin, dry, warm     LAB:  All pertinent labs reviewed and interpreted.       RADIOLOGY:  No orders to display       No orders to display       EKG:    ***  I have independently reviewed and interpreted the EKG(s) documented above.    PROCEDURES:   ***      I, Susy Kilpatrick, am serving as a scribe to document services personally performed by Dr. Jj based on my observation and the provider's statements to me. I, Daisy Jj MD attest that Susy Kilpatrick is acting in a scribe capacity, has observed my performance of the services and has documented them in accordance with my direction.    Daisy Jj M.D.  Emergency Medicine  [facility] EMERGENCY DEPARTMENT  [facility] [facility] [id number] [phone]  Dept: [phone]

## 2021-08-15 NOTE — ED NOTES
Pt now wanting to leave.  Told pt I could get to his fluids after I discharged the pt I was working with.  Pt does not want to wait.  IV removed, refusal of medical screening signed

## 2021-08-15 NOTE — ED NOTES
Bed: JNED-06  Expected date: 8/15/21  Expected time: 1:20 PM  Means of arrival: Ambulance  Comments:  ETOH WD 35 years old

## 2021-08-15 NOTE — ED TRIAGE NOTES
etoh withdrawal per pt.  Restless.  Has been here many times for same.  States he wants help.   for [facility].  IV 18g L AC

## 2021-08-15 NOTE — ED NOTES
Pt states he wants to leave.  This nurse told pt I would get his blood drawn and IV fluids started within a half hour if he can wait.  Pt agreeable

## 2021-08-16 ENCOUNTER — HOSPITAL ENCOUNTER (EMERGENCY)
Facility: HOSPITAL | Age: 36
Discharge: HOME | End: 2021-08-16
Attending: EMERGENCY MEDICINE | Admitting: EMERGENCY MEDICINE
Payer: COMMERCIAL

## 2021-08-16 VITALS
BODY MASS INDEX: 26.6 KG/M2 | HEIGHT: 71 IN | OXYGEN SATURATION: 99 % | RESPIRATION RATE: 16 BRPM | SYSTOLIC BLOOD PRESSURE: 143 MMHG | HEART RATE: 98 BPM | TEMPERATURE: 98.4 F | DIASTOLIC BLOOD PRESSURE: 90 MMHG | WEIGHT: 190 LBS

## 2021-08-16 DIAGNOSIS — F10.920 ALCOHOLIC INTOXICATION WITHOUT COMPLICATION (H): ICD-10-CM

## 2021-08-16 LAB — ETHANOL SERPL-MCNC: 367 MG/DL

## 2021-08-16 PROCEDURE — 258N000003 HC RX IP 258 OP 636: Performed by: EMERGENCY MEDICINE

## 2021-08-16 PROCEDURE — 99283 EMERGENCY DEPT VISIT LOW MDM: CPT | Mod: 25

## 2021-08-16 PROCEDURE — 82077 ASSAY SPEC XCP UR&BREATH IA: CPT | Performed by: EMERGENCY MEDICINE

## 2021-08-16 PROCEDURE — 36415 COLL VENOUS BLD VENIPUNCTURE: CPT | Performed by: EMERGENCY MEDICINE

## 2021-08-16 PROCEDURE — 96360 HYDRATION IV INFUSION INIT: CPT

## 2021-08-16 RX ADMIN — SODIUM CHLORIDE 1000 ML: 9 INJECTION, SOLUTION INTRAVENOUS at 14:55

## 2021-08-16 ASSESSMENT — MIFFLIN-ST. JEOR: SCORE: 1828.96

## 2021-08-16 ASSESSMENT — ENCOUNTER SYMPTOMS: VOMITING: 1

## 2021-08-16 NOTE — ED PROVIDER NOTES
EMERGENCY DEPARTMENT ENCOUNTER     NAME: Nikhil Rios   AGE: 35 year old male   YOB: 1985   MRN: [id number]   EVALUATION DATE & TIME: 8/16/2021  2:28 PM   PCP: Lisandro Graham     Chief Complaint   Patient presents with     Alcohol Intoxication   :    FINAL IMPRESSION       1. Alcoholic intoxication without complication (H)           ED COURSE & MEDICAL DECISION MAKING      Pertinent Labs & Imaging studies reviewed. (See chart for details)   35 year old male  presents to the Emergency Department for evaluation of alcohol intoxication.  Patient was seen here yesterday for same, ultimately left AGAINST MEDICAL ADVICE. Initial Vitals Reviewed. Initial exam notable for patient who is intoxicated appearing, is mildly tachycardic on arrival but with no signs of withdrawal.  He is specifically asking for withdrawal medications, and asked a few times, but clearly I see no signs of alcohol withdrawal and I suspect intoxication.  I offered transfer to detox which he declined.  He was treated with some IV fluids, has no tremors, no altered mental status.  He ultimately has an alcohol level of greater than 350 but is awake, alert, and frequently has an alcohol level around 400 when it is checked.  At this time, he did not want any transfer or treatment for chemical dependency, and he was able to find a friend who came to the ED and will be taking him responsibly into their care for transfer back home.        2:40 PM I met with the patient, obtained history, performed an initial exam, and discussed options and plan for diagnostics and treatment here in the ED.     At the conclusion of the encounter I discussed the results of all of the tests and the disposition. The questions were answered. The patient or family acknowledged understanding and was agreeable with the care plan.         MEDICATIONS GIVEN IN THE EMERGENCY:   Medications - No data to display   NEW PRESCRIPTIONS STARTED AT TODAY'S ER VISIT   New  "Prescriptions    No medications on file     ================================================================   HISTORY OF PRESENT ILLNESS       Patient information was obtained from: Patient    Use of Intrepreter: N/A     Nikhil Rios is a 35 year old male with history of alcohol dependence with uncomplicated withdrawal, alcohol abuse, alcohol-induced insomnia, chemical dependency, and alcohol-induced acute pancreatitis, who presents to the ED via EMS for the evaluation of alcohol intoxication.    Patient reports that he has been drinking today and is not feeling well so he called police as he wants help to get sober. He states that he has been trying to \"cut down\" on alcohol use. Patient reports that he has been to four treatments and has done many detox, however he states that they do not help. Patient states that he does not want to detox today.     Per triage note, patient was at [facility] Emergency Room for alcohol abuse, however patient left AMA. Patient called EMS today as he wants help to get sober. EMS medics saw two empty pints of vodka at his house. Patient was given 4 mg of IV Zofran en route to ED today.     No other complaints at this time.   ================================================================    REVIEW OF SYSTEMS       Review of Systems   Constitutional:        Positive for alcohol intoxication.    Gastrointestinal: Positive for vomiting.   All other systems reviewed and are negative.       PAST HISTORY     PAST MEDICAL HISTORY:   Past Medical History:   Diagnosis Date     Alcohol abuse      Alcohol abuse      Alcohol withdrawal (H)      Depressive disorder      HLD (hyperlipidemia)      HTN (hypertension)       PAST SURGICAL HISTORY:   Past Surgical History:   Procedure Laterality Date     NO HISTORY OF SURGERY       OTHER SURGICAL HISTORY      none      CURRENT MEDICATIONS:   diphenhydrAMINE (BENADRYL) 25 MG tablet  hydrOXYzine (VISTARIL) 50 MG capsule  LORazepam (ATIVAN) 0.5 MG " tablet      ALLERGIES:   Allergies   Allergen Reactions     Gramineae Pollens Itching     Pollen Extract Rash     grass tree pollen      FAMILY HISTORY:   Family History   Problem Relation Age of Onset     Coronary Artery Disease Father       SOCIAL HISTORY:   Social History     Socioeconomic History     Marital status: Single     Spouse name: Not on file     Number of children: Not on file     Years of education: Not on file     Highest education level: Not on file   Occupational History     Occupation: Rental property owner   Tobacco Use     Smoking status: Current Some Day Smoker     Packs/day: 0.25     Types: Cigars     Smokeless tobacco: Never Used     Tobacco comment: occasional   Substance and Sexual Activity     Alcohol use: Yes     Alcohol/week: 17.0 standard drinks     Types: 17 Shots of liquor per week     Comment: Drinks 750ml/day or 17 shots/day; hx of withdrawl seizures     Drug use: Not Currently     Sexual activity: Not on file   Other Topics Concern     Not on file   Social History Narrative     Not on file     Social Determinants of Health     Financial Resource Strain:      Difficulty of Paying Living Expenses:    Food Insecurity:      Worried About Running Out of Food in the Last Year:      Ran Out of Food in the Last Year:    Transportation Needs: No Transportation Needs     Lack of Transportation (Medical): No     Lack of Transportation (Non-Medical): No   Physical Activity:      Days of Exercise per Week:      Minutes of Exercise per Session:    Stress: Stress Concern Present     Feeling of Stress : Very much   Social Connections:      Frequency of Communication with Friends and Family:      Frequency of Social Gatherings with Friends and Family:      Attends [employer] Services:      Active Member of Clubs or Organizations:      Attends Club or Organization Meetings:      Marital Status:    Intimate Partner Violence:      Fear of Current or Ex-Partner:      Emotionally Abused:      Physically  "Abused:      Sexually Abused:         VITALS  Patient Vitals for the past 24 hrs:   BP Temp Temp src Pulse Resp SpO2 Height Weight   08/16/21 1330 136/75 98.4  F (36.9  C) Oral (!) 133 12 98 % 1.803 m (5' 11\") 86.2 kg (190 lb)        ================================================================    PHYSICAL EXAM     VITAL SIGNS: /75   Pulse (!) 133   Temp 98.4  F (36.9  C) (Oral)   Resp 12   Ht 1.803 m (5' 11\")   Wt 86.2 kg (190 lb)   SpO2 98%   BMI 26.50 kg/m     Constitutional:  Awake, no acute distress. Appears intoxicated.   HENT:  Atraumatic, oropharynx without exudate or erythema, membranes moist  Lymph:  No adenopathy  Eyes: EOM intact, PERRL, no injection  Neck: Supple  Respiratory:  Clear to auscultation bilaterally, no wheezes or crackles   Cardiovascular:  Tachycardic, Regular rhythm, single S1 and S2   GI:  Soft, nontender, nondistended, no rebound or guarding   Musculoskeletal:  Moves all extremities, no lower extremity edema, no deformities    Skin:  Warm, dry  Neurologic:  Alert and oriented x3, no focal deficits noted. Slurred speech, no tremors.      ================================================================  LAB       All pertinent labs reviewed and interpreted.   Labs Ordered and Resulted from Time of ED Arrival Up to the Time of Departure from the ED   ETHYL ALCOHOL LEVEL - Abnormal; Notable for the following components:       Result Value    Alcohol, Blood 367 (*)     All other components within normal limits   PERIPHERAL IV CATHETER        ===============================================================  RADIOLOGY       Reviewed all pertinent imaging. Please see official radiology report.   No orders to display         ================================================================  EKG         I have independently reviewed and interpreted the EKG(s) documented above.     ================================================================  PROCEDURES         I, Gisella Tolentino, am " serving as a scribe to document services personally performed by Dr. Machado based on my observation and the provider's statements to me. I, Charlotte Machado MD attest that Gisella Tolentino is acting in a scribe capacity, has observed my performance of the services and has documented them in accordance with my direction.   Charlotte Machado M.D.   Emergency Medicine   [facility] EMERGENCY DEPARTMENT  [address] [place] [id number]  [phone]  Dept: [phone]      Charlotte Machado MD  08/16/21 6125

## 2021-08-16 NOTE — ED TRIAGE NOTES
Pt was here for etoh abuse yesterday and he wanted help but then eventually left AMA. Pt called the ambulance again today as he wants help to get sober. Medics saw 2 empty pints of vodka at his house.pt is very drunk  But coopertivve at this point. Medics gave 4mg iv zofran

## 2021-08-16 NOTE — ED NOTES
"Pt brought to ED by EMS after pt called 911 by himslef. Pt reports he has been trying to \"cut back on drinking\" but has been unable to do so on his own. Pt states \"I feel like shit\" and admits to drinking 500 ml of vodka today \"which is actually not that much for me\". Pt denies wanting to go to detox but states he came in because he isn't feeling well. Pt agitated but overall calm and non combative at this time. Situation explained to patient that he needs to have a sober ride home and someone to assume care of him if he wishes to be discharged. Pt slurring words and appears intoxicated at this time. Denies physical pain or any other symptoms at this time.   "

## 2021-08-17 ENCOUNTER — HOSPITAL ENCOUNTER (EMERGENCY)
Facility: HOSPITAL | Age: 36
Discharge: HOME | End: 2021-08-17
Attending: EMERGENCY MEDICINE | Admitting: EMERGENCY MEDICINE
Payer: COMMERCIAL

## 2021-08-17 ENCOUNTER — HOSPITAL ENCOUNTER (EMERGENCY)
Facility: HOSPITAL | Age: 36
Discharge: LEFT BEFORE DISCHARGE | End: 2021-08-17
Payer: COMMERCIAL

## 2021-08-17 VITALS
SYSTOLIC BLOOD PRESSURE: 132 MMHG | TEMPERATURE: 99.1 F | HEIGHT: 71 IN | BODY MASS INDEX: 27.58 KG/M2 | OXYGEN SATURATION: 90 % | HEART RATE: 105 BPM | DIASTOLIC BLOOD PRESSURE: 86 MMHG | RESPIRATION RATE: 12 BRPM | WEIGHT: 197 LBS

## 2021-08-17 VITALS
BODY MASS INDEX: 27.58 KG/M2 | SYSTOLIC BLOOD PRESSURE: 138 MMHG | TEMPERATURE: 97 F | RESPIRATION RATE: 16 BRPM | DIASTOLIC BLOOD PRESSURE: 88 MMHG | HEIGHT: 71 IN | OXYGEN SATURATION: 94 % | WEIGHT: 197 LBS | HEART RATE: 155 BPM

## 2021-08-17 DIAGNOSIS — F10.920 ALCOHOLIC INTOXICATION WITHOUT COMPLICATION (H): ICD-10-CM

## 2021-08-17 PROCEDURE — 99283 EMERGENCY DEPT VISIT LOW MDM: CPT

## 2021-08-17 ASSESSMENT — MIFFLIN-ST. JEOR
SCORE: 1860.72
SCORE: 1860.72

## 2021-08-17 NOTE — ED NOTES
Bed: JNED-07  Expected date: 8/17/21  Expected time:   Means of arrival: Ambulance  Comments:  [facility]  ETOH

## 2021-08-17 NOTE — ED TRIAGE NOTES
Pt arrives via EMS for ETOH intoxication.  Per EMS report, pt has drinking vodka and has been falling today.  Pt has abrasion on right side of knee. Per report, pt blew .4 breathalizer

## 2021-08-17 NOTE — ED PROVIDER NOTES
"EMERGENCY DEPARTMENT ENCOUNTER      NAME: Nikhil Rios  AGE: 35 year old male  YOB: 1985  MRN: [id number]  EVALUATION DATE & TIME: 2021  6:36 PM    PCP: Lisandro Graham    ED PROVIDER: Aguila Manuel D.O.      CHIEF COMPLAINT:  Chief Complaint   Patient presents with     Alcohol Problem         FINAL IMPRESSION:  1. Alcoholic intoxication without complication (H)          ED COURSE & MEDICAL DECISION MAKIN year old male with a history of alcohol abuse, pretension, and anxiety presented to the ED for evaluation of alcohol withdrawal.  Of note, this was the patient's 10th visit within the last month for the same symptoms.  The patient stated that he \"nips\" alcohol his last drink was just prior to the ED arrival.  The patient had no specific complaints or symptoms.  Upon arrival to the ED the patient was noted to be slightly tachycardic but he was otherwise hemodynamically stable.  On exam the patient was quite intoxicated appearing with slurred speech.  Aside from the mild tachycardia his physical exam was otherwise unremarkable.  Although he was tachycardic he did not appear to have any signs or symptoms of withdrawal.  He did not appear to be anxious, agitated, tremulous, or diaphoretic.  Of note, the patient was mildly tachycardic when he was seen a few days ago for acute alcohol intoxication.      Following his initial evaluation the patient was informed that he appeared to be quite intoxicated and was not likely in alcohol withdrawal at this time.  Because he was not in alcohol withdrawals the patient was informed that he could not be admitted at this time.  Transferred to the [facility] detox [place] was offered.  The patient declined this.  The patient was informed that his alcohol abuse may be his attempt at controlling anxiety.  The patient agreed that this was quite likely.  As I was discussing the dangers of alcohol abuse the patient's friend arrived.  The patient's friend stated " that he had arranged an appointment with an inpatient detox facility in [place] that the patient had been to in the past.  The patient has an appointment at 5 PM tomorrow for intake.   The friend stated that he felt comfortable taking the patient home and staying with him until he can get into the inpatient facility.  The patient's friend was instructed to have the patient return back to ED sooner for any worsening alcohol withdrawal symptoms.  The patient was able to ambulate out of the ED with his friend.        6:42 PM I met and examined the patient   6:52 PM I discussed the plan for discharge with the patient, and patient is agreeable. We discussed supportive cares at home and reasons for return to the ER including new or worsening symptoms - all questions and concerns addressed. Patient to be discharged by RN.  At the conclusion of the encounter I discussed the results of all of the tests and the disposition. The questions were answered. The patient or family acknowledged understanding and was agreeable with the care plan.     PPE: Provider wore gloves, N95 mask, eye protection, surgical cap, and paper mask.  MEDICATIONS GIVEN IN THE EMERGENCY:  Medications - No data to display    NEW PRESCRIPTIONS STARTED AT TODAY'S ER VISIT:  Discharge Medication List as of 8/17/2021  7:12 PM             =================================================================    HPI  Nikhil Rios is a 35 year old male with a history of alcohol dependence, ACS, alcohol-induced acute pancreatitis, hypokalemia, alcoholic fatty liver, anxiety, hyperlipidemia and hypertension who presents via EMS for evaluation of an alcohol problem.     The patient reports that he may be going through alcohol withdrawal and he has been to the ED today already. He is intoxicated and while giving his history and his speech is slurred. He states that he is trying to quit drinking but each time he quits he get sick. When told that he probably  would not be admitted the patient stated he wanted to go home and sleep. The patient denies being calm, and states that he is worried about withdrawal symptoms. The patient denies any other symptoms or complaints at this time.    Of note, the patient has presented to the ED 14 times for intoxication in the last 30 days.     I, Ghislaine Ball am serving as a scribe to document services personally performed by Dr. Aguila Manuel DO, based on my observation and the provider's statements to me. I, Dr. Aguila Manuel DO attest that Ghislaine Ball is acting in a scribe capacity, has observed my performance of the services and has documented them in accordance with my direction.      REVIEW OF SYSTEMS   Constitutional: Denies fever, chills, unintentional weight loss or fatigue. Positive for intoxication.   Eyes: Denies visual changes or discharge    HENT: Denies sore throat, ear pain or neck pain  Respiratory: Denies cough or shortness of breath    Cardiovascular: Denies chest pain, palpitations or leg swelling  GI: Denies abdominal pain, nausea, vomiting, or dark, bloody stools.    : Denies hematuria, dysuria, or flank pain  Musculoskeletal: Denies any new back pain or new muscle/joint pains  Skin: Denies rash or wound  Neurologic: Denies current headache, new weakness, focal weakness, or sensory changes    Lymphatic: Denies swollen glands    Psychiatric: Denies depression, suicidal ideation or homicidal ideation.    Remainder of systems reviewed, unless noted in HPI all others negative.      PAST MEDICAL HISTORY:  Past Medical History:   Diagnosis Date     Alcohol abuse      Alcohol abuse      Alcohol withdrawal (H)      Depressive disorder      HLD (hyperlipidemia)      HTN (hypertension)        PAST SURGICAL HISTORY:  Past Surgical History:   Procedure Laterality Date     NO HISTORY OF SURGERY       OTHER SURGICAL HISTORY      none           CURRENT MEDICATIONS:    Prior to Admission medications    Medication Sig Start Date  End Date Taking? Authorizing Provider   diphenhydrAMINE (BENADRYL) 25 MG tablet Take 25 mg by mouth every 6 hours as needed for itching or allergies    Unknown, Entered By History   hydrOXYzine (VISTARIL) 50 MG capsule Take 1 capsule (50 mg) by mouth 3 times daily as needed for anxiety 8/7/21   Daisy Jj MD   LORazepam (ATIVAN) 0.5 MG tablet Take 2 tablets (1 mg) by mouth every 6 hours as needed for anxiety 8/7/21   Basilio Medrano MD         ALLERGIES:  Allergies   Allergen Reactions     Gramineae Pollens Itching     Pollen Extract Rash     grass tree pollen       FAMILY HISTORY:  Family History   Problem Relation Age of Onset     Coronary Artery Disease Father        SOCIAL HISTORY:   Social History     Socioeconomic History     Marital status: Single     Spouse name: None     Number of children: None     Years of education: None     Highest education level: None   Occupational History     Occupation: Rental property owner   Tobacco Use     Smoking status: Current Some Day Smoker     Packs/day: 0.25     Types: Cigars     Smokeless tobacco: Never Used     Tobacco comment: occasional   Substance and Sexual Activity     Alcohol use: Yes     Alcohol/week: 17.0 standard drinks     Types: 17 Shots of liquor per week     Comment: Drinks 750ml/day or 17 shots/day; hx of withdrawl seizures     Drug use: Not Currently     Sexual activity: None   Other Topics Concern     None   Social History Narrative     None     Social Determinants of Health     Financial Resource Strain:      Difficulty of Paying Living Expenses:    Food Insecurity:      Worried About Running Out of Food in the Last Year:      Ran Out of Food in the Last Year:    Transportation Needs: No Transportation Needs     Lack of Transportation (Medical): No     Lack of Transportation (Non-Medical): No   Physical Activity:      Days of Exercise per Week:      Minutes of Exercise per Session:    Stress: Stress Concern Present     Feeling of  "Stress : Very much   Social Connections:      Frequency of Communication with Friends and Family:      Frequency of Social Gatherings with Friends and Family:      Attends Jewish Services:      Active Member of Clubs or Organizations:      Attends Club or Organization Meetings:      Marital Status:    Intimate Partner Violence:      Fear of Current or Ex-Partner:      Emotionally Abused:      Physically Abused:      Sexually Abused:        PHYSICAL EXAM    /86   Pulse 105   Temp 99.1  F (37.3  C) (Oral)   Resp 12   Ht 1.803 m (5' 11\")   Wt 89.4 kg (197 lb)   SpO2 90%   BMI 27.48 kg/m    General presentation: Alert, Vital signs reviewed. NAD. Clearly intoxicated appearing with slurred speech.  HENT: ENT inspection is normal. Oropharynx is moist and clear.   Eye: Pupils are equal and reactive to light. EOMI  Neck: The neck is supple, with full ROM, with no evidence of meningismus.  Pulmonary: Currently in no acute respiratory distress. Normal, non labored respirations, the lung sounds are normal with good equal air movement. Clear to auscultation bilaterally.   Circulatory: Tachycardic rate and regular rhythm. Peripheral pulses are strong and equal. No murmurs, rubs, or gallops.   Abdominal: The abdomen is soft. Nontender. No rigidity, guarding, or rebound. Bowel sounds normal.   Neurologic: Alert, oriented to person, place, and time. No motor deficit. No sensory deficit. Cranial nerves II through XII are intact.  Musculoskeletal: No extremity tenderness. Full range of motion in all extremities. No extremity edema.   Skin: Skin color is normal. No rash. Warm. Dry to touch.         I, Ghislaine Ball, am serving as a scribe to document services personally performed by Dr. Aguila Manuel based on my observation and the provider's statements to me. I, Aguila Manuel, DO attest that Ghislaine Ball is acting in a scribe capacity, has observed my performance of the services and has documented them in accordance with my " direction.    Aguila Manuel D.O.  Emergency Medicine  [facility] EMERGENCY DEPARTMENT  [facility] [id number] [phone]  Dept: [phone]         Aguila Manuel DO  08/18/21 0127

## 2021-08-18 NOTE — DISCHARGE INSTRUCTIONS
Please check into the inpatient alcohol treatment center tomorrow at 5 PM.  If you develop any worsening alcohol withdrawal symptoms you can return back to ED sooner for reevaluation.

## 2021-08-20 ENCOUNTER — HOSPITAL ENCOUNTER (INPATIENT)
Facility: HOSPITAL | Age: 36
LOS: 3 days | Discharge: HOME | DRG: 439 | End: 2021-08-24
Attending: EMERGENCY MEDICINE | Admitting: EMERGENCY MEDICINE
Payer: COMMERCIAL

## 2021-08-20 DIAGNOSIS — K85.20 ALCOHOL-INDUCED ACUTE PANCREATITIS WITHOUT INFECTION OR NECROSIS: ICD-10-CM

## 2021-08-20 DIAGNOSIS — S09.90XA HEAD TRAUMA: ICD-10-CM

## 2021-08-20 DIAGNOSIS — F10.20 ALCOHOL USE DISORDER, SEVERE, DEPENDENCE (H): Primary | ICD-10-CM

## 2021-08-20 LAB
ALBUMIN SERPL-MCNC: 3.6 G/DL (ref 3.5–5)
ALP SERPL-CCNC: 76 U/L (ref 45–120)
ALT SERPL W P-5'-P-CCNC: 36 U/L (ref 0–45)
ANION GAP SERPL CALCULATED.3IONS-SCNC: 8 MMOL/L (ref 5–18)
AST SERPL W P-5'-P-CCNC: 33 U/L (ref 0–40)
BASOPHILS # BLD AUTO: 0 10E3/UL (ref 0–0.2)
BASOPHILS NFR BLD AUTO: 0 %
BILIRUB SERPL-MCNC: 0.5 MG/DL (ref 0–1)
BUN SERPL-MCNC: 5 MG/DL (ref 8–22)
CALCIUM SERPL-MCNC: 8.9 MG/DL (ref 8.5–10.5)
CHLORIDE BLD-SCNC: 104 MMOL/L (ref 98–107)
CO2 SERPL-SCNC: 27 MMOL/L (ref 22–31)
CREAT SERPL-MCNC: 0.8 MG/DL (ref 0.7–1.3)
EOSINOPHIL # BLD AUTO: 0.2 10E3/UL (ref 0–0.7)
EOSINOPHIL NFR BLD AUTO: 2 %
ERYTHROCYTE [DISTWIDTH] IN BLOOD BY AUTOMATED COUNT: 13.2 % (ref 10–15)
GFR SERPL CREATININE-BSD FRML MDRD: >90 ML/MIN/1.73M2
GLUCOSE BLD-MCNC: 111 MG/DL (ref 70–125)
HCT VFR BLD AUTO: 35.8 % (ref 40–53)
HGB BLD-MCNC: 11.9 G/DL (ref 13.3–17.7)
IMM GRANULOCYTES # BLD: 0 10E3/UL
IMM GRANULOCYTES NFR BLD: 0 %
LYMPHOCYTES # BLD AUTO: 1.1 10E3/UL (ref 0.8–5.3)
LYMPHOCYTES NFR BLD AUTO: 11 %
MAGNESIUM SERPL-MCNC: 1.4 MG/DL (ref 1.8–2.6)
MCH RBC QN AUTO: 31 PG (ref 26.5–33)
MCHC RBC AUTO-ENTMCNC: 33.2 G/DL (ref 31.5–36.5)
MCV RBC AUTO: 93 FL (ref 78–100)
MONOCYTES # BLD AUTO: 0.3 10E3/UL (ref 0–1.3)
MONOCYTES NFR BLD AUTO: 3 %
NEUTROPHILS # BLD AUTO: 8.2 10E3/UL (ref 1.6–8.3)
NEUTROPHILS NFR BLD AUTO: 84 %
NRBC # BLD AUTO: 0 10E3/UL
NRBC BLD AUTO-RTO: 0 /100
PLATELET # BLD AUTO: 202 10E3/UL (ref 150–450)
POTASSIUM BLD-SCNC: 3.7 MMOL/L (ref 3.5–5)
PROT SERPL-MCNC: 6.7 G/DL (ref 6–8)
RBC # BLD AUTO: 3.84 10E6/UL (ref 4.4–5.9)
SODIUM SERPL-SCNC: 139 MMOL/L (ref 136–145)
WBC # BLD AUTO: 9.8 10E3/UL (ref 4–11)

## 2021-08-20 PROCEDURE — 96366 THER/PROPH/DIAG IV INF ADDON: CPT

## 2021-08-20 PROCEDURE — 85610 PROTHROMBIN TIME: CPT | Performed by: EMERGENCY MEDICINE

## 2021-08-20 PROCEDURE — 83690 ASSAY OF LIPASE: CPT | Performed by: EMERGENCY MEDICINE

## 2021-08-20 PROCEDURE — 96376 TX/PRO/DX INJ SAME DRUG ADON: CPT

## 2021-08-20 PROCEDURE — 96375 TX/PRO/DX INJ NEW DRUG ADDON: CPT

## 2021-08-20 PROCEDURE — 36415 COLL VENOUS BLD VENIPUNCTURE: CPT | Performed by: EMERGENCY MEDICINE

## 2021-08-20 PROCEDURE — 99285 EMERGENCY DEPT VISIT HI MDM: CPT | Mod: 25

## 2021-08-20 PROCEDURE — 93005 ELECTROCARDIOGRAM TRACING: CPT | Performed by: EMERGENCY MEDICINE

## 2021-08-20 PROCEDURE — C9113 INJ PANTOPRAZOLE SODIUM, VIA: HCPCS | Performed by: EMERGENCY MEDICINE

## 2021-08-20 PROCEDURE — 84100 ASSAY OF PHOSPHORUS: CPT | Performed by: EMERGENCY MEDICINE

## 2021-08-20 PROCEDURE — 96361 HYDRATE IV INFUSION ADD-ON: CPT

## 2021-08-20 PROCEDURE — 250N000009 HC RX 250: Performed by: EMERGENCY MEDICINE

## 2021-08-20 PROCEDURE — 250N000011 HC RX IP 250 OP 636: Performed by: EMERGENCY MEDICINE

## 2021-08-20 PROCEDURE — 85025 COMPLETE CBC W/AUTO DIFF WBC: CPT | Performed by: EMERGENCY MEDICINE

## 2021-08-20 PROCEDURE — 96365 THER/PROPH/DIAG IV INF INIT: CPT | Mod: 59

## 2021-08-20 PROCEDURE — C9803 HOPD COVID-19 SPEC COLLECT: HCPCS

## 2021-08-20 PROCEDURE — 80053 COMPREHEN METABOLIC PANEL: CPT | Performed by: EMERGENCY MEDICINE

## 2021-08-20 PROCEDURE — 83735 ASSAY OF MAGNESIUM: CPT | Performed by: EMERGENCY MEDICINE

## 2021-08-20 PROCEDURE — 258N000003 HC RX IP 258 OP 636: Performed by: EMERGENCY MEDICINE

## 2021-08-20 RX ORDER — FOLIC ACID 5 MG/ML
1 INJECTION, SOLUTION INTRAMUSCULAR; INTRAVENOUS; SUBCUTANEOUS ONCE
Status: COMPLETED | OUTPATIENT
Start: 2021-08-20 | End: 2021-08-20

## 2021-08-20 RX ORDER — MAGNESIUM OXIDE 400 MG/1
400 TABLET ORAL ONCE
Status: COMPLETED | OUTPATIENT
Start: 2021-08-21 | End: 2021-08-21

## 2021-08-20 RX ORDER — THIAMINE HYDROCHLORIDE 100 MG/ML
100 INJECTION, SOLUTION INTRAMUSCULAR; INTRAVENOUS DAILY
Status: DISCONTINUED | OUTPATIENT
Start: 2021-08-21 | End: 2021-08-24 | Stop reason: HOSPADM

## 2021-08-20 RX ORDER — ONDANSETRON 2 MG/ML
4 INJECTION INTRAMUSCULAR; INTRAVENOUS EVERY 30 MIN PRN
Status: DISCONTINUED | OUTPATIENT
Start: 2021-08-20 | End: 2021-08-21

## 2021-08-20 RX ADMIN — ONDANSETRON 4 MG: 2 INJECTION INTRAMUSCULAR; INTRAVENOUS at 22:09

## 2021-08-20 RX ADMIN — THIAMINE HYDROCHLORIDE 100 MG: 100 INJECTION, SOLUTION INTRAMUSCULAR; INTRAVENOUS at 22:32

## 2021-08-20 RX ADMIN — FAMOTIDINE 20 MG: 10 INJECTION, SOLUTION INTRAVENOUS at 22:08

## 2021-08-20 RX ADMIN — PANTOPRAZOLE SODIUM 40 MG: 40 INJECTION, POWDER, FOR SOLUTION INTRAVENOUS at 22:05

## 2021-08-20 RX ADMIN — SODIUM CHLORIDE 1000 ML: 9 INJECTION, SOLUTION INTRAVENOUS at 22:04

## 2021-08-20 RX ADMIN — FOLIC ACID 1 MG: 5 INJECTION, SOLUTION INTRAMUSCULAR; INTRAVENOUS; SUBCUTANEOUS at 22:34

## 2021-08-20 ASSESSMENT — ENCOUNTER SYMPTOMS
VOMITING: 1
RESPIRATORY NEGATIVE: 1
ABDOMINAL PAIN: 1
ROS GI COMMENTS: NEGATIVE FOR HEMATEMESIS.
DIARRHEA: 1
BLOOD IN STOOL: 0
NAUSEA: 1

## 2021-08-21 ENCOUNTER — APPOINTMENT (OUTPATIENT)
Dept: CT IMAGING | Facility: HOSPITAL | Age: 36
DRG: 439 | End: 2021-08-21
Attending: EMERGENCY MEDICINE
Payer: COMMERCIAL

## 2021-08-21 LAB
ALBUMIN UR-MCNC: NEGATIVE MG/DL
APPEARANCE UR: CLEAR
BILIRUB UR QL STRIP: NEGATIVE
COLOR UR AUTO: ABNORMAL
GLUCOSE UR STRIP-MCNC: NEGATIVE MG/DL
HGB UR QL STRIP: NEGATIVE
INR PPP: 0.95 (ref 0.9–1.15)
KETONES UR STRIP-MCNC: NEGATIVE MG/DL
LEUKOCYTE ESTERASE UR QL STRIP: NEGATIVE
LIPASE SERPL-CCNC: 7725 U/L (ref 0–52)
NITRATE UR QL: NEGATIVE
PH UR STRIP: 7.5 [PH] (ref 5–7)
PHOSPHATE SERPL-MCNC: 3.1 MG/DL (ref 2.5–4.5)
RBC URINE: 1 /HPF
SARS-COV-2 RNA RESP QL NAA+PROBE: NEGATIVE
SP GR UR STRIP: 1.02 (ref 1–1.03)
SQUAMOUS EPITHELIAL: <1 /HPF
UROBILINOGEN UR STRIP-MCNC: <2 MG/DL
WBC URINE: 4 /HPF

## 2021-08-21 PROCEDURE — 250N000013 HC RX MED GY IP 250 OP 250 PS 637: Performed by: EMERGENCY MEDICINE

## 2021-08-21 PROCEDURE — HZ2ZZZZ DETOXIFICATION SERVICES FOR SUBSTANCE ABUSE TREATMENT: ICD-10-PCS | Performed by: EMERGENCY MEDICINE

## 2021-08-21 PROCEDURE — 258N000003 HC RX IP 258 OP 636: Performed by: EMERGENCY MEDICINE

## 2021-08-21 PROCEDURE — 81001 URINALYSIS AUTO W/SCOPE: CPT | Performed by: EMERGENCY MEDICINE

## 2021-08-21 PROCEDURE — 120N000001 HC R&B MED SURG/OB

## 2021-08-21 PROCEDURE — 250N000011 HC RX IP 250 OP 636: Performed by: EMERGENCY MEDICINE

## 2021-08-21 PROCEDURE — 250N000013 HC RX MED GY IP 250 OP 250 PS 637: Performed by: INTERNAL MEDICINE

## 2021-08-21 PROCEDURE — 250N000009 HC RX 250: Performed by: EMERGENCY MEDICINE

## 2021-08-21 PROCEDURE — 74177 CT ABD & PELVIS W/CONTRAST: CPT

## 2021-08-21 PROCEDURE — 99207 PR NO CHARGE LOS: CPT | Performed by: HOSPITALIST

## 2021-08-21 PROCEDURE — 87635 SARS-COV-2 COVID-19 AMP PRB: CPT | Performed by: EMERGENCY MEDICINE

## 2021-08-21 PROCEDURE — 99223 1ST HOSP IP/OBS HIGH 75: CPT | Performed by: EMERGENCY MEDICINE

## 2021-08-21 PROCEDURE — 250N000013 HC RX MED GY IP 250 OP 250 PS 637: Performed by: HOSPITALIST

## 2021-08-21 RX ORDER — DIAZEPAM 5 MG
5 TABLET ORAL 2 TIMES DAILY
Status: COMPLETED | OUTPATIENT
Start: 2021-08-22 | End: 2021-08-23

## 2021-08-21 RX ORDER — GABAPENTIN 100 MG/1
100 CAPSULE ORAL EVERY 8 HOURS
Status: DISCONTINUED | OUTPATIENT
Start: 2021-08-28 | End: 2021-08-24 | Stop reason: HOSPADM

## 2021-08-21 RX ORDER — DIAZEPAM 5 MG
5 TABLET ORAL ONCE
Status: COMPLETED | OUTPATIENT
Start: 2021-08-23 | End: 2021-08-23

## 2021-08-21 RX ORDER — GABAPENTIN 300 MG/1
600 CAPSULE ORAL EVERY 8 HOURS
Status: DISCONTINUED | OUTPATIENT
Start: 2021-08-24 | End: 2021-08-24 | Stop reason: ALTCHOICE

## 2021-08-21 RX ORDER — IOPAMIDOL 755 MG/ML
100 INJECTION, SOLUTION INTRAVASCULAR ONCE
Status: COMPLETED | OUTPATIENT
Start: 2021-08-21 | End: 2021-08-21

## 2021-08-21 RX ORDER — SODIUM CHLORIDE, SODIUM LACTATE, POTASSIUM CHLORIDE, CALCIUM CHLORIDE 600; 310; 30; 20 MG/100ML; MG/100ML; MG/100ML; MG/100ML
INJECTION, SOLUTION INTRAVENOUS CONTINUOUS
Status: DISCONTINUED | OUTPATIENT
Start: 2021-08-21 | End: 2021-08-24 | Stop reason: ALTCHOICE

## 2021-08-21 RX ORDER — ONDANSETRON 2 MG/ML
4 INJECTION INTRAMUSCULAR; INTRAVENOUS EVERY 6 HOURS PRN
Status: DISCONTINUED | OUTPATIENT
Start: 2021-08-21 | End: 2021-08-24 | Stop reason: HOSPADM

## 2021-08-21 RX ORDER — CLONIDINE HYDROCHLORIDE 0.1 MG/1
0.1 TABLET ORAL EVERY 8 HOURS
Status: DISCONTINUED | OUTPATIENT
Start: 2021-08-21 | End: 2021-08-24 | Stop reason: ALTCHOICE

## 2021-08-21 RX ORDER — OLANZAPINE 5 MG/1
5-10 TABLET, ORALLY DISINTEGRATING ORAL EVERY 6 HOURS PRN
Status: DISCONTINUED | OUTPATIENT
Start: 2021-08-21 | End: 2021-08-24 | Stop reason: HOSPADM

## 2021-08-21 RX ORDER — SODIUM CHLORIDE, SODIUM LACTATE, POTASSIUM CHLORIDE, CALCIUM CHLORIDE 600; 310; 30; 20 MG/100ML; MG/100ML; MG/100ML; MG/100ML
INJECTION, SOLUTION INTRAVENOUS ONCE
Status: COMPLETED | OUTPATIENT
Start: 2021-08-21 | End: 2021-08-21

## 2021-08-21 RX ORDER — ONDANSETRON 4 MG/1
4 TABLET, ORALLY DISINTEGRATING ORAL EVERY 8 HOURS PRN
Qty: 10 TABLET | Refills: 0 | Status: SHIPPED | OUTPATIENT
Start: 2021-08-21 | End: 2021-08-24

## 2021-08-21 RX ORDER — HYDROXYZINE HYDROCHLORIDE 25 MG/1
25-50 TABLET, FILM COATED ORAL EVERY 6 HOURS PRN
Status: DISPENSED | OUTPATIENT
Start: 2021-08-21 | End: 2021-08-22

## 2021-08-21 RX ORDER — GABAPENTIN 300 MG/1
1200 CAPSULE ORAL ONCE
Status: COMPLETED | OUTPATIENT
Start: 2021-08-21 | End: 2021-08-21

## 2021-08-21 RX ORDER — FLUMAZENIL 0.1 MG/ML
0.2 INJECTION, SOLUTION INTRAVENOUS
Status: DISCONTINUED | OUTPATIENT
Start: 2021-08-21 | End: 2021-08-24 | Stop reason: HOSPADM

## 2021-08-21 RX ORDER — HYDROCODONE BITARTRATE AND ACETAMINOPHEN 5; 325 MG/1; MG/1
1 TABLET ORAL ONCE
Status: COMPLETED | OUTPATIENT
Start: 2021-08-21 | End: 2021-08-21

## 2021-08-21 RX ORDER — GABAPENTIN 300 MG/1
300 CAPSULE ORAL EVERY 8 HOURS
Status: DISCONTINUED | OUTPATIENT
Start: 2021-08-26 | End: 2021-08-24 | Stop reason: ALTCHOICE

## 2021-08-21 RX ORDER — DIAZEPAM 5 MG
5 TABLET ORAL 3 TIMES DAILY
Status: DISCONTINUED | OUTPATIENT
Start: 2021-08-21 | End: 2021-08-21

## 2021-08-21 RX ORDER — DIAZEPAM 10 MG/2ML
5-10 INJECTION, SOLUTION INTRAMUSCULAR; INTRAVENOUS EVERY 30 MIN PRN
Status: DISCONTINUED | OUTPATIENT
Start: 2021-08-21 | End: 2021-08-24 | Stop reason: ALTCHOICE

## 2021-08-21 RX ORDER — DIAZEPAM 5 MG
5 TABLET ORAL 3 TIMES DAILY
Status: COMPLETED | OUTPATIENT
Start: 2021-08-21 | End: 2021-08-22

## 2021-08-21 RX ORDER — FAMOTIDINE 20 MG/1
20 TABLET, FILM COATED ORAL 2 TIMES DAILY
Qty: 14 TABLET | Refills: 0 | Status: SHIPPED | OUTPATIENT
Start: 2021-08-21 | End: 2021-08-24

## 2021-08-21 RX ORDER — GABAPENTIN 300 MG/1
900 CAPSULE ORAL EVERY 8 HOURS
Status: COMPLETED | OUTPATIENT
Start: 2021-08-21 | End: 2021-08-24

## 2021-08-21 RX ORDER — HALOPERIDOL 5 MG/ML
2.5-5 INJECTION INTRAMUSCULAR EVERY 6 HOURS PRN
Status: DISCONTINUED | OUTPATIENT
Start: 2021-08-21 | End: 2021-08-24 | Stop reason: HOSPADM

## 2021-08-21 RX ORDER — DIAZEPAM 5 MG
10 TABLET ORAL EVERY 30 MIN PRN
Status: DISCONTINUED | OUTPATIENT
Start: 2021-08-21 | End: 2021-08-24 | Stop reason: ALTCHOICE

## 2021-08-21 RX ADMIN — SODIUM CHLORIDE, POTASSIUM CHLORIDE, SODIUM LACTATE AND CALCIUM CHLORIDE: 600; 310; 30; 20 INJECTION, SOLUTION INTRAVENOUS at 15:44

## 2021-08-21 RX ADMIN — CLONIDINE HYDROCHLORIDE 0.1 MG: 0.1 TABLET ORAL at 20:39

## 2021-08-21 RX ADMIN — GABAPENTIN 900 MG: 300 CAPSULE ORAL at 11:16

## 2021-08-21 RX ADMIN — HYDROCODONE BITARTRATE AND ACETAMINOPHEN 1 TABLET: 5; 325 TABLET ORAL at 01:16

## 2021-08-21 RX ADMIN — CLONIDINE HYDROCHLORIDE 0.1 MG: 0.1 TABLET ORAL at 04:23

## 2021-08-21 RX ADMIN — DIAZEPAM 5 MG: 5 TABLET ORAL at 13:39

## 2021-08-21 RX ADMIN — HYDROMORPHONE HYDROCHLORIDE 1 MG: 1 INJECTION, SOLUTION INTRAMUSCULAR; INTRAVENOUS; SUBCUTANEOUS at 20:47

## 2021-08-21 RX ADMIN — MAGNESIUM OXIDE TAB 400 MG (241.3 MG ELEMENTAL MG) 400 MG: 400 (241.3 MG) TAB at 01:16

## 2021-08-21 RX ADMIN — HYDROXYZINE HYDROCHLORIDE 50 MG: 25 TABLET, FILM COATED ORAL at 22:24

## 2021-08-21 RX ADMIN — HYDROMORPHONE HYDROCHLORIDE 1 MG: 1 INJECTION, SOLUTION INTRAMUSCULAR; INTRAVENOUS; SUBCUTANEOUS at 11:31

## 2021-08-21 RX ADMIN — SODIUM CHLORIDE, POTASSIUM CHLORIDE, SODIUM LACTATE AND CALCIUM CHLORIDE: 600; 310; 30; 20 INJECTION, SOLUTION INTRAVENOUS at 21:05

## 2021-08-21 RX ADMIN — FOLIC ACID: 5 INJECTION, SOLUTION INTRAMUSCULAR; INTRAVENOUS; SUBCUTANEOUS at 04:44

## 2021-08-21 RX ADMIN — SODIUM CHLORIDE, POTASSIUM CHLORIDE, SODIUM LACTATE AND CALCIUM CHLORIDE: 600; 310; 30; 20 INJECTION, SOLUTION INTRAVENOUS at 02:34

## 2021-08-21 RX ADMIN — CLONIDINE HYDROCHLORIDE 0.1 MG: 0.1 TABLET ORAL at 11:16

## 2021-08-21 RX ADMIN — HYDROMORPHONE HYDROCHLORIDE 1 MG: 1 INJECTION, SOLUTION INTRAMUSCULAR; INTRAVENOUS; SUBCUTANEOUS at 17:29

## 2021-08-21 RX ADMIN — GABAPENTIN 900 MG: 300 CAPSULE ORAL at 20:39

## 2021-08-21 RX ADMIN — HYDROMORPHONE HYDROCHLORIDE 0.5 MG: 1 INJECTION, SOLUTION INTRAMUSCULAR; INTRAVENOUS; SUBCUTANEOUS at 04:42

## 2021-08-21 RX ADMIN — GABAPENTIN 1200 MG: 300 CAPSULE ORAL at 04:24

## 2021-08-21 RX ADMIN — DIAZEPAM 5 MG: 5 TABLET ORAL at 20:39

## 2021-08-21 RX ADMIN — DIAZEPAM 10 MG: 5 TABLET ORAL at 17:28

## 2021-08-21 RX ADMIN — IOPAMIDOL 100 ML: 755 INJECTION, SOLUTION INTRAVENOUS at 02:53

## 2021-08-21 ASSESSMENT — ACTIVITIES OF DAILY LIVING (ADL): DEPENDENT_IADLS:: INDEPENDENT

## 2021-08-21 NOTE — PROGRESS NOTES
Patient seen and examined and H&P from earlier today reviewed.  Has ongoing abdominal discomfort with movement.  Modestly tender on palpation. Requesting pain medications  Last drink on Monday but has been getting valium about 4x daily at detox until admission.  Is tremulous on exam.  Hx withdrawal seizure, no DTs.    #Alcoholic pancreatitis - IVF, NPO except sips, ice ships, pain medication  #Alcohol withdrawal with hx of withdrawal seizure - valium taper, ok to continue gabapentin taper & clonidine for now.  Has intake for day rehab program on Monday.    Sammie Miller MD  Internal Medicine Hospitalist  8/21/2021

## 2021-08-21 NOTE — CONSULTS
Care Management Initial Consult    General Information  Assessment completed with: Patient, pt  Type of CM/SW Visit: Initial Assessment    Primary Care Provider verified and updated as needed: Yes   Readmission within the last 30 days: current reason for admission unrelated to previous admission   Return Category: Exacerbation of disease  Reason for Consult: discharge planning  Advance Care Planning: Advance Care Planning Reviewed: no concerns identified          Communication Assessment  Patient's communication style: spoken language (English or Bilingual)    Hearing Difficulty or Deaf: no   Wear Glasses or Blind: no    Cognitive  Cognitive/Neuro/Behavioral: WDL                      Living Environment:   People in home: parent(s)     Current living Arrangements:        Able to return to prior arrangements:         Family/Social Support:  Care provided by:    Provides care for:                  Description of Support System:           Current Resources:   Patient receiving home care services: No     Community Resources: None  Equipment currently used at home: none  Supplies currently used at home: None    Employment/Financial:  Employment Status:          Financial Concerns: No concerns identified   Referral to Financial Counselor: No       Lifestyle & Psychosocial Needs:  Social Determinants of Health     Tobacco Use: High Risk     Smoking Tobacco Use: Current Some Day Smoker     Smokeless Tobacco Use: Never Used   Alcohol Use: Heavy Drinker     Frequency of Alcohol Consumption: 4 or more times a week     Average Number of Drinks: 10 or more     Frequency of Binge Drinking: Daily or almost daily   Financial Resource Strain:      Difficulty of Paying Living Expenses:    Food Insecurity:      Worried About Running Out of Food in the Last Year:      Ran Out of Food in the Last Year:    Transportation Needs: No Transportation Needs     Lack of Transportation (Medical): No     Lack of Transportation (Non-Medical): No    Physical Activity:      Days of Exercise per Week:      Minutes of Exercise per Session:    Stress: Stress Concern Present     Feeling of Stress : Very much   Social Connections:      Frequency of Communication with Friends and Family:      Frequency of Social Gatherings with Friends and Family:      Attends Cheondoism Services:      Active Member of Clubs or Organizations:      Attends Club or Organization Meetings:      Marital Status:    Intimate Partner Violence:      Fear of Current or Ex-Partner:      Emotionally Abused:      Physically Abused:      Sexually Abused:    Depression: Not at risk     PHQ-2 Score: 0   Housing Stability: Unknown     Unable to Pay for Housing in the Last Year: No     Number of Places Lived in the Last Year: Not on file     Unstable Housing in the Last Year: No       Functional Status:  Prior to admission patient needed assistance:   Dependent ADLs:: Independent  Dependent IADLs:: Independent  Assesssment of Functional Status: At functional baseline    Mental Health Status:          Chemical Dependency Status:                Values/Beliefs:  Spiritual, Cultural Beliefs, Cheondoism Practices, Values that affect care:                 Additional Information:  Assessed, lives w/parents now, no svcs and multiple ER visits this month, independent at baseline and declined HCD.       Abdulkadir Blair RN

## 2021-08-21 NOTE — ED TRIAGE NOTES
Pt was at detox x 2 days and medics came there for his complaints of abdominal pain this evening; upper mid abdominal pain, was given 10mg Morphine en route. Pt is awake and alert and is rating pain at 6/10 upon arrival to ED.

## 2021-08-21 NOTE — PHARMACY-ADMISSION MEDICATION HISTORY
Pharmacy Note - Admission Medication History    Pertinent Provider Information: Pt prescribed famotidine and zofran in anticipation of discharge today from the ER but pt is instead being admitted, cannot remove from med list.      ______________________________________________________________________    Prior To Admission (PTA) med list completed and updated in EMR.       PTA Med List   Medication Sig Last Dose     famotidine (PEPCID) 20 MG tablet Take 1 tablet (20 mg) by mouth 2 times daily for 7 days      hydrOXYzine (VISTARIL) 50 MG capsule Take 1 capsule (50 mg) by mouth 3 times daily as needed for anxiety Past Week at prn     ondansetron (ZOFRAN ODT) 4 MG ODT tab Take 1 tablet (4 mg) by mouth every 8 hours as needed for nausea        Information source(s): Patient and CareEverywhere/[facility]  Method of interview communication: in-person    Summary of Changes to PTA Med List  New: N/A  Discontinued: lorazepam, benadryl  Changed: N/A    Patient was asked about OTC/herbal products specifically.  PTA med list reflects this.    In the past week, patient estimated taking medication this percent of the time:  50-90% due to other.    Allergies were reviewed, assessed, and updated with the patient.      Patient does not use any multi-dose medications prior to admission.    The information provided in this note is only as accurate as the sources available at the time of the update(s).    Thank you for the opportunity to participate in the care of this patient.    Susy Betancur  8/21/2021 9:38 AM

## 2021-08-21 NOTE — ED PROVIDER NOTES
EMERGENCY DEPARTMENT ENCOUNTER      NAME: Nikhil Rios  AGE: 35 year old male  YOB: 1985  MRN: [id number]  EVALUATION DATE & TIME: 8/20/2021  9:27 PM    PCP: Lisandro Graham    ED PROVIDER: Basilio Hayden M.D.      Chief Complaint   Patient presents with     Abdominal Pain         FINAL IMPRESSION:  1. Alcohol-induced acute pancreatitis without infection or necrosis          ED COURSE & MEDICAL DECISION MAKING:    Pertinent Labs & Imaging studies reviewed. (See chart for details)  9:28 PM I met with the patient to gather history and to perform my initial exam. We discussed plans for the ED course, including diagnostic testing and treatment. PPE worn: n95 mask, surgical mask, gloves.  1:19 AM I rechecked and updated the patient with results.  1:43 AM I discussed the patient with Dr. Alfonso from the hospitalist service who agrees to admit the patient.   1:46 AM I rechecked the patient and updated him on the plan for admission.      35 year old male presents to the Emergency Department for evaluation of epigastric pain while at alcohol detox.  Most likely etiology alcohol induced gastritis versus pancreatitis.  Lipase significantly elevated but calcium unremarkable.  Patient has significant ongoing pain that is worse with any takes water to take oral medication.  Thiamine and folate supplementation given.  Magnesium supplementation given.  As needed pain medication and antiemetic written.  Lactated Ringer's infusion ordered.  Patient did have a large-volume urination in the emergency department after his fluid bolus.  Initial plan for discharge given normal calcium and reassuring serial exams.  However, concern the patient will continue to drink alcohol.  Concern for his poor tolerance of oral medication.  Plan for admission for ongoing treatment of pancreatitis secondary to alcohol use disorder.  Imaging consistent with pancreatic pseudocysts and pancreatitis    ED Course as of Aug 21 0143   Fri  Aug 20, 2021   2134 35-year-old male with history of of alcohol use disorder and one episode of pancreatitis.  Patient presents from detox facility.  Patient has been there for 2 days and is therefore not had a drink in 2 days.  He reports he developed a single episode of diarrhea this morning.  He had one episode of nausea vomiting around 530 or 6 PM.  And has had abdominal pain since that same time.  He denies hematemesis or hematochezia.  He denies ongoing withdrawal symptoms or intoxication.      2135 The patient's abdominal exam is benign without peritoneal signs.  His lipase on his recent visit on 8/17/2021 was slightly elevated but less than 5 times upper normal.  Greatest concern for pancreatitis versus gastritis at this time.  Will obtain lab assays.  Provide H2 blocker and PPI.      2330 Normocytic anemia noted.  Patient has no hematemesis, melena, hematochezia.      2352 LFTs have normalized in the setting of alcohol abstinence.  Awaiting lipase results.      Sat Aug 21, 2021   0119 Reevaluated the patient.  His examination remains nonperitoneal.  I counseled patient that his presentation thus far is consistent with pancreatitis and that we are diluting his lipase.  However, the degree of lipase elevation is not a disposition indicator in this condition.  Patient remains nontoxic well-appearing.  He is able to tolerate oral hydration.  If the patient can tolerate oral medication, will plan for disposition back to detox.  I counseled the patient on the curative nature of alcohol abstinence and an appropriate diet.           At the conclusion of the encounter I discussed the results of all of the tests and the disposition. The questions were answered. The patient or family acknowledged understanding and was agreeable with the care plan.       MEDICATIONS GIVEN IN THE EMERGENCY:  Medications   ondansetron (ZOFRAN) injection 4 mg (4 mg Intravenous Given 8/20/21 2209)   thiamine (B-1) injection 100 mg (100 mg  Intravenous Given 8/20/21 2232)   lactated ringers infusion (has no administration in time range)   0.9% sodium chloride BOLUS (0 mLs Intravenous Stopped 8/21/21 0115)   folic acid injection 1 mg (1 mg Intravenous Given 8/20/21 2234)   famotidine (PEPCID) injection 20 mg (20 mg Intravenous Given 8/20/21 2208)   pantoprazole (PROTONIX) IV push injection 40 mg (40 mg Intravenous Given 8/20/21 2205)   magnesium oxide (MAG-OX) tablet 400 mg (400 mg Oral Given 8/21/21 0116)   HYDROcodone-acetaminophen (NORCO) 5-325 MG per tablet 1 tablet (1 tablet Oral Given 8/21/21 0116)       NEW PRESCRIPTIONS STARTED AT TODAY'S ER VISIT  New Prescriptions    FAMOTIDINE (PEPCID) 20 MG TABLET    Take 1 tablet (20 mg) by mouth 2 times daily for 7 days    ONDANSETRON (ZOFRAN ODT) 4 MG ODT TAB    Take 1 tablet (4 mg) by mouth every 8 hours as needed for nausea          =================================================================    HPI    Patient information was obtained from: patient and EMS    Use of Intrepreter: N/A       Nikhil Rios is a 35 year old male with a pertinent history of alcohol induced pancreatitis, alcohol withdrawal, alcoholic fatty liver, HTN, HLD, and ACS who presents to this ED via EMS for evaluation of abdominal pain.    Per chart review, the patient has had 11 ED visits in the past month with 3 admissions for alcohol intoxication. His most recent visit was at [facility] ED 3 days ago. Aside from tachycardia his physical exam was otherwise unremarkable. Patient declined transfer to [facility] although later on his friend arrived who had arranged inpatient intake at a detox facility in [place] for the next day.    Per EMS, patient presents from a detox center with 8/10 upper abdominal pain. They gave him 10 mg morphine en route with significant relief. EMS states his last drink was 2 days ago. Blood pressures were initially 160's/100's but decreased to 140's/80's. They report good O2 sats and  NSR at a rate of 80 bpm. Blood sugar 140. EMS notes the patient had a fall in an ED 3 days ago and has resulting abrasions to his knees.     Per patient, he reports mid upper abdominal pain which began at 1700 this afternoon. His pain is constant, non-radiating, and stabbing in nature. Reports associating nausea with multiple episodes of vomiting tonight. States his pain initially improved after vomiting but acutely worsened later in the evening. Also states he had diarrhea at 1100 this morning which improved after taking amlodipine. Patient denies hematemesis and hematochezia. No respiratory complaints. Reports a history of alcohol induced pancreatitis. No history of gastric ulcers or cholelithiasis. No other complaints or concerns expressed at this time.      REVIEW OF SYSTEMS   Review of Systems   Respiratory: Negative.    Gastrointestinal: Positive for abdominal pain, diarrhea, nausea and vomiting. Negative for blood in stool.        Negative for hematemesis.   Skin:        Positive for knee abrasions.   All other systems reviewed and are negative.       PAST MEDICAL HISTORY:  Past Medical History:   Diagnosis Date     Alcohol abuse      Alcohol abuse      Alcohol withdrawal (H)      Depressive disorder      HLD (hyperlipidemia)      HTN (hypertension)        PAST SURGICAL HISTORY:  Past Surgical History:   Procedure Laterality Date     NO HISTORY OF SURGERY       OTHER SURGICAL HISTORY      none           CURRENT MEDICATIONS:    famotidine (PEPCID) 20 MG tablet  ondansetron (ZOFRAN ODT) 4 MG ODT tab  diphenhydrAMINE (BENADRYL) 25 MG tablet  hydrOXYzine (VISTARIL) 50 MG capsule  LORazepam (ATIVAN) 0.5 MG tablet        ALLERGIES:  Allergies   Allergen Reactions     Gramineae Pollens Itching     Pollen Extract Rash     grass tree pollen       FAMILY HISTORY:  Family History   Problem Relation Age of Onset     Coronary Artery Disease Father        SOCIAL HISTORY:   Social History     Socioeconomic History      Marital status: Single     Spouse name: Not on file     Number of children: Not on file     Years of education: Not on file     Highest education level: Not on file   Occupational History     Occupation: Rental property owner   Tobacco Use     Smoking status: Current Some Day Smoker     Packs/day: 0.25     Types: Cigars     Smokeless tobacco: Never Used     Tobacco comment: occasional   Substance and Sexual Activity     Alcohol use: Yes     Alcohol/week: 17.0 standard drinks     Types: 17 Shots of liquor per week     Comment: Drinks 750ml/day or 17 shots/day; hx of withdrawl seizures     Drug use: Not Currently     Sexual activity: Not on file   Other Topics Concern     Not on file   Social History Narrative     Not on file     Social Determinants of Health     Financial Resource Strain:      Difficulty of Paying Living Expenses:    Food Insecurity:      Worried About Running Out of Food in the Last Year:      Ran Out of Food in the Last Year:    Transportation Needs: No Transportation Needs     Lack of Transportation (Medical): No     Lack of Transportation (Non-Medical): No   Physical Activity:      Days of Exercise per Week:      Minutes of Exercise per Session:    Stress: Stress Concern Present     Feeling of Stress : Very much   Social Connections:      Frequency of Communication with Friends and Family:      Frequency of Social Gatherings with Friends and Family:      Attends Holiness Services:      Active Member of Clubs or Organizations:      Attends Club or Organization Meetings:      Marital Status:    Intimate Partner Violence:      Fear of Current or Ex-Partner:      Emotionally Abused:      Physically Abused:      Sexually Abused:        VITALS:  Patient Vitals for the past 24 hrs:   BP Temp Temp src Pulse Resp SpO2   08/21/21 0100 137/88 -- -- 79 28 99 %   08/21/21 0000 (!) 141/96 -- -- 76 24 99 %   08/20/21 2330 (!) 155/106 -- -- 76 27 99 %   08/20/21 2315 -- -- -- 75 21 99 %   08/20/21 2300 129/88  -- -- 79 16 97 %   08/20/21 2200 (!) 151/95 -- -- 85 20 95 %   08/20/21 2136 (!) 146/85 98.3  F (36.8  C) Oral 92 10 95 %       PHYSICAL EXAM    General: A&O x 3 no apparent distress  HEENT: PERRL, EOMI, moist mucous membranes  Neck: Supple, no rigidity  Cardiovascular: 2+ distal pulses, cap refill less than 2 seconds. RRR No m/r/g  Pulmonary: Chest nontender, symmetrical rise, normal effort, no respiratory distress. Clear to auscultation bilaterally.  Abdomen: Mild tenderness to palpation in epigastrium, no distention. No rebound, guarding, or rigidity.  Extremities: No clubbing, cyanosis, or edema  Musculoskeletal: Gait normal; extremities atraumatic x4  Neuro: Cranial nerves II through XII intact, GCS 15; intact, symmetric strength and sensation x4 extremities  Skin: No rash, jaundice, pallor.  Warm dry and intact  Psych: Normal mood and affect         LAB:  All pertinent labs reviewed and interpreted.  Results for orders placed or performed during the hospital encounter of 08/20/21   Result Value Ref Range    INR 0.95 0.90 - 1.15   Comprehensive metabolic panel   Result Value Ref Range    Sodium 139 136 - 145 mmol/L    Potassium 3.7 3.5 - 5.0 mmol/L    Chloride 104 98 - 107 mmol/L    Carbon Dioxide (CO2) 27 22 - 31 mmol/L    Anion Gap 8 5 - 18 mmol/L    Urea Nitrogen 5 (L) 8 - 22 mg/dL    Creatinine 0.80 0.70 - 1.30 mg/dL    Calcium 8.9 8.5 - 10.5 mg/dL    Glucose 111 70 - 125 mg/dL    Alkaline Phosphatase 76 45 - 120 U/L    AST 33 0 - 40 U/L    ALT 36 0 - 45 U/L    Protein Total 6.7 6.0 - 8.0 g/dL    Albumin 3.6 3.5 - 5.0 g/dL    Bilirubin Total 0.5 0.0 - 1.0 mg/dL    GFR Estimate >90 >60 mL/min/1.73m2   Result Value Ref Range    Lipase 7,725 (H) 0 - 52 U/L   Result Value Ref Range    Magnesium 1.4 (L) 1.8 - 2.6 mg/dL   CBC with platelets and differential   Result Value Ref Range    WBC Count 9.8 4.0 - 11.0 10e3/uL    RBC Count 3.84 (L) 4.40 - 5.90 10e6/uL    Hemoglobin 11.9 (L) 13.3 - 17.7 g/dL    Hematocrit  35.8 (L) 40.0 - 53.0 %    MCV 93 78 - 100 fL    MCH 31.0 26.5 - 33.0 pg    MCHC 33.2 31.5 - 36.5 g/dL    RDW 13.2 10.0 - 15.0 %    Platelet Count 202 150 - 450 10e3/uL    % Neutrophils 84 %    % Lymphocytes 11 %    % Monocytes 3 %    % Eosinophils 2 %    % Basophils 0 %    % Immature Granulocytes 0 %    NRBCs per 100 WBC 0 <1 /100    Absolute Neutrophils 8.2 1.6 - 8.3 10e3/uL    Absolute Lymphocytes 1.1 0.8 - 5.3 10e3/uL    Absolute Monocytes 0.3 0.0 - 1.3 10e3/uL    Absolute Eosinophils 0.2 0.0 - 0.7 10e3/uL    Absolute Basophils 0.0 0.0 - 0.2 10e3/uL    Absolute Immature Granulocytes 0.0 <=0.0 10e3/uL    Absolute NRBCs 0.0 10e3/uL       RADIOLOGY:  Reviewed all pertinent imaging. Please see official radiology report.  CT Abdomen Pelvis w Contrast    (Results Pending)         EKG:    Performed at: 21:35    Impression: sinus rhythm    Rate: 87 bpm  Rhythm: Sinus rhythm  Axis: 58  NJ Interval: 146 ms  QRS Interval: 90 ms  QTc Interval: 454 ms  ST Changes: No ST elevations or depressions  Comparison: No significant change from EKG of 6/24/21    I have independently reviewed and interpreted the EKG(s) documented above.    PROCEDURES:   None      I, Gilbert Alfonso, am serving as a scribe to document services personally performed by Dr. Hayden based on my observation and the provider's statements to me. I, Basilio Hayden MD attest that Gilbert Alfonso is acting in a scribe capacity, has observed my performance of the services and has documented them in accordance with my direction.  Any spelling or grammatic inconsistencies or inaccuracies are typographical or dictation errors.    Basilio Hayden M.D.  Emergency Medicine  [facility] EMERGENCY DEPARTMENT  [facility] [facility] [id number] [phone]  Dept: [phone]     Basilio Hayden MD  08/21/21 5458

## 2021-08-21 NOTE — H&P
"ADMISSION HISTORY & PHYSICAL      Lisandro Graham, [phone]  ASSESSMENT AND PLAN:  36 year old male with a history of alcoholism presents from a detox facility with abdominal pain and elevated lipase.:    1.  Acute pancreatitis: Likely alcohol induced.  Lipase greater than 7000 and CT scan showing marked inflammation of the pancreas.  There are 2 small cystic foci in the pancreatic head new from November 2019 which may represent small pseudocysts.  IV fluids, IV pain medication and trend labs.  He has tolerated clear liquids in the ED and will allow this for now.    2.  Alcohol abuse: Has had 7 ER visits in August and was hospitalized for alcohol intoxication with withdrawal and discharged from [facility] on July 30.  We will start CIWA protocol.  He has had multiple prior hospitalizations for alcohol intoxication and withdrawal.  He states that since he has been in detox he has not had a drink for \"3 or 4 nights \".  He is showing no current evidence of active withdrawal.  He does have a history of alcohol withdrawal related seizures.    3.  Generalized anxiety disorder/major depressive disorder/PTSD.  Previously on Lexapro and trazodone.  Med rec not yet completed but it appears he is not on any psychotropic medication at this time.  Will consult psychiatry and  for rule 25.    4.  Hypomagnesemia: Replaced in ED, will trend.    Barriers to discharge: Pancreatitis      CHIEF COMPLAINT:  Epigastric pain    HISTORY OF PRESENTING ILLNESS:  Nikhil Rios is a 36 year old male who presents from Psychiatric detox for abdominal pain.  He has been there for couple days and states his last drink was 3 or 4 nights ago.  Within the last day he developed some epigastric pain which gradually worsened and he did have some nausea and vomiting.  He has had a history of alcoholic pancreatitis.  No chest pain or shortness of breath, no documented fever or chills.    PMH/PSH:  Patient Active Problem List "   Diagnosis     Alcohol withdrawal (H)     Alcohol dependence with uncomplicated withdrawal (H)     ACS (acute coronary syndrome) (H)     Chemical dependency (H)     S/P alcohol detoxification     Alcohol-induced acute pancreatitis     Alcohol-induced insomnia (H)     Allergic rhinitis     Family history of diabetes mellitus     Seasonal allergies     Urticaria     Alcohol use disorder, severe, dependence (H)     Hypokalemia     Transaminitis     Alcoholic fatty liver     Generalized anxiety disorder     Moderate episode of recurrent major depressive disorder (H)       ALLERGIES:  Allergies   Allergen Reactions     Gramineae Pollens Itching     Pollen Extract Rash     grass tree pollen       MEDICATIONS:  Reviewed.  Current Facility-Administered Medications   Medication     cloNIDine (CATAPRES) tablet 0.1 mg     diazepam (VALIUM) tablet 10 mg    Or     diazepam (VALIUM) injection 5-10 mg     flumazenil (ROMAZICON) injection 0.2 mg     [START ON 8/28/2021] gabapentin (NEURONTIN) capsule 100 mg     [START ON 8/26/2021] gabapentin (NEURONTIN) capsule 300 mg     [START ON 8/24/2021] gabapentin (NEURONTIN) capsule 600 mg     gabapentin (NEURONTIN) capsule 900 mg     gabapentin (NEURONTIN) tablet 1,200 mg     OLANZapine zydis (zyPREXA) ODT tab 5-10 mg    Or     haloperidol lactate (HALDOL) injection 2.5-5 mg     HYDROmorphone (DILAUDID) injection 0.5-1 mg     lactated ringers infusion     melatonin tablet 5 mg     ondansetron (ZOFRAN) injection 4 mg     sodium chloride 0.9 % 1,000 mL with Infuvite Adult 10 mL, thiamine 100 mg, folic acid 1 mg infusion     thiamine (B-1) injection 100 mg     Current Outpatient Medications   Medication     famotidine (PEPCID) 20 MG tablet     ondansetron (ZOFRAN ODT) 4 MG ODT tab     diphenhydrAMINE (BENADRYL) 25 MG tablet     hydrOXYzine (VISTARIL) 50 MG capsule     LORazepam (ATIVAN) 0.5 MG tablet     Facility-Administered Medications Ordered in Other Encounters   Medication     Self  Administer Medications: Behavioral Services       SOCIAL HISTORY:  Social History     Socioeconomic History     Marital status: Single     Spouse name: Not on file     Number of children: Not on file     Years of education: Not on file     Highest education level: Not on file   Occupational History     Occupation: Rental property owner   Tobacco Use     Smoking status: Current Some Day Smoker     Packs/day: 0.25     Types: Cigars     Smokeless tobacco: Never Used     Tobacco comment: occasional   Substance and Sexual Activity     Alcohol use: Yes     Alcohol/week: 17.0 standard drinks     Types: 17 Shots of liquor per week     Comment: Drinks 750ml/day or 17 shots/day; hx of withdrawl seizures     Drug use: Not Currently     Sexual activity: Not on file   Other Topics Concern     Not on file   Social History Narrative     Not on file     Social Determinants of Health     Financial Resource Strain:      Difficulty of Paying Living Expenses:    Food Insecurity:      Worried About Running Out of Food in the Last Year:      Ran Out of Food in the Last Year:    Transportation Needs: No Transportation Needs     Lack of Transportation (Medical): No     Lack of Transportation (Non-Medical): No   Physical Activity:      Days of Exercise per Week:      Minutes of Exercise per Session:    Stress: Stress Concern Present     Feeling of Stress : Very much   Social Connections:      Frequency of Communication with Friends and Family:      Frequency of Social Gatherings with Friends and Family:      Attends Jewish Services:      Active Member of Clubs or Organizations:      Attends Club or Organization Meetings:      Marital Status:    Intimate Partner Violence:      Fear of Current or Ex-Partner:      Emotionally Abused:      Physically Abused:      Sexually Abused:        FAMILY HISTORY:  Family History   Problem Relation Age of Onset     Coronary Artery Disease Father        ROS:  12 point ROS was performed and found to be  negative except for the pertinent positives mentioned in the HPI.      PHYSICAL EXAM:  BP (!) 142/91   Pulse 77   Temp 98.3  F (36.8  C) (Oral)   Resp 13   SpO2 96%   No intake/output data recorded.  I/O this shift:  In: 0   Out: 900 [Urine:900]  CONSTITUTIONAL: No apparent distress  HEENT:       Sclera- anicteric      Mucous membrane-dry  LUNGS: Clear to auscultation bilaterally  CARDIOVASCULAR: S1S2 regular. No murmurs, rubs or gallops,no pedal edema  GI: Soft.  Epigastric/left upper quadrant tenderness no organomegaly. No guarding or rigidity. Bowel sounds- active  NEURO: Cranial nerves II-XII grossly intact. No focal neurological deficit. No involuntary movements-no tremor  INTGM: No jaundice  LYMPH: no adenopathy  MSK: no joint swelling or tenderness  PSYCH: alert and oriented x 3, no agitation      DIAGNOSTIC DATA:  Recent Results (from the past 24 hour(s))   INR    Collection Time: 08/20/21 10:57 PM   Result Value Ref Range    INR 0.95 0.90 - 1.15   Comprehensive metabolic panel    Collection Time: 08/20/21 10:57 PM   Result Value Ref Range    Sodium 139 136 - 145 mmol/L    Potassium 3.7 3.5 - 5.0 mmol/L    Chloride 104 98 - 107 mmol/L    Carbon Dioxide (CO2) 27 22 - 31 mmol/L    Anion Gap 8 5 - 18 mmol/L    Urea Nitrogen 5 (L) 8 - 22 mg/dL    Creatinine 0.80 0.70 - 1.30 mg/dL    Calcium 8.9 8.5 - 10.5 mg/dL    Glucose 111 70 - 125 mg/dL    Alkaline Phosphatase 76 45 - 120 U/L    AST 33 0 - 40 U/L    ALT 36 0 - 45 U/L    Protein Total 6.7 6.0 - 8.0 g/dL    Albumin 3.6 3.5 - 5.0 g/dL    Bilirubin Total 0.5 0.0 - 1.0 mg/dL    GFR Estimate >90 >60 mL/min/1.73m2   Lipase    Collection Time: 08/20/21 10:57 PM   Result Value Ref Range    Lipase 7,725 (H) 0 - 52 U/L   Magnesium    Collection Time: 08/20/21 10:57 PM   Result Value Ref Range    Magnesium 1.4 (L) 1.8 - 2.6 mg/dL   CBC with platelets and differential    Collection Time: 08/20/21 11:02 PM   Result Value Ref Range    WBC Count 9.8 4.0 - 11.0 10e3/uL     RBC Count 3.84 (L) 4.40 - 5.90 10e6/uL    Hemoglobin 11.9 (L) 13.3 - 17.7 g/dL    Hematocrit 35.8 (L) 40.0 - 53.0 %    MCV 93 78 - 100 fL    MCH 31.0 26.5 - 33.0 pg    MCHC 33.2 31.5 - 36.5 g/dL    RDW 13.2 10.0 - 15.0 %    Platelet Count 202 150 - 450 10e3/uL    % Neutrophils 84 %    % Lymphocytes 11 %    % Monocytes 3 %    % Eosinophils 2 %    % Basophils 0 %    % Immature Granulocytes 0 %    NRBCs per 100 WBC 0 <1 /100    Absolute Neutrophils 8.2 1.6 - 8.3 10e3/uL    Absolute Lymphocytes 1.1 0.8 - 5.3 10e3/uL    Absolute Monocytes 0.3 0.0 - 1.3 10e3/uL    Absolute Eosinophils 0.2 0.0 - 0.7 10e3/uL    Absolute Basophils 0.0 0.0 - 0.2 10e3/uL    Absolute Immature Granulocytes 0.0 <=0.0 10e3/uL    Absolute NRBCs 0.0 10e3/uL     All lab studies reviewed personally    CT Abdomen Pelvis w Contrast    Result Date: 8/21/2021  EXAM: CT ABDOMEN PELVIS W CONTRAST LOCATION: [facility] DATE/TIME: 8/21/2021 2:51 AM INDICATION: Epigastric pain COMPARISON: 11/28/2019. TECHNIQUE: CT scan of the abdomen and pelvis was performed following injection of IV contrast. Multiplanar reformats were obtained. Dose reduction techniques were used. CONTRAST: isovue 370 100ml FINDINGS: LOWER CHEST: Mild bibasilar atelectasis. HEPATOBILIARY: Hepatic steatosis. Normal gallbladder. PANCREAS: Marked inflammation of the upper abdomen centered on the pancreas consistent with acute pancreatitis. Small amount of fluid tracks inferiorly from the pancreas in the retroperitoneum into the pelvis. Secondary inflammation of the duodenum. 12 mm and 10 mm cystic lesions of the pancreatic head were not present on 11/28/2019. These may be small pseudocysts. Otherwise homogeneous enhancement of the pancreatic parenchyma. SPLEEN: Normal. ADRENAL GLANDS: Normal. KIDNEYS/BLADDER: Normal. BOWEL: Normal. LYMPH NODES: Normal. VASCULATURE: Unremarkable. PELVIC ORGANS: Normal. MUSCULOSKELETAL: Normal.     IMPRESSION: 1.  Acute pancreatitis. 2.   Two small cystic foci in the pancreatic head new from 11/28/2019 may be small pseudocysts. 3.  Hepatic steatosis.    Head CT w/o contrast    Result Date: 7/28/2021  EXAM: CT HEAD W/O CONTRAST LOCATION: [facility] DATE/TIME: 7/28/2021 4:05 AM INDICATION: Trauma. Head injury. Pain. COMPARISON: 06/24/2021. TECHNIQUE: Routine CT Head without IV contrast. Multiplanar reformats. Dose reduction techniques were used. FINDINGS: INTRACRANIAL CONTENTS: No intracranial hemorrhage, extraaxial collection, or mass effect.  No CT evidence of acute infarct. Normal parenchymal attenuation. Mild generalized volume loss. No hydrocephalus. VISUALIZED ORBITS/SINUSES/MASTOIDS: No intraorbital abnormality. No paranasal sinus mucosal disease. No middle ear or mastoid effusion. BONES/SOFT TISSUES: No acute osseous abnormality. Left posterior scalp soft tissue swelling/contusion.     IMPRESSION: 1.  No acute intracranial process. 2.  Mild generalized cerebral volume loss.    Radiology report reviewed.        Declan Alfonso MD  [facility] Medicine Service

## 2021-08-21 NOTE — ED NOTES
Bed: JNED-10  Expected date: 8/20/21  Expected time: 9:20 PM  Means of arrival: Ambulance  Comments:  Abd pain, morphine on board.

## 2021-08-22 PROBLEM — F41.1 GENERALIZED ANXIETY DISORDER: Chronic | Status: ACTIVE | Noted: 2020-12-03

## 2021-08-22 PROBLEM — K85.20 ALCOHOL-INDUCED ACUTE PANCREATITIS: Chronic | Status: ACTIVE | Noted: 2019-11-28

## 2021-08-22 LAB
ALBUMIN SERPL-MCNC: 3.2 G/DL (ref 3.5–5)
ALP SERPL-CCNC: 79 U/L (ref 45–120)
ALT SERPL W P-5'-P-CCNC: 22 U/L (ref 0–45)
ANION GAP SERPL CALCULATED.3IONS-SCNC: 11 MMOL/L (ref 5–18)
AST SERPL W P-5'-P-CCNC: 23 U/L (ref 0–40)
BILIRUB SERPL-MCNC: 0.7 MG/DL (ref 0–1)
BUN SERPL-MCNC: 3 MG/DL (ref 8–22)
CALCIUM SERPL-MCNC: 8.9 MG/DL (ref 8.5–10.5)
CHLORIDE BLD-SCNC: 102 MMOL/L (ref 98–107)
CO2 SERPL-SCNC: 21 MMOL/L (ref 22–31)
CREAT SERPL-MCNC: 0.69 MG/DL (ref 0.7–1.3)
ERYTHROCYTE [DISTWIDTH] IN BLOOD BY AUTOMATED COUNT: 13.2 % (ref 10–15)
GFR SERPL CREATININE-BSD FRML MDRD: >90 ML/MIN/1.73M2
GLUCOSE BLD-MCNC: 74 MG/DL (ref 70–125)
HCT VFR BLD AUTO: 34.9 % (ref 40–53)
HGB BLD-MCNC: 11.8 G/DL (ref 13.3–17.7)
LIPASE SERPL-CCNC: 628 U/L (ref 0–52)
MAGNESIUM SERPL-MCNC: 1.4 MG/DL (ref 1.8–2.6)
MCH RBC QN AUTO: 30.8 PG (ref 26.5–33)
MCHC RBC AUTO-ENTMCNC: 33.8 G/DL (ref 31.5–36.5)
MCV RBC AUTO: 91 FL (ref 78–100)
PLATELET # BLD AUTO: 147 10E3/UL (ref 150–450)
POTASSIUM BLD-SCNC: 3.9 MMOL/L (ref 3.5–5)
PROT SERPL-MCNC: 6.1 G/DL (ref 6–8)
RBC # BLD AUTO: 3.83 10E6/UL (ref 4.4–5.9)
SODIUM SERPL-SCNC: 134 MMOL/L (ref 136–145)
WBC # BLD AUTO: 11.4 10E3/UL (ref 4–11)

## 2021-08-22 PROCEDURE — 83690 ASSAY OF LIPASE: CPT | Performed by: EMERGENCY MEDICINE

## 2021-08-22 PROCEDURE — 250N000013 HC RX MED GY IP 250 OP 250 PS 637: Performed by: EMERGENCY MEDICINE

## 2021-08-22 PROCEDURE — 250N000013 HC RX MED GY IP 250 OP 250 PS 637: Performed by: HOSPITALIST

## 2021-08-22 PROCEDURE — 99232 SBSQ HOSP IP/OBS MODERATE 35: CPT | Performed by: INTERNAL MEDICINE

## 2021-08-22 PROCEDURE — 250N000009 HC RX 250: Performed by: EMERGENCY MEDICINE

## 2021-08-22 PROCEDURE — 80053 COMPREHEN METABOLIC PANEL: CPT | Performed by: EMERGENCY MEDICINE

## 2021-08-22 PROCEDURE — 250N000011 HC RX IP 250 OP 636: Performed by: EMERGENCY MEDICINE

## 2021-08-22 PROCEDURE — 85027 COMPLETE CBC AUTOMATED: CPT | Performed by: EMERGENCY MEDICINE

## 2021-08-22 PROCEDURE — 258N000003 HC RX IP 258 OP 636: Performed by: EMERGENCY MEDICINE

## 2021-08-22 PROCEDURE — 250N000013 HC RX MED GY IP 250 OP 250 PS 637: Performed by: INTERNAL MEDICINE

## 2021-08-22 PROCEDURE — 83735 ASSAY OF MAGNESIUM: CPT | Performed by: EMERGENCY MEDICINE

## 2021-08-22 PROCEDURE — 36415 COLL VENOUS BLD VENIPUNCTURE: CPT | Performed by: EMERGENCY MEDICINE

## 2021-08-22 PROCEDURE — 120N000001 HC R&B MED SURG/OB

## 2021-08-22 RX ORDER — HYDROXYZINE HYDROCHLORIDE 25 MG/1
25 TABLET, FILM COATED ORAL 3 TIMES DAILY PRN
Status: DISCONTINUED | OUTPATIENT
Start: 2021-08-22 | End: 2021-08-24 | Stop reason: HOSPADM

## 2021-08-22 RX ORDER — CLONIDINE HYDROCHLORIDE 0.1 MG/1
0.2 TABLET ORAL
Status: DISCONTINUED | OUTPATIENT
Start: 2021-08-22 | End: 2021-08-24 | Stop reason: ALTCHOICE

## 2021-08-22 RX ADMIN — CLONIDINE HYDROCHLORIDE 0.1 MG: 0.1 TABLET ORAL at 12:33

## 2021-08-22 RX ADMIN — GABAPENTIN 900 MG: 300 CAPSULE ORAL at 20:14

## 2021-08-22 RX ADMIN — HYDROMORPHONE HYDROCHLORIDE 1 MG: 1 INJECTION, SOLUTION INTRAMUSCULAR; INTRAVENOUS; SUBCUTANEOUS at 15:40

## 2021-08-22 RX ADMIN — HYDROMORPHONE HYDROCHLORIDE 1 MG: 1 INJECTION, SOLUTION INTRAMUSCULAR; INTRAVENOUS; SUBCUTANEOUS at 20:09

## 2021-08-22 RX ADMIN — Medication 5 MG: at 00:16

## 2021-08-22 RX ADMIN — GABAPENTIN 900 MG: 300 CAPSULE ORAL at 12:33

## 2021-08-22 RX ADMIN — HYDROMORPHONE HYDROCHLORIDE 0.5 MG: 1 INJECTION, SOLUTION INTRAMUSCULAR; INTRAVENOUS; SUBCUTANEOUS at 00:17

## 2021-08-22 RX ADMIN — HYDROMORPHONE HYDROCHLORIDE 1 MG: 1 INJECTION, SOLUTION INTRAMUSCULAR; INTRAVENOUS; SUBCUTANEOUS at 12:34

## 2021-08-22 RX ADMIN — GABAPENTIN 900 MG: 300 CAPSULE ORAL at 04:08

## 2021-08-22 RX ADMIN — DIAZEPAM 10 MG: 5 TABLET ORAL at 12:54

## 2021-08-22 RX ADMIN — THIAMINE HYDROCHLORIDE 100 MG: 100 INJECTION, SOLUTION INTRAMUSCULAR; INTRAVENOUS at 09:28

## 2021-08-22 RX ADMIN — HYDROXYZINE HYDROCHLORIDE 25 MG: 25 TABLET, FILM COATED ORAL at 22:29

## 2021-08-22 RX ADMIN — HYDROMORPHONE HYDROCHLORIDE 1 MG: 1 INJECTION, SOLUTION INTRAMUSCULAR; INTRAVENOUS; SUBCUTANEOUS at 07:44

## 2021-08-22 RX ADMIN — CLONIDINE HYDROCHLORIDE 0.1 MG: 0.1 TABLET ORAL at 04:08

## 2021-08-22 RX ADMIN — HYDROXYZINE HYDROCHLORIDE 25 MG: 25 TABLET, FILM COATED ORAL at 14:37

## 2021-08-22 RX ADMIN — CLONIDINE HYDROCHLORIDE 0.1 MG: 0.1 TABLET ORAL at 20:14

## 2021-08-22 RX ADMIN — FOLIC ACID: 5 INJECTION, SOLUTION INTRAMUSCULAR; INTRAVENOUS; SUBCUTANEOUS at 04:10

## 2021-08-22 RX ADMIN — DIAZEPAM 5 MG: 5 TABLET ORAL at 09:27

## 2021-08-22 RX ADMIN — DIAZEPAM 5 MG: 5 TABLET ORAL at 20:14

## 2021-08-22 NOTE — PLAN OF CARE
Problem: Alcohol Withdrawal  Goal: Alcohol Withdrawal Symptom Control  Outcome: Improving   Patient admitted from ED.  Scored a 12 on CIWA.  Oral valium given per protocol.  Patient complaining of 8/10 upper abdominal pain.  Pain down to 6/10 with IV dilaudid.

## 2021-08-22 NOTE — PLAN OF CARE
Problem: Pain (Pancreatitis)  Goal: Acceptable Pain Control  Intervention: Monitor and Manage Pain  Recent Flowsheet Documentation  Taken 8/22/2021 1540 by Marianna Brown RN  Pain Management Interventions: medication (see MAR)  Taken 8/22/2021 1234 by Marianna Brown RN  Pain Management Interventions: medication (see MAR)  Taken 8/22/2021 1117 by Marianna Brown RN  Pain Management Interventions: declines  Taken 8/22/2021 0900 by Marianna Brown RN  Pain Management Interventions: declines  Taken 8/22/2021 0744 by Marianna Brown RN  Pain Management Interventions: medication (see MAR)   Patient reporting chronic pain fluctuates in severity.  Dilaudid given 1 mg iv prn to manage pain.  Patient received 3 doses throughout my shift.  Patient reported pain improved with current pain management and denied need of further interventions.      Problem: Adult Inpatient Plan of Care  Goal: Plan of Care Review  Outcome: Improving  Flowsheets (Taken 8/22/2021 1604)  Plan of Care Reviewed With: patient  Progress: improving   Patient started on clear liquids.  Reported pain level did not increase with po intake.  Continue to monitor.      Patient denied nausea.

## 2021-08-22 NOTE — PLAN OF CARE
Problem: Alcohol Withdrawal  Goal: Alcohol Withdrawal Symptom Control  Outcome: Improving   CIWA score of 2 related to tremors.  No anxiety or agitation noted.  Scheduled gabapentin and clonidine given per order.  Reported severe abdominal pain with movement, prn IV Dilaudid given per request, sleeping at reassessment.

## 2021-08-22 NOTE — PROGRESS NOTES
Care Management Follow Up    Length of Stay (days): 1    Expected Discharge Date:       Concerns to be Addressed: discharge planning     Patient plan of care discussed at interdisciplinary rounds: Yes    Anticipated Discharge Disposition: Outpatient Chemical Dependency, Home, Outpatient Mental Health     Anticipated Discharge Services: Chemical Dependency Resources      Referrals Placed by CM/UYSRA:  None at this time  Private pay costs discussed: Not applicable    Additional Information:  SW met with pt to discuss chemical dependency needs. Pt confirms that he came to the hospital from [facility]. Pt reports that he had his chemical dependency evaluation completed with a counselor at [facility] and has plans to start an intensive outpatient treatment program at [facility] in [place] tomorrow. He reports that he has the contact information for the  there and will update them on a new start date if he needs to remain hospitalized. Pt reports he also has the information for the counselor from [facility] if he needs to contact them in the future. Pt reports that he has a home meeting location for AA and he attends AA frequently. Pt reports plan to stay with his mom for at least the first 30 days or so of this new treatment program. Pt asked if he could possibly attend a mental health day treatment program at [facility] and [facility] while doing the CD program. He reported that he was understanding that it might not be possible from an insurance perspective. He reports plan to discuss with his  from [employer]. SW provided support to pt in following through on these plans. Pt denies any needs from SW at this time. SW will remain available to assist as needed.      NINA Fernandes

## 2021-08-22 NOTE — PROVIDER NOTIFICATION
B/P elevated x2 144/109, recheck 140/106.  Dr. Munoz updated, see new orders.  Patient at this time did not meet perimeters for CIWA medications and recently treated with dilaudid for pain management.

## 2021-08-22 NOTE — PROGRESS NOTES
[facility]    Medicine Progress Note - Hospitalist Service       Date of Admission:  8/20/2021    Assessment & Plan         36-year-old man in detox started eating some crackers and developed abdominal pain had an episode of acute pancreatitis this is his second he is admitted for same and having some evidence of withdrawal symptoms he is on the CIWA protocol; continue to monitor lipase and symptom resolution    Eventual plan will be for him to return to detox when he is able to eat and abdominal pain is controlled he is slowly improving    Principal Problem:    #Alcohol-induced acute pancreatitis    Assessment: Improving    Plan: Clear liquid diet  Active Problems:    #Alcohol withdrawal (H)    Assessment: Same still tremulous    Plan: CIWA protocol    #Generalized anxiety disorder    Assessment: Outpatient hydroxyzine    Plan:     # Alcoholic fatty liver    Assessment: Same    Plan: Same       Diet: Clear Liquid Diet    DVT Prophylaxis: Low Risk/Ambulatory with no VTE prophylaxis indicated  Mars Catheter: Not present  Central Lines: None  Code Status: Full Code      Disposition Plan   Expected discharge:  recommended to prior living arrangement once adequate pain management/ tolerating PO medications.     The patient's care was discussed with the Bedside Nurse and Patient.    Mark Munoz MD  Hospitalist Service  [facility]  Securely message with the Voc[employer] Web Console (learn more here)  Text page via [employer] Paging/Directory      Clinically Significant Risk Factors Present on Admission               ______________________________________________________________________    Interval History    He feels he is slowly getting better less abdominal pain but he has not been eating much he is a bit afraid to eat he has had a history of pancreatitis in the past he now is motivated more than ever to stop drinking alcohol he is still somewhat tremulous no hallucinations  or other signs of withdrawal    Data reviewed today: I reviewed all medications, new labs and imaging results over the last 24 hours. I personally reviewed .    Physical Exam   Vital Signs: Temp: 97.9  F (36.6  C) Temp src: Oral BP: (!) 155/92 Pulse: 79   Resp: 18 SpO2: 93 % O2 Device: None (Room air)    Weight: 197 lbs 0 oz  Alert oriented anxious type of affect he still has some tremor no asterixis  Abdomen benign  Data   Recent Results (from the past 24 hour(s))   UA with Microscopic reflex to Culture    Collection Time: 08/21/21  3:03 PM    Specimen: Urine, Clean Catch   Result Value Ref Range    Color Urine Light Yellow Colorless, Straw, Light Yellow, Yellow    Appearance Urine Clear Clear    Glucose Urine Negative Negative mg/dL    Bilirubin Urine Negative Negative    Ketones Urine Negative Negative mg/dL    Specific Gravity Urine 1.024 1.001 - 1.030    Blood Urine Negative Negative    pH Urine 7.5 (H) 5.0 - 7.0    Protein Albumin Urine Negative Negative mg/dL    Urobilinogen Urine <2.0 <2.0 mg/dL    Nitrite Urine Negative Negative    Leukocyte Esterase Urine Negative Negative    RBC Urine 1 <=2 /HPF    WBC Urine 4 <=5 /HPF    Squamous Epithelials Urine <1 <=1 /HPF   Comprehensive metabolic panel    Collection Time: 08/22/21  7:25 AM   Result Value Ref Range    Sodium 134 (L) 136 - 145 mmol/L    Potassium 3.9 3.5 - 5.0 mmol/L    Chloride 102 98 - 107 mmol/L    Carbon Dioxide (CO2) 21 (L) 22 - 31 mmol/L    Anion Gap 11 5 - 18 mmol/L    Urea Nitrogen 3 (L) 8 - 22 mg/dL    Creatinine 0.69 (L) 0.70 - 1.30 mg/dL    Calcium 8.9 8.5 - 10.5 mg/dL    Glucose 74 70 - 125 mg/dL    Alkaline Phosphatase 79 45 - 120 U/L    AST 23 0 - 40 U/L    ALT 22 0 - 45 U/L    Protein Total 6.1 6.0 - 8.0 g/dL    Albumin 3.2 (L) 3.5 - 5.0 g/dL    Bilirubin Total 0.7 0.0 - 1.0 mg/dL    GFR Estimate >90 >60 mL/min/1.73m2   CBC with platelets    Collection Time: 08/22/21  7:25 AM   Result Value Ref Range    WBC Count 11.4 (H) 4.0 - 11.0  10e3/uL    RBC Count 3.83 (L) 4.40 - 5.90 10e6/uL    Hemoglobin 11.8 (L) 13.3 - 17.7 g/dL    Hematocrit 34.9 (L) 40.0 - 53.0 %    MCV 91 78 - 100 fL    MCH 30.8 26.5 - 33.0 pg    MCHC 33.8 31.5 - 36.5 g/dL    RDW 13.2 10.0 - 15.0 %    Platelet Count 147 (L) 150 - 450 10e3/uL   Magnesium    Collection Time: 08/22/21  7:25 AM   Result Value Ref Range    Magnesium 1.4 (L) 1.8 - 2.6 mg/dL   Lipase    Collection Time: 08/22/21  7:25 AM   Result Value Ref Range    Lipase 628 (H) 0 - 52 U/L

## 2021-08-23 ENCOUNTER — APPOINTMENT (OUTPATIENT)
Dept: CT IMAGING | Facility: HOSPITAL | Age: 36
DRG: 439 | End: 2021-08-23
Attending: FAMILY MEDICINE
Payer: COMMERCIAL

## 2021-08-23 PROBLEM — E83.42 HYPOMAGNESEMIA: Status: ACTIVE | Noted: 2021-08-23

## 2021-08-23 LAB
ATRIAL RATE - MUSE: 87 BPM
DIASTOLIC BLOOD PRESSURE - MUSE: 85 MMHG
INTERPRETATION ECG - MUSE: NORMAL
P AXIS - MUSE: 49 DEGREES
PR INTERVAL - MUSE: 146 MS
QRS DURATION - MUSE: 90 MS
QT - MUSE: 378 MS
QTC - MUSE: 454 MS
R AXIS - MUSE: 58 DEGREES
SYSTOLIC BLOOD PRESSURE - MUSE: 146 MMHG
T AXIS - MUSE: 50 DEGREES
VENTRICULAR RATE- MUSE: 87 BPM

## 2021-08-23 PROCEDURE — 250N000013 HC RX MED GY IP 250 OP 250 PS 637: Performed by: INTERNAL MEDICINE

## 2021-08-23 PROCEDURE — 99233 SBSQ HOSP IP/OBS HIGH 50: CPT | Performed by: INTERNAL MEDICINE

## 2021-08-23 PROCEDURE — 250N000013 HC RX MED GY IP 250 OP 250 PS 637: Performed by: HOSPITALIST

## 2021-08-23 PROCEDURE — 250N000009 HC RX 250: Performed by: EMERGENCY MEDICINE

## 2021-08-23 PROCEDURE — 120N000001 HC R&B MED SURG/OB

## 2021-08-23 PROCEDURE — 70450 CT HEAD/BRAIN W/O DYE: CPT

## 2021-08-23 PROCEDURE — 250N000013 HC RX MED GY IP 250 OP 250 PS 637: Performed by: EMERGENCY MEDICINE

## 2021-08-23 PROCEDURE — 258N000003 HC RX IP 258 OP 636: Performed by: INTERNAL MEDICINE

## 2021-08-23 PROCEDURE — 250N000011 HC RX IP 250 OP 636: Performed by: EMERGENCY MEDICINE

## 2021-08-23 PROCEDURE — 258N000003 HC RX IP 258 OP 636: Performed by: EMERGENCY MEDICINE

## 2021-08-23 RX ORDER — HYDROMORPHONE HYDROCHLORIDE 1 MG/ML
0.5 INJECTION, SOLUTION INTRAMUSCULAR; INTRAVENOUS; SUBCUTANEOUS EVERY 4 HOURS PRN
Status: DISCONTINUED | OUTPATIENT
Start: 2021-08-23 | End: 2021-08-23

## 2021-08-23 RX ORDER — MAGNESIUM OXIDE 400 MG/1
400 TABLET ORAL DAILY
Status: DISCONTINUED | OUTPATIENT
Start: 2021-08-23 | End: 2021-08-24 | Stop reason: HOSPADM

## 2021-08-23 RX ORDER — NALOXONE HYDROCHLORIDE 0.4 MG/ML
0.2 INJECTION, SOLUTION INTRAMUSCULAR; INTRAVENOUS; SUBCUTANEOUS
Status: DISCONTINUED | OUTPATIENT
Start: 2021-08-23 | End: 2021-08-24 | Stop reason: HOSPADM

## 2021-08-23 RX ORDER — HYDROMORPHONE HYDROCHLORIDE 4 MG/1
4 TABLET ORAL
Status: DISCONTINUED | OUTPATIENT
Start: 2021-08-23 | End: 2021-08-24 | Stop reason: ALTCHOICE

## 2021-08-23 RX ORDER — NALOXONE HYDROCHLORIDE 0.4 MG/ML
0.4 INJECTION, SOLUTION INTRAMUSCULAR; INTRAVENOUS; SUBCUTANEOUS
Status: DISCONTINUED | OUTPATIENT
Start: 2021-08-23 | End: 2021-08-24 | Stop reason: HOSPADM

## 2021-08-23 RX ORDER — HYDROMORPHONE HYDROCHLORIDE 1 MG/ML
0.5 INJECTION, SOLUTION INTRAMUSCULAR; INTRAVENOUS; SUBCUTANEOUS EVERY 6 HOURS PRN
Status: DISCONTINUED | OUTPATIENT
Start: 2021-08-23 | End: 2021-08-24 | Stop reason: ALTCHOICE

## 2021-08-23 RX ADMIN — HYDROMORPHONE HYDROCHLORIDE 1 MG: 1 INJECTION, SOLUTION INTRAMUSCULAR; INTRAVENOUS; SUBCUTANEOUS at 08:50

## 2021-08-23 RX ADMIN — GABAPENTIN 900 MG: 300 CAPSULE ORAL at 21:19

## 2021-08-23 RX ADMIN — DIAZEPAM 5 MG: 5 TABLET ORAL at 21:20

## 2021-08-23 RX ADMIN — DIAZEPAM 10 MG: 5 TABLET ORAL at 05:18

## 2021-08-23 RX ADMIN — DIAZEPAM 10 MG: 5 TABLET ORAL at 17:56

## 2021-08-23 RX ADMIN — HYDROMORPHONE HYDROCHLORIDE 4 MG: 4 TABLET ORAL at 14:35

## 2021-08-23 RX ADMIN — CLONIDINE HYDROCHLORIDE 0.1 MG: 0.1 TABLET ORAL at 11:48

## 2021-08-23 RX ADMIN — CLONIDINE HYDROCHLORIDE 0.1 MG: 0.1 TABLET ORAL at 04:01

## 2021-08-23 RX ADMIN — GABAPENTIN 900 MG: 300 CAPSULE ORAL at 04:01

## 2021-08-23 RX ADMIN — MAGNESIUM OXIDE TAB 400 MG (241.3 MG ELEMENTAL MG) 400 MG: 400 (241.3 MG) TAB at 21:20

## 2021-08-23 RX ADMIN — CLONIDINE HYDROCHLORIDE 0.1 MG: 0.1 TABLET ORAL at 21:19

## 2021-08-23 RX ADMIN — HYDROMORPHONE HYDROCHLORIDE 1 MG: 1 INJECTION, SOLUTION INTRAMUSCULAR; INTRAVENOUS; SUBCUTANEOUS at 04:12

## 2021-08-23 RX ADMIN — HYDROMORPHONE HYDROCHLORIDE 1 MG: 1 INJECTION, SOLUTION INTRAMUSCULAR; INTRAVENOUS; SUBCUTANEOUS at 00:04

## 2021-08-23 RX ADMIN — GABAPENTIN 900 MG: 300 CAPSULE ORAL at 11:48

## 2021-08-23 RX ADMIN — FOLIC ACID: 5 INJECTION, SOLUTION INTRAMUSCULAR; INTRAVENOUS; SUBCUTANEOUS at 04:02

## 2021-08-23 RX ADMIN — DIAZEPAM 5 MG: 5 TABLET ORAL at 08:43

## 2021-08-23 RX ADMIN — HYDROXYZINE HYDROCHLORIDE 25 MG: 25 TABLET, FILM COATED ORAL at 21:30

## 2021-08-23 RX ADMIN — SODIUM CHLORIDE, POTASSIUM CHLORIDE, SODIUM LACTATE AND CALCIUM CHLORIDE: 600; 310; 30; 20 INJECTION, SOLUTION INTRAVENOUS at 17:17

## 2021-08-23 RX ADMIN — Medication 5 MG: at 00:07

## 2021-08-23 RX ADMIN — THIAMINE HYDROCHLORIDE 100 MG: 100 INJECTION, SOLUTION INTRAMUSCULAR; INTRAVENOUS at 08:44

## 2021-08-23 NOTE — PLAN OF CARE
Problem: Adult Inpatient Plan of Care  Goal: Plan of Care Review  Outcome: Improving  Flowsheets (Taken 8/23/2021 3124)  Plan of Care Reviewed With: patient   Patient scoring 5 or less CIWA.  No significant withdrawal symptoms noted beyond tremor and mild anxiety.     Patient reporting abdominal pain 5-7/10.  MD discussed transitioning to po pain medications when needed.  If pain decreased advance to full liquids for supper.    Patient tolerating clear liquids well.

## 2021-08-23 NOTE — PROGRESS NOTES
"[facility]    Medicine Progress Note - Hospitalist Service       Date of Admission:  8/20/2021    Assessment & Plan           36 year old male with a history of alcoholism presents from a detox facility with abdominal pain and elevated lipase.:     ##  Acute pancreatitis: Likely alcohol induced.  Lipase greater than 7000 and CT scan showing marked inflammation of the pancreas.  There are 2 small cystic foci in the pancreatic head new from November 2019 which may represent small pseudocysts.  IV fluids, IV pain medication and trend labs.  He has tolerated clear liquids, advancing today to full liquids. Added p.o. Dilaudid for pain management.      #  Alcohol abuse: Has had 7 ER visits in August and was hospitalized for alcohol intoxication with withdrawal and discharged from [facility] on July 30.  We will start [facility] protocol.  He has had multiple prior hospitalizations for alcohol intoxication and withdrawal.  He states that since he has been in detox he has not had a drink for \"3 or 4 nights \".  He is showing no current evidence of active withdrawal.  He does have a history of alcohol withdrawal related seizures. He is interested in chemical dependency treatment.     #  Generalized anxiety disorder/major depressive disorder/PTSD.  Previously on Lexapro and trazodone.  Med rec not yet completed but it appears he is not on any psychotropic medication at this time.  Will consult psychiatry and  for rule 25.     #  Hypomagnesemia: Replacing and monitoring.     Diet: Full Liquid Diet    DVT Prophylaxis: Low Risk/Ambulatory with no VTE prophylaxis indicated  Mars Catheter: Not present  Central Lines: None  Code Status: Full Code      Disposition Plan   Expected discharge:  recommended to prior living arrangement once adequate pain management/ tolerating PO medications.     The patient's care was discussed with the Bedside Nurse and Patient.    Rosalina Brown" MD  Hospitalist Service  [facility]  Securely message with the [employer] Web Console (learn more here)  Text page via [employer] Paging/Directory    Clinically Significant Risk Factors Present on Admission      _____________________________________________________________________    Interval History    Patient is new to me. Chart reviewed. Patient was seen and examined. Complaining of epigastric and left upper quadrant abdominal pain. Elevated with IV Dilaudid. Not worsened by clear liquid diet. Patient feeling hungry and requesting diet advancement. Afebrile. Elevated BP. Quiring oxygen. No chest pain, cough, headache, focal weakness, dysuria, arthralgia. Mildly tremulous. Scoring low on CIWA.    Data reviewed today: I reviewed all medications, new labs and imaging results over the last 24 hours. I personally reviewed .    Physical Exam   Vital Signs: Temp: 98.1  F (36.7  C) Temp src: Oral BP: (!) 135/99 Pulse: 71   Resp: 18 SpO2: 96 % O2 Device: None (Room air)    Weight: 197 lbs 0 oz  CONSTITUTIONAL: No apparent distress  HEENT:                  Sclera- anicteric                 Mucous membrane-dry  LUNGS: Clear to auscultation bilaterally  CARDIOVASCULAR: S1S2 regular. No murmurs, rubs or gallops,no pedal edema  GI: Soft.  Epigastric/left upper quadrant tenderness no organomegaly. No guarding or rigidity. Bowel sounds- active  NEURO: Cranial nerves II-XII grossly intact. No focal neurological deficit. No involuntary movements-no tremor  INTGM: No jaundice  LYMPH: no adenopathy  MSK: no joint swelling or tenderness  PSYCH: alert and oriented x 3, no agitation    Data   No results found for this or any previous visit (from the past 24 hour(s)).

## 2021-08-23 NOTE — PLAN OF CARE
Problem: Adult Inpatient Plan of Care  Goal: Absence of Hospital-Acquired Illness or Injury  Outcome: Improving  Intervention: Identify and Manage Fall Risk  Recent Flowsheet Documentation  Taken 8/23/2021 0407 by Justo Ho, RN  Safety Promotion/Fall Prevention: activity supervised  Taken 8/22/2021 9299 by Justo Ho, RN  Safety Promotion/Fall Prevention: activity supervised     Problem: Adult Inpatient Plan of Care  Goal: Optimal Comfort and Wellbeing  Outcome: Improving       Pt reports constant pain 6/10 in abdomen.  Pt denies nausea but reports strange feeling in stomach.  Pt is on CIWA scoring 4 for tremors.  Pt has periods of anxiety and agitation.  Pt was given 10 mg valium for ciwa score 10.  Pt denies additional symptoms.  Pt is up walking in room independently.  Pt is tolerating clear liquids well.      Pt has abdominal pain 6/10 for which iv dilaudid has been utilized with moderate relief.

## 2021-08-24 VITALS
DIASTOLIC BLOOD PRESSURE: 95 MMHG | WEIGHT: 197 LBS | RESPIRATION RATE: 16 BRPM | BODY MASS INDEX: 27.48 KG/M2 | HEART RATE: 67 BPM | SYSTOLIC BLOOD PRESSURE: 141 MMHG | OXYGEN SATURATION: 95 % | TEMPERATURE: 97.8 F

## 2021-08-24 LAB
ALBUMIN SERPL-MCNC: 3.3 G/DL (ref 3.5–5)
ALP SERPL-CCNC: 85 U/L (ref 45–120)
ALT SERPL W P-5'-P-CCNC: 19 U/L (ref 0–45)
ANION GAP SERPL CALCULATED.3IONS-SCNC: 14 MMOL/L (ref 5–18)
AST SERPL W P-5'-P-CCNC: 18 U/L (ref 0–40)
BILIRUB SERPL-MCNC: 0.6 MG/DL (ref 0–1)
BUN SERPL-MCNC: <2 MG/DL (ref 8–22)
CALCIUM SERPL-MCNC: 9.7 MG/DL (ref 8.5–10.5)
CHLORIDE BLD-SCNC: 104 MMOL/L (ref 98–107)
CO2 SERPL-SCNC: 22 MMOL/L (ref 22–31)
CREAT SERPL-MCNC: 0.78 MG/DL (ref 0.7–1.3)
ERYTHROCYTE [DISTWIDTH] IN BLOOD BY AUTOMATED COUNT: 13 % (ref 10–15)
GFR SERPL CREATININE-BSD FRML MDRD: >90 ML/MIN/1.73M2
GLUCOSE BLD-MCNC: 95 MG/DL (ref 70–125)
HCT VFR BLD AUTO: 34.8 % (ref 40–53)
HGB BLD-MCNC: 11.9 G/DL (ref 13.3–17.7)
LIPASE SERPL-CCNC: 213 U/L (ref 0–52)
MAGNESIUM SERPL-MCNC: 1.9 MG/DL (ref 1.8–2.6)
MCH RBC QN AUTO: 30.9 PG (ref 26.5–33)
MCHC RBC AUTO-ENTMCNC: 34.2 G/DL (ref 31.5–36.5)
MCV RBC AUTO: 90 FL (ref 78–100)
PLATELET # BLD AUTO: 206 10E3/UL (ref 150–450)
POTASSIUM BLD-SCNC: 3.5 MMOL/L (ref 3.5–5)
PROT SERPL-MCNC: 6.6 G/DL (ref 6–8)
RBC # BLD AUTO: 3.85 10E6/UL (ref 4.4–5.9)
SODIUM SERPL-SCNC: 140 MMOL/L (ref 136–145)
WBC # BLD AUTO: 6.4 10E3/UL (ref 4–11)

## 2021-08-24 PROCEDURE — 85027 COMPLETE CBC AUTOMATED: CPT | Performed by: INTERNAL MEDICINE

## 2021-08-24 PROCEDURE — 250N000013 HC RX MED GY IP 250 OP 250 PS 637: Performed by: EMERGENCY MEDICINE

## 2021-08-24 PROCEDURE — 250N000009 HC RX 250: Performed by: EMERGENCY MEDICINE

## 2021-08-24 PROCEDURE — 99238 HOSP IP/OBS DSCHRG MGMT 30/<: CPT | Performed by: INTERNAL MEDICINE

## 2021-08-24 PROCEDURE — 250N000013 HC RX MED GY IP 250 OP 250 PS 637: Performed by: INTERNAL MEDICINE

## 2021-08-24 PROCEDURE — 250N000011 HC RX IP 250 OP 636: Performed by: EMERGENCY MEDICINE

## 2021-08-24 PROCEDURE — 83735 ASSAY OF MAGNESIUM: CPT | Performed by: INTERNAL MEDICINE

## 2021-08-24 PROCEDURE — 80053 COMPREHEN METABOLIC PANEL: CPT | Performed by: INTERNAL MEDICINE

## 2021-08-24 PROCEDURE — 258N000003 HC RX IP 258 OP 636: Performed by: EMERGENCY MEDICINE

## 2021-08-24 PROCEDURE — 36415 COLL VENOUS BLD VENIPUNCTURE: CPT | Performed by: INTERNAL MEDICINE

## 2021-08-24 PROCEDURE — 83690 ASSAY OF LIPASE: CPT | Performed by: INTERNAL MEDICINE

## 2021-08-24 RX ORDER — GABAPENTIN 100 MG/1
100 CAPSULE ORAL 3 TIMES DAILY
Qty: 42 CAPSULE | Refills: 0 | Status: SHIPPED | OUTPATIENT
Start: 2021-08-24 | End: 2022-06-05

## 2021-08-24 RX ORDER — FAMOTIDINE 20 MG/1
20 TABLET, FILM COATED ORAL 2 TIMES DAILY
Qty: 14 TABLET | Refills: 0 | Status: SHIPPED | OUTPATIENT
Start: 2021-08-24 | End: 2022-06-05

## 2021-08-24 RX ORDER — GABAPENTIN 100 MG/1
100 CAPSULE ORAL 3 TIMES DAILY
Qty: 42 CAPSULE | Refills: 0 | Status: SHIPPED | OUTPATIENT
Start: 2021-08-24 | End: 2021-08-24

## 2021-08-24 RX ORDER — ONDANSETRON 4 MG/1
4 TABLET, ORALLY DISINTEGRATING ORAL EVERY 8 HOURS PRN
Qty: 10 TABLET | Refills: 0 | Status: SHIPPED | OUTPATIENT
Start: 2021-08-24 | End: 2022-06-05

## 2021-08-24 RX ADMIN — FOLIC ACID: 5 INJECTION, SOLUTION INTRAMUSCULAR; INTRAVENOUS; SUBCUTANEOUS at 04:38

## 2021-08-24 RX ADMIN — MAGNESIUM OXIDE TAB 400 MG (241.3 MG ELEMENTAL MG) 400 MG: 400 (241.3 MG) TAB at 08:16

## 2021-08-24 RX ADMIN — CLONIDINE HYDROCHLORIDE 0.1 MG: 0.1 TABLET ORAL at 04:38

## 2021-08-24 RX ADMIN — GABAPENTIN 900 MG: 300 CAPSULE ORAL at 04:38

## 2021-08-24 RX ADMIN — THIAMINE HYDROCHLORIDE 100 MG: 100 INJECTION, SOLUTION INTRAMUSCULAR; INTRAVENOUS at 08:16

## 2021-08-24 NOTE — PLAN OF CARE
RN discussed discharge instructions with patient.  Patient provided AVS and updated to pickup medications from home pharmacy.  Patient verbalized understanding and voiced no concerns at this time.  Patient discharged to home with friend.

## 2021-08-24 NOTE — PLAN OF CARE
Problem: Pain (Pancreatitis)  Goal: Acceptable Pain Control  Outcome: Improving     Problem: Alcohol Withdrawal  Goal: Alcohol Withdrawal Symptom Control  Outcome: Improving     Mild abdominal discomfort only.  Tolerated full liquids.  No change in comfort level.    CIWA scores were 9, 3 and 2.  Valium given once.  Patient requested Valium at bedtime for sleep.  Stated he is feeling better and is hoping to be advance to Regular diet for breakfast and be discharged early tomorrow.

## 2021-08-24 NOTE — PLAN OF CARE
Care Management Discharge Note    Discharge Date: 08/24/2021       Discharge Disposition: Outpatient Chemical Dependency    Discharge Services: Chemical Dependency Resources    Discharge DME: None    Discharge Transportation: family or friend will provide    Private pay costs discussed: Not applicable    PAS Confirmation Code:    Patient/family educated on Medicare website which has current facility and service quality ratings: no    Education Provided on the Discharge Plan:    Persons Notified of Discharge Plans: pt.  Patient/Family in Agreement with the Plan: yes    Handoff Referral Completed: No    Additional Information:  Pt. Home today with Mom, to set up own Outpt. CD tx.      Nena Gallardo RN

## 2021-08-24 NOTE — PLAN OF CARE
Diet: fulls  Activity: independent  Pain: no  Nausea: no  A&O: yes  Sleep: most of the night    CIWA scores of 2 and 2. Patient wanting to advance diet and go home in the AM.        Vital signs stable. BP (!) 132/98   Pulse 50   Temp 98  F (36.7  C) (Oral)   Resp 16   Wt 89.4 kg (197 lb)   SpO2 96%   BMI 27.48 kg/m

## 2021-08-24 NOTE — DISCHARGE SUMMARY
[facility] MEDICINE  DISCHARGE SUMMARY     Primary Care Physician: Lisandro Graham  Admission Date: 8/20/2021   Discharge Provider: Keri Brady MD Discharge Date: 8/24/2021   Diet:   Active Diet and Nourishment Order   Procedures     Combination Diet Regular Diet Adult; Low Saturated Fat Na <2400mg Diet     Diet       Code Status: Full Code   Activity: DCACTIVITY: Activity as tolerated        Condition at Discharge: Stable     REASON FOR PRESENTATION(See Admission Note for Details)   Pancreatitis     PRINCIPAL & ACTIVE DISCHARGE DIAGNOSES     Principal Problem:    Alcohol-induced acute pancreatitis  Active Problems:    Alcohol withdrawal (H)    Alcohol dependence with uncomplicated withdrawal (H)    Alcoholic fatty liver    Generalized anxiety disorder    Hypomagnesemia      PENDING LABS     Unresulted Labs Ordered in the Past 30 Days of this Admission     No orders found from 7/21/2021 to 8/21/2021.            PROCEDURES ( this hospitalization only)          RECOMMENDATIONS TO OUTPATIENT PROVIDER FOR F/U VISIT     Follow-up Appointments     Follow-up and recommended labs and tests       Follow up with primary care provider, Lisandro Graham, within 3-5 days   to evaluate medication change, to evaluate treatment change, and for   hospital follow- up. The following labs/tests are recommended: CMP, mag.   Repeat Abdominal CT follow-up pancreatic cysts                 DISPOSITION     Home    SUMMARY OF HOSPITAL COURSE:      36 year old male with a history of alcoholism presents from a detox facility with abdominal pain and elevated lipase.:     Acute pancreatitis: Likely alcohol induced.  Lipase greater than 7000 and CT scan showing marked inflammation of the pancreas.  There are 2 small cystic foci in the pancreatic head new from November 2019 which may represent small pseudocysts.   - tolerating solids and no abd pain  - f/u with PCP  And repeat CT      #  Alcohol abuse:  "Has had 7 ER visits in August and was hospitalized for alcohol intoxication with withdrawal and discharged from [facility] on July 30.  We will start [facility] protocol.  He has had multiple prior hospitalizations for alcohol intoxication and withdrawal.  He states that since he has been in detox he has not had a drink for \"3 or 4 nights \".  He is showing no current evidence of active withdrawal.  He does have a history of alcohol withdrawal related seizures. He is interested in chemical dependency treatment.  - alcohol cessation      #  Generalized anxiety disorder/major depressive disorder/PTSD.    - f/u with PCP      #  Hypomagnesemia: Replaced    Discharge Medications with Med changes:     Current Discharge Medication List      START taking these medications    Details   famotidine (PEPCID) 20 MG tablet Take 1 tablet (20 mg) by mouth 2 times daily for 7 days  Qty: 14 tablet, Refills: 0      gabapentin (NEURONTIN) 100 MG capsule Take 1 capsule (100 mg) by mouth 3 times daily for 14 days  Qty: 42 capsule, Refills: 0    Associated Diagnoses: Alcohol use disorder, severe, dependence (H)      ondansetron (ZOFRAN ODT) 4 MG ODT tab Take 1 tablet (4 mg) by mouth every 8 hours as needed for nausea  Qty: 10 tablet, Refills: 0         CONTINUE these medications which have NOT CHANGED    Details   hydrOXYzine (VISTARIL) 50 MG capsule Take 1 capsule (50 mg) by mouth 3 times daily as needed for anxiety  Qty: 15 capsule, Refills: 0      LORazepam (ATIVAN) 0.5 MG tablet Take 2 tablets (1 mg) by mouth every 6 hours as needed for anxiety  Qty: 3 tablet, Refills: 0                   Rationale for medication changes:      See above        Consults       SOCIAL WORK IP CONSULT    Immunizations given this encounter     Most Recent Immunizations   Administered Date(s) Administered     COVID-19,PF,Pfizer 08/02/2021   Deferred Date(s) Deferred     Influenza Vaccine IM > 6 months Valent IIV4 03/30/2019           Anticoagulation " Information      Recent INR results:   Recent Labs   Lab 08/20/21  2257   INR 0.95     Warfarin doses (if applicable) or name of other anticoagulant: none      SIGNIFICANT IMAGING FINDINGS     Results for orders placed or performed during the hospital encounter of 08/20/21   CT Abdomen Pelvis w Contrast    Impression    IMPRESSION:   1.  Acute pancreatitis.  2.  Two small cystic foci in the pancreatic head new from 11/28/2019 may be small pseudocysts.  3.  Hepatic steatosis.   CT Head w/o Contrast    Impression    IMPRESSION:  1.  No acute intracranial abnormality.  2.  Stable mild generalized volume loss.       SIGNIFICANT LABORATORY FINDINGS     Most Recent 3 CBC's:Recent Labs   Lab Test 08/24/21  0607 08/22/21  0725 08/20/21  2302   WBC 6.4 11.4* 9.8   HGB 11.9* 11.8* 11.9*   MCV 90 91 93    147* 202     Most Recent 3 BMP's:Recent Labs   Lab Test 08/24/21  0607 08/22/21  0725 08/20/21  2257    134* 139   POTASSIUM 3.5 3.9 3.7   CHLORIDE 104 102 104   CO2 22 21* 27   BUN <2* 3* 5*   CR 0.78 0.69* 0.80   ANIONGAP 14 11 8   KEELEY 9.7 8.9 8.9   GLC 95 74 111     Most Recent 2 LFT's:Recent Labs   Lab Test 08/24/21  0607 08/22/21  0725   AST 18 23   ALT 19 22   ALKPHOS 85 79   BILITOTAL 0.6 0.7           Discharge Orders        Reason for your hospital stay    Acute pancreatitis     Follow-up and recommended labs and tests     Follow up with primary care provider, Lisandro Graham, within 3-5 days to evaluate medication change, to evaluate treatment change, and for hospital follow- up. The following labs/tests are recommended: CMP, mag. Repeat Abdominal CT follow-up pancreatic cysts     Activity    Your activity upon discharge: activity as tolerated     Discharge Instructions    Alcohol cessation     Diet    Follow this diet upon discharge: Orders Placed This Encounter      Combination Diet Regular Diet Adult; Low Saturated Fat Na <2400mg Diet       Examination   Physical Exam   Temp:  [97.8  F (36.6   C)-98.6  F (37  C)] 97.8  F (36.6  C)  Pulse:  [50-78] 67  Resp:  [16-18] 16  BP: (132-162)/(94-99) 141/95  SpO2:  [95 %-98 %] 95 %  Wt Readings from Last 1 Encounters:   08/21/21 89.4 kg (197 lb)       Constitutional: awake, alert, cooperative, no apparent distress, and appears stated age  Respiratory: No increased work of breathing, good air exchange, clear to auscultation bilaterally, no crackles or wheezing  Cardiovascular: Normal apical impulse, regular rate and rhythm, normal S1 and S2, no S3 or S4, and no murmur noted  GI: No scars, normal bowel sounds, soft, non-distended, non-tender, no masses palpated, no hepatosplenomegally  Skin: no bruising or bleeding  Musculoskeletal: no lower extremity pitting edema present  Neurologic: Awake, alert, oriented to name, place and time.     Please see EMR for more detailed significant labs, imaging, consultant notes etc.    I, Keri Brady MD, personally saw the patient today and spent less than or equal to 30 minutes discharging this patient.    Keri Brady MD  [facility]    CC:Lisandro Graham

## 2021-08-25 ENCOUNTER — PATIENT OUTREACH (OUTPATIENT)
Dept: CARE COORDINATION | Facility: CLINIC | Age: 36
End: 2021-08-25

## 2021-08-25 DIAGNOSIS — Z71.89 OTHER SPECIFIED COUNSELING: ICD-10-CM

## 2021-08-25 NOTE — PROGRESS NOTES
"Clinic Care Coordination Contact  [facility]: Post-Discharge Note  The CHW talked to the patient but was not able to ask all of the assessment questions. The patient had questions about medication and then had to hang up due to needing to give his mother directions on how to make it to the outpatient treatment. The CHW called the patient back and he asked about his insurance and stated that he was on a restricted program. The CHW informed him that he needs to work with his provider to be able to be seen. The patient then stated that he needed a shot and the second shot of the COVID-19 vaccination. The CHW informed the patient to work with his provider regarding the shots due to already receiving the first shot. At this time the CHW was not able to complete the full assessment. At the end of the conversation the patient stated \"ruby\".   SITUATION                                                      Admission:    Admission Date: 08/20/21   Reason for Admission: Pancreatitis  Discharge:   Discharge Date: 08/24/21  Discharge Diagnosis: Alcohol-induced acute pancreatitis    BACKGROUND                                                      36 year old male with a history of alcoholism presents from a detox facility with abdominal pain and elevated lipase.    ASSESSMENT           Discharge Assessment  How are you doing now that you are home?: \"I'm on my way to my intensive outpatient program\"  Does the patient have their discharge instructions? : Yes  Does the patient have questions regarding their discharge instructions? : No              PLAN                                                      Outpatient Plan:    Follow-up and recommended labs and tests       Follow up with primary care provider, Lisandro Graham, within 3-5 days   to evaluate medication change, to evaluate treatment change, and for   hospital follow- up. The following labs/tests are recommended: CMP, mag.   Repeat Abdominal CT follow-up " pancreatic cysts      No future appointments.      For any urgent concerns, please contact our 24 hour nurse triage line: [phone] ([phone])         KEATON Young  [phone]  [facility]

## 2022-05-03 ENCOUNTER — HOSPITAL ENCOUNTER (EMERGENCY)
Facility: HOSPITAL | Age: 37
Discharge: HOME | End: 2022-05-04
Attending: EMERGENCY MEDICINE | Admitting: EMERGENCY MEDICINE
Payer: COMMERCIAL

## 2022-05-03 DIAGNOSIS — F32.A DEPRESSION, UNSPECIFIED DEPRESSION TYPE: ICD-10-CM

## 2022-05-03 DIAGNOSIS — F10.920 ALCOHOLIC INTOXICATION WITHOUT COMPLICATION (H): ICD-10-CM

## 2022-05-03 LAB
ALBUMIN SERPL-MCNC: 4.5 G/DL (ref 3.5–5)
ALP SERPL-CCNC: 70 U/L (ref 45–120)
ALT SERPL W P-5'-P-CCNC: 36 U/L (ref 0–45)
ANION GAP SERPL CALCULATED.3IONS-SCNC: 15 MMOL/L (ref 5–18)
AST SERPL W P-5'-P-CCNC: 25 U/L (ref 0–40)
BILIRUB SERPL-MCNC: 0.3 MG/DL (ref 0–1)
BUN SERPL-MCNC: 14 MG/DL (ref 8–22)
CALCIUM SERPL-MCNC: 9.2 MG/DL (ref 8.5–10.5)
CHLORIDE BLD-SCNC: 103 MMOL/L (ref 98–107)
CO2 SERPL-SCNC: 24 MMOL/L (ref 22–31)
CREAT SERPL-MCNC: 0.75 MG/DL (ref 0.7–1.3)
ETHANOL SERPL-MCNC: 312 MG/DL
GFR SERPL CREATININE-BSD FRML MDRD: >90 ML/MIN/1.73M2
GLUCOSE BLD-MCNC: 132 MG/DL (ref 70–125)
POTASSIUM BLD-SCNC: 3.9 MMOL/L (ref 3.5–5)
PROT SERPL-MCNC: 7.6 G/DL (ref 6–8)
SODIUM SERPL-SCNC: 142 MMOL/L (ref 136–145)

## 2022-05-03 PROCEDURE — 82077 ASSAY SPEC XCP UR&BREATH IA: CPT | Performed by: EMERGENCY MEDICINE

## 2022-05-03 PROCEDURE — 80053 COMPREHEN METABOLIC PANEL: CPT | Performed by: EMERGENCY MEDICINE

## 2022-05-03 PROCEDURE — 99283 EMERGENCY DEPT VISIT LOW MDM: CPT

## 2022-05-03 PROCEDURE — 90791 PSYCH DIAGNOSTIC EVALUATION: CPT

## 2022-05-03 PROCEDURE — 36415 COLL VENOUS BLD VENIPUNCTURE: CPT | Performed by: EMERGENCY MEDICINE

## 2022-05-03 PROCEDURE — 250N000013 HC RX MED GY IP 250 OP 250 PS 637: Performed by: EMERGENCY MEDICINE

## 2022-05-03 PROCEDURE — 99285 EMERGENCY DEPT VISIT HI MDM: CPT

## 2022-05-03 RX ORDER — HYDROXYZINE HYDROCHLORIDE 25 MG/1
50 TABLET, FILM COATED ORAL ONCE
Status: COMPLETED | OUTPATIENT
Start: 2022-05-03 | End: 2022-05-03

## 2022-05-03 RX ADMIN — HYDROXYZINE HYDROCHLORIDE 50 MG: 25 TABLET, FILM COATED ORAL at 22:49

## 2022-05-03 NOTE — ED NOTES
"ED Provider In Triage Note  [facility]  Encounter Date: May 3, 2022  Chief complaint:  Depression   Alcohol intoxication    Brief HPI:   Nikhil Rios is a 36 year old male presenting to the Emergency Department with a chief complaint of alcohol abuse and depression. Heavy drinking.  PMHx alcohol abuse Patient reports he struggles with depression.  Denies suicidal ideation but very vague in direct questioning.  Appears intoxicated    Brief Physical Exam:  /87   Pulse 83   Temp 97.8  F (36.6  C) (Temporal)   Resp 22   Ht 1.778 m (5' 10\")   Wt 77.1 kg (170 lb)   SpO2 100%   BMI 24.39 kg/m    General: Appears intoxicated  HEENT: Atraumatic  Resp: No respiratory distress  Abdomen: Non-peritoneal  Neuro: Alert, oriented, answers questions appropriately  Psych:  Depression, vague responses on direct questioning of suicide thoughts    PIT Dispo:   Take directly back to main ED    Mane Zamora MD on 5/3/2022 at 5:15 PM    Patient was evaluated by the Physician in Triage due to a limitation of available rooms in the Emergency Department. A plan of care was discussed based on the information obtained on the initial evaluation and patient was consuled to return back to the Emergency Department lobby after this initial evalutaiton until results were obtained or a room became available in the Emergency Department. Patient was counseled not to leave prior to receiving the results of their workup.     Mane Zamora MD  [facility] EMERGENCY DEPARTMENT  [address] [place] [id number]  [phone]     Mane Zamora MD  05/03/22 1714       Mane Zamora MD  05/03/22 1719       Mane Zamora MD  05/03/22 2714    "

## 2022-05-03 NOTE — ED TRIAGE NOTES
Pt presents smelling of alcohol and being disoriented to place, time, and situation. Mother was present and pt had difficulty answering questions, writer asked mother to leave the room. Pt is speaking but not making clear sense. Denies suicidal thoughts or plan. Reports he is just depressed.     Triage Assessment     Row Name 05/03/22 4069       Triage Assessment (Adult)    Airway WDL WDL       Respiratory WDL    Respiratory WDL WDL       Skin Circulation/Temperature WDL    Skin Circulation/Temperature WDL WDL       Cardiac WDL    Cardiac WDL WDL       Peripheral/Neurovascular WDL    Peripheral Neurovascular WDL WDL       Cognitive/Neuro/Behavioral WDL    Cognitive/Neuro/Behavioral WDL X;mood/behavior    Level of Consciousness confused    Orientation disoriented to;place;time;situation    Speech slurred    Mood/Behavior restless

## 2022-05-04 VITALS
TEMPERATURE: 97.8 F | DIASTOLIC BLOOD PRESSURE: 67 MMHG | RESPIRATION RATE: 22 BRPM | HEIGHT: 70 IN | SYSTOLIC BLOOD PRESSURE: 119 MMHG | OXYGEN SATURATION: 99 % | HEART RATE: 73 BPM | WEIGHT: 170 LBS | BODY MASS INDEX: 24.34 KG/M2

## 2022-05-04 NOTE — ED PROVIDER NOTES
EMERGENCY DEPARTMENT ENCOUNTER      NAME: Nikhil Rios  AGE: 36 year old male  YOB: 1985  MRN: [id number]  EVALUATION DATE & TIME: 5/3/2022  7:32 PM    PCP: Lisandro Graham    ED PROVIDER: Wong Polk M.D.      Chief Complaint   Patient presents with     Alcohol Intoxication     Mental Health Problem       FINAL IMPRESSION:  1. Alcoholic intoxication without complication (H)    2. Depression, unspecified depression type        ED COURSE & MEDICAL DECISION MAKIN year old male presents to the Emergency Department for evaluation of alcohol intoxication and depression.  He is vitally stable when he arrives to the emergency department.  Blood alcohol here elevated greater than 300.  He has been on a drinking binge for the last couple of days after relapsing after 8 months of sobriety.  He is understandably quite frustrated and depressed about this.  He does not voice any suicidal ideation at any point during his emergency department stay.  He has struggled with depression in the past, not currently taking medications but is connected with psychiatry.  He was interviewed by DEC once clinically sober and was able to participate fully in their evaluation.  They are in agreement that he should be stable for discharge home and follow-up with outpatient resources both from a mental health standpoint and also a addiction medicine standpoint.  Patient was feeling improved on recheck and was comfortable with the plan.  He was discharged in good condition.    8:04 PM I met with the patient to gather history and to perform my initial exam. We discussed plans for the ED course, including diagnostic testing and treatment. PPE worn: n95 mask.    At the conclusion of the encounter I discussed the results of all of the tests and the disposition. The questions were answered. The patient or family acknowledged understanding and was agreeable with the care plan.       MEDICATIONS GIVEN IN THE  EMERGENCY:  Medications   hydrOXYzine (ATARAX) tablet 50 mg (50 mg Oral Given 5/3/22 3804)       NEW PRESCRIPTIONS STARTED AT TODAY'S ER VISIT  Discharge Medication List as of 5/4/2022  1:06 AM             =================================================================    HPI    Patient information was obtained from: patient, mother    Use of : N/A       Nikhil Rios is a 36 year old male with a pertinent history of alcohol dependence, alcohol withdrawal seizures, pancreatitis, and anxiety/depression who presents to this ED via walk-in for evaluation of alcohol detox.     Patient presents requesting help with alcohol detox. He relapsed 2 days ago (5/1) after being sober for 8 months. He is concerned about upcoming withdrawal as he has a history of withdrawal seizures. Last drink was sometime today. Denies abdominal pain and vomiting. He missed his appointment with addiction medicine today. Mom also states he has issues with depression and thinks that is associated with why he relapsed. Patient does note that he feels angry and has been dealing with mental health issues. He has followed with psychiatry in the past, though not recently. He denies suicidal ideation. Patient denies any other substance intake.       REVIEW OF SYSTEMS   All systems reviewed and negative except as noted in HPI.    PAST MEDICAL HISTORY:  Past Medical History:   Diagnosis Date     Alcohol abuse      Alcohol abuse      Alcohol withdrawal (H)      Depressive disorder      HLD (hyperlipidemia)      HTN (hypertension)        PAST SURGICAL HISTORY:  Past Surgical History:   Procedure Laterality Date     NO HISTORY OF SURGERY       OTHER SURGICAL HISTORY      none           CURRENT MEDICATIONS:    No current facility-administered medications for this encounter.     Current Outpatient Medications   Medication     famotidine (PEPCID) 20 MG tablet     gabapentin (NEURONTIN) 100 MG capsule     hydrOXYzine (VISTARIL) 50 MG capsule      "LORazepam (ATIVAN) 0.5 MG tablet     ondansetron (ZOFRAN ODT) 4 MG ODT tab     Facility-Administered Medications Ordered in Other Encounters   Medication     Self Administer Medications: Behavioral Services         ALLERGIES:  Allergies   Allergen Reactions     Gramineae Pollens Itching     Pollen Extract Rash     grass tree pollen       FAMILY HISTORY:  Family History   Problem Relation Age of Onset     Coronary Artery Disease Father        SOCIAL HISTORY:   Social History     Socioeconomic History     Marital status: Single     Spouse name: None     Number of children: None     Years of education: None     Highest education level: None   Occupational History     Occupation: Rental property owner   Tobacco Use     Smoking status: Current Some Day Smoker     Packs/day: 0.25     Types: Cigars     Smokeless tobacco: Never Used     Tobacco comment: occasional   Substance and Sexual Activity     Alcohol use: Yes     Alcohol/week: 17.0 standard drinks     Types: 17 Shots of liquor per week     Comment: Drinks 750ml/day or 17 shots/day; hx of withdrawl seizures     Drug use: Not Currently     Social Determinants of Health     Transportation Needs: No Transportation Needs     Lack of Transportation (Medical): No     Lack of Transportation (Non-Medical): No   Stress: Stress Concern Present     Feeling of Stress : Very much   Housing Stability: Unknown     Unable to Pay for Housing in the Last Year: No     Unstable Housing in the Last Year: No       VITALS:  /67   Pulse 73   Temp 97.8  F (36.6  C) (Temporal)   Resp 22   Ht 1.778 m (5' 10\")   Wt 77.1 kg (170 lb)   SpO2 99%   BMI 24.39 kg/m      PHYSICAL EXAM    Constitutional: Well developed, Well nourished, NAD.  HENT: Normocephalic, Atraumatic. Neck Supple.  Eyes: EOMI, Conjunctiva normal.  Respiratory: Breathing comfortably on room air. Speaks full sentences easily. Lungs clear to ascultation.  Cardiovascular: Normal heart rate, Regular rhythm. No peripheral " edema.  Abdomen: Soft, nontender  Musculoskeletal: Good range of motion in all major joints. No major deformities noted.  Integument: Warm, Dry.  Neurologic: Alert & awake.  Slurred speech.  Grossly impaired coordination.  Moving all extremities spontaneously and purposefully.  Psychiatric: Cooperative.  Somewhat limited eye contact during initial evaluation.  States mood is depressed and frustrated.  No auditory or visual hallucinations.  Linear thought process.  Affect is appropriate.  Denies suicidal or homicidal ideation.     LAB:  All pertinent labs reviewed and interpreted.  Labs Ordered and Resulted from Time of ED Arrival to Time of ED Departure   COMPREHENSIVE METABOLIC PANEL - Abnormal       Result Value    Sodium 142      Potassium 3.9      Chloride 103      Carbon Dioxide (CO2) 24      Anion Gap 15      Urea Nitrogen 14      Creatinine 0.75      Calcium 9.2      Glucose 132 (*)     Alkaline Phosphatase 70      AST 25      ALT 36      Protein Total 7.6      Albumin 4.5      Bilirubin Total 0.3      GFR Estimate >90     ETHYL ALCOHOL LEVEL - Abnormal    Alcohol, Blood 312 (*)            I, Gilbert Alfonso, am serving as a scribe to document services personally performed by Dr. Wong Polk, based on my observation and the provider's statements to me. I, Wong Polk MD attest that Gilbert Alfonso is acting in a scribe capacity, has observed my performance of the services and has documented them in accordance with my direction.    Wong Polk M.D.  Emergency Medicine  [facility] EMERGENCY DEPARTMENT  [address] [place] [id number]  [phone]  Dept: [phone]     Wong Polk MD  05/04/22 1950

## 2022-05-04 NOTE — DISCHARGE INSTRUCTIONS
Aftercare Plan  www.[email or web address] (filter for insurance, gender preference, etc.)    [facility] ([place])  *openings available this week    [facility]  [phone]  *offers individual therapy, medication management and Mental Health Case Workers; can self refer    If I am feeling unsafe or I am in a crisis, I will:   Contact my established care providers   Call the [place] Suicide Prevention Lifeline: [phone]   Go to the nearest emergency room   Call [phone]     Warning signs that I or other people might notice when a crisis is developing for me:     I am having increasing suicidal thoughts that turn to plans with intent or means  I am having additional urges to self-harm    My emotions are of hopelessness; feeling like there's no way out.  Rage or anger.  Engaging in risky activities without thinking  Withdrawing from family/friends  Dramatic mood swings  Drastic personality changes   Use of alcohol or drugs  Postings on social media  Neglect of personal hygiene or cares     Things I am able to do on my own to cope or help me feel better:    Spending quality time with loved ones  Staying hydrated  Eating balanced meals  Going for a walk every day  Take care of daily responsibilities/needs  Focus on positive self-talk vs negative self-talk    Things that I am able to do with others to cope or help me better:   Exercise  Music  Deep breathing  Meditations  Journal  Self-regulate  Self check-in  Ask for help    Things I can use or do for distraction:   Reach out to/spend time with family, friends  Shower  Exercise  Chores or do a project  Listen to music  Watch movie/TV  Listening to music  Journaling  Reading a book  Meditating  Call a friend    Changes I can make to support my mental health and wellness:    -I will abstain from all mood altering chemicals not currently prescribed to me   -I will attend scheduled mental health therapy and psychiatric appointments and  follow all   recommendations  -I will commit to 30 minutes of self care daily - this can be as simple as taking a shower, going for a   walk, cooking a meal, read, writing, etc  -I will practice square breathing when I begin to feel anxious - in breath through the nose for the count   of 4 and the first line on the square. Out breath through the mouth for the count of 4 for the second line   of the square. Repeat to complete the square. Repeat the square as many times as needed.  - I will use distraction skills of: going for walks, watching TV, spending time outside, calling a friend or   family member  -Use community resources, including hotline numbers, [facility] crisis and support meetings  -Maintain a daily schedule/routine  -Practice deep breathing skills  -Download a meditation palmira and spend 15-20 minutes per day mediating/relaxing. Some apps to   download include: Calm, Headspace and Insight Timer. All 3 of these apps have free version    People in my life that I can ask for help:   Family  Friends  Providers    Your [facility] has a mental health crisis team you can call 24/7:   [facility] Mental Health Crisis: [phone] - Call the crisis line for immediate mental health support, 24 hours a day.       Other things that are important when I'm in crisis:   Ask for help    Additional resources and information:     Mental Health Apps  My3  [email or web address]/    VirtualHopeBox  [email or web address]/virtual-hope-[email or web address]       Professionals or Agencies I Can Contact During A Crisis:       Crisis Lines  Crisis Text Line  Text 324036  You will be connected with a trained live crisis counselor to provide support.    [employer] [place] (LGBTQ Youth Crisis Line)  5.301.599.8612  text START to 618-685    National [place] on Mental Illness (JACQUES)  360.398.0410 or 8.493.JACQUES.HELPS    National Suicide Prevention Lifeline at 0-982-398-XUAB (9336)     Throughout  [place]: call **CRISIS (**979764)     Crisis  "Text Line: is available for free, 24/7 by texting [place] to 248184    Community Resources  Fast Tracker  Linking people to mental health and substance use disorder resources  [employer]org     [place] Mental Health Warm Line  Peer to peer support  Monday thru Saturday, 12 pm to 10 pm  685.299.8133 or 2.975.375.3311  Text \"Support\" to 03711     National [place] on Mental Illness (www.[place].veronica.org): 755.616.8083 or 648-702-4029     [facility] Phone (free remote counseling): [phone] Web address:   [email or web address].org/       Please follow up with scheduled providers to ensure all necessary paperwork is filled out prior to your   scheduled telehealth appointments.     Coordinators from [facility] will be calling within two business days to ensure   that you have the resources you may need or provide assistance with scheduling (Phone number: [phone].).    Remember: give the referrals 3 sessions prior to calling it quits. Do you trust them? Do you feel   understood? Do you think they can help? Check in with yourself after each session    Please reach out to the [facility]([phone]) regarding further mental health appointment needs for this emergency department visit.    [facility] SCHEDULING:  Today you were seen by a licensed mental health professional through [facility], [facility] ([facility])  for a crisis assessment in the Emergency Department at [facility].  It is recommended that you follow up with your estabished providers (psychiatrist, menta health therapist, and/or primary care doctor - as relevant) as soon as possible. Coordinators from [facility] will be calling you in the next 24-48 hours to ensure that you have the resources you need.  You can so contact [facility] coordinators directly at [phone].     [facility] maintains an extensive network of licensed behavioral health providers to connect patients with the " services they need.  We do not charge providers a fee to participate in our referral network.  We match patients with providers based on a patient s specific needs, insurance coverage, and location.  Our first effort will be to refer you to a provider within your care system, and will utilize providers outside your care system as needed.     Additional information  Today you were seen by a licensed mental health professional through [facility], [facility] ([facility])  for a crisis assessment in the Emergency Department at [facility].  It is recommended that you follow up with your established providers (psychiatrist, mental health therapist, and/or primary care doctor - as relevant) as soon as possible. Coordinators from [facility] will be calling you in the next 24-48 hours to ensure that you have the resources you need.  You can also contact [facility] coordinators directly at [phone]. You may have been scheduled for or offered an appointment with a mental health provider. [facility] maintains an extensive network of licensed behavioral health providers to connect patients with the services they need.  We do not charge providers a fee to participate in our referral network.  We match patients with providers based on a patient's specific needs, insurance coverage, and location.  Our first effort will be to refer you to a provider within your care system, and will utilize providers outside your care system as needed.
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
## 2022-05-04 NOTE — ED NOTES
Patient found out in waiting area doorway eating [employer] with mother, patient walked back to hallway bed without difficulty independently.

## 2022-05-06 ENCOUNTER — HOSPITAL ENCOUNTER (EMERGENCY)
Facility: HOSPITAL | Age: 37
Discharge: OTHER INSTITUTION | End: 2022-05-06
Attending: EMERGENCY MEDICINE | Admitting: EMERGENCY MEDICINE
Payer: COMMERCIAL

## 2022-05-06 VITALS
TEMPERATURE: 98.1 F | BODY MASS INDEX: 24.34 KG/M2 | SYSTOLIC BLOOD PRESSURE: 142 MMHG | RESPIRATION RATE: 28 BRPM | WEIGHT: 170 LBS | OXYGEN SATURATION: 99 % | HEIGHT: 70 IN | DIASTOLIC BLOOD PRESSURE: 71 MMHG | HEART RATE: 109 BPM

## 2022-05-06 DIAGNOSIS — F10.220 ALCOHOL DEPENDENCE WITH UNCOMPLICATED INTOXICATION (H): ICD-10-CM

## 2022-05-06 LAB
ALBUMIN SERPL-MCNC: 4.7 G/DL (ref 3.5–5)
ALP SERPL-CCNC: 81 U/L (ref 45–120)
ALT SERPL W P-5'-P-CCNC: 31 U/L (ref 0–45)
ANION GAP SERPL CALCULATED.3IONS-SCNC: 15 MMOL/L (ref 5–18)
AST SERPL W P-5'-P-CCNC: 43 U/L (ref 0–40)
BASOPHILS # BLD AUTO: 0.1 10E3/UL (ref 0–0.2)
BASOPHILS NFR BLD AUTO: 1 %
BILIRUB SERPL-MCNC: 0.4 MG/DL (ref 0–1)
BUN SERPL-MCNC: 15 MG/DL (ref 8–22)
CALCIUM SERPL-MCNC: 9.6 MG/DL (ref 8.5–10.5)
CHLORIDE BLD-SCNC: 100 MMOL/L (ref 98–107)
CO2 SERPL-SCNC: 31 MMOL/L (ref 22–31)
CREAT SERPL-MCNC: 0.96 MG/DL (ref 0.7–1.3)
EOSINOPHIL # BLD AUTO: 0 10E3/UL (ref 0–0.7)
EOSINOPHIL NFR BLD AUTO: 0 %
ERYTHROCYTE [DISTWIDTH] IN BLOOD BY AUTOMATED COUNT: 13.5 % (ref 10–15)
ETHANOL SERPL-MCNC: 336 MG/DL
GFR SERPL CREATININE-BSD FRML MDRD: >90 ML/MIN/1.73M2
GLUCOSE BLD-MCNC: 127 MG/DL (ref 70–125)
GLUCOSE BLD-MCNC: 127 MG/DL (ref 70–125)
GLUCOSE BLDC GLUCOMTR-MCNC: 125 MG/DL (ref 70–99)
HCT VFR BLD AUTO: 45.1 % (ref 40–53)
HGB BLD-MCNC: 15.4 G/DL (ref 13.3–17.7)
IMM GRANULOCYTES # BLD: 0.1 10E3/UL
IMM GRANULOCYTES NFR BLD: 1 %
LYMPHOCYTES # BLD AUTO: 3.2 10E3/UL (ref 0.8–5.3)
LYMPHOCYTES NFR BLD AUTO: 48 %
MCH RBC QN AUTO: 29.1 PG (ref 26.5–33)
MCHC RBC AUTO-ENTMCNC: 34.1 G/DL (ref 31.5–36.5)
MCV RBC AUTO: 85 FL (ref 78–100)
MONOCYTES # BLD AUTO: 0.3 10E3/UL (ref 0–1.3)
MONOCYTES NFR BLD AUTO: 4 %
NEUTROPHILS # BLD AUTO: 3 10E3/UL (ref 1.6–8.3)
NEUTROPHILS NFR BLD AUTO: 46 %
NRBC # BLD AUTO: 0 10E3/UL
NRBC BLD AUTO-RTO: 0 /100
PLATELET # BLD AUTO: 292 10E3/UL (ref 150–450)
POTASSIUM BLD-SCNC: 4.1 MMOL/L (ref 3.5–5)
PROT SERPL-MCNC: 8.2 G/DL (ref 6–8)
RBC # BLD AUTO: 5.3 10E6/UL (ref 4.4–5.9)
SODIUM SERPL-SCNC: 146 MMOL/L (ref 136–145)
WBC # BLD AUTO: 6.6 10E3/UL (ref 4–11)

## 2022-05-06 PROCEDURE — 99285 EMERGENCY DEPT VISIT HI MDM: CPT | Mod: 25

## 2022-05-06 PROCEDURE — 82947 ASSAY GLUCOSE BLOOD QUANT: CPT | Performed by: EMERGENCY MEDICINE

## 2022-05-06 PROCEDURE — 80053 COMPREHEN METABOLIC PANEL: CPT | Performed by: EMERGENCY MEDICINE

## 2022-05-06 PROCEDURE — 250N000011 HC RX IP 250 OP 636: Performed by: EMERGENCY MEDICINE

## 2022-05-06 PROCEDURE — 96361 HYDRATE IV INFUSION ADD-ON: CPT

## 2022-05-06 PROCEDURE — 258N000003 HC RX IP 258 OP 636: Performed by: EMERGENCY MEDICINE

## 2022-05-06 PROCEDURE — 82077 ASSAY SPEC XCP UR&BREATH IA: CPT | Performed by: EMERGENCY MEDICINE

## 2022-05-06 PROCEDURE — 96374 THER/PROPH/DIAG INJ IV PUSH: CPT

## 2022-05-06 PROCEDURE — 36415 COLL VENOUS BLD VENIPUNCTURE: CPT | Performed by: EMERGENCY MEDICINE

## 2022-05-06 PROCEDURE — 85025 COMPLETE CBC W/AUTO DIFF WBC: CPT | Performed by: EMERGENCY MEDICINE

## 2022-05-06 RX ORDER — LORAZEPAM 2 MG/ML
1 INJECTION INTRAMUSCULAR ONCE
Status: COMPLETED | OUTPATIENT
Start: 2022-05-06 | End: 2022-05-06

## 2022-05-06 RX ORDER — SODIUM CHLORIDE 9 MG/ML
INJECTION, SOLUTION INTRAVENOUS CONTINUOUS
Status: DISCONTINUED | OUTPATIENT
Start: 2022-05-06 | End: 2022-05-06 | Stop reason: CLARIF

## 2022-05-06 RX ADMIN — LORAZEPAM 1 MG: 2 INJECTION INTRAMUSCULAR; INTRAVENOUS at 20:43

## 2022-05-06 RX ADMIN — SODIUM CHLORIDE 1000 ML: 9 INJECTION, SOLUTION INTRAVENOUS at 20:40

## 2022-05-06 NOTE — ED PROVIDER NOTES
[facility] EMERGENCY DEPARTMENT  PHYSICIAN-IN-TRIAGE NOTE    MRN: [id number]    Nikhil Rios was seen in triage at 4:09 PM to expedite care while awaiting a room in the emergency department.    HPI  The patient is here with numbness in his arms and legs. He went to go to treatment for alcohol but they recommended he be seen in the ED first. He admits to drinking today. History limited due to alcohol intoxication.    Exam  Vital signs are unremarkable. The focused exam is notable for slurred speech, moving all extremities but difficulty focusing attention and following commands. Difficult to assess sensation due to intoxication.    Plan  A medical screening exam was performed. Based on my brief assessment of the patient, I will defer definitive management to the main ED provider. I believe the patient requires an ED room and will be moved to one as soon as it is available.       Pee Sutton MD  05/06/22 [id number]

## 2022-05-06 NOTE — ED TRIAGE NOTES
"Pt presents with acute intoxication. Pt was here 3 days ago for same reason. Pt is agitated, restless, unable to answer most questions. Mother stated that he was scheduled to go to [facility] and they said he needed to be evaluated here due to his complaints of  \"numb legs and arms,\" Pt reports hallucinations and hx of seizures with withdrawal.      Triage Assessment     Row Name 05/06/22 1483       Triage Assessment (Adult)    Airway WDL WDL       Respiratory WDL    Respiratory WDL WDL       Skin Circulation/Temperature WDL    Skin Circulation/Temperature WDL WDL       Cardiac WDL    Cardiac WDL WDL       Peripheral/Neurovascular WDL    Peripheral Neurovascular WDL WDL       Cognitive/Neuro/Behavioral WDL    Cognitive/Neuro/Behavioral WDL X;orientation    Level of Consciousness confused    Mood/Behavior agitated;anxious;excitable;restless              "

## 2022-05-07 NOTE — ED PROVIDER NOTES
"EMERGENCY DEPARTMENT NOTE     Name: Nikhil Rios    Age/Sex: 36 year old male   MRN: [id number]   Evaluation Date & Time:  5/6/2022  7:05 PM    PCP:    Lisandro Graham   ED Provider: Tim Phelps D.O.       CHIEF COMPLAINT    Alcohol Intoxication      DIAGNOSIS & DISPOSITION     1. Alcohol dependence with uncomplicated intoxication (H)      DISPOSITION: Home    At the conclusion of the encounter I discussed the results of all of the tests and the disposition. The questions were answered. The patient or family acknowledged understanding and was agreeable with the care plan.    TOTAL CRITICAL CARE TIME (EXCLUDING PROCEDURES): Not applicable    PROCEDURES:   None    EMERGENCY DEPARTMENT COURSE/MEDICAL DECISION MAKING   7:19 PM I met with the patient to gather history and to perform my initial exam.  We discussed treatment options and the plan for care while in the Emergency Department.  9:47 PM Rechecked patient and updated on results.    Nikhil Rios is a 36 year old male with relevant past history of alcohol abuse, HLD, HTN, who presents to the emergency department for evaluation of alcohol intoxication.  Triage note reviewed:  Pt presents with acute intoxication. Pt was here 3 days ago for same reason. Pt is agitated, restless, unable to answer most questions. Mother stated that he was scheduled to go to [facility] and they said he needed to be evaluated here due to his complaints of  \"numb legs and arms,\" Pt reports hallucinations and hx of seizures with withdrawal.      Triage Assessment     Row Name 05/06/22 1616       Triage Assessment (Adult)    Airway WDL WDL       Respiratory WDL    Respiratory WDL WDL       Skin Circulation/Temperature WDL    Skin Circulation/Temperature WDL WDL       Cardiac WDL    Cardiac WDL WDL       Peripheral/Neurovascular WDL    Peripheral Neurovascular WDL WDL       Cognitive/Neuro/Behavioral WDL    Cognitive/Neuro/Behavioral WDL X;orientation    Level of Consciousness confused " "   Mood/Behavior agitated;anxious;excitable;restless                Vital signs:BP (!) 142/71   Pulse 109   Temp 98.1  F (36.7  C) (Temporal)   Resp 28   Ht 1.778 m (5' 10\")   Wt 77.1 kg (170 lb)   SpO2 99%   BMI 24.39 kg/m    Pertinent physical exam findings:  General: Alert moderately intoxicated  Cardiac: Regular rate and rhythm S1-S2 without murmur rub  Abdomen: Lungs are clear to ascultation bilaterally with good breath sounds  Neuro: Cranial nerves 2 through 12 are intact.  5 out of 5 motor strength upper and lower extremities.  Intact sensation to light touch and positional sense.  No pronator drift. Diagnostic studies:  Imaging:  No orders to display     Lab:  Labs Ordered and Resulted from Time of ED Arrival to Time of ED Departure   GLUCOSE - Abnormal       Result Value    Glucose 127 (*)    GLUCOSE BY METER - Abnormal    GLUCOSE BY METER POCT 125 (*)    COMPREHENSIVE METABOLIC PANEL - Abnormal    Sodium 146 (*)     Potassium 4.1      Chloride 100      Carbon Dioxide (CO2) 31      Anion Gap 15      Urea Nitrogen 15      Creatinine 0.96      Calcium 9.6      Glucose 127 (*)     Alkaline Phosphatase 81      AST 43 (*)     ALT 31      Protein Total 8.2 (*)     Albumin 4.7      Bilirubin Total 0.4      GFR Estimate >90     ETHYL ALCOHOL LEVEL - Abnormal    Alcohol, Blood 336 (*)    CBC WITH PLATELETS AND DIFFERENTIAL    WBC Count 6.6      RBC Count 5.30      Hemoglobin 15.4      Hematocrit 45.1      MCV 85      MCH 29.1      MCHC 34.1      RDW 13.5      Platelet Count 292      % Neutrophils 46      % Lymphocytes 48      % Monocytes 4      % Eosinophils 0      % Basophils 1      % Immature Granulocytes 1      NRBCs per 100 WBC 0      Absolute Neutrophils 3.0      Absolute Lymphocytes 3.2      Absolute Monocytes 0.3      Absolute Eosinophils 0.0      Absolute Basophils 0.1      Absolute Immature Granulocytes 0.1      Absolute NRBCs 0.0       Interventions: Oral Ativan  Medical decision making: Patient " initially restless but not tachycardic or hypertensive was given dose of Ativan which is improved.  Triage note indicated that he needed to be seen for paresthesias in his arms or legs which the patient is currently denying.  His mother is now here and believes that they will be able to be admitted to the outpatient chemical dependency facility and will proceed with discharge with transfer by mother to that facility.    ED INTERVENTIONS     Medications   LORazepam (ATIVAN) injection 1 mg (1 mg Intravenous Given 5/6/22 2043)   0.9% sodium chloride BOLUS (0 mLs Intravenous Stopped 5/6/22 2215)       DISCHARGE MEDICATIONS        Review of your medicines      UNREVIEWED medicines. Ask your doctor about these medicines      Dose / Directions   famotidine 20 MG tablet  Commonly known as: PEPCID  Used for: Alcohol-induced acute pancreatitis without infection or necrosis      Dose: 20 mg  Take 1 tablet (20 mg) by mouth 2 times daily  Quantity: 14 tablet  Refills: 0     gabapentin 100 MG capsule  Commonly known as: NEURONTIN  Used for: Alcohol use disorder, severe, dependence (H)      Dose: 100 mg  Take 1 capsule (100 mg) by mouth 3 times daily  Quantity: 42 capsule  Refills: 0     hydrOXYzine 50 MG capsule  Commonly known as: VISTARIL      Dose: 50 mg  Take 1 capsule (50 mg) by mouth 3 times daily as needed for anxiety  Quantity: 15 capsule  Refills: 0     LORazepam 0.5 MG tablet  Commonly known as: ATIVAN      Dose: 1 mg  Take 2 tablets (1 mg) by mouth every 6 hours as needed for anxiety  Quantity: 3 tablet  Refills: 0     ondansetron 4 MG ODT tab  Commonly known as: Zofran ODT  Used for: Alcohol-induced acute pancreatitis without infection or necrosis      Dose: 4 mg  Take 1 tablet (4 mg) by mouth every 8 hours as needed for nausea  Quantity: 10 tablet  Refills: 0            INFORMATION SOURCE AND LIMITATIONS    History/Exam limitations: None  Patient information was obtained from: Patient  Use of :  N/A    HISTORY OF PRESENT ILLNESS   Nikhil Rios male with a relevant past history of alcohol abuse, HLD, HTN who presents to this ED by private car with  for evaluation of alcohol intoxication. Patient reports he relapsed on alcohol a few days ago after 8 months of sobriety. He was seen in this ED a few days ago for alcohol intoxication as well. He states that he has spoke to [facility] and plans on going there, but they wanted patient to be evaluated in the ED first. He states that [facility] is the only detox facility that he will go to. Patient reports his last drink was around 5:00 AM today. He reports a history of alcohol withdrawal seizures. Denies any other drug or substance use.    Patient endorses some recent diarrhea. Denies abdominal pain, nausea or vomiting. Denies any SI. Denies any other concerns.    Per chart review, patient was seen in this ED on 5/3/22 for evaluation of alcohol intoxication. Patient relapsed on 5/1/22 after 8 months of sobriety. He was feeling frustrated and depressed about his relapse. Did not voice and suicidal ideation at any point during his ED stay. Patient was interviewed by [facility] once clinically sober and was able to fully participate in their evaluation. They were in agreement that patient should be stable for discharge home and follow up with outpatient resources from both a mental health and addiction medicine standpoint. Patient discharged home in good condition.    REVIEW OF SYSTEMS:   Constitutional: Negative for fever. Positive for alcohol intoxication.   HENT: Negative for URI symptoms or sore throat.    Eyes: Negative for visual disturbance.   Cardiac: Negative for  chest pain,palpitations, near syncope or syncope  Respiratory: Negative for cough and shortness of breath.    Gastrointestinal: Negative for abdominal pain, nausea, vomiting, constipation, rectal bleeding or melena. Positive for diarrhea.   Genitourinary: Negative for dysuria, flank pain and  hematuria.   Musculoskeletal: Negative for back pain.   Skin: Negative for  rash  Neurological: Negative for dizziness, headache, syncope, speech difficulty, unilateral weakness or imbalance with walking.   Hematological: Negative for adenopathy. Does not bruise/bleed easily.   Psychiatric/Behavioral: Negative for confusion, suicidal ideation.       PATIENT HISTORY     Past Medical History:   Diagnosis Date     Alcohol abuse      Alcohol abuse      Alcohol withdrawal (H)      Depressive disorder      HLD (hyperlipidemia)      HTN (hypertension)      Patient Active Problem List   Diagnosis     Alcohol withdrawal (H)     Alcohol dependence with uncomplicated withdrawal (H)     ACS (acute coronary syndrome) (H)     Chemical dependency (H)     S/P alcohol detoxification     Alcohol-induced acute pancreatitis     Alcohol-induced insomnia (H)     Allergic rhinitis     Family history of diabetes mellitus     Seasonal allergies     Urticaria     Alcohol use disorder, severe, dependence (H)     Hypokalemia     Transaminitis     Alcoholic fatty liver     Generalized anxiety disorder     Moderate episode of recurrent major depressive disorder (H)     Hypomagnesemia     Past Surgical History:   Procedure Laterality Date     NO HISTORY OF SURGERY       OTHER SURGICAL HISTORY      none     Social Histrory  Smoking:  Alcohol Use:  Allergies   Allergen Reactions     Gramineae Pollens Itching     Pollen Extract Rash     grass tree pollen       OUTPATIENT MEDICATIONS     Discharge Medication List as of 5/6/2022 10:16 PM         Vitals:    05/06/22 2130 05/06/22 2145 05/06/22 2200 05/06/22 2215   BP: 122/70 130/66 117/64 (!) 142/71   Pulse: 81 88 87 109   Resp:       Temp:       TempSrc:       SpO2: 99% 98% 99% 99%   Weight:       Height:           Physical Exam   Constitutional: Oriented to person, place, and time. Appears well-developed and well-nourished. Intoxicated.  HEENT:    Head: Atraumatic.   Neck: Normal range of motion.  Neck supple.   Cardiovascular: Normal rate, regular rhythm and normal heart sounds.    Pulmonary/Chest: Normal effort  and breath sounds normal.   Abdominal: Soft. Bowel sounds are normal.   Musculoskeletal: Normal range of motion.   Neurological: Alert and oriented to person, place, and time. Normal strength.No sensory deficit. No cranial nerve deficit . Skin: Skin is warm and dry.   Psychiatric: Normal mood and affect. Behavior is normal. Thought content normal.      DIAGNOSTICS    LABORATORY FINDINGS (REVIEWED AND INTERPRETED):  Labs Ordered and Resulted from Time of ED Arrival to Time of ED Departure   GLUCOSE - Abnormal       Result Value    Glucose 127 (*)    GLUCOSE BY METER - Abnormal    GLUCOSE BY METER POCT 125 (*)    COMPREHENSIVE METABOLIC PANEL - Abnormal    Sodium 146 (*)     Potassium 4.1      Chloride 100      Carbon Dioxide (CO2) 31      Anion Gap 15      Urea Nitrogen 15      Creatinine 0.96      Calcium 9.6      Glucose 127 (*)     Alkaline Phosphatase 81      AST 43 (*)     ALT 31      Protein Total 8.2 (*)     Albumin 4.7      Bilirubin Total 0.4      GFR Estimate >90     ETHYL ALCOHOL LEVEL - Abnormal    Alcohol, Blood 336 (*)    CBC WITH PLATELETS AND DIFFERENTIAL    WBC Count 6.6      RBC Count 5.30      Hemoglobin 15.4      Hematocrit 45.1      MCV 85      MCH 29.1      MCHC 34.1      RDW 13.5      Platelet Count 292      % Neutrophils 46      % Lymphocytes 48      % Monocytes 4      % Eosinophils 0      % Basophils 1      % Immature Granulocytes 1      NRBCs per 100 WBC 0      Absolute Neutrophils 3.0      Absolute Lymphocytes 3.2      Absolute Monocytes 0.3      Absolute Eosinophils 0.0      Absolute Basophils 0.1      Absolute Immature Granulocytes 0.1      Absolute NRBCs 0.0         IMAGING (REVIEWED AND INTERPRETED):  No orders to display       Ivan HOPSON, am serving as a scribe to document services personally performed by Tim Phelps D.O., based on my observation and the  provider s statements to me.    I, Tim Phelps D.O., attest that Ivan Serna is acting in a scribe capacity, has observed my performance of the services and has documented them in accordance with my direction.    Tim Phelps D.O.  EMERGENCY MEDICINE   05/06/22  [facility] EMERGENCY DEPARTMENT  [address] [place] [id number]  [phone]  Dept: [phone]     Tim Phelps DO  05/07/22 0054

## 2022-05-07 NOTE — ED NOTES
Calm and cooperative.Up x 2 ambulated to restroom.States having loose stools.Gait steady.Mother at bedside.Mother states she has a treatment program lined up and she will take patient there tonight,

## 2022-05-07 NOTE — DISCHARGE INSTRUCTIONS
Follow-up with outpatient chemical dependency services.  Try to avoid further alcohol use.  Return to the emergency department if you have problems with follow-up or unable to control your alcohol use.

## 2022-05-14 ENCOUNTER — HOSPITAL ENCOUNTER (EMERGENCY)
Facility: HOSPITAL | Age: 37
Discharge: HOME | End: 2022-05-14
Attending: EMERGENCY MEDICINE | Admitting: EMERGENCY MEDICINE
Payer: COMMERCIAL

## 2022-05-14 ENCOUNTER — APPOINTMENT (OUTPATIENT)
Dept: RADIOLOGY | Facility: HOSPITAL | Age: 37
End: 2022-05-14
Attending: EMERGENCY MEDICINE
Payer: COMMERCIAL

## 2022-05-14 VITALS
SYSTOLIC BLOOD PRESSURE: 127 MMHG | HEART RATE: 89 BPM | TEMPERATURE: 98.6 F | OXYGEN SATURATION: 98 % | RESPIRATION RATE: 16 BRPM | DIASTOLIC BLOOD PRESSURE: 80 MMHG

## 2022-05-14 DIAGNOSIS — F10.220 ACUTE ALCOHOLIC INTOXICATION IN ALCOHOLISM WITHOUT COMPLICATION (H): ICD-10-CM

## 2022-05-14 LAB
ALBUMIN SERPL-MCNC: 4 G/DL (ref 3.5–5)
ALP SERPL-CCNC: 67 U/L (ref 45–120)
ALT SERPL W P-5'-P-CCNC: 45 U/L (ref 0–45)
ANION GAP SERPL CALCULATED.3IONS-SCNC: 12 MMOL/L (ref 5–18)
AST SERPL W P-5'-P-CCNC: 83 U/L (ref 0–40)
BILIRUB DIRECT SERPL-MCNC: <0.1 MG/DL
BILIRUB SERPL-MCNC: 0.2 MG/DL (ref 0–1)
BUN SERPL-MCNC: 14 MG/DL (ref 8–22)
CALCIUM SERPL-MCNC: 8.8 MG/DL (ref 8.5–10.5)
CHLORIDE BLD-SCNC: 109 MMOL/L (ref 98–107)
CO2 SERPL-SCNC: 25 MMOL/L (ref 22–31)
CREAT SERPL-MCNC: 0.79 MG/DL (ref 0.7–1.3)
ERYTHROCYTE [DISTWIDTH] IN BLOOD BY AUTOMATED COUNT: 14.6 % (ref 10–15)
ETHANOL SERPL-MCNC: 358 MG/DL
ETHANOL SERPL-MCNC: 441 MG/DL
GFR SERPL CREATININE-BSD FRML MDRD: >90 ML/MIN/1.73M2
GLUCOSE BLD-MCNC: 110 MG/DL (ref 70–125)
HCT VFR BLD AUTO: 38.5 % (ref 40–53)
HGB BLD-MCNC: 13 G/DL (ref 13.3–17.7)
LIPASE SERPL-CCNC: 57 U/L (ref 0–52)
MCH RBC QN AUTO: 28.8 PG (ref 26.5–33)
MCHC RBC AUTO-ENTMCNC: 33.8 G/DL (ref 31.5–36.5)
MCV RBC AUTO: 85 FL (ref 78–100)
PLATELET # BLD AUTO: 215 10E3/UL (ref 150–450)
POTASSIUM BLD-SCNC: 4.2 MMOL/L (ref 3.5–5)
PROT SERPL-MCNC: 7.1 G/DL (ref 6–8)
RBC # BLD AUTO: 4.51 10E6/UL (ref 4.4–5.9)
SODIUM SERPL-SCNC: 146 MMOL/L (ref 136–145)
TROPONIN I SERPL-MCNC: 0.02 NG/ML (ref 0–0.29)
WBC # BLD AUTO: 7.3 10E3/UL (ref 4–11)

## 2022-05-14 PROCEDURE — 258N000003 HC RX IP 258 OP 636: Performed by: EMERGENCY MEDICINE

## 2022-05-14 PROCEDURE — 93005 ELECTROCARDIOGRAM TRACING: CPT | Performed by: EMERGENCY MEDICINE

## 2022-05-14 PROCEDURE — 82248 BILIRUBIN DIRECT: CPT | Performed by: EMERGENCY MEDICINE

## 2022-05-14 PROCEDURE — 71046 X-RAY EXAM CHEST 2 VIEWS: CPT

## 2022-05-14 PROCEDURE — 99285 EMERGENCY DEPT VISIT HI MDM: CPT | Mod: 25

## 2022-05-14 PROCEDURE — 250N000011 HC RX IP 250 OP 636: Performed by: EMERGENCY MEDICINE

## 2022-05-14 PROCEDURE — 82077 ASSAY SPEC XCP UR&BREATH IA: CPT | Performed by: EMERGENCY MEDICINE

## 2022-05-14 PROCEDURE — 84484 ASSAY OF TROPONIN QUANT: CPT | Performed by: EMERGENCY MEDICINE

## 2022-05-14 PROCEDURE — 36415 COLL VENOUS BLD VENIPUNCTURE: CPT | Performed by: EMERGENCY MEDICINE

## 2022-05-14 PROCEDURE — 96361 HYDRATE IV INFUSION ADD-ON: CPT

## 2022-05-14 PROCEDURE — 96374 THER/PROPH/DIAG INJ IV PUSH: CPT

## 2022-05-14 PROCEDURE — 85018 HEMOGLOBIN: CPT | Performed by: EMERGENCY MEDICINE

## 2022-05-14 PROCEDURE — 83690 ASSAY OF LIPASE: CPT | Performed by: EMERGENCY MEDICINE

## 2022-05-14 RX ORDER — LORAZEPAM 2 MG/ML
1 INJECTION INTRAMUSCULAR ONCE
Status: COMPLETED | OUTPATIENT
Start: 2022-05-14 | End: 2022-05-14

## 2022-05-14 RX ADMIN — SODIUM CHLORIDE 500 ML: 9 INJECTION, SOLUTION INTRAVENOUS at 17:27

## 2022-05-14 RX ADMIN — LORAZEPAM 1 MG: 2 INJECTION INTRAMUSCULAR; INTRAVENOUS at 17:44

## 2022-05-14 NOTE — ED NOTES
Brought pt and mother to [facility] waiting room.  Pt given blankets and pillow to rest on couch.  Pt brought food and fresh water.  Will contact [facility] in [place] for update on pt's admission.

## 2022-05-14 NOTE — ED NOTES
Spoke with Ashley at [facility], confirmed pt still has a room and they are ready for report.    Called pt's mother Veronique to update that pt is ready to be discharged and transported to [facility].  Awaiting call back.    Please call Ivan at [phone], extension [phone] for nurse to nurse.

## 2022-05-14 NOTE — ED NOTES
Spoke with SARI Preston of [facility] in [place], [phone], ext [phone], or ext [phone].  Criteria for admission is for pt's blood alcohol to be less than 0.40.    Dr. Taylor updated, pt will have blood redraw at 1500.

## 2022-05-14 NOTE — ED TRIAGE NOTES
He has been waiting to get into a detox but he is too drunk to be admitted. His alcohol level was 0.46. He needs to be 0.40 before they will let him in. He is here with his mother.

## 2022-05-14 NOTE — ED NOTES
Pt's mother Veronique called back, will make plan with her other son to come to ED to take patient to [facility].

## 2022-05-14 NOTE — ED PROVIDER NOTES
"  Emergency Department Encounter     Evaluation Date & Time:   No admission date for patient encounter.    CHIEF COMPLAINT:  Alcohol Intoxication      Triage Note:He has been waiting to get into a detox but he is too drunk to be admitted. His alcohol level was 0.046. He needs to be 0.040 before they will let him in. He is here with his mother.            Impression and Plan       FINAL IMPRESSION:    ICD-10-CM    1. Acute alcoholic intoxication in alcoholism without complication (H)  F10.220          ED COURSE & MEDICAL DECISION MAKIN year old male, history of chronic alcohol abuse with previous withdrawal with fatty liver, HTN and HLD, who presents for evaluation of alcohol intoxication. Patient has a bed secured at [facility], however is too intoxicated for them to accept him; he needs an alcohol level 0.40 or lower and his alcohol level was 0.46. Patient had been sober for 8 months, but started drinking again this week. He has been through alcohol treatment 6-7 times previously. Denies use of other drugs.    He otherwise reports some generalized chest pain that has been constant for the past several days. He describes the pain as feeling as though \"something is in my chest\". He reports associated SOB. No cough, fevers.    On presentation, patient appears intoxicated with slurring of his speech.    EKG performed and demonstrated NSR with no ischemic changes.  Troponin WNL (0.02); a single, normal troponin is reassuring that symptoms are not secondary to ACS given that they have been constant for several days and I do not think serial troponin testing is indicated.    CXR performed and negative for pleural fluid, pneumothorax, airspace disease and edema.  Normal heart size.    A repeat alcohol level returned at 358.     Our Nursing Care Manager called [facility] and they are reserving his bed for him.  Patient discharged to alcohol treatment facility.  Patient stable throughout ED course.      At the conclusion " "of the encounter I discussed the results of all the tests and the disposition. The questions were answered. The patient and family acknowledged understanding and were agreeable with the care plan.      MEDICATIONS GIVEN IN THE EMERGENCY DEPARTMENT:  Medications   0.9% sodium chloride BOLUS (0 mLs Intravenous Stopped 5/14/22 1915)   LORazepam (ATIVAN) injection 1 mg (1 mg Intravenous Given 5/14/22 1744)       NEW PRESCRIPTIONS STARTED AT TODAY'S ED VISIT:  Discharge Medication List as of 5/14/2022  7:15 PM          HPI     HPI     Nikhil Rios is a 36 year old male, history of chronic alcohol abuse with previous withdrawal with fatty liver, HTN, HLD, who presents to this ED ambulatory with his mother for evaluation of alcohol intoxication. Patient has a bed secured at [facility], however is too intoxicated for them to accept him; he needs an alcohol level 0.40 or lower and his alcohol level was 0.46. Patient had been sober for 8 months, but started drinking again this week. He has been through alcohol treatment 6-7 times previously. Denies use of other drugs.    He otherwise reports some generalized chest pain that has been constant for the past several days. He describes the pain as feeling as though \"something is in my chest\". He reports associated SOB. No cough, fevers.    He has otherwise been in his usual state of health and denies abdominal pain, N/V/D or other concerns.     REVIEW OF SYSTEMS:  All other systems reviewed and are negative.      Medical History     Past Medical History:   Diagnosis Date     Alcohol abuse      Alcohol abuse      Alcohol withdrawal (H)      Depressive disorder      HLD (hyperlipidemia)      HTN (hypertension)        Past Surgical History:   Procedure Laterality Date     NO HISTORY OF SURGERY       OTHER SURGICAL HISTORY      none       Family History   Problem Relation Age of Onset     Coronary Artery Disease Father        Social History     Tobacco Use     Smoking status: Current Some " Day Smoker     Packs/day: 0.25     Types: Cigars     Smokeless tobacco: Never Used     Tobacco comment: occasional   Substance Use Topics     Alcohol use: Yes     Alcohol/week: 17.0 standard drinks     Types: 17 Shots of liquor per week     Comment: Drinks 750ml/day or 17 shots/day; hx of withdrawl seizures     Drug use: Not Currently       famotidine (PEPCID) 20 MG tablet  gabapentin (NEURONTIN) 100 MG capsule  hydrOXYzine (VISTARIL) 50 MG capsule  LORazepam (ATIVAN) 0.5 MG tablet  ondansetron (ZOFRAN ODT) 4 MG ODT tab        Physical Exam     First Vitals:  Patient Vitals for the past 24 hrs:   BP Temp Temp src Pulse Resp SpO2   05/14/22 1915 127/80 -- -- 89 16 98 %   05/14/22 1135 (!) 135/91 98.6  F (37  C) Temporal 98 16 97 %       PHYSICAL EXAM:   Physical Exam    GENERAL: Appears intoxicated with slurred speech. He is awake and alert.  Tearful, but otherwise in no acute distress.   HEENT: Normocephalic, atraumatic. Pupils equal, round and reactive. Conjunctiva normal.  NECK: No stridor.  PULMONARY: Symmetrical breath sounds without distress.  Lungs clear to auscultation bilaterally without wheezes, rhonchi or rales.  CARDIO: Borderline tachycardic rate with regular rhythm.  No significant murmur, rub or gallop.  Radial pulses strong and symmetrical.  ABDOMINAL: Abdomen soft, non-distended and non-tender to palpation.    EXTREMITIES: No lower extremity swelling or edema.      NEURO: Appears intoxicated with slurring of his speech. He is otherwise alert and oriented to person, place and time.  Cranial nerves grossly intact.  No focal motor deficit.  PSYCH: Intoxicated. Tearful.  SKIN: No rashes.     Results     LAB:  All pertinent labs reviewed and interpreted  Labs Ordered and Resulted from Time of ED Arrival to Time of ED Departure   ETHYL ALCOHOL LEVEL - Abnormal       Result Value    Alcohol, Blood 441 (*)    CBC WITH PLATELETS - Abnormal    WBC Count 7.3      RBC Count 4.51      Hemoglobin 13.0 (*)      Hematocrit 38.5 (*)     MCV 85      MCH 28.8      MCHC 33.8      RDW 14.6      Platelet Count 215     BASIC METABOLIC PANEL - Abnormal    Sodium 146 (*)     Potassium 4.2      Chloride 109 (*)     Carbon Dioxide (CO2) 25      Anion Gap 12      Urea Nitrogen 14      Creatinine 0.79      Calcium 8.8      Glucose 110      GFR Estimate >90     HEPATIC FUNCTION PANEL - Abnormal    Bilirubin Total 0.2      Bilirubin Direct <0.1      Protein Total 7.1      Albumin 4.0      Alkaline Phosphatase 67      AST 83 (*)     ALT 45     LIPASE - Abnormal    Lipase 57 (*)    ETHYL ALCOHOL LEVEL - Abnormal    Alcohol, Blood 358 (*)    TROPONIN I - Normal    Troponin I 0.02         RADIOLOGY:  Chest XR,  PA & LAT   Final Result   IMPRESSION: No pleural fluid or pneumothorax. No airspace disease or edema. Normal size of the heart.          EC2022, 12:15; NSR with rate of 79 bpm; normal intervals; normal conduction; no ST-T wave changes consistent with ACS or pericarditis; compared to previous EKG dated 2021, there are no significant changes    EKG independently reviewed and interpreted by MD Kristina Johnson MD  Emergency Medicine  [facility] EMERGENCY DEPARTMENT           Kristina Box MD  22

## 2022-05-15 LAB
ATRIAL RATE - MUSE: 79 BPM
DIASTOLIC BLOOD PRESSURE - MUSE: NORMAL MMHG
INTERPRETATION ECG - MUSE: NORMAL
P AXIS - MUSE: 56 DEGREES
PR INTERVAL - MUSE: 156 MS
QRS DURATION - MUSE: 88 MS
QT - MUSE: 378 MS
QTC - MUSE: 433 MS
R AXIS - MUSE: 76 DEGREES
SYSTOLIC BLOOD PRESSURE - MUSE: NORMAL MMHG
T AXIS - MUSE: 53 DEGREES
VENTRICULAR RATE- MUSE: 79 BPM

## 2022-05-20 NOTE — DISCHARGE INSTRUCTIONS
Follow the diet instructions given.  Advance on clear liquids tonight and clear liquids to bland diet.    Continue to abstain from alcohol.    Fill your prescriptions in the morning and take medication as instructed help control your symptoms   Spoke with [facility] pharmacist explained why cncl.  Per MD She was told to return 100 days from her appt in April

## 2022-05-25 ENCOUNTER — HOSPITAL ENCOUNTER (EMERGENCY)
Facility: HOSPITAL | Age: 37
Discharge: HOME | End: 2022-05-25
Attending: EMERGENCY MEDICINE | Admitting: EMERGENCY MEDICINE
Payer: COMMERCIAL

## 2022-05-25 VITALS
RESPIRATION RATE: 18 BRPM | DIASTOLIC BLOOD PRESSURE: 89 MMHG | HEIGHT: 71 IN | TEMPERATURE: 98 F | OXYGEN SATURATION: 94 % | WEIGHT: 170 LBS | SYSTOLIC BLOOD PRESSURE: 131 MMHG | BODY MASS INDEX: 23.8 KG/M2 | HEART RATE: 101 BPM

## 2022-05-25 DIAGNOSIS — F10.920 ALCOHOLIC INTOXICATION WITHOUT COMPLICATION (H): ICD-10-CM

## 2022-05-25 LAB
ALBUMIN SERPL-MCNC: 4.6 G/DL (ref 3.5–5)
ALP SERPL-CCNC: 78 U/L (ref 45–120)
ALT SERPL W P-5'-P-CCNC: 45 U/L (ref 0–45)
ANION GAP SERPL CALCULATED.3IONS-SCNC: 20 MMOL/L (ref 5–18)
AST SERPL W P-5'-P-CCNC: 52 U/L (ref 0–40)
BASOPHILS # BLD AUTO: 0.1 10E3/UL (ref 0–0.2)
BASOPHILS NFR BLD AUTO: 2 %
BILIRUB SERPL-MCNC: 0.4 MG/DL (ref 0–1)
BUN SERPL-MCNC: 13 MG/DL (ref 8–22)
CALCIUM SERPL-MCNC: 9.3 MG/DL (ref 8.5–10.5)
CHLORIDE BLD-SCNC: 98 MMOL/L (ref 98–107)
CO2 SERPL-SCNC: 26 MMOL/L (ref 22–31)
CREAT SERPL-MCNC: 0.88 MG/DL (ref 0.7–1.3)
EOSINOPHIL # BLD AUTO: 0 10E3/UL (ref 0–0.7)
EOSINOPHIL NFR BLD AUTO: 0 %
ERYTHROCYTE [DISTWIDTH] IN BLOOD BY AUTOMATED COUNT: 14.9 % (ref 10–15)
ETHANOL SERPL-MCNC: 449 MG/DL
GFR SERPL CREATININE-BSD FRML MDRD: >90 ML/MIN/1.73M2
GLUCOSE BLD-MCNC: 85 MG/DL (ref 70–125)
HCT VFR BLD AUTO: 46.1 % (ref 40–53)
HGB BLD-MCNC: 15.7 G/DL (ref 13.3–17.7)
IMM GRANULOCYTES # BLD: 0 10E3/UL
IMM GRANULOCYTES NFR BLD: 1 %
INR PPP: 0.92 (ref 0.85–1.15)
LIPASE SERPL-CCNC: 49 U/L (ref 0–52)
LYMPHOCYTES # BLD AUTO: 3.1 10E3/UL (ref 0.8–5.3)
LYMPHOCYTES NFR BLD AUTO: 50 %
MAGNESIUM SERPL-MCNC: 2 MG/DL (ref 1.8–2.6)
MCH RBC QN AUTO: 28.7 PG (ref 26.5–33)
MCHC RBC AUTO-ENTMCNC: 34.1 G/DL (ref 31.5–36.5)
MCV RBC AUTO: 84 FL (ref 78–100)
MONOCYTES # BLD AUTO: 0.3 10E3/UL (ref 0–1.3)
MONOCYTES NFR BLD AUTO: 4 %
NEUTROPHILS # BLD AUTO: 2.6 10E3/UL (ref 1.6–8.3)
NEUTROPHILS NFR BLD AUTO: 43 %
NRBC # BLD AUTO: 0 10E3/UL
NRBC BLD AUTO-RTO: 0 /100
PLATELET # BLD AUTO: 349 10E3/UL (ref 150–450)
POTASSIUM BLD-SCNC: 4.3 MMOL/L (ref 3.5–5)
PROT SERPL-MCNC: 7.9 G/DL (ref 6–8)
RBC # BLD AUTO: 5.47 10E6/UL (ref 4.4–5.9)
SODIUM SERPL-SCNC: 144 MMOL/L (ref 136–145)
TROPONIN I SERPL-MCNC: <0.01 NG/ML (ref 0–0.29)
WBC # BLD AUTO: 6.1 10E3/UL (ref 4–11)

## 2022-05-25 PROCEDURE — 93005 ELECTROCARDIOGRAM TRACING: CPT | Performed by: EMERGENCY MEDICINE

## 2022-05-25 PROCEDURE — 258N000003 HC RX IP 258 OP 636: Performed by: EMERGENCY MEDICINE

## 2022-05-25 PROCEDURE — 96375 TX/PRO/DX INJ NEW DRUG ADDON: CPT

## 2022-05-25 PROCEDURE — 83735 ASSAY OF MAGNESIUM: CPT | Performed by: EMERGENCY MEDICINE

## 2022-05-25 PROCEDURE — 85610 PROTHROMBIN TIME: CPT | Performed by: EMERGENCY MEDICINE

## 2022-05-25 PROCEDURE — 99285 EMERGENCY DEPT VISIT HI MDM: CPT | Mod: 25

## 2022-05-25 PROCEDURE — 250N000011 HC RX IP 250 OP 636: Performed by: EMERGENCY MEDICINE

## 2022-05-25 PROCEDURE — 84484 ASSAY OF TROPONIN QUANT: CPT | Performed by: EMERGENCY MEDICINE

## 2022-05-25 PROCEDURE — 36415 COLL VENOUS BLD VENIPUNCTURE: CPT | Performed by: EMERGENCY MEDICINE

## 2022-05-25 PROCEDURE — 96365 THER/PROPH/DIAG IV INF INIT: CPT

## 2022-05-25 PROCEDURE — 80053 COMPREHEN METABOLIC PANEL: CPT | Performed by: EMERGENCY MEDICINE

## 2022-05-25 PROCEDURE — 82077 ASSAY SPEC XCP UR&BREATH IA: CPT | Performed by: EMERGENCY MEDICINE

## 2022-05-25 PROCEDURE — 250N000009 HC RX 250: Performed by: EMERGENCY MEDICINE

## 2022-05-25 PROCEDURE — 85025 COMPLETE CBC W/AUTO DIFF WBC: CPT | Performed by: EMERGENCY MEDICINE

## 2022-05-25 PROCEDURE — 83690 ASSAY OF LIPASE: CPT | Performed by: EMERGENCY MEDICINE

## 2022-05-25 RX ORDER — LORAZEPAM 2 MG/ML
1 INJECTION INTRAMUSCULAR ONCE
Status: COMPLETED | OUTPATIENT
Start: 2022-05-25 | End: 2022-05-25

## 2022-05-25 RX ADMIN — FOLIC ACID: 5 INJECTION, SOLUTION INTRAMUSCULAR; INTRAVENOUS; SUBCUTANEOUS at 19:34

## 2022-05-25 RX ADMIN — LORAZEPAM 1 MG: 2 INJECTION, SOLUTION INTRAMUSCULAR; INTRAVENOUS at 19:01

## 2022-05-25 NOTE — ED PROVIDER NOTES
EMERGENCY DEPARTMENT ENCOUNTER     NAME: Nikhil Rios   AGE: 36 year old male   YOB: 1985   MRN: [id number]   EVALUATION DATE & TIME: 5/25/2022  6:12 PM   PCP: Lisandro Graham     Chief Complaint   Patient presents with     Withdrawal   :    FINAL IMPRESSION       1. Alcoholic intoxication without complication (H)           ED COURSE & MEDICAL DECISION MAKING      Pertinent Labs & Imaging studies reviewed. (See chart for details)   36 year old male  presents to the Emergency Department for evaluation of alcohol intoxication.  He is here with his mother as they are planning to get him back into [facility] for treatment. Initial Vitals Reviewed. Initial exam notable for very intoxicated male who is mildly tachycardic but ambulatory without assistance, admits to drinking today.  Clinically I do not suspect alcohol withdrawal and he smells of alcohol.  His mother was with him and states that their biggest concern is to get an alcohol level as if they could get him to [facility] tonight he would have to be under 0.4 to go.  His alcohol level here is 0.449, but his mom has called [facility] and they are not able to arrange treatment tonight.  She is otherwise comfortable taking him home into her care until they can arrange this as an outpatient.        6:30 PM I met with the patient, obtained history, performed an initial exam, and discussed options and plan for diagnostics and treatment here in the ED.  7:54 PM I rechecked and updated the patient with results. I spoke with the patient's mother.   8:12 PM Patient ready for discharge.          At the conclusion of the encounter I discussed the results of all of the tests and the disposition. The questions were answered. The patient or family acknowledged understanding and was agreeable with the care plan.           MEDICATIONS GIVEN IN THE EMERGENCY:   Medications   LORazepam (ATIVAN) injection 1 mg (has no administration in time range)   sodium chloride 0.9 % 1,000  "mL with Infuvite Adult 10 mL, thiamine 100 mg, folic acid 1 mg infusion (has no administration in time range)      NEW PRESCRIPTIONS STARTED AT TODAY'S ER VISIT   New Prescriptions    No medications on file     ================================================================   HISTORY OF PRESENT ILLNESS       Patient information was obtained from: Patient and Patient's Mother  Use of Intrepreter: N/A  Nikhil Rios is a 36 year old male with history of chronic alcohol abuse with previous withdrawal with fatty liver, HTN, HLD who presents to the ED for evaluation of alcohol intoxication.    Per chart review,  Patient was seen at  ED on 5/14/22 for evaluation of alcohol intoxication. Patient had been sober for 8 months, but started drinking again the week of 5/14. Patient has been through alcohol treatment 6-7 times previously. Denied any other drug use. Endorsed chest pain and shortness of breath. CXR performed and was negative for pleural fluid, pneumothorax, airspace disease and edema. Patient discharged to alcohol treatment facility, [facility].     Patient reports his last ETOH drink was sometime today prior to his arrival to the ED. States he is not feeling well. States \"I need to be a better person.\" Patient does not want to go to detox at [facility]. Says the only place he would like to go for treatment is [facility].  Mother states she called [facility] and they will not accept the patient unless his SHERITA is below 0.4 and he comes with a plan. She notes the patient has been to [facility] several times in the past. She also states he was 8 months sober before relapsing about 1 month ago. Denies any other medical complaint at this time.     ================================================================    REVIEW OF SYSTEMS       Review of Systems   ROS limited due to acuity of patients condition.     PAST HISTORY     PAST MEDICAL HISTORY:   Past Medical History:   Diagnosis Date     Alcohol abuse      Alcohol abuse  " "    Alcohol withdrawal (H)      Depressive disorder      HLD (hyperlipidemia)      HTN (hypertension)       PAST SURGICAL HISTORY:   Past Surgical History:   Procedure Laterality Date     NO HISTORY OF SURGERY       OTHER SURGICAL HISTORY      none      CURRENT MEDICATIONS:   famotidine (PEPCID) 20 MG tablet  gabapentin (NEURONTIN) 100 MG capsule  hydrOXYzine (VISTARIL) 50 MG capsule  LORazepam (ATIVAN) 0.5 MG tablet  ondansetron (ZOFRAN ODT) 4 MG ODT tab      ALLERGIES:   Allergies   Allergen Reactions     Gramineae Pollens Itching     Pollen Extract Rash     grass tree pollen      FAMILY HISTORY:   Family History   Problem Relation Age of Onset     Coronary Artery Disease Father       SOCIAL HISTORY:   Social History     Socioeconomic History     Marital status: Single   Occupational History     Occupation: Rental property owner   Tobacco Use     Smoking status: Current Some Day Smoker     Packs/day: 0.25     Types: Cigars     Smokeless tobacco: Never Used     Tobacco comment: occasional   Substance and Sexual Activity     Alcohol use: Yes     Alcohol/week: 17.0 standard drinks     Types: 17 Shots of liquor per week     Comment: Drinks 750ml/day or 17 shots/day; hx of withdrawl seizures     Drug use: Not Currently     Social Determinants of Health     Transportation Needs: No Transportation Needs     Lack of Transportation (Medical): No     Lack of Transportation (Non-Medical): No   Stress: Stress Concern Present     Feeling of Stress : Very much   Housing Stability: Unknown     Unable to Pay for Housing in the Last Year: No     Unstable Housing in the Last Year: No        VITALS  Patient Vitals for the past 24 hrs:   BP Temp Temp src Pulse Resp SpO2 Height Weight   05/25/22 1807 131/89 98  F (36.7  C) Oral 101 18 94 % 1.803 m (5' 11\") 77.1 kg (170 lb)        ================================================================    PHYSICAL EXAM     VITAL SIGNS: /89   Pulse 101   Temp 98  F (36.7  C) (Oral)   " "Resp 18   Ht 1.803 m (5' 11\")   Wt 77.1 kg (170 lb)   SpO2 94%   BMI 23.71 kg/m     Constitutional:  Awake, intoxicated appearing.   HENT:  Atraumatic, oropharynx without exudate or erythema, membranes moist  Lymph:  No adenopathy  Eyes: EOM intact, PERRL, no injection  Neck: Supple  Respiratory:  Clear to auscultation bilaterally, no wheezes or crackles   Cardiovascular:  Regular rate and rhythm, single S1 and S2   GI:  Soft, nontender, nondistended, no rebound or guarding   Musculoskeletal:  Moves all extremities, no lower extremity edema, no deformities    Skin:  Warm, dry  Neurologic:  Alert and oriented x3, no focal deficits noted       ================================================================  LAB       All pertinent labs reviewed and interpreted.   Labs Ordered and Resulted from Time of ED Arrival to Time of ED Departure   ETHYL ALCOHOL LEVEL - Abnormal       Result Value    Alcohol, Blood 449 (*)    COMPREHENSIVE METABOLIC PANEL - Abnormal    Sodium 144      Potassium 4.3      Chloride 98      Carbon Dioxide (CO2) 26      Anion Gap 20 (*)     Urea Nitrogen 13      Creatinine 0.88      Calcium 9.3      Glucose 85      Alkaline Phosphatase 78      AST 52 (*)     ALT 45      Protein Total 7.9      Albumin 4.6      Bilirubin Total 0.4      GFR Estimate >90     LIPASE - Normal    Lipase 49     INR - Normal    INR 0.92     MAGNESIUM - Normal    Magnesium 2.0     TROPONIN I - Normal    Troponin I <0.01     CBC WITH PLATELETS AND DIFFERENTIAL    WBC Count 6.1      RBC Count 5.47      Hemoglobin 15.7      Hematocrit 46.1      MCV 84      MCH 28.7      MCHC 34.1      RDW 14.9      Platelet Count 349      % Neutrophils 43      % Lymphocytes 50      % Monocytes 4      % Eosinophils 0      % Basophils 2      % Immature Granulocytes 1      NRBCs per 100 WBC 0      Absolute Neutrophils 2.6      Absolute Lymphocytes 3.1      Absolute Monocytes 0.3      Absolute Eosinophils 0.0      Absolute Basophils 0.1      " Absolute Immature Granulocytes 0.0      Absolute NRBCs 0.0          ===============================================================  RADIOLOGY       Reviewed all pertinent imaging. Please see official radiology report.   No orders to display         ================================================================  EKG     EKG reviewed interpreted by me shows sinus rhythm with rate of 74, right axis,  with no acute ST or T wave changes since 14 May    I have independently reviewed and interpreted the EKG(s) documented above.     ================================================================  PROCEDURES         I, Thony Priest, am serving as a scribe to document services personally performed by Dr. Machado based on my observation and the provider's statements to me. I, Charlotte Machado MD attest that Thony Priest is acting in a scribe capacity, has observed my performance of the services and has documented them in accordance with my direction.   Charlotte Machado M.D.   Emergency Medicine   [facility] EMERGENCY DEPARTMENT  [address] [place] [id number]  [phone]  Dept: [phone]        Charlotte aMchado MD  05/25/22 2028

## 2022-05-25 NOTE — ED NOTES
"ED Provider In Triage Note  [facility]  Encounter Date: May 25, 2022    Chief Complaint   Patient presents with     Withdrawal       Brief HPI:   Nikhil Rios is a 36 year old male presenting to the Emergency Department with a chief complaint of concern for alcohol withdrawal. History of alcohol withdrawal. Very anxious. Last drink this morning. \"I am going to lose it\". HX of WD seizures.     Brief Physical Exam:  /89   Pulse 101   Temp 98  F (36.7  C) (Oral)   Resp 18   Ht 1.803 m (5' 11\")   Wt 77.1 kg (170 lb)   SpO2 94%   BMI 23.71 kg/m    General: anxious  HEENT: Atraumatic  Resp: No respiratory distress  Abdomen: Non-peritoneal  Neuro: Alert, oriented  Psych: anxious, agiated      Plan Initiated in Triage:  Orders Placed This Encounter     Alcohol level blood     Comprehensive metabolic panel     Lipase     INR     Magnesium     Troponin I (now)     LORazepam (ATIVAN) injection 1 mg     sodium chloride 0.9 % 1,000 mL with Infuvite Adult 10 mL, thiamine 100 mg, folic acid 1 mg infusion       PIT Dispo:   Take directly back to main ED   Ativan, banana bag, labs, EKG    Justo Moreland MD on 5/25/2022 at 6:13 PM    Patient was evaluated by the Physician in Triage due to a limitation of available rooms in the Emergency Department. A plan of care was discussed based on the information obtained on the initial evaluation and patient was consuled to return back to the Emergency Department lobby after this initial evalutaiton until results were obtained or a room became available in the Emergency Department. Patient was counseled not to leave prior to receiving the results of their workup.     Justo Moreland MD  [facility] EMERGENCY DEPARTMENT  [address] [place] [id number]  [phone]     Justo Moreland MD  05/25/22 1814    "

## 2022-05-25 NOTE — ED TRIAGE NOTES
"Presents to triage with mom for c/o \"alcohol.\"  Sober for 8 months, relapsed a month ago, drinking since.  Drinks \"too much.\"  Last drink was today.  Having nausea without vomiting.  Slight HA.  c/o pins/needles.  VERY restless/anxious in triage.  Hx of w/d seizures.        "

## 2022-05-26 ENCOUNTER — HOSPITAL ENCOUNTER (EMERGENCY)
Facility: HOSPITAL | Age: 37
Discharge: HOME | End: 2022-05-26
Attending: EMERGENCY MEDICINE | Admitting: EMERGENCY MEDICINE
Payer: COMMERCIAL

## 2022-05-26 VITALS
RESPIRATION RATE: 18 BRPM | SYSTOLIC BLOOD PRESSURE: 128 MMHG | BODY MASS INDEX: 23.71 KG/M2 | DIASTOLIC BLOOD PRESSURE: 83 MMHG | OXYGEN SATURATION: 99 % | TEMPERATURE: 98 F | HEART RATE: 92 BPM | WEIGHT: 170 LBS

## 2022-05-26 DIAGNOSIS — F10.920 ALCOHOLIC INTOXICATION WITHOUT COMPLICATION (H): ICD-10-CM

## 2022-05-26 LAB
ANION GAP SERPL CALCULATED.3IONS-SCNC: 19 MMOL/L (ref 5–18)
ATRIAL RATE - MUSE: 74 BPM
BUN SERPL-MCNC: 13 MG/DL (ref 8–22)
CALCIUM SERPL-MCNC: 9 MG/DL (ref 8.5–10.5)
CHLORIDE BLD-SCNC: 98 MMOL/L (ref 98–107)
CO2 SERPL-SCNC: 25 MMOL/L (ref 22–31)
CREAT SERPL-MCNC: 0.85 MG/DL (ref 0.7–1.3)
DIASTOLIC BLOOD PRESSURE - MUSE: NORMAL MMHG
ETHANOL SERPL-MCNC: 463 MG/DL
GFR SERPL CREATININE-BSD FRML MDRD: >90 ML/MIN/1.73M2
GLUCOSE BLD-MCNC: 88 MG/DL (ref 70–125)
INTERPRETATION ECG - MUSE: NORMAL
MAGNESIUM SERPL-MCNC: 2.1 MG/DL (ref 1.8–2.6)
P AXIS - MUSE: 61 DEGREES
POTASSIUM BLD-SCNC: 4.2 MMOL/L (ref 3.5–5)
PR INTERVAL - MUSE: 156 MS
QRS DURATION - MUSE: 88 MS
QT - MUSE: 420 MS
QTC - MUSE: 466 MS
R AXIS - MUSE: 98 DEGREES
SODIUM SERPL-SCNC: 142 MMOL/L (ref 136–145)
SYSTOLIC BLOOD PRESSURE - MUSE: NORMAL MMHG
T AXIS - MUSE: 55 DEGREES
VENTRICULAR RATE- MUSE: 74 BPM

## 2022-05-26 PROCEDURE — 80048 BASIC METABOLIC PNL TOTAL CA: CPT | Performed by: EMERGENCY MEDICINE

## 2022-05-26 PROCEDURE — 250N000013 HC RX MED GY IP 250 OP 250 PS 637: Performed by: EMERGENCY MEDICINE

## 2022-05-26 PROCEDURE — 99283 EMERGENCY DEPT VISIT LOW MDM: CPT

## 2022-05-26 PROCEDURE — 82077 ASSAY SPEC XCP UR&BREATH IA: CPT | Performed by: EMERGENCY MEDICINE

## 2022-05-26 PROCEDURE — 36415 COLL VENOUS BLD VENIPUNCTURE: CPT | Performed by: EMERGENCY MEDICINE

## 2022-05-26 PROCEDURE — 83735 ASSAY OF MAGNESIUM: CPT | Performed by: EMERGENCY MEDICINE

## 2022-05-26 RX ORDER — MULTIVITAMIN,THERAPEUTIC
1 TABLET ORAL ONCE
Status: COMPLETED | OUTPATIENT
Start: 2022-05-26 | End: 2022-05-26

## 2022-05-26 RX ORDER — MAGNESIUM OXIDE 400 MG/1
800 TABLET ORAL ONCE
Status: COMPLETED | OUTPATIENT
Start: 2022-05-26 | End: 2022-05-26

## 2022-05-26 RX ORDER — FOLIC ACID 1 MG/1
1 TABLET ORAL ONCE
Status: COMPLETED | OUTPATIENT
Start: 2022-05-26 | End: 2022-05-26

## 2022-05-26 RX ADMIN — Medication 800 MG: at 16:52

## 2022-05-26 RX ADMIN — THIAMINE HCL TAB 100 MG 100 MG: 100 TAB at 16:52

## 2022-05-26 RX ADMIN — THERA TABS 1 TABLET: TAB at 16:52

## 2022-05-26 RX ADMIN — FOLIC ACID 1 MG: 1 TABLET ORAL at 16:52

## 2022-05-26 NOTE — ED NOTES
Pt reports he is here because he does not want to be an alcoholic. Last drink was sometime yesterday he reports. He denies any alcohol today.

## 2022-05-26 NOTE — ED PROVIDER NOTES
EMERGENCY DEPARTMENT ENCOUNTER     NAME: Nikhil Rios   AGE: 36 year old male   YOB: 1985   MRN: [id number]   EVALUATION DATE & TIME: No admission date for patient encounter.   PCP: Lisandro Graham     Chief Complaint   Patient presents with     Alcohol Intoxication   :    FINAL IMPRESSION       1. Alcoholic intoxication without complication (H)           ED COURSE & MEDICAL DECISION MAKING      Pertinent Labs & Imaging studies reviewed. (See chart for details)   36 year old male  presents to the Emergency Department for evaluation of repeat visit for alcohol intoxication.  Patient was actually seen by myself yesterday evening with the same presentation and is here again with his mother.  She states that they have not been able to get him into treatment yet, and she does not otherwise know what to do when he drinks and then says he does not feel well. Initial Vitals Reviewed. Initial exam notable for patient who is calm, has no tremors or signs of withdrawal at this time.  He admits to drinking today.  He is not open to the idea of a County detox, he is only open to one specific treatment facility, and I did discuss the limitations of the emergency department and reiterated them again as we had a similar conversation last night.  His alcohol level tonight is greater than 450 which confirms my suspicion of the lack of withdrawal.  Unfortunately there is simply not much more we can do for him here in the emergency department and I discussed this with his mother.  He is staying with her at this time and has been here several times in the last 10 days since he started drinking again.  Unfortunately at this time we have no further services to offer from the ED as he is declining the only possible services we can offer.  His mother is comfortable taking him home.           At the conclusion of the encounter I discussed the results of all of the tests and the disposition. The questions were answered. The  "patient or family acknowledged understanding and was agreeable with the care plan.       MEDICATIONS GIVEN IN THE EMERGENCY:   Medications   multivitamin, therapeutic (THERA-VIT) tablet 1 tablet (1 tablet Oral Given 5/26/22 1652)   folic acid (FOLVITE) tablet 1 mg (1 mg Oral Given 5/26/22 1652)   thiamine (B-1) tablet 100 mg (100 mg Oral Given 5/26/22 1652)   magnesium oxide (MAG-OX) tablet 800 mg (800 mg Oral Given 5/26/22 1652)      NEW PRESCRIPTIONS STARTED AT TODAY'S ER VISIT   New Prescriptions    No medications on file     ================================================================   HISTORY OF PRESENT ILLNESS       Patient information was obtained from: patient and mother   Use of Intrepreter: N/A   Nikhil Rios is a 36 year old male with history of alcohol abuse who presents again for alcohol intoxication.  Patient was seen last night here with similar presentation.  He denies any withdrawal symptoms, admits to drinking today.  He only wants to go to one specific treatment facility at [facility], and his mom has not been able to reach them to get him back in.  He is not open to a County detox, and when asked why he is here the patient states \" because I do not want to be an alcoholic anymore.\"    ================================================================    REVIEW OF SYSTEMS       Review of Systems   Unable to perform ROS: Other     Intoxicated    PAST HISTORY     PAST MEDICAL HISTORY:   Past Medical History:   Diagnosis Date     Alcohol abuse      Alcohol abuse      Alcohol withdrawal (H)      Depressive disorder      HLD (hyperlipidemia)      HTN (hypertension)       PAST SURGICAL HISTORY:   Past Surgical History:   Procedure Laterality Date     NO HISTORY OF SURGERY       OTHER SURGICAL HISTORY      none      CURRENT MEDICATIONS:   famotidine (PEPCID) 20 MG tablet  gabapentin (NEURONTIN) 100 MG capsule  hydrOXYzine (VISTARIL) 50 MG capsule  LORazepam (ATIVAN) 0.5 MG tablet  ondansetron (ZOFRAN ODT) " 4 MG ODT tab      ALLERGIES:   Allergies   Allergen Reactions     Gramineae Pollens Itching     Pollen Extract Rash     grass tree pollen      FAMILY HISTORY:   Family History   Problem Relation Age of Onset     Coronary Artery Disease Father       SOCIAL HISTORY:   Social History     Socioeconomic History     Marital status: Single   Occupational History     Occupation: Rental property owner   Tobacco Use     Smoking status: Current Some Day Smoker     Packs/day: 0.25     Types: Cigars     Smokeless tobacco: Never Used     Tobacco comment: occasional   Substance and Sexual Activity     Alcohol use: Yes     Alcohol/week: 17.0 standard drinks     Types: 17 Shots of liquor per week     Comment: Drinks 750ml/day or 17 shots/day; hx of withdrawl seizures     Drug use: Not Currently     Social Determinants of Health     Transportation Needs: No Transportation Needs     Lack of Transportation (Medical): No     Lack of Transportation (Non-Medical): No   Stress: Stress Concern Present     Feeling of Stress : Very much   Housing Stability: Unknown     Unable to Pay for Housing in the Last Year: No     Unstable Housing in the Last Year: No        VITALS  Patient Vitals for the past 24 hrs:   BP Temp Temp src Pulse Resp SpO2 Weight   05/26/22 1629 128/83 98  F (36.7  C) Temporal 92 18 99 % 77.1 kg (170 lb)        ================================================================    PHYSICAL EXAM     VITAL SIGNS: /83   Pulse 92   Temp 98  F (36.7  C) (Temporal)   Resp 18   Wt 77.1 kg (170 lb)   SpO2 99%   BMI 23.71 kg/m     Constitutional:  Awake, no acute distress, intoxicated appearing  HENT:  Atraumatic, oropharynx without exudate or erythema, membranes moist  Lymph:  No adenopathy  Eyes: EOM intact, PERRL, no injection  Neck: Supple  Respiratory:  Clear to auscultation bilaterally, no wheezes or crackles   Cardiovascular:  Regular rate and rhythm, single S1 and S2   GI:  Soft, nontender, nondistended, no rebound  or guarding   Musculoskeletal:  Moves all extremities, no lower extremity edema, no deformities    Skin:  Warm, dry  Neurologic:  Alert and oriented x3, no focal deficits noted, no tremors      ================================================================  LAB       All pertinent labs reviewed and interpreted.   Labs Ordered and Resulted from Time of ED Arrival to Time of ED Departure   BASIC METABOLIC PANEL - Abnormal       Result Value    Sodium 142      Potassium 4.2      Chloride 98      Carbon Dioxide (CO2) 25      Anion Gap 19 (*)     Urea Nitrogen 13      Creatinine 0.85      Calcium 9.0      Glucose 88      GFR Estimate >90     ETHYL ALCOHOL LEVEL - Abnormal    Alcohol, Blood 463 (*)    MAGNESIUM - Normal    Magnesium 2.1          ===============================================================  RADIOLOGY       Reviewed all pertinent imaging. Please see official radiology report.   No orders to display         ================================================================  EKG         I have independently reviewed and interpreted the EKG(s) documented above.     ================================================================  PROCEDURES           Charlotte Machado M.D.   Emergency Medicine   [facility] EMERGENCY DEPARTMENT  [address] [place] [id number]  [phone]  Dept: [phone]      Charlotte Machado MD  05/26/22 7261

## 2022-05-26 NOTE — ED PROVIDER NOTES
"ED Provider In Triage Note  [facility]  Encounter Date: May 26, 2022    Chief Complaint   Patient presents with     Alcohol Intoxication       Brief HPI:   Nikhil Rios is a 36 year old male, history of alcohol abuse with history of withdrawal with seizures, presenting to the Emergency Department with a chief complaint of alcohol intoxication. Was just recently in alcohol treatment (5/14) and started drinking again 2 days ago. Presents because he \"does not want to be an alcoholic anymore\".     Brief Physical Exam:  /83   Pulse 92   Temp 98  F (36.7  C) (Temporal)   Resp 18   Wt 77.1 kg (170 lb)   SpO2 99%   BMI 23.71 kg/m    General: Non-toxic appearing; appears intoxicated  HEENT: Atraumatic  Resp: No respiratory distress; clear lungs  Cardiac: RRR  Abdomen: soft, non-tender  Neuro: Appears intoxicated, but is alert, oriented, answers questions appropriately; cranial nerves grossly intact, no focal motor deficits; no tongue fasciculations; no hand tremors  Psych: Intoxicated    Plan Initiated in Triage:  Basic labs    PIT Dispo:   Room    Kristina Box MD on 5/26/2022 at 4:07 PM    Patient was evaluated by the Physician in Triage due to a limitation of available rooms in the Emergency Department. A plan of care was discussed based on the information obtained on the initial evaluation and patient was consuled to return back to the Emergency Department lobby after this initial evalutaiton until results were obtained or a room became available in the Emergency Department. Patient was counseled not to leave prior to receiving the results of their workup.        Kristina Box MD  05/26/22 1633    "

## 2022-05-26 NOTE — DISCHARGE INSTRUCTIONS
As we discussed, I know this is an incredibly frustrating situation but there is not much that can be done from the emergency department to help Nikhil until he can get back into a treatment program.  Reasons to come back to the ER would include alcohol withdrawal which can be life-threatening, or if he changes his mind about going to [facility] detox and we can help arrange transfer there.  Unfortunately otherwise there is not much we can do to get people into treatment programs and this has to be done as an outpatient by families.  Alcohol level today is 463.    As a reminder, we do not recommend attempting detox at home as it needs to be done in a safe environment and withdrawal can be life-threatening.

## 2022-05-26 NOTE — DISCHARGE INSTRUCTIONS
Alcohol level tonight was 449.  As we recommend, do not attempt to detox at home as this can cause life-threatening withdrawal.  Ensure that this is done in a treatment facility or at [facility] detox.

## 2022-05-26 NOTE — ED TRIAGE NOTES
The pt presents for evaluation of EtOH.      Triage Assessment     Row Name 05/26/22 8450       Triage Assessment (Adult)    Airway WDL WDL       Respiratory WDL    Respiratory WDL WDL       Skin Circulation/Temperature WDL    Skin Circulation/Temperature WDL WDL       Cardiac WDL    Cardiac WDL WDL       Peripheral/Neurovascular WDL    Peripheral Neurovascular WDL WDL       Cognitive/Neuro/Behavioral WDL    Cognitive/Neuro/Behavioral WDL WDL

## 2022-05-28 ENCOUNTER — HOSPITAL ENCOUNTER (EMERGENCY)
Facility: HOSPITAL | Age: 37
Discharge: HOME | End: 2022-05-29
Attending: EMERGENCY MEDICINE | Admitting: EMERGENCY MEDICINE
Payer: COMMERCIAL

## 2022-05-28 DIAGNOSIS — F41.9 ANXIETY: ICD-10-CM

## 2022-05-28 DIAGNOSIS — F10.920 ALCOHOLIC INTOXICATION WITHOUT COMPLICATION (H): ICD-10-CM

## 2022-05-28 PROCEDURE — 250N000013 HC RX MED GY IP 250 OP 250 PS 637: Performed by: EMERGENCY MEDICINE

## 2022-05-28 PROCEDURE — 96375 TX/PRO/DX INJ NEW DRUG ADDON: CPT

## 2022-05-28 PROCEDURE — 96366 THER/PROPH/DIAG IV INF ADDON: CPT

## 2022-05-28 PROCEDURE — 96365 THER/PROPH/DIAG IV INF INIT: CPT

## 2022-05-28 PROCEDURE — 99284 EMERGENCY DEPT VISIT MOD MDM: CPT | Mod: 25

## 2022-05-28 RX ORDER — DIAZEPAM 5 MG
5 TABLET ORAL ONCE
Status: COMPLETED | OUTPATIENT
Start: 2022-05-28 | End: 2022-05-28

## 2022-05-28 RX ADMIN — DIAZEPAM 5 MG: 5 TABLET ORAL at 23:37

## 2022-05-28 ASSESSMENT — ENCOUNTER SYMPTOMS
VOMITING: 1
NAUSEA: 1
ABDOMINAL PAIN: 1

## 2022-05-29 VITALS
OXYGEN SATURATION: 90 % | HEART RATE: 67 BPM | DIASTOLIC BLOOD PRESSURE: 52 MMHG | SYSTOLIC BLOOD PRESSURE: 108 MMHG | RESPIRATION RATE: 18 BRPM

## 2022-05-29 LAB
ANION GAP SERPL CALCULATED.3IONS-SCNC: 20 MMOL/L (ref 5–18)
BUN SERPL-MCNC: 13 MG/DL (ref 8–22)
CALCIUM SERPL-MCNC: 9.3 MG/DL (ref 8.5–10.5)
CHLORIDE BLD-SCNC: 100 MMOL/L (ref 98–107)
CO2 SERPL-SCNC: 24 MMOL/L (ref 22–31)
CREAT SERPL-MCNC: 0.75 MG/DL (ref 0.7–1.3)
ETHANOL SERPL-MCNC: 436 MG/DL
GFR SERPL CREATININE-BSD FRML MDRD: >90 ML/MIN/1.73M2
GLUCOSE BLD-MCNC: 87 MG/DL (ref 70–125)
POTASSIUM BLD-SCNC: 4.5 MMOL/L (ref 3.5–5)
SODIUM SERPL-SCNC: 144 MMOL/L (ref 136–145)

## 2022-05-29 PROCEDURE — 96375 TX/PRO/DX INJ NEW DRUG ADDON: CPT

## 2022-05-29 PROCEDURE — 250N000011 HC RX IP 250 OP 636: Performed by: EMERGENCY MEDICINE

## 2022-05-29 PROCEDURE — 82077 ASSAY SPEC XCP UR&BREATH IA: CPT | Performed by: EMERGENCY MEDICINE

## 2022-05-29 PROCEDURE — 258N000003 HC RX IP 258 OP 636: Performed by: EMERGENCY MEDICINE

## 2022-05-29 PROCEDURE — 96366 THER/PROPH/DIAG IV INF ADDON: CPT

## 2022-05-29 PROCEDURE — 96365 THER/PROPH/DIAG IV INF INIT: CPT

## 2022-05-29 PROCEDURE — 80048 BASIC METABOLIC PNL TOTAL CA: CPT | Performed by: EMERGENCY MEDICINE

## 2022-05-29 PROCEDURE — 36415 COLL VENOUS BLD VENIPUNCTURE: CPT | Performed by: EMERGENCY MEDICINE

## 2022-05-29 PROCEDURE — 250N000009 HC RX 250: Performed by: EMERGENCY MEDICINE

## 2022-05-29 RX ORDER — HALOPERIDOL 5 MG/ML
2 INJECTION INTRAMUSCULAR ONCE
Status: COMPLETED | OUTPATIENT
Start: 2022-05-29 | End: 2022-05-29

## 2022-05-29 RX ADMIN — FOLIC ACID: 5 INJECTION, SOLUTION INTRAMUSCULAR; INTRAVENOUS; SUBCUTANEOUS at 00:03

## 2022-05-29 RX ADMIN — HALOPERIDOL LACTATE 2 MG: 5 INJECTION, SOLUTION INTRAMUSCULAR at 01:49

## 2022-05-29 NOTE — ED NOTES
"Pt rang call light and told writer \"I'm going to have a fucking seizure, I can fucking feel it. I'm just fighting it.\" RN notified, seizure pads placed.  "

## 2022-05-29 NOTE — ED TRIAGE NOTES
Pt presents with complaints of alcohol intoxication and being unable to get into detox. Pt is accompanied by his mother who states that h mishel has been unable to get into detox. Pt is only willing to go to 1 detox. Pt is thrashing about in chair and cursing. Pt has been here many many times in the past few weeks for the same thing. Pt returns to drinking after leaving ED each time.     Triage Assessment     Row Name 05/28/22 0915       Triage Assessment (Adult)    Airway WDL WDL       Respiratory WDL    Respiratory WDL WDL       Skin Circulation/Temperature WDL    Skin Circulation/Temperature WDL WDL       Cardiac WDL    Cardiac WDL WDL       Peripheral/Neurovascular WDL    Peripheral Neurovascular WDL WDL       Cognitive/Neuro/Behavioral WDL    Cognitive/Neuro/Behavioral WDL mood/behavior    Mood/Behavior anxious;angry;agitated;restless;uncooperative

## 2022-05-29 NOTE — ED NOTES
Pt informed writer and staff that he needs more Valium because the last time he was at [facility], he had a seizure. Pt is adamant about this and continues to call for the same purpose. Pt/mother informed that he had 5mg of Valium already and has seizures pad on if he does have seizure. Dr. He informed.

## 2022-05-29 NOTE — DISCHARGE INSTRUCTIONS
You can self present to [facility] detox at any time.  This will be my recommendation as it is not safe for you to quit drinking in an unsupervised setting.    Until you are able to figure out a treatment plan and go through a supervised detox, I would not recommend quitting drinking.    Please follow through with your treatment program as soon as possible.

## 2022-05-29 NOTE — ED PROVIDER NOTES
EMERGENCY DEPARTMENT ENCOUNTER      NAME: Nikhil Rios  AGE: 36 year old male  YOB: 1985  MRN: [id number]  EVALUATION DATE & TIME: 2022 10:42 PM    PCP: Lisandro Graham    ED PROVIDER: Kanchan He M.D.      Chief Complaint   Patient presents with     Alcohol Intoxication         FINAL IMPRESSION:  1. Alcoholic intoxication without complication (H)    2. Anxiety        MEDICAL DECISION MAKIN:14 PM I met with the patient, obtained history, performed an initial exam, and discussed options and plan for diagnostics and treatment here in the ED. PPE worn including N95 mask, surgical gloves, surgical mask, eye protection.  2:44 AM I rechecked and updated the patient.  Pertinent Labs & Imaging studies reviewed. (See chart for details)       Nikhil Rios is a 36 year old male who presents for evaluation for alcohol intoxication--he believes that he is withdrawing but alcohol tonight is 436 so very unlikely to be withdrawing at this level.  He is not tachycardic and has a normal blood pressure.  Patient was actually 2 times in the last 24 hours and is here again with his mother.  She states that they have not been able to get him into treatment yet, and she does not otherwise know what to do when he drinks and then says he does not feel well. Initial exam notable for patient who is agitated and writhing in bed but has no tremors or signs of withdrawal at this time.  He admits to drinking today.  As per previous notes, he is not open to the idea of a [place] detox, he is only open to one specific treatment facility, and I did discuss the limitations of the emergency department and reiterated them again as he has been told similar information by many previous providers in this emergency department.  I will check basic labs give him fluids and symptomatic management including thiamine and folate and Zofran.    Labs are all reassuring.  Despite the high alcohol level, he continues to say that his  anxiety is through the roof and is getting repeatedly agitated when I will not give him multiple doses of benzodiazepines. Unfortunately there is simply not much more we can do for him here in the emergency department and I discussed this with his mother.  I offered him referral to [facility] detox and told him that this was his only option but he refused.  Because there is nothing more I can offer him in the emergency department, I explained to him that he would need to be discharged unless he is willing to go to [facility].  He was not willing again to do so, so he requested discharge..  He is staying with her at this time and has been here several times in the last 10 days since he started drinking again.  Unfortunately at this time we have no further services to offer from the ED as he is declining the only possible services we can offer.  Additionally, I do not think that he would be a candidate for outpatient Valium taper.  His mother is not with him 24/7 and she feels unable to administer this medication in a supervised fashion. His mother is comfortable taking him home.        At the conclusion of the encounter I discussed the results of all of the tests and the disposition. The questions were answered. The patient or family acknowledged understanding and was agreeable with the care plan.        MEDICATIONS GIVEN IN THE EMERGENCY:  Medications   sodium chloride 0.9 % 1,000 mL with Infuvite Adult 10 mL, thiamine 100 mg, folic acid 1 mg infusion ( Intravenous Stopped 5/29/22 0245)   diazepam (VALIUM) tablet 5 mg (5 mg Oral Given 5/28/22 2337)   haloperidol lactate (HALDOL) injection 2 mg (2 mg Intravenous Given 5/29/22 0149)       =================================================================    HPI    Patient information was obtained from: Patient, patient's mother    Use of : Use of : N/A      Nikhil Rios is a 36 year old male with a history of alcohol use, acute alcoholic  "pancreatitis, alcoholic fatty liver, SILVANA who presents with alcohol intoxication. Patient reports to the ED requesting ativan, valium, a banana bag and states \"make me feel better\". His last drink was earlier today at 4 PM. He states he is not intoxicated, but appears obviously intoxicated. Patient currently endorses he \"can't stop moving\", \"my brain won't stop\", and feels like he is going to have a seizure, which he states he has had in the past. He also endorses nausea, vomiting, abdominal pain. Patient states his last seizure was half a year ago, but patient's mother claims his last seizure was 1 year ago.    Patient has been seen in the ED recently and when asked what his plan was he states \"Brunson\", patient's mother states they do not have beds, but may be getting a bed by next week.    SHx- Patient is currently staying with his mother.    Per chart review, patient has been seen 5 other times this month with his last visit being on 05/26 (2 days ago). During his last visit at this ED, he presented with an alcohol level of 463.    REVIEW OF SYSTEMS   Review of Systems   Constitutional:        Positive for alcohol intoxication, \"can't stop moving\".   Gastrointestinal: Positive for abdominal pain, nausea and vomiting.   All other systems reviewed and are negative.       PAST MEDICAL HISTORY:  Past Medical History:   Diagnosis Date     Alcohol abuse      Alcohol abuse      Alcohol withdrawal (H)      Depressive disorder      HLD (hyperlipidemia)      HTN (hypertension)        PAST SURGICAL HISTORY:  Past Surgical History:   Procedure Laterality Date     NO HISTORY OF SURGERY       OTHER SURGICAL HISTORY      none       CURRENT MEDICATIONS:    No current facility-administered medications for this encounter.    Current Outpatient Medications:      famotidine (PEPCID) 20 MG tablet, Take 1 tablet (20 mg) by mouth 2 times daily, Disp: 14 tablet, Rfl: 0     gabapentin (NEURONTIN) 100 MG capsule, Take 1 capsule (100 mg) " by mouth 3 times daily, Disp: 42 capsule, Rfl: 0     hydrOXYzine (VISTARIL) 50 MG capsule, Take 1 capsule (50 mg) by mouth 3 times daily as needed for anxiety, Disp: 15 capsule, Rfl: 0     LORazepam (ATIVAN) 0.5 MG tablet, Take 2 tablets (1 mg) by mouth every 6 hours as needed for anxiety (Patient not taking: Reported on 8/21/2021), Disp: 3 tablet, Rfl: 0     ondansetron (ZOFRAN ODT) 4 MG ODT tab, Take 1 tablet (4 mg) by mouth every 8 hours as needed for nausea, Disp: 10 tablet, Rfl: 0    Facility-Administered Medications Ordered in Other Encounters:      Self Administer Medications: Behavioral Services, , Does not apply, See Admin Instructions, Kenny Forte MD    ALLERGIES:  Allergies   Allergen Reactions     Gramineae Pollens Itching     Pollen Extract Rash     grass tree pollen       FAMILY HISTORY:  Family History   Problem Relation Age of Onset     Coronary Artery Disease Father        SOCIAL HISTORY:   Social History     Tobacco Use     Smoking status: Current Some Day Smoker     Packs/day: 0.25     Types: Cigars     Smokeless tobacco: Never Used     Tobacco comment: occasional   Substance Use Topics     Alcohol use: Yes     Alcohol/week: 17.0 standard drinks     Types: 17 Shots of liquor per week     Comment: Drinks 750ml/day or 17 shots/day; hx of withdrawl seizures     Drug use: Not Currently        PHYSICAL EXAM:    Vitals: /52   Pulse 67   Resp 18   SpO2 90%    General:. Alert and interactive, comfortable appearing.  HENT: Oropharynx without erythema or exudates. MMM.  TMs clear bilaterally.  Eyes: Pupils mid-sized and equally reactive.   Neck: Full AROM.  No midline tenderness to palpation.  Cardiovascular: Regular rate and rhythm. Peripheral pulses 2+ bilaterally.  Chest/Pulmonary: Normal work of breathing. Lung sounds clear and equal throughout, no wheezes or crackles. No chest wall tenderness or deformities.  Abdomen: Soft, nondistended. Nontender without guarding or  rebound.  Back/Spine: No CVA or midline tenderness.  Extremities: Normal ROM of all major joints. No lower extremity edema.   Skin: Warm and dry. Normal skin color.   Neuro: Speech clear. CNs grossly intact. Moves all extremities appropriately. Strength and sensation grossly intact to all extremities.   Psych: Is agitated, demanding treatment, difficult to obtain history, anxious appearing. Memory appropriate.     LAB:  All pertinent labs reviewed and interpreted.  Labs Ordered and Resulted from Time of ED Arrival to Time of ED Departure   BASIC METABOLIC PANEL - Abnormal       Result Value    Sodium 144      Potassium 4.5      Chloride 100      Carbon Dioxide (CO2) 24      Anion Gap 20 (*)     Urea Nitrogen 13      Creatinine 0.75      Calcium 9.3      Glucose 87      GFR Estimate >90     ETHYL ALCOHOL LEVEL - Abnormal    Alcohol, Blood 436 (*)            I, Jeffery Conrad, am serving as a scribe to document services personally performed by Dr. Kanchan He  based on my observation and the provider's statements to me. I, Kanchan He MD attest that Jeffery Conrad is acting in a scribe capacity, has observed my performance of the services and has documented them in accordance with my direction.      Kanchan He M.D.  Emergency Medicine  [facility] EMERGENCY DEPARTMENT  [facility] [facility] [id number] [phone]  Dept: [phone]         Kanchan He MD  05/30/22 0102

## 2022-05-29 NOTE — ED NOTES
"Pt continuing to call for more Valium. Taking off finger probe, bp cuff, etc., and tossing it on the floor. Pt says that \"the staff doesn't understand my need for more meds and why 5mg isn't enough for me.\"   "

## 2022-05-31 ENCOUNTER — APPOINTMENT (OUTPATIENT)
Dept: RADIOLOGY | Facility: HOSPITAL | Age: 37
End: 2022-05-31
Attending: STUDENT IN AN ORGANIZED HEALTH CARE EDUCATION/TRAINING PROGRAM
Payer: COMMERCIAL

## 2022-05-31 ENCOUNTER — HOSPITAL ENCOUNTER (EMERGENCY)
Facility: HOSPITAL | Age: 37
Discharge: HOME | End: 2022-05-31
Attending: EMERGENCY MEDICINE | Admitting: EMERGENCY MEDICINE
Payer: COMMERCIAL

## 2022-05-31 VITALS
WEIGHT: 170 LBS | RESPIRATION RATE: 18 BRPM | HEART RATE: 98 BPM | DIASTOLIC BLOOD PRESSURE: 65 MMHG | TEMPERATURE: 97.8 F | BODY MASS INDEX: 23.71 KG/M2 | SYSTOLIC BLOOD PRESSURE: 106 MMHG | OXYGEN SATURATION: 93 %

## 2022-05-31 DIAGNOSIS — F10.920 ALCOHOLIC INTOXICATION WITHOUT COMPLICATION (H): ICD-10-CM

## 2022-05-31 LAB
ALBUMIN SERPL-MCNC: 4.1 G/DL (ref 3.5–5)
ALP SERPL-CCNC: 79 U/L (ref 45–120)
ALT SERPL W P-5'-P-CCNC: 88 U/L (ref 0–45)
ANION GAP SERPL CALCULATED.3IONS-SCNC: 12 MMOL/L (ref 5–18)
AST SERPL W P-5'-P-CCNC: 127 U/L (ref 0–40)
BILIRUB DIRECT SERPL-MCNC: 0.2 MG/DL
BILIRUB SERPL-MCNC: 0.4 MG/DL (ref 0–1)
BUN SERPL-MCNC: 14 MG/DL (ref 8–22)
CALCIUM SERPL-MCNC: 7.7 MG/DL (ref 8.5–10.5)
CHLORIDE BLD-SCNC: 106 MMOL/L (ref 98–107)
CO2 SERPL-SCNC: 27 MMOL/L (ref 22–31)
CREAT SERPL-MCNC: 0.7 MG/DL (ref 0.7–1.3)
ERYTHROCYTE [DISTWIDTH] IN BLOOD BY AUTOMATED COUNT: 15.2 % (ref 10–15)
ETHANOL SERPL-MCNC: 524 MG/DL
GFR SERPL CREATININE-BSD FRML MDRD: >90 ML/MIN/1.73M2
GLUCOSE BLD-MCNC: 97 MG/DL (ref 70–125)
GLUCOSE BLDC GLUCOMTR-MCNC: 100 MG/DL (ref 70–99)
HCT VFR BLD AUTO: 43.4 % (ref 40–53)
HGB BLD-MCNC: 14.9 G/DL (ref 13.3–17.7)
HOLD SPECIMEN: NORMAL
LIPASE SERPL-CCNC: 79 U/L (ref 0–52)
MAGNESIUM SERPL-MCNC: 2 MG/DL (ref 1.8–2.6)
MCH RBC QN AUTO: 28.8 PG (ref 26.5–33)
MCHC RBC AUTO-ENTMCNC: 34.3 G/DL (ref 31.5–36.5)
MCV RBC AUTO: 84 FL (ref 78–100)
PLATELET # BLD AUTO: 130 10E3/UL (ref 150–450)
POTASSIUM BLD-SCNC: 4 MMOL/L (ref 3.5–5)
PROT SERPL-MCNC: 6.6 G/DL (ref 6–8)
RBC # BLD AUTO: 5.17 10E6/UL (ref 4.4–5.9)
SODIUM SERPL-SCNC: 145 MMOL/L (ref 136–145)
WBC # BLD AUTO: 6.6 10E3/UL (ref 4–11)

## 2022-05-31 PROCEDURE — 85027 COMPLETE CBC AUTOMATED: CPT | Performed by: EMERGENCY MEDICINE

## 2022-05-31 PROCEDURE — 258N000003 HC RX IP 258 OP 636: Performed by: EMERGENCY MEDICINE

## 2022-05-31 PROCEDURE — 96361 HYDRATE IV INFUSION ADD-ON: CPT

## 2022-05-31 PROCEDURE — 82248 BILIRUBIN DIRECT: CPT | Performed by: EMERGENCY MEDICINE

## 2022-05-31 PROCEDURE — 36415 COLL VENOUS BLD VENIPUNCTURE: CPT | Performed by: EMERGENCY MEDICINE

## 2022-05-31 PROCEDURE — 71046 X-RAY EXAM CHEST 2 VIEWS: CPT

## 2022-05-31 PROCEDURE — 83735 ASSAY OF MAGNESIUM: CPT | Performed by: EMERGENCY MEDICINE

## 2022-05-31 PROCEDURE — 250N000011 HC RX IP 250 OP 636: Performed by: EMERGENCY MEDICINE

## 2022-05-31 PROCEDURE — 96374 THER/PROPH/DIAG INJ IV PUSH: CPT

## 2022-05-31 PROCEDURE — 83690 ASSAY OF LIPASE: CPT | Performed by: EMERGENCY MEDICINE

## 2022-05-31 PROCEDURE — 99284 EMERGENCY DEPT VISIT MOD MDM: CPT | Mod: 25

## 2022-05-31 PROCEDURE — 82310 ASSAY OF CALCIUM: CPT | Performed by: EMERGENCY MEDICINE

## 2022-05-31 PROCEDURE — 82077 ASSAY SPEC XCP UR&BREATH IA: CPT | Performed by: EMERGENCY MEDICINE

## 2022-05-31 RX ORDER — ONDANSETRON 2 MG/ML
4 INJECTION INTRAMUSCULAR; INTRAVENOUS ONCE
Status: COMPLETED | OUTPATIENT
Start: 2022-05-31 | End: 2022-05-31

## 2022-05-31 RX ADMIN — SODIUM CHLORIDE 1000 ML: 9 INJECTION, SOLUTION INTRAVENOUS at 05:52

## 2022-05-31 RX ADMIN — ONDANSETRON 4 MG: 2 INJECTION INTRAMUSCULAR; INTRAVENOUS at 05:51

## 2022-05-31 ASSESSMENT — ENCOUNTER SYMPTOMS
VOMITING: 1
TROUBLE SWALLOWING: 0
DIARRHEA: 1
NAUSEA: 1
SHORTNESS OF BREATH: 0
ABDOMINAL PAIN: 0
COUGH: 0
DIAPHORESIS: 1
DYSURIA: 0
FEVER: 0

## 2022-05-31 NOTE — ED NOTES
"Pt is constantly asking writer for ativan to calm him down or else he will go into \"seizure mode.\" Seizure pads already in place. Dr. Gardner informed.   "

## 2022-05-31 NOTE — ED PROVIDER NOTES
"    EMERGENCY DEPARTMENT ENCOUNTER      NAME: Nikhil Rios  AGE: 36 year old male  YOB: 1985  MRN: [id number]  EVALUATION DATE & TIME: 5/31/2022  5:00 AM    PCP: Lisandro Graham    ED PROVIDER: Nena Gardner M.D.        Chief Complaint   Patient presents with     Alcohol Intoxication         FINAL IMPRESSION:    1. Alcoholic intoxication without complication (H)            MEDICAL DECISION MAKING:    Nikhil Rios is a 36 year old male with history of hyperlipidemia, hypertension, and alcohol abuse who presents to the ER with complaints of intoxication.  Patient has been seen numerous times over the last several weeks for alcohol intoxication.  He at this time he is pleasant and cooperative but admits to drinking tonight.  He complains of vomiting and diarrhea but denies any abdominal pain or chest pain.  Patient signed out at change of shift awaiting laboratory evaluation, reevaluation once patient is more sober, and ultimate disposition.  At this time no signs to suggest active alcohol withdrawal.  EMS was concerned about possible seizure as they saw some white substance around his mouth though no seizure was witnessed and no postictal state reported.  At this time he is not showing any signs of true alcohol withdrawal and therefore I think alcohol withdrawal seizure is less likely. No physical signs of trauma.        ED COURSE:  5:09 AM I met with the patient to gather history and perform my exam. ED course and treatment discussed. EMS was concered about withdrawal seizure (nothing witnessed but had some \"white\" stuff around his mouth) though I think that this is less likely due to vitals do not suggest withdrawal and pt appears to acutely intoxicated (and smells of alcohol). Will do seizure precautions in the mean time. Pt complains of diarrhea and vomiting though denies abd pain and abd soft and benign. Will hold off on imaging pending labs and repeat eval once pt is less intoxicated.    6:18 " "AM  Patient requesting Ativan at this time no clinical signs for withdrawal.  Vital signs stable.  We will continue to monitor him closely.  At this time heart rate 85 and blood pressure of 106/59.  I do not see any home medications that mask withdrawal tachycardia such as a beta-blocker or calcium channel blocker.    Patient signed out at change of shift awaiting him to sober up and reevaluation.  He is otherwise pleasant at this time.    I do not think that this represents ACS, PE, ruptured AAA, aortic dissection, bowel obstruction, bowel ischemia, cholecystitis, pancreatitis, appendicitis, diverticulitis, kidney stone, pyelonephritis, incarcerated or strangulated hernia, testicular torsion, viscus perforation, perforated GI ulcer, or other such etiologies at this time.    COVID-19 PPE worn during patient evaluation:  Mask: n95 and homemade masks   Eye Protection: goggles   Gown: none   Hair cover: yes  Face shield: none   Patient wearing a mask: yes       CONSULTANTS:  none      MEDICATIONS GIVEN IN THE EMERGENCY:  Medications   0.9% sodium chloride BOLUS (1,000 mLs Intravenous New Bag 5/31/22 0552)   ondansetron (ZOFRAN) injection 4 mg (4 mg Intravenous Given 5/31/22 0551)           NEW PRESCRIPTIONS STARTED AT TODAY'S ER VISIT     Medication List      There are no discharge medications for this visit.             CONDITION:  stable        DISPOSITION:  pending         =================================================================  =================================================================    HPI  HPI LIMITED due to alcohol intoxication.  Patient information was obtained from: The patient    Use of Intrepreter: N/A      Nikhil Rios is a 36 year old male with history of hyperlipidemia, hypertension, and alcohol abuse who presents to the ER with complaints of intoxication.    When asked what prompted him to come to the ED he states a \"mental thing in my head.\" He also reports having had 1L of some kind " of alcohol within the past 24 hours. Additional symptoms that the patient endorses having include diaphoresis, diarrhea (8-9 episodes), and nausea with vomiting (most recently 5 hours ago). The patient denies fever, cough chest pain, abdominal pain, and any other symptoms or complaints at this time.     Per Chart Review, the patient has been evaluated 6 other times this month for alcohol intoxication.    05/28/2022 The patient presented to [facility] ED for evaluation of alcohol intoxication. The patient's alcohol level was 436. Labs were reassuring. The patient refused to go to [facility] detox. The patient was discharged with his mother.      REVIEW OF SYSTEMS  Review of Systems   Constitutional: Positive for diaphoresis. Negative for fever.   HENT: Negative for trouble swallowing.    Respiratory: Negative for cough and shortness of breath.    Cardiovascular: Negative for chest pain.   Gastrointestinal: Positive for diarrhea, nausea and vomiting. Negative for abdominal pain.   Genitourinary: Negative for dysuria.   Allergic/Immunologic: Negative for immunocompromised state.   All other systems reviewed and are negative.        PAST MEDICAL HISTORY:  Past Medical History:   Diagnosis Date     Alcohol abuse      Alcohol abuse      Alcohol withdrawal (H)      Depressive disorder      HLD (hyperlipidemia)      HTN (hypertension)          PAST SURGICAL HISTORY:  Past Surgical History:   Procedure Laterality Date     NO HISTORY OF SURGERY       OTHER SURGICAL HISTORY      none         CURRENT MEDICATIONS:    Prior to Admission medications    Medication Sig Start Date End Date Taking? Authorizing Provider   famotidine (PEPCID) 20 MG tablet Take 1 tablet (20 mg) by mouth 2 times daily 8/24/21   Keri Brady MD   gabapentin (NEURONTIN) 100 MG capsule Take 1 capsule (100 mg) by mouth 3 times daily 8/24/21   Keri Brady MD   hydrOXYzine (VISTARIL) 50 MG capsule Take 1 capsule (50 mg) by mouth 3 times daily as  needed for anxiety 8/7/21   Daisy Jj MD   LORazepam (ATIVAN) 0.5 MG tablet Take 2 tablets (1 mg) by mouth every 6 hours as needed for anxiety  Patient not taking: Reported on 8/21/2021 8/7/21   Basilio Medrano MD   ondansetron (ZOFRAN ODT) 4 MG ODT tab Take 1 tablet (4 mg) by mouth every 8 hours as needed for nausea 8/24/21   Keri Brady MD         ALLERGIES:  Allergies   Allergen Reactions     Gramineae Pollens Itching     Pollen Extract Rash     grass tree pollen         FAMILY HISTORY:  Family History   Problem Relation Age of Onset     Coronary Artery Disease Father          SOCIAL HISTORY:  Social History     Socioeconomic History     Marital status: Single   Occupational History     Occupation: Rental property owner   Tobacco Use     Smoking status: Current Some Day Smoker     Packs/day: 0.25     Types: Cigars     Smokeless tobacco: Never Used     Tobacco comment: occasional   Substance and Sexual Activity     Alcohol use: Yes     Alcohol/week: 17.0 standard drinks     Types: 17 Shots of liquor per week     Comment: Drinks 750ml/day or 17 shots/day; hx of withdrawl seizures     Drug use: Not Currently     Social Determinants of Health     Transportation Needs: No Transportation Needs     Lack of Transportation (Medical): No     Lack of Transportation (Non-Medical): No   Stress: Stress Concern Present     Feeling of Stress : Very much   Housing Stability: Unknown     Unable to Pay for Housing in the Last Year: No     Unstable Housing in the Last Year: No         VITALS:  Patient Vitals for the past 24 hrs:   BP Temp Temp src Pulse Resp SpO2 Weight   05/31/22 0600 121/75 -- -- 88 18 93 % --   05/31/22 0545 131/81 -- -- 90 16 95 % --   05/31/22 0530 123/78 -- -- 84 19 91 % --   05/31/22 0514 119/77 97.8  F (36.6  C) Oral 89 18 92 % 77.1 kg (170 lb)       Wt Readings from Last 3 Encounters:   05/31/22 77.1 kg (170 lb)   05/25/22 77.1 kg (170 lb)   05/06/22 77.1 kg (170 lb)          PHYSICAL EXAM    Constitutional:  Well developed, Well nourished, NAD, GCS 15  HENT:  Normocephalic, Atraumatic, Bilateral external ears normal, Nose normal. Neck- Supple, No stridor.   Eyes:  PERRL, EOMI, Conjunctiva normal, No discharge.  Respiratory:  Normal breath sounds, No respiratory distress, No wheezing, Speaks full sentences easily. No cough.  Cardiovascular:  Normal heart rate, Regular rhythm, No rubs, No gallops. Chest wall nontender.   GI:  No excessive obesity.  Bowel sounds normal, Soft, No tenderness, No masses, No flank tenderness. No rebound or guarding.   : deferred  Musculoskeletal: No cyanosis, No clubbing. Good range of motion in all major joints. No major deformities noted.   Integument:  Warm, Dry, No erythema, No rash.  No petechiae.  Neurologic:  Alert & oriented x 3, No focal deficits noted.  Psychiatric:  Affect normal, Cooperative         LAB:  All pertinent labs reviewed and interpreted.  Recent Results (from the past 24 hour(s))   CBC (+ platelets, no diff)    Collection Time: 05/31/22  5:40 AM   Result Value Ref Range    WBC Count 6.6 4.0 - 11.0 10e3/uL    RBC Count 5.17 4.40 - 5.90 10e6/uL    Hemoglobin 14.9 13.3 - 17.7 g/dL    Hematocrit 43.4 40.0 - 53.0 %    MCV 84 78 - 100 fL    MCH 28.8 26.5 - 33.0 pg    MCHC 34.3 31.5 - 36.5 g/dL    RDW 15.2 (H) 10.0 - 15.0 %    Platelet Count 130 (L) 150 - 450 10e3/uL   Glucose by meter    Collection Time: 05/31/22  5:55 AM   Result Value Ref Range    GLUCOSE BY METER POCT 100 (H) 70 - 99 mg/dL       No results found for: ABORH        RADIOLOGY:  Reviewed all pertinent imaging. Please see official radiology report.    No orders to display         EKG:    none      PROCEDURES:  none      IReji, am serving as a scribe to document services personally performed by Dr. Nena Gardner based on my observation and the provider's statements to me. I, Dr. Nena Gardner MD attest that Reji Santiago is acting in a scribe  capacity, has observed my performance of the services and has documented them in accordance with my direction.        Nena Gardner M.D. [facility]  Emergency Medicine and Medical Toxicology  [facility] EMERGENCY DEPARTMENT  [address] [place] [id number]  [phone]  Dept: [phone]           Nena Gardner MD  05/31/22 0621

## 2022-05-31 NOTE — ED NOTES
Discharge paperwork discussed with and given to pt. Pt verbalized understanding and has no questions at this time. Pt ambulates without troubles out to [facility] where he is waiting for cab ride home

## 2022-05-31 NOTE — ED NOTES
Assumed care of patient. He is requesting lorazepam. Patient is calm, oriented x 4, drowsy. Vital signs WNL on monitor. 97% SPO2 on 1LNC O2.

## 2022-05-31 NOTE — ED NOTES
EMERGENCY DEPARTMENT SIGN OUT NOTE        ED COURSE AND MEDICAL DECISION MAKING  Patient was signed out to me by Dr Nena Gardner at 7:22 AM  12:53 PM I rechecked and updated the patient     In brief, Nikhil Rios is a 36 year old male who initially presented with AMS/alcohol intoxicated    At time of sign out, disposition was pending sober reassessment     ED Course as of 05/31/22 1522   Tue May 31, 2022   0713 Sign out received: 35 y/o M who came in for alcohol intoxication. ETOH 524. Pending sober reevaluation. Pending LFTs and BMP.   0927 BMP with hypocalcemia otherwise reassuring. LFTs with /ALT 88, likely 2/2 to alcohol use.   1257 Patient has been here 8 hours and is clinically sobering as expected. During stay has needed oxygen while sleeping but now awake I am able to take him off of his and is sating >90% without any respiratory distress. CXR without acute findings. No abdominal pain and do not suspect LFT elevation is from emergent abdominal pathology needing imaging today. He is not tachycardia/hypertensive and no other objective signs of concerning withdrawal. I offered to send patient to detox which he declined. He expresses no additional complaints and does not want to meet with DEC or SW. Declined further resources for alcohol treatment. Able to ambulate safely. Strict return precautions discussed and his questions were answered.         FINAL IMPRESSION    1. Alcoholic intoxication without complication (H)        ED MEDS  Medications   0.9% sodium chloride BOLUS (0 mLs Intravenous Stopped 5/31/22 0641)   ondansetron (ZOFRAN) injection 4 mg (4 mg Intravenous Given 5/31/22 1254)       LAB  Labs Ordered and Resulted from Time of ED Arrival to Time of ED Departure   CBC WITH PLATELETS - Abnormal       Result Value    WBC Count 6.6      RBC Count 5.17      Hemoglobin 14.9      Hematocrit 43.4      MCV 84      MCH 28.8      MCHC 34.3      RDW 15.2 (*)     Platelet Count 130 (*)    ETHYL ALCOHOL  LEVEL - Abnormal    Alcohol, Blood 524 (*)    LIPASE - Abnormal    Lipase 79 (*)    GLUCOSE BY METER - Abnormal    GLUCOSE BY METER POCT 100 (*)    MAGNESIUM - Normal    Magnesium 2.0     GLUCOSE MONITOR NURSING POCT   BASIC METABOLIC PANEL   HEPATIC FUNCTION PANEL       EKG  N/A    RADIOLOGY    No orders to display       DISCHARGE MEDS  New Prescriptions    No medications on file     I, Duc Hernandez, am serving as a scribe to document services personally performed by Donna Ayala, based on my observations and the provider's statements to me.  I, Donna Ayala MD attest that Duc Hernandez is acting in a scribe capacity, has observed my performance of the services and has documented them in accordance with my direction.      Donna Ayala MD  [facility] EMERGENCY DEPARTMENT  [facility] [facility] [id number] [phone]     Dnona Ayala MD  05/31/22 4669

## 2022-05-31 NOTE — ED TRIAGE NOTES
Pt came in by EMS from home for alcohol intoxication. Pt had a liter of vodka last night, unable to state time. Pt is awaiting for detox. Per EMS, pt had some small white foam around mouth, unsure if pt had a seizure prior to EMS arrival on scene.      Triage Assessment     Row Name 05/31/22 0517       Triage Assessment (Adult)    Airway WDL WDL       Respiratory WDL    Respiratory WDL WDL       Skin Circulation/Temperature WDL    Skin Circulation/Temperature WDL WDL       Cardiac WDL    Cardiac WDL WDL       Peripheral/Neurovascular WDL    Peripheral Neurovascular WDL WDL    Capillary Refill, General less than/equal to 3 secs       Cognitive/Neuro/Behavioral WDL    Cognitive/Neuro/Behavioral WDL mood/behavior    Mood/Behavior flat affect;withdrawn

## 2022-05-31 NOTE — ED NOTES
Titrated O2 down to 0.5 LPM. Patient is quite somnolent. Required sternal rub to wake. Provider aware.

## 2022-06-04 ENCOUNTER — NURSE TRIAGE (OUTPATIENT)
Dept: NURSING | Facility: CLINIC | Age: 37
End: 2022-06-04
Payer: COMMERCIAL

## 2022-06-04 NOTE — TELEPHONE ENCOUNTER
"Nurse Triage SBAR    Is this a 2nd Level Triage? NO    Situation: Mom calling with patient.  Consent: not needed    Background: Pt mom calling and states that Pt has been seen at least 4 times since May 20th for intoxication.  Mom stating \"it's real hard to handle here\".  States \"He is planning to go to [facility]\" and reports that they are unable to take him this weekend and until mid-week.  Mom calling to see what to do.  Per mom pt typically has a SHERITA of .40 and higher.      Assessment:   * Pt drinks vodka  * Drinks daily for 4-4.5 years.  * Went to [facility] in patient therapy in August - they kept pt until he was able to live in [facility].  Pt was sober for 8months and on April 20th of 2022 he relapsed and has been struggling since.   * Drinks - 1 L of vodka every 24 hours.   * Pt is taking hydroxazine  & trazedone for sleep.    * Pt has his own home but is staying with his mother.  Mom states it's not safe as he had a fall and possibly a seizure the last time he was alone in his own home.   Mom states he had a seizure at [facility] as they did not give the pt the medications he stated he needed to take.    * Mom buys liquor for pt to help him avoid detox symptoms (seizuers/tremors).    * Pt does not want to go to [facility] to Detox.  Will only go to [facility] per Mom.    * Mom reports he is not violent but states he is urinating all over as he doesn't realize he is going.    *     Protocol Recommended Disposition:   ED NOW due to tremors and withdrawal sx.      Recommendation: Advised patient to Go to ED now . Reviewed concerning symptoms and when to call back.  Per mom the plan:  When he is feeling agitated, they will go in.  He just     Ally Noel RN [facility] 6/4/2022 6:58 PM    COVID 19 Nurse Triage Plan/Patient Instructions    Please be aware that novel coronavirus (COVID-19) may be circulating in the community. If you develop symptoms such as fever, cough, or SOB or if you have " concerns about the presence of another infection including coronavirus (COVID-19), please contact your health care provider or visit [email or web address].     Disposition/Instructions    ED Visit recommended. Follow protocol based instructions.     Bring Your Own Device:  Please also bring your smart device(s) (smart phones, tablets, laptops) and their charging cables for your personal use and to communicate with your care team during your visit.    Thank you for taking steps to prevent the spread of this virus.  o Limit your contact with others.  o Wear a simple mask to cover your cough.  o Wash your hands well and often.    Resources    [facility]: About COVID-19: [email or web address]    CDC: What to Do If You're Sick: www.cdc.gov/coronavirus/2019-ncov/about/steps-when-sick.html    CDC: Ending Home Isolation: www.cdc.gov/coronavirus/2019-ncov/hcp/disposition-in-home-patients.html     CDC: Caring for Someone: www.cdc.gov/coronavirus/2019-ncov/if-you-are-sick/care-for-someone.html     [facility]: Interim Guidance for Hospital Discharge to Home: www.[facility].Pending sale to [facility].mn.us/diseases/coronavirus/hcp/hospdischarge.pdf    [facility] clinical trials (COVID-19 research studies): [email or web address].[email or web address]-clinical-trials     Below are the COVID-19 hotlines at the [place] [facility] ([facility]). Interpreters are available.   o For health questions: Call 798-606-9109 or 1-591.659.8521 (7 a.m. to 7 p.m.)  o For questions about schools and childcare: Call 758-783-3828 or 1-318.804.2965 (7 a.m. to 7 p.m.)                         Reason for Disposition    Feeling very shaky (i.e., visible tremors of hands)    Additional Information    Negative: Coma (e.g., not moving, not talking, not responding to stimuli)    Negative: Difficult to awaken or acting confused (e.g., disoriented, slurred speech)    Negative: Seeing, hearing, or feeling things that are not there (i.e., visual, auditory, or tactile  "hallucinations)    Negative: Slow, shallow and weak breathing    Negative: Seizure    Negative: Violent behavior, or threatening to physically hurt or kill someone    Negative: Patient attempted suicide    Negative: Threatening suicide    Negative: Sounds like a life-threatening emergency to the triager    Negative: Recent significant injury, see that guideline (e.g., head, neck, chest, abdominal or extremity  injury)    Negative: Substance abuse or dependence: question or problem related to    Negative: Depression is main problem or symptom (e.g., feelings of sadness or hopelessness)    Negative: SEVERE abdominal pain    Negative: [1] Constant abdominal pain AND [2] present > 2 hours    Negative: Blood in bowel movements (e.g., black, tarry or red blood)  (Exception: Blood on surface of BM with constipation)    Negative: [1] Vomiting AND [2] contains red blood or black (\"coffee ground\") material  (Exception: few red streaks in vomit that only happened once)    Negative: [1] Vomiting or dry heaves AND [2] occurring frequently (i.e., vomiting > 4 times in last 4 hours)    Protocols used: ALCOHOL ABUSE AND [id number]      "

## 2022-06-05 ENCOUNTER — APPOINTMENT (OUTPATIENT)
Dept: CT IMAGING | Facility: HOSPITAL | Age: 37
DRG: 897 | End: 2022-06-05
Attending: EMERGENCY MEDICINE
Payer: COMMERCIAL

## 2022-06-05 ENCOUNTER — HOSPITAL ENCOUNTER (INPATIENT)
Facility: HOSPITAL | Age: 37
LOS: 4 days | Discharge: HOME | DRG: 897 | End: 2022-06-09
Attending: EMERGENCY MEDICINE | Admitting: HOSPITALIST
Payer: COMMERCIAL

## 2022-06-05 DIAGNOSIS — F10.20 ALCOHOL USE DISORDER, SEVERE, DEPENDENCE (H): ICD-10-CM

## 2022-06-05 DIAGNOSIS — F33.1 MODERATE EPISODE OF RECURRENT MAJOR DEPRESSIVE DISORDER (H): Primary | ICD-10-CM

## 2022-06-05 DIAGNOSIS — D69.6 THROMBOCYTOPENIA (H): ICD-10-CM

## 2022-06-05 DIAGNOSIS — F10.230 ALCOHOL DEPENDENCE WITH UNCOMPLICATED WITHDRAWAL (H): ICD-10-CM

## 2022-06-05 DIAGNOSIS — F10.220 ACUTE ALCOHOLIC INTOXICATION IN ALCOHOLISM WITHOUT COMPLICATION (H): ICD-10-CM

## 2022-06-05 DIAGNOSIS — R79.89 ELEVATED LIVER FUNCTION TESTS: ICD-10-CM

## 2022-06-05 PROBLEM — K70.0 ALCOHOLIC FATTY LIVER: Chronic | Status: ACTIVE | Noted: 2021-03-26

## 2022-06-05 LAB
ALBUMIN SERPL-MCNC: 4.4 G/DL (ref 3.5–5)
ALBUMIN UR-MCNC: 200 MG/DL
ALP SERPL-CCNC: 77 U/L (ref 45–120)
ALT SERPL W P-5'-P-CCNC: 144 U/L (ref 0–45)
AMPHETAMINES UR QL SCN: NORMAL
ANION GAP SERPL CALCULATED.3IONS-SCNC: 21 MMOL/L (ref 5–18)
APPEARANCE UR: CLEAR
AST SERPL W P-5'-P-CCNC: 238 U/L (ref 0–40)
ATRIAL RATE - MUSE: 92 BPM
BARBITURATES UR QL: NORMAL
BASOPHILS # BLD AUTO: 0 10E3/UL (ref 0–0.2)
BASOPHILS NFR BLD AUTO: 1 %
BENZODIAZ UR QL: NORMAL
BILIRUB SERPL-MCNC: 0.9 MG/DL (ref 0–1)
BILIRUB UR QL STRIP: NEGATIVE
BUN SERPL-MCNC: 17 MG/DL (ref 8–22)
CALCIUM SERPL-MCNC: 8.6 MG/DL (ref 8.5–10.5)
CANNABINOIDS UR QL SCN: NORMAL
CHLORIDE BLD-SCNC: 94 MMOL/L (ref 98–107)
CO2 SERPL-SCNC: 21 MMOL/L (ref 22–31)
COCAINE UR QL: NORMAL
COLOR UR AUTO: YELLOW
CREAT SERPL-MCNC: 0.74 MG/DL (ref 0.7–1.3)
CREAT UR-MCNC: 122 MG/DL
DIASTOLIC BLOOD PRESSURE - MUSE: NORMAL MMHG
EOSINOPHIL # BLD AUTO: 0 10E3/UL (ref 0–0.7)
EOSINOPHIL NFR BLD AUTO: 0 %
ERYTHROCYTE [DISTWIDTH] IN BLOOD BY AUTOMATED COUNT: 15.3 % (ref 10–15)
ETHANOL SERPL-MCNC: 506 MG/DL
GFR SERPL CREATININE-BSD FRML MDRD: >90 ML/MIN/1.73M2
GLUCOSE BLD-MCNC: 111 MG/DL (ref 70–125)
GLUCOSE UR STRIP-MCNC: NEGATIVE MG/DL
HCT VFR BLD AUTO: 42.7 % (ref 40–53)
HGB BLD-MCNC: 14.7 G/DL (ref 13.3–17.7)
HGB UR QL STRIP: ABNORMAL
HOLD SPECIMEN: NORMAL
HOLD SPECIMEN: NORMAL
IMM GRANULOCYTES # BLD: 0 10E3/UL
IMM GRANULOCYTES NFR BLD: 0 %
INR PPP: 0.96 (ref 0.85–1.15)
INR PPP: 0.98 (ref 0.85–1.15)
INTERPRETATION ECG - MUSE: NORMAL
KETONES UR STRIP-MCNC: ABNORMAL MG/DL
LEUKOCYTE ESTERASE UR QL STRIP: NEGATIVE
LIPASE SERPL-CCNC: 118 U/L (ref 0–52)
LYMPHOCYTES # BLD AUTO: 1.2 10E3/UL (ref 0.8–5.3)
LYMPHOCYTES NFR BLD AUTO: 33 %
MAGNESIUM SERPL-MCNC: 2 MG/DL (ref 1.8–2.6)
MCH RBC QN AUTO: 28.5 PG (ref 26.5–33)
MCHC RBC AUTO-ENTMCNC: 34.4 G/DL (ref 31.5–36.5)
MCV RBC AUTO: 83 FL (ref 78–100)
METHADONE UR QL SCN: NORMAL
MONOCYTES # BLD AUTO: 0.2 10E3/UL (ref 0–1.3)
MONOCYTES NFR BLD AUTO: 5 %
MUCOUS THREADS #/AREA URNS LPF: PRESENT /LPF
NEUTROPHILS # BLD AUTO: 2.3 10E3/UL (ref 1.6–8.3)
NEUTROPHILS NFR BLD AUTO: 61 %
NITRATE UR QL: NEGATIVE
NRBC # BLD AUTO: 0 10E3/UL
NRBC BLD AUTO-RTO: 0 /100
OPIATES UR QL SCN: NORMAL
OXYCODONE UR QL: NORMAL
P AXIS - MUSE: 63 DEGREES
PCP UR QL SCN: NORMAL
PH UR STRIP: 6.5 [PH] (ref 5–7)
PHOSPHATE SERPL-MCNC: 3.4 MG/DL (ref 2.5–4.5)
PLATELET # BLD AUTO: 71 10E3/UL (ref 150–450)
POTASSIUM BLD-SCNC: 4.2 MMOL/L (ref 3.5–5)
PR INTERVAL - MUSE: 146 MS
PROT SERPL-MCNC: 7.3 G/DL (ref 6–8)
PROTHROMBIN TIME: 12.9 SECONDS
QRS DURATION - MUSE: 98 MS
QT - MUSE: 370 MS
QTC - MUSE: 457 MS
R AXIS - MUSE: 82 DEGREES
RBC # BLD AUTO: 5.15 10E6/UL (ref 4.4–5.9)
RBC URINE: 1 /HPF
SARS-COV-2 RNA RESP QL NAA+PROBE: NEGATIVE
SODIUM SERPL-SCNC: 136 MMOL/L (ref 136–145)
SP GR UR STRIP: 1.02 (ref 1–1.03)
SYSTOLIC BLOOD PRESSURE - MUSE: NORMAL MMHG
T AXIS - MUSE: 70 DEGREES
UROBILINOGEN UR STRIP-MCNC: 2 MG/DL
VENTRICULAR RATE- MUSE: 92 BPM
WBC # BLD AUTO: 3.8 10E3/UL (ref 4–11)
WBC URINE: 1 /HPF

## 2022-06-05 PROCEDURE — 96376 TX/PRO/DX INJ SAME DRUG ADON: CPT

## 2022-06-05 PROCEDURE — C9803 HOPD COVID-19 SPEC COLLECT: HCPCS

## 2022-06-05 PROCEDURE — 81001 URINALYSIS AUTO W/SCOPE: CPT | Performed by: EMERGENCY MEDICINE

## 2022-06-05 PROCEDURE — 36415 COLL VENOUS BLD VENIPUNCTURE: CPT | Performed by: EMERGENCY MEDICINE

## 2022-06-05 PROCEDURE — 258N000003 HC RX IP 258 OP 636: Performed by: HOSPITALIST

## 2022-06-05 PROCEDURE — 83735 ASSAY OF MAGNESIUM: CPT | Performed by: EMERGENCY MEDICINE

## 2022-06-05 PROCEDURE — 120N000001 HC R&B MED SURG/OB

## 2022-06-05 PROCEDURE — 250N000013 HC RX MED GY IP 250 OP 250 PS 637: Performed by: FAMILY MEDICINE

## 2022-06-05 PROCEDURE — 36415 COLL VENOUS BLD VENIPUNCTURE: CPT | Performed by: HOSPITALIST

## 2022-06-05 PROCEDURE — 99285 EMERGENCY DEPT VISIT HI MDM: CPT | Mod: 25

## 2022-06-05 PROCEDURE — 99222 1ST HOSP IP/OBS MODERATE 55: CPT | Performed by: FAMILY MEDICINE

## 2022-06-05 PROCEDURE — 250N000009 HC RX 250: Performed by: EMERGENCY MEDICINE

## 2022-06-05 PROCEDURE — 70450 CT HEAD/BRAIN W/O DYE: CPT

## 2022-06-05 PROCEDURE — 85025 COMPLETE CBC W/AUTO DIFF WBC: CPT | Performed by: EMERGENCY MEDICINE

## 2022-06-05 PROCEDURE — 80307 DRUG TEST PRSMV CHEM ANLYZR: CPT | Performed by: EMERGENCY MEDICINE

## 2022-06-05 PROCEDURE — 85610 PROTHROMBIN TIME: CPT | Performed by: EMERGENCY MEDICINE

## 2022-06-05 PROCEDURE — 82077 ASSAY SPEC XCP UR&BREATH IA: CPT | Performed by: EMERGENCY MEDICINE

## 2022-06-05 PROCEDURE — 99223 1ST HOSP IP/OBS HIGH 75: CPT | Performed by: HOSPITALIST

## 2022-06-05 PROCEDURE — 250N000011 HC RX IP 250 OP 636: Performed by: HOSPITALIST

## 2022-06-05 PROCEDURE — 96375 TX/PRO/DX INJ NEW DRUG ADDON: CPT

## 2022-06-05 PROCEDURE — 96365 THER/PROPH/DIAG IV INF INIT: CPT

## 2022-06-05 PROCEDURE — 250N000013 HC RX MED GY IP 250 OP 250 PS 637: Performed by: HOSPITALIST

## 2022-06-05 PROCEDURE — 96361 HYDRATE IV INFUSION ADD-ON: CPT

## 2022-06-05 PROCEDURE — 258N000003 HC RX IP 258 OP 636: Performed by: EMERGENCY MEDICINE

## 2022-06-05 PROCEDURE — 84100 ASSAY OF PHOSPHORUS: CPT | Performed by: HOSPITALIST

## 2022-06-05 PROCEDURE — 93005 ELECTROCARDIOGRAM TRACING: CPT | Performed by: EMERGENCY MEDICINE

## 2022-06-05 PROCEDURE — 80053 COMPREHEN METABOLIC PANEL: CPT | Performed by: EMERGENCY MEDICINE

## 2022-06-05 PROCEDURE — 250N000011 HC RX IP 250 OP 636: Performed by: EMERGENCY MEDICINE

## 2022-06-05 PROCEDURE — 87635 SARS-COV-2 COVID-19 AMP PRB: CPT | Performed by: EMERGENCY MEDICINE

## 2022-06-05 PROCEDURE — 85610 PROTHROMBIN TIME: CPT | Performed by: HOSPITALIST

## 2022-06-05 PROCEDURE — HZ2ZZZZ DETOXIFICATION SERVICES FOR SUBSTANCE ABUSE TREATMENT: ICD-10-PCS | Performed by: HOSPITALIST

## 2022-06-05 PROCEDURE — 83690 ASSAY OF LIPASE: CPT | Performed by: EMERGENCY MEDICINE

## 2022-06-05 RX ORDER — GABAPENTIN 300 MG/1
600 CAPSULE ORAL EVERY 8 HOURS
Status: DISCONTINUED | OUTPATIENT
Start: 2022-06-08 | End: 2022-06-09 | Stop reason: HOSPADM

## 2022-06-05 RX ORDER — DIAZEPAM 5 MG
10 TABLET ORAL EVERY 30 MIN PRN
Status: DISCONTINUED | OUTPATIENT
Start: 2022-06-05 | End: 2022-06-09 | Stop reason: HOSPADM

## 2022-06-05 RX ORDER — FLUMAZENIL 0.1 MG/ML
0.2 INJECTION, SOLUTION INTRAVENOUS
Status: DISCONTINUED | OUTPATIENT
Start: 2022-06-05 | End: 2022-06-09 | Stop reason: HOSPADM

## 2022-06-05 RX ORDER — LORAZEPAM 2 MG/ML
1 INJECTION INTRAMUSCULAR ONCE
Status: DISCONTINUED | OUTPATIENT
Start: 2022-06-05 | End: 2022-06-05

## 2022-06-05 RX ORDER — LIDOCAINE 40 MG/G
CREAM TOPICAL
Status: DISCONTINUED | OUTPATIENT
Start: 2022-06-05 | End: 2022-06-09 | Stop reason: HOSPADM

## 2022-06-05 RX ORDER — MULTIPLE VITAMINS W/ MINERALS TAB 9MG-400MCG
1 TAB ORAL DAILY
Status: DISCONTINUED | OUTPATIENT
Start: 2022-06-05 | End: 2022-06-09 | Stop reason: HOSPADM

## 2022-06-05 RX ORDER — ONDANSETRON 2 MG/ML
4 INJECTION INTRAMUSCULAR; INTRAVENOUS EVERY 6 HOURS PRN
Status: DISCONTINUED | OUTPATIENT
Start: 2022-06-05 | End: 2022-06-09 | Stop reason: HOSPADM

## 2022-06-05 RX ORDER — HALOPERIDOL 5 MG/ML
2.5-5 INJECTION INTRAMUSCULAR EVERY 6 HOURS PRN
Status: DISCONTINUED | OUTPATIENT
Start: 2022-06-05 | End: 2022-06-09 | Stop reason: HOSPADM

## 2022-06-05 RX ORDER — LORAZEPAM 2 MG/ML
1 INJECTION INTRAMUSCULAR ONCE
Status: DISCONTINUED | OUTPATIENT
Start: 2022-06-05 | End: 2022-06-05 | Stop reason: CLARIF

## 2022-06-05 RX ORDER — DIAZEPAM 10 MG/2ML
5-10 INJECTION, SOLUTION INTRAMUSCULAR; INTRAVENOUS EVERY 30 MIN PRN
Status: DISCONTINUED | OUTPATIENT
Start: 2022-06-05 | End: 2022-06-09 | Stop reason: HOSPADM

## 2022-06-05 RX ORDER — GABAPENTIN 300 MG/1
1200 CAPSULE ORAL ONCE
Status: COMPLETED | OUTPATIENT
Start: 2022-06-05 | End: 2022-06-05

## 2022-06-05 RX ORDER — TRAZODONE HYDROCHLORIDE 50 MG/1
50 TABLET, FILM COATED ORAL AT BEDTIME
COMMUNITY
Start: 2022-04-06 | End: 2023-05-22

## 2022-06-05 RX ORDER — GABAPENTIN 300 MG/1
300 CAPSULE ORAL EVERY 8 HOURS
Status: DISCONTINUED | OUTPATIENT
Start: 2022-06-10 | End: 2022-06-05

## 2022-06-05 RX ORDER — FOLIC ACID 1 MG/1
1 TABLET ORAL DAILY
Status: DISCONTINUED | OUTPATIENT
Start: 2022-06-05 | End: 2022-06-09 | Stop reason: HOSPADM

## 2022-06-05 RX ORDER — PROCHLORPERAZINE MALEATE 10 MG
10 TABLET ORAL EVERY 6 HOURS PRN
Status: DISCONTINUED | OUTPATIENT
Start: 2022-06-05 | End: 2022-06-09 | Stop reason: HOSPADM

## 2022-06-05 RX ORDER — OLANZAPINE 5 MG/1
5-10 TABLET, ORALLY DISINTEGRATING ORAL EVERY 6 HOURS PRN
Status: DISCONTINUED | OUTPATIENT
Start: 2022-06-05 | End: 2022-06-09 | Stop reason: HOSPADM

## 2022-06-05 RX ORDER — GABAPENTIN 300 MG/1
900 CAPSULE ORAL EVERY 8 HOURS
Status: COMPLETED | OUTPATIENT
Start: 2022-06-05 | End: 2022-06-08

## 2022-06-05 RX ORDER — ACETAMINOPHEN 325 MG/1
650 TABLET ORAL EVERY 6 HOURS PRN
Status: DISCONTINUED | OUTPATIENT
Start: 2022-06-05 | End: 2022-06-09 | Stop reason: HOSPADM

## 2022-06-05 RX ORDER — LORAZEPAM 2 MG/ML
1 INJECTION INTRAMUSCULAR ONCE
Status: COMPLETED | OUTPATIENT
Start: 2022-06-05 | End: 2022-06-05

## 2022-06-05 RX ORDER — FAMOTIDINE 20 MG/1
20 TABLET, FILM COATED ORAL 2 TIMES DAILY
Status: DISCONTINUED | OUTPATIENT
Start: 2022-06-05 | End: 2022-06-09 | Stop reason: HOSPADM

## 2022-06-05 RX ORDER — GABAPENTIN 300 MG/1
600 CAPSULE ORAL EVERY 8 HOURS
Status: DISCONTINUED | OUTPATIENT
Start: 2022-06-08 | End: 2022-06-05

## 2022-06-05 RX ORDER — GABAPENTIN 100 MG/1
100 CAPSULE ORAL EVERY 8 HOURS
Status: DISCONTINUED | OUTPATIENT
Start: 2022-06-12 | End: 2022-06-05

## 2022-06-05 RX ORDER — ONDANSETRON 4 MG/1
4 TABLET, ORALLY DISINTEGRATING ORAL EVERY 6 HOURS PRN
Status: DISCONTINUED | OUTPATIENT
Start: 2022-06-05 | End: 2022-06-09 | Stop reason: HOSPADM

## 2022-06-05 RX ORDER — LEVETIRACETAM 500 MG/1
500 TABLET ORAL 2 TIMES DAILY
Status: DISCONTINUED | OUTPATIENT
Start: 2022-06-05 | End: 2022-06-09 | Stop reason: HOSPADM

## 2022-06-05 RX ORDER — PROCHLORPERAZINE 25 MG
25 SUPPOSITORY, RECTAL RECTAL EVERY 12 HOURS PRN
Status: DISCONTINUED | OUTPATIENT
Start: 2022-06-05 | End: 2022-06-09 | Stop reason: HOSPADM

## 2022-06-05 RX ORDER — ACETAMINOPHEN 650 MG/1
650 SUPPOSITORY RECTAL EVERY 6 HOURS PRN
Status: DISCONTINUED | OUTPATIENT
Start: 2022-06-05 | End: 2022-06-09 | Stop reason: HOSPADM

## 2022-06-05 RX ORDER — SODIUM CHLORIDE 9 MG/ML
INJECTION, SOLUTION INTRAVENOUS CONTINUOUS
Status: DISCONTINUED | OUTPATIENT
Start: 2022-06-05 | End: 2022-06-06

## 2022-06-05 RX ADMIN — DIAZEPAM 5 MG: 5 INJECTION, SOLUTION INTRAMUSCULAR; INTRAVENOUS at 18:36

## 2022-06-05 RX ADMIN — FAMOTIDINE 20 MG: 20 TABLET ORAL at 19:51

## 2022-06-05 RX ADMIN — SODIUM CHLORIDE 1000 ML: 9 INJECTION, SOLUTION INTRAVENOUS at 01:15

## 2022-06-05 RX ADMIN — DIAZEPAM 10 MG: 5 INJECTION, SOLUTION INTRAMUSCULAR; INTRAVENOUS at 22:37

## 2022-06-05 RX ADMIN — SODIUM CHLORIDE: 9 INJECTION, SOLUTION INTRAVENOUS at 13:04

## 2022-06-05 RX ADMIN — GABAPENTIN 1200 MG: 300 CAPSULE ORAL at 05:13

## 2022-06-05 RX ADMIN — DIAZEPAM 10 MG: 5 TABLET ORAL at 08:08

## 2022-06-05 RX ADMIN — GABAPENTIN 900 MG: 300 CAPSULE ORAL at 21:11

## 2022-06-05 RX ADMIN — THIAMINE HCL TAB 100 MG 100 MG: 100 TAB at 08:12

## 2022-06-05 RX ADMIN — LORAZEPAM 1 MG: 2 INJECTION INTRAMUSCULAR; INTRAVENOUS at 02:05

## 2022-06-05 RX ADMIN — DIAZEPAM 10 MG: 5 INJECTION, SOLUTION INTRAMUSCULAR; INTRAVENOUS at 20:34

## 2022-06-05 RX ADMIN — FOLIC ACID: 5 INJECTION, SOLUTION INTRAMUSCULAR; INTRAVENOUS; SUBCUTANEOUS at 02:08

## 2022-06-05 RX ADMIN — LEVETIRACETAM 500 MG: 500 TABLET, FILM COATED ORAL at 10:11

## 2022-06-05 RX ADMIN — FAMOTIDINE 20 MG: 20 TABLET ORAL at 08:11

## 2022-06-05 RX ADMIN — Medication 1 TABLET: at 08:10

## 2022-06-05 RX ADMIN — DIAZEPAM 10 MG: 5 INJECTION, SOLUTION INTRAMUSCULAR; INTRAVENOUS at 21:11

## 2022-06-05 RX ADMIN — SODIUM CHLORIDE: 9 INJECTION, SOLUTION INTRAVENOUS at 04:22

## 2022-06-05 RX ADMIN — DIAZEPAM 10 MG: 5 INJECTION, SOLUTION INTRAMUSCULAR; INTRAVENOUS at 05:38

## 2022-06-05 RX ADMIN — FOLIC ACID 1 MG: 1 TABLET ORAL at 08:12

## 2022-06-05 RX ADMIN — LEVETIRACETAM 500 MG: 500 TABLET, FILM COATED ORAL at 19:51

## 2022-06-05 RX ADMIN — SODIUM CHLORIDE: 9 INJECTION, SOLUTION INTRAVENOUS at 21:11

## 2022-06-05 RX ADMIN — DIAZEPAM 10 MG: 5 INJECTION, SOLUTION INTRAMUSCULAR; INTRAVENOUS at 09:41

## 2022-06-05 RX ADMIN — DIAZEPAM 10 MG: 5 INJECTION, SOLUTION INTRAMUSCULAR; INTRAVENOUS at 19:51

## 2022-06-05 RX ADMIN — ONDANSETRON 4 MG: 2 INJECTION INTRAMUSCULAR; INTRAVENOUS at 04:20

## 2022-06-05 RX ADMIN — GABAPENTIN 900 MG: 300 CAPSULE ORAL at 14:38

## 2022-06-05 ASSESSMENT — ACTIVITIES OF DAILY LIVING (ADL)
ADLS_ACUITY_SCORE: 37

## 2022-06-05 NOTE — ED NOTES
Patient sleeping. Appears comfortable. No visible sweat. VSS. Did not wake for CIWA assessment. Will repeat CIWA once patient awakes.

## 2022-06-05 NOTE — H&P
[facility]    History and Physical - Hospitalist Service       Date of Admission:  6/5/2022    Assessment & Plan      Nikhil Rios is a 36 year old male admitted on 6/5/2022. He was brought into the ED with concerns of going into alcohol withdrawals.    1.  Alcohol abuse, dependence, intoxication  At risk for withdrawals  [facility] protocol  Addiction medicine consult    2.  Alcohol hepatitis  Hepatic steatosis noted on previous scans  ,   Total bilirubin 0.9, creatinine 0.74, INR 0.96  Check prothrombin time  Monitor LFTs    3.  Anion gap metabolic acidosis  Secondary to alcohol ketosis  IV hydration    4.  Leukopenia, thrombocytopenia  Secondary to alcohol abuse  Monitor blood counts    5.  Depression, anxiety  Supportive management     Diet: Advance Diet as Tolerated: Clear Liquid Diet    DVT Prophylaxis: Ambulate every shift  Mars Catheter: Not present  Central Lines: None  Cardiac Monitoring: ACTIVE order. Indication: etoh w/d  Code Status: Full Code      Clinically Significant Risk Factors Present on Admission            # Anion Gap Metabolic Acidosis: AG = 21 mmol/L (Ref range: 5 - 18 mmol/L) on admission, will monitor and treat as appropriate     # Thrombocytopenia: Plts = 71 10e3/uL (Ref range: 150 - 450 10e3/uL) on admission, will monitor for bleeding       Disposition Plan   Expected Discharge:  after at least 2 midnights  Anticipated discharge location:  Awaiting care coordination huddle  Delays:    Alcohol withdrawals       The patient's care was discussed with the Patient.    Ernie Montoya MD  Hospitalist Service  [facility]  Securely message with the Vocera Web Console (learn more here)  Text page via [email or web address] Paging/Directory         ______________________________________________________________________    Chief Complaint   Alcohol withdrawals    History is obtained from the patient, electronic health record and emergency department  physician    History of Present Illness   Nikhil Rios is a 36 year old male who was brought to the ED by his mother for evaluation of possible alcohol withdrawals.  Past medical history of alcohol abuse, depression, anxiety, allergic rhinitis, hepatic steatosis.  Patient has had previous alcohol withdrawals and alcohol induced pancreatitis.  He is trying to get into rehab but there were no beds on 6/3/2022.  He has been drinking 1 L of vodka on a daily basis to avoid going into withdrawals.  Patient feels anxious and is somewhat restless.  He smokes 1 cigarette/day and denies drugs.  Patient has been falling recently.    Review of Systems    The 10 point Review of Systems is negative other than noted in the HPI or here.     Past Medical History    I have reviewed this patient's medical history and updated it with pertinent information if needed.   Past Medical History:   Diagnosis Date     Alcohol abuse      Alcohol abuse      Alcohol withdrawal (H)      Depressive disorder      HLD (hyperlipidemia)      HTN (hypertension)        Past Surgical History   I have reviewed this patient's surgical history and updated it with pertinent information if needed.  Past Surgical History:   Procedure Laterality Date     NO HISTORY OF SURGERY       OTHER SURGICAL HISTORY      none       Social History   I have reviewed this patient's social history and updated it with pertinent information if needed.  Social History     Tobacco Use     Smoking status: Current Some Day Smoker     Packs/day: 0.25     Types: Cigars     Smokeless tobacco: Never Used     Tobacco comment: occasional   Substance Use Topics     Alcohol use: Yes     Alcohol/week: 17.0 standard drinks     Types: 17 Shots of liquor per week     Comment: Drinks 750ml/day or 17 shots/day; hx of withdrawl seizures     Drug use: Not Currently       Family History   I have reviewed this patient's family history and updated it with pertinent information if needed.  Family History    Problem Relation Age of Onset     Coronary Artery Disease Father        Prior to Admission Medications   Prior to Admission Medications   Prescriptions Last Dose Informant Patient Reported? Taking?   LORazepam (ATIVAN) 0.5 MG tablet   No No   Sig: Take 2 tablets (1 mg) by mouth every 6 hours as needed for anxiety   Patient not taking: Reported on 8/21/2021   famotidine (PEPCID) 20 MG tablet   No No   Sig: Take 1 tablet (20 mg) by mouth 2 times daily   gabapentin (NEURONTIN) 100 MG capsule   No No   Sig: Take 1 capsule (100 mg) by mouth 3 times daily   hydrOXYzine (VISTARIL) 50 MG capsule   No No   Sig: Take 1 capsule (50 mg) by mouth 3 times daily as needed for anxiety   ondansetron (ZOFRAN ODT) 4 MG ODT tab   No No   Sig: Take 1 tablet (4 mg) by mouth every 8 hours as needed for nausea      Facility-Administered Medications: None     Allergies   Allergies   Allergen Reactions     Gramineae Pollens Itching     Pollen Extract Rash     grass tree pollen       Physical Exam   Vital Signs: Temp: 98.8  F (37.1  C) Temp src: Temporal BP: 120/80 Pulse: 94   Resp: 16 SpO2: 95 %      Weight: 170 lbs 0 oz    Constitutional: awake and restless  ENT: Dry mucosa, normocepalic, without obvious abnormality  Respiratory: no increased work of breathing and good air exchange  Cardiovascular: regular rate and rhythm and normal S1 and S2  GI: normal bowel sounds and soft  Skin: no bruising or bleeding and no redness, warmth, or swelling  Musculoskeletal: no lower extremity pitting edema present  there is no redness, warmth, or swelling of the joints  Neurologic: Mental Status Exam:  Level of Alertness:   awake  Motor Exam:  moves all extremities well and symmetrically    Data   Data reviewed today: I reviewed all medications, new labs and imaging results over the last 24 hours. I personally reviewed the EKG tracing showing Sinus rhythm at 92 bpm, nonspecific ST waves.    Recent Labs   Lab 06/05/22  0113 05/31/22  0717 05/31/22  0555  05/31/22  0540   WBC 3.8*  --   --  6.6   HGB 14.7  --   --  14.9   MCV 83  --   --  84   PLT 71*  --   --  130*   INR 0.96  --   --   --     145  --   --    POTASSIUM 4.2 4.0  --   --    CHLORIDE 94* 106  --   --    CO2 21* 27  --   --    BUN 17 14  --   --    CR 0.74 0.70  --   --    ANIONGAP 21* 12  --   --    KEELEY 8.6 7.7*  --   --     97 100*  --    ALBUMIN 4.4 4.1  --   --    PROTTOTAL 7.3 6.6  --   --    BILITOTAL 0.9 0.4  --   --    ALKPHOS 77 79  --   --    * 88*  --   --    * 127*  --   --    LIPASE 118*  --   --  79*     Recent Results (from the past 24 hour(s))   Head CT w/o contrast    Narrative    EXAM: CT HEAD W/O CONTRAST  LOCATION: [facility]  DATE/TIME: 6/5/2022 2:23 AM    INDICATION: Head trauma, abnormal mental status (Age 19-64y)  COMPARISON: 8/23/2021  TECHNIQUE: Routine CT Head without IV contrast. Multiplanar reformats. Dose reduction techniques were used.    FINDINGS:  INTRACRANIAL CONTENTS: No intracranial hemorrhage, extraaxial collection, or mass effect.  No CT evidence of acute infarct. Normal parenchymal attenuation. There is mild to moderate atrophy, for age.     VISUALIZED ORBITS/SINUSES/MASTOIDS: No intraorbital abnormality. No paranasal sinus mucosal disease. No middle ear or mastoid effusion.    BONES/SOFT TISSUES: Left parietal/occipital scalp hematoma. No fracture.      Impression    IMPRESSION:  1.  No acute intracranial process.  2.  Prominent atrophy for age.

## 2022-06-05 NOTE — ED NOTES
Writer woke up pt to take gabapentin, pt demanded 2 mg IV ativan, pt informed that ativan is not ordered. Pt refusing to answer questions during CIWA assessment. Writer checked on pt again approx 10 minutes later; pt was asleep and is still asleep at this time.

## 2022-06-05 NOTE — PHARMACY-ADMISSION MEDICATION HISTORY
Pharmacy Note - Admission Medication History    Pertinent Provider Information:      ______________________________________________________________________    Prior To Admission (PTA) med list completed and updated in EMR.       PTA Med List   Medication Sig Last Dose     hydrOXYzine (VISTARIL) 50 MG capsule Take 1 capsule (50 mg) by mouth 3 times daily as needed for anxiety Past Week at Unknown time     traZODone (DESYREL) 50 MG tablet Take 50 mg by mouth At Bedtime Past Week at Unknown time       Information source(s): Patient and CareEverywhere/SureScripts  Method of interview communication: in-person    Summary of Changes to PTA Med List  New: trazodone  Discontinued: famotidine, gabapentin, lorazepam, ondansetron  Changed: n/a    Patient was asked about OTC/herbal products specifically.  PTA med list reflects this.    In the past week, patient estimated taking medication this percent of the time:  Unable to assess    Allergies were reviewed, assessed, and updated with the patient.      Patient does not use any multi-dose medications prior to admission.    The information provided in this note is only as accurate as the sources available at the time of the update(s).    Thank you for the opportunity to participate in the care of this patient.    Alis Allison [facility]  6/5/2022 10:19 AM

## 2022-06-05 NOTE — ED NOTES
Patient tried to use the urinal x3. Was unsuccessful and flopping around the bed. Patient got up, and started walking out of the room. Patient was assisted to the bathroom by his mom, two RNs and EDT. No void in the bathroom.

## 2022-06-05 NOTE — ED PROVIDER NOTES
EMERGENCY DEPARTMENT ENCOUnter      NAME: Nikhil Rios  AGE: 36 year old male  YOB: 1985  MRN: [id number]  EVALUATION DATE & TIME: 6/5/2022 12:33 AM    PCP: Lisandro Graham    ED PROVIDER: Hilary Galaviz MD      Chief Complaint   Patient presents with     Delirium Tremens (DTS)         FINAL IMPRESSION:  1. Acute alcoholic intoxication in alcoholism without complication (H)    2. Thrombocytopenia (H)    3. Elevated liver function tests          ED COURSE & MEDICAL DECISION MAKING:      In summary, the patient is a 36-year-old male that presents to the emergency department for evaluation of alcohol intoxication in the setting of alcoholism.  The mother reports that the patient has had multiple falls at home, she is concerned about his safety, and his alcohol level is 506, we will admit to the hospital for further care and evaluation of his alcohol intoxication and abuse.    12:59 AM I met the patient and performed my initial interview and exam.  Normal saline 1 L IV was administered for IV hydration.  A banana bag was administered.  0205-Ativan 1 mg IV was administered for anxiolysis.  2:39 AM I rechecked and updated the patient.    3:04 AM I spoke with Dr. Montoya, hospitalist, and he accepts pateint for admission.    At the conclusion of the encounter I discussed the results of all of the tests and the disposition. The questions were answered. The patient or family acknowledged understanding and was agreeable with the care plan.         MEDICATIONS GIVEN IN THE EMERGENCY:  Medications   LORazepam (ATIVAN) injection 1 mg (1 mg Intravenous Not Given 6/5/22 0310)   0.9% sodium chloride BOLUS (1,000 mLs Intravenous New Bag 6/5/22 0115)   sodium chloride 0.9 % 1,000 mL with Infuvite Adult 10 mL, thiamine 100 mg, folic acid 1 mg infusion ( Intravenous New Bag 6/5/22 0208)   LORazepam (ATIVAN) injection 1 mg (1 mg Intravenous Given 6/5/22 0205)       NEW PRESCRIPTIONS STARTED AT TODAY'S ER VISIT  New  Prescriptions    No medications on file          =================================================================    HPI        Nikhil Rios is a 36 year old male with a pertinent history of alcohol withdrawal who presents to this ED by wheelchair for evaluation of DTS.    Family member reports that patient is waiting to get into [facility] but was unable to get in this week. Patient has been drinking as he is scared to get an alcohol withdrawal induced seizure again. Patient has been seen in the ED several times in the last couple weeks. His last known drink was at 2100. Patient has been falling and reported hitting his head. He takes hydroxyzine. Patient does smoke cigarettes when he drinks but denies any other drug use. He has been to treatment four or five times previously. He relapsed in April. Patient has his own house but is staying with a family member currently. Patient denies any other complaints at this time.       REVIEW OF SYSTEMS     Constitutional: Positive for intoxication. Denies fever or chills  HENT: Positive for hitting his head from falling. Denies sore throat   Respiratory:  Denies cough or shortness of breath   Cardiovascular:  Denies chest pain or palpitations  GI:  Denies abdominal pain, nausea, or vomiting  Musculoskeletal:  Denies any new extremity pain   Skin:  Denies rash   Neurologic:  Denies headache, focal weakness or sensory changes    All other systems reviewed and are negative      PAST MEDICAL HISTORY:  Past Medical History:   Diagnosis Date     Alcohol abuse      Alcohol abuse      Alcohol withdrawal (H)      Depressive disorder      HLD (hyperlipidemia)      HTN (hypertension)        PAST SURGICAL HISTORY:  Past Surgical History:   Procedure Laterality Date     NO HISTORY OF SURGERY       OTHER SURGICAL HISTORY      none           CURRENT MEDICATIONS:    famotidine (PEPCID) 20 MG tablet  gabapentin (NEURONTIN) 100 MG capsule  hydrOXYzine (VISTARIL) 50 MG capsule  LORazepam (ATIVAN)  "0.5 MG tablet  ondansetron (ZOFRAN ODT) 4 MG ODT tab        ALLERGIES:  Allergies   Allergen Reactions     Gramineae Pollens Itching     Pollen Extract Rash     grass tree pollen       FAMILY HISTORY:  Family History   Problem Relation Age of Onset     Coronary Artery Disease Father        SOCIAL HISTORY:   Social History     Socioeconomic History     Marital status: Single     Spouse name: None     Number of children: None     Years of education: None     Highest education level: None   Occupational History     Occupation: Rental property owner   Tobacco Use     Smoking status: Current Some Day Smoker     Packs/day: 0.25     Types: Cigars     Smokeless tobacco: Never Used     Tobacco comment: occasional   Substance and Sexual Activity     Alcohol use: Yes     Alcohol/week: 17.0 standard drinks     Types: 17 Shots of liquor per week     Comment: Drinks 750ml/day or 17 shots/day; hx of withdrawl seizures     Drug use: Not Currently     Social Determinants of Health     Transportation Needs: No Transportation Needs     Lack of Transportation (Medical): No     Lack of Transportation (Non-Medical): No   Stress: Stress Concern Present     Feeling of Stress : Very much   Housing Stability: Unknown     Unable to Pay for Housing in the Last Year: No     Unstable Housing in the Last Year: No       VITALS:  Patient Vitals for the past 24 hrs:   BP Temp Temp src Pulse Resp SpO2 Height Weight   06/05/22 0148 -- -- -- -- -- 95 % -- --   06/05/22 0147 -- -- -- 94 -- 92 % -- --   06/05/22 0146 -- -- -- 93 -- 95 % -- --   06/05/22 0047 120/80 -- -- 84 -- 92 % -- --   06/05/22 0029 113/87 98.8  F (37.1  C) Temporal (!) 125 16 93 % 1.803 m (5' 11\") 77.1 kg (170 lb)       PHYSICAL EXAM    Constitutional:  Well developed, Well nourished,  HENT:  Normocephalic, Atraumatic, Bilateral external ears normal, Oropharynx dry, Nose normal.   Neck:  Normal range of motion, No meningismus, No stridor.   Eyes:  EOMI, Conjunctiva normal, No " discharge.   Respiratory:  Normal breath sounds, No respiratory distress, No wheezing, No chest tenderness.   Cardiovascular:  Normal heart rate, Normal rhythm, No murmurs  GI:  Soft, No tenderness, No guarding, No CVA tenderness.   Musculoskeletal:  Neurovascularly intact distally, No edema, No tenderness, No cyanosis, Good range of motion in all major joints.   Integument:  Warm, Dry, No erythema, No rash.   Lymphatic:  No lymphadenopathy noted.   Neurologic:  Alert & oriented x 3, Normal motor function, Normal sensory function, No focal deficits noted.   Psychiatric:  Affect normal,intoxicated, Mood normal.      LAB:  All pertinent labs reviewed and interpreted.  Results for orders placed or performed during the hospital encounter of 06/05/22   Head CT w/o contrast    Impression    IMPRESSION:  1.  No acute intracranial process.  2.  Prominent atrophy for age.   Result Value Ref Range    INR 0.96 0.85 - 1.15   Comprehensive metabolic panel   Result Value Ref Range    Sodium 136 136 - 145 mmol/L    Potassium 4.2 3.5 - 5.0 mmol/L    Chloride 94 (L) 98 - 107 mmol/L    Carbon Dioxide (CO2) 21 (L) 22 - 31 mmol/L    Anion Gap 21 (H) 5 - 18 mmol/L    Urea Nitrogen 17 8 - 22 mg/dL    Creatinine 0.74 0.70 - 1.30 mg/dL    Calcium 8.6 8.5 - 10.5 mg/dL    Glucose 111 70 - 125 mg/dL    Alkaline Phosphatase 77 45 - 120 U/L     (H) 0 - 40 U/L     (H) 0 - 45 U/L    Protein Total 7.3 6.0 - 8.0 g/dL    Albumin 4.4 3.5 - 5.0 g/dL    Bilirubin Total 0.9 0.0 - 1.0 mg/dL    GFR Estimate >90 >60 mL/min/1.73m2   Result Value Ref Range    Lipase 118 (H) 0 - 52 U/L   Result Value Ref Range    Magnesium 2.0 1.8 - 2.6 mg/dL   Ethyl Alcohol Level   Result Value Ref Range    Alcohol, Blood 506 (H) None detected mg/dL   CBC with platelets and differential   Result Value Ref Range    WBC Count 3.8 (L) 4.0 - 11.0 10e3/uL    RBC Count 5.15 4.40 - 5.90 10e6/uL    Hemoglobin 14.7 13.3 - 17.7 g/dL    Hematocrit 42.7 40.0 - 53.0 %     MCV 83 78 - 100 fL    MCH 28.5 26.5 - 33.0 pg    MCHC 34.4 31.5 - 36.5 g/dL    RDW 15.3 (H) 10.0 - 15.0 %    Platelet Count 71 (L) 150 - 450 10e3/uL    % Neutrophils 61 %    % Lymphocytes 33 %    % Monocytes 5 %    % Eosinophils 0 %    % Basophils 1 %    % Immature Granulocytes 0 %    NRBCs per 100 WBC 0 <1 /100    Absolute Neutrophils 2.3 1.6 - 8.3 10e3/uL    Absolute Lymphocytes 1.2 0.8 - 5.3 10e3/uL    Absolute Monocytes 0.2 0.0 - 1.3 10e3/uL    Absolute Eosinophils 0.0 0.0 - 0.7 10e3/uL    Absolute Basophils 0.0 0.0 - 0.2 10e3/uL    Absolute Immature Granulocytes 0.0 <=0.4 10e3/uL    Absolute NRBCs 0.0 10e3/uL       RADIOLOGY:  I have independently reviewed and interpreted the above imaging, pending the final radiology read.  Head CT w/o contrast   Final Result   IMPRESSION:   1.  No acute intracranial process.   2.  Prominent atrophy for age.          [id number], sinus, no st segment elevation or depression    I have independently reviewed and interpreted this EKG          I, Prakash Hernandez, am serving as a scribe to document services personally performed by Dr. Galaviz based on my observation and the provider's statements to me. I, Hilary Galaviz MD attest that Prakash Hernandez is acting in a scribe capacity, has observed my performance of the services and has documented them in accordance with my direction.    Hilary Galaviz MD  Emergency Medicine  [facility] EMERGENCY DEPARTMENT  [address] [facility] [id number] [phone]  Dept: [phone]     Hilary Galaviz MD  [phone]

## 2022-06-05 NOTE — ED TRIAGE NOTES
Pt presents with mother stating he is in ETOh withdrawal.  Mother states she is buying him 1 liter of vodka per day to keep him drunk until he can get into treatment     Triage Assessment     Row Name 06/05/22 0030       Triage Assessment (Adult)    Airway WDL WDL       Respiratory WDL    Respiratory WDL WDL       Skin Circulation/Temperature WDL    Skin Circulation/Temperature WDL WDL       Cardiac WDL    Cardiac WDL WDL       Peripheral/Neurovascular WDL    Peripheral Neurovascular WDL WDL       Cognitive/Neuro/Behavioral WDL    Cognitive/Neuro/Behavioral WDL WDL       Eudora Coma Scale    Best Eye Response 4-->(E4) spontaneous    Best Motor Response 6-->(M6) obeys commands    Best Verbal Response 5-->(V5) oriented    Reji Coma Scale Score 15

## 2022-06-05 NOTE — PROGRESS NOTES
Care Management Follow Up    Length of Stay (days): 0    Expected Discharge Date:  pending     Chart reviewed for boarding ED patient. Full assessment needed by SW. Addiction Medicine consulted.  Patient independent at baseline. This is his 8th ED visit for ETOH.     Final discharge need pending recommendations.        Jessica Hernandez RN

## 2022-06-05 NOTE — CONSULTS
Addiction Medicine Consultation    Nikhil Rios, 1985, [id number]    Primary:  Lisandro Graham, [phone]       Tele-Visit Details    Type of service:  Video Visit  Video Start Time (time video started): 0859  Video End Time (time video stopped): 0916  Originating Location (pt. Location): Patient's room  Distant Location (provider location): On site at [facility]  Reason for Televisit: COVID-19  Mode of Communication:  Video Conference via polycom  Physician has received verbal consent for a video visit from the patient? Yes    Assessment and Plan:     Principal Problem:    Alcohol use disorder, severe, dependence (H)  Active Problems:    Alcoholic fatty liver    Thrombocytopenia (H)    Elevated liver function tests    Acute alcoholic intoxication in alcoholism without complication (H)    36-year-old male with severe alcohol use disorder and history of alcohol withdrawal seizures and pancreatitis who presented to [facility] with alcohol withdrawal.  Patient denies any other substance use.  He additionally has transaminitis with an ALT of 144 and AST of 232, increased anion gap and elevated lipase at 118.  Platelets are 71 and WBC 3.8, hemoglobin is within normal limits.  Patient has received a total of 20 mg diazepam this morning (and 1 mg lorazepam yesterday) for elevated CIWA score.    Recommendations:  1. Continue CIWA with diazepam  2. Add Keppra 500 mg twice daily for 7 days for seizure prophylaxis, given his history of alcohol withdrawal seizures.  (Order placed)  3. Follow CMP and CBC  4. Consider checking INR if patient develops any concerning signs for bleeding  5. Continue gabapentin as scheduled and will consider discharging patient on gabapentin 300-600 mg 3 times daily for postacute withdrawal symptoms and to reduce the risk of returning to drinking.  I have ordered that the gabapentin taper should stop at 600 mg 3 times daily and continue at this dose.  6. Patient is  "interested in pharmacotherapy for alcohol use disorder but not interested in going to substance use treatment.  Withdrawal is quite severe at the time of exam and the options for pharmacotherapy were not discussed in detail.  We will plan on doing this sometime in the next couple of days.  7. Addiction medicine will continue following        Chief Complaint:  Alcohol withdrawal     HPI:    Nikhil Rios is a 36 year old old male with a past medical history significant for alcohol use disorder, alcohol induced pancreatitis, alcohol withdrawal seizures who presented to [facility] for alcohol withdrawal. Consultation requested for severe alcohol use disorder.    Substance of choice: Alcohol  Patient denies any other substance use    Patient is sedated and slow at answering questions.  Quite tremulous and I am unable to obtain a full history however, he does answer some questions.  Patient on CIWA with diazepam and received a total of 20 mg this morning, the last dose at approximately 0 800.  He did receive 1 dose of lorazepam on 6/4.    ETOH:    Amount/frequency: 1 L vodka daily  Last use: Just prior to admission  History of seizures: Yes  History of DTs: No  Use of alcohol pharmacotherapies: Unclear but he does think he is tried naltrexone before.  He is interested in pharmacotherapies but is not interested in going to substance use treatment.      Current symptoms:  Reports \"crawling out of my skin\" and tremor    Medical History  Past Medical History:   Diagnosis Date     Alcohol abuse      Alcohol abuse      Alcohol withdrawal (H)      Depressive disorder      HLD (hyperlipidemia)      HTN (hypertension)        Surgical History  Past Surgical History:   Procedure Laterality Date     NO HISTORY OF SURGERY       OTHER SURGICAL HISTORY      none       Social History  Current living situation: lives with mother but has own home  Employment: unemployed    Social History     Tobacco Use     Smoking status: Current Some Day " Smoker     Packs/day: 0.25     Types: Cigars     Smokeless tobacco: Never Used     Tobacco comment: occasional   Substance Use Topics     Alcohol use: Yes     Alcohol/week: 17.0 standard drinks     Types: 17 Shots of liquor per week     Comment: Drinks 750ml/day or 17 shots/day; hx of withdrawl seizures       Family History  Reviewed    family history includes Coronary Artery Disease in his father.    Prior to Admission Medications   Current Facility-Administered Medications   Medication     acetaminophen (TYLENOL) tablet 650 mg    Or     acetaminophen (TYLENOL) Suppository 650 mg     diazepam (VALIUM) tablet 10 mg    Or     diazepam (VALIUM) injection 5-10 mg     famotidine (PEPCID) tablet 20 mg     flumazenil (ROMAZICON) injection 0.2 mg     folic acid (FOLVITE) tablet 1 mg     [START ON 6/12/2022] gabapentin (NEURONTIN) capsule 100 mg     [START ON 6/10/2022] gabapentin (NEURONTIN) capsule 300 mg     [START ON 6/8/2022] gabapentin (NEURONTIN) capsule 600 mg     gabapentin (NEURONTIN) capsule 900 mg     OLANZapine zydis (zyPREXA) ODT tab 5-10 mg    Or     haloperidol lactate (HALDOL) injection 2.5-5 mg     lidocaine (LMX4) cream     lidocaine 1 % 0.1-1 mL     LORazepam (ATIVAN) injection 1 mg     melatonin tablet 1 mg     melatonin tablet 5 mg     multivitamin w/minerals (THERA-VIT-M) tablet 1 tablet     ondansetron (ZOFRAN ODT) ODT tab 4 mg    Or     ondansetron (ZOFRAN) injection 4 mg     prochlorperazine (COMPAZINE) injection 10 mg    Or     prochlorperazine (COMPAZINE) tablet 10 mg    Or     prochlorperazine (COMPAZINE) suppository 25 mg     sodium chloride (PF) 0.9% PF flush 3 mL     sodium chloride (PF) 0.9% PF flush 3 mL     sodium chloride 0.9% infusion     thiamine (B-1) tablet 100 mg     Current Outpatient Medications   Medication     famotidine (PEPCID) 20 MG tablet     gabapentin (NEURONTIN) 100 MG capsule     hydrOXYzine (VISTARIL) 50 MG capsule     LORazepam (ATIVAN) 0.5 MG tablet     ondansetron  "(ZOFRAN ODT) 4 MG ODT tab     Facility-Administered Medications Ordered in Other Encounters   Medication     Self Administer Medications: Behavioral Services         Allergies  Allergies   Allergen Reactions     Gramineae Pollens Itching     Pollen Extract Rash     grass tree pollen        Review of Systems:    Unable to obtain review of systems because of sedation and alcohol withdrawal      Physical Exam:    /62   Pulse 94   Temp 98.8  F (37.1  C) (Temporal)   Resp 17   Ht 1.803 m (5' 11\")   Wt 77.1 kg (170 lb)   SpO2 91%   BMI 23.71 kg/m      Standard physical exam not performed today since this encounter is via telehealth during the COVID-19 pandemic.  The exams performed by previous providers are personally reviewed in the chart.    General appearance   Gen: Slow in responses, somewhat sedated but will open eyes when asked.  Dermatologic   No rash. No piloerection or diaphoresis.  Although difficult to assess skin on iPad.  No jaundice  HEENT   EOMI.    No yawning  Pulmonary   No respiratory distress. No cough noted.  Neurologic   Tremor is severe  Speech is slow and mumbled      Results:  Lab Results personally reviewed.  Lipase 118  , , increased anion gap otherwise CMP is unremarkable  WBC 3.8, hemoglobin 14.7, platelets 71  INR not checked  Urine drug screen negative  Head CT negative for acute process, prominent atrophy for age noted.       personally reviewed and shows:  No rx fills    Cassidy Austin MD  Addiction Medicine                    "

## 2022-06-06 LAB
ALBUMIN SERPL-MCNC: 3.3 G/DL (ref 3.5–5)
ALP SERPL-CCNC: 62 U/L (ref 45–120)
ALT SERPL W P-5'-P-CCNC: 89 U/L (ref 0–45)
ANION GAP SERPL CALCULATED.3IONS-SCNC: 8 MMOL/L (ref 5–18)
AST SERPL W P-5'-P-CCNC: 116 U/L (ref 0–40)
BILIRUB SERPL-MCNC: 1.3 MG/DL (ref 0–1)
BUN SERPL-MCNC: 13 MG/DL (ref 8–22)
CALCIUM SERPL-MCNC: 8.6 MG/DL (ref 8.5–10.5)
CHLORIDE BLD-SCNC: 98 MMOL/L (ref 98–107)
CO2 SERPL-SCNC: 28 MMOL/L (ref 22–31)
CREAT SERPL-MCNC: 0.69 MG/DL (ref 0.7–1.3)
ERYTHROCYTE [DISTWIDTH] IN BLOOD BY AUTOMATED COUNT: 15.7 % (ref 10–15)
GFR SERPL CREATININE-BSD FRML MDRD: >90 ML/MIN/1.73M2
GLUCOSE BLD-MCNC: 83 MG/DL (ref 70–125)
HCT VFR BLD AUTO: 32.4 % (ref 40–53)
HGB BLD-MCNC: 11.3 G/DL (ref 13.3–17.7)
MCH RBC QN AUTO: 29.4 PG (ref 26.5–33)
MCHC RBC AUTO-ENTMCNC: 34.9 G/DL (ref 31.5–36.5)
MCV RBC AUTO: 84 FL (ref 78–100)
PLATELET # BLD AUTO: 40 10E3/UL (ref 150–450)
POTASSIUM BLD-SCNC: 3.5 MMOL/L (ref 3.5–5)
PROT SERPL-MCNC: 5.4 G/DL (ref 6–8)
RBC # BLD AUTO: 3.84 10E6/UL (ref 4.4–5.9)
SODIUM SERPL-SCNC: 134 MMOL/L (ref 136–145)
WBC # BLD AUTO: 4.5 10E3/UL (ref 4–11)

## 2022-06-06 PROCEDURE — 250N000013 HC RX MED GY IP 250 OP 250 PS 637: Performed by: INTERNAL MEDICINE

## 2022-06-06 PROCEDURE — 36415 COLL VENOUS BLD VENIPUNCTURE: CPT | Performed by: INTERNAL MEDICINE

## 2022-06-06 PROCEDURE — 250N000013 HC RX MED GY IP 250 OP 250 PS 637: Performed by: HOSPITALIST

## 2022-06-06 PROCEDURE — 258N000003 HC RX IP 258 OP 636: Performed by: INTERNAL MEDICINE

## 2022-06-06 PROCEDURE — 258N000003 HC RX IP 258 OP 636: Performed by: HOSPITALIST

## 2022-06-06 PROCEDURE — 250N000011 HC RX IP 250 OP 636: Performed by: HOSPITALIST

## 2022-06-06 PROCEDURE — 250N000013 HC RX MED GY IP 250 OP 250 PS 637: Performed by: FAMILY MEDICINE

## 2022-06-06 PROCEDURE — 99233 SBSQ HOSP IP/OBS HIGH 50: CPT | Performed by: INTERNAL MEDICINE

## 2022-06-06 PROCEDURE — 96376 TX/PRO/DX INJ SAME DRUG ADON: CPT

## 2022-06-06 PROCEDURE — 82040 ASSAY OF SERUM ALBUMIN: CPT | Performed by: INTERNAL MEDICINE

## 2022-06-06 PROCEDURE — 85027 COMPLETE CBC AUTOMATED: CPT | Performed by: INTERNAL MEDICINE

## 2022-06-06 PROCEDURE — 80053 COMPREHEN METABOLIC PANEL: CPT | Performed by: INTERNAL MEDICINE

## 2022-06-06 PROCEDURE — 120N000001 HC R&B MED SURG/OB

## 2022-06-06 PROCEDURE — 250N000011 HC RX IP 250 OP 636: Performed by: INTERNAL MEDICINE

## 2022-06-06 PROCEDURE — 96374 THER/PROPH/DIAG INJ IV PUSH: CPT

## 2022-06-06 RX ORDER — HYDROXYZINE HYDROCHLORIDE 25 MG/1
50 TABLET, FILM COATED ORAL 3 TIMES DAILY PRN
Status: DISCONTINUED | OUTPATIENT
Start: 2022-06-06 | End: 2022-06-09 | Stop reason: HOSPADM

## 2022-06-06 RX ORDER — TRAZODONE HYDROCHLORIDE 50 MG/1
50 TABLET, FILM COATED ORAL AT BEDTIME
Status: DISCONTINUED | OUTPATIENT
Start: 2022-06-06 | End: 2022-06-09 | Stop reason: HOSPADM

## 2022-06-06 RX ORDER — SODIUM CHLORIDE 9 MG/ML
INJECTION, SOLUTION INTRAVENOUS CONTINUOUS
Status: DISCONTINUED | OUTPATIENT
Start: 2022-06-06 | End: 2022-06-08

## 2022-06-06 RX ADMIN — FAMOTIDINE 20 MG: 20 TABLET ORAL at 09:05

## 2022-06-06 RX ADMIN — FAMOTIDINE 20 MG: 20 TABLET ORAL at 20:47

## 2022-06-06 RX ADMIN — DIAZEPAM 10 MG: 5 INJECTION, SOLUTION INTRAMUSCULAR; INTRAVENOUS at 03:53

## 2022-06-06 RX ADMIN — LEVETIRACETAM 500 MG: 500 TABLET, FILM COATED ORAL at 09:05

## 2022-06-06 RX ADMIN — DIAZEPAM 10 MG: 5 INJECTION, SOLUTION INTRAMUSCULAR; INTRAVENOUS at 00:35

## 2022-06-06 RX ADMIN — LEVETIRACETAM 500 MG: 500 TABLET, FILM COATED ORAL at 20:47

## 2022-06-06 RX ADMIN — DIAZEPAM 10 MG: 5 TABLET ORAL at 17:53

## 2022-06-06 RX ADMIN — DIAZEPAM 5 MG: 5 INJECTION, SOLUTION INTRAMUSCULAR; INTRAVENOUS at 19:55

## 2022-06-06 RX ADMIN — DIAZEPAM 10 MG: 5 TABLET ORAL at 04:22

## 2022-06-06 RX ADMIN — HALOPERIDOL LACTATE 2.5 MG: 5 INJECTION, SOLUTION INTRAMUSCULAR at 02:50

## 2022-06-06 RX ADMIN — DIAZEPAM 10 MG: 5 TABLET ORAL at 06:51

## 2022-06-06 RX ADMIN — FOLIC ACID 1 MG: 1 TABLET ORAL at 09:05

## 2022-06-06 RX ADMIN — DIAZEPAM 10 MG: 5 INJECTION, SOLUTION INTRAMUSCULAR; INTRAVENOUS at 21:30

## 2022-06-06 RX ADMIN — SODIUM CHLORIDE: 9 INJECTION, SOLUTION INTRAVENOUS at 03:53

## 2022-06-06 RX ADMIN — DIAZEPAM 5 MG: 5 INJECTION, SOLUTION INTRAMUSCULAR; INTRAVENOUS at 13:54

## 2022-06-06 RX ADMIN — GABAPENTIN 900 MG: 300 CAPSULE ORAL at 05:59

## 2022-06-06 RX ADMIN — DIAZEPAM 10 MG: 5 TABLET ORAL at 05:59

## 2022-06-06 RX ADMIN — DIAZEPAM 10 MG: 5 INJECTION, SOLUTION INTRAMUSCULAR; INTRAVENOUS at 02:09

## 2022-06-06 RX ADMIN — TRAZODONE HYDROCHLORIDE 50 MG: 50 TABLET ORAL at 20:48

## 2022-06-06 RX ADMIN — DIAZEPAM 10 MG: 5 TABLET ORAL at 19:53

## 2022-06-06 RX ADMIN — DIAZEPAM 10 MG: 5 INJECTION, SOLUTION INTRAMUSCULAR; INTRAVENOUS at 01:07

## 2022-06-06 RX ADMIN — DIAZEPAM 10 MG: 5 TABLET ORAL at 11:15

## 2022-06-06 RX ADMIN — THIAMINE HCL TAB 100 MG 100 MG: 100 TAB at 09:05

## 2022-06-06 RX ADMIN — GABAPENTIN 900 MG: 300 CAPSULE ORAL at 13:40

## 2022-06-06 RX ADMIN — Medication 1 TABLET: at 09:05

## 2022-06-06 RX ADMIN — GABAPENTIN 900 MG: 300 CAPSULE ORAL at 20:47

## 2022-06-06 RX ADMIN — SODIUM CHLORIDE: 9 INJECTION, SOLUTION INTRAVENOUS at 09:04

## 2022-06-06 ASSESSMENT — ACTIVITIES OF DAILY LIVING (ADL)
ADLS_ACUITY_SCORE: 39

## 2022-06-06 NOTE — PROGRESS NOTES
"Pt updated regarding not medicating per CIWA protocol even if score greater than 8 per MD unless SBP>110 or HR>70. Discussed CIWA scoring and protocol with Dr. Watson. Pt must have physiologic signs of withdrawal ( higher HR and BP) Pt sleeping in between interactions. When staff walk into room. Pt starts to shake severely and state this is the\"worst withdrawal ever.\" Per MD pt knows what to do to score high on CIWA. Per report pt frequently requests IV valium. Will continue to monitor.  "

## 2022-06-06 NOTE — INTERIM SUMMARY
I saw and examined Mr. Rios today in the ED. He was still intoxicated with a red face and slurred speech. He was not interested in any other program than [place]. He has been trying to get a place with them for a while now but continues to drink so they will not take him. He needs to have an ETOH level below 0.4.    I agree with the assessment and plan by Dr. Montoya unless stated above.     General Appearance: Awake, intoxicated  Respiratory: CTAB, no wheeze  Cardiovascular: tachycardia, no murmur noted  GI: soft, nontender, non distended, normal bowel sounds  Skin: no jaundice, no rash, flushing

## 2022-06-06 NOTE — PROGRESS NOTES
Pt put on call light. Had loose incontinent stool all over bed. Was very unsteady walking to bathroom. Refused bedpan or commode. Gait belt used. After pt medicated per ANDREA. Will monitor.

## 2022-06-06 NOTE — PROGRESS NOTES
[facility]    Medicine Progress Note - Hospitalist Service    Date of Admission:  6/5/2022    Assessment & Plan        Nikhil Rios is a 36 year old male admitted on 6/5/2022. He was brought into the ED with concerns of going into alcohol withdrawals.     Alcohol abuse, dependence  Alcohol withdrawal  -Was initially intoxicated  -[facility] protocol-added heart rate and blood pressure parameters as he became hypotensive last night, will need to be aware of his breathing rate as he had respirations down to 8 overnight  -Addiction medicine consult  -Replacing thiamine and folate  -Had been refusing to have his vitals taken but now being more cooperative     Alcohol hepatitis  Hepatic steatosis noted on previous scans  ,   Total bilirubin 0.9, creatinine 0.74, INR 0.96  Check prothrombin time  Monitor LFTs    Normocytic anemia  Thrombocytopenia  -Likely secondary to liver disease/alcohol use     Anion gap metabolic acidosis  Secondary to alcohol ketosis  IV hydration     Depression, anxiety  Trazodone 50 mg at bedtime       Diet: Regular Diet Adult    DVT Prophylaxis: Pneumatic Compression Devices  Mars Catheter: Not present  Central Lines: None  Cardiac Monitoring: None  Code Status: Full Code      Disposition Plan   Expected Discharge: 06/07/2022     Anticipated discharge location:  Awaiting care coordination huddle  Delays:            The patient's care was discussed with the Patient.    Cece Watson MD  Hospitalist Service  [facility]  Securely message with the Vocera Web Console (learn more here)  Text page via [employer] Paging/Directory         Clinically Significant Risk Factors Present on Admission                 ______________________________________________________________________    Interval History   Mr. Rios had a rough night last night.  He was continually asking for diazepam every 30 minutes.  He was breathing at a rate of 8 and had hypotension  by the morning.  He was not allowing the nurses to take his vital signs as well.  I had a discussion with him about this and how unsafe it is to give the medication and we would not be giving it unless he allowed us to take his vitals.  He agreed.  He seems to still be intoxicated versus highly medicated in the morning.  He was not having any tremors his heart rate was regular.    Data reviewed today: I reviewed all medications, new labs and imaging results over the last 24 hours. I personally reviewed no images or EKG's today.    Physical Exam   Vital Signs: Temp: 97.8  F (36.6  C) Temp src: Oral BP: 113/78 Pulse: 84   Resp: 16 SpO2: 100 % O2 Device: None (Room air)    Weight: 170 lbs 0 oz  General Appearance: Awake, lethargic, in no acute distress  Respiratory: CTAB, no wheeze  Cardiovascular: RRR, no murmur noted  GI: soft, nontender, non distended, normal bowel sounds  Skin: no jaundice, no rash      Data   Recent Labs   Lab 06/06/22  0734 06/05/22  0800 06/05/22  0113 05/31/22  0717 05/31/22  0555 05/31/22  0540   WBC 4.5  --  3.8*  --   --  6.6   HGB 11.3*  --  14.7  --   --  14.9   MCV 84  --  83  --   --  84   PLT 40*  --  71*  --   --  130*   INR  --  0.98 0.96  --   --   --    *  --  136 145  --   --    POTASSIUM 3.5  --  4.2 4.0  --   --    CHLORIDE 98  --  94* 106  --   --    CO2 28  --  21* 27  --   --    BUN 13  --  17 14  --   --    CR 0.69*  --  0.74 0.70  --   --    ANIONGAP 8  --  21* 12  --   --    KEELEY 8.6  --  8.6 7.7*  --   --    GLC 83  --  111 97   < >  --    ALBUMIN 3.3*  --  4.4 4.1   < >  --    PROTTOTAL 5.4*  --  7.3 6.6   < >  --    BILITOTAL 1.3*  --  0.9 0.4   < >  --    ALKPHOS 62  --  77 79   < >  --    ALT 89*  --  144* 88*   < >  --    *  --  238* 127*   < >  --    LIPASE  --   --  118*  --   --  79*    < > = values in this interval not displayed.     No results found for this or any previous visit (from the past 24 hour(s)).  Medications     sodium chloride 75 mL/hr at  06/06/22 1340       famotidine  20 mg Oral BID     folic acid  1 mg Oral Daily     [START ON 6/8/2022] gabapentin  600 mg Oral Q8H     gabapentin  900 mg Oral Q8H     levETIRAcetam  500 mg Oral BID     multivitamin w/minerals  1 tablet Oral Daily     sodium chloride (PF)  3 mL Intracatheter Q8H     thiamine  100 mg Oral Daily     traZODone  50 mg Oral At Bedtime

## 2022-06-06 NOTE — PROGRESS NOTES
Nikhil had a rather eventful night. CIWA scores 22-25 and medicated per protocol very frequently throughout the night. He was able to get up 1 assist to the bathroom.     His mother was at the bedside at the beginning of the shift and was updated.     Discharge plans pending at this time.

## 2022-06-06 NOTE — PLAN OF CARE
Problem: Plan of Care - These are the overarching goals to be used throughout the patient stay.    Goal: Plan of Care Review/Shift Note  Description: The Plan of Care Review/Shift note should be completed every shift.  The Outcome Evaluation is a brief statement about your assessment that the patient is improving, declining, or no change.  This information will be displayed automatically on your shift note.  Outcome: Ongoing, Progressing   Goal Outcome Evaluation:      Pt alert and orienated X4, uses call light approp. Has not been attempting to get out of bed. Has not been calling out for medications. Per report pt very unsteady on feet when up to the bathroom. Urinal at bedside. Pt on CIWA protocol and medicated X1. Pt asleep in between all interactions. Pt aware of not being able to get valium medication unless BP>110 and HR>70 regardless of CIWA score.  IVF running. Pt refused breakfast and so far lunch today. Pt requesting to sleep. No visitors in room. Room air sats mid 90s. Pt easily arousable with voice. Will monitor withdraw symptoms.

## 2022-06-06 NOTE — PROGRESS NOTES
Patient refused vital signs at times with CIWA scores throughout the night and kept taking off blood pressure cuff and telemetry patches.

## 2022-06-07 LAB
ALBUMIN SERPL-MCNC: 3.5 G/DL (ref 3.5–5)
ALP SERPL-CCNC: 72 U/L (ref 45–120)
ALT SERPL W P-5'-P-CCNC: 71 U/L (ref 0–45)
ANION GAP SERPL CALCULATED.3IONS-SCNC: 9 MMOL/L (ref 5–18)
AST SERPL W P-5'-P-CCNC: 80 U/L (ref 0–40)
BILIRUB SERPL-MCNC: 0.8 MG/DL (ref 0–1)
BUN SERPL-MCNC: 9 MG/DL (ref 8–22)
CALCIUM SERPL-MCNC: 8.9 MG/DL (ref 8.5–10.5)
CHLORIDE BLD-SCNC: 98 MMOL/L (ref 98–107)
CO2 SERPL-SCNC: 27 MMOL/L (ref 22–31)
CREAT SERPL-MCNC: 0.7 MG/DL (ref 0.7–1.3)
ERYTHROCYTE [DISTWIDTH] IN BLOOD BY AUTOMATED COUNT: 15.4 % (ref 10–15)
GFR SERPL CREATININE-BSD FRML MDRD: >90 ML/MIN/1.73M2
GLUCOSE BLD-MCNC: 116 MG/DL (ref 70–125)
HCT VFR BLD AUTO: 35.1 % (ref 40–53)
HGB BLD-MCNC: 12.1 G/DL (ref 13.3–17.7)
MAGNESIUM SERPL-MCNC: 1.6 MG/DL (ref 1.8–2.6)
MCH RBC QN AUTO: 28.8 PG (ref 26.5–33)
MCHC RBC AUTO-ENTMCNC: 34.5 G/DL (ref 31.5–36.5)
MCV RBC AUTO: 84 FL (ref 78–100)
PLATELET # BLD AUTO: 48 10E3/UL (ref 150–450)
POTASSIUM BLD-SCNC: 3.5 MMOL/L (ref 3.5–5)
PROT SERPL-MCNC: 5.8 G/DL (ref 6–8)
RBC # BLD AUTO: 4.2 10E6/UL (ref 4.4–5.9)
SODIUM SERPL-SCNC: 134 MMOL/L (ref 136–145)
WBC # BLD AUTO: 6.9 10E3/UL (ref 4–11)

## 2022-06-07 PROCEDURE — 250N000011 HC RX IP 250 OP 636: Performed by: HOSPITALIST

## 2022-06-07 PROCEDURE — 250N000013 HC RX MED GY IP 250 OP 250 PS 637: Performed by: INTERNAL MEDICINE

## 2022-06-07 PROCEDURE — 36415 COLL VENOUS BLD VENIPUNCTURE: CPT | Performed by: INTERNAL MEDICINE

## 2022-06-07 PROCEDURE — 99232 SBSQ HOSP IP/OBS MODERATE 35: CPT | Performed by: INTERNAL MEDICINE

## 2022-06-07 PROCEDURE — 85027 COMPLETE CBC AUTOMATED: CPT | Performed by: INTERNAL MEDICINE

## 2022-06-07 PROCEDURE — 84132 ASSAY OF SERUM POTASSIUM: CPT | Performed by: INTERNAL MEDICINE

## 2022-06-07 PROCEDURE — 82947 ASSAY GLUCOSE BLOOD QUANT: CPT | Performed by: INTERNAL MEDICINE

## 2022-06-07 PROCEDURE — 250N000013 HC RX MED GY IP 250 OP 250 PS 637: Performed by: HOSPITALIST

## 2022-06-07 PROCEDURE — 120N000001 HC R&B MED SURG/OB

## 2022-06-07 PROCEDURE — 83735 ASSAY OF MAGNESIUM: CPT | Performed by: INTERNAL MEDICINE

## 2022-06-07 PROCEDURE — 82374 ASSAY BLOOD CARBON DIOXIDE: CPT | Performed by: INTERNAL MEDICINE

## 2022-06-07 PROCEDURE — 258N000003 HC RX IP 258 OP 636: Performed by: INTERNAL MEDICINE

## 2022-06-07 PROCEDURE — 99232 SBSQ HOSP IP/OBS MODERATE 35: CPT | Performed by: NURSE PRACTITIONER

## 2022-06-07 PROCEDURE — 250N000011 HC RX IP 250 OP 636: Performed by: INTERNAL MEDICINE

## 2022-06-07 PROCEDURE — 250N000013 HC RX MED GY IP 250 OP 250 PS 637: Performed by: FAMILY MEDICINE

## 2022-06-07 RX ORDER — MAGNESIUM SULFATE HEPTAHYDRATE 40 MG/ML
2 INJECTION, SOLUTION INTRAVENOUS ONCE
Status: COMPLETED | OUTPATIENT
Start: 2022-06-07 | End: 2022-06-07

## 2022-06-07 RX ADMIN — DIAZEPAM 10 MG: 5 TABLET ORAL at 16:23

## 2022-06-07 RX ADMIN — DIAZEPAM 5 MG: 5 INJECTION, SOLUTION INTRAMUSCULAR; INTRAVENOUS at 18:25

## 2022-06-07 RX ADMIN — GABAPENTIN 900 MG: 300 CAPSULE ORAL at 21:26

## 2022-06-07 RX ADMIN — TRAZODONE HYDROCHLORIDE 50 MG: 50 TABLET ORAL at 21:26

## 2022-06-07 RX ADMIN — HYDROXYZINE HYDROCHLORIDE 50 MG: 25 TABLET, FILM COATED ORAL at 01:38

## 2022-06-07 RX ADMIN — DIAZEPAM 10 MG: 5 TABLET ORAL at 14:30

## 2022-06-07 RX ADMIN — ONDANSETRON 4 MG: 2 INJECTION INTRAMUSCULAR; INTRAVENOUS at 08:48

## 2022-06-07 RX ADMIN — ONDANSETRON 4 MG: 2 INJECTION INTRAMUSCULAR; INTRAVENOUS at 02:21

## 2022-06-07 RX ADMIN — FOLIC ACID 1 MG: 1 TABLET ORAL at 10:49

## 2022-06-07 RX ADMIN — LEVETIRACETAM 500 MG: 500 TABLET, FILM COATED ORAL at 21:25

## 2022-06-07 RX ADMIN — FAMOTIDINE 20 MG: 20 TABLET ORAL at 10:49

## 2022-06-07 RX ADMIN — Medication 1 TABLET: at 10:49

## 2022-06-07 RX ADMIN — FAMOTIDINE 20 MG: 20 TABLET ORAL at 21:25

## 2022-06-07 RX ADMIN — GABAPENTIN 900 MG: 300 CAPSULE ORAL at 05:37

## 2022-06-07 RX ADMIN — DIAZEPAM 10 MG: 5 TABLET ORAL at 21:25

## 2022-06-07 RX ADMIN — DIAZEPAM 5 MG: 5 INJECTION, SOLUTION INTRAMUSCULAR; INTRAVENOUS at 01:37

## 2022-06-07 RX ADMIN — LEVETIRACETAM 500 MG: 500 TABLET, FILM COATED ORAL at 10:39

## 2022-06-07 RX ADMIN — DIAZEPAM 10 MG: 5 TABLET ORAL at 05:38

## 2022-06-07 RX ADMIN — SODIUM CHLORIDE: 9 INJECTION, SOLUTION INTRAVENOUS at 10:33

## 2022-06-07 RX ADMIN — GABAPENTIN 900 MG: 300 CAPSULE ORAL at 14:06

## 2022-06-07 RX ADMIN — THIAMINE HCL TAB 100 MG 100 MG: 100 TAB at 10:49

## 2022-06-07 RX ADMIN — MAGNESIUM SULFATE IN WATER 2 G: 40 INJECTION, SOLUTION INTRAVENOUS at 08:57

## 2022-06-07 ASSESSMENT — ACTIVITIES OF DAILY LIVING (ADL)
ADLS_ACUITY_SCORE: 40
ADLS_ACUITY_SCORE: 23
ADLS_ACUITY_SCORE: 40
TOILETING_ISSUES: NO
HEARING_DIFFICULTY_OR_DEAF: NO
DOING_ERRANDS_INDEPENDENTLY_DIFFICULTY: NO
ADLS_ACUITY_SCORE: 23
DIFFICULTY_EATING/SWALLOWING: NO
CHANGE_IN_FUNCTIONAL_STATUS_SINCE_ONSET_OF_CURRENT_ILLNESS/INJURY: YES
ADLS_ACUITY_SCORE: 39
WEAR_GLASSES_OR_BLIND: NO
CONCENTRATING,_REMEMBERING_OR_MAKING_DECISIONS_DIFFICULTY: NO
ADLS_ACUITY_SCORE: 23
ADLS_ACUITY_SCORE: 23
DIFFICULTY_COMMUNICATING: NO
ADLS_ACUITY_SCORE: 40
DRESSING/BATHING_DIFFICULTY: NO
ADLS_ACUITY_SCORE: 39
ADLS_ACUITY_SCORE: 23
FALL_HISTORY_WITHIN_LAST_SIX_MONTHS: YES
ADLS_ACUITY_SCORE: 40
ADLS_ACUITY_SCORE: 22
WALKING_OR_CLIMBING_STAIRS_DIFFICULTY: NO

## 2022-06-07 NOTE — PLAN OF CARE
"  Problem: Plan of Care - These are the overarching goals to be used throughout the patient stay.    Goal: Plan of Care Review/Shift Note  Description: The Plan of Care Review/Shift note should be completed every shift.  The Outcome Evaluation is a brief statement about your assessment that the patient is improving, declining, or no change.  This information will be displayed automatically on your shift note.  Outcome: Ongoing, Progressing  Flowsheets (Taken 6/7/2022 1732)  Plan of Care Reviewed With: patient  Goal: Patient-Specific Goal (Individualized)  Description: You can add care plan individualizations to a care plan. Examples of Individualization might be:  \"Parent requests to be called daily at 9am for status\", \"I have a hard time hearing out of my right ear\", or \"Do not touch me to wake me up as it startles me\".  Outcome: Ongoing, Progressing  Goal: Absence of Hospital-Acquired Illness or Injury  Outcome: Ongoing, Progressing  Intervention: Identify and Manage Fall Risk  Recent Flowsheet Documentation  Taken 6/7/2022 1625 by Dhruv Kate RN  Safety Promotion/Fall Prevention: bed alarm on  Intervention: Prevent Skin Injury  Recent Flowsheet Documentation  Taken 6/7/2022 1700 by Dhruv Kate RN  Body Position: position changed independently  Taken 6/7/2022 1625 by Dhruv Kate RN  Body Position: position changed independently  Intervention: Prevent and Manage VTE (Venous Thromboembolism) Risk  Recent Flowsheet Documentation  Taken 6/7/2022 1700 by Dhruv Kate RN  Activity Management: activity adjusted per tolerance  Taken 6/7/2022 1625 by Dhruv Kate RN  Activity Management: ambulated in room  Goal: Optimal Comfort and Wellbeing  Outcome: Ongoing, Progressing  Goal: Readiness for Transition of Care  Outcome: Ongoing, Progressing     Problem: Alcohol Withdrawal  Goal: Alcohol Withdrawal Symptom Control  Outcome: Ongoing, Progressing     Problem: Acute Neurologic Deterioration (Alcohol " Withdrawal)  Goal: Optimal Neurologic Function  Outcome: Ongoing, Progressing     Problem: Substance Misuse (Alcohol Withdrawal)  Goal: Readiness for Change Identified  Outcome: Ongoing, Progressing     Problem: Seizure, Active Management  Goal: Absence of Seizure/Seizure-Related Injury  Outcome: Ongoing, Progressing   Goal Outcome Evaluation:    Plan of Care Reviewed With: patient

## 2022-06-07 NOTE — PLAN OF CARE
"  Problem: Plan of Care - These are the overarching goals to be used throughout the patient stay.    Goal: Plan of Care Review/Shift Note  Description: The Plan of Care Review/Shift note should be completed every shift.  The Outcome Evaluation is a brief statement about your assessment that the patient is improving, declining, or no change.  This information will be displayed automatically on your shift note.  Outcome: Ongoing, Progressing  Flowsheets (Taken 6/6/2022 3064)  Plan of Care Reviewed With: patient  Goal: Patient-Specific Goal (Individualized)  Description: You can add care plan individualizations to a care plan. Examples of Individualization might be:  \"Parent requests to be called daily at 9am for status\", \"I have a hard time hearing out of my right ear\", or \"Do not touch me to wake me up as it startles me\".  Outcome: Ongoing, Progressing  Goal: Absence of Hospital-Acquired Illness or Injury  Outcome: Ongoing, Progressing  Goal: Optimal Comfort and Wellbeing  Outcome: Ongoing, Progressing  Goal: Readiness for Transition of Care  Outcome: Ongoing, Progressing     Problem: Alcohol Withdrawal  Goal: Alcohol Withdrawal Symptom Control  Outcome: Ongoing, Progressing     Problem: Acute Neurologic Deterioration (Alcohol Withdrawal)  Goal: Optimal Neurologic Function  Outcome: Ongoing, Progressing     Problem: Substance Misuse (Alcohol Withdrawal)  Goal: Readiness for Change Identified  Outcome: Ongoing, Progressing   Goal Outcome Evaluation:    Plan of Care Reviewed With: patient                 "

## 2022-06-07 NOTE — PLAN OF CARE
"  Problem: Plan of Care - These are the overarching goals to be used throughout the patient stay.    Goal: Absence of Hospital-Acquired Illness or Injury  Intervention: Identify and Manage Fall Risk  Recent Flowsheet Documentation  Taken 6/7/2022 0400 by Crystal Renee RN  Safety Promotion/Fall Prevention: bed alarm on  Taken 6/7/2022 0123 by Crystal Renee RN  Safety Promotion/Fall Prevention: bed alarm on     Problem: Plan of Care - These are the overarching goals to be used throughout the patient stay.    Goal: Patient-Specific Goal (Individualized)  Description: You can add care plan individualizations to a care plan. Examples of Individualization might be:  \"Parent requests to be called daily at 9am for status\", \"I have a hard time hearing out of my right ear\", or \"Do not touch me to wake me up as it startles me\".  Outcome: Ongoing, Progressing   Goal Outcome Evaluation:   Pt was anxious all through the night and wanting to be medicated every 30 minutes. CIWAA scored 3 times for patient to be medicated with vallum. Pt seem a little calm when nurse walks in but then becomes anxious again when nurse is in room. No pain, on and off headaches, intermittent nausea but no hallucinations and some tremors. Pt plt count came back at 0620 at 48. House Doctor was notified and said not to worry about it.                       "

## 2022-06-07 NOTE — PROGRESS NOTES
"Addiction Medicine Progress Note      Assessment/Plan    Principal Problem:    Alcohol use disorder, severe, dependence (H)  Active Problems:    Alcoholic fatty liver    Thrombocytopenia (H)    Elevated liver function tests    Acute alcoholic intoxication in alcoholism without complication (H)    Nikhil is a 36-year-old male with severe alcohol use disorder and history of alcohol withdrawal seizures and pancreatitis who is admitted to [facility] with alcohol withdrawal. Has received 210 mg of diazepam since 6/5, became hypotensive parameters for heart rate and blood pressure added to CIWA protocol.  LFTs are improving, AST 80, ALT 71.  Platelets decreased to 48, drop is expected and should start to improve in the next couple of days.  Magnesium today is 1.6 on magnesium protocol.  Continues on high-dose gabapentin until tomorrow( 6/8), start gabapentin 600 mg 3 times daily on 6/9.   Nikhil is willing to complete chemical health assessment but is unsure if he is willing to go to treatment.  However he had plans to go into treatment on 6/3/2022. Will order rule 25 assessment to be completed while inpatient. He reports depressed mood described as \"dark cloud\" triggered relapsed to alcohol. Has tried lexapro in past which was ineffective.   Recommendations:    1. Continue CIWA with diazepam  2. Continue Keppra 500 mg twice daily for 7 days for seizure prophylaxis, given his history of alcohol withdrawal seizures. Last dose 6/11/22  3.  Magnesium replacement through protocol, already ordered.  4.Consider rechecking INR if patient develops any concerning signs for bleeding  5.  Rule 25 assessment to be completed while inpatient  6.Patient is interested in pharmacotherapy for alcohol use disorder.  Discussed naltrexone today, but he continues to not feel well.  Revisit discussion of naltrexone in the next couple of days.  7.  High-dose gabapentin to be completed 6/8.  Continue gabapentin 600 mg 3 times daily daily.  Plan to " "discuss charge with  300-600 mg dose.   8. Depressed mood triggered relapse after 8.5 months of sobriety. Psychiatry consult ordered.   9. Addiction medicine will continue following           Subjective  36-year-old male with severe alcohol use disorder and history of alcohol withdrawal seizures and pancreatitis who presented to [facility] with alcohol withdrawal. CIWA- diazepam ordered for withdrawal management. He has received a total of 210 mg of diazepam since 6/5/22, last dose 6/6/22 at 2000. He continues to report feeling 'like crawling out of skin\", tremulous and shakes. States this is \"the worst withdrawal\" he has ever felt.     He attended treatment 10 months ago, and maintained 8.5 months of abstinence. He relapsed 1 week following Easter (4/15). States he had a \"dark cloud\" hanging over him and felt alone which preceded his return to use. He has taken lexapro in the past but did not find this helpful. He has had multiple ED visits for alcohol intoxication in the last month.      Discussed possibility of returning to treatment. He is willing to complete a MATHEW assessment, but is unsure if he is willing to go to treatment at this time. He open to discussing pharmacotherapy for alcohol use disorder before he discharges.     ROS:  \"crawling out of skin\"  Tremulous, shakes, weak  Nausea  Hx of depressed mood leading to relapse    Objective    Vital signs in last 24 hours  Temp:  [97.7  F (36.5  C)-98.8  F (37.1  C)] 98.3  F (36.8  C)  Pulse:  [] 80  Resp:  [12-24] 13  BP: (110-140)/(62-87) 128/75  SpO2:  [93 %-100 %] 99 %        Physical Exam    Gen: Awake and alert. In no acute distress.  Neuro: speech is normal  Tremor: none  Eyes: EOMI. There is no scleral icterus.  Skin: diaphoresis,   Respiratory: no cough, breathing is non-labored    Pertinent Labs   Lab Results: I have personally reviewed the labs.  Alcohol level on 6/5 was 506 on admit  Mg+ 1.6 on 6/7  Na+ 134 on 6/7   Plts 48 on 6/7  INR 0.98 on " 6/5  LFT's improving AST  80, ALT 71, Bili 0.8 on 6/7/22  Anion gap continued to be elevated 21      Irene Finn DNP, MSN, AGNP-C  Addiction Medicine

## 2022-06-08 LAB
ALBUMIN SERPL-MCNC: 3.3 G/DL (ref 3.5–5)
ALP SERPL-CCNC: 61 U/L (ref 45–120)
ALT SERPL W P-5'-P-CCNC: 59 U/L (ref 0–45)
ANION GAP SERPL CALCULATED.3IONS-SCNC: 8 MMOL/L (ref 5–18)
APTT PPP: 31 SECONDS (ref 22–38)
AST SERPL W P-5'-P-CCNC: 48 U/L (ref 0–40)
BILIRUB DIRECT SERPL-MCNC: 0.2 MG/DL
BILIRUB SERPL-MCNC: 0.5 MG/DL (ref 0–1)
BUN SERPL-MCNC: 8 MG/DL (ref 8–22)
CALCIUM SERPL-MCNC: 9.2 MG/DL (ref 8.5–10.5)
CHLORIDE BLD-SCNC: 106 MMOL/L (ref 98–107)
CO2 SERPL-SCNC: 27 MMOL/L (ref 22–31)
CREAT SERPL-MCNC: 0.7 MG/DL (ref 0.7–1.3)
ERYTHROCYTE [DISTWIDTH] IN BLOOD BY AUTOMATED COUNT: 16.2 % (ref 10–15)
GFR SERPL CREATININE-BSD FRML MDRD: >90 ML/MIN/1.73M2
GLUCOSE BLD-MCNC: 102 MG/DL (ref 70–125)
HCT VFR BLD AUTO: 35 % (ref 40–53)
HGB BLD-MCNC: 11.8 G/DL (ref 13.3–17.7)
INR PPP: 0.91 (ref 0.85–1.15)
MAGNESIUM SERPL-MCNC: 1.9 MG/DL (ref 1.8–2.6)
MCH RBC QN AUTO: 29 PG (ref 26.5–33)
MCHC RBC AUTO-ENTMCNC: 33.7 G/DL (ref 31.5–36.5)
MCV RBC AUTO: 86 FL (ref 78–100)
PLATELET # BLD AUTO: 66 10E3/UL (ref 150–450)
POTASSIUM BLD-SCNC: 4.2 MMOL/L (ref 3.5–5)
PROT SERPL-MCNC: 5.9 G/DL (ref 6–8)
RBC # BLD AUTO: 4.07 10E6/UL (ref 4.4–5.9)
SODIUM SERPL-SCNC: 141 MMOL/L (ref 136–145)
WBC # BLD AUTO: 6.9 10E3/UL (ref 4–11)

## 2022-06-08 PROCEDURE — 250N000013 HC RX MED GY IP 250 OP 250 PS 637: Performed by: HOSPITALIST

## 2022-06-08 PROCEDURE — 120N000001 HC R&B MED SURG/OB

## 2022-06-08 PROCEDURE — 83735 ASSAY OF MAGNESIUM: CPT | Performed by: INTERNAL MEDICINE

## 2022-06-08 PROCEDURE — 82248 BILIRUBIN DIRECT: CPT | Performed by: INTERNAL MEDICINE

## 2022-06-08 PROCEDURE — 82310 ASSAY OF CALCIUM: CPT | Performed by: INTERNAL MEDICINE

## 2022-06-08 PROCEDURE — 250N000013 HC RX MED GY IP 250 OP 250 PS 637: Performed by: FAMILY MEDICINE

## 2022-06-08 PROCEDURE — 85610 PROTHROMBIN TIME: CPT | Performed by: INTERNAL MEDICINE

## 2022-06-08 PROCEDURE — 250N000013 HC RX MED GY IP 250 OP 250 PS 637: Performed by: INTERNAL MEDICINE

## 2022-06-08 PROCEDURE — 85730 THROMBOPLASTIN TIME PARTIAL: CPT | Performed by: INTERNAL MEDICINE

## 2022-06-08 PROCEDURE — 36415 COLL VENOUS BLD VENIPUNCTURE: CPT | Performed by: INTERNAL MEDICINE

## 2022-06-08 PROCEDURE — 85014 HEMATOCRIT: CPT | Performed by: INTERNAL MEDICINE

## 2022-06-08 PROCEDURE — 250N000011 HC RX IP 250 OP 636: Performed by: INTERNAL MEDICINE

## 2022-06-08 PROCEDURE — 99232 SBSQ HOSP IP/OBS MODERATE 35: CPT | Performed by: INTERNAL MEDICINE

## 2022-06-08 PROCEDURE — 99222 1ST HOSP IP/OBS MODERATE 55: CPT

## 2022-06-08 RX ORDER — ESCITALOPRAM OXALATE 10 MG/1
10 TABLET ORAL DAILY
Status: DISCONTINUED | OUTPATIENT
Start: 2022-06-09 | End: 2022-06-09 | Stop reason: HOSPADM

## 2022-06-08 RX ORDER — HYDROXYZINE HYDROCHLORIDE 25 MG/1
25 TABLET, FILM COATED ORAL 3 TIMES DAILY PRN
Status: DISCONTINUED | OUTPATIENT
Start: 2022-06-08 | End: 2022-06-08

## 2022-06-08 RX ORDER — MAGNESIUM OXIDE 400 MG/1
400 TABLET ORAL 2 TIMES DAILY
Status: DISCONTINUED | OUTPATIENT
Start: 2022-06-08 | End: 2022-06-09 | Stop reason: HOSPADM

## 2022-06-08 RX ADMIN — HYDROXYZINE HYDROCHLORIDE 50 MG: 25 TABLET, FILM COATED ORAL at 10:26

## 2022-06-08 RX ADMIN — Medication 5 MG: at 21:25

## 2022-06-08 RX ADMIN — DIAZEPAM 10 MG: 5 TABLET ORAL at 23:07

## 2022-06-08 RX ADMIN — FAMOTIDINE 20 MG: 20 TABLET ORAL at 21:24

## 2022-06-08 RX ADMIN — Medication 400 MG: at 08:08

## 2022-06-08 RX ADMIN — Medication 400 MG: at 21:26

## 2022-06-08 RX ADMIN — TRAZODONE HYDROCHLORIDE 50 MG: 50 TABLET ORAL at 21:26

## 2022-06-08 RX ADMIN — LEVETIRACETAM 500 MG: 500 TABLET, FILM COATED ORAL at 21:25

## 2022-06-08 RX ADMIN — DIAZEPAM 5 MG: 5 INJECTION, SOLUTION INTRAMUSCULAR; INTRAVENOUS at 00:13

## 2022-06-08 RX ADMIN — Medication 1 TABLET: at 08:09

## 2022-06-08 RX ADMIN — DIAZEPAM 10 MG: 5 TABLET ORAL at 10:25

## 2022-06-08 RX ADMIN — DIAZEPAM 5 MG: 5 INJECTION, SOLUTION INTRAMUSCULAR; INTRAVENOUS at 08:08

## 2022-06-08 RX ADMIN — GABAPENTIN 600 MG: 300 CAPSULE ORAL at 13:20

## 2022-06-08 RX ADMIN — FOLIC ACID 1 MG: 1 TABLET ORAL at 08:09

## 2022-06-08 RX ADMIN — LEVETIRACETAM 500 MG: 500 TABLET, FILM COATED ORAL at 08:09

## 2022-06-08 RX ADMIN — HYDROXYZINE HYDROCHLORIDE 50 MG: 25 TABLET, FILM COATED ORAL at 01:55

## 2022-06-08 RX ADMIN — DIAZEPAM 5 MG: 5 INJECTION, SOLUTION INTRAMUSCULAR; INTRAVENOUS at 13:25

## 2022-06-08 RX ADMIN — DIAZEPAM 5 MG: 5 INJECTION, SOLUTION INTRAMUSCULAR; INTRAVENOUS at 01:55

## 2022-06-08 RX ADMIN — Medication 5 MG: at 01:55

## 2022-06-08 RX ADMIN — GABAPENTIN 600 MG: 300 CAPSULE ORAL at 21:24

## 2022-06-08 RX ADMIN — THIAMINE HCL TAB 100 MG 100 MG: 100 TAB at 08:18

## 2022-06-08 RX ADMIN — DIAZEPAM 10 MG: 5 TABLET ORAL at 17:42

## 2022-06-08 RX ADMIN — DIAZEPAM 5 MG: 5 INJECTION, SOLUTION INTRAMUSCULAR; INTRAVENOUS at 20:03

## 2022-06-08 RX ADMIN — GABAPENTIN 900 MG: 300 CAPSULE ORAL at 06:18

## 2022-06-08 RX ADMIN — FAMOTIDINE 20 MG: 20 TABLET ORAL at 08:09

## 2022-06-08 ASSESSMENT — ACTIVITIES OF DAILY LIVING (ADL)
ADLS_ACUITY_SCORE: 22

## 2022-06-08 NOTE — PLAN OF CARE
"  Problem: Plan of Care - These are the overarching goals to be used throughout the patient stay.    Goal: Plan of Care Review/Shift Note  Description: The Plan of Care Review/Shift note should be completed every shift.  The Outcome Evaluation is a brief statement about your assessment that the patient is improving, declining, or no change.  This information will be displayed automatically on your shift note.  Outcome: Ongoing, Progressing  Goal: Patient-Specific Goal (Individualized)  Description: You can add care plan individualizations to a care plan. Examples of Individualization might be:  \"Parent requests to be called daily at 9am for status\", \"I have a hard time hearing out of my right ear\", or \"Do not touch me to wake me up as it startles me\".  Outcome: Ongoing, Progressing  Goal: Absence of Hospital-Acquired Illness or Injury  Outcome: Ongoing, Progressing  Intervention: Identify and Manage Fall Risk  Recent Flowsheet Documentation  Taken 6/8/2022 0740 by Jackelyn Thompson, RN  Safety Promotion/Fall Prevention:   assistive device/personal items within reach   activity supervised   bed alarm on   fall prevention program maintained   increase visualization of patient   nonskid shoes/slippers when out of bed   room door open   room organization consistent   supervised activity   toileting scheduled  Intervention: Prevent and Manage VTE (Venous Thromboembolism) Risk  Recent Flowsheet Documentation  Taken 6/8/2022 1000 by Jackelyn Thompson, RN  Activity Management: ambulated to bathroom  Taken 6/8/2022 0800 by Jackelyn Thompson, RN  Activity Management: up in chair  Taken 6/8/2022 0740 by Jackelyn Thompson, RN  Activity Management:   activity adjusted per tolerance   ambulated in [place]  Goal: Optimal Comfort and Wellbeing  Outcome: Ongoing, Progressing  Goal: Readiness for Transition of Care  Outcome: Ongoing, Progressing     Problem: Alcohol Withdrawal  Goal: Alcohol Withdrawal Symptom Control  Outcome: " Ongoing, Progressing     Problem: Seizure, Active Management  Goal: Absence of Seizure/Seizure-Related Injury  Outcome: Ongoing, Progressing     Problem: Mood Impairment (Anxiety Signs/Symptoms)  Goal: Improved Mood Symptoms (Anxiety Signs/Symptoms)  Outcome: Ongoing, Progressing  Pt agitated/anxious this shift. Inquiring about getting valium more frrquently. Explained protocol we are following for med admin. Pt does not call appropriately, jumps up out of bed. Pt unsteady on feet, bed and chair alarm in place. Pt has good appetite, snacking on candy and ordering food between meals. CIWA assessment completed, diazepam prn per parameters. Pt slept very short time this shift.     Goal Outcome Evaluation:

## 2022-06-08 NOTE — PROGRESS NOTES
Care Management Follow Up    Length of Stay (days): 3    Expected Discharge Date: 06/09/2022     Concerns to be Addressed:       Patient plan of care discussed at interdisciplinary rounds: Yes    Anticipated Discharge Disposition:       Anticipated Discharge Services:    Anticipated Discharge DME:      Patient/family educated on Medicare website which has current facility and service quality ratings:    Education Provided on the Discharge Plan:    Patient/Family in Agreement with the Plan:      Referrals Placed by CM/SW:    Private pay costs discussed: Not applicable    Additional Information:  Met with pt to discuss Rule 25 assessment and setting up assessment for him.  Pt stating he is not interested in having a Rule 25 assessment at this time.  Pt stating he is planning to go to his mother's home upon discharge from the hospital.  Pt stating he has a home AA program that he plans to follow up with at discharge.  Pt declining any other CD resources at this time.      SEGUN Miller

## 2022-06-08 NOTE — PROGRESS NOTES
[facility]    Medicine Progress Note - Hospitalist Service    Date of Admission:  6/5/2022    Assessment & Plan        Nikhil Rios is a 36 year old male admitted on 6/5/2022. He was brought into the ED with concerns of going into alcohol withdrawals.     Alcohol abuse, dependence  Alcohol withdrawal  -Was initially intoxicated  -CIWA protocol-added heart rate and blood pressure parameters as he became hypotensive 6/6, will need to be aware of his breathing rate as he had respirations down to 8 overnight 6/6  -Addiction medicine consult  -Replacing thiamine and folate  -Had been refusing to have his vitals taken but now being more cooperative  -Keppra added due to history of seizures with withdrawal     Alcohol hepatitis  Hepatic steatosis noted on previous scans  Initial labs , , Total bilirubin 0.9, creatinine 0.74, INR 0.96  Monitor LFTs    Normocytic anemia  Thrombocytopenia  -Likely secondary to liver disease/alcohol use     Anion gap metabolic acidosis  Secondary to alcohol ketosis  IV hydration     Depression, anxiety  Trazodone 50 mg at bedtime         Diet: Regular Diet Adult    DVT Prophylaxis: Pneumatic Compression Devices  Mars Catheter: Not present  Central Lines: None  Cardiac Monitoring: None  Code Status: Full Code      Disposition Plan   Expected Discharge: 06/08/2022     Anticipated discharge location:  Awaiting care coordination huddle  Delays:    Once out of withdrawal       The patient's care was discussed with the Patient.    Cece Watson MD  Hospitalist Service  [facility]  Securely message with the Vocera Web Console (learn more here)  Text page via [employer] Paging/Directory         Clinically Significant Risk Factors Present on Admission                 ______________________________________________________________________    Interval History   Mr. Rios is doing better today.  He seems more alert and his speech is less  slurred.  He also made a joke today which was the first time he has mild since I have seen him.  He is cooperating with vitals now.  He is requiring less diazepam now.    Data reviewed today: I reviewed all medications, new labs and imaging results over the last 24 hours. I personally reviewed no images or EKG's today.    Physical Exam   Vital Signs: Temp: 97.7  F (36.5  C) Temp src: Oral BP: 118/75 Pulse: 71   Resp: 18 SpO2: 98 % O2 Device: None (Room air)    Weight: 170 lbs 0 oz  General Appearance: Awake, alert, in no acute distress  Respiratory: CTAB, no wheeze  Cardiovascular: RRR, no murmur noted  GI: soft, nontender, non distended, normal bowel sounds  Skin: no jaundice, no rash      Data   Recent Labs   Lab 06/07/22  0531 06/06/22  0734 06/05/22  0800 06/05/22  0113   WBC 6.9 4.5  --  3.8*   HGB 12.1* 11.3*  --  14.7   MCV 84 84  --  83   PLT 48* 40*  --  71*   INR  --   --  0.98 0.96   * 134*  --  136   POTASSIUM 3.5 3.5  --  4.2   CHLORIDE 98 98  --  94*   CO2 27 28  --  21*   BUN 9 13  --  17   CR 0.70 0.69*  --  0.74   ANIONGAP 9 8  --  21*   KEELEY 8.9 8.6  --  8.6    83  --  111   ALBUMIN 3.5 3.3*  --  4.4   PROTTOTAL 5.8* 5.4*  --  7.3   BILITOTAL 0.8 1.3*  --  0.9   ALKPHOS 72 62  --  77   ALT 71* 89*  --  144*   AST 80* 116*  --  238*   LIPASE  --   --   --  118*     Medications     sodium chloride Stopped (06/07/22 1445)       famotidine  20 mg Oral BID     folic acid  1 mg Oral Daily     [START ON 6/8/2022] gabapentin  600 mg Oral Q8H     gabapentin  900 mg Oral Q8H     levETIRAcetam  500 mg Oral BID     multivitamin w/minerals  1 tablet Oral Daily     sodium chloride (PF)  3 mL Intracatheter Q8H     thiamine  100 mg Oral Daily     traZODone  50 mg Oral At Bedtime

## 2022-06-08 NOTE — PLAN OF CARE
"  Problem: Plan of Care - These are the overarching goals to be used throughout the patient stay.    Goal: Plan of Care Review/Shift Note  Description: The Plan of Care Review/Shift note should be completed every shift.  The Outcome Evaluation is a brief statement about your assessment that the patient is improving, declining, or no change.  This information will be displayed automatically on your shift note.  Outcome: Ongoing, Progressing  Flowsheets (Taken 6/8/2022 1810)  Plan of Care Reviewed With: patient  Goal: Patient-Specific Goal (Individualized)  Description: You can add care plan individualizations to a care plan. Examples of Individualization might be:  \"Parent requests to be called daily at 9am for status\", \"I have a hard time hearing out of my right ear\", or \"Do not touch me to wake me up as it startles me\".  Outcome: Ongoing, Progressing  Goal: Absence of Hospital-Acquired Illness or Injury  Outcome: Ongoing, Progressing  Intervention: Identify and Manage Fall Risk  Recent Flowsheet Documentation  Taken 6/8/2022 1700 by Dhruv Kate RN  Safety Promotion/Fall Prevention:   activity supervised   bed alarm on  Intervention: Prevent Skin Injury  Recent Flowsheet Documentation  Taken 6/8/2022 1700 by Dhruv Kate RN  Body Position: position changed independently  Intervention: Prevent and Manage VTE (Venous Thromboembolism) Risk  Recent Flowsheet Documentation  Taken 6/8/2022 1700 by Dhruv Kate RN  Activity Management: ambulated in room  Goal: Readiness for Transition of Care  Outcome: Ongoing, Progressing     Problem: Alcohol Withdrawal  Goal: Alcohol Withdrawal Symptom Control  Outcome: Ongoing, Progressing     Problem: Substance Misuse (Alcohol Withdrawal)  Goal: Readiness for Change Identified  Outcome: Ongoing, Progressing     Problem: Seizure, Active Management  Goal: Absence of Seizure/Seizure-Related Injury  Outcome: Ongoing, Progressing     Problem: Activity and Energy Impairment (Anxiety " Signs/Symptoms)  Goal: Optimized Energy Level (Anxiety Signs/Symptoms)  Outcome: Ongoing, Progressing     Problem: Cognitive Impairment (Anxiety Signs/Symptoms)  Goal: Optimized Cognitive Function (Anxiety Signs/Symptoms)  Outcome: Ongoing, Progressing     Problem: Mood Impairment (Anxiety Signs/Symptoms)  Goal: Improved Mood Symptoms (Anxiety Signs/Symptoms)  Outcome: Ongoing, Progressing     Problem: Sleep Impairment (Anxiety Signs/Symptoms)  Goal: Improved Sleep (Anxiety Signs/Symptoms)  Outcome: Ongoing, Progressing     Problem: Social, Occupational or Functional Impairment (Anxiety Signs/Symptoms)  Goal: Enhanced Social, Occupational or Functional Skills (Anxiety Signs/Symptoms)  Outcome: Ongoing, Progressing     Problem: Somatic Disturbance (Anxiety Signs/Symptoms)  Goal: Improved Somatic Symptoms (Anxiety Signs/Symptoms)  Outcome: Ongoing, Progressing   Goal Outcome Evaluation:    Plan of Care Reviewed With: patient

## 2022-06-08 NOTE — PLAN OF CARE
Problem: Plan of Care - These are the overarching goals to be used throughout the patient stay.    Goal: Plan of Care Review/Shift Note  Description: The Plan of Care Review/Shift note should be completed every shift.  The Outcome Evaluation is a brief statement about your assessment that the patient is improving, declining, or no change.  This information will be displayed automatically on your shift note.  Outcome: Ongoing, Progressing     Problem: Alcohol Withdrawal  Goal: Alcohol Withdrawal Symptom Control  Outcome: Ongoing, Progressing   Goal Outcome Evaluation:    VSS. Pt stating severe headache at times. CIWA scores requiring IV medication. Pt was pretty agitated and swearing a lot. Pt did sleep from 1381-5994, was awaken when this writer took vitals at 0600. Very unstable ambulating to the bathroom.

## 2022-06-08 NOTE — CONSULTS
"      Initial Psychiatric Consult   Consult date: June 8, 2022         Reason for Consult, requesting source:    Depressed mood leading to relapse     Requesting source: Cece Watson MD     Labs and imaging reviewed. Patient at -60 seen and evaluated via [employer] by CESAR Woo CNP  Video Start time: 12:00PM  Video end time: 12:15PM          HPI:   Nikhil is a 36-year-old male with severe alcohol use disorder and history of alcohol withdrawal seizures and pancreatitis who presented to [facility] with alcohol withdrawal.  Patient denies any other substance use.  He additionally has transaminitis. Addiction medicine is following. Psychiatry is being asked to consult due to patient admitting he relapsed due to depression.     Upon assessment, patient was lying down in bed with his hands behind his head. Patient states that he relapsed around east of this year after being sober for about 9 months. When asked about his mood, patient states \"when I'm sober, I am a machine. Im a great uncle, brother, and son.\" He then goes on to reference the [employer] song stating it is exactly how he has felt since easter: \"Fucking black cloud keeps following me around, be in a room full of people but feel alone.\" Patient states this started abruptdly around east and has not gotten better. He states that exercise has always been his coping mechanism but it wasn't helping his \"dark cloud\" go away. He reports not knowing what to do so picked up the bottle and started drinking. He denies any major trauma or life events happening around the time of easter, but then admits he has been feeling stressed out lately being unemployed. He states he has been anxious his whole life and admits he was probably self medicating his anxiety with alcohol. He denies ever feeling depressed to this severity, he has been on Lexapro in the past. Per chart review he was at one point on 20mg. He states he didn't notice a difference due to " "drinking heavily on this medication. States that he has never felt suicidal stating \"I don't think like that.\" He denies AVH.           Past Psychiatric History:   Denies suicidal, denies prior psychiatric hospitalizations   Just started therapy, missed second appointment due to being in hospital.         Substance Use and History:   Patient has been in the ED for alcohol related complaints 7+ times in the last month.   Patient denies all other recent drug use.   Has addiction medicine physician he follows with outpatient.         Past Medical History:   PAST MEDICAL HISTORY:   Past Medical History:   Diagnosis Date     Alcohol abuse      Alcohol abuse      Alcohol withdrawal (H)      Depressive disorder      HLD (hyperlipidemia)      HTN (hypertension)        PAST SURGICAL HISTORY:   Past Surgical History:   Procedure Laterality Date     NO HISTORY OF SURGERY       OTHER SURGICAL HISTORY      none             Family History:   FAMILY HISTORY:   Family History   Problem Relation Age of Onset     Coronary Artery Disease Father        Family Psychiatric History: father with depression and possible anxiety, states he has been a bottle of Ativan at his parents home        Social History:   SOCIAL HISTORY:   Social History     Tobacco Use     Smoking status: Current Some Day Smoker     Packs/day: 0.25     Types: Cigars     Smokeless tobacco: Never Used     Tobacco comment: occasional   Substance Use Topics     Alcohol use: Yes     Alcohol/week: 17.0 standard drinks     Types: 17 Shots of liquor per week     Comment: Drinks 750ml/day or 17 shots/day; hx of withdrawl seizures     Patient currently unemployed, states he does have an opportunity with [employer] [facility] that he is looking forward to as 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          Physical ROS:   The 10 point Review of Systems is negative other than noted in the HPI or here.           Medications:       famotidine  20 mg Oral " BID     folic acid  1 mg Oral Daily     gabapentin  600 mg Oral Q8H     levETIRAcetam  500 mg Oral BID     magnesium oxide  400 mg Oral BID     multivitamin w/minerals  1 tablet Oral Daily     sodium chloride (PF)  3 mL Intracatheter Q8H     thiamine  100 mg Oral Daily     traZODone  50 mg Oral At Bedtime              Allergies:     Allergies   Allergen Reactions     Gramineae Pollens Itching     Pollen Extract Rash     grass tree pollen          Labs:     Recent Results (from the past 48 hour(s))   Magnesium    Collection Time: 06/07/22  5:31 AM   Result Value Ref Range    Magnesium 1.6 (L) 1.8 - 2.6 mg/dL   Comprehensive metabolic panel    Collection Time: 06/07/22  5:31 AM   Result Value Ref Range    Sodium 134 (L) 136 - 145 mmol/L    Potassium 3.5 3.5 - 5.0 mmol/L    Chloride 98 98 - 107 mmol/L    Carbon Dioxide (CO2) 27 22 - 31 mmol/L    Anion Gap 9 5 - 18 mmol/L    Urea Nitrogen 9 8 - 22 mg/dL    Creatinine 0.70 0.70 - 1.30 mg/dL    Calcium 8.9 8.5 - 10.5 mg/dL    Glucose 116 70 - 125 mg/dL    Alkaline Phosphatase 72 45 - 120 U/L    AST 80 (H) 0 - 40 U/L    ALT 71 (H) 0 - 45 U/L    Protein Total 5.8 (L) 6.0 - 8.0 g/dL    Albumin 3.5 3.5 - 5.0 g/dL    Bilirubin Total 0.8 0.0 - 1.0 mg/dL    GFR Estimate >90 >60 mL/min/1.73m2   CBC with platelets    Collection Time: 06/07/22  5:31 AM   Result Value Ref Range    WBC Count 6.9 4.0 - 11.0 10e3/uL    RBC Count 4.20 (L) 4.40 - 5.90 10e6/uL    Hemoglobin 12.1 (L) 13.3 - 17.7 g/dL    Hematocrit 35.1 (L) 40.0 - 53.0 %    MCV 84 78 - 100 fL    MCH 28.8 26.5 - 33.0 pg    MCHC 34.5 31.5 - 36.5 g/dL    RDW 15.4 (H) 10.0 - 15.0 %    Platelet Count 48 (LL) 150 - 450 10e3/uL   Magnesium    Collection Time: 06/08/22  5:12 AM   Result Value Ref Range    Magnesium 1.9 1.8 - 2.6 mg/dL   Basic metabolic panel    Collection Time: 06/08/22  5:12 AM   Result Value Ref Range    Sodium 141 136 - 145 mmol/L    Potassium 4.2 3.5 - 5.0 mmol/L    Chloride 106 98 - 107 mmol/L    Carbon  "Dioxide (CO2) 27 22 - 31 mmol/L    Anion Gap 8 5 - 18 mmol/L    Urea Nitrogen 8 8 - 22 mg/dL    Creatinine 0.70 0.70 - 1.30 mg/dL    Calcium 9.2 8.5 - 10.5 mg/dL    Glucose 102 70 - 125 mg/dL    GFR Estimate >90 >60 mL/min/1.73m2   Hepatic panel    Collection Time: 06/08/22  5:12 AM   Result Value Ref Range    Bilirubin Total 0.5 0.0 - 1.0 mg/dL    Bilirubin Direct 0.2 <=0.5 mg/dL    Protein Total 5.9 (L) 6.0 - 8.0 g/dL    Albumin 3.3 (L) 3.5 - 5.0 g/dL    Alkaline Phosphatase 61 45 - 120 U/L    AST 48 (H) 0 - 40 U/L    ALT 59 (H) 0 - 45 U/L   INR    Collection Time: 06/08/22  5:12 AM   Result Value Ref Range    INR 0.91 0.85 - 1.15   Partial thromboplastin time    Collection Time: 06/08/22  5:12 AM   Result Value Ref Range    aPTT 31 22 - 38 Seconds   CBC with platelets    Collection Time: 06/08/22  5:12 AM   Result Value Ref Range    WBC Count 6.9 4.0 - 11.0 10e3/uL    RBC Count 4.07 (L) 4.40 - 5.90 10e6/uL    Hemoglobin 11.8 (L) 13.3 - 17.7 g/dL    Hematocrit 35.0 (L) 40.0 - 53.0 %    MCV 86 78 - 100 fL    MCH 29.0 26.5 - 33.0 pg    MCHC 33.7 31.5 - 36.5 g/dL    RDW 16.2 (H) 10.0 - 15.0 %    Platelet Count 66 (L) 150 - 450 10e3/uL          Physical and Psychiatric Examination:     /74 (BP Location: Left arm, Patient Position: Supine)   Pulse 77   Temp 97.7  F (36.5  C) (Oral)   Resp 20   Ht 1.803 m (5' 11\")   Wt 77.1 kg (170 lb)   SpO2 100%   BMI 23.71 kg/m    Weight is 170 lbs 0 oz  Body mass index is 23.71 kg/m .    Physical Exam:  I have reviewed the physical exam as documented by by the medical team and agree with findings and assessment and have no additional findings to add at this time.    Mental Status Exam:    Appearance: awake, alert, adequately groomed and dressed in hospital scrubs  Attitude:  somewhat cooperative  Eye Contact:  fair  Mood:  \"fine\"  Affect:  appropriate and in normal range and mood congruent  Speech:  clear, coherent  Language: Fluent in english   Psychomotor Behavior:  " no evidence of tardive dyskinesia, dystonia, or tics  Thought Process:  logical, linear and goal oriented  Associations:  no loose associations  Thought Content:  no evidence of suicidal ideation or homicidal ideation and no evidence of psychotic thought  Insight:  fair  Judgement:  limited  Oriented to:  time, person, and place  Attention Span and Concentration:  fair  Recent and Remote Memory:  intact  Fund of Knowledge: Appropriate   Gait and Station: normal, intact               DSM-5 Diagnosis:   1. Alcohol Use Disorder, severe  2. Major Depressive Disorder, severe, without psychotic features          Assessment:   Nikhil is a 36 year old male with a history of severe alcohol use disorder who relapsed after 9 months of sobriety around [facility] this year due to depression. He states he has been on lexapro before although he states he has never been this depressed before. States he didn't really see a benefit from Lexpapro because took it only for a short time and was drinking heavily while on it. Patient was at first resistant to taking medications, risks/benefits/alternatives discussed with patient and he was agreeable to trying Lexparo 10mg daily starting tomorrow.           Summary of Recommendations:     1. Started Lexapro 10mg    2. Patient to follow-up with addiction medicine, therapist and PCP upon discharge.       Kamilla Temple, [id number]  Consult/Liaison Psychiatry   [facility]

## 2022-06-09 VITALS
SYSTOLIC BLOOD PRESSURE: 128 MMHG | WEIGHT: 170 LBS | RESPIRATION RATE: 18 BRPM | HEART RATE: 60 BPM | OXYGEN SATURATION: 100 % | DIASTOLIC BLOOD PRESSURE: 77 MMHG | BODY MASS INDEX: 23.8 KG/M2 | HEIGHT: 71 IN | TEMPERATURE: 97.5 F

## 2022-06-09 LAB
ALBUMIN SERPL-MCNC: 3.4 G/DL (ref 3.5–5)
ALP SERPL-CCNC: 59 U/L (ref 45–120)
ALT SERPL W P-5'-P-CCNC: 56 U/L (ref 0–45)
ANION GAP SERPL CALCULATED.3IONS-SCNC: 11 MMOL/L (ref 5–18)
AST SERPL W P-5'-P-CCNC: 36 U/L (ref 0–40)
BILIRUB SERPL-MCNC: 0.3 MG/DL (ref 0–1)
BUN SERPL-MCNC: 11 MG/DL (ref 8–22)
CALCIUM SERPL-MCNC: 9.2 MG/DL (ref 8.5–10.5)
CHLORIDE BLD-SCNC: 106 MMOL/L (ref 98–107)
CO2 SERPL-SCNC: 23 MMOL/L (ref 22–31)
CREAT SERPL-MCNC: 0.72 MG/DL (ref 0.7–1.3)
ERYTHROCYTE [DISTWIDTH] IN BLOOD BY AUTOMATED COUNT: 16.3 % (ref 10–15)
GFR SERPL CREATININE-BSD FRML MDRD: >90 ML/MIN/1.73M2
GLUCOSE BLD-MCNC: 107 MG/DL (ref 70–125)
HCT VFR BLD AUTO: 38 % (ref 40–53)
HGB BLD-MCNC: 12.7 G/DL (ref 13.3–17.7)
MCH RBC QN AUTO: 29.2 PG (ref 26.5–33)
MCHC RBC AUTO-ENTMCNC: 33.4 G/DL (ref 31.5–36.5)
MCV RBC AUTO: 87 FL (ref 78–100)
PLATELET # BLD AUTO: 127 10E3/UL (ref 150–450)
POTASSIUM BLD-SCNC: 4 MMOL/L (ref 3.5–5)
PROT SERPL-MCNC: 6.4 G/DL (ref 6–8)
RBC # BLD AUTO: 4.35 10E6/UL (ref 4.4–5.9)
SODIUM SERPL-SCNC: 140 MMOL/L (ref 136–145)
WBC # BLD AUTO: 6.8 10E3/UL (ref 4–11)

## 2022-06-09 PROCEDURE — 250N000013 HC RX MED GY IP 250 OP 250 PS 637

## 2022-06-09 PROCEDURE — 80053 COMPREHEN METABOLIC PANEL: CPT | Performed by: INTERNAL MEDICINE

## 2022-06-09 PROCEDURE — 250N000013 HC RX MED GY IP 250 OP 250 PS 637: Performed by: INTERNAL MEDICINE

## 2022-06-09 PROCEDURE — 250N000013 HC RX MED GY IP 250 OP 250 PS 637: Performed by: FAMILY MEDICINE

## 2022-06-09 PROCEDURE — 250N000011 HC RX IP 250 OP 636: Performed by: INTERNAL MEDICINE

## 2022-06-09 PROCEDURE — 99239 HOSP IP/OBS DSCHRG MGMT >30: CPT | Performed by: FAMILY MEDICINE

## 2022-06-09 PROCEDURE — 250N000013 HC RX MED GY IP 250 OP 250 PS 637: Performed by: HOSPITALIST

## 2022-06-09 PROCEDURE — 36415 COLL VENOUS BLD VENIPUNCTURE: CPT | Performed by: INTERNAL MEDICINE

## 2022-06-09 PROCEDURE — 85027 COMPLETE CBC AUTOMATED: CPT | Performed by: INTERNAL MEDICINE

## 2022-06-09 RX ORDER — LEVETIRACETAM 500 MG/1
500 TABLET ORAL 2 TIMES DAILY
Qty: 4 TABLET | Refills: 0 | Status: SHIPPED | OUTPATIENT
Start: 2022-06-09 | End: 2022-06-18

## 2022-06-09 RX ORDER — FOLIC ACID 1 MG/1
1 TABLET ORAL DAILY
Qty: 30 TABLET | Refills: 0 | Status: SHIPPED | OUTPATIENT
Start: 2022-06-10 | End: 2023-05-22

## 2022-06-09 RX ORDER — GABAPENTIN 300 MG/1
600 CAPSULE ORAL EVERY 8 HOURS
Qty: 180 CAPSULE | Refills: 0 | Status: SHIPPED | OUTPATIENT
Start: 2022-06-09 | End: 2022-06-18

## 2022-06-09 RX ORDER — LANOLIN ALCOHOL/MO/W.PET/CERES
100 CREAM (GRAM) TOPICAL DAILY
Qty: 30 TABLET | Refills: 0 | Status: CANCELLED | OUTPATIENT
Start: 2022-06-09

## 2022-06-09 RX ORDER — ONDANSETRON 4 MG/1
4 TABLET, FILM COATED ORAL EVERY 8 HOURS PRN
Status: DISCONTINUED | OUTPATIENT
Start: 2022-06-09 | End: 2022-06-09 | Stop reason: HOSPADM

## 2022-06-09 RX ORDER — ONDANSETRON 4 MG/1
4 TABLET, FILM COATED ORAL EVERY 8 HOURS PRN
Qty: 4 TABLET | Refills: 0 | Status: SHIPPED | OUTPATIENT
Start: 2022-06-09 | End: 2023-05-22

## 2022-06-09 RX ORDER — ESCITALOPRAM OXALATE 10 MG/1
10 TABLET ORAL DAILY
Qty: 30 TABLET | Refills: 0 | Status: SHIPPED | OUTPATIENT
Start: 2022-06-10 | End: 2023-05-22

## 2022-06-09 RX ORDER — LEVETIRACETAM 500 MG/1
500 TABLET ORAL 2 TIMES DAILY
Qty: 4 TABLET | Refills: 0 | Status: SHIPPED | OUTPATIENT
Start: 2022-06-09 | End: 2022-06-09

## 2022-06-09 RX ADMIN — DIAZEPAM 10 MG: 5 TABLET ORAL at 04:43

## 2022-06-09 RX ADMIN — GABAPENTIN 600 MG: 300 CAPSULE ORAL at 05:42

## 2022-06-09 RX ADMIN — Medication 400 MG: at 08:27

## 2022-06-09 RX ADMIN — LEVETIRACETAM 500 MG: 500 TABLET, FILM COATED ORAL at 08:27

## 2022-06-09 RX ADMIN — GABAPENTIN 600 MG: 300 CAPSULE ORAL at 14:26

## 2022-06-09 RX ADMIN — HYDROXYZINE HYDROCHLORIDE 50 MG: 25 TABLET, FILM COATED ORAL at 09:54

## 2022-06-09 RX ADMIN — ESCITALOPRAM OXALATE 10 MG: 10 TABLET ORAL at 08:27

## 2022-06-09 RX ADMIN — DIAZEPAM 5 MG: 5 INJECTION, SOLUTION INTRAMUSCULAR; INTRAVENOUS at 01:13

## 2022-06-09 RX ADMIN — THIAMINE HCL TAB 100 MG 100 MG: 100 TAB at 08:27

## 2022-06-09 RX ADMIN — Medication 1 TABLET: at 08:27

## 2022-06-09 RX ADMIN — FAMOTIDINE 20 MG: 20 TABLET ORAL at 08:27

## 2022-06-09 RX ADMIN — FOLIC ACID 1 MG: 1 TABLET ORAL at 08:27

## 2022-06-09 RX ADMIN — HYDROXYZINE HYDROCHLORIDE 50 MG: 25 TABLET, FILM COATED ORAL at 01:13

## 2022-06-09 RX ADMIN — ACETAMINOPHEN 650 MG: 325 TABLET ORAL at 04:43

## 2022-06-09 ASSESSMENT — ACTIVITIES OF DAILY LIVING (ADL)
ADLS_ACUITY_SCORE: 22

## 2022-06-09 NOTE — PLAN OF CARE
"  Problem: Plan of Care - These are the overarching goals to be used throughout the patient stay.    Goal: Plan of Care Review/Shift Note  Description: The Plan of Care Review/Shift note should be completed every shift.  The Outcome Evaluation is a brief statement about your assessment that the patient is improving, declining, or no change.  This information will be displayed automatically on your shift note.  Outcome: Ongoing, Progressing  Goal: Patient-Specific Goal (Individualized)  Description: You can add care plan individualizations to a care plan. Examples of Individualization might be:  \"Parent requests to be called daily at 9am for status\", \"I have a hard time hearing out of my right ear\", or \"Do not touch me to wake me up as it startles me\".  Outcome: Ongoing, Progressing  Goal: Absence of Hospital-Acquired Illness or Injury  Outcome: Ongoing, Progressing  Intervention: Prevent Skin Injury  Recent Flowsheet Documentation  Taken 6/9/2022 0107 by Zaida Choi, RN  Body Position: position changed independently  Intervention: Prevent and Manage VTE (Venous Thromboembolism) Risk  Recent Flowsheet Documentation  Taken 6/9/2022 0107 by Zaida Choi, RN  Activity Management: ambulated to bathroom  Goal: Optimal Comfort and Wellbeing  Outcome: Ongoing, Progressing  Intervention: Monitor Pain and Promote Comfort  Recent Flowsheet Documentation  Taken 6/9/2022 0443 by Zaida Choi, RN  Pain Management Interventions: medication (see MAR)  Goal: Readiness for Transition of Care  Outcome: Ongoing, Progressing     Problem: Alcohol Withdrawal  Goal: Alcohol Withdrawal Symptom Control  Outcome: Ongoing, Progressing     Problem: Acute Neurologic Deterioration (Alcohol Withdrawal)  Goal: Optimal Neurologic Function  Outcome: Ongoing, Progressing     Problem: Substance Misuse (Alcohol Withdrawal)  Goal: Readiness for Change Identified  Outcome: Ongoing, Progressing     Problem: Seizure, Active Management  Goal: " Absence of Seizure/Seizure-Related Injury  Outcome: Ongoing, Progressing     Problem: Activity and Energy Impairment (Anxiety Signs/Symptoms)  Goal: Optimized Energy Level (Anxiety Signs/Symptoms)  Outcome: Ongoing, Progressing     Problem: Cognitive Impairment (Anxiety Signs/Symptoms)  Goal: Optimized Cognitive Function (Anxiety Signs/Symptoms)  Outcome: Ongoing, Progressing     Problem: Mood Impairment (Anxiety Signs/Symptoms)  Goal: Improved Mood Symptoms (Anxiety Signs/Symptoms)  Outcome: Ongoing, Progressing     Problem: Sleep Impairment (Anxiety Signs/Symptoms)  Goal: Improved Sleep (Anxiety Signs/Symptoms)  Outcome: Ongoing, Progressing     Problem: Social, Occupational or Functional Impairment (Anxiety Signs/Symptoms)  Goal: Enhanced Social, Occupational or Functional Skills (Anxiety Signs/Symptoms)  Outcome: Ongoing, Progressing     Problem: Somatic Disturbance (Anxiety Signs/Symptoms)  Goal: Improved Somatic Symptoms (Anxiety Signs/Symptoms)  Outcome: Ongoing, Progressing   Goal Outcome Evaluation:      Pt a/o with constant c/o of not feeling well and requiring frequent and larger doses of valium. CIWA scores 8,1,8, and pending. Pt steady and getting up to bathroom independently. Pt c/o diarrhea.

## 2022-06-09 NOTE — PROGRESS NOTES
Care Management Discharge Note    Discharge Date: 06/09/2022       Discharge Disposition: Home    Discharge Services:  OP f/u with PCP and Addiction Medicine    Discharge DME:  None    Discharge Transportation:  Family or friend    Private pay costs discussed: Not applicable    PAS Confirmation Code:  Not applicable  Patient/family educated on Medicare website which has current facility and service quality ratings:      Education Provided on the Discharge Plan:    Persons Notified of Discharge Plans:   Patient/Family in Agreement with the Plan:      Handoff Referral Completed: Yes    Additional Information:  Chart reviewed and plan of care discussed with hospitalist. Plan to discharge home to mother's house today, with OP f/u with PCP and Addiction Medicine.   met with pt yesterday to discuss CD resources.    Silvia Singer RN

## 2022-06-09 NOTE — PLAN OF CARE
"  Problem: Alcohol Withdrawal  Goal: Alcohol Withdrawal Symptom Control  Outcome: Ongoing, Progressing     Problem: Seizure, Active Management  Goal: Absence of Seizure/Seizure-Related Injury  Outcome: Ongoing, Progressing     Problem: Mood Impairment (Anxiety Signs/Symptoms)  Goal: Improved Mood Symptoms (Anxiety Signs/Symptoms)  Outcome: Ongoing, Progressing     Problem: Somatic Disturbance (Anxiety Signs/Symptoms)  Goal: Improved Somatic Symptoms (Anxiety Signs/Symptoms)  Outcome: Ongoing, Progressing  Pt requesting valium IV this after stating he felt miserable. CIWA score 8, 6 respectfully. Pt apical HR 60. Valium not admin'd. Pt stated \"just give me the med so you can kick me out of here.\" Valium order prn and parameters explianed to pt. No itchiness noted from pt yesterday or today. No diarrhea stools noted either yesterday or today. Pt appetite good, eating 100% meals. Pt states \"Im just bored.\" Pt ambulating halls, steady on feet when up independently.  Monitoring stability,CIWA scores.     Goal Outcome Evaluation:                      "

## 2022-06-09 NOTE — PROGRESS NOTES
[facility]    Medicine Progress Note - Hospitalist Service    Date of Admission:  6/5/2022    Assessment & Plan        Nikhil Rios is a 36 year old male admitted on 6/5/2022. He was brought into the ED with concerns of going into alcohol withdrawals.     Alcohol abuse, dependence  Alcohol withdrawal  -Was initially intoxicated  -CIWA protocol-added heart rate and blood pressure parameters as he became hypotensive 6/6, will need to be aware of his breathing rate as he had respirations down to 8 overnight 6/6  -Addiction medicine consult  -Replacing thiamine and folate  -Had been refusing to have his vitals taken but now being more cooperative  -Keppra added due to history of seizures with withdrawal    Major depressive disorder  -Psych recommends starting Lexapro 10, follow-up with addiction medicine, therapist, and PCP  - Trazodone 50 mg at bedtime     Alcohol hepatitis  Hepatic steatosis noted on previous scans  -Initial labs , , Total bilirubin 0.9, creatinine 0.74, INR 0.96  -Monitor LFTs    Normocytic anemia  Thrombocytopenia  -Likely secondary to liver disease/alcohol use     Anion gap metabolic acidosis  Secondary to alcohol ketosis  -IV hydration has been stopped     Diet: Regular Diet Adult  Room Service    DVT Prophylaxis: Pneumatic Compression Devices  Mars Catheter: Not present  Central Lines: None  Cardiac Monitoring: None  Code Status: Full Code      Disposition Plan   Expected Discharge: 06/09/2022     Anticipated discharge location:  Awaiting care coordination huddle  Delays:    Withdrawal       The patient's care was discussed with the Patient.    Cece Watson MD  Hospitalist Service  [facility]  Securely message with the Vocera Web Console (learn more here)  Text page via [employer] Paging/Directory         Clinically Significant Risk Factors Present on Admission                  ______________________________________________________________________    Interval History   Mr. Rios is feeling better today.  He states that he will never use alcohol again.  He seems adamant about this.  He states that when he is not using alcohol he works out and does better.  He was seen by psych today and they started him on a medication for depression.    Data reviewed today: I reviewed all medications, new labs and imaging results over the last 24 hours. I personally reviewed no images or EKG's today.    Physical Exam   Vital Signs: Temp: 97.5  F (36.4  C) Temp src: Axillary BP: 102/68 Pulse: 58   Resp: 18 SpO2: 98 % O2 Device: None (Room air)    Weight: 170 lbs 0 oz  General Appearance: Awake, alert, in no acute distress  Respiratory: CTAB, no wheeze  Cardiovascular: RRR, no murmur noted  GI: soft, nontender, non distended, normal bowel sounds  Skin: no jaundice, no rash      Data   Recent Labs   Lab 06/08/22  0512 06/07/22  0531 06/06/22  0734 06/05/22  0800 06/05/22  0113   WBC 6.9 6.9 4.5  --  3.8*   HGB 11.8* 12.1* 11.3*  --  14.7   MCV 86 84 84  --  83   PLT 66* 48* 40*  --  71*   INR 0.91  --   --  0.98 0.96    134* 134*  --  136   POTASSIUM 4.2 3.5 3.5  --  4.2   CHLORIDE 106 98 98  --  94*   CO2 27 27 28  --  21*   BUN 8 9 13  --  17   CR 0.70 0.70 0.69*  --  0.74   ANIONGAP 8 9 8  --  21*   KEELEY 9.2 8.9 8.6  --  8.6    116 83  --  111   ALBUMIN 3.3* 3.5 3.3*  --  4.4   PROTTOTAL 5.9* 5.8* 5.4*  --  7.3   BILITOTAL 0.5 0.8 1.3*  --  0.9   ALKPHOS 61 72 62  --  77   ALT 59* 71* 89*  --  144*   AST 48* 80* 116*  --  238*   LIPASE  --   --   --   --  118*     Medications       [START ON 6/9/2022] escitalopram  10 mg Oral Daily     famotidine  20 mg Oral BID     folic acid  1 mg Oral Daily     gabapentin  600 mg Oral Q8H     levETIRAcetam  500 mg Oral BID     magnesium oxide  400 mg Oral BID     melatonin  5 mg Oral At Bedtime     multivitamin w/minerals  1 tablet Oral Daily     sodium  chloride (PF)  3 mL Intracatheter Q8H     thiamine  100 mg Oral Daily     traZODone  50 mg Oral At Bedtime

## 2022-06-09 NOTE — PLAN OF CARE
"  Problem: Plan of Care - These are the overarching goals to be used throughout the patient stay.    Goal: Plan of Care Review/Shift Note  Description: The Plan of Care Review/Shift note should be completed every shift.  The Outcome Evaluation is a brief statement about your assessment that the patient is improving, declining, or no change.  This information will be displayed automatically on your shift note.  6/9/2022 1642 by Jackelyn Thompson RN  Outcome: Met  6/9/2022 1156 by Jackelyn Thompson RN  Outcome: Ongoing, Progressing  Goal: Patient-Specific Goal (Individualized)  Description: You can add care plan individualizations to a care plan. Examples of Individualization might be:  \"Parent requests to be called daily at 9am for status\", \"I have a hard time hearing out of my right ear\", or \"Do not touch me to wake me up as it startles me\".  6/9/2022 1642 by Jackelyn Thompson RN  Outcome: Met  6/9/2022 1156 by Jackelyn Thompson RN  Outcome: Ongoing, Progressing  Goal: Absence of Hospital-Acquired Illness or Injury  6/9/2022 1642 by Jackelyn Thompson RN  Outcome: Met  6/9/2022 1156 by Jackelyn Thompson RN  Outcome: Ongoing, Progressing  Intervention: Identify and Manage Fall Risk  Recent Flowsheet Documentation  Taken 6/9/2022 0822 by Jackelyn Thompson RN  Safety Promotion/Fall Prevention:   activity supervised   assistive device/personal items within reach   bed alarm on   clutter free environment maintained   nonskid shoes/slippers when out of bed   patient and family education   room organization consistent   supervised activity  Intervention: Prevent Skin Injury  Recent Flowsheet Documentation  Taken 6/9/2022 0822 by Jackelyn Thompson RN  Body Position: position changed independently  Intervention: Prevent and Manage VTE (Venous Thromboembolism) Risk  Recent Flowsheet Documentation  Taken 6/9/2022 0822 by Jackelyn Thompson RN  Activity Management:   ambulated in [place]s   standing at " bedside  Goal: Optimal Comfort and Wellbeing  6/9/2022 1642 by Jackelyn Thompson, RN  Outcome: Met  6/9/2022 1156 by Jackelyn Thompson RN  Outcome: Ongoing, Progressing  Goal: Readiness for Transition of Care  6/9/2022 1642 by Jackelyn Thompson, RN  Outcome: Met  6/9/2022 1156 by Jackelyn Thompson, RN  Outcome: Ongoing, Progressing  Pt d/c'ing to home at this time with mother. Paperwork given and explained.     Goal Outcome Evaluation:

## 2022-06-10 ENCOUNTER — PATIENT OUTREACH (OUTPATIENT)
Dept: CARE COORDINATION | Facility: CLINIC | Age: 37
End: 2022-06-10
Payer: COMMERCIAL

## 2022-06-10 DIAGNOSIS — Z71.89 OTHER SPECIFIED COUNSELING: ICD-10-CM

## 2022-06-10 NOTE — DISCHARGE SUMMARY
[facility]  Hospitalist Discharge Summary      Date of Admission:  6/5/2022  Date of Discharge:  6/9/2022  5:34 PM  Discharging Provider: LIA BYNUM MD      Discharge Diagnoses   Principal Problem:    Alcohol use disorder, severe, dependence (H)  Active Problems:    Alcoholic fatty liver    Thrombocytopenia (H)    Elevated liver function tests    Acute alcoholic intoxication in alcoholism without complication (H)       Discharge Procedure Orders   Reason for your hospital stay   Order Comments: Alcohol withdrawal     Follow-up and recommended labs and tests   Order Comments: Follow up with primary care provider, Lisandro Graham, within 3-5 days to evaluate medication change and for hospital follow- up.  The following labs/tests are recommended: CMP, Mag, CBC.     Activity   Order Comments: Your activity upon discharge: activity as tolerated     Order Specific Question Answer Comments   Is discharge order? Yes      Discharge Instructions   Order Comments: Follow up addiction physician ASAP     Diet   Order Comments: Follow this diet upon discharge: Orders Placed This Encounter      Room Service      Regular Diet Adult       Order Specific Question Answer Comments   Is discharge order? Yes           Hospital Course   Nikhil Rios is a 36 year old male with a history of depression, alcohol abuse, alcohol withdrawal seizure admitted for alcohol withdrawal      Alcohol abuse, dependence  Alcohol withdrawal  -Was initially intoxicated  -CIWA protocol was used.  Started scheduled gabapentin as well as as needed Valium  had some issues with Valium causing hypotension, resolved  -Addiction medicine consulted  -Replacing thiamine and folate  -Patient was started on Keppra for 7-day course due to his history of alcohol withdrawal seizures  Today patient has not needed Valium, no further signs of withdrawal   anxiety controlled with Vistaril  He is refusing CD evaluation, naltrexone  Patient will  follow up with his own chemical dependency counselor to consider Antabuse     Major depressive disorder  -Psych recommends starting Lexapro 10, follow-up with addiction medicine, therapist, and PCP  -Tolerated at discharge.  Follow-up with primary physician     Alcohol hepatitis  Hepatic steatosis noted on previous scans  -Initial labs , , Total bilirubin 0.9, creatinine 0.74, INR 0.96  -LFTs improved at discharge.  Recheck at follow-up.     Normocytic anemia  Thrombocytopenia  -Likely secondary to liver disease/alcohol use  No signs of GI loss.        Diet: Regular Diet Adult  Room Service    DVT Prophylaxis: Pneumatic Compression Devices  Mars Catheter: Not present  Central Lines: None  Cardiac Monitoring: None  Code Status: Full Code          Disposition Plan     Expected Discharge: 06/09/2022     Anticipated discharge location:  Awaiting care coordination huddle  Delays:    Withdrawal       Consultations This Hospital Stay   CARE MANAGEMENT / SOCIAL WORK IP CONSULT  ADDICTION MEDICINE INPATIENT CONSULT  CARE MANAGEMENT / SOCIAL WORK IP CONSULT  PSYCHIATRY IP CONSULT    Code Status   Prior         Greater than 35 minutes spent on coordinating discharge, evaluating labs, studies, evaluating patient, evaluating specialist notes    LIA BYNUM MD  [place] [id number]  Phone: [phone]  Fax: [phone]  ______________________________________________________________________         Primary Care Physician   Lisandro Graham    Discharge Orders      Reason for your hospital stay    Alcohol withdrawal     Follow-up and recommended labs and tests    Follow up with primary care provider, Lisandro Graham, within 3-5 days to evaluate medication change and for hospital follow- up.  The following labs/tests are recommended: CMP, Mag, CBC.     Activity    Your activity upon discharge: activity as tolerated     Discharge Instructions     Follow up addiction physician ASAP     Diet    Follow this diet upon discharge: Orders Placed This Encounter      Room Service      Regular Diet Adult         Significant Results and Procedures   Most Recent 3 CBC's:Recent Labs   Lab Test 06/09/22  0613 06/08/22  0512 06/07/22  0531   WBC 6.8 6.9 6.9   HGB 12.7* 11.8* 12.1*   MCV 87 86 84   * 66* 48*     Most Recent 3 BMP's:Recent Labs   Lab Test 06/09/22  0613 06/08/22  0512 06/07/22  0531    141 134*   POTASSIUM 4.0 4.2 3.5   CHLORIDE 106 106 98   CO2 23 27 27   BUN 11 8 9   CR 0.72 0.70 0.70   ANIONGAP 11 8 9   KEELEY 9.2 9.2 8.9    102 116     Most Recent 2 LFT's:Recent Labs   Lab Test 06/09/22 0613 06/08/22  0512   AST 36 48*   ALT 56* 59*   ALKPHOS 59 61   BILITOTAL 0.3 0.5   ,   Results for orders placed or performed during the hospital encounter of 06/05/22   Head CT w/o contrast    Narrative    EXAM: CT HEAD W/O CONTRAST  LOCATION: [facility]  DATE/TIME: 6/5/2022 2:23 AM    INDICATION: Head trauma, abnormal mental status (Age 19-64y)  COMPARISON: 8/23/2021  TECHNIQUE: Routine CT Head without IV contrast. Multiplanar reformats. Dose reduction techniques were used.    FINDINGS:  INTRACRANIAL CONTENTS: No intracranial hemorrhage, extraaxial collection, or mass effect.  No CT evidence of acute infarct. Normal parenchymal attenuation. There is mild to moderate atrophy, for age.     VISUALIZED ORBITS/SINUSES/MASTOIDS: No intraorbital abnormality. No paranasal sinus mucosal disease. No middle ear or mastoid effusion.    BONES/SOFT TISSUES: Left parietal/occipital scalp hematoma. No fracture.      Impression    IMPRESSION:  1.  No acute intracranial process.  2.  Prominent atrophy for age.       Discharge Medications   Discharge Medication List as of 6/9/2022  5:26 PM      START taking these medications    Details   escitalopram (LEXAPRO) 10 MG tablet Take 1 tablet (10 mg) by mouth daily, Disp-30 tablet, R-0, E-Prescribe  "     folic acid (FOLVITE) 1 MG tablet Take 1 tablet (1 mg) by mouth daily, Disp-30 tablet, R-0, E-Prescribe      gabapentin (NEURONTIN) 300 MG capsule Take 2 capsules (600 mg) by mouth every 8 hours, Disp-180 capsule, R-0, E-Prescribe      ondansetron (ZOFRAN) 4 MG tablet Take 1 tablet (4 mg) by mouth every 8 hours as needed for nausea or vomiting, Disp-4 tablet, R-0, E-Prescribe         CONTINUE these medications which have CHANGED    Details   levETIRAcetam (KEPPRA) 500 MG tablet Take 1 tablet (500 mg) by mouth 2 times daily for 2 days, Disp-4 tablet, R-0, E-Prescribe         CONTINUE these medications which have NOT CHANGED    Details   hydrOXYzine (VISTARIL) 50 MG capsule Take 1 capsule (50 mg) by mouth 3 times daily as needed for anxiety, Disp-15 capsule, R-0, E-Prescribe      traZODone (DESYREL) 50 MG tablet Take 50 mg by mouth At Bedtime, Historical           Allergies   Allergies   Allergen Reactions     Gramineae Pollens Itching     Pollen Extract Rash     grass tree pollen       Physical Exam:       /77 (BP Location: Left arm)   Pulse 60   Temp 97.5  F (36.4  C) (Oral)   Resp 18   Ht 1.803 m (5' 11\")   Wt 77.1 kg (170 lb)   SpO2 100%   BMI 23.71 kg/m    General appearance: alert, appears stated age and cooperative  Head: Normocephalic, without obvious abnormality, atraumatic  Eyes: Clear conjuctiva  Neck: no JVD and supple, symmetrical, trachea midline  Lungs: clear to auscultation bilaterally  Heart: regular rate and rhythm, S1, S2 normal, no murmur, click, rub or gallop  Abdomen: soft, non-tender; bowel sounds normal; no masses,  no organomegaly  Extremities: Sarah's sign is negative, no sign of DVT  Skin: Skin color, texture, turgor normal. No rashes or lesions  Neurologic: Mental status: Alert, oriented, thought content appropriate  Cranial nerves: normal  Sensory: normal  Motor:grossly normal  No tremor      "

## 2022-06-10 NOTE — PROGRESS NOTES
Clinic Care Coordination Contact  [facility]/Voicemail       Clinical Data: Care Coordinator Outreach  Outreach attempted x 1.  Left message on patient's voicemail with call back information and requested return call.  Plan: Care Coordinator will try to reach patient again in 1-2 business days.    SEGUN Salguero  Connected Care Resource Center  [facility]     *Connected Care Resource Team does NOT follow patient ongoing. Referrals are identified based on internal discharge reports and the outreach is to ensure patient has an understanding of their discharge instructions.

## 2022-06-13 NOTE — PROGRESS NOTES
Clinic Care Coordination Contact  [facility]/Voicemail       Clinical Data: Care Coordinator Outreach  Outreach attempted x 2.  Left message on patient's voicemail with call back information and requested return call.    Plan: Care Coordinator will do no further outreaches at this time.    SEGUN Salguero  Connected Care Resource Center  [facility]     *Connected Care Resource Team does NOT follow patient ongoing. Referrals are identified based on internal discharge reports and the outreach is to ensure patient has an understanding of their discharge instructions.

## 2022-06-16 ENCOUNTER — HOSPITAL ENCOUNTER (EMERGENCY)
Facility: HOSPITAL | Age: 37
Discharge: HOME | End: 2022-06-17
Attending: EMERGENCY MEDICINE | Admitting: EMERGENCY MEDICINE
Payer: COMMERCIAL

## 2022-06-16 DIAGNOSIS — F10.239 ALCOHOL DEPENDENCE WITH WITHDRAWAL WITH COMPLICATION (H): ICD-10-CM

## 2022-06-16 LAB
ANION GAP SERPL CALCULATED.3IONS-SCNC: 17 MMOL/L (ref 5–18)
BUN SERPL-MCNC: 15 MG/DL (ref 8–22)
CALCIUM SERPL-MCNC: 9.2 MG/DL (ref 8.5–10.5)
CHLORIDE BLD-SCNC: 102 MMOL/L (ref 98–107)
CO2 SERPL-SCNC: 25 MMOL/L (ref 22–31)
CREAT SERPL-MCNC: 0.8 MG/DL (ref 0.7–1.3)
ETHANOL SERPL-MCNC: 442 MG/DL
GFR SERPL CREATININE-BSD FRML MDRD: >90 ML/MIN/1.73M2
GLUCOSE BLD-MCNC: 105 MG/DL (ref 70–125)
POTASSIUM BLD-SCNC: 4 MMOL/L (ref 3.5–5)
SARS-COV-2 RNA RESP QL NAA+PROBE: NEGATIVE
SODIUM SERPL-SCNC: 144 MMOL/L (ref 136–145)

## 2022-06-16 PROCEDURE — 87635 SARS-COV-2 COVID-19 AMP PRB: CPT | Performed by: EMERGENCY MEDICINE

## 2022-06-16 PROCEDURE — 96361 HYDRATE IV INFUSION ADD-ON: CPT

## 2022-06-16 PROCEDURE — 36415 COLL VENOUS BLD VENIPUNCTURE: CPT | Performed by: EMERGENCY MEDICINE

## 2022-06-16 PROCEDURE — 258N000003 HC RX IP 258 OP 636: Performed by: EMERGENCY MEDICINE

## 2022-06-16 PROCEDURE — 250N000013 HC RX MED GY IP 250 OP 250 PS 637: Performed by: EMERGENCY MEDICINE

## 2022-06-16 PROCEDURE — 99285 EMERGENCY DEPT VISIT HI MDM: CPT | Mod: 25

## 2022-06-16 PROCEDURE — 250N000011 HC RX IP 250 OP 636: Performed by: EMERGENCY MEDICINE

## 2022-06-16 PROCEDURE — C9803 HOPD COVID-19 SPEC COLLECT: HCPCS

## 2022-06-16 PROCEDURE — 80048 BASIC METABOLIC PNL TOTAL CA: CPT | Performed by: EMERGENCY MEDICINE

## 2022-06-16 PROCEDURE — 82077 ASSAY SPEC XCP UR&BREATH IA: CPT | Performed by: EMERGENCY MEDICINE

## 2022-06-16 PROCEDURE — 96374 THER/PROPH/DIAG INJ IV PUSH: CPT

## 2022-06-16 RX ORDER — FOLIC ACID 1 MG/1
1 TABLET ORAL ONCE
Status: COMPLETED | OUTPATIENT
Start: 2022-06-16 | End: 2022-06-16

## 2022-06-16 RX ORDER — LORAZEPAM 2 MG/ML
2 INJECTION INTRAMUSCULAR ONCE
Status: COMPLETED | OUTPATIENT
Start: 2022-06-16 | End: 2022-06-16

## 2022-06-16 RX ADMIN — FOLIC ACID 1 MG: 1 TABLET ORAL at 22:22

## 2022-06-16 RX ADMIN — SODIUM CHLORIDE 1000 ML: 9 INJECTION, SOLUTION INTRAVENOUS at 22:21

## 2022-06-16 RX ADMIN — LORAZEPAM 2 MG: 2 INJECTION INTRAMUSCULAR; INTRAVENOUS at 22:18

## 2022-06-17 VITALS
OXYGEN SATURATION: 97 % | HEIGHT: 70 IN | RESPIRATION RATE: 24 BRPM | TEMPERATURE: 97.6 F | BODY MASS INDEX: 24.34 KG/M2 | DIASTOLIC BLOOD PRESSURE: 55 MMHG | HEART RATE: 96 BPM | WEIGHT: 170 LBS | SYSTOLIC BLOOD PRESSURE: 103 MMHG

## 2022-06-17 NOTE — ED PROVIDER NOTES
EMERGENCY DEPARTMENT ENCOUNTER      NAME: Nikhil Rios  AGE: 36 year old male  YOB: 1985  MRN: [id number]  EVALUATION DATE & TIME: 6/16/2022  9:57 PM    PCP: Lisandro Graham    ED PROVIDER: Ana Michelle M.D.      Chief Complaint   Patient presents with     Alcohol Intoxication         FINAL IMPRESSION:  1. Alcohol dependence with withdrawal with complication (H)          ED COURSE & MEDICAL DECISION MAKING:    ED Course as of 06/16/22 2355   Thu Jun 16, 2022   2206 Patient with daily EtOH abuse with recent EtOH today here noting he feels shaky like he is withdrawing/anxious, mother at bedside. Patient refuses to go to all detox facilities except for [place] and with recent conflict at [place] but wants to try going there, RN SA now attempting to contact [place] facility. Ativan/thiamine/folic acid and IVF begun with EtOH and COVID19 PCR in case they do have space available and will clinically reassess and repeat VS. Patient refuses all dispo except to [place], which his mother notes is his baseline.   2347 VS WNL reassuringly and COVID19 negative, will reassess now.   2353 Patient clinically reassessed and awake/alert and oriented, refuses to go to detox and wants discharge to home, mother driving him home. Patient discharged after being provided with extensive anticipatory guidance and given return precautions, importance of PMD follow-up emphasized.        Pertinent Labs & Imaging studies reviewed. (See chart for details)    N95 worn  A face shield was worn also  COVID PPE    9:59 PM I met the patient and performed my initial interview and exam.   10:05 PM patients mother is now at bed side.    At the conclusion of the encounter I discussed the results of all of the tests and the disposition. The questions were answered. The patient or family acknowledged understanding and was agreeable with the care plan.     MEDICATIONS GIVEN IN THE EMERGENCY:  Medications   thiamine (B-1) tablet 100 mg (has no  "administration in time range)   0.9% sodium chloride BOLUS (1,000 mLs Intravenous New Bag 6/16/22 2221)   folic acid (FOLVITE) tablet 1 mg (1 mg Oral Given 6/16/22 2222)   LORazepam (ATIVAN) injection 2 mg (2 mg Intravenous Given 6/16/22 2218)       NEW PRESCRIPTIONS STARTED AT TODAY'S ER VISIT  New Prescriptions    No medications on file          =================================================================    HPI      Nikhil Rios is a 36 year old male with PMHx of alcohol abuse, who presents to the ED today via walk in with symptoms of withdrawal and alcohol intoxication.     Patient reports he suffering from alcohol withdrawal, he keeps repeating \"this is going to get bad fast.\" Patient is hesitant to go to detox due to a staff there being inappropriate with him. On an average day patient reports drinking 750 ml of vodka. He reports that he has ingested alcohol today, not sure what time his last drink was. Patient has had two seizures from withdrawal in the past. Patient denies taking medication. Patient denies any medical problems, or any complaints at this time.     REVIEW OF SYSTEMS   All other systems reviewed and are negative except as noted above in HPI.    PAST MEDICAL HISTORY:  Past Medical History:   Diagnosis Date     Alcohol abuse      Alcohol abuse      Alcohol withdrawal (H)      Depressive disorder      HLD (hyperlipidemia)      HTN (hypertension)        PAST SURGICAL HISTORY:  Past Surgical History:   Procedure Laterality Date     NO HISTORY OF SURGERY       OTHER SURGICAL HISTORY      none       CURRENT MEDICATIONS:    escitalopram (LEXAPRO) 10 MG tablet  folic acid (FOLVITE) 1 MG tablet  gabapentin (NEURONTIN) 300 MG capsule  hydrOXYzine (VISTARIL) 50 MG capsule  levETIRAcetam (KEPPRA) 500 MG tablet  ondansetron (ZOFRAN) 4 MG tablet  traZODone (DESYREL) 50 MG tablet        ALLERGIES:  Allergies   Allergen Reactions     Gramineae Pollens Itching     Pollen Extract Rash     grass tree pollen " "      FAMILY HISTORY:  Family History   Problem Relation Age of Onset     Coronary Artery Disease Father        SOCIAL HISTORY:   Social History     Socioeconomic History     Marital status: Single     Spouse name: None     Number of children: None     Years of education: None     Highest education level: None   Occupational History     Occupation: Rental property owner   Tobacco Use     Smoking status: Current Some Day Smoker     Packs/day: 0.25     Types: Cigars     Smokeless tobacco: Never Used     Tobacco comment: occasional   Substance and Sexual Activity     Alcohol use: Yes     Alcohol/week: 17.0 standard drinks     Types: 17 Shots of liquor per week     Comment: Drinks 750ml/day or 17 shots/day; hx of withdrawl seizures     Drug use: Not Currently     Social Determinants of Health     Transportation Needs: No Transportation Needs     Lack of Transportation (Medical): No     Lack of Transportation (Non-Medical): No   Stress: Stress Concern Present     Feeling of Stress : Very much   Housing Stability: Unknown     Unable to Pay for Housing in the Last Year: No     Unstable Housing in the Last Year: No       VITALS:  Patient Vitals for the past 24 hrs:   BP Temp Temp src Pulse Resp SpO2 Height Weight   06/16/22 2300 105/55 -- -- 89 -- 98 % -- --   06/16/22 2250 109/58 -- -- 90 -- (!) 89 % -- --   06/16/22 2148 123/89 97.6  F (36.4  C) Temporal 116 24 96 % 1.778 m (5' 10\") 77.1 kg (170 lb)       PHYSICAL EXAM    GENERAL: Awake, alert.  In no acute distress.   HEENT: Normocephalic, atraumatic.  Pupils equal, round and reactive.  Conjunctiva normal.  EOMI.  NECK: No stridor or apparent deformity.  PULMONARY: Symmetrical breath sounds without distress.  Lungs clear to auscultation bilaterally without wheezes, rhonchi or rales.  CARDIO: Rapid rate, normal rhythm. No significant murmur, rub or gallop.  Radial pulses strong and symmetrical.  ABDOMINAL: Abdomen soft, non-distended and non-tender to palpation.  No CVAT, " no palpable hepatosplenomegaly.  EXTREMITIES: No lower extremity swelling or edema.    NEURO: Alert and oriented to person, place and time.  Cranial nerves grossly intact.  No focal motor deficit.  PSYCH: Anxious appearing.  SKIN: No rashes      LAB:  All pertinent labs reviewed and interpreted.  Results for orders placed or performed during the hospital encounter of 06/16/22   Ethyl Alcohol Level   Result Value Ref Range    Alcohol, Blood 442 (H) None detected mg/dL   Basic metabolic panel   Result Value Ref Range    Sodium 144 136 - 145 mmol/L    Potassium 4.0 3.5 - 5.0 mmol/L    Chloride 102 98 - 107 mmol/L    Carbon Dioxide (CO2) 25 22 - 31 mmol/L    Anion Gap 17 5 - 18 mmol/L    Urea Nitrogen 15 8 - 22 mg/dL    Creatinine 0.80 0.70 - 1.30 mg/dL    Calcium 9.2 8.5 - 10.5 mg/dL    Glucose 105 70 - 125 mg/dL    GFR Estimate >90 >60 mL/min/1.73m2   Asymptomatic COVID-19 Virus (Coronavirus) by PCR Nasopharyngeal    Specimen: Nasopharyngeal; Swab   Result Value Ref Range    SARS CoV2 PCR Negative Negative             I, Jayne Quispe, am serving as a scribe to document services personally performed by Dr. Ana Michelle based on my observation and the provider's statements to me. IAna MD attest that Jayne Quispe is acting in a scribe capacity, has observed my performance of the services and has documented them in accordance with my direction.     Ana Michelle MD  06/16/22 [id number]

## 2022-06-17 NOTE — ED TRIAGE NOTES
"Pt presents with acute intoxication and complaints of withdrawal. Smells very strongly of alcohol. Keeps repeating \"this is going to get bad fast.\" Pt states that the detox he goes to ([place]) he is unable to go to right now due to a staff there being inappropriate. States that they \"city and councils\" are coming after me. Pt does not know when last alcohol intake was.      Triage Assessment     Row Name 06/16/22 3225       Triage Assessment (Adult)    Airway WDL WDL       Respiratory WDL    Respiratory WDL WDL       Skin Circulation/Temperature WDL    Skin Circulation/Temperature WDL WDL       Cardiac WDL    Cardiac WDL WDL       Peripheral/Neurovascular WDL    Peripheral Neurovascular WDL WDL       Cognitive/Neuro/Behavioral WDL    Cognitive/Neuro/Behavioral WDL WDL              "

## 2022-06-18 ENCOUNTER — HOSPITAL ENCOUNTER (OUTPATIENT)
Facility: HOSPITAL | Age: 37
Setting detail: OBSERVATION
Discharge: OTHER INSTITUTION | End: 2022-06-19
Attending: EMERGENCY MEDICINE | Admitting: EMERGENCY MEDICINE
Payer: COMMERCIAL

## 2022-06-18 ENCOUNTER — APPOINTMENT (OUTPATIENT)
Dept: CT IMAGING | Facility: HOSPITAL | Age: 37
End: 2022-06-18
Attending: EMERGENCY MEDICINE
Payer: COMMERCIAL

## 2022-06-18 DIAGNOSIS — F10.920 ALCOHOLIC INTOXICATION WITHOUT COMPLICATION (H): ICD-10-CM

## 2022-06-18 DIAGNOSIS — F10.20 ALCOHOL USE DISORDER, SEVERE, DEPENDENCE (H): ICD-10-CM

## 2022-06-18 LAB
ALBUMIN SERPL-MCNC: 4.3 G/DL (ref 3.5–5)
ALP SERPL-CCNC: 76 U/L (ref 45–120)
ALT SERPL W P-5'-P-CCNC: 38 U/L (ref 0–45)
ANION GAP SERPL CALCULATED.3IONS-SCNC: 17 MMOL/L (ref 5–18)
AST SERPL W P-5'-P-CCNC: 41 U/L (ref 0–40)
BASOPHILS # BLD AUTO: 0.2 10E3/UL (ref 0–0.2)
BASOPHILS NFR BLD AUTO: 3 %
BILIRUB SERPL-MCNC: 0.3 MG/DL (ref 0–1)
BUN SERPL-MCNC: 13 MG/DL (ref 8–22)
CALCIUM SERPL-MCNC: 9.3 MG/DL (ref 8.5–10.5)
CHLORIDE BLD-SCNC: 102 MMOL/L (ref 98–107)
CO2 SERPL-SCNC: 23 MMOL/L (ref 22–31)
CREAT SERPL-MCNC: 0.78 MG/DL (ref 0.7–1.3)
EOSINOPHIL # BLD AUTO: 0 10E3/UL (ref 0–0.7)
EOSINOPHIL NFR BLD AUTO: 1 %
ERYTHROCYTE [DISTWIDTH] IN BLOOD BY AUTOMATED COUNT: 18.2 % (ref 10–15)
ETHANOL SERPL-MCNC: 469 MG/DL
GFR SERPL CREATININE-BSD FRML MDRD: >90 ML/MIN/1.73M2
GLUCOSE BLD-MCNC: 96 MG/DL (ref 70–125)
HCT VFR BLD AUTO: 39.4 % (ref 40–53)
HGB BLD-MCNC: 13.5 G/DL (ref 13.3–17.7)
HOLD SPECIMEN: NORMAL
IMM GRANULOCYTES # BLD: 0.1 10E3/UL
IMM GRANULOCYTES NFR BLD: 2 %
LYMPHOCYTES # BLD AUTO: 2.7 10E3/UL (ref 0.8–5.3)
LYMPHOCYTES NFR BLD AUTO: 43 %
MAGNESIUM SERPL-MCNC: 2.2 MG/DL (ref 1.8–2.6)
MCH RBC QN AUTO: 29.2 PG (ref 26.5–33)
MCHC RBC AUTO-ENTMCNC: 34.3 G/DL (ref 31.5–36.5)
MCV RBC AUTO: 85 FL (ref 78–100)
MONOCYTES # BLD AUTO: 0.4 10E3/UL (ref 0–1.3)
MONOCYTES NFR BLD AUTO: 6 %
NEUTROPHILS # BLD AUTO: 2.7 10E3/UL (ref 1.6–8.3)
NEUTROPHILS NFR BLD AUTO: 45 %
NRBC # BLD AUTO: 0 10E3/UL
NRBC BLD AUTO-RTO: 0 /100
PLATELET # BLD AUTO: 556 10E3/UL (ref 150–450)
POTASSIUM BLD-SCNC: 4.2 MMOL/L (ref 3.5–5)
PROT SERPL-MCNC: 8.4 G/DL (ref 6–8)
RBC # BLD AUTO: 4.63 10E6/UL (ref 4.4–5.9)
SARS-COV-2 RNA RESP QL NAA+PROBE: NEGATIVE
SODIUM SERPL-SCNC: 142 MMOL/L (ref 136–145)
WBC # BLD AUTO: 6 10E3/UL (ref 4–11)

## 2022-06-18 PROCEDURE — 70450 CT HEAD/BRAIN W/O DYE: CPT

## 2022-06-18 PROCEDURE — 80053 COMPREHEN METABOLIC PANEL: CPT | Performed by: EMERGENCY MEDICINE

## 2022-06-18 PROCEDURE — 84100 ASSAY OF PHOSPHORUS: CPT | Performed by: HOSPITALIST

## 2022-06-18 PROCEDURE — 87635 SARS-COV-2 COVID-19 AMP PRB: CPT | Performed by: EMERGENCY MEDICINE

## 2022-06-18 PROCEDURE — 36415 COLL VENOUS BLD VENIPUNCTURE: CPT | Performed by: EMERGENCY MEDICINE

## 2022-06-18 PROCEDURE — 82077 ASSAY SPEC XCP UR&BREATH IA: CPT | Performed by: EMERGENCY MEDICINE

## 2022-06-18 PROCEDURE — 99285 EMERGENCY DEPT VISIT HI MDM: CPT | Mod: 25

## 2022-06-18 PROCEDURE — C9803 HOPD COVID-19 SPEC COLLECT: HCPCS

## 2022-06-18 PROCEDURE — 250N000013 HC RX MED GY IP 250 OP 250 PS 637: Performed by: EMERGENCY MEDICINE

## 2022-06-18 PROCEDURE — 96361 HYDRATE IV INFUSION ADD-ON: CPT

## 2022-06-18 PROCEDURE — 83735 ASSAY OF MAGNESIUM: CPT | Performed by: EMERGENCY MEDICINE

## 2022-06-18 PROCEDURE — 72125 CT NECK SPINE W/O DYE: CPT

## 2022-06-18 PROCEDURE — 258N000003 HC RX IP 258 OP 636: Performed by: EMERGENCY MEDICINE

## 2022-06-18 PROCEDURE — 85025 COMPLETE CBC W/AUTO DIFF WBC: CPT | Performed by: EMERGENCY MEDICINE

## 2022-06-18 RX ORDER — LORAZEPAM 0.5 MG/1
1 TABLET ORAL ONCE
Status: COMPLETED | OUTPATIENT
Start: 2022-06-18 | End: 2022-06-18

## 2022-06-18 RX ORDER — OLANZAPINE 5 MG/1
5-10 TABLET, ORALLY DISINTEGRATING ORAL EVERY 6 HOURS PRN
Status: CANCELLED | OUTPATIENT
Start: 2022-06-18

## 2022-06-18 RX ORDER — FLUMAZENIL 0.1 MG/ML
0.2 INJECTION, SOLUTION INTRAVENOUS
Status: CANCELLED | OUTPATIENT
Start: 2022-06-18

## 2022-06-18 RX ORDER — HALOPERIDOL 5 MG/ML
2.5-5 INJECTION INTRAMUSCULAR EVERY 6 HOURS PRN
Status: CANCELLED | OUTPATIENT
Start: 2022-06-18

## 2022-06-18 RX ORDER — SODIUM CHLORIDE 9 MG/ML
INJECTION, SOLUTION INTRAVENOUS CONTINUOUS
Status: DISCONTINUED | OUTPATIENT
Start: 2022-06-18 | End: 2022-06-19 | Stop reason: HOSPADM

## 2022-06-18 RX ORDER — GABAPENTIN 300 MG/1
600 CAPSULE ORAL ONCE
Status: COMPLETED | OUTPATIENT
Start: 2022-06-18 | End: 2022-06-18

## 2022-06-18 RX ORDER — GABAPENTIN 300 MG/1
600 CAPSULE ORAL EVERY 8 HOURS
Qty: 60 CAPSULE | Refills: 0 | Status: ON HOLD | OUTPATIENT
Start: 2022-06-18 | End: 2022-06-23

## 2022-06-18 RX ADMIN — SODIUM CHLORIDE: 9 INJECTION, SOLUTION INTRAVENOUS at 19:53

## 2022-06-18 RX ADMIN — GABAPENTIN 600 MG: 300 CAPSULE ORAL at 22:16

## 2022-06-18 RX ADMIN — SODIUM CHLORIDE: 9 INJECTION, SOLUTION INTRAVENOUS at 17:57

## 2022-06-18 RX ADMIN — LORAZEPAM 1 MG: 0.5 TABLET ORAL at 17:57

## 2022-06-18 RX ADMIN — LORAZEPAM 1 MG: 0.5 TABLET ORAL at 22:17

## 2022-06-18 RX ADMIN — SODIUM CHLORIDE 1000 ML: 9 INJECTION, SOLUTION INTRAVENOUS at 17:57

## 2022-06-18 NOTE — ED TRIAGE NOTES
"     Triage Assessment     Row Name 06/18/22 8757       Triage Assessment (Adult)    Airway WDL WDL       Respiratory WDL    Respiratory WDL WDL            Liter of vodka yesterday. Just \"sips\" today. Hx of withdrawal seizures  "

## 2022-06-18 NOTE — ED PROVIDER NOTES
"EMERGENCY DEPARTMENT NOTE     Name: Nikhil Rios    Age/Sex: 36 year old male   MRN: [id number]   Evaluation Date & Time:  6/18/2022  5:15 PM    PCP:    Lisandro Graham   ED Provider: Tim Phelps D.O.       CHIEF COMPLAINT    Alcohol Intoxication       DIAGNOSIS & DISPOSITION     1. Alcoholic intoxication without complication (H)    2. Alcohol use disorder, severe, dependence (H)      DISPOSITION: [facility]    At the conclusion of the encounter I discussed the results of all of the tests and the disposition. The questions were answered. The patient or family acknowledged understanding and was agreeable with the care plan.    TOTAL CRITICAL CARE TIME (EXCLUDING PROCEDURES): Not applicable    PROCEDURES:   None    EMERGENCY DEPARTMENT COURSE/MEDICAL DECISION MAKING   5:30 PM I met with the patient to gather history and to perform my initial exam.  We discussed treatment options and the plan for care while in the Emergency Department.  9:00 PM I rechecked and updated the patient       Triage note reviewed:       Triage Assessment     Row Name 06/18/22 1711       Triage Assessment (Adult)    Airway WDL WDL       Respiratory WDL    Respiratory WDL WDL            Liter of vodka yesterday. Just \"sips\" today. Hx of withdrawal seizures    Vital signs:BP (!) 149/85   Pulse 105   Temp 98.1  F (36.7  C) (Oral)   Resp 21   Ht 1.778 m (5' 10\")   Wt 77.1 kg (170 lb)   SpO2 98%   BMI 24.39 kg/m    Pertinent physical exam findings:  General: Alert, oriented x4, intoxicated  HEENT: Abrasion right forehead, cervical spine nontender  Cardiac: Regular rate and rhythm S1-S2 without murmur rub  Abdomen: Soft nontender, positive bowel sounds.  No organomegaly or mass  Neuro: Slight tremulousness, cranial nerves 2 through 12 are intact.  5 out of 5 motor strength upper and lower extremities.  Intact sensation to light touch and positional sense.  No pronator drift.  Normal cerebellar function with serial fingers and " heel-to-shin.  Diagnostic studies:  Imaging:  Cervical spine CT w/o contrast   Final Result   IMPRESSION:   HEAD CT:   1.  No acute intracranial process.      CERVICAL SPINE CT:   1.  No CT evidence for acute fracture or post traumatic subluxation.      Head CT w/o contrast   Final Result   IMPRESSION:   HEAD CT:   1.  No acute intracranial process.      CERVICAL SPINE CT:   1.  No CT evidence for acute fracture or post traumatic subluxation.         Lab:  Labs Ordered and Resulted from Time of ED Arrival to Time of ED Departure   COMPREHENSIVE METABOLIC PANEL - Abnormal       Result Value    Sodium 142      Potassium 4.2      Chloride 102      Carbon Dioxide (CO2) 23      Anion Gap 17      Urea Nitrogen 13      Creatinine 0.78      Calcium 9.3      Glucose 96      Alkaline Phosphatase 76      AST 41 (*)     ALT 38      Protein Total 8.4 (*)     Albumin 4.3      Bilirubin Total 0.3      GFR Estimate >90     CBC WITH PLATELETS AND DIFFERENTIAL - Abnormal    WBC Count 6.0      RBC Count 4.63      Hemoglobin 13.5      Hematocrit 39.4 (*)     MCV 85      MCH 29.2      MCHC 34.3      RDW 18.2 (*)     Platelet Count 556 (*)     % Neutrophils 45      % Lymphocytes 43      % Monocytes 6      % Eosinophils 1      % Basophils 3      % Immature Granulocytes 2      NRBCs per 100 WBC 0      Absolute Neutrophils 2.7      Absolute Lymphocytes 2.7      Absolute Monocytes 0.4      Absolute Eosinophils 0.0      Absolute Basophils 0.2      Absolute Immature Granulocytes 0.1      Absolute NRBCs 0.0     ETHYL ALCOHOL LEVEL - Abnormal    Alcohol, Blood 469 (*)    COVID-19 VIRUS (CORONAVIRUS) BY PCR - Normal    SARS CoV2 PCR Negative     MAGNESIUM - Normal    Magnesium 2.2     PHOSPHORUS - Normal    Phosphorus 3.3     COVID-19 VIRUS (CORONAVIRUS) BY PCR      Interventions: Oral Ativan, IV fluids  Medical decision making: CT of head and cervical spine negative for traumatic findings.  Laboratory evaluation without significant elevation in  transaminases and renal function normal.  Alcohol level 469.  Patient became more tremulous during ED course and had elevation in blood pressure and heart rate.  Patient will be admitted for safe alcohol detoxification with history of alcohol withdrawal seizures.  Case was discussed with the hospitalist service Dr. Montoya    ED INTERVENTIONS     Medications   0.9% sodium chloride BOLUS (0 mLs Intravenous Stopped 6/18/22 2118)     Followed by   sodium chloride 0.9% infusion ( Intravenous New Bag 6/18/22 1953)   sodium chloride 0.9 % 1,000 mL with Infuvite Adult 10 mL, thiamine 100 mg, folic acid 1 mg infusion (has no administration in time range)   OLANZapine zydis (zyPREXA) ODT tab 5-10 mg (has no administration in time range)     Or   haloperidol lactate (HALDOL) injection 2.5-5 mg (has no administration in time range)   flumazenil (ROMAZICON) injection 0.2 mg (has no administration in time range)   melatonin tablet 5 mg (has no administration in time range)   LORazepam (ATIVAN) tablet 1-2 mg (has no administration in time range)     Or   LORazepam (ATIVAN) injection 1-2 mg (has no administration in time range)   thiamine (B-1) tablet 100 mg (has no administration in time range)   folic acid (FOLVITE) tablet 1 mg (has no administration in time range)   multivitamin w/minerals (THERA-VIT-M) tablet 1 tablet (has no administration in time range)   escitalopram (LEXAPRO) tablet 10 mg (has no administration in time range)   gabapentin (NEURONTIN) capsule 600 mg (has no administration in time range)   hydrOXYzine (VISTARIL) capsule 50 mg (has no administration in time range)   traZODone (DESYREL) tablet 50 mg (50 mg Oral Given 6/19/22 0126)   ondansetron (ZOFRAN) tablet 4 mg (has no administration in time range)   LORazepam (ATIVAN) tablet 1 mg (1 mg Oral Given 6/18/22 1757)   gabapentin (NEURONTIN) capsule 600 mg (600 mg Oral Given 6/18/22 2216)   LORazepam (ATIVAN) tablet 1 mg (1 mg Oral Given 6/18/22 2217)        DISCHARGE MEDICATIONS        Review of your medicines      UNREVIEWED medicines. Ask your doctor about these medicines      Dose / Directions   escitalopram 10 MG tablet  Commonly known as: LEXAPRO  Used for: Moderate episode of recurrent major depressive disorder (H)      Dose: 10 mg  Take 1 tablet (10 mg) by mouth daily  Quantity: 30 tablet  Refills: 0     folic acid 1 MG tablet  Commonly known as: FOLVITE  Used for: Alcohol use disorder, severe, dependence (H)      Dose: 1 mg  Take 1 tablet (1 mg) by mouth daily  Quantity: 30 tablet  Refills: 0     hydrOXYzine 50 MG capsule  Commonly known as: VISTARIL      Dose: 50 mg  Take 1 capsule (50 mg) by mouth 3 times daily as needed for anxiety  Quantity: 15 capsule  Refills: 0     ondansetron 4 MG tablet  Commonly known as: ZOFRAN  Used for: Alcohol dependence with uncomplicated withdrawal (H)      Dose: 4 mg  Take 1 tablet (4 mg) by mouth every 8 hours as needed for nausea or vomiting  Quantity: 4 tablet  Refills: 0     traZODone 50 MG tablet  Commonly known as: DESYREL      Dose: 50 mg  Take 50 mg by mouth At Bedtime  Refills: 0        CONTINUE these medicines which have NOT CHANGED      Dose / Directions   gabapentin 300 MG capsule  Commonly known as: NEURONTIN  Used for: Alcohol use disorder, severe, dependence (H)      Dose: 600 mg  Take 2 capsules (600 mg) by mouth every 8 hours  Quantity: 60 capsule  Refills: 0           Where to get your medicines      Some of these will need a paper prescription and others can be bought over the counter. Ask your nurse if you have questions.    Bring a paper prescription for each of these medications    gabapentin 300 MG capsule           INFORMATION SOURCE AND LIMITATIONS    History/Exam limitations: none  Patient information was obtained from: patient   Use of : N/A    HISTORY OF PRESENT ILLNESS   Nikhil Rios is a 36 year old male with relevant past history of alcohol abuse, alcohol withdrawal, alcoholic  "fatty liver, alcohol-induced acute pancreatitis, chemical dependency, anxiety, and s/p alcohol detoxification who presents to the emergency department via walk in for evaluation of alcohol intoxication.     Patient reports that he is currently going through alcohol withdrawals. He notes that he \"hates living like this and doesn't want to be this person.\" The patient states that he has been \"sipping\" alcohol today, but reports that he typically can drink 750mL of alcohol per day. The patient also endorses falling around the house. He is nauseous and \"wants to crawl out of his skin.\" He denies any vomiting, headache, or any other complaints at this time.     The patient currently lives with his mother.     REVIEW OF SYSTEMS:   Constitutional: Negative for  fever. Positive for fall  HENT: Negative for URI symptoms or sore throat.    Cardiac: Negative for  chest pain,palpitations, near syncope or syncope  Respiratory: Negative for cough and shortness of breath.    Gastrointestinal: Negative for abdominal pain, vomiting, constipation, diarrhea, rectal bleeding or melena. Positive for nausea   Genitourinary: Negative for dysuria, flank pain and hematuria.   Musculoskeletal: Negative for back pain.   Skin: Negative for  rash  Neurological: Negative for dizziness, headache, syncope, speech difficulty, unilateral weakness or imbalance with walking.   Hematological: Negative for adenopathy. Does not bruise/bleed easily.   Psychiatric/Behavioral: Negative for confusion. Positive for alcohol intoxication      PATIENT HISTORY     Past Medical History:   Diagnosis Date     Alcohol abuse      Alcohol abuse      Alcohol withdrawal (H)      Depressive disorder      HLD (hyperlipidemia)      HTN (hypertension)      Patient Active Problem List   Diagnosis     Alcohol withdrawal (H)     Alcohol dependence with uncomplicated withdrawal (H)     ACS (acute coronary syndrome) (H)     Chemical dependency (H)     S/P alcohol detoxification "     Alcohol-induced acute pancreatitis     Alcohol-induced insomnia (H)     Allergic rhinitis     Family history of diabetes mellitus     Seasonal allergies     Urticaria     Alcohol use disorder, severe, dependence (H)     Hypokalemia     Transaminitis     Alcoholic fatty liver     Generalized anxiety disorder     Moderate episode of recurrent major depressive disorder (H)     Hypomagnesemia     Thrombocytopenia (H)     Elevated liver function tests     Acute alcoholic intoxication in alcoholism without complication (H)     Alcoholic intoxication without complication (H)     Past Surgical History:   Procedure Laterality Date     NO HISTORY OF SURGERY       OTHER SURGICAL HISTORY      none     Social Histrory  Smoking:  Alcohol Use:  Allergies   Allergen Reactions     Gramineae Pollens Itching     Pollen Extract Rash     grass tree pollen         OUTPATIENT MEDICATIONS     Current Discharge Medication List         Vitals:    06/18/22 2315 06/18/22 2330 06/19/22 0000 06/19/22 0045   BP: 104/55 110/59 118/60 (!) 149/85   Pulse: 97 101 102 105   Resp: 17 19 20 21   Temp:       TempSrc:       SpO2: 94% 93% 96% 98%   Weight:       Height:           Physical Exam   Constitutional: Oriented to person, place, and time.  Appears intoxicated HEENT:    Head: Abrasion right forehead  Neck: Normal range of motion. Neck supple.  No cervical spine tenderness  Cardiovascular: Normal rate, regular rhythm and normal heart sounds.    Pulmonary/Chest: Normal effort  and breath sounds normal.   Abdominal: Soft. Bowel sounds are normal.   Musculoskeletal: Normal range of motion.   Neurological: Alert, intoxicated, oriented to person, place, and time. Normal strength.No sensory deficit. No cranial nerve deficit . Skin: Skin is warm and dry.   Psychiatric: Normal mood and affect. Behavior is normal. Thought content normal.       DIAGNOSTICS    LABORATORY FINDINGS (REVIEWED AND INTERPRETED):  Labs Ordered and Resulted from Time of ED  Arrival to Time of ED Departure   COMPREHENSIVE METABOLIC PANEL - Abnormal       Result Value    Sodium 142      Potassium 4.2      Chloride 102      Carbon Dioxide (CO2) 23      Anion Gap 17      Urea Nitrogen 13      Creatinine 0.78      Calcium 9.3      Glucose 96      Alkaline Phosphatase 76      AST 41 (*)     ALT 38      Protein Total 8.4 (*)     Albumin 4.3      Bilirubin Total 0.3      GFR Estimate >90     CBC WITH PLATELETS AND DIFFERENTIAL - Abnormal    WBC Count 6.0      RBC Count 4.63      Hemoglobin 13.5      Hematocrit 39.4 (*)     MCV 85      MCH 29.2      MCHC 34.3      RDW 18.2 (*)     Platelet Count 556 (*)     % Neutrophils 45      % Lymphocytes 43      % Monocytes 6      % Eosinophils 1      % Basophils 3      % Immature Granulocytes 2      NRBCs per 100 WBC 0      Absolute Neutrophils 2.7      Absolute Lymphocytes 2.7      Absolute Monocytes 0.4      Absolute Eosinophils 0.0      Absolute Basophils 0.2      Absolute Immature Granulocytes 0.1      Absolute NRBCs 0.0     ETHYL ALCOHOL LEVEL - Abnormal    Alcohol, Blood 469 (*)    COVID-19 VIRUS (CORONAVIRUS) BY PCR - Normal    SARS CoV2 PCR Negative     MAGNESIUM - Normal    Magnesium 2.2     PHOSPHORUS - Normal    Phosphorus 3.3     COVID-19 VIRUS (CORONAVIRUS) BY PCR         IMAGING (REVIEWED AND INTERPRETED):  Cervical spine CT w/o contrast   Final Result   IMPRESSION:   HEAD CT:   1.  No acute intracranial process.      CERVICAL SPINE CT:   1.  No CT evidence for acute fracture or post traumatic subluxation.      Head CT w/o contrast   Final Result   IMPRESSION:   HEAD CT:   1.  No acute intracranial process.      CERVICAL SPINE CT:   1.  No CT evidence for acute fracture or post traumatic subluxation.          I, Hoa Tavera, am serving as a scribe to document services personally performed by Tim Phelps D.O., based on my observation and the provider s statements to me.    I, Tim Phelps D.O., attest that Hoa Tavera is acting in a  scribe [facility], has observed my performance of the services and has documented them in accordance with my direction.    Tim Phelps D.O.  EMERGENCY MEDICINE   06/18/22  [facility] EMERGENCY DEPARTMENT  [address] [place] [id number]  [phone]  Dept: [phone]     Tim Phelps DO  06/19/22 0151

## 2022-06-19 ENCOUNTER — HOSPITAL ENCOUNTER (INPATIENT)
Facility: CLINIC | Age: 37
LOS: 4 days | Discharge: HOME | DRG: 897 | End: 2022-06-23
Attending: INTERNAL MEDICINE | Admitting: INTERNAL MEDICINE
Payer: COMMERCIAL

## 2022-06-19 VITALS
TEMPERATURE: 98.1 F | SYSTOLIC BLOOD PRESSURE: 123 MMHG | WEIGHT: 170 LBS | HEART RATE: 78 BPM | HEIGHT: 70 IN | OXYGEN SATURATION: 95 % | DIASTOLIC BLOOD PRESSURE: 64 MMHG | BODY MASS INDEX: 24.34 KG/M2 | RESPIRATION RATE: 16 BRPM

## 2022-06-19 DIAGNOSIS — F10.230 ALCOHOL DEPENDENCE WITH UNCOMPLICATED WITHDRAWAL (H): Primary | ICD-10-CM

## 2022-06-19 DIAGNOSIS — F10.20 ALCOHOL USE DISORDER, SEVERE, DEPENDENCE (H): ICD-10-CM

## 2022-06-19 PROBLEM — F10.939 ALCOHOL WITHDRAWAL (H): Chronic | Status: ACTIVE | Noted: 2019-03-27

## 2022-06-19 LAB
ALBUMIN SERPL-MCNC: 3.5 G/DL (ref 3.4–5)
ALP SERPL-CCNC: 58 U/L (ref 40–150)
ALT SERPL W P-5'-P-CCNC: 38 U/L (ref 0–70)
ANION GAP SERPL CALCULATED.3IONS-SCNC: 7 MMOL/L (ref 3–14)
AST SERPL W P-5'-P-CCNC: 23 U/L (ref 0–45)
BILIRUB SERPL-MCNC: 0.4 MG/DL (ref 0.2–1.3)
BUN SERPL-MCNC: 16 MG/DL (ref 7–30)
CALCIUM SERPL-MCNC: 8.6 MG/DL (ref 8.5–10.1)
CHLORIDE BLD-SCNC: 107 MMOL/L (ref 94–109)
CO2 SERPL-SCNC: 26 MMOL/L (ref 20–32)
CREAT SERPL-MCNC: 0.62 MG/DL (ref 0.66–1.25)
ERYTHROCYTE [DISTWIDTH] IN BLOOD BY AUTOMATED COUNT: 17.6 % (ref 10–15)
GFR SERPL CREATININE-BSD FRML MDRD: >90 ML/MIN/1.73M2
GLUCOSE BLD-MCNC: 90 MG/DL (ref 70–99)
HCT VFR BLD AUTO: 32.9 % (ref 40–53)
HGB BLD-MCNC: 10.9 G/DL (ref 13.3–17.7)
MAGNESIUM SERPL-MCNC: 2 MG/DL (ref 1.6–2.3)
MCH RBC QN AUTO: 28.6 PG (ref 26.5–33)
MCHC RBC AUTO-ENTMCNC: 33.1 G/DL (ref 31.5–36.5)
MCV RBC AUTO: 86 FL (ref 78–100)
PHOSPHATE SERPL-MCNC: 3.3 MG/DL (ref 2.5–4.5)
PHOSPHATE SERPL-MCNC: 3.6 MG/DL (ref 2.5–4.5)
PLATELET # BLD AUTO: 392 10E3/UL (ref 150–450)
POTASSIUM BLD-SCNC: 3.6 MMOL/L (ref 3.4–5.3)
PROT SERPL-MCNC: 6.6 G/DL (ref 6.8–8.8)
RBC # BLD AUTO: 3.81 10E6/UL (ref 4.4–5.9)
SARS-COV-2 RNA RESP QL NAA+PROBE: NEGATIVE
SODIUM SERPL-SCNC: 140 MMOL/L (ref 133–144)
WBC # BLD AUTO: 4.6 10E3/UL (ref 4–11)

## 2022-06-19 PROCEDURE — G0378 HOSPITAL OBSERVATION PER HR: HCPCS

## 2022-06-19 PROCEDURE — 85014 HEMATOCRIT: CPT | Performed by: INTERNAL MEDICINE

## 2022-06-19 PROCEDURE — 96375 TX/PRO/DX INJ NEW DRUG ADDON: CPT

## 2022-06-19 PROCEDURE — 250N000011 HC RX IP 250 OP 636: Performed by: INTERNAL MEDICINE

## 2022-06-19 PROCEDURE — 80053 COMPREHEN METABOLIC PANEL: CPT | Performed by: INTERNAL MEDICINE

## 2022-06-19 PROCEDURE — 250N000009 HC RX 250: Performed by: EMERGENCY MEDICINE

## 2022-06-19 PROCEDURE — 120N000001 HC R&B MED SURG/OB

## 2022-06-19 PROCEDURE — 250N000013 HC RX MED GY IP 250 OP 250 PS 637: Performed by: INTERNAL MEDICINE

## 2022-06-19 PROCEDURE — 250N000011 HC RX IP 250 OP 636: Performed by: EMERGENCY MEDICINE

## 2022-06-19 PROCEDURE — 84100 ASSAY OF PHOSPHORUS: CPT | Performed by: INTERNAL MEDICINE

## 2022-06-19 PROCEDURE — 250N000011 HC RX IP 250 OP 636: Performed by: STUDENT IN AN ORGANIZED HEALTH CARE EDUCATION/TRAINING PROGRAM

## 2022-06-19 PROCEDURE — HZ2ZZZZ DETOXIFICATION SERVICES FOR SUBSTANCE ABUSE TREATMENT: ICD-10-PCS | Performed by: INTERNAL MEDICINE

## 2022-06-19 PROCEDURE — 99223 1ST HOSP IP/OBS HIGH 75: CPT | Mod: AI | Performed by: INTERNAL MEDICINE

## 2022-06-19 PROCEDURE — 83735 ASSAY OF MAGNESIUM: CPT | Performed by: INTERNAL MEDICINE

## 2022-06-19 PROCEDURE — 87635 SARS-COV-2 COVID-19 AMP PRB: CPT | Performed by: EMERGENCY MEDICINE

## 2022-06-19 PROCEDURE — 250N000013 HC RX MED GY IP 250 OP 250 PS 637: Performed by: STUDENT IN AN ORGANIZED HEALTH CARE EDUCATION/TRAINING PROGRAM

## 2022-06-19 PROCEDURE — 96365 THER/PROPH/DIAG IV INF INIT: CPT

## 2022-06-19 PROCEDURE — 96366 THER/PROPH/DIAG IV INF ADDON: CPT

## 2022-06-19 PROCEDURE — 258N000003 HC RX IP 258 OP 636: Performed by: EMERGENCY MEDICINE

## 2022-06-19 PROCEDURE — 250N000013 HC RX MED GY IP 250 OP 250 PS 637: Performed by: HOSPITALIST

## 2022-06-19 PROCEDURE — 36415 COLL VENOUS BLD VENIPUNCTURE: CPT | Performed by: INTERNAL MEDICINE

## 2022-06-19 PROCEDURE — 99220 PR INITIAL OBSERVATION CARE,LEVEL III: CPT | Performed by: HOSPITALIST

## 2022-06-19 RX ORDER — FOLIC ACID 1 MG/1
1 TABLET ORAL DAILY
Status: DISCONTINUED | OUTPATIENT
Start: 2022-06-19 | End: 2022-06-19 | Stop reason: HOSPADM

## 2022-06-19 RX ORDER — ACETAMINOPHEN 650 MG/1
650 SUPPOSITORY RECTAL EVERY 6 HOURS PRN
Status: DISCONTINUED | OUTPATIENT
Start: 2022-06-19 | End: 2022-06-19 | Stop reason: HOSPADM

## 2022-06-19 RX ORDER — GABAPENTIN 300 MG/1
900 CAPSULE ORAL EVERY 8 HOURS
Status: COMPLETED | OUTPATIENT
Start: 2022-06-19 | End: 2022-06-22

## 2022-06-19 RX ORDER — LORAZEPAM 2 MG/ML
1-2 INJECTION INTRAMUSCULAR EVERY 30 MIN PRN
Status: DISCONTINUED | OUTPATIENT
Start: 2022-06-19 | End: 2022-06-19 | Stop reason: HOSPADM

## 2022-06-19 RX ORDER — FOLIC ACID 1 MG/1
1 TABLET ORAL DAILY
Status: DISCONTINUED | OUTPATIENT
Start: 2022-06-19 | End: 2022-06-23 | Stop reason: HOSPADM

## 2022-06-19 RX ORDER — HALOPERIDOL 5 MG/ML
2.5-5 INJECTION INTRAMUSCULAR EVERY 6 HOURS PRN
Status: DISCONTINUED | OUTPATIENT
Start: 2022-06-19 | End: 2022-06-19

## 2022-06-19 RX ORDER — ONDANSETRON 2 MG/ML
4 INJECTION INTRAMUSCULAR; INTRAVENOUS EVERY 6 HOURS PRN
Status: DISCONTINUED | OUTPATIENT
Start: 2022-06-19 | End: 2022-06-23 | Stop reason: HOSPADM

## 2022-06-19 RX ORDER — LIDOCAINE 40 MG/G
CREAM TOPICAL
Status: DISCONTINUED | OUTPATIENT
Start: 2022-06-19 | End: 2022-06-23 | Stop reason: HOSPADM

## 2022-06-19 RX ORDER — ACETAMINOPHEN 325 MG/1
650 TABLET ORAL EVERY 6 HOURS PRN
Status: DISCONTINUED | OUTPATIENT
Start: 2022-06-19 | End: 2022-06-19 | Stop reason: HOSPADM

## 2022-06-19 RX ORDER — HYDROXYZINE PAMOATE 50 MG/1
50 CAPSULE ORAL 3 TIMES DAILY PRN
Status: DISCONTINUED | OUTPATIENT
Start: 2022-06-19 | End: 2022-06-19 | Stop reason: HOSPADM

## 2022-06-19 RX ORDER — GABAPENTIN 600 MG/1
1200 TABLET ORAL ONCE
Status: COMPLETED | OUTPATIENT
Start: 2022-06-19 | End: 2022-06-19

## 2022-06-19 RX ORDER — ONDANSETRON 4 MG/1
4 TABLET, FILM COATED ORAL EVERY 8 HOURS PRN
Status: DISCONTINUED | OUTPATIENT
Start: 2022-06-19 | End: 2022-06-19 | Stop reason: HOSPADM

## 2022-06-19 RX ORDER — HALOPERIDOL 5 MG/ML
2.5-5 INJECTION INTRAMUSCULAR EVERY 6 HOURS PRN
Status: DISCONTINUED | OUTPATIENT
Start: 2022-06-19 | End: 2022-06-19 | Stop reason: HOSPADM

## 2022-06-19 RX ORDER — GABAPENTIN 100 MG/1
100 CAPSULE ORAL EVERY 8 HOURS
Status: DISCONTINUED | OUTPATIENT
Start: 2022-06-26 | End: 2022-06-23 | Stop reason: HOSPADM

## 2022-06-19 RX ORDER — DIAZEPAM 10 MG/2ML
5-10 INJECTION, SOLUTION INTRAMUSCULAR; INTRAVENOUS EVERY 30 MIN PRN
Status: DISCONTINUED | OUTPATIENT
Start: 2022-06-19 | End: 2022-06-23 | Stop reason: HOSPADM

## 2022-06-19 RX ORDER — MULTIPLE VITAMINS W/ MINERALS TAB 9MG-400MCG
1 TAB ORAL DAILY
Status: DISCONTINUED | OUTPATIENT
Start: 2022-06-19 | End: 2022-06-23 | Stop reason: HOSPADM

## 2022-06-19 RX ORDER — AMOXICILLIN 250 MG
2 CAPSULE ORAL 2 TIMES DAILY PRN
Status: DISCONTINUED | OUTPATIENT
Start: 2022-06-19 | End: 2022-06-23 | Stop reason: HOSPADM

## 2022-06-19 RX ORDER — FLUMAZENIL 0.1 MG/ML
0.2 INJECTION, SOLUTION INTRAVENOUS
Status: DISCONTINUED | OUTPATIENT
Start: 2022-06-19 | End: 2022-06-23 | Stop reason: HOSPADM

## 2022-06-19 RX ORDER — OLANZAPINE 5 MG/1
5-10 TABLET, ORALLY DISINTEGRATING ORAL EVERY 6 HOURS PRN
Status: DISCONTINUED | OUTPATIENT
Start: 2022-06-19 | End: 2022-06-19 | Stop reason: HOSPADM

## 2022-06-19 RX ORDER — LORAZEPAM 2 MG/ML
1 INJECTION INTRAMUSCULAR ONCE
Status: COMPLETED | OUTPATIENT
Start: 2022-06-19 | End: 2022-06-19

## 2022-06-19 RX ORDER — TRAZODONE HYDROCHLORIDE 50 MG/1
50 TABLET, FILM COATED ORAL AT BEDTIME
Status: DISCONTINUED | OUTPATIENT
Start: 2022-06-19 | End: 2022-06-19 | Stop reason: HOSPADM

## 2022-06-19 RX ORDER — ONDANSETRON 2 MG/ML
4 INJECTION INTRAMUSCULAR; INTRAVENOUS EVERY 6 HOURS PRN
Status: DISCONTINUED | OUTPATIENT
Start: 2022-06-19 | End: 2022-06-19 | Stop reason: HOSPADM

## 2022-06-19 RX ORDER — LORAZEPAM 0.5 MG/1
1-2 TABLET ORAL EVERY 30 MIN PRN
Status: DISCONTINUED | OUTPATIENT
Start: 2022-06-19 | End: 2022-06-19 | Stop reason: HOSPADM

## 2022-06-19 RX ORDER — LORAZEPAM 1 MG/1
1-2 TABLET ORAL EVERY 30 MIN PRN
Status: DISCONTINUED | OUTPATIENT
Start: 2022-06-19 | End: 2022-06-19 | Stop reason: ALTCHOICE

## 2022-06-19 RX ORDER — ONDANSETRON 4 MG/1
4 TABLET, ORALLY DISINTEGRATING ORAL EVERY 6 HOURS PRN
Status: DISCONTINUED | OUTPATIENT
Start: 2022-06-19 | End: 2022-06-23 | Stop reason: HOSPADM

## 2022-06-19 RX ORDER — ONDANSETRON 4 MG/1
4 TABLET, ORALLY DISINTEGRATING ORAL EVERY 6 HOURS PRN
Status: DISCONTINUED | OUTPATIENT
Start: 2022-06-19 | End: 2022-06-19 | Stop reason: HOSPADM

## 2022-06-19 RX ORDER — FLUMAZENIL 0.1 MG/ML
0.2 INJECTION, SOLUTION INTRAVENOUS
Status: DISCONTINUED | OUTPATIENT
Start: 2022-06-19 | End: 2022-06-19

## 2022-06-19 RX ORDER — ESCITALOPRAM OXALATE 10 MG/1
10 TABLET ORAL DAILY
Status: DISCONTINUED | OUTPATIENT
Start: 2022-06-19 | End: 2022-06-19 | Stop reason: HOSPADM

## 2022-06-19 RX ORDER — SODIUM CHLORIDE AND POTASSIUM CHLORIDE 150; 900 MG/100ML; MG/100ML
INJECTION, SOLUTION INTRAVENOUS CONTINUOUS
Status: DISPENSED | OUTPATIENT
Start: 2022-06-19 | End: 2022-06-20

## 2022-06-19 RX ORDER — AMOXICILLIN 250 MG
1 CAPSULE ORAL 2 TIMES DAILY PRN
Status: DISCONTINUED | OUTPATIENT
Start: 2022-06-19 | End: 2022-06-23 | Stop reason: HOSPADM

## 2022-06-19 RX ORDER — GABAPENTIN 300 MG/1
600 CAPSULE ORAL EVERY 8 HOURS
Status: DISCONTINUED | OUTPATIENT
Start: 2022-06-22 | End: 2022-06-23 | Stop reason: HOSPADM

## 2022-06-19 RX ORDER — LORAZEPAM 2 MG/ML
1-2 INJECTION INTRAMUSCULAR EVERY 30 MIN PRN
Status: DISCONTINUED | OUTPATIENT
Start: 2022-06-19 | End: 2022-06-19 | Stop reason: ALTCHOICE

## 2022-06-19 RX ORDER — CLONIDINE HYDROCHLORIDE 0.1 MG/1
0.1 TABLET ORAL EVERY 8 HOURS
Status: DISCONTINUED | OUTPATIENT
Start: 2022-06-19 | End: 2022-06-23 | Stop reason: HOSPADM

## 2022-06-19 RX ORDER — FLUMAZENIL 0.1 MG/ML
0.2 INJECTION, SOLUTION INTRAVENOUS
Status: DISCONTINUED | OUTPATIENT
Start: 2022-06-19 | End: 2022-06-19 | Stop reason: HOSPADM

## 2022-06-19 RX ORDER — GABAPENTIN 300 MG/1
300 CAPSULE ORAL EVERY 8 HOURS
Status: DISCONTINUED | OUTPATIENT
Start: 2022-06-24 | End: 2022-06-23 | Stop reason: HOSPADM

## 2022-06-19 RX ORDER — FAMOTIDINE 20 MG/1
20 TABLET, FILM COATED ORAL 2 TIMES DAILY
Status: DISCONTINUED | OUTPATIENT
Start: 2022-06-19 | End: 2022-06-23 | Stop reason: HOSPADM

## 2022-06-19 RX ORDER — DIAZEPAM 10 MG
10 TABLET ORAL EVERY 30 MIN PRN
Status: DISCONTINUED | OUTPATIENT
Start: 2022-06-19 | End: 2022-06-23 | Stop reason: HOSPADM

## 2022-06-19 RX ORDER — MULTIPLE VITAMINS W/ MINERALS TAB 9MG-400MCG
1 TAB ORAL DAILY
Status: DISCONTINUED | OUTPATIENT
Start: 2022-06-19 | End: 2022-06-19 | Stop reason: HOSPADM

## 2022-06-19 RX ORDER — GABAPENTIN 300 MG/1
600 CAPSULE ORAL EVERY 8 HOURS
Status: DISCONTINUED | OUTPATIENT
Start: 2022-06-19 | End: 2022-06-19 | Stop reason: HOSPADM

## 2022-06-19 RX ORDER — HALOPERIDOL 5 MG/ML
2.5-5 INJECTION INTRAMUSCULAR EVERY 6 HOURS PRN
Status: DISCONTINUED | OUTPATIENT
Start: 2022-06-19 | End: 2022-06-23 | Stop reason: HOSPADM

## 2022-06-19 RX ADMIN — TRAZODONE HYDROCHLORIDE 50 MG: 50 TABLET ORAL at 01:26

## 2022-06-19 RX ADMIN — GABAPENTIN 1200 MG: 600 TABLET, FILM COATED ORAL at 06:23

## 2022-06-19 RX ADMIN — SODIUM CHLORIDE: 9 INJECTION, SOLUTION INTRAVENOUS at 03:28

## 2022-06-19 RX ADMIN — FOLIC ACID 1 MG: 1 TABLET ORAL at 09:14

## 2022-06-19 RX ADMIN — POTASSIUM CHLORIDE AND SODIUM CHLORIDE: 900; 150 INJECTION, SOLUTION INTRAVENOUS at 21:43

## 2022-06-19 RX ADMIN — DIAZEPAM 5 MG: 5 INJECTION INTRAMUSCULAR; INTRAVENOUS at 13:42

## 2022-06-19 RX ADMIN — FAMOTIDINE 20 MG: 20 TABLET ORAL at 09:14

## 2022-06-19 RX ADMIN — CLONIDINE HYDROCHLORIDE 0.1 MG: 0.1 TABLET ORAL at 13:49

## 2022-06-19 RX ADMIN — FAMOTIDINE 20 MG: 20 TABLET ORAL at 21:42

## 2022-06-19 RX ADMIN — POTASSIUM CHLORIDE AND SODIUM CHLORIDE: 900; 150 INJECTION, SOLUTION INTRAVENOUS at 08:55

## 2022-06-19 RX ADMIN — DIAZEPAM 10 MG: 10 TABLET ORAL at 19:05

## 2022-06-19 RX ADMIN — CLONIDINE HYDROCHLORIDE 0.1 MG: 0.1 TABLET ORAL at 06:23

## 2022-06-19 RX ADMIN — LORAZEPAM 2 MG: 2 INJECTION INTRAMUSCULAR at 06:31

## 2022-06-19 RX ADMIN — LORAZEPAM 1 MG: 2 INJECTION INTRAMUSCULAR; INTRAVENOUS at 03:24

## 2022-06-19 RX ADMIN — GABAPENTIN 900 MG: 300 CAPSULE ORAL at 21:42

## 2022-06-19 RX ADMIN — LORAZEPAM 1 MG: 1 TABLET ORAL at 11:21

## 2022-06-19 RX ADMIN — DIAZEPAM 10 MG: 10 TABLET ORAL at 21:47

## 2022-06-19 RX ADMIN — GABAPENTIN 900 MG: 300 CAPSULE ORAL at 13:49

## 2022-06-19 RX ADMIN — DIAZEPAM 5 MG: 5 INJECTION INTRAMUSCULAR; INTRAVENOUS at 16:18

## 2022-06-19 RX ADMIN — CLONIDINE HYDROCHLORIDE 0.1 MG: 0.1 TABLET ORAL at 21:42

## 2022-06-19 RX ADMIN — MULTIPLE VITAMINS W/ MINERALS TAB 1 TABLET: TAB at 09:14

## 2022-06-19 RX ADMIN — LORAZEPAM 1 MG: 1 TABLET ORAL at 12:55

## 2022-06-19 RX ADMIN — FOLIC ACID: 5 INJECTION, SOLUTION INTRAMUSCULAR; INTRAVENOUS; SUBCUTANEOUS at 02:00

## 2022-06-19 RX ADMIN — LORAZEPAM 2 MG: 1 TABLET ORAL at 09:14

## 2022-06-19 RX ADMIN — THIAMINE HCL TAB 100 MG 100 MG: 100 TAB at 09:14

## 2022-06-19 ASSESSMENT — ACTIVITIES OF DAILY LIVING (ADL)
ADLS_ACUITY_SCORE: 37

## 2022-06-19 NOTE — PROGRESS NOTES
- [facility] hospitalist acceptance note-    Patient is a 36-year-old male with history of alcohol dependence, alcohol withdrawal with previous seizure, hepatic steatosis, alcohol abuse pancreatitis, MDD, and anxiety who presented to [facility] emergency department due to concerns for alcohol withdrawal and direct admission from Dr. Gilmore requested.  He has been experiencing nausea and falls.  Regularly drinks 750 mL of liquor per day.  Per review of his chart he has been in the ER 10 times in the past 6 weeks for alcohol and was admitted once from 6/5 through 6/9 for alcohol withdrawal treatment and he declined chemical dependency at that time.  Per documentation here he appears interested in getting sober.  His CMP is unremarkable except for slight elevation in AST at 41.  CBC unremarkable.  COVID-19 PCR negative.  CT head and C-spine negative for acute pathology.  Blood alcohol level is elevated, I am unfairly noted with the scale used at the lab.  He is hypertensive, tachycardic, and appears to be in withdrawal.  He received 2 mg of oral lorazepam, IV banana bag, 1 L NS, trazodone, gabapentin 600 mg.  Request for medical bed here for alcohol withdrawal treatment.

## 2022-06-19 NOTE — PROGRESS NOTES
Patient arrived direct admit around 0600. New orders received. CIWA protocols initiated, score 16. IV ativan given with re-check of 7. Bed alarms on. VSS. Paged provider on possible need for tele monitoring. Awaiting response. Will continue to monitor per plan of care.

## 2022-06-19 NOTE — PLAN OF CARE
Vital signs stable. Alert and orientated x4, occasional forgetfulness noted. Regular diet. CIWA scores: 17, 7, 11, 7, 12, 12. PRN PO ativan given 3x (total of 4 mg) and Valium 1x (5mg). Pt's symptoms include agitation, anxiety, tremors, paroxysmal sweats, nausea, and headache. Lung sounds bilaterally clear. Has an abrasion on right forehead and left knee. PIV in left forearm infusing at 100 ml/hr of NS with KCL 20. Discharge TBD.

## 2022-06-19 NOTE — H&P
H&P dictated    Direct admit from [facility] ER for ETOH withdrawal    Exam notable for stable VS with severe tremor.  No hallucinations.  No agitation currently    Labs unremarkable    Plan  - WA protocol  - thiamine/folate  - gabapentin (hx of withdrawal seizure)  - IVF  - repeat labs   - would obtain CD and SW consults after withdrawal sx improved.  Pt interested in receiving help for ETOH dependence

## 2022-06-19 NOTE — PHARMACY-ADMISSION MEDICATION HISTORY
Admission medication history interview status for this patient is complete. See [facility] admission navigator for allergy information, prior to admission medications and immunization status.     Medication history interview done, indicate source(s): N/A  Medication history resources (including written lists, pill bottles, clinic record): [facility] and [facility]  Pharmacy: [id number] [place], [place] - [address] AT [facility] [address]      Changes made to PTA medication list:  Added: None  Changed: None  Reported as Not Taking: N/A  Removed: None    Actions taken by pharmacist (provider contacted, etc): Sticky note to provider     Additional medication history information: Pt. Discharge and transfer from [facility] 6/9    Medication reconciliation/reorder completed by provider prior to medication history?  N   (Y/N)     Prior to Admission medications    Medication Sig Last Dose Taking? Auth Provider Long Term End Date   escitalopram (LEXAPRO) 10 MG tablet Take 1 tablet (10 mg) by mouth daily Past Week at Unknown time Yes Fahad Mcdonald MD Yes    folic acid (FOLVITE) 1 MG tablet Take 1 tablet (1 mg) by mouth daily Past Week at Unknown time Yes Fahad Mcdonald MD No    gabapentin (NEURONTIN) 300 MG capsule Take 2 capsules (600 mg) by mouth every 8 hours Past Week at Unknown time Yes Tim Phelps,  Yes    traZODone (DESYREL) 50 MG tablet Take 50 mg by mouth At Bedtime Past Week at Unknown time Yes Unknown, Entered By History No    hydrOXYzine (VISTARIL) 50 MG capsule Take 1 capsule (50 mg) by mouth 3 times daily as needed for anxiety  at PRN  Daisy Jj MD     ondansetron (ZOFRAN) 4 MG tablet Take 1 tablet (4 mg) by mouth every 8 hours as needed for nausea or vomiting  at PRN  Fahad Mcdonald MD

## 2022-06-19 NOTE — PHARMACY-ADMISSION MEDICATION HISTORY
Pharmacy Note - Admission Medication History    Pertinent Provider Information: Jack was prescribed escitalopram, folic acid, gabapentin and two days of Keppra when discharged from the hospital earlier this month. He did complete the two days of Keppra, but has not been taking other newly prescribed medications because he has been drinking alcohol.     ______________________________________________________________________    Prior To Admission (PTA) med list completed and updated in EMR.       PTA Med List   Medication Sig Last Dose     escitalopram (LEXAPRO) 10 MG tablet Take 1 tablet (10 mg) by mouth daily Past Month     folic acid (FOLVITE) 1 MG tablet Take 1 tablet (1 mg) by mouth daily Past Month     gabapentin (NEURONTIN) 300 MG capsule Take 2 capsules (600 mg) by mouth every 8 hours      hydrOXYzine (VISTARIL) 50 MG capsule Take 1 capsule (50 mg) by mouth 3 times daily as needed for anxiety Past Week     ondansetron (ZOFRAN) 4 MG tablet Take 1 tablet (4 mg) by mouth every 8 hours as needed for nausea or vomiting      traZODone (DESYREL) 50 MG tablet Take 50 mg by mouth At Bedtime Past Week       Information source(s): Patient, Hospital records and [facility]  Method of interview communication: in-person    Summary of Changes to PTA Med List  New: none  Discontinued: none  Changed: none    Patient was asked about OTC/herbal products specifically.  PTA med list reflects this.    In the past week, patient estimated taking medication this percent of the time:  less than 50% due to other (EtOH).    Allergies were reviewed, assessed, and updated with the patient.      Patient does not use any multi-dose medications prior to admission.    The information provided in this note is only as accurate as the sources available at the time of the update(s).    Thank you for the opportunity to participate in the care of this patient.    Zahra Nayak RPH  6/18/2022 10:35 PM

## 2022-06-19 NOTE — ED PROVIDER NOTES
"EMERGENCY DEPARTMENT SIGN OUT NOTE        ED COURSE AND MEDICAL DECISION MAKING  36-year-old male with a history of alcohol abuse who presented to the ED for evaluation of alcohol withdrawals was checked out to me by Dr. Phelps.  The patient was initially admitted here at [facility].  However, due to a lack of bed availability the patient will need to be transferred to [facility].      The patient's case was discussed with the accepting hospitalist Dr. Webster at [facility] [place].  The patient was evaluated prior to transfer.  He was in agreement with the decision for transfer.  At the time of evaluation the patient was noted to be sleeping in the room.  He was given an additional dose of IV Ativan prior to transfer.     Patient was signed out to me by Dr Tim Phelps at 2:17  2:27 AM Spoke with Dr. Cha, [facility], they have accepted the patient for transfer  3:08 AM Met with patient and discussed plans for transfer. Patient is in agreement.     In brief, Nikhil Rios is a 36 year old male who initially presented for evaluation of withdrawal. Patient reports that he is currently going through alcohol withdrawals. He notes that he \"hates living like this and doesn't want to be this person.\" The patient states that he has been \"sipping\" alcohol today, but reports that he typically can drink 750mL of alcohol per day. The patient also endorses falling around the house. He is nauseous and \"wants to crawl out of his skin.\" He denies any vomiting, headache, or any other complaints at this time.     At time of sign out, disposition was pending transfer to [facility]    FINAL IMPRESSION    1. Alcoholic intoxication without complication (H)    2. Alcohol use disorder, severe, dependence (H)        ED MEDS  Medications   0.9% sodium chloride BOLUS (0 mLs Intravenous Stopped 6/18/22 2118)     Followed by   sodium chloride 0.9% infusion ( Intravenous New Bag 6/18/22 1953)   OLANZapine " zydis (zyPREXA) ODT tab 5-10 mg (has no administration in time range)     Or   haloperidol lactate (HALDOL) injection 2.5-5 mg (has no administration in time range)   flumazenil (ROMAZICON) injection 0.2 mg (has no administration in time range)   LORazepam (ATIVAN) tablet 1-2 mg (has no administration in time range)     Or   LORazepam (ATIVAN) injection 1-2 mg (has no administration in time range)   thiamine (B-1) tablet 100 mg (has no administration in time range)   folic acid (FOLVITE) tablet 1 mg (has no administration in time range)   multivitamin w/minerals (THERA-VIT-M) tablet 1 tablet (has no administration in time range)   escitalopram (LEXAPRO) tablet 10 mg (has no administration in time range)   gabapentin (NEURONTIN) capsule 600 mg (has no administration in time range)   hydrOXYzine (VISTARIL) capsule 50 mg (has no administration in time range)   traZODone (DESYREL) tablet 50 mg (50 mg Oral Given 6/19/22 0126)   ondansetron (ZOFRAN) tablet 4 mg (has no administration in time range)   melatonin tablet 5 mg (has no administration in time range)   ondansetron (ZOFRAN ODT) ODT tab 4 mg (has no administration in time range)     Or   ondansetron (ZOFRAN) injection 4 mg (has no administration in time range)   acetaminophen (TYLENOL) tablet 650 mg (has no administration in time range)     Or   acetaminophen (TYLENOL) Suppository 650 mg (has no administration in time range)   LORazepam (ATIVAN) tablet 1 mg (1 mg Oral Given 6/18/22 1757)   gabapentin (NEURONTIN) capsule 600 mg (600 mg Oral Given 6/18/22 2216)   LORazepam (ATIVAN) tablet 1 mg (1 mg Oral Given 6/18/22 2217)   sodium chloride 0.9 % 1,000 mL with Infuvite Adult 10 mL, thiamine 100 mg, folic acid 1 mg infusion ( Intravenous New Bag 6/19/22 0200)       LAB  Labs Ordered and Resulted from Time of ED Arrival to Time of ED Departure   COMPREHENSIVE METABOLIC PANEL - Abnormal       Result Value    Sodium 142      Potassium 4.2      Chloride 102      Carbon  Dioxide (CO2) 23      Anion Gap 17      Urea Nitrogen 13      Creatinine 0.78      Calcium 9.3      Glucose 96      Alkaline Phosphatase 76      AST 41 (*)     ALT 38      Protein Total 8.4 (*)     Albumin 4.3      Bilirubin Total 0.3      GFR Estimate >90     CBC WITH PLATELETS AND DIFFERENTIAL - Abnormal    WBC Count 6.0      RBC Count 4.63      Hemoglobin 13.5      Hematocrit 39.4 (*)     MCV 85      MCH 29.2      MCHC 34.3      RDW 18.2 (*)     Platelet Count 556 (*)     % Neutrophils 45      % Lymphocytes 43      % Monocytes 6      % Eosinophils 1      % Basophils 3      % Immature Granulocytes 2      NRBCs per 100 WBC 0      Absolute Neutrophils 2.7      Absolute Lymphocytes 2.7      Absolute Monocytes 0.4      Absolute Eosinophils 0.0      Absolute Basophils 0.2      Absolute Immature Granulocytes 0.1      Absolute NRBCs 0.0     ETHYL ALCOHOL LEVEL - Abnormal    Alcohol, Blood 469 (*)    COVID-19 VIRUS (CORONAVIRUS) BY PCR - Normal    SARS CoV2 PCR Negative     MAGNESIUM - Normal    Magnesium 2.2     PHOSPHORUS - Normal    Phosphorus 3.3     COVID-19 VIRUS (CORONAVIRUS) BY PCR     RADIOLOGY    Cervical spine CT w/o contrast   Final Result   IMPRESSION:   HEAD CT:   1.  No acute intracranial process.      CERVICAL SPINE CT:   1.  No CT evidence for acute fracture or post traumatic subluxation.      Head CT w/o contrast   Final Result   IMPRESSION:   HEAD CT:   1.  No acute intracranial process.      CERVICAL SPINE CT:   1.  No CT evidence for acute fracture or post traumatic subluxation.          DISCHARGE MEDS  Current Discharge Medication List            Naveen Manuel DO  Emergency Medicine  [facility] EMERGENCY DEPARTMENT  [address] [place] [address] [id number] [phone]       Aguila Manuel DO  06/19/22 0639

## 2022-06-19 NOTE — H&P
[facility]    History and Physical - Hospitalist Service       Date of Admission:  6/18/2022    Assessment & Plan      Nikhil Rios is a 36 year old male admitted on 6/18/2022. He came to the ED for evaluation of ongoing alcohol abuse.    1.  Alcohol intoxication, abuse, dependence  At risk for withdrawals, monitor  Continue gabapentin, thiamine, multivitamin, folic acid   evaluation, will need outpatient chemical dependency treatment    2.  Depression, anxiety  Restart escitalopram, was not taking after discharge  Will need outpatient follow-up         Diet: Regular Diet Adult    DVT Prophylaxis: Ambulate every shift  Mars Catheter: Not present  Central Lines: None  Cardiac Monitoring: None  Code Status: Full Code      Clinically Significant Risk Factors Present on Admission            # Anion Gap Metabolic Acidosis: AG = 17 mmol/L (Ref range: 5 - 18 mmol/L) on admission, will monitor and treat as appropriate           Disposition Plan   Expected Discharge:  1 day  Anticipated discharge location:  Awaiting care coordination huddle  Delays:    Potential alcohol withdrawals       The patient's care was discussed with the Patient.    Ernie Montoya MD  Hospitalist Service  [facility]  Securely message with the Vocera Web Console (learn more here)  Text page via [employer] Sign Paging/Directory         ______________________________________________________________________    Chief Complaint   Alcohol abuse    History is obtained from the patient, electronic health record and emergency department physician    History of Present Illness   Nikhil Rios is a 36 year old male who came to the ED for evaluation of ongoing alcohol abuse concerned about going into withdrawals.  Past medical history of alcohol abuse, dependence, withdrawals, depression, anxiety, fatty liver.  Patient was admitted to this hospital from 6/5 through 6/9/2022, initially intoxicated then treated  for withdrawals.  He was evaluated by addiction medicine and recommended gabapentin and Keppra.  Patient completed Keppra but did not take prescribed gabapentin or escitalopram.  He reports ongoing daily drinking of 750 mL of vodka.  He was seen in the ED on 6/16/2022 with similar situation and patient only wanted to go to [place] treatment, however there was no availability and he was discharged home.  He states he has a counselor through the [facility] and is supposed to get some appointments.  Patient has fallen at home and has an abrasion on the right forehead.  CT head and cervical spine were unremarkable.    Review of Systems    The 10 point Review of Systems is negative other than noted in the HPI or here.     Past Medical History    I have reviewed this patient's medical history and updated it with pertinent information if needed.   Past Medical History:   Diagnosis Date     Alcohol abuse      Alcohol abuse      Alcohol withdrawal (H)      Depressive disorder      HLD (hyperlipidemia)      HTN (hypertension)        Past Surgical History   I have reviewed this patient's surgical history and updated it with pertinent information if needed.  Past Surgical History:   Procedure Laterality Date     NO HISTORY OF SURGERY       OTHER SURGICAL HISTORY      none       Social History   I have reviewed this patient's social history and updated it with pertinent information if needed.  Social History     Tobacco Use     Smoking status: Current Some Day Smoker     Packs/day: 0.25     Types: Cigars     Smokeless tobacco: Never Used     Tobacco comment: occasional   Substance Use Topics     Alcohol use: Yes     Alcohol/week: 17.0 standard drinks     Types: 17 Shots of liquor per week     Comment: Drinks 750ml/day or 17 shots/day; hx of withdrawl seizures     Drug use: Not Currently       Family History   I have reviewed this patient's family history and updated it with pertinent information if needed.  Family  History   Problem Relation Age of Onset     Coronary Artery Disease Father        Prior to Admission Medications   Prior to Admission Medications   Prescriptions Last Dose Informant Patient Reported? Taking?   escitalopram (LEXAPRO) 10 MG tablet Past Month  No Yes   Sig: Take 1 tablet (10 mg) by mouth daily   folic acid (FOLVITE) 1 MG tablet Past Month  No Yes   Sig: Take 1 tablet (1 mg) by mouth daily   gabapentin (NEURONTIN) 300 MG capsule   No No   Sig: Take 2 capsules (600 mg) by mouth every 8 hours   gabapentin (NEURONTIN) 300 MG capsule   No Yes   Sig: Take 2 capsules (600 mg) by mouth every 8 hours   hydrOXYzine (VISTARIL) 50 MG capsule Past Week  No Yes   Sig: Take 1 capsule (50 mg) by mouth 3 times daily as needed for anxiety   ondansetron (ZOFRAN) 4 MG tablet   No Yes   Sig: Take 1 tablet (4 mg) by mouth every 8 hours as needed for nausea or vomiting   traZODone (DESYREL) 50 MG tablet Past Week  Yes Yes   Sig: Take 50 mg by mouth At Bedtime      Facility-Administered Medications: None     Allergies   Allergies   Allergen Reactions     Gramineae Pollens Itching     Pollen Extract Rash     grass tree pollen       Physical Exam   Vital Signs: Temp: 98.1  F (36.7  C) Temp src: Oral BP: (!) 149/85 Pulse: 105   Resp: 21 SpO2: 98 %      Weight: 170 lbs 0 oz    Constitutional: awake and alert  Respiratory: no increased work of breathing and good air exchange  Cardiovascular: regular rate and rhythm and normal S1 and S2  GI: normal bowel sounds and soft  Skin: Right forehead abrasion  Musculoskeletal: no lower extremity pitting edema present  there is no redness, warmth, or swelling of the joints  Neurologic: Mental Status Exam:  Level of Alertness:   awake  Orientation:   person, place, time  Neuropsychiatric: General: restless    Data   Data reviewed today: I reviewed all medications, new labs and imaging results over the last 24 hours. I personally reviewed no images or EKG's today.    Recent Labs   Lab  06/18/22  1754 06/16/22  2215   WBC 6.0  --    HGB 13.5  --    MCV 85  --    *  --     144   POTASSIUM 4.2 4.0   CHLORIDE 102 102   CO2 23 25   BUN 13 15   CR 0.78 0.80   ANIONGAP 17 17   KEELEY 9.3 9.2   GLC 96 105   ALBUMIN 4.3  --    PROTTOTAL 8.4*  --    BILITOTAL 0.3  --    ALKPHOS 76  --    ALT 38  --    AST 41*  --      Recent Results (from the past 24 hour(s))   Head CT w/o contrast    Narrative    EXAM: CT HEAD W/O CONTRAST, CT CERVICAL SPINE W/O CONTRAST  LOCATION: [facility]  DATE/TIME: 6/18/2022 6:48 PM    INDICATION: Alcohol abuse, history of frequent falls. Nausea.  COMPARISON: CT head 06/05/2022  TECHNIQUE:   1) Routine CT Head without IV contrast. Multiplanar reformats. Dose reduction techniques were used.  2) Routine CT Cervical Spine without IV contrast. Multiplanar reformats. Dose reduction techniques were used.    FINDINGS:   HEAD CT:   INTRACRANIAL CONTENTS: No intracranial hemorrhage, extraaxial collection, or mass effect.  No CT evidence of acute infarct. Mild presumed chronic small vessel ischemic changes. Normal ventricles and sulci.     VISUALIZED ORBITS/SINUSES/MASTOIDS: No intraorbital abnormality. No paranasal sinus mucosal disease. No middle ear or mastoid effusion.    BONES/SOFT TISSUES: No acute abnormality.    CERVICAL SPINE CT:   VERTEBRA: Normal vertebral body heights. No fracture or posttraumatic subluxation. Straightening of cervical lordosis.    CANAL/FORAMINA: Mild degenerative disc disease at C5-C6 and C6-C7. No high-grade central or foraminal stenosis.    PARASPINAL: No extraspinal abnormality. Visualized lung fields are clear.      Impression    IMPRESSION:  HEAD CT:  1.  No acute intracranial process.    CERVICAL SPINE CT:  1.  No CT evidence for acute fracture or post traumatic subluxation.   Cervical spine CT w/o contrast    Narrative    EXAM: CT HEAD W/O CONTRAST, CT CERVICAL SPINE W/O CONTRAST  LOCATION: [facility]  DATE/TIME: 6/18/2022 6:48 PM    INDICATION: Alcohol abuse, history of frequent falls. Nausea.  COMPARISON: CT head 06/05/2022  TECHNIQUE:   1) Routine CT Head without IV contrast. Multiplanar reformats. Dose reduction techniques were used.  2) Routine CT Cervical Spine without IV contrast. Multiplanar reformats. Dose reduction techniques were used.    FINDINGS:   HEAD CT:   INTRACRANIAL CONTENTS: No intracranial hemorrhage, extraaxial collection, or mass effect.  No CT evidence of acute infarct. Mild presumed chronic small vessel ischemic changes. Normal ventricles and sulci.     VISUALIZED ORBITS/SINUSES/MASTOIDS: No intraorbital abnormality. No paranasal sinus mucosal disease. No middle ear or mastoid effusion.    BONES/SOFT TISSUES: No acute abnormality.    CERVICAL SPINE CT:   VERTEBRA: Normal vertebral body heights. No fracture or posttraumatic subluxation. Straightening of cervical lordosis.    CANAL/FORAMINA: Mild degenerative disc disease at C5-C6 and C6-C7. No high-grade central or foraminal stenosis.    PARASPINAL: No extraspinal abnormality. Visualized lung fields are clear.      Impression    IMPRESSION:  HEAD CT:  1.  No acute intracranial process.    CERVICAL SPINE CT:  1.  No CT evidence for acute fracture or post traumatic subluxation.

## 2022-06-19 NOTE — PROGRESS NOTES
Care Management Note    Length of Stay (days): 0    Expected Discharge Date: 06/20/2022     Concerns to be Addressed:  High URR     Patient plan of care discussed at interdisciplinary rounds: Yes    Anticipated Discharge Disposition:  Home     Anticipated Discharge Services:    Anticipated Discharge DME:      Referrals Placed by CM/SW:    Private pay costs discussed: Not applicable    Additional Information:  Reviewed pt's status and discussed in rounds. Pt admitted with Alcohol withdrawal, noted to have unplanned readmission risk of 43%. SW will follow and assess for discharge needs when medically appropriate. Pt currently experiencing withdrawal symptoms.     If pt interested in Chemical dependency assessment/Rule 25, please consult CD.     Social work will continue to follow and assist with discharge planning as needed.    VICKY Augustine, LSW  Inpatient Care Coordination  [facility], [facility]  [phone]    VICKY Rhoades

## 2022-06-19 NOTE — H&P
Admitted: 06/19/2022    CHIEF COMPLAINT:  Alcohol withdrawal.    HISTORY OF PRESENT ILLNESS:  Mr. Rios is a 36-year-old male with a history of alcohol dependence, alcohol withdrawal, previous alcohol withdrawal seizure, alcoholic pancreatitis, who presented to [facility] Emergency Department on 06/18/2022 for concerns about alcohol withdrawal.  As [facility] did not have any beds available, direct admission was requested to [facility].    The patient has been experiencing nausea.  He has been falling.  He has significant tremors.  He reports his last drink was yesterday.  He normally drinks 750 mL to 1000 mL of vodka a day.  Recently, he has increased his alcohol intake.  He says that he understands he needs to quit drinking and would like some help with that.    The patient has been seen in the Emergency Department 10 times in the past 6 weeks for alcohol-related issues and was admitted 06/05/2022 through 06/09/2022 for alcohol withdrawal treatment, but apparently declined chemical dependency treatment at that time.    On arrival to the ER at [facility] yesterday afternoon, vital signs included blood pressure 153/84 with a heart rate of 109.  Temperature was afebrile.  Saturation 96% on room air.  On chart review, he was intermittently tachycardic at [facility] with modest elevation of his blood pressure into the 140 to low 150 range.  Lab work from [facility] notable for a CBC that was essentially normal, a complete metabolic panel notable for isolated AST elevation of 41, otherwise unremarkable.  Magnesium level was normal at 2.2.  Alcohol level was elevated at 469.  Phosphorus was normal at 3.3.  COVID-19 was negative.    Treatments provided at [facility] included IV fluids.  It appears that he received 2 liters of normal saline via bolus.  He also received a dose of gabapentin 600 mg once.  He received a dose of lorazepam 1 mg at approximately 3:30 a.m. this morning.  He had received additional  doses of lorazepam yesterday at around 6 p.m. and again a second dose of 1 mg around 10 p.m.  He also received a dose of trazodone 50 mg at around 1:30 a.m.    On arrival here to [facility], the patient is having significant tremors.  He denies any hallucinations.  He does report he has had alcohol withdrawal seizure twice in his lifetime.  He denies any other significant medical problems beyond alcohol-related issues.    PAST MEDICAL HISTORY:     1.  Alcohol dependence.  2.  Alcohol withdrawal.  3.  Numerous recent presentations to outside Emergency Department for alcohol-related issues, being seen 10 times in the past 6 weeks.  4.  Alcohol-related pancreatitis.  5.  Anxiety.  6.  Hepatic steatosis.    CURRENT MEDICATIONS:   Trazodone and hydralazine, both of which he takes for sleep.    ALLERGIES:  NO MEDICATION ALLERGIES.    FAMILY HISTORY:  Reviewed.  Nothing contributory to this admission.    SOCIAL HISTORY:  The patient currently is unemployed.  He is currently living with his mother.  Normally lives on his own.  Alcohol use includes approximately 1 liter of vodka a day.  Currently, he recently increased this.  Denies street drug use.  He does smoke cigarettes when he drinks alcohol.    REVIEW OF SYSTEMS:  See HPI for details.  Comprehensive greater than 10-point review of systems is otherwise negative, besides that detailed above.    PHYSICAL EXAMINATION:    VITAL SIGNS:  Blood pressure is currently 130/60 with a heart rate of 75.  No fever.  Saturation 93% on room air.  GENERAL:  The patient is quite tremulous.  When I entered the room, he was lying in bed.  As soon as he began to move his arms, he began to shake uncontrollably.  Not only did his arms shake, but his entire body was trembling at one point.  He was able to provide accurate historical information for me.  HEENT:  Head is atraumatic.  Sclerae white.  Eyelids normal.  Conjunctivae normal.  Extraocular movements are intact.  NECK:  Supple.   No cervical or supraclavicular lymphadenopathy.  CARDIOVASCULAR:  Regular rate and rhythm.  No significant murmurs.  No lower extremity edema.  LUNGS:  Clear to auscultation bilaterally.  No intercostal retractions.  No conversational dyspnea.  ABDOMEN:  Nontender, nondistended.  No masses.  No organomegaly.   EXTREMITIES:  No edema.  SKIN:  No rashes.  No jaundice.  Skin is dry to touch.  NEUROLOGIC:  Cranial nerves II through XII are intact.  Moves all extremities appropriately.  Sensation intact to light touch in the upper and lower extremities bilaterally.   PSYCHIATRIC:  The patient is awake, alert, appears to be oriented x 3.  He is able to provide accurate historical information.  He is quite tremulous as mentioned above and clearly having withdrawal symptoms.    LABORATORY AND IMAGING DATA:  Labs from [facility] reviewed above in HPI.    IMPRESSION:  Mr. Rios is a 36-year-old male with a history of alcohol dependence, previous alcohol withdrawal complicated by alcohol withdrawal seizure, anxiety, who presented to [facility] ER today for concerns about alcohol withdrawal.  He reports his last drink was yesterday.  Symptoms include nausea and tremors.  Because of lack of bed space at [facility], patient was directly admitted to [facility].    Workup at [facility] included labs that were rather unremarkable.  He received a total of 3 mg of lorazepam while at [facility] since his appearance 12 hours ago.    1.  Alcohol withdrawal.  2.  History of alcohol withdrawal seizure.  3.  Alcohol dependence with multiple appearances at [facility] Emergency Department in the past 6 weeks for alcohol-related issues.  4.  History of hepatic steatosis based on chart review.  Liver function tests near normal currently.  5.  Anxiety history.    PLAN:     1.  Admit to inpatient service.  Anticipate greater than 2 midnights in the hospital given his current degree of alcohol withdrawal.  2.  [facility] protocol will be  ordered.  3.  Thiamine and folate will be administered.  4.  Normal saline IV fluids with potassium will be administered.  5.  We will repeat labs this morning, including complete metabolic panel, magnesium, and phosphorus, as well as CBC.  6.  Replace electrolytes as needed.  7.  Recommend Chemical Dependency and Social Work consults after withdrawal symptoms have improved.  The patient is interested in receiving help for alcohol withdrawal cessation.  8.  Prophylaxis for DVT will include mechanical prophylaxis for now.  We will order Pepcid for GI prophylaxis.    Mane Evans MD        D: 2022   T: 2022   MT: MELISA    Name:     QUYEN QUIROZ  MRN:      [id number]        Account:     [id number]   :      1985           Admitted:    2022       Document: [id number]

## 2022-06-19 NOTE — ED NOTES
"Patient refusing to take ordered gabapentin. He said, \"I need more than that to calm me down.\" Provider updated.   "

## 2022-06-19 NOTE — PROGRESS NOTES
Nikhil Rios is a 36-year-old male with past medical history of alcohol dependence, severe alcohol withdrawal with prior seizures, and alcoholic pancreatitis who presented to [facility] as a transfer from [facility] emergency department on 6/19/2022.  He was admitted earlier this morning by my colleague, Mane Evans MD, for acute alcohol withdrawal.  Patient was started on CIWA protocol and vitamin/electrolyte replacement.  He has been requiring frequent Ativan dosing for continued withdrawals and is very tremulous on my examination this afternoon.  Given his history of complicated withdrawals with seizure activity, will transition from Ativan to diazepam for longer acting benzodiazepine coverage.  Continue other current cares.    Justin Guerrero MD  Hospitalist

## 2022-06-20 PROCEDURE — 250N000013 HC RX MED GY IP 250 OP 250 PS 637: Performed by: STUDENT IN AN ORGANIZED HEALTH CARE EDUCATION/TRAINING PROGRAM

## 2022-06-20 PROCEDURE — 99232 SBSQ HOSP IP/OBS MODERATE 35: CPT | Performed by: INTERNAL MEDICINE

## 2022-06-20 PROCEDURE — 250N000011 HC RX IP 250 OP 636: Performed by: INTERNAL MEDICINE

## 2022-06-20 PROCEDURE — 120N000001 HC R&B MED SURG/OB

## 2022-06-20 PROCEDURE — 250N000013 HC RX MED GY IP 250 OP 250 PS 637: Performed by: INTERNAL MEDICINE

## 2022-06-20 PROCEDURE — 250N000011 HC RX IP 250 OP 636: Performed by: STUDENT IN AN ORGANIZED HEALTH CARE EDUCATION/TRAINING PROGRAM

## 2022-06-20 RX ADMIN — FOLIC ACID 1 MG: 1 TABLET ORAL at 08:28

## 2022-06-20 RX ADMIN — DIAZEPAM 10 MG: 5 INJECTION INTRAMUSCULAR; INTRAVENOUS at 00:25

## 2022-06-20 RX ADMIN — FAMOTIDINE 20 MG: 20 TABLET ORAL at 08:28

## 2022-06-20 RX ADMIN — CLONIDINE HYDROCHLORIDE 0.1 MG: 0.1 TABLET ORAL at 14:01

## 2022-06-20 RX ADMIN — GABAPENTIN 900 MG: 300 CAPSULE ORAL at 21:54

## 2022-06-20 RX ADMIN — DIAZEPAM 10 MG: 5 INJECTION INTRAMUSCULAR; INTRAVENOUS at 11:04

## 2022-06-20 RX ADMIN — THIAMINE HCL TAB 100 MG 100 MG: 100 TAB at 08:28

## 2022-06-20 RX ADMIN — GABAPENTIN 900 MG: 300 CAPSULE ORAL at 14:01

## 2022-06-20 RX ADMIN — CLONIDINE HYDROCHLORIDE 0.1 MG: 0.1 TABLET ORAL at 06:35

## 2022-06-20 RX ADMIN — DIAZEPAM 10 MG: 5 INJECTION INTRAMUSCULAR; INTRAVENOUS at 12:27

## 2022-06-20 RX ADMIN — DIAZEPAM 10 MG: 10 TABLET ORAL at 04:24

## 2022-06-20 RX ADMIN — DIAZEPAM 10 MG: 10 TABLET ORAL at 16:24

## 2022-06-20 RX ADMIN — MULTIPLE VITAMINS W/ MINERALS TAB 1 TABLET: TAB at 08:28

## 2022-06-20 RX ADMIN — DIAZEPAM 10 MG: 5 INJECTION INTRAMUSCULAR; INTRAVENOUS at 08:27

## 2022-06-20 RX ADMIN — DIAZEPAM 10 MG: 5 INJECTION INTRAMUSCULAR; INTRAVENOUS at 06:35

## 2022-06-20 RX ADMIN — ONDANSETRON 4 MG: 4 TABLET, ORALLY DISINTEGRATING ORAL at 00:35

## 2022-06-20 RX ADMIN — GABAPENTIN 900 MG: 300 CAPSULE ORAL at 06:31

## 2022-06-20 RX ADMIN — DIAZEPAM 10 MG: 10 TABLET ORAL at 14:26

## 2022-06-20 RX ADMIN — DIAZEPAM 10 MG: 5 INJECTION INTRAMUSCULAR; INTRAVENOUS at 14:02

## 2022-06-20 RX ADMIN — FAMOTIDINE 20 MG: 20 TABLET ORAL at 21:55

## 2022-06-20 RX ADMIN — DIAZEPAM 10 MG: 10 TABLET ORAL at 01:11

## 2022-06-20 RX ADMIN — CLONIDINE HYDROCHLORIDE 0.1 MG: 0.1 TABLET ORAL at 21:55

## 2022-06-20 RX ADMIN — DIAZEPAM 10 MG: 5 INJECTION INTRAMUSCULAR; INTRAVENOUS at 03:42

## 2022-06-20 RX ADMIN — DIAZEPAM 5 MG: 5 INJECTION INTRAMUSCULAR; INTRAVENOUS at 21:48

## 2022-06-20 ASSESSMENT — ACTIVITIES OF DAILY LIVING (ADL)
ADLS_ACUITY_SCORE: 23
ADLS_ACUITY_SCORE: 20
ADLS_ACUITY_SCORE: 22
ADLS_ACUITY_SCORE: 22
ADLS_ACUITY_SCORE: 23
ADLS_ACUITY_SCORE: 20
ADLS_ACUITY_SCORE: 20
ADLS_ACUITY_SCORE: 23
ADLS_ACUITY_SCORE: 20
ADLS_ACUITY_SCORE: 22

## 2022-06-20 NOTE — PROGRESS NOTES
"[facility]  Hospitalist Progress Note  Jourdan Galvan MD 06/20/2022    Reason for Stay (Diagnosis): Alcohol withdrawal.         Assessment and Plan:      Summary of Stay: Nikhil Rios is a 36 year old male  with a history of alcohol dependence, previous alcohol withdrawal complicated by alcohol withdrawal seizure, anxiety, who presented to [facility] ER with a concerns about alcohol withdrawal.  He reports his last drink was 6/18. He developed tremors, nausea and to [facility] for admission.      1.  Alcohol dependence and withdrawal.  2.  History of alcohol withdrawal seizure.  -Patient had multiple presentations  to [facility] for alcohol-related.  -Currently on CIWA protocol, calm and cooperative, but has tremors.  -Denied any nausea at this time.  -Continue CIWA protocol and gabapentin possible.  -Watch for any sign of withdrawal.  -He has history of withdrawal related seizures in the past.  -He is not at the peak withdrawal time and still has risk of further deterioration.  -Social service and CD consult.  -Replace electrolytes per protocol.  -Monitor blood pressure    4.  History of hepatic steatosis based on chart review.    -Liver function tests near normal currently.  5.  Anxiety history.  This could also be related to substance abuse related mood disorder.  -Continue to treat for alcohol.  -Patient is on benzodiazepine as needed per protocol.      Clinically Significant Risk Factors Present on Admission             # Overweight: Estimated body mass index is 25.67 kg/m  as calculated from the following:    Height as of this encounter: 1.778 m (5' 10\").    Weight as of this encounter: 81.1 kg (178 lb 14.4 oz).         DVT Prophylaxis: Pneumatic Compression Devices  Code Status: Full Code  Discharge Dispo: Home  Estimated Disch Date / # of Days until Disch: 2-3 more days in the hospital  I discussed with patient at length the plan of care.  All his questions " "answered.        Interval History (Subjective):      Patient seen and examined, assumed care today, admitted with alcohol withdrawal, continue to have tremors, no hallucinations, no agitation cooperative and pleasant.                  Physical Exam:      Last Vital Signs:  BP (!) 142/86 (BP Location: Left arm)   Pulse 57   Temp 98  F (36.7  C) (Oral)   Resp 16   Ht 1.778 m (5' 10\")   Wt 81.1 kg (178 lb 14.4 oz)   SpO2 100%   BMI 25.67 kg/m      I/O last 3 completed shifts:  In: 650 [P.O.:650]  Out: -   Vitals:    06/19/22 0545   Weight: 81.1 kg (178 lb 14.4 oz)     Current Facility-Administered Medications   Medication     cloNIDine (CATAPRES) tablet 0.1 mg     diazepam (VALIUM) tablet 10 mg    Or     diazepam (VALIUM) injection 5-10 mg     famotidine (PEPCID) tablet 20 mg     flumazenil (ROMAZICON) injection 0.2 mg     folic acid (FOLVITE) tablet 1 mg     [START ON 6/26/2022] gabapentin (NEURONTIN) capsule 100 mg     [START ON 6/24/2022] gabapentin (NEURONTIN) capsule 300 mg     [START ON 6/22/2022] gabapentin (NEURONTIN) capsule 600 mg     gabapentin (NEURONTIN) capsule 900 mg     OLANZapine zydis (zyPREXA) ODT half-tab 5-10 mg    Or     haloperidol lactate (HALDOL) injection 2.5-5 mg     lidocaine (LMX4) cream     lidocaine 1 % 0.1-1 mL     melatonin tablet 5 mg     multivitamin w/minerals (THERA-VIT-M) tablet 1 tablet     ondansetron (ZOFRAN ODT) ODT tab 4 mg    Or     ondansetron (ZOFRAN) injection 4 mg     senna-docusate (SENOKOT-S/PERICOLACE) 8.6-50 MG per tablet 1 tablet    Or     senna-docusate (SENOKOT-S/PERICOLACE) 8.6-50 MG per tablet 2 tablet     sodium chloride (PF) 0.9% PF flush 3 mL     sodium chloride (PF) 0.9% PF flush 3 mL     thiamine (B-1) tablet 100 mg     Facility-Administered Medications Ordered in Other Encounters   Medication     Self Administer Medications: Behavioral Services       Constitutional: Awake, alert, cooperative, no apparent distress   Respiratory: Clear to auscultation " bilaterally, no crackles or wheezing   Cardiovascular: Regular rate and rhythm, normal S1 and S2, and no murmur noted   Abdomen: Normal bowel sounds, soft, non-distended, non-tender   Skin: No rashes, no cyanosis, dry to touch   Neuro: Alert and oriented x3, no weakness, numbness, memory loss, positive tremors   Extremities: No edema, normal range of motion   Other(s):HEENT  Pink, nonicteric, moist oral mucosa       All other systems: Negative          Medications:      All current medications were reviewed with changes reflected in problem list.         Data:      All new lab and imaging data was reviewed.   Labs:  Recent Labs   Lab 06/19/22 0608 06/18/22 1754   WBC 4.6 6.0   HGB 10.9* 13.5   HCT 32.9* 39.4*   MCV 86 85    556*     Recent Labs   Lab 06/19/22 0608 06/18/22 1754 06/16/22  2215    142 144   POTASSIUM 3.6 4.2 4.0   CHLORIDE 107 102 102   CO2 26 23 25   ANIONGAP 7 17 17   GLC 90 96 105   BUN 16 13 15   CR 0.62* 0.78 0.80   GFRESTIMATED >90 >90 >90   KEELEY 8.6 9.3 9.2   MAG 2.0 2.2  --    PHOS 3.6 3.3  --    PROTTOTAL 6.6* 8.4*  --    ALBUMIN 3.5 4.3  --    BILITOTAL 0.4 0.3  --    ALKPHOS 58 76  --    AST 23 41*  --    ALT 38 38  --      Recent Labs   Lab 06/19/22 0608 06/18/22  1754 06/16/22  2215   GLC 90 96 105      Imaging:   No results found for this or any previous visit (from the past 24 hour(s)).

## 2022-06-20 NOTE — PLAN OF CARE
Vital signs stable, with exception of BP being slightly elevated. Alert and orientated x4, occasional forgetfulness noted. Regular diet. Pt reported that he has had a decreased appetite since yesterday. CIWA scores: 17, 9, 16, 16, 7, 16, 9. PRN Valium given per CIWA scale PO 1x and IV 4x. Pt's primary symptoms today include anxiety, tremors, paroxysmal sweats, and intermittent headache and nausea. Linen and IV dressing changed 2x due to excessive perspiration. Discharge TBD will continue with POC.

## 2022-06-20 NOTE — PLAN OF CARE
Goal Outcome Evaluation:    CIWA 11, 4, 13, 6 and 11.  Valium 5mg IV  and 20mg PO given. Continue IV fluid  ml/h. Unsteady gait, BA on, SBA to BR. Continue current POC.

## 2022-06-20 NOTE — PLAN OF CARE
Aox4. Pt scoring on CIWA: 16, 11, 17, 12, 16 as of 06:46. Pt received valium per MAR. Pt scoring for tremors, anxiety, agitation, headache, and diaphoresis. Linen changed once on shift due to excessive perspiration. VSS on RA. SBA to bathroom and voiding well. PIV symptomatic and removed. Fluids stopped per MAR. New PIV L hand SL. Skin: scab on forehead and knee. Regular diet. Plan: continue w/ CIWA. Continue w/ POC.

## 2022-06-21 PROCEDURE — 250N000011 HC RX IP 250 OP 636: Performed by: STUDENT IN AN ORGANIZED HEALTH CARE EDUCATION/TRAINING PROGRAM

## 2022-06-21 PROCEDURE — 120N000001 HC R&B MED SURG/OB

## 2022-06-21 PROCEDURE — 250N000013 HC RX MED GY IP 250 OP 250 PS 637: Performed by: INTERNAL MEDICINE

## 2022-06-21 PROCEDURE — 99232 SBSQ HOSP IP/OBS MODERATE 35: CPT | Performed by: INTERNAL MEDICINE

## 2022-06-21 PROCEDURE — 250N000013 HC RX MED GY IP 250 OP 250 PS 637: Performed by: STUDENT IN AN ORGANIZED HEALTH CARE EDUCATION/TRAINING PROGRAM

## 2022-06-21 RX ORDER — ESCITALOPRAM OXALATE 10 MG/1
10 TABLET ORAL DAILY
Status: DISCONTINUED | OUTPATIENT
Start: 2022-06-21 | End: 2022-06-23 | Stop reason: HOSPADM

## 2022-06-21 RX ORDER — HYDROXYZINE HYDROCHLORIDE 50 MG/1
50 TABLET, FILM COATED ORAL 3 TIMES DAILY PRN
Status: DISCONTINUED | OUTPATIENT
Start: 2022-06-21 | End: 2022-06-23 | Stop reason: HOSPADM

## 2022-06-21 RX ORDER — TRAZODONE HYDROCHLORIDE 50 MG/1
50 TABLET, FILM COATED ORAL AT BEDTIME
Status: DISCONTINUED | OUTPATIENT
Start: 2022-06-21 | End: 2022-06-23 | Stop reason: HOSPADM

## 2022-06-21 RX ORDER — ONDANSETRON 4 MG/1
4 TABLET, FILM COATED ORAL EVERY 8 HOURS PRN
Status: DISCONTINUED | OUTPATIENT
Start: 2022-06-21 | End: 2022-06-21

## 2022-06-21 RX ADMIN — HYDROXYZINE HYDROCHLORIDE 50 MG: 50 TABLET, FILM COATED ORAL at 19:18

## 2022-06-21 RX ADMIN — TRAZODONE HYDROCHLORIDE 50 MG: 50 TABLET ORAL at 21:58

## 2022-06-21 RX ADMIN — DIAZEPAM 10 MG: 10 TABLET ORAL at 11:50

## 2022-06-21 RX ADMIN — DIAZEPAM 5 MG: 5 INJECTION INTRAMUSCULAR; INTRAVENOUS at 03:41

## 2022-06-21 RX ADMIN — FAMOTIDINE 20 MG: 20 TABLET ORAL at 08:44

## 2022-06-21 RX ADMIN — CLONIDINE HYDROCHLORIDE 0.1 MG: 0.1 TABLET ORAL at 14:59

## 2022-06-21 RX ADMIN — MULTIPLE VITAMINS W/ MINERALS TAB 1 TABLET: TAB at 08:44

## 2022-06-21 RX ADMIN — GABAPENTIN 900 MG: 300 CAPSULE ORAL at 06:18

## 2022-06-21 RX ADMIN — DIAZEPAM 5 MG: 5 INJECTION INTRAMUSCULAR; INTRAVENOUS at 05:41

## 2022-06-21 RX ADMIN — CLONIDINE HYDROCHLORIDE 0.1 MG: 0.1 TABLET ORAL at 06:19

## 2022-06-21 RX ADMIN — DIAZEPAM 5 MG: 5 INJECTION INTRAMUSCULAR; INTRAVENOUS at 06:19

## 2022-06-21 RX ADMIN — DIAZEPAM 10 MG: 5 INJECTION INTRAMUSCULAR; INTRAVENOUS at 00:18

## 2022-06-21 RX ADMIN — DIAZEPAM 5 MG: 5 INJECTION INTRAMUSCULAR; INTRAVENOUS at 01:38

## 2022-06-21 RX ADMIN — GABAPENTIN 900 MG: 300 CAPSULE ORAL at 14:59

## 2022-06-21 RX ADMIN — THIAMINE HCL TAB 100 MG 100 MG: 100 TAB at 08:44

## 2022-06-21 RX ADMIN — FAMOTIDINE 20 MG: 20 TABLET ORAL at 21:58

## 2022-06-21 RX ADMIN — DIAZEPAM 10 MG: 10 TABLET ORAL at 13:19

## 2022-06-21 RX ADMIN — DIAZEPAM 10 MG: 10 TABLET ORAL at 08:44

## 2022-06-21 RX ADMIN — CLONIDINE HYDROCHLORIDE 0.1 MG: 0.1 TABLET ORAL at 21:58

## 2022-06-21 RX ADMIN — GABAPENTIN 900 MG: 300 CAPSULE ORAL at 21:58

## 2022-06-21 RX ADMIN — DIAZEPAM 10 MG: 10 TABLET ORAL at 16:20

## 2022-06-21 RX ADMIN — FOLIC ACID 1 MG: 1 TABLET ORAL at 08:44

## 2022-06-21 RX ADMIN — ESCITALOPRAM OXALATE 10 MG: 10 TABLET ORAL at 14:59

## 2022-06-21 RX ADMIN — DIAZEPAM 5 MG: 5 INJECTION INTRAMUSCULAR; INTRAVENOUS at 00:57

## 2022-06-21 RX ADMIN — DIAZEPAM 10 MG: 10 TABLET ORAL at 17:09

## 2022-06-21 ASSESSMENT — ACTIVITIES OF DAILY LIVING (ADL)
ADLS_ACUITY_SCORE: 22

## 2022-06-21 NOTE — PLAN OF CARE
Vss LS clear. A&OX4. Up SBA. 30mg po valium given per CIWA protocol. Tolerated diet. Sleeping in between cares

## 2022-06-21 NOTE — PLAN OF CARE
Aox4. Pt denies pain. Scoring CIWA: 16, 14, 11, 12, 13, and 12  W/ Valium given per MAR. Pt reports tremors, anxiety, agitation, and excessive perspiration. Linens changed x1. Pt SBA to bathroom and voiding well. Pt unable to recall last BM. Last charted BM 6/19. Pt reports tingling in BUE and BLE and numbness in only LUE. Pt reports this is normal for him when he withdrawals. New PIV R hand SL. Regular diet. Plan: Continue w/ CIWA scoring.

## 2022-06-21 NOTE — PROGRESS NOTES
"[facility]  Hospitalist Progress Note  Jourdan Galvan MD 06/21/2022    Reason for Stay (Diagnosis): Alcohol withdrawal.         Assessment and Plan:      Summary of Stay: Nikhil Rios is a 36 year old male  with a history of alcohol dependence, previous alcohol withdrawal complicated by alcohol withdrawal seizure, anxiety, who presented to [facility] ER with a concerns about alcohol withdrawal.  He reports his last drink was 6/18. He developed tremors, nausea and to [facility] for admission.      1.  Alcohol dependence and withdrawal.  2.  History of alcohol withdrawal seizure.  -Patient had multiple presentations to [facility] for alcohol-related conditions.  -Currently on CIWA protocol, calm and cooperative, but has tremors.  -Denied any nausea at this time.  -Continue CIWA protocol and gabapentin.  -Watch for any sign of significant withdrawal.  -Patient is seeking IV Valium, but encouraged him to use oral dose which is more effective and stabilizes his symptoms better. Avoid IV dose as much as possible.  -He has history of withdrawal related seizures in the past.  -Last  ETOH use 6/18, approaching the peak withdrawal time in the next 24 hours and still has risk of  deterioration.  -Social service and CD consult.  -Replace electrolytes per protocol.  -Monitor blood pressure    4.  History of hepatic steatosis based on chart review.    -Liver function tests near normal currently.  5.  Anxiety history.  This could also be related to substance abuse related mood disorder.  -Continue to treat for alcohol.  -Patient is on benzodiazepine as needed per protocol.      Clinically Significant Risk Factors Present on Admission             # Overweight: Estimated body mass index is 25.67 kg/m  as calculated from the following:    Height as of this encounter: 1.778 m (5' 10\").    Weight as of this encounter: 81.1 kg (178 lb 14.4 oz).         DVT Prophylaxis: Pneumatic Compression " "Devices  Code Status: Full Code  Discharge Dispo: Home  Estimated Disch Date / # of Days until Disch: 2 more days in the hospital.  I discussed with patient at length the plan of care. I also discussed with the RN.  All his questions answered.        Interval History (Subjective):      Patient seen and examined, no new issues, sleeping this morning and was comfortable. After I woke him up, he started to have hand tremors right more than left side. No hallucinations, no agitations,  cooperative and pleasant. Asking for IV valium.                  Physical Exam:      Last Vital Signs:  /73 (BP Location: Left arm)   Pulse 58   Temp 97.9  F (36.6  C) (Oral)   Resp 16   Ht 1.778 m (5' 10\")   Wt 81.1 kg (178 lb 14.4 oz)   SpO2 97%   BMI 25.67 kg/m      I/O last 3 completed shifts:  In: 1403 [P.O.:1400; I.V.:3]  Out: -   Vitals:    06/19/22 0545   Weight: 81.1 kg (178 lb 14.4 oz)     Current Facility-Administered Medications   Medication     cloNIDine (CATAPRES) tablet 0.1 mg     diazepam (VALIUM) tablet 10 mg    Or     diazepam (VALIUM) injection 5-10 mg     famotidine (PEPCID) tablet 20 mg     flumazenil (ROMAZICON) injection 0.2 mg     folic acid (FOLVITE) tablet 1 mg     [START ON 6/26/2022] gabapentin (NEURONTIN) capsule 100 mg     [START ON 6/24/2022] gabapentin (NEURONTIN) capsule 300 mg     [START ON 6/22/2022] gabapentin (NEURONTIN) capsule 600 mg     gabapentin (NEURONTIN) capsule 900 mg     OLANZapine zydis (zyPREXA) ODT half-tab 5-10 mg    Or     haloperidol lactate (HALDOL) injection 2.5-5 mg     lidocaine (LMX4) cream     lidocaine 1 % 0.1-1 mL     melatonin tablet 5 mg     multivitamin w/minerals (THERA-VIT-M) tablet 1 tablet     ondansetron (ZOFRAN ODT) ODT tab 4 mg    Or     ondansetron (ZOFRAN) injection 4 mg     senna-docusate (SENOKOT-S/PERICOLACE) 8.6-50 MG per tablet 1 tablet    Or     senna-docusate (SENOKOT-S/PERICOLACE) 8.6-50 MG per tablet 2 tablet     sodium chloride (PF) 0.9% PF " flush 3 mL     sodium chloride (PF) 0.9% PF flush 3 mL     thiamine (B-1) tablet 100 mg     Facility-Administered Medications Ordered in Other Encounters   Medication     Self Administer Medications: Behavioral Services       Constitutional: Awake, alert, cooperative, no apparent distress   Respiratory: Clear to auscultation bilaterally, no crackles or wheezing   Cardiovascular: Regular rate and rhythm, normal S1 and S2, and no murmur noted   Abdomen: Normal bowel sounds, soft, non-distended, non-tender   Skin: No rashes, no cyanosis, dry to touch   Neuro: Alert and oriented x3, no weakness, numbness, memory loss, positive tremors   Extremities: No edema, normal range of motion, + tremors UE   Other(s):HEENT Pink, nonicteric, moist oral mucosa       All other systems: Negative          Medications:      All current medications were reviewed with changes reflected in problem list.         Data:      All new lab and imaging data was reviewed.   Labs:  Recent Labs   Lab 06/19/22 0608 06/18/22 1754   WBC 4.6 6.0   HGB 10.9* 13.5   HCT 32.9* 39.4*   MCV 86 85    556*     Recent Labs   Lab 06/19/22 0608 06/18/22  1754 06/16/22  2215    142 144   POTASSIUM 3.6 4.2 4.0   CHLORIDE 107 102 102   CO2 26 23 25   ANIONGAP 7 17 17   GLC 90 96 105   BUN 16 13 15   CR 0.62* 0.78 0.80   GFRESTIMATED >90 >90 >90   KEELEY 8.6 9.3 9.2   MAG 2.0 2.2  --    PHOS 3.6 3.3  --    PROTTOTAL 6.6* 8.4*  --    ALBUMIN 3.5 4.3  --    BILITOTAL 0.4 0.3  --    ALKPHOS 58 76  --    AST 23 41*  --    ALT 38 38  --      Recent Labs   Lab 06/19/22 0608 06/18/22  1754 06/16/22  2215   GLC 90 96 105      Imaging:   No results found for this or any previous visit (from the past 24 hour(s)).

## 2022-06-21 NOTE — PLAN OF CARE
Goal Outcome Evaluation:      Admitting Diagnosis: ETOH  Pertinent History: Alcohol dependency, alcohol withdrawal, seizure, anxiety.  For vitals and assessment please see flow sheet.   Living Situation:home with family.   Pain plan: denies pain  Mobility: assistance, stand-by  Baseline activity: Independent.  Alarms/Safety:BA.   LDA's: PIV.   Pertinent test results: K+ 3.6, Mg+ 2.0, Cr: 0.62.  Consults: None   Abnormals/Pending: Hgb: 10.9  Other Cares/Comments:A & O x 4, VSS, LS clear, O2  94% RA, CIWA protocol 8, 5, 10. PO & IV valium given.   Discharge Disposition: 2-3 days.   Discharge Time: TBD.

## 2022-06-22 PROCEDURE — 120N000001 HC R&B MED SURG/OB

## 2022-06-22 PROCEDURE — 250N000013 HC RX MED GY IP 250 OP 250 PS 637: Performed by: INTERNAL MEDICINE

## 2022-06-22 PROCEDURE — 250N000011 HC RX IP 250 OP 636: Performed by: INTERNAL MEDICINE

## 2022-06-22 PROCEDURE — 250N000013 HC RX MED GY IP 250 OP 250 PS 637: Performed by: STUDENT IN AN ORGANIZED HEALTH CARE EDUCATION/TRAINING PROGRAM

## 2022-06-22 PROCEDURE — 99232 SBSQ HOSP IP/OBS MODERATE 35: CPT | Performed by: STUDENT IN AN ORGANIZED HEALTH CARE EDUCATION/TRAINING PROGRAM

## 2022-06-22 RX ORDER — CALCIUM CARBONATE 500 MG/1
500 TABLET, CHEWABLE ORAL DAILY PRN
Status: DISCONTINUED | OUTPATIENT
Start: 2022-06-22 | End: 2022-06-23 | Stop reason: HOSPADM

## 2022-06-22 RX ADMIN — CALCIUM CARBONATE (ANTACID) CHEW TAB 500 MG 500 MG: 500 CHEW TAB at 11:47

## 2022-06-22 RX ADMIN — TRAZODONE HYDROCHLORIDE 50 MG: 50 TABLET ORAL at 22:30

## 2022-06-22 RX ADMIN — DIAZEPAM 10 MG: 10 TABLET ORAL at 00:42

## 2022-06-22 RX ADMIN — FAMOTIDINE 20 MG: 20 TABLET ORAL at 22:31

## 2022-06-22 RX ADMIN — HYDROXYZINE HYDROCHLORIDE 50 MG: 50 TABLET, FILM COATED ORAL at 08:29

## 2022-06-22 RX ADMIN — DIAZEPAM 10 MG: 10 TABLET ORAL at 19:54

## 2022-06-22 RX ADMIN — FOLIC ACID 1 MG: 1 TABLET ORAL at 08:22

## 2022-06-22 RX ADMIN — THIAMINE HCL TAB 100 MG 100 MG: 100 TAB at 08:22

## 2022-06-22 RX ADMIN — DIAZEPAM 10 MG: 10 TABLET ORAL at 06:05

## 2022-06-22 RX ADMIN — CLONIDINE HYDROCHLORIDE 0.1 MG: 0.1 TABLET ORAL at 14:00

## 2022-06-22 RX ADMIN — ESCITALOPRAM OXALATE 10 MG: 10 TABLET ORAL at 08:22

## 2022-06-22 RX ADMIN — ONDANSETRON 4 MG: 4 TABLET, ORALLY DISINTEGRATING ORAL at 11:53

## 2022-06-22 RX ADMIN — MULTIPLE VITAMINS W/ MINERALS TAB 1 TABLET: TAB at 08:22

## 2022-06-22 RX ADMIN — GABAPENTIN 600 MG: 300 CAPSULE ORAL at 14:00

## 2022-06-22 RX ADMIN — GABAPENTIN 900 MG: 300 CAPSULE ORAL at 06:05

## 2022-06-22 RX ADMIN — CLONIDINE HYDROCHLORIDE 0.1 MG: 0.1 TABLET ORAL at 22:31

## 2022-06-22 RX ADMIN — DIAZEPAM 10 MG: 10 TABLET ORAL at 11:53

## 2022-06-22 RX ADMIN — GABAPENTIN 600 MG: 300 CAPSULE ORAL at 22:30

## 2022-06-22 RX ADMIN — DIAZEPAM 10 MG: 10 TABLET ORAL at 22:39

## 2022-06-22 RX ADMIN — FAMOTIDINE 20 MG: 20 TABLET ORAL at 08:22

## 2022-06-22 RX ADMIN — HYDROXYZINE HYDROCHLORIDE 50 MG: 50 TABLET, FILM COATED ORAL at 19:54

## 2022-06-22 RX ADMIN — CLONIDINE HYDROCHLORIDE 0.1 MG: 0.1 TABLET ORAL at 06:05

## 2022-06-22 ASSESSMENT — ACTIVITIES OF DAILY LIVING (ADL)
ADLS_ACUITY_SCORE: 22

## 2022-06-22 NOTE — PLAN OF CARE
Goal Outcome Evaluation:     VS stable. No complaints of pain. CIWA scores of 12,5, 12 and valium given as prescribed. Slept well throughout the night.

## 2022-06-22 NOTE — PLAN OF CARE
Goal Outcome Evaluation:      Admitting Diagnosis: ETOH  Pertinent History: Alcohol dependency, alcohol withdrawal, seizure, anxiety.  For vitals and assessment please see flow sheet.   Living Situation:home with family.   Pain plan: denies pain  Mobility: assistance, stand-by  Baseline activity: Independent.  Alarms/Safety:BA.   LDA's: PIV.   Pertinent test results: K+ 3.6, Mg+ 2.0, Cr: 0.62.  Consults: None   Abnormals/Pending:  Other Cares/Comments:A & O x 4, VSS, LS clear, O2  94% RA, CIWA protocol 10,8,6, 3, PO Valium 10 mg x 2 given per CIWA protocol. For anxiety, prn Atarax x1 given.  Discharge Disposition: 2-3 days.   Discharge Time: TBD.

## 2022-06-22 NOTE — PROGRESS NOTES
Epic downtime from 6/22/22 0200 to 6/22/22 0600. RN entered CIWA documentation in computer done during downtime.

## 2022-06-22 NOTE — PROGRESS NOTES
"[facility]  Hospitalist Progress Note    Severo Roque MD 06/22/2022    Reason for Stay (Diagnosis): Alcohol withdrawal.         Assessment and Plan:        Summary of Stay: Nikhil Rios is a 36 year old male  with a history of alcohol dependence, previous alcohol withdrawal complicated by alcohol withdrawal seizure, anxiety, who presented to [facility] ER with a concerns about alcohol withdrawal.  He reports his last drink was 6/18. He developed tremors, nausea and to [facility] for admission.      1.  Alcohol dependence and withdrawal.  2.  History of alcohol withdrawal seizure.  -Patient had multiple presentations to [facility] for alcohol-related conditions.  Plan:  -Currently on CIWA protocol, calm and cooperative, but has tremors.  -Denied any nausea at this time.  -Continue CIWA protocol and gabapentin.  -Watch for any sign of significant withdrawal.  -Patient is seeking IV Valium, but encouraged him to use oral dose which is more effective and stabilizes his symptoms better. Avoid IV dose as much as possible.  -He has history of withdrawal related seizures in the past.  -Last  ETOH use 6/18.  -Social service and CD consulted.  -Replace electrolytes per protocol.  -Monitor blood pressure    4.  History of hepatic steatosis based on chart review.    -Liver function tests near normal currently.    5.  Anxiety history.  This could also be related to substance abuse related mood disorder.  -Continue to treat for alcohol.  -Patient is on benzodiazepine as needed per protocol.      Clinically Significant Risk Factors Present on Admission             # Overweight: Estimated body mass index is 25.67 kg/m  as calculated from the following:    Height as of this encounter: 1.778 m (5' 10\").    Weight as of this encounter: 81.1 kg (178 lb 14.4 oz).         DVT Prophylaxis: Pneumatic Compression Devices  Code Status: Full Code  Discharge Dispo: Home  Estimated Disch Date / # of Days " "until Disch: 2 more days in the hospital.  I discussed with patient at length the plan of care. I also discussed with the RN.  All his questions answered.        Interval History (Subjective):        Patient seen and examined, no new issues  Tremors still present. No fevers or chills. No CP/SOB. No hallucinations, no agitations,  cooperative and pleasant.   No new complaints, reports he is currently living with his mother.          Physical Exam:      Last Vital Signs:  BP 95/69 (BP Location: Left arm)   Pulse 67   Temp 98  F (36.7  C) (Oral)   Resp 16   Ht 1.778 m (5' 10\")   Wt 81.1 kg (178 lb 14.4 oz)   SpO2 98%   BMI 25.67 kg/m      I/O last 3 completed shifts:  In: 240 [P.O.:240]  Out: -   Vitals:    06/19/22 0545   Weight: 81.1 kg (178 lb 14.4 oz)     Current Facility-Administered Medications   Medication     cloNIDine (CATAPRES) tablet 0.1 mg     diazepam (VALIUM) tablet 10 mg    Or     diazepam (VALIUM) injection 5-10 mg     escitalopram (LEXAPRO) tablet 10 mg     famotidine (PEPCID) tablet 20 mg     flumazenil (ROMAZICON) injection 0.2 mg     folic acid (FOLVITE) tablet 1 mg     [START ON 6/26/2022] gabapentin (NEURONTIN) capsule 100 mg     [START ON 6/24/2022] gabapentin (NEURONTIN) capsule 300 mg     gabapentin (NEURONTIN) capsule 600 mg     OLANZapine zydis (zyPREXA) ODT half-tab 5-10 mg    Or     haloperidol lactate (HALDOL) injection 2.5-5 mg     hydrOXYzine (ATARAX) tablet 50 mg     lidocaine (LMX4) cream     lidocaine 1 % 0.1-1 mL     melatonin tablet 5 mg     multivitamin w/minerals (THERA-VIT-M) tablet 1 tablet     ondansetron (ZOFRAN ODT) ODT tab 4 mg    Or     ondansetron (ZOFRAN) injection 4 mg     senna-docusate (SENOKOT-S/PERICOLACE) 8.6-50 MG per tablet 1 tablet    Or     senna-docusate (SENOKOT-S/PERICOLACE) 8.6-50 MG per tablet 2 tablet     sodium chloride (PF) 0.9% PF flush 3 mL     sodium chloride (PF) 0.9% PF flush 3 mL     thiamine (B-1) tablet 100 mg     traZODone (DESYREL) " tablet 50 mg     Facility-Administered Medications Ordered in Other Encounters   Medication     Self Administer Medications: Behavioral Services       Constitutional: Awake, alert, cooperative, no apparent distress   Respiratory: Clear to auscultation bilaterally, no crackles or wheezing   Cardiovascular: Regular rate and rhythm, normal S1 and S2, and no murmur noted   Abdomen: Normal bowel sounds, soft, non-distended, non-tender   Skin: No rashes, no cyanosis, dry to touch   Neuro: Alert and oriented x3, no weakness, numbness, memory loss, positive tremors   Extremities: No edema, normal range of motion, + tremors UE   Other(s):HEENT Pink, nonicteric, moist oral mucosa       All other systems: Negative          Medications:      All current medications were reviewed with changes reflected in problem list.         Data:      All new lab and imaging data was reviewed.   Labs:  Recent Labs   Lab 06/19/22 0608 06/18/22 1754   WBC 4.6 6.0   HGB 10.9* 13.5   HCT 32.9* 39.4*   MCV 86 85    556*     Recent Labs   Lab 06/19/22 0608 06/18/22 1754 06/16/22  2215    142 144   POTASSIUM 3.6 4.2 4.0   CHLORIDE 107 102 102   CO2 26 23 25   ANIONGAP 7 17 17   GLC 90 96 105   BUN 16 13 15   CR 0.62* 0.78 0.80   GFRESTIMATED >90 >90 >90   KEELEY 8.6 9.3 9.2   MAG 2.0 2.2  --    PHOS 3.6 3.3  --    PROTTOTAL 6.6* 8.4*  --    ALBUMIN 3.5 4.3  --    BILITOTAL 0.4 0.3  --    ALKPHOS 58 76  --    AST 23 41*  --    ALT 38 38  --      Recent Labs   Lab 06/19/22 0608 06/18/22  1754 06/16/22  2215   GLC 90 96 105      Imaging:   No results found for this or any previous visit (from the past 24 hour(s)).

## 2022-06-23 ENCOUNTER — HOSPITAL ENCOUNTER (EMERGENCY)
Facility: HOSPITAL | Age: 37
Discharge: HOME | End: 2022-06-23
Attending: EMERGENCY MEDICINE | Admitting: EMERGENCY MEDICINE
Payer: COMMERCIAL

## 2022-06-23 VITALS
RESPIRATION RATE: 18 BRPM | DIASTOLIC BLOOD PRESSURE: 57 MMHG | SYSTOLIC BLOOD PRESSURE: 107 MMHG | HEART RATE: 52 BPM | TEMPERATURE: 97.9 F | WEIGHT: 178.9 LBS | HEIGHT: 70 IN | OXYGEN SATURATION: 97 % | BODY MASS INDEX: 25.61 KG/M2

## 2022-06-23 DIAGNOSIS — F10.29 ALCOHOL DEPENDENCE WITH UNSPECIFIED ALCOHOL-INDUCED DISORDER (H): ICD-10-CM

## 2022-06-23 LAB
ALBUMIN SERPL-MCNC: 3.5 G/DL (ref 3.4–5)
ALP SERPL-CCNC: 59 U/L (ref 40–150)
ALT SERPL W P-5'-P-CCNC: 37 U/L (ref 0–70)
ANION GAP SERPL CALCULATED.3IONS-SCNC: 5 MMOL/L (ref 3–14)
AST SERPL W P-5'-P-CCNC: 18 U/L (ref 0–45)
BILIRUB SERPL-MCNC: 0.6 MG/DL (ref 0.2–1.3)
BUN SERPL-MCNC: 20 MG/DL (ref 7–30)
CALCIUM SERPL-MCNC: 9.1 MG/DL (ref 8.5–10.1)
CHLORIDE BLD-SCNC: 106 MMOL/L (ref 94–109)
CO2 SERPL-SCNC: 25 MMOL/L (ref 20–32)
CREAT SERPL-MCNC: 0.86 MG/DL (ref 0.66–1.25)
ERYTHROCYTE [DISTWIDTH] IN BLOOD BY AUTOMATED COUNT: 16.7 % (ref 10–15)
GFR SERPL CREATININE-BSD FRML MDRD: >90 ML/MIN/1.73M2
GLUCOSE BLD-MCNC: 101 MG/DL (ref 70–99)
HCT VFR BLD AUTO: 39.7 % (ref 40–53)
HGB BLD-MCNC: 13 G/DL (ref 13.3–17.7)
MCH RBC QN AUTO: 29.3 PG (ref 26.5–33)
MCHC RBC AUTO-ENTMCNC: 32.7 G/DL (ref 31.5–36.5)
MCV RBC AUTO: 90 FL (ref 78–100)
PLATELET # BLD AUTO: 268 10E3/UL (ref 150–450)
POTASSIUM BLD-SCNC: 4 MMOL/L (ref 3.4–5.3)
PROT SERPL-MCNC: 6.9 G/DL (ref 6.8–8.8)
RBC # BLD AUTO: 4.43 10E6/UL (ref 4.4–5.9)
SODIUM SERPL-SCNC: 136 MMOL/L (ref 133–144)
WBC # BLD AUTO: 10.1 10E3/UL (ref 4–11)

## 2022-06-23 PROCEDURE — 250N000013 HC RX MED GY IP 250 OP 250 PS 637: Performed by: INTERNAL MEDICINE

## 2022-06-23 PROCEDURE — 250N000013 HC RX MED GY IP 250 OP 250 PS 637: Performed by: STUDENT IN AN ORGANIZED HEALTH CARE EDUCATION/TRAINING PROGRAM

## 2022-06-23 PROCEDURE — 99239 HOSP IP/OBS DSCHRG MGMT >30: CPT | Performed by: STUDENT IN AN ORGANIZED HEALTH CARE EDUCATION/TRAINING PROGRAM

## 2022-06-23 PROCEDURE — 36415 COLL VENOUS BLD VENIPUNCTURE: CPT | Performed by: STUDENT IN AN ORGANIZED HEALTH CARE EDUCATION/TRAINING PROGRAM

## 2022-06-23 PROCEDURE — 85027 COMPLETE CBC AUTOMATED: CPT | Performed by: STUDENT IN AN ORGANIZED HEALTH CARE EDUCATION/TRAINING PROGRAM

## 2022-06-23 PROCEDURE — 80053 COMPREHEN METABOLIC PANEL: CPT | Performed by: STUDENT IN AN ORGANIZED HEALTH CARE EDUCATION/TRAINING PROGRAM

## 2022-06-23 PROCEDURE — 250N000013 HC RX MED GY IP 250 OP 250 PS 637: Performed by: EMERGENCY MEDICINE

## 2022-06-23 PROCEDURE — 99283 EMERGENCY DEPT VISIT LOW MDM: CPT

## 2022-06-23 RX ORDER — GABAPENTIN 300 MG/1
600 CAPSULE ORAL EVERY 8 HOURS
Qty: 60 CAPSULE | Refills: 0 | Status: SHIPPED | OUTPATIENT
Start: 2022-06-23 | End: 2023-05-22

## 2022-06-23 RX ORDER — DIAZEPAM 5 MG
5 TABLET ORAL ONCE
Status: COMPLETED | OUTPATIENT
Start: 2022-06-23 | End: 2022-06-23

## 2022-06-23 RX ORDER — DIAZEPAM 5 MG
5 TABLET ORAL EVERY 6 HOURS PRN
Qty: 6 TABLET | Refills: 0 | Status: SHIPPED | OUTPATIENT
Start: 2022-06-23 | End: 2023-05-22

## 2022-06-23 RX ADMIN — GABAPENTIN 600 MG: 300 CAPSULE ORAL at 05:22

## 2022-06-23 RX ADMIN — ESCITALOPRAM OXALATE 10 MG: 10 TABLET ORAL at 10:23

## 2022-06-23 RX ADMIN — DIAZEPAM 10 MG: 10 TABLET ORAL at 05:26

## 2022-06-23 RX ADMIN — CLONIDINE HYDROCHLORIDE 0.1 MG: 0.1 TABLET ORAL at 05:22

## 2022-06-23 RX ADMIN — DIAZEPAM 5 MG: 5 TABLET ORAL at 23:40

## 2022-06-23 RX ADMIN — MULTIPLE VITAMINS W/ MINERALS TAB 1 TABLET: TAB at 10:23

## 2022-06-23 RX ADMIN — FAMOTIDINE 20 MG: 20 TABLET ORAL at 10:23

## 2022-06-23 RX ADMIN — DIAZEPAM 10 MG: 10 TABLET ORAL at 00:21

## 2022-06-23 RX ADMIN — THIAMINE HCL TAB 100 MG 100 MG: 100 TAB at 10:23

## 2022-06-23 RX ADMIN — FOLIC ACID 1 MG: 1 TABLET ORAL at 10:23

## 2022-06-23 ASSESSMENT — ACTIVITIES OF DAILY LIVING (ADL)
ADLS_ACUITY_SCORE: 22

## 2022-06-23 NOTE — PLAN OF CARE
Goal Outcome Evaluation:      VS stable. No complaints of pain. CIWA scores of 10, 5, 9, 6 and valium given as prescribed. Slept well throughout the night.

## 2022-06-23 NOTE — PLAN OF CARE
Reviewed discharge instructions with the pt. Pt verbalized his understanding and had no questions at this time. Hard script copy for Valium and Gabapentin was provided to pt. Pt retrieved all of his personal belongings. Pts mother has arrived to take pt home via her personal vehicle. Pt left [facility] unit at 1310.

## 2022-06-23 NOTE — PROGRESS NOTES
VSS. Denies pain. 10mg po valium given this shift per ciwa protocol. Atarax given x1 for anxiety with no relief per patient, states valium helps a little. Tums and po zofran given for indigestion with relief. Tolerating regular diet and voiding. Plan for possible discharge home tomorrow. Will continue to monitor.

## 2022-06-23 NOTE — PROGRESS NOTES
Assumed care at 1930. A&OX4. LS clear. VSS oxygen 98% on room air. Up with assist of 1 with gait belt to bathroom. Continue to monitor.

## 2022-06-23 NOTE — PLAN OF CARE
A/Ox4. CIWA score 8 for tremors, anxiety, light sensitivity, and mild numbness and tingling. LS clear, on RA. Gave PRN atarax for anxiety. Cared for pt from [id number].

## 2022-06-23 NOTE — DISCHARGE SUMMARY
[facility]  Hospitalist Discharge Summary      Date of Admission:  6/19/2022  Date of Discharge:  6/23/2022  Discharging Provider: Severo Roque MD  Discharge Service: Hospitalist Service    Discharge Diagnoses     Alcohol dependence and withdrawal.    Follow-ups Needed After Discharge   Follow-up Appointments     Follow-up and recommended labs and tests       Follow up with primary care provider, Lisandro Graham, within 7 days for   hospital follow- up.  No follow up labs or test are needed.             Unresulted Labs Ordered in the Past 30 Days of this Admission     No orders found from 5/20/2022 to 6/20/2022.        Discharge Disposition     Discharged to home  Condition at discharge: Stable    Hospital Course      Summary of Stay: Nikhil Rios is a 36 year old male  with a history of alcohol dependence, previous alcohol withdrawal complicated by alcohol withdrawal seizure, anxiety, who presented to [facility] ER with a concerns about alcohol withdrawal.  He reports his last drink was 6/18. He developed tremors, nausea and to [facility] for admission.      1.  Alcohol dependence and withdrawal.  2.  History of alcohol withdrawal seizure.  -Patient had multiple presentations to [facility] for alcohol-related conditions. Last  ETOH use 6/18. Tremors resolved and no fevers or chills. No CP/SOB. No hallucinations, no agitations,  cooperative and pleasant.   Plan:  -improved with CIWA and supportive cares, IVF etc  -Continue PTA Gabapentin    4.  History of hepatic steatosis based on chart review.    -Liver function tests near normal currently.     5.  Anxiety / Insomnia.  This could also be related to substance abuse related mood disorder.  -Continue PTA hydroxyzine / Lexapro  -Continue PTA Trazodone       Consultations This Hospital Stay   None    Code Status   Full Code    Time Spent on this Encounter   I, Severo Roque MD, personally saw the patient today and spent  greater than 30 minutes discharging this patient.       Severo Roque MD  [place] [address] MEDICAL SURGICAL  [address]  [facility] [address]  Phone: [phone]  Fax: [phone]  ______________________________________________________________________    Physical Exam   Vital Signs: Temp: 97.9  F (36.6  C) Temp src: Oral BP: 107/57 Pulse: 52   Resp: 18 SpO2: 97 % O2 Device: None (Room air)    Weight: 178 lbs 14.4 oz       Primary Care Physician   Lisandro Graham    Discharge Orders      Reason for your hospital stay    You had alcohol withdrawal symptoms.     Follow-up and recommended labs and tests     Follow up with primary care provider, Lisandro Graham, within 7 days for hospital follow- up.  No follow up labs or test are needed.     Activity    Your activity upon discharge: activity as tolerated     Diet    Follow this diet upon discharge: Orders Placed This Encounter      Combination Diet Regular Diet Adult       Significant Results and Procedures   Most Recent 3 CBC's:Recent Labs   Lab Test 06/23/22 0621 06/19/22  0608 06/18/22  1754   WBC 10.1 4.6 6.0   HGB 13.0* 10.9* 13.5   MCV 90 86 85    392 556*     Most Recent 3 BMP's:Recent Labs   Lab Test 06/23/22  0621 06/19/22  0608 06/18/22  1754    140 142   POTASSIUM 4.0 3.6 4.2   CHLORIDE 106 107 102   CO2 25 26 23   BUN 20 16 13   CR 0.86 0.62* 0.78   ANIONGAP 5 7 17   KEELEY 9.1 8.6 9.3   * 90 96     Most Recent 2 LFT's:Recent Labs   Lab Test 06/23/22  0621 06/19/22  0608   AST 18 23   ALT 37 38   ALKPHOS 59 58   BILITOTAL 0.6 0.4     Most Recent 3 INR's:Recent Labs   Lab Test 06/08/22  0512 06/05/22  0800 06/05/22  0113   INR 0.91 0.98 0.96   ,   Results for orders placed or performed during the hospital encounter of 06/18/22   Head CT w/o contrast    Narrative    EXAM: CT HEAD W/O CONTRAST, CT CERVICAL SPINE W/O CONTRAST  LOCATION: [facility]  DATE/TIME: 6/18/2022 6:48 PM    INDICATION:  Alcohol abuse, history of frequent falls. Nausea.  COMPARISON: CT head 06/05/2022  TECHNIQUE:   1) Routine CT Head without IV contrast. Multiplanar reformats. Dose reduction techniques were used.  2) Routine CT Cervical Spine without IV contrast. Multiplanar reformats. Dose reduction techniques were used.    FINDINGS:   HEAD CT:   INTRACRANIAL CONTENTS: No intracranial hemorrhage, extraaxial collection, or mass effect.  No CT evidence of acute infarct. Mild presumed chronic small vessel ischemic changes. Normal ventricles and sulci.     VISUALIZED ORBITS/SINUSES/MASTOIDS: No intraorbital abnormality. No paranasal sinus mucosal disease. No middle ear or mastoid effusion.    BONES/SOFT TISSUES: No acute abnormality.    CERVICAL SPINE CT:   VERTEBRA: Normal vertebral body heights. No fracture or posttraumatic subluxation. Straightening of cervical lordosis.    CANAL/FORAMINA: Mild degenerative disc disease at C5-C6 and C6-C7. No high-grade central or foraminal stenosis.    PARASPINAL: No extraspinal abnormality. Visualized lung fields are clear.      Impression    IMPRESSION:  HEAD CT:  1.  No acute intracranial process.    CERVICAL SPINE CT:  1.  No CT evidence for acute fracture or post traumatic subluxation.   Cervical spine CT w/o contrast    Narrative    EXAM: CT HEAD W/O CONTRAST, CT CERVICAL SPINE W/O CONTRAST  LOCATION: [facility]  DATE/TIME: 6/18/2022 6:48 PM    INDICATION: Alcohol abuse, history of frequent falls. Nausea.  COMPARISON: CT head 06/05/2022  TECHNIQUE:   1) Routine CT Head without IV contrast. Multiplanar reformats. Dose reduction techniques were used.  2) Routine CT Cervical Spine without IV contrast. Multiplanar reformats. Dose reduction techniques were used.    FINDINGS:   HEAD CT:   INTRACRANIAL CONTENTS: No intracranial hemorrhage, extraaxial collection, or mass effect.  No CT evidence of acute infarct. Mild presumed chronic small vessel ischemic changes. Normal  ventricles and sulci.     VISUALIZED ORBITS/SINUSES/MASTOIDS: No intraorbital abnormality. No paranasal sinus mucosal disease. No middle ear or mastoid effusion.    BONES/SOFT TISSUES: No acute abnormality.    CERVICAL SPINE CT:   VERTEBRA: Normal vertebral body heights. No fracture or posttraumatic subluxation. Straightening of cervical lordosis.    CANAL/FORAMINA: Mild degenerative disc disease at C5-C6 and C6-C7. No high-grade central or foraminal stenosis.    PARASPINAL: No extraspinal abnormality. Visualized lung fields are clear.      Impression    IMPRESSION:  HEAD CT:  1.  No acute intracranial process.    CERVICAL SPINE CT:  1.  No CT evidence for acute fracture or post traumatic subluxation.       Discharge Medications   Current Discharge Medication List      START taking these medications    Details   diazepam (VALIUM) 5 MG tablet Take 1 tablet (5 mg) by mouth every 6 hours as needed for anxiety or withdrawal  Qty: 6 tablet, Refills: 0    Associated Diagnoses: Alcohol dependence with uncomplicated withdrawal (H)         CONTINUE these medications which have NOT CHANGED    Details   escitalopram (LEXAPRO) 10 MG tablet Take 1 tablet (10 mg) by mouth daily  Qty: 30 tablet, Refills: 0    Associated Diagnoses: Moderate episode of recurrent major depressive disorder (H)      folic acid (FOLVITE) 1 MG tablet Take 1 tablet (1 mg) by mouth daily  Qty: 30 tablet, Refills: 0    Associated Diagnoses: Alcohol use disorder, severe, dependence (H)      gabapentin (NEURONTIN) 300 MG capsule Take 2 capsules (600 mg) by mouth every 8 hours  Qty: 60 capsule, Refills: 0    Associated Diagnoses: Alcohol use disorder, severe, dependence (H)      traZODone (DESYREL) 50 MG tablet Take 50 mg by mouth At Bedtime      hydrOXYzine (VISTARIL) 50 MG capsule Take 1 capsule (50 mg) by mouth 3 times daily as needed for anxiety  Qty: 15 capsule, Refills: 0      ondansetron (ZOFRAN) 4 MG tablet Take 1 tablet (4 mg) by mouth every 8 hours  as needed for nausea or vomiting  Qty: 4 tablet, Refills: 0    Associated Diagnoses: Alcohol dependence with uncomplicated withdrawal (H)           Allergies   Allergies   Allergen Reactions     Gramineae Pollens Itching     Pollen Extract Rash     grass tree pollen

## 2022-06-23 NOTE — PLAN OF CARE
Vital signs stable, with exception of HR in 50's. Denies pain. Regular diet. Up with assist of 1 in room. CIWA- 6, 4. PIV in right forearm SL. Plan for pt to discharge today.

## 2022-06-24 ENCOUNTER — PATIENT OUTREACH (OUTPATIENT)
Dept: CARE COORDINATION | Facility: CLINIC | Age: 37
End: 2022-06-24

## 2022-06-24 DIAGNOSIS — Z71.89 OTHER SPECIFIED COUNSELING: ICD-10-CM

## 2022-06-24 NOTE — ED PROVIDER NOTES
EMERGENCY DEPARTMENT ENCOUNTER      NAME: Nikhil Rios  AGE: 36 year old male  YOB: 1985  MRN: [id number]  EVALUATION DATE & TIME: No admission date for patient encounter.    PCP: Lisandro Graham    ED PROVIDER: Ana Michelle M.D.      Chief Complaint   Patient presents with     Medication Refill         FINAL IMPRESSION:  1. Alcohol dependence with unspecified alcohol-induced disorder (H)          ED COURSE & MEDICAL DECISION MAKING:    ED Course as of 06/24/22 0136   Thu Jun 23, 2022   2317 Pt well appearing and neurovascularly intact after unable to  Rx tonight for valium, given one oral dose here in ED and mother driving him home. Patient discharged after being provided with extensive anticipatory guidance and given return precautions, importance of PMD follow-up emphasized.        11:13 PM I met with the patient, obtained history, performed an initial exam, and discussed options and plan for diagnostics and treatment here in the ED. We discussed the plan for discharge and the patient is agreeable. Reviewed supportive cares, symptomatic treatment, outpatient follow up, and reasons to return to the Emergency Department. Patient to be discharged by ED RN.     Pertinent Labs & Imaging studies reviewed. (See chart for details)    N95 worn  A face shield was worn also  COVID PPE      At the conclusion of the encounter I discussed the results of all of the tests and the disposition. The questions were answered. The patient or family acknowledged understanding and was agreeable with the care plan.     MEDICATIONS GIVEN IN THE EMERGENCY:  Medications   diazepam (VALIUM) tablet 5 mg (5 mg Oral Given 6/23/22 6590)       NEW PRESCRIPTIONS STARTED AT TODAY'S ER VISIT  Discharge Medication List as of 6/23/2022 11:28 PM             =================================================================    HPI    Nikhil Rios is a 36 year old male with PMHx of SILVANA, alcohol abuse, alcohol dependence with  uncomplicated withdrawal, alcohol-induced mood disorder, chemical dependency, who presents to the ED today via walk-in for medication refill.    Patient reports he was discharged today from the hospital after being admitted for alcohol dependence. He states that he was discharged with a prescription for Valium but states that when he went to the pharmacy earlier today, there were complications with the prescription so he got his primary care provider to write a new script that he is able to  in the morning tomorrow. Patient is requesting a dose of valium so he can make it through to the morning to get his medications. No other complaints at this time.    Per chart review, patient was seen at [facility] on 6/19-6/23 for alcohol dependence and withdrawal. Patient had multiple presentations to [facility] for alcohol-related conditions. Last  ETOH use 6/18. Tremors resolved and no fevers or chills. No chest pain or shortness of breath. No hallucinations, no agitations,  cooperative and pleasant. Improved with CIWA and supportive cares, IVF. Recommended to continue PTA Gabapentin. Patient discharged with Valium.    REVIEW OF SYSTEMS   All other systems reviewed and are negative except as noted above in HPI.    PAST MEDICAL HISTORY:  Past Medical History:   Diagnosis Date     Alcohol abuse      Alcohol abuse      Alcohol withdrawal (H)      Depressive disorder      HLD (hyperlipidemia)      HTN (hypertension)        PAST SURGICAL HISTORY:  Past Surgical History:   Procedure Laterality Date     NO HISTORY OF SURGERY       OTHER SURGICAL HISTORY      none       CURRENT MEDICATIONS:    diazepam (VALIUM) 5 MG tablet  escitalopram (LEXAPRO) 10 MG tablet  folic acid (FOLVITE) 1 MG tablet  gabapentin (NEURONTIN) 300 MG capsule  hydrOXYzine (VISTARIL) 50 MG capsule  ondansetron (ZOFRAN) 4 MG tablet  traZODone (DESYREL) 50 MG tablet        ALLERGIES:  Allergies   Allergen Reactions     Gramineae Pollens  Itching     Pollen Extract Rash     grass tree pollen       FAMILY HISTORY:  Family History   Problem Relation Age of Onset     Coronary Artery Disease Father        SOCIAL HISTORY:   Social History     Socioeconomic History     Marital status: Single   Occupational History     Occupation: Rental property owner   Tobacco Use     Smoking status: Current Some Day Smoker     Packs/day: 0.25     Types: Cigars     Smokeless tobacco: Never Used     Tobacco comment: occasional   Substance and Sexual Activity     Alcohol use: Yes     Alcohol/week: 17.0 standard drinks     Types: 17 Shots of liquor per week     Comment: Drinks 750ml/day or 17 shots/day; hx of withdrawl seizures     Drug use: Not Currently     Social Determinants of Health     Transportation Needs: No Transportation Needs     Lack of Transportation (Medical): No     Lack of Transportation (Non-Medical): No   Stress: Stress Concern Present     Feeling of Stress : Very much   Housing Stability: Unknown     Unable to Pay for Housing in the Last Year: No     Unstable Housing in the Last Year: No         PHYSICAL EXAM    GENERAL: Awake, alert.  In no acute distress.   HEENT: Normocephalic, atraumatic.  Pupils equal, round and reactive.  Conjunctiva normal.  EOMI.  NECK: No stridor or apparent deformity.  EXTREMITIES: No lower extremity swelling or edema.    NEURO: Alert and oriented to person, place and time.  Cranial nerves grossly intact.  No focal motor deficit.  PSYCH: Normal mood and affect  SKIN: No rashes         I, Gisella Tolentino, am serving as a scribe to document services personally performed by Dr. Ana Michelle based on my observation and the provider's statements to me. IAna MD attest that Gisella Tolentino is acting in a scribe capacity, has observed my performance of the services and has documented them in accordance with my direction.     Ana Michelle MD  06/24/22 0136

## 2022-06-24 NOTE — ED TRIAGE NOTES
Pt here d/t medication issues. Was sent to [facility] on prior visit for detox. Pt has been working with his regular MD to get his meds filled. Pt feels like he is not getting the run around trying get get his meds from his pharmacy. Pt states he figured out his med issue and can  his script in the AM and feels like he needs a dose of valium so that he can make it through to the morning to get his meds

## 2022-06-24 NOTE — PROGRESS NOTES
Clinic Care Coordination Contact  [facility]/Voicemail       Clinical Data: Care Coordinator Outreach  Outreach attempted x 1.  Left message on patient's voicemail with call back information and requested return call.  Plan: Care Coordinator will attempt to reach patient again in 1-2 business days.    SEGUN Smith   Social Work Clinic Care Coordinator   [facility]  PH: [phone]  [email or web address]

## 2022-06-27 NOTE — PROGRESS NOTES
Clinic Care Coordination Contact  [facility]/Voicemail       Clinical Data: Care Coordinator Outreach  Outreach attempted x 2.  Left message on patient's voicemail with call back information and requested return call.    Plan: Care Coordinator will do no further outreaches at this time.    SEGUN Salguero  Connected Care Resource Center  [facility]     *Connected Care Resource Team does NOT follow patient ongoing. Referrals are identified based on internal discharge reports and the outreach is to ensure patient has an understanding of their discharge instructions.

## 2022-07-15 ENCOUNTER — HOSPITAL ENCOUNTER (EMERGENCY)
Facility: HOSPITAL | Age: 37
Discharge: HOME | End: 2022-07-15
Attending: EMERGENCY MEDICINE | Admitting: EMERGENCY MEDICINE
Payer: COMMERCIAL

## 2022-07-15 ENCOUNTER — APPOINTMENT (OUTPATIENT)
Dept: CT IMAGING | Facility: HOSPITAL | Age: 37
End: 2022-07-15
Attending: EMERGENCY MEDICINE
Payer: COMMERCIAL

## 2022-07-15 VITALS
DIASTOLIC BLOOD PRESSURE: 65 MMHG | WEIGHT: 170 LBS | HEART RATE: 67 BPM | BODY MASS INDEX: 23.8 KG/M2 | TEMPERATURE: 99 F | RESPIRATION RATE: 12 BRPM | HEIGHT: 71 IN | OXYGEN SATURATION: 94 % | SYSTOLIC BLOOD PRESSURE: 111 MMHG

## 2022-07-15 DIAGNOSIS — F10.920 ALCOHOLIC INTOXICATION WITHOUT COMPLICATION (H): ICD-10-CM

## 2022-07-15 LAB
ALBUMIN SERPL-MCNC: 4.9 G/DL (ref 3.5–5)
ALP SERPL-CCNC: 65 U/L (ref 45–120)
ALT SERPL W P-5'-P-CCNC: 20 U/L (ref 0–45)
ANION GAP SERPL CALCULATED.3IONS-SCNC: 19 MMOL/L (ref 5–18)
AST SERPL W P-5'-P-CCNC: 29 U/L (ref 0–40)
BILIRUB DIRECT SERPL-MCNC: 0.1 MG/DL
BILIRUB SERPL-MCNC: 0.5 MG/DL (ref 0–1)
BUN SERPL-MCNC: 14 MG/DL (ref 8–22)
CALCIUM SERPL-MCNC: 9.7 MG/DL (ref 8.5–10.5)
CHLORIDE BLD-SCNC: 100 MMOL/L (ref 98–107)
CO2 SERPL-SCNC: 22 MMOL/L (ref 22–31)
CREAT SERPL-MCNC: 0.93 MG/DL (ref 0.7–1.3)
ERYTHROCYTE [DISTWIDTH] IN BLOOD BY AUTOMATED COUNT: 16.5 % (ref 10–15)
ETHANOL SERPL-MCNC: 311 MG/DL
GFR SERPL CREATININE-BSD FRML MDRD: >90 ML/MIN/1.73M2
GLUCOSE BLD-MCNC: 105 MG/DL (ref 70–125)
HCT VFR BLD AUTO: 43.8 % (ref 40–53)
HGB BLD-MCNC: 15 G/DL (ref 13.3–17.7)
INR PPP: 1 (ref 0.85–1.15)
LIPASE SERPL-CCNC: 17 U/L (ref 0–52)
MAGNESIUM SERPL-MCNC: 1.8 MG/DL (ref 1.8–2.6)
MCH RBC QN AUTO: 29.8 PG (ref 26.5–33)
MCHC RBC AUTO-ENTMCNC: 34.2 G/DL (ref 31.5–36.5)
MCV RBC AUTO: 87 FL (ref 78–100)
PLATELET # BLD AUTO: 379 10E3/UL (ref 150–450)
POTASSIUM BLD-SCNC: 3.7 MMOL/L (ref 3.5–5)
PROT SERPL-MCNC: 8.3 G/DL (ref 6–8)
RBC # BLD AUTO: 5.04 10E6/UL (ref 4.4–5.9)
SODIUM SERPL-SCNC: 141 MMOL/L (ref 136–145)
WBC # BLD AUTO: 7.8 10E3/UL (ref 4–11)

## 2022-07-15 PROCEDURE — 96365 THER/PROPH/DIAG IV INF INIT: CPT

## 2022-07-15 PROCEDURE — 83735 ASSAY OF MAGNESIUM: CPT | Performed by: EMERGENCY MEDICINE

## 2022-07-15 PROCEDURE — 99284 EMERGENCY DEPT VISIT MOD MDM: CPT | Mod: 25

## 2022-07-15 PROCEDURE — 250N000013 HC RX MED GY IP 250 OP 250 PS 637: Performed by: EMERGENCY MEDICINE

## 2022-07-15 PROCEDURE — 250N000009 HC RX 250: Performed by: EMERGENCY MEDICINE

## 2022-07-15 PROCEDURE — 82248 BILIRUBIN DIRECT: CPT | Performed by: EMERGENCY MEDICINE

## 2022-07-15 PROCEDURE — 36415 COLL VENOUS BLD VENIPUNCTURE: CPT | Performed by: EMERGENCY MEDICINE

## 2022-07-15 PROCEDURE — 82077 ASSAY SPEC XCP UR&BREATH IA: CPT | Performed by: EMERGENCY MEDICINE

## 2022-07-15 PROCEDURE — 258N000003 HC RX IP 258 OP 636: Performed by: EMERGENCY MEDICINE

## 2022-07-15 PROCEDURE — 83690 ASSAY OF LIPASE: CPT | Performed by: EMERGENCY MEDICINE

## 2022-07-15 PROCEDURE — 85610 PROTHROMBIN TIME: CPT | Performed by: EMERGENCY MEDICINE

## 2022-07-15 PROCEDURE — 70450 CT HEAD/BRAIN W/O DYE: CPT

## 2022-07-15 PROCEDURE — 85027 COMPLETE CBC AUTOMATED: CPT | Performed by: EMERGENCY MEDICINE

## 2022-07-15 PROCEDURE — 80048 BASIC METABOLIC PNL TOTAL CA: CPT | Performed by: EMERGENCY MEDICINE

## 2022-07-15 PROCEDURE — 250N000011 HC RX IP 250 OP 636: Performed by: EMERGENCY MEDICINE

## 2022-07-15 RX ORDER — LORAZEPAM 0.5 MG/1
2 TABLET ORAL ONCE
Status: COMPLETED | OUTPATIENT
Start: 2022-07-15 | End: 2022-07-15

## 2022-07-15 RX ADMIN — FOLIC ACID: 5 INJECTION, SOLUTION INTRAMUSCULAR; INTRAVENOUS; SUBCUTANEOUS at 19:48

## 2022-07-15 RX ADMIN — LORAZEPAM 2 MG: 0.5 TABLET ORAL at 17:47

## 2022-07-15 ASSESSMENT — ENCOUNTER SYMPTOMS
NAUSEA: 1
SHORTNESS OF BREATH: 0
FEVER: 0
ABDOMINAL PAIN: 1
DIARRHEA: 0
VOMITING: 0
NECK PAIN: 0
BLOOD IN STOOL: 0

## 2022-07-15 NOTE — ED NOTES
ED Provider In Triage Note  [facility]  Encounter Date: Jul 15, 2022    No chief complaint on file.      Brief HPI:   Nikhil Rios is a 36 year old male, history of alcohol abuse with previous withdrawal, presenting to the Emergency Department with a chief complaint of alcohol withdrawal symptoms. Reports last drink was around 4am, but only very small.     Brief Physical Exam:  There were no vitals taken for this visit.  General: Non-toxic appearing; tremulous and diaphoretic  HEENT: Atraumatic  Resp: No respiratory distress; clear symmetrical breath sounds  Abdomen: Non-peritoneal  Neuro: Alert, oriented, answers questions appropriately  Psych: Emotional    Plan Initiated in Triage:  Orders Placed This Encounter     CBC (+ platelets, no diff)     Basic metabolic panel     Hepatic function panel     Lipase     INR     Alcohol level blood     Magnesium     LORazepam (ATIVAN) tablet 2 mg     sodium chloride 0.9 % 1,000 mL with Infuvite Adult 10 mL, thiamine 100 mg, folic acid 1 mg infusion       PIT Dispo:   Place patient in the next available ED bed    Kristina Box MD on 7/15/2022 at 5:38 PM    Patient was evaluated by the Physician in Triage due to a limitation of available rooms in the Emergency Department. A plan of care was discussed based on the information obtained on the initial evaluation and patient was consuled to return back to the Emergency Department lobby after this initial evalutaiton until results were obtained or a room became available in the Emergency Department. Patient was counseled not to leave prior to receiving the results of their workup.     Kristina Box MD  [facility] EMERGENCY DEPARTMENT  [address] [place] [id number]  [phone]       Kristina Box MD  07/15/22 5558

## 2022-07-15 NOTE — ED PROVIDER NOTES
"EMERGENCY DEPARTMENT ENCOUNTER      NAME: Nikhil Rios  AGE: 36 year old male  YOB: 1985  MRN: [id number]  EVALUATION DATE & TIME: 7/15/2022  6:29 PM    PCP: Lisandro Graham    ED PROVIDER: Tejal Cuellar DO      No chief complaint on file.        FINAL IMPRESSION:  1. Alcoholic intoxication without complication (H)          ED COURSE & MEDICAL DECISION MAKING:    Pertinent Labs & Imaging studies reviewed. (See chart for details)  6:54 PM I met the patient and performed my initial interview and exam.  7:00 PM CIWA 5  8:10 PM CIWA 5  8:25 PM I updated patient and mom to offer detox. Patient refuses detox. Mom has stiven in contact with [facility] for detox and will follow up with them.    36 year old male presents to the Emergency Department for evaluation of self-reported alcohol withdrawal.  Patient with history of alcohol abuse, reports a history of withdrawal seizure.  He has had multiple admissions for withdrawal.  Patient reports about a liter and a half of liquor daily.  He reports he last used \"some\" this morning.  When he checked into triage his recorded heart rate was 150.  He was given 2 mg of Ativan.  When I saw him in the room his pulse was in the 60s and he was normotensive.  He had mild tremors but otherwise unremarkable.  Labs are unremarkable.  Small anion gap at 19.  He had been ordered IV fluids with vitamins.  No other electrolyte abnormalities.  Alcohol is 311.  No elevations LFTs.  Questionable if he had a recent fall, CT head unremarkable.  Repeat CIWA 2 hours after Ativan remains at 5.  Updated patient and his mother.  I do not think he needs admission today.  I offered detox but patient declined.  Sounds like they are in contact with [facility] for detox.  He has a few Valium at home.  We discussed importance of not using this with alcohol.    At the conclusion of the encounter I discussed the results of all of the tests and the disposition. The questions were answered. The patient or " family acknowledged understanding and was agreeable with the care plan.       MEDICATIONS GIVEN IN THE EMERGENCY:  Medications   LORazepam (ATIVAN) tablet 2 mg (2 mg Oral Given 7/15/22 1747)   sodium chloride 0.9 % 1,000 mL with Infuvite Adult 10 mL, thiamine 100 mg, folic acid 1 mg infusion ( Intravenous Stopped 7/15/22 2044)       NEW PRESCRIPTIONS STARTED AT TODAY'S ER VISIT  New Prescriptions    No medications on file          =================================================================    HPI    Patient information was obtained from: Patient    Use of : N/A         Nikhil Rios is a 36 year old male with a pertinent history of ACS, HTN, HLD, alcohol abuse, who presents to this ED via walk-in for evaluation of alcohol withdrawal.    Patient reports history of alcoholism with withdrawal associated seizures. Reports last drink at 4:00 AM, endorses abdominal pain, nausea, has no memory of falls but endorses a scratch on his face. He states he drinks 1.5 L of liquor a day, also reports some marijuana use. He says he was given 2 mg Ativan on arrival to the hospital.    Patient denies, diarrhea, bloody stool, vomit, neck pain, and all other relevant symptoms.      REVIEW OF SYSTEMS   Review of Systems   Constitutional: Negative for fever.   Eyes: Negative for visual disturbance.   Respiratory: Negative for shortness of breath.    Cardiovascular: Negative for chest pain.   Gastrointestinal: Positive for abdominal pain and nausea. Negative for blood in stool, diarrhea and vomiting.   Musculoskeletal: Negative for neck pain.   Neurological: Negative for syncope.   All other systems reviewed and are negative.      PAST MEDICAL HISTORY:  Past Medical History:   Diagnosis Date     Alcohol abuse      Alcohol abuse      Alcohol withdrawal (H)      Depressive disorder      HLD (hyperlipidemia)      HTN (hypertension)        PAST SURGICAL HISTORY:  Past Surgical History:   Procedure Laterality Date     NO HISTORY  "OF SURGERY       OTHER SURGICAL HISTORY      none           CURRENT MEDICATIONS:    diazepam (VALIUM) 5 MG tablet  escitalopram (LEXAPRO) 10 MG tablet  folic acid (FOLVITE) 1 MG tablet  gabapentin (NEURONTIN) 300 MG capsule  hydrOXYzine (VISTARIL) 50 MG capsule  ondansetron (ZOFRAN) 4 MG tablet  traZODone (DESYREL) 50 MG tablet         ALLERGIES:  Allergies   Allergen Reactions     Gramineae Pollens Itching     Pollen Extract Rash     grass tree pollen       FAMILY HISTORY:  Family History   Problem Relation Age of Onset     Coronary Artery Disease Father        SOCIAL HISTORY:   Social History     Socioeconomic History     Marital status: Single   Occupational History     Occupation: Rental property owner   Tobacco Use     Smoking status: Current Some Day Smoker     Packs/day: 0.25     Types: Cigars     Smokeless tobacco: Never Used     Tobacco comment: occasional   Substance and Sexual Activity     Alcohol use: Yes     Alcohol/week: 17.0 standard drinks     Types: 17 Shots of liquor per week     Comment: Drinks 750ml/day or 17 shots/day; hx of withdrawl seizures     Drug use: Not Currently     Social Determinants of Health     Transportation Needs: No Transportation Needs     Lack of Transportation (Medical): No     Lack of Transportation (Non-Medical): No   Stress: Stress Concern Present     Feeling of Stress : Very much   Housing Stability: Unknown     Unable to Pay for Housing in the Last Year: No     Unstable Housing in the Last Year: No       VITALS:  /65   Pulse 73   Temp 99  F (37.2  C)   Resp 12   Ht 1.803 m (5' 11\")   Wt 77.1 kg (170 lb)   SpO2 95%   BMI 23.71 kg/m      PHYSICAL EXAM    Physical Exam  Constitutional:       General: He is not in acute distress.  HENT:      Head: Normocephalic and atraumatic.      Mouth/Throat:      Mouth: Mucous membranes are dry.      Pharynx: Oropharynx is clear.   Eyes:      Pupils: Pupils are equal, round, and reactive to light.   Cardiovascular:      Rate " and Rhythm: Regular rhythm. Tachycardia present.      Pulses: Normal pulses.      Heart sounds: Normal heart sounds.   Pulmonary:      Effort: Pulmonary effort is normal.      Breath sounds: Normal breath sounds.   Abdominal:      General: Abdomen is flat. Bowel sounds are normal.      Palpations: Abdomen is soft.      Tenderness: There is no abdominal tenderness.   Musculoskeletal:         General: Normal range of motion.   Skin:     General: Skin is warm and dry.      Capillary Refill: Capillary refill takes less than 2 seconds.   Neurological:      General: No focal deficit present.      Mental Status: He is alert and oriented to person, place, and time.      Motor: Tremor present.        LAB:  All pertinent labs reviewed and interpreted.  Labs Ordered and Resulted from Time of ED Arrival to Time of ED Departure   CBC WITH PLATELETS - Abnormal       Result Value    WBC Count 7.8      RBC Count 5.04      Hemoglobin 15.0      Hematocrit 43.8      MCV 87      MCH 29.8      MCHC 34.2      RDW 16.5 (*)     Platelet Count 379     BASIC METABOLIC PANEL - Abnormal    Sodium 141      Potassium 3.7      Chloride 100      Carbon Dioxide (CO2) 22      Anion Gap 19 (*)     Urea Nitrogen 14      Creatinine 0.93      Calcium 9.7      Glucose 105      GFR Estimate >90     HEPATIC FUNCTION PANEL - Abnormal    Bilirubin Total 0.5      Bilirubin Direct 0.1      Protein Total 8.3 (*)     Albumin 4.9      Alkaline Phosphatase 65      AST 29      ALT 20     ETHYL ALCOHOL LEVEL - Abnormal    Alcohol, Blood 311 (*)    LIPASE - Normal    Lipase 17     INR - Normal    INR 1.00     MAGNESIUM - Normal    Magnesium 1.8         RADIOLOGY:  Reviewed all pertinent imaging. Please see official radiology report.  Head CT w/o contrast   Final Result   IMPRESSION:   1.  Normal head CT.          I, Aldo Trujillo, am serving as a scribe to document services personally performed by Dr. Tejal Cuellar based on my observation and the provider's statements  to me. I, Tejal Cuellar DO attest that Aldo Trujillo is acting in a scribe capacity, has observed my performance of the services and has documented them in accordance with my direction.    Tejal Cuellar DO  Emergency Medicine  [facility] EMERGENCY DEPARTMENT  [address] [place] [id number] [phone]  Dept: [phone]       Tejal Cuellar DO  07/15/22 2056

## 2022-07-16 NOTE — DISCHARGE INSTRUCTIONS
Labs and your vitals are reassuring today  You may use your Valium at home, however do not mix this with alcohol  Follow-up with [facility] as you have planned  Return if any acute worsening symptoms

## 2022-07-21 ENCOUNTER — HOSPITAL ENCOUNTER (EMERGENCY)
Facility: HOSPITAL | Age: 37
Discharge: HOME | End: 2022-07-22
Attending: EMERGENCY MEDICINE | Admitting: EMERGENCY MEDICINE
Payer: COMMERCIAL

## 2022-07-21 DIAGNOSIS — F10.920 ALCOHOLIC INTOXICATION WITHOUT COMPLICATION (H): ICD-10-CM

## 2022-07-21 LAB
ALBUMIN SERPL-MCNC: 4.7 G/DL (ref 3.5–5)
ALP SERPL-CCNC: 65 U/L (ref 45–120)
ALT SERPL W P-5'-P-CCNC: 14 U/L (ref 0–45)
ANION GAP SERPL CALCULATED.3IONS-SCNC: 15 MMOL/L (ref 5–18)
AST SERPL W P-5'-P-CCNC: 19 U/L (ref 0–40)
BASOPHILS # BLD AUTO: 0.1 10E3/UL (ref 0–0.2)
BASOPHILS NFR BLD AUTO: 1 %
BILIRUB DIRECT SERPL-MCNC: 0.1 MG/DL
BILIRUB SERPL-MCNC: 0.3 MG/DL (ref 0–1)
BUN SERPL-MCNC: 13 MG/DL (ref 8–22)
CALCIUM SERPL-MCNC: 9.5 MG/DL (ref 8.5–10.5)
CHLORIDE BLD-SCNC: 103 MMOL/L (ref 98–107)
CO2 SERPL-SCNC: 27 MMOL/L (ref 22–31)
CREAT SERPL-MCNC: 0.83 MG/DL (ref 0.7–1.3)
EOSINOPHIL # BLD AUTO: 0.2 10E3/UL (ref 0–0.7)
EOSINOPHIL NFR BLD AUTO: 2 %
ERYTHROCYTE [DISTWIDTH] IN BLOOD BY AUTOMATED COUNT: 16.1 % (ref 10–15)
ETHANOL SERPL-MCNC: 464 MG/DL
GFR SERPL CREATININE-BSD FRML MDRD: >90 ML/MIN/1.73M2
GLUCOSE BLD-MCNC: 88 MG/DL (ref 70–125)
HCT VFR BLD AUTO: 44.5 % (ref 40–53)
HGB BLD-MCNC: 15.4 G/DL (ref 13.3–17.7)
IMM GRANULOCYTES # BLD: 0 10E3/UL
IMM GRANULOCYTES NFR BLD: 0 %
LIPASE SERPL-CCNC: 29 U/L (ref 0–52)
LYMPHOCYTES # BLD AUTO: 3.2 10E3/UL (ref 0.8–5.3)
LYMPHOCYTES NFR BLD AUTO: 45 %
MAGNESIUM SERPL-MCNC: 1.9 MG/DL (ref 1.8–2.6)
MCH RBC QN AUTO: 30.3 PG (ref 26.5–33)
MCHC RBC AUTO-ENTMCNC: 34.6 G/DL (ref 31.5–36.5)
MCV RBC AUTO: 88 FL (ref 78–100)
MONOCYTES # BLD AUTO: 0.3 10E3/UL (ref 0–1.3)
MONOCYTES NFR BLD AUTO: 4 %
NEUTROPHILS # BLD AUTO: 3.4 10E3/UL (ref 1.6–8.3)
NEUTROPHILS NFR BLD AUTO: 48 %
NRBC # BLD AUTO: 0 10E3/UL
NRBC BLD AUTO-RTO: 0 /100
PLATELET # BLD AUTO: 246 10E3/UL (ref 150–450)
POTASSIUM BLD-SCNC: 3.8 MMOL/L (ref 3.5–5)
PROT SERPL-MCNC: 8.3 G/DL (ref 6–8)
RBC # BLD AUTO: 5.08 10E6/UL (ref 4.4–5.9)
SODIUM SERPL-SCNC: 145 MMOL/L (ref 136–145)
WBC # BLD AUTO: 7.1 10E3/UL (ref 4–11)

## 2022-07-21 PROCEDURE — 250N000009 HC RX 250: Performed by: PHYSICIAN ASSISTANT

## 2022-07-21 PROCEDURE — 85025 COMPLETE CBC W/AUTO DIFF WBC: CPT | Performed by: PHYSICIAN ASSISTANT

## 2022-07-21 PROCEDURE — 250N000011 HC RX IP 250 OP 636: Performed by: PHYSICIAN ASSISTANT

## 2022-07-21 PROCEDURE — 83690 ASSAY OF LIPASE: CPT | Performed by: PHYSICIAN ASSISTANT

## 2022-07-21 PROCEDURE — 96366 THER/PROPH/DIAG IV INF ADDON: CPT

## 2022-07-21 PROCEDURE — 83735 ASSAY OF MAGNESIUM: CPT | Performed by: PHYSICIAN ASSISTANT

## 2022-07-21 PROCEDURE — 36415 COLL VENOUS BLD VENIPUNCTURE: CPT | Performed by: PHYSICIAN ASSISTANT

## 2022-07-21 PROCEDURE — 82248 BILIRUBIN DIRECT: CPT | Performed by: PHYSICIAN ASSISTANT

## 2022-07-21 PROCEDURE — 82077 ASSAY SPEC XCP UR&BREATH IA: CPT | Performed by: PHYSICIAN ASSISTANT

## 2022-07-21 PROCEDURE — 250N000013 HC RX MED GY IP 250 OP 250 PS 637: Performed by: PHYSICIAN ASSISTANT

## 2022-07-21 PROCEDURE — 99284 EMERGENCY DEPT VISIT MOD MDM: CPT | Mod: 25

## 2022-07-21 PROCEDURE — 96365 THER/PROPH/DIAG IV INF INIT: CPT

## 2022-07-21 PROCEDURE — 258N000003 HC RX IP 258 OP 636: Performed by: PHYSICIAN ASSISTANT

## 2022-07-21 PROCEDURE — 93005 ELECTROCARDIOGRAM TRACING: CPT | Performed by: PHYSICIAN ASSISTANT

## 2022-07-21 RX ORDER — LORAZEPAM 0.5 MG/1
2 TABLET ORAL ONCE
Status: COMPLETED | OUTPATIENT
Start: 2022-07-21 | End: 2022-07-21

## 2022-07-21 RX ADMIN — LORAZEPAM 2 MG: 0.5 TABLET ORAL at 18:50

## 2022-07-21 RX ADMIN — FOLIC ACID: 5 INJECTION, SOLUTION INTRAMUSCULAR; INTRAVENOUS; SUBCUTANEOUS at 19:24

## 2022-07-21 ASSESSMENT — ENCOUNTER SYMPTOMS
WEAKNESS: 0
LIGHT-HEADEDNESS: 0
FREQUENCY: 0
ABDOMINAL PAIN: 0
NAUSEA: 1
FEVER: 0
BLOOD IN STOOL: 0
HEMATURIA: 0
VOMITING: 0
SHORTNESS OF BREATH: 0
NUMBNESS: 0
CHILLS: 0
DIZZINESS: 0
DYSURIA: 0
HEADACHES: 0
DIARRHEA: 0
CONSTIPATION: 1

## 2022-07-21 NOTE — ED PROVIDER NOTES
EMERGENCY DEPARTMENT ENCOUNTER      NAME: Nikhil Rios  AGE: 36 year old male  YOB: 1985  MRN: [id number]  EVALUATION DATE & TIME: 7/21/2022  5:53 PM    PCP: Lisandro Graham    ED PROVIDER: Cece Og PA-C      Chief Complaint   Patient presents with     Alcohol Intoxication         FINAL IMPRESSION:  1. Alcoholic intoxication without complication (H)          MEDICAL DECISION MAKING:    Pertinent Labs & Imaging studies reviewed. (See chart for details)  36 year old male with a pertinent history of alcohol abuse, pancreatitis, ACS, depression presents to the Emergency Department for evaluation of alcohol intoxication. Reports drinking 0.5 liters of hard alcohol today. Usually drinks 1.5 L/day. Several admissions and ED visits for alcohol abuse. Denies any vomiting, diarrhea, fevers, abdominal pain, chest pain, shortness of breath. Denies any visual or auditory hallucinations. Reports history of alcohol withdrawal seizures.     Vitals reviewed and unremarkable. Normal heart rate. On exam, patient acutely intoxicated. No tachycardia, diaphoresis, tremors, obvious hallucinations. Smells strongly of alcohol. No evidence of injury. Denies any SI/HI. Refuses to go to detox. Blood alcohol 464. Labs reassuring. Normal LFTs and lipase. No leukocytosis. Hgb 15.4. Normal renal function and electrolytes. He received IVF with thiamine and folate. He was given ativan 2 mg PO. He is currently sleeping. He will need either a safe ride home or he will need to wait in ED until clinically sober. He denies any SI/HI. Patient reports wanting to quit drinking and has already arranged to go to treatment center on his own. Declines additional resources at this time. Patient sobered up in ED for 7 hours. He is ambulatory, tolerating oral intake and requesting to leave. He was able to call his own ride using [employer] on his phone and feel patient is clinically sober and safe to discharge home with .     0 minutes of  critical care time     ED COURSE:  6:16 PM I met with the patient, obtained history, performed an initial exam, and discussed options and plan for diagnostics and treatment here in the ED.  12:46 AM Patient discharged after being provided with extensive anticipatory guidance and given return precautions, importance of PCP follow-up emphasized.    At the conclusion of the encounter I discussed the results of all of the tests and the disposition. The questions were answered. The patient acknowledged understanding and was agreeable with the care plan.     MEDICATIONS GIVEN IN THE EMERGENCY:  Medications   LORazepam (ATIVAN) tablet 2 mg (2 mg Oral Given 7/21/22 1850)   sodium chloride 0.9 % 1,000 mL with Infuvite Adult 10 mL, thiamine 100 mg, folic acid 1 mg infusion ( Intravenous Stopped 7/21/22 2309)       NEW PRESCRIPTIONS STARTED AT TODAY'S ER VISIT  New Prescriptions    No medications on file            =================================================================    HPI:    Patient information was obtained from: Patient    Use of Interpretor: N/A      Nikhil Rios is a 36 year old male with a pertinent history of alcohol abuse, pancreatitis, ACS, depression who presents to this ED via EMS for evaluation of alcohol intoxication.    Patient called EMS today stating his brother told him to.  He has drank 0.5 L of hard alcohol today.  He states he typically drinks 1.5 L/day and is concerned he is in withdrawal. He reports a history of alcohol withdrawal seizures. He denies any suicidal or homicidal ideations. He reports nausea however denies any vomiting.  He reports constipation. Denies bloody or black stools, fevers, chills.  He denies any abdominal pain currently however reports occasionally he will have left lower quadrant abdominal pain.  He denies any recent falls. Patient reports living with his mother. Denies any visual or auditory hallucinations.     REVIEW OF SYSTEMS:  Review of Systems   Constitutional:  Negative for chills and fever.   Respiratory: Negative for shortness of breath.    Cardiovascular: Negative for chest pain.   Gastrointestinal: Positive for constipation and nausea. Negative for abdominal pain, blood in stool, diarrhea and vomiting.   Genitourinary: Negative for dysuria, frequency and hematuria.   Neurological: Negative for dizziness, weakness, light-headedness, numbness and headaches.   Psychiatric/Behavioral: Negative for self-injury and suicidal ideas.   All other systems reviewed and are negative.      PAST MEDICAL HISTORY:  Past Medical History:   Diagnosis Date     Alcohol abuse      Alcohol abuse      Alcohol withdrawal (H)      Depressive disorder      HLD (hyperlipidemia)      HTN (hypertension)        PAST SURGICAL HISTORY:  Past Surgical History:   Procedure Laterality Date     NO HISTORY OF SURGERY       OTHER SURGICAL HISTORY      none           CURRENT MEDICATIONS:    No current facility-administered medications for this encounter.    Current Outpatient Medications:      diazepam (VALIUM) 5 MG tablet, Take 1 tablet (5 mg) by mouth every 6 hours as needed for anxiety or withdrawal, Disp: 6 tablet, Rfl: 0     escitalopram (LEXAPRO) 10 MG tablet, Take 1 tablet (10 mg) by mouth daily, Disp: 30 tablet, Rfl: 0     folic acid (FOLVITE) 1 MG tablet, Take 1 tablet (1 mg) by mouth daily, Disp: 30 tablet, Rfl: 0     gabapentin (NEURONTIN) 300 MG capsule, Take 2 capsules (600 mg) by mouth every 8 hours, Disp: 60 capsule, Rfl: 0     hydrOXYzine (VISTARIL) 50 MG capsule, Take 1 capsule (50 mg) by mouth 3 times daily as needed for anxiety, Disp: 15 capsule, Rfl: 0     ondansetron (ZOFRAN) 4 MG tablet, Take 1 tablet (4 mg) by mouth every 8 hours as needed for nausea or vomiting, Disp: 4 tablet, Rfl: 0     traZODone (DESYREL) 50 MG tablet, Take 50 mg by mouth At Bedtime, Disp: , Rfl:     Facility-Administered Medications Ordered in Other Encounters:      Self Administer Medications: Behavioral  Services, , Does not apply, See Admin Instructions, Kenny Forte MD      ALLERGIES:  Allergies   Allergen Reactions     Gramineae Pollens Itching     Pollen Extract Rash     grass tree pollen       FAMILY HISTORY:  Family History   Problem Relation Age of Onset     Coronary Artery Disease Father        SOCIAL HISTORY:   Social History     Socioeconomic History     Marital status: Single   Occupational History     Occupation: Rental property owner   Tobacco Use     Smoking status: Current Some Day Smoker     Packs/day: 0.25     Types: Cigars     Smokeless tobacco: Never Used     Tobacco comment: occasional   Substance and Sexual Activity     Alcohol use: Yes     Alcohol/week: 17.0 standard drinks     Types: 17 Shots of liquor per week     Comment: Drinks 750ml/day or 17 shots/day; hx of withdrawl seizures     Drug use: Not Currently     Social Determinants of Health     Transportation Needs: No Transportation Needs     Lack of Transportation (Medical): No     Lack of Transportation (Non-Medical): No   Stress: Stress Concern Present     Feeling of Stress : Very much   Housing Stability: Unknown     Unable to Pay for Housing in the Last Year: No     Unstable Housing in the Last Year: No       VITALS:  Patient Vitals for the past 24 hrs:   BP Temp Temp src Pulse Resp SpO2   07/22/22 0000 105/55 -- -- 67 16 91 %   07/21/22 2345 103/55 -- -- 70 16 91 %   07/21/22 2330 111/58 -- -- 66 17 --   07/21/22 2315 110/59 -- -- 64 17 91 %   07/21/22 2300 110/58 -- -- 78 14 90 %   07/21/22 2245 110/62 -- -- 71 16 92 %   07/21/22 2230 115/65 -- -- 76 20 93 %   07/21/22 2215 112/66 -- -- 88 -- 95 %   07/21/22 2200 103/59 -- -- 79 -- 93 %   07/21/22 2145 105/57 -- -- 73 -- 94 %   07/21/22 2130 101/59 -- -- 73 -- 93 %   07/21/22 2115 103/59 -- -- 83 -- 93 %   07/21/22 1755 131/83 98.6  F (37  C) Oral 76 20 95 %       PHYSICAL EXAM  Constitutional: Well developed, Well nourished, NAD. Intoxicated.   HENT: Normocephalic,  Atraumatic, Bilateral external ears normal, Oropharynx normal, mucous membranes dry, Nose normal.   Neck: Normal range of motion, No tenderness, Supple, No stridor.  Eyes: PERRL, EOMI, Conjunctiva normal, No discharge.   Respiratory: Normal breath sounds, No respiratory distress, No wheezing, Speaks full sentences easily. No cough.  Cardiovascular: Normal heart rate, Regular rhythm, No murmurs, No rubs, No gallops. Chest wall nontender.  GI: Soft, No tenderness, No masses, No flank tenderness. No rebound or guarding.  Musculoskeletal: No edema. No cyanosis, No clubbing. Good range of motion in all major joints. No tenderness to palpation or major deformities noted.  Integument: Warm, Dry, No erythema, No rash. No petechiae.  Neurologic: Alert & oriented x 3, Normal motor function, Normal sensory function, No focal deficits noted.  Psychiatric: Affect normal, Judgment poor, Mood normal. Cooperative.    LAB:  All pertinent labs reviewed and interpreted.  Recent Results (from the past 24 hour(s))   Basic metabolic panel    Collection Time: 07/21/22  7:19 PM   Result Value Ref Range    Sodium 145 136 - 145 mmol/L    Potassium 3.8 3.5 - 5.0 mmol/L    Chloride 103 98 - 107 mmol/L    Carbon Dioxide (CO2) 27 22 - 31 mmol/L    Anion Gap 15 5 - 18 mmol/L    Urea Nitrogen 13 8 - 22 mg/dL    Creatinine 0.83 0.70 - 1.30 mg/dL    Calcium 9.5 8.5 - 10.5 mg/dL    Glucose 88 70 - 125 mg/dL    GFR Estimate >90 >60 mL/min/1.73m2   Hepatic function panel    Collection Time: 07/21/22  7:19 PM   Result Value Ref Range    Bilirubin Total 0.3 0.0 - 1.0 mg/dL    Bilirubin Direct 0.1 <=0.5 mg/dL    Protein Total 8.3 (H) 6.0 - 8.0 g/dL    Albumin 4.7 3.5 - 5.0 g/dL    Alkaline Phosphatase 65 45 - 120 U/L    AST 19 0 - 40 U/L    ALT 14 0 - 45 U/L   Lipase    Collection Time: 07/21/22  7:19 PM   Result Value Ref Range    Lipase 29 0 - 52 U/L   Alcohol level blood    Collection Time: 07/21/22  7:19 PM   Result Value Ref Range    Alcohol, Blood  464 (H) None detected mg/dL   Magnesium    Collection Time: 07/21/22  7:19 PM   Result Value Ref Range    Magnesium 1.9 1.8 - 2.6 mg/dL   CBC with platelets and differential    Collection Time: 07/21/22  7:19 PM   Result Value Ref Range    WBC Count 7.1 4.0 - 11.0 10e3/uL    RBC Count 5.08 4.40 - 5.90 10e6/uL    Hemoglobin 15.4 13.3 - 17.7 g/dL    Hematocrit 44.5 40.0 - 53.0 %    MCV 88 78 - 100 fL    MCH 30.3 26.5 - 33.0 pg    MCHC 34.6 31.5 - 36.5 g/dL    RDW 16.1 (H) 10.0 - 15.0 %    Platelet Count 246 150 - 450 10e3/uL    % Neutrophils 48 %    % Lymphocytes 45 %    % Monocytes 4 %    % Eosinophils 2 %    % Basophils 1 %    % Immature Granulocytes 0 %    NRBCs per 100 WBC 0 <1 /100    Absolute Neutrophils 3.4 1.6 - 8.3 10e3/uL    Absolute Lymphocytes 3.2 0.8 - 5.3 10e3/uL    Absolute Monocytes 0.3 0.0 - 1.3 10e3/uL    Absolute Eosinophils 0.2 0.0 - 0.7 10e3/uL    Absolute Basophils 0.1 0.0 - 0.2 10e3/uL    Absolute Immature Granulocytes 0.0 <=0.4 10e3/uL    Absolute NRBCs 0.0 10e3/uL       Cece Og PA-C  Emergency Medicine  [facility]  7/21/2022     Cece Og PA-C  07/22/22 0025       Cece Og PA-C  07/22/22 0050

## 2022-07-21 NOTE — ED TRIAGE NOTES
"Pt presents from his mother's house via [facility] with c/o ETOH w/d. C/o stomach upset, nausea, denies pain. Pt grimacing, calling out, crying. Pt making statements of \"I don't want to feel like this anymore\", \"I don't know where the fuck I am\", \"I'm not gonna do well\". Pt told EMS that he drank 0.5L alcohol today when he usually drinks 1.5L per day, pt suspecting he is in w/d.     Triage Assessment     Row Name 07/21/22 7439       Triage Assessment (Adult)    Airway WDL WDL       Respiratory WDL    Respiratory WDL WDL       Skin Circulation/Temperature WDL    Skin Circulation/Temperature WDL WDL       Cardiac WDL    Cardiac WDL WDL       Peripheral/Neurovascular WDL    Peripheral Neurovascular WDL WDL       Cognitive/Neuro/Behavioral WDL    Cognitive/Neuro/Behavioral WDL X;mood/behavior    Level of Consciousness confused    Arousal Level opens eyes spontaneously    Orientation disoriented to;situation;time    Speech slurred;rambling    Mood/Behavior anxious;agitated              "

## 2022-07-21 NOTE — ED NOTES
Bed: JNED-12  Expected date: 7/21/22  Expected time: 5:37 PM  Means of arrival:   Comments:  [facility]- ETOH

## 2022-07-21 NOTE — ED NOTES
"Pt yelling in room, making statements of \"you probably hate me\" to staff. Pt saying \"I'm probably going to be an asshole to you\".  "

## 2022-07-22 VITALS
WEIGHT: 175 LBS | DIASTOLIC BLOOD PRESSURE: 71 MMHG | OXYGEN SATURATION: 99 % | HEART RATE: 81 BPM | TEMPERATURE: 97.8 F | HEIGHT: 71 IN | SYSTOLIC BLOOD PRESSURE: 120 MMHG | RESPIRATION RATE: 20 BRPM | BODY MASS INDEX: 24.5 KG/M2

## 2022-07-22 VITALS
RESPIRATION RATE: 16 BRPM | TEMPERATURE: 98.6 F | SYSTOLIC BLOOD PRESSURE: 136 MMHG | DIASTOLIC BLOOD PRESSURE: 84 MMHG | OXYGEN SATURATION: 93 % | HEART RATE: 65 BPM

## 2022-07-22 DIAGNOSIS — F10.930 ALCOHOL WITHDRAWAL SYNDROME WITHOUT COMPLICATION (H): ICD-10-CM

## 2022-07-22 PROBLEM — E87.6 HYPOKALEMIA: Status: ACTIVE | Noted: 2020-10-31

## 2022-07-22 PROBLEM — F10.94 ALCOHOL-INDUCED MOOD DISORDER (H): Status: ACTIVE | Noted: 2019-03-27

## 2022-07-22 PROBLEM — K59.00 CONSTIPATION: Status: ACTIVE | Noted: 2021-03-23

## 2022-07-22 PROBLEM — R20.8 DYSESTHESIA: Status: ACTIVE | Noted: 2021-12-01

## 2022-07-22 PROBLEM — D69.6 THROMBOCYTOPENIA (H): Status: ACTIVE | Noted: 2021-01-27

## 2022-07-22 PROBLEM — F19.94 SUBSTANCE INDUCED MOOD DISORDER (H): Status: ACTIVE | Noted: 2022-07-22

## 2022-07-22 PROBLEM — L85.8 KERATOSIS PILARIS: Status: ACTIVE | Noted: 2021-09-20

## 2022-07-22 PROBLEM — H54.62 DECREASED VISION OF LEFT EYE: Status: ACTIVE | Noted: 2021-11-29

## 2022-07-22 PROBLEM — F10.10 ALCOHOL ABUSE: Status: ACTIVE | Noted: 2021-04-01

## 2022-07-22 PROBLEM — R06.83 SNORING: Status: ACTIVE | Noted: 2021-12-01

## 2022-07-22 PROBLEM — L29.9 PRURITUS: Status: ACTIVE | Noted: 2021-09-20

## 2022-07-22 PROBLEM — G89.29 CHRONIC INTRACTABLE HEADACHE: Status: ACTIVE | Noted: 2021-11-29

## 2022-07-22 PROBLEM — G47.9 SLEEP DIFFICULTIES: Status: ACTIVE | Noted: 2022-07-22

## 2022-07-22 PROBLEM — R51.9 CHRONIC INTRACTABLE HEADACHE: Status: ACTIVE | Noted: 2021-11-29

## 2022-07-22 PROBLEM — F10.929 ALCOHOLIC INTOXICATION (H): Status: ACTIVE | Noted: 2020-10-25

## 2022-07-22 PROBLEM — D64.9 NORMOCYTIC ANEMIA: Status: ACTIVE | Noted: 2021-04-02

## 2022-07-22 PROBLEM — R22.9 LOCALIZED SUPERFICIAL SWELLING, MASS, OR LUMP: Status: ACTIVE | Noted: 2021-12-01

## 2022-07-22 LAB
ANION GAP SERPL CALCULATED.3IONS-SCNC: 15 MMOL/L (ref 5–18)
ATRIAL RATE - MUSE: 73 BPM
BUN SERPL-MCNC: 13 MG/DL (ref 8–22)
CALCIUM SERPL-MCNC: 9.5 MG/DL (ref 8.5–10.5)
CHLORIDE BLD-SCNC: 105 MMOL/L (ref 98–107)
CO2 SERPL-SCNC: 23 MMOL/L (ref 22–31)
CREAT SERPL-MCNC: 0.81 MG/DL (ref 0.7–1.3)
DIASTOLIC BLOOD PRESSURE - MUSE: NORMAL MMHG
ERYTHROCYTE [DISTWIDTH] IN BLOOD BY AUTOMATED COUNT: 16 % (ref 10–15)
GFR SERPL CREATININE-BSD FRML MDRD: >90 ML/MIN/1.73M2
GLUCOSE BLD-MCNC: 92 MG/DL (ref 70–125)
HCT VFR BLD AUTO: 44.5 % (ref 40–53)
HGB BLD-MCNC: 15 G/DL (ref 13.3–17.7)
HOLD SPECIMEN: NORMAL
INTERPRETATION ECG - MUSE: NORMAL
MAGNESIUM SERPL-MCNC: 2 MG/DL (ref 1.8–2.6)
MCH RBC QN AUTO: 29.5 PG (ref 26.5–33)
MCHC RBC AUTO-ENTMCNC: 33.7 G/DL (ref 31.5–36.5)
MCV RBC AUTO: 88 FL (ref 78–100)
P AXIS - MUSE: 67 DEGREES
PLATELET # BLD AUTO: 237 10E3/UL (ref 150–450)
POTASSIUM BLD-SCNC: 4 MMOL/L (ref 3.5–5)
PR INTERVAL - MUSE: 160 MS
QRS DURATION - MUSE: 96 MS
QT - MUSE: 394 MS
QTC - MUSE: 434 MS
R AXIS - MUSE: 86 DEGREES
RBC # BLD AUTO: 5.08 10E6/UL (ref 4.4–5.9)
SODIUM SERPL-SCNC: 143 MMOL/L (ref 136–145)
SYSTOLIC BLOOD PRESSURE - MUSE: NORMAL MMHG
T AXIS - MUSE: 66 DEGREES
VENTRICULAR RATE- MUSE: 73 BPM
WBC # BLD AUTO: 6.7 10E3/UL (ref 4–11)

## 2022-07-22 PROCEDURE — 80048 BASIC METABOLIC PNL TOTAL CA: CPT | Performed by: EMERGENCY MEDICINE

## 2022-07-22 PROCEDURE — 36415 COLL VENOUS BLD VENIPUNCTURE: CPT | Performed by: EMERGENCY MEDICINE

## 2022-07-22 PROCEDURE — 99285 EMERGENCY DEPT VISIT HI MDM: CPT | Mod: 25

## 2022-07-22 PROCEDURE — 85027 COMPLETE CBC AUTOMATED: CPT | Performed by: EMERGENCY MEDICINE

## 2022-07-22 PROCEDURE — 250N000011 HC RX IP 250 OP 636: Performed by: EMERGENCY MEDICINE

## 2022-07-22 PROCEDURE — 83735 ASSAY OF MAGNESIUM: CPT | Performed by: EMERGENCY MEDICINE

## 2022-07-22 PROCEDURE — 96374 THER/PROPH/DIAG INJ IV PUSH: CPT

## 2022-07-22 RX ORDER — DIAZEPAM 10 MG/2ML
10 INJECTION, SOLUTION INTRAMUSCULAR; INTRAVENOUS ONCE
Status: COMPLETED | OUTPATIENT
Start: 2022-07-22 | End: 2022-07-22

## 2022-07-22 RX ADMIN — DIAZEPAM 10 MG: 5 INJECTION, SOLUTION INTRAMUSCULAR; INTRAVENOUS at 12:08

## 2022-07-22 NOTE — DISCHARGE INSTRUCTIONS
Try to stay around people who can help make sure you are doing okay and keep you accountable so you do not go back to drinking.  Continue to use the Valium you have at home to help with the withdrawal symptoms.

## 2022-07-22 NOTE — ED NOTES
Patient ambulatory in hallway without assistance. Patient would like to take an Uber home, ED provider made aware of this and is okay with this discharge plan.

## 2022-07-22 NOTE — ED PROVIDER NOTES
"[facility] EMERGENCY DEPARTMENT  PHYSICIAN NOTE    MRN: [id number]    FINAL IMPRESSION     1. Alcohol withdrawal syndrome without complication (H)          ED COURSE & MDM       11:24 AM Initial history and physical performed. Plan of care discussed. PPE utilized includes N95 mask, goggles, and gloves.    12:32 PM Patient reevaluated.    1:27 PM Patient reevaluated prior to discharge.       Patient presented for alcohol withdrawal. Initial vital signs reassuring.  His last drink was this morning, but it was far less than he normally would drink.  Initial CIWA score was 22, significant provement after 1 dose of Valium.  Labs reassuring.  Patient does have oral Valium at home and his brother is in town so he feels comfortable being discharged to manage symptoms at home and his brother can help make sure he does not go back to drinking again.  He is still waiting to hear from detox about a bed, but they are not available now.  Differential diagnosis considered includes sepsis, anxiety, intoxication, and electrolyte abnormality. Patient discharged in stable condition. Return precautions provided. All questions answered.      ===================================================================    HPI     Nikhil Rios is a 36 year old male with relevant PMH significant for generalized anxiety disorder, alcohol abuse disorder with alcohol withdrawal syndrome including withdrawal seizures, and alcohol induced fatty liver and pancreatitis presenting with alcohol withdrawal. Patient feels he is going through alcohol withdrawal and does not feel good. He reports nausea, shakiness, a headache, and discomfort that makes him \"want to crawl out of his skin\". He thinks he may be more confused than normal. He does not think he has been drinking enough water.    Patient c/o constipation and pain in LLQ of abdomen but denies visual or auditory hallucinations.     Of note, the patient was here las night for " "withdrawal symptoms and was discharged at ~1:00 AM. Upon return home, the patient admits that he drank ~ a half pint of alcohol. Patient says he has valium at home but is afraid of mixing it with alcohol, so he came to the ED for help instead. The patient is waiting on a bed in a detox center to open up so that he can be treated there.       ROS    All other ROS negative.    Problem list, medications, allergies, PMH, PSH, family history, and social history reviewed and updated as able in Epic.      PHYSICAL EXAM     Vitals:    07/22/22 0858 07/22/22 1210 07/22/22 1230 07/22/22 1330   BP: 133/81 137/82 123/74 120/71   Pulse: 91 75 76 81   Resp: 16  26 20   Temp: 97.8  F (36.6  C)      TempSrc: Oral      SpO2: 97% 97% 98% 99%   Weight: 79.4 kg (175 lb)      Height: 1.803 m (5' 11\")           Constitutional: Alert, appears uncomfortable.  HENT: Normocephalic, atraumatic.  Eyes: Sclera anicteric, EOMI.  CV: Normal rate, regular rhythm. Peripheral pulses intact.  Pulm: Non-labored respirations, CTAB.  Abdomen: Soft, non-tender.  MSK: No major deformities. Neck supple.  Neuro: A/O x3. Normal speech. No focal deficits. Resting tremor.  Skin: Warm and dry, no rash.  Psych: Cooperative, able to follow commands. Intact attention.      TESTING   All testing reviewed and interpreted.    EKG  None.    LABS  Labs Ordered and Resulted from Time of ED Arrival to Time of ED Departure   BASIC METABOLIC PANEL - Normal       Result Value    Sodium 143      Potassium 4.0      Chloride 105      Carbon Dioxide (CO2) 23      Anion Gap 15      Urea Nitrogen 13      Creatinine 0.81      Calcium 9.5      Glucose 92      GFR Estimate >90     MAGNESIUM - Normal    Magnesium 2.0     CBC WITH PLATELETS       IMAGING  No orders to display         I attest that Thais Ward is acting in a scribe capacity, has observed my performance of services, and has documented them in accordance with my direction.       Pee Sutton MD  07/22/22 1401    "

## 2022-07-22 NOTE — ED TRIAGE NOTES
"Patient states he was here last night for withdrawal, treated and discharged.  He did go home and drink \"some'.  Is back her for WD.  Patient is restless in triage. He Is very concerned about his phone he lost.      Triage Assessment     Row Name 07/22/22 3560       Triage Assessment (Adult)    Airway WDL WDL       Respiratory WDL    Respiratory WDL WDL       Skin Circulation/Temperature WDL    Skin Circulation/Temperature WDL WDL       Cardiac WDL    Cardiac WDL WDL       Peripheral/Neurovascular WDL    Peripheral Neurovascular WDL WDL       Cognitive/Neuro/Behavioral WDL    Cognitive/Neuro/Behavioral WDL WDL              "

## 2022-07-22 NOTE — DISCHARGE INSTRUCTIONS
You need to stop drinking alcohol. Alcohol withdrawal can be very dangerous. Recommend close follow up with your primary care provider.

## 2022-07-23 ENCOUNTER — HOSPITAL ENCOUNTER (EMERGENCY)
Facility: HOSPITAL | Age: 37
Discharge: HOME | End: 2022-07-24
Attending: EMERGENCY MEDICINE | Admitting: EMERGENCY MEDICINE
Payer: COMMERCIAL

## 2022-07-23 DIAGNOSIS — F10.229 ALCOHOL DEPENDENCE WITH INTOXICATION WITH COMPLICATION (H): ICD-10-CM

## 2022-07-23 LAB
ALBUMIN SERPL-MCNC: 4.8 G/DL (ref 3.5–5)
ALP SERPL-CCNC: 69 U/L (ref 45–120)
ALT SERPL W P-5'-P-CCNC: 16 U/L (ref 0–45)
ANION GAP SERPL CALCULATED.3IONS-SCNC: 16 MMOL/L (ref 5–18)
AST SERPL W P-5'-P-CCNC: 27 U/L (ref 0–40)
BILIRUB SERPL-MCNC: 0.4 MG/DL (ref 0–1)
BUN SERPL-MCNC: 12 MG/DL (ref 8–22)
CALCIUM SERPL-MCNC: 9.1 MG/DL (ref 8.5–10.5)
CHLORIDE BLD-SCNC: 105 MMOL/L (ref 98–107)
CO2 SERPL-SCNC: 26 MMOL/L (ref 22–31)
CREAT SERPL-MCNC: 0.89 MG/DL (ref 0.7–1.3)
ERYTHROCYTE [DISTWIDTH] IN BLOOD BY AUTOMATED COUNT: 16.2 % (ref 10–15)
ETHANOL SERPL-MCNC: 492 MG/DL
GFR SERPL CREATININE-BSD FRML MDRD: >90 ML/MIN/1.73M2
GLUCOSE BLD-MCNC: 101 MG/DL (ref 70–125)
HCT VFR BLD AUTO: 46.8 % (ref 40–53)
HGB BLD-MCNC: 15.9 G/DL (ref 13.3–17.7)
LIPASE SERPL-CCNC: 35 U/L (ref 0–52)
MCH RBC QN AUTO: 29.8 PG (ref 26.5–33)
MCHC RBC AUTO-ENTMCNC: 34 G/DL (ref 31.5–36.5)
MCV RBC AUTO: 88 FL (ref 78–100)
PLATELET # BLD AUTO: 202 10E3/UL (ref 150–450)
POTASSIUM BLD-SCNC: 3.8 MMOL/L (ref 3.5–5)
PROT SERPL-MCNC: 8 G/DL (ref 6–8)
RBC # BLD AUTO: 5.34 10E6/UL (ref 4.4–5.9)
SODIUM SERPL-SCNC: 147 MMOL/L (ref 136–145)
WBC # BLD AUTO: 5.6 10E3/UL (ref 4–11)

## 2022-07-23 PROCEDURE — 96361 HYDRATE IV INFUSION ADD-ON: CPT

## 2022-07-23 PROCEDURE — 250N000011 HC RX IP 250 OP 636: Performed by: EMERGENCY MEDICINE

## 2022-07-23 PROCEDURE — 85027 COMPLETE CBC AUTOMATED: CPT | Performed by: EMERGENCY MEDICINE

## 2022-07-23 PROCEDURE — 96374 THER/PROPH/DIAG INJ IV PUSH: CPT

## 2022-07-23 PROCEDURE — 83690 ASSAY OF LIPASE: CPT | Performed by: EMERGENCY MEDICINE

## 2022-07-23 PROCEDURE — 36415 COLL VENOUS BLD VENIPUNCTURE: CPT | Performed by: EMERGENCY MEDICINE

## 2022-07-23 PROCEDURE — 99284 EMERGENCY DEPT VISIT MOD MDM: CPT | Mod: 25

## 2022-07-23 PROCEDURE — 258N000003 HC RX IP 258 OP 636: Performed by: EMERGENCY MEDICINE

## 2022-07-23 PROCEDURE — 82077 ASSAY SPEC XCP UR&BREATH IA: CPT | Performed by: EMERGENCY MEDICINE

## 2022-07-23 PROCEDURE — 80053 COMPREHEN METABOLIC PANEL: CPT | Performed by: EMERGENCY MEDICINE

## 2022-07-23 PROCEDURE — 96375 TX/PRO/DX INJ NEW DRUG ADDON: CPT

## 2022-07-23 RX ORDER — SODIUM CHLORIDE 9 MG/ML
INJECTION, SOLUTION INTRAVENOUS CONTINUOUS
Status: DISCONTINUED | OUTPATIENT
Start: 2022-07-23 | End: 2022-07-24 | Stop reason: HOSPADM

## 2022-07-23 RX ORDER — DIPHENHYDRAMINE HYDROCHLORIDE 50 MG/ML
25 INJECTION INTRAMUSCULAR; INTRAVENOUS ONCE
Status: COMPLETED | OUTPATIENT
Start: 2022-07-23 | End: 2022-07-23

## 2022-07-23 RX ORDER — ONDANSETRON 2 MG/ML
4 INJECTION INTRAMUSCULAR; INTRAVENOUS ONCE
Status: COMPLETED | OUTPATIENT
Start: 2022-07-23 | End: 2022-07-23

## 2022-07-23 RX ADMIN — SODIUM CHLORIDE 1000 ML: 9 INJECTION, SOLUTION INTRAVENOUS at 20:50

## 2022-07-23 RX ADMIN — ONDANSETRON 4 MG: 2 INJECTION INTRAMUSCULAR; INTRAVENOUS at 19:34

## 2022-07-23 RX ADMIN — FAMOTIDINE 20 MG: 10 INJECTION INTRAVENOUS at 19:34

## 2022-07-23 RX ADMIN — SODIUM CHLORIDE 1000 ML: 9 INJECTION, SOLUTION INTRAVENOUS at 22:32

## 2022-07-23 RX ADMIN — DIPHENHYDRAMINE HYDROCHLORIDE 25 MG: 50 INJECTION, SOLUTION INTRAMUSCULAR; INTRAVENOUS at 19:34

## 2022-07-23 RX ADMIN — SODIUM CHLORIDE 1000 ML: 9 INJECTION, SOLUTION INTRAVENOUS at 19:38

## 2022-07-23 NOTE — ED NOTES
Bed: JNED-06  Expected date: 7/23/22  Expected time: 6:50 PM  Means of arrival: Ambulance  Comments:  [facility] ambulance

## 2022-07-23 NOTE — ED TRIAGE NOTES
Pt called ambulance to his mothers home after becoming intoxicated and taking a valium. Pt states that he doesn't feel well and his family hates him.     Triage Assessment     Row Name 07/23/22 7901       Triage Assessment (Adult)    Airway WDL WDL       Respiratory WDL    Respiratory WDL WDL       Skin Circulation/Temperature WDL    Skin Circulation/Temperature WDL X  flushed        Cardiac WDL    Cardiac WDL X  tachycardia       Peripheral/Neurovascular WDL    Peripheral Neurovascular WDL WDL       Cognitive/Neuro/Behavioral WDL    Cognitive/Neuro/Behavioral WDL X  intoxicated

## 2022-07-24 ENCOUNTER — HOSPITAL ENCOUNTER (EMERGENCY)
Facility: HOSPITAL | Age: 37
Discharge: HOME | End: 2022-07-25
Attending: EMERGENCY MEDICINE | Admitting: EMERGENCY MEDICINE
Payer: COMMERCIAL

## 2022-07-24 VITALS
TEMPERATURE: 98.7 F | RESPIRATION RATE: 24 BRPM | OXYGEN SATURATION: 100 % | HEART RATE: 88 BPM | DIASTOLIC BLOOD PRESSURE: 73 MMHG | SYSTOLIC BLOOD PRESSURE: 135 MMHG

## 2022-07-24 VITALS
OXYGEN SATURATION: 100 % | TEMPERATURE: 98.1 F | DIASTOLIC BLOOD PRESSURE: 53 MMHG | HEART RATE: 71 BPM | RESPIRATION RATE: 24 BRPM | SYSTOLIC BLOOD PRESSURE: 103 MMHG

## 2022-07-24 DIAGNOSIS — F10.220 ACUTE ALCOHOLIC INTOXICATION IN ALCOHOLISM WITHOUT COMPLICATION (H): ICD-10-CM

## 2022-07-24 DIAGNOSIS — F10.920 ALCOHOLIC INTOXICATION WITHOUT COMPLICATION (H): ICD-10-CM

## 2022-07-24 PROCEDURE — 99282 EMERGENCY DEPT VISIT SF MDM: CPT

## 2022-07-24 RX ORDER — ONDANSETRON 2 MG/ML
4 INJECTION INTRAMUSCULAR; INTRAVENOUS ONCE
Status: DISCONTINUED | OUTPATIENT
Start: 2022-07-24 | End: 2022-07-24 | Stop reason: HOSPADM

## 2022-07-24 RX ORDER — SODIUM CHLORIDE 9 MG/ML
INJECTION, SOLUTION INTRAVENOUS CONTINUOUS
Status: DISCONTINUED | OUTPATIENT
Start: 2022-07-24 | End: 2022-07-24 | Stop reason: HOSPADM

## 2022-07-24 NOTE — ED TRIAGE NOTES
"Pt is here after being discharged from ED this am. Nikhil states that he \"doesn't feel good\" and that he is \"going through withdrawal. Last drink was around 11am today and was \"just a little bit\".     Triage Assessment     Row Name 07/24/22 1501       Triage Assessment (Adult)    Airway WDL WDL       Respiratory WDL    Respiratory WDL WDL       Skin Circulation/Temperature WDL    Skin Circulation/Temperature WDL X  redness of skin       Cardiac WDL    Cardiac WDL WDL       Peripheral/Neurovascular WDL    Peripheral Neurovascular WDL WDL       Cognitive/Neuro/Behavioral WDL    Cognitive/Neuro/Behavioral WDL WDL              "

## 2022-07-24 NOTE — ED NOTES
Pt called for friend to pick him  Up.  Friend will come back into unit when here so we know that he has a sober ride

## 2022-07-24 NOTE — ED PROVIDER NOTES
"EMERGENCY DEPARTMENT ENCOUNTER      NAME: Nikhil Rios  AGE: 36 year old male  YOB: 1985  MRN: [id number]  EVALUATION DATE & TIME: 7/24/2022  3:45 PM    PCP: Lisandro Graham    ED PROVIDER: Tim Trevizo M.D.      Chief Complaint   Patient presents with     Alcohol Problem         FINAL IMPRESSION:  Alcohol intoxication  Chronic alcoholism    ED COURSE & MEDICAL DECISION MAKING:    Pertinent Labs & Imaging studies reviewed. (See chart for details)  36 year old male presents to the Emergency Department for evaluation of alcohol intoxication.  Patient presented yesterday under similar circumstances.  Released this morning around 5:30 AM.  At that point he was without signs of intoxication and ambulatory without difficulties.  Patient apparently began drinking immediately after departure and returns severely intoxicated.  Patient once again is asking \"what he can to do about things\".  Patient offered detox last night and declined.  Declines detox once again today.  Does wish to depart immediately.  Explained he would have to have a responsible adult to provide a sober ride and he would not be appropriate for departure in [facility].  He understood this well.  He will make attempts to contact friends/family members.  Intravenous fluids and symptomatic relief with Pepcid and Zofran ordered.. Patient appears non toxic with stable vitals signs.     3:52 PM I met with the patient for the initial interview and physical examination. Discussed plan for treatment and workup in the ED.    4:06 PM.  Nikhil reports she has been able to get a hold of a friend who is willing to give him a ride home.  Awaiting arrival sober ride  4:30 PM.  Friend has arrived.  He is agreeable to take Nikhil home.  At the conclusion of the encounter I discussed the results of all of the tests and the disposition. The questions were answered and return precautions provided. The patient or family acknowledged understanding and was agreeable with the " "care plan.       PPE: Provider wore gloves, N95 mask, eye protection, surgical cap     MEDICATIONS GIVEN IN THE EMERGENCY:  Medications - No data to display    NEW PRESCRIPTIONS STARTED AT TODAY'S ER VISIT  New Prescriptions    No medications on file          =================================================================    HPI        Nikhil Rios is a 36 year old male with a pertient medical history of chronic alcoholism and continued alcohol abuse.  Returns stating \"you let me go to earlier today\".  Patient reports feeling poorly.  Admits to drinking after his departure.  Reports nausea without vomiting.    REVIEW OF SYSTEMS   Constitutional:  Denies fever, chills  Respiratory:  Denies productive cough or increased work of breathing  Cardiovascular:  Denies chest pain, palpitations  GI: Mild abdominal pain, positive nausea, negative vomiting, or change in bowel or bladder habits   Musculoskeletal:  Denies any new muscle/joint swelling  Skin:  Denies rash   Neurologic:  Denies focal weakness  All systems negative except as marked.     PAST MEDICAL HISTORY:  Past Medical History:   Diagnosis Date     Alcohol abuse      Alcohol abuse      Alcohol withdrawal (H)      Depressive disorder      HLD (hyperlipidemia)      HTN (hypertension)        PAST SURGICAL HISTORY:  Past Surgical History:   Procedure Laterality Date     NO HISTORY OF SURGERY       OTHER SURGICAL HISTORY      none         CURRENT MEDICATIONS:    No current facility-administered medications for this encounter.    Current Outpatient Medications:      diazepam (VALIUM) 5 MG tablet, Take 1 tablet (5 mg) by mouth every 6 hours as needed for anxiety or withdrawal, Disp: 6 tablet, Rfl: 0     escitalopram (LEXAPRO) 10 MG tablet, Take 1 tablet (10 mg) by mouth daily, Disp: 30 tablet, Rfl: 0     folic acid (FOLVITE) 1 MG tablet, Take 1 tablet (1 mg) by mouth daily, Disp: 30 tablet, Rfl: 0     gabapentin (NEURONTIN) 300 MG capsule, Take 2 capsules (600 mg) by " mouth every 8 hours, Disp: 60 capsule, Rfl: 0     hydrOXYzine (VISTARIL) 50 MG capsule, Take 1 capsule (50 mg) by mouth 3 times daily as needed for anxiety, Disp: 15 capsule, Rfl: 0     ondansetron (ZOFRAN) 4 MG tablet, Take 1 tablet (4 mg) by mouth every 8 hours as needed for nausea or vomiting, Disp: 4 tablet, Rfl: 0     traZODone (DESYREL) 50 MG tablet, Take 50 mg by mouth At Bedtime, Disp: , Rfl:     Facility-Administered Medications Ordered in Other Encounters:      Self Administer Medications: Behavioral Services, , Does not apply, See Admin Instructions, Kenny Forte MD    ALLERGIES:  Allergies   Allergen Reactions     Gramineae Pollens Itching     Pollen Extract Rash     grass tree pollen       FAMILY HISTORY:  Family History   Problem Relation Age of Onset     Coronary Artery Disease Father        SOCIAL HISTORY:   Social History     Socioeconomic History     Marital status: Single   Occupational History     Occupation: Rental property owner   Tobacco Use     Smoking status: Current Some Day Smoker     Packs/day: 0.25     Types: Cigars     Smokeless tobacco: Never Used     Tobacco comment: occasional   Substance and Sexual Activity     Alcohol use: Yes     Alcohol/week: 17.0 standard drinks     Types: 17 Shots of liquor per week     Comment: Drinks 750ml/day or 17 shots/day; hx of withdrawl seizures     Drug use: Not Currently     Social Determinants of Health     Transportation Needs: No Transportation Needs     Lack of Transportation (Medical): No     Lack of Transportation (Non-Medical): No       VITALS:  Patient Vitals for the past 24 hrs:   BP Temp Temp src Pulse Resp SpO2   07/24/22 1500 135/73 98.7  F (37.1  C) Oral 88 24 100 %        PHYSICAL EXAM    Constitutional:  Awake, alert, in mild apparent distress  HENT:  Normocephalic, Atraumatic. Bilateral external ears normal. Oropharynx moist. Nose normal. Neck- Normal range of motion with no guarding, No midline cervical tenderness, Supple, No  stridor.   Eyes:  PERRL, EOMI.  Mild nystagmus.  Mild conjunctival injection.   Respiratory:  Normal breath sounds, No respiratory distress, No wheezing.    Cardiovascular:  Normal heart rate, Normal rhythm, No appreciable rubs or gallops.   GI:  Soft, No tenderness, No distension, No palpable masses  Musculoskeletal:  Intact distal pulses, No edema. Good range of motion in all major joints. No tenderness to palpation or major deformities noted.  Integument:  Warm, Dry, No erythema, No rash.   Neurologic:  Alert & oriented to person and place.  Slurring words., Normal motor function, Normal sensory function, No focal deficits noted.   Psychiatric:  Affect normal, Judgment normal, Mood normal.       I, Tanisha Contreras, am serving as a scribe to document services personally performed by Tim Trevizo MD, based on my observation and the provider's statements to me. I, Tim Trevizo MD attest that Tanisha Contreras is acting in a scribe capacity, has observed my performance of the services and has documented them in accordance with my direction.    Tim Trevizo M.D.  Emergency Medicine  [facility] EMERGENCY DEPARTMENT     Tim Trevizo MD  07/24/22 2766

## 2022-07-24 NOTE — ED NOTES
EMERGENCY DEPARTMENT SIGN OUT NOTE        ED COURSE AND MEDICAL DECISION MAKING  Patient was signed out to me by Dr Tim Trevizo at 12:24 AM.    In brief, Nikhil Rios is a 36 year old male who initially presented alcohol intoxication after drinking one half of 750 mL bottle of liquor today. He was been evaluated three prior visits for similar alcohol issues. No other medical complaints at this time.      At time of sign out, disposition was pending sober and final dispo.    4:04 AM Pt re-evaluated as he's asking to go home as he has a new job he's starting tomorrow.  No SI/HI.  5:12 AM Pt requesting to leave, no SI/HI.  Pt is clinically sober at this time.  They are conversing normally with staff, ambulating independently in the ED and tolerating PO well.  They do not appear to be in any immediate danger to themselves and are appropriate for discharge at this time.      FINAL IMPRESSION    1. Alcohol dependence with intoxication with complication (H)        ED MEDS  Medications   0.9% sodium chloride BOLUS (0 mLs Intravenous Stopped 7/23/22 2050)     Followed by   0.9% sodium chloride BOLUS (0 mLs Intravenous Stopped 7/23/22 2308)     Followed by   sodium chloride 0.9% infusion ( Intravenous Rate/Dose Verify 7/24/22 0304)   famotidine (PEPCID) injection 20 mg (20 mg Intravenous Given 7/23/22 1934)   ondansetron (ZOFRAN) injection 4 mg (4 mg Intravenous Given 7/23/22 1934)   diphenhydrAMINE (BENADRYL) injection 25 mg (25 mg Intravenous Given 7/23/22 1934)       LAB  Labs Ordered and Resulted from Time of ED Arrival to Time of ED Departure   COMPREHENSIVE METABOLIC PANEL - Abnormal       Result Value    Sodium 147 (*)     Potassium 3.8      Chloride 105      Carbon Dioxide (CO2) 26      Anion Gap 16      Urea Nitrogen 12      Creatinine 0.89      Calcium 9.1      Glucose 101      Alkaline Phosphatase 69      AST 27      ALT 16      Protein Total 8.0      Albumin 4.8      Bilirubin Total 0.4      GFR Estimate >90      ETHYL ALCOHOL LEVEL - Abnormal    Alcohol, Blood 492 (*)    CBC WITH PLATELETS - Abnormal    WBC Count 5.6      RBC Count 5.34      Hemoglobin 15.9      Hematocrit 46.8      MCV 88      MCH 29.8      MCHC 34.0      RDW 16.2 (*)     Platelet Count 202     LIPASE - Normal    Lipase 35         RADIOLOGY    No orders to display       DISCHARGE MEDS  New Prescriptions    No medications on file       Basilio Morales [facility] EMERGENCY DEPARTMENT  [facility] [facility] [id number] [phone]     Basilio Morales MD  07/24/22 0515

## 2022-07-24 NOTE — ED PROVIDER NOTES
"EMERGENCY DEPARTMENT ENCOUNTER      NAME: Nikhil Rios  AGE: 36 year old male  YOB: 1985  MRN: [id number]  EVALUATION DATE & TIME: 7/23/2022  6:51 PM    PCP: Lisandro Graham    ED PROVIDER: Tim Trevizo M.D.      Chief Complaint   Patient presents with     Alcohol Problem         FINAL IMPRESSION:  Acute alcohol intoxication  Chronic alcohol abuse    ED COURSE & MEDICAL DECISION MAKING:    Pertinent Labs & Imaging studies reviewed. (See chart for details)  36 year old male presents to the Emergency Department for evaluation of an alcohol problem.  Patient reports \"an alcoholic\".  He was on reportedly \"I just do not feel well\".  States she drank half of a 750 mL bottle today.  Patient with multiple recent visits under similar circumstances.  Patient typically lives with parents.  Patient with reported history of alcohol withdrawal seizures.  When asked about possibility of detox patient declines going to [facility] detox centers.  Patient is in moderate distress.  Marked nystagmus conjunctival injection.  Slurring words.  Vital signs however normal.  No evidence of withdrawal.  Evidence of significant intoxication.  Baseline blood work being obtained.  Intravenous fluids initiated along with symptomatic treatment with Pepcid, Zofran and Benadryl intravenously    7:01 PM I met with the patient for the initial interview and physical examination. Discussed plan for treatment and workup in the ED.    9:08 PM.  Patient easily arousable and conversant.  Will remain in the ER until morning when plans for disposition can be made.  Alcohol markedly elevated 492 remainder of laboratory evaluation essentially normal.  12:18 AM.  Patient discussed with Dr. Morales at end of shift.  Patient is to be monitored until sober enough for disposition  At the conclusion of the encounter I discussed the results of all of the tests and the disposition. The questions were answered and return precautions " provided. The patient or family acknowledged understanding and was agreeable with the care plan.       PPE: Provider wore gloves, N95 mask, eye protection, surgical cap, and paper mask.     MEDICATIONS GIVEN IN THE EMERGENCY:  Medications - No data to display    NEW PRESCRIPTIONS STARTED AT TODAY'S ER VISIT  New Prescriptions    No medications on file          =================================================================    HPI    Patient information was obtained from: Patient    Use of Intrepreter: N/A    History limited by patient's mental status.    Nikhil Rios is a 36 year old male with a pertient medical history of EtOH abuse and withdrawal, depressive disorder, HTN, and HLD who presents to the ED for evaluation of an alcohol problem.    Patient reports an ongoing drinking problem and states that earlier today he drank at least one half of a 750 mL bottle of liquor. Patient has been to the ED on three other occasions in the last week regarding similar alcohol issues. He otherwise denies any other medical complaints.     REVIEW OF SYSTEMS   Constitutional:  Denies fever, chills  Respiratory:  Denies productive cough or increased work of breathing  Cardiovascular:  Denies chest pain, palpitations  GI:  Denies abdominal pain, nausea, vomiting, or change in bowel or bladder habits   Musculoskeletal:  Denies any new muscle/joint swelling  Skin:  Denies rash   Neurologic:  Denies focal weakness  All systems negative except as marked.     PAST MEDICAL HISTORY:  Past Medical History:   Diagnosis Date     Alcohol abuse      Alcohol abuse      Alcohol withdrawal (H)      Depressive disorder      HLD (hyperlipidemia)      HTN (hypertension)        PAST SURGICAL HISTORY:  Past Surgical History:   Procedure Laterality Date     NO HISTORY OF SURGERY       OTHER SURGICAL HISTORY      none         CURRENT MEDICATIONS:    No current facility-administered medications for this encounter.    Current Outpatient Medications:       diazepam (VALIUM) 5 MG tablet, Take 1 tablet (5 mg) by mouth every 6 hours as needed for anxiety or withdrawal, Disp: 6 tablet, Rfl: 0     escitalopram (LEXAPRO) 10 MG tablet, Take 1 tablet (10 mg) by mouth daily, Disp: 30 tablet, Rfl: 0     folic acid (FOLVITE) 1 MG tablet, Take 1 tablet (1 mg) by mouth daily, Disp: 30 tablet, Rfl: 0     gabapentin (NEURONTIN) 300 MG capsule, Take 2 capsules (600 mg) by mouth every 8 hours, Disp: 60 capsule, Rfl: 0     hydrOXYzine (VISTARIL) 50 MG capsule, Take 1 capsule (50 mg) by mouth 3 times daily as needed for anxiety, Disp: 15 capsule, Rfl: 0     ondansetron (ZOFRAN) 4 MG tablet, Take 1 tablet (4 mg) by mouth every 8 hours as needed for nausea or vomiting, Disp: 4 tablet, Rfl: 0     traZODone (DESYREL) 50 MG tablet, Take 50 mg by mouth At Bedtime, Disp: , Rfl:     Facility-Administered Medications Ordered in Other Encounters:      Self Administer Medications: Behavioral Services, , Does not apply, See Admin Instructions, Kenny Forte MD    ALLERGIES:  Allergies   Allergen Reactions     Gramineae Pollens Itching     Pollen Extract Rash     grass tree pollen       FAMILY HISTORY:  Family History   Problem Relation Age of Onset     Coronary Artery Disease Father        SOCIAL HISTORY:   Social History     Socioeconomic History     Marital status: Single     Spouse name: None     Number of children: None     Years of education: None     Highest education level: None   Occupational History     Occupation: Rental property owner   Tobacco Use     Smoking status: Current Some Day Smoker     Packs/day: 0.25     Types: Cigars     Smokeless tobacco: Never Used     Tobacco comment: occasional   Substance and Sexual Activity     Alcohol use: Yes     Alcohol/week: 17.0 standard drinks     Types: 17 Shots of liquor per week     Comment: Drinks 750ml/day or 17 shots/day; hx of withdrawl seizures     Drug use: Not Currently     Social Determinants of Health     Transportation Needs:  No Transportation Needs     Lack of Transportation (Medical): No     Lack of Transportation (Non-Medical): No       VITALS:  Patient Vitals for the past 24 hrs:   BP Temp Temp src Pulse Resp SpO2   07/23/22 1857 (!) 109/94 98.1  F (36.7  C) Oral 74 24 99 %        PHYSICAL EXAM    Constitutional:  Awake, alert, in mild to moderate apparent distress  HENT:  Normocephalic, Atraumatic. Bilateral external ears normal. Oropharynx moist. Nose normal. Neck- Normal range of motion with no guarding, No midline cervical tenderness, Supple, No stridor.   Eyes:  PERRL, EOMI with no signs of entrapment, Conjunctiva injected.  Marked nystagmus, No discharge.   Respiratory:  Normal breath sounds, No respiratory distress, No wheezing.    Cardiovascular:  Normal heart rate, Normal rhythm, No appreciable rubs or gallops.   GI:  Soft, No tenderness, No distension, No palpable masses  Musculoskeletal: . Good range of motion in all major joints.   Integument:  Warm, Dry, No erythema, No rash.   Neurologic:  Alert & oriented to person and place., Normal motor function, Normal sensory function, No focal deficits noted.  Slurring words.  LAB:  All pertinent labs reviewed and interpreted.  Results for orders placed or performed during the hospital encounter of 07/23/22   Comprehensive metabolic panel     Status: Abnormal   Result Value Ref Range    Sodium 147 (H) 136 - 145 mmol/L    Potassium 3.8 3.5 - 5.0 mmol/L    Chloride 105 98 - 107 mmol/L    Carbon Dioxide (CO2) 26 22 - 31 mmol/L    Anion Gap 16 5 - 18 mmol/L    Urea Nitrogen 12 8 - 22 mg/dL    Creatinine 0.89 0.70 - 1.30 mg/dL    Calcium 9.1 8.5 - 10.5 mg/dL    Glucose 101 70 - 125 mg/dL    Alkaline Phosphatase 69 45 - 120 U/L    AST 27 0 - 40 U/L    ALT 16 0 - 45 U/L    Protein Total 8.0 6.0 - 8.0 g/dL    Albumin 4.8 3.5 - 5.0 g/dL    Bilirubin Total 0.4 0.0 - 1.0 mg/dL    GFR Estimate >90 >60 mL/min/1.73m2   Lipase     Status: Normal   Result Value Ref Range    Lipase 35 0 - 52 U/L    Ethyl Alcohol Level     Status: Abnormal   Result Value Ref Range    Alcohol, Blood 492 (H) None detected mg/dL   CBC (+ platelets, no diff)     Status: Abnormal   Result Value Ref Range    WBC Count 5.6 4.0 - 11.0 10e3/uL    RBC Count 5.34 4.40 - 5.90 10e6/uL    Hemoglobin 15.9 13.3 - 17.7 g/dL    Hematocrit 46.8 40.0 - 53.0 %    MCV 88 78 - 100 fL    MCH 29.8 26.5 - 33.0 pg    MCHC 34.0 31.5 - 36.5 g/dL    RDW 16.2 (H) 10.0 - 15.0 %    Platelet Count 202 150 - 450 10e3/uL         I, Karma Abdi, am serving as a scribe to document services personally performed by Tim Trevizo MD, based on my observation and the provider's statements to me. I, Tim Trevizo MD attest that KARMA ABDI is acting in a scribe capacity, has observed my performance of the services and has documented them in accordance with my direction.    Tim Trevizo M.D.  Emergency Medicine  [facility] EMERGENCY DEPARTMENT     Tim Trevizo MD  07/23/22 6940       Tim Trevizo MD  07/24/22 0018

## 2022-07-25 VITALS
TEMPERATURE: 98 F | SYSTOLIC BLOOD PRESSURE: 130 MMHG | HEART RATE: 79 BPM | DIASTOLIC BLOOD PRESSURE: 73 MMHG | RESPIRATION RATE: 18 BRPM | OXYGEN SATURATION: 97 %

## 2022-07-25 VITALS
DIASTOLIC BLOOD PRESSURE: 94 MMHG | OXYGEN SATURATION: 97 % | SYSTOLIC BLOOD PRESSURE: 124 MMHG | TEMPERATURE: 98 F | RESPIRATION RATE: 16 BRPM | HEART RATE: 85 BPM

## 2022-07-25 DIAGNOSIS — F10.920 ALCOHOLIC INTOXICATION WITHOUT COMPLICATION (H): ICD-10-CM

## 2022-07-25 PROCEDURE — 99282 EMERGENCY DEPT VISIT SF MDM: CPT

## 2022-07-25 NOTE — ED NOTES
"Patient walked out room saying \"my uber is here\" security called and patient brought back to bed. Security called, MD notified. Patient returned to bed in room.  "

## 2022-07-25 NOTE — ED PROVIDER NOTES
EMERGENCY DEPARTMENT ENCOUNTER      NAME: Nikhil Rios  AGE: 36 year old male  YOB: 1985  MRN: [id number]  EVALUATION DATE & TIME: 7/25/2022  5:06 PM    PCP: Lisandro Graham    ED PROVIDER: Ana Michelle M.D.      Chief Complaint   Patient presents with     Alcohol Intoxication         FINAL IMPRESSION:  1. Alcoholic intoxication without complication (H)          ED COURSE & MEDICAL DECISION MAKING:    ED Course as of 07/26/22 0134   Mon Jul 25, 2022   7779 Patient here BIB brother Yoseph who he had previously had conversation with today re: his relapsed alcoholism since April 2022. Per Yoseph and per patient, Nikhil is only amenable to going to [facility] detox, and patient tells me he refuses to go to detox anywhere else including [address] and [facility] detox. Yoseph noted he called [facility] today and they have plans to admit Nikhil to [facility] tomorrow morning, Nikhil stays with his mother. They came to the ED because Yoseph was initially hopeful that Nikhil would accept our offer to place him into [facility] for tonight in lieu of waiting tonight at his mother's house for detox in the AM, patient Nikhil now declines offer to go to any other detox facility and wants to be discharged. I offered Yoseph Rx for benzodiazepine therapy and complimentary therapy to allow for Nikhil to detox at home with family holding the prescription to prevent misuse/dangerous administration, Yoseph declines this option as he will not be staying with Nikhil overnight and their mother is not able to hold and administer those medications in a safe fashion, which is challenging. Patient is not currently in withrawal based on appearance without hypertension, tachycardia or tremulousness, still appears somewhat intoxicated but able to engage in extensive shared decision making conversation with me and his family, requests discharge and wants to go to [facility] in the morning specifically, no SI/HI/hallucinations/delusions. Patient  "discharged after being provided with extensive anticipatory guidance and given return precautions, importance of PMD follow-up emphasized.        5:44 PM I met with the patient, obtained history, performed an initial exam, and discussed options and plan for diagnostics and treatment here in the ED.   6:01 PM Discussed plans for discharge.    Pertinent Labs & Imaging studies reviewed. (See chart for details)    At the conclusion of the encounter I discussed the results of all of the tests and the disposition. The questions were answered. The patient or family acknowledged understanding and was agreeable with the care plan.     MEDICATIONS GIVEN IN THE EMERGENCY:  Medications - No data to display    NEW PRESCRIPTIONS STARTED AT TODAY'S ER VISIT  Discharge Medication List as of 7/25/2022  6:00 PM             =================================================================    HPI      Nikhil Rios is a 36 year old male with PMHx of alcohol dependence, psychiatric diagnoses who presents to the ED today via wheelchair with his brother with alcohol intoxication. Patient reports to the ED seeking help quitting alcohol. Patient's brother reports he is currently in contact with [facility] trying to get the patient admitted. Patient is otherwise unwilling to go to [facility] or [facility] detox.     Patient's brother is uncomfortable with outpatient treatment with medications as he states the patient has not had success with this in the past and instead opted to drink alcohol rather than take his medications.    Patient reports the reason he drinks is \"mostly emotional\" and states alcohol \"keeps me going\". Patient was previously sober for 8.5 months, but relapsed shortly after Easter of this year. Since then, patient reports he drinks 1/2 L of vodka daily. Patient reports he becomes tremulous after a couple of hours without alcohol. He states he has had 2 alcohol withdrawal seizures in the past, but none since he has " started drinking again.    Patient has been seen in the ED multiple times over this past week.    REVIEW OF SYSTEMS   All other systems reviewed and are negative except as noted above in HPI.    PAST MEDICAL HISTORY:  Past Medical History:   Diagnosis Date     Alcohol abuse      Alcohol abuse      Alcohol withdrawal (H)      Depressive disorder      HLD (hyperlipidemia)      HTN (hypertension)        PAST SURGICAL HISTORY:  Past Surgical History:   Procedure Laterality Date     NO HISTORY OF SURGERY       OTHER SURGICAL HISTORY      none       CURRENT MEDICATIONS:    diazepam (VALIUM) 5 MG tablet  escitalopram (LEXAPRO) 10 MG tablet  folic acid (FOLVITE) 1 MG tablet  gabapentin (NEURONTIN) 300 MG capsule  hydrOXYzine (VISTARIL) 50 MG capsule  ondansetron (ZOFRAN) 4 MG tablet  traZODone (DESYREL) 50 MG tablet        ALLERGIES:  Allergies   Allergen Reactions     Gramineae Pollens Itching     Pollen Extract Rash     grass tree pollen       FAMILY HISTORY:  Family History   Problem Relation Age of Onset     Coronary Artery Disease Father        SOCIAL HISTORY:   Social History     Socioeconomic History     Marital status: Single   Occupational History     Occupation: Rental property owner   Tobacco Use     Smoking status: Current Some Day Smoker     Packs/day: 0.25     Types: Cigars     Smokeless tobacco: Never Used     Tobacco comment: occasional   Substance and Sexual Activity     Alcohol use: Yes     Alcohol/week: 17.0 standard drinks     Types: 17 Shots of liquor per week     Comment: Drinks 750ml/day or 17 shots/day; hx of withdrawl seizures     Drug use: Not Currently     Social Determinants of Health     Transportation Needs: No Transportation Needs     Lack of Transportation (Medical): No     Lack of Transportation (Non-Medical): No       VITALS:  Patient Vitals for the past 24 hrs:   BP Temp Pulse Resp SpO2   07/25/22 1800 130/73 -- 79 -- 97 %   07/25/22 1745 -- -- 90 -- 95 %   07/25/22 1342 121/78 98  F (36.7   C) 96 18 98 %       PHYSICAL EXAM    GENERAL: Awake, alert.  In no acute distress.   HEENT: Normocephalic, atraumatic.  Pupils equal, round and reactive.  Conjunctiva normal.  EOMI.  NECK: No stridor or apparent deformity.  PULMONARY: Symmetrical breath sounds without distress.  Lungs clear to auscultation bilaterally without wheezes, rhonchi or rales.  CARDIO: Regular rate and rhythm.  No significant murmur, rub or gallop.  Radial pulses strong and symmetrical.  ABDOMINAL: Abdomen soft, non-distended and non-tender to palpation.  No CVAT, no palpable hepatosplenomegaly.  EXTREMITIES: No lower extremity swelling or edema.    NEURO: Alert and oriented to person, place and time.  Cranial nerves grossly intact.  No focal motor deficit.  PSYCH: Intoxicated affect.  SKIN: No rashes            I, Jeffery Conrad, am serving as a scribe to document services personally performed by Dr. Ana Michelle based on my observation and the provider's statements to me. I, Ana Michelle MD attest that Jeffery Conrad is acting in a scribe capacity, has observed my performance of the services and has documented them in accordance with my direction.     Ana Michelle MD  07/26/22 0134

## 2022-07-25 NOTE — ED TRIAGE NOTES
"    pt is intoxicated his brother brings him to ED today because \"they need help\" he currently stays with his Mother. Is supposed to go to [facility] for inpt tx this week.   "

## 2022-07-25 NOTE — ED TRIAGE NOTES
"Patient returns today after drinking more ETOH (after being discharged from ER earlier today). Was also here lastnight for hours after getting intoxicated. States \"you guys need to help me\". Pt brought in by a friend. Pt was living at a sober house for 8 months but started drinking about 3 months ago. Pt not cooperative in triage. Security notified.     Triage Assessment     Row Name 07/24/22 5256       Triage Assessment (Adult)    Airway WDL WDL       Respiratory WDL    Respiratory WDL WDL       Skin Circulation/Temperature WDL    Skin Circulation/Temperature WDL WDL       Cardiac WDL    Cardiac WDL WDL       Peripheral/Neurovascular WDL    Peripheral Neurovascular WDL WDL       Cognitive/Neuro/Behavioral WDL    Cognitive/Neuro/Behavioral WDL WDL              "

## 2022-07-25 NOTE — ED PROVIDER NOTES
EMERGENCY DEPARTMENT ENCOUNTER     NAME: Nikhil Rios   AGE: 36 year old male   YOB: 1985   MRN: [id number]   EVALUATION DATE & TIME: No admission date for patient encounter.   PCP: Lisandro Graham     Chief Complaint   Patient presents with     Alcohol Intoxication   :    FINAL IMPRESSION       1. Alcoholic intoxication without complication (H)           ED COURSE & MEDICAL DECISION MAKING      Pertinent Labs & Imaging studies reviewed. (See chart for details)   36 year old male  presents to the Emergency Department for evaluation of a repeat visit for alcohol intoxication.  This is the patient's third visit today.  He is known to me and typically comes in with his mother, but apparently she is on vacation currently.  He left clinically sober just a couple of hours ago, and then returned intoxicated again. Initial Vitals Reviewed. Initial exam notable for patient who was quite clinically intoxicated.  Initially he tried to call in [place] and leave, but once he was evaluated and does not have a sober ride, we did have him remain here in the emergency department and sleep.  It is now been greater than 8 hours since his arrival or last drink and he is awake, ambulatory, tolerating oral intake and is much more calm and cooperative consistent with being clinically sober.  He will now be discharged.  In the past he has declined detox, he will only agree to going to one specific alcohol treatment program, and his ultimate plan for chemical dependency treatment is very limited.         11:05 PM I met with the patient to gather history and to perform my initial exam. We discussed plans for the ED course, including diagnostic testing and treatment. PPE: N95 mask, surgical mask, scrub cap  11:18 PM I rechecked the patient.   At the conclusion of the encounter I discussed the results of all of the tests and the disposition. The questions were answered. The patient or family acknowledged understanding and was  agreeable with the care plan.         MEDICATIONS GIVEN IN THE EMERGENCY:   Medications - No data to display   NEW PRESCRIPTIONS STARTED AT TODAY'S ER VISIT   New Prescriptions    No medications on file     ================================================================   HISTORY OF PRESENT ILLNESS       Patient information was obtained from: patient    Use of Intrepreter: N/A    Nikhil Rios is a 36 year old male with history of chronic alcoholism and continued alcohol abuse who presents for evaluation of alcohol intoxication.     Per chart review, patient presented to [facility] ED on 7/24/22 for alcohol intoxication. Patient presented the day before under similar circumstances.Treated with IV fluids, Pepcid, and Zofran. Patient discharged with friend after appearing non toxic.     History extremely limited due to patient mental status.     Patient does not remember being here earlier today, and states he is waiting for his uber to pick him up.     ================================================================    REVIEW OF SYSTEMS       Review of Systems   Unable to perform ROS: Mental status change       PAST HISTORY     PAST MEDICAL HISTORY:   Past Medical History:   Diagnosis Date     Alcohol abuse      Alcohol abuse      Alcohol withdrawal (H)      Depressive disorder      HLD (hyperlipidemia)      HTN (hypertension)       PAST SURGICAL HISTORY:   Past Surgical History:   Procedure Laterality Date     NO HISTORY OF SURGERY       OTHER SURGICAL HISTORY      none      CURRENT MEDICATIONS:   diazepam (VALIUM) 5 MG tablet  escitalopram (LEXAPRO) 10 MG tablet  folic acid (FOLVITE) 1 MG tablet  gabapentin (NEURONTIN) 300 MG capsule  hydrOXYzine (VISTARIL) 50 MG capsule  ondansetron (ZOFRAN) 4 MG tablet  traZODone (DESYREL) 50 MG tablet      ALLERGIES:   Allergies   Allergen Reactions     Gramineae Pollens Itching     Pollen Extract Rash     grass tree pollen      FAMILY HISTORY:   Family History   Problem Relation  Age of Onset     Coronary Artery Disease Father       SOCIAL HISTORY:   Social History     Socioeconomic History     Marital status: Single   Occupational History     Occupation: Rental property owner   Tobacco Use     Smoking status: Current Some Day Smoker     Packs/day: 0.25     Types: Cigars     Smokeless tobacco: Never Used     Tobacco comment: occasional   Substance and Sexual Activity     Alcohol use: Yes     Alcohol/week: 17.0 standard drinks     Types: 17 Shots of liquor per week     Comment: Drinks 750ml/day or 17 shots/day; hx of withdrawl seizures     Drug use: Not Currently     Social Determinants of Health     Transportation Needs: No Transportation Needs     Lack of Transportation (Medical): No     Lack of Transportation (Non-Medical): No        VITALS  Patient Vitals for the past 24 hrs:   BP Temp Temp src Pulse Resp SpO2   07/24/22 2102 130/87 98  F (36.7  C) Temporal 85 16 97 %        ================================================================    PHYSICAL EXAM     VITAL SIGNS: /87   Pulse 85   Temp 98  F (36.7  C) (Temporal)   Resp 16   SpO2 97%    Constitutional:  Awake, no acute distress, intoxicated appearing  HENT:  Atraumatic, oropharynx without exudate or erythema, membranes moist  Lymph:  No adenopathy  Eyes: EOM intact, PERRL, no injection  Neck: Supple  Respiratory:  Clear to auscultation bilaterally, no wheezes or crackles   Cardiovascular:  Regular rate and rhythm, single S1 and S2   GI:  Soft, nontender, nondistended, no rebound or guarding   Musculoskeletal:  Moves all extremities, no lower extremity edema, no deformities    Skin:  Warm, dry  Neurologic:  Alert and oriented x3, no focal deficits noted       ================================================================  LAB       All pertinent labs reviewed and interpreted.   Labs Ordered and Resulted from Time of ED Arrival to Time of ED Departure - No data to display      ===============================================================  RADIOLOGY       Reviewed all pertinent imaging. Please see official radiology report.   No orders to display         ================================================================  EKG         I have independently reviewed and interpreted the EKG(s) documented above.     ================================================================  PROCEDURES         I, Vicky Pfeiffer, am serving as a scribe to document services personally performed by Dr. Machado based on my observation and the provider's statements to me. I, Charlotte Machado MD attest that Vicky Pfeiffer is acting in a scribe capacity, has observed my performance of the services and has documented them in accordance with my direction.   Charlotte Machado M.D.   Emergency Medicine   [facility] EMERGENCY DEPARTMENT  [address] [place] [id number]  [phone]  Dept: [phone]      Charlotte Machado MD  07/25/22 0515

## 2022-07-28 ENCOUNTER — HOSPITAL ENCOUNTER (EMERGENCY)
Facility: CLINIC | Age: 37
Discharge: HOME | End: 2022-07-28
Attending: EMERGENCY MEDICINE | Admitting: EMERGENCY MEDICINE
Payer: COMMERCIAL

## 2022-07-28 ENCOUNTER — TELEPHONE (OUTPATIENT)
Dept: NURSING | Facility: CLINIC | Age: 37
End: 2022-07-28

## 2022-07-28 ENCOUNTER — HOSPITAL ENCOUNTER (EMERGENCY)
Facility: HOSPITAL | Age: 37
Discharge: HOME | End: 2022-07-28
Attending: EMERGENCY MEDICINE | Admitting: EMERGENCY MEDICINE
Payer: COMMERCIAL

## 2022-07-28 VITALS
DIASTOLIC BLOOD PRESSURE: 73 MMHG | RESPIRATION RATE: 16 BRPM | WEIGHT: 170 LBS | TEMPERATURE: 99.1 F | SYSTOLIC BLOOD PRESSURE: 120 MMHG | BODY MASS INDEX: 24.34 KG/M2 | HEIGHT: 70 IN | HEART RATE: 63 BPM | OXYGEN SATURATION: 95 %

## 2022-07-28 VITALS
HEART RATE: 105 BPM | SYSTOLIC BLOOD PRESSURE: 151 MMHG | OXYGEN SATURATION: 99 % | TEMPERATURE: 98.8 F | RESPIRATION RATE: 18 BRPM | DIASTOLIC BLOOD PRESSURE: 74 MMHG

## 2022-07-28 DIAGNOSIS — F10.220 ACUTE ALCOHOLIC INTOXICATION IN ALCOHOLISM WITHOUT COMPLICATION (H): ICD-10-CM

## 2022-07-28 DIAGNOSIS — F10.920 ALCOHOLIC INTOXICATION WITHOUT COMPLICATION (H): ICD-10-CM

## 2022-07-28 DIAGNOSIS — R11.2 NON-INTRACTABLE VOMITING WITH NAUSEA, UNSPECIFIED VOMITING TYPE: ICD-10-CM

## 2022-07-28 DIAGNOSIS — E87.20 ACIDOSIS, LACTIC: ICD-10-CM

## 2022-07-28 LAB
ALBUMIN SERPL-MCNC: 4.8 G/DL (ref 3.5–5)
ALCOHOL BREATH TEST: 0.34 (ref 0–0.01)
ALP SERPL-CCNC: 80 U/L (ref 45–120)
ALT SERPL W P-5'-P-CCNC: 47 U/L (ref 0–45)
AMPHETAMINES UR QL SCN: ABNORMAL
ANION GAP SERPL CALCULATED.3IONS-SCNC: 19 MMOL/L (ref 5–18)
AST SERPL W P-5'-P-CCNC: 81 U/L (ref 0–40)
ATRIAL RATE - MUSE: 80 BPM
BARBITURATES UR QL: ABNORMAL
BASOPHILS # BLD AUTO: 0.1 10E3/UL (ref 0–0.2)
BASOPHILS NFR BLD AUTO: 1 %
BENZODIAZ UR QL: ABNORMAL
BILIRUB SERPL-MCNC: 0.7 MG/DL (ref 0–1)
BUN SERPL-MCNC: 14 MG/DL (ref 8–22)
CALCIUM SERPL-MCNC: 9 MG/DL (ref 8.5–10.5)
CANNABINOIDS UR QL SCN: ABNORMAL
CHLORIDE BLD-SCNC: 99 MMOL/L (ref 98–107)
CO2 SERPL-SCNC: 23 MMOL/L (ref 22–31)
COCAINE UR QL: ABNORMAL
CREAT SERPL-MCNC: 0.84 MG/DL (ref 0.7–1.3)
DIASTOLIC BLOOD PRESSURE - MUSE: 77 MMHG
EOSINOPHIL # BLD AUTO: 0 10E3/UL (ref 0–0.7)
EOSINOPHIL NFR BLD AUTO: 0 %
ERYTHROCYTE [DISTWIDTH] IN BLOOD BY AUTOMATED COUNT: 15.4 % (ref 10–15)
ETHANOL SERPL-MCNC: 382 MG/DL
GFR SERPL CREATININE-BSD FRML MDRD: >90 ML/MIN/1.73M2
GLUCOSE BLD-MCNC: 110 MG/DL (ref 70–125)
HCT VFR BLD AUTO: 45 % (ref 40–53)
HGB BLD-MCNC: 15.8 G/DL (ref 13.3–17.7)
IMM GRANULOCYTES # BLD: 0 10E3/UL
IMM GRANULOCYTES NFR BLD: 0 %
INR PPP: 0.91 (ref 0.85–1.15)
INTERPRETATION ECG - MUSE: NORMAL
LACTATE SERPL-SCNC: 3.2 MMOL/L (ref 0.7–2)
LACTATE SERPL-SCNC: 4.6 MMOL/L (ref 0.7–2)
LACTATE SERPL-SCNC: 6.7 MMOL/L (ref 0.7–2)
LIPASE SERPL-CCNC: 50 U/L (ref 0–52)
LYMPHOCYTES # BLD AUTO: 2.7 10E3/UL (ref 0.8–5.3)
LYMPHOCYTES NFR BLD AUTO: 26 %
MAGNESIUM SERPL-MCNC: 1.9 MG/DL (ref 1.8–2.6)
MCH RBC QN AUTO: 29.8 PG (ref 26.5–33)
MCHC RBC AUTO-ENTMCNC: 35.1 G/DL (ref 31.5–36.5)
MCV RBC AUTO: 85 FL (ref 78–100)
MONOCYTES # BLD AUTO: 0.5 10E3/UL (ref 0–1.3)
MONOCYTES NFR BLD AUTO: 5 %
NEUTROPHILS # BLD AUTO: 6.8 10E3/UL (ref 1.6–8.3)
NEUTROPHILS NFR BLD AUTO: 68 %
NRBC # BLD AUTO: 0 10E3/UL
NRBC BLD AUTO-RTO: 0 /100
OPIATES UR QL SCN: ABNORMAL
P AXIS - MUSE: 58 DEGREES
PLATELET # BLD AUTO: 153 10E3/UL (ref 150–450)
POTASSIUM BLD-SCNC: 3.9 MMOL/L (ref 3.5–5)
PR INTERVAL - MUSE: 144 MS
PROT SERPL-MCNC: 8.2 G/DL (ref 6–8)
QRS DURATION - MUSE: 94 MS
QT - MUSE: 426 MS
QTC - MUSE: 491 MS
R AXIS - MUSE: 94 DEGREES
RBC # BLD AUTO: 5.3 10E6/UL (ref 4.4–5.9)
SARS-COV-2 RNA RESP QL NAA+PROBE: NEGATIVE
SODIUM SERPL-SCNC: 141 MMOL/L (ref 136–145)
SYSTOLIC BLOOD PRESSURE - MUSE: 136 MMHG
T AXIS - MUSE: 79 DEGREES
VENTRICULAR RATE- MUSE: 80 BPM
WBC # BLD AUTO: 10.1 10E3/UL (ref 4–11)

## 2022-07-28 PROCEDURE — 83690 ASSAY OF LIPASE: CPT | Performed by: EMERGENCY MEDICINE

## 2022-07-28 PROCEDURE — 36415 COLL VENOUS BLD VENIPUNCTURE: CPT | Performed by: EMERGENCY MEDICINE

## 2022-07-28 PROCEDURE — 250N000011 HC RX IP 250 OP 636: Performed by: EMERGENCY MEDICINE

## 2022-07-28 PROCEDURE — 99285 EMERGENCY DEPT VISIT HI MDM: CPT | Mod: 25

## 2022-07-28 PROCEDURE — 80307 DRUG TEST PRSMV CHEM ANLYZR: CPT | Performed by: EMERGENCY MEDICINE

## 2022-07-28 PROCEDURE — 93005 ELECTROCARDIOGRAM TRACING: CPT | Performed by: EMERGENCY MEDICINE

## 2022-07-28 PROCEDURE — 99282 EMERGENCY DEPT VISIT SF MDM: CPT | Performed by: EMERGENCY MEDICINE

## 2022-07-28 PROCEDURE — 96361 HYDRATE IV INFUSION ADD-ON: CPT

## 2022-07-28 PROCEDURE — 80053 COMPREHEN METABOLIC PANEL: CPT | Performed by: EMERGENCY MEDICINE

## 2022-07-28 PROCEDURE — 96375 TX/PRO/DX INJ NEW DRUG ADDON: CPT

## 2022-07-28 PROCEDURE — 258N000003 HC RX IP 258 OP 636: Performed by: EMERGENCY MEDICINE

## 2022-07-28 PROCEDURE — 83605 ASSAY OF LACTIC ACID: CPT | Mod: 91 | Performed by: EMERGENCY MEDICINE

## 2022-07-28 PROCEDURE — 96365 THER/PROPH/DIAG IV INF INIT: CPT

## 2022-07-28 PROCEDURE — 85025 COMPLETE CBC W/AUTO DIFF WBC: CPT | Performed by: EMERGENCY MEDICINE

## 2022-07-28 PROCEDURE — 83735 ASSAY OF MAGNESIUM: CPT | Performed by: EMERGENCY MEDICINE

## 2022-07-28 PROCEDURE — 87635 SARS-COV-2 COVID-19 AMP PRB: CPT | Performed by: EMERGENCY MEDICINE

## 2022-07-28 PROCEDURE — 82077 ASSAY SPEC XCP UR&BREATH IA: CPT | Performed by: EMERGENCY MEDICINE

## 2022-07-28 PROCEDURE — 82075 ASSAY OF BREATH ETHANOL: CPT | Performed by: EMERGENCY MEDICINE

## 2022-07-28 PROCEDURE — 85610 PROTHROMBIN TIME: CPT | Performed by: EMERGENCY MEDICINE

## 2022-07-28 PROCEDURE — 83605 ASSAY OF LACTIC ACID: CPT | Performed by: EMERGENCY MEDICINE

## 2022-07-28 PROCEDURE — 250N000009 HC RX 250: Performed by: EMERGENCY MEDICINE

## 2022-07-28 RX ORDER — SODIUM CHLORIDE 9 MG/ML
100 INJECTION, SOLUTION INTRAVENOUS ONCE
Status: COMPLETED | OUTPATIENT
Start: 2022-07-28 | End: 2022-07-28

## 2022-07-28 RX ORDER — ONDANSETRON 2 MG/ML
4 INJECTION INTRAMUSCULAR; INTRAVENOUS ONCE
Status: COMPLETED | OUTPATIENT
Start: 2022-07-28 | End: 2022-07-28

## 2022-07-28 RX ORDER — SODIUM CHLORIDE 9 MG/ML
100 INJECTION, SOLUTION INTRAVENOUS ONCE
Status: DISCONTINUED | OUTPATIENT
Start: 2022-07-28 | End: 2022-07-28

## 2022-07-28 RX ORDER — LORAZEPAM 2 MG/ML
1 INJECTION INTRAMUSCULAR ONCE
Status: COMPLETED | OUTPATIENT
Start: 2022-07-28 | End: 2022-07-28

## 2022-07-28 RX ORDER — LORAZEPAM 1 MG/1
1-2 TABLET ORAL EVERY 6 HOURS PRN
Qty: 10 TABLET | Refills: 0 | Status: SHIPPED | OUTPATIENT
Start: 2022-07-28 | End: 2023-05-22

## 2022-07-28 RX ADMIN — SODIUM CHLORIDE 100 ML: 9 INJECTION, SOLUTION INTRAVENOUS at 04:19

## 2022-07-28 RX ADMIN — LORAZEPAM 1 MG: 2 INJECTION INTRAMUSCULAR at 03:51

## 2022-07-28 RX ADMIN — SODIUM CHLORIDE 1000 ML: 9 INJECTION, SOLUTION INTRAVENOUS at 01:27

## 2022-07-28 RX ADMIN — PROCHLORPERAZINE EDISYLATE 10 MG: 5 INJECTION INTRAMUSCULAR; INTRAVENOUS at 05:11

## 2022-07-28 RX ADMIN — FOLIC ACID: 5 INJECTION, SOLUTION INTRAMUSCULAR; INTRAVENOUS; SUBCUTANEOUS at 02:01

## 2022-07-28 RX ADMIN — ONDANSETRON 4 MG: 2 INJECTION INTRAMUSCULAR; INTRAVENOUS at 01:15

## 2022-07-28 NOTE — ED NOTES
EMERGENCY DEPARTMENT SIGN OUT NOTE        ED COURSE AND MEDICAL DECISION MAKING  36-year-old male who is frequently seen in this ED for alcohol intoxication who presented to the ED last night for intoxication was checked out to me by Dr. Magana.  Of note, this is the patient's eighth visit to this ED for alcohol intoxication in the last 13 days.  At the time of checkout the patient was awaiting clinical sobriety and improvement of his lactic acid.    The patient's initial blood alcohol was 382 when he arrived.  The patient's lactic acid was 6.7.  This improved to 4.6 and again to 3.2 with the 3 L of IV normal saline.  Other than an elevated anion gap the remaining laboratory tests were all reassuring.    After remaining in the ED for over 9 hours he was able to ambulate and appeared to be clinically sober.  Patient was able to drink water without any further episodes of nausea or vomiting.  The patient was requesting to be discharged home at the time.  The patient was then discharged home with the oral Ativan that had prescribed to him by Dr. Wolf.  Patient was instructed to cease alcohol use and to seek outpatient or inpatient treatment for his alcohol abuse.  He was instructed to return back to ED sooner for any worsening withdrawal symptoms or any other new or concerning symptoms.     Patient was signed out to me by Dr Willis Magana at 7:32 AM         At time of sign out, disposition was pending sobering and disposition after sobering and if able to keep fluids down.      FINAL IMPRESSION    1. Acute alcoholic intoxication in alcoholism without complication (H)    2. Acidosis, lactic    3. Non-intractable vomiting with nausea, unspecified vomiting type        ED MEDS  Medications   0.9% sodium chloride BOLUS (0 mLs Intravenous Stopped 7/28/22 0157)   sodium chloride 0.9 % 1,000 mL with Infuvite Adult 10 mL, thiamine 100 mg, folic acid 1 mg infusion ( Intravenous Stopped 7/28/22 8512)   ondansetron (ZOFRAN) injection  4 mg (4 mg Intravenous Given 7/28/22 0115)   LORazepam (ATIVAN) injection 1 mg (1 mg Intravenous Given 7/28/22 0208)   sodium chloride 0.9% infusion (0 mLs Intravenous Stopped 7/28/22 0921)   prochlorperazine (COMPAZINE) injection 10 mg (10 mg Intravenous Given 7/28/22 0589)       LAB  Labs Ordered and Resulted from Time of ED Arrival to Time of ED Departure   COMPREHENSIVE METABOLIC PANEL - Abnormal       Result Value    Sodium 141      Potassium 3.9      Chloride 99      Carbon Dioxide (CO2) 23      Anion Gap 19 (*)     Urea Nitrogen 14      Creatinine 0.84      Calcium 9.0      Glucose 110      Alkaline Phosphatase 80      AST 81 (*)     ALT 47 (*)     Protein Total 8.2 (*)     Albumin 4.8      Bilirubin Total 0.7      GFR Estimate >90     ETHYL ALCOHOL LEVEL - Abnormal    Alcohol, Blood 382 (*)    LACTIC ACID WHOLE BLOOD - Abnormal    Lactic Acid 6.7 (*)    CBC WITH PLATELETS AND DIFFERENTIAL - Abnormal    WBC Count 10.1      RBC Count 5.30      Hemoglobin 15.8      Hematocrit 45.0      MCV 85      MCH 29.8      MCHC 35.1      RDW 15.4 (*)     Platelet Count 153      % Neutrophils 68      % Lymphocytes 26      % Monocytes 5      % Eosinophils 0      % Basophils 1      % Immature Granulocytes 0      NRBCs per 100 WBC 0      Absolute Neutrophils 6.8      Absolute Lymphocytes 2.7      Absolute Monocytes 0.5      Absolute Eosinophils 0.0      Absolute Basophils 0.1      Absolute Immature Granulocytes 0.0      Absolute NRBCs 0.0     LACTIC ACID WHOLE BLOOD - Abnormal    Lactic Acid 4.6 (*)    LACTIC ACID WHOLE BLOOD - Abnormal    Lactic Acid 3.2 (*)    INR - Normal    INR 0.91     LIPASE - Normal    Lipase 50     MAGNESIUM - Normal    Magnesium 1.9     COVID-19 VIRUS (CORONAVIRUS) BY PCR - Normal    SARS CoV2 PCR Negative     ROUTINE UA WITH MICROSCOPIC REFLEX TO CULTURE   DRUGS OF ABUSE 1 PANEL, URINE ([facility] ONLY)       DISCHARGE MEDS  Discharge Medication List as of 7/28/2022 10:11 AM      START taking these  medications    Details   LORazepam (ATIVAN) 1 MG tablet Take 1-2 tablets (1-2 mg) by mouth every 6 hours as needed for withdrawal, Disp-10 tablet, R-0, Local Print             Aguila Manuel DO  [facility] EMERGENCY DEPARTMENT  [address] [place] [id number] [phone]     Aguila Manuel DO  07/28/22 5360

## 2022-07-28 NOTE — ED NOTES
Bed: JNED-16  Expected date: 7/28/22  Expected time:   Means of arrival:   Comments:  WBL  36 yr old Alcohol Withdrawal

## 2022-07-28 NOTE — ED NOTES
AIDET: done    Security ( Screen ): done    Belongings:  [ 1  ] BAG(s)    CELLPHONE  [ YES ]  WALLET   [ YES ]    >w/pt: cellphone      > rack: shoes, socks, wallet, cigarettes      >security:   none

## 2022-07-28 NOTE — ED NOTES
Nursing Assessment: Cardiovascular: Cardiac monitor reveals normal sinus rhythm with brief periods of tachycardia up to 105. Skin appears flushed. Respiratory: Lung sounds are clear bilaterally. No shortness of breath. GI: No gi upset at this time. He is able to drink and retain. Mentation: Patient is awake and answering questions appropriately. He states that he is intending to go to detox on Saturday.

## 2022-07-28 NOTE — PROGRESS NOTES
After pt was discharged home from ER, rec'd phone call from Jagdish alberto/[facility]     Patient will NOT be able to fill Librium  or any Controlled Substance Prescriptions given in the ED.      Jagdish asking for us to sober him in ER before discharging, and that sending him home on a controlled substance is NOT A SAFE CARE PLAN, Jagdish Restricted  Program [phone].    Jagdish spoke to patient and he is already drunk, and that we should send him to detox, explained to Jagdish that patient would have to be willing to go to detox in order for them to accept him. Jagdish is asking that we keep in here until he is sober. Of note, pt was clinically sober prior to discharge and there is a prescription for Ativan 1mg, 10 tablets that was sent with him, but he is not able to fill anyways.

## 2022-07-28 NOTE — TELEPHONE ENCOUNTER
Mother is calling with questions and FNA advised that FNA will need consent to speak with Mother Veronique.  FNA advised to phone back when Nikhil is present.

## 2022-07-28 NOTE — ED PROVIDER NOTES
EMERGENCY DEPARTMENT ENCOUnter      NAME: Nikhil Rios  AGE: 36 year old male  YOB: 1985  MRN: [id number]  EVALUATION DATE & TIME: 7/28/2022 12:36 AM    PCP: Lisandro Graham    ED PROVIDER: Hilary Galaviz MD      No chief complaint on file.        FINAL IMPRESSION:  1. Acute alcoholic intoxication in alcoholism without complication (H)    2. Acidosis, lactic    3. Non-intractable vomiting with nausea, unspecified vomiting type          ED COURSE & MEDICAL DECISION MAKING:      In summary, the patient is a 36-year-old alcoholic male that presents to the emergency department for evaluation of possible alcohol withdrawal.  He has no symptoms of alcohol withdrawal at this time.  12:54 AM I met with the patient to gather history and to perform my initial exam. I discussed the plan for care while in the Emergency Department.  Normal saline 1 L IV was administered for IV hydration.  A banana bag was administered.  Zofran 4 mg IV was administered for nausea.  2:23 AM Nurse reports patient stated he was having visual hallucinations. I think this is unlikely based on his alcohol level.  0500-patient continues to have emesis and still has not urinated.  IV fluids are infusing.  Compazine 10 mg IV was administered as an antiemetic.  We again discussed detox which he declines.  He states that he has a rehab center that can accept him in 2 days.  He also states that he does not have a ride home and would like to take Uber.  0530-Signed out at change of shift with UA and response to treatment pending.    At the conclusion of the encounter I discussed the results of all of the tests and the disposition. The questions were answered. The patient or family acknowledged understanding and was agreeable with the care plan.         MEDICATIONS GIVEN IN THE EMERGENCY:  Medications   prochlorperazine (COMPAZINE) injection 10 mg (has no administration in time range)   0.9% sodium chloride BOLUS (0 mLs Intravenous  "Stopped 7/28/22 0157)   sodium chloride 0.9 % 1,000 mL with Infuvite Adult 10 mL, thiamine 100 mg, folic acid 1 mg infusion ( Intravenous New Bag 7/28/22 0201)   ondansetron (ZOFRAN) injection 4 mg (4 mg Intravenous Given 7/28/22 0115)   LORazepam (ATIVAN) injection 1 mg (1 mg Intravenous Given 7/28/22 0351)   sodium chloride 0.9% infusion (100 mLs Intravenous New Bag 7/28/22 0419)       NEW PRESCRIPTIONS STARTED AT TODAY'S ER VISIT  New Prescriptions    LORAZEPAM (ATIVAN) 1 MG TABLET    Take 1-2 tablets (1-2 mg) by mouth every 6 hours as needed for withdrawal          =================================================================    HPI        Nikhil Rios is a 36 year old male with a pertinent history of alcohol dependence with withdrawal, alcohol abuse, hypertension, hyperlipidemia, hypokalemia, thrombocytopenia, and anemia who presents to this ED by EMS for evaluation of alcohol intoxication.     Per chart review: Patient has been to the ED seven other times this month due to alcohol intoxication. During these stays patient refuses to go to detox unless it is [place] detox.     Patient states he drinks 750 ml of vodka daily. He notes it has been 24 hours since his last drink. He endorses vomiting \"a lot\". He states he takes hydroxyzine, but denies taking any today. He endorses smoking. He currently lives with his mother and he is unemployed. He doesn't want to go to detox tonight. Patient denies any other complaints at this time.    REVIEW OF SYSTEMS     Constitutional:  Denies fever or chills  HENT:  Denies sore throat   Respiratory:  Denies cough or shortness of breath   Cardiovascular:  Denies chest pain or palpitations  GI:  Denies abdominal pain, nausea. Endorses vomiting.   Musculoskeletal:  Denies any new extremity pain   Skin:  Denies rash   Neurologic:  Denies headache, focal weakness or sensory changes    All other systems reviewed and are negative      PAST MEDICAL HISTORY:  Past Medical History: "   Diagnosis Date     Alcohol abuse      Alcohol abuse      Alcohol withdrawal (H)      Depressive disorder      HLD (hyperlipidemia)      HTN (hypertension)        PAST SURGICAL HISTORY:  Past Surgical History:   Procedure Laterality Date     NO HISTORY OF SURGERY       OTHER SURGICAL HISTORY      none           CURRENT MEDICATIONS:    LORazepam (ATIVAN) 1 MG tablet  diazepam (VALIUM) 5 MG tablet  escitalopram (LEXAPRO) 10 MG tablet  folic acid (FOLVITE) 1 MG tablet  gabapentin (NEURONTIN) 300 MG capsule  hydrOXYzine (VISTARIL) 50 MG capsule  ondansetron (ZOFRAN) 4 MG tablet  traZODone (DESYREL) 50 MG tablet        ALLERGIES:  Allergies   Allergen Reactions     Gramineae Pollens Itching     Pollen Extract Rash     grass tree pollen       FAMILY HISTORY:  Family History   Problem Relation Age of Onset     Coronary Artery Disease Father        SOCIAL HISTORY:   Social History     Socioeconomic History     Marital status: Single     Spouse name: None     Number of children: None     Years of education: None     Highest education level: None   Occupational History     Occupation: Rental property owner   Tobacco Use     Smoking status: Current Some Day Smoker     Packs/day: 0.25     Types: Cigars     Smokeless tobacco: Never Used     Tobacco comment: occasional   Substance and Sexual Activity     Alcohol use: Yes     Alcohol/week: 17.0 standard drinks     Types: 17 Shots of liquor per week     Comment: Drinks 750ml/day or 17 shots/day; hx of withdrawl seizures     Drug use: Not Currently     Social Determinants of Health     Transportation Needs: No Transportation Needs     Lack of Transportation (Medical): No     Lack of Transportation (Non-Medical): No       VITALS:  Patient Vitals for the past 24 hrs:   BP Temp Temp src Pulse Resp SpO2 Height Weight   07/28/22 0400 130/77 -- -- 75 (!) 7 92 % -- --   07/28/22 0300 126/81 -- -- 96 (!) 33 99 % -- --   07/28/22 0200 136/75 -- -- 80 13 95 % -- --   07/28/22 0100 -- -- --  "(!) 136 -- 95 % -- --   07/28/22 0047 (!) 141/89 99.1  F (37.3  C) Oral 98 16 -- 1.778 m (5' 10\") 77.1 kg (170 lb)       PHYSICAL EXAM    Constitutional:  Well developed, Well nourished,  HENT:  Normocephalic, Atraumatic, Bilateral external ears normal, Oropharynx moist, Nose normal.   Neck:  Normal range of motion, No meningismus, No stridor.   Eyes:  EOMI, Conjunctiva injected bilaterally, No discharge.   Respiratory:  Normal breath sounds, No respiratory distress, No wheezing, No chest tenderness.   Cardiovascular:  Normal heart rate, Normal rhythm, No murmurs  GI:  Soft, No tenderness, No guarding, No CVA tenderness.   Musculoskeletal:  Neurovascularly intact distally, No edema, No tenderness, No cyanosis, Good range of motion in all major joints. No tenderness to palpation or major deformities noted.   Integument:  Warm, Dry, No erythema, No rash.   Lymphatic:  No lymphadenopathy noted.   Neurologic:  Alert & oriented x 3, Normal motor function, Normal sensory function, No focal deficits noted.   Psychiatric:  Affect normal, intoxicated, Mood normal.      LAB:  All pertinent labs reviewed and interpreted.  Results for orders placed or performed during the hospital encounter of 07/28/22   Result Value Ref Range    INR 0.91 0.85 - 1.15   Comprehensive metabolic panel   Result Value Ref Range    Sodium 141 136 - 145 mmol/L    Potassium 3.9 3.5 - 5.0 mmol/L    Chloride 99 98 - 107 mmol/L    Carbon Dioxide (CO2) 23 22 - 31 mmol/L    Anion Gap 19 (H) 5 - 18 mmol/L    Urea Nitrogen 14 8 - 22 mg/dL    Creatinine 0.84 0.70 - 1.30 mg/dL    Calcium 9.0 8.5 - 10.5 mg/dL    Glucose 110 70 - 125 mg/dL    Alkaline Phosphatase 80 45 - 120 U/L    AST 81 (H) 0 - 40 U/L    ALT 47 (H) 0 - 45 U/L    Protein Total 8.2 (H) 6.0 - 8.0 g/dL    Albumin 4.8 3.5 - 5.0 g/dL    Bilirubin Total 0.7 0.0 - 1.0 mg/dL    GFR Estimate >90 >60 mL/min/1.73m2   Result Value Ref Range    Lipase 50 0 - 52 U/L   Ethyl Alcohol Level   Result Value Ref " Range    Alcohol, Blood 382 (H) None detected mg/dL   Result Value Ref Range    Magnesium 1.9 1.8 - 2.6 mg/dL   Asymptomatic COVID-19 Virus (Coronavirus) by PCR Nasopharyngeal    Specimen: Nasopharyngeal; Swab   Result Value Ref Range    SARS CoV2 PCR Negative Negative   Lactic acid whole blood   Result Value Ref Range    Lactic Acid 6.7 (HH) 0.7 - 2.0 mmol/L   CBC with platelets and differential   Result Value Ref Range    WBC Count 10.1 4.0 - 11.0 10e3/uL    RBC Count 5.30 4.40 - 5.90 10e6/uL    Hemoglobin 15.8 13.3 - 17.7 g/dL    Hematocrit 45.0 40.0 - 53.0 %    MCV 85 78 - 100 fL    MCH 29.8 26.5 - 33.0 pg    MCHC 35.1 31.5 - 36.5 g/dL    RDW 15.4 (H) 10.0 - 15.0 %    Platelet Count 153 150 - 450 10e3/uL    % Neutrophils 68 %    % Lymphocytes 26 %    % Monocytes 5 %    % Eosinophils 0 %    % Basophils 1 %    % Immature Granulocytes 0 %    NRBCs per 100 WBC 0 <1 /100    Absolute Neutrophils 6.8 1.6 - 8.3 10e3/uL    Absolute Lymphocytes 2.7 0.8 - 5.3 10e3/uL    Absolute Monocytes 0.5 0.0 - 1.3 10e3/uL    Absolute Eosinophils 0.0 0.0 - 0.7 10e3/uL    Absolute Basophils 0.1 0.0 - 0.2 10e3/uL    Absolute Immature Granulocytes 0.0 <=0.4 10e3/uL    Absolute NRBCs 0.0 10e3/uL   Lactic acid whole blood   Result Value Ref Range    Lactic Acid 4.6 (HH) 0.7 - 2.0 mmol/L       EK-rate is 80, sinus, there is no ST segment elevation or depression appreciated.  Prolonged QT.    I have independently reviewed and interpreted this EKG          I, Leslie Jasmine, am serving as a scribe to document services personally performed by Dr. Galaviz based on my observation and the provider's statements to me. I, Hilary Galaviz MD attest that Leslie Jasmine is acting in a scribe capacity, has observed my performance of the services and has documented them in accordance with my direction.    Hilary Galaviz MD  Emergency Medicine  [facility] EMERGENCY  DEPARTMENT  [address] [place] [id number]  [phone]  Dept: [phone]     Hilary Galaviz MD  07/28/22 5728

## 2022-07-28 NOTE — DISCHARGE INSTRUCTIONS
Abstain from alcohol  Take a daily multivitamin  Go to treatment on Saturday as previously arranged

## 2022-07-28 NOTE — ED NOTES
"ED SIGNOUT  Date/Time:7/28/2022 5:33 AM    Patient signed out to me by my colleague, Dr. Galaviz.  Please see their note for complete history and physical. Plan to follow up on UA and re-evaluation.     The creation of this record is based on the scribe s observations of the work being performed by Sean Magana and the provider s statements to them. It was created on their behalf by Gisella Tolentino, a trained medical scribe. This document has been checked and approved by the attending provider.      REMAINING ED WORKUP:    Vitals:  /67   Pulse 75   Temp 99.1  F (37.3  C) (Oral)   Resp 16   Ht 1.778 m (5' 10\")   Wt 77.1 kg (170 lb)   SpO2 95%   BMI 24.39 kg/m        Pertinent labs results reviewed   Results for orders placed or performed during the hospital encounter of 07/28/22   Result Value Ref Range    INR 0.91 0.85 - 1.15   Comprehensive metabolic panel   Result Value Ref Range    Sodium 141 136 - 145 mmol/L    Potassium 3.9 3.5 - 5.0 mmol/L    Chloride 99 98 - 107 mmol/L    Carbon Dioxide (CO2) 23 22 - 31 mmol/L    Anion Gap 19 (H) 5 - 18 mmol/L    Urea Nitrogen 14 8 - 22 mg/dL    Creatinine 0.84 0.70 - 1.30 mg/dL    Calcium 9.0 8.5 - 10.5 mg/dL    Glucose 110 70 - 125 mg/dL    Alkaline Phosphatase 80 45 - 120 U/L    AST 81 (H) 0 - 40 U/L    ALT 47 (H) 0 - 45 U/L    Protein Total 8.2 (H) 6.0 - 8.0 g/dL    Albumin 4.8 3.5 - 5.0 g/dL    Bilirubin Total 0.7 0.0 - 1.0 mg/dL    GFR Estimate >90 >60 mL/min/1.73m2   Result Value Ref Range    Lipase 50 0 - 52 U/L   Ethyl Alcohol Level   Result Value Ref Range    Alcohol, Blood 382 (H) None detected mg/dL   Result Value Ref Range    Magnesium 1.9 1.8 - 2.6 mg/dL   Asymptomatic COVID-19 Virus (Coronavirus) by PCR Nasopharyngeal    Specimen: Nasopharyngeal; Swab   Result Value Ref Range    SARS CoV2 PCR Negative Negative   Lactic acid whole blood   Result Value Ref Range    Lactic Acid 6.7 (HH) 0.7 - 2.0 mmol/L   CBC with platelets and differential "   Result Value Ref Range    WBC Count 10.1 4.0 - 11.0 10e3/uL    RBC Count 5.30 4.40 - 5.90 10e6/uL    Hemoglobin 15.8 13.3 - 17.7 g/dL    Hematocrit 45.0 40.0 - 53.0 %    MCV 85 78 - 100 fL    MCH 29.8 26.5 - 33.0 pg    MCHC 35.1 31.5 - 36.5 g/dL    RDW 15.4 (H) 10.0 - 15.0 %    Platelet Count 153 150 - 450 10e3/uL    % Neutrophils 68 %    % Lymphocytes 26 %    % Monocytes 5 %    % Eosinophils 0 %    % Basophils 1 %    % Immature Granulocytes 0 %    NRBCs per 100 WBC 0 <1 /100    Absolute Neutrophils 6.8 1.6 - 8.3 10e3/uL    Absolute Lymphocytes 2.7 0.8 - 5.3 10e3/uL    Absolute Monocytes 0.5 0.0 - 1.3 10e3/uL    Absolute Eosinophils 0.0 0.0 - 0.7 10e3/uL    Absolute Basophils 0.1 0.0 - 0.2 10e3/uL    Absolute Immature Granulocytes 0.0 <=0.4 10e3/uL    Absolute NRBCs 0.0 10e3/uL   Lactic acid whole blood   Result Value Ref Range    Lactic Acid 4.6 (HH) 0.7 - 2.0 mmol/L       Pertinent imaging reviewed   Please see official radiology report.  No orders to display        Interventions  Medications   0.9% sodium chloride BOLUS (0 mLs Intravenous Stopped 7/28/22 0157)   sodium chloride 0.9 % 1,000 mL with Infuvite Adult 10 mL, thiamine 100 mg, folic acid 1 mg infusion ( Intravenous New Bag 7/28/22 0201)   ondansetron (ZOFRAN) injection 4 mg (4 mg Intravenous Given 7/28/22 0115)   LORazepam (ATIVAN) injection 1 mg (1 mg Intravenous Given 7/28/22 2659)   sodium chloride 0.9% infusion (100 mLs Intravenous New Bag 7/28/22 8959)   prochlorperazine (COMPAZINE) injection 10 mg (10 mg Intravenous Given 7/28/22 6487)        ED Course/MDM:  5:34 AM Signout accepted from Dr. Galaviz.  Prior records were reviewed.  Diagnostics from this visit are reviewed.  6:24 AM I introduced myself to the patient.  7:23 AM patient rechecked and sleeping with final cessation of vomiting.  We will allow patient to rest and sober a little bit more with plan for likely discharge once clinically sober.  He does not wish detox treatment and will  recheck a lactic after finishing and following liter of fluid.    Patient signed pending sobering and disposition after sobering and if able to keep fluids down.  Patient continuing to improve but will need to sober further for plan for possible DC.  Patient continues sober and vomiting controlled now patient sleeping we will plan for reassessment and patient will be signed out           1. Acute alcoholic intoxication in alcoholism without complication (H)    2. Acidosis, lactic    3. Non-intractable vomiting with nausea, unspecified vomiting type          Dr. Sean Magana  [facility]       Sean Magana MD  07/28/22 4093

## 2022-07-28 NOTE — ED TRIAGE NOTES
Patient presents with alcolhol withdrawal symptoms.  Patients last drink was yesterday and he states 750ml vodka per day.     Triage Assessment     Row Name 07/28/22 0048       Triage Assessment (Adult)    Airway WDL WDL    Additional Documentation Breath Sounds (Group)       Respiratory WDL    Respiratory WDL WDL       Breath Sounds    Breath Sounds All Fields    All Lung Fields Breath Sounds clear       Skin Circulation/Temperature WDL    Skin Circulation/Temperature WDL WDL       Cardiac WDL    Cardiac WDL WDL       Peripheral/Neurovascular WDL    Peripheral Neurovascular WDL WDL    Capillary Refill, General greater than 3 secs       Cognitive/Neuro/Behavioral WDL    Cognitive/Neuro/Behavioral WDL mood/behavior    Mood/Behavior agitated;anxious;cooperative       Reji Coma Scale    Best Eye Response 4-->(E4) spontaneous    Best Motor Response 6-->(M6) obeys commands    Best Verbal Response 5-->(V5) oriented    Reji Coma Scale Score 15

## 2022-07-28 NOTE — ED TRIAGE NOTES
Pt staggering in triage and with slurred words states he is withdrawing from alcohol and needs help with the withdrawal symptoms and then go to [facility] treatment.     Triage Assessment     Row Name 07/28/22 8453       Triage Assessment (Adult)    Airway WDL WDL       Respiratory WDL    Respiratory WDL WDL       Skin Circulation/Temperature WDL    Skin Circulation/Temperature WDL WDL       Cardiac WDL    Cardiac WDL WDL       Peripheral/Neurovascular WDL    Peripheral Neurovascular WDL WDL       Cognitive/Neuro/Behavioral WDL    Cognitive/Neuro/Behavioral WDL WDL

## 2022-07-28 NOTE — DISCHARGE INSTRUCTIONS
Go to your planned detox facility. Return to the emergency department if there are any new symptoms or any cause for concern.

## 2022-07-28 NOTE — ED PROVIDER NOTES
ED Provider Note  [facility]      History     Chief Complaint   Patient presents with     Drug / Alcohol Assessment     HPI  Nikhil Rios is a 36 year old male who presents with alcohol intoxication.  Patient was initially here seeking detox.  Patient states that he now has a bed at a different detox center that he would prefer to go there.  His friend is coming to pick him up to drive him there.    Past Medical History  Past Medical History:   Diagnosis Date     Alcohol abuse      Alcohol abuse      Alcohol withdrawal (H)      Depressive disorder      HLD (hyperlipidemia)      HTN (hypertension)      Past Surgical History:   Procedure Laterality Date     NO HISTORY OF SURGERY       OTHER SURGICAL HISTORY      none     diazepam (VALIUM) 5 MG tablet  escitalopram (LEXAPRO) 10 MG tablet  folic acid (FOLVITE) 1 MG tablet  gabapentin (NEURONTIN) 300 MG capsule  hydrOXYzine (VISTARIL) 50 MG capsule  LORazepam (ATIVAN) 1 MG tablet  ondansetron (ZOFRAN) 4 MG tablet  traZODone (DESYREL) 50 MG tablet      Allergies   Allergen Reactions     Gramineae Pollens Itching     Pollen Extract Rash     grass tree pollen     Family History  Family History   Problem Relation Age of Onset     Coronary Artery Disease Father      Social History   Social History     Tobacco Use     Smoking status: Current Some Day Smoker     Packs/day: 0.25     Types: Cigars     Smokeless tobacco: Never Used     Tobacco comment: occasional   Substance Use Topics     Alcohol use: Yes     Alcohol/week: 17.0 standard drinks     Types: 17 Shots of liquor per week     Comment: Drinks 750ml/day or 17 shots/day; hx of withdrawl seizures     Drug use: Not Currently      Past medical history, past surgical history, medications, allergies, family history, and social history were reviewed with the patient. No additional pertinent items.       Review of Systems  A complete review of systems was performed with pertinent positives and negatives  noted in the HPI, and all other systems negative.    Physical Exam   BP: (!) 151/74  Pulse: 105  Temp: 98.8  F (37.1  C)  Resp: 18  SpO2: 99 %  Physical Exam  Vitals and nursing note reviewed.   Constitutional:       General: He is not in acute distress.     Appearance: He is not diaphoretic.      Comments: Appears intoxicated.   HENT:      Head: Atraumatic.   Eyes:      General: No scleral icterus.     Pupils: Pupils are equal, round, and reactive to light.   Cardiovascular:      Heart sounds: Normal heart sounds.   Pulmonary:      Effort: No respiratory distress.      Breath sounds: Normal breath sounds.   Abdominal:      General: Bowel sounds are normal.      Palpations: Abdomen is soft.      Tenderness: There is no abdominal tenderness.   Musculoskeletal:         General: No tenderness.   Skin:     General: Skin is warm.      Findings: No rash.   Psychiatric:         Mood and Affect: Mood normal.         Behavior: Behavior normal. Behavior is cooperative.         ED Course      Procedures                        Assessments & Plan (with Medical Decision Making)   This is a 36-year-old male who presents with alcohol intoxication.  He initially presented for detox but states he has been accepted to a different detox facility and would prefer to go there.  Patient is awake alert and cooperative.  He has a friend coming to pick him up.  We will discharge when patient's friend arrives.    I have reviewed the nursing notes. I have reviewed the findings, diagnosis, plan and need for follow up with the patient.    New Prescriptions    No medications on file       Final diagnoses:   Alcoholic intoxication without complication (H)       --  Urban Montoya DO  [facility] EMERGENCY DEPARTMENT  7/28/2022     Urban Montoya,   07/28/22 1915

## 2022-07-28 NOTE — ED NOTES
Pt appears tremulous at time of discharge. Primary RN aware and clear for discharge. Has a ride coming and will not drive today. On phone arranging ride.

## 2022-07-29 ENCOUNTER — HOSPITAL ENCOUNTER (EMERGENCY)
Facility: HOSPITAL | Age: 37
Discharge: HOME | End: 2022-07-29
Attending: EMERGENCY MEDICINE | Admitting: EMERGENCY MEDICINE
Payer: COMMERCIAL

## 2022-07-29 VITALS
OXYGEN SATURATION: 97 % | DIASTOLIC BLOOD PRESSURE: 75 MMHG | HEART RATE: 88 BPM | TEMPERATURE: 98.2 F | SYSTOLIC BLOOD PRESSURE: 123 MMHG | RESPIRATION RATE: 18 BRPM

## 2022-07-29 DIAGNOSIS — F10.920 ALCOHOLIC INTOXICATION WITHOUT COMPLICATION (H): ICD-10-CM

## 2022-07-29 PROCEDURE — 250N000013 HC RX MED GY IP 250 OP 250 PS 637: Performed by: EMERGENCY MEDICINE

## 2022-07-29 PROCEDURE — 99283 EMERGENCY DEPT VISIT LOW MDM: CPT

## 2022-07-29 RX ORDER — DIAZEPAM 5 MG
5 TABLET ORAL ONCE
Status: COMPLETED | OUTPATIENT
Start: 2022-07-29 | End: 2022-07-29

## 2022-07-29 RX ADMIN — DIAZEPAM 5 MG: 5 TABLET ORAL at 19:43

## 2022-07-29 ASSESSMENT — ENCOUNTER SYMPTOMS
FEVER: 0
SHORTNESS OF BREATH: 0
ABDOMINAL PAIN: 0

## 2022-07-30 NOTE — ED NOTES
Bed: JNED-08  Expected date: 7/29/22  Expected time: 7:16 PM  Means of arrival: Ambulance  Comments:  WBL  35yo ETOH

## 2022-07-30 NOTE — ED NOTES
Patient up and ambulatory in department.States he has an [employer] coming in 2 min and must leave.Refuses to wait for discharge paperwork.Left department ambulatory with steady gait.RIKI x3.

## 2022-07-30 NOTE — ED PROVIDER NOTES
EMERGENCY DEPARTMENT ENCOUNTER       ED Course & Medical Decision Making   7:27 PM I met with the patient to gather history and to perform my initial exam. I discussed the plan for care while in the Emergency Department. PPE (gloves & surgical mask) was worn during patient encounters.        I saw and examined the patient.  No signs of acute trauma, this appears to be simple alcohol intoxication.  This patient is very well-known to our emergency department and has had multiple visits and his last couple of days.  All of them have been similar.  I do not see the benefit of doing any diagnostic testing based on his history and previous work-ups.    He states that he called EMS because he is looking for someone to help him stop drinking.    He does acknowledge that he is supposed to start inpatient alcohol treatment tomorrow and I do feel that this is the best option for him.    Currently he cannot find anyone to pick him up and he is clinically intoxicated.    He is offered detox multiple times and refuses.    He will need to sober up more until he is more clinically sober and then find a ride.     Towards the end of my shift he is now ambulatory and asking to be discharged.  He does not appear to be a fall risk and is clinically quite sober.  He was able to contact an [employer] who came and picked him up.  He is to follow-up with [facility] that treatment tomorrow          Prior to making a final disposition on this patient the results of patient's tests and other diagnostic studies were discussed with the patient. All questions were answered. Patient expressed understanding of the plan and was amenable to it.    Medications   diazepam (VALIUM) tablet 5 mg (5 mg Oral Given 7/29/22 1943)       Final Impression     1. Alcoholic intoxication without complication (H)            Chief Complaint     Chief Complaint   Patient presents with     Alcohol Intoxication            HPI   History is limited secondary to alcohol  "intoxication    Nikhil Rios is a 36 year old male with a history of alcohol abuse and alcohol withdrawal who presents for evaluation of alcohol intoxication. Patient states that he called EMS because he is an alcoholic and he \"didn't want to die.\" Patient reports that he will be going to [facility] [place] in-patient treatment starting \"Friday or Saturday.\" Patient lives at home with his mother, but she is out of town right now.    Patient denies additional medical concerns or complaints at this time.     Per chart review, patient has presented to the ED 8 times in the past 30 days for evaluation of alcohol intoxication. Patient's most recent visit was yesterday, 7/28/22. Patient was given IV saline, a banana bag, Zofran, Compazine. The doctor discussed detox with the patient, which he declined.      I, Elise Gryler am serving as a scribe to document services personally performed by Landry Amin M.D. based on my observation and the provider's statements to me. ILandry M.D attest that Elise Gryler is acting in a scribe capacity, has observed my performance of the services and has documented them in accordance with my direction.    Past Medical History     Past Medical History:   Diagnosis Date     Alcohol abuse      Alcohol abuse      Alcohol withdrawal (H)      Depressive disorder      HLD (hyperlipidemia)      HTN (hypertension)      Past Surgical History:   Procedure Laterality Date     NO HISTORY OF SURGERY       OTHER SURGICAL HISTORY      none     Family History   Problem Relation Age of Onset     Coronary Artery Disease Father       Social History     Tobacco Use     Smoking status: Current Some Day Smoker     Packs/day: 0.25     Types: Cigars     Smokeless tobacco: Never Used     Tobacco comment: occasional   Substance Use Topics     Alcohol use: Yes     Alcohol/week: 17.0 standard drinks     Types: 17 Shots of liquor per week     Comment: Drinks 750ml/day or 17 shots/day; hx of withdrawl seizures     " Drug use: Not Currently       Relevant past medical, surgical, family and social history as documented above, has been reviewed and discussed with patient. No changes or additions, unless otherwise noted in the HPI.    Current Medications     diazepam (VALIUM) 5 MG tablet  escitalopram (LEXAPRO) 10 MG tablet  folic acid (FOLVITE) 1 MG tablet  gabapentin (NEURONTIN) 300 MG capsule  hydrOXYzine (VISTARIL) 50 MG capsule  LORazepam (ATIVAN) 1 MG tablet  ondansetron (ZOFRAN) 4 MG tablet  traZODone (DESYREL) 50 MG tablet        Allergies     Allergies   Allergen Reactions     Gramineae Pollens Itching     Pollen Extract Rash     grass tree pollen       Review of Systems     Review of Systems   Unable to perform ROS: Other (Alcohol intoxication)   Constitutional: Negative for fever.        Positive for alcohol intoxication   Respiratory: Negative for shortness of breath.    Cardiovascular: Negative for chest pain.   Gastrointestinal: Negative for abdominal pain.   All other systems reviewed and are negative.       Remainder of systems reviewed, unless noted in HPI all others negative.    Physical Exam     /75   Pulse 88   Temp 98.2  F (36.8  C) (Oral)   Resp 18   SpO2 97%     Physical Exam  Vitals and nursing note reviewed.   Constitutional:       General: He is not in acute distress.  HENT:      Head: Normocephalic.      Nose: Nose normal.   Eyes:      General: No scleral icterus.  Cardiovascular:      Rate and Rhythm: Normal rate.   Pulmonary:      Effort: Pulmonary effort is normal.   Musculoskeletal:      Cervical back: Neck supple.   Skin:     Findings: No rash.   Neurological:      Mental Status: He is alert. Mental status is at baseline.   Psychiatric:         Mood and Affect: Affect is blunt.         Speech: Speech is slurred.         Behavior: Behavior is slowed.         Thought Content: Thought content does not include homicidal or suicidal ideation.             Labs & Imaging         Labs Ordered and  Resulted from Time of ED Arrival to Time of ED Departure - No data to display           Landry Amin MD  Emergency Medicine  [facility] EMERGENCY DEPARTMENT  [facility] [facility] [address]  [phone]  7/29/2022         Landry Amin MD  07/29/22 2019       Landry Amin MD  07/29/22 6325

## 2022-07-30 NOTE — ED TRIAGE NOTES
"Patient arrives via [facility] due to alcohol intoxication. EMS reports that the patient verbalized to them that he had been drinking most part of the day because his mother was out of town. Patient reports \"I called called EMS to bring me to ER to prevent me from drinking\" He denies drinking alcohol tonight but clearly looks intoxicated.          "

## 2022-07-31 ENCOUNTER — HOSPITAL ENCOUNTER (EMERGENCY)
Facility: HOSPITAL | Age: 37
Discharge: HOME | End: 2022-08-01
Attending: EMERGENCY MEDICINE
Payer: COMMERCIAL

## 2022-07-31 VITALS
BODY MASS INDEX: 24.92 KG/M2 | TEMPERATURE: 98.5 F | DIASTOLIC BLOOD PRESSURE: 95 MMHG | WEIGHT: 178 LBS | OXYGEN SATURATION: 97 % | HEIGHT: 71 IN | RESPIRATION RATE: 16 BRPM | HEART RATE: 124 BPM | SYSTOLIC BLOOD PRESSURE: 134 MMHG

## 2022-08-01 NOTE — ED TRIAGE NOTES
Pt presents in alcohol withdrawal and intoxication.  Pt states he hasnt drank since noon.  Vomiting in triage

## 2023-05-16 ENCOUNTER — HOSPITAL ENCOUNTER (EMERGENCY)
Facility: HOSPITAL | Age: 38
Discharge: HOME | End: 2023-05-16
Attending: EMERGENCY MEDICINE | Admitting: EMERGENCY MEDICINE
Payer: COMMERCIAL

## 2023-05-16 VITALS
OXYGEN SATURATION: 99 % | BODY MASS INDEX: 25.05 KG/M2 | DIASTOLIC BLOOD PRESSURE: 79 MMHG | TEMPERATURE: 98 F | WEIGHT: 175 LBS | RESPIRATION RATE: 18 BRPM | SYSTOLIC BLOOD PRESSURE: 129 MMHG | HEART RATE: 73 BPM | HEIGHT: 70 IN

## 2023-05-16 DIAGNOSIS — F10.920 ALCOHOL INTOXICATION, UNCOMPLICATED (H): ICD-10-CM

## 2023-05-16 LAB
ALBUMIN SERPL BCG-MCNC: 4.6 G/DL (ref 3.5–5.2)
ALP SERPL-CCNC: 69 U/L (ref 40–129)
ALT SERPL W P-5'-P-CCNC: 24 U/L (ref 10–50)
ANION GAP SERPL CALCULATED.3IONS-SCNC: 13 MMOL/L (ref 7–15)
AST SERPL W P-5'-P-CCNC: 38 U/L (ref 10–50)
BILIRUB DIRECT SERPL-MCNC: <0.2 MG/DL (ref 0–0.3)
BILIRUB SERPL-MCNC: 0.2 MG/DL
BUN SERPL-MCNC: 11.3 MG/DL (ref 6–20)
CALCIUM SERPL-MCNC: 9 MG/DL (ref 8.6–10)
CHLORIDE SERPL-SCNC: 100 MMOL/L (ref 98–107)
CREAT SERPL-MCNC: 0.95 MG/DL (ref 0.67–1.17)
DEPRECATED HCO3 PLAS-SCNC: 28 MMOL/L (ref 22–29)
ERYTHROCYTE [DISTWIDTH] IN BLOOD BY AUTOMATED COUNT: 15 % (ref 10–15)
ETHANOL SERPL-MCNC: 0.4 G/DL
GFR SERPL CREATININE-BSD FRML MDRD: >90 ML/MIN/1.73M2
GLUCOSE SERPL-MCNC: 97 MG/DL (ref 70–99)
HCT VFR BLD AUTO: 42.4 % (ref 40–53)
HGB BLD-MCNC: 13.9 G/DL (ref 13.3–17.7)
INR PPP: 0.95 (ref 0.85–1.15)
MCH RBC QN AUTO: 28.3 PG (ref 26.5–33)
MCHC RBC AUTO-ENTMCNC: 32.8 G/DL (ref 31.5–36.5)
MCV RBC AUTO: 86 FL (ref 78–100)
PLATELET # BLD AUTO: 280 10E3/UL (ref 150–450)
POTASSIUM SERPL-SCNC: 3.8 MMOL/L (ref 3.4–5.3)
PROT SERPL-MCNC: 7 G/DL (ref 6.4–8.3)
RBC # BLD AUTO: 4.91 10E6/UL (ref 4.4–5.9)
SODIUM SERPL-SCNC: 141 MMOL/L (ref 136–145)
WBC # BLD AUTO: 11.3 10E3/UL (ref 4–11)

## 2023-05-16 PROCEDURE — 99283 EMERGENCY DEPT VISIT LOW MDM: CPT

## 2023-05-16 PROCEDURE — 85027 COMPLETE CBC AUTOMATED: CPT | Performed by: EMERGENCY MEDICINE

## 2023-05-16 PROCEDURE — 85610 PROTHROMBIN TIME: CPT | Performed by: EMERGENCY MEDICINE

## 2023-05-16 PROCEDURE — 82077 ASSAY SPEC XCP UR&BREATH IA: CPT | Performed by: EMERGENCY MEDICINE

## 2023-05-16 PROCEDURE — 36415 COLL VENOUS BLD VENIPUNCTURE: CPT | Performed by: EMERGENCY MEDICINE

## 2023-05-16 PROCEDURE — 80053 COMPREHEN METABOLIC PANEL: CPT | Performed by: EMERGENCY MEDICINE

## 2023-05-16 PROCEDURE — 82435 ASSAY OF BLOOD CHLORIDE: CPT | Performed by: EMERGENCY MEDICINE

## 2023-05-16 PROCEDURE — 82248 BILIRUBIN DIRECT: CPT | Performed by: EMERGENCY MEDICINE

## 2023-05-16 ASSESSMENT — ACTIVITIES OF DAILY LIVING (ADL): ADLS_ACUITY_SCORE: 37

## 2023-05-16 NOTE — ED PROVIDER NOTES
"  Emergency Department Encounter     Evaluation Date & Time:   2023 12:14 PM    CHIEF COMPLAINT:  Alcohol Intoxication      Triage Note:       Triage Assessment     Row Name 23 1212       Triage Assessment (Adult)    Airway WDL WDL                Lives with roommate. States here because I'm drunk. States 1/2 litre of vodka today. States also THC. Denies other drugs. States on 4 day binder. States has been through treatment before and states has had w/d seizures befores. Denies SI or HI. Poor eye contact     Triage Assessment     Row Name 23 1212       Triage Assessment (Adult)    Airway WDL WDL                    Impression and Plan       FINAL IMPRESSION:    ICD-10-CM    1. Alcohol intoxication, uncomplicated (H)  F10.920             ED COURSE & MEDICAL DECISION MAKIN:31 PM I met with the patient for the initial interview and physical examination. Discussed plan for treatment and workup in the ED.      37 year old male, history of HTN, HLD and alcohol abuse with previous withdrawal with seizures, who presents for evaluation of alcohol intoxication. Patient reports that he relapsed on alcohol 4-5 days ago and has been drinking up to 1L Vodka daily since. He presents today for help and concerns of withdrawal because he is shaking. His last drink was this morning (only a \"sip\"). He also admits to using THC.     On exam, patient appears intoxicated with some slurring of his speech, however is awake, alert and conversant.    He is not hypertensive or tachycardic and has no tongue fasciculations or hand tremors to suggest alcohol withdrawal.    Patient's blood alcohol level is 0.4.  Labs otherwise remarkable for mild leukocytosis (WBC 11.3) with no anemia, electrolyte derangements, renal or hepatic impairment.    Patient initially was willing to go to Detox, however then changed his mind.     Patient alert and independently ambulatory.  I do not think patient is holdable.  Patient discharged to " home with an Uber.  Return precautions provided.  Patient stable throughout ED course.      At the conclusion of the encounter I discussed the results of all the tests and the disposition. The questions were answered. The patient acknowledged understanding and was agreeable with the care plan.        Medical Decision Making    History:    Supplemental history from: Documented in chart, if applicable    External Record(s) reviewed: Documented in chart, if applicable.    Work Up:    Chart documentation includes differential considered and any EKGs or imaging independently interpreted by provider, where specified.    In additional to work up documented, I considered the following work up: Documented in chart, if applicable.    External consultation:    Discussion of management with another provider: Documented in chart, if applicable    Complicating factors:    Care impacted by chronic illness: Hyperlipidemia, Hypertension and Other: Seizures secondary to Alcohol Withdrawl     Care affected by social determinants of health: Alcohol Abuse and/or Recreational Drug Use    Disposition considerations: Discharge. No recommendations on prescription strength medication(s). I considered admission, but ultimately discharged patient Given no signs of alcohol withdrawal and reassuring evaluation.         MEDICATIONS GIVEN IN THE EMERGENCY DEPARTMENT:  Medications - No data to display    NEW PRESCRIPTIONS STARTED AT TODAY'S ED VISIT:  Discharge Medication List as of 5/16/2023  1:47 PM          HPI     HPI     Nikhil Rios is a 37 year old male, history of hypertension, hyperlipidemia and alcohol abuse with previous withdrawal with seizures, who presents to this ED via private vehicle accompanied by brother for evaluation of alcohol intoxication.    Patient reports that he relapsed on alcohol 4-5 days ago and has been drinking up to 1L Vodka daily since. He presents today for help and concerns of withdrawal because he is shaking. He  "reports a \"sip\" of alcohol this morning. He also admits to using THC.     Patient otherwise denies chest pain, shortness of breath, abdominal pain, nausea / vomiting / diarrhea, cough, fever or additional symptoms or complaints at this time.     REVIEW OF SYSTEMS:  All other systems reviewed and are negative.      Medical History     Past Medical History:   Diagnosis Date     Alcohol abuse      Alcohol abuse      Alcohol withdrawal (H)      Depressive disorder      HLD (hyperlipidemia)      HTN (hypertension)        Past Surgical History:   Procedure Laterality Date     NO HISTORY OF SURGERY       OTHER SURGICAL HISTORY      none       Family History   Problem Relation Age of Onset     Coronary Artery Disease Father        Social History     Tobacco Use     Smoking status: Some Days     Packs/day: 0.25     Types: Cigars, Cigarettes     Smokeless tobacco: Never     Tobacco comments:     occasional   Substance Use Topics     Alcohol use: Yes     Alcohol/week: 17.0 standard drinks of alcohol     Types: 17 Shots of liquor per week     Comment: Drinks 750ml/day or 17 shots/day; hx of withdrawl seizures     Drug use: Not Currently       diazepam (VALIUM) 5 MG tablet  escitalopram (LEXAPRO) 10 MG tablet  folic acid (FOLVITE) 1 MG tablet  gabapentin (NEURONTIN) 300 MG capsule  hydrOXYzine (VISTARIL) 50 MG capsule  LORazepam (ATIVAN) 1 MG tablet  ondansetron (ZOFRAN) 4 MG tablet  traZODone (DESYREL) 50 MG tablet        Physical Exam     /79   Pulse 73   Temp 98  F (36.7  C) (Oral)   Resp 18   Ht 1.778 m (5' 10\")   Wt 79.4 kg (175 lb)   SpO2 99%   BMI 25.11 kg/m        PHYSICAL EXAM:   Physical Exam    GENERAL: Awake, alert.  Patient is tearful. He appears intoxicated with some slurring of his speech.   HEENT: Normocephalic, atraumatic. Pupils equal, round and reactive. Conjunctiva normal. No tongue fasciculations.   NECK: No stridor.  PULMONARY: Symmetrical breath sounds without distress.  Lungs clear to " auscultation bilaterally without wheezes, rhonchi or rales.  CARDIO: Regular rate and rhythm.  No significant murmur, rub or gallop.    ABDOMINAL: Abdomen soft, non-distended and non-tender to palpation.   EXTREMITIES: No lower extremity swelling or edema.      NEURO: Patient appears intoxicated with some slurring of his speech. He is alert and oriented to person, place and time.  Cranial nerves grossly intact.  No focal motor deficit. No tongue fasciculations or hand tremors.   PSYCH: Appears intoxicated.  SKIN: No rashes.     Results     LAB:  All pertinent labs reviewed and interpreted  Labs Ordered and Resulted from Time of ED Arrival to Time of ED Departure   CBC WITH PLATELETS - Abnormal       Result Value    WBC Count 11.3 (*)     RBC Count 4.91      Hemoglobin 13.9      Hematocrit 42.4      MCV 86      MCH 28.3      MCHC 32.8      RDW 15.0      Platelet Count 280     ETHYL ALCOHOL LEVEL - Abnormal    Alcohol ethyl 0.40 (*)    BASIC METABOLIC PANEL - Normal    Sodium 141      Potassium 3.8      Chloride 100      Carbon Dioxide (CO2) 28      Anion Gap 13      Urea Nitrogen 11.3      Creatinine 0.95      Calcium 9.0      Glucose 97      GFR Estimate >90     HEPATIC FUNCTION PANEL - Normal    Protein Total 7.0      Albumin 4.6      Bilirubin Total 0.2      Alkaline Phosphatase 69      AST 38      ALT 24      Bilirubin Direct <0.20     INR - Normal    INR 0.95           I, Duc Hernandez, am serving as a scribe to document services personally performed by Kristina Box MD based on my observation and the provider's statements to me. I, Kristina Box MD attest that Duc Hernandez is acting in a scribe capacity, has observed my performance of the services and has documented them in accordance with my direction.    Kristina Box MD  Emergency Medicine  [facility] EMERGENCY DEPARTMENT         Kristina Box MD  05/17/23 1443

## 2023-05-16 NOTE — ED NOTES
Pt walking out of ED in triage bay. Stopped pt to take out IV. Pt steady on feet. Pt not wanting to stay and said he called an Uber. Dr. Box out to triage and states pt is ok to leave

## 2023-05-16 NOTE — DISCHARGE INSTRUCTIONS
Follow-up with your Primary Care Provider this week; call to arrange appointment.    It is best that you abstain from alcohol.

## 2023-05-16 NOTE — ED TRIAGE NOTES
Lives with roommate. States here because I'm drunk. States 1/2 litre of vodka today. States also THC. Denies other drugs. States on 4 day binder. States has been through treatment before and states has had w/d seizures befores. Denies SI or HI. Poor eye contact     Triage Assessment     Row Name 05/16/23 1212       Triage Assessment (Adult)    Airway WDL WDL

## 2023-05-16 NOTE — ED TRIAGE NOTES
Triage Assessment     Row Name 05/16/23 1212       Triage Assessment (Adult)    Airway WDL WDL

## 2023-05-19 ENCOUNTER — HOSPITAL ENCOUNTER (EMERGENCY)
Facility: HOSPITAL | Age: 38
Discharge: HOME | End: 2023-05-19
Attending: EMERGENCY MEDICINE | Admitting: EMERGENCY MEDICINE
Payer: COMMERCIAL

## 2023-05-19 VITALS
TEMPERATURE: 98.4 F | WEIGHT: 175 LBS | BODY MASS INDEX: 24.5 KG/M2 | DIASTOLIC BLOOD PRESSURE: 67 MMHG | HEART RATE: 77 BPM | OXYGEN SATURATION: 98 % | HEIGHT: 71 IN | RESPIRATION RATE: 24 BRPM | SYSTOLIC BLOOD PRESSURE: 112 MMHG

## 2023-05-19 DIAGNOSIS — F10.10 ALCOHOL ABUSE: ICD-10-CM

## 2023-05-19 DIAGNOSIS — F10.920 ALCOHOLIC INTOXICATION WITHOUT COMPLICATION (H): ICD-10-CM

## 2023-05-19 LAB
ANION GAP SERPL CALCULATED.3IONS-SCNC: 22 MMOL/L (ref 7–15)
BASOPHILS # BLD AUTO: 0.1 10E3/UL (ref 0–0.2)
BASOPHILS NFR BLD AUTO: 1 %
BUN SERPL-MCNC: 16.8 MG/DL (ref 6–20)
CALCIUM SERPL-MCNC: 9.7 MG/DL (ref 8.6–10)
CHLORIDE SERPL-SCNC: 98 MMOL/L (ref 98–107)
CREAT SERPL-MCNC: 0.93 MG/DL (ref 0.67–1.17)
DEPRECATED HCO3 PLAS-SCNC: 24 MMOL/L (ref 22–29)
EOSINOPHIL # BLD AUTO: 0.1 10E3/UL (ref 0–0.7)
EOSINOPHIL NFR BLD AUTO: 1 %
ERYTHROCYTE [DISTWIDTH] IN BLOOD BY AUTOMATED COUNT: 14.6 % (ref 10–15)
ETHANOL SERPL-MCNC: 0.34 G/DL
GFR SERPL CREATININE-BSD FRML MDRD: >90 ML/MIN/1.73M2
GLUCOSE SERPL-MCNC: 94 MG/DL (ref 70–99)
HCT VFR BLD AUTO: 46.6 % (ref 40–53)
HGB BLD-MCNC: 16.1 G/DL (ref 13.3–17.7)
HOLD SPECIMEN: NORMAL
IMM GRANULOCYTES # BLD: 0 10E3/UL
IMM GRANULOCYTES NFR BLD: 1 %
LYMPHOCYTES # BLD AUTO: 2.4 10E3/UL (ref 0.8–5.3)
LYMPHOCYTES NFR BLD AUTO: 30 %
MAGNESIUM SERPL-MCNC: 2 MG/DL (ref 1.7–2.3)
MCH RBC QN AUTO: 28.6 PG (ref 26.5–33)
MCHC RBC AUTO-ENTMCNC: 34.5 G/DL (ref 31.5–36.5)
MCV RBC AUTO: 83 FL (ref 78–100)
MONOCYTES # BLD AUTO: 0.3 10E3/UL (ref 0–1.3)
MONOCYTES NFR BLD AUTO: 4 %
NEUTROPHILS # BLD AUTO: 5.2 10E3/UL (ref 1.6–8.3)
NEUTROPHILS NFR BLD AUTO: 63 %
NRBC # BLD AUTO: 0 10E3/UL
NRBC BLD AUTO-RTO: 0 /100
PLATELET # BLD AUTO: 294 10E3/UL (ref 150–450)
POTASSIUM SERPL-SCNC: 4.2 MMOL/L (ref 3.4–5.3)
RBC # BLD AUTO: 5.62 10E6/UL (ref 4.4–5.9)
SODIUM SERPL-SCNC: 144 MMOL/L (ref 136–145)
WBC # BLD AUTO: 8.1 10E3/UL (ref 4–11)

## 2023-05-19 PROCEDURE — 80048 BASIC METABOLIC PNL TOTAL CA: CPT | Performed by: EMERGENCY MEDICINE

## 2023-05-19 PROCEDURE — 82077 ASSAY SPEC XCP UR&BREATH IA: CPT | Performed by: EMERGENCY MEDICINE

## 2023-05-19 PROCEDURE — 250N000009 HC RX 250: Performed by: EMERGENCY MEDICINE

## 2023-05-19 PROCEDURE — 83735 ASSAY OF MAGNESIUM: CPT | Performed by: EMERGENCY MEDICINE

## 2023-05-19 PROCEDURE — 258N000003 HC RX IP 258 OP 636: Performed by: EMERGENCY MEDICINE

## 2023-05-19 PROCEDURE — 96365 THER/PROPH/DIAG IV INF INIT: CPT

## 2023-05-19 PROCEDURE — 99284 EMERGENCY DEPT VISIT MOD MDM: CPT | Mod: 25

## 2023-05-19 PROCEDURE — 96375 TX/PRO/DX INJ NEW DRUG ADDON: CPT

## 2023-05-19 PROCEDURE — 85025 COMPLETE CBC W/AUTO DIFF WBC: CPT | Performed by: EMERGENCY MEDICINE

## 2023-05-19 PROCEDURE — 250N000013 HC RX MED GY IP 250 OP 250 PS 637: Performed by: EMERGENCY MEDICINE

## 2023-05-19 PROCEDURE — 250N000011 HC RX IP 250 OP 636: Performed by: EMERGENCY MEDICINE

## 2023-05-19 PROCEDURE — 36415 COLL VENOUS BLD VENIPUNCTURE: CPT | Performed by: EMERGENCY MEDICINE

## 2023-05-19 PROCEDURE — 96366 THER/PROPH/DIAG IV INF ADDON: CPT

## 2023-05-19 RX ORDER — LORAZEPAM 0.5 MG/1
1 TABLET ORAL ONCE
Status: COMPLETED | OUTPATIENT
Start: 2023-05-19 | End: 2023-05-19

## 2023-05-19 RX ORDER — LORAZEPAM 2 MG/ML
1 INJECTION INTRAMUSCULAR ONCE
Status: COMPLETED | OUTPATIENT
Start: 2023-05-19 | End: 2023-05-19

## 2023-05-19 RX ADMIN — FOLIC ACID: 5 INJECTION, SOLUTION INTRAMUSCULAR; INTRAVENOUS; SUBCUTANEOUS at 07:53

## 2023-05-19 RX ADMIN — LORAZEPAM 1 MG: 2 INJECTION INTRAMUSCULAR; INTRAVENOUS at 07:50

## 2023-05-19 RX ADMIN — LORAZEPAM 1 MG: 0.5 TABLET ORAL at 09:33

## 2023-05-19 ASSESSMENT — ENCOUNTER SYMPTOMS
VOMITING: 0
COUGH: 0
SHORTNESS OF BREATH: 0
DIARRHEA: 0
ABDOMINAL PAIN: 0

## 2023-05-19 ASSESSMENT — ACTIVITIES OF DAILY LIVING (ADL)
ADLS_ACUITY_SCORE: 35
ADLS_ACUITY_SCORE: 37

## 2023-05-19 NOTE — DISCHARGE INSTRUCTIONS
Continue to work with the [place] program to get in for alcohol treatment.    Return to emergency department with worsening concerns for withdrawal such as shakiness, fast heartbeat, worsening hallucinations, or other concerns.    Thank you for choosing [facility] Emergency Department.  It has been my pleasure caring for you today.     ~Dr. Jj MD

## 2023-05-19 NOTE — ED PROVIDER NOTES
EMERGENCY DEPARTMENT ENCOUNTER      NAME: Nikhil Rios  AGE: 37 year old male  YOB: 1985  MRN: [id number]  EVALUATION DATE & TIME: 2023  7:02 AM    PCP: Lisandro Graham    ED PROVIDER: Nena Gardner M.D.        Chief Complaint   Patient presents with     Alcohol Problem         FINAL IMPRESSION:    1. Alcohol abuse    2. Alcoholic intoxication without complication (H)            MEDICAL DECISION MAKIN year old male with history of alcohol abuse and alcohol-related withdrawal seizures who presents emergency department with concerns for alcohol withdrawal.  He had been sober for 9 months before he started drinking about 6 days ago.  He had last alcohol earlier this morning and alcohol was still 340 here in the ER.  No clinical signs for withdrawal.  Vital signs are very stable.  Was given a dose of IV Ativan for symptom support and states he is now feeling it is wearing off.  We will give a dose of oral.  No objective signs of alcohol withdrawal.  He is supposed to get into a program called [place] for his alcohol treatment either today or tomorrow.  He is here with his mother.  At this time I feel he is safe for discharge home.  I did offer him [place] detox but he declined.  I do not think he requires any radiology imaging.  Laboratories are stable.  He understands what to watch for and when to return to the ER and all of his questions have been answered.        ED COURSE:  7:12 AM  I met with the patient to gather history and perform my exam. ED course and treatment discussed.  Initial set of vital signs from triage are unremarkable.  Patient does not appear toxic or septic.  Does not appear to be in significant alcohol withdrawal at this moment clinically.  I will give him a little IV Ativan, check some laboratories, fluid hydration, and reassess.  I have offered him [place] detox but he has declined.  Mother is hopeful that he will get into this [place] program  later today as he had called earlier today and they thought that there might be a spot for him today.    9:17 AM  Patient continues to look well.  Does not appear in active withdrawal.  He states that he received a call from the [place] program and they are hoping to get him in today or tomorrow.  He is here with his mother as a sober ride.  At this time his alcohol was 340 when they checked at around 7:30 AM.  No objective signs for active alcohol withdrawal.  He states he feels like the Ativan is wearing off and I will give him a dose of oral, but I do not think he requires admission to the hospital for alcohol withdrawal.  He has declined to go to [facility] for detox.    COVID-19 PPE worn during patient evaluation:  Mask: surgical  Eye Protection: none   Gown: none   Hair cover: yes  Face shield: none  Patient wearing a mask: none    At the conclusion of the encounter I discussed the results of all of the tests and the disposition. Their questions were answered. The patient (and any family present) acknowledged understanding and were agreeable with the care plan.      CONSULTANTS:  none        MEDICATIONS GIVEN IN THE EMERGENCY:  Medications   LORazepam (ATIVAN) injection 1 mg (1 mg Intravenous $Given 5/19/23 0750)   sodium chloride 0.9 % 1,000 mL with Infuvite Adult 10 mL, thiamine 100 mg, folic acid 1 mg infusion ( Intravenous Stopped 5/19/23 0944)   LORazepam (ATIVAN) tablet 1 mg (1 mg Oral $Given 5/19/23 0933)           NEW PRESCRIPTIONS STARTED AT TODAY'S ER VISIT     Medication List      There are no discharge medications for this visit.             CONDITION:  stable        DISPOSITION:  discharge home with mother         =================================================================  =================================================================  TRIAGE ASSESSMENT:  Pt arrives to triage w/ mother escorted by wheelchair endorsing relapsed, about 0100 this AM last drink of vodka  Unknown amount  "\"buying 750s\". Pt very restless in triage. \"definitely withdrawing\". \"I found myself not breathing last night\". Hx of seizures w/ withdrawal    On waitlist for gateway treatment facility  Sober for 9 months and relapsed sunday        ED Triage Vitals [05/19/23 0659]   Enc Vitals Group      /82      Pulse 78      Resp 20      Temp 98.4  F (36.9  C)      Temp src Oral      SpO2 100 %          ================================================================  ================================================================    HPI    Patient information was obtained from: patient and mother    Use of Intrepreter: N/A      Nikhil Rios is a 37 year old male with history of alcoholism who presents to the ER with complaints of alcohol withdrawal.    Patient states that he been sober for 9 months and relapsed about 1 week ago on Mother's Day.  He was seen in our emergency department on the 16th for alcohol intoxication.  He is here with his mother concerned he is going into alcohol withdrawal.  He states he had a history of alcohol related withdrawal seizures.  He is already set up to go to the [place] program and they are hoping that he can get in later today.  They are here hoping that he can stay until a bed is available later today.    Patient states that he last drank during the night and was drinking vodka.  Otherwise denies any fevers, cough, chest pain, shortness of breath, vomiting or diarrhea.  He denies taking any daily medications.    He states that he does suffer from depression but denies any suicidal or homicidal thoughts.      REVIEW OF SYSTEMS  Review of Systems   Respiratory: Negative for cough and shortness of breath.    Cardiovascular: Negative for chest pain.   Gastrointestinal: Negative for abdominal pain, diarrhea and vomiting.   Allergic/Immunologic: Negative for immunocompromised state.   Psychiatric/Behavioral: Negative for suicidal ideas.   All other systems reviewed and are " negative.        PAST MEDICAL HISTORY:  Past Medical History:   Diagnosis Date     Alcohol abuse      Alcohol abuse      Alcohol withdrawal (H)      Depressive disorder      HLD (hyperlipidemia)      HTN (hypertension)          PAST SURGICAL HISTORY:  Past Surgical History:   Procedure Laterality Date     NO HISTORY OF SURGERY       OTHER SURGICAL HISTORY      none         CURRENT MEDICATIONS:    Prior to Admission medications    Medication Sig Start Date End Date Taking? Authorizing Provider   diazepam (VALIUM) 5 MG tablet Take 1 tablet (5 mg) by mouth every 6 hours as needed for anxiety or withdrawal 6/23/22   Severo Roque MD   escitalopram (LEXAPRO) 10 MG tablet Take 1 tablet (10 mg) by mouth daily 6/10/22   Fahad Mcdonald MD   folic acid (FOLVITE) 1 MG tablet Take 1 tablet (1 mg) by mouth daily 6/10/22   Fahad Mcdonald MD   gabapentin (NEURONTIN) 300 MG capsule Take 2 capsules (600 mg) by mouth every 8 hours 6/23/22   Severo Roque MD   hydrOXYzine (VISTARIL) 50 MG capsule Take 1 capsule (50 mg) by mouth 3 times daily as needed for anxiety 8/7/21   Daisy Jj MD   LORazepam (ATIVAN) 1 MG tablet Take 1-2 tablets (1-2 mg) by mouth every 6 hours as needed for withdrawal 7/28/22   Hilary Galaviz MD   ondansetron (ZOFRAN) 4 MG tablet Take 1 tablet (4 mg) by mouth every 8 hours as needed for nausea or vomiting 6/9/22   Fahad Mcdonald MD   traZODone (DESYREL) 50 MG tablet Take 50 mg by mouth At Bedtime 4/6/22   Unknown, Entered By History         ALLERGIES:  Allergies   Allergen Reactions     Gramineae Pollens Itching     Pollen Extract Rash     grass tree pollen         FAMILY HISTORY:  Family History   Problem Relation Age of Onset     Coronary Artery Disease Father          SOCIAL HISTORY:  Social History     Socioeconomic History     Marital status: Single     Spouse name: None     Number of children: None     Years of education: None     Highest education level: None   Occupational  "History     Occupation: Rental property owner   Tobacco Use     Smoking status: Some Days     Packs/day: 0.25     Types: Cigars, Cigarettes     Smokeless tobacco: Never     Tobacco comments:     occasional   Substance and Sexual Activity     Alcohol use: Yes     Alcohol/week: 17.0 standard drinks of alcohol     Types: 17 Shots of liquor per week     Comment: Drinks 750ml/day or 17 shots/day; hx of withdrawl seizures     Drug use: Not Currently     Social Determinants of Health     Transportation Needs: No Transportation Needs (7/21/2021)    PRAPARE - Transportation      Lack of Transportation (Medical): No      Lack of Transportation (Non-Medical): No   Stress: Stress Concern Present (7/21/2021)    Citizen of [place] [employer] - Occupational Stress Questionnaire      Feeling of Stress : Very much   Housing Stability: Unknown (7/21/2021)    Housing Stability Vital Sign      Unable to Pay for Housing in the Last Year: No      Unstable Housing in the Last Year: No         VITALS:  Patient Vitals for the past 24 hrs:   BP Temp Temp src Pulse Resp SpO2 Height Weight   05/19/23 0933 112/67 -- -- 77 24 98 % -- --   05/19/23 0915 119/67 -- -- 75 29 99 % -- --   05/19/23 0859 118/73 -- -- 77 28 99 % -- --   05/19/23 0856 -- -- -- -- -- -- 1.803 m (5' 11\") 79.4 kg (175 lb)   05/19/23 0845 119/66 -- -- 75 19 -- -- --   05/19/23 0833 116/60 -- -- 79 (!) 35 (!) 85 % -- --   05/19/23 0812 127/69 -- -- 77 20 99 % -- --   05/19/23 0757 120/67 -- -- 69 20 99 % -- --   05/19/23 0741 116/68 -- -- 68 22 100 % -- --   05/19/23 0726 110/72 -- -- 67 10 100 % -- --   05/19/23 0659 123/82 98.4  F (36.9  C) Oral 78 20 100 % -- --       Wt Readings from Last 3 Encounters:   05/19/23 79.4 kg (175 lb)   05/16/23 79.4 kg (175 lb)   07/28/22 77.1 kg (170 lb)       Estimated Creatinine Clearance: 124.5 mL/min (based on SCr of 0.93 mg/dL).    PHYSICAL EXAM    Constitutional:  Well developed, Well nourished, NAD, GCS 15  HENT:  " Normocephalic, Atraumatic, Bilateral external ears normal, Nose normal. Neck- Supple, No stridor.  Eyes:  PERRL, EOMI, Conjunctiva normal, No discharge.  Respiratory:  Normal breath sounds, No respiratory distress, No wheezing, Speaks full sentences easily. No cough.  Cardiovascular:  Normal heart rate, Regular rhythm,  No rubs, No gallops. Chest wall nontender.  GI:  No excessive obesity.  Bowel sounds normal, Soft, No tenderness, No masses, No flank tenderness. No rebound or guarding.   : deferred  Musculoskeletal:  No cyanosis, No clubbing. Good range of motion in all major joints. No major deformities noted.   Integument:  Warm, Dry, No erythema, No rash.  No petechiae.  Neurologic:  Alert & oriented x 3, Normal motor function, Normal sensory function, No focal deficits noted.   Psychiatric:  Affect normal, Cooperative, denies SI/HI         LAB:  All pertinent labs reviewed and interpreted.  Recent Results (from the past 24 hour(s))   Alcohol level blood    Collection Time: 05/19/23  7:36 AM   Result Value Ref Range    Alcohol ethyl 0.34 (HH) <=0.01 g/dL   Basic metabolic panel    Collection Time: 05/19/23  7:36 AM   Result Value Ref Range    Sodium 144 136 - 145 mmol/L    Potassium 4.2 3.4 - 5.3 mmol/L    Chloride 98 98 - 107 mmol/L    Carbon Dioxide (CO2) 24 22 - 29 mmol/L    Anion Gap 22 (H) 7 - 15 mmol/L    Urea Nitrogen 16.8 6.0 - 20.0 mg/dL    Creatinine 0.93 0.67 - 1.17 mg/dL    Calcium 9.7 8.6 - 10.0 mg/dL    Glucose 94 70 - 99 mg/dL    GFR Estimate >90 >60 mL/min/1.73m2   Magnesium    Collection Time: 05/19/23  7:36 AM   Result Value Ref Range    Magnesium 2.0 1.7 - 2.3 mg/dL   Extra Red Top Tube    Collection Time: 05/19/23  7:36 AM   Result Value Ref Range    Hold Specimen [facility]    Extra Green Top (Lithium Heparin) Tube    Collection Time: 05/19/23  7:36 AM   Result Value Ref Range    Hold Specimen [facility]    Extra Purple Top Tube    Collection Time: 05/19/23  7:36 AM   Result Value Ref Range    Hold  Specimen [facility]    CBC with platelets and differential    Collection Time: 05/19/23  7:36 AM   Result Value Ref Range    WBC Count 8.1 4.0 - 11.0 10e3/uL    RBC Count 5.62 4.40 - 5.90 10e6/uL    Hemoglobin 16.1 13.3 - 17.7 g/dL    Hematocrit 46.6 40.0 - 53.0 %    MCV 83 78 - 100 fL    MCH 28.6 26.5 - 33.0 pg    MCHC 34.5 31.5 - 36.5 g/dL    RDW 14.6 10.0 - 15.0 %    Platelet Count 294 150 - 450 10e3/uL    % Neutrophils 63 %    % Lymphocytes 30 %    % Monocytes 4 %    % Eosinophils 1 %    % Basophils 1 %    % Immature Granulocytes 1 %    NRBCs per 100 WBC 0 <1 /100    Absolute Neutrophils 5.2 1.6 - 8.3 10e3/uL    Absolute Lymphocytes 2.4 0.8 - 5.3 10e3/uL    Absolute Monocytes 0.3 0.0 - 1.3 10e3/uL    Absolute Eosinophils 0.1 0.0 - 0.7 10e3/uL    Absolute Basophils 0.1 0.0 - 0.2 10e3/uL    Absolute Immature Granulocytes 0.0 <=0.4 10e3/uL    Absolute NRBCs 0.0 10e3/uL       No results found for: [facility]        RADIOLOGY:  Reviewed all pertinent imaging. Please see official radiology report.    No orders to display         EKG:    none    PROCEDURES:  None      Medical Decision Making    History:    Supplemental history from: Documented in chart, if applicable and Family Member/Significant Other    External Record(s) reviewed: Documented in chart, if applicable.    Work Up:    Chart documentation includes differential considered and any EKGs or imaging independently interpreted by provider, where specified.    In additional to work up documented, I considered the following work up: Documented in chart, if applicable.    External consultation:    Discussion of management with another provider: Documented in chart, if applicable    Complicating factors:    Care impacted by chronic illness: Other: alcohol abuse    Care affected by social determinants of health: Alcohol Abuse and/or Recreational Drug Use    Disposition considerations: Discharge. No recommendations on prescription strength medication(s). I considered admission,  but ultimately discharged patient As he has no clinical signs for alcohol withdrawal at this time and is supposed to go into alcohol treatment other today or tomorrow at [place]..      Nena Gardner M.D. [facility]  Emergency Medicine and Medical Toxicology  [facility] EMERGENCY DEPARTMENT  [address] [place] [id number] [phone]  Dept: [phone]           Nena Gardner MD  05/19/23 1279

## 2023-05-19 NOTE — ED TRIAGE NOTES
"Pt arrives to triage w/ mother escorted by wheelchair endorsing relapsed, about 0100 this AM last drink of vodka  Unknown amount \"buying 750s\". Pt very restless in triage. \"definitely withdrawing\". \"I found myself not breathing last night\". Hx of seizures w/ withdrawal    On waitlist for gateway treatment facility  Sober for 9 months and relapsed sunday      "

## 2023-05-20 ENCOUNTER — APPOINTMENT (OUTPATIENT)
Dept: CT IMAGING | Facility: HOSPITAL | Age: 38
End: 2023-05-20
Attending: EMERGENCY MEDICINE
Payer: COMMERCIAL

## 2023-05-20 ENCOUNTER — HOSPITAL ENCOUNTER (EMERGENCY)
Facility: HOSPITAL | Age: 38
Discharge: HOME | End: 2023-05-20
Attending: EMERGENCY MEDICINE | Admitting: EMERGENCY MEDICINE
Payer: COMMERCIAL

## 2023-05-20 VITALS
RESPIRATION RATE: 16 BRPM | BODY MASS INDEX: 25.05 KG/M2 | WEIGHT: 175 LBS | HEART RATE: 79 BPM | DIASTOLIC BLOOD PRESSURE: 70 MMHG | HEIGHT: 70 IN | SYSTOLIC BLOOD PRESSURE: 140 MMHG | OXYGEN SATURATION: 97 % | TEMPERATURE: 99 F

## 2023-05-20 DIAGNOSIS — R10.13 ABDOMINAL PAIN, EPIGASTRIC: ICD-10-CM

## 2023-05-20 DIAGNOSIS — F10.920 ALCOHOL INTOXICATION, UNCOMPLICATED (H): ICD-10-CM

## 2023-05-20 DIAGNOSIS — R07.9 CHEST PAIN, UNSPECIFIED TYPE: ICD-10-CM

## 2023-05-20 LAB
ALBUMIN SERPL BCG-MCNC: 4.4 G/DL (ref 3.5–5.2)
ALP SERPL-CCNC: 70 U/L (ref 40–129)
ALT SERPL W P-5'-P-CCNC: 30 U/L (ref 10–50)
ANION GAP SERPL CALCULATED.3IONS-SCNC: 13 MMOL/L (ref 7–15)
AST SERPL W P-5'-P-CCNC: 50 U/L (ref 10–50)
BASOPHILS # BLD AUTO: 0.1 10E3/UL (ref 0–0.2)
BASOPHILS NFR BLD AUTO: 1 %
BILIRUB DIRECT SERPL-MCNC: <0.2 MG/DL (ref 0–0.3)
BILIRUB SERPL-MCNC: 0.5 MG/DL
BUN SERPL-MCNC: 13.4 MG/DL (ref 6–20)
CALCIUM SERPL-MCNC: 8.8 MG/DL (ref 8.6–10)
CHLORIDE SERPL-SCNC: 105 MMOL/L (ref 98–107)
CREAT SERPL-MCNC: 0.81 MG/DL (ref 0.67–1.17)
D DIMER PPP FEU-MCNC: 8.79 UG/ML FEU (ref 0–0.5)
DEPRECATED HCO3 PLAS-SCNC: 28 MMOL/L (ref 22–29)
EOSINOPHIL # BLD AUTO: 0.1 10E3/UL (ref 0–0.7)
EOSINOPHIL NFR BLD AUTO: 1 %
ERYTHROCYTE [DISTWIDTH] IN BLOOD BY AUTOMATED COUNT: 14.9 % (ref 10–15)
ETHANOL SERPL-MCNC: 0.43 G/DL
GFR SERPL CREATININE-BSD FRML MDRD: >90 ML/MIN/1.73M2
GLUCOSE SERPL-MCNC: 107 MG/DL (ref 70–99)
HCT VFR BLD AUTO: 41.7 % (ref 40–53)
HGB BLD-MCNC: 14.3 G/DL (ref 13.3–17.7)
HOLD SPECIMEN: NORMAL
IMM GRANULOCYTES # BLD: 0 10E3/UL
IMM GRANULOCYTES NFR BLD: 0 %
INR PPP: 0.88 (ref 0.85–1.15)
LIPASE SERPL-CCNC: 46 U/L (ref 13–60)
LYMPHOCYTES # BLD AUTO: 3.3 10E3/UL (ref 0.8–5.3)
LYMPHOCYTES NFR BLD AUTO: 45 %
MAGNESIUM SERPL-MCNC: 2.2 MG/DL (ref 1.7–2.3)
MCH RBC QN AUTO: 28.7 PG (ref 26.5–33)
MCHC RBC AUTO-ENTMCNC: 34.3 G/DL (ref 31.5–36.5)
MCV RBC AUTO: 84 FL (ref 78–100)
MONOCYTES # BLD AUTO: 0.4 10E3/UL (ref 0–1.3)
MONOCYTES NFR BLD AUTO: 5 %
NEUTROPHILS # BLD AUTO: 3.5 10E3/UL (ref 1.6–8.3)
NEUTROPHILS NFR BLD AUTO: 48 %
NRBC # BLD AUTO: 0 10E3/UL
NRBC BLD AUTO-RTO: 0 /100
PLATELET # BLD AUTO: 259 10E3/UL (ref 150–450)
POTASSIUM SERPL-SCNC: 3.9 MMOL/L (ref 3.4–5.3)
PROT SERPL-MCNC: 6.8 G/DL (ref 6.4–8.3)
RBC # BLD AUTO: 4.99 10E6/UL (ref 4.4–5.9)
SODIUM SERPL-SCNC: 146 MMOL/L (ref 136–145)
TROPONIN T SERPL HS-MCNC: <6 NG/L
WBC # BLD AUTO: 7.4 10E3/UL (ref 4–11)

## 2023-05-20 PROCEDURE — 82077 ASSAY SPEC XCP UR&BREATH IA: CPT | Performed by: EMERGENCY MEDICINE

## 2023-05-20 PROCEDURE — 258N000003 HC RX IP 258 OP 636: Performed by: EMERGENCY MEDICINE

## 2023-05-20 PROCEDURE — 71275 CT ANGIOGRAPHY CHEST: CPT

## 2023-05-20 PROCEDURE — 96360 HYDRATION IV INFUSION INIT: CPT | Mod: 59

## 2023-05-20 PROCEDURE — 85379 FIBRIN DEGRADATION QUANT: CPT | Performed by: EMERGENCY MEDICINE

## 2023-05-20 PROCEDURE — 83690 ASSAY OF LIPASE: CPT | Performed by: EMERGENCY MEDICINE

## 2023-05-20 PROCEDURE — 82248 BILIRUBIN DIRECT: CPT | Performed by: EMERGENCY MEDICINE

## 2023-05-20 PROCEDURE — 93005 ELECTROCARDIOGRAM TRACING: CPT | Performed by: EMERGENCY MEDICINE

## 2023-05-20 PROCEDURE — 250N000011 HC RX IP 250 OP 636: Performed by: EMERGENCY MEDICINE

## 2023-05-20 PROCEDURE — 84484 ASSAY OF TROPONIN QUANT: CPT | Performed by: EMERGENCY MEDICINE

## 2023-05-20 PROCEDURE — 85610 PROTHROMBIN TIME: CPT | Performed by: EMERGENCY MEDICINE

## 2023-05-20 PROCEDURE — 36415 COLL VENOUS BLD VENIPUNCTURE: CPT | Performed by: EMERGENCY MEDICINE

## 2023-05-20 PROCEDURE — 83735 ASSAY OF MAGNESIUM: CPT | Performed by: EMERGENCY MEDICINE

## 2023-05-20 PROCEDURE — 74177 CT ABD & PELVIS W/CONTRAST: CPT

## 2023-05-20 PROCEDURE — 99285 EMERGENCY DEPT VISIT HI MDM: CPT | Mod: 25

## 2023-05-20 PROCEDURE — 85025 COMPLETE CBC W/AUTO DIFF WBC: CPT | Performed by: EMERGENCY MEDICINE

## 2023-05-20 RX ORDER — IOPAMIDOL 755 MG/ML
90 INJECTION, SOLUTION INTRAVASCULAR ONCE
Status: COMPLETED | OUTPATIENT
Start: 2023-05-20 | End: 2023-05-20

## 2023-05-20 RX ORDER — MAGNESIUM HYDROXIDE/ALUMINUM HYDROXICE/SIMETHICONE 120; 1200; 1200 MG/30ML; MG/30ML; MG/30ML
30 SUSPENSION ORAL ONCE
Status: COMPLETED | OUTPATIENT
Start: 2023-05-20 | End: 2023-05-20

## 2023-05-20 RX ADMIN — SODIUM CHLORIDE 500 ML: 9 INJECTION, SOLUTION INTRAVENOUS at 13:38

## 2023-05-20 RX ADMIN — IOPAMIDOL 90 ML: 755 INJECTION, SOLUTION INTRAVENOUS at 13:35

## 2023-05-20 ASSESSMENT — ACTIVITIES OF DAILY LIVING (ADL): ADLS_ACUITY_SCORE: 37

## 2023-05-20 NOTE — ED NOTES
Pt's phone charged at nurses station to 20% so he can uber home. Pt placed out in lobby in meantime. Pt able to ambulate independently. Discharged to home with belongings.

## 2023-05-20 NOTE — ED PROVIDER NOTES
"  Emergency Department Encounter     Evaluation Date & Time:   No admission date for patient encounter.    CHIEF COMPLAINT:  Abdominal Pain and Alcohol Problem      Triage Note:  Pt presents with complaints of upper abd pain, he believes to be pancreatitis. Pt has been in recovery for 9 months and relapsed 2 weeks ago and has been binging since. Pt has been sipping alcohol to not have a seizure, pt is known to have seizures.      Triage Assessment     Row Name 23 [id number]       Triage Assessment (Adult)    Airway WDL WDL       Respiratory WDL    Respiratory WDL WDL       Skin Circulation/Temperature WDL    Skin Circulation/Temperature WDL WDL       Cardiac WDL    Cardiac WDL WDL       Peripheral/Neurovascular WDL    Peripheral Neurovascular WDL WDL       Cognitive/Neuro/Behavioral WDL    Cognitive/Neuro/Behavioral WDL WDL                    Impression and Plan       FINAL IMPRESSION:    ICD-10-CM    1. Abdominal pain, epigastric  R10.13       2. Chest pain, unspecified type  R07.9       3. Alcohol intoxication, uncomplicated (H)  F10.920             ED COURSE & MEDICAL DECISION MAKIN:40 AM I met with patient for initial encounter.    37 year old male, history of alcohol abuse with previous withdrawal with seizures, HTN and HLD, who presents for evaluation of nagging epigastric abdominal and low chest pain ongoing for a few hours radiating to his back. Pain feels similar to previous episodes of pancreatitis. Reports nausea without vomiting and looser than normal stools.  No hematochezia or melena.  Has shortness of breath secondary to the pain.    He has been \"sipping\" alcohol to prevent withdrawal with his last drink ~2 hours ago.     On exam, he has borderline tachycardic rate with regular rhythm and clear and symmetrical breath sounds.  He has mild to moderate tenderness to palpation of the epigastrium with no peritoneal signs.  He appears acutely intoxicated with some slurring of his speech.  No tongue " fasciculations or hand tremors to suggest any withdrawal.    EKG performed and demonstrated NSR with no ischemic changes.  Troponin negative (<6).    PE considered for which a D-dimer was checked and elevated (8.79).    CTA chest performed and negative for PE or other acute process.    CT abdomen / pelvis also performed and demonstrated some edema in the bowel mesentery possibly secondary to a non-specific enteritis with trace free fluid; marked improvement in hepatic steatosis since prior imaging.    Labs otherwise remarkable for no leukocytosis or anemia.  He has mild hypernatremia (Na+ 146), likely secondary to dehydration for which he was given IV fluids.  Renal function normal.  No laboratory evidence of hepatitis, biliary obstruction or pancreatitis.    Blood alcohol level 0.43.    Patient initially willing to go to Detox and a bed was found at [place], however then patient refused.  He reports that he has a bed secured at [facility] on Monday.    Patient discharged to home with follow-up with his PCP and he was encouraged to present to [facility] on Monday, as previously arranged.  Return precautions provided.  Patient stable throughout ED course.      At the conclusion of the encounter I discussed the results of all the tests and the disposition. The questions were answered. The patient acknowledged understanding and was agreeable with the care plan.      Medical Decision Making    History:    Supplemental history from: Documented in chart, if applicable    External Record(s) reviewed: Documented in chart, if applicable.    Work Up:    Chart documentation includes differential considered and any EKGs or imaging independently interpreted by provider, where specified.    In additional to work up documented, I considered the following work up: Documented in chart, if applicable.    External consultation:    Discussion of management with another provider: Documented in chart, if applicable    Complicating  "factors:    Care impacted by chronic illness: Hyperlipidemia, Hypertension and Smoking / Nicotine Use    Care affected by social determinants of health: Alcohol Abuse and/or Recreational Drug Use    Disposition considerations: Discharge. No recommendations on prescription strength medication(s). I considered admission, but ultimately discharged patient Given reassuring vitals, exam and evaluation.         MEDICATIONS GIVEN IN THE EMERGENCY DEPARTMENT:  Medications   alum & mag hydroxide-simethicone (MAALOX) suspension 30 mL (30 mLs Oral Not Given 5/20/23 1206)   0.9% sodium chloride BOLUS (0 mLs Intravenous Stopped 5/20/23 1417)   iopamidol (ISOVUE-370) solution 90 mL (90 mLs Intravenous $Given 5/20/23 1335)       NEW PRESCRIPTIONS STARTED AT TODAY'S ED VISIT:  Discharge Medication List as of 5/20/2023  2:18 PM          HPI     HPI     Nikhil Rios is a 37 year old male, history of alcohol abuse with previous withdrawal with seizures, HTN and HLD, who presents to this ED via walk-in for evaluation of abdominal and chest pain.    Patient endorses epigastric / lower chest pain beginning a few hours ago which has been constant since onset. The pain radiates to his back. The pain feels \"nagging\" in nature and similar to previous episodes of pancreatitis. The pain is worse with breathing and walking and the pain makes him want to bend over. He reports associated nausea without vomiting. His stools have been looser, but he denies diarrhea, hematochezia and melena. He reports shortness of breath secondary to the pain.     He has been sipping alcohol to prevent withdrawal with his last drink ~2 hours ago.     Patient denies cough, fever and other concerns.     REVIEW OF SYSTEMS:  All other systems reviewed and are negative.      Medical History     Past Medical History:   Diagnosis Date     Alcohol abuse      Alcohol abuse      Alcohol withdrawal (H)      Depressive disorder      HLD (hyperlipidemia)      HTN (hypertension)  " "      Past Surgical History:   Procedure Laterality Date     NO HISTORY OF SURGERY       OTHER SURGICAL HISTORY      none       Family History   Problem Relation Age of Onset     Coronary Artery Disease Father        Social History     Tobacco Use     Smoking status: Some Days     Packs/day: 0.25     Types: Cigars, Cigarettes     Smokeless tobacco: Never     Tobacco comments:     occasional   Substance Use Topics     Alcohol use: Yes     Alcohol/week: 17.0 standard drinks of alcohol     Types: 17 Shots of liquor per week     Comment: Drinks 750ml/day or 17 shots/day; hx of withdrawl seizures     Drug use: Not Currently       diazepam (VALIUM) 5 MG tablet  escitalopram (LEXAPRO) 10 MG tablet  folic acid (FOLVITE) 1 MG tablet  gabapentin (NEURONTIN) 300 MG capsule  hydrOXYzine (VISTARIL) 50 MG capsule  LORazepam (ATIVAN) 1 MG tablet  ondansetron (ZOFRAN) 4 MG tablet  traZODone (DESYREL) 50 MG tablet        Physical Exam     First Vitals:  Patient Vitals for the past 24 hrs:   BP Temp Temp src Pulse Resp SpO2 Height Weight   05/20/23 1415 (!) 140/70 -- -- 79 16 97 % -- --   05/20/23 1330 130/73 -- -- 76 -- 97 % -- --   05/20/23 1300 125/63 -- -- 78 15 93 % -- --   05/20/23 1245 131/75 -- -- 81 14 94 % -- --   05/20/23 1230 138/79 -- -- 82 15 95 % -- --   05/20/23 1200 128/67 -- -- 82 15 93 % -- --   05/20/23 1145 (!) 145/75 -- -- 89 24 94 % -- --   05/20/23 1120 132/88 99  F (37.2  C) Temporal 101 24 95 % 1.778 m (5' 10\") 79.4 kg (175 lb)       PHYSICAL EXAM:   Physical Exam    GENERAL: Awake, alert.  In mild acute distress; patient is tearful. He appears intoxicated with some slurring of his speech.   HEENT: Normocephalic, atraumatic. Pupils equal, round and reactive. Conjunctiva normal.   NECK: No stridor.  PULMONARY: Symmetrical breath sounds without distress.  Lungs clear to auscultation bilaterally without wheezes, rhonchi or rales.  CARDIO: Borderline tachycardic rate with regular rhythm.  No significant murmur, " rub or gallop.  Radial pulses strong and symmetrical.  ABDOMINAL: Abdomen soft, non-distended with mild to moderate tenderness to palpation epigastrium; no rebound tenderness or guarding.   EXTREMITIES: No lower extremity swelling or edema.      NEURO: Alert and oriented to person, place and time.  Cranial nerves grossly intact.  No focal motor deficit. No tongue fasciculations or hand tremors.   PSYCH: Appears intoxicated with slurring of his speech.         Results     LAB:  All pertinent labs reviewed and interpreted  Labs Ordered and Resulted from Time of ED Arrival to Time of ED Departure   BASIC METABOLIC PANEL - Abnormal       Result Value    Sodium 146 (*)     Potassium 3.9      Chloride 105      Carbon Dioxide (CO2) 28      Anion Gap 13      Urea Nitrogen 13.4      Creatinine 0.81      Calcium 8.8      Glucose 107 (*)     GFR Estimate >90     ETHYL ALCOHOL LEVEL - Abnormal    Alcohol ethyl 0.43 (*)    D DIMER QUANTITATIVE - Abnormal    D-Dimer Quantitative 8.79 (*)    LIPASE - Normal    Lipase 46     HEPATIC FUNCTION PANEL - Normal    Protein Total 6.8      Albumin 4.4      Bilirubin Total 0.5      Alkaline Phosphatase 70      AST 50      ALT 30      Bilirubin Direct <0.20     MAGNESIUM - Normal    Magnesium 2.2     INR - Normal    INR 0.88     TROPONIN T, HIGH SENSITIVITY - Normal    Troponin T, High Sensitivity <6     CBC WITH PLATELETS AND DIFFERENTIAL    WBC Count 7.4      RBC Count 4.99      Hemoglobin 14.3      Hematocrit 41.7      MCV 84      MCH 28.7      MCHC 34.3      RDW 14.9      Platelet Count 259      % Neutrophils 48      % Lymphocytes 45      % Monocytes 5      % Eosinophils 1      % Basophils 1      % Immature Granulocytes 0      NRBCs per 100 WBC 0      Absolute Neutrophils 3.5      Absolute Lymphocytes 3.3      Absolute Monocytes 0.4      Absolute Eosinophils 0.1      Absolute Basophils 0.1      Absolute Immature Granulocytes 0.0      Absolute NRBCs 0.0         RADIOLOGY:  CT Abdomen  Pelvis w Contrast   Final Result   IMPRESSION:    1.  Some edema in the bowel mesentery is noted, question a nonspecific enteritis. Trace free fluid.   2.  Marked improvement in hepatic steatosis since comparison.      CT Chest Pulmonary Embolism w Contrast   Final Result   IMPRESSION:   1.  No acute process demonstrated.          EC2023, 11:40; NSR with rate of 90 bpm; normal intervals; normal conduction; no ST-T wave changes consistent with ACS or pericarditis; compared to previous EKG dated 2022, there are no significant changes    EKG independently reviewed and interpreted by Kristina Box MD      I, Aldo Trujillo, am serving as a scribe to document services personally performed by Kristina Box MD based on my observation and the provider's statements to me. I, Kristina Box MD attest that Aldo Trujillo is acting in a scribe capacity, has observed my performance of the services and has documented them in accordance with my direction.    Kristina Box MD  Emergency Medicine  [facility] EMERGENCY DEPARTMENT          Kristina Box MD  [phone]

## 2023-05-20 NOTE — DISCHARGE INSTRUCTIONS
Please follow-up with your Primary Care Provider within 1 week for a recheck; call to arrange appointment.    Present to [facility] on Monday, as previously arranged.    Return to the ER for worsening symptoms, worsening chest pain, shortness of breath, if you pass out or feel that you might, worsening abdominal pain, persistent nausea / vomiting, fever or other concerns.

## 2023-05-20 NOTE — ED NOTES
"Pt refusing to go to [facility] detox. States he would rather go home with his mother or he can \"make calls\". HUC updated.   "

## 2023-05-20 NOTE — ED TRIAGE NOTES
Pt presents with complaints of upper abd pain, he believes to be pancreatitis. Pt has been in recovery for 9 months and relapsed 2 weeks ago and has been binging since. Pt has been sipping alcohol to not have a seizure, pt is known to have seizures.      Triage Assessment     Row Name 05/20/23 1122       Triage Assessment (Adult)    Airway WDL WDL       Respiratory WDL    Respiratory WDL WDL       Skin Circulation/Temperature WDL    Skin Circulation/Temperature WDL WDL       Cardiac WDL    Cardiac WDL WDL       Peripheral/Neurovascular WDL    Peripheral Neurovascular WDL WDL       Cognitive/Neuro/Behavioral WDL    Cognitive/Neuro/Behavioral WDL WDL

## 2023-05-21 ENCOUNTER — HOSPITAL ENCOUNTER (OUTPATIENT)
Facility: HOSPITAL | Age: 38
Setting detail: OBSERVATION
Discharge: HOME | End: 2023-05-22
Attending: EMERGENCY MEDICINE | Admitting: EMERGENCY MEDICINE
Payer: COMMERCIAL

## 2023-05-21 DIAGNOSIS — R10.13 EPIGASTRIC PAIN: ICD-10-CM

## 2023-05-21 DIAGNOSIS — F10.920 ALCOHOLIC INTOXICATION WITHOUT COMPLICATION (H): ICD-10-CM

## 2023-05-21 LAB
ALBUMIN SERPL BCG-MCNC: 4.6 G/DL (ref 3.5–5.2)
ALP SERPL-CCNC: 67 U/L (ref 40–129)
ALT SERPL W P-5'-P-CCNC: 35 U/L (ref 10–50)
ANION GAP SERPL CALCULATED.3IONS-SCNC: 15 MMOL/L (ref 7–15)
AST SERPL W P-5'-P-CCNC: 58 U/L (ref 10–50)
BASOPHILS # BLD AUTO: 0 10E3/UL (ref 0–0.2)
BASOPHILS NFR BLD AUTO: 1 %
BILIRUB SERPL-MCNC: 0.5 MG/DL
BUN SERPL-MCNC: 12.6 MG/DL (ref 6–20)
CALCIUM SERPL-MCNC: 8.7 MG/DL (ref 8.6–10)
CHLORIDE SERPL-SCNC: 102 MMOL/L (ref 98–107)
CREAT SERPL-MCNC: 0.73 MG/DL (ref 0.67–1.17)
DEPRECATED HCO3 PLAS-SCNC: 26 MMOL/L (ref 22–29)
EOSINOPHIL # BLD AUTO: 0 10E3/UL (ref 0–0.7)
EOSINOPHIL NFR BLD AUTO: 1 %
ERYTHROCYTE [DISTWIDTH] IN BLOOD BY AUTOMATED COUNT: 15.2 % (ref 10–15)
ETHANOL SERPL-MCNC: 0.33 G/DL
GFR SERPL CREATININE-BSD FRML MDRD: >90 ML/MIN/1.73M2
GLUCOSE SERPL-MCNC: 92 MG/DL (ref 70–99)
HCT VFR BLD AUTO: 39.7 % (ref 40–53)
HGB BLD-MCNC: 13.7 G/DL (ref 13.3–17.7)
IMM GRANULOCYTES # BLD: 0 10E3/UL
IMM GRANULOCYTES NFR BLD: 0 %
LIPASE SERPL-CCNC: 35 U/L (ref 13–60)
LYMPHOCYTES # BLD AUTO: 3 10E3/UL (ref 0.8–5.3)
LYMPHOCYTES NFR BLD AUTO: 45 %
MCH RBC QN AUTO: 28.9 PG (ref 26.5–33)
MCHC RBC AUTO-ENTMCNC: 34.5 G/DL (ref 31.5–36.5)
MCV RBC AUTO: 84 FL (ref 78–100)
MONOCYTES # BLD AUTO: 0.3 10E3/UL (ref 0–1.3)
MONOCYTES NFR BLD AUTO: 5 %
NEUTROPHILS # BLD AUTO: 3.3 10E3/UL (ref 1.6–8.3)
NEUTROPHILS NFR BLD AUTO: 48 %
NRBC # BLD AUTO: 0 10E3/UL
NRBC BLD AUTO-RTO: 0 /100
PLATELET # BLD AUTO: 203 10E3/UL (ref 150–450)
POTASSIUM SERPL-SCNC: 3.9 MMOL/L (ref 3.4–5.3)
PROT SERPL-MCNC: 7 G/DL (ref 6.4–8.3)
RBC # BLD AUTO: 4.74 10E6/UL (ref 4.4–5.9)
SODIUM SERPL-SCNC: 143 MMOL/L (ref 136–145)
WBC # BLD AUTO: 6.8 10E3/UL (ref 4–11)

## 2023-05-21 PROCEDURE — 250N000011 HC RX IP 250 OP 636: Performed by: EMERGENCY MEDICINE

## 2023-05-21 PROCEDURE — 258N000003 HC RX IP 258 OP 636: Performed by: EMERGENCY MEDICINE

## 2023-05-21 PROCEDURE — 96361 HYDRATE IV INFUSION ADD-ON: CPT

## 2023-05-21 PROCEDURE — 83735 ASSAY OF MAGNESIUM: CPT | Performed by: HOSPITALIST

## 2023-05-21 PROCEDURE — 36415 COLL VENOUS BLD VENIPUNCTURE: CPT | Performed by: EMERGENCY MEDICINE

## 2023-05-21 PROCEDURE — 250N000013 HC RX MED GY IP 250 OP 250 PS 637: Performed by: EMERGENCY MEDICINE

## 2023-05-21 PROCEDURE — 84100 ASSAY OF PHOSPHORUS: CPT | Performed by: HOSPITALIST

## 2023-05-21 PROCEDURE — 99285 EMERGENCY DEPT VISIT HI MDM: CPT | Mod: 25

## 2023-05-21 PROCEDURE — 80053 COMPREHEN METABOLIC PANEL: CPT | Performed by: EMERGENCY MEDICINE

## 2023-05-21 PROCEDURE — 83690 ASSAY OF LIPASE: CPT | Performed by: EMERGENCY MEDICINE

## 2023-05-21 PROCEDURE — 96375 TX/PRO/DX INJ NEW DRUG ADDON: CPT

## 2023-05-21 PROCEDURE — 85025 COMPLETE CBC W/AUTO DIFF WBC: CPT | Performed by: EMERGENCY MEDICINE

## 2023-05-21 PROCEDURE — 82077 ASSAY SPEC XCP UR&BREATH IA: CPT | Performed by: EMERGENCY MEDICINE

## 2023-05-21 RX ORDER — MORPHINE SULFATE 4 MG/ML
4 INJECTION, SOLUTION INTRAMUSCULAR; INTRAVENOUS ONCE
Status: COMPLETED | OUTPATIENT
Start: 2023-05-21 | End: 2023-05-21

## 2023-05-21 RX ORDER — SUCRALFATE 1 G/1
1 TABLET ORAL 4 TIMES DAILY
Qty: 30 TABLET | Refills: 0 | Status: SHIPPED | OUTPATIENT
Start: 2023-05-21 | End: 2023-05-22

## 2023-05-21 RX ORDER — SUCRALFATE 1 G/1
1 TABLET ORAL ONCE
Status: COMPLETED | OUTPATIENT
Start: 2023-05-22 | End: 2023-05-21

## 2023-05-21 RX ORDER — MORPHINE SULFATE 4 MG/ML
4 INJECTION, SOLUTION INTRAMUSCULAR; INTRAVENOUS
Status: DISCONTINUED | OUTPATIENT
Start: 2023-05-21 | End: 2023-05-22 | Stop reason: HOSPADM

## 2023-05-21 RX ORDER — ONDANSETRON 2 MG/ML
8 INJECTION INTRAMUSCULAR; INTRAVENOUS
Status: COMPLETED | OUTPATIENT
Start: 2023-05-21 | End: 2023-05-21

## 2023-05-21 RX ORDER — FAMOTIDINE 20 MG/1
20 TABLET, FILM COATED ORAL 2 TIMES DAILY
Qty: 30 TABLET | Refills: 0 | Status: SHIPPED | OUTPATIENT
Start: 2023-05-21 | End: 2023-05-22

## 2023-05-21 RX ADMIN — MORPHINE SULFATE 4 MG: 4 INJECTION, SOLUTION INTRAMUSCULAR; INTRAVENOUS at 23:32

## 2023-05-21 RX ADMIN — SUCRALFATE 1 G: 1 TABLET ORAL at 23:48

## 2023-05-21 RX ADMIN — FAMOTIDINE 20 MG: 10 INJECTION, SOLUTION INTRAVENOUS at 23:35

## 2023-05-21 RX ADMIN — ONDANSETRON 8 MG: 2 INJECTION INTRAMUSCULAR; INTRAVENOUS at 23:31

## 2023-05-21 RX ADMIN — SODIUM CHLORIDE 1000 ML: 9 INJECTION, SOLUTION INTRAVENOUS at 22:44

## 2023-05-21 ASSESSMENT — ACTIVITIES OF DAILY LIVING (ADL): ADLS_ACUITY_SCORE: 37

## 2023-05-22 VITALS
OXYGEN SATURATION: 96 % | BODY MASS INDEX: 25.05 KG/M2 | HEIGHT: 70 IN | TEMPERATURE: 98.7 F | HEART RATE: 56 BPM | SYSTOLIC BLOOD PRESSURE: 125 MMHG | RESPIRATION RATE: 12 BRPM | DIASTOLIC BLOOD PRESSURE: 65 MMHG | WEIGHT: 175 LBS

## 2023-05-22 PROBLEM — F10.220 ACUTE ALCOHOLIC INTOXICATION IN ALCOHOLISM WITHOUT COMPLICATION (H): Status: ACTIVE | Noted: 2022-06-05

## 2023-05-22 PROBLEM — R10.13 EPIGASTRIC PAIN: Status: ACTIVE | Noted: 2023-05-22

## 2023-05-22 PROBLEM — F10.920 ALCOHOLIC INTOXICATION WITHOUT COMPLICATION (H): Status: ACTIVE | Noted: 2022-06-19

## 2023-05-22 PROBLEM — F10.239 ALCOHOL DEPENDENCE WITH WITHDRAWAL (H): Status: ACTIVE | Noted: 2019-05-18

## 2023-05-22 LAB
HOLD SPECIMEN: NORMAL
MAGNESIUM SERPL-MCNC: 2 MG/DL (ref 1.7–2.3)
PHOSPHATE SERPL-MCNC: 3.5 MG/DL (ref 2.5–4.5)

## 2023-05-22 PROCEDURE — 96366 THER/PROPH/DIAG IV INF ADDON: CPT

## 2023-05-22 PROCEDURE — 250N000011 HC RX IP 250 OP 636: Performed by: HOSPITALIST

## 2023-05-22 PROCEDURE — 96365 THER/PROPH/DIAG IV INF INIT: CPT

## 2023-05-22 PROCEDURE — 250N000013 HC RX MED GY IP 250 OP 250 PS 637: Performed by: EMERGENCY MEDICINE

## 2023-05-22 PROCEDURE — G0378 HOSPITAL OBSERVATION PER HR: HCPCS

## 2023-05-22 PROCEDURE — 99223 1ST HOSP IP/OBS HIGH 75: CPT | Performed by: HOSPITALIST

## 2023-05-22 PROCEDURE — 258N000003 HC RX IP 258 OP 636: Performed by: HOSPITALIST

## 2023-05-22 PROCEDURE — 250N000009 HC RX 250: Performed by: HOSPITALIST

## 2023-05-22 PROCEDURE — 250N000013 HC RX MED GY IP 250 OP 250 PS 637: Performed by: HOSPITALIST

## 2023-05-22 RX ORDER — AMOXICILLIN 250 MG
1 CAPSULE ORAL 2 TIMES DAILY PRN
Status: DISCONTINUED | OUTPATIENT
Start: 2023-05-22 | End: 2023-05-22 | Stop reason: HOSPADM

## 2023-05-22 RX ORDER — ACETAMINOPHEN 325 MG/1
650 TABLET ORAL EVERY 6 HOURS PRN
Status: DISCONTINUED | OUTPATIENT
Start: 2023-05-22 | End: 2023-05-22 | Stop reason: HOSPADM

## 2023-05-22 RX ORDER — GABAPENTIN 300 MG/1
900 CAPSULE ORAL EVERY 8 HOURS
Status: DISCONTINUED | OUTPATIENT
Start: 2023-05-22 | End: 2023-05-22

## 2023-05-22 RX ORDER — ONDANSETRON 4 MG/1
4 TABLET, ORALLY DISINTEGRATING ORAL EVERY 6 HOURS PRN
Status: DISCONTINUED | OUTPATIENT
Start: 2023-05-22 | End: 2023-05-22 | Stop reason: HOSPADM

## 2023-05-22 RX ORDER — ONDANSETRON 2 MG/ML
4 INJECTION INTRAMUSCULAR; INTRAVENOUS EVERY 6 HOURS PRN
Status: DISCONTINUED | OUTPATIENT
Start: 2023-05-22 | End: 2023-05-22 | Stop reason: HOSPADM

## 2023-05-22 RX ORDER — PROCHLORPERAZINE MALEATE 10 MG
10 TABLET ORAL EVERY 6 HOURS PRN
Status: DISCONTINUED | OUTPATIENT
Start: 2023-05-22 | End: 2023-05-22 | Stop reason: HOSPADM

## 2023-05-22 RX ORDER — GABAPENTIN 300 MG/1
300 CAPSULE ORAL EVERY 8 HOURS
Status: DISCONTINUED | OUTPATIENT
Start: 2023-05-27 | End: 2023-05-22

## 2023-05-22 RX ORDER — FAMOTIDINE 20 MG/1
20 TABLET, FILM COATED ORAL 2 TIMES DAILY
Status: DISCONTINUED | OUTPATIENT
Start: 2023-05-22 | End: 2023-05-22 | Stop reason: HOSPADM

## 2023-05-22 RX ORDER — DIAZEPAM 10 MG/2ML
5-10 INJECTION, SOLUTION INTRAMUSCULAR; INTRAVENOUS EVERY 30 MIN PRN
Status: DISCONTINUED | OUTPATIENT
Start: 2023-05-22 | End: 2023-05-22 | Stop reason: HOSPADM

## 2023-05-22 RX ORDER — LIDOCAINE 40 MG/G
CREAM TOPICAL
Status: DISCONTINUED | OUTPATIENT
Start: 2023-05-22 | End: 2023-05-22 | Stop reason: HOSPADM

## 2023-05-22 RX ORDER — NALOXONE HYDROCHLORIDE 0.4 MG/ML
0.2 INJECTION, SOLUTION INTRAMUSCULAR; INTRAVENOUS; SUBCUTANEOUS
Status: DISCONTINUED | OUTPATIENT
Start: 2023-05-22 | End: 2023-05-22 | Stop reason: HOSPADM

## 2023-05-22 RX ORDER — HALOPERIDOL 5 MG/ML
2.5-5 INJECTION INTRAMUSCULAR EVERY 6 HOURS PRN
Status: DISCONTINUED | OUTPATIENT
Start: 2023-05-22 | End: 2023-05-22 | Stop reason: HOSPADM

## 2023-05-22 RX ORDER — PROCHLORPERAZINE 25 MG
25 SUPPOSITORY, RECTAL RECTAL EVERY 12 HOURS PRN
Status: DISCONTINUED | OUTPATIENT
Start: 2023-05-22 | End: 2023-05-22 | Stop reason: HOSPADM

## 2023-05-22 RX ORDER — NALOXONE HYDROCHLORIDE 0.4 MG/ML
0.4 INJECTION, SOLUTION INTRAMUSCULAR; INTRAVENOUS; SUBCUTANEOUS
Status: DISCONTINUED | OUTPATIENT
Start: 2023-05-22 | End: 2023-05-22 | Stop reason: HOSPADM

## 2023-05-22 RX ORDER — OLANZAPINE 5 MG/1
5-10 TABLET, ORALLY DISINTEGRATING ORAL EVERY 6 HOURS PRN
Status: DISCONTINUED | OUTPATIENT
Start: 2023-05-22 | End: 2023-05-22 | Stop reason: HOSPADM

## 2023-05-22 RX ORDER — GABAPENTIN 300 MG/1
600 CAPSULE ORAL EVERY 8 HOURS
Status: DISCONTINUED | OUTPATIENT
Start: 2023-05-25 | End: 2023-05-22

## 2023-05-22 RX ORDER — DIAZEPAM 10 MG
10 TABLET ORAL EVERY 30 MIN PRN
Status: DISCONTINUED | OUTPATIENT
Start: 2023-05-22 | End: 2023-05-22 | Stop reason: HOSPADM

## 2023-05-22 RX ORDER — SUCRALFATE 1 G/1
1 TABLET ORAL 4 TIMES DAILY
Qty: 30 TABLET | Refills: 0 | Status: ON HOLD | OUTPATIENT
Start: 2023-05-22 | End: 2023-06-20

## 2023-05-22 RX ORDER — FLUMAZENIL 0.1 MG/ML
0.2 INJECTION, SOLUTION INTRAVENOUS
Status: DISCONTINUED | OUTPATIENT
Start: 2023-05-22 | End: 2023-05-22 | Stop reason: HOSPADM

## 2023-05-22 RX ORDER — CLONIDINE HYDROCHLORIDE 0.1 MG/1
0.1 TABLET ORAL EVERY 8 HOURS
Status: DISCONTINUED | OUTPATIENT
Start: 2023-05-22 | End: 2023-05-22

## 2023-05-22 RX ORDER — AMOXICILLIN 250 MG
2 CAPSULE ORAL 2 TIMES DAILY PRN
Status: DISCONTINUED | OUTPATIENT
Start: 2023-05-22 | End: 2023-05-22 | Stop reason: HOSPADM

## 2023-05-22 RX ORDER — GABAPENTIN 300 MG/1
1200 CAPSULE ORAL ONCE
Status: COMPLETED | OUTPATIENT
Start: 2023-05-22 | End: 2023-05-22

## 2023-05-22 RX ORDER — ACETAMINOPHEN 650 MG/1
650 SUPPOSITORY RECTAL EVERY 6 HOURS PRN
Status: DISCONTINUED | OUTPATIENT
Start: 2023-05-22 | End: 2023-05-22 | Stop reason: HOSPADM

## 2023-05-22 RX ORDER — MAGNESIUM HYDROXIDE/ALUMINUM HYDROXICE/SIMETHICONE 120; 1200; 1200 MG/30ML; MG/30ML; MG/30ML
30 SUSPENSION ORAL EVERY 4 HOURS PRN
Status: DISCONTINUED | OUTPATIENT
Start: 2023-05-22 | End: 2023-05-22 | Stop reason: HOSPADM

## 2023-05-22 RX ORDER — CLONIDINE HYDROCHLORIDE 0.1 MG/1
0.1 TABLET ORAL EVERY 8 HOURS PRN
Status: DISCONTINUED | OUTPATIENT
Start: 2023-05-22 | End: 2023-05-22 | Stop reason: HOSPADM

## 2023-05-22 RX ORDER — GABAPENTIN 100 MG/1
100 CAPSULE ORAL EVERY 8 HOURS
Status: DISCONTINUED | OUTPATIENT
Start: 2023-05-29 | End: 2023-05-22

## 2023-05-22 RX ORDER — DIAZEPAM 5 MG
5 TABLET ORAL ONCE
Status: COMPLETED | OUTPATIENT
Start: 2023-05-22 | End: 2023-05-22

## 2023-05-22 RX ORDER — FOLIC ACID 1 MG/1
1 TABLET ORAL DAILY
Status: DISCONTINUED | OUTPATIENT
Start: 2023-05-23 | End: 2023-05-22 | Stop reason: HOSPADM

## 2023-05-22 RX ORDER — MULTIPLE VITAMINS W/ MINERALS TAB 9MG-400MCG
1 TAB ORAL DAILY
Status: DISCONTINUED | OUTPATIENT
Start: 2023-05-23 | End: 2023-05-22 | Stop reason: HOSPADM

## 2023-05-22 RX ADMIN — CLONIDINE HYDROCHLORIDE 0.1 MG: 0.1 TABLET ORAL at 06:21

## 2023-05-22 RX ADMIN — FOLIC ACID: 5 INJECTION, SOLUTION INTRAMUSCULAR; INTRAVENOUS; SUBCUTANEOUS at 03:30

## 2023-05-22 RX ADMIN — DIAZEPAM 10 MG: 10 TABLET ORAL at 03:59

## 2023-05-22 RX ADMIN — DIAZEPAM 5 MG: 5 TABLET ORAL at 00:38

## 2023-05-22 RX ADMIN — GABAPENTIN 1200 MG: 300 CAPSULE ORAL at 06:21

## 2023-05-22 RX ADMIN — DIAZEPAM 10 MG: 10 TABLET ORAL at 08:42

## 2023-05-22 RX ADMIN — FAMOTIDINE 20 MG: 20 TABLET ORAL at 08:42

## 2023-05-22 RX ADMIN — ONDANSETRON 4 MG: 4 TABLET, ORALLY DISINTEGRATING ORAL at 08:42

## 2023-05-22 ASSESSMENT — ACTIVITIES OF DAILY LIVING (ADL)
ADLS_ACUITY_SCORE: 37
DEPENDENT_IADLS:: INDEPENDENT
ADLS_ACUITY_SCORE: 37

## 2023-05-22 NOTE — ED TRIAGE NOTES
Pt here from detox center with abdominal pain. He went in with his last drink was around 1600 today. Hx of alcohol abuse and pancreatitis. Pt very restless but cooperative.      Triage Assessment     Row Name 05/21/23 9013       Triage Assessment (Adult)    Airway WDL WDL       Respiratory WDL    Respiratory WDL WDL       Skin Circulation/Temperature WDL    Skin Circulation/Temperature WDL WDL       Cardiac WDL    Cardiac WDL WDL       Peripheral/Neurovascular WDL    Peripheral Neurovascular WDL WDL       Cognitive/Neuro/Behavioral WDL    Cognitive/Neuro/Behavioral WDL WDL

## 2023-05-22 NOTE — PHARMACY-ADMISSION MEDICATION HISTORY
Pharmacist Admission Medication History    Admission medication history is complete. The information provided in this note is only as accurate as the sources available at the time of the update.    Medication reconciliation/reorder completed by provider prior to medication history? Yes    Information Source(s): Patient and CareEverywhere/SureScripts via in-person    Pertinent Information: not taking an otc or rx meds at this time    Changes made to PTA medication list:    Added: None    Deleted: benzos, Lexapro, folic acid, gabapentin, Vistaril, Zofran, trazodone     Changed: None    Medication Affordability:  Not including over the counter (OTC) medications, was there a time in the past 3 months when you did not take your medications as prescribed because of cost?: Unable to Assess    Allergies reviewed with patient and updates made in EHR: yes    Medication History Completed By: Nadine Javed RPH 5/22/2023 7:48 AM    Prior to Admission medications    Medication Sig Last Dose Taking? Auth Provider Long Term End Date   No pta meds

## 2023-05-22 NOTE — ED NOTES
Pt reports having hallucinations. States he is seeing pople eating in his room and people playing basketball on the wall. PT is restless and anxious. MD aware.

## 2023-05-22 NOTE — DISCHARGE SUMMARY
"[facility]  Hospitalist Discharge Summary      Date of Admission:  5/21/2023  Date of Discharge:  5/22/2023 11:25 AM  Discharging Provider: Mark Arnold DO, DO  Discharge Service: Hospitalist Service    Discharge Diagnoses       Clinically Significant Risk Factors     # Overweight: Estimated body mass index is 25.11 kg/m  as calculated from the following:    Height as of this encounter: 1.778 m (5' 10\").    Weight as of this encounter: 79.4 kg (175 lb).       Follow-ups Needed After Discharge   Follow-up Appointments     Follow-up and recommended labs and tests       Follow up with primary care provider, Lisandro Graham, as needed. No   follow up labs or test are needed.             Unresulted Labs Ordered in the Past 30 Days of this Admission     No orders found for last 31 day(s).      These results will be followed up by     Discharge Disposition   Discharged back to [facility]  Condition at discharge: Stable    Hospital Course   Nikhil Rios is a 37 year old male admitted on 5/21/2023. He came to the ED for evaluation of abdominal pain, while in the ED complained of visual hallucinations     #Alcohol abuse, dependence, intoxication, early withdrawals  -discharged back to [facility]     #Abdominal pain  -CT abdomen pelvis from 5/20/2023 shows some edema in the bowel mesentery question a nonspecific enteritis, a few calcifications in the uncinate process may be related to prior or chronic pancreatitis  -Lipase is not elevated  -resolved    Visual hallucinations: resolved        Diet: Advance Diet as Tolerated: Clear Liquid Diet    DVT Prophylaxis: Ambulate every shift  Mars Catheter: Not present  Lines: None     Cardiac Monitoring: None  Code Status: Full Code           Consultations This Hospital Stay   CARE MANAGEMENT / SOCIAL WORK IP CONSULT    Code Status   Full Code    Time Spent on this Encounter          Mark Arnold DO, DO  [facility] " EMERGENCY DEPARTMENT  [address] [place] [id number]  Phone: [phone]  ______________________________________________________________________    Physical Exam   Vital Signs: Temp: 98.7  F (37.1  C) Temp src: Temporal BP: 128/62 Pulse: 63   Resp: 12 SpO2: 96 % O2 Device: None (Room air)    Weight: 175 lbs 0 oz  VSS. NO HALLUCINATIONS, EATING SOLID WELL.       Primary Care Physician   Lisandro Graham    Discharge Orders      Reason for your hospital stay    Refer to h&p     Follow-up and recommended labs and tests     Follow up with primary care provider, Lisandro Graham, as needed. No follow up labs or test are needed.     Activity    Your activity upon discharge: activity as tolerated     Diet    Follow this diet upon discharge: regular       Significant Results and Procedures       Discharge Medications   Current Discharge Medication List      START taking these medications    Details   omeprazole (PRILOSEC) 20 MG DR capsule Take 2 capsules (40 mg) by mouth daily for 30 days  Qty: 30 capsule, Refills: 0      sucralfate (CARAFATE) 1 GM tablet Take 1 tablet (1 g) by mouth 4 times daily  Qty: 30 tablet, Refills: 0           Allergies   Allergies   Allergen Reactions     Gramineae Pollens Itching     Pollen Extract Rash     grass tree pollen

## 2023-05-22 NOTE — ED NOTES
Patient was sleeping soundly when first checked this am.  He has ate a clear liquids tray, 100%, without difficulty.  Does complain of same nausea without emesis.  States he is very anxious, requesting valium.  No tremor noted, Chava hallucinations at this time.  CIWA done and was 8 due to reported nausea and anxiety.  Medicated with valium PO.  Now is resting more comfortably, falling asleep. CIWA is now 1.  Pts IV infiltrated this am--said it hurt and noted some puffiness at the site.  IV dcd.  Tolerating fluids well.  Will advance diet.    head trauma

## 2023-05-22 NOTE — CONSULTS
Care Management Initial Consult    General Information  Assessment completed with: Patient,    Type of CM/SW Visit: Initial Assessment    Primary Care Provider verified and updated as needed: Yes   Readmission within the last 30 days: no previous admission in last 30 days      Reason for Consult: discharge planning  Advance Care Planning:            Communication Assessment  Patient's communication style: spoken language (English or Bilingual)             Cognitive  Cognitive/Neuro/Behavioral: WDL                      Living Environment:   People in home:  (roommate)     Current living Arrangements: house      Able to return to prior arrangements: yes       Family/Social Support:  Care provided by: self  Provides care for: no one  Marital Status: Single  Parent(s)          Description of Support System: Supportive, Involved    Support Assessment: Adequate family and caregiver support    Current Resources:   Patient receiving home care services: No     Community Resources: None  Equipment currently used at home:    Supplies currently used at home: None    Employment/Financial:  Employment Status: employed full-time, employed part-time        Financial Concerns: No concerns identified           Does the patient's insurance plan have a 3 day qualifying hospital stay waiver?  No    Lifestyle & Psychosocial Needs:  Social Determinants of Health     Tobacco Use: High Risk (5/20/2023)    Patient History      Smoking Tobacco Use: Some Days      Smokeless Tobacco Use: Never      Passive Exposure: Not on file   Alcohol Use: Heavy Drinker (7/21/2021)    AUDIT-C      Frequency of Alcohol Consumption: 4 or more times a week      Average Number of Drinks: 10 or more      Frequency of Binge Drinking: Daily or almost daily   Financial Resource Strain: Not on file   Food Insecurity: Not on file   Transportation Needs: No Transportation Needs (7/21/2021)    PRAPARE - Transportation      Lack of Transportation (Medical): No      Lack  of Transportation (Non-Medical): No   Physical Activity: Not on file   Stress: Stress Concern Present (7/21/2021)    [place] [place] [employer] - Occupational Stress Questionnaire      Feeling of Stress : Very much   Social Connections: Not on file   Intimate Partner Violence: Not on file   Depression: Not at risk (2/25/2020)    PHQ-2      PHQ-2 Score: 0   Housing Stability: Unknown (7/21/2021)    Housing Stability Vital Sign      Unable to Pay for Housing in the Last Year: No      Number of Places Lived in the Last Year: Not on file      Unstable Housing in the Last Year: No       Functional Status:  Prior to admission patient needed assistance:   Dependent ADLs:: Independent  Dependent IADLs:: Independent       Mental Health Status:  Mental Health Status: No Current Concerns       Chemical Dependency Status:  Chemical Dependency Status: Current Concern  Chemical Dependency Management:  (Came from detox facility)          Values/Beliefs:  Spiritual, Cultural Beliefs, Protestant Practices, Values that affect care: no               Additional Information:  CM met with patient in room to discuss discharge planning and compelte initial assessment.     Patient lives in a house with a roommate. He is independent at baseline, drives, and works two jobs. He works full time at [employer] and part time at [employer]. Patient states he came from [facility]. He declined CD resources and did not want to discuss options with SW. Patient states he will either go home or back to detox and that his mom will likely transport.     TARAS StantonW

## 2023-05-22 NOTE — ED NOTES
"Pt had been sleeping since previous assessment. Pt woken for IVF. Pt states he feels like \"garbage\". Is restless and anxious when awake. States he no longer is having hallucinations. Reports some nausea but is tolerating clear liquids.   "

## 2023-05-22 NOTE — H&P
[facility]    History and Physical - Hospitalist Service       Date of Admission:  5/21/2023    Assessment & Plan      Nikhil Rios is a 37 year old male admitted on 5/21/2023. He came to the ED for evaluation of abdominal pain, while in the ED complained of visual hallucinations    #Alcohol abuse, dependence, intoxication, early withdrawals  -History of alcohol withdrawal seizures  -Binging 1 L of vodka daily for 2 weeks, last use on 5/21/2023 at 4 PM  -[facility] protocol  -Multivitamins, thiamine, folic acid  -Clonidine, gabapentin  - consult, patient was not accepted back at [place] detox    #Abdominal pain  -CT abdomen pelvis from 5/20/2023 shows some edema in the bowel mesentery question a nonspecific enteritis, a few calcifications in the uncinate process may be related to prior or chronic pancreatitis  -Lipase is not elevated  -Supportive management  -Advance diet as tolerated     Diet: Advance Diet as Tolerated: Clear Liquid Diet    DVT Prophylaxis: Ambulate every shift  Mars Catheter: Not present  Lines: None     Cardiac Monitoring: None  Code Status: Full Code           Disposition Plan      Expected Discharge Date: 05/24/2023                  Ernie Montoya MD  Hospitalist Service  [facility]  Securely message with [employer] (more info)  Text page via [employer] Paging/Directory     ______________________________________________________________________    Chief Complaint   Abdominal pain, nausea    History is obtained from the patient, electronic health record and emergency department physician    History of Present Illness   Nikhil Rios is a 37 year old male who came to the emergency department for evaluation of abdominal pain and nausea.  Past medical history of alcohol abuse and dependence with previous withdrawal seizures, alcohol pancreatitis, depression, anxiety.  Patient states that he was sober for a long period of time up until 2 weeks ago when  he relapsed.  He has been drinking 1 L of vodka daily and was seen in the ED on 5/16 and 5/19 for alcohol intoxication.  He smokes marijuana occasionally and chews tobacco but denies other drugs.  He was then seen again in the ED on 5/20/2023 with abdominal pain and CT scan showed questionable nonspecific enteritis.  Patient went to [place] [facility] where he complained of abdominal pain and nausea and was sent to the ED.  While in the ED, he complained of visual hallucinations and reportedly [facility] would not accept him back tonight.  Patient reports seeing different things on the walls, feeling anxious, nauseated and itching on bilateral ankles.      Past Medical History    Past Medical History:   Diagnosis Date     Alcohol abuse      Alcohol abuse      Alcohol withdrawal (H)      Depressive disorder      HLD (hyperlipidemia)      HTN (hypertension)        Past Surgical History   Past Surgical History:   Procedure Laterality Date     NO HISTORY OF SURGERY       OTHER SURGICAL HISTORY      none       Prior to Admission Medications   Prior to Admission Medications   Prescriptions Last Dose Informant Patient Reported? Taking?   LORazepam (ATIVAN) 1 MG tablet   No No   Sig: Take 1-2 tablets (1-2 mg) by mouth every 6 hours as needed for withdrawal   diazepam (VALIUM) 5 MG tablet   No No   Sig: Take 1 tablet (5 mg) by mouth every 6 hours as needed for anxiety or withdrawal   escitalopram (LEXAPRO) 10 MG tablet   No No   Sig: Take 1 tablet (10 mg) by mouth daily   folic acid (FOLVITE) 1 MG tablet   No No   Sig: Take 1 tablet (1 mg) by mouth daily   gabapentin (NEURONTIN) 300 MG capsule   No No   Sig: Take 2 capsules (600 mg) by mouth every 8 hours   hydrOXYzine (VISTARIL) 50 MG capsule   No No   Sig: Take 1 capsule (50 mg) by mouth 3 times daily as needed for anxiety   ondansetron (ZOFRAN) 4 MG tablet   No No   Sig: Take 1 tablet (4 mg) by mouth every 8 hours as needed for nausea or vomiting   traZODone (DESYREL) 50 MG  tablet   Yes No   Sig: Take 50 mg by mouth At Bedtime      Facility-Administered Medications: None           Physical Exam   Vital Signs: Temp: 98.7  F (37.1  C) Temp src: Temporal BP: 127/72 Pulse: 70   Resp: 20 SpO2: 98 %      Weight: 175 lbs 0 oz    Constitutional: awake and alert  Respiratory: no increased work of breathing, good air exchange and clear to auscultation  Cardiovascular: regular rate and rhythm and normal S1 and S2  GI: normal bowel sounds and soft  Skin: no bruising or bleeding  Musculoskeletal: no lower extremity pitting edema present  Neurologic: Mental Status Exam:  Level of Alertness:   awake  Orientation:   person, place, time  Neuropsychiatric: Anxious, restless    Medical Decision Making       MANAGEMENT DISCUSSED with the following over the past 24 hours: Patient and mother       Data     I have personally reviewed the following data over the past 24 hrs:    6.8  \   13.7   / 203     143 102 12.6 /  92   3.9 26 0.73 \       ALT: 35 AST: 58 (H) AP: 67 TBILI: 0.5   ALB: 4.6 TOT PROTEIN: 7.0 LIPASE: 35       Imaging results reviewed over the past 24 hrs:   No results found for this or any previous visit (from the past 24 hour(s)).   Patient

## 2023-05-22 NOTE — ED PROVIDER NOTES
EMERGENCY DEPARTMENT NOTE     Name: Nikhil Rios    Age/Sex: 37 year old male   MRN: [id number]   Evaluation Date & Time:  5/21/2023 10:07 PM    PCP:    Lisandro Graham   ED Provider: Tim Phelps D.O.       CHIEF COMPLAINT    Abdominal Pain       DIAGNOSIS & DISPOSITION     1. Epigastric pain      DISPOSITION: Discharge back to [facility] with a detoxification facility    At the conclusion of the encounter I discussed the results of all of the tests and the disposition. The questions were answered. The patient or family acknowledged understanding and was agreeable with the care plan.    TOTAL CRITICAL CARE TIME (EXCLUDING PROCEDURES): Not applicable    PROCEDURES:   None    EMERGENCY DEPARTMENT COURSE/MEDICAL DECISION MAKING   10:54 PM I met with the patient to gather history and to perform my initial exam.  We discussed treatment options and the plan for care while in the Emergency Department.    Nikhil Rios is a 37 year old male with relevant past history of alcoholism who presents to the emergency department for evaluation of epigastric abdominal pain.         Triage note reviewed:  Pt here from detox center with abdominal pain. He went in with his last drink was around 1600 today. Hx of alcohol abuse and pancreatitis. Pt very restless but cooperative.      Triage Assessment     Row Name 05/21/23 1561       Triage Assessment (Adult)    Airway WDL WDL       Respiratory WDL    Respiratory WDL WDL       Skin Circulation/Temperature WDL    Skin Circulation/Temperature WDL WDL       Cardiac WDL    Cardiac WDL WDL       Peripheral/Neurovascular WDL    Peripheral Neurovascular WDL WDL       Cognitive/Neuro/Behavioral WDL    Cognitive/Neuro/Behavioral WDL WDL                Differential  diagnosis  considered included but not limited to pancreatitis, gastritis, bowel obstruction or perforation    Diagnostic studies:  Imaging:  No orders to display      Lab:  Labs Ordered and Resulted from Time of ED Arrival to Time of ED  "Departure   COMPREHENSIVE METABOLIC PANEL - Abnormal       Result Value    Sodium 143      Potassium 3.9      Chloride 102      Carbon Dioxide (CO2) 26      Anion Gap 15      Urea Nitrogen 12.6      Creatinine 0.73      Calcium 8.7      Glucose 92      Alkaline Phosphatase 67      AST 58 (*)     ALT 35      Protein Total 7.0      Albumin 4.6      Bilirubin Total 0.5      GFR Estimate >90     ETHYL ALCOHOL LEVEL - Abnormal    Alcohol ethyl 0.33 (*)    CBC WITH PLATELETS AND DIFFERENTIAL - Abnormal    WBC Count 6.8      RBC Count 4.74      Hemoglobin 13.7      Hematocrit 39.7 (*)     MCV 84      MCH 28.9      MCHC 34.5      RDW 15.2 (*)     Platelet Count 203      % Neutrophils 48      % Lymphocytes 45      % Monocytes 5      % Eosinophils 1      % Basophils 1      % Immature Granulocytes 0      NRBCs per 100 WBC 0      Absolute Neutrophils 3.3      Absolute Lymphocytes 3.0      Absolute Monocytes 0.3      Absolute Eosinophils 0.0      Absolute Basophils 0.0      Absolute Immature Granulocytes 0.0      Absolute NRBCs 0.0     LIPASE - Normal    Lipase 35     COMPREHENSIVE METABOLIC PANEL   LIPASE      Interventions: IV Pepcid, morphine  Discharge Vital Signs:/83   Pulse 72   Temp 98.7  F (37.1  C) (Temporal)   Resp 20   Ht 1.778 m (5' 10\")   Wt 79.4 kg (175 lb)   SpO2 97%   BMI 25.11 kg/m    Medical Decision Making  Patient with history of recurrent alcohol use.  He has had recent admissions to the emergency department on May 16, 2019 through May 20 for evaluation of alcohol intoxication and also episodes of chest and abdominal pain.  He had recent work-up yesterday with CTA of chest abdomen pelvis without acute findings and normal lab [place] evaluation including no evidence of pancreatitis with, hepatitis or anemia.  Patient has had recurrent abdominal pain today.  He is localizing it to the epigastric region.  Described as burning or sharp, nonradiating.  No recurrent chest pain.  No vomiting including " hematemesis and no melena.  On initial exam mild midepigastric tenderness.  Laboratory evaluation including CBC, comprehensive metabolic profile and lipase remain unchanged.  Patient is feeling improved after IV Pepcid, oral Carafate and 1 dose of morphine.  On serial abdominal exams no localized tenderness.  Low suspicion based on recent work-up for acute process including bowel obstruction or perforation requiring imaging.  Patient will be discharged back to the [place] detox facility.  Have written prescriptions for Pepcid, Protonix and Carafate.  You use these with Maalox and Tylenol for pain management.  Return criteria discussed and if recurrent abdominal pain despite these medications or other progressive symptoms including vomiting or melena will return to the emergency department.  Addendum: Prior to discharge the patient reported he was experiencing visual hallucinations.  Alcohol level 330, patient is not tachycardic or hypertensive.  Mother had contacted [facility] and reported current symptoms and they reported they no longer feel that the patient is appropriate for their facility.  Patient in the past has refused other detox including [facility] and [place].  Patient will be admitted for observation for alcohol withdrawal syndromes.  He was given 1 dose of Valium p.o.  Discussed case with Dr. Ellison the hospitalist  History:  Supplemental history from: Documented in chart, if applicable and Family Member/Significant Other  External Record(s) reviewed: Documented in chart, if applicable. and Outpatient Record: Recent ER visits as per medical decision making note    Work Up:  Chart documentation includes differential considered and any EKGs or imaging independently interpreted by provider, where specified.  In additional to work up documented, I considered the following work up: Documented in chart, if applicable. and Imaging CT, but deferred due to Low suspicion for acute surgical  process.    External consultation:  Discussion of management with another provider: Documented in chart, if applicable    Complicating factors:  Care impacted by chronic illness: N/A  Care affected by social determinants of health: Alcohol Abuse and/or Recreational Drug Use    Disposition considerations: Discharge. I prescribed additional prescription strength medication(s) as charted. I considered admission, but discharged patient after significant clinical improvement.      @@@    ED INTERVENTIONS     Medications   morphine (PF) injection 4 mg (has no administration in time range)   sucralfate (CARAFATE) tablet 1 g (has no administration in time range)   0.9% sodium chloride BOLUS (1,000 mLs Intravenous $Started 5/21/23 2244)   ondansetron (ZOFRAN) injection 8 mg (8 mg Intravenous $Given 5/21/23 2331)   famotidine (PEPCID) injection 20 mg (20 mg Intravenous $Given 5/21/23 2335)   morphine (PF) injection 4 mg (4 mg Intravenous $Given 5/21/23 2332)       DISCHARGE MEDICATIONS        Review of your medicines      UNREVIEWED medicines. Ask your doctor about these medicines      Dose / Directions   diazepam 5 MG tablet  Commonly known as: VALIUM  Used for: Alcohol dependence with uncomplicated withdrawal (H)      Dose: 5 mg  Take 1 tablet (5 mg) by mouth every 6 hours as needed for anxiety or withdrawal  Quantity: 6 tablet  Refills: 0     escitalopram 10 MG tablet  Commonly known as: LEXAPRO  Used for: Moderate episode of recurrent major depressive disorder (H)      Dose: 10 mg  Take 1 tablet (10 mg) by mouth daily  Quantity: 30 tablet  Refills: 0     folic acid 1 MG tablet  Commonly known as: FOLVITE  Used for: Alcohol use disorder, severe, dependence (H)      Dose: 1 mg  Take 1 tablet (1 mg) by mouth daily  Quantity: 30 tablet  Refills: 0     gabapentin 300 MG capsule  Commonly known as: NEURONTIN  Used for: Alcohol use disorder, severe, dependence (H)      Dose: 600 mg  Take 2 capsules (600 mg) by mouth every 8  hours  Quantity: 60 capsule  Refills: 0     hydrOXYzine 50 MG capsule  Commonly known as: VISTARIL      Dose: 50 mg  Take 1 capsule (50 mg) by mouth 3 times daily as needed for anxiety  Quantity: 15 capsule  Refills: 0     LORazepam 1 MG tablet  Commonly known as: ATIVAN      Dose: 1-2 mg  Take 1-2 tablets (1-2 mg) by mouth every 6 hours as needed for withdrawal  Quantity: 10 tablet  Refills: 0     ondansetron 4 MG tablet  Commonly known as: ZOFRAN  Used for: Alcohol dependence with uncomplicated withdrawal (H)      Dose: 4 mg  Take 1 tablet (4 mg) by mouth every 8 hours as needed for nausea or vomiting  Quantity: 4 tablet  Refills: 0     traZODone 50 MG tablet  Commonly known as: DESYREL      Dose: 50 mg  Take 50 mg by mouth At Bedtime  Refills: 0        START taking      Dose / Directions   famotidine 20 MG tablet  Commonly known as: PEPCID      Dose: 20 mg  Take 1 tablet (20 mg) by mouth 2 times daily  Quantity: 30 tablet  Refills: 0     omeprazole 20 MG DR capsule  Commonly known as: priLOSEC      Dose: 40 mg  Take 2 capsules (40 mg) by mouth daily for 30 days  Quantity: 30 capsule  Refills: 0     sucralfate 1 GM tablet  Commonly known as: CARAFATE      Dose: 1 g  Take 1 tablet (1 g) by mouth 4 times daily  Quantity: 30 tablet  Refills: 0           Where to get your medicines      Some of these will need a paper prescription and others can be bought over the counter. Ask your nurse if you have questions.    Bring a paper prescription for each of these medications  famotidine 20 MG tablet  omeprazole 20 MG DR capsule  sucralfate 1 GM tablet           INFORMATION SOURCE AND LIMITATIONS    History/Exam limitations: N/A  Patient information was obtained from: Patient and Patient's Mother  Use of : N/A    HISTORY OF PRESENT ILLNESS   Nikhil Rios is a 37 year old year old male with a relevant past history with history of recurrent alcohol use.  He has had recent admissions to the emergency department on May  16, 2019 through May 20 for evaluation of alcohol intoxication and also episodes of chest and abdominal pain.  He had recent work-up yesterday with CTA of chest abdomen pelvis without acute findings and normal lab [place] evaluation including no evidence of pancreatitis with, hepatitis or anemia.  Patient has had recurrent abdominal pain today.  He is localizing it to the epigastric region.  Described as burning or sharp, nonradiating.  No recurrent chest pain.  No vomiting including hematemesis and no melena.      REVIEW OF SYSTEMS:   All other systems reviewed and are negative except as noted above in HPI.    PATIENT HISTORY     Past Medical History:   Diagnosis Date     Alcohol abuse      Alcohol abuse      Alcohol withdrawal (H)      Depressive disorder      HLD (hyperlipidemia)      HTN (hypertension)      Patient Active Problem List   Diagnosis     Alcohol-induced mood disorder (H)     Alcohol dependence with withdrawal (H)     ACS (acute coronary syndrome) (H)     Chemical dependency (H)     S/P alcohol detoxification     Alcohol withdrawal syndrome, uncomplicated (H)     Alcohol-induced acute pancreatitis     Alcohol-induced insomnia (H)     Allergic rhinitis     Family history of diabetes mellitus     Seasonal allergies     Urticaria     Withdrawal symptoms, alcohol, with unspecified complication (H)     Alcohol use disorder, severe, dependence (H)     Hypokalemia     Transaminitis     Alcoholic fatty liver     Generalized anxiety disorder     Moderate episode of recurrent major depressive disorder (H)     Hypomagnesemia     Thrombocytopenia (H)     Elevated liver function tests     Acute alcoholic intoxication in alcoholism without complication (H)     Alcoholic intoxication without complication (H)     Chronic intractable headache     Constipation     Decreased vision of left eye     Dysesthesia     Internal hemorrhoid, bleeding     Keratosis pilaris     Localized superficial swelling, mass, or lump      "Normocytic anemia     Pruritus     Sleep difficulties     Snoring     Substance induced mood disorder (H)     Alcohol abuse     Alcoholic intoxication (H)     Past Surgical History:   Procedure Laterality Date     NO HISTORY OF SURGERY       OTHER SURGICAL HISTORY      none       Allergies   Allergen Reactions     Gramineae Pollens Itching     Pollen Extract Rash     grass tree pollen       OUTPATIENT MEDICATIONS     New Prescriptions    FAMOTIDINE (PEPCID) 20 MG TABLET    Take 1 tablet (20 mg) by mouth 2 times daily    OMEPRAZOLE (PRILOSEC) 20 MG DR CAPSULE    Take 2 capsules (40 mg) by mouth daily for 30 days    SUCRALFATE (CARAFATE) 1 GM TABLET    Take 1 tablet (1 g) by mouth 4 times daily      Vitals:    05/21/23 2157 05/21/23 2230 05/21/23 2245 05/21/23 2300   BP: 136/73 129/75 127/77 128/83   Pulse: 84 73 87 72   Resp: 20      Temp: 98.7  F (37.1  C)      TempSrc: Temporal      SpO2: 99% 97% 98% 97%   Weight: 79.4 kg (175 lb)      Height: 1.778 m (5' 10\")          Physical Exam   Constitutional: Oriented to person, place, and time. Appears well-developed and well-nourished.     Cardiovascular: Normal rate, regular rhythm and normal heart sounds.    Pulmonary/Chest: Normal effort  and breath sounds normal.   Abdominal: Soft. Bowel sounds are normal.  Epigastric tenderness without guarding or peritoneal signs  Musculoskeletal: Normal range of motion.   Neurological: Alert and oriented to person, place, and time. Normal strength. No sensory deficit. No cranial nerve deficit.  Skin: Skin is warm and dry.   Psychiatric: Normal mood and affect. Behavior is normal. Thought content normal.     DIAGNOSTICS    LABORATORY FINDINGS (REVIEWED AND INTERPRETED):  Labs Ordered and Resulted from Time of ED Arrival to Time of ED Departure   COMPREHENSIVE METABOLIC PANEL - Abnormal       Result Value    Sodium 143      Potassium 3.9      Chloride 102      Carbon Dioxide (CO2) 26      Anion Gap 15      Urea Nitrogen 12.6      " Creatinine 0.73      Calcium 8.7      Glucose 92      Alkaline Phosphatase 67      AST 58 (*)     ALT 35      Protein Total 7.0      Albumin 4.6      Bilirubin Total 0.5      GFR Estimate >90     ETHYL ALCOHOL LEVEL - Abnormal    Alcohol ethyl 0.33 (*)    CBC WITH PLATELETS AND DIFFERENTIAL - Abnormal    WBC Count 6.8      RBC Count 4.74      Hemoglobin 13.7      Hematocrit 39.7 (*)     MCV 84      MCH 28.9      MCHC 34.5      RDW 15.2 (*)     Platelet Count 203      % Neutrophils 48      % Lymphocytes 45      % Monocytes 5      % Eosinophils 1      % Basophils 1      % Immature Granulocytes 0      NRBCs per 100 WBC 0      Absolute Neutrophils 3.3      Absolute Lymphocytes 3.0      Absolute Monocytes 0.3      Absolute Eosinophils 0.0      Absolute Basophils 0.0      Absolute Immature Granulocytes 0.0      Absolute NRBCs 0.0     LIPASE - Normal    Lipase 35     COMPREHENSIVE METABOLIC PANEL   LIPASE         IMAGING (REVIEWED AND INTERPRETED):  No orders to display             IAlfredo , am serving as a scribe to document services personally performed by Tim Phelps D.O., based on my observation and the provider s statements to me.    ITim D.O., attest that Alfredo Bradley  is acting in a scribe capacity, has observed my performance of the services and has documented them in accordance with my direction.    Tim Phelps D.O.  EMERGENCY MEDICINE   05/21/23  [facility] EMERGENCY DEPARTMENT  [address] [place] [id number]  [phone]  Dept: [phone]     Tim Phelps DO  05/21/23 8507       Tim Phelps DO  05/22/23 9657

## 2023-05-23 LAB
ATRIAL RATE - MUSE: 90 BPM
DIASTOLIC BLOOD PRESSURE - MUSE: 80 MMHG
INTERPRETATION ECG - MUSE: NORMAL
P AXIS - MUSE: 76 DEGREES
PR INTERVAL - MUSE: 152 MS
QRS DURATION - MUSE: 88 MS
QT - MUSE: 376 MS
QTC - MUSE: 459 MS
R AXIS - MUSE: 98 DEGREES
SYSTOLIC BLOOD PRESSURE - MUSE: 150 MMHG
T AXIS - MUSE: 69 DEGREES
VENTRICULAR RATE- MUSE: 90 BPM

## 2023-05-24 ENCOUNTER — APPOINTMENT (OUTPATIENT)
Dept: CT IMAGING | Facility: HOSPITAL | Age: 38
End: 2023-05-24
Attending: EMERGENCY MEDICINE
Payer: COMMERCIAL

## 2023-05-24 ENCOUNTER — PATIENT OUTREACH (OUTPATIENT)
Dept: CARE COORDINATION | Facility: CLINIC | Age: 38
End: 2023-05-24
Payer: COMMERCIAL

## 2023-05-24 ENCOUNTER — HOSPITAL ENCOUNTER (EMERGENCY)
Facility: HOSPITAL | Age: 38
Discharge: HOME | End: 2023-05-24
Attending: EMERGENCY MEDICINE | Admitting: EMERGENCY MEDICINE
Payer: COMMERCIAL

## 2023-05-24 VITALS
RESPIRATION RATE: 16 BRPM | HEART RATE: 68 BPM | OXYGEN SATURATION: 100 % | WEIGHT: 168 LBS | SYSTOLIC BLOOD PRESSURE: 128 MMHG | BODY MASS INDEX: 24.05 KG/M2 | DIASTOLIC BLOOD PRESSURE: 81 MMHG | TEMPERATURE: 97.6 F | HEIGHT: 70 IN

## 2023-05-24 DIAGNOSIS — R10.13 EPIGASTRIC PAIN: ICD-10-CM

## 2023-05-24 DIAGNOSIS — R10.9 RIGHT FLANK PAIN: ICD-10-CM

## 2023-05-24 PROBLEM — R53.83 FATIGUE: Status: ACTIVE | Noted: 2023-03-17

## 2023-05-24 PROBLEM — K64.8 INTERNAL HEMORRHOIDS: Status: ACTIVE | Noted: 2021-03-23

## 2023-05-24 LAB
ALBUMIN SERPL BCG-MCNC: 4.9 G/DL (ref 3.5–5.2)
ALBUMIN UR-MCNC: NEGATIVE MG/DL
ALP SERPL-CCNC: 80 U/L (ref 40–129)
ALT SERPL W P-5'-P-CCNC: 33 U/L (ref 10–50)
ANION GAP SERPL CALCULATED.3IONS-SCNC: 12 MMOL/L (ref 7–15)
APPEARANCE UR: CLEAR
AST SERPL W P-5'-P-CCNC: 47 U/L (ref 10–50)
BASOPHILS # BLD AUTO: 0 10E3/UL (ref 0–0.2)
BASOPHILS NFR BLD AUTO: 0 %
BILIRUB DIRECT SERPL-MCNC: <0.2 MG/DL (ref 0–0.3)
BILIRUB SERPL-MCNC: 0.5 MG/DL
BILIRUB UR QL STRIP: NEGATIVE
BUN SERPL-MCNC: 16 MG/DL (ref 6–20)
CALCIUM SERPL-MCNC: 9.9 MG/DL (ref 8.6–10)
CHLORIDE SERPL-SCNC: 100 MMOL/L (ref 98–107)
COLOR UR AUTO: ABNORMAL
CREAT SERPL-MCNC: 1.02 MG/DL (ref 0.67–1.17)
DEPRECATED HCO3 PLAS-SCNC: 28 MMOL/L (ref 22–29)
EOSINOPHIL # BLD AUTO: 0.2 10E3/UL (ref 0–0.7)
EOSINOPHIL NFR BLD AUTO: 2 %
ERYTHROCYTE [DISTWIDTH] IN BLOOD BY AUTOMATED COUNT: 14.6 % (ref 10–15)
ETHANOL SERPL-MCNC: <0.01 G/DL
GFR SERPL CREATININE-BSD FRML MDRD: >90 ML/MIN/1.73M2
GLUCOSE SERPL-MCNC: 69 MG/DL (ref 70–99)
GLUCOSE UR STRIP-MCNC: NEGATIVE MG/DL
HCT VFR BLD AUTO: 41 % (ref 40–53)
HGB BLD-MCNC: 13.9 G/DL (ref 13.3–17.7)
HGB UR QL STRIP: NEGATIVE
IMM GRANULOCYTES # BLD: 0 10E3/UL
IMM GRANULOCYTES NFR BLD: 0 %
KETONES UR STRIP-MCNC: NEGATIVE MG/DL
LEUKOCYTE ESTERASE UR QL STRIP: NEGATIVE
LIPASE SERPL-CCNC: 39 U/L (ref 13–60)
LYMPHOCYTES # BLD AUTO: 1.7 10E3/UL (ref 0.8–5.3)
LYMPHOCYTES NFR BLD AUTO: 17 %
MCH RBC QN AUTO: 29.1 PG (ref 26.5–33)
MCHC RBC AUTO-ENTMCNC: 33.9 G/DL (ref 31.5–36.5)
MCV RBC AUTO: 86 FL (ref 78–100)
MONOCYTES # BLD AUTO: 0.4 10E3/UL (ref 0–1.3)
MONOCYTES NFR BLD AUTO: 4 %
MUCOUS THREADS #/AREA URNS LPF: PRESENT /LPF
NEUTROPHILS # BLD AUTO: 7.6 10E3/UL (ref 1.6–8.3)
NEUTROPHILS NFR BLD AUTO: 77 %
NITRATE UR QL: NEGATIVE
NRBC # BLD AUTO: 0 10E3/UL
NRBC BLD AUTO-RTO: 0 /100
PH UR STRIP: 7.5 [PH] (ref 5–7)
PLATELET # BLD AUTO: 176 10E3/UL (ref 150–450)
POTASSIUM SERPL-SCNC: 4.3 MMOL/L (ref 3.4–5.3)
PROT SERPL-MCNC: 7.7 G/DL (ref 6.4–8.3)
RBC # BLD AUTO: 4.78 10E6/UL (ref 4.4–5.9)
RBC URINE: <1 /HPF
SODIUM SERPL-SCNC: 140 MMOL/L (ref 136–145)
SP GR UR STRIP: 1.02 (ref 1–1.03)
UROBILINOGEN UR STRIP-MCNC: <2 MG/DL
WBC # BLD AUTO: 10 10E3/UL (ref 4–11)
WBC URINE: <1 /HPF

## 2023-05-24 PROCEDURE — 99285 EMERGENCY DEPT VISIT HI MDM: CPT | Mod: 25

## 2023-05-24 PROCEDURE — 81001 URINALYSIS AUTO W/SCOPE: CPT | Performed by: EMERGENCY MEDICINE

## 2023-05-24 PROCEDURE — 82248 BILIRUBIN DIRECT: CPT | Performed by: EMERGENCY MEDICINE

## 2023-05-24 PROCEDURE — 80053 COMPREHEN METABOLIC PANEL: CPT | Performed by: EMERGENCY MEDICINE

## 2023-05-24 PROCEDURE — 82077 ASSAY SPEC XCP UR&BREATH IA: CPT | Performed by: EMERGENCY MEDICINE

## 2023-05-24 PROCEDURE — 83690 ASSAY OF LIPASE: CPT | Performed by: EMERGENCY MEDICINE

## 2023-05-24 PROCEDURE — 250N000011 HC RX IP 250 OP 636: Performed by: EMERGENCY MEDICINE

## 2023-05-24 PROCEDURE — 85025 COMPLETE CBC W/AUTO DIFF WBC: CPT | Performed by: EMERGENCY MEDICINE

## 2023-05-24 PROCEDURE — 74177 CT ABD & PELVIS W/CONTRAST: CPT

## 2023-05-24 PROCEDURE — 36415 COLL VENOUS BLD VENIPUNCTURE: CPT | Performed by: EMERGENCY MEDICINE

## 2023-05-24 RX ORDER — IOPAMIDOL 755 MG/ML
90 INJECTION, SOLUTION INTRAVASCULAR ONCE
Status: COMPLETED | OUTPATIENT
Start: 2023-05-24 | End: 2023-05-24

## 2023-05-24 RX ADMIN — IOPAMIDOL 90 ML: 755 INJECTION, SOLUTION INTRAVENOUS at 18:34

## 2023-05-24 ASSESSMENT — ENCOUNTER SYMPTOMS
NAUSEA: 1
CONSTIPATION: 0
ABDOMINAL PAIN: 1
VOMITING: 0
COUGH: 0
DIARRHEA: 0
DYSURIA: 1
FLANK PAIN: 1

## 2023-05-24 ASSESSMENT — ACTIVITIES OF DAILY LIVING (ADL): ADLS_ACUITY_SCORE: 35

## 2023-05-24 NOTE — ED PROVIDER NOTES
EMERGENCY DEPARTMENT ENCOUNTER      NAME: Nikhil Rios  AGE: 37 year old male  YOB: 1985  MRN: [id number]  EVALUATION DATE & TIME: No admission date for patient encounter.    PCP: Lisandro Graham    ED PROVIDER: Jimmy Moreland MD        Chief Complaint   Patient presents with     flank and abdominal pain         FINAL IMPRESSION:  1. Right flank pain    2. Epigastric pain          ED COURSE & MEDICAL DECISION MAKING:    Pertinent Labs & Imaging studies reviewed. (See chart for details)  37 year old male presents to the Emergency Department for evaluation of epigastric abdominal pain and resolved right flank pain      ED Course as of 05/24/23 1958   Wed May 24, 2023   1726 37-year-old male here with epigastric and right-sided abdominal pain that has mostly resolved after arrival to the ER via ambulance.  History of alcoholism recently relapsed and is staying at a outpatient residential detox facility   1727 Last drink 5 days ago.  Clinically does not appear to be in any significant withdrawal   1727 Patient reports she had severe right-sided flank pain earlier today associate with nausea but is since seem to have gone away.  Reports it did seem to hurt when he urinated and stooled   1727 Differential includes colitis, liver colic, hepatitis, appendicitis, pancreatitis, irritable bowel, gas related pain, pneumonia, peptic ulcer disease, biliary colic, kidney stone   1728 We will obtain CBC, lipase, BMP, hepatic function panel, alcohol level, UA, possible abdominal imaging   1728 Patient was discharged in the hospital with Carafate, Pepcid, omeprazole   1728 ACS, pneumonia unlikely   1728 I reviewed his discharge summary from his hospitalization under observation few days ago   1729 CTA PE study 4 days ago was negative for PE or pneumonia.  CT abdomen 4 days ago showed some nonspecific mesenteric edema   1729 Mesenteric ischemia unlikely based on current exam and history   1808 Lipase: 39   1808 Alcohol  ethyl: <0.01  Pancreatitis unlikely   1808 Bilirubin Direct: <0.20   1808 AST: 47   1808 ALT: 33   1808 Alkaline Phosphatase: 80   1808 WBC: 10.0   1837 RBC Urine: <1   1837 Leukocyte Esterase Urine: Negative   1837 Nitrite Urine: Negative   1956 I rechecked patient and he was eating [employer] food in the lobby.  He reports his epigastric pain started to come back.  Suspect gastritis or possible developing ulcer.  Recommend continue the Carafate, famotidine, omeprazole.  Will recommend Pepto-Bismol and Tylenol   1957 I created a GI referral.  Patient was asking for narcotic pain medicine which I do not feel is indicated for epigastric pain secondary to possible gastritis       5:11 PM I met with the patient, obtained history, performed an initial exam, and discussed options and plan for diagnostics and treatment here in the ED.  7:31 PM Updated patient on results. We discussed the plan for discharge and the patient is agreeable. Reviewed supportive cares, symptomatic treatment, outpatient follow up, and reasons to return to the Emergency Department. Patient to be discharged by ED RN.       Medical Decision Making    History:    Supplemental history from: Documented in chart, if applicable    External Record(s) reviewed: Documented in chart, if applicable.    Work Up:    Chart documentation includes differential considered and any EKGs or imaging independently interpreted by provider, where specified.    In additional to work up documented, I considered the following work up: Documented in chart, if applicable.    External consultation:    Discussion of management with another provider: Documented in chart, if applicable    Complicating factors:    Care impacted by chronic illness: Hyperlipidemia, Hypertension, Mental Health and Smoking / Nicotine Use    Care affected by social determinants of health: Alcohol Abuse and/or Recreational Drug Use    Disposition considerations: Discharge. I recommended the patient  "continue their current prescription strength medication(s): Omeprazole, Pepcid, Carafate. N/A.          Voice recognition software was used in the creation of this note. Any grammatical or nonsensical errors are due to inherent errors with the software and are not the intention of the writer.         At the conclusion of the encounter I discussed the results of all of the tests and the disposition. The questions were answered. The patient or family acknowledged understanding and was agreeable with the care plan.               MEDICATIONS GIVEN IN THE EMERGENCY:  Medications   iopamidol (ISOVUE-370) solution 90 mL (90 mLs Intravenous $Given 5/24/23 5004)       NEW PRESCRIPTIONS STARTED AT TODAY'S ER VISIT  New Prescriptions    No medications on file          =================================================================    HPI    Triage note  \"  Pt arrives via EMS from [facility] in [facility] for alcohol abuse with c/o right flank and abdominal pain. Diagnosed with gastritis and stomach ulcers on 5/20/23 at [facility]. Currently no pain but has increased abdominal pain after eating and pain with urination and defecation     Triage Assessment     Row Name 05/24/23 1955       Triage Assessment (Adult)    Airway WDL WDL       Respiratory WDL    Respiratory WDL WDL       Skin Circulation/Temperature WDL    Skin Circulation/Temperature WDL WDL       Cardiac WDL    Cardiac WDL WDL       Peripheral/Neurovascular WDL    Peripheral Neurovascular WDL WDL       Cognitive/Neuro/Behavioral WDL    Cognitive/Neuro/Behavioral WDL WDL              \"      Patient information was obtained from: patient     Use of : N/A         Nikhil Rios is a 37 year old male with a pertinent history of hypertension, hyperlipidemia, anxiety, depression, alcohol dependence, alcohol withdrawal symptoms, alcohol-induced acute pancreatitis, and alcoholic fatty liver who presents to this ED by EMS for evaluation of abdominal " pain.    About 2-3 weeks ago, the patient was relapsed on alcohol use after being sober for 8-9 months and is now undergoing detox at [facility]. He started having epigastric abdominal pain and was seen in the [facility] ED for evaluation about 4 days ago. He endorses being prescribed Carafate and an antacid, which he has been taking. This morning, after waking up, he started experiencing right flank pain that radiated to his back and nausea. He reports the pain was constant until patient called EMS. Pain started to resolve after laying down in the ambulance. In the ED, endorses complete resolution of his right flank pain and nausea. Notes mild epigastric pain currently. He also endorses dysuria that started yesterday. His last bowel movement was earlier today, and patient says it was looser than normal. No diarrhea. Patient denies a history of gallstones or kidney stones. No previous endoscopies or abdominal surgeries. He does not take any other daily medications at home. Receiving some withdrawal medications at detox. He will complete detox on 5/27. Last alcohol use ~5 days ago. He denies recent fall or injuries. The patient denies vomiting, constipation, cough, or any other complaints at this time.      REVIEW OF SYSTEMS   Review of Systems   Respiratory: Negative for cough.    Gastrointestinal: Positive for abdominal pain (epigastric) and nausea (resolved). Negative for constipation, diarrhea and vomiting.   Genitourinary: Positive for dysuria and flank pain (right, resolved).        PAST MEDICAL HISTORY:  Past Medical History:   Diagnosis Date     Alcohol abuse      Alcohol abuse      Alcohol withdrawal (H)      Depressive disorder      HLD (hyperlipidemia)      HTN (hypertension)        PAST SURGICAL HISTORY:  Past Surgical History:   Procedure Laterality Date     NO HISTORY OF SURGERY       OTHER SURGICAL HISTORY      none           CURRENT MEDICATIONS:    omeprazole (PRILOSEC) 20 MG DR capsule  sucralfate  "(CARAFATE) 1 GM tablet        ALLERGIES:  Allergies   Allergen Reactions     Gramineae Pollens Itching     Pollen Extract Rash     grass tree pollen       FAMILY HISTORY:  Family History   Problem Relation Age of Onset     Coronary Artery Disease Father        SOCIAL HISTORY:   Social History     Socioeconomic History     Marital status: Single   Occupational History     Occupation: Rental property owner   Tobacco Use     Smoking status: Some Days     Packs/day: 0.25     Types: Cigars, Cigarettes     Smokeless tobacco: Never     Tobacco comments:     occasional   Substance and Sexual Activity     Alcohol use: Yes     Alcohol/week: 17.0 standard drinks of alcohol     Types: 17 Shots of liquor per week     Comment: Drinks 750ml/day or 17 shots/day; hx of withdrawl seizures     Drug use: Not Currently     Social Determinants of Health     Transportation Needs: No Transportation Needs (7/21/2021)    PRAPARE - Transportation      Lack of Transportation (Medical): No      Lack of Transportation (Non-Medical): No   Stress: Stress Concern Present (7/21/2021)    [employer] - Occupational Stress Questionnaire      Feeling of Stress : Very much   Housing Stability: Unknown (7/21/2021)    Housing Stability Vital Sign      Unable to Pay for Housing in the Last Year: No      Unstable Housing in the Last Year: No       VITALS:  /81   Pulse 68   Temp 97.6  F (36.4  C) (Oral)   Resp 16   Ht 1.778 m (5' 10\")   Wt 76.2 kg (168 lb)   SpO2 100%   BMI 24.11 kg/m      PHYSICAL EXAM      Vitals: /81   Pulse 68   Temp 97.6  F (36.4  C) (Oral)   Resp 16   Ht 1.778 m (5' 10\")   Wt 76.2 kg (168 lb)   SpO2 100%   BMI 24.11 kg/m    General: Appears in no acute distress, awake, alert, interactive.  Eyes: Conjunctivae non-injected. Sclera anicteric.  HENT: Atraumatic.  Neck: Supple.  Respiratory/Chest: Respiration unlabored.  Heart: Regular rate and rhythm  Abdomen: No abdominal tenderness, " guarding, or rigidity. No CVA tenderness.  Musculoskeletal: Normal extremities. No edema or erythema.  Skin: Normal color. No rash or diaphoresis.  Neurologic: No tremors. Face symmetric, moves all extremities spontaneously. Speech clear.  Psychiatric: Slightly anxious.Oriented to person, place, and time. Affect appropriate.       LAB:  All pertinent labs reviewed and interpreted.  Results for orders placed or performed during the hospital encounter of 05/24/23   CT Abdomen Pelvis w Contrast    Impression    IMPRESSION:   1.  No etiology for symptoms evident. No definite inflammatory focus involving bowel, no obstruction.  2.  Chronic calcific pancreatitis, no acute inflammatory changes.   UA with Microscopic reflex to Culture    Specimen: Urine, Midstream   Result Value Ref Range    Color Urine Light Yellow Colorless, Straw, Light Yellow, Yellow    Appearance Urine Clear Clear    Glucose Urine Negative Negative mg/dL    Bilirubin Urine Negative Negative    Ketones Urine Negative Negative mg/dL    Specific Gravity Urine 1.020 1.001 - 1.030    Blood Urine Negative Negative    pH Urine 7.5 (H) 5.0 - 7.0    Protein Albumin Urine Negative Negative mg/dL    Urobilinogen Urine <2.0 <2.0 mg/dL    Nitrite Urine Negative Negative    Leukocyte Esterase Urine Negative Negative    Mucus Urine Present (A) None Seen /LPF    RBC Urine <1 <=2 /HPF    WBC Urine <1 <=5 /HPF   Basic metabolic panel   Result Value Ref Range    Sodium 140 136 - 145 mmol/L    Potassium 4.3 3.4 - 5.3 mmol/L    Chloride 100 98 - 107 mmol/L    Carbon Dioxide (CO2) 28 22 - 29 mmol/L    Anion Gap 12 7 - 15 mmol/L    Urea Nitrogen 16.0 6.0 - 20.0 mg/dL    Creatinine 1.02 0.67 - 1.17 mg/dL    Calcium 9.9 8.6 - 10.0 mg/dL    Glucose 69 (L) 70 - 99 mg/dL    GFR Estimate >90 >60 mL/min/1.73m2   CBC with platelets and differential   Result Value Ref Range    WBC Count 10.0 4.0 - 11.0 10e3/uL    RBC Count 4.78 4.40 - 5.90 10e6/uL    Hemoglobin 13.9 13.3 - 17.7 g/dL     Hematocrit 41.0 40.0 - 53.0 %    MCV 86 78 - 100 fL    MCH 29.1 26.5 - 33.0 pg    MCHC 33.9 31.5 - 36.5 g/dL    RDW 14.6 10.0 - 15.0 %    Platelet Count 176 150 - 450 10e3/uL    % Neutrophils 77 %    % Lymphocytes 17 %    % Monocytes 4 %    % Eosinophils 2 %    % Basophils 0 %    % Immature Granulocytes 0 %    NRBCs per 100 WBC 0 <1 /100    Absolute Neutrophils 7.6 1.6 - 8.3 10e3/uL    Absolute Lymphocytes 1.7 0.8 - 5.3 10e3/uL    Absolute Monocytes 0.4 0.0 - 1.3 10e3/uL    Absolute Eosinophils 0.2 0.0 - 0.7 10e3/uL    Absolute Basophils 0.0 0.0 - 0.2 10e3/uL    Absolute Immature Granulocytes 0.0 <=0.4 10e3/uL    Absolute NRBCs 0.0 10e3/uL   Alcohol level blood   Result Value Ref Range    Alcohol ethyl <0.01 <=0.01 g/dL   Hepatic function panel   Result Value Ref Range    Protein Total 7.7 6.4 - 8.3 g/dL    Albumin 4.9 3.5 - 5.2 g/dL    Bilirubin Total 0.5 <=1.2 mg/dL    Alkaline Phosphatase 80 40 - 129 U/L    AST 47 10 - 50 U/L    ALT 33 10 - 50 U/L    Bilirubin Direct <0.20 0.00 - 0.30 mg/dL   Result Value Ref Range    Lipase 39 13 - 60 U/L       RADIOLOGY:  Reviewed all pertinent imaging. Please see official radiology report.  CT Abdomen Pelvis w Contrast   Final Result   IMPRESSION:    1.  No etiology for symptoms evident. No definite inflammatory focus involving bowel, no obstruction.   2.  Chronic calcific pancreatitis, no acute inflammatory changes.          EKG:    None    PROCEDURES:   None      I, Olive Butler, am serving as a scribe to document services personally performed by Justo Moreland MD based on my observation and the provider's statements to me. I, Dr. Justo Moreland, attest that Olive Butler is acting in a scribe capacity, has observed my performance of the services and has documented them in accordance with my direction.    Justo Moreland MD  Emergency Medicine  [facility] EMERGENCY DEPARTMENT  [address] [place] [id number] [phone]     Justo Moreland,  MD  05/24/23 1959

## 2023-05-24 NOTE — DISCHARGE INSTRUCTIONS
Your CT scan and labs do not show a cause for your symptoms.  Follow-up with your primary care doctor for consideration of endoscopy to screen for gastritis or possible ulcer.  Continue your current medications as prescribed when you were discharged from the hospital. Your liver test are normal. You can use tylenol every 6 hours for pain. You can use pepto bismol. Recommend avoid ibuprofen. I did create a referral for you to see a GI doctor..

## 2023-05-24 NOTE — PROGRESS NOTES
[facility] Care Resource Center Contact  [facility]/Voicemail     Clinical Data: Transitional Care Management Outreach     Outreach attempted x 2.  Left message on patient's voicemail, providing [facility]'s 24/7 scheduling and nurse triage phone number [phone] ([phone]) for questions/concerns and/or to schedule an appt with an [facility] provider, if they do not have a PCP.      Plan:  [facility] will do no further outreaches at this time.       Vanda Mccormick RN  Connected Care Resource Center, [facility]    *Connected Care Resource Team does NOT follow patient ongoing. Referrals are identified based on internal discharge reports and the outreach is to ensure patient has an understanding of their discharge instructions.

## 2023-05-24 NOTE — ED TRIAGE NOTES
Pt arrives via EMS from [facility] in [facility] for alcohol abuse with c/o right flank and abdominal pain. Diagnosed with gastritis and stomach ulcers on 5/20/23 at [facility]. Currently no pain but has increased abdominal pain after eating and pain with urination and defecation     Triage Assessment     Row Name 05/24/23 1051       Triage Assessment (Adult)    Airway WDL WDL       Respiratory WDL    Respiratory WDL WDL       Skin Circulation/Temperature WDL    Skin Circulation/Temperature WDL WDL       Cardiac WDL    Cardiac WDL WDL       Peripheral/Neurovascular WDL    Peripheral Neurovascular WDL WDL       Cognitive/Neuro/Behavioral WDL    Cognitive/Neuro/Behavioral WDL WDL

## 2023-05-30 ENCOUNTER — HOSPITAL ENCOUNTER (EMERGENCY)
Facility: HOSPITAL | Age: 38
Discharge: HOME | End: 2023-05-30
Attending: EMERGENCY MEDICINE | Admitting: EMERGENCY MEDICINE
Payer: COMMERCIAL

## 2023-05-30 VITALS
DIASTOLIC BLOOD PRESSURE: 92 MMHG | RESPIRATION RATE: 20 BRPM | HEART RATE: 86 BPM | SYSTOLIC BLOOD PRESSURE: 140 MMHG | OXYGEN SATURATION: 96 %

## 2023-05-30 DIAGNOSIS — F10.920 ALCOHOLIC INTOXICATION WITHOUT COMPLICATION (H): ICD-10-CM

## 2023-05-30 LAB
ALBUMIN SERPL BCG-MCNC: 4.6 G/DL (ref 3.5–5.2)
ALP SERPL-CCNC: 73 U/L (ref 40–129)
ALT SERPL W P-5'-P-CCNC: 43 U/L (ref 10–50)
ANION GAP SERPL CALCULATED.3IONS-SCNC: 15 MMOL/L (ref 7–15)
AST SERPL W P-5'-P-CCNC: 66 U/L (ref 10–50)
BASOPHILS # BLD AUTO: 0.1 10E3/UL (ref 0–0.2)
BASOPHILS NFR BLD AUTO: 1 %
BILIRUB SERPL-MCNC: 0.2 MG/DL
BUN SERPL-MCNC: 14 MG/DL (ref 6–20)
CALCIUM SERPL-MCNC: 9.2 MG/DL (ref 8.6–10)
CHLORIDE SERPL-SCNC: 102 MMOL/L (ref 98–107)
CREAT SERPL-MCNC: 0.95 MG/DL (ref 0.67–1.17)
DEPRECATED HCO3 PLAS-SCNC: 28 MMOL/L (ref 22–29)
EOSINOPHIL # BLD AUTO: 0.1 10E3/UL (ref 0–0.7)
EOSINOPHIL NFR BLD AUTO: 1 %
ERYTHROCYTE [DISTWIDTH] IN BLOOD BY AUTOMATED COUNT: 15 % (ref 10–15)
ETHANOL SERPL-MCNC: 0.44 G/DL
GFR SERPL CREATININE-BSD FRML MDRD: >90 ML/MIN/1.73M2
GLUCOSE SERPL-MCNC: 126 MG/DL (ref 70–99)
HCT VFR BLD AUTO: 39.3 % (ref 40–53)
HGB BLD-MCNC: 13.6 G/DL (ref 13.3–17.7)
IMM GRANULOCYTES # BLD: 0 10E3/UL
IMM GRANULOCYTES NFR BLD: 0 %
LYMPHOCYTES # BLD AUTO: 3.8 10E3/UL (ref 0.8–5.3)
LYMPHOCYTES NFR BLD AUTO: 63 %
MCH RBC QN AUTO: 29.2 PG (ref 26.5–33)
MCHC RBC AUTO-ENTMCNC: 34.6 G/DL (ref 31.5–36.5)
MCV RBC AUTO: 84 FL (ref 78–100)
MONOCYTES # BLD AUTO: 0.4 10E3/UL (ref 0–1.3)
MONOCYTES NFR BLD AUTO: 7 %
NEUTROPHILS # BLD AUTO: 1.7 10E3/UL (ref 1.6–8.3)
NEUTROPHILS NFR BLD AUTO: 28 %
NRBC # BLD AUTO: 0 10E3/UL
NRBC BLD AUTO-RTO: 0 /100
PLATELET # BLD AUTO: 275 10E3/UL (ref 150–450)
POTASSIUM SERPL-SCNC: 3.7 MMOL/L (ref 3.4–5.3)
PROT SERPL-MCNC: 7.2 G/DL (ref 6.4–8.3)
RBC # BLD AUTO: 4.66 10E6/UL (ref 4.4–5.9)
SODIUM SERPL-SCNC: 145 MMOL/L (ref 136–145)
WBC # BLD AUTO: 6.1 10E3/UL (ref 4–11)

## 2023-05-30 PROCEDURE — 85025 COMPLETE CBC W/AUTO DIFF WBC: CPT | Performed by: EMERGENCY MEDICINE

## 2023-05-30 PROCEDURE — 80053 COMPREHEN METABOLIC PANEL: CPT | Performed by: EMERGENCY MEDICINE

## 2023-05-30 PROCEDURE — 82077 ASSAY SPEC XCP UR&BREATH IA: CPT | Performed by: EMERGENCY MEDICINE

## 2023-05-30 PROCEDURE — 36415 COLL VENOUS BLD VENIPUNCTURE: CPT | Performed by: EMERGENCY MEDICINE

## 2023-05-30 PROCEDURE — 99283 EMERGENCY DEPT VISIT LOW MDM: CPT

## 2023-05-30 ASSESSMENT — ACTIVITIES OF DAILY LIVING (ADL): ADLS_ACUITY_SCORE: 37

## 2023-05-31 NOTE — ED NOTES
Discharge    Discharge paperwork discussed. Patient questions answered. AVS in hand. Patient discharged from ED with mom. Instructions to call [place] in the morning.

## 2023-05-31 NOTE — ED PROVIDER NOTES
EMERGENCY DEPARTMENT ENCOUNTER      NAME: Nikhil Rios  AGE: 37 year old male  YOB: 1985  MRN: [id number]  EVALUATION DATE & TIME: 2023  9:16 PM    PCP: Lisandro Graham    ED PROVIDER: Wong Polk M.D.      Chief Complaint   Patient presents with     Alcohol Intoxication         FINAL IMPRESSION:  1. Alcoholic intoxication without complication (H)          ED COURSE & MEDICAL DECISION MAKIN year old male presents to the Emergency Department for evaluation of alcohol abuse concerns.  He is accompanied by his mother.  He has been living at home with her and recently relapsed on heavy alcohol use.  He arrives to the emergency department clinically intoxicated with some slurred speech and mild coordination impairment.  Patient himself states he is concerned about the possibility of alcohol withdrawal as he has had withdrawal seizures in the past.  Labs were completed as below and appear generally stable including stable metabolic work-up and liver function tests.  His blood alcohol is notably elevated to 0.44, in line with previous ED visits.  He has had multiple similar presentations last year.  Explained that currently it would be unsafe to treat him with benzodiazepines.  We did explore the possibility of transfer to detox, he asked specifically that we contact [facility] where he has been admitted recently.  In the middle of the night we were unable to reach anyone there.  We did call [facility] as well as [facility] and both were full for the evening, patient says that he would not want to be transferred there anyways citing previous bad experiences.  Discussed that there would not be an indication to admit him to the hospital for uncomplicated alcohol intoxication or withdrawal and upon hearing this the patient decided that he wanted to go home and rest at home.  His mother will be returning him home this evening.  He was strongly encouraged to reach out to [facility] and any  other detox program to help him through his anticipated withdrawal symptoms.  Patient left in stable condition with supervision from his mother.    9:29 PM I met with the patient, obtained history, performed an initial exam, and discussed options and plan for diagnostics and treatment here in the ED.  10:12 PM Received critical lab; alcohol level 0.44.   10:45 PM Checked in on and updated patient. We discussed the plan for discharge and the patient is agreeable. Reviewed supportive cares, symptomatic treatment, outpatient follow up, and reasons to return to the Emergency Department. Patient to be discharged by ED RN.       At the conclusion of the encounter I discussed the results of all of the tests and the disposition. The questions were answered. The patient or family acknowledged understanding and was agreeable with the care plan.       Medical Decision Making    History:    Supplemental history from: Family Member/Significant Other    External Record(s) reviewed: Documented in chart, if applicable.    Work Up:    Chart documentation includes differential considered and any EKGs or imaging independently interpreted by provider, where specified.    In additional to work up documented, I considered the following work up: Documented in chart, if applicable.    External consultation:    Discussion of management with another provider: Documented in chart, if applicable    Complicating factors:    Care impacted by chronic illness: Hyperlipidemia, Hypertension, Mental Health and Other: Alcoholic fatty liver and alcohol withdrawal syndrome with seizures    Care affected by social determinants of health: Alcohol Abuse and/or Recreational Drug Use    Disposition considerations: Discharge. No recommendations on prescription strength medication(s). See documentation for any additional details.            MEDICATIONS GIVEN IN THE EMERGENCY:  Medications - No data to display    NEW PRESCRIPTIONS STARTED AT TODAY'S ER  "VISIT  Discharge Medication List as of 5/30/2023 11:00 PM             =================================================================    HPI    Patient information was obtained from: Patient and patient's mother     Use of : N/A         Nikhil Rios is a 37 year old male with a pertinent history of alcohol abuse, alcoholic fatty liver, alcohol withdrawal with seizures, hyperlipidemia, hypertension and mental health diagnoses who presents to this ED by walking for evaluation of alcohol intoxication.     Per mother: Patient had been going to [facility] for detox. On Friday (5/26) patient left [place] for an appointment but states that there were issues during his appointment. Since them patient has been drinking ~750 mL of alcohol daily. Mother states that patient \"plans to keep on drinking so that he doesn't have a seizure\". Patient lives in his own house with a roommate but has been staying with his mother this past weekend.     Per patient: States he had \"a couple of sips\" of alcohol today but is unsure of the exact amount. Patient states he had been previously sober for 9 months; notes that this was possible as he had increased structure in his life. States that he has a  and has been in contact with her.     When asked if patient would consider detox programs through [facility], patient adamantly declined.     REVIEW OF SYSTEMS   All systems reviewed and negative except as noted in HPI.    PAST MEDICAL HISTORY:  Past Medical History:   Diagnosis Date     Alcohol abuse      Alcohol abuse      Alcohol withdrawal (H)      Depressive disorder      HLD (hyperlipidemia)      HTN (hypertension)        PAST SURGICAL HISTORY:  Past Surgical History:   Procedure Laterality Date     NO HISTORY OF SURGERY       OTHER SURGICAL HISTORY      none           CURRENT MEDICATIONS:    No current facility-administered medications for this encounter.     Current Outpatient Medications   Medication     omeprazole " (PRILOSEC) 20 MG DR capsule     sucralfate (CARAFATE) 1 GM tablet     Facility-Administered Medications Ordered in Other Encounters   Medication     Self Administer Medications: Behavioral Services         ALLERGIES:  Allergies   Allergen Reactions     Gramineae Pollens Itching     Pollen Extract Rash     grass tree pollen       FAMILY HISTORY:  Family History   Problem Relation Age of Onset     Coronary Artery Disease Father        SOCIAL HISTORY:   Social History     Socioeconomic History     Marital status: Single   Occupational History     Occupation: Rental property owner   Tobacco Use     Smoking status: Some Days     Packs/day: 0.25     Types: Cigars, Cigarettes     Smokeless tobacco: Never     Tobacco comments:     occasional   Substance and Sexual Activity     Alcohol use: Yes     Alcohol/week: 17.0 standard drinks of alcohol     Types: 17 Shots of liquor per week     Comment: Drinks 750ml/day or 17 shots/day; hx of withdrawl seizures     Drug use: Not Currently     Social Determinants of Health     Transportation Needs: No Transportation Needs (7/21/2021)    PRAPARE - Transportation      Lack of Transportation (Medical): No      Lack of Transportation (Non-Medical): No   Stress: Stress Concern Present (7/21/2021)    [employer] [place] [employer] - Occupational Stress Questionnaire      Feeling of Stress : Very much   Housing Stability: Unknown (7/21/2021)    Housing Stability Vital Sign      Unable to Pay for Housing in the Last Year: No      Unstable Housing in the Last Year: No       VITALS:  BP (!) 140/92   Pulse 86   Resp 20   SpO2 96%     PHYSICAL EXAM    Constitutional: Well-developed young male patient, sitting up in bed, no acute distress  HENT: Normocephalic, Atraumatic. Neck Supple.  Eyes: EOMI, Conjunctiva normal.  Respiratory: Breathing comfortably on room air. Speaks full sentences easily. Lungs clear to ascultation.  Cardiovascular: Normal heart rate, Regular rhythm. No  peripheral edema.  Abdomen: Soft, nontender  Musculoskeletal: Good range of motion in all major joints. No major deformities noted.  Integument: Warm, Dry.  Neurologic: Alert and awake.  Fidgeting in bed.  Speech is slightly slurred.  Face is symmetric.  Mild gross motor coordination impairment.  Moving all extremities otherwise with symmetric strength.  Patient is ambulatory.  Psychiatric: Cooperative. Affect appropriate.     LAB:  All pertinent labs reviewed and interpreted.  Labs Ordered and Resulted from Time of ED Arrival to Time of ED Departure   COMPREHENSIVE METABOLIC PANEL - Abnormal       Result Value    Sodium 145      Potassium 3.7      Chloride 102      Carbon Dioxide (CO2) 28      Anion Gap 15      Urea Nitrogen 14.0      Creatinine 0.95      Calcium 9.2      Glucose 126 (*)     Alkaline Phosphatase 73      AST 66 (*)     ALT 43      Protein Total 7.2      Albumin 4.6      Bilirubin Total 0.2      GFR Estimate >90     ETHYL ALCOHOL LEVEL - Abnormal    Alcohol ethyl 0.44 (*)    CBC WITH PLATELETS AND DIFFERENTIAL - Abnormal    WBC Count 6.1      RBC Count 4.66      Hemoglobin 13.6      Hematocrit 39.3 (*)     MCV 84      MCH 29.2      MCHC 34.6      RDW 15.0      Platelet Count 275      % Neutrophils 28      % Lymphocytes 63      % Monocytes 7      % Eosinophils 1      % Basophils 1      % Immature Granulocytes 0      NRBCs per 100 WBC 0      Absolute Neutrophils 1.7      Absolute Lymphocytes 3.8      Absolute Monocytes 0.4      Absolute Eosinophils 0.1      Absolute Basophils 0.1      Absolute Immature Granulocytes 0.0      Absolute NRBCs 0.0             I, Roxann Roach, am serving as a scribe to document services personally performed by Dr. Wong Polk, based on my observation and the provider's statements to me. I, Wong Polk MD attest that Roxann Roach is acting in a scribe capacity, has observed my performance of the services and has documented them in accordance with my  direction.    Wong Polk M.D.  Emergency Medicine  [facility] EMERGENCY DEPARTMENT  [address] [place] [id number] [phone]  Dept: [phone]     Wong Polk MD  05/31/23 0103

## 2023-05-31 NOTE — ED TRIAGE NOTES
Patient is here with mom and is acting spastic in triage. Patient was previously in detox and then rebounded due to an appointment. Patient has been drinking for a couple days and last drink was just before arriving.

## 2023-05-31 NOTE — DISCHARGE INSTRUCTIONS
You were seen in the emergency department for concerns about alcohol abuse.  Your evaluation included an elevated blood alcohol level but otherwise stable lab tests.  We called [facility] and left a message regarding your care but warranting able to get a hold of anyone at this hour.  We also investigated [facility] and [facility] detox facilities which are also full.  We would like you to call yourself first thing in the morning to try to get checked back into monitor detox program.  You can also self refer to programs like [facility] and [facility] if you would like additional help with withdrawal symptoms.

## 2023-06-01 ENCOUNTER — HOSPITAL ENCOUNTER (EMERGENCY)
Facility: HOSPITAL | Age: 38
Discharge: HOME | End: 2023-06-02
Attending: EMERGENCY MEDICINE | Admitting: EMERGENCY MEDICINE
Payer: COMMERCIAL

## 2023-06-01 DIAGNOSIS — F10.920 ALCOHOLIC INTOXICATION WITHOUT COMPLICATION (H): ICD-10-CM

## 2023-06-01 LAB
ALBUMIN SERPL BCG-MCNC: 5 G/DL (ref 3.5–5.2)
ALP SERPL-CCNC: 76 U/L (ref 40–129)
ALT SERPL W P-5'-P-CCNC: 85 U/L (ref 10–50)
ANION GAP SERPL CALCULATED.3IONS-SCNC: 15 MMOL/L (ref 7–15)
AST SERPL W P-5'-P-CCNC: 125 U/L (ref 10–50)
BASOPHILS # BLD AUTO: 0.1 10E3/UL (ref 0–0.2)
BASOPHILS NFR BLD AUTO: 1 %
BILIRUB DIRECT SERPL-MCNC: <0.2 MG/DL (ref 0–0.3)
BILIRUB SERPL-MCNC: 0.3 MG/DL
BUN SERPL-MCNC: 11.9 MG/DL (ref 6–20)
CALCIUM SERPL-MCNC: 9.2 MG/DL (ref 8.6–10)
CHLORIDE SERPL-SCNC: 100 MMOL/L (ref 98–107)
CREAT SERPL-MCNC: 0.93 MG/DL (ref 0.67–1.17)
DEPRECATED HCO3 PLAS-SCNC: 26 MMOL/L (ref 22–29)
EOSINOPHIL # BLD AUTO: 0 10E3/UL (ref 0–0.7)
EOSINOPHIL NFR BLD AUTO: 1 %
ERYTHROCYTE [DISTWIDTH] IN BLOOD BY AUTOMATED COUNT: 15.2 % (ref 10–15)
ETHANOL SERPL-MCNC: 0.48 G/DL
GFR SERPL CREATININE-BSD FRML MDRD: >90 ML/MIN/1.73M2
GLUCOSE SERPL-MCNC: 105 MG/DL (ref 70–99)
HCT VFR BLD AUTO: 44.1 % (ref 40–53)
HGB BLD-MCNC: 15.1 G/DL (ref 13.3–17.7)
HOLD SPECIMEN: NORMAL
IMM GRANULOCYTES # BLD: 0 10E3/UL
IMM GRANULOCYTES NFR BLD: 0 %
LIPASE SERPL-CCNC: 51 U/L (ref 13–60)
LYMPHOCYTES # BLD AUTO: 3.7 10E3/UL (ref 0.8–5.3)
LYMPHOCYTES NFR BLD AUTO: 62 %
MCH RBC QN AUTO: 29.1 PG (ref 26.5–33)
MCHC RBC AUTO-ENTMCNC: 34.2 G/DL (ref 31.5–36.5)
MCV RBC AUTO: 85 FL (ref 78–100)
MONOCYTES # BLD AUTO: 0.3 10E3/UL (ref 0–1.3)
MONOCYTES NFR BLD AUTO: 6 %
NEUTROPHILS # BLD AUTO: 1.8 10E3/UL (ref 1.6–8.3)
NEUTROPHILS NFR BLD AUTO: 30 %
NRBC # BLD AUTO: 0 10E3/UL
NRBC BLD AUTO-RTO: 0 /100
PLATELET # BLD AUTO: 349 10E3/UL (ref 150–450)
POTASSIUM SERPL-SCNC: 4.3 MMOL/L (ref 3.4–5.3)
PROT SERPL-MCNC: 7.8 G/DL (ref 6.4–8.3)
RBC # BLD AUTO: 5.19 10E6/UL (ref 4.4–5.9)
SODIUM SERPL-SCNC: 141 MMOL/L (ref 136–145)
WBC # BLD AUTO: 5.9 10E3/UL (ref 4–11)

## 2023-06-01 PROCEDURE — 99285 EMERGENCY DEPT VISIT HI MDM: CPT | Mod: 25

## 2023-06-01 PROCEDURE — 250N000009 HC RX 250: Performed by: EMERGENCY MEDICINE

## 2023-06-01 PROCEDURE — 85025 COMPLETE CBC W/AUTO DIFF WBC: CPT | Performed by: EMERGENCY MEDICINE

## 2023-06-01 PROCEDURE — 36415 COLL VENOUS BLD VENIPUNCTURE: CPT | Performed by: EMERGENCY MEDICINE

## 2023-06-01 PROCEDURE — 250N000011 HC RX IP 250 OP 636: Performed by: EMERGENCY MEDICINE

## 2023-06-01 PROCEDURE — 82077 ASSAY SPEC XCP UR&BREATH IA: CPT | Performed by: EMERGENCY MEDICINE

## 2023-06-01 PROCEDURE — 96365 THER/PROPH/DIAG IV INF INIT: CPT

## 2023-06-01 PROCEDURE — 96375 TX/PRO/DX INJ NEW DRUG ADDON: CPT

## 2023-06-01 PROCEDURE — 96361 HYDRATE IV INFUSION ADD-ON: CPT

## 2023-06-01 PROCEDURE — 96376 TX/PRO/DX INJ SAME DRUG ADON: CPT

## 2023-06-01 PROCEDURE — 82248 BILIRUBIN DIRECT: CPT | Performed by: EMERGENCY MEDICINE

## 2023-06-01 PROCEDURE — 258N000003 HC RX IP 258 OP 636: Performed by: EMERGENCY MEDICINE

## 2023-06-01 PROCEDURE — 83690 ASSAY OF LIPASE: CPT | Performed by: EMERGENCY MEDICINE

## 2023-06-01 PROCEDURE — 80053 COMPREHEN METABOLIC PANEL: CPT | Performed by: EMERGENCY MEDICINE

## 2023-06-01 RX ORDER — LORAZEPAM 2 MG/ML
1 INJECTION INTRAMUSCULAR ONCE
Status: COMPLETED | OUTPATIENT
Start: 2023-06-01 | End: 2023-06-01

## 2023-06-01 RX ADMIN — LORAZEPAM 1 MG: 2 INJECTION INTRAMUSCULAR; INTRAVENOUS at 22:55

## 2023-06-01 RX ADMIN — LORAZEPAM 1 MG: 2 INJECTION INTRAMUSCULAR; INTRAVENOUS at 21:36

## 2023-06-01 RX ADMIN — FOLIC ACID: 5 INJECTION, SOLUTION INTRAMUSCULAR; INTRAVENOUS; SUBCUTANEOUS at 21:58

## 2023-06-01 ASSESSMENT — ACTIVITIES OF DAILY LIVING (ADL)
ADLS_ACUITY_SCORE: 37
ADLS_ACUITY_SCORE: 37

## 2023-06-02 VITALS
DIASTOLIC BLOOD PRESSURE: 85 MMHG | OXYGEN SATURATION: 96 % | HEIGHT: 70 IN | HEART RATE: 89 BPM | SYSTOLIC BLOOD PRESSURE: 132 MMHG | RESPIRATION RATE: 18 BRPM | BODY MASS INDEX: 22.9 KG/M2 | WEIGHT: 160 LBS

## 2023-06-02 PROCEDURE — 250N000011 HC RX IP 250 OP 636: Performed by: EMERGENCY MEDICINE

## 2023-06-02 PROCEDURE — 250N000013 HC RX MED GY IP 250 OP 250 PS 637: Performed by: EMERGENCY MEDICINE

## 2023-06-02 PROCEDURE — 258N000003 HC RX IP 258 OP 636: Performed by: EMERGENCY MEDICINE

## 2023-06-02 RX ORDER — ONDANSETRON 4 MG/1
4 TABLET, ORALLY DISINTEGRATING ORAL EVERY 6 HOURS PRN
Qty: 10 TABLET | Refills: 0 | Status: SHIPPED | OUTPATIENT
Start: 2023-06-02 | End: 2023-06-09

## 2023-06-02 RX ORDER — DIAZEPAM 5 MG
5 TABLET ORAL ONCE
Status: COMPLETED | OUTPATIENT
Start: 2023-06-02 | End: 2023-06-02

## 2023-06-02 RX ADMIN — PROCHLORPERAZINE EDISYLATE 10 MG: 5 INJECTION, SOLUTION INTRAMUSCULAR; INTRAVENOUS at 03:58

## 2023-06-02 RX ADMIN — SODIUM CHLORIDE 1000 ML: 9 INJECTION, SOLUTION INTRAVENOUS at 03:57

## 2023-06-02 RX ADMIN — DIAZEPAM 5 MG: 5 TABLET ORAL at 06:27

## 2023-06-02 ASSESSMENT — ACTIVITIES OF DAILY LIVING (ADL)
ADLS_ACUITY_SCORE: 37

## 2023-06-02 NOTE — DISCHARGE INSTRUCTIONS
Please use the below resources and your primary care physician to safely cease alcohol and/or substance use.     Return to the ED if you are having any urgent/life-threatening concerns.     DISCHARGE RESOURCES:  [facility] [phone] (detox), [phone] (outpatient & [facility])    SMART Recovery - self management for addiction recovery:  www.smartrecFreshfetch Pet Foodsy.org    [facility] ~ A Health Crisis Resource & Support Center: [phone].  [facility] [phone]   Substance Abuse and Mental Health Services (www.samhsa.gov)  Harm Reduction Coalition (www.Harmreduction.org)  [place] Opioid Prevention Coalition: www.opioidcoalition.org  Poison control 1-537.366.3178       Sober Support Group Information:  AA/NA & Sponsor/Support  Alcoholics Anonymous (www.alcoholics-anonymous.org)   AA Intergroup service office in [place] (http://www.aastpaul.org/) [phone]  AA Intergroup service office in [facility]: [phone]. (http://www.a[place].org/)  Secular AA (www.secularaa.org)  Narcotics Anonymous (www.nam[place].org) (810) [phone]   Sober Fun Activities: www.sober-activities.[facility]//[facility] Recovery Connection (MRC)  [facility] connects people seeking recovery to resources that help foster and sustain long-term recovery.  Whether you are seeking resources for treatment, transportation, housing, job training, education, health care or other pathways to recovery, [facility] is a great place to start.   Phone: [phone]. www.[place]avorite Words.SAIC (Great listing of all types of recovery and non-recovery related resources)?

## 2023-06-02 NOTE — ED TRIAGE NOTES
Pt mother brought pt in due to alcohol intoxication.  Mother reports about 1 L of vodka drank within the past 36 hours.  Pt flailing around.

## 2023-06-02 NOTE — ED PROVIDER NOTES
EMERGENCY DEPARTMENT ENCOUNTER      NAME: Nikhil Rios  AGE: 37 year old male  YOB: 1985  MRN: [id number]  EVALUATION DATE & TIME: 2023  8:49 PM    PCP: Lisandro Graham    ED PROVIDER: Cali Solomon D.O.      Chief Complaint   Patient presents with     Alcohol Intoxication       FINAL IMPRESSION:  1. Alcoholic intoxication without complication (H)        ED COURSE & MEDICAL DECISION MAKIN:24 PM I met with the patient to gather history and to perform my initial exam. I discussed the plan for care while in the Emergency Department.  9:50 PM Spoke with lab who reports patient Alcohol ethyl result was 0.48.          Pertinent Labs & Imaging studies reviewed. (See chart for details)  37 year old male presents to the Emergency Department for evaluation of alcohol intoxication.  Patient is seen frequently for alcohol intoxication.  Not show any signs of withdrawal, but severely intoxicated tonight.  Do anticipate that the patient becomes more sober he can be offered [facility] detox versus discharge, but do not believe he can be safely discharged at this time.  Also, as patient has been able to adequately ambulate do not believe I could send him to detox yet. Patient care will be turned over to Dr. Magana pending improved intoxicated state.    Medical Decision Making    History:    Supplemental history from: Documented in chart, if applicable and Family Member/Significant Other    External Record(s) reviewed: Documented in chart, if applicable.    Work Up:    Chart documentation includes differential considered and any EKGs or imaging independently interpreted by provider, where specified.    In additional to work up documented, I considered the following work up: Documented in chart, if applicable.    External consultation:    Discussion of management with another provider: Documented in chart, if applicable    Complicating factors:    Care impacted by chronic illness: N/A    Care affected by social  "determinants of health: Alcohol Abuse and/or Recreational Drug Use    Disposition considerations: Admission considered. Patient was signed out to the oncoming physician, disposition pending.        At the conclusion of the encounter I discussed the results of all of the tests and the disposition. The questions were answered. The patient or family acknowledged understanding and was agreeable with the care plan.      HPI    Patient information was obtained from: Patient     Use of : N/A        Nikhil Rios is a 37 year old male who presents via walk in accompanied by mom for evaluation of alcohol intoxication.     Per Chart Review: Past medical history of alcohol abuse, alcohol withdrawal, HLD, HTN, depressive disorder.   On 05/30/23, the patient presented to [facility] ED for evaluation of alcohol intoxication. Patient has been living at home with mom and recently relapsed on heavy alcohol use. Patient states he is concerned about the possibility of alcohol withdrawal as he has had withdrawal seizures in the past.  Labs stable including metabolic work-up and liver function tests. His blood alcohol is notably elevated to 0.44. Had multiple similar presentations last year. We did explore the possibility of transfer to detox, he asked specifically that we contact [place] where he has been admitted recently. Call [facility] as well as [facility] and both were full for the evening, patient says that he would not want to be transferred there anyways citing previous bad experiences. Discharge home with mom.     Per mom, The patient was brought by mom for alcohol intoxication. He bought a bottle of 1.75 litter of alcohol yesterday. Mom states that by the time she got home around 5PM today 06/01/23 there was only a little left. Unsure when the last drink was. He lost his dad in May 2023 and a friend recently. He also lost his job 4 years ago.     Per patient, He states that he did \"not drink enough\". He " "drank about 1 liter of alcohol. For the past 3 weeks, he has had a \"real struggle\" and drinking alcohol daily. He reports having inflamed alcohol abdominal pain. He does use marijuana but none recently.     REVIEW OF SYSTEMS  Constitutional:  Denies fever, chills, weight loss or weakness  Eyes:  No pain, discharge, redness  HENT:  Denies sore throat, ear pain, congestion  Respiratory: No SOB, wheeze or cough  Cardiovascular:  No CP, palpitations  GI:  Denies abdominal pain, nausea, vomiting, diarrhea  : Denies dysuria, hematuria  Musculoskeletal:  Denies any new muscle/joint pain, swelling or loss of function.  Skin:  Denies rash, pallor  Neurologic:  Denies headache, focal weakness or sensory changes  Lymph: Denies swollen nodes    All other systems negative unless noted in HPI.    PAST MEDICAL HISTORY:  Past Medical History:   Diagnosis Date     Alcohol abuse      Alcohol abuse      Alcohol withdrawal (H)      Depressive disorder      HLD (hyperlipidemia)      HTN (hypertension)        PAST SURGICAL HISTORY:  Past Surgical History:   Procedure Laterality Date     NO HISTORY OF SURGERY       OTHER SURGICAL HISTORY      none         CURRENT MEDICATIONS:    No current facility-administered medications for this encounter.     Current Outpatient Medications   Medication     omeprazole (PRILOSEC) 20 MG DR capsule     sucralfate (CARAFATE) 1 GM tablet     Facility-Administered Medications Ordered in Other Encounters   Medication     Self Administer Medications: Behavioral Services         ALLERGIES:  Allergies   Allergen Reactions     Gramineae Pollens Itching     Pollen Extract Rash     grass tree pollen       FAMILY HISTORY:  Family History   Problem Relation Age of Onset     Coronary Artery Disease Father        SOCIAL HISTORY:  Social History     Socioeconomic History     Marital status: Single   Occupational History     Occupation: Rental property owner   Tobacco Use     Smoking status: Some Days     Packs/day: " "0.25     Types: Cigars, Cigarettes     Smokeless tobacco: Never     Tobacco comments:     occasional   Substance and Sexual Activity     Alcohol use: Yes     Alcohol/week: 17.0 standard drinks of alcohol     Types: 17 Shots of liquor per week     Comment: Drinks 750ml/day or 17 shots/day; hx of withdrawl seizures     Drug use: Not Currently     Social Determinants of Health     Transportation Needs: No Transportation Needs (7/21/2021)    PRAPARE - Transportation      Lack of Transportation (Medical): No      Lack of Transportation (Non-Medical): No   Stress: Stress Concern Present (7/21/2021)    [facility] - Occupational Stress Questionnaire      Feeling of Stress : Very much   Housing Stability: Unknown (7/21/2021)    Housing Stability Vital Sign      Unable to Pay for Housing in the Last Year: No      Unstable Housing in the Last Year: No       VITALS:  Patient Vitals for the past 24 hrs:   BP Pulse Resp SpO2 Height Weight   06/01/23 2300 -- 82 -- 91 % -- --   06/01/23 2256 139/81 87 -- 96 % -- --   06/01/23 2056 (!) 144/94 103 18 93 % 1.778 m (5' 10\") 72.6 kg (160 lb)       PHYSICAL EXAM    VITAL SIGNS: /81   Pulse 82   Resp 18   Ht 1.778 m (5' 10\")   Wt 72.6 kg (160 lb)   SpO2 91%   BMI 22.96 kg/m      General Appearance: Anxious, intoxicated appearing, and oriented x3.   Head:  Normocephalic, without obvious abnormality, atraumatic  Eyes:  PERRL, conjunctiva/corneas clear, EOM's intact,  ENT:  Lips, mucosa, and tongue normal, membranes are moist without pallor  Cardio:  Border line tachycardia, no murmur, rub or gallop, 2+ pulses symmetric in all extremities  Pulm:  Clear to auscultation bilaterally, respirations unlabored,  Back:  ROM normal, no CVA tenderness, no spinal tenderness, no paraspinal tenderness  Abdomen:  Soft, non-tender, no rebound or guarding.  Musculoskeletal: Full ROM, no edema, no cyanosis, good ROM of major joints  Integument:  Warm, Dry, No " erythema, No rash.    Neurologic:  Alert & oriented.  No focal deficits appreciated.  Ambulatory.  Psychiatric:  Affect normal, Judgment normal, Mood normal.      LABS  Results for orders placed or performed during the hospital encounter of 06/01/23 (from the past 24 hour(s))   [place] Draw    Narrative    The following orders were created for panel order [place] Draw.  Procedure                               Abnormality         Status                     ---------                               -----------         ------                     Extra Blue Top Tube[[id number]]                              Final result               Extra Red Top Tube[[id number]]                               Final result               Extra Green Top (Lithium...[[id number]]                      Final result               Extra Purple Top Tube[[id number]]                            Final result                 Please view results for these tests on the individual orders.   Extra Blue Top Tube   Result Value Ref Range    Hold Specimen JIC    Extra Red Top Tube   Result Value Ref Range    Hold Specimen JIC    Extra Green Top (Lithium Heparin) Tube   Result Value Ref Range    Hold Specimen JIC    Extra Purple Top Tube   Result Value Ref Range    Hold Specimen JIC    CBC with platelets + differential    Narrative    The following orders were created for panel order CBC with platelets + differential.  Procedure                               Abnormality         Status                     ---------                               -----------         ------                     CBC with platelets and d...[[id number]]  Abnormal            Final result                 Please view results for these tests on the individual orders.   Basic metabolic panel   Result Value Ref Range    Sodium 141 136 - 145 mmol/L    Potassium 4.3 3.4 - 5.3 mmol/L    Chloride 100 98 - 107 mmol/L    Carbon Dioxide (CO2) 26 22 - 29 mmol/L    Anion Gap 15 7 - 15 mmol/L    Urea  Nitrogen 11.9 6.0 - 20.0 mg/dL    Creatinine 0.93 0.67 - 1.17 mg/dL    Calcium 9.2 8.6 - 10.0 mg/dL    Glucose 105 (H) 70 - 99 mg/dL    GFR Estimate >90 >60 mL/min/1.73m2   Hepatic function panel   Result Value Ref Range    Protein Total 7.8 6.4 - 8.3 g/dL    Albumin 5.0 3.5 - 5.2 g/dL    Bilirubin Total 0.3 <=1.2 mg/dL    Alkaline Phosphatase 76 40 - 129 U/L     (H) 10 - 50 U/L    ALT 85 (H) 10 - 50 U/L    Bilirubin Direct <0.20 0.00 - 0.30 mg/dL   Lipase   Result Value Ref Range    Lipase 51 13 - 60 U/L   Alcohol level blood   Result Value Ref Range    Alcohol ethyl 0.48 (HH) <=0.01 g/dL   CBC with platelets and differential   Result Value Ref Range    WBC Count 5.9 4.0 - 11.0 10e3/uL    RBC Count 5.19 4.40 - 5.90 10e6/uL    Hemoglobin 15.1 13.3 - 17.7 g/dL    Hematocrit 44.1 40.0 - 53.0 %    MCV 85 78 - 100 fL    MCH 29.1 26.5 - 33.0 pg    MCHC 34.2 31.5 - 36.5 g/dL    RDW 15.2 (H) 10.0 - 15.0 %    Platelet Count 349 150 - 450 10e3/uL    % Neutrophils 30 %    % Lymphocytes 62 %    % Monocytes 6 %    % Eosinophils 1 %    % Basophils 1 %    % Immature Granulocytes 0 %    NRBCs per 100 WBC 0 <1 /100    Absolute Neutrophils 1.8 1.6 - 8.3 10e3/uL    Absolute Lymphocytes 3.7 0.8 - 5.3 10e3/uL    Absolute Monocytes 0.3 0.0 - 1.3 10e3/uL    Absolute Eosinophils 0.0 0.0 - 0.7 10e3/uL    Absolute Basophils 0.1 0.0 - 0.2 10e3/uL    Absolute Immature Granulocytes 0.0 <=0.4 10e3/uL    Absolute NRBCs 0.0 10e3/uL             MEDICATIONS GIVEN IN THE EMERGENCY:  Medications   LORazepam (ATIVAN) injection 1 mg (1 mg Intravenous $Given 6/1/23 0755)   sodium chloride 0.9 % 1,000 mL with Infuvite Adult 10 mL, thiamine 100 mg, folic acid 1 mg infusion ( Intravenous $New Bag 6/1/23 6322)   LORazepam (ATIVAN) injection 1 mg (1 mg Intravenous $Given 6/1/23 8080)       NEW PRESCRIPTIONS STARTED AT TODAY'S ER VISIT  New Prescriptions    No medications on file        I, Tali Her, am serving as a scribe to document services  personally performed by Cali Solomon D.O., based on my observations and the provider's statements to me.  I, Cali Solomon D.O., attest that Tali Her is acting in a scribe capacity, has observed my performance of the services and has documented them in accordance with my direction.     Cali Solomon D.O.  Emergency Medicine  [facility] EMERGENCY DEPARTMENT  [address] [place] [id number] [phone]  Dept: [phone]       Cali Solomon,   06/02/23 0040

## 2023-06-02 NOTE — ED NOTES
.EMERGENCY DEPARTMENT SIGN OUT NOTE        ED COURSE AND MEDICAL DECISION MAKING  Patient was signed out to me by Dr Cali Solomon at 12:50 AM    In brief, Nikhil Rios is a 37 year old male with a medical history of alcohol abuse, alcohol withdrawal, HLD, HTN, depressive disorder who initially presented for alcohol intoxication.  He nearly finished a 1.75 liter of alcohol today around 5 PM. The patient notes he has been struggling and has been drinking daily. He endorsed having abdominal pain. He denied any other medical complaints or concerns at this time.    At time of sign out, disposition was pending with plan for reassessment following sobering in the ER.   3:57 AM patient reporting withdrawal symptoms however CIWA score is negative and patient appears still intoxicated.  He did report some nausea and was given Compazine as well as another liter of fluid.  We will continue with plan for sobering.  6:13 a.m. patient reassessed and clinically sober now.  Patient ambulating on his own in the past with no neurologic deficits and does not wish to seek detox.  Patient will call for a ride home and did have some slight tremoring.  Will be given an oral dose of Valium however I am concerned about his use at home if he is to start drinking again and will hold off further benzodiazepines for home if he does not choose to go to detox.  Patient will call his mother for a ride home.    FINAL IMPRESSION    1. Alcoholic intoxication without complication (H)      ED MEDS  Medications   diazepam (VALIUM) tablet 5 mg (has no administration in time range)   LORazepam (ATIVAN) injection 1 mg (1 mg Intravenous $Given 6/1/23 2136)   sodium chloride 0.9 % 1,000 mL with Infuvite Adult 10 mL, thiamine 100 mg, folic acid 1 mg infusion ( Intravenous $New Bag 6/1/23 2158)   LORazepam (ATIVAN) injection 1 mg (1 mg Intravenous $Given 6/1/23 9519)   prochlorperazine (COMPAZINE) injection 10 mg (10 mg Intravenous $Given 6/2/23 6164)   0.9%  sodium chloride BOLUS (0 mLs Intravenous Stopped 6/2/23 3605)     LAB  Labs Ordered and Resulted from Time of ED Arrival to Time of ED Departure   BASIC METABOLIC PANEL - Abnormal       Result Value    Sodium 141      Potassium 4.3      Chloride 100      Carbon Dioxide (CO2) 26      Anion Gap 15      Urea Nitrogen 11.9      Creatinine 0.93      Calcium 9.2      Glucose 105 (*)     GFR Estimate >90     HEPATIC FUNCTION PANEL - Abnormal    Protein Total 7.8      Albumin 5.0      Bilirubin Total 0.3      Alkaline Phosphatase 76       (*)     ALT 85 (*)     Bilirubin Direct <0.20     ETHYL ALCOHOL LEVEL - Abnormal    Alcohol ethyl 0.48 (*)    CBC WITH PLATELETS AND DIFFERENTIAL - Abnormal    WBC Count 5.9      RBC Count 5.19      Hemoglobin 15.1      Hematocrit 44.1      MCV 85      MCH 29.1      MCHC 34.2      RDW 15.2 (*)     Platelet Count 349      % Neutrophils 30      % Lymphocytes 62      % Monocytes 6      % Eosinophils 1      % Basophils 1      % Immature Granulocytes 0      NRBCs per 100 WBC 0      Absolute Neutrophils 1.8      Absolute Lymphocytes 3.7      Absolute Monocytes 0.3      Absolute Eosinophils 0.0      Absolute Basophils 0.1      Absolute Immature Granulocytes 0.0      Absolute NRBCs 0.0     LIPASE - Normal    Lipase 51       RADIOLOGY    No orders to display       DISCHARGE MEDS  New Prescriptions    ONDANSETRON (ZOFRAN ODT) 4 MG ODT TAB    Take 1 tablet (4 mg) by mouth every 6 hours as needed for nausea     Sean Magana MD  [facility] EMERGENCY DEPARTMENT  [address] [place] [id number] [phone]     Sean Magana MD  06/02/23 0689

## 2023-06-03 ENCOUNTER — HOSPITAL ENCOUNTER (EMERGENCY)
Facility: HOSPITAL | Age: 38
Discharge: HOME | End: 2023-06-03
Attending: EMERGENCY MEDICINE | Admitting: EMERGENCY MEDICINE
Payer: COMMERCIAL

## 2023-06-03 ENCOUNTER — HOSPITAL ENCOUNTER (INPATIENT)
Facility: CLINIC | Age: 38
LOS: 2 days | Discharge: HOME | DRG: 896 | End: 2023-06-05
Attending: EMERGENCY MEDICINE | Admitting: FAMILY MEDICINE
Payer: COMMERCIAL

## 2023-06-03 VITALS
TEMPERATURE: 98.4 F | HEART RATE: 94 BPM | SYSTOLIC BLOOD PRESSURE: 128 MMHG | RESPIRATION RATE: 22 BRPM | DIASTOLIC BLOOD PRESSURE: 83 MMHG | OXYGEN SATURATION: 96 %

## 2023-06-03 DIAGNOSIS — F10.20 ALCOHOL USE DISORDER, SEVERE, DEPENDENCE (H): ICD-10-CM

## 2023-06-03 DIAGNOSIS — F10.920 ALCOHOLIC INTOXICATION WITHOUT COMPLICATION (H): ICD-10-CM

## 2023-06-03 DIAGNOSIS — F10.230 ALCOHOL DEPENDENCE WITH UNCOMPLICATED WITHDRAWAL (H): ICD-10-CM

## 2023-06-03 DIAGNOSIS — R79.89 ABNORMAL LIVER FUNCTION TESTS: ICD-10-CM

## 2023-06-03 DIAGNOSIS — R79.89 ELEVATED LFTS: ICD-10-CM

## 2023-06-03 DIAGNOSIS — Z11.52 ENCOUNTER FOR SCREENING LABORATORY TESTING FOR COVID-19 VIRUS: ICD-10-CM

## 2023-06-03 DIAGNOSIS — F41.1 GENERALIZED ANXIETY DISORDER: Chronic | ICD-10-CM

## 2023-06-03 DIAGNOSIS — F10.930 ALCOHOL WITHDRAWAL SYNDROME WITHOUT COMPLICATION (H): Primary | ICD-10-CM

## 2023-06-03 DIAGNOSIS — F33.1 MODERATE EPISODE OF RECURRENT MAJOR DEPRESSIVE DISORDER (H): ICD-10-CM

## 2023-06-03 PROBLEM — F10.10 ALCOHOL ABUSE: Status: RESOLVED | Noted: 2021-04-01 | Resolved: 2023-06-03

## 2023-06-03 PROBLEM — F10.239 ALCOHOL DEPENDENCE WITH WITHDRAWAL (H): Status: RESOLVED | Noted: 2019-05-18 | Resolved: 2023-06-03

## 2023-06-03 PROBLEM — G47.9 SLEEP DIFFICULTIES: Status: RESOLVED | Noted: 2022-07-22 | Resolved: 2023-06-03

## 2023-06-03 PROBLEM — F19.94 SUBSTANCE INDUCED MOOD DISORDER (H): Status: RESOLVED | Noted: 2022-07-22 | Resolved: 2023-06-03

## 2023-06-03 PROBLEM — Z92.89 S/P ALCOHOL DETOXIFICATION: Status: RESOLVED | Noted: 2019-07-16 | Resolved: 2023-06-03

## 2023-06-03 PROBLEM — F10.929 ALCOHOLIC INTOXICATION (H): Status: RESOLVED | Noted: 2020-10-25 | Resolved: 2023-06-03

## 2023-06-03 PROBLEM — F10.939: Status: RESOLVED | Noted: 2019-03-27 | Resolved: 2023-06-03

## 2023-06-03 PROBLEM — F10.220 ACUTE ALCOHOLIC INTOXICATION IN ALCOHOLISM WITHOUT COMPLICATION (H): Status: RESOLVED | Noted: 2022-06-05 | Resolved: 2023-06-03

## 2023-06-03 LAB
ALBUMIN SERPL BCG-MCNC: 4.6 G/DL (ref 3.5–5.2)
ALCOHOL BREATH TEST: 0.18 (ref 0–0.01)
ALP SERPL-CCNC: 62 U/L (ref 40–129)
ALT SERPL W P-5'-P-CCNC: 85 U/L (ref 10–50)
AMPHETAMINES UR QL SCN: ABNORMAL
ANION GAP SERPL CALCULATED.3IONS-SCNC: 13 MMOL/L (ref 7–15)
AST SERPL W P-5'-P-CCNC: 116 U/L (ref 10–50)
BARBITURATES UR QL SCN: ABNORMAL
BASOPHILS # BLD AUTO: 0 10E3/UL (ref 0–0.2)
BASOPHILS NFR BLD AUTO: 1 %
BENZODIAZ UR QL SCN: ABNORMAL
BILIRUB SERPL-MCNC: 0.4 MG/DL
BUN SERPL-MCNC: 10.2 MG/DL (ref 6–20)
BZE UR QL SCN: ABNORMAL
CALCIUM SERPL-MCNC: 9.1 MG/DL (ref 8.6–10)
CANNABINOIDS UR QL SCN: ABNORMAL
CHLORIDE SERPL-SCNC: 105 MMOL/L (ref 98–107)
CREAT SERPL-MCNC: 0.73 MG/DL (ref 0.67–1.17)
DEPRECATED HCO3 PLAS-SCNC: 27 MMOL/L (ref 22–29)
EOSINOPHIL # BLD AUTO: 0.1 10E3/UL (ref 0–0.7)
EOSINOPHIL NFR BLD AUTO: 2 %
ERYTHROCYTE [DISTWIDTH] IN BLOOD BY AUTOMATED COUNT: 14.9 % (ref 10–15)
ETHANOL SERPL-MCNC: 0.42 G/DL
ETHANOL UR QL SCN: ABNORMAL
FERRITIN SERPL-MCNC: 387 NG/ML (ref 31–409)
GFR SERPL CREATININE-BSD FRML MDRD: >90 ML/MIN/1.73M2
GLUCOSE SERPL-MCNC: 105 MG/DL (ref 70–99)
HCT VFR BLD AUTO: 36.9 % (ref 40–53)
HGB BLD-MCNC: 12.8 G/DL (ref 13.3–17.7)
HOLD SPECIMEN: 0
HOLD SPECIMEN: NORMAL
IMM GRANULOCYTES # BLD: 0 10E3/UL
IMM GRANULOCYTES NFR BLD: 0 %
LIPASE SERPL-CCNC: 63 U/L (ref 13–60)
LYMPHOCYTES # BLD AUTO: 2 10E3/UL (ref 0.8–5.3)
LYMPHOCYTES NFR BLD AUTO: 41 %
MAGNESIUM SERPL-MCNC: 2.6 MG/DL (ref 1.7–2.3)
MCH RBC QN AUTO: 29.4 PG (ref 26.5–33)
MCHC RBC AUTO-ENTMCNC: 34.7 G/DL (ref 31.5–36.5)
MCV RBC AUTO: 85 FL (ref 78–100)
MONOCYTES # BLD AUTO: 0.4 10E3/UL (ref 0–1.3)
MONOCYTES NFR BLD AUTO: 9 %
NEUTROPHILS # BLD AUTO: 2.3 10E3/UL (ref 1.6–8.3)
NEUTROPHILS NFR BLD AUTO: 47 %
NRBC # BLD AUTO: 0 10E3/UL
NRBC BLD AUTO-RTO: 0 /100
OPIATES UR QL SCN: ABNORMAL
PHOSPHATE SERPL-MCNC: 3 MG/DL (ref 2.5–4.5)
PLATELET # BLD AUTO: 239 10E3/UL (ref 150–450)
POTASSIUM SERPL-SCNC: 3.8 MMOL/L (ref 3.4–5.3)
PROT SERPL-MCNC: 6.7 G/DL (ref 6.4–8.3)
RBC # BLD AUTO: 4.36 10E6/UL (ref 4.4–5.9)
SARS-COV-2 RNA RESP QL NAA+PROBE: NEGATIVE
SODIUM SERPL-SCNC: 145 MMOL/L (ref 136–145)
VIT B12 SERPL-MCNC: 579 PG/ML (ref 232–1245)
WBC # BLD AUTO: 4.9 10E3/UL (ref 4–11)

## 2023-06-03 PROCEDURE — 250N000013 HC RX MED GY IP 250 OP 250 PS 637: Performed by: EMERGENCY MEDICINE

## 2023-06-03 PROCEDURE — 250N000013 HC RX MED GY IP 250 OP 250 PS 637: Performed by: STUDENT IN AN ORGANIZED HEALTH CARE EDUCATION/TRAINING PROGRAM

## 2023-06-03 PROCEDURE — 82607 VITAMIN B-12: CPT

## 2023-06-03 PROCEDURE — 99283 EMERGENCY DEPT VISIT LOW MDM: CPT

## 2023-06-03 PROCEDURE — 80307 DRUG TEST PRSMV CHEM ANLYZR: CPT | Performed by: EMERGENCY MEDICINE

## 2023-06-03 PROCEDURE — 84100 ASSAY OF PHOSPHORUS: CPT

## 2023-06-03 PROCEDURE — 250N000013 HC RX MED GY IP 250 OP 250 PS 637

## 2023-06-03 PROCEDURE — 250N000011 HC RX IP 250 OP 636

## 2023-06-03 PROCEDURE — 120N000002 HC R&B MED SURG/OB UMMC

## 2023-06-03 PROCEDURE — 80053 COMPREHEN METABOLIC PANEL: CPT | Performed by: EMERGENCY MEDICINE

## 2023-06-03 PROCEDURE — 36415 COLL VENOUS BLD VENIPUNCTURE: CPT | Performed by: EMERGENCY MEDICINE

## 2023-06-03 PROCEDURE — HZ2ZZZZ DETOXIFICATION SERVICES FOR SUBSTANCE ABUSE TREATMENT: ICD-10-PCS | Performed by: EMERGENCY MEDICINE

## 2023-06-03 PROCEDURE — 82075 ASSAY OF BREATH ETHANOL: CPT | Performed by: EMERGENCY MEDICINE

## 2023-06-03 PROCEDURE — 83735 ASSAY OF MAGNESIUM: CPT

## 2023-06-03 PROCEDURE — 250N000013 HC RX MED GY IP 250 OP 250 PS 637: Performed by: FAMILY MEDICINE

## 2023-06-03 PROCEDURE — 87635 SARS-COV-2 COVID-19 AMP PRB: CPT | Performed by: EMERGENCY MEDICINE

## 2023-06-03 PROCEDURE — 99285 EMERGENCY DEPT VISIT HI MDM: CPT | Mod: 25 | Performed by: EMERGENCY MEDICINE

## 2023-06-03 PROCEDURE — 99223 1ST HOSP IP/OBS HIGH 75: CPT | Mod: AI | Performed by: FAMILY MEDICINE

## 2023-06-03 PROCEDURE — C9803 HOPD COVID-19 SPEC COLLECT: HCPCS | Performed by: EMERGENCY MEDICINE

## 2023-06-03 PROCEDURE — 258N000003 HC RX IP 258 OP 636

## 2023-06-03 PROCEDURE — 99285 EMERGENCY DEPT VISIT HI MDM: CPT | Performed by: EMERGENCY MEDICINE

## 2023-06-03 PROCEDURE — 83690 ASSAY OF LIPASE: CPT | Performed by: EMERGENCY MEDICINE

## 2023-06-03 PROCEDURE — 85025 COMPLETE CBC W/AUTO DIFF WBC: CPT | Performed by: EMERGENCY MEDICINE

## 2023-06-03 PROCEDURE — 82728 ASSAY OF FERRITIN: CPT

## 2023-06-03 PROCEDURE — 82077 ASSAY SPEC XCP UR&BREATH IA: CPT | Performed by: EMERGENCY MEDICINE

## 2023-06-03 PROCEDURE — 250N000011 HC RX IP 250 OP 636: Performed by: EMERGENCY MEDICINE

## 2023-06-03 RX ORDER — ONDANSETRON 2 MG/ML
4 INJECTION INTRAMUSCULAR; INTRAVENOUS EVERY 6 HOURS PRN
Status: DISCONTINUED | OUTPATIENT
Start: 2023-06-03 | End: 2023-06-05 | Stop reason: HOSPADM

## 2023-06-03 RX ORDER — AMOXICILLIN 250 MG
1 CAPSULE ORAL 2 TIMES DAILY PRN
Status: DISCONTINUED | OUTPATIENT
Start: 2023-06-03 | End: 2023-06-05 | Stop reason: HOSPADM

## 2023-06-03 RX ORDER — GABAPENTIN 300 MG/1
900 CAPSULE ORAL EVERY 8 HOURS
Status: DISCONTINUED | OUTPATIENT
Start: 2023-06-03 | End: 2023-06-05 | Stop reason: HOSPADM

## 2023-06-03 RX ORDER — IBUPROFEN 600 MG/1
600 TABLET, FILM COATED ORAL EVERY 6 HOURS PRN
Status: DISCONTINUED | OUTPATIENT
Start: 2023-06-03 | End: 2023-06-05 | Stop reason: HOSPADM

## 2023-06-03 RX ORDER — LIDOCAINE 40 MG/G
CREAM TOPICAL
Status: DISCONTINUED | OUTPATIENT
Start: 2023-06-03 | End: 2023-06-05 | Stop reason: HOSPADM

## 2023-06-03 RX ORDER — FLUMAZENIL 0.1 MG/ML
0.2 INJECTION, SOLUTION INTRAVENOUS
Status: DISCONTINUED | OUTPATIENT
Start: 2023-06-03 | End: 2023-06-05 | Stop reason: HOSPADM

## 2023-06-03 RX ORDER — DIAZEPAM 10 MG/2ML
5-10 INJECTION, SOLUTION INTRAMUSCULAR; INTRAVENOUS EVERY 30 MIN PRN
Status: DISCONTINUED | OUTPATIENT
Start: 2023-06-03 | End: 2023-06-05 | Stop reason: HOSPADM

## 2023-06-03 RX ORDER — ONDANSETRON 4 MG/1
4 TABLET, FILM COATED ORAL EVERY 6 HOURS PRN
Status: DISCONTINUED | OUTPATIENT
Start: 2023-06-03 | End: 2023-06-03

## 2023-06-03 RX ORDER — PROCHLORPERAZINE 25 MG
25 SUPPOSITORY, RECTAL RECTAL EVERY 12 HOURS PRN
Status: DISCONTINUED | OUTPATIENT
Start: 2023-06-03 | End: 2023-06-05 | Stop reason: HOSPADM

## 2023-06-03 RX ORDER — SODIUM CHLORIDE, SODIUM LACTATE, POTASSIUM CHLORIDE, CALCIUM CHLORIDE 600; 310; 30; 20 MG/100ML; MG/100ML; MG/100ML; MG/100ML
INJECTION, SOLUTION INTRAVENOUS CONTINUOUS
Status: DISCONTINUED | OUTPATIENT
Start: 2023-06-03 | End: 2023-06-04

## 2023-06-03 RX ORDER — ONDANSETRON 4 MG/1
4 TABLET, ORALLY DISINTEGRATING ORAL ONCE
Status: COMPLETED | OUTPATIENT
Start: 2023-06-03 | End: 2023-06-03

## 2023-06-03 RX ORDER — HYDROXYZINE HYDROCHLORIDE 50 MG/1
50 TABLET, FILM COATED ORAL EVERY 6 HOURS PRN
Status: DISCONTINUED | OUTPATIENT
Start: 2023-06-03 | End: 2023-06-05 | Stop reason: HOSPADM

## 2023-06-03 RX ORDER — SUCRALFATE 1 G/1
1 TABLET ORAL 4 TIMES DAILY
Status: DISCONTINUED | OUTPATIENT
Start: 2023-06-03 | End: 2023-06-05 | Stop reason: HOSPADM

## 2023-06-03 RX ORDER — CLONIDINE HYDROCHLORIDE 0.1 MG/1
0.1 TABLET ORAL EVERY 8 HOURS
Status: DISCONTINUED | OUTPATIENT
Start: 2023-06-03 | End: 2023-06-05 | Stop reason: HOSPADM

## 2023-06-03 RX ORDER — OLANZAPINE 5 MG/1
5-10 TABLET, ORALLY DISINTEGRATING ORAL EVERY 6 HOURS PRN
Status: DISCONTINUED | OUTPATIENT
Start: 2023-06-03 | End: 2023-06-05 | Stop reason: HOSPADM

## 2023-06-03 RX ORDER — MULTIPLE VITAMINS W/ MINERALS TAB 9MG-400MCG
1 TAB ORAL DAILY
Status: DISCONTINUED | OUTPATIENT
Start: 2023-06-03 | End: 2023-06-03

## 2023-06-03 RX ORDER — GABAPENTIN 300 MG/1
300 CAPSULE ORAL EVERY 8 HOURS
Status: DISCONTINUED | OUTPATIENT
Start: 2023-06-08 | End: 2023-06-05 | Stop reason: HOSPADM

## 2023-06-03 RX ORDER — MULTIPLE VITAMINS W/ MINERALS TAB 9MG-400MCG
1 TAB ORAL DAILY
Status: DISCONTINUED | OUTPATIENT
Start: 2023-06-04 | End: 2023-06-05 | Stop reason: HOSPADM

## 2023-06-03 RX ORDER — DIAZEPAM 10 MG
10 TABLET ORAL EVERY 30 MIN PRN
Status: DISCONTINUED | OUTPATIENT
Start: 2023-06-03 | End: 2023-06-05 | Stop reason: HOSPADM

## 2023-06-03 RX ORDER — ACETAMINOPHEN 650 MG/1
650 SUPPOSITORY RECTAL EVERY 6 HOURS PRN
Status: DISCONTINUED | OUTPATIENT
Start: 2023-06-03 | End: 2023-06-05 | Stop reason: HOSPADM

## 2023-06-03 RX ORDER — GABAPENTIN 100 MG/1
100 CAPSULE ORAL EVERY 8 HOURS
Status: DISCONTINUED | OUTPATIENT
Start: 2023-06-10 | End: 2023-06-05 | Stop reason: HOSPADM

## 2023-06-03 RX ORDER — PANTOPRAZOLE SODIUM 40 MG/1
40 TABLET, DELAYED RELEASE ORAL
Status: DISCONTINUED | OUTPATIENT
Start: 2023-06-03 | End: 2023-06-05 | Stop reason: HOSPADM

## 2023-06-03 RX ORDER — AMOXICILLIN 250 MG
2 CAPSULE ORAL 2 TIMES DAILY PRN
Status: DISCONTINUED | OUTPATIENT
Start: 2023-06-03 | End: 2023-06-05 | Stop reason: HOSPADM

## 2023-06-03 RX ORDER — GABAPENTIN 600 MG/1
1200 TABLET ORAL ONCE
Status: COMPLETED | OUTPATIENT
Start: 2023-06-03 | End: 2023-06-03

## 2023-06-03 RX ORDER — POLYETHYLENE GLYCOL 3350 17 G/17G
17 POWDER, FOR SOLUTION ORAL DAILY
Status: DISCONTINUED | OUTPATIENT
Start: 2023-06-03 | End: 2023-06-05 | Stop reason: HOSPADM

## 2023-06-03 RX ORDER — PROCHLORPERAZINE MALEATE 5 MG
10 TABLET ORAL EVERY 6 HOURS PRN
Status: DISCONTINUED | OUTPATIENT
Start: 2023-06-03 | End: 2023-06-05 | Stop reason: HOSPADM

## 2023-06-03 RX ORDER — DIAZEPAM 5 MG
5-20 TABLET ORAL EVERY 30 MIN PRN
Status: DISCONTINUED | OUTPATIENT
Start: 2023-06-03 | End: 2023-06-03

## 2023-06-03 RX ORDER — FOLIC ACID 1 MG/1
1 TABLET ORAL DAILY
Status: DISCONTINUED | OUTPATIENT
Start: 2023-06-03 | End: 2023-06-03

## 2023-06-03 RX ORDER — HYDROXYZINE HYDROCHLORIDE 25 MG/1
25 TABLET, FILM COATED ORAL EVERY 6 HOURS PRN
Status: DISCONTINUED | OUTPATIENT
Start: 2023-06-03 | End: 2023-06-05 | Stop reason: HOSPADM

## 2023-06-03 RX ORDER — FOLIC ACID 1 MG/1
1 TABLET ORAL DAILY
Status: DISCONTINUED | OUTPATIENT
Start: 2023-06-04 | End: 2023-06-05 | Stop reason: HOSPADM

## 2023-06-03 RX ORDER — HALOPERIDOL 5 MG/ML
2.5-5 INJECTION INTRAMUSCULAR EVERY 6 HOURS PRN
Status: DISCONTINUED | OUTPATIENT
Start: 2023-06-03 | End: 2023-06-05 | Stop reason: HOSPADM

## 2023-06-03 RX ORDER — GABAPENTIN 300 MG/1
600 CAPSULE ORAL EVERY 8 HOURS
Status: DISCONTINUED | OUTPATIENT
Start: 2023-06-06 | End: 2023-06-05 | Stop reason: HOSPADM

## 2023-06-03 RX ORDER — ONDANSETRON 4 MG/1
4 TABLET, ORALLY DISINTEGRATING ORAL EVERY 6 HOURS PRN
Status: DISCONTINUED | OUTPATIENT
Start: 2023-06-03 | End: 2023-06-05 | Stop reason: HOSPADM

## 2023-06-03 RX ORDER — ACETAMINOPHEN 325 MG/1
650 TABLET ORAL EVERY 6 HOURS PRN
Status: DISCONTINUED | OUTPATIENT
Start: 2023-06-03 | End: 2023-06-05 | Stop reason: HOSPADM

## 2023-06-03 RX ADMIN — DIAZEPAM 10 MG: 10 TABLET ORAL at 15:54

## 2023-06-03 RX ADMIN — THIAMINE HCL TAB 100 MG 100 MG: 100 TAB at 09:38

## 2023-06-03 RX ADMIN — DIAZEPAM 10 MG: 5 TABLET ORAL at 09:38

## 2023-06-03 RX ADMIN — DIAZEPAM 10 MG: 5 TABLET ORAL at 09:08

## 2023-06-03 RX ADMIN — DIAZEPAM 10 MG: 10 TABLET ORAL at 20:31

## 2023-06-03 RX ADMIN — SODIUM CHLORIDE, POTASSIUM CHLORIDE, SODIUM LACTATE AND CALCIUM CHLORIDE: 600; 310; 30; 20 INJECTION, SOLUTION INTRAVENOUS at 17:53

## 2023-06-03 RX ADMIN — ACETAMINOPHEN 650 MG: 325 TABLET ORAL at 22:45

## 2023-06-03 RX ADMIN — DIAZEPAM 10 MG: 5 TABLET ORAL at 10:23

## 2023-06-03 RX ADMIN — GABAPENTIN 1200 MG: 600 TABLET, FILM COATED ORAL at 15:11

## 2023-06-03 RX ADMIN — CLONIDINE HYDROCHLORIDE 0.1 MG: 0.1 TABLET ORAL at 21:43

## 2023-06-03 RX ADMIN — PANTOPRAZOLE SODIUM 40 MG: 40 TABLET, DELAYED RELEASE ORAL at 17:11

## 2023-06-03 RX ADMIN — DIAZEPAM 10 MG: 5 TABLET ORAL at 14:12

## 2023-06-03 RX ADMIN — DIAZEPAM 10 MG: 10 TABLET ORAL at 22:31

## 2023-06-03 RX ADMIN — MULTIPLE VITAMINS W/ MINERALS TAB 1 TABLET: TAB at 09:38

## 2023-06-03 RX ADMIN — FOLIC ACID 1 MG: 1 TABLET ORAL at 09:38

## 2023-06-03 RX ADMIN — DIAZEPAM 10 MG: 5 TABLET ORAL at 11:30

## 2023-06-03 RX ADMIN — POLYETHYLENE GLYCOL 3350 17 G: 17 POWDER, FOR SOLUTION ORAL at 15:09

## 2023-06-03 RX ADMIN — HYDROXYZINE HYDROCHLORIDE 25 MG: 25 TABLET, FILM COATED ORAL at 21:43

## 2023-06-03 RX ADMIN — DIAZEPAM 10 MG: 10 TABLET ORAL at 23:59

## 2023-06-03 RX ADMIN — ONDANSETRON 4 MG: 4 TABLET, ORALLY DISINTEGRATING ORAL at 03:24

## 2023-06-03 RX ADMIN — FOLIC ACID: 5 INJECTION, SOLUTION INTRAMUSCULAR; INTRAVENOUS; SUBCUTANEOUS at 15:09

## 2023-06-03 RX ADMIN — CLONIDINE HYDROCHLORIDE 0.1 MG: 0.1 TABLET ORAL at 15:11

## 2023-06-03 RX ADMIN — GABAPENTIN 900 MG: 300 CAPSULE ORAL at 22:31

## 2023-06-03 RX ADMIN — SUCRALFATE 1 G: 1 TABLET ORAL at 16:05

## 2023-06-03 RX ADMIN — SUCRALFATE 1 G: 1 TABLET ORAL at 20:31

## 2023-06-03 ASSESSMENT — ACTIVITIES OF DAILY LIVING (ADL)
ADLS_ACUITY_SCORE: 37
ADLS_ACUITY_SCORE: 35
ADLS_ACUITY_SCORE: 37
ADLS_ACUITY_SCORE: 27
ADLS_ACUITY_SCORE: 37
ADLS_ACUITY_SCORE: 37

## 2023-06-03 NOTE — ED PROVIDER NOTES
EMERGENCY DEPARTMENT ENCOUNTER      NAME: Nikhil Rios  AGE: 37 year old male  YOB: 1985  MRN: [id number]  EVALUATION DATE & TIME: 6/3/2023 12:23 AM    PCP: Lisandro Graham    ED PROVIDER: Cali Solomon D.O.      Chief Complaint   Patient presents with     Alcohol Intoxication       FINAL IMPRESSION:  1. Alcoholic intoxication without complication (H)        ED COURSE & MEDICAL DECISION MAKIN:32 AM I met with the patient to gather history and to perform my initial exam. I discussed the plan for care while in the Emergency Department.  12:51 AM Rechecked on the patient, suggested detox at [place].  1:18 PM Patient attempted to leave AMA.  1:48 AM Lab informed about patient alcohol ethyl of 0.42.        Pertinent Labs & Imaging studies reviewed. (See chart for details)  37 year old male presents to the Emergency Department for evaluation of alcohol intoxication.  Patient has been seen multiple times for this in the past.  He was showing no signs of withdrawal here.  I spent a large amount of time with the patient trying to convince him to let us find a chemical dependency unit or detox facility to admit/transfer him to.  Unfortunately, the patient was not amenable to this.  He did request discharge home.  He has sobered up some in the emergency department, and his mother does feel comfortable taking him back to her home.  He does have plan for inpatient rehab in 2 weeks.  Return precautions were discussed.    Medical Decision Making    History:    Supplemental history from: Documented in chart, if applicable and Family Member/Significant Other    External Record(s) reviewed: Documented in chart, if applicable. and Other: Chart Reviewed [facility] ED note 23    Work Up:    Chart documentation includes differential considered and any EKGs or imaging independently interpreted by provider, where specified.    In additional to work up documented, I considered the following work up: Documented in  "chart, if applicable.    External consultation:    Discussion of management with another provider: Documented in chart, if applicable    Complicating factors:    Care impacted by chronic illness: Hyperlipidemia, Hypertension, Mental Health and Other: Alcohol abuse    Care affected by social determinants of health: Alcohol Abuse and/or Recreational Drug Use    Disposition considerations: Discharge. No recommendations on prescription strength medication(s). I recommended admission, but the patient declined.        At the conclusion of the encounter I discussed the results of all of the tests and the disposition. The questions were answered. The patient or family acknowledged understanding and was agreeable with the care plan.      HPI    Patient information was obtained from: Patient     Use of : N/A        Nikhil Rios is a 37 year old male who presents via wheelchair in for evaluation of alcohol intoxication.     Per Chart Review: Patient presented to [facility] ED on 06/01/23 for alcohol intoxication. He was clinically sober and ambulating independently with no neurologic deficits and does not wish to seek detox. Discharged home. If he is to start drinking again and will hold off further benzodiazepines for home if he does not choose to go to detox.    Per mom, the patient drank about 0.75 L of alcohol. He reports that he is in withdrawal. Mom states that the patient had some alcohol to drink to \"numb the pain\". Denies any other medical concerns.    REVIEW OF SYSTEMS  Constitutional:  Denies fever, chills, weight loss or weakness. Positive for alcohol intoxication.   Eyes:  No pain, discharge, redness  HENT:  Denies sore throat, ear pain, congestion  Respiratory: No SOB, wheeze or cough  Cardiovascular:  No CP, palpitations  GI:  Denies abdominal pain, nausea, vomiting, diarrhea  : Denies dysuria, hematuria  Musculoskeletal:  Denies any new muscle/joint pain, swelling or loss of function.  Skin:  Denies " rash, pallor  Neurologic:  Denies headache, focal weakness or sensory changes  Lymph: Denies swollen nodes    All other systems negative unless noted in HPI.    PAST MEDICAL HISTORY:  Past Medical History:   Diagnosis Date     Alcohol abuse      Alcohol abuse      Alcohol withdrawal (H)      Depressive disorder      HLD (hyperlipidemia)      HTN (hypertension)        PAST SURGICAL HISTORY:  Past Surgical History:   Procedure Laterality Date     NO HISTORY OF SURGERY       OTHER SURGICAL HISTORY      none         CURRENT MEDICATIONS:    No current facility-administered medications for this encounter.     Current Outpatient Medications   Medication     omeprazole (PRILOSEC) 20 MG DR capsule     ondansetron (ZOFRAN ODT) 4 MG ODT tab     sucralfate (CARAFATE) 1 GM tablet     Facility-Administered Medications Ordered in Other Encounters   Medication     Self Administer Medications: Behavioral Services         ALLERGIES:  Allergies   Allergen Reactions     Gramineae Pollens Itching     Pollen Extract Rash     grass tree pollen       FAMILY HISTORY:  Family History   Problem Relation Age of Onset     Coronary Artery Disease Father        SOCIAL HISTORY:  Social History     Socioeconomic History     Marital status: Single   Occupational History     Occupation: Rental property owner   Tobacco Use     Smoking status: Some Days     Packs/day: 0.25     Types: Cigars, Cigarettes     Smokeless tobacco: Never     Tobacco comments:     occasional   Substance and Sexual Activity     Alcohol use: Yes     Alcohol/week: 17.0 standard drinks of alcohol     Types: 17 Shots of liquor per week     Comment: Drinks 750ml/day or 17 shots/day; hx of withdrawl seizures     Drug use: Not Currently     Social Determinants of Health     Transportation Needs: No Transportation Needs (7/21/2021)    PRAPARE - Transportation      Lack of Transportation (Medical): No      Lack of Transportation (Non-Medical): No   Stress: Stress Concern Present  (7/21/2021)    [employer] - Occupational Stress Questionnaire      Feeling of Stress : Very much   Housing Stability: Unknown (7/21/2021)    Housing Stability Vital Sign      Unable to Pay for Housing in the Last Year: No      Unstable Housing in the Last Year: No       VITALS:  Patient Vitals for the past 24 hrs:   BP Temp Temp src Pulse Resp SpO2   06/03/23 0324 128/83 -- -- -- -- 96 %   06/03/23 0021 (!) 176/92 -- -- -- -- --   06/03/23 0018 -- 98.4  F (36.9  C) Temporal 94 22 100 %       PHYSICAL EXAM    VITAL SIGNS: /83   Pulse 94   Temp 98.4  F (36.9  C) (Temporal)   Resp 22   SpO2 96%     General Appearance: Intoxicated, well-nourished, no acute distress   Head:  Normocephalic, without obvious abnormality, atraumatic  Eyes:  PERRL, conjunctiva/corneas clear, EOM's intact,  ENT:  Lips, mucosa, and tongue normal, membranes are moist without pallor  Neck:  Normal ROM, symmetrical, trachea midline    Cardio:  Regular rate and rhythm, no murmur, rub or gallop, 2+ pulses symmetric in all extremities  Pulm:  Clear to auscultation bilaterally, respirations unlabored,  Abdomen:  Soft, non-tender, no rebound or guarding.  Musculoskeletal: Full ROM, no edema, no cyanosis, good ROM of major joints  Integument:  Warm, Dry, No erythema, No rash.    Neurologic:  Intoxicated & oriented.  No focal deficits appreciated.  Ambulatory.      LABS  Results for orders placed or performed during the hospital encounter of 06/03/23 (from the past 24 hour(s))   Alcohol level blood   Result Value Ref Range    Alcohol ethyl 0.42 (HH) <=0.01 g/dL         RADIOLOGY  No orders to display        MEDICATIONS GIVEN IN THE EMERGENCY:  Medications   ondansetron (ZOFRAN ODT) ODT tab 4 mg (4 mg Oral $Given 6/3/23 0324)       NEW PRESCRIPTIONS STARTED AT TODAY'S ER VISIT  Discharge Medication List as of 6/3/2023  3:22 AM           I, Tali Her, am serving as a scribe to document services personally performed  by Cali Solomon D.O., based on my observations and the provider's statements to me.  I, Cali Solomon D.O., attest that Tali Her  is acting in a scribe capacity, has observed my performance of the services and has documented them in accordance with my direction.     Cali Solomon D.O.  Emergency Medicine  [facility] EMERGENCY DEPARTMENT  [address] [place] [id number]  [phone]  Dept: [phone]      Cali Solomon,   06/03/23 0355

## 2023-06-03 NOTE — MEDICATION SCRIBE - ADMISSION MEDICATION HISTORY
Medication Scribe Admission Medication History    Admission medication history is complete. The information provided in this note is only as accurate as the sources available at the time of the update.    Medication reconciliation/reorder completed by provider prior to medication history? No    Information Source(s): Patient via in-person    Pertinent Information: none    Changes made to PTA medication list:    Added: None    Deleted: zofran    Changed: None    Medication Affordability: no       Allergies reviewed with patient and updates made in EHR: yes    Medication History Completed By: Michell Alfonso 6/3/2023 12:49 PM    Prior to Admission medications    Medication Sig Last Dose Taking? Auth Provider Long Term End Date   omeprazole (PRILOSEC) 20 MG DR capsule Take 2 capsules (40 mg) by mouth daily 6/2/2023 Yes Justo Moreland MD     ondansetron (ZOFRAN ODT) 4 MG ODT tab Take 1 tablet (4 mg) by mouth every 6 hours as needed for nausea 6/2/2023 Yes Sean Magana MD  6/9/23   sucralfate (CARAFATE) 1 GM tablet Take 1 tablet (1 g) by mouth 4 times daily 6/2/2023 Yes Justo Moreland MD

## 2023-06-03 NOTE — ED PROVIDER NOTES
"ED Provider Note  [facility]      History     Chief Complaint   Patient presents with     Drug / Alcohol Assessment     Seeking help for alcohol withdrawal, does not want detox     The history is provided by the patient and medical records.   Drug / Alcohol Assessment      Nikhil Rios is a 37 year old male with history of severe alcohol use disorder and alcohol withdrawal seizure presenting to the ED due to alcohol withdrawal.  Patient states that he is presenting here because \"it was here at the liquor store\".  Patient reports that his alcohol use has been worsening in the past 3 months.  He has been drinking 1.75 L daily.  Last drink was approximately 13 hours prior to arrival.  He does report history of seizures and reports his last one was a few years ago.  He is now experiencing withdrawal symptoms including abdominal pain and vomiting.  He occasionally uses marijuana, but denies other drug use.  He states that he does not want to go to detox here, but plans to go to [place] where he has been several times previously.    Social: Patient is accompanied by his mother.    Of note, patient was seen around 12:30 AM at the [facility] ED for alcohol intoxication.  He was a 0.42 SHERITA at 1:30 AM.  No signs of withdrawal at that time.  A significant amount of time was spent with the patient trying to convince him to accept help in finding a chemical dependency unit or detox facility to admit/transfer him to, but patient was not amenable and did request discharge home.  He sobered some in the emergency department and his mother felt comfortable taking him home.  He reported having a plan for inpatient rehab and 2 weeks.    Past Medical History  Past Medical History:   Diagnosis Date     Alcohol abuse      Alcohol abuse      Alcohol withdrawal (H)      Depressive disorder      HLD (hyperlipidemia)      HTN (hypertension)      Past Surgical History:   Procedure Laterality Date     NO HISTORY OF " "SURGERY       OTHER SURGICAL HISTORY      none     omeprazole (PRILOSEC) 20 MG DR capsule  ondansetron (ZOFRAN ODT) 4 MG ODT tab  sucralfate (CARAFATE) 1 GM tablet      Allergies   Allergen Reactions     Gramineae Pollens Itching     Pollen Extract Rash     grass tree pollen     Family History  Family History   Problem Relation Age of Onset     Coronary Artery Disease Father      Social History   Social History     Tobacco Use     Smoking status: Some Days     Packs/day: 0.25     Types: Cigars, Cigarettes     Smokeless tobacco: Never     Tobacco comments:     occasional   Substance Use Topics     Alcohol use: Yes     Alcohol/week: 17.0 standard drinks of alcohol     Types: 17 Shots of liquor per week     Comment: Drinks 750ml/day or 17 shots/day; hx of withdrawl seizures     Drug use: Not Currently     Types: Marijuana         A medically appropriate review of systems was performed with pertinent positives and negatives noted in the HPI, and all other systems negative.    Physical Exam   BP: 138/83  Pulse: 92  Temp: 98.3  F (36.8  C)  Resp: 16  Height: 177.8 cm (5' 10\")  Weight: 75.3 kg (166 lb)  SpO2: 98 %  Physical Exam  Physical Exam   Constitutional:   well nourished, well developed, appears agitated and intoxicated  HENT:   Head: Normocephalic and atraumatic.   Eyes: Conjunctivae are normal. Pupils are equal, round, and reactive to light.   Cardiovascular: regular rate and rhythm without murmurs or gallops  Pulmonary/Chest: Clear to auscultation bilaterally, with no wheezes or retractions. No respiratory distress.  GI: Soft with good bowel sounds.  Non-tender, non-distended, with no guarding, no rebound, no peritoneal signs.   Back:  No bony or CVA tenderness   Musculoskeletal:  no edema  Skin: Skin is warm and dry. No rash noted.   Neurological: alert and oriented to person, place, and time. Nonfocal exam, speech is slurred, patient appears somewhat agitated and is writhing around the cot  Psychiatric:  Speech " is slurred Judgment and thought content normal. mood appears flat. Patient is not agitated, not aggressive, not hyperactive, not actively hallucinating and not combative. Thought content is not paranoid and not delusional. Cognition and memory are normal. No homicidal and no suicidal ideation.       ED Course, Procedures, & Data      Procedures                     Results for orders placed or performed during the hospital encounter of 06/03/23   [place] Draw     Status: None    Narrative    The following orders were created for panel order [place] Draw.  Procedure                               Abnormality         Status                     ---------                               -----------         ------                     Extra Green Top (Lithium...[[id number]]                      Final result               Extra Purple Top Tube[[id number]]                            Final result                 Please view results for these tests on the individual orders.   Extra Green Top (Lithium Heparin) Tube     Status: None   Result Value Ref Range    Hold Specimen JIC    Extra Purple Top Tube     Status: None   Result Value Ref Range    Hold Specimen 0    Comprehensive metabolic panel     Status: Abnormal   Result Value Ref Range    Sodium 145 136 - 145 mmol/L    Potassium 3.8 3.4 - 5.3 mmol/L    Chloride 105 98 - 107 mmol/L    Carbon Dioxide (CO2) 27 22 - 29 mmol/L    Anion Gap 13 7 - 15 mmol/L    Urea Nitrogen 10.2 6.0 - 20.0 mg/dL    Creatinine 0.73 0.67 - 1.17 mg/dL    Calcium 9.1 8.6 - 10.0 mg/dL    Glucose 105 (H) 70 - 99 mg/dL    Alkaline Phosphatase 62 40 - 129 U/L     (H) 10 - 50 U/L    ALT 85 (H) 10 - 50 U/L    Protein Total 6.7 6.4 - 8.3 g/dL    Albumin 4.6 3.5 - 5.2 g/dL    Bilirubin Total 0.4 <=1.2 mg/dL    GFR Estimate >90 >60 mL/min/1.73m2   Lipase     Status: Abnormal   Result Value Ref Range    Lipase 63 (H) 13 - 60 U/L   Drug abuse screen 6 urine (chem dep) ([facility])     Status: Abnormal   Result Value  Ref Range    Amphetamines Urine Screen Negative Screen Negative    Barbituates Urine Screen Negative Screen Negative    Benzodiazepine Urine Screen Positive (A) Screen Negative    Cannabinoids Urine Screen Negative Screen Negative    Cocaine Urine Screen Negative Screen Negative    Ethanol Urine Screen Positive (A) Screen Negative    Opiates Urine Screen Negative Screen Negative   CBC with platelets and differential     Status: Abnormal   Result Value Ref Range    WBC Count 4.9 4.0 - 11.0 10e3/uL    RBC Count 4.36 (L) 4.40 - 5.90 10e6/uL    Hemoglobin 12.8 (L) 13.3 - 17.7 g/dL    Hematocrit 36.9 (L) 40.0 - 53.0 %    MCV 85 78 - 100 fL    MCH 29.4 26.5 - 33.0 pg    MCHC 34.7 31.5 - 36.5 g/dL    RDW 14.9 10.0 - 15.0 %    Platelet Count 239 150 - 450 10e3/uL    % Neutrophils 47 %    % Lymphocytes 41 %    % Monocytes 9 %    % Eosinophils 2 %    % Basophils 1 %    % Immature Granulocytes 0 %    NRBCs per 100 WBC 0 <1 /100    Absolute Neutrophils 2.3 1.6 - 8.3 10e3/uL    Absolute Lymphocytes 2.0 0.8 - 5.3 10e3/uL    Absolute Monocytes 0.4 0.0 - 1.3 10e3/uL    Absolute Eosinophils 0.1 0.0 - 0.7 10e3/uL    Absolute Basophils 0.0 0.0 - 0.2 10e3/uL    Absolute Immature Granulocytes 0.0 <=0.4 10e3/uL    Absolute NRBCs 0.0 10e3/uL   Alcohol breath test POCT     Status: Abnormal   Result Value Ref Range    Alcohol Breath Test 0.183 (A) 0.00 - 0.01   CBC with platelets differential     Status: Abnormal    Narrative    The following orders were created for panel order CBC with platelets differential.  Procedure                               Abnormality         Status                     ---------                               -----------         ------                     CBC with platelets and d...[[id number]]  Abnormal            Final result                 Please view results for these tests on the individual orders.   Results for orders placed or performed during the hospital encounter of 06/03/23   Alcohol level blood      Status: Abnormal   Result Value Ref Range    Alcohol ethyl 0.42 (HH) <=0.01 g/dL     Medications   thiamine (B-1) tablet 100 mg (100 mg Oral $Given 6/3/23 0938)   folic acid (FOLVITE) tablet 1 mg (1 mg Oral $Given 6/3/23 0938)   multivitamin w/minerals (THERA-VIT-M) tablet 1 tablet (1 tablet Oral $Given 6/3/23 0938)   diazepam (VALIUM) tablet 5-20 mg (10 mg Oral $Given 6/3/23 1023)     Labs Ordered and Resulted from Time of ED Arrival to Time of ED Departure   COMPREHENSIVE METABOLIC PANEL - Abnormal       Result Value    Sodium 145      Potassium 3.8      Chloride 105      Carbon Dioxide (CO2) 27      Anion Gap 13      Urea Nitrogen 10.2      Creatinine 0.73      Calcium 9.1      Glucose 105 (*)     Alkaline Phosphatase 62       (*)     ALT 85 (*)     Protein Total 6.7      Albumin 4.6      Bilirubin Total 0.4      GFR Estimate >90     LIPASE - Abnormal    Lipase 63 (*)    DRUG ABUSE SCREEN 6 CHEM DEP URINE ([facility]) - Abnormal    Amphetamines Urine Screen Negative      Barbituates Urine Screen Negative      Benzodiazepine Urine Screen Positive (*)     Cannabinoids Urine Screen Negative      Cocaine Urine Screen Negative      Ethanol Urine Screen Positive (*)     Opiates Urine Screen Negative     CBC WITH PLATELETS AND DIFFERENTIAL - Abnormal    WBC Count 4.9      RBC Count 4.36 (*)     Hemoglobin 12.8 (*)     Hematocrit 36.9 (*)     MCV 85      MCH 29.4      MCHC 34.7      RDW 14.9      Platelet Count 239      % Neutrophils 47      % Lymphocytes 41      % Monocytes 9      % Eosinophils 2      % Basophils 1      % Immature Granulocytes 0      NRBCs per 100 WBC 0      Absolute Neutrophils 2.3      Absolute Lymphocytes 2.0      Absolute Monocytes 0.4      Absolute Eosinophils 0.1      Absolute Basophils 0.0      Absolute Immature Granulocytes 0.0      Absolute NRBCs 0.0     ALCOHOL BREATH TEST POCT - Abnormal    Alcohol Breath Test 0.183 (*)    COVID-19 VIRUS (CORONAVIRUS) BY PCR     No orders to display       "    Critical care was not performed.     Medical Decision Making  The patient's presentation was of high complexity (a chronic illness severe exacerbation, progression, or side effect of treatment).    The patient's evaluation involved:  review of external note(s) from 3+ sources (prior visits)  ordering and/or review of 3+ test(s) in this encounter (see separate area of note for details)    The patient's management necessitated moderate risk (prescription drug management including medications given in the ED) and high risk (a decision regarding hospitalization).      Assessment & Plan        I have reviewed the nursing notes.   Emergency Department course:  The patient was seen and examined at 0900 am.   Breathalyzer 0.183.  He was in [facility] earlier today with a blood alcohol content of 0.24.  He was requested discharge home and now presents to [facility] with his mother.    Laboratory studies show elevated LFTs, consistent with heavy alcohol intake.  AST is elevated at 116 and ALT at 85.  CBC shows anemia, with a hemoglobin of 12.8.  Lipase is slightly high at 63.  Urine drug screen is positive for benzodiazepines and ethanol.  Covid 19 is negative.    The patient was placed on the MSSA protocol for alcohol withdrawal.  He is receiving Valium.  I also treated him with thiamine, folic acid, multivitamins p.o.  He is refusing admission to our detox unit because he states that it is \"too depressing,\" but states he is willing to be admitted to the medicine service here at [facility]    Nikhil Rios is a 37 year old male with a history of alcohol abuse who presents with alcohol withdrawal.  He is currently (at 11:30 am), scoring 17 on the MSSA protocol.  He is on the MSSA protocol for alcohol withdrawal.  He will be admitted to the medicine service for further evaluation and treatment. I spoke with Dr. Etienne of medicine regarding admission.    I have reviewed the findings, diagnosis, plan and need " for follow up with the patient.    New Prescriptions    No medications on file       Final diagnoses:   Alcohol withdrawal syndrome without complication (H)   Elevated LFTs   I, Zoe Galeano, am serving as a trained medical scribe to document services personally performed by Allyn Garner MD, based on the provider's statements to me.     IAllyn MD, was physically present and have reviewed and verified the accuracy of this note documented by Zoe Galeano.    This note was created in part by the use of Dragon voice recognition dictation system. Inadvertent grammatical errors and typographical errors may still exist.  MD Allyn Montoya  [facility] located within [facility] EMERGENCY DEPARTMENT  6/3/2023     Allyn Garner MD  06/03/23 5330

## 2023-06-03 NOTE — ED TRIAGE NOTES
Has mid abd pain since 3am, vomiting x1     Triage Assessment     Row Name 06/03/23 0818       Triage Assessment (Adult)    Airway WDL WDL       Respiratory WDL    Respiratory WDL WDL       Skin Circulation/Temperature WDL    Skin Circulation/Temperature WDL WDL       Cardiac WDL    Cardiac WDL WDL       Peripheral/Neurovascular WDL    Peripheral Neurovascular WDL WDL       Cognitive/Neuro/Behavioral WDL    Cognitive/Neuro/Behavioral WDL WDL

## 2023-06-03 NOTE — ED TRIAGE NOTES
"    patient states he is in alcohol withdrawal, mother states she left from 6-8pm and returned and he states he had some to drink to \"numb\" the pain. But does not elaborate further.   "

## 2023-06-03 NOTE — ED NOTES
Writer discussed withdraw protocol with MD who recommended 10mg Valium after scoring him due to seizure concerns, also ordered Zofran.

## 2023-06-03 NOTE — PROGRESS NOTES
6MS ADMISSION    D: Patient transferred from [facility] ED for acute alcohol withdrawal and acute-on-chronic pancreatitis.     I: Patient was very sleepy upon arrival to the unit. Upon arrival to the unit patient was oriented to room, unit, and call light. Patient s height, weight, and vital signs were obtained. Allergies reviewed and allergy band applied. Provider notified of patient s arrival on the unit. Adult AVS completed. Head to toe assessment completed. Education assessment completed. Care plan initiated.    A: Vital signs stable upon admission. Patient denies pain. Two RN skin assessment completed  With second RN MARY BETH. Significant Skin Findings include abrasion at left knee and right forehead from recent fall.      P: Continue to monitor patient s CIWA and intervene as needed. Continue with plan of care. Notify provider with any concerns or changes in patient status.

## 2023-06-03 NOTE — H&P
[facility]    History and Physical - [facility]       Date of Admission:  6/3/2023    Assessment & Plan      Nikhil Rios is a 37 year old male PMH severe alcohol use disorder with history of withdrawal seizures, chronic calcific pancreatitis, GERD, HTN, HLD admitted on 6/3/2023 for acute alcohol withdrawal and acute-on-chronic pancreatitis.     # Alcohol use disorder, severe  # Acute alcohol withdrawal   # History of withdrawal seizures   Return to use one month ago after a few months of sobriety. Drinking 1.75mL vodka daily. Last drink 6/2 at 2000. Current withdrawal symptoms of anxiety and tremor. Normal vitals. Exam with BUE tremor and tongue fasciculations, otherwise A&Ox3 without asterixis. Labs with mild transaminitis (, ALT 85), otherwise reassuring with normal electrolytes. Reported history of withdrawal seizures. Per chart review, unwitnessed seizure prior to admission 8/2022. Declined Chemical Dependency evaluation at this time. Interested in rehabilitation after discharge.   - CIWA protocol with Diazepam   - Clonidine 0.1mg q8hr; hold SBP <90   - Gabapentin taper (currently 900 mg TID)   - Daily multivitamin, folic acid   - Wernicke's thiamine protocol   - Daily CBC, CMP, Mg, Phos   - Seizure precautions     # Abdominal pain, epigastric and RUQ   # Acute-on-chronic pancreatitis   # Chronic calcific pancreatitis   Patient reports epigastric and RUQ abdominal pain radiating to back and worsening after food intake. No vomiting or diarrhea. Afebrile with normal vitals. Exam with diffuse tenderness to palpation without guarding/rigidity, worst in epigastric and RUQ. Labs with mildly elevated lipase and AST/ALT. No leukocytosis. Recent CT (5/24) with chronic calcific pancreatitis. Suspect acute-on-chronic pancreatitis given consistent nature of pain. Would not expect significant rise in lipase for chronic pancreatitis. May also have  component of irritation from alcohol hepatitis. Less likely cholecystitis without Alk Phos elevation; shock liver/viral hepatitis; Tylenol ingestion with only mildly elevated LFT; or cardiac etiology with consistent history.   - NPO, ADAT  - LR @ 150 mL/hr (end at 0800; reevaluate need for fluids in AM)  - Pain control: Tylenol (max 2g daily), ibuprofen, heat/ice. If pain uncontrolled, could try Toradol. Avoid opioids given concurrent benzodiazepines through CIWA.    - If worsening pain or concern, consider RUQ US or evaluate for pseudocyst.       # Normocytic anemia   Likely secondary to poor nutrition in the setting of alcohol use disorder. HDS. No signs of acute bleed.   - Folate, B12, ferritin pending   - Daily CBC     # GERD: PTA Omeprazole 40 mg daily, Sucralfate 1g QID  # HLD: Not on PTA medications.   # HTN: Not on PTA medications.        Diet: NPO/ADAT  DVT Prophylaxis: Low Risk/Ambulatory with no VTE prophylaxis indicated  Mars Catheter: Not present  Fluids: LR @150mL/hr   Lines: None     Cardiac Monitoring: None  Code Status: Full Code    Clinically Significant Risk Factors Present on Admission                                Disposition Plan      Expected Discharge Date: 06/05/2023                The patient's care was discussed with the Attending Physician, Dr. Love.      Gisella Bob MD  [facility]  [facility]  Securely message with Re-[employer] (more info)  Text page via [facility] Paging/Directory   See signed in provider for up to date coverage information  ______________________________________________________________________    Chief Complaint   Alcohol withdrawal     History is obtained from the patient and chart     History of Present Illness   Nikhil Rios is a 37 year old male PMH severe alcohol use disorder with history of withdrawal seizures, chronic calcific pancreatitis, GERD, HTN, HLD admitted on 6/3/2023 for acute alcohol  "withdrawal. The patient reports return to alcohol use one month ago after a few months of sobriety in the setting of several life stressors including his father passing away. He has been drinking 1.75L alcohol daily. Last drink 6/2 at 2000. The patient endorses history of withdrawal seizures. Per chart review, he reported having a seizure prior to admission to [facility] 8/2020. The event was unwitnessed. He is currently experiencing withdrawal symptoms of anxiety and brain fog. He reports feeling \"like I want to crawl out of my skin\". The patient also endorses abdominal pain located to his RUQ and epigastric regions. He describes his pain as sharp, constant, and rated 8/10 in severity. It radiates to his back. It worsens with food intake. He feels his pain is similar to previous pancreatitis flares. The patient had a recent CT scan 5/24 for right-sided abdominal pain that showed chronic calcific pancreatitis but was otherwise unremarkable. He otherwise denies fevers, CP, vomiting, and diarrhea. He occasionally smokes cigarettes and smokes marijuana. No other recreational drug use.     ED COURSE:  Vitals: Afebrile with normal vitals.     Labs:  CBC: WBC 4.9 (WNL), HGB 12.8 (L; normocytic),  (WNL)  CMP:  (H), ALT 85 (H)  Lipase: 63 (H)  EtOH: 0.183  UDS: Positive for alcohol and benzodiazepine (recent administration in ED)    Imaging:   CT Abdomen (from 5/24):   1.  No etiology for symptoms evident. No definite inflammatory focus involving bowel, no obstruction.  2.  Chronic calcific pancreatitis, no acute inflammatory changes.    Interventions:  - Valium 10 mg     Past Medical History    Past Medical History:   Diagnosis Date     Alcohol abuse      Alcohol abuse      Alcohol withdrawal (H)      Depressive disorder      Family history of diabetes mellitus 11/9/2007     HLD (hyperlipidemia)      HTN (hypertension)        Past Surgical History   Past Surgical History:   Procedure Laterality Date     NO " HISTORY OF SURGERY       OTHER SURGICAL HISTORY      none       Prior to Admission Medications   Prior to Admission Medications   Prescriptions Last Dose Informant Patient Reported? Taking?   omeprazole (PRILOSEC) 20 MG DR capsule 6/2/2023  No Yes   Sig: Take 2 capsules (40 mg) by mouth daily   ondansetron (ZOFRAN ODT) 4 MG ODT tab 6/2/2023  No Yes   Sig: Take 1 tablet (4 mg) by mouth every 6 hours as needed for nausea   sucralfate (CARAFATE) 1 GM tablet 6/2/2023  No Yes   Sig: Take 1 tablet (1 g) by mouth 4 times daily      Facility-Administered Medications: None        Social History   I have reviewed this patient's social history and updated it with pertinent information if needed. The patient drinks 1.75L alcohol daily. Occasionally smokes cigarettes and smokes marijuana. No other recreational drug use.        Physical Exam   Vital Signs: Temp: 98.3  F (36.8  C) Temp src: Oral BP: 115/65 Pulse: 79   Resp: 18 SpO2: 98 % O2 Device: None (Room air)    Weight: 166 lbs 0 oz    Constitutional: Awake, alert, cooperative, anxious-appearing   Eyes: Lids and lashes normal, pupils equal, round and reactive to light, extra ocular muscles intact, conjunctiva normal, no scleral icterus   ENT: Normocephalic, without obvious abnormality, atraumatic, oral pharynx with moist mucous membranes, tonsils without erythema or exudates, gums normal and good dentition, tongue fasciculations.  Respiratory: No increased work of breathing, good air exchange, clear to auscultation bilaterally, no crackles or wheezing  Cardiovascular: Normal apical impulse, regular rate and rhythm, normal S1 and S2, no S3 or S4, and no murmur noted  GI: No scars, normal bowel sounds, soft, non-distended, diffuse tenderness to palpation worst in RUQ and epigastrium without guarding, no masses palpated, no hepatosplenomegally  Musculoskeletal: There is no redness, warmth, or swelling of the joints.  Full range of motion noted.  Motor strength is 5 out of 5 all  extremities bilaterally.  Tone is normal.  Neurologic: Awake, alert, oriented to name, place and time.  Cranial nerves II-XII are grossly intact.  Motor is 5 out of 5 bilaterally. BUE tremor.  Neuropsychiatric: Anxious-appearing.     Medical Decision Making   Please see A&P for additional details of medical decision making.      Data   ------------------------- PAST 24 HR DATA REVIEWED -----------------------------------------------    I have personally reviewed the following data over the past 24 hrs:    4.9  \   12.8 (L)   / 239     145 105 10.2 /  105 (H)   3.8 27 0.73 \       ALT: 85 (H) AST: 116 (H) AP: 62 TBILI: 0.4   ALB: 4.6 TOT PROTEIN: 6.7 LIPASE: 63 (H)       Imaging results reviewed over the past 24 hrs:   No results found for this or any previous visit (from the past 24 hour(s)).

## 2023-06-04 LAB
ALBUMIN SERPL BCG-MCNC: 4.4 G/DL (ref 3.5–5.2)
ALP SERPL-CCNC: 65 U/L (ref 40–129)
ALT SERPL W P-5'-P-CCNC: 82 U/L (ref 10–50)
ANION GAP SERPL CALCULATED.3IONS-SCNC: 8 MMOL/L (ref 7–15)
AST SERPL W P-5'-P-CCNC: 91 U/L (ref 10–50)
BILIRUB SERPL-MCNC: 1 MG/DL
BUN SERPL-MCNC: 11.9 MG/DL (ref 6–20)
CALCIUM SERPL-MCNC: 10 MG/DL (ref 8.6–10)
CHLORIDE SERPL-SCNC: 99 MMOL/L (ref 98–107)
CREAT SERPL-MCNC: 0.77 MG/DL (ref 0.67–1.17)
DEPRECATED HCO3 PLAS-SCNC: 30 MMOL/L (ref 22–29)
ERYTHROCYTE [DISTWIDTH] IN BLOOD BY AUTOMATED COUNT: 15.3 % (ref 10–15)
FOLATE SERPL-MCNC: >40 NG/ML (ref 4.6–34.8)
GFR SERPL CREATININE-BSD FRML MDRD: >90 ML/MIN/1.73M2
GLUCOSE SERPL-MCNC: 91 MG/DL (ref 70–99)
HCT VFR BLD AUTO: 37.4 % (ref 40–53)
HGB BLD-MCNC: 12.5 G/DL (ref 13.3–17.7)
MAGNESIUM SERPL-MCNC: 2.6 MG/DL (ref 1.7–2.3)
MCH RBC QN AUTO: 29.3 PG (ref 26.5–33)
MCHC RBC AUTO-ENTMCNC: 33.4 G/DL (ref 31.5–36.5)
MCV RBC AUTO: 88 FL (ref 78–100)
PHOSPHATE SERPL-MCNC: 3.5 MG/DL (ref 2.5–4.5)
PLATELET # BLD AUTO: 190 10E3/UL (ref 150–450)
POTASSIUM SERPL-SCNC: 4.7 MMOL/L (ref 3.4–5.3)
PROT SERPL-MCNC: 6.6 G/DL (ref 6.4–8.3)
RBC # BLD AUTO: 4.27 10E6/UL (ref 4.4–5.9)
SODIUM SERPL-SCNC: 137 MMOL/L (ref 136–145)
WBC # BLD AUTO: 6.3 10E3/UL (ref 4–11)

## 2023-06-04 PROCEDURE — 250N000013 HC RX MED GY IP 250 OP 250 PS 637: Performed by: FAMILY MEDICINE

## 2023-06-04 PROCEDURE — 99232 SBSQ HOSP IP/OBS MODERATE 35: CPT | Mod: GC | Performed by: FAMILY MEDICINE

## 2023-06-04 PROCEDURE — 82310 ASSAY OF CALCIUM: CPT

## 2023-06-04 PROCEDURE — 83735 ASSAY OF MAGNESIUM: CPT

## 2023-06-04 PROCEDURE — 250N000013 HC RX MED GY IP 250 OP 250 PS 637

## 2023-06-04 PROCEDURE — 84100 ASSAY OF PHOSPHORUS: CPT

## 2023-06-04 PROCEDURE — 120N000002 HC R&B MED SURG/OB UMMC

## 2023-06-04 PROCEDURE — 36415 COLL VENOUS BLD VENIPUNCTURE: CPT

## 2023-06-04 PROCEDURE — 258N000003 HC RX IP 258 OP 636

## 2023-06-04 PROCEDURE — 85027 COMPLETE CBC AUTOMATED: CPT

## 2023-06-04 PROCEDURE — 82746 ASSAY OF FOLIC ACID SERUM: CPT

## 2023-06-04 RX ADMIN — POLYETHYLENE GLYCOL 3350 17 G: 17 POWDER, FOR SOLUTION ORAL at 08:24

## 2023-06-04 RX ADMIN — FOLIC ACID 1 MG: 1 TABLET ORAL at 08:24

## 2023-06-04 RX ADMIN — MULTIPLE VITAMINS W/ MINERALS TAB 1 TABLET: TAB at 08:24

## 2023-06-04 RX ADMIN — CLONIDINE HYDROCHLORIDE 0.1 MG: 0.1 TABLET ORAL at 14:27

## 2023-06-04 RX ADMIN — GABAPENTIN 900 MG: 300 CAPSULE ORAL at 08:24

## 2023-06-04 RX ADMIN — SUCRALFATE 1 G: 1 TABLET ORAL at 17:19

## 2023-06-04 RX ADMIN — SUCRALFATE 1 G: 1 TABLET ORAL at 14:27

## 2023-06-04 RX ADMIN — CLONIDINE HYDROCHLORIDE 0.1 MG: 0.1 TABLET ORAL at 22:04

## 2023-06-04 RX ADMIN — NICOTINE POLACRILEX 2 MG: 2 GUM, CHEWING BUCCAL at 12:33

## 2023-06-04 RX ADMIN — CLONIDINE HYDROCHLORIDE 0.1 MG: 0.1 TABLET ORAL at 08:24

## 2023-06-04 RX ADMIN — PANTOPRAZOLE SODIUM 40 MG: 40 TABLET, DELAYED RELEASE ORAL at 08:24

## 2023-06-04 RX ADMIN — THIAMINE HCL TAB 100 MG 100 MG: 100 TAB at 08:24

## 2023-06-04 RX ADMIN — SODIUM CHLORIDE, POTASSIUM CHLORIDE, SODIUM LACTATE AND CALCIUM CHLORIDE: 600; 310; 30; 20 INJECTION, SOLUTION INTRAVENOUS at 08:35

## 2023-06-04 RX ADMIN — GABAPENTIN 900 MG: 300 CAPSULE ORAL at 17:19

## 2023-06-04 RX ADMIN — DIAZEPAM 10 MG: 10 TABLET ORAL at 08:35

## 2023-06-04 RX ADMIN — PANTOPRAZOLE SODIUM 40 MG: 40 TABLET, DELAYED RELEASE ORAL at 17:19

## 2023-06-04 RX ADMIN — GABAPENTIN 900 MG: 300 CAPSULE ORAL at 22:04

## 2023-06-04 RX ADMIN — SUCRALFATE 1 G: 1 TABLET ORAL at 22:04

## 2023-06-04 RX ADMIN — SUCRALFATE 1 G: 1 TABLET ORAL at 08:24

## 2023-06-04 ASSESSMENT — ACTIVITIES OF DAILY LIVING (ADL)
ADLS_ACUITY_SCORE: 20
ADLS_ACUITY_SCORE: 23
ADLS_ACUITY_SCORE: 23
ADLS_ACUITY_SCORE: 27
ADLS_ACUITY_SCORE: 23
ADLS_ACUITY_SCORE: 23
ADLS_ACUITY_SCORE: 20
ADLS_ACUITY_SCORE: 20
ADLS_ACUITY_SCORE: 27

## 2023-06-04 NOTE — PROGRESS NOTES
[facility]    Progress Note - [facility]       Date of Admission:  6/3/2023    Plan for today:  - Continue CIWA protocol  - Advance to clear liquid diet  - PT consult  - Nicotine gum  - Chem Dep consult declined  - Discontinue LR    Assessment & Plan   Nikhil Rios is a 37 year old male PMH severe alcohol use disorder with history of withdrawal seizures, chronic calcific pancreatitis, GERD, HTN, HLD admitted on 6/3/2023 for acute alcohol withdrawal and acute-on-chronic pancreatitis.      # Alcohol use disorder, severe  # Acute alcohol withdrawal   # History of withdrawal seizures   Return to use one month ago after a few months of sobriety. Drinking 1.75mL vodka daily. Last drink 6/2 at 2000. Admission withdrawal symptoms of anxiety and tremor, nl vitals and BUE tremor and tongue fasciculations on exam. Labs with mild transaminitis (, ALT 85), otherwise reassuring. Reported history of withdrawal seizures , although per chart review, unwitnessed seizure prior to admission 8/2022. Declined Chemical Dependency evaluation at this time. Interested in rehabilitation after discharge. 6/3 required 90 mg of diazepam over 24 hours per CIWA protocol. Tremors have improved substantially, and Nikhil is mentating well.  - CIWA protocol with Diazepam   - Clonidine 0.1mg q8hr; hold SBP <90   - Gabapentin taper (currently 900 mg TID)   - Daily multivitamin, folic acid   - Wernicke's thiamine protocol   - Daily CBC, CMP, Mg, Phos   - Seizure precautions      # Abdominal pain, epigastric and RUQ   # Acute-on-chronic alcohol-induced pancreatitis   # Chronic calcific pancreatitis   Patient reports that his epigastric and RUQ abdominal pain has improved substantially. No vomiting or diarrhea after drinking a cup of lukewarm water. Abdominal exam was unremarkable. AST/ALT are both downtrending. No leukocytosis. Recent CT (5/24) with chronic calcific pancreatitis.  Suspect acute-on-chronic pancreatitis given consistent nature of pain.   - ADAT  - Discontinue LR @ 150 mL/hr  - Pain control: Tylenol (max 2g daily), ibuprofen, heat/ice. If pain uncontrolled, could try Toradol. Avoid opioids given concurrent benzodiazepines through CIWA.    - If worsening pain or concern, consider RUQ US or evaluate for pseudocyst.        # Normocytic anemia   Likely secondary to poor nutrition in the setting of alcohol use disorder. HDS. No signs of acute bleed. Folic acid >40. Vitamin B12 and Ferritin WNL.  - Daily CBC      # GERD: PTA Omeprazole 40 mg daily, Sucralfate 1g QID  # HLD: Not on PTA medications.   # HTN: Not on PTA medications.      Diet: Advance Diet as Tolerated: Full Liquid Diet    DVT Prophylaxis: Low Risk/Ambulatory with no VTE prophylaxis indicated  Mars Catheter: Not present  Fluids: PO  Lines: None     Cardiac Monitoring: None  Code Status: Full Code      Clinically Significant Risk Factors     Disposition Plan      Expected Discharge Date: 06/06/2023                The patient's care was discussed with the Attending Physician, Dr. Love .    Tejal Padilla DO, MPH  [facility]  [facility]  Securely message with Web[employer] (more info)  Text page via [facility] Paging/Directory   See signed in provider for up to date coverage information  ______________________________________________________________________    Interval History   Nikhil reports that his pain has improved substantially. He reported that he drank a cup of lukewarm water without vomiting or diarrhea. However, he said that his pain increased to a 5/10. He is interested in starting EMDR to process his historical trauma. Nikhil says that every May is difficult for him, and he often relapses during that month. When he was 18 years old, he witnessed his father collapse from a heart attack and tried to resuscitate him, but his father passed away on May 19th. Nikhil  blames himself for his father's death and the guilt appears to induce his relapses. Notes that his tremors have improved as well.    Physical Exam   Vital Signs: Temp: 97.3  F (36.3  C) Temp src: Oral BP: 124/81 Pulse: 56   Resp: 20 SpO2: 99 % O2 Device: None (Room air)    Weight: 167 lbs 15.85 oz    Physical Exam  Vitals reviewed.   Constitutional:       Appearance: Normal appearance.   HENT:      Head: Normocephalic and atraumatic.      Nose: Nose normal.      Mouth/Throat:      Mouth: Mucous membranes are moist.      Pharynx: Oropharynx is clear.   Eyes:      Extraocular Movements: Extraocular movements intact.      Conjunctiva/sclera: Conjunctivae normal.   Cardiovascular:      Rate and Rhythm: Normal rate and regular rhythm.      Heart sounds: Normal heart sounds.   Pulmonary:      Effort: Pulmonary effort is normal.      Breath sounds: Normal breath sounds.   Abdominal:      General: There is no distension.      Palpations: Abdomen is soft.      Tenderness: There is no abdominal tenderness. There is no guarding or rebound.   Musculoskeletal:         General: Normal range of motion.      Cervical back: Normal range of motion and neck supple.      Right lower leg: No edema.      Left lower leg: No edema.   Skin:     General: Skin is warm and dry.   Neurological:      Mental Status: He is alert and oriented to person, place, and time.   Psychiatric:         Mood and Affect: Affect is tearful.         Data     I have personally reviewed the following data over the past 24 hrs:    6.3  \   12.5 (L)   / 190     137 99 11.9 /  91   4.7 30 (H) 0.77 \       ALT: 82 (H) AST: 91 (H) AP: 65 TBILI: 1.0   ALB: 4.4 TOT PROTEIN: 6.6 LIPASE: N/A       Ferritin:  N/A % Retic:  N/A LDH:  N/A       Imaging results reviewed over the past 24 hrs:   No results found for this or any previous visit (from the past 24 hour(s)).

## 2023-06-04 NOTE — PROGRESS NOTES
"  VS: /80 (BP Location: Right arm)   Pulse 68   Temp 98.2  F (36.8  C) (Oral)   Resp 18   Ht 1.778 m (5' 10\")   Wt 76.2 kg (167 lb 15.9 oz)   SpO2 97%   BMI 24.10 kg/m     O2: Stable on RA   Output: Voids spontaneously without difficulty.    Last BM: Bowel sounds normoactive. Pt not sure when LBM.    Activity: SBA    Skin: All visible skin intact.    Pain: Pt denies pain.    CMS: WDL except tingling related to ETOH withdrawal.    Dressing: N/A   Diet: NPO    LDA: R PIV infusing 150ml/hr LR.    Equipment: IV pole, call light and pt belongings in reach       Plan: Follow POC.    Additional Info: CIWA scores for the shift:    @2350 13  @0040 5  @0110 1  @1521 1    Pt reports since having been to a tx center his tolerance for Valium has increased.     "

## 2023-06-04 NOTE — PROGRESS NOTES
"[id number]    Plan of Care Reviewed With: Patient    Overall Patient Progress: No Progress    Outcome Evaluation: Pt is A & O x4 on RA. C/O 5/10 generalized abdominal pain. Denies nausea, chest pain & SOB. Pt has L PIV continuously infusing LR @ 150 ml/hr. Pt is SBA, voids spontaneously & uses call light appropriately.    Shift Updates    CIWA score at 0829 was 9 - administered 10 mg of Valium. Rescore at 1030 @ 1430 was 5.    Vitals: /86 (BP Location: Right arm)   Pulse 56   Temp 97.3  F (36.3  C) (Oral)   Resp 20   Ht 1.778 m (5' 10\")   Wt 76.2 kg (167 lb 15.9 oz)   SpO2 99%   BMI 24.10 kg/m      Plan: TBD, possible discharge 6/5. Continue w/ plan of care.   "

## 2023-06-05 VITALS
HEIGHT: 70 IN | DIASTOLIC BLOOD PRESSURE: 79 MMHG | BODY MASS INDEX: 24.05 KG/M2 | TEMPERATURE: 98.2 F | SYSTOLIC BLOOD PRESSURE: 116 MMHG | OXYGEN SATURATION: 97 % | WEIGHT: 167.99 LBS | HEART RATE: 56 BPM | RESPIRATION RATE: 17 BRPM

## 2023-06-05 LAB
ALBUMIN SERPL BCG-MCNC: 4.4 G/DL (ref 3.5–5.2)
ALP SERPL-CCNC: 64 U/L (ref 40–129)
ALT SERPL W P-5'-P-CCNC: 73 U/L (ref 10–50)
ANION GAP SERPL CALCULATED.3IONS-SCNC: 11 MMOL/L (ref 7–15)
AST SERPL W P-5'-P-CCNC: 61 U/L (ref 10–50)
BILIRUB SERPL-MCNC: 0.6 MG/DL
BUN SERPL-MCNC: 11.1 MG/DL (ref 6–20)
CALCIUM SERPL-MCNC: 9.9 MG/DL (ref 8.6–10)
CHLORIDE SERPL-SCNC: 104 MMOL/L (ref 98–107)
CREAT SERPL-MCNC: 0.7 MG/DL (ref 0.67–1.17)
DEPRECATED HCO3 PLAS-SCNC: 25 MMOL/L (ref 22–29)
ERYTHROCYTE [DISTWIDTH] IN BLOOD BY AUTOMATED COUNT: 15.1 % (ref 10–15)
GFR SERPL CREATININE-BSD FRML MDRD: >90 ML/MIN/1.73M2
GLUCOSE SERPL-MCNC: 111 MG/DL (ref 70–99)
HCT VFR BLD AUTO: 38.4 % (ref 40–53)
HGB BLD-MCNC: 13.1 G/DL (ref 13.3–17.7)
MAGNESIUM SERPL-MCNC: 2.6 MG/DL (ref 1.7–2.3)
MCH RBC QN AUTO: 29.4 PG (ref 26.5–33)
MCHC RBC AUTO-ENTMCNC: 34.1 G/DL (ref 31.5–36.5)
MCV RBC AUTO: 86 FL (ref 78–100)
PHOSPHATE SERPL-MCNC: 4.6 MG/DL (ref 2.5–4.5)
PLATELET # BLD AUTO: 186 10E3/UL (ref 150–450)
POTASSIUM SERPL-SCNC: 4 MMOL/L (ref 3.4–5.3)
PROT SERPL-MCNC: 6.4 G/DL (ref 6.4–8.3)
RBC # BLD AUTO: 4.45 10E6/UL (ref 4.4–5.9)
SODIUM SERPL-SCNC: 140 MMOL/L (ref 136–145)
WBC # BLD AUTO: 5.1 10E3/UL (ref 4–11)

## 2023-06-05 PROCEDURE — 99239 HOSP IP/OBS DSCHRG MGMT >30: CPT | Mod: GC | Performed by: FAMILY MEDICINE

## 2023-06-05 PROCEDURE — 80053 COMPREHEN METABOLIC PANEL: CPT

## 2023-06-05 PROCEDURE — 250N000013 HC RX MED GY IP 250 OP 250 PS 637: Performed by: FAMILY MEDICINE

## 2023-06-05 PROCEDURE — 84100 ASSAY OF PHOSPHORUS: CPT

## 2023-06-05 PROCEDURE — 999N000147 HC STATISTIC PT IP EVAL DEFER

## 2023-06-05 PROCEDURE — 83735 ASSAY OF MAGNESIUM: CPT

## 2023-06-05 PROCEDURE — 250N000013 HC RX MED GY IP 250 OP 250 PS 637

## 2023-06-05 PROCEDURE — 36415 COLL VENOUS BLD VENIPUNCTURE: CPT

## 2023-06-05 PROCEDURE — 85014 HEMATOCRIT: CPT

## 2023-06-05 RX ORDER — GABAPENTIN 300 MG/1
300 CAPSULE ORAL EVERY 8 HOURS
Qty: 6 CAPSULE | Refills: 0 | OUTPATIENT
Start: 2023-06-08 | End: 2024-08-08

## 2023-06-05 RX ORDER — GABAPENTIN 300 MG/1
900 CAPSULE ORAL EVERY 8 HOURS
Qty: 3 CAPSULE | Refills: 0 | OUTPATIENT
Start: 2023-06-05 | End: 2024-08-08

## 2023-06-05 RX ORDER — GABAPENTIN 100 MG/1
100 CAPSULE ORAL EVERY 8 HOURS
Qty: 9 CAPSULE | Refills: 0 | OUTPATIENT
Start: 2023-06-10 | End: 2024-08-08

## 2023-06-05 RX ORDER — GABAPENTIN 300 MG/1
CAPSULE ORAL
Qty: 27 CAPSULE | Refills: 0 | Status: ON HOLD | OUTPATIENT
Start: 2023-06-05 | End: 2023-06-18

## 2023-06-05 RX ORDER — GABAPENTIN 100 MG/1
100 CAPSULE ORAL EVERY 8 HOURS
Qty: 6 CAPSULE | Refills: 0 | Status: ON HOLD | OUTPATIENT
Start: 2023-06-10 | End: 2023-06-18

## 2023-06-05 RX ORDER — GABAPENTIN 300 MG/1
600 CAPSULE ORAL EVERY 8 HOURS
Qty: 12 CAPSULE | Refills: 0 | OUTPATIENT
Start: 2023-06-06 | End: 2024-08-08

## 2023-06-05 RX ORDER — HYDROXYZINE HYDROCHLORIDE 25 MG/1
25-50 TABLET, FILM COATED ORAL 3 TIMES DAILY PRN
Qty: 30 TABLET | Refills: 0 | Status: ON HOLD | OUTPATIENT
Start: 2023-06-05 | End: 2023-06-20

## 2023-06-05 RX ADMIN — GABAPENTIN 900 MG: 300 CAPSULE ORAL at 16:43

## 2023-06-05 RX ADMIN — CLONIDINE HYDROCHLORIDE 0.1 MG: 0.1 TABLET ORAL at 16:44

## 2023-06-05 RX ADMIN — SUCRALFATE 1 G: 1 TABLET ORAL at 08:10

## 2023-06-05 RX ADMIN — MULTIPLE VITAMINS W/ MINERALS TAB 1 TABLET: TAB at 08:10

## 2023-06-05 RX ADMIN — GABAPENTIN 900 MG: 300 CAPSULE ORAL at 08:09

## 2023-06-05 RX ADMIN — FOLIC ACID 1 MG: 1 TABLET ORAL at 08:09

## 2023-06-05 RX ADMIN — CLONIDINE HYDROCHLORIDE 0.1 MG: 0.1 TABLET ORAL at 08:10

## 2023-06-05 RX ADMIN — POLYETHYLENE GLYCOL 3350 17 G: 17 POWDER, FOR SOLUTION ORAL at 08:10

## 2023-06-05 RX ADMIN — SUCRALFATE 1 G: 1 TABLET ORAL at 16:43

## 2023-06-05 RX ADMIN — PANTOPRAZOLE SODIUM 40 MG: 40 TABLET, DELAYED RELEASE ORAL at 08:10

## 2023-06-05 RX ADMIN — THIAMINE HCL TAB 100 MG 100 MG: 100 TAB at 08:10

## 2023-06-05 RX ADMIN — PANTOPRAZOLE SODIUM 40 MG: 40 TABLET, DELAYED RELEASE ORAL at 16:43

## 2023-06-05 ASSESSMENT — ACTIVITIES OF DAILY LIVING (ADL)
ADLS_ACUITY_SCORE: 20

## 2023-06-05 NOTE — PROGRESS NOTES
Patient is alert and oriented x 4, calm, cooperative and able to make needs known effectively. No anxiety noted this shift, states that he feeling and doing better than yesterday.     Independent in room with mobility and cares.Seizure precautions in place.Continent of bowel and bladder, denies any concerns.      Regular Diet tolerating well.  Denies SOB, numbness/tingling abdominal pain, nausea, or discomfort.     CIWA monitoring done, scored 0.      PIV saline locked at left lower arm.      Patient on RN managed standard Magnesium and Potassium replacement protocol. No replacements needed today.    Plan: TBD, possible discharge 6/5. Continue w/ plan of care

## 2023-06-05 NOTE — PLAN OF CARE
PT: PT orders received.  At this time pt is IND in room and halls.  Able to ambulate without assist or loss of balance, good strength noted.  At this time pt with no inpatient PT needs, PT orders completed.

## 2023-06-05 NOTE — PROGRESS NOTES
"Nutrition Services.    RD consulted to see pt re: \"alcohol withdrawal.\"  Pt discussed with team in interdisciplinary care rounds.  With plan to discharge soon, Diana[facility] planned to discontinue the RD consult today.      RD available for future needs.     Alis Hoffman) Shayan ALMEIDA, LD   6B and 8A Med/surg (M-F) Pager: [phone]  Weekend/holiday pager: [phone]    "

## 2023-06-05 NOTE — PROGRESS NOTES
[id number]    Patient is alert and oriented x 4, calm, cooperative and able to make needs known effectively. No anxiety noted this shift, states that he feeling and doing better than yesterday.    Independent in room with mobility and cares.Seizure precautions in place.Continent of bowel and bladder, denies any concerns.     Diet changed to regular diet from full liquid diet. Patient tolerated well. Ate 100%. Denies abdominal pain, nausea, or discomfort.    CIWA monitoring done, scored 0.     PIV saline locked at left lower arm. Continuous LR infusion discontinued.     Patient on RN managed standard Magnesium and Potassium replacement protocol. No replacements needed today. Lab draw scheduled for tomorrow morning.      Plan: TBD, possible discharge 6/5. Continue w/ plan of care.

## 2023-06-05 NOTE — PROGRESS NOTES
"Care Management Discharge Note    Discharge Date: 06/06/2023       Discharge Disposition: Home    Discharge Services: None    Discharge DME: None    Discharge Transportation: family or friend will provide    Private pay costs discussed: Not applicable    Does the patient's insurance plan have a 3 day qualifying hospital stay waiver?  No    PAS Confirmation Code:  NA  Patient/family educated on Medicare website which has current facility and service quality ratings:  NA    Education Provided on the Discharge Plan:  Yes  Persons Notified of Discharge Plans: Patient, bedside nurse, charge nurse   Patient/Family in Agreement with the Plan: yes    Handoff Referral Completed: No    Additional Information:       Per notes from Dr. Garner on 6/3- \"Nikhil Rios is a 37 year old male with history of severe alcohol use disorder and alcohol withdrawal seizure presenting to the ED due to alcohol withdrawal.  Patient states that he is presenting here because \"it was here at the liquor store\".  Patient reports that his alcohol use has been worsening in the past 3 months.  He has been drinking 1.75 L daily.  Last drink was approximately 13 hours prior to arrival.  He does report history of seizures and reports his last one was a few years ago.  He is now experiencing withdrawal symptoms including abdominal pain and vomiting.  He occasionally uses marijuana, but denies other drug use.  He states that he does not want to go to detox here, but plans to go to [place] where he has been several times previously.\"    He stated his mother can help him get a ride to his home around 4pm today after she is done working. He is open to a ride through his insurance plan also.    RANJAN Godinez, LGSW  6 Med Surg   [facility]  Phone: [phone]  Pager: [phone]          "

## 2023-06-05 NOTE — DISCHARGE SUMMARY
[facility]  Discharge Summary - Medicine & Pediatrics       Date of Admission:  6/3/2023  Date of Discharge:  6/5/2023  4:50 PM  Discharging Provider: Dr. Love and Dr. Bob   Discharge Service: [facility]    Discharge Diagnoses     # Alcohol use disorder, severe  # Acute alcohol withdrawal, improved   # History of withdrawal seizures, unwitnessed  # Abdominal pain, epigastric and RUQ, resolved    # Acute-on-chronic alcohol-induced pancreatitis, resolved   # Chronic calcific pancreatitis   # Normocytic anemia, stable    # GERD  # HLD  # HTN    Clinically Significant Risk Factors          Follow-ups Needed After Discharge      1.) PCP: Please follow-up within 5-7 days for hospital follow-up. Recommend repeat BMP, Mg, Phos. Advise recheck of patient's efforts to connect with outpatient sobriety resources and if they have scheduled with Psych for therapy.     2.) Pysch: Referred for psychotherapy. Interested in EMDR.     Discharge Disposition   Discharged to home  Condition at discharge: Stable    Hospital Course   Nikhil Rios is a 37 year old male PMH severe alcohol use disorder with history of withdrawal seizures, chronic calcific pancreatitis, GERD, HTN, HLD admitted on 6/3/2023 for acute alcohol withdrawal and acute-on-chronic pancreatitis. The following problems were addressed during his hospitalization:    # Alcohol use disorder, severe  # Acute alcohol withdrawal, improved   # History of withdrawal seizures, unwitnessed  Return to use one month ago after a few months of sobriety due to several life triggers including anniversary of death of father. Drinking 1.75mL alcohol daily PTA. Admitted with acute withdrawal symptoms including anxiety and tremor. Normal vitals. Admission labs with mild transaminitis (, ALT 85), otherwise reassuring. Patient was placed on CIWA with Valium. Monitored for several days with improvement to withdrawal  symptoms. Last Valium received 6/4 AM. Patient was scoring 0-1 on CIWA and had not received benzodiazepines for >24 hours prior to discharge. Reported resolution of withdrawal symptoms aside from mild diarrhea. Labs with improving transamnitis (AST 61, ALT 73) and stable electrolytes. Declined Chemical Dependency resources or transfer to inpatient CD facility. Patient reported that he was well connected with outpatient resources, including living near AA location. He is appropriate for discharge at this time. Patient voiced understanding and agreement with plan. Questions were answered.   - Discharged with Gabapentin taper to assist with anxiety and cravings; 900 mg PM 6/5; 600 mg 6/6-6/7; 300 mg 6/8-6/9; 100 mg 6/10-6/11   - Discharged with Hydroxyzine 25 mg PRN #30 tablets   - Follow up with PCP for hospital follow up; recommend repeat BMP, Mg, Phos   - Psych referral placed for therapy to support sobriety efforts     # Abdominal pain, epigastric and RUQ, resolved    # Acute-on-chronic alcohol-induced pancreatitis, resolved   # Chronic calcific pancreatitis   Admitted with epigastric and RUQ pain radiating to back and worsening after food intake. Labs with mildly elevated lipase (63). No leukocytosis. Recent CT Abdomen (5/24) with chronic calcific pancreatitis. Pain improved with gut rest and IVF. Suspect acute-on-chronic pancreatitis given consistent nature of pain.     # Normocytic anemia, stable    Likely secondary to poor nutrition in the setting of alcohol use disorder. HDS. No signs of acute bleed. Folic acid >40. Vitamin B12 and Ferritin WNL. HGB 13.1 on day of discharge.     # GERD  - Continue PTA Omeprazole 40 mg daily, Sucralfate 1g QID    # HLD: Not on PTA medications.   # HTN: Not on PTA medications.     Consultations This Hospital Stay   NUTRITION SERVICES ADULT IP CONSULT  PHYSICAL THERAPY ADULT IP CONSULT    Code Status   Full Code       The patient was discussed with Dr. Ivan Bob,  [facility]  [facility] MED SURG  [address] [address]  Phone: [phone]  Fax: [phone]  ______________________________________________________________________    Physical Exam   Vital Signs: Temp: 98.2  F (36.8  C) Temp src: Oral BP: 116/79 Pulse: 56   Resp: 17 SpO2: 97 % O2 Device: None (Room air)    Weight: 167 lbs 15.85 oz    General: Alert, well-appearing, no acute distress   HEENT: PERRL, moist oral mucus membranes  Pulm: Clear to auscultation bilaterally   CV: RRR, no murmur  Abd: soft, NTND, no masses  Ext: Warm, well perfused, 2+ BL radial pulses, no LE edema  Skin: No rash on limited skin exam  Psych: Mood appropriate to visit content, full affect, rational thought content and process      Primary Care Physician   Lisandro Graham    Discharge Orders      Adult Mental Health [facility] Referral      Reason for your hospital stay    You were hospitalized for the following reasons:     ALCOHOL WITHDRAWAL: You were monitored using the CIWA scale and treated with Valium as needed. Your symptoms improved with time. Please continue the Gabapentin. Please look into support resources and SMART therapy for continued support with your sobriety. Follow up with your primary doctor.    ABDOMINAL PAIN: You have chronic (or long term) pancreatitis. You had a flare while you were here. It improved with resting your gut and giving fluids.     Activity    Your activity upon discharge: activity as tolerated     Adult [facility] Follow-up and recommended labs and tests    Follow up with primary care provider, Lisandro Graham, within 7 days for hospital follow- up.  The following labs/tests are recommended: BMP, Mg, Phos. Recheck Gabapentin taper and efforts to connect with SMART resources.      Appointments on [place] and/or [facility] (with [facility] or [facility] provider or service). Call [phone] if you haven't heard regarding these appointments within 7 days of  discharge.     Diet    Follow this diet upon discharge: Regular diet.       Significant Results and Procedures   Most Recent 3 CBC's:  Recent Labs   Lab Test 06/05/23  0539 06/04/23  1007 06/03/23  0849   WBC 5.1 6.3 4.9   HGB 13.1* 12.5* 12.8*   MCV 86 88 85    190 239     Most Recent 3 BMP's:  Recent Labs   Lab Test 06/05/23  0539 06/04/23  1007 06/03/23  0849    137 145   POTASSIUM 4.0 4.7 3.8   CHLORIDE 104 99 105   CO2 25 30* 27   BUN 11.1 11.9 10.2   CR 0.70 0.77 0.73   ANIONGAP 11 8 13   KEELEY 9.9 10.0 9.1   * 91 105*     Most Recent 2 LFT's:  Recent Labs   Lab Test 06/05/23 0539 06/04/23  1007   AST 61* 91*   ALT 73* 82*   ALKPHOS 64 65   BILITOTAL 0.6 1.0       Discharge Medications   Discharge Medication List as of 6/5/2023  2:40 PM        START taking these medications    Details   !! gabapentin (NEURONTIN) 100 MG capsule Take 1 capsule (100 mg) by mouth every 8 hours for 2 days, Disp-6 capsule, R-0, E-Prescribe      !! gabapentin (NEURONTIN) 300 MG capsule Take 3 capsules (900 mg) by mouth every 8 hours for 1 day, THEN 2 capsules (600 mg) every 8 hours for 2 days, THEN 1 capsule (300 mg) every 8 hours for 2 days., Disp-27 capsule, R-0, E-Prescribe      hydrOXYzine (ATARAX) 25 MG tablet Take 1-2 tablets (25-50 mg) by mouth 3 times daily as needed for itching, Disp-30 tablet, R-0, E-Prescribe       !! - Potential duplicate medications found. Please discuss with provider.        CONTINUE these medications which have NOT CHANGED    Details   omeprazole (PRILOSEC) 20 MG DR capsule Take 2 capsules (40 mg) by mouth daily, Disp-30 capsule, R-0, Local Print      ondansetron (ZOFRAN ODT) 4 MG ODT tab Take 1 tablet (4 mg) by mouth every 6 hours as needed for nausea, Disp-10 tablet, R-0, Local Print      sucralfate (CARAFATE) 1 GM tablet Take 1 tablet (1 g) by mouth 4 times daily, Disp-30 tablet, R-0, Local Print           Allergies   Allergies   Allergen Reactions    Gramineae Pollens Itching     Pollen Extract Rash     grass tree pollen

## 2023-06-05 NOTE — PROGRESS NOTES
Care Management Initial Consult    General Information  Assessment completed with: Patient, Patient  Type of CM/SW Visit: Initial Assessment    Primary Care Provider verified and updated as needed: Yes   Readmission within the last 30 days:        Reason for Consult: discharge planning  Advance Care Planning:            Communication Assessment  Patient's communication style: spoken language (English or Bilingual)    Hearing Difficulty or Deaf: no   Wear Glasses or Blind: no    Cognitive  Cognitive/Neuro/Behavioral: WDL  Level of Consciousness: alert  Arousal Level: opens eyes spontaneously  Orientation: oriented x 4  Mood/Behavior: cooperative  Best Language: 0 - No aphasia  Speech: clear, spontaneous, logical    Living Environment:   People in home:       Current living Arrangements:        Able to return to prior arrangements:         Family/Social Support:  Care provided by:    Provides care for:    Marital Status:  (Girlfriend)  Significant Other          Description of Support System: Supportive         Current Resources:   Patient receiving home care services: No     Community Resources:    Equipment currently used at home: none  Supplies currently used at home:      Employment/Financial:  Employment Status: employed full-time        Financial Concerns: No concerns identified   Referral to Financial Worker: No       Does the patient's insurance plan have a 3 day qualifying hospital stay waiver?  No    Lifestyle & Psychosocial Needs:  Social Determinants of Health     Tobacco Use: High Risk (6/3/2023)    Patient History      Smoking Tobacco Use: Some Days      Smokeless Tobacco Use: Never      Passive Exposure: Not on file   Alcohol Use: Heavy Drinker (7/21/2021)    AUDIT-C      Frequency of Alcohol Consumption: 4 or more times a week      Average Number of Drinks: 10 or more      Frequency of Binge Drinking: Daily or almost daily   Financial Resource Strain: Not on file   Food Insecurity: Not on file  "  Transportation Needs: No Transportation Needs (7/21/2021)    PRAPARE - Transportation      Lack of Transportation (Medical): No      Lack of Transportation (Non-Medical): No   Physical Activity: Not on file   Stress: Stress Concern Present (7/21/2021)    [employer] [place] [employer] - Occupational Stress Questionnaire      Feeling of Stress : Very much   Social Connections: Not on file   Intimate Partner Violence: Not on file   Depression: Not at risk (2/25/2020)    PHQ-2      PHQ-2 Score: 0   Housing Stability: Unknown (7/21/2021)    Housing Stability Vital Sign      Unable to Pay for Housing in the Last Year: No      Number of Places Lived in the Last Year: Not on file      Unstable Housing in the Last Year: No                Values/Beliefs:  Spiritual, Cultural Beliefs, Tenriism Practices, Values that affect care: yes  Description of Beliefs that Will Affect Care: Voodoo            Additional Information:      Per notes from Dr. Garner on 6/3- \"Nikhil Rios is a 37 year old male with history of severe alcohol use disorder and alcohol withdrawal seizure presenting to the ED due to alcohol withdrawal.  Patient states that he is presenting here because \"it was here at the liquor store\".  Patient reports that his alcohol use has been worsening in the past 3 months.  He has been drinking 1.75 L daily.  Last drink was approximately 13 hours prior to arrival.  He does report history of seizures and reports his last one was a few years ago.  He is now experiencing withdrawal symptoms including abdominal pain and vomiting.  He occasionally uses marijuana, but denies other drug use.  He states that he does not want to go to detox here, but plans to go to [place] where he has been several times previously.\"    Writer met with patient at bedside to complete the CMA.He stated he lives in a house with a roommate. He owns his own house. He stated he doesn't need a chemical dependency referral. He stated he has " a girlfriend and family and friends. They could be more supportive he stated. He has a therapist. 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 He stated he has an addiction doctor through [facility] in [place]. He primary Dr. is Dr. Graham through [facility].     He stated he does not have an official mental health diagnosis. He stated he struggles with alcohol use. He smokes weed but this is not a large concern for him. He stated he has been to treatment 5 times in the past. He stated he went to [place]- January of 2023. He might consider outpatient treatment.     He stated he is [employer] and believes in God. He declined a  visit.     He stated his mother can help him get a ride to his home around 4pm today after she is done working.       RANJAN Godinez, [facility]  6 Med Surg   [facility]  Phone: [phone]  Pager: [phone]

## 2023-06-06 NOTE — PROGRESS NOTES
6MS DISCHARGE    D: Patient discharged to home at around 1700. Patient walked out of the facility, stated that he will be accompanied by his mother from the lobby.    I: Discharge prescriptions given to patient. All discharge medications and instructions reviewed with the patient. Patient instructed to seek care if experiencing worsening symptoms. Other phone numbers to call with questions or concerns after discharge reviewed. PIV removed. Education completed.    A: Patient verbalized understanding of discharge medications and instructions. Prescribed home medications given to patient.  Belongings returned to patient.    P: Patient to follow-up on with his primary care provider.

## 2023-06-07 ENCOUNTER — PATIENT OUTREACH (OUTPATIENT)
Dept: CARE COORDINATION | Facility: CLINIC | Age: 38
End: 2023-06-07
Payer: COMMERCIAL

## 2023-06-07 NOTE — PROGRESS NOTES
Clinic Care Coordination Contact  [facility]: Post-Discharge Note  SITUATION                                                      Admission:    Admission Date: 06/03/23   Reason for Admission: # Alcohol use disorder, severe  # Acute alcohol withdrawal, improved   # History of withdrawal seizures, unwitnessed  # Abdominal pain, epigastric and RUQ, resolved    # Acute-on-chronic alcohol-induced pancreatitis, resolved   # Chronic calcific pancreatitis   # Normocytic anemia, stable    # GERD  # HLD  # HTN  Discharge:   Discharge Date: 06/05/23  Discharge Diagnosis: # Alcohol use disorder, severe  # Acute alcohol withdrawal, improved   # History of withdrawal seizures, unwitnessed  # Abdominal pain, epigastric and RUQ, resolved    # Acute-on-chronic alcohol-induced pancreatitis, resolved   # Chronic calcific pancreatitis   # Normocytic anemia, stable    # GERD  # HLD  # HTN    BACKGROUND                                                    Nikhil Rios is a 37 year old male PMH severe alcohol use disorder with history of withdrawal seizures, chronic calcific pancreatitis, GERD, HTN, HLD admitted on 6/3/2023 for acute alcohol withdrawal and acute-on-chronic pancreatitis. The following problems were addressed during his hospitalization:     # Alcohol use disorder, severe  # Acute alcohol withdrawal, improved   # History of withdrawal seizures, unwitnessed  Return to use one month ago after a few months of sobriety due to several life triggers including anniversary of death of father. Drinking 1.75mL alcohol daily PTA. Admitted with acute withdrawal symptoms including anxiety and tremor. Normal vitals. Admission labs with mild transaminitis (, ALT 85), otherwise reassuring. Patient was placed on CIWA with Valium. Monitored for several days with improvement to withdrawal symptoms. Last Valium received 6/4 AM. Patient was scoring 0-1 on CIWA and had not received benzodiazepines for >24 hours prior to discharge.  Reported resolution of withdrawal symptoms aside from mild diarrhea. Labs with improving transamnitis (AST 61, ALT 73) and stable electrolytes. Declined Chemical Dependency resources or transfer to inpatient CD facility. Patient reported that he was well connected with outpatient resources, including living near AA location. He is appropriate for discharge at this time. Patient voiced understanding and agreement with plan. Questions were answered.   - Discharged with Gabapentin taper to assist with anxiety and cravings; 900 mg PM 6/5; 600 mg 6/6-6/7; 300 mg 6/8-6/9; 100 mg 6/10-6/11   - Discharged with Hydroxyzine 25 mg PRN #30 tablets   - Follow up with PCP for hospital follow up; recommend repeat BMP, Mg, Phos   - Psych referral placed for therapy to support sobriety efforts      # Abdominal pain, epigastric and RUQ, resolved    # Acute-on-chronic alcohol-induced pancreatitis, resolved   # Chronic calcific pancreatitis   Admitted with epigastric and RUQ pain radiating to back and worsening after food intake. Labs with mildly elevated lipase (63). No leukocytosis. Recent CT Abdomen (5/24) with chronic calcific pancreatitis. Pain improved with gut rest and IVF. Suspect acute-on-chronic pancreatitis given consistent nature of pain.      # Normocytic anemia, stable    Likely secondary to poor nutrition in the setting of alcohol use disorder. HDS. No signs of acute bleed. Folic acid >40. Vitamin B12 and Ferritin WNL. HGB 13.1 on day of discharge.      # GERD  - Continue PTA Omeprazole 40 mg daily, Sucralfate 1g QID     # HLD: Not on PTA medications.   # HTN: Not on PTA medications.           ASSESSMENT           Discharge Assessment  How are you doing now that you are home?: Spoke with patient who shares that he is doing very well. Plans to get tested for ADHD as he has been told several times he should be. Waiting to hear from [facility] on scheduling therapy. Shares that he had great care during his hospilalization. No  other concerns/questions at this time.  How are your symptoms? (Red Flag symptoms escalate to triage hotline per guidelines): Improved  Do you feel your condition is stable enough to be safe at home until your provider visit?: Yes  Does the patient have their discharge instructions? : Yes  Does the patient have questions regarding their discharge instructions? : No  Were you started on any new medications or were there changes to any of your previous medications? : Yes  Does the patient have all of their medications?: Yes  Do you have questions regarding any of your medications? : No  Do you have all of your needed medical supplies or equipment (DME)?  (i.e. oxygen tank, CPAP, cane, etc.): Yes  Discharge follow-up appointment scheduled within 14 calendar days? : Yes  Discharge Follow Up Appointment Date: 06/14/23  Discharge Follow Up Appointment Scheduled with?: Specialty Care Provider    Post-op ([facility] CTA Only)  If the patient had a surgery or procedure, do they have any questions for a nurse?: No    Post-op (Clinicians Only)  Did the patient have surgery or a procedure: No  Chills: No        PLAN                                                      Outpatient Plan:  1.) PCP: Please follow-up within 5-7 days for hospital follow-up. Recommend repeat BMP, Mg, Phos. Advise recheck of patient's efforts to connect with outpatient sobriety resources and if they have scheduled with Psych for therapy.      2.) Pysch: Referred for psychotherapy. Interested in EMDR.     No future appointments.      For any urgent concerns, please contact our 24 hour nurse triage line: [phone] ([phone])         SEGUN Gutierrez

## 2023-06-10 ENCOUNTER — HOSPITAL ENCOUNTER (EMERGENCY)
Facility: CLINIC | Age: 38
Discharge: HOME | End: 2023-06-10
Attending: EMERGENCY MEDICINE | Admitting: EMERGENCY MEDICINE
Payer: COMMERCIAL

## 2023-06-10 VITALS
DIASTOLIC BLOOD PRESSURE: 75 MMHG | TEMPERATURE: 98.3 F | RESPIRATION RATE: 16 BRPM | HEART RATE: 92 BPM | OXYGEN SATURATION: 95 % | SYSTOLIC BLOOD PRESSURE: 133 MMHG

## 2023-06-10 DIAGNOSIS — F10.920 ALCOHOLIC INTOXICATION WITHOUT COMPLICATION (H): ICD-10-CM

## 2023-06-10 DIAGNOSIS — F41.9 ANXIETY: ICD-10-CM

## 2023-06-10 LAB — SARS-COV-2 RNA RESP QL NAA+PROBE: NEGATIVE

## 2023-06-10 PROCEDURE — 90791 PSYCH DIAGNOSTIC EVALUATION: CPT

## 2023-06-10 PROCEDURE — 87635 SARS-COV-2 COVID-19 AMP PRB: CPT | Performed by: EMERGENCY MEDICINE

## 2023-06-10 PROCEDURE — 99285 EMERGENCY DEPT VISIT HI MDM: CPT | Mod: 25

## 2023-06-10 PROCEDURE — 250N000013 HC RX MED GY IP 250 OP 250 PS 637: Performed by: EMERGENCY MEDICINE

## 2023-06-10 PROCEDURE — C9803 HOPD COVID-19 SPEC COLLECT: HCPCS

## 2023-06-10 RX ORDER — ONDANSETRON 4 MG/1
4 TABLET, ORALLY DISINTEGRATING ORAL EVERY 8 HOURS PRN
Qty: 10 TABLET | Refills: 0 | Status: SHIPPED | OUTPATIENT
Start: 2023-06-10 | End: 2023-06-13

## 2023-06-10 RX ORDER — CHLORDIAZEPOXIDE HYDROCHLORIDE 5 MG/1
5 CAPSULE, GELATIN COATED ORAL 4 TIMES DAILY PRN
Qty: 12 CAPSULE | Refills: 0 | Status: SHIPPED | OUTPATIENT
Start: 2023-06-10 | End: 2023-06-13

## 2023-06-10 RX ADMIN — NICOTINE POLACRILEX 4 MG: 2 GUM, CHEWING ORAL at 10:39

## 2023-06-10 ASSESSMENT — COLUMBIA-SUICIDE SEVERITY RATING SCALE - C-SSRS
1. HAVE YOU WISHED YOU WERE DEAD OR WISHED YOU COULD GO TO SLEEP AND NOT WAKE UP?: YES
ATTEMPT LIFETIME: NO
1. IN THE PAST MONTH, HAVE YOU WISHED YOU WERE DEAD OR WISHED YOU COULD GO TO SLEEP AND NOT WAKE UP?: NO
2. HAVE YOU ACTUALLY HAD ANY THOUGHTS OF KILLING YOURSELF?: NO

## 2023-06-10 ASSESSMENT — ACTIVITIES OF DAILY LIVING (ADL)
ADLS_ACUITY_SCORE: 37
ADLS_ACUITY_SCORE: 37

## 2023-06-10 NOTE — DISCHARGE INSTRUCTIONS
Aftercare Plan  Scheduled Appointment  Date: Wednesday, 6/14/2023  Time: 3:30 pm - 4:30 pm  Provider: Gabi Hernandez  MSN  CNP,PMHNP,RN  Location: [facility] [place], [address] [place] [address], [address] [address]  Phone: [phone]  Type: Medication Mgmt - Initial (In-Person)    Patient Instructions  NEW PATIENT PAPERWORK WILL NEED TO BE COMPLETED 24 HRS BEFORE APPOINTMENT... EMAIL IS REQUIRED TO SCHEDULE- NO EXCEPTIONS!!! ID & INSURANCE CARDS WILL NEED TO BE PRESENT FOR THE APPOINTMENT!!    Scheduled Appointment  Date: Thursday, 6/15/2023  Time: 10:00 am - 11:00 am  Provider: Tori Julien  Location: [facility], [address] [place] [address], [address], [place] [address]  Phone: [phone]  Type: Therapy - Initial (In-Person)    Patient Instructions  For Telehealth we will need your paperwork before you are seen. Email/fax/mail accepted. Website will give directions to us, necessary paperwork found at. [email or web address] Please arrive 20 minutes early for in person appointments. Bring insurance card. Metered street or ramp parking.    If I am feeling unsafe or I am in a crisis, I will:   Contact my established care providers   Call the National Suicide Prevention Lifeline: 988  Go to the nearest emergency room   Call 911     Warning signs that I or other people might notice when a crisis is developing for me: Isolating, ignoring friends and family, increased drug/alcohol use, feeling more insecure, making impulsive decisions.    Things I am able to do on my own to cope or help me feel better: Exercise, work toward forgiveness in therapy, go fishing, gardening,     Things that I am able to do with others to cope or help me better: Golf with friends, go fishing with friends.    Things I can use or do for distraction: Read.    Changes I can make to support my mental health and wellness: Starting reading more, golf more, get back into the gym, create structure  for life, build in time for self.     People in my life that I can ask for help:  Mom, MELCHOR (), Severo, Jimmy, Tim, Bernardo Hameed    Your Replaced by [facility] has a mental health crisis team you can call 24/7: [facility]  [phone]    Other things that are important when I'm in crisis: It's important for me to ask for help.    Other things that can help: I will attempt to avoid excessive use of mood altering chemicals with the understanding that while in crisis, they exacerbate social and/or emotional concerns.    Substance Use Disorder Direct Access Resources    It is recommended that you abstain from all mood altering chemicals. Please contact the sober support hotline ([phone]) as needed; phones are answered 24 hours a day, 7 days a week.    To access substance use treatment you must have a comprehensive assessment completed to begin any treatment program.     If uninsured, please contact your county of residence for eligibility screen to substance use disorder evaluation and treatment:    [facility] [phone]   [facility] [phone]   [facility] [phone]   [facility] [phone]   [facility] [phone]   [facility] [phone]   [facility] [phone]   [place] - [phone]     If you have private insurance, call the customer service number on the back of your insurance card to find an in-network substance abuse use disorder assessment. The ideal provider will be a treatment facility, licensed in the [facility] [place].     Community MATHEW Evaluations: Clients may call their [facility] for a full list of providers - Availability and services listed belo are subject to change, please call the provider to confirm    [facility]  [phone] [address] [place], [place], [address]  *Please call the above number to schedule a comprehensive assessment for determination of level of care needs. In person and virtual appointments available Mon-Fri.    [facility], [address] [place] [address], First Floor, [address], [place] [address] (next to the outpatient lab)    Phone: [phone]   Provides bridging services to people with Opiate Use Disorders (OUD) seeking care. This is a front door to Medication Assisted Treatments (MAT), ages 16+  Walk In hours: Monday-Friday 9:00am-3:00pm    [facility]  [phone]  Walk in Assessments: Mon-Friday 7a-1:45p  [address], [place], [address]    [facility]  Central Access [phone]  [address] [place], [place], [address]  *by appointment only    [facility]  [phone] (phone consultation available )  Locations in: [place], [facility], and [place], [place]  Thai virtual IOP [phone] or visit Ivan.[email or web address]/SAL   Also offers LGBTQ programming     [facility]  [phone]  [address] [facility]  [place], [place], [address]  *Currently only offered via telehealth - call to set up an appointment    [facility]  [address] [place] [address], [address]  [facility]  For more information to to make a referral call:  [phone]  Walk-in on   9-11 a.m.    [facility]  [phone]  [address] [place], [place], [address]  *available by appointments only    [facility] specific  [phone]  [address] [place], [place], [address]  *available by appointment only    [facility]  [phone]  [address] [place], [place], [address]  *walk in assessments available M-F starting at 7 am.    [facility]  [phone]  Locations: [facility], [facility], and [place]  *Walk in assessments availble M-F starting at 8 am -virtual only    [facility]  [phone]  [address] [place] [address]    [facility]  Virtual + Locations: [place], [place], [place]  [facility], [facility]   [phone]  *available by appointment only    [facility]  [phone]  [address]  [place], [place], [address]    [facility]  [phone]  [address]  [place], [place], [address]  *available by appointment    [facility]  [phone]  [address]  [place], [address]  *walk ins available M-TH from 9-3    [facility] program: [phone]  [address] [place], [place], [address]    [place]  [phone]  Same day substance use disorder assessments are available Monday - Friday, via walk-in or by appointment at the [place] location.  [facility], [address], [place] [address]     [facility] - adolescent and adult SUDs services  [phone]  Offer services Monday through Friday, as well as evening hours Monday through Thursday. Normally, a first appointment will be scheduled within one week  [email or web address] [email or web address]-[email or web address]-treatment  Locations all over [place]    If you are intoxicated, you may be required to detox at a detox facility before starting treatment. The following are detox facilities that you can self present to. All detox facilities are able to help you complete an assessment prior to discharge if you choose:    Accident Detox: Arrive at a Accident Emergency Department for immediate medical evaluation    [facility]: [address] [place], [place], [address].         [phone]    [facility]: [address] [place], [place], [address]  [phone]     [facility]): [address] [place], [place], [address] [phone]     [facility]: [address], [place], [place], [address], [phone]    Ways to help cope with sobriety:    -- Take prescribed medicines as scheduled  -- Keep follow-up appointments  -- Talk to others about your concerns  -- Get regular exercise  -- Practice deep breathing  skills  -- Eat a healthy diet  -- Use community resources, including hotline numbers, [facility] crisis and support meetings  -- Stay sober and avoid places/people/things associated with substance use  --Maintain a daily schedule/routine  --Get at least 7-8 hours of sleep per night  --Create a list 10--20 healthy activities that you can do that are enjoyable and do not involve substance use  --Create daily goals (approx. 1-4 goals) per day and work to achieve them throughout the day.    Free Resources:    [facility] ([facility])  [facility] connects people seeking recovery to resources that help foster and sustain long-term recovery. Whether you are seeking resources for treatment, transportation, housing, job training, education, health care or other pathways to recovery, [facility] is a great place to start.  Phone: 541.720.8224. www.[place]Implicit Monitoring Solutions.MADS (Great listing of all types of recovery and non-recovery related resources)    Alcoholics Anonymous  Phone: 9-914-ALCOHOL  Website: HTTP://WWW.AA.ORG/  [facility] [place] (860-268-3539 or http://aaSIMTEK.org)  [facility] [place] (957-683-6889 or www.aasaul.org)     Narcotics Anonymous  Phone: 604.246.7792  Website: www.Open Mile.[employer].    [facility] [address], [place], [place],  Phone: [phone]  Drop-in Hours: Monday-Friday 9-11:30 am. By appointment at other times.  Provides: [facility] is a drop-in center on the east side [facility] that provides a safe space for individuals who are homeless and have a history of chemical use. Sobriety is not a requirement but drugs and alcohol are not allowed on the property.  Services: Non-clients can access drop-in services such as Recovery and Harm Reduction Groups, referrals to case management, community activities, shower facilities, and a pool table. Individuals who are homeless and have chemical health needs may be eligible for enrollment into [facility]'s case management program.  Clients and  work together to access benefits, treatment, health care, shelter, and external housing resources.    If I am feeling unsafe or I am in a crisis, I will:      Contact my established care providers    Call the National Suicide Prevention Lifeline: 561.844.6964      Go to the nearest emergency room      and/or    Call 911    Additional information:  Today you were seen by a licensed mental health professional through Triage and Transition services, [facility] ([facility])  for a crisis assessment in the Emergency Department at [facility].  It is recommended that you follow up with your established providers (psychiatrist, mental health therapist, and/or primary care doctor - as relevant) as soon as possible. Coordinators from [facility] will be calling you in the next 24-48 hours to ensure that you have the resources you need.  You can also contact [facility] coordinators directly at [phone]. You may have been scheduled for or offered an appointment with a mental health provider. [facility] maintains an extensive network of licensed behavioral health providers to connect patients with the services they need.  We do not charge providers a fee to participate in our referral network.  We match patients with providers based on a patient's specific needs, insurance coverage, and location.  Our first effort will be to refer you to a provider within your care system, and will utilize providers outside your care system as needed.      Crisis Lines  Crisis Text Line  Text 143982  You will be connected with a trained live crisis counselor to provide support.    Por espanol, texto  PILAR a 377012 o texto a 442-AYUDAME en WhatsApp    [employer] (LGBTQ Youth Crisis Line)  8.825.872.0957  text START to 890-788    Community Resources  Fast Tracker  Linking people to mental health and substance use disorder resources  fasttrackermn.org     [place] Mental Health Warm Line  Peer to peer  "support  Monday thru Saturday, 12 pm to 10 pm  703.203.0775 or 1.967.074.3234  Text \"Support\" to [address]102    National [place] on Mental Illness (JACQUES)  049.661.3476 or 1.888.JACQUES.HELPS      Mental Health Apps  My3  https://BioCatchpp.org/    VirtualHopeBox  https://RETAIL PRO/apps/virtual-hope-box/    Additional Information  Today you were seen by a licensed mental health professional through Triage and Transition services, [facility] ([facility])  for a crisis assessment in the Emergency Department at [facility].  It is recommended that you follow up with your established providers (psychiatrist, mental health therapist, and/or primary care doctor - as relevant) as soon as possible. Coordinators from [facility] will be calling you in the next 24-48 hours to ensure that you have the resources you need.  You can also contact [facility] coordinators directly at [phone]. You may have been scheduled for or offered an appointment with a mental health provider. [facility] maintains an extensive network of licensed behavioral health providers to connect patients with the services they need.  We do not charge providers a fee to participate in our referral network.  We match patients with providers based on a patient's specific needs, insurance coverage, and location.  Our first effort will be to refer you to a provider within your care system, and will utilize providers outside your care system as needed.    "

## 2023-06-10 NOTE — ED TRIAGE NOTES
States is depressed as hell , denies hx  Or diagnosis, states started  when dad  ,, denies suicidal, uses marijuana, etoh daily was sober 9 months 750-1 liter a day for 3 weeks, [place]

## 2023-06-10 NOTE — CONSULTS
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 for or offered an appointment with a mental health provider. [facility] maintains an extensive network of licensed behavioral health providers to connect patients with the services they need.  We do not charge providers a fee to participate in our referral network.  We match patients with providers based on a patient's specific needs, insurance coverage, and location.  Our first effort will be to refer you to a provider within your care system, and will utilize providers outside your care system as needed.                  "

## 2023-06-15 ENCOUNTER — HOSPITAL ENCOUNTER (EMERGENCY)
Facility: HOSPITAL | Age: 38
Discharge: HOME | End: 2023-06-16
Attending: EMERGENCY MEDICINE | Admitting: EMERGENCY MEDICINE
Payer: COMMERCIAL

## 2023-06-15 DIAGNOSIS — T42.4X1A BENZODIAZEPINE (TRANQUILIZER) OVERDOSE, ACCIDENTAL OR UNINTENTIONAL, INITIAL ENCOUNTER: ICD-10-CM

## 2023-06-15 DIAGNOSIS — F10.929 ALCOHOL INTOXICATION, WITH UNSPECIFIED COMPLICATION (H): ICD-10-CM

## 2023-06-15 LAB
ALBUMIN SERPL BCG-MCNC: 5.1 G/DL (ref 3.5–5.2)
ALBUMIN UR-MCNC: 30 MG/DL
ALP SERPL-CCNC: 80 U/L (ref 40–129)
ALT SERPL W P-5'-P-CCNC: 75 U/L (ref 0–70)
AMPHETAMINES UR QL SCN: ABNORMAL
ANION GAP SERPL CALCULATED.3IONS-SCNC: 18 MMOL/L (ref 7–15)
APAP SERPL-MCNC: <5 UG/ML (ref 10–30)
APPEARANCE UR: CLEAR
AST SERPL W P-5'-P-CCNC: 113 U/L (ref 0–45)
BARBITURATES UR QL SCN: ABNORMAL
BASOPHILS # BLD AUTO: 0.1 10E3/UL (ref 0–0.2)
BASOPHILS NFR BLD AUTO: 2 %
BENZODIAZ UR QL SCN: ABNORMAL
BILIRUB SERPL-MCNC: 0.4 MG/DL
BILIRUB UR QL STRIP: NEGATIVE
BUN SERPL-MCNC: 13.2 MG/DL (ref 6–20)
BZE UR QL SCN: ABNORMAL
CALCIUM SERPL-MCNC: 9.2 MG/DL (ref 8.6–10)
CANNABINOIDS UR QL SCN: ABNORMAL
CHLORIDE SERPL-SCNC: 98 MMOL/L (ref 98–107)
COLOR UR AUTO: ABNORMAL
CREAT SERPL-MCNC: 0.73 MG/DL (ref 0.67–1.17)
DEPRECATED HCO3 PLAS-SCNC: 27 MMOL/L (ref 22–29)
EOSINOPHIL # BLD AUTO: 0.1 10E3/UL (ref 0–0.7)
EOSINOPHIL NFR BLD AUTO: 1 %
ERYTHROCYTE [DISTWIDTH] IN BLOOD BY AUTOMATED COUNT: 16 % (ref 10–15)
ETHANOL SERPL-MCNC: 0.52 G/DL
GFR SERPL CREATININE-BSD FRML MDRD: >90 ML/MIN/1.73M2
GLUCOSE SERPL-MCNC: 92 MG/DL (ref 70–99)
GLUCOSE UR STRIP-MCNC: NEGATIVE MG/DL
HCT VFR BLD AUTO: 43.1 % (ref 40–53)
HGB BLD-MCNC: 14.5 G/DL (ref 13.3–17.7)
HGB UR QL STRIP: NEGATIVE
IMM GRANULOCYTES # BLD: 0 10E3/UL
IMM GRANULOCYTES NFR BLD: 0 %
KETONES UR STRIP-MCNC: NEGATIVE MG/DL
LEUKOCYTE ESTERASE UR QL STRIP: NEGATIVE
LYMPHOCYTES # BLD AUTO: 3.1 10E3/UL (ref 0.8–5.3)
LYMPHOCYTES NFR BLD AUTO: 48 %
MAGNESIUM SERPL-MCNC: 2.1 MG/DL (ref 1.7–2.3)
MCH RBC QN AUTO: 28.7 PG (ref 26.5–33)
MCHC RBC AUTO-ENTMCNC: 33.6 G/DL (ref 31.5–36.5)
MCV RBC AUTO: 85 FL (ref 78–100)
MONOCYTES # BLD AUTO: 0.3 10E3/UL (ref 0–1.3)
MONOCYTES NFR BLD AUTO: 5 %
NEUTROPHILS # BLD AUTO: 2.8 10E3/UL (ref 1.6–8.3)
NEUTROPHILS NFR BLD AUTO: 44 %
NITRATE UR QL: NEGATIVE
NRBC # BLD AUTO: 0 10E3/UL
NRBC BLD AUTO-RTO: 0 /100
OPIATES UR QL SCN: ABNORMAL
PCP QUAL URINE (ROCHE): ABNORMAL
PH UR STRIP: 6.5 [PH] (ref 5–7)
PLATELET # BLD AUTO: 269 10E3/UL (ref 150–450)
POTASSIUM SERPL-SCNC: 4.5 MMOL/L (ref 3.4–5.3)
PROT SERPL-MCNC: 8.1 G/DL (ref 6.4–8.3)
RBC # BLD AUTO: 5.06 10E6/UL (ref 4.4–5.9)
RBC URINE: 0 /HPF
SALICYLATES SERPL-MCNC: <0.3 MG/DL
SODIUM SERPL-SCNC: 143 MMOL/L (ref 136–145)
SP GR UR STRIP: 1.01 (ref 1–1.03)
UROBILINOGEN UR STRIP-MCNC: <2 MG/DL
WBC # BLD AUTO: 6.4 10E3/UL (ref 4–11)
WBC URINE: 0 /HPF

## 2023-06-15 PROCEDURE — 80053 COMPREHEN METABOLIC PANEL: CPT | Performed by: EMERGENCY MEDICINE

## 2023-06-15 PROCEDURE — 80143 DRUG ASSAY ACETAMINOPHEN: CPT | Performed by: EMERGENCY MEDICINE

## 2023-06-15 PROCEDURE — 99284 EMERGENCY DEPT VISIT MOD MDM: CPT | Mod: 25

## 2023-06-15 PROCEDURE — 83735 ASSAY OF MAGNESIUM: CPT | Performed by: EMERGENCY MEDICINE

## 2023-06-15 PROCEDURE — 36415 COLL VENOUS BLD VENIPUNCTURE: CPT | Performed by: EMERGENCY MEDICINE

## 2023-06-15 PROCEDURE — 80179 DRUG ASSAY SALICYLATE: CPT | Performed by: EMERGENCY MEDICINE

## 2023-06-15 PROCEDURE — 96360 HYDRATION IV INFUSION INIT: CPT

## 2023-06-15 PROCEDURE — 81001 URINALYSIS AUTO W/SCOPE: CPT | Mod: XU | Performed by: EMERGENCY MEDICINE

## 2023-06-15 PROCEDURE — 93005 ELECTROCARDIOGRAM TRACING: CPT | Performed by: EMERGENCY MEDICINE

## 2023-06-15 PROCEDURE — 80307 DRUG TEST PRSMV CHEM ANLYZR: CPT | Performed by: EMERGENCY MEDICINE

## 2023-06-15 PROCEDURE — 85025 COMPLETE CBC W/AUTO DIFF WBC: CPT | Performed by: EMERGENCY MEDICINE

## 2023-06-15 PROCEDURE — 258N000003 HC RX IP 258 OP 636: Performed by: EMERGENCY MEDICINE

## 2023-06-15 PROCEDURE — 82077 ASSAY SPEC XCP UR&BREATH IA: CPT | Performed by: EMERGENCY MEDICINE

## 2023-06-15 RX ADMIN — SODIUM CHLORIDE 1000 ML: 9 INJECTION, SOLUTION INTRAVENOUS at 22:09

## 2023-06-15 ASSESSMENT — ACTIVITIES OF DAILY LIVING (ADL)
ADLS_ACUITY_SCORE: 37
ADLS_ACUITY_SCORE: 37

## 2023-06-16 VITALS
RESPIRATION RATE: 12 BRPM | HEART RATE: 81 BPM | OXYGEN SATURATION: 93 % | SYSTOLIC BLOOD PRESSURE: 109 MMHG | TEMPERATURE: 98.3 F | DIASTOLIC BLOOD PRESSURE: 69 MMHG

## 2023-06-16 ASSESSMENT — ACTIVITIES OF DAILY LIVING (ADL): ADLS_ACUITY_SCORE: 37

## 2023-06-16 NOTE — ED TRIAGE NOTES
"Pt BIB EMS from home for ETOH and valium ingestion. Pt's mother called PD. Pt arrives via stretcher able to move self to ED stretcher with some assistance. Pt reports he \"drank alcohol\" today and took 2 10 mg valium pills. Pt reports \"I'm depressed\" but denies wanting to kill self. \"I don't have the courage.\" Pt denies wanting to hurt others. Placed on pulse ox. Pt on  hold.     Triage Assessment       Row Name 06/15/23 2039       Triage Assessment (Adult)    Airway WDL WDL       Respiratory WDL    Respiratory WDL WDL       Skin Circulation/Temperature WDL    Skin Circulation/Temperature WDL WDL       Cardiac WDL    Cardiac WDL WDL       Peripheral/Neurovascular WDL    Peripheral Neurovascular WDL WDL       Cognitive/Neuro/Behavioral WDL    Cognitive/Neuro/Behavioral WDL X;arousability;mood/behavior;motor response    Level of Consciousness lethargic  ETOH + valium    Arousal Level arouses to voice;opens eyes spontaneously    Speech hesitant    Mood/Behavior flat affect;sad;cooperative       Motor Response    General Motor Response other (see comments)  slow                  "

## 2023-06-16 NOTE — ED NOTES
Pt awake, states he wants to go. MD updated, states will discharge around 0400. Given water, juice, and soda; pt tolerating. Pt encouraged to continue sleeping as he won't be discharged yet.

## 2023-06-16 NOTE — DISCHARGE INSTRUCTIONS
Abstain from alcohol  Only take the prescribed amount of your medications and do not double your dose  Do not take Valium and alcohol at the same time  Take a Daily multivitamin  Is follow-up with your primary care provider within the next week for recheck  Return to the emergency department for worsening problems or concerns

## 2023-06-16 NOTE — ED NOTES
Pt up and walking around out of room. Again states he wants to go. MD to bedside. Veronique, pt's mother, aware he will discharge when she arrives.

## 2023-06-16 NOTE — ED NOTES
Pt's mother, Veronique Rios, called stating she can pick him up when he is discharged. [phone]

## 2023-06-16 NOTE — ED PROVIDER NOTES
EMERGENCY DEPARTMENT ENCOUnter      NAME: Nikhil Rios  AGE: 37 year old male  YOB: 1985  MRN: [id number]  EVALUATION DATE & TIME: 6/15/2023  8:37 PM    PCP: Lisandro Graham    ED PROVIDER: Hilary Galaviz MD      Chief Complaint   Patient presents with     Alcohol Problem         FINAL IMPRESSION:  1. Alcohol intoxication, with unspecified complication (H)    2. Benzodiazepine (tranquilizer) overdose, accidental or unintentional, initial encounter          ED COURSE & MEDICAL DECISION MAKING:      In summary, the patient is a 37-year-old male that presents to the emergency department for evaluation of alcohol and benzo diazepam and intoxication.  Patient was discharged to home with his mother after he was able to ambulate with out any assistance.  His mother is comfortable taking him home.    9:28 PM I met with the patient to gather history and perform my exam. ED course and treatment discussed.  Normal saline 1 L IV was administered for IV hydration  10:40 PM Lab called to report the patient's blood alcohol level is 0.52   1:38 AM Nurse reports the patient is awake and is wanting to leave. The patient reports his mother can come and pick him up  2:13 AM Nurse reports the patient's mother is here to  the patient   2:38 AM I met with the patient's mother and answered her questions.  Offered detox which they declined     At the conclusion of the encounter I discussed the results of all of the tests and the disposition. The questions were answered. The patient or family acknowledged understanding and was agreeable with the care plan.     Medical Decision Making    History:    Supplemental history from: Documented in chart, if applicable    External Record(s) reviewed: Documented in chart, if applicable. and Other: Prior ED visits    Work Up:    Chart documentation includes differential considered and any EKGs or imaging independently interpreted by provider, where specified.    In  "additional to work up documented, I considered the following work up: Documented in chart, if applicable.    External consultation:    Discussion of management with another provider: Documented in chart, if applicable    Complicating factors:    Care impacted by chronic illness: Hyperlipidemia, Hypertension, Mental Health and Smoking / Nicotine Use    Care affected by social determinants of health: Alcohol Abuse and/or Recreational Drug Use    Disposition considerations: Discharge. No recommendations on prescription strength medication(s). See documentation for any additional details.        MEDICATIONS GIVEN IN THE EMERGENCY:  Medications   0.9% sodium chloride BOLUS (0 mLs Intravenous Stopped 6/15/23 4713)       NEW PRESCRIPTIONS STARTED AT TODAY'S ER VISIT  Discharge Medication List as of 6/16/2023  2:17 AM             =================================================================    HPI     Nikhil Rios is a 37 year old male with a pertinent history of alcohol abuse, mental health diagnoses, tobacco use, HTN, and HLD who presents to this ED via EMS for evaluation of ingestion and alcohol use    Per chart review: The patient is frequently seen in the ER for alcohol intoxication. Most recently on 6/10/23 at [facility].     HPI limited due to patient's intoxication    The patient reports taking \"a half\" of valium today around 5 PM and drinking alcohol. The patient was unable to specify how much he drank.   The patient states he lives with \"a friend\".     The patient denies medical problems, taking daily medications, or mediation allergies.    Social history: The patient reports smoking and alcohol use    REVIEW OF SYSTEMS   UNABLE TO PERFORM DUE TO ACUITY OF CONDITION      PAST MEDICAL HISTORY:  Past Medical History:   Diagnosis Date     Alcohol abuse      Alcohol abuse      Alcohol withdrawal (H)      Depressive disorder      Family history of diabetes mellitus 11/9/2007     HLD (hyperlipidemia)      HTN " (hypertension)        PAST SURGICAL HISTORY:  Past Surgical History:   Procedure Laterality Date     NO HISTORY OF SURGERY       OTHER SURGICAL HISTORY      none           CURRENT MEDICATIONS:    gabapentin (NEURONTIN) 100 MG capsule  gabapentin (NEURONTIN) 300 MG capsule  hydrOXYzine (ATARAX) 25 MG tablet  omeprazole (PRILOSEC) 20 MG DR capsule  sucralfate (CARAFATE) 1 GM tablet        ALLERGIES:  Allergies   Allergen Reactions     Gramineae Pollens Itching     Pollen Extract Rash     grass tree pollen       FAMILY HISTORY:  Family History   Problem Relation Age of Onset     Coronary Artery Disease Father        SOCIAL HISTORY:   Social History     Socioeconomic History     Marital status: Single     Spouse name: None     Number of children: None     Years of education: None     Highest education level: None   Occupational History     Occupation: Rental property owner   Tobacco Use     Smoking status: Some Days     Packs/day: 0.25     Types: Cigars, Cigarettes     Smokeless tobacco: Never     Tobacco comments:     occasional   Substance and Sexual Activity     Alcohol use: Yes     Alcohol/week: 17.0 standard drinks of alcohol     Types: 17 Shots of liquor per week     Comment: Drinks 750ml/day or 17 shots/day; hx of withdrawl seizures     Drug use: Not Currently     Types: Marijuana     Social Determinants of Health     Transportation Needs: No Transportation Needs (7/21/2021)    PRAPARE - Transportation      Lack of Transportation (Medical): No      Lack of Transportation (Non-Medical): No   Stress: Stress Concern Present (7/21/2021)    [employer] [place] [employer] - Occupational Stress Questionnaire      Feeling of Stress : Very much   Housing Stability: Unknown (7/21/2021)    Housing Stability Vital Sign      Unable to Pay for Housing in the Last Year: No      Unstable Housing in the Last Year: No       VITALS:  Patient Vitals for the past 24 hrs:   BP Pulse Resp SpO2   06/16/23 0100 109/69 81 -- 93  %   06/16/23 0000 107/66 75 12 92 %   06/15/23 2330 -- 77 -- 93 %   06/15/23 2300 116/73 76 12 91 %       PHYSICAL EXAM    Constitutional:  Well developed, Well nourished,  HENT:  Normocephalic, Atraumatic, Bilateral external ears normal, Oropharynx dry, Nose normal.   Neck:  Normal range of motion, No meningismus, No stridor.   Eyes:  EOMI, Conjunctiva normal, No discharge.   Respiratory:  Normal breath sounds, No respiratory distress, No wheezing, No chest tenderness.   Cardiovascular:  Normal heart rate, Normal rhythm, No murmurs  GI:  Soft, No tenderness, No guarding,   Musculoskeletal:  Neurovascularly intact distally, No edema, No tenderness, No cyanosis, Good range of motion in all major joints.   Integument:  Warm, Dry, No erythema, No rash.   Lymphatic:  No lymphadenopathy noted.   Neurologic:  Drowsy and arousable, Normal motor function, , No focal deficits noted.   Psychiatric: intoxicated     LAB:  All pertinent labs reviewed and interpreted.  Results for orders placed or performed during the hospital encounter of 06/15/23   Alcohol level blood   Result Value Ref Range    Alcohol ethyl 0.52 (HH) <=0.01 g/dL   Salicylate level   Result Value Ref Range    Salicylate <0.3   mg/dL   Acetaminophen level   Result Value Ref Range    Acetaminophen <5.0 (L) 10.0 - 30.0 ug/mL   Comprehensive metabolic panel   Result Value Ref Range    Sodium 143 136 - 145 mmol/L    Potassium 4.5 3.4 - 5.3 mmol/L    Chloride 98 98 - 107 mmol/L    Carbon Dioxide (CO2) 27 22 - 29 mmol/L    Anion Gap 18 (H) 7 - 15 mmol/L    Urea Nitrogen 13.2 6.0 - 20.0 mg/dL    Creatinine 0.73 0.67 - 1.17 mg/dL    Calcium 9.2 8.6 - 10.0 mg/dL    Glucose 92 70 - 99 mg/dL    Alkaline Phosphatase 80 40 - 129 U/L     (H) 0 - 45 U/L    ALT 75 (H) 0 - 70 U/L    Protein Total 8.1 6.4 - 8.3 g/dL    Albumin 5.1 3.5 - 5.2 g/dL    Bilirubin Total 0.4 <=1.2 mg/dL    GFR Estimate >90 >60 mL/min/1.73m2   UA with Microscopic reflex to Culture    Specimen:  Urine, NOS   Result Value Ref Range    Color Urine Light Yellow Colorless, Straw, Light Yellow, Yellow    Appearance Urine Clear Clear    Glucose Urine Negative Negative mg/dL    Bilirubin Urine Negative Negative    Ketones Urine Negative Negative mg/dL    Specific Gravity Urine 1.011 1.001 - 1.030    Blood Urine Negative Negative    pH Urine 6.5 5.0 - 7.0    Protein Albumin Urine 30 (A) Negative mg/dL    Urobilinogen Urine <2.0 <2.0 mg/dL    Nitrite Urine Negative Negative    Leukocyte Esterase Urine Negative Negative    RBC Urine 0 <=2 /HPF    WBC Urine 0 <=5 /HPF   CBC with platelets and differential   Result Value Ref Range    WBC Count 6.4 4.0 - 11.0 10e3/uL    RBC Count 5.06 4.40 - 5.90 10e6/uL    Hemoglobin 14.5 13.3 - 17.7 g/dL    Hematocrit 43.1 40.0 - 53.0 %    MCV 85 78 - 100 fL    MCH 28.7 26.5 - 33.0 pg    MCHC 33.6 31.5 - 36.5 g/dL    RDW 16.0 (H) 10.0 - 15.0 %    Platelet Count 269 150 - 450 10e3/uL    % Neutrophils 44 %    % Lymphocytes 48 %    % Monocytes 5 %    % Eosinophils 1 %    % Basophils 2 %    % Immature Granulocytes 0 %    NRBCs per 100 WBC 0 <1 /100    Absolute Neutrophils 2.8 1.6 - 8.3 10e3/uL    Absolute Lymphocytes 3.1 0.8 - 5.3 10e3/uL    Absolute Monocytes 0.3 0.0 - 1.3 10e3/uL    Absolute Eosinophils 0.1 0.0 - 0.7 10e3/uL    Absolute Basophils 0.1 0.0 - 0.2 10e3/uL    Absolute Immature Granulocytes 0.0 <=0.4 10e3/uL    Absolute NRBCs 0.0 10e3/uL   Drug abuse screen 77 urine (FL, RH, SH)   Result Value Ref Range    Amphetamines Urine Screen Negative Screen Negative    Barbituates Urine Screen Negative Screen Negative    Benzodiazepine Urine Screen Positive (A) Screen Negative    Cannabinoids Urine Screen Negative Screen Negative    Opiates Urine Screen Negative Screen Negative    PCP Urine Screen Negative Screen Negative    Cocaine Urine Screen Negative Screen Negative   Result Value Ref Range    Magnesium 2.1 1.7 - 2.3 mg/dL       EK-rate is 76, sinus, no ST segment elevation  or depression is appreciated prolonged QT    Dr. Wolf independently reviewed and interpreted this EKG        I, Anat Cuello, am serving as a scribe to document services personally performed by Dr. Galaviz based on my observation and the provider's statements to me. I, Hilary Galaviz MD attest that Anat Cuello is acting in a scribe capacity, has observed my performance of the services and has documented them in accordance with my direction.    Hilary Galaviz MD  Emergency Medicine  [facility] EMERGENCY DEPARTMENT  [facility] [facility] [address]  [phone]  Dept: [phone]     Hilary Galaviz MD  06/16/23 8780

## 2023-06-17 ENCOUNTER — HOSPITAL ENCOUNTER (INPATIENT)
Facility: CLINIC | Age: 38
LOS: 2 days | Discharge: HOME | DRG: 897 | End: 2023-06-20
Attending: EMERGENCY MEDICINE | Admitting: PSYCHIATRY & NEUROLOGY
Payer: COMMERCIAL

## 2023-06-17 DIAGNOSIS — F10.20 ALCOHOL USE DISORDER, SEVERE, DEPENDENCE (H): ICD-10-CM

## 2023-06-17 DIAGNOSIS — F10.930 ALCOHOL WITHDRAWAL SYNDROME WITHOUT COMPLICATION (H): Primary | ICD-10-CM

## 2023-06-17 DIAGNOSIS — F10.229 ALCOHOL DEPENDENCE WITH INTOXICATION (H): ICD-10-CM

## 2023-06-17 DIAGNOSIS — F41.1 GENERALIZED ANXIETY DISORDER: Chronic | ICD-10-CM

## 2023-06-17 DIAGNOSIS — F10.229 ALCOHOL DEPENDENCE WITH INTOXICATION WITH COMPLICATION (H): ICD-10-CM

## 2023-06-17 DIAGNOSIS — Z11.52 ENCOUNTER FOR SCREENING LABORATORY TESTING FOR COVID-19 VIRUS: ICD-10-CM

## 2023-06-17 LAB
ALBUMIN SERPL BCG-MCNC: 4.7 G/DL (ref 3.5–5.2)
ALP SERPL-CCNC: 72 U/L (ref 40–129)
ALT SERPL W P-5'-P-CCNC: 92 U/L (ref 0–70)
AMPHETAMINES UR QL SCN: ABNORMAL
ANION GAP SERPL CALCULATED.3IONS-SCNC: 15 MMOL/L (ref 7–15)
AST SERPL W P-5'-P-CCNC: 128 U/L (ref 0–45)
BARBITURATES UR QL SCN: ABNORMAL
BASOPHILS # BLD AUTO: 0.1 10E3/UL (ref 0–0.2)
BASOPHILS NFR BLD AUTO: 1 %
BENZODIAZ UR QL SCN: ABNORMAL
BILIRUB SERPL-MCNC: 0.4 MG/DL
BUN SERPL-MCNC: 11.6 MG/DL (ref 6–20)
BZE UR QL SCN: ABNORMAL
CALCIUM SERPL-MCNC: 9.2 MG/DL (ref 8.6–10)
CANNABINOIDS UR QL SCN: ABNORMAL
CHLORIDE SERPL-SCNC: 101 MMOL/L (ref 98–107)
CREAT SERPL-MCNC: 0.7 MG/DL (ref 0.67–1.17)
DEPRECATED HCO3 PLAS-SCNC: 28 MMOL/L (ref 22–29)
EOSINOPHIL # BLD AUTO: 0.1 10E3/UL (ref 0–0.7)
EOSINOPHIL NFR BLD AUTO: 1 %
ERYTHROCYTE [DISTWIDTH] IN BLOOD BY AUTOMATED COUNT: 16 % (ref 10–15)
ETHANOL SERPL-MCNC: 0.39 G/DL
GFR SERPL CREATININE-BSD FRML MDRD: >90 ML/MIN/1.73M2
GLUCOSE SERPL-MCNC: 96 MG/DL (ref 70–99)
HCT VFR BLD AUTO: 39.4 % (ref 40–53)
HGB BLD-MCNC: 13.3 G/DL (ref 13.3–17.7)
IMM GRANULOCYTES # BLD: 0 10E3/UL
IMM GRANULOCYTES NFR BLD: 0 %
LIPASE SERPL-CCNC: 58 U/L (ref 13–60)
LYMPHOCYTES # BLD AUTO: 2.1 10E3/UL (ref 0.8–5.3)
LYMPHOCYTES NFR BLD AUTO: 23 %
MAGNESIUM SERPL-MCNC: 2.1 MG/DL (ref 1.7–2.3)
MCH RBC QN AUTO: 29.2 PG (ref 26.5–33)
MCHC RBC AUTO-ENTMCNC: 33.8 G/DL (ref 31.5–36.5)
MCV RBC AUTO: 87 FL (ref 78–100)
MONOCYTES # BLD AUTO: 0.5 10E3/UL (ref 0–1.3)
MONOCYTES NFR BLD AUTO: 6 %
NEUTROPHILS # BLD AUTO: 6.4 10E3/UL (ref 1.6–8.3)
NEUTROPHILS NFR BLD AUTO: 69 %
NRBC # BLD AUTO: 0 10E3/UL
NRBC BLD AUTO-RTO: 0 /100
OPIATES UR QL SCN: ABNORMAL
PLATELET # BLD AUTO: 179 10E3/UL (ref 150–450)
POTASSIUM SERPL-SCNC: 4.2 MMOL/L (ref 3.4–5.3)
PROT SERPL-MCNC: 7.2 G/DL (ref 6.4–8.3)
RBC # BLD AUTO: 4.55 10E6/UL (ref 4.4–5.9)
SARS-COV-2 RNA RESP QL NAA+PROBE: NEGATIVE
SODIUM SERPL-SCNC: 144 MMOL/L (ref 136–145)
WBC # BLD AUTO: 9.3 10E3/UL (ref 4–11)

## 2023-06-17 PROCEDURE — 96365 THER/PROPH/DIAG IV INF INIT: CPT | Performed by: EMERGENCY MEDICINE

## 2023-06-17 PROCEDURE — 250N000009 HC RX 250: Performed by: EMERGENCY MEDICINE

## 2023-06-17 PROCEDURE — 83735 ASSAY OF MAGNESIUM: CPT | Performed by: EMERGENCY MEDICINE

## 2023-06-17 PROCEDURE — 87635 SARS-COV-2 COVID-19 AMP PRB: CPT | Performed by: EMERGENCY MEDICINE

## 2023-06-17 PROCEDURE — 258N000003 HC RX IP 258 OP 636: Performed by: EMERGENCY MEDICINE

## 2023-06-17 PROCEDURE — 80053 COMPREHEN METABOLIC PANEL: CPT | Performed by: EMERGENCY MEDICINE

## 2023-06-17 PROCEDURE — 82077 ASSAY SPEC XCP UR&BREATH IA: CPT | Performed by: EMERGENCY MEDICINE

## 2023-06-17 PROCEDURE — 85025 COMPLETE CBC W/AUTO DIFF WBC: CPT | Performed by: EMERGENCY MEDICINE

## 2023-06-17 PROCEDURE — 99285 EMERGENCY DEPT VISIT HI MDM: CPT | Mod: 25 | Performed by: EMERGENCY MEDICINE

## 2023-06-17 PROCEDURE — HZ2ZZZZ DETOXIFICATION SERVICES FOR SUBSTANCE ABUSE TREATMENT: ICD-10-PCS | Performed by: EMERGENCY MEDICINE

## 2023-06-17 PROCEDURE — 99285 EMERGENCY DEPT VISIT HI MDM: CPT | Performed by: EMERGENCY MEDICINE

## 2023-06-17 PROCEDURE — 96361 HYDRATE IV INFUSION ADD-ON: CPT | Performed by: EMERGENCY MEDICINE

## 2023-06-17 PROCEDURE — 36415 COLL VENOUS BLD VENIPUNCTURE: CPT | Performed by: EMERGENCY MEDICINE

## 2023-06-17 PROCEDURE — 250N000011 HC RX IP 250 OP 636: Performed by: EMERGENCY MEDICINE

## 2023-06-17 PROCEDURE — 80307 DRUG TEST PRSMV CHEM ANLYZR: CPT | Performed by: EMERGENCY MEDICINE

## 2023-06-17 PROCEDURE — 83690 ASSAY OF LIPASE: CPT | Performed by: NURSE PRACTITIONER

## 2023-06-17 RX ORDER — DIAZEPAM 5 MG
5-20 TABLET ORAL EVERY 30 MIN PRN
Status: DISCONTINUED | OUTPATIENT
Start: 2023-06-17 | End: 2023-06-18

## 2023-06-17 RX ORDER — ACETAMINOPHEN 325 MG/1
975 TABLET ORAL ONCE
Status: COMPLETED | OUTPATIENT
Start: 2023-06-18 | End: 2023-06-18

## 2023-06-17 RX ADMIN — SODIUM CHLORIDE 1000 ML: 9 INJECTION, SOLUTION INTRAVENOUS at 16:27

## 2023-06-17 RX ADMIN — FOLIC ACID: 5 INJECTION, SOLUTION INTRAMUSCULAR; INTRAVENOUS; SUBCUTANEOUS at 17:18

## 2023-06-17 ASSESSMENT — ACTIVITIES OF DAILY LIVING (ADL)
ADLS_ACUITY_SCORE: 37

## 2023-06-17 NOTE — ED PROVIDER NOTES
"ED Provider Note  [facility]      History     Chief Complaint   Patient presents with     Drug / Alcohol Assessment     Seeking detox off of alcohol, last drink PTA, drinks vodka half of a 750mls, withdrawal seizure last one 2 yrs ago     HPI  Nikhil Rios is a 37 year old male with a past medical history of severe alcohol use disorder with history of withdrawal seizures, chronic calcific pancreatitis, GERD, HTN, and HLD who presents to the Emergency Department seeking detox. Patient previously presented to [facility] ED on 6/15 following a benzodiazepine overdose.  He says that using benzos is a new thing. He says he just saw his psychiatrist who gave him benzos and he used them all in 2 days. He is here with his mother.  He has been staying with her since the beginning of May. He drinks 750 ml of vodka daily.  Last drink was prior to arrival. He has hx of withdrawal seizures but not DTs. He uses thc sometimes as well. His mother says he did have a 9 month sobriety prior to relapse.        Physical Exam   BP: 118/88  Pulse: 106  Temp: 97.8  F (36.6  C)  Resp: 16  Height: 177.8 cm (5' 10\")  Weight: 74.8 kg (165 lb)  SpO2: 98 %  Physical Exam  Vitals and nursing note reviewed.   Constitutional:       Comments: Slurred speech and slow to response due to cd concerns.  Airway is protected.    HENT:      Head: Normocephalic and atraumatic.      Nose: No congestion or rhinorrhea.   Eyes:      General: No scleral icterus.     Extraocular Movements: Extraocular movements intact.      Comments: Pupils dilated (both)   Cardiovascular:      Rate and Rhythm: Tachycardia present.      Heart sounds: Normal heart sounds.   Pulmonary:      Effort: Pulmonary effort is normal.      Breath sounds: Normal breath sounds.   Abdominal:      General: There is no distension.      Palpations: Abdomen is soft. There is no mass.      Tenderness: There is no abdominal tenderness. There is no guarding " or rebound.      Hernia: No hernia is present.   Musculoskeletal:         General: No tenderness or signs of injury. Normal range of motion.      Cervical back: Normal range of motion. No rigidity.   Skin:     Coloration: Skin is not jaundiced.   Neurological:      General: No focal deficit present.      Mental Status: He is alert and oriented to person, place, and time.   Psychiatric:         Attention and Perception: Attention and perception normal.         Mood and Affect: Mood is depressed.         Speech: Speech is slurred.         Behavior: Behavior is slowed.         Thought Content: Thought content normal.           ED Course, Procedures, & Data     ED Course as of 06/18/23 1105   Sat Jun 17, 2023   1622 Alcohol ethyl(!!): 0.39   2242 Patient in agreement with admission to detox unit after several hours in the Emergency Department. Patient states he would prefer a medical admit, however he is medically stable from my perspective with all labs within normal or expected limits. Will plan to admit to detox.      Procedures                   Results for orders placed or performed during the hospital encounter of 06/17/23   Comprehensive metabolic panel     Status: Abnormal   Result Value Ref Range    Sodium 144 136 - 145 mmol/L    Potassium 4.2 3.4 - 5.3 mmol/L    Chloride 101 98 - 107 mmol/L    Carbon Dioxide (CO2) 28 22 - 29 mmol/L    Anion Gap 15 7 - 15 mmol/L    Urea Nitrogen 11.6 6.0 - 20.0 mg/dL    Creatinine 0.70 0.67 - 1.17 mg/dL    Calcium 9.2 8.6 - 10.0 mg/dL    Glucose 96 70 - 99 mg/dL    Alkaline Phosphatase 72 40 - 129 U/L     (H) 0 - 45 U/L    ALT 92 (H) 0 - 70 U/L    Protein Total 7.2 6.4 - 8.3 g/dL    Albumin 4.7 3.5 - 5.2 g/dL    Bilirubin Total 0.4 <=1.2 mg/dL    GFR Estimate >90 >60 mL/min/1.73m2   Magnesium     Status: Normal   Result Value Ref Range    Magnesium 2.1 1.7 - 2.3 mg/dL   Asymptomatic COVID-19 Virus (Coronavirus) by PCR Nose     Status: Normal    Specimen: Nose; Swab    Result Value Ref Range    SARS CoV2 PCR Negative Negative    Narrative    Testing was performed using the Xpert Xpress SARS-CoV-2 Assay on the Cepheid Gene-Xpert Instrument Systems. Additional information about this Emergency Use Authorization (EUA) assay can be found via the Lab Guide. This test should be ordered for the detection of SARS-CoV-2 in individuals who meet SARS-CoV-2 clinical and/or epidemiological criteria as well as from individuals without symptoms or other reasons to suspect COVID-19. Test performance for asymptomatic patients has only been established in anterior nasal swab specimens. This test is for in vitro diagnostic use under the FDA EUA for laboratories certified under CLIA to perform high complexity testing. This test has not been FDA cleared or approved. A negative result does not rule out the presence of PCR inhibitors in the specimen or target RNA concentration below the limit of detection for the assay. The possibility of a false negative should be considered if the patient's recent exposure or clinical presentation suggests COVID-19. This test was validated by the [facility]. This laboratory is certified under the Clinical Laboratory Improvement Amendments (CLIA) as qualified to perform high complexity laboratory testing.     Ethyl Alcohol Level     Status: Abnormal   Result Value Ref Range    Alcohol ethyl 0.39 (HH) <=0.01 g/dL   CBC with platelets and differential     Status: Abnormal   Result Value Ref Range    WBC Count 9.3 4.0 - 11.0 10e3/uL    RBC Count 4.55 4.40 - 5.90 10e6/uL    Hemoglobin 13.3 13.3 - 17.7 g/dL    Hematocrit 39.4 (L) 40.0 - 53.0 %    MCV 87 78 - 100 fL    MCH 29.2 26.5 - 33.0 pg    MCHC 33.8 31.5 - 36.5 g/dL    RDW 16.0 (H) 10.0 - 15.0 %    Platelet Count 179 150 - 450 10e3/uL    % Neutrophils 69 %    % Lymphocytes 23 %    % Monocytes 6 %    % Eosinophils 1 %    % Basophils 1 %    % Immature Granulocytes 0 %    NRBCs per  100 WBC 0 <1 /100    Absolute Neutrophils 6.4 1.6 - 8.3 10e3/uL    Absolute Lymphocytes 2.1 0.8 - 5.3 10e3/uL    Absolute Monocytes 0.5 0.0 - 1.3 10e3/uL    Absolute Eosinophils 0.1 0.0 - 0.7 10e3/uL    Absolute Basophils 0.1 0.0 - 0.2 10e3/uL    Absolute Immature Granulocytes 0.0 <=0.4 10e3/uL    Absolute NRBCs 0.0 10e3/uL   Drug abuse screen 1 urine (ED)     Status: Abnormal   Result Value Ref Range    Amphetamines Urine Screen Negative Screen Negative    Barbituates Urine Screen Negative Screen Negative    Benzodiazepine Urine Screen Positive (A) Screen Negative    Cannabinoids Urine Screen Negative Screen Negative    Cocaine Urine Screen Negative Screen Negative    Opiates Urine Screen Negative Screen Negative   Lipase     Status: Normal   Result Value Ref Range    Lipase 58 13 - 60 U/L   GGT     Status: Normal   Result Value Ref Range    GGT 46 8 - 61 U/L   Lipid panel     Status: Abnormal   Result Value Ref Range    Cholesterol 234 (H) <200 mg/dL    Triglycerides 81 <150 mg/dL    Direct Measure HDL 82 >=40 mg/dL    LDL Cholesterol Calculated 136 (H) <=100 mg/dL    Non HDL Cholesterol 152 (H) <130 mg/dL    Narrative    Cholesterol  Desirable:  <200 mg/dL    Triglycerides  Normal:  Less than 150 mg/dL  Borderline High:  150-199 mg/dL  High:  200-499 mg/dL  Very High:  Greater than or equal to 500 mg/dL    Direct Measure HDL  Female:  Greater than or equal to 50 mg/dL   Male:  Greater than or equal to 40 mg/dL    LDL Cholesterol  Desirable:  <100mg/dL  Above Desirable:  100-129 mg/dL   Borderline High:  130-159 mg/dL   High:  160-189 mg/dL   Very High:  >= 190 mg/dL    Non HDL Cholesterol  Desirable:  130 mg/dL  Above Desirable:  130-159 mg/dL  Borderline High:  160-189 mg/dL  High:  190-219 mg/dL  Very High:  Greater than or equal to 220 mg/dL   TSH with free T4 reflex and/or T3 as indicated     Status: Normal   Result Value Ref Range    TSH 1.76 0.30 - 4.20 uIU/mL   Vitamin B12     Status: Normal   Result Value  Ref Range    Vitamin B12 663 232 - 1,245 pg/mL   Folate     Status: Normal   Result Value Ref Range    Folic Acid 12.7 4.6 - 34.8 ng/mL   Urine Drugs of Abuse Screen     Status: Abnormal    Narrative    The following orders were created for panel order Urine Drugs of Abuse Screen.  Procedure                               Abnormality         Status                     ---------                               -----------         ------                     Drug abuse screen 1 urin...[[id number]]  Abnormal            Final result                 Please view results for these tests on the individual orders.   CBC with platelets differential     Status: Abnormal    Narrative    The following orders were created for panel order CBC with platelets differential.  Procedure                               Abnormality         Status                     ---------                               -----------         ------                     CBC with platelets and d...[[id number]]  Abnormal            Final result                 Please view results for these tests on the individual orders.     Medications   diazepam (VALIUM) tablet 5-20 mg (10 mg Oral $Given 6/18/23 0717)   atenolol (TENORMIN) tablet 50 mg (has no administration in time range)   thiamine (B-1) tablet 100 mg (100 mg Oral $Given 6/18/23 0811)   folic acid (FOLVITE) tablet 1 mg (1 mg Oral $Given 6/18/23 0811)   multivitamin w/minerals (THERA-VIT-M) tablet 1 tablet (1 tablet Oral $Given 6/18/23 0811)   loperamide (IMODIUM) capsule 2 mg (has no administration in time range)   ondansetron (ZOFRAN ODT) ODT tab 4 mg (has no administration in time range)   acetaminophen (TYLENOL) tablet 650 mg (has no administration in time range)   alum & mag hydroxide-simethicone (MAALOX) suspension 30 mL (has no administration in time range)   senna-docusate (SENOKOT-S/PERICOLACE) 8.6-50 MG per tablet 1 tablet (has no administration in time range)   traZODone (DESYREL) tablet 50 mg (has no  administration in time range)   hydrOXYzine (ATARAX) tablet 25 mg (has no administration in time range)   nicotine polacrilex (NICORETTE) gum 4 mg (has no administration in time range)   hydrOXYzine (ATARAX) tablet 25-50 mg (has no administration in time range)   omeprazole (priLOSEC) CR capsule 40 mg (has no administration in time range)   sucralfate (CARAFATE) tablet 1 g (has no administration in time range)   sodium chloride 0.9 % 1,000 mL with Infuvite Adult 10 mL, thiamine 100 mg, folic acid 1 mg, magnesium sulfate 2 g infusion ( Intravenous Stopped 6/17/23 1833)   0.9% sodium chloride BOLUS (0 mLs Intravenous Stopped 6/17/23 1718)   acetaminophen (TYLENOL) tablet 975 mg (975 mg Oral $Given 6/18/23 0001)   diazepam (VALIUM) tablet 10 mg (10 mg Oral $Given 6/18/23 0134)     Labs Ordered and Resulted from Time of ED Arrival to Time of ED Departure   COMPREHENSIVE METABOLIC PANEL - Abnormal       Result Value    Sodium 144      Potassium 4.2      Chloride 101      Carbon Dioxide (CO2) 28      Anion Gap 15      Urea Nitrogen 11.6      Creatinine 0.70      Calcium 9.2      Glucose 96      Alkaline Phosphatase 72       (*)     ALT 92 (*)     Protein Total 7.2      Albumin 4.7      Bilirubin Total 0.4      GFR Estimate >90     ETHYL ALCOHOL LEVEL - Abnormal    Alcohol ethyl 0.39 (*)    CBC WITH PLATELETS AND DIFFERENTIAL - Abnormal    WBC Count 9.3      RBC Count 4.55      Hemoglobin 13.3      Hematocrit 39.4 (*)     MCV 87      MCH 29.2      MCHC 33.8      RDW 16.0 (*)     Platelet Count 179      % Neutrophils 69      % Lymphocytes 23      % Monocytes 6      % Eosinophils 1      % Basophils 1      % Immature Granulocytes 0      NRBCs per 100 WBC 0      Absolute Neutrophils 6.4      Absolute Lymphocytes 2.1      Absolute Monocytes 0.5      Absolute Eosinophils 0.1      Absolute Basophils 0.1      Absolute Immature Granulocytes 0.0      Absolute NRBCs 0.0     DRUG ABUSE SCREEN 1 URINE (ED) - Abnormal     Amphetamines Urine Screen Negative      Barbituates Urine Screen Negative      Benzodiazepine Urine Screen Positive (*)     Cannabinoids Urine Screen Negative      Cocaine Urine Screen Negative      Opiates Urine Screen Negative     MAGNESIUM - Normal    Magnesium 2.1     COVID-19 VIRUS (CORONAVIRUS) BY PCR - Normal    SARS CoV2 PCR Negative     LIPASE - Normal    Lipase 58       No orders to display          Critical care was not performed.     Medical Decision Making  The patient's presentation was of high complexity (a chronic illness severe exacerbation, progression, or side effect of treatment).    The patient's evaluation involved:  an assessment requiring an independent historian (mother)  review of external note(s) from 3+ sources (frequent/multiple ed visits for alcohol intoxicatio)  ordering and/or review of 3+ test(s) in this encounter (see separate area of note for details)    The patient's management necessitated high risk (a decision regarding hospitalization).      Assessment & Plan    Nikhil Rios is a 37 year old male with a past medical history of severe alcohol use disorder with history of withdrawal seizures, chronic calcific pancreatitis, GERD, HTN, and HLD who presents to the Emergency Department seeking detox.  He drinks 750 ml vodka daily.  Last drink prior to arrival. Hx of withdrawal seizures.  Labs were ordered and reviewed.  He initially isn't sure if he wants to go to detox but would benefit from detox. Detox bed held.  Patient agreeable for admission to detox and medically appropriate.      I have reviewed the nursing notes. I have reviewed the findings, diagnosis, plan and need for follow up with the patient.        Final diagnoses:   Alcohol dependence with intoxication with complication (H)   ITien, am serving as a trained medical scribe to document services personally performed by Génesis Le MD, based on the provider's statements to me.     IGénesis MD, was  physically present and have reviewed and verified the accuracy of this note documented by Tien Avery.      Génesis Le MD  [facility] EMERGENCY DEPARTMENT  6/17/2023       Génesis eL MD  06/18/23 2442

## 2023-06-17 NOTE — LETTER
June 18, 2023      To Whom It May Concern:      Nikhli Rios was seen in our Emergency Department today, 06/18/23.    He is being admitted to an inpatient hospital unit for an undetermined period of time.      Sincerely,        Landry Barnett MD

## 2023-06-17 NOTE — ED TRIAGE NOTES
Seeking detox off of alcohol, last drink PTA, drinks vodka half of a 750mls, withdrawal seizure last one 2 yrs ago     Triage Assessment     Row Name 06/17/23 1532       Triage Assessment (Adult)    Airway WDL WDL       Respiratory WDL    Respiratory WDL WDL       Skin Circulation/Temperature WDL    Skin Circulation/Temperature WDL WDL       Cardiac WDL    Cardiac WDL WDL       Peripheral/Neurovascular WDL    Peripheral Neurovascular WDL WDL       Cognitive/Neuro/Behavioral WDL    Cognitive/Neuro/Behavioral WDL WDL

## 2023-06-18 PROBLEM — F10.229 ALCOHOL DEPENDENCE WITH INTOXICATION WITH COMPLICATION (H): Status: ACTIVE | Noted: 2023-06-18

## 2023-06-18 LAB
CHOLEST SERPL-MCNC: 234 MG/DL
FOLATE SERPL-MCNC: 12.7 NG/ML (ref 4.6–34.8)
GGT SERPL-CCNC: 46 U/L (ref 8–61)
HDLC SERPL-MCNC: 82 MG/DL
LDLC SERPL CALC-MCNC: 136 MG/DL
NONHDLC SERPL-MCNC: 152 MG/DL
TRIGL SERPL-MCNC: 81 MG/DL
TSH SERPL DL<=0.005 MIU/L-ACNC: 1.76 UIU/ML (ref 0.3–4.2)
VIT B12 SERPL-MCNC: 663 PG/ML (ref 232–1245)

## 2023-06-18 PROCEDURE — 80061 LIPID PANEL: CPT | Performed by: PSYCHIATRY & NEUROLOGY

## 2023-06-18 PROCEDURE — 250N000013 HC RX MED GY IP 250 OP 250 PS 637: Performed by: EMERGENCY MEDICINE

## 2023-06-18 PROCEDURE — 128N000004 HC R&B CD ADULT

## 2023-06-18 PROCEDURE — 84443 ASSAY THYROID STIM HORMONE: CPT | Performed by: PSYCHIATRY & NEUROLOGY

## 2023-06-18 PROCEDURE — H2035 A/D TX PROGRAM, PER HOUR: HCPCS | Mod: HQ

## 2023-06-18 PROCEDURE — 82746 ASSAY OF FOLIC ACID SERUM: CPT | Performed by: PSYCHIATRY & NEUROLOGY

## 2023-06-18 PROCEDURE — 99223 1ST HOSP IP/OBS HIGH 75: CPT | Mod: AI | Performed by: PSYCHIATRY & NEUROLOGY

## 2023-06-18 PROCEDURE — 82977 ASSAY OF GGT: CPT | Performed by: PSYCHIATRY & NEUROLOGY

## 2023-06-18 PROCEDURE — 36415 COLL VENOUS BLD VENIPUNCTURE: CPT | Performed by: PSYCHIATRY & NEUROLOGY

## 2023-06-18 PROCEDURE — 82607 VITAMIN B-12: CPT | Performed by: PSYCHIATRY & NEUROLOGY

## 2023-06-18 PROCEDURE — 99221 1ST HOSP IP/OBS SF/LOW 40: CPT | Mod: AI | Performed by: PHYSICIAN ASSISTANT

## 2023-06-18 PROCEDURE — 250N000013 HC RX MED GY IP 250 OP 250 PS 637: Performed by: PSYCHIATRY & NEUROLOGY

## 2023-06-18 RX ORDER — DIAZEPAM 5 MG
10 TABLET ORAL ONCE
Status: COMPLETED | OUTPATIENT
Start: 2023-06-18 | End: 2023-06-18

## 2023-06-18 RX ORDER — MULTIPLE VITAMINS W/ MINERALS TAB 9MG-400MCG
1 TAB ORAL DAILY
Status: DISCONTINUED | OUTPATIENT
Start: 2023-06-18 | End: 2023-06-20 | Stop reason: HOSPADM

## 2023-06-18 RX ORDER — HYDROXYZINE HYDROCHLORIDE 25 MG/1
25 TABLET, FILM COATED ORAL EVERY 4 HOURS PRN
Status: DISCONTINUED | OUTPATIENT
Start: 2023-06-18 | End: 2023-06-18

## 2023-06-18 RX ORDER — ACETAMINOPHEN 325 MG/1
650 TABLET ORAL EVERY 4 HOURS PRN
Status: DISCONTINUED | OUTPATIENT
Start: 2023-06-18 | End: 2023-06-20 | Stop reason: HOSPADM

## 2023-06-18 RX ORDER — GABAPENTIN 300 MG/1
300 CAPSULE ORAL 3 TIMES DAILY
Status: DISCONTINUED | OUTPATIENT
Start: 2023-06-18 | End: 2023-06-18

## 2023-06-18 RX ORDER — LOPERAMIDE HCL 2 MG
2 CAPSULE ORAL 4 TIMES DAILY PRN
Status: DISCONTINUED | OUTPATIENT
Start: 2023-06-18 | End: 2023-06-20 | Stop reason: HOSPADM

## 2023-06-18 RX ORDER — MAGNESIUM HYDROXIDE/ALUMINUM HYDROXICE/SIMETHICONE 120; 1200; 1200 MG/30ML; MG/30ML; MG/30ML
30 SUSPENSION ORAL EVERY 4 HOURS PRN
Status: DISCONTINUED | OUTPATIENT
Start: 2023-06-18 | End: 2023-06-20 | Stop reason: HOSPADM

## 2023-06-18 RX ORDER — TRAZODONE HYDROCHLORIDE 50 MG/1
50 TABLET, FILM COATED ORAL
Status: DISCONTINUED | OUTPATIENT
Start: 2023-06-18 | End: 2023-06-20 | Stop reason: HOSPADM

## 2023-06-18 RX ORDER — DIAZEPAM 5 MG
5-20 TABLET ORAL EVERY 30 MIN PRN
Status: DISCONTINUED | OUTPATIENT
Start: 2023-06-18 | End: 2023-06-20 | Stop reason: HOSPADM

## 2023-06-18 RX ORDER — GABAPENTIN 300 MG/1
300 CAPSULE ORAL 3 TIMES DAILY
Status: ON HOLD | COMMUNITY
End: 2023-06-20

## 2023-06-18 RX ORDER — HYDROXYZINE HYDROCHLORIDE 25 MG/1
25-50 TABLET, FILM COATED ORAL 3 TIMES DAILY PRN
Status: DISCONTINUED | OUTPATIENT
Start: 2023-06-18 | End: 2023-06-20 | Stop reason: HOSPADM

## 2023-06-18 RX ORDER — ONDANSETRON 4 MG/1
4 TABLET, ORALLY DISINTEGRATING ORAL EVERY 6 HOURS PRN
Status: DISCONTINUED | OUTPATIENT
Start: 2023-06-18 | End: 2023-06-20 | Stop reason: HOSPADM

## 2023-06-18 RX ORDER — GABAPENTIN 400 MG/1
400 CAPSULE ORAL 3 TIMES DAILY
Status: DISCONTINUED | OUTPATIENT
Start: 2023-06-18 | End: 2023-06-20 | Stop reason: HOSPADM

## 2023-06-18 RX ORDER — SUCRALFATE 1 G/1
1 TABLET ORAL 4 TIMES DAILY
Status: DISCONTINUED | OUTPATIENT
Start: 2023-06-18 | End: 2023-06-20 | Stop reason: HOSPADM

## 2023-06-18 RX ORDER — ATENOLOL 50 MG/1
50 TABLET ORAL DAILY PRN
Status: DISCONTINUED | OUTPATIENT
Start: 2023-06-18 | End: 2023-06-20 | Stop reason: HOSPADM

## 2023-06-18 RX ORDER — FOLIC ACID 1 MG/1
1 TABLET ORAL DAILY
Status: DISCONTINUED | OUTPATIENT
Start: 2023-06-18 | End: 2023-06-20 | Stop reason: HOSPADM

## 2023-06-18 RX ORDER — AMOXICILLIN 250 MG
1 CAPSULE ORAL 2 TIMES DAILY PRN
Status: DISCONTINUED | OUTPATIENT
Start: 2023-06-18 | End: 2023-06-20 | Stop reason: HOSPADM

## 2023-06-18 RX ADMIN — FOLIC ACID 1 MG: 1 TABLET ORAL at 08:11

## 2023-06-18 RX ADMIN — THIAMINE HCL TAB 100 MG 100 MG: 100 TAB at 08:11

## 2023-06-18 RX ADMIN — DIAZEPAM 5 MG: 5 TABLET ORAL at 20:09

## 2023-06-18 RX ADMIN — OMEPRAZOLE 40 MG: 20 CAPSULE, DELAYED RELEASE ORAL at 12:33

## 2023-06-18 RX ADMIN — DIAZEPAM 10 MG: 5 TABLET ORAL at 12:34

## 2023-06-18 RX ADMIN — DIAZEPAM 10 MG: 5 TABLET ORAL at 16:13

## 2023-06-18 RX ADMIN — DIAZEPAM 10 MG: 5 TABLET ORAL at 01:34

## 2023-06-18 RX ADMIN — SUCRALFATE 1 G: 1 TABLET ORAL at 16:13

## 2023-06-18 RX ADMIN — ACETAMINOPHEN 975 MG: 325 TABLET, FILM COATED ORAL at 00:01

## 2023-06-18 RX ADMIN — GABAPENTIN 400 MG: 400 CAPSULE ORAL at 20:09

## 2023-06-18 RX ADMIN — SUCRALFATE 1 G: 1 TABLET ORAL at 12:33

## 2023-06-18 RX ADMIN — DIAZEPAM 5 MG: 5 TABLET ORAL at 00:15

## 2023-06-18 RX ADMIN — SUCRALFATE 1 G: 1 TABLET ORAL at 20:09

## 2023-06-18 RX ADMIN — MULTIPLE VITAMINS W/ MINERALS TAB 1 TABLET: TAB at 08:11

## 2023-06-18 RX ADMIN — GABAPENTIN 400 MG: 400 CAPSULE ORAL at 12:33

## 2023-06-18 RX ADMIN — DIAZEPAM 10 MG: 5 TABLET ORAL at 07:17

## 2023-06-18 ASSESSMENT — ACTIVITIES OF DAILY LIVING (ADL)
ADLS_ACUITY_SCORE: 40
ADLS_ACUITY_SCORE: 40
FALL_HISTORY_WITHIN_LAST_SIX_MONTHS: YES
ADLS_ACUITY_SCORE: 40
ADLS_ACUITY_SCORE: 40
NUMBER_OF_TIMES_PATIENT_HAS_FALLEN_WITHIN_LAST_SIX_MONTHS: 5
ADLS_ACUITY_SCORE: 40
LAUNDRY: WITH SUPERVISION
DRESS: INDEPENDENT
HEARING_DIFFICULTY_OR_DEAF: NO
DRESSING/BATHING_DIFFICULTY: NO
ADLS_ACUITY_SCORE: 40
ADLS_ACUITY_SCORE: 40
DIFFICULTY_EATING/SWALLOWING: NO
WALKING_OR_CLIMBING_STAIRS_DIFFICULTY: NO
TOILETING_ISSUES: NO
ADLS_ACUITY_SCORE: 40
ADLS_ACUITY_SCORE: 40
ORAL_HYGIENE: INDEPENDENT
ADLS_ACUITY_SCORE: 40
CHANGE_IN_FUNCTIONAL_STATUS_SINCE_ONSET_OF_CURRENT_ILLNESS/INJURY: NO
WEAR_GLASSES_OR_BLIND: NO
DOING_ERRANDS_INDEPENDENTLY_DIFFICULTY: NO
HYGIENE/GROOMING: INDEPENDENT
DIFFICULTY_COMMUNICATING: NO
CONCENTRATING,_REMEMBERING_OR_MAKING_DECISIONS_DIFFICULTY: NO

## 2023-06-18 NOTE — PLAN OF CARE
"    Problem: Substance Withdrawal  Signs and symptoms of listed problems will be absent or manageable.   SBAR    S = Situation:   Voluntary admit  B  = Background:   Per ED \"Nikhil Rios is a 37 year old male with a past medical history of severe alcohol use disorder with history of withdrawal seizures, chronic calcific pancreatitis, GERD, HTN, and HLD who presented to the ED seeking detox. Patient previously presented to [facility] ED on 6/15 following a benzodiazepine overdose. He says he just saw his psychiatrist who gave him benzos and he used them all in 2 days (Per Epic pt  dispensed 21 - 10 mg tablets on 6/15).    Notable labs: UTOX positive for benzodiazepine; COVID negative; ALT 92 (H),  (H), Hematocrit 39.4 (L), RDW 16 (H).  A  =  Assessment:   Vital Signs: /79 (BP Location: Right arm, Patient Position: Chair, Cuff Size: Adult Small)   Pulse 86   Temp 97.4  F (36.3  C) (Oral)   Resp 14   Ht 1.778 m (5' 10\")   Wt 74.8 kg (165 lb)   SpO2 98%   BMI 23.68 kg/m      Pt tearful, pleasant and cooperative upon approach; pt endorses hopelessness and frustration about his alcohol use. Pt stated he has protective factors including supportive and sober girlfriend and mother.   Denies SI, SIB, HI, and HA; although makes passive suicidal statements, such as \"I am slowly killing myself with alcohol and I don't have the balls to actually kill myself.\" Affect flat and blunted; mood depressed and anxious; gait unsteady; Patient reported multiple recent falls r/t intoxication. Pt stated 2 days ago he fell off his bed and hit his jaw and forehead. Abrasion noted by the patient's right eye. Patient denied loss of consciousness, any acute pain or swelling. Pt indicated drinking 1.75 L daily x 1 month.     R =   Request or Recommendation:   Patient given a walker for unsteady gait and educated about fall prevention Alcohol withdrawal monitoring, seizure, Suicide and fall precautions; Dr." Vipin to evaluate, case management to see--pt reports he is set to got [id number] after detox.

## 2023-06-18 NOTE — PLAN OF CARE
"Behavioral  Pt irritable and demanding upon approach; Pt denied SI, SIB, HI, and hallucinations;  Pt perseverative about getting valium and appears to exaggerate symptom at times; pt repeatedly tells staff he will have a seizure if he does not get more valium. Pt observed to have minimal tremors when he is not aware staff is watching him - tremors appear to escalate when he knows he is being observed. pt provided education on MSSA protocol; pt receptive;  Pt isolative to room/self. Did not attend programming; affect flat and blunted. Mood labile and depressed. eating 75% of meals and hydrating adequately; Attending to ADL's appropriately; pt unsteady on feet; given bell, walker and wheelchair to utilize. Pt moved to a room close to nursing station with a medical bed.      Medical   No complaints of physical pain/discomfort this shift. BP (!) 148/81   Pulse 71   Temp 97.8  F (36.6  C) (Oral)   Resp 16   Ht 1.778 m (5' 10\")   Wt 74.8 kg (165 lb)   SpO2 99%   BMI 23.68 kg/m       Pt continues in alcohol withdrawal; MSSA 9 and 9; pt has required 35 mg of valium since admission;      PRN'S: Valium 10 mg x 2 for withdrawal.     Plan  Discharge plan is to go to treatment at [facility].    Problem: Plan of Care - These are the overarching goals to be used throughout the patient stay.    Goal: Absence of Hospital-Acquired Illness or Injury  Intervention: Identify and Manage Fall Risk  Recent Flowsheet Documentation  Taken 6/18/2023 0103 by Jenni Alex RN  Safety Promotion/Fall Prevention:    assistive device/personal items within reach    clutter free environment maintained    increased rounding and observation    increase visualization of patient    lighting adjusted    nonskid shoes/slippers when out of bed    patient and family education    safety round/check completed  Goal: Readiness for Transition of Care  Intervention: Mutually Develop Transition Plan  Recent Flowsheet Documentation  Taken 6/18/2023 0000 by " Jenni Alex, RN  Equipment Currently Used at Home: none

## 2023-06-18 NOTE — PROGRESS NOTES
Spoke with pt's mom (MITRA signed); mom stated that the pt called her and told was having convulsions and he didn't feel we were treating him. Reassured mom that pt was being monitored and medicated as needed for withdrawal symptoms. Pt mom said that the pt is always in the emergency room and has been restricted from [place] due because of too many visits. Mom also indicated that she feels the patient has untreated mental health symptoms.  Mom stated that she will not let him come home and wants him to go to treatment.

## 2023-06-18 NOTE — PROGRESS NOTES
SPIRITUAL HEALTH SERVICES Progress Note  [facility] ([facility]) 3AW    I visited pt Jack per admission request. I introduced pt to spiritual health services. Pt clarified they'd arranged for their  to visit them the next day.    Plan: Pt decline further  visit and is aware on how to request for spiritual health services.    Severo Hernandez, CPRS, CHP   Intern  Pager [phone]      * [facility] remains available 24/7 for emergent requests/referrals, either by having the switchboard page the on-call  or by entering an ASAP/STAT consult in Epic (this will also page the on-call ). Routine Epic consults receive an initial response within 24 hours.*

## 2023-06-18 NOTE — PROGRESS NOTES
Problem: Behavioral Health Plan of Care  Goal: Optimal Comfort and Wellbeing  Outcome: Ongoing, Progressing    Behavioral  Pt appeared sleeping comfortably overnight after admission; no behavioral concerns noted;     Medical  Pt continues in alcohol withdrawal; MSSA 12 and 6;  10 mg x 1 of valium given this shift; Pt has received a total of 15 mg of valium this hospitalization.   No new medical concerns noted.

## 2023-06-18 NOTE — PHARMACY-ADMISSION MEDICATION HISTORY
Pharmacist Admission Medication History    Admission medication history is complete. The information provided in this note is only as accurate as the sources available at the time of the update.    Medication reconciliation/reorder completed by provider prior to medication history? yes    Information Source(s): patient and fill records    Pertinent Information:     No changes made to PTA med list.      Allergies reviewed with patient and updates made in EHR: no    Prior to Admission medications    Medication Sig Last Dose       gabapentin (NEURONTIN) 300 MG capsule     Take 300 mg by mouth 3 times daily Past Month       hydrOXYzine (ATARAX) 25 MG tablet Take 1-2 tablets (25-50 mg) by mouth 3 times daily as needed for itching     Past Month       omeprazole (PRILOSEC) 20 MG DR capsule Take 2 capsules (40 mg) by mouth daily     Past Month       sucralfate (CARAFATE) 1 GM tablet Take 1 tablet (1 g) by mouth 4 times daily     Past Month          Tori Snyder, Pharm.D., [facility]  Behavioral Health Inpatient Pharmacist  [facility] ([facility])  Phone: *[phone] (Five minutes) or [phone]

## 2023-06-18 NOTE — PROGRESS NOTES
06/18/23 0109   Patient Belongings   Did you bring any home meds/supplements to the hospital?  No   Patient Belongings locker   Patient Belongings Put in Hospital Secure Location (Security or Locker, etc.) clothing   Belongings Search Yes   Clothing Search Yes   Second Staff Arsalan ()     Bin: 1 black t-shirt.   A               Admission:  I am responsible for any personal items that are not sent to the safe or pharmacy.  [facility] is not responsible for loss, theft or damage of any property in my possession.    Signature:  _________________________________ Date: _______  Time: _____                                              Staff Signature:  ____________________________ Date: ________  Time: _____      2nd Staff person, if patient is unable/unwilling to sign:    Signature: ________________________________ Date: ________  Time: _____     Discharge:  [facility] has returned all of my personal belongings:    Signature: _________________________________ Date: ________  Time: _____                                          Staff Signature:  ____________________________ Date: ________  Time: _____

## 2023-06-18 NOTE — PROGRESS NOTES
Met with patient to initiate discharge planning and introduce case management.  Patient stated that he is DETOX only and has a bed waiting with [facility].  Patient declined case management needs at this time as patient reports that his mom has coordinated his care.    Spoke with mom by phone with confirmation.  Mom stated that patient will need to do a phone assessment with I[facility] - contact number is [phone] or [phone].    Case management to follow up next day to coordinate with [facility].

## 2023-06-18 NOTE — CONSULTS
[facility]  Consult Note - Hospitalist Service  Date of Admission:  6/17/2023  Consult Requested by: Psychiatry  Reason for Consult: medical co-management    Assessment & Plan   Nikhil Rios is a 37 year old male who has a past medical history of severe alcohol use disorder with withdrawal seizures, chronic pancreatitis, GERD  who presented to the [facility] ER on 6/15/2023 following a benzodiazepine overdose requesting detox.     # Alcohol abuse disorder with withdrawal and history of seizures  # Benzodiazepine withdrawal  -Per primary psychiatry team  -Continue MSSA protocol with Valium as needed  -Continue as needed Atarax  -Continue multivitamin, folic acid and thiamine replacement    # Tobacco use disorder  -He smokes cigars and cigarettes and averages 1/4 ppd. Recommend offering nicotine patch and gum prn     # GERD  -Continue home regimen of Prilosec 40 mg a mouth once daily      *Medicine will sign off at this time. Please do not hesitate to contact us with any questions or concerns*    Clinically Significant Risk Factors Present on Admission                                KEON Hale  Hospitalist Service  Securely message with [employer] (more info)  Text page via [facility] Paging/Directory   ______________________________________________________________________    Chief Complaint   Medical co-management while in detox    History of Present Illness   Nikhil Rios is a 37 year old male who has a past medical history of severe alcohol use disorder with withdrawal seizures, chronic pancreatitis, GERD  who presented to the [facility] ER on 6/15/2023 following a benzodiazepine overdose requesting detox.  He had recently been prescribed benzodiazepines by a psychiatrist and decided to use all of his medication in 2 days.  He denies any attempt to end his life and has no thoughts of harming others.  He denies any hallucinations.  He denies any chest pain,  shortness of breath, fevers, chills, abdominal pain, diarrhea.  His last drink was on 615/23 first thing in the morning and often mixes alcohol with benzodiazepines.  Upon arrival to the ER his vital signs included temp 97.8 Fahrenheit, heart rate 106 and regular, /88 and he was 98% on room air.  Lab work including BMP and CBC revealed no acute abnormalities.  He was admitted to detox and internal medicine was consulted for comanagement of his chronic medical issues.    Past Medical History    Past Medical History:   Diagnosis Date     Alcohol abuse      Alcohol abuse      Alcohol withdrawal (H)      Depressive disorder      Family history of diabetes mellitus 11/9/2007     HLD (hyperlipidemia)      HTN (hypertension)        Past Surgical History   Past Surgical History:   Procedure Laterality Date     NO HISTORY OF SURGERY       OTHER SURGICAL HISTORY      none       Medications   Current Facility-Administered Medications   Medication     acetaminophen (TYLENOL) tablet 650 mg     alum & mag hydroxide-simethicone (MAALOX) suspension 30 mL     atenolol (TENORMIN) tablet 50 mg     diazepam (VALIUM) tablet 5-20 mg     folic acid (FOLVITE) tablet 1 mg     gabapentin (NEURONTIN) capsule 300 mg     hydrOXYzine (ATARAX) tablet 25 mg     hydrOXYzine (ATARAX) tablet 25-50 mg     loperamide (IMODIUM) capsule 2 mg     multivitamin w/minerals (THERA-VIT-M) tablet 1 tablet     nicotine polacrilex (NICORETTE) gum 4 mg     omeprazole (priLOSEC) CR capsule 40 mg     ondansetron (ZOFRAN ODT) ODT tab 4 mg     senna-docusate (SENOKOT-S/PERICOLACE) 8.6-50 MG per tablet 1 tablet     sucralfate (CARAFATE) tablet 1 g     thiamine (B-1) tablet 100 mg     traZODone (DESYREL) tablet 50 mg     Facility-Administered Medications Ordered in Other Encounters   Medication     Self Administer Medications: Behavioral Services          Review of Systems    The 10 point Review of Systems is negative other than noted in the HPI or here.       Physical Exam   Vital Signs: Temp: 97.8  F (36.6  C) Temp src: Oral BP: (!) 148/81 Pulse: 71   Resp: 16 SpO2: 99 % O2 Device: None (Room air)    Weight: 165 lbs 0 oz    General Appearance: 37 year old gentleman resting in bed, reports feeling very anxious  Respiratory: breathing comfortably on room air, no adventitious sounds to bilateral auscultation  Cardiovascular: regular rate and rhythm, no appreciable murmurs, rubs or gallops  GI: bowel sounds active, soft, non-tender to palpation throughout  Skin:  Warm, dry, no open sores, lesions or ulcerations  Psych: alert, oriented to name, hospital, year, quite anxious, upper extremities a little shaky      Data   Recent Labs   Lab 06/17/23  1622 06/15/23  2156   WBC 9.3 6.4   HGB 13.3 14.5   MCV 87 85    269    143   POTASSIUM 4.2 4.5   CHLORIDE 101 98   CO2 28 27   BUN 11.6 13.2   CR 0.70 0.73   ANIONGAP 15 18*   KEELEY 9.2 9.2   GLC 96 92   ALBUMIN 4.7 5.1   PROTTOTAL 7.2 8.1   BILITOTAL 0.4 0.4   ALKPHOS 72 80   ALT 92* 75*   * 113*   LIPASE 58  --

## 2023-06-18 NOTE — H&P
"Nikhil Rios is a 37 year old male   History was provided by PATEINT who was a fair  historian.   CHIEF COMPLAINT:      HISTORY OF PRESENT ILLNESS:      Per ED Provider Note :  Nikhil Rios is a 37 year old male with a pertinent history of alcohol abuse, mental health diagnoses, tobacco use, HTN, and HLD who presents to this ED via EMS for evaluation of ingestion and alcohol use     Per chart review: The patient is frequently seen in the ER for alcohol intoxication. Most recently on 6/10/23 at [facility].      HPI limited due to patient's intoxication     The patient reports taking \"a half\" of valium today around 5 PM and drinking alcohol. The patient was unable to specify how much he drank.   The patient states he lives with \"a friend\".      The patient denies medical problems, taking daily medications, or mediation allergies.     Social history: The patient reports smoking and alcohol use        As per my interview     Patient reports he was sober for 9 months and then has relapsed since then he has been in the emergency room and empath  unit and hospitalizations  Patient was in the emergency room on 6/2/2023  6/3/2022 to 6/5/2023 patient was hospitalized for pancreatitis he left home and continued to drink    He received 2 prescriptions for l benzodiazepines  On 610 patient received Librium    More recently on 6/15 patient received 21 tablets of 10 mg of Valium  After much persuasion patient reports that he took all the Valium and he does not have any left.  His U-Tox is positive for benzodiazepines    Alcohol is identified as his drug of choice  He has been taking 750 ml of vodka for the past 5 to 6 weeks  Patient has been using the following substances:   Started at age17 , became a problem at 30     Patient has tolerance, withdrawal, progressive use, loss of control, spending more time and more amount than intended. Patient has made attempts to quit, is experiencing cravings, and reports negative " consequences.  Patient does not ramirez.     Patient does not have a history of seizures.  Patient does not have a history of delirium tremens.     Patient does not have a history of overdose.  Patient does not have a history of IV use.  Patient does not have a history of hepatitis, HIV, a        HISTORY OF OVERDOSE-denies      alcohol            USE DISORDER - CRITERIA  +admits to taking larger amounts than initially intended  +admits to unsuccessful efforts to cut down or control use  +admits to spending a great deal of time in activities necessary to obtain, use and recover from  +admits to cravings or a strong desire to use   + admits to failure to fulfill obligations at work, school or home  + admits to continued use despite negative consequences  + admits to giving up important activities to use  +admits to use in situations in which it is physically dangerous     The patient started drinking alcohol at 16.  He has tolerance.  It became more of a problem in the last 5+ years.   He has tolerance to alcohol, withdrawal, progressive use with loss of control, spent more time, more amount, tried to quit unsuccessfully, despite negative experiences with withdrawal when he stops drinking, including shaking, vomiting, diarrhea.      He does not smoke  Or drugs, does not use any street drugs, does not have any suicidal or homicidal ideation, plan or intent.      He takes Lexapro for his anxiety.  He says it is situational.  The patient denies any depression, jennifer, or psychosis.                 Denies thoughts of suicide or harming others.       Denies auditory or visual hallucinations.      PSYCHIATRIC REVIEW OF SYSTEMS:          Psychiatric Review of Systems:   Depression:     Denies: depressed mood, suicidal ideation, decreased interest, changes in sleep, changes in appetite, guilt, hopelessness, helplessness, impaired concentration, decreased energy, irritability.  Jennifer:     Denies: sleeplessness, increased  "goal-directed activities, abrupt increase in energy, pressured speech  Psychosis:     Denies: visual hallucinations, auditory hallucinations, paranoia  Anxiety:        Denies: worries that are difficult to control for the past 6 months, panic attacks  PTSD:     Denies: re-experiencing past trauma, nightmares, increased arousal, avoidance of traumatic stimuli, impaired function.  OCD:     Denies: obsessions, checking, symmetry, cleaning, skin picking.  ED:     Denies: restriction, binging, purging.                     PSYCHIATRIC HISTORY      None.  He was in CD treatments twice, once 3 years ago and once as above.  Never had ECT, never had any civil commitment.  No access to guns.         SOCIAL HISTORY                                                                         Patient was born and grew up in the [place]. He has a Bachelor's degree from [employer]. He is currently unemployed, but worked in [employer] a year ago.     Born in the [facility], bachelor's from [employer].  He is employed, [employer] worked in IT.  He is .                Family History:   Family Mental Health History-  sister  Substance Use Problems - none         Medical h/o   A 10-point review of systems is reviewed and is negative except for psychiatric symptoms above.       Allergies reviewed  Blood pressure (!) 148/81, pulse 71, temperature 97.8  F (36.6  C), temperature source Oral, resp. rate 16, height 1.778 m (5' 10\"), weight 74.8 kg (165 lb), SpO2 99 %.      vitals  Appearance:  awake, alert, appeared as age stated, adequate groomed and slightly unkempt  Attitude:  cooperative  Eye Contact:  good  Mood:  anxious   Affect:  congruent   Speech:  clear, coherent normal rate   Psychomotor Behavior:  no evidence of tardive dyskinesia, dystonia, or tics  Thought Process:  logical, linear and goal oriented  Associations:  no loose associations  Thought Content:  no evidence of psychotic thought and active " suicidal ideation present  Denied any active suicidal /homicidation ideation plan intent   Insight:  fair  Judgment:  fair  Oriented to:  time, person, and place  Attention Span and Concentration:  intact  Recent and Remote Memory:  intact  Language:  english with appropriate syntax and vocabulary  Fund of Knowledge: appropriate  Muscle Strength and Tone: normal  Gait and Station: Normal          Patient has severe exacerbation of chronic alcoholism  ,  been unable to stop using  in the community due to both physical and psychological symptoms.  Continued use will put the patient at risk for medical and/or psychiatric complications.     Diagnosis  Alcohol use disorder severe  Alcohol withdrawal severe  Benzodiazepine abuse    Plan  Patient will detox of alcohol using MSS protocol on Valium  Patient has tremor agitation sweats  Patient scored a 9 and received 10 mg of diazepam since admission patient received 25 mg of diazepam  Patient came with a very high alcohol level 0.39  Prescription drug monitoring program shows patient has been prescribed benzodiazepines he acknowledges that he took all of the benzodiazepines    We will monitor him on the MSSA  We will order gabapentin 300 mg 3 times a day for his anxiety    Patient has elevated liver enzymes  ALT 92 from alcoholism  Nonviral suspected we will put internal medicine consult      I HAVE REVIEWED LABS WITH PT AND TALKED ABOUT RESULTS WITH PT  I HAVE REVIEWED AND SUMMARIZED OLD RECORDS including his medication reconcilation of his home medications  and PDMP   I HAVE SPOKEN WITH RN ABOUT MEDICATIONS AND withdrawl SCORES  I HAVE SPOKEN WITH CM ABOUT PTS TREATMETN OPTIONS                             PTA Medications:     Medications Prior to Admission   Medication Sig Dispense Refill Last Dose     hydrOXYzine (ATARAX) 25 MG tablet Take 1-2 tablets (25-50 mg) by mouth 3 times daily as needed for itching 30 tablet 0 Unknown     omeprazole (PRILOSEC) 20 MG   capsule Take 2 capsules (40 mg) by mouth daily 30 capsule 0 Unknown     sucralfate (CARAFATE) 1 GM tablet Take 1 tablet (1 g) by mouth 4 times daily 30 tablet 0 Unknown     gabapentin (NEURONTIN) 100 MG capsule Take 1 capsule (100 mg) by mouth every 8 hours for 2 days 6 capsule 0      gabapentin (NEURONTIN) 300 MG capsule Take 3 capsules (900 mg) by mouth every 8 hours for 1 day, THEN 2 capsules (600 mg) every 8 hours for 2 days, THEN 1 capsule (300 mg) every 8 hours for 2 days. 27 capsule 0           Allergies:     Allergies   Allergen Reactions     Gramineae Pollens Itching     Pollen Extract Rash     grass tree pollen          Labs:     Recent Results (from the past 48 hour(s))   Comprehensive metabolic panel    Collection Time: 06/17/23  4:22 PM   Result Value Ref Range    Sodium 144 136 - 145 mmol/L    Potassium 4.2 3.4 - 5.3 mmol/L    Chloride 101 98 - 107 mmol/L    Carbon Dioxide (CO2) 28 22 - 29 mmol/L    Anion Gap 15 7 - 15 mmol/L    Urea Nitrogen 11.6 6.0 - 20.0 mg/dL    Creatinine 0.70 0.67 - 1.17 mg/dL    Calcium 9.2 8.6 - 10.0 mg/dL    Glucose 96 70 - 99 mg/dL    Alkaline Phosphatase 72 40 - 129 U/L     (H) 0 - 45 U/L    ALT 92 (H) 0 - 70 U/L    Protein Total 7.2 6.4 - 8.3 g/dL    Albumin 4.7 3.5 - 5.2 g/dL    Bilirubin Total 0.4 <=1.2 mg/dL    GFR Estimate >90 >60 mL/min/1.73m2   Magnesium    Collection Time: 06/17/23  4:22 PM   Result Value Ref Range    Magnesium 2.1 1.7 - 2.3 mg/dL   Ethyl Alcohol Level    Collection Time: 06/17/23  4:22 PM   Result Value Ref Range    Alcohol ethyl 0.39 (HH) <=0.01 g/dL   CBC with platelets and differential    Collection Time: 06/17/23  4:22 PM   Result Value Ref Range    WBC Count 9.3 4.0 - 11.0 10e3/uL    RBC Count 4.55 4.40 - 5.90 10e6/uL    Hemoglobin 13.3 13.3 - 17.7 g/dL    Hematocrit 39.4 (L) 40.0 - 53.0 %    MCV 87 78 - 100 fL    MCH 29.2 26.5 - 33.0 pg    MCHC 33.8 31.5 - 36.5 g/dL    RDW 16.0 (H) 10.0 - 15.0 %    Platelet Count 179 150 - 450 10e3/uL     % Neutrophils 69 %    % Lymphocytes 23 %    % Monocytes 6 %    % Eosinophils 1 %    % Basophils 1 %    % Immature Granulocytes 0 %    NRBCs per 100 WBC 0 <1 /100    Absolute Neutrophils 6.4 1.6 - 8.3 10e3/uL    Absolute Lymphocytes 2.1 0.8 - 5.3 10e3/uL    Absolute Monocytes 0.5 0.0 - 1.3 10e3/uL    Absolute Eosinophils 0.1 0.0 - 0.7 10e3/uL    Absolute Basophils 0.1 0.0 - 0.2 10e3/uL    Absolute Immature Granulocytes 0.0 <=0.4 10e3/uL    Absolute NRBCs 0.0 10e3/uL   Lipase    Collection Time: 06/17/23  4:22 PM   Result Value Ref Range    Lipase 58 13 - 60 U/L   Asymptomatic COVID-19 Virus (Coronavirus) by PCR Nose    Collection Time: 06/17/23  4:32 PM    Specimen: Nose; Swab   Result Value Ref Range    SARS CoV2 PCR Negative Negative   Drug abuse screen 1 urine (ED)    Collection Time: 06/17/23  9:17 PM   Result Value Ref Range    Amphetamines Urine Screen Negative Screen Negative    Barbituates Urine Screen Negative Screen Negative    Benzodiazepine Urine Screen Positive (A) Screen Negative    Cannabinoids Urine Screen Negative Screen Negative    Cocaine Urine Screen Negative Screen Negative    Opiates Urine Screen Negative Screen Negative   GGT    Collection Time: 06/18/23  7:36 AM   Result Value Ref Range    GGT 46 8 - 61 U/L   Lipid panel    Collection Time: 06/18/23  7:36 AM   Result Value Ref Range    Cholesterol 234 (H) <200 mg/dL    Triglycerides 81 <150 mg/dL    Direct Measure HDL 82 >=40 mg/dL    LDL Cholesterol Calculated 136 (H) <=100 mg/dL    Non HDL Cholesterol 152 (H) <130 mg/dL   TSH with free T4 reflex and/or T3 as indicated    Collection Time: 06/18/23  7:36 AM   Result Value Ref Range    TSH 1.76 0.30 - 4.20 uIU/mL   Vitamin B12    Collection Time: 06/18/23  7:36 AM   Result Value Ref Range    Vitamin B12 663 232 - 1,245 pg/mL   Folate    Collection Time: 06/18/23  7:36 AM   Result Value Ref Range    Folic Acid 12.7 4.6 - 34.8 ng/mL         BP (!) 148/81   Pulse 71   Temp 97.8  F (36.6  C)  "(Oral)   Resp 16   Ht 1.778 m (5' 10\")   Wt 74.8 kg (165 lb)   SpO2 99%   BMI 23.68 kg/m    Weight is 165 lbs 0 oz  Body mass index is 23.68 kg/m .    Physical Exam:     ROS: 10 point ROS neg other than the symptoms noted above in the HPI.            Past Medical History:   PAST MEDICAL HISTORY:   Past Medical History:   Diagnosis Date     Alcohol abuse      Alcohol abuse      Alcohol withdrawal (H)      Depressive disorder      Family history of diabetes mellitus 11/9/2007     HLD (hyperlipidemia)      HTN (hypertension)        PAST SURGICAL HISTORY:   Past Surgical History:   Procedure Laterality Date     NO HISTORY OF SURGERY       OTHER SURGICAL HISTORY      none       -    -              Laboratory/Imaging:    Liver Function Studies -   Recent Labs   Lab Test 06/17/23  1622   PROTTOTAL 7.2   ALBUMIN 4.7   BILITOTAL 0.4   ALKPHOS 72   *   ALT 92*      Last Comprehensive Metabolic Panel:  Sodium   Date Value Ref Range Status   06/17/2023 144 136 - 145 mmol/L Final   12/02/2020 141 133 - 144 mmol/L Final     Potassium   Date Value Ref Range Status   06/17/2023 4.2 3.4 - 5.3 mmol/L Final   07/28/2022 3.9 3.5 - 5.0 mmol/L Final   12/02/2020 3.6 3.4 - 5.3 mmol/L Final     Chloride   Date Value Ref Range Status   06/17/2023 101 98 - 107 mmol/L Final   07/28/2022 99 98 - 107 mmol/L Final   12/02/2020 100 94 - 109 mmol/L Final     Carbon Dioxide   Date Value Ref Range Status   12/02/2020 27 20 - 32 mmol/L Final     Carbon Dioxide (CO2)   Date Value Ref Range Status   06/17/2023 28 22 - 29 mmol/L Final   07/28/2022 23 22 - 31 mmol/L Final     Anion Gap   Date Value Ref Range Status   06/17/2023 15 7 - 15 mmol/L Final   07/28/2022 19 (H) 5 - 18 mmol/L Final   12/02/2020 14 3 - 14 mmol/L Final     Glucose   Date Value Ref Range Status   06/17/2023 96 70 - 99 mg/dL Final   07/28/2022 110 70 - 125 mg/dL Final   12/02/2020 147 (H) 70 - 99 mg/dL Final     Urea Nitrogen   Date Value Ref Range Status   06/17/2023 " 11.6 6.0 - 20.0 mg/dL Final   07/28/2022 14 8 - 22 mg/dL Final   12/02/2020 11 7 - 30 mg/dL Final     Creatinine   Date Value Ref Range Status   06/17/2023 0.70 0.67 - 1.17 mg/dL Final   12/02/2020 0.72 0.66 - 1.25 mg/dL Final     GFR Estimate   Date Value Ref Range Status   06/17/2023 >90 >60 mL/min/1.73m2 Final     Comment:     eGFR calculated using 2021 CKD-EPI equation.   06/25/2021 >60 >60 mL/min/1.73m2 Final   12/02/2020 >90 >60 mL/min/[1.73_m2] Final     Comment:     Non  GFR Calc  Starting 12/18/2018, serum creatinine based estimated GFR (eGFR) will be   calculated using the Chronic Kidney Disease Epidemiology Collaboration   (CKD-EPI) equation.       Calcium   Date Value Ref Range Status   06/17/2023 9.2 8.6 - 10.0 mg/dL Final   12/02/2020 8.6 8.5 - 10.1 mg/dL Final     Bilirubin Total   Date Value Ref Range Status   06/17/2023 0.4 <=1.2 mg/dL Final   12/02/2020 0.6 0.2 - 1.3 mg/dL Final     Alkaline Phosphatase   Date Value Ref Range Status   06/17/2023 72 40 - 129 U/L Final   12/02/2020 87 40 - 150 U/L Final     ALT   Date Value Ref Range Status   06/17/2023 92 (H) 0 - 70 U/L Final     Comment:     Reference intervals for this test were updated on 6/12/2023 to more accurately reflect our healthy population. There may be differences in the flagging of prior results with similar values performed with this method. Interpretation of those prior results can be made in the context of the updated reference intervals.     12/02/2020 92 (H) 0 - 70 U/L Final     AST   Date Value Ref Range Status   06/17/2023 128 (H) 0 - 45 U/L Final     Comment:     Reference intervals for this test were updated on 6/12/2023 to more accurately reflect our healthy population. There may be differences in the flagging of prior results with similar values performed with this method. Interpretation of those prior results can be made in the context of the updated reference intervals.   12/02/2020 115 (H) 0 - 45 U/L Final  "                  Medical treatment/interventions:  Medical concerns: As above    - Consults: IM consult placed. Appreciate assistance.     Legal Status: Voluntary     Safety Assessment:   Checks: Status 15  Pt has not required locked seclusion or restraints in the past 24 hours to maintain safety, please refer to RN documentation for further details.    The risks, benefits, alternatives and side effects have been discussed and are understood by the patient.       Patient will be treated in therapeutic milieu with appropriate individual and group therapies as described.      Clinical Global Impressions  First:7     Most recent:7     Disposition: Pending clinical stabilization. Pt does  appear interested in COMPLETE DETOX AND DO TRT  Length of stay 3-5 days    Attestation:  Patient has been seen and evaluated by me, Dr Ava Lew MD  The patient was counseled on nature of illness and treatment plan/options  Care was coordinated with treatment team          \"Much or all of the text in this note was generated through the use of Dragon Dictate voice to text software. Errors in spelling or words which appear to be out of contact are unintentional, may be present due having escaped editing\"     "

## 2023-06-19 LAB
ATRIAL RATE - MUSE: 76 BPM
DIASTOLIC BLOOD PRESSURE - MUSE: NORMAL MMHG
INTERPRETATION ECG - MUSE: NORMAL
P AXIS - MUSE: 65 DEGREES
PR INTERVAL - MUSE: 160 MS
QRS DURATION - MUSE: 96 MS
QT - MUSE: 428 MS
QTC - MUSE: 481 MS
R AXIS - MUSE: 83 DEGREES
SYSTOLIC BLOOD PRESSURE - MUSE: NORMAL MMHG
T AXIS - MUSE: 73 DEGREES
VENTRICULAR RATE- MUSE: 76 BPM

## 2023-06-19 PROCEDURE — 99232 SBSQ HOSP IP/OBS MODERATE 35: CPT | Performed by: PSYCHIATRY & NEUROLOGY

## 2023-06-19 PROCEDURE — 250N000013 HC RX MED GY IP 250 OP 250 PS 637: Performed by: PSYCHIATRY & NEUROLOGY

## 2023-06-19 PROCEDURE — 128N000004 HC R&B CD ADULT

## 2023-06-19 RX ADMIN — SUCRALFATE 1 G: 1 TABLET ORAL at 08:27

## 2023-06-19 RX ADMIN — GABAPENTIN 400 MG: 400 CAPSULE ORAL at 21:55

## 2023-06-19 RX ADMIN — SUCRALFATE 1 G: 1 TABLET ORAL at 20:11

## 2023-06-19 RX ADMIN — TRAZODONE HYDROCHLORIDE 50 MG: 50 TABLET ORAL at 21:55

## 2023-06-19 RX ADMIN — SUCRALFATE 1 G: 1 TABLET ORAL at 12:00

## 2023-06-19 RX ADMIN — HYDROXYZINE HYDROCHLORIDE 25 MG: 25 TABLET, FILM COATED ORAL at 08:30

## 2023-06-19 RX ADMIN — OMEPRAZOLE 40 MG: 20 CAPSULE, DELAYED RELEASE ORAL at 08:27

## 2023-06-19 RX ADMIN — FOLIC ACID 1 MG: 1 TABLET ORAL at 08:27

## 2023-06-19 RX ADMIN — SUCRALFATE 1 G: 1 TABLET ORAL at 16:38

## 2023-06-19 RX ADMIN — GABAPENTIN 400 MG: 400 CAPSULE ORAL at 08:27

## 2023-06-19 RX ADMIN — HYDROXYZINE HYDROCHLORIDE 50 MG: 25 TABLET, FILM COATED ORAL at 21:55

## 2023-06-19 RX ADMIN — GABAPENTIN 400 MG: 400 CAPSULE ORAL at 14:03

## 2023-06-19 RX ADMIN — MULTIPLE VITAMINS W/ MINERALS TAB 1 TABLET: TAB at 08:27

## 2023-06-19 RX ADMIN — THIAMINE HCL TAB 100 MG 100 MG: 100 TAB at 08:27

## 2023-06-19 ASSESSMENT — ACTIVITIES OF DAILY LIVING (ADL)
ADLS_ACUITY_SCORE: 40
DRESS: INDEPENDENT
ADLS_ACUITY_SCORE: 40
ORAL_HYGIENE: INDEPENDENT
DRESS: INDEPENDENT
ADLS_ACUITY_SCORE: 40
LAUNDRY: WITH SUPERVISION
LAUNDRY: WITH SUPERVISION
HYGIENE/GROOMING: INDEPENDENT
HYGIENE/GROOMING: INDEPENDENT
ORAL_HYGIENE: INDEPENDENT
ADLS_ACUITY_SCORE: 40
ADLS_ACUITY_SCORE: 40

## 2023-06-19 NOTE — DISCHARGE INSTRUCTIONS
Behavioral Discharge Planning and Instructions  THANK YOU FOR CHOOSING THE [address]  [phone]    Summary: You were admitted to [facility] on 6/17/2023 for detoxification from Alcohol  A medical exam was performed that included lab work. You have met with a  and opted to returning home and declining all case management resources and services at this time.  Please take care and make your recovery a priority, Jack!    Recommendation:  Please attend residential treatment at [facility] as you stated. Please call Irena at [phone] to complete an intake for [facility]. Please attend your scheduled assessment date at [facility] Assessment date on 6.21.23 @ 2 pm video link will be sent to your e-mail address.  Please follow any and all recommendations as needed and requested.     Disposition: Return Home    Main Diagnosis: Ava Bustos MD  Alcohol use disorder severe  Alcohol withdrawal severe  Benzodiazepine abuse       Major Treatments, Procedures and Findings:  You have withdrawn from Alcohol  using Valium protocol.  You have met with a  to develop a treatment plan for discharge.  You have had labs drawn and those results have been reviewed with you. Please take a copy of your lab work with you to your next primary care physician appointment.  Major Treatments, Procedures and Findings:  You were detoxed from alcohol with the Modified Selective Severity Protocol using Valium. You have met with a  to develop a treatment plan for discharge.  You have had labs drawn and a copy of those labs will be sent home with you.  Please bring your lab results with to your follow up doctor appointment.    Symptoms to Report:  If you experience more anxiety, confusion, sleeplessness, deep sadness or thoughts of suicide, notify your treatment team or notify your primary care physician. IF ANY OF THE SYMPTOMS YOU ARE EXPERIENCING ARE A  "MEDICAL EMERGENCY CALL 911 IMMEDIATELY.     If you or someone you know is struggling or in crisis, help is available.  Call or text 988 or chat  at [email or web address].org    Lifestyle Adjustment:   Health Action Plan:  1.Create a daily schedule  2. Eat Healthy  3. Plan Enjoyable Sober Activities  4. Use Problem Solving Skills and Deal with Issues as they Arise.   5. Be Physically Active  6. Take your medications as prescribed  7. Get enough restful sleep  8. Practice Relaxation  9. Spend time with Supportive People  10. No use of alcohol, illegal drugs or addictive medications other than what is currently prescribed.   11.AA, NA Sponsor are excellent resources for support  12. Explore how  you can utilize spirituality in your recovery      Facts about COVID19 at www.cdc.gov/COVID19 and www.[place].gov/covid19    Keeping hands clean is one of the most important steps we can take to avoid getting sick and spreading germs to others.  Please wash your hands frequently and lather with soap for at least 20 seconds!    Follow-up Appointment:   Appointment Date/Time: Thursday June 29 th 2023 at 9:40 am with your  Primary Care Giver:  Lisandro Graham DO  [facility]  Address:[address] [place] [address]    Phone number:[phone] (Work      [facility]   Assessment date on 6.21.23 @ 2 pm video link will be sent to his e-mail address     Resources:   Resources for on line recovery meetings:  AA meetings can be found online; search for them at: http://aa-intergroup.org/directory.php  AA meetings via Z[place]OM for [place] area can be found online at: https://a[place].org/find-a-meeting/holiday-closings/  NA meetings via [place] for [place] area can be found online at: https://sites.google.com/view/[place]regionofnarcoticsanonymous/home?authuser=2    Www.Motopia.Vital Juice Newsletter  has online resources for meeting and recovery care including Podcast \"Let's Talk:Addiction & Recovery Podcasts    Www.[place]recovery.org     DISCHARGE " RESOURCES:  -SMART Recovery - self management for addiction recovery:  www.smartrecovery.org    -Pathways ~ A Health Crisis Resource & Support Center: 501.927.9240.  -[facility] [phone]   -[facility] 400-292-7465 or 978-723-5611.  -Suicide Awareness Voices of Education (SAVE) (www.save.org): 918-115-TUCZ (4342)  -National Suicide Prevention Line (www.mentalhealthmn.org): 251-319-TYLJ (4218)  -National [place] on Mental Illness (www.[place].veronica.org): 596.932.2160 or 027-786-6633.  -Odxr1wtgn: text the word LIFE to 30704 for immediate support and crisis intervention  -Mental Health Consumer/Survivor Network of [place] (www.mhcsn.net): 863.872.5979 or 491-720-3373  -Mental Health Association of [place] (www.mentalhealth.org): 255.561.4221 or 228-836-0367     -Substance Abuse and Mental Health Services (www.samhsa.gov)  -Harm Reduction Coalition (www. Harmreduction.org)  -www.prescribetoprevent.org or http://prescribetoprevent.org/video  -Poison control 1-656.473.2940   **[place] Opioid Prevention Coalition: www.opioidcoalition.org    Sober Support Group Information:  AA/NA & Sponsor/Support  -Alcoholics Anonymous (www.alcoholics-anonymous.org): for local information 24 hours/day  -AA Intergroup service office in [place] (http://www.aastpaul.org/) 276.676.1597  -AA Intergroup service office in [facility]: [phone]. (http://www.a[place].org/)  -Narcotics Anonymous (www.nam[place].org) (783) 119-6675   **Sober Fun Activities: www.soberCaseRevactivities.[employer]/[facility]//[facility] Recovery Connection (MRC)  MRC connects people seeking recovery to resources that help foster and sustain long-term recovery.  Whether you are seeking resources for treatment, transportation, housing, job training, education, health care or other pathways to recovery, [facility] is a great place to start.    Phone: (322) [phone].  www.[facility].org (Great listing of all  types of recovery and non-recovery related resources)      General Medication Instructions:   See your medication sheet(s) for instructions.   Take all medicines as directed.  Make no changes unless your doctor suggests them.   Go to all your doctor visits.  Be sure to have all your required lab tests. This way, your medicines can be refilled on time.  Do not use any drugs not prescribed by your provider.  AA/NA and Sponsors are excellent resources for support  Avoid alcohol.    Any follow up concerns:  Nursing questions call the [facility] [phone]  Medical Record call [phone]  Outpatient Behavioral Intake call [phone]  LP+ Wait List/Bed Availability call [phone]    The entire treatment team has appreciated the opportunity to work with you Nikhil.  We wish you the best in the future and with your lifelong recovery goals. Please bring this discharge folder with you to all follow up appointments.  It contains your lab results, diagnosis, medication list and discharge recommendations.    THANK YOU FOR CHOOSING THE [facility]

## 2023-06-19 NOTE — PLAN OF CARE
Problem: Alcohol Withdrawal  Goal: Alcohol Withdrawal Symptom Control  Outcome: Progressing     Problem: Behavioral Health Plan of Care  Goal: Optimal Comfort and Wellbeing  Outcome: Ongoing, Progressing    Behavioral  Pt appeared sleeping comfortably overnight; no behavioral concerns noted;     Medical  Pt continues in alcohol withdrawal; MSSA 4 and 3; no medication given for withdrawal given this shift; Pt has received a total of 50 mg of valium this hospitalization.   Pt last required medication for withdrawal on 6/18 @2009    No new medical concerns noted.

## 2023-06-19 NOTE — PLAN OF CARE
"Patient visible in milieu, utilizing wheelchair due to unsteady gait. Attended offered groups. Affect blunted, mood is anxious and irritable. Patient denies SI, SIB, HI, or hallucinations.   Continues to be monitored for alcohol withdrawal. MSSA scores 8 and 8. Given a total of 15 mg of Valium for alcohol withdrawal.  VSS, pulse rate on the lower side. Patient reports he is a runner and his pulse runs lower at baseline. Appetite good, drinking fluids. Denies pain. Using a wheelchair in the unit due to tremulousness. Ambulating with stand-by assist to use the restroom. Gait is improving. Using a call bell appropriately.   Patient reports he has a bed waiting for him at [facility] after detox.  Patient denies any additional concerns.  /89 (BP Location: Left arm, Cuff Size: Adult Regular)   Pulse 52   Temp 97.3  F (36.3  C) (Tympanic)   Resp 18   Ht 1.778 m (5' 10\")   Wt 74.8 kg (165 lb)   SpO2 100%   BMI 23.68 kg/m       Problem: Alcohol Withdrawal  Goal: Alcohol Withdrawal Symptom Control  Outcome: Progressing  Goal: Optimal Neurologic Function  Outcome: Progressing  Goal: Readiness for Change Identified  Outcome: Progressing     Problem: Fall Injury Risk  Goal: Absence of Fall and Fall-Related Injury  Outcome: Progressing   Goal Outcome Evaluation:    Plan of Care Reviewed With: patient                   "

## 2023-06-19 NOTE — PROGRESS NOTES
[facility], [place]   Psychiatric Progress Note        Interim history   This is a 37 year old male with Alcohol use disorder severe  Alcohol withdrawal severe  Benzodiazepine abuse.Pt seen in rounds.   The patient's care was discussed with the treatment team during the daily team meeting and/or staff's chart notes were reviewed.  Staff report patient has been visible in the milieu,  no acute eventsovernight.     Patient's mood is positive   Energy Level is improving   Sleep:No concerns, sleeps well through night  Appetite:normal motivation interest improving   deneid any Suicidal/homicidal ideation/plan intent.  Denied any psychosis  No prior suicde attempts  No access to gun  Pt is in alcohol withdrawal still being monitered every 4 hrs for it,   Pt mssa score are monitered  Tolerating meds and has no side effects.              Medications:     Current Facility-Administered Medications   Medication     acetaminophen (TYLENOL) tablet 650 mg     alum & mag hydroxide-simethicone (MAALOX) suspension 30 mL     atenolol (TENORMIN) tablet 50 mg     diazepam (VALIUM) tablet 5-20 mg     folic acid (FOLVITE) tablet 1 mg     gabapentin (NEURONTIN) capsule 400 mg     hydrOXYzine (ATARAX) tablet 25-50 mg     loperamide (IMODIUM) capsule 2 mg     multivitamin w/minerals (THERA-VIT-M) tablet 1 tablet     nicotine polacrilex (NICORETTE) gum 4 mg     omeprazole (priLOSEC) CR capsule 40 mg     ondansetron (ZOFRAN ODT) ODT tab 4 mg     senna-docusate (SENOKOT-S/PERICOLACE) 8.6-50 MG per tablet 1 tablet     sucralfate (CARAFATE) tablet 1 g     thiamine (B-1) tablet 100 mg     traZODone (DESYREL) tablet 50 mg     Facility-Administered Medications Ordered in Other Encounters   Medication     Self Administer Medications: Behavioral Services             Allergies:     Allergies   Allergen Reactions     Gramineae Pollens Itching     Pollen Extract Rash     grass tree pollen            Psychiatric Examination:  "  Blood pressure 134/88, pulse 62, temperature 97.2  F (36.2  C), temperature source Temporal, resp. rate 16, height 1.778 m (5' 10\"), weight 74.8 kg (165 lb), SpO2 100 %.  Weight is 165 lbs 0 oz  Body mass index is 23.68 kg/m .    Appearance:  awake, alert and adequately groomed  Attitude:  cooperative  Eye Contact:  good  Mood:  better  Affect:  appropriate and in normal range and mood congruent  Speech:  clear, coherent rate /rhythm are good  Psychomotor Behavior:  no evidence of tardive dyskinesia, dystonia, or tics and intact station, gait and muscle tone  Throught Process:  logical  Associations:  no loose associations  Thought Content:  no evidence of suicidal ideation or homicidal ideation, no evidence of psychotic thought, no auditory hallucinations present and no visual hallucinations present  Insight:  partial  Judgement:  limited  Oriented to:  time, person, and place  Attention Span and Concentration:  intact  Recent and Remote Memory:  intact  Language fund of knowledge are adequate         Labs:   No results found for this or any previous visit (from the past 24 hour(s)).      DX Alcohol use disorder severe  Alcohol withdrawal severe  Benzodiazepine abuse     PLAN   Alcohol intoxication/withdrawal, presently is on MSSA protocol with Valium. Continue the same MSSA protocol as ordered. Continue thiamine 100 mg p.o. daily, M.V.I. one p.o. daily and folate 1 mg p.o. Daily  Will continue mssa protocal to detox off alcohol on valium,  Pt is c/o of termor , agitation poor sleep and poor appetite, he has sweats, feels shakey  On mssa client scored scored 3today and  needed needed0  mg po as of yet , total dose since admission was 50 mg     MSSA    Eating Disturbances: ate and enjoyed all of it or not applicable  Tremor: 1 - not visibly apparent but can be felt by the examiner placing his fingertip slightly against the patient's fingertips  Sleep Disturbance: slept through the night or not applicable  Clouding " of Sensorium: no evidence  Hallucinations: 0 - none  Quality of Contact: 0 - awareness of examiner and people around him/her  Agitation: 1 - somewhat more than normal activity  Paroxysmal Sweats: 0 - no observed sweating  Temperature: 99.5 or below  Pulse: 0 - 69 or below  Total MSSA Score: 3    Laboratory/Imaging: reviewed with patient   Consults: internal medicine consult reviewed  Patient will be treated in therapeutic milieu with appropriate individual and group therapies as described.  PDMP CHECKED     Supportive psychotheraoy provided, ana talked about recovery enviroment, relapse prevention, triggers to use.  Discussed with patient many issues of addiction,triggers, relapse, and establishing a solid recovery program.  Asked pt to be med complinat   Medical diagnoses to be addressed this admission:    Plan:      Legal Status: voluntary    Safety Assessment:   Checks:  15 min  Precautions: withdrawal precautions  Pt has not required locked seclusion or restraints in the past 24 hours to maintain safety, please refer to RN documentation for further details.  Discussed with patient many issues of addiction,triggers, relapse, and establishing a solid recovery program.  Able to give informed consent:  YES   Discussed Risks/Benefits/Side Effects/Alternatives: YES    After discussion of the indications, risks, benefits, alternatives and consequences of no treatment, the patient elects to complete detox and do trt        I HAVE SPOKEN WITH RN ABOUT MEDICATIONS AND DETOX SCORES  I HAVE SPOKEN WITH CM ABOUT PTS TREATMENT OPTIONS       Counseled the patient on the importance of having a recovery program in addition to medication to manage recovery.  Components include avoiding isolating, having willingness to change, avoiding triggers and managing cravings. Encouraged having some type of sober network and practicing honesty with trusted support person(s).   Significant education and counseling spent on: how substance use  disorders and dependence occur, and how it can become a chronic relapsing and remitting medical condition.  In addition, the pharmacology of medical treatments including   the importance of follow up, being medication compliant.

## 2023-06-19 NOTE — PROGRESS NOTES
"   06/18/23 2200   Group Therapy Session   Group Attendance attended group session   Time Session Began 1645   Time Session Ended 1730   Total Time (minutes) 45   Total # Attendees 5   Group Type psychotherapeutic   Group Topic Covered disease of addiction/choices in recovery;co-occurring illness   Group Session Detail Process   Patient Response/Contribution cooperative with task;discussed personal experience with topic;unable to interrupt patient   Pt for the most part was a positive participant. The group was quite emotional, 2-3 of them crying, perhaps because it was fathers day. He was quite vocal, sad about disappointing mom , wanting to get back to a job he likes. He had moments that seemed like a personality disorder. At times going on rants, one time \" demonstrating\" his being very shaky and having a seizure, which writer would have redirected but it happened so fast and unexpected, it seemed like an attention seeking \" demonstration.\", acting like he was having a seizure as he said he did earlier in the day.  "

## 2023-06-19 NOTE — PLAN OF CARE
"  Problem: Alcohol Withdrawal  Goal: Alcohol Withdrawal Symptom Control  Outcome: Met     Problem: Substance Withdrawal  Goal: Substance Withdrawal  Description: Signs and symptoms of listed problems will be absent or manageable.  Outcome: Met   Goal Outcome Evaluation:    Plan of Care Reviewed With: patient    Patient continues on a medical bed for his existing conditions, is A&O x 4, was using a walker or w/c, now patient is ambulating with a steady gait.  He has flat affect, calm moods. Tolerating intakes eating 100% of breakfast and lunch. Denies SI/HI/SIB, contracts for safety while on unit.    Pt is visible on unit, interacting with peers in milieu.  MSSA 3 and 2, no Valium given this shift.  Endorsed anxiety at 8/10, PRN Hydroxyzine 25 mg given once this shift with effect.   Staff will continue to monitor ad update changes.    /83   Pulse 59   Temp 98.7  F (37.1  C) (Oral)   Resp 16   Ht 1.778 m (5' 10\")   Wt 74.8 kg (165 lb)   SpO2 100%   BMI 23.68 kg/m      "

## 2023-06-19 NOTE — PROGRESS NOTES
Writer spoke to Pt and agreed to attend [facility] for IOP treatment. Pt. Has an appointment on 6.21.23 @ 2pm for his assessment and to get set up for treatment. The link for the video assessment will be sent to his e-mail address on file.Pt was updated on this and information added to the AVS.

## 2023-06-19 NOTE — PROGRESS NOTES
Triage & Transition Services, [address]     Nikhil Rios  June 19, 2023    PLAN: Discharge to home and attend residential treatment.     Nikhil is currently admitted to [address]. Please see H&P for complete assessment information and therapist Progress Notes for additional clinical details.      worked with Meena Monaco MS, [facility] regarding patient's care today.     Met with patient to coordinate details of aftercare planning in-person. Patient stated they wanted to discuss discharge planning and after care plans. Patient declined all  Case management services and resources at this time. Patient reported his mother has everything in place for patient to [facility] when he is discharged  From detox. Per note from another staff on  this information was confirmed with patients mother.      Healthcare funding: [employer]. Barriers to care related to funding? None Steps taken toward reducing barriers to care: None    Clinical care team and client have agreed on an aftercare plan that includes the following level of care at discharge: residential treatment       Referrals offered to patient: None      MEENA MONACO  Triage & Transition Services - Mental Health and Addiction Service Line  [address] - Adult Inpatient Addiction Psychiatry Unit

## 2023-06-19 NOTE — PROGRESS NOTES
Behavioral Team Discussion: (6/19/2023)    Continued Stay Criteria/Rationale: Patient admitted for Alcohol Use Disorder.  Plan: The following services will be provided to the patient; psychiatric assessment, medication management, therapeutic milieu, individual and group support, and skills groups.   Participants: 3A Provider: Dr. Ava Lew MD; 3A RN: Swati Greco RN; 3A CM's: Jv GRAJEDA [facility].  Summary/Recommendation: Providers will assess today for treatment recommendations, discharge planning, and aftercare plans. Jv [facility]s [facility] CM will meet with pt for discharge planning.   Medical/Physical: chronic calcific pancreatitis, GERD, HTN, and HLD  Precautions: SZ  Behavioral Orders   Procedures     Code 1 - Restrict to Unit     Fall precautions     Routine Programming     As clinically indicated     Seizure precautions     Status 15     Every 15 minutes.     Suicide precautions     Patients on Suicide Precautions should have a Combination Diet ordered that includes a Diet selection(s) AND a Behavioral Tray selection for Safe Tray - with utensils, or Safe Tray - NO utensils       Withdrawal precautions     Rationale for change in precautions or plan: N/A  Progress: No Change.    ASAM Dimension Scale Ratings:  Dimension 1: 3 Client tolerates and mo with withdrawal discomfort poorly. Client has severe intoxication, such that the client endangers self or others, or intoxication has not abated with less intensive levels of services. Client displays severe signs and symptoms; or risk of severe, but manageable withdrawal; or withdrawal worsening despite detox at less intensive level.  Dimension 2: 1 Client tolerates and mo with physical discomfort and is able to get the services that the client needs.  Dimension 3: 2 Client has difficulty with impulse control and lacks coping skills. Client has thoughts of suicide or harm to others without means; however, the thoughts may interfere with  participation in some treatment activities. Client has difficulty functioning in significant life areas. Client has moderate symptoms of emotional, behavioral, or cognitive problems. Client is able to participate in most treatment activities.  Dimension 4: 3 Client displays inconsistent compliance, minimal awareness of either the client's addiction or mental disorder, and is minimally cooperative.  Dimension 5: 3 Client has poor recognition and understanding of relapse and recidivism issues and displays moderately high vulnerability for further substance use or mental health problems. Client has few coping skills and rarely applies coping skills.  Dimension 6: 3 Client is not engaged in structured, meaningful activity and the client's peers, family, significant other, and living environment are unsupportive, or there is significant criminal justice system involvement.

## 2023-06-19 NOTE — PLAN OF CARE
Goal Outcome Evaluation:    Plan of Care Reviewed With: patient        Discharge note:  Problem: Alcohol Withdrawal  Goal: Alcohol Withdrawal Symptom Control  Outcome: Met

## 2023-06-20 VITALS
TEMPERATURE: 98.6 F | BODY MASS INDEX: 23.62 KG/M2 | HEART RATE: 68 BPM | DIASTOLIC BLOOD PRESSURE: 86 MMHG | RESPIRATION RATE: 17 BRPM | WEIGHT: 165 LBS | HEIGHT: 70 IN | OXYGEN SATURATION: 100 % | SYSTOLIC BLOOD PRESSURE: 133 MMHG

## 2023-06-20 PROCEDURE — 250N000013 HC RX MED GY IP 250 OP 250 PS 637: Performed by: PSYCHIATRY & NEUROLOGY

## 2023-06-20 PROCEDURE — 99239 HOSP IP/OBS DSCHRG MGMT >30: CPT | Performed by: PSYCHIATRY & NEUROLOGY

## 2023-06-20 RX ORDER — GABAPENTIN 400 MG/1
400 CAPSULE ORAL 3 TIMES DAILY
Qty: 90 CAPSULE | Refills: 0 | Status: ON HOLD | OUTPATIENT
Start: 2023-06-20 | End: 2023-07-27

## 2023-06-20 RX ORDER — SUCRALFATE 1 G/1
1 TABLET ORAL 4 TIMES DAILY
Qty: 30 TABLET | Refills: 0 | Status: SHIPPED | OUTPATIENT
Start: 2023-06-20 | End: 2023-08-25

## 2023-06-20 RX ORDER — TRAZODONE HYDROCHLORIDE 50 MG/1
50 TABLET, FILM COATED ORAL
Qty: 30 TABLET | Refills: 0 | Status: ON HOLD | OUTPATIENT
Start: 2023-06-20 | End: 2023-08-29

## 2023-06-20 RX ORDER — LANOLIN ALCOHOL/MO/W.PET/CERES
100 CREAM (GRAM) TOPICAL DAILY
Qty: 30 TABLET | Refills: 0 | Status: SHIPPED | OUTPATIENT
Start: 2023-06-21 | End: 2023-07-25

## 2023-06-20 RX ORDER — DISULFIRAM 250 MG/1
250 TABLET ORAL DAILY
Qty: 30 TABLET | Refills: 0 | Status: SHIPPED | OUTPATIENT
Start: 2023-06-20 | End: 2023-07-25

## 2023-06-20 RX ORDER — MULTIPLE VITAMINS W/ MINERALS TAB 9MG-400MCG
1 TAB ORAL DAILY
Qty: 30 TABLET | Refills: 0 | Status: SHIPPED | OUTPATIENT
Start: 2023-06-21 | End: 2023-07-25

## 2023-06-20 RX ORDER — HYDROXYZINE HYDROCHLORIDE 25 MG/1
25-50 TABLET, FILM COATED ORAL 3 TIMES DAILY PRN
Qty: 30 TABLET | Refills: 0 | Status: SHIPPED | OUTPATIENT
Start: 2023-06-20 | End: 2023-09-19

## 2023-06-20 RX ADMIN — GABAPENTIN 400 MG: 400 CAPSULE ORAL at 08:14

## 2023-06-20 RX ADMIN — MULTIPLE VITAMINS W/ MINERALS TAB 1 TABLET: TAB at 08:14

## 2023-06-20 RX ADMIN — OMEPRAZOLE 40 MG: 20 CAPSULE, DELAYED RELEASE ORAL at 08:14

## 2023-06-20 RX ADMIN — SUCRALFATE 1 G: 1 TABLET ORAL at 11:38

## 2023-06-20 RX ADMIN — THIAMINE HCL TAB 100 MG 100 MG: 100 TAB at 08:14

## 2023-06-20 RX ADMIN — FOLIC ACID 1 MG: 1 TABLET ORAL at 08:14

## 2023-06-20 RX ADMIN — SUCRALFATE 1 G: 1 TABLET ORAL at 08:14

## 2023-06-20 ASSESSMENT — ACTIVITIES OF DAILY LIVING (ADL)
ADLS_ACUITY_SCORE: 40
HYGIENE/GROOMING: INDEPENDENT
ADLS_ACUITY_SCORE: 40
LAUNDRY: WITH SUPERVISION
ORAL_HYGIENE: INDEPENDENT
ADLS_ACUITY_SCORE: 40
ADLS_ACUITY_SCORE: 40
DRESS: INDEPENDENT

## 2023-06-20 NOTE — PLAN OF CARE
"Patient visible in milieu, social with peers, attended groups. Patient affect neutral, mood is calm. Denies SI, SIB, HI, or hallucinations.   MSSA scores 2 and 2. Patient has not scored greater than an 8 on MSSA monitoring for greater than 24 hours and has not required Valium for withdrawal in greater than 24 hours. Patient is now \"out of detox\" for alcohol withdrawal per unit protocol.  VSS, denies pain, appetite is good.  Patient plans to attend [facility].  Patient was given prn trazodone 50 mg times one for sleep, and prn hydroxyzine 50 mg at HS for anxiety.  /78   Pulse 71   Temp 97.8  F (36.6  C) (Oral)   Resp 16   Ht 1.778 m (5' 10\")   Wt 74.8 kg (165 lb)   SpO2 100%   BMI 23.68 kg/m     Problem: Alcohol Withdrawal  Goal: Alcohol Withdrawal Symptom Control  Outcome: Met   Goal Outcome Evaluation:    Plan of Care Reviewed With: patient                   "

## 2023-06-20 NOTE — DISCHARGE SUMMARY
Nikhil Rios MRN# [id number]   Age: 37 year old YOB: 1985     Date of Admission:  6/17/2023  Date of Discharge:  6/20/2023  Admitting Physician:  Jose Santacruz MD  Discharge Physician:  Ava Lew MD      DISCHARGE  DX  Alcohol use disorder severe           Event Leading to Hospitalization:     See Admission note by admitting provider for patient encounter. for additional details.          Hospital Course:   PATIENT was admitted to [facility] attending  under DR lew, please review the detailed admit note on    The patient was placed under status 15 (15 minute checks) to ensure patient safety.   MSSA protocol was initiated due to the patient's history of alcohol abuse and concern for withdrawal symptoms.  CBC, BMP and utox obtained.    All outpatient medications were continued    PATIENTdid participate in groups and was visible in the milieu.     The patient's symptoms of withdrawal improved.     Patients energy motivation , sleep appetite improved.  Pt completed detox . It was un eventful.      Discussed with patient medications for craving.  Pt is willing to take  antabuse    Spoke with patient about triggers coping skills relapse prevention.    CONSULTS DONE DURING PATIENTS HOSPITALIZATION.  Patient was seen by medicine on date6/18/23    This as per their medical consult    Assessment & Plan  Nikhil Rios is a 37 year old male who has a past medical history of severe alcohol use disorder with withdrawal seizures, chronic pancreatitis, GERD  who presented to the [facility] ER on 6/15/2023 following a benzodiazepine overdose requesting detox.      # Alcohol abuse disorder with withdrawal and history of seizures  # Benzodiazepine withdrawal  -Per primary psychiatry team  -Continue MSSA protocol with Valium as needed  -Continue as needed Atarax  -Continue multivitamin, folic acid and thiamine replacement     # Tobacco use disorder  -He smokes cigars and cigarettes and averages  1/4 ppd. Recommend offering nicotine patch and gum prn      # GERD  -Continue home regimen of Prilosec 40 mg a mouth once daily        *Medicine will sign off at this time            Pt was seen by cm  As per recommendations from Jv Parrish, [facility]   Chem Dep  Progress Notes      Signed  Date of Service:  6/19/2023  1:20 PM  Creation Time:  6/19/2023  1:20 PM       Writer spoke to Pt and agreed to attend [facility] and [facility] for IOP treatment. Pt. Has an appointment on 6.21.23 @ 2pm for his assessment and to get set up for treatment. The link for the video assessment will be sent to his e-mail address on file.Pt was updated on this and information added to the AVS.                   Labs:reviewed with patient     No results found for this or any previous visit (from the past 48 hour(s)).      Recent Results (from the past 240 hour(s))   Asymptomatic COVID-19 Virus (Coronavirus) by PCR Nasopharyngeal    Collection Time: 06/10/23 10:08 AM    Specimen: Nasopharyngeal; Swab   Result Value Ref Range    SARS CoV2 PCR Negative Negative   Alcohol level blood    Collection Time: 06/15/23  9:56 PM   Result Value Ref Range    Alcohol ethyl 0.52 (HH) <=0.01 g/dL   Salicylate level    Collection Time: 06/15/23  9:56 PM   Result Value Ref Range    Salicylate <0.3   mg/dL   Acetaminophen level    Collection Time: 06/15/23  9:56 PM   Result Value Ref Range    Acetaminophen <5.0 (L) 10.0 - 30.0 ug/mL   Comprehensive metabolic panel    Collection Time: 06/15/23  9:56 PM   Result Value Ref Range    Sodium 143 136 - 145 mmol/L    Potassium 4.5 3.4 - 5.3 mmol/L    Chloride 98 98 - 107 mmol/L    Carbon Dioxide (CO2) 27 22 - 29 mmol/L    Anion Gap 18 (H) 7 - 15 mmol/L    Urea Nitrogen 13.2 6.0 - 20.0 mg/dL    Creatinine 0.73 0.67 - 1.17 mg/dL    Calcium 9.2 8.6 - 10.0 mg/dL    Glucose 92 70 - 99 mg/dL    Alkaline Phosphatase 80 40 - 129 U/L     (H) 0 - 45 U/L    ALT 75 (H) 0 - 70 U/L    Protein Total 8.1 6.4 - 8.3 g/dL     Albumin 5.1 3.5 - 5.2 g/dL    Bilirubin Total 0.4 <=1.2 mg/dL    GFR Estimate >90 >60 mL/min/1.73m2   UA with Microscopic reflex to Culture    Collection Time: 06/15/23  9:56 PM    Specimen: Urine, NOS   Result Value Ref Range    Color Urine Light Yellow Colorless, Straw, Light Yellow, Yellow    Appearance Urine Clear Clear    Glucose Urine Negative Negative mg/dL    Bilirubin Urine Negative Negative    Ketones Urine Negative Negative mg/dL    Specific Gravity Urine 1.011 1.001 - 1.030    Blood Urine Negative Negative    pH Urine 6.5 5.0 - 7.0    Protein Albumin Urine 30 (A) Negative mg/dL    Urobilinogen Urine <2.0 <2.0 mg/dL    Nitrite Urine Negative Negative    Leukocyte Esterase Urine Negative Negative    RBC Urine 0 <=2 /HPF    WBC Urine 0 <=5 /HPF   CBC with platelets and differential    Collection Time: 06/15/23  9:56 PM   Result Value Ref Range    WBC Count 6.4 4.0 - 11.0 10e3/uL    RBC Count 5.06 4.40 - 5.90 10e6/uL    Hemoglobin 14.5 13.3 - 17.7 g/dL    Hematocrit 43.1 40.0 - 53.0 %    MCV 85 78 - 100 fL    MCH 28.7 26.5 - 33.0 pg    MCHC 33.6 31.5 - 36.5 g/dL    RDW 16.0 (H) 10.0 - 15.0 %    Platelet Count 269 150 - 450 10e3/uL    % Neutrophils 44 %    % Lymphocytes 48 %    % Monocytes 5 %    % Eosinophils 1 %    % Basophils 2 %    % Immature Granulocytes 0 %    NRBCs per 100 WBC 0 <1 /100    Absolute Neutrophils 2.8 1.6 - 8.3 10e3/uL    Absolute Lymphocytes 3.1 0.8 - 5.3 10e3/uL    Absolute Monocytes 0.3 0.0 - 1.3 10e3/uL    Absolute Eosinophils 0.1 0.0 - 0.7 10e3/uL    Absolute Basophils 0.1 0.0 - 0.2 10e3/uL    Absolute Immature Granulocytes 0.0 <=0.4 10e3/uL    Absolute NRBCs 0.0 10e3/uL   Drug abuse screen 77 urine (FL, RH, SH)    Collection Time: 06/15/23  9:56 PM   Result Value Ref Range    Amphetamines Urine Screen Negative Screen Negative    Barbituates Urine Screen Negative Screen Negative    Benzodiazepine Urine Screen Positive (A) Screen Negative    Cannabinoids Urine Screen Negative Screen  Negative    Opiates Urine Screen Negative Screen Negative    PCP Urine Screen Negative Screen Negative    Cocaine Urine Screen Negative Screen Negative   Magnesium    Collection Time: 06/15/23  9:56 PM   Result Value Ref Range    Magnesium 2.1 1.7 - 2.3 mg/dL   ECG 12-LEAD WITH MUSE (LHE)    Collection Time: 06/15/23 10:12 PM   Result Value Ref Range    Systolic Blood Pressure  mmHg    Diastolic Blood Pressure  mmHg    Ventricular Rate 76 BPM    Atrial Rate 76 BPM    SD Interval 160 ms    QRS Duration 96 ms     ms    QTc 481 ms    P Axis 65 degrees    R AXIS 83 degrees    T Axis 73 degrees    Interpretation ECG       Sinus rhythm  Prolonged QT  Abnormal ECG  When compared with ECG of 20-MAY-2023 11:40,  No significant change was found  Confirmed by SEE ED PROVIDER NOTE FOR, ECG INTERPRETATION ([id number]),  SHANTELL LOBO ([id number]) on 6/19/2023 1:27:55 PM     Comprehensive metabolic panel    Collection Time: 06/17/23  4:22 PM   Result Value Ref Range    Sodium 144 136 - 145 mmol/L    Potassium 4.2 3.4 - 5.3 mmol/L    Chloride 101 98 - 107 mmol/L    Carbon Dioxide (CO2) 28 22 - 29 mmol/L    Anion Gap 15 7 - 15 mmol/L    Urea Nitrogen 11.6 6.0 - 20.0 mg/dL    Creatinine 0.70 0.67 - 1.17 mg/dL    Calcium 9.2 8.6 - 10.0 mg/dL    Glucose 96 70 - 99 mg/dL    Alkaline Phosphatase 72 40 - 129 U/L     (H) 0 - 45 U/L    ALT 92 (H) 0 - 70 U/L    Protein Total 7.2 6.4 - 8.3 g/dL    Albumin 4.7 3.5 - 5.2 g/dL    Bilirubin Total 0.4 <=1.2 mg/dL    GFR Estimate >90 >60 mL/min/1.73m2   Magnesium    Collection Time: 06/17/23  4:22 PM   Result Value Ref Range    Magnesium 2.1 1.7 - 2.3 mg/dL   Ethyl Alcohol Level    Collection Time: 06/17/23  4:22 PM   Result Value Ref Range    Alcohol ethyl 0.39 (HH) <=0.01 g/dL   CBC with platelets and differential    Collection Time: 06/17/23  4:22 PM   Result Value Ref Range    WBC Count 9.3 4.0 - 11.0 10e3/uL    RBC Count 4.55 4.40 - 5.90 10e6/uL    Hemoglobin 13.3 13.3 - 17.7 g/dL     Hematocrit 39.4 (L) 40.0 - 53.0 %    MCV 87 78 - 100 fL    MCH 29.2 26.5 - 33.0 pg    MCHC 33.8 31.5 - 36.5 g/dL    RDW 16.0 (H) 10.0 - 15.0 %    Platelet Count 179 150 - 450 10e3/uL    % Neutrophils 69 %    % Lymphocytes 23 %    % Monocytes 6 %    % Eosinophils 1 %    % Basophils 1 %    % Immature Granulocytes 0 %    NRBCs per 100 WBC 0 <1 /100    Absolute Neutrophils 6.4 1.6 - 8.3 10e3/uL    Absolute Lymphocytes 2.1 0.8 - 5.3 10e3/uL    Absolute Monocytes 0.5 0.0 - 1.3 10e3/uL    Absolute Eosinophils 0.1 0.0 - 0.7 10e3/uL    Absolute Basophils 0.1 0.0 - 0.2 10e3/uL    Absolute Immature Granulocytes 0.0 <=0.4 10e3/uL    Absolute NRBCs 0.0 10e3/uL   Lipase    Collection Time: 06/17/23  4:22 PM   Result Value Ref Range    Lipase 58 13 - 60 U/L   Asymptomatic COVID-19 Virus (Coronavirus) by PCR Nose    Collection Time: 06/17/23  4:32 PM    Specimen: Nose; Swab   Result Value Ref Range    SARS CoV2 PCR Negative Negative   Drug abuse screen 1 urine (ED)    Collection Time: 06/17/23  9:17 PM   Result Value Ref Range    Amphetamines Urine Screen Negative Screen Negative    Barbituates Urine Screen Negative Screen Negative    Benzodiazepine Urine Screen Positive (A) Screen Negative    Cannabinoids Urine Screen Negative Screen Negative    Cocaine Urine Screen Negative Screen Negative    Opiates Urine Screen Negative Screen Negative   GGT    Collection Time: 06/18/23  7:36 AM   Result Value Ref Range    GGT 46 8 - 61 U/L   Lipid panel    Collection Time: 06/18/23  7:36 AM   Result Value Ref Range    Cholesterol 234 (H) <200 mg/dL    Triglycerides 81 <150 mg/dL    Direct Measure HDL 82 >=40 mg/dL    LDL Cholesterol Calculated 136 (H) <=100 mg/dL    Non HDL Cholesterol 152 (H) <130 mg/dL   TSH with free T4 reflex and/or T3 as indicated    Collection Time: 06/18/23  7:36 AM   Result Value Ref Range    TSH 1.76 0.30 - 4.20 uIU/mL   Vitamin B12    Collection Time: 06/18/23  7:36 AM   Result Value Ref Range    Vitamin B12 663  232 - 1,245 pg/mL   Folate    Collection Time: 06/18/23  7:36 AM   Result Value Ref Range    Folic Acid 12.7 4.6 - 34.8 ng/mL            Because this patient meets criteria for an Alcohol Use Disorder, I performed the following brief intervention on the date of this note:              1) Expressed concern that the patient is drinking at unhealthy levels known to increase their risk of alcohol related problems              2) Gave feedback linking alcohol use and health, including personalized feedback explaining how alcohol use can interact with their medical and/or psychiatric problems, and with prescribed medications.              3) Advised patient to abstain.    PT counseled on nicotine cessation and nicotine replacement provided    Counseled the patient on the importance of having a recovery program in addition to medication to manage recovery.  Components include avoiding isolating, having willingness to change, avoiding triggers and managing cravings. Encouraged having some type of sober network and practicing honesty with trusted support person(s).     Discussed with patient many issues of addiction,triggers, relapse, and establishing a solid recovery program.    DISCHARGE MENTAL STATUS EXAMINATION:  The patient is alert, oriented x3.  Good fund of knowledge.  Good use of language.  Recent and remote memory, language, fund of knowledge are all adequate.  Euthymic mood congruent affect  Speech normal rate/rhythm linear tp no loose asso,The patient does not have any active suicidal or homicidal ideation.  Does not have any auditory or visual hallucination.  Fair insight/judgment At this time, the patient was stable to be discharged.        Pt was not determined to not be a danger to himself or others. At the current time of discharge, the patient does not meet criteria for involuntary hospitalization. On the day of discharge, the patient reports that they do not have suicidal or homicidal ideation and would  never hurt themselves or others. Steps taken to minimize risk include: assessing patient s behavior and thought process daily during hospital stay, discharging patient with adequate plan for follow up for mental and physical health and discussing safety plan of returning to the hospital should the patient ever have thoughts of harming themselves or others. Therefore, based on all available evidence including the factors cited above, the patient does not appear to be at imminent risk for self-harm, and is appropriate for outpatient level of care.     Educated about side effects/risk vs benefits /alternative including non treatment.Pt consented to be on medication.     .Total time spent on discharge summary more than 35 min  More than  20 min  planning, coordination of care, medication reconciliation and performance of physical exam on day of discharge.Care was coordinated with unit RN and unit therapist         Review of your medicines      UNREVIEWED medicines. Ask your doctor about these medicines      Dose / Directions   gabapentin 300 MG capsule  Commonly known as: NEURONTIN  Ask about: Which instructions should I use?      Dose: 300 mg  Take 300 mg by mouth 3 times daily  Refills: 0     hydrOXYzine 25 MG tablet  Commonly known as: ATARAX  Used for: Alcohol use disorder, severe, dependence (H), Generalized anxiety disorder      Dose: 25-50 mg  Take 1-2 tablets (25-50 mg) by mouth 3 times daily as needed for itching  Quantity: 30 tablet  Refills: 0     omeprazole 20 MG DR capsule  Commonly known as: priLOSEC      Dose: 40 mg  Take 2 capsules (40 mg) by mouth daily  Quantity: 30 capsule  Refills: 0     sucralfate 1 GM tablet  Commonly known as: CARAFATE      Dose: 1 g  Take 1 tablet (1 g) by mouth 4 times daily  Quantity: 30 tablet  Refills: 0             Disposition: home     Facts about COVID19 at www.cdc.gov/COVID19 and www.[place].gov/covid19     Keeping hands clean is one of the most important steps we can take to  "avoid getting sick and spreading germs to others.  Please wash your hands frequently and lather with soap for at least 20 seconds!     Medical Follow-Up:pcp 6/29/23       Treatment Follow-Up:[facility] for [facility] treatment. Pt. Has an appointment on 6.21.23 @ 2pm for his assessment and to get set up for treatment      \"Much or all of the text in this note was generated through the use of Dragon Dictate voice to text software. Errors in spelling or words which appear to be out of contact are unintentional, may be present due having escaped editing\"     "

## 2023-06-20 NOTE — PLAN OF CARE
Problem: Sleep Disturbance  Goal: Adequate Sleep/Rest  Outcome: Progressing   Goal Outcome Evaluation:         Pt is out of alcohol detox. Slept through the night with no apparent . Up at 0530 to look at the time.

## 2023-06-22 ENCOUNTER — PATIENT OUTREACH (OUTPATIENT)
Dept: CARE COORDINATION | Facility: CLINIC | Age: 38
End: 2023-06-22
Payer: COMMERCIAL

## 2023-06-22 NOTE — PROGRESS NOTES
Clinic Care Coordination Contact  [facility]/Voicemail       Clinical Data: Care Coordinator Outreach  Outreach attempted x 2.  Left message on patient's voicemail with call back information and requested return call.  Plan: Care Coordinator will make no further outreaches at this time.    SEGUN Smith   Social Work Clinic Care Coordinator   [facility]  PH: [phone]  [email or web address]

## 2023-07-02 ENCOUNTER — HEALTH MAINTENANCE LETTER (OUTPATIENT)
Age: 38
End: 2023-07-02

## 2023-07-14 ENCOUNTER — HOSPITAL ENCOUNTER (OUTPATIENT)
Dept: BEHAVIORAL HEALTH | Facility: CLINIC | Age: 38
Discharge: HOME | End: 2023-07-14
Attending: FAMILY MEDICINE | Admitting: FAMILY MEDICINE
Payer: COMMERCIAL

## 2023-07-14 PROCEDURE — 90791 PSYCH DIAGNOSTIC EVALUATION: CPT | Performed by: COUNSELOR

## 2023-07-14 ASSESSMENT — ANXIETY QUESTIONNAIRES
7. FEELING AFRAID AS IF SOMETHING AWFUL MIGHT HAPPEN: NEARLY EVERY DAY
5. BEING SO RESTLESS THAT IT IS HARD TO SIT STILL: NEARLY EVERY DAY
GAD7 TOTAL SCORE: 21
3. WORRYING TOO MUCH ABOUT DIFFERENT THINGS: NEARLY EVERY DAY
6. BECOMING EASILY ANNOYED OR IRRITABLE: NEARLY EVERY DAY
1. FEELING NERVOUS, ANXIOUS, OR ON EDGE: NEARLY EVERY DAY
2. NOT BEING ABLE TO STOP OR CONTROL WORRYING: NEARLY EVERY DAY
4. TROUBLE RELAXING: NEARLY EVERY DAY
IF YOU CHECKED OFF ANY PROBLEMS ON THIS QUESTIONNAIRE, HOW DIFFICULT HAVE THESE PROBLEMS MADE IT FOR YOU TO DO YOUR WORK, TAKE CARE OF THINGS AT HOME, OR GET ALONG WITH OTHER PEOPLE: VERY DIFFICULT
GAD7 TOTAL SCORE: 21

## 2023-07-14 ASSESSMENT — PAIN SCALES - GENERAL: PAINLEVEL: NO PAIN (0)

## 2023-07-14 ASSESSMENT — COLUMBIA-SUICIDE SEVERITY RATING SCALE - C-SSRS
4. HAVE YOU HAD THESE THOUGHTS AND HAD SOME INTENTION OF ACTING ON THEM?: NO
5. HAVE YOU STARTED TO WORK OUT OR WORKED OUT THE DETAILS OF HOW TO KILL YOURSELF? DO YOU INTEND TO CARRY OUT THIS PLAN?: NO
3. HAVE YOU BEEN THINKING ABOUT HOW YOU MIGHT KILL YOURSELF?: NO
1. IN THE PAST MONTH, HAVE YOU WISHED YOU WERE DEAD OR WISHED YOU COULD GO TO SLEEP AND NOT WAKE UP?: YES
2. HAVE YOU ACTUALLY HAD ANY THOUGHTS OF KILLING YOURSELF IN THE PAST MONTH?: YES
6. HAVE YOU EVER DONE ANYTHING, STARTED TO DO ANYTHING, OR PREPARED TO DO ANYTHING TO END YOUR LIFE?: NO

## 2023-07-14 ASSESSMENT — PATIENT HEALTH QUESTIONNAIRE - PHQ9: SUM OF ALL RESPONSES TO PHQ QUESTIONS 1-9: 12
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
## 2023-07-20 ENCOUNTER — HOSPITAL ENCOUNTER (EMERGENCY)
Facility: CLINIC | Age: 38
Discharge: HOME | End: 2023-07-21
Attending: EMERGENCY MEDICINE | Admitting: EMERGENCY MEDICINE
Payer: COMMERCIAL

## 2023-07-20 ENCOUNTER — TELEPHONE (OUTPATIENT)
Dept: BEHAVIORAL HEALTH | Facility: CLINIC | Age: 38
End: 2023-07-20

## 2023-07-20 DIAGNOSIS — F10.220 ALCOHOL DEPENDENCE WITH UNCOMPLICATED INTOXICATION (H): Primary | ICD-10-CM

## 2023-07-20 DIAGNOSIS — F10.920 ALCOHOLIC INTOXICATION WITHOUT COMPLICATION (H): ICD-10-CM

## 2023-07-20 LAB
ALBUMIN SERPL BCG-MCNC: 4.8 G/DL (ref 3.5–5.2)
ALCOHOL BREATH TEST: 0.27 (ref 0–0.01)
ALP SERPL-CCNC: 48 U/L (ref 40–129)
ALT SERPL W P-5'-P-CCNC: 17 U/L (ref 0–70)
ANION GAP SERPL CALCULATED.3IONS-SCNC: 14 MMOL/L (ref 7–15)
AST SERPL W P-5'-P-CCNC: 25 U/L (ref 0–45)
BASOPHILS # BLD AUTO: 0.1 10E3/UL (ref 0–0.2)
BASOPHILS NFR BLD AUTO: 1 %
BILIRUB SERPL-MCNC: <0.2 MG/DL
BUN SERPL-MCNC: 12.9 MG/DL (ref 6–20)
CALCIUM SERPL-MCNC: 8.9 MG/DL (ref 8.6–10)
CHLORIDE SERPL-SCNC: 107 MMOL/L (ref 98–107)
CREAT SERPL-MCNC: 0.71 MG/DL (ref 0.67–1.17)
DEPRECATED HCO3 PLAS-SCNC: 23 MMOL/L (ref 22–29)
EOSINOPHIL # BLD AUTO: 0.1 10E3/UL (ref 0–0.7)
EOSINOPHIL NFR BLD AUTO: 1 %
ERYTHROCYTE [DISTWIDTH] IN BLOOD BY AUTOMATED COUNT: 14.9 % (ref 10–15)
ETHANOL SERPL-MCNC: 0.57 G/DL
GFR SERPL CREATININE-BSD FRML MDRD: >90 ML/MIN/1.73M2
GLUCOSE SERPL-MCNC: 93 MG/DL (ref 70–99)
HCT VFR BLD AUTO: 41.7 % (ref 40–53)
HGB BLD-MCNC: 14.2 G/DL (ref 13.3–17.7)
IMM GRANULOCYTES # BLD: 0 10E3/UL
IMM GRANULOCYTES NFR BLD: 1 %
LIPASE SERPL-CCNC: 35 U/L (ref 13–60)
LYMPHOCYTES # BLD AUTO: 2.9 10E3/UL (ref 0.8–5.3)
LYMPHOCYTES NFR BLD AUTO: 38 %
MCH RBC QN AUTO: 29.7 PG (ref 26.5–33)
MCHC RBC AUTO-ENTMCNC: 34.1 G/DL (ref 31.5–36.5)
MCV RBC AUTO: 87 FL (ref 78–100)
MONOCYTES # BLD AUTO: 0.2 10E3/UL (ref 0–1.3)
MONOCYTES NFR BLD AUTO: 3 %
NEUTROPHILS # BLD AUTO: 4.4 10E3/UL (ref 1.6–8.3)
NEUTROPHILS NFR BLD AUTO: 56 %
NRBC # BLD AUTO: 0 10E3/UL
NRBC BLD AUTO-RTO: 0 /100
PLATELET # BLD AUTO: 280 10E3/UL (ref 150–450)
POTASSIUM SERPL-SCNC: 4.2 MMOL/L (ref 3.4–5.3)
PROT SERPL-MCNC: 7.3 G/DL (ref 6.4–8.3)
RBC # BLD AUTO: 4.78 10E6/UL (ref 4.4–5.9)
SODIUM SERPL-SCNC: 144 MMOL/L (ref 136–145)
WBC # BLD AUTO: 7.7 10E3/UL (ref 4–11)

## 2023-07-20 PROCEDURE — 85025 COMPLETE CBC W/AUTO DIFF WBC: CPT | Performed by: EMERGENCY MEDICINE

## 2023-07-20 PROCEDURE — 80053 COMPREHEN METABOLIC PANEL: CPT | Performed by: EMERGENCY MEDICINE

## 2023-07-20 PROCEDURE — 83690 ASSAY OF LIPASE: CPT | Performed by: EMERGENCY MEDICINE

## 2023-07-20 PROCEDURE — 99285 EMERGENCY DEPT VISIT HI MDM: CPT

## 2023-07-20 PROCEDURE — 250N000011 HC RX IP 250 OP 636: Performed by: EMERGENCY MEDICINE

## 2023-07-20 PROCEDURE — 99285 EMERGENCY DEPT VISIT HI MDM: CPT | Performed by: EMERGENCY MEDICINE

## 2023-07-20 PROCEDURE — 96372 THER/PROPH/DIAG INJ SC/IM: CPT | Performed by: INTERNAL MEDICINE

## 2023-07-20 PROCEDURE — 36415 COLL VENOUS BLD VENIPUNCTURE: CPT | Performed by: EMERGENCY MEDICINE

## 2023-07-20 PROCEDURE — 96372 THER/PROPH/DIAG INJ SC/IM: CPT | Performed by: EMERGENCY MEDICINE

## 2023-07-20 PROCEDURE — 250N000011 HC RX IP 250 OP 636: Mod: JZ | Performed by: INTERNAL MEDICINE

## 2023-07-20 PROCEDURE — 82077 ASSAY SPEC XCP UR&BREATH IA: CPT | Performed by: EMERGENCY MEDICINE

## 2023-07-20 PROCEDURE — 82075 ASSAY OF BREATH ETHANOL: CPT

## 2023-07-20 RX ORDER — GABAPENTIN 800 MG/1
TABLET ORAL
Qty: 5 TABLET | Refills: 0 | Status: SHIPPED | OUTPATIENT
Start: 2023-07-20 | End: 2023-07-25

## 2023-07-20 RX ORDER — FOLIC ACID 1 MG/1
1 TABLET ORAL DAILY
Status: DISCONTINUED | OUTPATIENT
Start: 2023-07-20 | End: 2023-07-21 | Stop reason: HOSPADM

## 2023-07-20 RX ORDER — DIAZEPAM 5 MG
5-20 TABLET ORAL EVERY 30 MIN PRN
Status: DISCONTINUED | OUTPATIENT
Start: 2023-07-20 | End: 2023-07-21 | Stop reason: HOSPADM

## 2023-07-20 RX ORDER — DIAZEPAM 10 MG/2ML
5 INJECTION, SOLUTION INTRAMUSCULAR; INTRAVENOUS ONCE
Status: COMPLETED | OUTPATIENT
Start: 2023-07-20 | End: 2023-07-20

## 2023-07-20 RX ORDER — OLANZAPINE 10 MG/2ML
5 INJECTION, POWDER, FOR SOLUTION INTRAMUSCULAR
Status: DISCONTINUED | OUTPATIENT
Start: 2023-07-20 | End: 2023-07-21 | Stop reason: HOSPADM

## 2023-07-20 RX ORDER — OLANZAPINE 10 MG/2ML
5 INJECTION, POWDER, FOR SOLUTION INTRAMUSCULAR ONCE
Status: COMPLETED | OUTPATIENT
Start: 2023-07-20 | End: 2023-07-20

## 2023-07-20 RX ORDER — GABAPENTIN 800 MG/1
TABLET ORAL
Qty: 10 TABLET | Refills: 0 | Status: ON HOLD | OUTPATIENT
Start: 2023-07-20 | End: 2023-07-27

## 2023-07-20 RX ORDER — MULTIPLE VITAMINS W/ MINERALS TAB 9MG-400MCG
1 TAB ORAL DAILY
Status: DISCONTINUED | OUTPATIENT
Start: 2023-07-20 | End: 2023-07-21 | Stop reason: HOSPADM

## 2023-07-20 RX ORDER — DIAZEPAM 10 MG/2ML
INJECTION, SOLUTION INTRAMUSCULAR; INTRAVENOUS
Status: DISCONTINUED
Start: 2023-07-20 | End: 2023-07-20 | Stop reason: HOSPADM

## 2023-07-20 RX ADMIN — DIAZEPAM 5 MG: 5 INJECTION INTRAMUSCULAR; INTRAVENOUS at 14:46

## 2023-07-20 RX ADMIN — OLANZAPINE 5 MG: 10 INJECTION, POWDER, FOR SOLUTION INTRAMUSCULAR at 17:53

## 2023-07-20 ASSESSMENT — ACTIVITIES OF DAILY LIVING (ADL)
ADLS_ACUITY_SCORE: 37
ADLS_ACUITY_SCORE: 37
ADLS_ACUITY_SCORE: 35
ADLS_ACUITY_SCORE: 37

## 2023-07-20 NOTE — ED NOTES
Pt refusing labs at this time, pt will open eyes and curl arm in tighter to bed. Pt mother at bedside, with her encouragement he jerked leg up and kneed wall.

## 2023-07-20 NOTE — ED TRIAGE NOTES
Triage Assessment     Row Name 07/20/23 1138       Triage Assessment (Adult)    Airway WDL WDL       Respiratory WDL    Respiratory WDL WDL       Skin Circulation/Temperature WDL    Skin Circulation/Temperature WDL WDL       Cardiac WDL    Cardiac WDL WDL       Peripheral/Neurovascular WDL    Peripheral Neurovascular WDL WDL       Cognitive/Neuro/Behavioral WDL    Cognitive/Neuro/Behavioral WDL X  alcohol intoxication

## 2023-07-20 NOTE — ED PROVIDER NOTES
[facility] EMERGENCY DEPARTMENT ([facility])    7/20/23      ED PROVIDER NOTE    History     Chief Complaint   Patient presents with     Alcohol Problem     Brought in by mother, seeking detox and wants a mental health evaluation, drinks vodka, severely intoxication     HPI  Nikhil Rios is a 37 year old male with past medical history significant for alcohol use disorder with history of withdrawal seizures recently admitted to detox at [facility] 06/17/2023 - 06/20/2023, HTN, HLD, pancreatitis who presents to the ED for complaint of ETOH intoxication and detox. Patient states that he has been drinking daily between 5-10 drinks a combination of beer and hard liquor. He states that his last drink was this morning. He wanted to detox so came to the ED. He denies other acute complaints. No SI/HI.          Past Medical History  Past Medical History:   Diagnosis Date     Alcohol abuse      Alcohol abuse      Alcohol withdrawal (H)      Depressive disorder      Family history of diabetes mellitus 11/9/2007     HLD (hyperlipidemia)      HTN (hypertension)      Past Surgical History:   Procedure Laterality Date     NO HISTORY OF SURGERY       OTHER SURGICAL HISTORY      none     disulfiram (ANTABUSE) 250 MG tablet  gabapentin (NEURONTIN) 400 MG capsule  hydrOXYzine (ATARAX) 25 MG tablet  multivitamin w/minerals (THERA-VIT-M) tablet  nicotine polacrilex (NICORETTE) 4 MG gum  omeprazole (PRILOSEC) 20 MG DR capsule  sucralfate (CARAFATE) 1 GM tablet  thiamine (B-1) 100 MG tablet  traZODone (DESYREL) 50 MG tablet      Allergies   Allergen Reactions     Gramineae Pollens Itching     Pollen Extract Rash     grass tree pollen     Family History  Family History   Problem Relation Age of Onset     Coronary Artery Disease Father      Substance Abuse Maternal Grandfather      Mental Illness Brother      Schizophrenia Brother      Schizophrenia Maternal Aunt      Social History   Social History     Tobacco Use     Smoking status: Some  Days     Packs/day: 0.25     Types: Cigars, Cigarettes     Smokeless tobacco: Never     Tobacco comments:     occasional   Substance Use Topics     Alcohol use: Yes     Alcohol/week: 17.0 standard drinks of alcohol     Types: 17 Shots of liquor per week     Comment: Drinks 750ml/day or 17 shots/day; hx of withdrawl seizures     Drug use: Not Currently     Types: Marijuana      Past medical history, past surgical history, medications, allergies, family history, and social history were reviewed with the patient. No additional pertinent items.      A medically appropriate review of systems was performed with pertinent positives and negatives noted in the HPI, and all other systems negative.    Physical Exam   BP: 115/72  Pulse: 76  Temp: 98.4  F (36.9  C)  Resp: 16  Weight:  (unable to obtain)  SpO2: 96 %  Physical Exam  Constitutional:       General: He is not in acute distress.     Appearance: Normal appearance.      Comments: intoxicated   HENT:      Head: Normocephalic and atraumatic.      Mouth/Throat:      Mouth: Mucous membranes are moist.      Pharynx: Oropharynx is clear.   Eyes:      Extraocular Movements: Extraocular movements intact.      Pupils: Pupils are equal, round, and reactive to light.   Cardiovascular:      Rate and Rhythm: Normal rate and regular rhythm.   Abdominal:      General: There is no distension.      Palpations: Abdomen is soft.      Tenderness: There is no abdominal tenderness. There is no guarding.   Musculoskeletal:         General: Normal range of motion.      Cervical back: Normal range of motion.   Skin:     General: Skin is warm and dry.   Neurological:      General: No focal deficit present.      Mental Status: He is alert and oriented to person, place, and time.   Psychiatric:         Mood and Affect: Affect is tearful.         Thought Content: Thought content does not include homicidal or suicidal ideation.         ED Course, Procedures, & Data     11:51 AM  The patient was seen  and examined by Js Alicea MD in Room [facility].   Procedures                      No results found for any visits on 07/20/23.  Medications - No data to display  Labs Ordered and Resulted from Time of ED Arrival to Time of ED Departure - No data to display  No orders to display          Critical care was not performed.     Medical Decision Making  The patient's presentation was of high complexity (a chronic illness severe exacerbation, progression, or side effect of treatment).    The patient's evaluation involved:  review of external note(s) from 1 sources (notes from detox admission last month)  ordering and/or review of 3+ test(s) in this encounter (CMP, CBC, ETOH level, lipase)  discussion of management or test interpretation with another health professional (detox unit/service)    The patient's management necessitated high risk (a decision regarding hospitalization).      Assessment & Plan    37yoM with history of ETOH abuse, withdrawal, prior admissions for detox here for ETOH intoxication. Patient was intoxicated on arrival. Patient stated he wanted to detox.     Initially labs were not done as he refused to have blood drawn. He was calm. Later in the ED he did allow blood to be drawn. He became agitated and kept getting out of the bed and asking for help. He was given IM valium as he would not take a pill. He did become somewhat more calm following this.     No other acute medical or psychiatric concerns at this time. Discussed with detox service and serial ETOH levels/ breathlyzers will be done and after the ETOH level is <0.3 he will be admitted to the detox unit.    Patient was signed out pending admission to detox unit.      I have reviewed the nursing notes. I have reviewed the findings, diagnosis, plan and need for follow up with the patient.    New Prescriptions    No medications on file       Final diagnoses:   None       Js Alicea    [facility] EMERGENCY  DEPARTMENT  7/20/2023     Js Alicea MD  07/20/23 7386       Js Alicea MD  07/20/23 5341

## 2023-07-20 NOTE — TELEPHONE ENCOUNTER
S: [facility] , Provider Deanne calling at 4:00 PM with clinical on a 37 year old/Male presenting for alcohol detox.     B: Pt presents for ETOH detox.   Currently reports drinking 5-10 drinks daily for at least 4 weeks. Patient was agitated in the ED requiring IM valium    Patient reports last use was this morning  Pt SHERITA: 0.57   Pt  denies hx of DT  Pt  endorses hx of seizures. Last seizure: unknown   Pt endorsing the following symptoms of withdrawal: None  MSSA Score: none    Pt denies acute mental health or medical concerns.   Pt denies other drug use: None Amount/frequency: N/A    Does Pt have a detox care plan in [facility]? No  Does pt present with specific needs, assistive devices, or exclusionary criteria? None  Is the patient ambulating, eating and drinking in the ED? yes    A: Pt meets criteria to be presented for IP detox admission. Patient is voluntary    COVID Symptoms: No  If yes, COVID test required   Utox: Ordered, not yet collected  CMP: WNL  CBC: WNL  HCG: N/A     R: Patient cleared and ready for behavioral bed placement: Yes    Pt is meeting criteria for presentation to 3A/CD    3:57 PM Intake paged Dr. Lew for presentation for 3A CD for [facility].     4:00 PM Intake received call from Dr. Lew that patient will need serial SHERITA <0.35 before the patient can be presented for admission. Intake re    6:18 PM Patient's etoh level has not been collected. Patient placed in a restraints this evening and is uncooperative.   8:40 PM No repeat etoh level collected. 9:57 PM No repeat etoh completed.   10:03 PM Intake called [facility] and per Ashanti GUO, etoh level will be completed.   10:04 PM Intake passed case to nights to follow.

## 2023-07-20 NOTE — ED NOTES
Writer attempted to draw pt's blood for a third time. Pt adamantly refused; pulled arms aggressively away from writer multiple times.

## 2023-07-20 NOTE — ED NOTES
Pt placed on 1:1 at this time, security at bedside at this time for safety of pt and staff. Pt has repeatedly got out of bed and walked down phipps. During last episode pt got up and when redirected by RN he swung at her. Pt then walked quickly into nurses station and had to been approached by multiple staff to redirect and place back in bed. Pt is uncooperative and appears to be not cognitively processing instructions.

## 2023-07-20 NOTE — ED NOTES
This RN attempted to draw labs again on pt. Pt opens eyes and refuses to allow blood draw. Provider aware.

## 2023-07-20 NOTE — ED NOTES
Patient continues to attempt to get out of bed with unsteady gait, not listening to redirection. Patient placed in restraint chair at 1730, MD aware

## 2023-07-21 ENCOUNTER — HOSPITAL ENCOUNTER (EMERGENCY)
Facility: HOSPITAL | Age: 38
Discharge: HOME | End: 2023-07-21
Attending: EMERGENCY MEDICINE | Admitting: EMERGENCY MEDICINE
Payer: COMMERCIAL

## 2023-07-21 VITALS
SYSTOLIC BLOOD PRESSURE: 133 MMHG | OXYGEN SATURATION: 95 % | DIASTOLIC BLOOD PRESSURE: 68 MMHG | HEART RATE: 68 BPM | RESPIRATION RATE: 22 BRPM | TEMPERATURE: 98.1 F | HEIGHT: 67 IN | BODY MASS INDEX: 25.9 KG/M2 | WEIGHT: 165 LBS

## 2023-07-21 VITALS
SYSTOLIC BLOOD PRESSURE: 129 MMHG | DIASTOLIC BLOOD PRESSURE: 74 MMHG | TEMPERATURE: 98.5 F | RESPIRATION RATE: 16 BRPM | HEART RATE: 59 BPM | OXYGEN SATURATION: 100 %

## 2023-07-21 DIAGNOSIS — F10.220 ALCOHOL DEPENDENCE WITH ACUTE ALCOHOLIC INTOXICATION WITHOUT COMPLICATION (H): ICD-10-CM

## 2023-07-21 LAB
ANION GAP SERPL CALCULATED.3IONS-SCNC: 18 MMOL/L (ref 7–15)
BUN SERPL-MCNC: 14.7 MG/DL (ref 6–20)
CALCIUM SERPL-MCNC: 9.1 MG/DL (ref 8.6–10)
CHLORIDE SERPL-SCNC: 101 MMOL/L (ref 98–107)
CREAT SERPL-MCNC: 0.82 MG/DL (ref 0.67–1.17)
DEPRECATED HCO3 PLAS-SCNC: 25 MMOL/L (ref 22–29)
ETHANOL SERPL-MCNC: 0.42 G/DL
GFR SERPL CREATININE-BSD FRML MDRD: >90 ML/MIN/1.73M2
GLUCOSE SERPL-MCNC: 84 MG/DL (ref 70–99)
HOLD SPECIMEN: NORMAL
MAGNESIUM SERPL-MCNC: 1.9 MG/DL (ref 1.7–2.3)
POTASSIUM SERPL-SCNC: 4.2 MMOL/L (ref 3.4–5.3)
SODIUM SERPL-SCNC: 144 MMOL/L (ref 136–145)

## 2023-07-21 PROCEDURE — 99285 EMERGENCY DEPT VISIT HI MDM: CPT

## 2023-07-21 PROCEDURE — 82374 ASSAY BLOOD CARBON DIOXIDE: CPT | Performed by: EMERGENCY MEDICINE

## 2023-07-21 PROCEDURE — 83735 ASSAY OF MAGNESIUM: CPT | Performed by: EMERGENCY MEDICINE

## 2023-07-21 PROCEDURE — 36415 COLL VENOUS BLD VENIPUNCTURE: CPT | Performed by: EMERGENCY MEDICINE

## 2023-07-21 PROCEDURE — 82077 ASSAY SPEC XCP UR&BREATH IA: CPT | Performed by: EMERGENCY MEDICINE

## 2023-07-21 RX ORDER — IBUPROFEN 600 MG/1
600 TABLET, FILM COATED ORAL ONCE
Status: COMPLETED | OUTPATIENT
Start: 2023-07-21 | End: 2023-07-21

## 2023-07-21 ASSESSMENT — ACTIVITIES OF DAILY LIVING (ADL)
ADLS_ACUITY_SCORE: 37
ADLS_ACUITY_SCORE: 35

## 2023-07-21 NOTE — ED NOTES
Emergency Department Patient Sign-out       Brief HPI:  This is a 37 year old male signed out to me by Dr. BEVERLY Luu .  See initial ED Provider note for details of the presentation.            Significant Events prior to my assuming care: Acute ETOH intoxication with abuse and dependence with h/o withdrawal seizure.      Exam:   Patient Vitals for the past 24 hrs:   BP Temp Temp src Pulse Resp SpO2   07/20/23 1140 115/72 98.4  F (36.9  C) Oral 76 16 96 %           ED RESULTS:   Results for orders placed or performed during the hospital encounter of 07/20/23 (from the past 24 hour(s))   CBC with platelets differential     Status: None    Collection Time: 07/20/23  2:25 PM    Narrative    The following orders were created for panel order CBC with platelets differential.  Procedure                               Abnormality         Status                     ---------                               -----------         ------                     CBC with platelets and d...[[id number]]                      Final result                 Please view results for these tests on the individual orders.   Comprehensive metabolic panel     Status: Normal    Collection Time: 07/20/23  2:25 PM   Result Value Ref Range    Sodium 144 136 - 145 mmol/L    Potassium 4.2 3.4 - 5.3 mmol/L    Chloride 107 98 - 107 mmol/L    Carbon Dioxide (CO2) 23 22 - 29 mmol/L    Anion Gap 14 7 - 15 mmol/L    Urea Nitrogen 12.9 6.0 - 20.0 mg/dL    Creatinine 0.71 0.67 - 1.17 mg/dL    Calcium 8.9 8.6 - 10.0 mg/dL    Glucose 93 70 - 99 mg/dL    Alkaline Phosphatase 48 40 - 129 U/L    AST 25 0 - 45 U/L    ALT 17 0 - 70 U/L    Protein Total 7.3 6.4 - 8.3 g/dL    Albumin 4.8 3.5 - 5.2 g/dL    Bilirubin Total <0.2 <=1.2 mg/dL    GFR Estimate >90 >60 mL/min/1.73m2   Lipase     Status: Normal    Collection Time: 07/20/23  2:25 PM   Result Value Ref Range    Lipase 35 13 - 60 U/L   Ethyl Alcohol Level     Status: Abnormal    Collection Time: 07/20/23  2:25  PM   Result Value Ref Range    Alcohol ethyl 0.57 (HH) <=0.01 g/dL   CBC with platelets and differential     Status: None    Collection Time: 07/20/23  2:25 PM   Result Value Ref Range    WBC Count 7.7 4.0 - 11.0 10e3/uL    RBC Count 4.78 4.40 - 5.90 10e6/uL    Hemoglobin 14.2 13.3 - 17.7 g/dL    Hematocrit 41.7 40.0 - 53.0 %    MCV 87 78 - 100 fL    MCH 29.7 26.5 - 33.0 pg    MCHC 34.1 31.5 - 36.5 g/dL    RDW 14.9 10.0 - 15.0 %    Platelet Count 280 150 - 450 10e3/uL    % Neutrophils 56 %    % Lymphocytes 38 %    % Monocytes 3 %    % Eosinophils 1 %    % Basophils 1 %    % Immature Granulocytes 1 %    NRBCs per 100 WBC 0 <1 /100    Absolute Neutrophils 4.4 1.6 - 8.3 10e3/uL    Absolute Lymphocytes 2.9 0.8 - 5.3 10e3/uL    Absolute Monocytes 0.2 0.0 - 1.3 10e3/uL    Absolute Eosinophils 0.1 0.0 - 0.7 10e3/uL    Absolute Basophils 0.1 0.0 - 0.2 10e3/uL    Absolute Immature Granulocytes 0.0 <=0.4 10e3/uL    Absolute NRBCs 0.0 10e3/uL   Alcohol breath test POCT     Status: Abnormal    Collection Time: 07/20/23 10:05 PM   Result Value Ref Range    Alcohol Breath Test 0.272 (A) 0.00 - 0.01       ED MEDICATIONS:   Medications   diazepam (VALIUM) tablet 5-20 mg ( Oral Not Given 7/20/23 1440)   thiamine (B-1) tablet 100 mg (100 mg Oral Not Given 7/20/23 1440)   folic acid (FOLVITE) tablet 1 mg (1 mg Oral Not Given 7/20/23 1440)   multivitamin w/minerals (THERA-VIT-M) tablet 1 tablet (1 tablet Oral Not Given 7/20/23 1440)   OLANZapine (zyPREXA) injection 5 mg (has no administration in time range)   diazepam (VALIUM) injection 5 mg (5 mg Intramuscular $Given 7/20/23 1446)   OLANZapine (zyPREXA) injection 5 mg (5 mg Intramuscular $Given 7/20/23 1753)         Impression:    ICD-10-CM    1. Alcoholic intoxication without complication (H)  F10.920           Plan:    Pending studies include labs ok, breathylyzer down to 0.27, no medical or mental health concerns, admit to Detox.      Then, he changed his mind for Detox, his mother  to pick him up and discharged with neurontin taper, follow up with his PMD and or Mental Health.      Zenon Alfaro MD, Zenon Morley MD  07/20/23 9786       Zenon Alfaro MD  07/21/23 0026

## 2023-07-21 NOTE — ED NOTES
The pt went to sit on the chair and fell off the left side of his chair and hit the back of his head. No LOC, pt denies cervical tenderness.
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
## 2023-07-21 NOTE — ED NOTES
Patient calm and cooperative for agreed upon 10 minutes. Patient released from restraint chair and ambulatory to stretcher. RN provided warm blankets for comfort. Patient verbalized understanding of safe behaviors and agreed to cooperate with staff. Side rails raised x 2, 1:1 sitter remains at bedside. Patient's mother also at bedside with patient.

## 2023-07-21 NOTE — ED NOTES
Patient continues in restraints, educated x2 by RN about why he is in restraints. Patient verbalizes understanding but then immediately starts yelling again and attempts to pull against restraints, does not appear to be able to learn at this time. Mom in room, 1:1 at bedside as well

## 2023-07-21 NOTE — ED NOTES
Spoke with patient's mother via phone with patient's permission; updated on plan of care and patient decision to proceed with discharge vs inpatient detox. Mother to come to ER to pick patient up shortly.

## 2023-07-21 NOTE — ED NOTES
Patient's parent wants to be updated when patient goes to detox unit. Patient gives verbal consent to call mother.

## 2023-07-21 NOTE — TELEPHONE ENCOUNTER
11:08pm Intake received a call from  CRN reporting they are capped for admission.     11:10pm Intake paged the [facility]S.     11:15pm Intake received a call from ANS reporting the unit is capped for admission.     11:30pm Intake called [facility] and provided update to the pt's nurse.

## 2023-07-21 NOTE — ED PROVIDER NOTES
"  Emergency Department Encounter     Evaluation Date & Time:   7/21/2023  4:45 PM    CHIEF COMPLAINT:  Alcohol Intoxication      Triage Note:The pt drank 1 L of Vodka today and yesterday; he was sober for 9 months and than he relapsed. The pt presents with his mom. The lives alone. Pt states that he has had thoughts of suicide; he has thought of jumping off a bridge but does not because he \"does not have the guts to do it.\" Pt states he has a hx of restraints at another hospital recently. The pt does not want to go to detox he just wants to break through.         ED COURSE & MEDICAL DECISION MAKING:     ED Course as of 07/21/23 2122 Fri Jul 21, 2023   1932 Alcohol 0.42, which is consistent with history/exam.  Pt is still very much conversational.   1943 I spoke again with pt and mother.  He is anxious to go home, mother will be driving.  No acute SI or acute medical needs with no indication for hospitalization or further workup.  Pt given chemdep resources for follow up.     Pt with long history of etoh abuse/dependence, recently at [facility] yesterday, here again with mother for etoh.  Pt denies any true SI or plan.  Pt has no acute medical complaints and appears under influence of alcohol, but able to provide appropriate history. Pt states he wants to quit drinking, but has been in various facilities, has psych/therapy appointments, but frustrated as they are not exactly what he wants.  Mother doesn't think he's intoxicated, requesting etoh check. Will check chemsitry with it. Anticipate home with chem dep resources, which he's honestly already aware of and knows how to move forward.  No indication for hospitalization medically at this point.  Pt declines detox facility.    5:19 PM Met with patient for initial interview and exam.  7:44 PM Discussed discharge with patient. He agrees.        Medical Decision Making    History:    Supplemental history from: Other: Mother    External Record(s) reviewed: Inpatient " Record: [facility] ED 07/20/2023-07/21/2023    Work Up:    Chart documentation includes differential considered and any EKGs or imaging independently interpreted by provider, where specified.    In additional to work up documented, I considered the following work up: Documented in chart, if applicable.    External consultation:    Discussion of management with another provider: Documented in chart, if applicable    Complicating factors:    Care impacted by chronic illness: Hyperlipidemia and Hypertension    Care affected by social determinants of health: Alcohol Abuse and/or Recreational Drug Use    Disposition considerations: Discharge. No recommendations on prescription strength medication(s). See documentation for any additional details.      At the conclusion of the encounter I discussed the results of all the tests and the disposition. The questions were answered. The patient or family acknowledged understanding and was agreeable with the care plan.      MEDICATIONS GIVEN IN THE EMERGENCY DEPARTMENT:  Medications   ibuprofen (ADVIL/MOTRIN) tablet 600 mg (600 mg Oral Not Given 7/21/23 1956)       NEW PRESCRIPTIONS STARTED AT TODAY'S ED VISIT:  Discharge Medication List as of 7/21/2023  7:44 PM          HPI   The history is provided by the patient and a parent. No  was used.        Nikhil Rios is a 37 year old male with a pertinent history of hypertension, hyperlipidemia, alcohol withdrawal induced seizures, and alcohol abuse who presents to this ED by walk in for evaluation of alcohol intoxication.    Per chart review, patient was seen at [facility] ED on 07/20/2023 for evaluation of an alcohol problem. He was brought in by his mother and was seeking detox and a mental health evaluation. Initially labs were not done as he refused to have blood drawn. He was calm. Later in the ED he did allow blood to be drawn. He became agitated and kept getting out of the bed and  "asking for help. He was given IM valium as he would not take a pill. He did become somewhat more calm following this. No other acute medical or psychiatric concerns at this time. Discussed with detox service and serial ETOH levels/ breathlyzers will be done and after the ETOH level is <0.3 he will be admitted to the detox unit. Patient was signed out pending admission to detox unit. Patient changed mind for Detox, mother picked up and he was discharged with neurontin taper.    The patient states that he needs \"sincere help\" and says, \"I don't want to drink [...] I believe I am strong enough to do this.\" He had been sober for 3.5 weeks prior to relapse 1.5 weeks ago. He has been drinking vodka and smoking marijuana, but denies other recreational drug use. His last drink was this morning. His mother states that the patient's stomach hurt today and he was agitated; he fell out of his chair while in triage. He had one instance of emesis.    The patient has been attempting to get consistently seen by a psychiatrist but states that she is cancelling on him. His mother states that it has been a \"real struggle.\" She states that he does not like attending mental health group sessions as they share \"war stories.\" His mother says that \"he says there's a voice urging him.\"    Patient denies a plan for suicide or recent sickness. Per his mother, he often makes comments like \"shoot myself,\" but he says that he could not actually commit suicide and has no history of attempts. The patient has his own house but is currently staying with his mother.    REVIEW OF SYSTEMS:  See HPI      Medical History     Past Medical History:   Diagnosis Date     Alcohol abuse      Alcohol abuse      Alcohol withdrawal (H)      Depressive disorder      Family history of diabetes mellitus 11/9/2007     HLD (hyperlipidemia)      HTN (hypertension)        Past Surgical History:   Procedure Laterality Date     NO HISTORY OF SURGERY       OTHER SURGICAL " "HISTORY      none       Family History   Problem Relation Age of Onset     Coronary Artery Disease Father      Substance Abuse Maternal Grandfather      Mental Illness Brother      Schizophrenia Brother      Schizophrenia Maternal Aunt        Social History     Tobacco Use     Smoking status: Some Days     Packs/day: 0.25     Types: Cigars, Cigarettes     Smokeless tobacco: Never     Tobacco comments:     occasional   Substance Use Topics     Alcohol use: Yes     Alcohol/week: 17.0 standard drinks of alcohol     Types: 17 Shots of liquor per week     Comment: Drinks 750ml/day or 17 shots/day; hx of withdrawl seizures     Drug use: Not Currently     Types: Marijuana       disulfiram (ANTABUSE) 250 MG tablet  gabapentin (NEURONTIN) 400 MG capsule  gabapentin (NEURONTIN) 800 MG tablet  gabapentin (NEURONTIN) 800 MG tablet  hydrOXYzine (ATARAX) 25 MG tablet  multivitamin w/minerals (THERA-VIT-M) tablet  nicotine polacrilex (NICORETTE) 4 MG gum  omeprazole (PRILOSEC) 20 MG DR capsule  sucralfate (CARAFATE) 1 GM tablet  thiamine (B-1) 100 MG tablet  traZODone (DESYREL) 50 MG tablet        Physical Exam     Vitals:  /68 (BP Location: Left arm, Cuff Size: Adult Regular)   Pulse 68   Temp 98.1  F (36.7  C) (Oral)   Resp 22   Ht 1.702 m (5' 7\")   Wt 74.8 kg (165 lb)   SpO2 95%   BMI 25.84 kg/m      PHYSICAL EXAM:   Physical Exam  Vitals and nursing note reviewed.   Constitutional:       General: He is not in acute distress.     Appearance: Normal appearance.      Comments: Smells of alcohol   HENT:      Head: Normocephalic and atraumatic.      Nose: Nose normal.      Mouth/Throat:      Mouth: Mucous membranes are moist.   Eyes:      Pupils: Pupils are equal, round, and reactive to light.   Cardiovascular:      Rate and Rhythm: Normal rate and regular rhythm.      Pulses: Normal pulses.           Radial pulses are 2+ on the right side and 2+ on the left side.        Dorsalis pedis pulses are 2+ on the right side " and 2+ on the left side.   Pulmonary:      Effort: Pulmonary effort is normal. No respiratory distress.      Breath sounds: Normal breath sounds.   Abdominal:      Palpations: Abdomen is soft.      Tenderness: There is no abdominal tenderness.   Musculoskeletal:      Cervical back: Full passive range of motion without pain and neck supple.      Comments: No calf tenderness or swelling b/l   Skin:     General: Skin is warm.      Findings: No rash.   Neurological:      General: No focal deficit present.      Mental Status: He is alert. Mental status is at baseline.      Comments: Fluent speech, no acute lateralizing deficits   Psychiatric:         Mood and Affect: Mood normal.         Speech: Speech is slurred.         Behavior: Behavior normal.         Thought Content: Thought content does not include suicidal ideation.         Results     LAB:  All pertinent labs reviewed and interpreted  Labs Ordered and Resulted from Time of ED Arrival to Time of ED Departure   BASIC METABOLIC PANEL - Abnormal       Result Value    Sodium 144      Potassium 4.2      Chloride 101      Carbon Dioxide (CO2) 25      Anion Gap 18 (*)     Urea Nitrogen 14.7      Creatinine 0.82      Calcium 9.1      Glucose 84      GFR Estimate >90     ETHYL ALCOHOL LEVEL - Abnormal    Alcohol ethyl 0.42 (*)    MAGNESIUM - Normal    Magnesium 1.9         RADIOLOGY:  No orders to display                ECG:  none    PROCEDURES:  Procedures:  none      FINAL IMPRESSION:    ICD-10-CM    1. Alcohol dependence with acute alcoholic intoxication without complication (H)  F10.220           0 minutes of critical care time      I, Gilma Woody, am serving as a scribe to document services personally performed by Dr. Basilio Morales, based on my observations and the provider's statements to me. I, Basilio Morales, DO attest that Gilma Woody is acting in a scribe capacity, has observed my performance of the services and has documented them in accordance with my  direction.      Basilio Morales DO  Emergency Medicine  [facility] EMERGENCY DEPARTMENT  7/21/2023  5:18 PM        Basilio Morales MD  07/21/23 8152

## 2023-07-21 NOTE — ED NOTES
Bed: JNED-07  Expected date:   Expected time:   Means of arrival:   Comments:  Hold for MARIAMA pt JF

## 2023-07-22 NOTE — ED NOTES
"PT came out of the room stating - \"I am leaving - Im not being seen\" MD came in and advised pt is being discharged.   "

## 2023-07-22 NOTE — ED NOTES
Pt's mother asked if pt's fall in triage was addressed. She was advised the MD is aware of the fall. PT is also threatening to leave AMA and is escalating.

## 2023-07-22 NOTE — DISCHARGE INSTRUCTIONS
Chemical Dependency Treatment     [facility]    [phone] or [phone]  [address] [place] [address]. [place]   Services include screening assessments, medicallysupervised detoxification, inpatient and outpatient evaluation and referral, combined inpatient to outpatient treatment sequences, family counseling and aftercare.      [place] [facility]   www.mntc.org  [phone] [address] [facility]   Serves adults and teens (minimum age is 16); has short-term treatment and a long-term recovery program; uses 12-step, cognitive behavioral therapy, motivational enhancement; faithbased, and recoverymanagement; high school on-site and iLive programming available      [facility]   www.[employer]/psy/[employer]  [phone]  [facility]  [address] [facility]     [facility]   www.[employer]  [phone]  [address] [place] [address], [facility] [address] (Inpatient & Outpatient)  [email or web address]  [phone]  Phone consultation available 24/7  In-person Assessments  [address] [place] [address], [address], [place] [address]  [address] [address] [place] [address] [id number] [place] [address] (Inpatient & Outpatient)    [place], [place]  Contact  for Chemical Health Evaluation, [phone]  Funded by: Rule 25 in [facility]. MedicalAssistance (MA) providers of [facility], [facility], Blue Cross, [employer], [employer], Medicare A&B. Private insurance reviewed case by case.     [facility]Based Inpatient & Outpatient)  [email or web address]://[email or web address]  [phone]  [address] [place] [address]     [facility]  http://www.[facility]//specialties/mental-health/adap.html  [phone]  Alcohol and Drug Abuse Program - [place] [address]  [place], [address]  [place] [address]  Assessments are available during the day and on a walk-in basis Monday-Friday starting at 8am.     [facility]  [phone]    [address] [place] [address]     [facility] (Residential & Outpatient)  [email or web address]  Women s Programs: [phone], [address], [place] [address]     [facility] (Does Rule 25 Assessments)  [email or web address]  [phone]  [address], [address], [place] [address]     [facility]  http://[email or web address]/  [phone]  [address] [place] [address]  [id number] [place] [address]  Rule 25 Assessments, Substance Abuse Assessments, Men s & Women s Programs     [facility]  [email or web address] Medical CenterStageit.[employer]/our-services/drug-alcohol-treatment  [phone], [address], [address], [place] [address]  [phone], [address], [address], [place], [place] [address] [phone], [address] [facility], [address], [place], [place] [address] [phone], [address], [address], ([facility]), [place], [place] [id number] [phone], [address] [address] [place] [address]     NationalInstitute on Drug Abuse

## 2023-07-23 ENCOUNTER — HOSPITAL ENCOUNTER (EMERGENCY)
Facility: HOSPITAL | Age: 38
Discharge: HOME | End: 2023-07-23
Attending: STUDENT IN AN ORGANIZED HEALTH CARE EDUCATION/TRAINING PROGRAM | Admitting: STUDENT IN AN ORGANIZED HEALTH CARE EDUCATION/TRAINING PROGRAM
Payer: COMMERCIAL

## 2023-07-23 VITALS
DIASTOLIC BLOOD PRESSURE: 69 MMHG | TEMPERATURE: 97.2 F | RESPIRATION RATE: 18 BRPM | BODY MASS INDEX: 23.62 KG/M2 | HEIGHT: 70 IN | SYSTOLIC BLOOD PRESSURE: 118 MMHG | HEART RATE: 85 BPM | WEIGHT: 165 LBS | OXYGEN SATURATION: 94 %

## 2023-07-23 DIAGNOSIS — F10.10 ETOH ABUSE: ICD-10-CM

## 2023-07-23 LAB
ALBUMIN SERPL BCG-MCNC: 4.6 G/DL (ref 3.5–5.2)
ALP SERPL-CCNC: 52 U/L (ref 40–129)
ALT SERPL W P-5'-P-CCNC: 25 U/L (ref 0–70)
ANION GAP SERPL CALCULATED.3IONS-SCNC: 14 MMOL/L (ref 7–15)
AST SERPL W P-5'-P-CCNC: 45 U/L (ref 0–45)
BASOPHILS # BLD AUTO: 0.1 10E3/UL (ref 0–0.2)
BASOPHILS NFR BLD AUTO: 1 %
BILIRUB SERPL-MCNC: 0.3 MG/DL
BUN SERPL-MCNC: 9.5 MG/DL (ref 6–20)
CALCIUM SERPL-MCNC: 8.4 MG/DL (ref 8.6–10)
CHLORIDE SERPL-SCNC: 107 MMOL/L (ref 98–107)
CREAT SERPL-MCNC: 0.76 MG/DL (ref 0.67–1.17)
DEPRECATED HCO3 PLAS-SCNC: 28 MMOL/L (ref 22–29)
EOSINOPHIL # BLD AUTO: 0.1 10E3/UL (ref 0–0.7)
EOSINOPHIL NFR BLD AUTO: 2 %
ERYTHROCYTE [DISTWIDTH] IN BLOOD BY AUTOMATED COUNT: 15.3 % (ref 10–15)
ETHANOL SERPL-MCNC: 0.42 G/DL
GFR SERPL CREATININE-BSD FRML MDRD: >90 ML/MIN/1.73M2
GLUCOSE SERPL-MCNC: 108 MG/DL (ref 70–99)
HCT VFR BLD AUTO: 39 % (ref 40–53)
HGB BLD-MCNC: 13.3 G/DL (ref 13.3–17.7)
IMM GRANULOCYTES # BLD: 0 10E3/UL
IMM GRANULOCYTES NFR BLD: 0 %
LYMPHOCYTES # BLD AUTO: 3 10E3/UL (ref 0.8–5.3)
LYMPHOCYTES NFR BLD AUTO: 43 %
MAGNESIUM SERPL-MCNC: 1.8 MG/DL (ref 1.7–2.3)
MCH RBC QN AUTO: 29.4 PG (ref 26.5–33)
MCHC RBC AUTO-ENTMCNC: 34.1 G/DL (ref 31.5–36.5)
MCV RBC AUTO: 86 FL (ref 78–100)
MONOCYTES # BLD AUTO: 0.4 10E3/UL (ref 0–1.3)
MONOCYTES NFR BLD AUTO: 6 %
NEUTROPHILS # BLD AUTO: 3.3 10E3/UL (ref 1.6–8.3)
NEUTROPHILS NFR BLD AUTO: 48 %
NRBC # BLD AUTO: 0 10E3/UL
NRBC BLD AUTO-RTO: 0 /100
PLATELET # BLD AUTO: 246 10E3/UL (ref 150–450)
POTASSIUM SERPL-SCNC: 4.2 MMOL/L (ref 3.4–5.3)
PROT SERPL-MCNC: 6.7 G/DL (ref 6.4–8.3)
RBC # BLD AUTO: 4.52 10E6/UL (ref 4.4–5.9)
SODIUM SERPL-SCNC: 149 MMOL/L (ref 136–145)
WBC # BLD AUTO: 6.9 10E3/UL (ref 4–11)

## 2023-07-23 PROCEDURE — 85025 COMPLETE CBC W/AUTO DIFF WBC: CPT | Performed by: STUDENT IN AN ORGANIZED HEALTH CARE EDUCATION/TRAINING PROGRAM

## 2023-07-23 PROCEDURE — 250N000013 HC RX MED GY IP 250 OP 250 PS 637: Performed by: STUDENT IN AN ORGANIZED HEALTH CARE EDUCATION/TRAINING PROGRAM

## 2023-07-23 PROCEDURE — 250N000011 HC RX IP 250 OP 636: Performed by: STUDENT IN AN ORGANIZED HEALTH CARE EDUCATION/TRAINING PROGRAM

## 2023-07-23 PROCEDURE — 82077 ASSAY SPEC XCP UR&BREATH IA: CPT | Performed by: STUDENT IN AN ORGANIZED HEALTH CARE EDUCATION/TRAINING PROGRAM

## 2023-07-23 PROCEDURE — 96374 THER/PROPH/DIAG INJ IV PUSH: CPT

## 2023-07-23 PROCEDURE — 36415 COLL VENOUS BLD VENIPUNCTURE: CPT | Performed by: STUDENT IN AN ORGANIZED HEALTH CARE EDUCATION/TRAINING PROGRAM

## 2023-07-23 PROCEDURE — 80053 COMPREHEN METABOLIC PANEL: CPT | Performed by: STUDENT IN AN ORGANIZED HEALTH CARE EDUCATION/TRAINING PROGRAM

## 2023-07-23 PROCEDURE — 83735 ASSAY OF MAGNESIUM: CPT | Performed by: STUDENT IN AN ORGANIZED HEALTH CARE EDUCATION/TRAINING PROGRAM

## 2023-07-23 PROCEDURE — 96361 HYDRATE IV INFUSION ADD-ON: CPT

## 2023-07-23 PROCEDURE — 96376 TX/PRO/DX INJ SAME DRUG ADON: CPT

## 2023-07-23 PROCEDURE — 99284 EMERGENCY DEPT VISIT MOD MDM: CPT | Mod: 25

## 2023-07-23 PROCEDURE — 258N000003 HC RX IP 258 OP 636: Performed by: STUDENT IN AN ORGANIZED HEALTH CARE EDUCATION/TRAINING PROGRAM

## 2023-07-23 RX ORDER — DIAZEPAM 10 MG/2ML
10 INJECTION, SOLUTION INTRAMUSCULAR; INTRAVENOUS ONCE
Status: COMPLETED | OUTPATIENT
Start: 2023-07-23 | End: 2023-07-23

## 2023-07-23 RX ORDER — DIAZEPAM 10 MG
10 TABLET ORAL ONCE
Status: COMPLETED | OUTPATIENT
Start: 2023-07-23 | End: 2023-07-23

## 2023-07-23 RX ORDER — CHLORDIAZEPOXIDE HYDROCHLORIDE 5 MG/1
CAPSULE, GELATIN COATED ORAL
Qty: 15 CAPSULE | Refills: 0 | Status: SHIPPED | OUTPATIENT
Start: 2023-07-23 | End: 2023-07-30

## 2023-07-23 RX ADMIN — DIAZEPAM 10 MG: 5 INJECTION, SOLUTION INTRAMUSCULAR; INTRAVENOUS at 10:41

## 2023-07-23 RX ADMIN — DIAZEPAM 10 MG: 10 TABLET ORAL at 12:30

## 2023-07-23 RX ADMIN — SODIUM CHLORIDE 1000 ML: 9 INJECTION, SOLUTION INTRAVENOUS at 10:42

## 2023-07-23 RX ADMIN — DIAZEPAM 10 MG: 5 INJECTION, SOLUTION INTRAMUSCULAR; INTRAVENOUS at 11:42

## 2023-07-23 ASSESSMENT — ACTIVITIES OF DAILY LIVING (ADL): ADLS_ACUITY_SCORE: 37

## 2023-07-23 NOTE — ED NOTES
Patient ripped out IV and took off to the ER waiting room. Staff spoke with patient in the waiting room and redirected him back to his room. MD notified.

## 2023-07-23 NOTE — ED TRIAGE NOTES
"Pt presents with alcohol intoxication vs early withdrawal, wanting to be admitted. Pt stating \"help me not have seizures. If you can't help me, I will leave.\"     Triage Assessment     Row Name 07/23/23 0929       Triage Assessment (Adult)    Airway WDL WDL       Respiratory WDL    Respiratory WDL WDL       Skin Circulation/Temperature WDL    Skin Circulation/Temperature WDL WDL       Cardiac WDL    Cardiac WDL WDL       Peripheral/Neurovascular WDL    Peripheral Neurovascular WDL WDL       Cognitive/Neuro/Behavioral WDL    Cognitive/Neuro/Behavioral WDL X;mood/behavior    Level of Consciousness alert    Arousal Level opens eyes spontaneously    Speech hesitant;slurred    Mood/Behavior anxious;agitated              "

## 2023-07-23 NOTE — DISCHARGE INSTRUCTIONS
Substance Use Disorder Direct Access Resources    It is recommended that you abstain from all mood altering chemicals. Please contact the sober support hotline ([phone]) as needed; phones are answered 24 hours a day, 7 days a week.    To access substance use treatment you must have a comprehensive assessment completed to begin any treatment program.     If uninsured, please contact your county of residence for eligibility screen to substance use disorder evaluation and treatment:    [place] - [phone]   [place] - [phone]   [facility] [phone]   [place] - [phone]   [place]  [phone]   [place] - [phone]   [facility] [phone]   [place] - [phone]     If you have private insurance, call the customer service number on the back of your insurance card to find an in-network substance abuse use disorder assessment. The ideal provider will be a treatment facility, licensed in the [facility].     Community MATHEW Evaluations: Clients may call their [facility] for a full list of providers - Availability and services listed belo are subject to change, please call the provider to confirm    [facility]  [phone]  [address] [place] [address], [address]  *Please call the above number to schedule a comprehensive assessment for determination of level of care needs. In person and virtual appointments available Mon-Fri.    [facility], [address] [place] [address], First Floor, [address], [place] [address] (next to the outpatient lab)    Phone: [phone]   Provides bridging services to people with Opiate Use Disorders (OUD) seeking care. This is a front door to Medication Assisted Treatments (MAT), ages 16+  Walk In hours: Monday-Friday 9:00am-3:00pm    [facility]  [phone]  Walk in Assessments: Mon-Friday 7a-1:45p  [facility], [place], [address]

## 2023-07-23 NOTE — ED PROVIDER NOTES
EMERGENCY DEPARTMENT ENCOUNTER      NAME: Nikhil Rios  AGE: 37 year old male  YOB: 1985  MRN: [id number]  EVALUATION DATE & TIME: 7/23/2023  9:35 AM    PCP: Lisandro Graham    ED PROVIDER: Brian Webster M.D.      Chief Complaint   Patient presents with     Alcohol Problem         FINAL IMPRESSION:  1. ETOH abuse          ED COURSE & MEDICAL DECISION MAKING:    Pertinent Labs & Imaging studies reviewed. (See chart for details)  37 year old male presents to the Emergency Department for evaluation of alcohol intoxication.  Patient has a long history of alcohol use did have 1 time where he says he had a seizure with detox although as the patient described it do not believe this represents a true tonic-clonic seizure.  Patient is a heavy alcohol user and had an alcohol level of 0.42 today in the emergency department.  He was easily directable and had capacity to make his own decisions although did seem somewhat labile.  Vital signs were essentially stable here with no tachycardia or significant hypertension.  Patient had no tongue fasciculations or any other evidence of delirium tremens.  Patient was seeking treatment and medical withdrawal and his mother was present here with him the entire time.  Patient received IV doses of Valium and we sent some electrolytes.  Blood work is from most part normal no significant acute abnormalities.  Patient at one point took his IV out because it was bothering him and he was at that time I spoke with him about the options of continuing to try to obtain a medical detox bag at the [facility] or perhaps a [facility] detox and that I did not think that admission to the hospital for a medical bed was necessary based on the symptomatology.  Patient and his mother after this discussion is not comfortable proceeding with detox however they would be comfortable with the Librium taper which his mother will keep and administer over the next 7 days and the patient will  attempt at home detox.    They are in the process of obtaining outpatient treatment for the patient and they received from a prescription for Librium as well as information on outpatient treatment centers.    At the conclusion of the encounter I discussed the results of all of the tests and the disposition. The questions were answered. The patient or family acknowledged understanding and was agreeable with the care plan.          10:20 AM I met the patient and performed my initial interview and exam.   12:17 PM Patient ripped off his IV and was found in the ED lobby. Patient would like to speak with me.  12:26 PM I met with the patient.       Medical Decision Making    History:    Supplemental history from: Family Member/Significant Other    External Record(s) reviewed: Documented in chart, if applicable.    Work Up:    Chart documentation includes differential considered and any EKGs or imaging independently interpreted by provider, where specified.    In additional to work up documented, I considered the following work up: Documented in chart, if applicable.    External consultation:    Discussion of management with another provider: Documented in chart, if applicable    Complicating factors:    Care impacted by chronic illness: Hyperlipidemia, Hypertension and Other: ACS, chronic headaches, alcohol-induced pancreatitis    Care affected by social determinants of health: Alcohol Abuse and/or Recreational Drug Use    Disposition considerations: Discharge. I prescribed additional prescription strength medication(s) as charted. See documentation for any additional details.        This patient involved a high degree of complexity in medical decision making, as significant risks were present and assessed. Recent encounters & results in medical record reviewed by me.     All workup (i.e. any EKG/labs/imaging as per charting below) reviewed and independently interpreted by me. See respective sections for details.        "minutes of critical care time     MEDICATIONS GIVEN IN THE EMERGENCY:  Medications   diazepam (VALIUM) injection 10 mg (10 mg Intravenous $Given 7/23/23 1041)   0.9% sodium chloride BOLUS (0 mLs Intravenous Stopped 7/23/23 1221)   diazepam (VALIUM) injection 10 mg (10 mg Intravenous $Given 7/23/23 1142)   diazepam (VALIUM) tablet 10 mg (10 mg Oral $Given 7/23/23 1230)       NEW PRESCRIPTIONS STARTED AT TODAY'S ER VISIT  Discharge Medication List as of 7/23/2023 12:32 PM      START taking these medications    Details   chlordiazePOXIDE (LIBRIUM) 5 MG capsule Take 1 capsule (5 mg) by mouth 3 times daily as needed for anxiety for 3 days, THEN 1 capsule (5 mg) 2 times daily for 2 days, THEN 1 capsule (5 mg) daily for 2 days., Disp-15 capsule, R-0, Local Print                =================================================================    HPI    Patient information was obtained from: patient and patient's family member    Use of : None        Nikhil Rios is a 37 year old male with a pertinent history of HTN, HLD, ACS, chronic headaches, and alcohol-induced pancreatitis who presents for evaluation of alcohol problem.    The patient is an alcoholic and comes to the ED with hopes to get admitted for a medical detox. His last alcoholic drink was around 8 AM today (7/23). The patient usually has 1 liter of vodka a day and describes his alcohol consumption as \"violent\". He expresses that he has had multiple seizures due to the alcohol consumption and when he was in medical detox. Patient makes it clear that he would like to leave the ED if he does not receive help for a medical detox.      REVIEW OF SYSTEMS   Review of Systems   All other systems reviewed and are negative.       PAST MEDICAL HISTORY:  Past Medical History:   Diagnosis Date     Alcohol abuse      Alcohol abuse      Alcohol withdrawal (H)      Depressive disorder      Family history of diabetes mellitus 11/9/2007     HLD (hyperlipidemia)      HTN " (hypertension)        PAST SURGICAL HISTORY:  Past Surgical History:   Procedure Laterality Date     NO HISTORY OF SURGERY       OTHER SURGICAL HISTORY      none           CURRENT MEDICATIONS:    chlordiazePOXIDE (LIBRIUM) 5 MG capsule  disulfiram (ANTABUSE) 250 MG tablet  gabapentin (NEURONTIN) 400 MG capsule  gabapentin (NEURONTIN) 800 MG tablet  gabapentin (NEURONTIN) 800 MG tablet  hydrOXYzine (ATARAX) 25 MG tablet  multivitamin w/minerals (THERA-VIT-M) tablet  nicotine polacrilex (NICORETTE) 4 MG gum  omeprazole (PRILOSEC) 20 MG DR capsule  sucralfate (CARAFATE) 1 GM tablet  thiamine (B-1) 100 MG tablet  traZODone (DESYREL) 50 MG tablet        ALLERGIES:  Allergies   Allergen Reactions     Gramineae Pollens Itching     Pollen Extract Rash     grass tree pollen       FAMILY HISTORY:  Family History   Problem Relation Age of Onset     Coronary Artery Disease Father      Substance Abuse Maternal Grandfather      Mental Illness Brother      Schizophrenia Brother      Schizophrenia Maternal Aunt        SOCIAL HISTORY:   Social History     Socioeconomic History     Marital status: Single   Occupational History     Occupation: Rental property owner   Tobacco Use     Smoking status: Some Days     Packs/day: 0.25     Types: Cigars, Cigarettes     Smokeless tobacco: Never     Tobacco comments:     occasional   Substance and Sexual Activity     Alcohol use: Yes     Alcohol/week: 17.0 standard drinks of alcohol     Types: 17 Shots of liquor per week     Comment: Drinks 750ml/day or 17 shots/day; hx of withdrawl seizures     Drug use: Not Currently     Types: Marijuana     Sexual activity: Not Currently     Social Determinants of Health     Transportation Needs: No Transportation Needs (7/21/2021)    PRAPARE - Transportation      Lack of Transportation (Medical): No      Lack of Transportation (Non-Medical): No   Stress: Stress Concern Present (7/21/2021)    [employer] [place] [employer] - Occupational Stress  "Questionnaire      Feeling of Stress : Very much   Housing Stability: Unknown (7/21/2021)    Housing Stability Vital Sign      Unable to Pay for Housing in the Last Year: No      Unstable Housing in the Last Year: No       VITALS:  /69   Pulse 85   Temp 97.2  F (36.2  C) (Temporal)   Resp 18   Ht 1.786 m (5' 10.3\")   Wt 74.8 kg (165 lb)   SpO2 94%   BMI 23.47 kg/m        PHYSICAL EXAM    VITAL SIGNS: /69   Pulse 85   Temp 97.2  F (36.2  C) (Temporal)   Resp 18   Ht 1.786 m (5' 10.3\")   Wt 74.8 kg (165 lb)   SpO2 94%   BMI 23.47 kg/m    Constitutional:  Well developed, well nourished.   EYES: Conjunctivae clear, no discharge  HENT: Atraumatic, normocephalic, bilateral external ears normal.  Oropharynx moist. Nose normal. No tongue fasciculation.  Neck: Normal ROM , Supple   Respiratory:  No respiratory distress, normal nonlabored respirations.   Cardiovascular:  Distal perfusion appears intact  Musculoskeletal:  No edema appreciated, No cyanosis, No clubbing. Good range of motion in all major joints.   Integument:  Warm, Dry, No erythema, No rash.   Neurologic:  Alert and oriented. No focal deficits noted.  Ambulatory. No tremors.   Psychiatric:  Affect normal         LAB:  All pertinent labs reviewed and interpreted.  Labs Ordered and Resulted from Time of ED Arrival to Time of ED Departure   COMPREHENSIVE METABOLIC PANEL - Abnormal       Result Value    Sodium 149 (*)     Potassium 4.2      Chloride 107      Carbon Dioxide (CO2) 28      Anion Gap 14      Urea Nitrogen 9.5      Creatinine 0.76      Calcium 8.4 (*)     Glucose 108 (*)     Alkaline Phosphatase 52      AST 45      ALT 25      Protein Total 6.7      Albumin 4.6      Bilirubin Total 0.3      GFR Estimate >90     ETHYL ALCOHOL LEVEL - Abnormal    Alcohol ethyl 0.42 (*)    CBC WITH PLATELETS AND DIFFERENTIAL - Abnormal    WBC Count 6.9      RBC Count 4.52      Hemoglobin 13.3      Hematocrit 39.0 (*)     MCV 86      MCH 29.4      " MCHC 34.1      RDW 15.3 (*)     Platelet Count 246      % Neutrophils 48      % Lymphocytes 43      % Monocytes 6      % Eosinophils 2      % Basophils 1      % Immature Granulocytes 0      NRBCs per 100 WBC 0      Absolute Neutrophils 3.3      Absolute Lymphocytes 3.0      Absolute Monocytes 0.4      Absolute Eosinophils 0.1      Absolute Basophils 0.1      Absolute Immature Granulocytes 0.0      Absolute NRBCs 0.0     MAGNESIUM - Normal    Magnesium 1.8         RADIOLOGY:  Reviewed all pertinent imaging. Please see official radiology report.  No orders to display         I, Tio Aguilar, am serving as a scribe to document services personally performed by Dr. Brian Webster based on my observation and the provider's statements to me. IBrian MD attest that Tio Aguilar is acting in a scribe capacity, has observed my performance of the services and has documented them in accordance with my direction.    Brian Webster M.D.  Emergency Medicine  [facility] EMERGENCY DEPARTMENT  [facility] [id number] [phone]  Dept: [phone]     Brian Webster MD  07/23/23 2964

## 2023-07-24 ENCOUNTER — HOSPITAL ENCOUNTER (EMERGENCY)
Facility: HOSPITAL | Age: 38
Discharge: LEFT BEFORE DISCHARGE | End: 2023-07-24
Admitting: EMERGENCY MEDICINE
Payer: COMMERCIAL

## 2023-07-24 ENCOUNTER — NURSE TRIAGE (OUTPATIENT)
Dept: NURSING | Facility: CLINIC | Age: 38
End: 2023-07-24
Payer: COMMERCIAL

## 2023-07-24 VITALS
HEIGHT: 70 IN | TEMPERATURE: 98 F | RESPIRATION RATE: 16 BRPM | SYSTOLIC BLOOD PRESSURE: 149 MMHG | BODY MASS INDEX: 23.62 KG/M2 | HEART RATE: 93 BPM | OXYGEN SATURATION: 94 % | DIASTOLIC BLOOD PRESSURE: 76 MMHG | WEIGHT: 165 LBS

## 2023-07-24 PROCEDURE — 99281 EMR DPT VST MAYX REQ PHY/QHP: CPT

## 2023-07-24 NOTE — TELEPHONE ENCOUNTER
Mom is calling about medication question.  Pt was involved in the call.    He was seen in the ED earlier today and was prescribed librium.  1 capsule by mouth 3 times a day as needed for anxiety for 3 days.    Patient first dose was at 3:30pm and patient states that that was his last dose.  Mom interrupted and said his last dose was at 10:30pm and was wanting to take another dose saying that 1 pill isn't helping as his tolerance is high.  Mom states pt did fall asleep after his first dose.    Informed mom and patient that he has to wait 8 hours until next dose.  ER nurse stated 6hrs - informed mom this is okay.    Advised mom and patient to call his PCP in the morning if he feels like his medication needs changing or to go back to the ED tonight if urgent.    Ruma Guzman RN, BSN Nurse Triage Advisor 7/24/2023 12:13 AM     Reason for Disposition    Caller has medicine question only, adult not sick, AND triager answers question    Additional Information    Negative: [1] Intentional drug overdose AND [2] suicidal thoughts or ideas    Negative: MORE THAN A DOUBLE DOSE of a prescription or over-the-counter (OTC) drug    Negative: [1] DOUBLE DOSE (an extra dose or lesser amount) of prescription drug AND [2] any symptoms (e.g., dizziness, nausea, pain, sleepiness)    Negative: [1] DOUBLE DOSE (an extra dose or lesser amount) of over-the-counter (OTC) drug AND [2] any symptoms (e.g., dizziness, nausea, pain, sleepiness)    Negative: Took another person's prescription drug    Negative: [1] DOUBLE DOSE (an extra dose or lesser amount) of prescription drug AND [2] NO symptoms (Exception: a double dose of antibiotics)    Negative: Diabetes drug error or overdose (e.g., took wrong type of insulin or took extra dose)    Negative: [1] Prescription not at pharmacy AND [2] was prescribed by PCP recently (Exception: triager has access to EMR and prescription is recorded there. Go to Home Care and confirm for pharmacy.)    Negative:  [1] Pharmacy calling with prescription question AND [2] triager unable to answer question    Negative: [1] Caller has URGENT medicine question about med that PCP or specialist prescribed AND [2] triager unable to answer question    Negative: Medicine patch causing local rash or itching    Negative: [1] Caller has medicine question about med NOT prescribed by PCP AND [2] triager unable to answer question (e.g., compatibility with other med, storage)    Negative: Prescription request for new medicine (not a refill)    Negative: [1] Caller has NON-URGENT medicine question about med that PCP prescribed AND [2] triager unable to answer question    Negative: Caller wants to use a complementary or alternative medicine    Negative: [1] Prescription prescribed recently is not at pharmacy AND [2] triager has access to patient's EMR AND [3] prescription is recorded in the EMR    Negative: [1] DOUBLE DOSE (an extra dose or lesser amount) of over-the-counter (OTC) drug AND [2] NO symptoms    Negative: [1] DOUBLE DOSE (an extra dose or lesser amount) of antibiotic drug AND [2] NO symptoms    Protocols used: MEDICATION QUESTION CALL-A-

## 2023-07-24 NOTE — ED TRIAGE NOTES
Was seen today for etoh withdrawal, was sent home with Librium 5mg patient would like the dose increased. He is concerned he with withdrawal and have a seizure.      Triage Assessment     Row Name 07/24/23 0050       Triage Assessment (Adult)    Airway WDL WDL       Respiratory WDL    Respiratory WDL WDL       Skin Circulation/Temperature WDL    Skin Circulation/Temperature WDL WDL       Cardiac WDL    Cardiac WDL WDL

## 2023-07-25 ENCOUNTER — APPOINTMENT (OUTPATIENT)
Dept: CT IMAGING | Facility: CLINIC | Age: 38
DRG: 897 | End: 2023-07-25
Attending: EMERGENCY MEDICINE
Payer: COMMERCIAL

## 2023-07-25 ENCOUNTER — APPOINTMENT (OUTPATIENT)
Dept: GENERAL RADIOLOGY | Facility: CLINIC | Age: 38
DRG: 897 | End: 2023-07-25
Attending: EMERGENCY MEDICINE
Payer: COMMERCIAL

## 2023-07-25 ENCOUNTER — HOSPITAL ENCOUNTER (INPATIENT)
Facility: CLINIC | Age: 38
LOS: 2 days | Discharge: HOME | DRG: 897 | End: 2023-07-27
Attending: EMERGENCY MEDICINE | Admitting: INTERNAL MEDICINE
Payer: COMMERCIAL

## 2023-07-25 DIAGNOSIS — R33.9 URINARY RETENTION: ICD-10-CM

## 2023-07-25 DIAGNOSIS — F10.121 ALCOHOL INTOXICATION DELIRIUM (H): Primary | ICD-10-CM

## 2023-07-25 DIAGNOSIS — R10.13 EPIGASTRIC PAIN: ICD-10-CM

## 2023-07-25 DIAGNOSIS — F10.921 ALCOHOL INTOXICATION WITH DELIRIUM (H): ICD-10-CM

## 2023-07-25 DIAGNOSIS — F10.930 ALCOHOL WITHDRAWAL SYNDROME WITHOUT COMPLICATION (H): ICD-10-CM

## 2023-07-25 DIAGNOSIS — K92.1 GASTROINTESTINAL HEMORRHAGE WITH MELENA: ICD-10-CM

## 2023-07-25 DIAGNOSIS — R33.9 RETENTION OF URINE, UNSPECIFIED: ICD-10-CM

## 2023-07-25 DIAGNOSIS — F10.229 ALCOHOL DEPENDENCE WITH INTOXICATION WITH COMPLICATION (H): ICD-10-CM

## 2023-07-25 DIAGNOSIS — K92.2 ACUTE GASTROINTESTINAL HEMORRHAGE: ICD-10-CM

## 2023-07-25 DIAGNOSIS — M54.50 LUMBAGO: ICD-10-CM

## 2023-07-25 LAB
ABO/RH(D): NORMAL
ALBUMIN SERPL BCG-MCNC: 4.6 G/DL (ref 3.5–5.2)
ALBUMIN UR-MCNC: NEGATIVE MG/DL
ALCOHOL BREATH TEST: 0.34 (ref 0–0.01)
ALP SERPL-CCNC: 57 U/L (ref 40–129)
ALT SERPL W P-5'-P-CCNC: 25 U/L (ref 0–70)
AMPHETAMINES UR QL SCN: ABNORMAL
ANION GAP SERPL CALCULATED.3IONS-SCNC: 12 MMOL/L (ref 7–15)
ANTIBODY SCREEN: NEGATIVE
APPEARANCE UR: CLEAR
AST SERPL W P-5'-P-CCNC: 38 U/L (ref 0–45)
ATRIAL RATE - MUSE: 90 BPM
BARBITURATES UR QL SCN: ABNORMAL
BASOPHILS # BLD AUTO: 0.1 10E3/UL (ref 0–0.2)
BASOPHILS NFR BLD AUTO: 1 %
BENZODIAZ UR QL SCN: ABNORMAL
BILIRUB SERPL-MCNC: 0.2 MG/DL
BILIRUB UR QL STRIP: NEGATIVE
BUN SERPL-MCNC: 11.6 MG/DL (ref 6–20)
BZE UR QL SCN: ABNORMAL
CALCIUM SERPL-MCNC: 9 MG/DL (ref 8.6–10)
CANNABINOIDS UR QL SCN: ABNORMAL
CHLORIDE SERPL-SCNC: 107 MMOL/L (ref 98–107)
CK SERPL-CCNC: 196 U/L (ref 39–308)
COLOR UR AUTO: ABNORMAL
CREAT SERPL-MCNC: 0.68 MG/DL (ref 0.67–1.17)
DEPRECATED HCO3 PLAS-SCNC: 29 MMOL/L (ref 22–29)
DIASTOLIC BLOOD PRESSURE - MUSE: NORMAL MMHG
EOSINOPHIL # BLD AUTO: 0.4 10E3/UL (ref 0–0.7)
EOSINOPHIL NFR BLD AUTO: 6 %
ERYTHROCYTE [DISTWIDTH] IN BLOOD BY AUTOMATED COUNT: 15.4 % (ref 10–15)
GFR SERPL CREATININE-BSD FRML MDRD: >90 ML/MIN/1.73M2
GLUCOSE SERPL-MCNC: 99 MG/DL (ref 70–99)
GLUCOSE UR STRIP-MCNC: NEGATIVE MG/DL
HCT VFR BLD AUTO: 37.6 % (ref 40–53)
HGB BLD-MCNC: 11.9 G/DL (ref 13.3–17.7)
HGB BLD-MCNC: 12.7 G/DL (ref 13.3–17.7)
HGB UR QL STRIP: NEGATIVE
HOLD SPECIMEN: NORMAL
IMM GRANULOCYTES # BLD: 0.1 10E3/UL
IMM GRANULOCYTES NFR BLD: 1 %
INR PPP: 0.86 (ref 0.85–1.15)
INTERPRETATION ECG - MUSE: NORMAL
KETONES UR STRIP-MCNC: NEGATIVE MG/DL
LEUKOCYTE ESTERASE UR QL STRIP: NEGATIVE
LIPASE SERPL-CCNC: 46 U/L (ref 13–60)
LYMPHOCYTES # BLD AUTO: 3.3 10E3/UL (ref 0.8–5.3)
LYMPHOCYTES NFR BLD AUTO: 45 %
MAGNESIUM SERPL-MCNC: 2.3 MG/DL (ref 1.7–2.3)
MCH RBC QN AUTO: 29.3 PG (ref 26.5–33)
MCHC RBC AUTO-ENTMCNC: 33.8 G/DL (ref 31.5–36.5)
MCV RBC AUTO: 87 FL (ref 78–100)
MONOCYTES # BLD AUTO: 0.4 10E3/UL (ref 0–1.3)
MONOCYTES NFR BLD AUTO: 6 %
MUCOUS THREADS #/AREA URNS LPF: PRESENT /LPF
NEUTROPHILS # BLD AUTO: 3 10E3/UL (ref 1.6–8.3)
NEUTROPHILS NFR BLD AUTO: 41 %
NITRATE UR QL: NEGATIVE
NRBC # BLD AUTO: 0 10E3/UL
NRBC BLD AUTO-RTO: 0 /100
NT-PROBNP SERPL-MCNC: 99 PG/ML (ref 0–450)
OPIATES UR QL SCN: ABNORMAL
P AXIS - MUSE: 56 DEGREES
PH UR STRIP: 6.5 [PH] (ref 5–7)
PHOSPHATE SERPL-MCNC: 3.4 MG/DL (ref 2.5–4.5)
PLATELET # BLD AUTO: 193 10E3/UL (ref 150–450)
POTASSIUM SERPL-SCNC: 4.2 MMOL/L (ref 3.4–5.3)
PR INTERVAL - MUSE: 146 MS
PROT SERPL-MCNC: 6.9 G/DL (ref 6.4–8.3)
QRS DURATION - MUSE: 90 MS
QT - MUSE: 388 MS
QTC - MUSE: 474 MS
R AXIS - MUSE: 76 DEGREES
RBC # BLD AUTO: 4.33 10E6/UL (ref 4.4–5.9)
RBC URINE: <1 /HPF
SODIUM SERPL-SCNC: 148 MMOL/L (ref 136–145)
SP GR UR STRIP: 1.01 (ref 1–1.03)
SPECIMEN EXPIRATION DATE: NORMAL
SQUAMOUS EPITHELIAL: <1 /HPF
SYSTOLIC BLOOD PRESSURE - MUSE: NORMAL MMHG
T AXIS - MUSE: 40 DEGREES
TROPONIN T SERPL HS-MCNC: 7 NG/L
TROPONIN T SERPL HS-MCNC: 8 NG/L
UROBILINOGEN UR STRIP-MCNC: NORMAL MG/DL
VENTRICULAR RATE- MUSE: 90 BPM
WBC # BLD AUTO: 7.3 10E3/UL (ref 4–11)
WBC URINE: <1 /HPF

## 2023-07-25 PROCEDURE — 82075 ASSAY OF BREATH ETHANOL: CPT | Performed by: EMERGENCY MEDICINE

## 2023-07-25 PROCEDURE — 120N000002 HC R&B MED SURG/OB UMMC

## 2023-07-25 PROCEDURE — 83880 ASSAY OF NATRIURETIC PEPTIDE: CPT | Performed by: EMERGENCY MEDICINE

## 2023-07-25 PROCEDURE — 71046 X-RAY EXAM CHEST 2 VIEWS: CPT

## 2023-07-25 PROCEDURE — 36415 COLL VENOUS BLD VENIPUNCTURE: CPT | Performed by: EMERGENCY MEDICINE

## 2023-07-25 PROCEDURE — 83735 ASSAY OF MAGNESIUM: CPT | Performed by: EMERGENCY MEDICINE

## 2023-07-25 PROCEDURE — 250N000013 HC RX MED GY IP 250 OP 250 PS 637: Performed by: PHYSICIAN ASSISTANT

## 2023-07-25 PROCEDURE — 74176 CT ABD & PELVIS W/O CONTRAST: CPT

## 2023-07-25 PROCEDURE — 250N000013 HC RX MED GY IP 250 OP 250 PS 637: Performed by: EMERGENCY MEDICINE

## 2023-07-25 PROCEDURE — 99207 PR APP CREDIT; MD BILLING SHARED VISIT: CPT | Performed by: PHYSICIAN ASSISTANT

## 2023-07-25 PROCEDURE — 82550 ASSAY OF CK (CPK): CPT | Performed by: EMERGENCY MEDICINE

## 2023-07-25 PROCEDURE — 99222 1ST HOSP IP/OBS MODERATE 55: CPT | Mod: AI | Performed by: INTERNAL MEDICINE

## 2023-07-25 PROCEDURE — 80053 COMPREHEN METABOLIC PANEL: CPT | Performed by: EMERGENCY MEDICINE

## 2023-07-25 PROCEDURE — 85018 HEMOGLOBIN: CPT | Performed by: PHYSICIAN ASSISTANT

## 2023-07-25 PROCEDURE — 80307 DRUG TEST PRSMV CHEM ANLYZR: CPT | Performed by: EMERGENCY MEDICINE

## 2023-07-25 PROCEDURE — 85610 PROTHROMBIN TIME: CPT | Performed by: EMERGENCY MEDICINE

## 2023-07-25 PROCEDURE — 99285 EMERGENCY DEPT VISIT HI MDM: CPT | Mod: 25 | Performed by: EMERGENCY MEDICINE

## 2023-07-25 PROCEDURE — 93010 ELECTROCARDIOGRAM REPORT: CPT | Performed by: EMERGENCY MEDICINE

## 2023-07-25 PROCEDURE — 36415 COLL VENOUS BLD VENIPUNCTURE: CPT | Performed by: PHYSICIAN ASSISTANT

## 2023-07-25 PROCEDURE — 84484 ASSAY OF TROPONIN QUANT: CPT | Performed by: EMERGENCY MEDICINE

## 2023-07-25 PROCEDURE — 84100 ASSAY OF PHOSPHORUS: CPT | Performed by: PHYSICIAN ASSISTANT

## 2023-07-25 PROCEDURE — 85025 COMPLETE CBC W/AUTO DIFF WBC: CPT | Performed by: EMERGENCY MEDICINE

## 2023-07-25 PROCEDURE — 83690 ASSAY OF LIPASE: CPT | Performed by: EMERGENCY MEDICINE

## 2023-07-25 PROCEDURE — 93005 ELECTROCARDIOGRAM TRACING: CPT | Performed by: EMERGENCY MEDICINE

## 2023-07-25 PROCEDURE — 81001 URINALYSIS AUTO W/SCOPE: CPT | Performed by: EMERGENCY MEDICINE

## 2023-07-25 PROCEDURE — 86901 BLOOD TYPING SEROLOGIC RH(D): CPT | Performed by: EMERGENCY MEDICINE

## 2023-07-25 PROCEDURE — 86850 RBC ANTIBODY SCREEN: CPT | Performed by: EMERGENCY MEDICINE

## 2023-07-25 RX ORDER — NALTREXONE HYDROCHLORIDE 50 MG/1
50 TABLET, FILM COATED ORAL DAILY
Status: ON HOLD | COMMUNITY
Start: 2023-07-05 | End: 2023-08-29

## 2023-07-25 RX ORDER — FOLIC ACID 1 MG/1
1 TABLET ORAL DAILY
Status: DISCONTINUED | OUTPATIENT
Start: 2023-07-26 | End: 2023-07-27 | Stop reason: HOSPADM

## 2023-07-25 RX ORDER — POLYETHYLENE GLYCOL 3350 17 G/17G
17 POWDER, FOR SOLUTION ORAL DAILY PRN
Status: DISCONTINUED | OUTPATIENT
Start: 2023-07-25 | End: 2023-07-27 | Stop reason: HOSPADM

## 2023-07-25 RX ORDER — DIAZEPAM 5 MG
10 TABLET ORAL EVERY 30 MIN PRN
Status: DISCONTINUED | OUTPATIENT
Start: 2023-07-25 | End: 2023-07-27 | Stop reason: HOSPADM

## 2023-07-25 RX ORDER — SUCRALFATE 1 G/1
1 TABLET ORAL 4 TIMES DAILY
Status: DISCONTINUED | OUTPATIENT
Start: 2023-07-25 | End: 2023-07-27 | Stop reason: HOSPADM

## 2023-07-25 RX ORDER — FLUMAZENIL 0.1 MG/ML
0.2 INJECTION, SOLUTION INTRAVENOUS
Status: DISCONTINUED | OUTPATIENT
Start: 2023-07-25 | End: 2023-07-27 | Stop reason: HOSPADM

## 2023-07-25 RX ORDER — FOLIC ACID 1 MG/1
1 TABLET ORAL DAILY
Status: DISCONTINUED | OUTPATIENT
Start: 2023-07-26 | End: 2023-07-25

## 2023-07-25 RX ORDER — PANTOPRAZOLE SODIUM 40 MG/1
40 TABLET, DELAYED RELEASE ORAL
Status: DISCONTINUED | OUTPATIENT
Start: 2023-07-25 | End: 2023-07-27 | Stop reason: HOSPADM

## 2023-07-25 RX ORDER — CALCIUM CARBONATE 500 MG/1
500 TABLET, CHEWABLE ORAL 2 TIMES DAILY PRN
Status: DISCONTINUED | OUTPATIENT
Start: 2023-07-25 | End: 2023-07-27 | Stop reason: HOSPADM

## 2023-07-25 RX ORDER — MULTIPLE VITAMINS W/ MINERALS TAB 9MG-400MCG
1 TAB ORAL ONCE
Status: COMPLETED | OUTPATIENT
Start: 2023-07-25 | End: 2023-07-25

## 2023-07-25 RX ORDER — DIAZEPAM 5 MG
5-20 TABLET ORAL EVERY 30 MIN PRN
Status: DISCONTINUED | OUTPATIENT
Start: 2023-07-25 | End: 2023-07-25

## 2023-07-25 RX ORDER — MULTIPLE VITAMINS W/ MINERALS TAB 9MG-400MCG
1 TAB ORAL DAILY
Status: DISCONTINUED | OUTPATIENT
Start: 2023-07-26 | End: 2023-07-27 | Stop reason: HOSPADM

## 2023-07-25 RX ORDER — ONDANSETRON 4 MG/1
4 TABLET, ORALLY DISINTEGRATING ORAL EVERY 6 HOURS PRN
Status: DISCONTINUED | OUTPATIENT
Start: 2023-07-25 | End: 2023-07-27 | Stop reason: HOSPADM

## 2023-07-25 RX ORDER — DIAZEPAM 10 MG/2ML
5-10 INJECTION, SOLUTION INTRAMUSCULAR; INTRAVENOUS EVERY 30 MIN PRN
Status: DISCONTINUED | OUTPATIENT
Start: 2023-07-25 | End: 2023-07-27 | Stop reason: HOSPADM

## 2023-07-25 RX ORDER — LIDOCAINE 40 MG/G
CREAM TOPICAL
Status: DISCONTINUED | OUTPATIENT
Start: 2023-07-25 | End: 2023-07-27 | Stop reason: HOSPADM

## 2023-07-25 RX ORDER — HYDROXYZINE HYDROCHLORIDE 25 MG/1
25-50 TABLET, FILM COATED ORAL 3 TIMES DAILY PRN
Status: DISCONTINUED | OUTPATIENT
Start: 2023-07-25 | End: 2023-07-27 | Stop reason: HOSPADM

## 2023-07-25 RX ORDER — GABAPENTIN 800 MG/1
800 TABLET ORAL 2 TIMES DAILY
Status: DISCONTINUED | OUTPATIENT
Start: 2023-07-25 | End: 2023-07-26

## 2023-07-25 RX ORDER — OLANZAPINE 5 MG/1
5-10 TABLET, ORALLY DISINTEGRATING ORAL EVERY 6 HOURS PRN
Status: DISCONTINUED | OUTPATIENT
Start: 2023-07-25 | End: 2023-07-27 | Stop reason: HOSPADM

## 2023-07-25 RX ORDER — CLONIDINE HYDROCHLORIDE 0.1 MG/1
0.1 TABLET ORAL 2 TIMES DAILY PRN
COMMUNITY
Start: 2023-07-06

## 2023-07-25 RX ORDER — ACETAMINOPHEN 325 MG/1
650 TABLET ORAL EVERY 6 HOURS PRN
Status: DISCONTINUED | OUTPATIENT
Start: 2023-07-25 | End: 2023-07-27 | Stop reason: HOSPADM

## 2023-07-25 RX ORDER — AMOXICILLIN 250 MG
1 CAPSULE ORAL 2 TIMES DAILY PRN
Status: DISCONTINUED | OUTPATIENT
Start: 2023-07-25 | End: 2023-07-27 | Stop reason: HOSPADM

## 2023-07-25 RX ORDER — ONDANSETRON 2 MG/ML
4 INJECTION INTRAMUSCULAR; INTRAVENOUS EVERY 6 HOURS PRN
Status: DISCONTINUED | OUTPATIENT
Start: 2023-07-25 | End: 2023-07-27 | Stop reason: HOSPADM

## 2023-07-25 RX ORDER — TRAZODONE HYDROCHLORIDE 50 MG/1
50 TABLET, FILM COATED ORAL
Status: DISCONTINUED | OUTPATIENT
Start: 2023-07-25 | End: 2023-07-27 | Stop reason: HOSPADM

## 2023-07-25 RX ORDER — AMOXICILLIN 250 MG
2 CAPSULE ORAL 2 TIMES DAILY PRN
Status: DISCONTINUED | OUTPATIENT
Start: 2023-07-25 | End: 2023-07-27 | Stop reason: HOSPADM

## 2023-07-25 RX ORDER — HALOPERIDOL 5 MG/ML
2.5-5 INJECTION INTRAMUSCULAR EVERY 6 HOURS PRN
Status: DISCONTINUED | OUTPATIENT
Start: 2023-07-25 | End: 2023-07-27 | Stop reason: HOSPADM

## 2023-07-25 RX ADMIN — DIAZEPAM 10 MG: 10 TABLET ORAL at 21:29

## 2023-07-25 RX ADMIN — MULTIPLE VITAMINS W/ MINERALS TAB 1 TABLET: TAB at 18:18

## 2023-07-25 RX ADMIN — DIAZEPAM 10 MG: 10 TABLET ORAL at 23:58

## 2023-07-25 RX ADMIN — PANTOPRAZOLE SODIUM 40 MG: 40 TABLET, DELAYED RELEASE ORAL at 18:18

## 2023-07-25 RX ADMIN — NICOTINE POLACRILEX 4 MG: 4 GUM, CHEWING BUCCAL at 13:09

## 2023-07-25 RX ADMIN — GABAPENTIN 800 MG: 800 TABLET, FILM COATED ORAL at 21:29

## 2023-07-25 RX ADMIN — NICOTINE POLACRILEX 2 MG: 2 GUM, CHEWING BUCCAL at 17:46

## 2023-07-25 RX ADMIN — DIAZEPAM 10 MG: 5 TABLET ORAL at 16:35

## 2023-07-25 RX ADMIN — THIAMINE HCL TAB 100 MG 100 MG: 100 TAB at 18:18

## 2023-07-25 RX ADMIN — NICOTINE POLACRILEX 4 MG: 4 GUM, CHEWING BUCCAL at 14:03

## 2023-07-25 RX ADMIN — DIAZEPAM 10 MG: 10 TABLET ORAL at 18:18

## 2023-07-25 ASSESSMENT — ACTIVITIES OF DAILY LIVING (ADL)
ADLS_ACUITY_SCORE: 37
CHANGE_IN_FUNCTIONAL_STATUS_SINCE_ONSET_OF_CURRENT_ILLNESS/INJURY: NO
WEAR_GLASSES_OR_BLIND: NO
ADLS_ACUITY_SCORE: 39
ADLS_ACUITY_SCORE: 37
DOING_ERRANDS_INDEPENDENTLY_DIFFICULTY: NO
TOILETING_ISSUES: NO
CONCENTRATING,_REMEMBERING_OR_MAKING_DECISIONS_DIFFICULTY: NO
ADLS_ACUITY_SCORE: 37
ADLS_ACUITY_SCORE: 37
DIFFICULTY_EATING/SWALLOWING: NO
WALKING_OR_CLIMBING_STAIRS_DIFFICULTY: NO
DRESSING/BATHING_DIFFICULTY: NO
ADLS_ACUITY_SCORE: 22
FALL_HISTORY_WITHIN_LAST_SIX_MONTHS: NO

## 2023-07-25 NOTE — ED TRIAGE NOTES
Pt state,he was brought in to ED by his mother due to back pain and alcohol intoxication. Has been sober for 9 month and recently relapse. Hx of seizure, per pt report. State his last drink was 12 hours a go.          Triage Assessment     Row Name 07/25/23 1129       Respiratory WDL    Respiratory WDL WDL       Skin Circulation/Temperature WDL    Skin Circulation/Temperature WDL WDL       Cardiac WDL    Cardiac WDL WDL       Peripheral/Neurovascular WDL    Peripheral Neurovascular WDL WDL       Cognitive/Neuro/Behavioral WDL    Cognitive/Neuro/Behavioral WDL WDL

## 2023-07-25 NOTE — ED NOTES
Pt voided at 1220. He has a post-void bladder scan done at 1235 with a result of >550. Pt went back to the bathroom and attempted to void. He said he could not go, He had a bladder scan of >578

## 2023-07-25 NOTE — ED NOTES
This individual is refusing IV insertion.  He states he just wants to leave and is attempting elopment.  He is obviously acutely intoxicated.  He is placed on an MUSTAPHA.

## 2023-07-25 NOTE — ED PROVIDER NOTES
[facility] EMERGENCY DEPARTMENT ([facility])  7/25/23  [facility]      History     Chief Complaint   Patient presents with    Back Pain    Alcohol Intoxication     Pt state,he was brought in to ED by his mother due to back pain and alcohol intoxication.        SOFI Rios is a 37 year old male with a past medical history significant for alcohol use disorder, HTN, HLD, ACS, thrombocytopenia, chronic headaches, and alcohol-induced pancreatitis who presents to the Emergency Department seeking detox.  Patient states that he thought his alcohol problem was getting better however he continues to relapse.  He states he was 9 months sober, relapsed for 3 months, was 9 months sober again and then relapsed again for 2 months.  He states he drinks 750 mL daily. His last drink was 12 hours ago. He states he has history of alcoholic withdrawal seizures and DTs.  He states he last had a seizure in detox 1 month ago.  He endorses some marijuana use as he states it makes him not want to drink.      Patient denies any recent trauma or falls.  Patient also presents with lower left and right back to buttock pain.  This pain does not radiate to his legs. He states his pain began today however, is not always there.  He states he is unable to go to the gym due to his pain. he also endorses some abdominal pain and states he has not been able to fully empty his bladder recently. He states his stools have been black within the last week.  He endorses some nausea and vomiting but did not look to see if there was blood in his vomit.  He denies any recent fevers.  He states he has been having chest pain and shortness of breath for 1 to 2 months.      Past Medical History  Past Medical History:   Diagnosis Date    Alcohol abuse     Alcohol abuse     Alcohol withdrawal (H)     Depressive disorder     Family history of diabetes mellitus 11/9/2007    HLD (hyperlipidemia)     HTN (hypertension)      Past Surgical History:   Procedure  Laterality Date    NO HISTORY OF SURGERY      OTHER SURGICAL HISTORY      none     gabapentin (NEURONTIN) 800 MG tablet  hydrOXYzine (ATARAX) 25 MG tablet  multivitamin w/minerals (THERA-VIT-M) tablet  nicotine polacrilex (NICORETTE) 4 MG gum  omeprazole (PRILOSEC) 20 MG DR capsule  sucralfate (CARAFATE) 1 GM tablet  thiamine (B-1) 100 MG tablet  traZODone (DESYREL) 50 MG tablet  chlordiazePOXIDE (LIBRIUM) 5 MG capsule  disulfiram (ANTABUSE) 250 MG tablet  gabapentin (NEURONTIN) 400 MG capsule  gabapentin (NEURONTIN) 800 MG tablet      Allergies   Allergen Reactions    Gramineae Pollens Itching    Pollen Extract Rash     grass tree pollen     Family History  Family History   Problem Relation Age of Onset    Coronary Artery Disease Father     Substance Abuse Maternal Grandfather     Mental Illness Brother     Schizophrenia Brother     Schizophrenia Maternal Aunt      Social History   Social History     Tobacco Use    Smoking status: Some Days     Packs/day: 0.25     Types: Cigars, Cigarettes    Smokeless tobacco: Never    Tobacco comments:     occasional   Substance Use Topics    Alcohol use: Yes     Alcohol/week: 17.0 standard drinks of alcohol     Types: 17 Shots of liquor per week     Comment: Drinks 750ml/day or 17 shots/day; hx of withdrawl seizures    Drug use: Not Currently     Types: Marijuana      Past medical history, past surgical history, medications, allergies, family history, and social history were reviewed with the patient. No additional pertinent items.      A medically appropriate review of systems was performed with pertinent positives and negatives noted in the HPI, and all other systems negative.    Physical Exam   BP: 129/80  Temp: 98.1  F (36.7  C)  Resp: 16  SpO2: 92 %  Physical Exam  General: patient is alert, speech is slurred, somewhat labile  Head: atraumatic and normocephalic   EENT: dry mucus membranes, sclera anicteric   Neck: supple   Cardiovascular: regular rate and rhythm,  extremities warm and well perfused, no lower extremity edema, 2+ PT pulses bilaterally  Pulmonary: lungs clear to auscultation bilaterally   Abdomen: soft, mild generalized TTP, left CVA TTP   Musculoskeletal: normal range of motion of the extremities, no midline thoracic or lumbar spine tenderness to palpation, bilateral paraspinal lumbar tenderness  Neurological: alert and oriented, moving all extremities symmetrically, strength 5/5 and symmetric in hip flexion/extension, knee flexion/extension and ankle plantar/dorsiflexion, sensation to light touch in distal upper and lower extremities intact, normal gait   skin: warm, dry       ED Course, Procedures, & Data      Procedures       ED Course Selections:        EKG Interpretation:      Interpreted by Tori Pa MD  Time reviewed: 1209  Symptoms at time of EKG: none   Rhythm: normal sinus   Rate: normal  Axis: normal  Ectopy: none  Conduction: normal  ST Segments/ T Waves: No ST-T wave changes  Q Waves: none  Comparison to prior: No old EKG available    Clinical Impression: normal EKG                 No results found for any visits on 07/25/23.  Medications - No data to display  Labs Ordered and Resulted from Time of ED Arrival to Time of ED Departure - No data to display  No orders to display          Critical care was not performed.     Medical Decision Making  The patient's presentation was of high complexity (a chronic illness severe exacerbation, progression, or side effect of treatment).    The patient's evaluation involved:  review of external note(s) from 1 sources (ED notes)  ordering and/or review of 3+ test(s) in this encounter (see separate area of note for details)  independent interpretation of testing performed by another health professional (CXR)    The patient's management necessitated moderate risk (prescription drug management including medications given in the ED) and high risk (a decision regarding hospitalization).    Assessment &  Plan    Mr. Rios is a 37 year old male with a past medical history significant for alcohol use disorder, HTN, HLD, ACS, thrombocytopenia, chronic headaches, and alcohol-induced pancreatitis who presents to the Emergency Department seeking detox in addition to low back pain, abdominal pain and black stools.  He is hemodynamically stable, afebrile and in no respiratory distress .  On exam he is neurovascularly intact.  Postvoid residual obtained elevated at 586.  He does report recent hydroxyzine use and question if this may be medication induced.  No evidence of UTI.  CT without hydronephrosis or mass, normal spine, mild constipation.  He is voiding in the ED and has refused catheter.  He is quite strong in his lower extremities without any other focal deficits and low suspicion for cauda equina, epidural abscess or hematoma.   Laboratory evaluation shows anemia of 12.7, Na 148, NL LFTs.  Chest x-ray negative.  ECG without ischemic changes and troponin is 8 and 7.  D-dimer pending, low suspicion for PE.  Ordered IV fluids, thiamine, folate, multivitamin, Protonix and placed on MSSA protocol.  Plan to admit to medicine to manage withdrawal, serial Hgb for possible GI bleed and further evaluation of urinary retention.      I have reviewed the nursing notes. I have reviewed the findings, diagnosis, plan and need for follow up with the patient.    New Prescriptions    No medications on file       Final diagnoses:   Alcohol intoxication with delirium (H)   Gastrointestinal hemorrhage with melena   Urinary retention   I, ADRIANNA OH, am serving as a trained medical scribe to document services personally performed by Tori Pa MD, based on the provider's statements to me.      I, Tori Pa MD, was physically present and have reviewed and verified the accuracy of this note documented by ADRIANNA OH.     Tori Pa MD  [facility] EMERGENCY DEPARTMENT  7/25/2023     Tori Pa MD  07/25/23  180

## 2023-07-25 NOTE — ED NOTES
ED to Behavioral Floor Handoff    SITUATION  Nikhil Rios is a 37 year old male who speaks English and lives in a home with family members The patient arrived in the ED by private car from emergency room with a complaint of Back Pain and Alcohol Intoxication (Pt state,he was brought in to ED by his mother due to back pain and alcohol intoxication. /)  .The patient's current symptoms started/worsened 2 week(s) ago and during this time the symptoms have increased.   In the ED, pt was diagnosed with   Final diagnoses:   Alcohol intoxication with delirium (H)   Gastrointestinal hemorrhage with melena   Urinary retention        Initial vitals were: BP: 129/80  Pulse: 88  Temp: 98.1  F (36.7  C)  Resp: 16  SpO2: 92 %   --------  Is the patient diabetic? No   If yes, last blood glucose? --     If yes, was this treated in the ED? --  --------  Is the patient inebriated (ETOH) Yes or Impaired on other substances? Yes  MSSA done? Yes  Last MSSA score: --    Were withdrawal symptoms treated? Yes  Does the patient have a seizure history? Yes. If yes, date of most recent seizure--  --------  Is the patient patient experiencing suicidal ideation? denies current or recent suicidal ideation     Homicidal ideation? denies current or recent homicidal ideation or behaviors.    Self-injurious behavior/urges? denies current or recent self injurious behavior or ideation.  ------  Was pt aggressive in the ED No  Was a code called No  Is the pt now cooperative? Yes  -------  Meds given in ED:   Medications   dextrose 5% and 0.45% NaCl 1,000 mL with Infuvite Adult 10 mL, thiamine 100 mg, folic acid 1 mg infusion (has no administration in time range)   nicotine (NICORETTE) gum 2 mg (has no administration in time range)   hydrOXYzine (ATARAX) tablet 25-50 mg (has no administration in time range)   sucralfate (CARAFATE) tablet 1 g (has no administration in time range)   traZODone (DESYREL) tablet 50 mg (has no administration in time range)    pantoprazole (PROTONIX) EC tablet 40 mg (has no administration in time range)   lidocaine 1 % 0.1-1 mL (has no administration in time range)   lidocaine (LMX4) cream (has no administration in time range)   sodium chloride (PF) 0.9% PF flush 3 mL (has no administration in time range)   sodium chloride (PF) 0.9% PF flush 3 mL (has no administration in time range)   acetaminophen (TYLENOL) tablet 650 mg (has no administration in time range)   polyethylene glycol (MIRALAX) Packet 17 g (has no administration in time range)   senna-docusate (SENOKOT-S/PERICOLACE) 8.6-50 MG per tablet 1 tablet (has no administration in time range)     Or   senna-docusate (SENOKOT-S/PERICOLACE) 8.6-50 MG per tablet 2 tablet (has no administration in time range)   ondansetron (ZOFRAN ODT) ODT tab 4 mg (has no administration in time range)     Or   ondansetron (ZOFRAN) injection 4 mg (has no administration in time range)   calcium carbonate (TUMS) chewable tablet 500 mg (has no administration in time range)   OLANZapine zydis (zyPREXA) ODT tab 5-10 mg (has no administration in time range)     Or   haloperidol lactate (HALDOL) injection 2.5-5 mg (has no administration in time range)   flumazenil (ROMAZICON) injection 0.2 mg (has no administration in time range)   melatonin tablet 5 mg (has no administration in time range)   diazepam (VALIUM) tablet 10 mg (has no administration in time range)     Or   diazepam (VALIUM) injection 5-10 mg (has no administration in time range)   thiamine (B-1) tablet 100 mg (has no administration in time range)   folic acid (FOLVITE) tablet 1 mg (has no administration in time range)   multivitamin w/minerals (THERA-VIT-M) tablet 1 tablet (has no administration in time range)   gabapentin (NEURONTIN) tablet 800 mg (has no administration in time range)   nicotine polacrilex (NICORETTE) gum 4 mg (4 mg Buccal $Given 7/25/23 1307)   nicotine polacrilex (NICORETTE) gum 4 mg (4 mg Buccal $Given 7/25/23 1403)       Family present during ED course? Yes  Family currently present? Yes    BACKGROUND  Does the patient have a cognitive impairment or developmental disability? No  Allergies:   Allergies   Allergen Reactions    Gramineae Pollens Itching    Pollen Extract Rash     grass tree pollen   .   Social demographics are   Social History     Socioeconomic History    Marital status: Single     Spouse name: None    Number of children: None    Years of education: None    Highest education level: None   Occupational History    Occupation: Rental property owner   Tobacco Use    Smoking status: Some Days     Packs/day: 0.25     Types: Cigars, Cigarettes    Smokeless tobacco: Never    Tobacco comments:     occasional   Substance and Sexual Activity    Alcohol use: Yes     Alcohol/week: 17.0 standard drinks of alcohol     Types: 17 Shots of liquor per week     Comment: Drinks 750ml/day or 17 shots/day; hx of withdrawl seizures    Drug use: Not Currently     Types: Marijuana    Sexual activity: Not Currently     Social Determinants of Health     Transportation Needs: No Transportation Needs (7/21/2021)    PRAPARE - Transportation     Lack of Transportation (Medical): No     Lack of Transportation (Non-Medical): No   Stress: Stress Concern Present (7/21/2021)    [employer] - Occupational Stress Questionnaire     Feeling of Stress : Very much   Housing Stability: Unknown (7/21/2021)    Housing Stability Vital Sign     Unable to Pay for Housing in the Last Year: No     Unstable Housing in the Last Year: No        ASSESSMENT  Labs results   Labs Ordered and Resulted from Time of ED Arrival to Time of ED Departure   DRUG ABUSE SCREEN 1 URINE (ED) - Abnormal       Result Value    Amphetamines Urine Screen Negative      Barbituates Urine Screen Negative      Benzodiazepine Urine Screen Positive (*)     Cannabinoids Urine Screen Negative      Cocaine Urine Screen Negative      Opiates Urine Screen Negative      COMPREHENSIVE METABOLIC PANEL - Abnormal    Sodium 148 (*)     Potassium 4.2      Chloride 107      Carbon Dioxide (CO2) 29      Anion Gap 12      Urea Nitrogen 11.6      Creatinine 0.68      Calcium 9.0      Glucose 99      Alkaline Phosphatase 57      AST 38      ALT 25      Protein Total 6.9      Albumin 4.6      Bilirubin Total 0.2      GFR Estimate >90     ROUTINE UA WITH MICROSCOPIC REFLEX TO CULTURE - Abnormal    Color Urine Straw      Appearance Urine Clear      Glucose Urine Negative      Bilirubin Urine Negative      Ketones Urine Negative      Specific Gravity Urine 1.008      Blood Urine Negative      pH Urine 6.5      Protein Albumin Urine Negative      Urobilinogen Urine Normal      Nitrite Urine Negative      Leukocyte Esterase Urine Negative      Mucus Urine Present (*)     RBC Urine <1      WBC Urine <1      Squamous Epithelials Urine <1     CBC WITH PLATELETS AND DIFFERENTIAL - Abnormal    WBC Count 7.3      RBC Count 4.33 (*)     Hemoglobin 12.7 (*)     Hematocrit 37.6 (*)     MCV 87      MCH 29.3      MCHC 33.8      RDW 15.4 (*)     Platelet Count 193      % Neutrophils 41      % Lymphocytes 45      % Monocytes 6      % Eosinophils 6      % Basophils 1      % Immature Granulocytes 1      NRBCs per 100 WBC 0      Absolute Neutrophils 3.0      Absolute Lymphocytes 3.3      Absolute Monocytes 0.4      Absolute Eosinophils 0.4      Absolute Basophils 0.1      Absolute Immature Granulocytes 0.1      Absolute NRBCs 0.0     ALCOHOL BREATH TEST POCT - Abnormal    Alcohol Breath Test 0.344 (*)    INR - Normal    INR 0.86     LIPASE - Normal    Lipase 46     TROPONIN T, HIGH SENSITIVITY - Normal    Troponin T, High Sensitivity 7     MAGNESIUM - Normal    Magnesium 2.3     CK TOTAL - Normal         TROPONIN T, HIGH SENSITIVITY - Normal    Troponin T, High Sensitivity 8     NT PROBNP INPATIENT   PHOSPHORUS   TYPE AND SCREEN, ADULT    ABO/RH(D) O POS      Antibody Screen Negative      SPECIMEN  EXPIRATION DATE [id number]     ABO/RH TYPE AND SCREEN      Imaging Studies:   Recent Results (from the past 24 hour(s))   CT Abdomen Pelvis w/o Contrast    Narrative    CT ABDOMEN PELVIS W/O CONTRAST 7/25/2023 3:04 PM    CLINICAL HISTORY: urinary retention, low back pain  TECHNIQUE: CT scan of the abdomen and pelvis was performed without IV  contrast. Multiplanar reformats were obtained. Dose reduction  techniques were used.  CONTRAST: None.    COMPARISON: CT 5/24/2023    FINDINGS:   LOWER CHEST: Unremarkable.    HEPATOBILIARY: Normal.    PANCREAS: Focal calcifications in/adjacent to the uncinate process of  the pancreas, not significantly changed from prior examination. No  ductal dilation or surrounding fat stranding.    SPLEEN: Normal.    ADRENAL GLANDS: Normal.    KIDNEYS/BLADDER: No nephroureterolithiasis or hydronephrosis. Urinary  bladder is unremarkable.    BOWEL: No obstruction or inflammatory change. Moderate volume of  formed stool throughout the colon. Normal appendix.    LYMPH NODES: Normal.    VASCULATURE: Unremarkable.    PELVIC ORGANS: Normal.    OTHER: None.    MUSCULOSKELETAL: No acute bony abnormality. Transitional lumbosacral  anatomy.      Impression    IMPRESSION:   1.  No acute abnormality in the abdomen or pelvis. Specifically, no  nephroureterolithiasis or hydronephrosis.  2.  Likely sequela of chronic calcific pancreatitis, confined to the  uncinate process, not significantly changed from prior exam.    LEOBARDO DUKE MD         SYSTEM ID:  [id number]   XR Chest 2 Views    Narrative    XR CHEST 2 VIEWS   7/25/2023 3:07 PM     HISTORY: chest pain, dyspnea    COMPARISON: Chest radiograph 5/10/2020      Impression    IMPRESSION: No acute cardiopulmonary disease.    LEOBARDO DUKE MD         SYSTEM ID:  [id number]      Most recent vital signs /74   Pulse 90   Temp 98.6  F (37  C) (Oral)   Resp 16   SpO2 95%    Abnormal labs/tests/findings requiring intervention:---   Pain control:  good  Nausea control: good    RECOMMENDATION  Are any infection precautions needed (MRSA, VRE, etc.)? No If yes, what infection? --  ---  Does the patient have mobility issues? independently. If yes, what device does the pt use? ---  ---  Is patient on 72 hour hold or commitment? No If on 72 hour hold, have hold and rights been given to patient? N/A  Are admitting orders written if after 10 p.m. ?N/A  Tasks needing to be completed:---     Pool Nguyen RN   [facility]--    [phone] [place] [facility]   [phone] [facility]

## 2023-07-25 NOTE — MEDICATION SCRIBE - ADMISSION MEDICATION HISTORY
Medication Scribe Admission Medication History    Admission medication history is complete. The information provided in this note is only as accurate as the sources available at the time of the update.    Medication reconciliation/reorder completed by provider prior to medication history? Yes    Information Source(s): Patient and CareEverywhere/SureScripts via in-person    Pertinent Information: Patient reported antabuse was replaced by naltrexone and that he is no longer taking the other deleted medications. Added medication verified by patient and dispense list. Patient is tapering off gabapentin and is taking 1 tablet three times a day today.    Changes made to PTA medication list:  Added: naltrexone, clonidine  Deleted: multivitamin, vitamin b1, omeprazole, antabuse  Changed: None    Medication Affordability:       Allergies reviewed with patient and updates made in EHR: yes    Medication History Completed By: Zhane Benz 7/25/2023 4:19 PM    Prior to Admission medications    Medication Sig Last Dose Taking? Auth Provider Long Term End Date   chlordiazePOXIDE (LIBRIUM) 5 MG capsule Take 1 capsule (5 mg) by mouth 3 times daily as needed for anxiety for 3 days, THEN 1 capsule (5 mg) 2 times daily for 2 days, THEN 1 capsule (5 mg) daily for 2 days. Past Week Yes Brian Webster MD  7/30/23   cloNIDine (CATAPRES) 0.1 MG tablet Take 0.1 mg by mouth 2 times daily Hold the dose if blood pressure is 90/60 or lower. Then recheck in 30 minutes to an hour. Past Week Yes Reported, Patient No    gabapentin (NEURONTIN) 800 MG tablet One qid day 1, one tid day 2, one bid day 3 and one a day for day 4 7/25/2023 Yes Zenon Alfaro MD Yes    hydrOXYzine (ATARAX) 25 MG tablet Take 1-2 tablets (25-50 mg) by mouth 3 times daily as needed for itching 7/24/2023 Yes Ava Lew MD No    naltrexone (DEPADE/REVIA) 50 MG tablet Take 50 mg by mouth daily 7/24/2023 Yes Reported, Patient No    nicotine polacrilex (NICORETTE)  4 MG gum Place 1 each (4 mg) inside cheek every hour as needed for other (nicotine withdrawal symptoms) 7/25/2023 Yes Ava Lew MD     sucralfate (CARAFATE) 1 GM tablet Take 1 tablet (1 g) by mouth 4 times daily Unknown Yes Ava Lew MD     traZODone (DESYREL) 50 MG tablet Take 1 tablet (50 mg) by mouth nightly as needed for sleep (may repeat after 60 minutes) Past Week Yes Ava Lew MD Yes    gabapentin (NEURONTIN) 400 MG capsule Take 1 capsule (400 mg) by mouth 3 times daily   Ava Lew MD Yes

## 2023-07-25 NOTE — ED NOTES
Report given to Pearl RN, ready for patient, will attempt to have patient void prior to going to unit.

## 2023-07-25 NOTE — H&P
[facility]    History and Physical - Hospitalist Service, GOLD TEAM        Date of Admission:  7/25/2023    Assessment & Plan      Nikhil Rios is a 37 year old male patient with a past medical history significant for severe alcohol use disorder with withdrawal seizures, benzodiazepine abuse, chronic pancreatitis, GERD, HTN, HLD, tobacco use, cannabis use who was recently admitted to inpatient detox at [facility] 6/17/23-6/20/23 for concurrent benzodiazepine and etoh withdrawal. He has presented to the ED 5 times since then for alcohol intoxication and withdrawal. He presented to the [facility] ER 7/25/23 (after eloping the previous evening from [facility] ED) seeking alcohol detox, and was subsequently admitted after Health Officer Authority to Detain was filed in the ED when he attempted to elope from [facility] while acutely intoxicated.     Acute alcohol intoxication  H/o severe alcohol use disorder with withdrawal and history of seizures  H/o benzodiazepine abuse  Pt reported he was sober for 9 months, relapsed for 3 months, was sober for 9 months and then has relapsed again. Generally drinks 750cc hard liquor daily (vodka). His last drink was reportedly ~12 hours prior to admission. Etoh level in ED 0.344. UDS positive for benzodiazepines. He does have librium at home and has been on a taper, mother has been helping to manage his medications.   - Seizure precautions   - CIWA scoring with prn valium   - Continue PTA Gabapentin 800mg BID, taper as able   - Consider prn clonidine for any hypertension   - Atarax prn for anxiety   - Multivitamin, folic acid and thiamine replacement   - Psychaitry consult placed for chemical dependency since MUSTAPHA was filed in ED.    - Discussed with attending medicine physician Dr. Jacobo who felt patient to be a possible danger to himself with valium onboard and concern that he will actively drink, potentially causing severe  respiratory depression; MUSTAPHA remains active. He reports no suicidal or homicidal ideation at this time.     Abdominal Pain - resolved  Melena?  Urinary Retention  CT A/P done in ED with sequela of chronic pancreatitis but acute intra-abdominal pathology to explain symptoms. Lipase, LFTs normal. WBC 7.3. UA negative for infection or ketones. CXR negative for infection. Bladder scan with >550cc initially and 200cc post-void residual with improvement in abdominal pain. Patient had reported to ED staff stools appearing dark x 1 week but states he just had a bowel movement in ED that was brown (no melena, no hematochezia).   - Suspect gastritis vs PUD in addition to urinary retention. Agree with IV PPI Q12H ordered per ED but patient is currently refusing IV placement. If continues to refuse, will change to PO (not ideal, but better than nothing).   - Serial hemoglobins Q8H overnight   - Consider discuss with GI if any drop in hemoglobin or melena visualized.   - Serial bladder scans with straight cath as needed    Back Pain - resolved  Pt reporting his back pain has resolved at this time. No numbness or tingling, full ROM all joints. No noted lumbar or pelvic abnormalities on CT A/P.   - Likely musculoskeletal. Will monitor for recurrence.    Chest Pain - resolved  Shortness of Breath - resolved  Pt reporting chest pain and shortness of breath x 1-2 month. EKG without acute ischemic changes. Troponin negative. CXR, CT A/P unremarkable for source. Reviewed chart, and he has been complaining of these symptoms for longer than 1 month since he underwent evaluation with CT PE 5/20/23 (ordered for chest pain, shortness of breath) which was negative. Patient reports his SOB and CP have resolved at current moment and he is feeling fine.   - Likely sequela of his substance use, and may improve with PPI and sobriety.   - Telemetry overnight   - TTE ordered   - If any hypoxia or worsening of symptoms, low threshold to repeat CT  PE.    Anemia  Patient reports stools have looked dark for the past ~1 week. Hemoglobin 12.7 (13.3 on 7/23/23). Type and screen done 7/25/23.   - Serial hemoglobins as above.    Hypernatremia  Na noted to be 148 in setting of poor PO.   - Agree with D5 1/2NS bolus ordered in ED. Patient currently refusing IV placement.   - Encourage free water intake. Will order maintenance IVF if patient allows IV placement.   - Recheck in am    Tobacco Use Disorder  He smokes cigars and cigarettes, and averages 1/4 ppd.   - Nicotine gum available prn     GERD   - As above, recommend IV PPI Q12H for now, and would continue PTA sucralfate       Diet: Regular Diet Adult  DVT Prophylaxis: Low Risk/Ambulatory with no VTE prophylaxis indicated  Mars Catheter: Not present  Lines: None     Cardiac Monitoring: ACTIVE order. Indication: Chest pain/ ACS rule out (24 hours)  Code Status: Full Code    Clinically Significant Risk Factors Present on Admission         # Hypernatremia: Highest Na = 148 mmol/L in last 2 days, will monitor as appropriate               Disposition Plan      Expected Discharge Date: 07/29/2023                The patient's care was discussed with the Attending Physician, Dr. Jacobo .    Ruiz Mitchell PA-C  Hospitalist Service, [facility]  Securely message with [employer] (more info)  Text page via [facility] Paging/Directory   See signed in provider for up to date coverage information    ______________________________________________________________________    Chief Complaint   Low Back Pain, Etoh intoxication    History is obtained from the patient, mother, EMR.    History of Present Illness   Nikhil Rios is a 37 year old male patient with a past medical history significant for severe alcohol use disorder with withdrawal seizures, benzodiazepine abuse, chronic pancreatitis, GERD, HTN, HLD, tobacco use, cannabis use who was recently admitted to inpatient detox at  [facility] 6/17/23-6/20/23 for concurrent benzodiazepine and etoh withdrawal. He has presented to the ED 5 times since then for alcohol intoxication and withdrawal. He presented to the [facility] ER 7/25/23 (after eloping the previous evening from [facility]) seeking alcohol detox, and was subsequently admitted after Health Officer Authority to Detain was filed in the ED when he attempted to elope from [facility] while acutely intoxicated.    Patient tremulous, requesting discharge home. He reports back pain, chest pain, shortness of breath, nausea, vomiting, abdominal pain have all resolved since he voided and had BM in ED (brown, no melena or blood noted). No fevers, chills, urinary complaints, numbness, tingling, leg weakness.    Past Medical History    Past Medical History:   Diagnosis Date    Alcohol abuse     Alcohol abuse     Alcohol withdrawal (H)     Depressive disorder     Family history of diabetes mellitus 11/9/2007    HLD (hyperlipidemia)     HTN (hypertension)        Past Surgical History   Past Surgical History:   Procedure Laterality Date    NO HISTORY OF SURGERY      OTHER SURGICAL HISTORY      none       Prior to Admission Medications   Prior to Admission Medications   Prescriptions Last Dose Informant Patient Reported? Taking?   chlordiazePOXIDE (LIBRIUM) 5 MG capsule Past Week  No Yes   Sig: Take 1 capsule (5 mg) by mouth 3 times daily as needed for anxiety for 3 days, THEN 1 capsule (5 mg) 2 times daily for 2 days, THEN 1 capsule (5 mg) daily for 2 days.   cloNIDine (CATAPRES) 0.1 MG tablet Past Week  Yes Yes   Sig: Take 0.1 mg by mouth 2 times daily Hold the dose if blood pressure is 90/60 or lower. Then recheck in 30 minutes to an hour.   gabapentin (NEURONTIN) 400 MG capsule   No No   Sig: Take 1 capsule (400 mg) by mouth 3 times daily   gabapentin (NEURONTIN) 800 MG tablet 7/25/2023  No Yes   Sig: One qid day 1, one tid day 2, one bid day 3 and one a day for day 4   hydrOXYzine (ATARAX)  25 MG tablet 7/24/2023  No Yes   Sig: Take 1-2 tablets (25-50 mg) by mouth 3 times daily as needed for itching   naltrexone (DEPADE/REVIA) 50 MG tablet 7/24/2023  Yes Yes   Sig: Take 50 mg by mouth daily   nicotine polacrilex (NICORETTE) 4 MG gum 7/25/2023  No Yes   Sig: Place 1 each (4 mg) inside cheek every hour as needed for other (nicotine withdrawal symptoms)   sucralfate (CARAFATE) 1 GM tablet Unknown  No Yes   Sig: Take 1 tablet (1 g) by mouth 4 times daily   traZODone (DESYREL) 50 MG tablet Past Week  No Yes   Sig: Take 1 tablet (50 mg) by mouth nightly as needed for sleep (may repeat after 60 minutes)      Facility-Administered Medications: None        Review of Systems    The 10 point Review of Systems is negative other than noted in the HPI or here.    Social History   I have reviewed this patient's social history and updated it with pertinent information if needed.  Social History     Tobacco Use    Smoking status: Some Days     Packs/day: 0.25     Types: Cigars, Cigarettes    Smokeless tobacco: Never    Tobacco comments:     occasional   Substance Use Topics    Alcohol use: Yes     Alcohol/week: 17.0 standard drinks of alcohol     Types: 17 Shots of liquor per week     Comment: Drinks 750ml/day or 17 shots/day; hx of withdrawl seizures    Drug use: Not Currently     Types: Marijuana         Family History   I have reviewed this patient's family history and updated it with pertinent information if needed.  Family History   Problem Relation Age of Onset    Coronary Artery Disease Father     Substance Abuse Maternal Grandfather     Mental Illness Brother     Schizophrenia Brother     Schizophrenia Maternal Aunt          Allergies   Allergies   Allergen Reactions    Gramineae Pollens Itching    Pollen Extract Rash     grass tree pollen        Physical Exam   Vital Signs: Temp: 98.6  F (37  C) Temp src: Oral BP: 128/74 Pulse: 90   Resp: 16 SpO2: 95 % O2 Device: None (Room air)    Weight: 0 lbs 0  oz    General: patient is alert, speech is clear, no apparent distress, tremulous  Head: atraumatic and normocephalic   EENT: dry mucus membranes, sclera anicteric   Neck: supple   Cardiovascular: regular rate and rhythm, extremities warm and well perfused, no lower extremity edema, 2+ PT pulses bilaterally  Pulmonary: lungs clear to auscultation bilaterally   Abdomen: soft, non-tender  Musculoskeletal: normal range of motion of the extremities, no midline thoracic or lumbar spine tenderness reported  Neurological: alert and oriented, moving all extremities symmetrically, strength 5/5 and symmetric in hip flexion/extension, knee flexion/extension and ankle plantar/dorsiflexion, sensation to light touch in distal upper and lower extremities intact, normal gait  skin: warm, dry     Medical Decision Making       60 MINUTES SPENT BY ME on the date of service doing chart review, history, exam, documentation & further activities per the note.      Data   Imaging results reviewed over the past 24 hrs:   Recent Results (from the past 24 hour(s))   CT Abdomen Pelvis w/o Contrast    Narrative    CT ABDOMEN PELVIS W/O CONTRAST 7/25/2023 3:04 PM    CLINICAL HISTORY: urinary retention, low back pain  TECHNIQUE: CT scan of the abdomen and pelvis was performed without IV  contrast. Multiplanar reformats were obtained. Dose reduction  techniques were used.  CONTRAST: None.    COMPARISON: CT 5/24/2023    FINDINGS:   LOWER CHEST: Unremarkable.    HEPATOBILIARY: Normal.    PANCREAS: Focal calcifications in/adjacent to the uncinate process of  the pancreas, not significantly changed from prior examination. No  ductal dilation or surrounding fat stranding.    SPLEEN: Normal.    ADRENAL GLANDS: Normal.    KIDNEYS/BLADDER: No nephroureterolithiasis or hydronephrosis. Urinary  bladder is unremarkable.    BOWEL: No obstruction or inflammatory change. Moderate volume of  formed stool throughout the colon. Normal appendix.    LYMPH NODES:  Normal.    VASCULATURE: Unremarkable.    PELVIC ORGANS: Normal.    OTHER: None.    MUSCULOSKELETAL: No acute bony abnormality. Transitional lumbosacral  anatomy.      Impression    IMPRESSION:   1.  No acute abnormality in the abdomen or pelvis. Specifically, no  nephroureterolithiasis or hydronephrosis.  2.  Likely sequela of chronic calcific pancreatitis, confined to the  uncinate process, not significantly changed from prior exam.    LEOBARDO DUKE MD         SYSTEM ID:  [id number]   XR Chest 2 Views    Narrative    XR CHEST 2 VIEWS   7/25/2023 3:07 PM     HISTORY: chest pain, dyspnea    COMPARISON: Chest radiograph 5/10/2020      Impression    IMPRESSION: No acute cardiopulmonary disease.    LEOBARDO DUKE MD         SYSTEM ID:  [id number]     Recent Labs   Lab 07/25/23  1205 07/23/23  1033 07/21/23  1851 07/20/23  1425   WBC 7.3 6.9  --  7.7   HGB 12.7* 13.3  --  14.2   MCV 87 86  --  87    246  --  280   INR 0.86  --   --   --    * 149* 144 144   POTASSIUM 4.2 4.2 4.2 4.2   CHLORIDE 107 107 101 107   CO2 29 28 25 23   BUN 11.6 9.5 14.7 12.9   CR 0.68 0.76 0.82 0.71   ANIONGAP 12 14 18* 14   KEELEY 9.0 8.4* 9.1 8.9   GLC 99 108* 84 93   ALBUMIN 4.6 4.6  --  4.8   PROTTOTAL 6.9 6.7  --  7.3   BILITOTAL 0.2 0.3  --  <0.2   ALKPHOS 57 52  --  48   ALT 25 25  --  17   AST 38 45  --  25   LIPASE 46  --   --  35

## 2023-07-26 ENCOUNTER — APPOINTMENT (OUTPATIENT)
Dept: CARDIOLOGY | Facility: CLINIC | Age: 38
DRG: 897 | End: 2023-07-26
Attending: PHYSICIAN ASSISTANT
Payer: COMMERCIAL

## 2023-07-26 LAB
ALBUMIN SERPL BCG-MCNC: 4.1 G/DL (ref 3.5–5.2)
ALP SERPL-CCNC: 53 U/L (ref 40–129)
ALT SERPL W P-5'-P-CCNC: 22 U/L (ref 0–70)
ANION GAP SERPL CALCULATED.3IONS-SCNC: 12 MMOL/L (ref 7–15)
AST SERPL W P-5'-P-CCNC: 28 U/L (ref 0–45)
BILIRUB SERPL-MCNC: 0.4 MG/DL
BUN SERPL-MCNC: 19.5 MG/DL (ref 6–20)
CALCIUM SERPL-MCNC: 9.4 MG/DL (ref 8.6–10)
CHLORIDE SERPL-SCNC: 100 MMOL/L (ref 98–107)
CREAT SERPL-MCNC: 0.66 MG/DL (ref 0.67–1.17)
DEPRECATED HCO3 PLAS-SCNC: 27 MMOL/L (ref 22–29)
ERYTHROCYTE [DISTWIDTH] IN BLOOD BY AUTOMATED COUNT: 15.1 % (ref 10–15)
GFR SERPL CREATININE-BSD FRML MDRD: >90 ML/MIN/1.73M2
GLUCOSE SERPL-MCNC: 92 MG/DL (ref 70–99)
HCT VFR BLD AUTO: 37.2 % (ref 40–53)
HGB BLD-MCNC: 12.6 G/DL (ref 13.3–17.7)
HGB BLD-MCNC: 12.6 G/DL (ref 13.3–17.7)
HGB BLD-MCNC: 13.2 G/DL (ref 13.3–17.7)
HGB BLD-MCNC: 13.9 G/DL (ref 13.3–17.7)
LVEF ECHO: NORMAL
MCH RBC QN AUTO: 29.4 PG (ref 26.5–33)
MCHC RBC AUTO-ENTMCNC: 33.9 G/DL (ref 31.5–36.5)
MCV RBC AUTO: 87 FL (ref 78–100)
PLATELET # BLD AUTO: 160 10E3/UL (ref 150–450)
POTASSIUM SERPL-SCNC: 4.5 MMOL/L (ref 3.4–5.3)
PROT SERPL-MCNC: 6.1 G/DL (ref 6.4–8.3)
RBC # BLD AUTO: 4.29 10E6/UL (ref 4.4–5.9)
SODIUM SERPL-SCNC: 139 MMOL/L (ref 136–145)
WBC # BLD AUTO: 6.7 10E3/UL (ref 4–11)

## 2023-07-26 PROCEDURE — 250N000013 HC RX MED GY IP 250 OP 250 PS 637: Performed by: INTERNAL MEDICINE

## 2023-07-26 PROCEDURE — 999N000208 ECHOCARDIOGRAM COMPLETE

## 2023-07-26 PROCEDURE — 250N000013 HC RX MED GY IP 250 OP 250 PS 637

## 2023-07-26 PROCEDURE — 250N000011 HC RX IP 250 OP 636: Performed by: PHYSICIAN ASSISTANT

## 2023-07-26 PROCEDURE — 85027 COMPLETE CBC AUTOMATED: CPT | Performed by: PHYSICIAN ASSISTANT

## 2023-07-26 PROCEDURE — 99232 SBSQ HOSP IP/OBS MODERATE 35: CPT | Performed by: INTERNAL MEDICINE

## 2023-07-26 PROCEDURE — 85018 HEMOGLOBIN: CPT | Performed by: PHYSICIAN ASSISTANT

## 2023-07-26 PROCEDURE — 93306 TTE W/DOPPLER COMPLETE: CPT

## 2023-07-26 PROCEDURE — 250N000013 HC RX MED GY IP 250 OP 250 PS 637: Performed by: PHYSICIAN ASSISTANT

## 2023-07-26 PROCEDURE — 93306 TTE W/DOPPLER COMPLETE: CPT | Mod: 26 | Performed by: INTERNAL MEDICINE

## 2023-07-26 PROCEDURE — 99222 1ST HOSP IP/OBS MODERATE 55: CPT

## 2023-07-26 PROCEDURE — 120N000002 HC R&B MED SURG/OB UMMC

## 2023-07-26 PROCEDURE — 80053 COMPREHEN METABOLIC PANEL: CPT | Performed by: PHYSICIAN ASSISTANT

## 2023-07-26 PROCEDURE — 36415 COLL VENOUS BLD VENIPUNCTURE: CPT | Performed by: PHYSICIAN ASSISTANT

## 2023-07-26 RX ORDER — GABAPENTIN 600 MG/1
600 TABLET ORAL 3 TIMES DAILY
Status: DISCONTINUED | OUTPATIENT
Start: 2023-07-26 | End: 2023-07-27 | Stop reason: HOSPADM

## 2023-07-26 RX ADMIN — GABAPENTIN 600 MG: 600 TABLET, FILM COATED ORAL at 14:24

## 2023-07-26 RX ADMIN — HYDROXYZINE HYDROCHLORIDE 50 MG: 25 TABLET, FILM COATED ORAL at 23:56

## 2023-07-26 RX ADMIN — SUCRALFATE 1 G: 1 TABLET ORAL at 08:52

## 2023-07-26 RX ADMIN — ONDANSETRON 4 MG: 4 TABLET, ORALLY DISINTEGRATING ORAL at 08:45

## 2023-07-26 RX ADMIN — FOLIC ACID 1 MG: 1 TABLET ORAL at 08:47

## 2023-07-26 RX ADMIN — PANTOPRAZOLE SODIUM 40 MG: 40 TABLET, DELAYED RELEASE ORAL at 16:52

## 2023-07-26 RX ADMIN — PANTOPRAZOLE SODIUM 40 MG: 40 TABLET, DELAYED RELEASE ORAL at 08:46

## 2023-07-26 RX ADMIN — DIAZEPAM 10 MG: 10 TABLET ORAL at 08:44

## 2023-07-26 RX ADMIN — GABAPENTIN 600 MG: 600 TABLET, FILM COATED ORAL at 20:57

## 2023-07-26 RX ADMIN — THIAMINE HCL TAB 100 MG 100 MG: 100 TAB at 08:46

## 2023-07-26 RX ADMIN — MULTIPLE VITAMINS W/ MINERALS TAB 1 TABLET: TAB at 08:46

## 2023-07-26 RX ADMIN — SUCRALFATE 1 G: 1 TABLET ORAL at 20:57

## 2023-07-26 RX ADMIN — SUCRALFATE 1 G: 1 TABLET ORAL at 16:52

## 2023-07-26 RX ADMIN — GABAPENTIN 800 MG: 800 TABLET, FILM COATED ORAL at 08:46

## 2023-07-26 RX ADMIN — SUCRALFATE 1 G: 1 TABLET ORAL at 12:58

## 2023-07-26 RX ADMIN — TRAZODONE HYDROCHLORIDE 50 MG: 50 TABLET ORAL at 23:56

## 2023-07-26 ASSESSMENT — ACTIVITIES OF DAILY LIVING (ADL)
ADLS_ACUITY_SCORE: 22

## 2023-07-26 NOTE — PLAN OF CARE
VS: /85 (BP Location: Right arm)   Pulse 60   Temp 98.2  F (36.8  C)   Resp 18   SpO2 97%      O2: 97% on RA.    Output: Voiding spontaneously without difficulty.   Last BM: 7/26 per pt report.   Activity: Ind in room w 1:1.    Skin: Intact.   Pain: Denies.   CMS: A&Ox4. Able to make needs known. CMS intact.   Dressing: None.   Diet: Regular, tolerating well.    LDA: RPIV SL.   Equipment: Personal Belongings.   Plan: Continue with POC.   Additional Info: C/o N/V. PRN medication given.   CIWA scored. PRN medication given.

## 2023-07-26 NOTE — DISCHARGE INSTRUCTIONS
Follow-up Appointments     Date: Tuesday, 8/1/2023  Time: 8:00 am - 10:00 am  Provider: Susan Clarke MD, MD  [facility] [address], [address], [place] [address]  Phone: [phone]  Type: Medication Mgmt - Initial (In-Person)

## 2023-07-26 NOTE — CONSULTS
"Triage and Transition - Consult and Liaison     Nikhil Rios  July 26, 2023    Session start: 11:10 AM  Session end: 11:40 PM  Session duration in minutes: 30 minutes  CPT utilized: 41041 - Brief diagnostic assessment (modifier 52)  Patient was seen virtually ([employer] cart or other teleconferencing device).    Diagnosis:   300.02 (F41.1) Generalized Anxiety Disorder  309.9 (F43.9) Unspecified Trauma and Stressor Related Disorder  Substance-Related & Addictive Disorders Alcohol Use Disorder   303.90 (F10.20) Severe uncomplicated ,  Present ;      Plan/Recommendations:   Continue care coordination with Care Team.   Maintain current transition plan. Next steps include: Psych provider for medication review and recommendation to assist with anxiety, trauma, and alcohol.   Patient is voluntary for services at this time. Does not meet criteria for inpatient mental health services.  Please enter another psychiatry if further visits are needed. Patients are not followed by Psychiatry C&L Service unless otherwise indicated.     Reason for consult: Psychiatry consult was requested due to \"Chemical dependency, MUSTAPHA filed in emergency department, assess if transfer to inpatient psych would be appropriate\". Patient was seen by Triage and Transition Consult & Liaison team.     Identifying information: Nikhil is 37 year old White  male   followed related to complaint of Back Pain and Alcohol Intoxication (Pt state,he was brought in to ED by his mother due to back pain and alcohol intoxication. The patient's current symptoms started/worsened 2 week(s) ago and during this time the symptoms have increased.     Brief Psychosocial History    Per patient's report: Owns own house, [place], lives by himself. Like to golf, go to gym daily, active person. \"I get runners high. It motivates me\". Mother is 76 yrs old, enjoys cooking for her, \"amature \", works part time at [employer], full time [employer], hangout with friends when " "available. Garden, do yard work. Sikh, leave of absence from jobs right now, able to go to daily mass in the mornings, roughly 30 minutes. Mom joins me on Sundays and joins me for breakfast. Has a girlfriend, [place] met each other here, going into her master's program, went back home, can't travel for a year. I have three weeks vacation, plan to go see her. Supportive of each other.      Summary of Patient Situation    Per patient's report: Alcohol withdrawal, experienced pain yesterday. Attempted to leave due to intoxication and was placed on 72HH. Today, agreeable to services and willing to get help. Able to identify wants and needs in services. Current anxiety, going through withdrawal. Denies SI, HI, Psychosis. Able to demonstrate appropriate behaviors and thoughts. Verbalized understanding of the need for medical inpatient stay for withdrawal, optimistic regarding outpatient services.    Significant Clinical History     \"Alcohol is the bandage, a lot of trauma in life, my father passed in front of me when I was 18\". Starting a new therapist, excited for appointments. \"I am still holding on to that trauma of my father. Alcohol doesn't taste good. I don't drink it because I like it.\" Generalized Anxiety disorder in the past. \"I don't know how to slow down, which leads me to drink. I am productive when I slow down, I work two jobs, and then I overwork myself. Hard for me to sit down and watch TV. I always have to do something.\" [facility] in the past, did not like it. Anxiety is increasing. \"I am missing family events, social anxiety. When I get into patterns of drinking, I stop doing everything\". History of inpatient programs, pt would drink immediately after release. However, he can go \"months and months of sobriety with outpatient programs\". Last relapse was in May, (his dad passed in May as well), \"This might be subconsciously affecting me\".     New appointment:  In-person Psychiatry " appointment    [facility]  [address]  With Susan Clarke MD  8/1/23 at 8 AM  Phone: [phone]    Current Providers  [facility] Care Provider: [facility]    Psychiatrist: No, old psychiatrist at [facility].   Therapist: Yes [facility].     : No   [facility]: No   ACT Team: No   Other: No    Collateral information:   Reviewed chart and coordinated with RN. Per RN: Not medically cleared yet. CIWA still, still needing withdrawal assistance. Reported he was happy he stayed in the hospital to get help. Presented differently compared to yesterday. Update provided to care team including C&L Providers.      Mental Status Exam   Affect: Appropriate  Appearance: Appropriate   Attention Span/Concentration: Attentive    Eye Contact: Engaged  Fund of Knowledge: Appropriate   Language /Speech Content: Fluent  Language /Speech Volume: Normal   Language /Speech Rate/Productions: Normal   Recent Memory: Intact  Remote Memory: Intact  Mood: Normal and Sad   Orientation:   Person: Yes   Place: Yes  Time of Day: Yes   Date: Yes   Situation (Do they understand why they are here?): Yes   Psychomotor Behavior: Normal   Thought Content: Clear  Thought Form: Goal Directed    Current medications:   Current Facility-Administered Medications   Medication    acetaminophen (TYLENOL) tablet 650 mg    calcium carbonate (TUMS) chewable tablet 500 mg    dextrose 5% and 0.45% NaCl 1,000 mL with Infuvite Adult 10 mL, thiamine 100 mg, folic acid 1 mg infusion    diazepam (VALIUM) tablet 10 mg    Or    diazepam (VALIUM) injection 5-10 mg    flumazenil (ROMAZICON) injection 0.2 mg    folic acid (FOLVITE) tablet 1 mg    gabapentin (NEURONTIN) tablet 800 mg    OLANZapine zydis (zyPREXA) ODT tab 5-10 mg    Or    haloperidol lactate (HALDOL) injection 2.5-5 mg    hydrOXYzine (ATARAX) tablet 25-50 mg    lidocaine (LMX4) cream    lidocaine 1 % 0.1-1 mL    melatonin tablet 5 mg     multivitamin w/minerals (THERA-VIT-M) tablet 1 tablet    nicotine (NICORETTE) gum 2 mg    ondansetron (ZOFRAN ODT) ODT tab 4 mg    Or    ondansetron (ZOFRAN) injection 4 mg    pantoprazole (PROTONIX) EC tablet 40 mg    polyethylene glycol (MIRALAX) Packet 17 g    senna-docusate (SENOKOT-S/PERICOLACE) 8.6-50 MG per tablet 1 tablet    Or    senna-docusate (SENOKOT-S/PERICOLACE) 8.6-50 MG per tablet 2 tablet    sodium chloride (PF) 0.9% PF flush 3 mL    sodium chloride (PF) 0.9% PF flush 3 mL    sucralfate (CARAFATE) tablet 1 g    thiamine (B-1) tablet 100 mg    traZODone (DESYREL) tablet 50 mg     Facility-Administered Medications Ordered in Other Encounters   Medication    Self Administer Medications: Behavioral Services        Therapeutic intervention and progress:  Therapeutic intervention consisted of building therapeutic rapport, active listening, validation, engaging in learning/practicing coping skills, and stress relief practices. Patient is making progress towards treatment goals as evidenced by engagement in session.      Aleida Blair [facility] Center[facility]RA   Triage and Transition - Consult and Liaison   [phone]

## 2023-07-26 NOTE — PLAN OF CARE
Pt arrived to unit 8MS from ED via pt transport around 1830.     /73 (BP Location: Right arm)   Pulse 71   Temp 98.2  F (36.8  C)   Resp 20   SpO2 94%   Pt resting comfortably in room. Pt mom present at bedside. Report given to oncoming RN.    Continue with POC.

## 2023-07-26 NOTE — CONSULTS
Met with pt for CD Consult and introduced self and role in pt's care.  Pt reports he is already in touch with [facility] for IOP and declined assessment for referral to CD Tx or any additional CD Resource needs.  Pt reports he sees a therapist (appt in place for tomorrow AM per pt report, pv specializes in grief) and is looking to establish care with a new psychiatrist.  Pt reported an interest in speaking with someone from psych regarding medications while admitted, and may benefit from considering anti-craving medication.    Relayed to pt that if they change their mind while admitted and would like to complete a CD Assessment for referrals to treatment or need any additional CD Resources they may alert unit staff who will assist in having CD Consult re-ordered.  If pt has active insurance they may also schedule an assessment on an outpatient basis by calling [facility] at [phone].    ROGER Adler, [facility]  Chemical Dependency Evaluation Counselor  Email: [email or web address]

## 2023-07-26 NOTE — PROGRESS NOTES
"SPIRITUAL HEALTH SERVICES Progress Note  [facility] ([facility]) 8A    Saw pt Nikhil Rios per admission request and oriented pt to [facility].    Patient/Family Understanding of Illness and Goals of Care - Jack said \"I don't drink because I'm an alcoholic or I enjoy the taste of alcohol.\" He reflected on his loss and identified the cause of his drinking to childhood unresolved trauma.    Distress and Loss - He tearfully processed the loss of his father multiple times during the visit and said \"I haven't been able to get over it yet, and need individual help to not deal with it all my life\". He also said \"prior to my dad dying he lost his memory\" and it felt like he'd never had the father he deserved.    Strengths, Coping, and Resources  - Pt is prideful about his two jobs. He said he has a therapist, a psychologist, a , and his mom to support him. Cooking for mother provides him meaningfulness.     Meaning, Beliefs, and Spirituality - Pt identified born as a Buddhism. \"Spirituality gives me the calmness, strength and drive to keep pushing\" he said. We prayed for emotional relief.    Plan of Care - No follow-up visits for now. [facility] remains available to pt per request.    Severo Hernandez, CPRS, CHP   Intern  Pager [phone]    * [facility] remains available 24/7 for emergent requests/referrals, either by having the switchboard page the on-call  or by entering an ASAP/STAT consult in Epic (this will also page the on-call ). Routine Epic consults receive an initial response within 24 hours.*   "

## 2023-07-26 NOTE — PLAN OF CARE
Goal Outcome Evaluation:  V/S: Blood pressure 127/85, pulse 60, temperature 98.2  F (36.8  C), resp. rate 18, SpO2 97 %.    Alert and oriented  Denied pain throughout the night  Received x1 Valium, slept most the night CIWA-0  No concern at this time, continue with plan of care.      Plan of Care Reviewed With: patient    Overall Patient Progress: no changeOverall Patient Progress: no change

## 2023-07-26 NOTE — CONSULTS
Initial Psychiatric Consult   Consult date: July 26, 2023         Reason for Consult, requesting source:    Etoh, anxiety, trauma   Requesting source: Primary team     This note is being entered to supplement the psychiatry consultation note that was completed on July 26, 2023 by the licensed mental health professional NINA Serrano. They have reviewed with me the pertinent clinical details related to their encounter. I am being consulted to offer additional guidance on psychiatric pharmacological interventions.     Total time spent in chart review, patient interview and coordination of care; 45 minutes on date of encounter        HPI:   Nikhil Rios is a 37 year old male patient with a past medical history significant for severe alcohol use disorder with withdrawal seizures, benzodiazepine abuse, chronic pancreatitis, GERD, HTN, HLD, tobacco use, cannabis use who was recently admitted to inpatient detox at [facility] 6/17/23-6/20/23 for concurrent benzodiazepine and etoh withdrawal. He has presented to the ED 5 times since then for alcohol intoxication and withdrawal. He presented to the [facility] ER 7/25/23 (after eloping the previous evening from [facility]) seeking alcohol detox, and was subsequently admitted to medical. A 72HH was placed in the ED when he attempted to elope from [facility] while acutely intoxicated.     He met with LMHP, Aleida Blair, and reported increased anxiety related to unresolved trauma. He states he has a new therapist who specializes in trauma and wants a new psychiatrist as his current provider and him do not mesh. He is agreeable to increasing his Gabapentin. He takes trazodone at bedtime most every night for sleep.            Physical ROS:   The 10 point Review of Systems is negative other than noted in the HPI or here.         Medications:      dextrose 5% and 0.45% NaCl 1,000 mL with Infuvite Adult 10 mL, thiamine 100 mg, folic acid 1 mg infusion   Intravenous Once     folic acid  1 mg Oral Daily    gabapentin  600 mg Oral TID    multivitamin w/minerals  1 tablet Oral Daily    pantoprazole  40 mg Oral BID AC    sodium chloride (PF)  3 mL Intracatheter Q8H    sucralfate  1 g Oral 4x Daily    thiamine  100 mg Oral Daily            Physical and Psychiatric Examination:     /85 (BP Location: Right arm)   Pulse 60   Temp 98.2  F (36.8  C)   Resp 18   SpO2 97%   Weight is 0 lbs 0 oz  There is no height or weight on file to calculate BMI.    Physical Exam:  I have reviewed the physical exam as documented by by the medical team and agree with findings and assessment and have no additional findings to add at this time.    Mental Status Exam:  Affect: Appropriate  Appearance: Appropriate   Attention Span/Concentration: Attentive    Eye Contact: Engaged  Fund of Knowledge: Appropriate   Language /Speech Content: Fluent  Language /Speech Volume: Normal   Language /Speech Rate/Productions: Normal   Recent Memory: Intact  Remote Memory: Intact  Mood: Normal and Sad   Orientation:   Person: Yes   Place: Yes  Time of Day: Yes   Date: Yes   Situation (Do they understand why they are here?): Yes   Psychomotor Behavior: Normal   Thought Content: Clear  Thought Form: Goal Directed             DSM-5 Diagnosis:   Alcohol use disorder, severe  SILVANA  PTSD          Assessment:   Nikhil is a 37 year old male with a history of the above diagnoses who presents to the ED intoxicated for detox. He was put on 72HH while in ED as he attempted to elope while intoxicated. He is voluntary and motivated for treatment at this time. Gabapentin helps with post-acute withdrawal anxiety, insomnia and the studies show that it reduces the chance of returning to drinking. Increased gabapentin to 600 mg 3 times daily.  Agree with referral to outpatient psychiatrist for further medication management to target anxiety and trauma sxs once detoxed.           Summary of Recommendations:     Discontinued 72HH and bedside  romie. Patient is voluntary and does not meet hold criteria  Increased Gabapentin to 600mg TID  Continue Trazodone 50mg prn at bedtime       Kamilla Temple, [id number]  Consult/Liaison Psychiatry   [facility]

## 2023-07-26 NOTE — PROGRESS NOTES
[facility] - Internal Medicine Daily Note   Date of Service: 7/26/2023  Patient: Nikhil Rios  MRN: [id number]  Admission Date: 7/25/2023  Hospital Day # 1    Assessment & Plan      Nikhil Rios is a 37 year old male patient with a past medical history significant for severe alcohol use disorder with withdrawal seizures, benzodiazepine abuse, chronic pancreatitis, GERD, HTN, HLD, tobacco use, cannabis use who was recently admitted to inpatient detox at [facility] 6/17/23-6/20/23 for concurrent benzodiazepine and etoh withdrawal. He has presented to the ED 5 times since then for alcohol intoxication and withdrawal. He presented to the [facility] ER 7/25/23 (after eloping the previous evening from [facility]) seeking alcohol detox, and was subsequently admitted after Health Officer Authority to Detain was filed in the ED when he attempted to elope from [facility] while acutely intoxicated.     Acute alcohol intoxication  H/o severe alcohol use disorder with withdrawal and history of seizures  H/o benzodiazepine abuse  Pt reported he was sober for 9 months, relapsed for 3 months, was sober for 9 months and then has relapsed again. Generally drinks 750cc hard liquor daily (vodka). His last drink was reportedly ~12 hours prior to admission. Etoh level in ED 0.344. UDS positive for benzodiazepines. He does have librium at home and has been on a taper, mother has been helping to manage his medications.   - Seizure precautions   - CIWA scoring with prn valium   - Continue PTA Gabapentin 800mg BID, taper as able   - Consider prn clonidine for any hypertension   - Atarax prn for anxiety   - Multivitamin, folic acid and thiamine replacement   - Psychaitry consult placed for chemical dependency since MUSTAPHA was filed in ED.    - Discussed with attending medicine physician Dr. Jacobo who felt patient to be a possible danger to himself with valium onboard and concern that he will actively drink, potentially causing  severe respiratory depression; MUSTAPHA remains active. He reports no suicidal or homicidal ideation at this time.     Abdominal Pain - resolved  Melena?  Urinary Retention  CT A/P done in ED with sequela of chronic pancreatitis but acute intra-abdominal pathology to explain symptoms. Lipase, LFTs normal. WBC 7.3. UA negative for infection or ketones. CXR negative for infection.   Urinating with out difficulty today.       Back Pain - resolved  Pt reporting his back pain has resolved at this time. No numbness or tingling, full ROM all joints. No noted lumbar or pelvic abnormalities on CT A/P.   - Likely musculoskeletal. Will monitor for recurrence.     Chest Pain - resolved  Shortness of Breath - resolved  Pt reporting chest pain and shortness of breath x 1-2 month. EKG without acute ischemic changes. Troponin negative. CXR, CT A/P unremarkable for source. Reviewed chart, and he has been complaining of these symptoms for longer than 1 month since he underwent evaluation with CT PE 5/20/23 (ordered for chest pain, shortness of breath) which was negative. Patient reports his SOB and CP have resolved at current moment and he is feeling fine.   - Likely sequela of his substance use, and may improve with PPI and sobriety.   - Telemetry overnight   - TTE ordered and pending   - If any hypoxia or worsening of symptoms, low threshold to repeat CT PE.     Anemia  Patient reports stools have looked dark for the past ~1 week. Hemoglobin 12.7 (13.3 on 7/23/23). Type and screen done 7/25/23.   - Serial hemoglobins as above.     Hypernatremia  Na noted to be 148 in setting of poor PO.   - Agree with D5 1/2NS bolus ordered in ED. Patient currently refusing IV placement.   - Encourage free water intake. Will order maintenance IVF if patient allows IV placement.   - Recheck in am     Tobacco Use Disorder  He smokes cigars and cigarettes, and averages 1/4 ppd.   - Nicotine gum available prn     GERD   - As above, recommend IV PPI Q12H for  now, and would continue PTA sucralfate           Diet: Regular Diet Adult  DVT Prophylaxis: Low Risk/Ambulatory with no VTE prophylaxis indicated  Mars Catheter: Not present  Lines: None     Cardiac Monitoring: ACTIVE order. Indication: Chest pain/ ACS rule out (24 hours)  Code Status: Full Code     Patty Reddy MD  Internal Medicine Staff Hospitalist   [facility]   Pager: [phone]    Team: Lien B[place] 19  Page Cross Cover after 5 pm: pager [phone]   ___________________________________________________________________    Subjective & Interval Hx:    Awake and oriented x3. Has established IOP for AUD. Seen by psychiatry  Gabapentin dose changed to 600 mg po TID and Trazodone 50 mg at bedtime as needed   Echo pending. Possible discharge home tomorrow.     Last 24 hr care team notes reviewed.   ROS:  4 point ROS including Respiratory, CV, GI and , other than that noted in the HPI, is negative.    Medications: Reviewed in EPIC. List below for reference    Current Facility-Administered Medications:     acetaminophen (TYLENOL) tablet 650 mg, 650 mg, Oral, Q6H PRN, Ruiz Mitchell PA-C    calcium carbonate (TUMS) chewable tablet 500 mg, 500 mg, Oral, BID PRN, Ruiz Mitchell PA-C    dextrose 5% and 0.45% NaCl 1,000 mL with Infuvite Adult 10 mL, thiamine 100 mg, folic acid 1 mg infusion, , Intravenous, Once, Tori Pa MD    diazepam (VALIUM) tablet 10 mg, 10 mg, Oral, Q30 Min PRN, 10 mg at 07/26/23 0844 **OR** diazepam (VALIUM) injection 5-10 mg, 5-10 mg, Intravenous, Q30 Min PRN, Ruiz Mitchell PA-C    flumazenil (ROMAZICON) injection 0.2 mg, 0.2 mg, Intravenous, q1 min prn, Ruiz Mitchell PA-C    folic acid (FOLVITE) tablet 1 mg, 1 mg, Oral, Daily, Ruiz Mitchell PA-C, 1 mg at 07/26/23 0847    gabapentin (NEURONTIN) tablet 600 mg, 600 mg, Oral, TID, Kamilla Temple, APRN CNP    OLANZapine zydis (zyPREXA) ODT tab 5-10 mg, 5-10 mg, Oral, Q6H PRN **OR** haloperidol  lactate (HALDOL) injection 2.5-5 mg, 2.5-5 mg, Intravenous, Q6H PRN, Ruiz Mitchell PA-C    hydrOXYzine (ATARAX) tablet 25-50 mg, 25-50 mg, Oral, TID PRN, Ruiz Mitchell PA-C    lidocaine (LMX4) cream, , Topical, Q1H PRN, Ruiz Mitchell PA-C    lidocaine 1 % 0.1-1 mL, 0.1-1 mL, Other, Q1H PRN, Ruiz Mitchell PA-C    melatonin tablet 5 mg, 5 mg, Oral, QPM PRN, Ruiz Mitchell PA-C    multivitamin w/minerals (THERA-VIT-M) tablet 1 tablet, 1 tablet, Oral, Daily, Ruiz Mitchell PA-C, 1 tablet at 07/26/23 0846    nicotine (NICORETTE) gum 2 mg, 2 mg, Buccal, Q1H PRN, Tori Pa MD, 2 mg at 07/25/23 1746    ondansetron (ZOFRAN ODT) ODT tab 4 mg, 4 mg, Oral, Q6H PRN, 4 mg at 07/26/23 0845 **OR** ondansetron (ZOFRAN) injection 4 mg, 4 mg, Intravenous, Q6H PRN, Ruiz Mitchell PA-C    pantoprazole (PROTONIX) EC tablet 40 mg, 40 mg, Oral, BID AC, Ruiz Mitchell PA-C, 40 mg at 07/26/23 0846    polyethylene glycol (MIRALAX) Packet 17 g, 17 g, Oral, Daily PRN, Ruiz Mitchell PA-C    senna-docusate (SENOKOT-S/PERICOLACE) 8.6-50 MG per tablet 1 tablet, 1 tablet, Oral, BID PRN **OR** senna-docusate (SENOKOT-S/PERICOLACE) 8.6-50 MG per tablet 2 tablet, 2 tablet, Oral, BID PRN, Ruiz Mitchell PA-C    sodium chloride (PF) 0.9% PF flush 3 mL, 3 mL, Intracatheter, Q8H, Ruiz Mitchell PA-C    sodium chloride (PF) 0.9% PF flush 3 mL, 3 mL, Intracatheter, q1 min prn, Ruiz Mitchell PA-C    sucralfate (CARAFATE) tablet 1 g, 1 g, Oral, 4x Daily, Ruiz Mitchell PA-C, 1 g at 07/26/23 0852    thiamine (B-1) tablet 100 mg, 100 mg, Oral, Daily, Jimmie Jacobo MD, 100 mg at 07/26/23 0846    traZODone (DESYREL) tablet 50 mg, 50 mg, Oral, At Bedtime PRN, Ruiz Mitchell PA-C    Facility-Administered Medications Ordered in Other Encounters:     Self Administer Medications: Behavioral Services, , Does not apply, See Admin Instructions, Kenny Forte MD    Physical Exam:    /85 (BP  Location: Right arm)   Pulse 60   Temp 98.2  F (36.8  C)   Resp 18   SpO2 97%      GENERAL: Alert and oriented x 3. NAD.   HEENT: Anicteric sclera. Mucous membranes moist.   CV: RRR. S1, S2. No murmurs appreciated.   RESPIRATORY: Effort normal on RA. Lungs CTAB with no wheezing, rales, rhonchi.   GI: Abdomen soft and non distended with normoactive bowel sounds present in all quadrants. No tenderness, rebound, guarding.   NEUROLOGICAL: No focal deficits. Moves all extremities.    EXTREMITIES: No peripheral edema. Intact bilateral pedal pulses.   SKIN: No jaundice. No rashes.     Labs & Studies of Note: I personally reviewed the following studies:  Last Comprehensive Metabolic Panel:  Lab Results   Component Value Date     07/26/2023    POTASSIUM 4.5 07/26/2023    CHLORIDE 100 07/26/2023    CO2 27 07/26/2023    ANIONGAP 12 07/26/2023    GLC 92 07/26/2023    BUN 19.5 07/26/2023    CR 0.66 (L) 07/26/2023    GFRESTIMATED >90 07/26/2023    KEELEY 9.4 07/26/2023       CBC RESULTS:   Recent Labs   Lab Test 07/26/23  0717   WBC 6.7   RBC 4.29*   HGB 12.6*  12.6*   HCT 37.2*   MCV 87   MCH 29.4   MCHC 33.9   RDW 15.1*

## 2023-07-27 VITALS
RESPIRATION RATE: 18 BRPM | SYSTOLIC BLOOD PRESSURE: 131 MMHG | OXYGEN SATURATION: 97 % | TEMPERATURE: 98.2 F | HEART RATE: 58 BPM | DIASTOLIC BLOOD PRESSURE: 99 MMHG

## 2023-07-27 PROBLEM — F10.921 ALCOHOL INTOXICATION WITH DELIRIUM (H): Status: RESOLVED | Noted: 2023-07-25 | Resolved: 2023-07-27

## 2023-07-27 PROBLEM — K92.1 GASTROINTESTINAL HEMORRHAGE WITH MELENA: Status: RESOLVED | Noted: 2023-07-25 | Resolved: 2023-07-27

## 2023-07-27 PROBLEM — R33.9 URINARY RETENTION: Status: RESOLVED | Noted: 2023-07-25 | Resolved: 2023-07-27

## 2023-07-27 LAB
GLUCOSE BLDC GLUCOMTR-MCNC: 103 MG/DL (ref 70–99)
HGB BLD-MCNC: 13.7 G/DL (ref 13.3–17.7)

## 2023-07-27 PROCEDURE — 85018 HEMOGLOBIN: CPT | Performed by: PHYSICIAN ASSISTANT

## 2023-07-27 PROCEDURE — 250N000013 HC RX MED GY IP 250 OP 250 PS 637

## 2023-07-27 PROCEDURE — 250N000013 HC RX MED GY IP 250 OP 250 PS 637: Performed by: PHYSICIAN ASSISTANT

## 2023-07-27 PROCEDURE — 36415 COLL VENOUS BLD VENIPUNCTURE: CPT | Performed by: PHYSICIAN ASSISTANT

## 2023-07-27 PROCEDURE — 99239 HOSP IP/OBS DSCHRG MGMT >30: CPT | Performed by: INTERNAL MEDICINE

## 2023-07-27 PROCEDURE — 250N000013 HC RX MED GY IP 250 OP 250 PS 637: Performed by: INTERNAL MEDICINE

## 2023-07-27 RX ORDER — PANTOPRAZOLE SODIUM 40 MG/1
40 TABLET, DELAYED RELEASE ORAL
Qty: 30 TABLET | Refills: 0 | Status: SHIPPED | OUTPATIENT
Start: 2023-07-27 | End: 2023-08-11

## 2023-07-27 RX ORDER — GABAPENTIN 800 MG/1
TABLET ORAL
Qty: 10 TABLET | Refills: 0 | Status: SHIPPED | OUTPATIENT
Start: 2023-07-27 | End: 2023-07-27

## 2023-07-27 RX ORDER — FOLIC ACID 1 MG/1
1 TABLET ORAL DAILY
Qty: 3 TABLET | Refills: 0 | Status: SHIPPED | OUTPATIENT
Start: 2023-07-28 | End: 2023-07-31

## 2023-07-27 RX ORDER — GABAPENTIN 600 MG/1
600 TABLET ORAL 3 TIMES DAILY
Qty: 90 TABLET | Refills: 0 | Status: ON HOLD | OUTPATIENT
Start: 2023-07-27 | End: 2023-08-29

## 2023-07-27 RX ORDER — LANOLIN ALCOHOL/MO/W.PET/CERES
100 CREAM (GRAM) TOPICAL DAILY
Qty: 30 TABLET | Refills: 0 | Status: SHIPPED | OUTPATIENT
Start: 2023-07-28 | End: 2023-08-25

## 2023-07-27 RX ORDER — GABAPENTIN 400 MG/1
400 CAPSULE ORAL 3 TIMES DAILY
Qty: 90 CAPSULE | Refills: 0 | Status: SHIPPED | OUTPATIENT
Start: 2023-07-27 | End: 2023-07-27

## 2023-07-27 RX ADMIN — THIAMINE HCL TAB 100 MG 100 MG: 100 TAB at 08:43

## 2023-07-27 RX ADMIN — SUCRALFATE 1 G: 1 TABLET ORAL at 08:43

## 2023-07-27 RX ADMIN — PANTOPRAZOLE SODIUM 40 MG: 40 TABLET, DELAYED RELEASE ORAL at 08:43

## 2023-07-27 RX ADMIN — MULTIPLE VITAMINS W/ MINERALS TAB 1 TABLET: TAB at 08:43

## 2023-07-27 RX ADMIN — GABAPENTIN 600 MG: 600 TABLET, FILM COATED ORAL at 08:43

## 2023-07-27 RX ADMIN — FOLIC ACID 1 MG: 1 TABLET ORAL at 08:43

## 2023-07-27 ASSESSMENT — ACTIVITIES OF DAILY LIVING (ADL)
ADLS_ACUITY_SCORE: 22

## 2023-07-27 NOTE — PLAN OF CARE
Pt discharged today at 1415 after being seen by MD.  Pt received all medications prescribed to discharge pharmacy including gabapentin.  Pt received AVS and discussed any questions with the nurse: yes, all questions answered.  Pt discharged to home and got there through family transportation.  Pt has a follow up appointment with psychiatry in one week.  Pt signed for all medications except n/a.

## 2023-07-27 NOTE — DISCHARGE SUMMARY
Medicine Discharge Summary       Nikhil Rios MRN# [id number]   YOB: 1985 Age: 37 year old     Date of Admission:  7/25/2023  Date of Discharge:  7/27/2023  Admitting Physician:  Jimmie Jacobo MD  Discharge Physician:  Jami Delacruz  Discharging Service:  Hospital Medicine     Primary Provider: Lisandro Graham              Discharge Diagnosis:   alcohol intoxication  alcohol use disorder with withdrawal  abdominal pain  alcoholic gastritis  urinary retention  chronic pancreatitis  chest pain  Hypernatremia  nicotine use disorder         Consultations:   Psychiatry  mental health   social work           Hospital Course     Nikhil Rios is a 37 year old male patient with a past medical history significant for severe alcohol use disorder with withdrawal seizures, benzodiazepine abuse, chronic pancreatitis, GERD, HTN, HLD, tobacco use, cannabis use who was recently admitted to inpatient detox at [facility] 6/17/23-6/20/23 for concurrent benzodiazepine and etoh withdrawal. He has presented to the ED 5 times since then for alcohol intoxication and withdrawal. He presented to the [facility] ER 7/25/23 (after eloping the previous evening from [facility] ED) seeking alcohol detox, and was subsequently admitted after Health Officer Authority to Detain was filed in the ED when he attempted to elope from [facility] while acutely intoxicated.     Acute alcohol intoxication  H/o severe alcohol use disorder with withdrawal and history of seizures  H/o benzodiazepine abuse  psychiatry was consulted and patient was started on gabapentin 600 mg PO TID prior to admission gabapentin doses well-adjusted and discontinued at discharge.     Abdominal Pain - resolved  Melena?  Urinary Retention, resolved  CT A/P done in ED with sequela of chronic pancreatitis but acute intra-abdominal pathology to explain symptoms. Lipase, LFTs normal. WBC 7.3. UA negative for infection or ketones. CXR negative for infection.        Back  "Pain - resolved  Pt reporting his back pain has resolved at this time. No numbness or tingling, full ROM all joints. No noted lumbar or pelvic abnormalities on CT A/P.   - Likely musculoskeletal. Will monitor for recurrence.     Chest Pain - resolved  Shortness of Breath - resolved  Pt reporting chest pain and shortness of breath x 1-2 month. EKG without acute ischemic changes. Troponin negative. CXR, CT A/P unremarkable for source. Reviewed chart, and he has been complaining of these symptoms for longer than 1 month since he underwent evaluation with CT PE 5/20/23 (ordered for chest pain, shortness of breath) which was negative. Patient reports his SOB and CP have resolved at current moment and he is feeling fine.  Echocardiogram showed no abnormalities.     Anemia, mild. In the setting of etiologies disorder. Doubt and upper G.I. hemorrhage.  Patient reports stools have looked dark for the past ~1 week. Hemoglobin 12.7 (13.3 on 7/23/23).      Hypernatremia, due to dehydration. Resolved.  Na noted to be 148 in setting of poor PO.       Tobacco Use Disorder  Counseled on smoking cessation     GERD. He denied having any vomiting during two days of inpatient hospital observation. Hemoglobin remained stable. No any melena, hematochezia or hematemesis.   -pantoprazole BID for two weeks. Advised follow-up with PCP in 1 to 2 weeks discharged.            On Exam ;   Alert and oriented . No acute distress  Vital signs:      BP: (!) 131/99 Pulse: 58   Resp: 18 SpO2: 97 % O2 Device: None (Room air)        Estimated body mass index is 23.68 kg/m  as calculated from the following:    Height as of 7/24/23: 1.778 m (5' 10\").    Weight as of 7/24/23: 74.8 kg (165 lb).    GENERAL: Alert and oriented x 3. NAD.   HEENT: Anicteric sclera. Mucous membranes moist.   CV: RRR. S1, S2. No murmurs appreciated.   RESPIRATORY: Effort normal on RA. Lungs CTAB with no wheezing, rales, rhonchi.   GI: Abdomen soft and non distended with " normoactive bowel sounds present in all quadrants. No tenderness, rebound, guarding.   NEUROLOGICAL: No focal deficits. Moves all extremities.    EXTREMITIES: No peripheral edema. Intact bilateral pedal pulses.   SKIN: No jaundice. No rashes.   ROUTINE IP LABS (Last four results)  BMP  Recent Labs   Lab 07/27/23  0813 07/26/23  0717 07/25/23  1205 07/23/23  1033 07/21/23  1851   NA  --  139 148* 149* 144   POTASSIUM  --  4.5 4.2 4.2 4.2   CHLORIDE  --  100 107 107 101   KEELEY  --  9.4 9.0 8.4* 9.1   CO2  --  27 29 28 25   BUN  --  19.5 11.6 9.5 14.7   CR  --  0.66* 0.68 0.76 0.82   * 92 99 108* 84     CBC  Recent Labs   Lab 07/27/23  1058 07/26/23  2239 07/26/23  1608 07/26/23  0717 07/25/23  2215 07/25/23  1205 07/23/23  1033 07/20/23  1425   WBC  --   --   --  6.7  --  7.3 6.9 7.7   RBC  --   --   --  4.29*  --  4.33* 4.52 4.78   HGB 13.7 13.2* 13.9 12.6*  12.6*   < > 12.7* 13.3 14.2   HCT  --   --   --  37.2*  --  37.6* 39.0* 41.7   MCV  --   --   --  87  --  87 86 87   MCH  --   --   --  29.4  --  29.3 29.4 29.7   MCHC  --   --   --  33.9  --  33.8 34.1 34.1   RDW  --   --   --  15.1*  --  15.4* 15.3* 14.9   PLT  --   --   --  160  --  193 246 280    < > = values in this interval not displayed.     INR  Recent Labs   Lab 07/25/23  1205   INR 0.86                    Discharge Medications:     Current Discharge Medication List        START taking these medications    Details   folic acid (FOLVITE) 1 MG tablet Take 1 tablet (1 mg) by mouth daily for 3 doses  Qty: 3 tablet, Refills: 0    Associated Diagnoses: Alcohol dependence with intoxication with complication (H)      pantoprazole (PROTONIX) 40 MG EC tablet Take 1 tablet (40 mg) by mouth 2 times daily (before meals) for 30 doses  Qty: 30 tablet, Refills: 0    Associated Diagnoses: Alcohol dependence with intoxication with complication (H); Epigastric pain      thiamine (B-1) 100 MG tablet Take 1 tablet (100 mg) by mouth daily for 30 doses  Qty: 30 tablet,  Refills: 0    Associated Diagnoses: Alcohol dependence with intoxication with complication (H)           CONTINUE these medications which have CHANGED    Details   gabapentin (NEURONTIN) 600 MG tablet Take 1 tablet (600 mg) by mouth 3 times daily for 30 days  Qty: 90 tablet, Refills: 0    Associated Diagnoses: Alcohol dependence with intoxication with complication (H)           CONTINUE these medications which have NOT CHANGED    Details   chlordiazePOXIDE (LIBRIUM) 5 MG capsule Take 1 capsule (5 mg) by mouth 3 times daily as needed for anxiety for 3 days, THEN 1 capsule (5 mg) 2 times daily for 2 days, THEN 1 capsule (5 mg) daily for 2 days.  Qty: 15 capsule, Refills: 0      cloNIDine (CATAPRES) 0.1 MG tablet Take 0.1 mg by mouth 2 times daily Hold the dose if blood pressure is 90/60 or lower. Then recheck in 30 minutes to an hour.      hydrOXYzine (ATARAX) 25 MG tablet Take 1-2 tablets (25-50 mg) by mouth 3 times daily as needed for itching  Qty: 30 tablet, Refills: 0    Associated Diagnoses: Generalized anxiety disorder; Alcohol use disorder, severe, dependence (H)      naltrexone (DEPADE/REVIA) 50 MG tablet Take 50 mg by mouth daily      nicotine polacrilex (NICORETTE) 4 MG gum Place 1 each (4 mg) inside cheek every hour as needed for other (nicotine withdrawal symptoms)  Qty: 30 each, Refills: 0    Associated Diagnoses: Alcohol withdrawal syndrome without complication (H)      sucralfate (CARAFATE) 1 GM tablet Take 1 tablet (1 g) by mouth 4 times daily  Qty: 30 tablet, Refills: 0    Associated Diagnoses: Alcohol withdrawal syndrome without complication (H)      traZODone (DESYREL) 50 MG tablet Take 1 tablet (50 mg) by mouth nightly as needed for sleep (may repeat after 60 minutes)  Qty: 30 tablet, Refills: 0    Associated Diagnoses: Alcohol withdrawal syndrome without complication (H)           STOP taking these medications       gabapentin (NEURONTIN) 400 MG capsule Comments:   Reason for Stopping:                     Discharge Instructions and Follow-Up:     Discharge Procedure Orders   Reason for your hospital stay   Order Comments: Alcohol intoxication, alcohol withdrawal syndrome. Alcoholic gastritis     Activity   Order Comments: Your activity upon discharge: activity as tolerated     Order Specific Question Answer Comments   Is discharge order? Yes      [facility] Follow-up and recommended labs and tests   Order Comments: Follow up with primary care provider, Lisandro Graham, within 7 days for hospital follow- up.  No follow up labs or test are needed.      Appointments on [place] and/or [facility] (with [facility] or [facility] provider or service). Call [phone] if you haven't heard regarding these appointments within 7 days of discharge.     Diet   Order Comments: Follow this diet upon discharge: Regular     Order Specific Question Answer Comments   Is discharge order? Yes          Reason for your hospital stay    Alcohol intoxication, alcohol withdrawal syndrome. Alcoholic gastritis     Activity    Your activity upon discharge: activity as tolerated     [facility] Follow-up and recommended labs and tests    Follow up with primary care provider, Lisandro Graham, within 7 days for hospital follow- up.  No follow up labs or test are needed.      Appointments on [place] and/or [facility] (with [facility] or [facility] provider or service). Call [phone] if you haven't heard regarding these appointments within 7 days of discharge.     Diet    Follow this diet upon discharge: Regular                Discharge Disposition:   34 minutes spent in discharge, including >50% in counseling and coordination of care, medication review and plan of care recommended on follow up. Questions were answered to satisfaction       Patty Reddy MD  Internal Medicine Hospitalist & Staff Physician  [facility]

## 2023-07-27 NOTE — PLAN OF CARE
Goal Outcome Evaluation:    VS: /85 (BP Location: Right arm)   Pulse 60   Temp 98.2  F (36.8  C)   Resp 18   SpO2 97%   Denied CP and SOB   O2: Stable on RA   Output: Voiding spontaneously without difficulty.   Last BM: 7/26 per pt report.   Activity: Ind in and out in the hallways    Skin: Intact.   Pain: Denies.   CMS: AO4.   Able to make needs known.   CMS intact.   Dressing: None.   Diet: Regular, tolerating well.    LDA: R-PIV SL.   Equipment: Personal Belongings.   Plan: Continue with POC.   Additional Info: CIWA scored at 2- no PRN

## 2023-07-30 ENCOUNTER — PATIENT OUTREACH (OUTPATIENT)
Dept: CARE COORDINATION | Facility: CLINIC | Age: 38
End: 2023-07-30
Payer: COMMERCIAL

## 2023-07-30 NOTE — PROGRESS NOTES
[facility] Resource [facility] Contact  [facility]/Voicemail     Clinical Data: Transitional Care Management Outreach     Outreach attempted x 2.  Left message on patient's voicemail, providing [facility]'s 24/7 scheduling and nurse triage phone number [phone] ([phone]) for questions/concerns and/or to schedule an appt with an [facility] provider, if they do not have a PCP.      Plan:  [facility] will do no further outreaches at this time.       Kanchan Mclean RN  Connected Care Resource [place], [facility]    *Connected Care Resource Team does NOT follow patient ongoing. Referrals are identified based on internal discharge reports and the outreach is to ensure patient has an understanding of their discharge instructions.

## 2023-08-03 ENCOUNTER — HOSPITAL ENCOUNTER (EMERGENCY)
Facility: CLINIC | Age: 38
Discharge: HOME | End: 2023-08-04
Attending: EMERGENCY MEDICINE | Admitting: EMERGENCY MEDICINE
Payer: COMMERCIAL

## 2023-08-03 ENCOUNTER — NURSE TRIAGE (OUTPATIENT)
Dept: NURSING | Facility: CLINIC | Age: 38
End: 2023-08-03
Payer: COMMERCIAL

## 2023-08-03 ENCOUNTER — TELEPHONE (OUTPATIENT)
Dept: BEHAVIORAL HEALTH | Facility: CLINIC | Age: 38
End: 2023-08-03

## 2023-08-03 DIAGNOSIS — F10.220 ALCOHOL DEPENDENCE WITH UNCOMPLICATED INTOXICATION (H): Primary | ICD-10-CM

## 2023-08-03 DIAGNOSIS — F10.930 ALCOHOL WITHDRAWAL, UNCOMPLICATED (H): ICD-10-CM

## 2023-08-03 DIAGNOSIS — F10.10 ALCOHOL ABUSE: ICD-10-CM

## 2023-08-03 LAB
ALBUMIN SERPL BCG-MCNC: 4.9 G/DL (ref 3.5–5.2)
ALCOHOL BREATH TEST: 0.43 (ref 0–0.01)
ALP SERPL-CCNC: 72 U/L (ref 40–129)
ALT SERPL W P-5'-P-CCNC: 84 U/L (ref 0–70)
ANION GAP SERPL CALCULATED.3IONS-SCNC: 14 MMOL/L (ref 7–15)
AST SERPL W P-5'-P-CCNC: 133 U/L (ref 0–45)
BASOPHILS # BLD AUTO: 0.1 10E3/UL (ref 0–0.2)
BASOPHILS NFR BLD AUTO: 1 %
BILIRUB SERPL-MCNC: 0.3 MG/DL
BUN SERPL-MCNC: 16.2 MG/DL (ref 6–20)
CALCIUM SERPL-MCNC: 9.2 MG/DL (ref 8.6–10)
CHLORIDE SERPL-SCNC: 101 MMOL/L (ref 98–107)
CREAT SERPL-MCNC: 0.76 MG/DL (ref 0.67–1.17)
DEPRECATED HCO3 PLAS-SCNC: 27 MMOL/L (ref 22–29)
EOSINOPHIL # BLD AUTO: 0.1 10E3/UL (ref 0–0.7)
EOSINOPHIL NFR BLD AUTO: 1 %
ERYTHROCYTE [DISTWIDTH] IN BLOOD BY AUTOMATED COUNT: 14.9 % (ref 10–15)
ETHANOL SERPL-MCNC: 0.61 G/DL
GFR SERPL CREATININE-BSD FRML MDRD: >90 ML/MIN/1.73M2
GLUCOSE SERPL-MCNC: 114 MG/DL (ref 70–99)
HCT VFR BLD AUTO: 40.3 % (ref 40–53)
HGB BLD-MCNC: 14.1 G/DL (ref 13.3–17.7)
IMM GRANULOCYTES # BLD: 0 10E3/UL
IMM GRANULOCYTES NFR BLD: 0 %
LYMPHOCYTES # BLD AUTO: 3.2 10E3/UL (ref 0.8–5.3)
LYMPHOCYTES NFR BLD AUTO: 44 %
MCH RBC QN AUTO: 30 PG (ref 26.5–33)
MCHC RBC AUTO-ENTMCNC: 35 G/DL (ref 31.5–36.5)
MCV RBC AUTO: 86 FL (ref 78–100)
MONOCYTES # BLD AUTO: 0.9 10E3/UL (ref 0–1.3)
MONOCYTES NFR BLD AUTO: 12 %
NEUTROPHILS # BLD AUTO: 3 10E3/UL (ref 1.6–8.3)
NEUTROPHILS NFR BLD AUTO: 42 %
NRBC # BLD AUTO: 0 10E3/UL
NRBC BLD AUTO-RTO: 0 /100
PLATELET # BLD AUTO: 199 10E3/UL (ref 150–450)
POTASSIUM SERPL-SCNC: 3.7 MMOL/L (ref 3.4–5.3)
PROT SERPL-MCNC: 7.3 G/DL (ref 6.4–8.3)
RBC # BLD AUTO: 4.7 10E6/UL (ref 4.4–5.9)
SODIUM SERPL-SCNC: 142 MMOL/L (ref 136–145)
WBC # BLD AUTO: 7.2 10E3/UL (ref 4–11)

## 2023-08-03 PROCEDURE — 82075 ASSAY OF BREATH ETHANOL: CPT

## 2023-08-03 PROCEDURE — 36415 COLL VENOUS BLD VENIPUNCTURE: CPT | Performed by: EMERGENCY MEDICINE

## 2023-08-03 PROCEDURE — 82075 ASSAY OF BREATH ETHANOL: CPT | Mod: 91

## 2023-08-03 PROCEDURE — 99283 EMERGENCY DEPT VISIT LOW MDM: CPT

## 2023-08-03 PROCEDURE — 80053 COMPREHEN METABOLIC PANEL: CPT | Performed by: EMERGENCY MEDICINE

## 2023-08-03 PROCEDURE — 99284 EMERGENCY DEPT VISIT MOD MDM: CPT | Performed by: EMERGENCY MEDICINE

## 2023-08-03 PROCEDURE — 82077 ASSAY SPEC XCP UR&BREATH IA: CPT | Performed by: EMERGENCY MEDICINE

## 2023-08-03 PROCEDURE — 85025 COMPLETE CBC W/AUTO DIFF WBC: CPT | Performed by: EMERGENCY MEDICINE

## 2023-08-03 ASSESSMENT — ACTIVITIES OF DAILY LIVING (ADL): ADLS_ACUITY_SCORE: 37

## 2023-08-04 VITALS
RESPIRATION RATE: 16 BRPM | TEMPERATURE: 98.5 F | SYSTOLIC BLOOD PRESSURE: 127 MMHG | OXYGEN SATURATION: 98 % | DIASTOLIC BLOOD PRESSURE: 84 MMHG | HEART RATE: 88 BPM

## 2023-08-04 LAB
ALCOHOL BREATH TEST: 0.2 (ref 0–0.01)
ALCOHOL BREATH TEST: 0.23 (ref 0–0.01)
AMPHETAMINES UR QL SCN: ABNORMAL
BARBITURATES UR QL SCN: ABNORMAL
BENZODIAZ UR QL SCN: ABNORMAL
BZE UR QL SCN: ABNORMAL
CANNABINOIDS UR QL SCN: ABNORMAL
OPIATES UR QL SCN: ABNORMAL

## 2023-08-04 PROCEDURE — 80307 DRUG TEST PRSMV CHEM ANLYZR: CPT | Performed by: EMERGENCY MEDICINE

## 2023-08-04 PROCEDURE — 250N000013 HC RX MED GY IP 250 OP 250 PS 637: Performed by: EMERGENCY MEDICINE

## 2023-08-04 PROCEDURE — 250N000013 HC RX MED GY IP 250 OP 250 PS 637: Performed by: STUDENT IN AN ORGANIZED HEALTH CARE EDUCATION/TRAINING PROGRAM

## 2023-08-04 RX ORDER — HYDROXYZINE HYDROCHLORIDE 50 MG/1
50 TABLET, FILM COATED ORAL ONCE
Status: COMPLETED | OUTPATIENT
Start: 2023-08-04 | End: 2023-08-04

## 2023-08-04 RX ORDER — CHLORDIAZEPOXIDE HYDROCHLORIDE 25 MG/1
25-50 CAPSULE, GELATIN COATED ORAL 4 TIMES DAILY PRN
Qty: 24 CAPSULE | Refills: 0 | Status: SHIPPED | OUTPATIENT
Start: 2023-08-04 | End: 2023-08-07

## 2023-08-04 RX ORDER — DIAZEPAM 5 MG
5-20 TABLET ORAL EVERY 30 MIN PRN
Status: DISCONTINUED | OUTPATIENT
Start: 2023-08-04 | End: 2023-08-04 | Stop reason: HOSPADM

## 2023-08-04 RX ADMIN — DIAZEPAM 10 MG: 5 TABLET ORAL at 07:27

## 2023-08-04 RX ADMIN — DIAZEPAM 10 MG: 5 TABLET ORAL at 11:43

## 2023-08-04 RX ADMIN — DIAZEPAM 10 MG: 5 TABLET ORAL at 14:17

## 2023-08-04 RX ADMIN — HYDROXYZINE HYDROCHLORIDE 50 MG: 50 TABLET, FILM COATED ORAL at 12:56

## 2023-08-04 RX ADMIN — DIAZEPAM 10 MG: 5 TABLET ORAL at 09:25

## 2023-08-04 ASSESSMENT — ACTIVITIES OF DAILY LIVING (ADL)
ADLS_ACUITY_SCORE: 37

## 2023-08-04 NOTE — TELEPHONE ENCOUNTER
1:52 PM: Intake received a call from [facility] ED informing that Pt expressed not wanting to stay at this location for detox d/t his last admit when he had experienced seizure. Pt discharged with Mom. Pt removed from work list.    1:54 PM: Intake removed Pt from work list.

## 2023-08-04 NOTE — ED PROVIDER NOTES
History     Chief Complaint   Patient presents with    Alcohol Intoxication     Patient arrives with mother intoxicated, he is crying stating that he needs help. He drinks 750ml a day -today more. States to have had a seizure history, mom thinks it was a month ago in detox.      HPI  Nikhil Rios is a 37 year old male with PMH significant for HTN, HLD, alcohol abuse/dependence, benzodiazepine use, cannabis use, tobacco use, alcohol withdrawal seizures, and alcohol-induced pancreatitis who presents to the emergency department for evaluation of ETOH intoxication. Patient reports that he drinks 750mL a day but drank more today (today, he endorses 1.25 L).  The patient presents to the emergency department accompanied by his mother.  The patient would like help with his alcohol use issue and is interested in detox placement.  Last drink was prior to arrival.  The patient does have a history of alcohol withdrawal seizures, most recently during the patient's last hospitalization while the patient was in detox per his mother.  No other drug use reported.  No suicidal or homicidal ideation reported.  No acute physical concerns.  No history of DTs reported.  The patient does report a depressed mood.  Did have some sweating earlier today/about an hour ago.    Patient's records were reviewed, the patient was admitted to internal medicine on 7/25 and discharged on 7/27.  (Has been drinking heavily since that time per his mother.) I do not see mention of a seizure in his discharge summary and per patient's report, he experienced shaking of all of his limbs, but retained consciousness.  It seems unlikely that this was a seizure episode, however, per chart review, it appears the patient does have a history of seizures related to his alcohol withdrawal in the past, it is unclear when his last seizure was..    I have reviewed the Medications, Allergies, Past Medical and Surgical History, and Social History in the Epic  system.    Past Medical History:   Diagnosis Date    Alcohol abuse     Alcohol abuse     Alcohol withdrawal (H)     Depressive disorder     Family history of diabetes mellitus 11/9/2007    HLD (hyperlipidemia)     HTN (hypertension)      Past Surgical History:   Procedure Laterality Date    NO HISTORY OF SURGERY      OTHER SURGICAL HISTORY      none     No current facility-administered medications for this encounter.     Current Outpatient Medications   Medication    cloNIDine (CATAPRES) 0.1 MG tablet    gabapentin (NEURONTIN) 600 MG tablet    hydrOXYzine (ATARAX) 25 MG tablet    naltrexone (DEPADE/REVIA) 50 MG tablet    nicotine polacrilex (NICORETTE) 4 MG gum    pantoprazole (PROTONIX) 40 MG EC tablet    sucralfate (CARAFATE) 1 GM tablet    thiamine (B-1) 100 MG tablet    traZODone (DESYREL) 50 MG tablet     Facility-Administered Medications Ordered in Other Encounters   Medication    Self Administer Medications: Behavioral Services     Allergies   Allergen Reactions    Gramineae Pollens Itching    Pollen Extract Rash     grass tree pollen     Past medical history, past surgical history, medications, and allergies were reviewed with the patient. Additional pertinent items: None    Social History     Socioeconomic History    Marital status: Single     Spouse name: Not on file    Number of children: Not on file    Years of education: Not on file    Highest education level: Not on file   Occupational History    Occupation: Rental property owner   Tobacco Use    Smoking status: Some Days     Packs/day: 0.25     Types: Cigars, Cigarettes    Smokeless tobacco: Never    Tobacco comments:     occasional   Substance and Sexual Activity    Alcohol use: Yes     Alcohol/week: 17.0 standard drinks of alcohol     Types: 17 Shots of liquor per week     Comment: Drinks 750ml/day or 17 shots/day; hx of withdrawl seizures    Drug use: Not Currently     Types: Marijuana    Sexual activity: Not Currently   Other Topics Concern    Not  on file   Social History Narrative    Not on file     Social Determinants of Health     Financial Resource Strain: Not on file   Food Insecurity: Not on file   Transportation Needs: No Transportation Needs (7/21/2021)    PRAPARE - Transportation     Lack of Transportation (Medical): No     Lack of Transportation (Non-Medical): No   Physical Activity: Not on file   Stress: Stress Concern Present (7/21/2021)    [employer] - Occupational Stress Questionnaire     Feeling of Stress : Very much   Social Connections: Not on file   Intimate Partner Violence: Not on file   Housing Stability: Unknown (7/21/2021)    Housing Stability Vital Sign     Unable to Pay for Housing in the Last Year: No     Number of Places Lived in the Last Year: Not on file     Unstable Housing in the Last Year: No     Social history was reviewed with the patient. Additional pertinent items: None    Review of Systems  A medically appropriate review of systems was performed with pertinent positives and negatives noted in the HPI, and all other systems negative.    Physical Exam   BP: 131/85  Pulse: 109  Temp: 98.5  F (36.9  C)  Resp: 18  SpO2: 95 %      General: Smells of EtOH, intermittently tearful  HEENT: EOMI, anicteric. NCAT, MMM  Neck: no jugular venous distension, supple, nl ROM  Cardiac: Tachycardic rate, extremities well-perfused  Pulm: NLB, normal RR  Skin: Warm and dry to the touch.  No rash  Extremities: No LE edema, no cyanosis, w/w/p  Neuro: Alert, slurred speech consistent with alcohol intoxication  Psych: No HI or SI    ED Course        Procedures                           Labs Ordered and Resulted from Time of ED Arrival to Time of ED Departure   ALCOHOL BREATH TEST POCT - Abnormal       Result Value    Alcohol Breath Test 0.426 (*)    COMPREHENSIVE METABOLIC PANEL   ETHYL ALCOHOL LEVEL   CBC WITH PLATELETS AND DIFFERENTIAL            Results for orders placed or performed during the hospital encounter of  08/03/23 (from the past 24 hour(s))   Alcohol breath test POCT   Result Value Ref Range    Alcohol Breath Test 0.426 (A) 0.00 - 0.01   CBC with platelets differential    Narrative    The following orders were created for panel order CBC with platelets differential.  Procedure                               Abnormality         Status                     ---------                               -----------         ------                     CBC with platelets and d...[[id number]]                                                   Please view results for these tests on the individual orders.       Labs, vital signs, and imaging studies were reviewed by me.    Medications - No data to display    Assessments & Plan (with Medical Decision Making)   Nikhil Rios is a 37 year old male who presents to the emergency department seeking detox placement.    Patient was discussed with detox intake, they do have a bed available for the patient.  Medical clearance labs were ordered.    Laboratory work-up is remarkable for alcohol level 0.426, slight elevation of ALT/AST, consistent with heavy alcohol usage    I have reviewed the nursing notes.    I have reviewed the findings, diagnosis, plan and need for follow up with the patient.    Patient to be admitted to detox for further management. Plan was discussed with patient who understands and agrees with plan.     Critical care was not performed.     Medical Decision Making  The patient's presentation was of high complexity (a chronic illness severe exacerbation, progression, or side effect of treatment).    The patient's evaluation involved:  an assessment requiring an independent historian (pt's mother)  ordering and/or review of 3+ test(s) in this encounter (see separate area of note for details)  discussion of management or test interpretation with another health professional (detox intake)    The patient's management necessitated moderate risk (prescription drug management including  medications given in the ED) and high risk (a decision regarding hospitalization).    New Prescriptions    No medications on file       Final diagnoses:   Alcohol abuse       DENISE AZUL MD  8/3/2023   [facility] EMERGENCY DEPARTMENT       Denise Azul MD  08/03/23 [id number]       Denise Azul MD  08/03/23 0430

## 2023-08-04 NOTE — TELEPHONE ENCOUNTER
S: [facility] , Provider Dr. Azul calling at 22:48 with clinical on a 37 year old/Male presenting for alcohol detox. Brought in by family.     B: Pt presents for ETOH detox.   Currently reports drinking 750ml/day for since JUly 27th.    Patient reports last use was prior to ED.  Pt SHERITA: .426   Pt  denies hx of DT  Pt  endorses hx of seizures. Last seizure:  last hospitalization but unknowm  Pt endorsing the following symptoms of withdrawal:  Nothing at this time  MSSA Score: NA    Pt denies acute mental health or medical concerns.   Pt denies other drug use: None Amount/frequency: N/A    Does Pt have a detox care plan in [facility]? No  Does pt present with specific needs, assistive devices, or exclusionary criteria? None  Is the patient ambulating, eating and drinking in the ED? yes    A: Pt meets criteria to be presented for IP detox admission. Patient is voluntary    COVID Symptoms: No  If yes, COVID test required   Utox: Ordered, not yet collected  CMP: Abnormalities: AST: 114; ALT: 84  CBC: WNL  HCG: N/A     R: Patient cleared and ready for behavioral bed placement: No    Pt is meeting criteria for presentation to 3A/CD     11:01pm Intake called [facility].     1113pm  Intake received a call from [facility] accepting this pt for st 3A/CD/[place]. Provider would like the pt to wait in ED until SHERITA comes down and receive a dose of Valium before admission.     2334 - Intake called station 3A and provided disposition to CRN; Nurse report charge will call    2337 - Intake called ED; awaiting report from 3A CRN

## 2023-08-04 NOTE — DISCHARGE INSTRUCTIONS
Thank you for coming to the [facility] emergency department.  As we discussed we strongly recommended supervised inpatient detox which you accepted to.  However we understand given your experiences there on prior admission that you were reluctant to go to the service.  You are continuing to demonstrate signs of alcohol withdrawal.  You have been receiving Valium.  We will discharge you on a medication called Librium to help control your withdrawal symptoms in the outpatient setting.  Please continue to work with your substance use treatment plan to attempt to obtain appropriate outpatient follow-up.    Should you suffer from any concerning increase in withdrawal symptoms, develop any seizure-like activity, hallucinations or any other concerning symptoms please do not hesitate to return the emergency department.

## 2023-08-04 NOTE — TELEPHONE ENCOUNTER
Patient called.  He is going to in patient tomorrow.  He states his friend is able to get him into in patient treatment.    Patient states he drank 750ml during the day today and now another 500ml of alcohol tonight.  Patient is slurring his words and states he is very depressed.  He statets he wants help.  He is sweating profusely.    Patient is with his mom.  Patient is able to breath, walk, not confused, doesn't want to harm himself or others.      Care advise: go to ED.  Mom will drive him    Nenagale Torre RN   08/03/23 9:16 PM  [facility] Nurse Advisor  Reason for Disposition   Questions or concerns about substance use (drug use), unhealthy drug use, intoxication, or withdrawal   Patient sounds very anxious or agitated    Additional Information   Negative: Coma (e.g., not moving, not talking, not responding to stimuli)   Negative: Difficult to awaken or acting confused (e.g., disoriented, slurred speech)   Negative: Seeing, hearing, or feeling things that are not there (i.e., visual, auditory, or tactile hallucinations)   Negative: Slow, shallow and weak breathing   Negative: Seizure   Negative: Violent behavior, or threatening to physically hurt or kill someone   Negative: Patient attempted suicide   Negative: Threatening suicide   Negative: Sounds like a life-threatening emergency to the triager   Negative: Recent significant injury, see that guideline (e.g., head, neck, chest, abdominal or extremity injury)   Negative: Coma (e.g., not moving, not talking, not responding to stimuli)   Negative: Difficult to awaken or acting confused (e.g., disoriented, slurred speech)   Negative: Seeing, hearing, or feeling things that are not there (i.e., visual, auditory, or tactile hallucinations)   Negative: Slow, shallow and weak breathing   Negative: Seizure   Negative: Violent behavior, or threatening to physically hurt or kill someone   Negative: Sounds like a life-threatening emergency to the triager    Negative: Suicide thoughts, threats, attempts, or questions   Negative: Recent significant injury, see that guideline (e.g., head, neck, chest, abdominal or extremity  injury)   Negative: Other significant medical symptom is present, see that guideline (e.g., chest pain, headache, vomiting)   Negative: Questions or concerns about marijuana use (cannabis use)   Negative: Questions or concerns about alcohol use, unhealthy alcohol use, binge drinking, intoxication, or withdrawal   Negative: Questions or concerns about opioid use, misuse, dependence, or withdrawal   Negative: Depression is main problem or symptom (e.g., feelings of sadness or hopelessness)   Negative: Very strange, paranoid, or confused behavior   Negative: Feeling very shaky (i.e., visible tremors of hands)   Negative: [1] Pregnant AND [2] symptoms of narcotic withdrawal (e.g., vomiting, severe muscle cramps)   Negative: [1] SEVERE vomiting (e.g., 6 or more times/day) AND [2] present > 8 hours (Exception: patient sounds well, is drinking liquids, does not sound dehydrated, and vomiting has lasted less than 24 hours)   Negative: [1] MODERATE vomiting (e.g., 3 - 5 times/day) AND [2] age > 60 years    Protocols used: Alcohol Use and Tokpswng-T-TS, Substance Use and [id number]-T-EG

## 2023-08-04 NOTE — PROGRESS NOTES
I took signout on the patient from Dr. Myers.  This is a 37 year old male with severe EtOH use disorder pending admission to a detox unit.    Patient remained stable in the emergency department at the beginning of my shift.  Early on in the shift I was notified by nursing staff the patient change his mind and did not want wish to undergo admission to the detox unit as he states he had a seizure on their previously and did not feel that they took his withdrawal seriously.  The patient is not interested in referrals to other detox facilities.  He is on a list for a outpatient detox facility with a 1 to 2-week backlog at this time.    The patient's blood alcohol level remained quite elevated and his mother did not wish to take him into her custody at this time.  The patient stated he wished to be admitted to the MedSur floor.  Unfortunately at this time patient is controlled on oral medications, has been accepted  to 3 A and I do not believe it would be an appropriate or indicated use of medical resources to admit him to the medical surgical floor given his otherwise appropriate disposition and treatment plan.    Stated that we would continue to monitor the patient as he reaches sobriety.  If he started to require IV medications would readdress medical admission.  Patient was offered continued opportunity towards detox admission multiple times.  He continued to decline this.    I engaged our chemical dependency counselor who had very portia and deep discussions with the patient and provided what additional resources he could.    Later in the afternoon patient was reassessed with his mother present.  Patient still has an elevated blood alcohol level but is clinically sober and she is frustrated that he is discharging but does not oppose the plan at this time.  We discussed that we will attempt to optimize his chances at outpatient success with Librium to hopefully help control his withdrawal symptoms.  Patient had  been on this previously.  Did remind the patient multiple times that should he change his mind he is welcome to return here at any time for assistance with detox and withdrawal symptoms.  Had a long discussion that the risk of seizure is not 0 even in a controlled environment such as the inpatient detox unit which the patient is unfortunately well aware of.  Patient and his mother verbalized their understanding.    Ultimately discharged with a Librium prescription in the custody of his mother in a self-directed/AGAINST MEDICAL ADVICE fashion.  Outpatient care was optimized to the best of our ability.  Return precautions were clearly expressed.

## 2023-08-04 NOTE — CONSULTS
Triage & Transition Services, [address]     Nikhil Rios  August 4, 2023    Reviewed Patient's medical records. Case management was declined with the assessment. Pt states that he will be returning home and attend treatment with [facility]. Pt set everything up on his own, after talking to the pt again he stated that he would not qualify for residential treatment with [facility] in [place].      Plan: Pt to discharge to home and find treatment. Pt refused the assessment     CITLALLI ROMERO  Triage & Transition Services - Mental Health and Addiction Service Line  [address] - Adult Inpatient Addiction Psychiatry Unit

## 2023-08-04 NOTE — ED TRIAGE NOTES
Patient arrives with mother intoxicated, he is crying stating that he needs help. He drinks 750ml a day -today more. States to have had a seizure history, mom thinks it was a month ago in detox.      Triage Assessment       Row Name 08/03/23 3051       Triage Assessment (Adult)    Airway WDL WDL       Respiratory WDL    Respiratory WDL WDL       Skin Circulation/Temperature WDL    Skin Circulation/Temperature WDL WDL       Cardiac WDL    Cardiac WDL WDL       Peripheral/Neurovascular WDL    Peripheral Neurovascular WDL WDL       Cognitive/Neuro/Behavioral WDL    Cognitive/Neuro/Behavioral WDL X

## 2023-08-07 ENCOUNTER — VIRTUAL VISIT (OUTPATIENT)
Dept: PSYCHOLOGY | Facility: CLINIC | Age: 38
End: 2023-08-07
Payer: COMMERCIAL

## 2023-08-07 DIAGNOSIS — F33.1 MAJOR DEPRESSIVE DISORDER, RECURRENT EPISODE, MODERATE (H): Primary | ICD-10-CM

## 2023-08-07 PROCEDURE — 90832 PSYTX W PT 30 MINUTES: CPT | Mod: GT | Performed by: PSYCHOLOGIST

## 2023-08-07 ASSESSMENT — PATIENT HEALTH QUESTIONNAIRE - PHQ9
SUM OF ALL RESPONSES TO PHQ QUESTIONS 1-9: 15
SUM OF ALL RESPONSES TO PHQ QUESTIONS 1-9: 15

## 2023-08-13 ENCOUNTER — HOSPITAL ENCOUNTER (EMERGENCY)
Facility: HOSPITAL | Age: 38
Discharge: HOME | End: 2023-08-14
Attending: EMERGENCY MEDICINE | Admitting: EMERGENCY MEDICINE
Payer: COMMERCIAL

## 2023-08-13 DIAGNOSIS — F10.920 ALCOHOLIC INTOXICATION WITHOUT COMPLICATION (H): ICD-10-CM

## 2023-08-13 LAB
ALBUMIN SERPL BCG-MCNC: 4.9 G/DL (ref 3.5–5.2)
ALP SERPL-CCNC: 70 U/L (ref 40–129)
ALT SERPL W P-5'-P-CCNC: 123 U/L (ref 0–70)
ANION GAP SERPL CALCULATED.3IONS-SCNC: 15 MMOL/L (ref 7–15)
AST SERPL W P-5'-P-CCNC: 169 U/L (ref 0–45)
BILIRUB SERPL-MCNC: 0.5 MG/DL
BUN SERPL-MCNC: 15.8 MG/DL (ref 6–20)
CALCIUM SERPL-MCNC: 9.5 MG/DL (ref 8.6–10)
CHLORIDE SERPL-SCNC: 98 MMOL/L (ref 98–107)
CREAT SERPL-MCNC: 0.73 MG/DL (ref 0.67–1.17)
DEPRECATED HCO3 PLAS-SCNC: 27 MMOL/L (ref 22–29)
ERYTHROCYTE [DISTWIDTH] IN BLOOD BY AUTOMATED COUNT: 15 % (ref 10–15)
ETHANOL SERPL-MCNC: 0.49 G/DL
GFR SERPL CREATININE-BSD FRML MDRD: >90 ML/MIN/1.73M2
GLUCOSE SERPL-MCNC: 125 MG/DL (ref 70–99)
HCT VFR BLD AUTO: 43.4 % (ref 40–53)
HGB BLD-MCNC: 14.6 G/DL (ref 13.3–17.7)
HOLD SPECIMEN: NORMAL
LIPASE SERPL-CCNC: 90 U/L (ref 13–60)
MAGNESIUM SERPL-MCNC: 2.2 MG/DL (ref 1.7–2.3)
MCH RBC QN AUTO: 29 PG (ref 26.5–33)
MCHC RBC AUTO-ENTMCNC: 33.6 G/DL (ref 31.5–36.5)
MCV RBC AUTO: 86 FL (ref 78–100)
PLATELET # BLD AUTO: 147 10E3/UL (ref 150–450)
POTASSIUM SERPL-SCNC: 3.9 MMOL/L (ref 3.4–5.3)
PROT SERPL-MCNC: 7.3 G/DL (ref 6.4–8.3)
RBC # BLD AUTO: 5.04 10E6/UL (ref 4.4–5.9)
SODIUM SERPL-SCNC: 140 MMOL/L (ref 136–145)
WBC # BLD AUTO: 5.2 10E3/UL (ref 4–11)

## 2023-08-13 PROCEDURE — 96375 TX/PRO/DX INJ NEW DRUG ADDON: CPT

## 2023-08-13 PROCEDURE — 83690 ASSAY OF LIPASE: CPT | Performed by: EMERGENCY MEDICINE

## 2023-08-13 PROCEDURE — 80053 COMPREHEN METABOLIC PANEL: CPT | Performed by: EMERGENCY MEDICINE

## 2023-08-13 PROCEDURE — 258N000003 HC RX IP 258 OP 636: Performed by: EMERGENCY MEDICINE

## 2023-08-13 PROCEDURE — 96374 THER/PROPH/DIAG INJ IV PUSH: CPT

## 2023-08-13 PROCEDURE — 36415 COLL VENOUS BLD VENIPUNCTURE: CPT | Performed by: EMERGENCY MEDICINE

## 2023-08-13 PROCEDURE — 250N000011 HC RX IP 250 OP 636: Mod: JZ | Performed by: EMERGENCY MEDICINE

## 2023-08-13 PROCEDURE — 99284 EMERGENCY DEPT VISIT MOD MDM: CPT | Mod: 25

## 2023-08-13 PROCEDURE — 82077 ASSAY SPEC XCP UR&BREATH IA: CPT | Performed by: EMERGENCY MEDICINE

## 2023-08-13 PROCEDURE — 83735 ASSAY OF MAGNESIUM: CPT | Performed by: EMERGENCY MEDICINE

## 2023-08-13 PROCEDURE — 85027 COMPLETE CBC AUTOMATED: CPT | Performed by: EMERGENCY MEDICINE

## 2023-08-13 RX ORDER — ONDANSETRON 2 MG/ML
4 INJECTION INTRAMUSCULAR; INTRAVENOUS ONCE
Status: COMPLETED | OUTPATIENT
Start: 2023-08-13 | End: 2023-08-13

## 2023-08-13 RX ORDER — LORAZEPAM 2 MG/ML
1 INJECTION INTRAMUSCULAR ONCE
Status: COMPLETED | OUTPATIENT
Start: 2023-08-13 | End: 2023-08-13

## 2023-08-13 RX ADMIN — SODIUM CHLORIDE 1000 ML: 9 INJECTION, SOLUTION INTRAVENOUS at 17:30

## 2023-08-13 RX ADMIN — LORAZEPAM 1 MG: 2 INJECTION INTRAMUSCULAR; INTRAVENOUS at 22:55

## 2023-08-13 RX ADMIN — FAMOTIDINE 20 MG: 10 INJECTION, SOLUTION INTRAVENOUS at 17:31

## 2023-08-13 RX ADMIN — ONDANSETRON 4 MG: 2 INJECTION INTRAMUSCULAR; INTRAVENOUS at 17:31

## 2023-08-13 ASSESSMENT — ACTIVITIES OF DAILY LIVING (ADL)
ADLS_ACUITY_SCORE: 37

## 2023-08-13 NOTE — ED PROVIDER NOTES
"EMERGENCY DEPARTMENT ENCOUNTER      NAME: Nikhil Rios  AGE: 37 year old male  YOB: 1985  MRN: [id number]  EVALUATION DATE & TIME: No admission date for patient encounter.    PCP: Lisandro Graham    ED PROVIDER: Tim Trevizo M.D.      Chief Complaint   Patient presents with    Alcohol Intoxication         FINAL IMPRESSION:  Acute alcohol intoxication  Chronic alcoholism    ED COURSE & MEDICAL DECISION MAKING:    Pertinent Labs & Imaging studies reviewed. (See chart for details)  37 year old male presents to the Emergency Department for evaluation of \"my anxiety has never been this bad before\".  Patient arrives with his mother who provides much of the history.  Per report patient with long history of alcohol abuse.  He relates back to when \"my father  when he was 18 and I could not save him\".  Patient reportedly with excessive alcohol intake ongoing for several days.  Did stop it briefly yesterday and began taking Librium but then began drinking once again today.  Mother reports she drank perhaps half of a 750 cc bottle of alcohol today.  On exam patient with moderate signs of intoxication.  He is slurring his words.  Conjunctiva markedly injected.  Some pupillary dilation.  He does reports minimal vomiting but without obvious diarrhea.  Patient with acute alcohol intoxication with long history of alcohol abuse and equivocal history of alcohol withdrawal seizures.  Mother reports their hope is to provide symptomatic care to keep blood alcohol \"below 400\" so he can go into detox tomorrow.  We will proceed with intravenous fluids, Pepcid and Zofran for symptomatic relief.  Based on blood work being obtained assess for alcoholic hepatitis, pancreatitis.  Blood alcohol level also being obtained.   Review of records indicate patient was seen on August 3, 2020 third, ,  all for alcohol intoxication/withdrawal symptomatology.  4:44 PM I met with the patient for the initial interview and " physical examination. Discussed plan for treatment and workup in the ED.    5:32 PM.  CBC unremarkable other than slight thrombocytopenia consistent with alcohol abuse  5:37 PM.  Lipase mildly elevated at 90.  Magnesium normal 2.2  5:40 PM.  Blood alcohol markedly elevated at 0.49  6:12 PM.  Preemptive metabolic panel essentially unremarkable other than moderate elevation of transaminases consistent with alcoholic hepatitis/injury.  We will continue to monitor patient.  6:21 PM nurse reports patient getting very restless.  Mother now present.  Informed patient will likely not be candidate for benzodiazepines until blood alcohol is near 300 which would be 68 hours from now.  He is in agreement.  We will continue to monitor in the ED.  Patient now eating a sandwich.    10:25 PM.  Patient getting increasingly agitated.  Possibly early withdrawal.  Given a milligram of Ativan.  Situation discussed with  and his shift.  Plan is to medicate the patient with Ativan through the night to forestall overt withdrawal.  Mother will take patient in the morning to detox.    At the conclusion of the encounter I discussed the results of all of the tests and the disposition. The questions were answered and return precautions provided. The patient or family acknowledged understanding and was agreeable with the care plan.       PPE: Provider wore gloves, N95 mask, eye protection, surgical cap, and paper mask.       MEDICATIONS GIVEN IN THE EMERGENCY:  Medications   0.9% sodium chloride BOLUS (1,000 mLs Intravenous $New Bag 8/13/23 1730)   famotidine (PEPCID) injection 20 mg (20 mg Intravenous $Given 8/13/23 1731)   ondansetron (ZOFRAN) injection 4 mg (4 mg Intravenous $Given 8/13/23 1731)       NEW PRESCRIPTIONS STARTED AT TODAY'S ER VISIT  New Prescriptions    No medications on file      Medical Decision Making    History:  Supplemental history from: Documented in chart, if applicable and Family Member/Significant  "Other  External Record(s) reviewed: Documented in chart, if applicable.    Work Up:  Chart documentation includes differential considered and any EKGs or imaging independently interpreted by provider, where specified.  In additional to work up documented, I considered the following work up: Documented in chart, if applicable.    External consultation:  Discussion of management with another provider: Documented in chart, if applicable    Complicating factors:  Care impacted by chronic illness: N/A  Care affected by social determinants of health: Alcohol Abuse and/or Recreational Drug Use    Disposition considerations:       =================================================================    HPI    Patient information was obtained from: Patient and patients mom    Use of Intrepreter: N/A       Nikhil Rios is a 37 year old male with a pertient medical history of alcoholism, HLD, hypertension, alcohol induced pancreatitis, alcoholic fatty liver, SILVANA, and alcohol withdrawal who presents to the ED for evaluation of alcohol intoxication and getting patient ready for sober house.     Per chart review: patient was [facility] ED on [facility] for alcohol intoxication. Laboratory work-up is remarkable for alcohol level 0.426, slight elevation of ALT/AST, consistent with heavy alcohol usage. Patient was admitted to detox for further management.     Patient reports having high anxiety today and using alcohol as a bandage for his anxiety. He states \"my anxiety has never been this bad before\".  He relates back to when \"my father  when he was 18 and I could not save him\". Patient has been several treatment program and is trying to set up for psychiatry treatment. Patient did not like his previous psychiatrist. Per mom, his alcoholism gets in the way of his therapy. [facility] and needs to be below a 0.4. Patient was been drinking alcohol for several days and stopped drinking " yesterday and took librium. The patient however drank half a 750mL bottle today. Per mom, the goal of today was to get the patient ready for sober house and not have any other occurring symptoms.     Patient was vomiting earlier today and denies having diarrhea.     REVIEW OF SYSTEMS   Constitutional:  Denies fever, chills. Positive for intoxication   Respiratory:  Denies productive cough or increased work of breathing  Cardiovascular:  Denies chest pain, palpitations  GI:  Denies abdominal pain, or change in bowel or bladder habits. Positive for vomiting.  Musculoskeletal:  Denies any new muscle/joint swelling  Skin:  Denies rash   Neurologic:  Denies focal weakness  All systems negative except as marked.     PAST MEDICAL HISTORY:  Past Medical History:   Diagnosis Date    Alcohol abuse     Alcohol abuse     Alcohol withdrawal (H)     Depressive disorder     Family history of diabetes mellitus 11/9/2007    HLD (hyperlipidemia)     HTN (hypertension)        PAST SURGICAL HISTORY:  Past Surgical History:   Procedure Laterality Date    NO HISTORY OF SURGERY      OTHER SURGICAL HISTORY      none         CURRENT MEDICATIONS:    No current facility-administered medications for this encounter.    Current Outpatient Medications:     cloNIDine (CATAPRES) 0.1 MG tablet, Take 0.1 mg by mouth 2 times daily Hold the dose if blood pressure is 90/60 or lower. Then recheck in 30 minutes to an hour., Disp: , Rfl:     gabapentin (NEURONTIN) 600 MG tablet, Take 1 tablet (600 mg) by mouth 3 times daily for 30 days, Disp: 90 tablet, Rfl: 0    hydrOXYzine (ATARAX) 25 MG tablet, Take 1-2 tablets (25-50 mg) by mouth 3 times daily as needed for itching, Disp: 30 tablet, Rfl: 0    naltrexone (DEPADE/REVIA) 50 MG tablet, Take 50 mg by mouth daily, Disp: , Rfl:     nicotine polacrilex (NICORETTE) 4 MG gum, Place 1 each (4 mg) inside cheek every hour as needed for other (nicotine withdrawal symptoms), Disp: 30 each, Rfl: 0    sucralfate  (CARAFATE) 1 GM tablet, Take 1 tablet (1 g) by mouth 4 times daily, Disp: 30 tablet, Rfl: 0    thiamine (B-1) 100 MG tablet, Take 1 tablet (100 mg) by mouth daily for 30 doses, Disp: 30 tablet, Rfl: 0    traZODone (DESYREL) 50 MG tablet, Take 1 tablet (50 mg) by mouth nightly as needed for sleep (may repeat after 60 minutes), Disp: 30 tablet, Rfl: 0    Facility-Administered Medications Ordered in Other Encounters:     Self Administer Medications: Behavioral Services, , Does not apply, See Admin Instructions, Kenny Forte MD    ALLERGIES:  Allergies   Allergen Reactions    Gramineae Pollens Itching    Pollen Extract Rash     grass tree pollen       FAMILY HISTORY:  Family History   Problem Relation Age of Onset    Coronary Artery Disease Father     Substance Abuse Maternal Grandfather     Mental Illness Brother     Schizophrenia Brother     Schizophrenia Maternal Aunt        SOCIAL HISTORY:   Social History     Socioeconomic History    Marital status: Single     Spouse name: None    Number of children: None    Years of education: None    Highest education level: None   Occupational History    Occupation: Rental property owner   Tobacco Use    Smoking status: Some Days     Packs/day: 0.25     Types: Cigars, Cigarettes    Smokeless tobacco: Never    Tobacco comments:     occasional   Substance and Sexual Activity    Alcohol use: Yes     Alcohol/week: 17.0 standard drinks of alcohol     Types: 17 Shots of liquor per week     Comment: Drinks 750ml/day or 17 shots/day; hx of withdrawl seizures    Drug use: Not Currently     Types: Marijuana    Sexual activity: Not Currently     Social Determinants of Health     Transportation Needs: No Transportation Needs (7/21/2021)    PRAPARE - Transportation     Lack of Transportation (Medical): No     Lack of Transportation (Non-Medical): No   Stress: Stress Concern Present (7/21/2021)    [employer] - Occupational Stress Questionnaire     Feeling  of Stress : Very much   Housing Stability: Unknown (7/21/2021)    Housing Stability Vital Sign     Unable to Pay for Housing in the Last Year: No     Unstable Housing in the Last Year: No       VITALS:  Patient Vitals for the past 24 hrs:   BP Temp Temp src Pulse Resp SpO2 Weight   08/13/23 2008 -- -- -- (!) 123 -- 97 % --   08/13/23 2000 -- -- -- 110 -- -- --   08/13/23 1945 -- -- -- 82 -- 92 % --   08/13/23 1930 105/64 -- -- 84 -- 95 % --   08/13/23 1915 -- -- -- 84 -- 95 % --   08/13/23 1634 (!) 142/98 98.8  F (37.1  C) Temporal (!) 124 20 99 % 74.8 kg (165 lb)        PHYSICAL EXAM    Constitutional:  Awake, alert, in mild distress   HENT:  Normocephalic, Atraumatic. Bilateral external ears normal. Oropharynx moist. Nose normal. Neck- Normal range of motion with no guarding, No midline cervical tenderness, Supple, No stridor.   Eyes:  PERRL, EOMI with no signs of entrapment, bilateral Conjunctiva injection. Left periorbital ecchymosis   Respiratory:  Normal breath sounds, No respiratory distress, No wheezing.    Cardiovascular:  Normal heart rate, Normal rhythm, No appreciable rubs or gallops.   GI:  Soft, No tenderness, No distension, No palpable masses  Musculoskeletal:  Intact distal pulses, No edema. Good range of motion in all major joints. No tenderness to palpation or major deformities noted.  Integument:  Warm, Dry, No erythema, No rash.   Neurologic:  Alert & oriented, Normal motor function, Normal sensory function, No focal deficits noted. Slurring of words consistent with intoxication   Psychiatric:  Affect normal, Judgment normal, Mood normal.     LAB:  All pertinent labs reviewed and interpreted.  Results for orders placed or performed during the hospital encounter of 08/13/23   Comprehensive metabolic panel   Result Value Ref Range    Sodium 140 136 - 145 mmol/L    Potassium 3.9 3.4 - 5.3 mmol/L    Chloride 98 98 - 107 mmol/L    Carbon Dioxide (CO2) 27 22 - 29 mmol/L    Anion Gap 15 7 - 15 mmol/L     Urea Nitrogen 15.8 6.0 - 20.0 mg/dL    Creatinine 0.73 0.67 - 1.17 mg/dL    Calcium 9.5 8.6 - 10.0 mg/dL    Glucose 125 (H) 70 - 99 mg/dL    Alkaline Phosphatase 70 40 - 129 U/L     (H) 0 - 45 U/L     (H) 0 - 70 U/L    Protein Total 7.3 6.4 - 8.3 g/dL    Albumin 4.9 3.5 - 5.2 g/dL    Bilirubin Total 0.5 <=1.2 mg/dL    GFR Estimate >90 >60 mL/min/1.73m2   Result Value Ref Range    Lipase 90 (H) 13 - 60 U/L   Result Value Ref Range    Magnesium 2.2 1.7 - 2.3 mg/dL   Ethyl Alcohol Level   Result Value Ref Range    Alcohol ethyl 0.49 (HH) <=0.01 g/dL   CBC (+ platelets, no diff)   Result Value Ref Range    WBC Count 5.2 4.0 - 11.0 10e3/uL    RBC Count 5.04 4.40 - 5.90 10e6/uL    Hemoglobin 14.6 13.3 - 17.7 g/dL    Hematocrit 43.4 40.0 - 53.0 %    MCV 86 78 - 100 fL    MCH 29.0 26.5 - 33.0 pg    MCHC 33.6 31.5 - 36.5 g/dL    RDW 15.0 10.0 - 15.0 %    Platelet Count 147 (L) 150 - 450 10e3/uL   Extra Blue Top Tube   Result Value Ref Range    Hold Specimen JIC    Extra Green Top (Lithium Heparin) Tube   Result Value Ref Range    Hold Specimen JIC    Extra Purple Top Tube   Result Value Ref Range    Hold Specimen JIC        R  I, Wilma Dillon, am serving as a scribe to document services personally performed by Tim Trevizo MD, based on my observation and the provider's statements to me. I, Tim Trevizo MD attest that Wilma Dillon is acting in a scribe capacity, has observed my performance of the services and has documented them in accordance with my direction.    Tim Trevizo M.D.  Emergency Medicine  [facility] EMERGENCY DEPARTMENT     Tim Trevizo MD  08/13/23 3227

## 2023-08-13 NOTE — ED TRIAGE NOTES
Pt has been drinking for a week straight per mom. Pt had half of a 750 today. Pt reports anxiety and is afraid he is going to have a seizure. Pt's left eye is bruised from a fall on Friday. Denies suicidal ideation     Triage Assessment       Row Name 08/13/23 3546       Triage Assessment (Adult)    Airway WDL WDL       Respiratory WDL    Respiratory WDL WDL       Skin Circulation/Temperature WDL    Skin Circulation/Temperature WDL WDL       Cardiac WDL    Cardiac WDL WDL       Peripheral/Neurovascular WDL    Peripheral Neurovascular WDL WDL       Cognitive/Neuro/Behavioral WDL    Cognitive/Neuro/Behavioral WDL arousability;level of consciousness;motor response    Level of Consciousness obtunded;somnolent    Arousal Level arouses to voice       Motor Response    Motor Response general motor response    LUE Motor Response purposeful motor response    RUE Motor Response purposeful motor response    LLE Motor Response purposeful motor response    RLE Motor Response purposeful motor response

## 2023-08-14 ENCOUNTER — HOSPITAL ENCOUNTER (EMERGENCY)
Facility: CLINIC | Age: 38
Discharge: HOME | End: 2023-08-14
Attending: EMERGENCY MEDICINE | Admitting: EMERGENCY MEDICINE
Payer: COMMERCIAL

## 2023-08-14 VITALS
DIASTOLIC BLOOD PRESSURE: 79 MMHG | HEART RATE: 72 BPM | WEIGHT: 165 LBS | BODY MASS INDEX: 23.68 KG/M2 | OXYGEN SATURATION: 97 % | RESPIRATION RATE: 20 BRPM | TEMPERATURE: 98.8 F | SYSTOLIC BLOOD PRESSURE: 132 MMHG

## 2023-08-14 VITALS
SYSTOLIC BLOOD PRESSURE: 132 MMHG | DIASTOLIC BLOOD PRESSURE: 78 MMHG | WEIGHT: 170 LBS | HEIGHT: 70 IN | BODY MASS INDEX: 24.34 KG/M2 | RESPIRATION RATE: 20 BRPM | HEART RATE: 77 BPM | OXYGEN SATURATION: 98 % | TEMPERATURE: 97.8 F

## 2023-08-14 DIAGNOSIS — F10.230 ALCOHOL DEPENDENCE WITH UNCOMPLICATED WITHDRAWAL (H): ICD-10-CM

## 2023-08-14 LAB
ALBUMIN SERPL-MCNC: 4.2 G/DL (ref 3.5–5)
ALP SERPL-CCNC: 52 U/L (ref 45–120)
ALT SERPL W P-5'-P-CCNC: 111 U/L (ref 0–45)
ANION GAP SERPL CALCULATED.3IONS-SCNC: 10 MMOL/L (ref 5–18)
AST SERPL W P-5'-P-CCNC: 144 U/L (ref 0–40)
BILIRUB DIRECT SERPL-MCNC: 0.3 MG/DL
BILIRUB SERPL-MCNC: 1 MG/DL (ref 0–1)
BUN SERPL-MCNC: 15 MG/DL (ref 8–22)
CALCIUM SERPL-MCNC: 9.1 MG/DL (ref 8.5–10.5)
CHLORIDE BLD-SCNC: 99 MMOL/L (ref 98–107)
CO2 SERPL-SCNC: 28 MMOL/L (ref 22–31)
CREAT SERPL-MCNC: 0.74 MG/DL (ref 0.7–1.3)
ERYTHROCYTE [DISTWIDTH] IN BLOOD BY AUTOMATED COUNT: 14.7 % (ref 10–15)
ETHANOL SERPL-MCNC: 30 MG/DL
GFR SERPL CREATININE-BSD FRML MDRD: >90 ML/MIN/1.73M2
GLUCOSE BLD-MCNC: 87 MG/DL (ref 70–125)
HCT VFR BLD AUTO: 35.1 % (ref 40–53)
HGB BLD-MCNC: 11.9 G/DL (ref 13.3–17.7)
LIPASE SERPL-CCNC: 72 U/L (ref 0–52)
MCH RBC QN AUTO: 29 PG (ref 26.5–33)
MCHC RBC AUTO-ENTMCNC: 33.9 G/DL (ref 31.5–36.5)
MCV RBC AUTO: 86 FL (ref 78–100)
PLATELET # BLD AUTO: 103 10E3/UL (ref 150–450)
POTASSIUM BLD-SCNC: 3.7 MMOL/L (ref 3.5–5)
PROT SERPL-MCNC: 6.5 G/DL (ref 6–8)
RBC # BLD AUTO: 4.1 10E6/UL (ref 4.4–5.9)
SODIUM SERPL-SCNC: 137 MMOL/L (ref 136–145)
WBC # BLD AUTO: 5.8 10E3/UL (ref 4–11)

## 2023-08-14 PROCEDURE — 250N000011 HC RX IP 250 OP 636: Performed by: EMERGENCY MEDICINE

## 2023-08-14 PROCEDURE — 82077 ASSAY SPEC XCP UR&BREATH IA: CPT | Performed by: PHYSICIAN ASSISTANT

## 2023-08-14 PROCEDURE — 82248 BILIRUBIN DIRECT: CPT | Performed by: PHYSICIAN ASSISTANT

## 2023-08-14 PROCEDURE — 82310 ASSAY OF CALCIUM: CPT | Performed by: PHYSICIAN ASSISTANT

## 2023-08-14 PROCEDURE — 96376 TX/PRO/DX INJ SAME DRUG ADON: CPT

## 2023-08-14 PROCEDURE — 85027 COMPLETE CBC AUTOMATED: CPT | Performed by: PHYSICIAN ASSISTANT

## 2023-08-14 PROCEDURE — 82374 ASSAY BLOOD CARBON DIOXIDE: CPT | Performed by: PHYSICIAN ASSISTANT

## 2023-08-14 PROCEDURE — 250N000011 HC RX IP 250 OP 636: Performed by: PHYSICIAN ASSISTANT

## 2023-08-14 PROCEDURE — 99284 EMERGENCY DEPT VISIT MOD MDM: CPT | Mod: 25

## 2023-08-14 PROCEDURE — 96361 HYDRATE IV INFUSION ADD-ON: CPT

## 2023-08-14 PROCEDURE — 83690 ASSAY OF LIPASE: CPT | Performed by: PHYSICIAN ASSISTANT

## 2023-08-14 PROCEDURE — 96374 THER/PROPH/DIAG INJ IV PUSH: CPT

## 2023-08-14 PROCEDURE — 258N000003 HC RX IP 258 OP 636: Performed by: PHYSICIAN ASSISTANT

## 2023-08-14 PROCEDURE — 36415 COLL VENOUS BLD VENIPUNCTURE: CPT | Performed by: PHYSICIAN ASSISTANT

## 2023-08-14 RX ORDER — LORAZEPAM 0.5 MG/1
0.5 TABLET ORAL EVERY 6 HOURS PRN
Qty: 6 TABLET | Refills: 0 | Status: ON HOLD | OUTPATIENT
Start: 2023-08-14 | End: 2023-08-29

## 2023-08-14 RX ORDER — LORAZEPAM 2 MG/ML
1 INJECTION INTRAMUSCULAR ONCE
Status: COMPLETED | OUTPATIENT
Start: 2023-08-14 | End: 2023-08-14

## 2023-08-14 RX ORDER — LORAZEPAM 2 MG/ML
2 INJECTION INTRAMUSCULAR ONCE
Status: COMPLETED | OUTPATIENT
Start: 2023-08-14 | End: 2023-08-14

## 2023-08-14 RX ADMIN — LORAZEPAM 1 MG: 2 INJECTION INTRAMUSCULAR; INTRAVENOUS at 04:22

## 2023-08-14 RX ADMIN — SODIUM CHLORIDE 1000 ML: 9 INJECTION, SOLUTION INTRAVENOUS at 09:54

## 2023-08-14 RX ADMIN — LORAZEPAM 2 MG: 2 INJECTION INTRAMUSCULAR; INTRAVENOUS at 09:57

## 2023-08-14 RX ADMIN — LORAZEPAM 2 MG: 2 INJECTION INTRAMUSCULAR; INTRAVENOUS at 07:42

## 2023-08-14 RX ADMIN — LORAZEPAM 1 MG: 2 INJECTION INTRAMUSCULAR; INTRAVENOUS at 11:03

## 2023-08-14 RX ADMIN — LORAZEPAM 1 MG: 2 INJECTION INTRAMUSCULAR; INTRAVENOUS at 07:09

## 2023-08-14 RX ADMIN — LORAZEPAM 1 MG: 2 INJECTION INTRAMUSCULAR; INTRAVENOUS at 03:27

## 2023-08-14 ASSESSMENT — ACTIVITIES OF DAILY LIVING (ADL)
ADLS_ACUITY_SCORE: 37

## 2023-08-14 NOTE — DISCHARGE INSTRUCTIONS
You were seen in the emergency department for alcohol withdrawal and tremors. Your vitals were very reassuring while here.  We gave you some more fluids and Ativan which minimally helped your symptoms.  We did repeat blood work, your blood alcohol level was 0.03 here today.     You labs show a reduction in your hemoglobin which can be due to the amount of IV fluids you have received. If you develop any signs of bleeding such as in your vomit or stool you will need to return for re-evaluation.     I am going to give you six tabs of 0.5mg ativan.  You can use these as needed for your withdrawal symptoms. Ultimately, detox can help you with symptoms and next steps for treatment.     Return to ED with any other concerns.

## 2023-08-14 NOTE — ED TRIAGE NOTES
Patient presents to ED with shaking and pins and needles, last drink was yesterday @1600, was seen @ [facility] yesterday and discharged, patient is wanting meds for withdrawal, reports a hx of seizures, reports that he drinks 750 mL of vodka/day.  Luda Gilman RN.......8/14/2023 9:24 AM     Triage Assessment       Row Name 08/14/23 0923       Triage Assessment (Adult)    Airway WDL WDL       Respiratory WDL    Respiratory WDL WDL       Skin Circulation/Temperature WDL    Skin Circulation/Temperature WDL WDL       Cardiac WDL    Cardiac WDL WDL       Peripheral/Neurovascular WDL    Peripheral Neurovascular WDL WDL       Cognitive/Neuro/Behavioral WDL    Cognitive/Neuro/Behavioral WDL WDL

## 2023-08-14 NOTE — ED NOTES
While preparing to give ativan pt began to shake but did not lose consciousness and was talking during event. Ativan administered.

## 2023-08-14 NOTE — ED PROVIDER NOTES
Emergency Department Midlevel Supervisory Note    Date/Time:8/14/2023 9:51 AM    I personally saw the patient and performed a substantive portion of the visit including all aspects of the medical decision making.  I am seeing this patient along with Alysha Rubalcava PA-C.  I, Basilio Medrano D.O., have reviewed the documentation, personally taken the patient's history, performed an exam and agree with the physical finds, diagnosis and management plan.    Prior records were reviewed.  I personally performed history and physical.  I personally reviewed lab, EKG, and radiology results as indicated.  Care was discussed with mid-level provider.  Diagnosis and disposition were discussed.  Final disposition will be per the [facility] depending diagnostic studies and patient's clinical trajectory.      ED Course:  9:30 AM I spoke with Alysha Rubalcava PA-C about the patient.     The patient presented to the emergency room today with concerns about alcohol withdrawal symptoms.  He has a long history of alcohol abuse.  He states that he was not interested in detox placement.  He was found to have some mild withdrawal symptoms here in the ER.  No concerning laboratory findings.  He is feeling better after medications.  After reexamination, it seems that he is appropriate for discharge home with a few tablets of Ativan and resources for outpatient follow-up.  He is comfortable with this plan.          DIAGNOSIS:  1. Alcohol dependence with uncomplicated withdrawal (H)          Medications:  Discharge Medication List as of 8/14/2023 11:38 AM        START taking these medications    Details   LORazepam (ATIVAN) 0.5 MG tablet Take 1 tablet (0.5 mg) by mouth every 6 hours as needed for anxiety, Disp-6 tablet, R-0, Local Print               Labs and Imaging:  Results for orders placed or performed during the hospital encounter of 08/14/23   CBC with platelets   Result Value Ref Range    WBC Count 5.8 4.0 - 11.0 10e3/uL    RBC Count 4.10 (L)  4.40 - 5.90 10e6/uL    Hemoglobin 11.9 (L) 13.3 - 17.7 g/dL    Hematocrit 35.1 (L) 40.0 - 53.0 %    MCV 86 78 - 100 fL    MCH 29.0 26.5 - 33.0 pg    MCHC 33.9 31.5 - 36.5 g/dL    RDW 14.7 10.0 - 15.0 %    Platelet Count 103 (L) 150 - 450 10e3/uL   Basic metabolic panel   Result Value Ref Range    Sodium 137 136 - 145 mmol/L    Potassium 3.7 3.5 - 5.0 mmol/L    Chloride 99 98 - 107 mmol/L    Carbon Dioxide (CO2) 28 22 - 31 mmol/L    Anion Gap 10 5 - 18 mmol/L    Urea Nitrogen 15 8 - 22 mg/dL    Creatinine 0.74 0.70 - 1.30 mg/dL    Calcium 9.1 8.5 - 10.5 mg/dL    Glucose 87 70 - 125 mg/dL    GFR Estimate >90 >60 mL/min/1.73m2   Alcohol level blood   Result Value Ref Range    Alcohol, Blood 30 (H) None detected mg/dL   Hepatic function panel   Result Value Ref Range    Bilirubin Total 1.0 0.0 - 1.0 mg/dL    Bilirubin Direct 0.3 <=0.5 mg/dL    Protein Total 6.5 6.0 - 8.0 g/dL    Albumin 4.2 3.5 - 5.0 g/dL    Alkaline Phosphatase 52 45 - 120 U/L     (H) 0 - 40 U/L     (H) 0 - 45 U/L   Result Value Ref Range    Lipase 72 (H) 0 - 52 U/L           Basilio Medrano D.O.  Emergency Medicine  [facility] EMERGENCY ROOM  [address] [facility] [id number] [phone]  Dept: [phone]               Basilio Medrano MD  08/19/23 0553

## 2023-08-14 NOTE — ED NOTES
eMERGENCY dEPARTMENT PROGRESS NOTE      Patient was signed out to me by Tim Trevizo MD at 10:36 PM.      37-year-old male presenting to the emergency department with acute alcohol intoxication.  There is a plan for inpatient detox and rehab management already planned by the patient and mother.  Patient care was turned over with plan for discharge in the morning into the care of his mother with plan to take to rehab in the morning.    LABS  Pertinent lab results reviewed in chart.  Results for orders placed or performed during the hospital encounter of 08/13/23   Comprehensive metabolic panel   Result Value Ref Range    Sodium 140 136 - 145 mmol/L    Potassium 3.9 3.4 - 5.3 mmol/L    Chloride 98 98 - 107 mmol/L    Carbon Dioxide (CO2) 27 22 - 29 mmol/L    Anion Gap 15 7 - 15 mmol/L    Urea Nitrogen 15.8 6.0 - 20.0 mg/dL    Creatinine 0.73 0.67 - 1.17 mg/dL    Calcium 9.5 8.6 - 10.0 mg/dL    Glucose 125 (H) 70 - 99 mg/dL    Alkaline Phosphatase 70 40 - 129 U/L     (H) 0 - 45 U/L     (H) 0 - 70 U/L    Protein Total 7.3 6.4 - 8.3 g/dL    Albumin 4.9 3.5 - 5.2 g/dL    Bilirubin Total 0.5 <=1.2 mg/dL    GFR Estimate >90 >60 mL/min/1.73m2   Result Value Ref Range    Lipase 90 (H) 13 - 60 U/L   Result Value Ref Range    Magnesium 2.2 1.7 - 2.3 mg/dL   Ethyl Alcohol Level   Result Value Ref Range    Alcohol ethyl 0.49 (HH) <=0.01 g/dL   CBC (+ platelets, no diff)   Result Value Ref Range    WBC Count 5.2 4.0 - 11.0 10e3/uL    RBC Count 5.04 4.40 - 5.90 10e6/uL    Hemoglobin 14.6 13.3 - 17.7 g/dL    Hematocrit 43.4 40.0 - 53.0 %    MCV 86 78 - 100 fL    MCH 29.0 26.5 - 33.0 pg    MCHC 33.6 31.5 - 36.5 g/dL    RDW 15.0 10.0 - 15.0 %    Platelet Count 147 (L) 150 - 450 10e3/uL   Extra Blue Top Tube   Result Value Ref Range    Hold Specimen JIC    Extra Green Top (Lithium Heparin) Tube   Result Value Ref Range    Hold Specimen JIC    Extra Purple Top Tube   Result Value Ref Range    Hold Specimen JIC            ED  COURSE & MEDICAL DECISION MAKING    Pertinent Labs and Imagaing reviewed (see chart for details)    37 year old male presenting with acute alcohol intoxication.  Plan is to allow him to sober up overnight, and be discharged to patient's mother who will take him to the rehab facility.  Patient has been medically cleared by the previous provider.    At the conclusion of the encounter I discussed  the results of all of the tests and the disposition.   The questions were answered.  The patient or family acknowledged understanding and was agreeable with the care plan.       FINAL IMPRESSION        1. Alcoholic intoxication without complication (H)               Cali Solomon,   08/14/23 0408

## 2023-08-14 NOTE — ED NOTES
Pt request for sound machine, notified pt of tv for therapeutic sound, mother at bedside feeding pt. No other concerns or needs expressed.

## 2023-08-14 NOTE — ED NOTES
"EMERGENCY DEPARTMENT SIGN OUT NOTE        ED COURSE AND MEDICAL DECISION MAKING  Patient was signed out to me by Dr Cali Solomon at 4:01 AM    In brief, Nikhil Rios is a 37 year old male who initially presented with alcohol intoxication     \"Patient reports having high anxiety today and using alcohol as a bandage for his anxiety. He states \"my anxiety has never been this bad before\".  He relates back to when \"my father  when he was 18 and I could not save him\". Patient has been several treatment program and is trying to set up for psychiatry treatment. Patient did not like his previous psychiatrist. Per mom, his alcoholism gets in the way of his therapy. [facility] and needs to be below a 0.4. Patient was been drinking alcohol for several days and stopped drinking yesterday and took librium. The patient however drank half a 750mL bottle today. Per mom, the goal of today was to get the patient ready for sober house and not have any other occurring symptoms.\"    At time of sign out, disposition was pending his mother coming to pick him up and bring him to detox.  Patient signed out to oncoming ED provider Dr. Varner.      FINAL IMPRESSION    1. Alcoholic intoxication without complication (H)        ED MEDS  Medications   0.9% sodium chloride BOLUS (0 mLs Intravenous Stopped 23 1800)   famotidine (PEPCID) injection 20 mg (20 mg Intravenous $Given 23 173)   ondansetron (ZOFRAN) injection 4 mg (4 mg Intravenous $Given 23 173)   LORazepam (ATIVAN) injection 1 mg (1 mg Intravenous $Given 23 4836)   LORazepam (ATIVAN) injection 1 mg (1 mg Intravenous $Given 23 4477)   LORazepam (ATIVAN) injection 1 mg (1 mg Intravenous $Given 23 0542)       LAB  Labs Ordered and Resulted from Time of ED Arrival to Time of ED Departure   COMPREHENSIVE METABOLIC PANEL - Abnormal       Result Value    Sodium 140      Potassium 3.9      Chloride 98      Carbon Dioxide " (CO2) 27      Anion Gap 15      Urea Nitrogen 15.8      Creatinine 0.73      Calcium 9.5      Glucose 125 (*)     Alkaline Phosphatase 70       (*)      (*)     Protein Total 7.3      Albumin 4.9      Bilirubin Total 0.5      GFR Estimate >90     LIPASE - Abnormal    Lipase 90 (*)    ETHYL ALCOHOL LEVEL - Abnormal    Alcohol ethyl 0.49 (*)    CBC WITH PLATELETS - Abnormal    WBC Count 5.2      RBC Count 5.04      Hemoglobin 14.6      Hematocrit 43.4      MCV 86      MCH 29.0      MCHC 33.6      RDW 15.0      Platelet Count 147 (*)    MAGNESIUM - Normal    Magnesium 2.2         Wong Polk MD  [facility] EMERGENCY DEPARTMENT  [facility] [facility] [address] [phone]       Wong Polk MD  08/14/23 0602

## 2023-08-14 NOTE — ED PROVIDER NOTES
EMERGENCY DEPARTMENT ENCOUNTER      NAME: Nikhil Rios  AGE: 37 year old male  YOB: 1985  MRN: [id number]  EVALUATION DATE & TIME: 8/14/2023  9:26 AM    PCP: Lisandro Graham    ED PROVIDER: Alysha Rubalcava PA-C      Chief Complaint   Patient presents with    Withdrawal    Alcohol Problem         FINAL IMPRESSION:  1. Alcohol dependence with uncomplicated withdrawal (H)          MEDICAL DECISION MAKING:    Pertinent Labs & Imaging studies reviewed. (See chart for details)  37 year old male with a h/o alcohol dependence, alcohol induced pancreatitis, alcoholic fatty  liver, generalized anxiety presents to the Emergency Department for evaluation of tremors, headache after decreasing his alcohol consumption over the last 2 days.  He was seen at [facility] ED yesterday for concern of alcohol withdrawal, was given fluids, Pepcid, Ativan with last dose approximately 5 hours ago discharged with intent to go to detox.  Here stating he is not interested in detox and was hoping to be admitted.  Has not had any additional falls.  No infectious symptoms.  Vitals are WNL.  He does have a slight tremor and during my evaluation he makes several full body large tremulous movements in which he is alert and aware, no posturing and no concern for seizure-like activity during my evaluation.  There are tongue fasciculations.  No abdominal tenderness to palpation.  Lungs clear without crackle or wheeze.  He does have a left periorbital hematoma which is not new.  There is also a 1 cm superficial laceration just superior and lateral to the left eyebrow which is >24h old. Last drink approximately 1 pint liquor about 18 hours ago.     Concern for etoh withdrawal. No evidence seizure like activity, DTs, or hallucinations. Labs with acute change to hgb from 14.9 15 hours ago to 11.9 today, however pt has received fluids during 2 ED visits and suspect this to be dilutional anemia. He is not having any signs of bleeding  such as hematochezia, melena or hematemesis. He was given 1L NS, 3mg ativan and we discussed continued withdrawal management. He does not warrant hospitalization at this time, and I encouraged him to go to detox. He does feel comfortable going home and requested some ativan for sypmtomatic management. His mother is with him and we discussed that she could manage this medication for him. Will rx x6 tabs of 0.5mg ativan to help with symptoms. Discussed close return precautions and additional resources for withdrawal management.    There is no evidence of acute or emergent process requiring intervention at this time. Pt is appropriate for outpatient management. Provisional nature of today's diagnosis was discussed and strict return precautions were given. Pt expressed understanding and He was discharged to home in good condition.     Medical Decision Making    History:  Supplemental history from: Documented in chart, if applicable and Family Member/Significant Other  External Record(s) reviewed: Documented in chart, if applicable. and Outpatient Record: ED visit yesteday 8/13/2023    Work Up:  Chart documentation includes differential considered and any EKGs or imaging independently interpreted by provider, where specified.  In additional to work up documented, I considered the following work up: Documented in chart, if applicable.    External consultation:  Discussion of management with another provider: Documented in chart, if applicable    Complicating factors:  Care impacted by chronic illness: Other: severe alcohol dependence  Care affected by social determinants of health: Alcohol Abuse and/or Recreational Drug Use    Disposition considerations: Discharge. I prescribed additional prescription strength medication(s) as charted. See documentation for any additional details.      CRITICAL CARE: None    ED COURSE  9:35 AM  Met and evaluated patient. Discussed ED plan.   9:45 AM Staffed the patient with Dr. Khalil  "Mitchell  10:50 AM discharged to home in good condition by RN.     MEDICATIONS GIVEN IN THE EMERGENCY:  Medications   LORazepam (ATIVAN) injection 2 mg (2 mg Intravenous $Given 8/14/23 3622)   0.9% sodium chloride BOLUS (0 mLs Intravenous Stopped 8/14/23 1137)   LORazepam (ATIVAN) injection 1 mg (1 mg Intravenous $Given 8/14/23 1103)       NEW PRESCRIPTIONS STARTED AT TODAY'S ER VISIT  Discharge Medication List as of 8/14/2023 11:38 AM        START taking these medications    Details   LORazepam (ATIVAN) 0.5 MG tablet Take 1 tablet (0.5 mg) by mouth every 6 hours as needed for anxiety, Disp-6 tablet, R-0, Local Print                =================================================================    HPI    Patient information was obtained from: Patient, mom Veronique    Use of Intrepreter: N/A      Nikhil Rios is a 37 year old male who presents for evaluation of alcohol withdrawal.  Patient states he was seen in the emergency department at [facility] yesterday, he received fluids, Pepcid and multiple doses of Ativan.  He was discharged home approximately 4 and half hours ago.  Chart review notes intention to present to detox, however patient states today that he  does not have any interest in going to detox.  Notes that it was difficult to sleep due to \" full body convulsions\" and reports that he does have a history of seizures.  States that his last drink was approximately 18 hours ago, he typically drinks about 750cc liquor daily, however yesterday he consumed about half of that estimating approximately 1 pint.  He does note a periorbital hematoma and small laceration to the upper left superior eyebrow, mother wonders if this needs any intervention today as it was \" clotted with blood\" when he was evaluated yesterday.  States that his fall was several days ago.     Denies any other symptoms of illness such as fever, abdominal pain, sore throat.  He does state that he has a headache which she relates to the " withdrawals that he does feel nauseous with vague report of vomiting.  No diarrhea.      REVIEW OF SYSTEMS   As noted in HPI. All other systems negative.    PAST MEDICAL HISTORY:  Past Medical History:   Diagnosis Date    Alcohol abuse     Alcohol abuse     Alcohol withdrawal (H)     Depressive disorder     Family history of diabetes mellitus 11/9/2007    HLD (hyperlipidemia)     HTN (hypertension)        PAST SURGICAL HISTORY:  Past Surgical History:   Procedure Laterality Date    NO HISTORY OF SURGERY      OTHER SURGICAL HISTORY      none           CURRENT MEDICATIONS:    LORazepam (ATIVAN) 0.5 MG tablet  cloNIDine (CATAPRES) 0.1 MG tablet  gabapentin (NEURONTIN) 600 MG tablet  hydrOXYzine (ATARAX) 25 MG tablet  naltrexone (DEPADE/REVIA) 50 MG tablet  nicotine polacrilex (NICORETTE) 4 MG gum  sucralfate (CARAFATE) 1 GM tablet  thiamine (B-1) 100 MG tablet  traZODone (DESYREL) 50 MG tablet        ALLERGIES:  Allergies   Allergen Reactions    Gramineae Pollens Itching    Pollen Extract Rash     grass tree pollen       FAMILY HISTORY:  Family History   Problem Relation Age of Onset    Coronary Artery Disease Father     Substance Abuse Maternal Grandfather     Mental Illness Brother     Schizophrenia Brother     Schizophrenia Maternal Aunt        SOCIAL HISTORY:   Social History     Socioeconomic History    Marital status: Single     Spouse name: Not on file    Number of children: Not on file    Years of education: Not on file    Highest education level: Not on file   Occupational History    Occupation: Rental property owner   Tobacco Use    Smoking status: Some Days     Packs/day: 0.25     Types: Cigars, Cigarettes    Smokeless tobacco: Never    Tobacco comments:     occasional   Substance and Sexual Activity    Alcohol use: Yes     Alcohol/week: 17.0 standard drinks of alcohol     Types: 17 Shots of liquor per week     Comment: Drinks 750ml/day or 17 shots/day; hx of withdrawl seizures    Drug use: Not Currently      "Types: Marijuana    Sexual activity: Not Currently   Other Topics Concern    Not on file   Social History Narrative    Not on file     Social Determinants of Health     Financial Resource Strain: Not on file   Food Insecurity: Not on file   Transportation Needs: No Transportation Needs (7/21/2021)    PRAPARE - Transportation     Lack of Transportation (Medical): No     Lack of Transportation (Non-Medical): No   Physical Activity: Not on file   Stress: Stress Concern Present (7/21/2021)    [employer] [place] [employer] - Occupational Stress Questionnaire     Feeling of Stress : Very much   Social Connections: Not on file   Intimate Partner Violence: Not on file   Housing Stability: Unknown (7/21/2021)    Housing Stability Vital Sign     Unable to Pay for Housing in the Last Year: No     Number of Places Lived in the Last Year: Not on file     Unstable Housing in the Last Year: No         VITALS:  Patient Vitals for the past 24 hrs:   BP Temp Temp src Pulse Resp SpO2 Height Weight   08/14/23 1104 132/78 -- -- 77 -- 98 % -- --   08/14/23 1045 127/75 -- -- 85 -- 96 % -- --   08/14/23 1036 130/76 -- -- 79 -- 97 % -- --   08/14/23 0922 (!) 142/93 97.8  F (36.6  C) Temporal 95 20 98 % 1.778 m (5' 10\") 77.1 kg (170 lb)       PHYSICAL EXAM    Physical Exam  Vitals reviewed.   Constitutional:       General: He is not in acute distress.     Comments: tremulous   HENT:      Head: Normocephalic.   Eyes:      General:         Right eye: No discharge.         Left eye: No discharge.      Extraocular Movements: Extraocular movements intact.      Comments: Left periorbital hematoma which is deep purple in color. No surrounding edema. Left eye opens easily with scant subconjunctival hematoma at the lower left field.    Cardiovascular:      Rate and Rhythm: Normal rate and regular rhythm.      Pulses: Normal pulses.      Comments: Radial pulses 2+ bilaterally  Pulmonary:      Effort: Pulmonary effort is normal. No " respiratory distress.      Breath sounds: No stridor. No wheezing.   Abdominal:      General: Abdomen is flat. There is no distension.      Tenderness: There is no abdominal tenderness. There is no guarding or rebound.   Musculoskeletal:      Cervical back: Normal range of motion.      Right lower leg: No edema.      Left lower leg: No edema.   Skin:     General: Skin is warm.      Capillary Refill: Capillary refill takes less than 2 seconds.   Neurological:      General: No focal deficit present.      Mental Status: He is alert and oriented to person, place, and time.          LAB:  All pertinent labs reviewed and interpreted.    Labs Ordered and Resulted from Time of ED Arrival to Time of ED Departure   CBC WITH PLATELETS - Abnormal       Result Value    WBC Count 5.8      RBC Count 4.10 (*)     Hemoglobin 11.9 (*)     Hematocrit 35.1 (*)     MCV 86      MCH 29.0      MCHC 33.9      RDW 14.7      Platelet Count 103 (*)    ETHYL ALCOHOL LEVEL - Abnormal    Alcohol, Blood 30 (*)    HEPATIC FUNCTION PANEL - Abnormal    Bilirubin Total 1.0      Bilirubin Direct 0.3      Protein Total 6.5      Albumin 4.2      Alkaline Phosphatase 52       (*)      (*)    LIPASE - Abnormal    Lipase 72 (*)    BASIC METABOLIC PANEL - Normal    Sodium 137      Potassium 3.7      Chloride 99      Carbon Dioxide (CO2) 28      Anion Gap 10      Urea Nitrogen 15      Creatinine 0.74      Calcium 9.1      Glucose 87      GFR Estimate >90           RADIOLOGY:  Reviewed all pertinent imaging. Please see official radiology report    No orders to display       Alysha Rubalcava PA-C  Emergency Medicine  [facility] EMERGENCY ROOM  [facility] [facility] [address] [phone]  Dept: [phone]    This note has in part been created with speech recognition technology and may create an occasional, unintended word/grammar substitution. Errors are generally  corrected in real time. Please message me via Libretto In Basket if you note any errors requiring clarification.       Alysha Rubalcava PA-C  08/14/23 3782

## 2023-08-14 NOTE — ED NOTES
"The pt found laying in bed, he was A&Ox4. The pt is noted to tremulous. He had a mouth guard in his mouth and stated that its for his \"seizures\". He stated that he has had several seizures since arrival. MD notified and went to pts bedside.   "

## 2023-08-15 ENCOUNTER — NURSE TRIAGE (OUTPATIENT)
Dept: NURSING | Facility: CLINIC | Age: 38
End: 2023-08-15
Payer: COMMERCIAL

## 2023-08-15 ENCOUNTER — APPOINTMENT (OUTPATIENT)
Dept: CT IMAGING | Facility: HOSPITAL | Age: 38
End: 2023-08-15
Attending: STUDENT IN AN ORGANIZED HEALTH CARE EDUCATION/TRAINING PROGRAM
Payer: COMMERCIAL

## 2023-08-15 ENCOUNTER — HOSPITAL ENCOUNTER (EMERGENCY)
Facility: HOSPITAL | Age: 38
Discharge: HOME | End: 2023-08-15
Attending: STUDENT IN AN ORGANIZED HEALTH CARE EDUCATION/TRAINING PROGRAM | Admitting: STUDENT IN AN ORGANIZED HEALTH CARE EDUCATION/TRAINING PROGRAM
Payer: COMMERCIAL

## 2023-08-15 VITALS
TEMPERATURE: 98.3 F | DIASTOLIC BLOOD PRESSURE: 64 MMHG | SYSTOLIC BLOOD PRESSURE: 116 MMHG | OXYGEN SATURATION: 97 % | WEIGHT: 170 LBS | BODY MASS INDEX: 24.39 KG/M2 | RESPIRATION RATE: 18 BRPM | HEART RATE: 88 BPM

## 2023-08-15 DIAGNOSIS — F10.10 ETOH ABUSE: ICD-10-CM

## 2023-08-15 PROCEDURE — 70450 CT HEAD/BRAIN W/O DYE: CPT

## 2023-08-15 PROCEDURE — 99284 EMERGENCY DEPT VISIT MOD MDM: CPT | Mod: 25

## 2023-08-15 ASSESSMENT — ACTIVITIES OF DAILY LIVING (ADL): ADLS_ACUITY_SCORE: 37

## 2023-08-15 NOTE — TELEPHONE ENCOUNTER
Pt sounds intoxicated, swearing, states he is hearing and seeing things. He is with his mother.    Writer advised pt and his mother to hang up and dial 911.     Pt verbalizes understanding and agrees to plan.     Reason for Disposition   Seeing, hearing, or feeling things that are not there (i.e., visual, auditory, or tactile hallucinations)    Protocols used: Alcohol Abuse and [id number]

## 2023-08-15 NOTE — ED PROVIDER NOTES
"  Emergency Department Encounter         FINAL IMPRESSION:  ] EtOH abuse        ED COURSE AND MEDICAL DECISION MAKING       ED Course as of 08/15/23 1641   Tue Aug 15, 2023   1635 Patient is a 37-year-old male with history of longstanding EtOH abuse here with his mother via EMS with concerns of alcohol withdrawal.  Apparently they called nurse triage and told him that he was having hallucinations and convulsions at home.  No hematemesis or black or bloody stools.  No chest pain or trouble breathing.  Patient reports falling hit his head recently.  Unable to tell me anything else.  No neck pain or back pain.  No fevers.  On arrival he is intoxicated.  Bloodshot eyes.  Slurred speech.  Has a small old laceration above his left eye with periauricular ecchymosis on the left side.  Has no midline neck pain.  Has old abrasions on his left knee.  No abdominal pain.  Patient states that he does not want to withdrawal.  He has no signs of active withdrawal right now.  Vitals otherwise stable.  He has no tongue fasciculations.  Plan for CT head and most likely discharge home.  I did offer inpatient [place] detox however patient declined stating \"I do not like it there.\"     -Patient was observed here.  Plan for discharge home.  -No signs of withdrawal here.  Vitals are stable.  Discharged home in the care of patient's mother.  - CT normal.            4:28 PM I performed my initial interview and exam.  5:19 PM I rechecked and updated the patient prior to discharge.     Medical Decision Making    History:  Supplemental history from: Family Member/Significant Other  External Record(s) reviewed: Outpatient Record: ED Visit from 8/14/23, 8/13/23, 7/24/23, 7/23/23, and 5/16/23    Work Up:  Chart documentation includes differential considered and any EKGs or imaging independently interpreted by provider, where specified.  In additional to work up documented, I considered the following work up: Documented in chart, if " applicable.    External consultation:  Discussion of management with another provider: Documented in chart, if applicable    Complicating factors:  Care impacted by chronic illness: Mental Health  Care affected by social determinants of health: N/A    Disposition considerations: Discharge. No recommendations on prescription strength medication(s). See documentation for any additional details.                    Critical Care     Performed by: Vladimir Cuellar or    Authorized by: Vladimir Cuellar  Total critical care time:  minutes  Critical care was necessary to treat or prevent imminent or life-threatening deterioration of the following conditions:   Critical care was time spent personally by me on the following activities: development of treatment plan with patient or surrogate, discussions with consultants, examination of patient, evaluation of patient's response to treatment, obtaining history from patient or surrogate, ordering and performing treatments and interventions, ordering and review of laboratory studies, ordering and review of radiographic studies, re-evaluation of patient's condition and monitoring for potential decompensation.  Critical care time was exclusive of separately billable procedures and treating other patients.'    At the conclusion of the encounter I discussed the results of all the tests and the disposition. The questions were answered. The patient or family acknowledged understanding and was agreeable with the care plan.                  MEDICATIONS GIVEN IN THE EMERGENCY DEPARTMENT:  Medications - No data to display    NEW PRESCRIPTIONS STARTED AT TODAY'S ED VISIT:  New Prescriptions    No medications on file       HPI     Patient information obtained from: Patient and his mother    Use of Interpretor: N/A     Nikhil Rios is a 37 year old male with a pertinent history of alcohol-induced acute pancreatitis, alcoholic fatty liver, alcohol use disorder, moderate episode of recurrent major depressive  disorder, chronic intractable headache, and alcohol withdrawal syndrome without complication who presents to this ED by ambulance for evaluation of alcohol intoxication.    Per Chart Review, patient has multiple ED visits in the past 3 months for alcohol intoxication.    Patient presents to the ED today with concerns of alcohol withdrawal. Patient reports that since being discharged from the ED yesterday he has been withdrawing and having convulsions and hallucinations. He notes that he was prescribed ativan but he denies taking any yet. Patient reports that he would like to go to detox and to a treatment facility but he is waiting to be accepted to a treatment facility. Patient endorses falling recently. Patient denies chest pain, shortness of breath, neck pain, hematemesis, or any other concerns at this time.    REVIEW OF SYSTEMS:  Review of Systems   Constitutional: Positive for alcohol intoxication and convulsions. Negative for fever, malaise  HEENT: Negative runny nose, sore throat, ear pain, neck pain  Respiratory: Negative for shortness of breath, cough, congestion  Cardiovascular: Negative for chest pain, leg edema  Gastrointestinal: Negative for abdominal distention, abdominal pain, constipation, vomiting, nausea, diarrhea  Genitourinary: Negative for dysuria and hematuria.   Integument: Negative for rash, skin breakdown  Neurological: Negative for paresthesias, weakness, headache.  Musculoskeletal: Negative for joint pain, joint swelling  Psych: Positive for hallucinations.      All other systems reviewed and are negative.          MEDICAL HISTORY     Past Medical History:   Diagnosis Date    Alcohol abuse     Alcohol abuse     Alcohol withdrawal (H)     Depressive disorder     Family history of diabetes mellitus 11/9/2007    HLD (hyperlipidemia)     HTN (hypertension)        Past Surgical History:   Procedure Laterality Date    NO HISTORY OF SURGERY      OTHER SURGICAL HISTORY      none       Social  History     Tobacco Use    Smoking status: Some Days     Packs/day: 0.25     Types: Cigars, Cigarettes    Smokeless tobacco: Never    Tobacco comments:     occasional   Substance Use Topics    Alcohol use: Yes     Alcohol/week: 17.0 standard drinks of alcohol     Types: 17 Shots of liquor per week     Comment: Drinks 750ml/day or 17 shots/day; hx of withdrawl seizures    Drug use: Not Currently     Types: Marijuana       cloNIDine (CATAPRES) 0.1 MG tablet  gabapentin (NEURONTIN) 600 MG tablet  hydrOXYzine (ATARAX) 25 MG tablet  LORazepam (ATIVAN) 0.5 MG tablet  naltrexone (DEPADE/REVIA) 50 MG tablet  nicotine polacrilex (NICORETTE) 4 MG gum  sucralfate (CARAFATE) 1 GM tablet  thiamine (B-1) 100 MG tablet  traZODone (DESYREL) 50 MG tablet            PHYSICAL EXAM     /64   Pulse 73   Temp 98.3  F (36.8  C) (Oral)   Resp 18   Wt 77.1 kg (170 lb)   SpO2 94%   BMI 24.39 kg/m        PHYSICAL EXAM:     General: Patient appears well, nontoxic, comfortable. Intoxicated.  HEENT: Moist mucous membranes,  bloodshot eyes.  Black eye around left eye.  Cardiovascular: Normal rate, normal rhythm, no extremity edema.  No appreciable murmur.  Respiratory: No signs of respiratory distress, lungs are clear to auscultation bilaterally with no wheezes rhonchi or rales.  Abdominal: Soft, nontender, nondistended, no palpable masses, no guarding, no rebound  Musculoskeletal: Full range of motion of joints, no deformities appreciated. No midline neck pain.   Neurological: Alert and oriented, grossly neurologically intact. Slurred speech.  Psychological: Normal affect and mood.  Integument: No rashes appreciated. Small old laceration above his left eye with periauricular ecchymosis on the left side. Old abrasions on his left knee.          RESULTS       Labs Ordered and Resulted from Time of ED Arrival to Time of ED Departure - No data to display    No orders to display                     PROCEDURES:  Procedures:  Procedures        I, Harman Guerra am serving as a scribe to document services personally performed by Vladimir Cuellar DO, based on my observations and the provider's statements to me.  I, Vladimir Cuellar DO, attest that Harman Guerra is acting in a scribe capacity, has observed my performance of the services and has documented them in accordance with my direction.    Vladimir Cuellar DO  Emergency Medicine  [facility] EMERGENCY DEPARTMENT       Vladimir Cuellar DO  08/15/23 2241

## 2023-08-15 NOTE — ED TRIAGE NOTES
The pt presents for evaluation of EtOH and concerns over withdrawal. He here Sunday, at  yesterday, and comes here today for the same concerns. Last drink reported by pt was 1500. He is reporting anxiety, nausea, and some akathisias. Speech is slow and slurred. No tremors noted.

## 2023-08-16 NOTE — TELEPHONE ENCOUNTER
"Triage Call:    Caller: Mother calling for patient and consent is not on file.    Patient givers permission to speak with Mom.      Patient was in the ER for ETOH intoxication  in the last few days and today and around 1700 due to convulsions, with concerns of seizures.    Patient then got on the phone.   Today patient says he stopped drinking 750ml a day to \"basically nothing\" and that is when he went to [facility] around 1700.      He is calling now to be clean for a few days and potentially going to treatment.     Patient got mad at mom and after 2 minutes, patient is asking \"who are you talking to\".       He then is reporting slurred speech and convulsions and that he is seeing things, visual hallucinations, \"most I have ever had\".          Protocol Recommended Disposition: Call 911    Then mom advised that patient is supposed to be admitted to a detox at 2300 in [place] and advised they could call there to see their input and then call 911 for patient to be evaluated for safety.       Caller verbalized understanding of instructions and questions answered.      Nena Mcallister RN on 8/15/2023 at 9:44 PM      Reason for Disposition   Seeing, hearing, or feeling things that are not there (i.e., visual, auditory, or tactile hallucinations)    Additional Information   Negative: Coma (e.g., not moving, not talking, not responding to stimuli)   Negative: Difficult to awaken or acting confused (e.g., disoriented, slurred speech)    Protocols used: Alcohol Abuse and [id number]    "

## 2023-08-24 ENCOUNTER — HOSPITAL ENCOUNTER (EMERGENCY)
Facility: CLINIC | Age: 38
Discharge: HOME | End: 2023-08-25
Attending: EMERGENCY MEDICINE | Admitting: EMERGENCY MEDICINE
Payer: COMMERCIAL

## 2023-08-24 DIAGNOSIS — F10.10 ALCOHOL ABUSE: ICD-10-CM

## 2023-08-24 LAB — ALCOHOL BREATH TEST: 0.28 (ref 0–0.01)

## 2023-08-24 PROCEDURE — 96365 THER/PROPH/DIAG IV INF INIT: CPT | Performed by: EMERGENCY MEDICINE

## 2023-08-24 PROCEDURE — 99284 EMERGENCY DEPT VISIT MOD MDM: CPT | Mod: 25 | Performed by: EMERGENCY MEDICINE

## 2023-08-24 PROCEDURE — 99284 EMERGENCY DEPT VISIT MOD MDM: CPT | Performed by: EMERGENCY MEDICINE

## 2023-08-24 PROCEDURE — 82075 ASSAY OF BREATH ETHANOL: CPT | Performed by: EMERGENCY MEDICINE

## 2023-08-25 ENCOUNTER — TELEPHONE (OUTPATIENT)
Dept: BEHAVIORAL HEALTH | Facility: CLINIC | Age: 38
End: 2023-08-25

## 2023-08-25 VITALS
OXYGEN SATURATION: 99 % | DIASTOLIC BLOOD PRESSURE: 68 MMHG | RESPIRATION RATE: 18 BRPM | WEIGHT: 170 LBS | HEART RATE: 82 BPM | BODY MASS INDEX: 24.39 KG/M2 | SYSTOLIC BLOOD PRESSURE: 130 MMHG | TEMPERATURE: 98.8 F

## 2023-08-25 LAB
ALBUMIN SERPL BCG-MCNC: 4.8 G/DL (ref 3.5–5.2)
ALP SERPL-CCNC: 82 U/L (ref 40–129)
ALT SERPL W P-5'-P-CCNC: 40 U/L (ref 0–70)
ANION GAP SERPL CALCULATED.3IONS-SCNC: 17 MMOL/L (ref 7–15)
AST SERPL W P-5'-P-CCNC: 47 U/L (ref 0–45)
BASOPHILS # BLD AUTO: 0.1 10E3/UL (ref 0–0.2)
BASOPHILS NFR BLD AUTO: 1 %
BILIRUB SERPL-MCNC: 0.3 MG/DL
BUN SERPL-MCNC: 11.1 MG/DL (ref 6–20)
CALCIUM SERPL-MCNC: 9.4 MG/DL (ref 8.6–10)
CHLORIDE SERPL-SCNC: 94 MMOL/L (ref 98–107)
CREAT SERPL-MCNC: 0.65 MG/DL (ref 0.67–1.17)
DEPRECATED HCO3 PLAS-SCNC: 27 MMOL/L (ref 22–29)
EOSINOPHIL # BLD AUTO: 0 10E3/UL (ref 0–0.7)
EOSINOPHIL NFR BLD AUTO: 0 %
ERYTHROCYTE [DISTWIDTH] IN BLOOD BY AUTOMATED COUNT: 15 % (ref 10–15)
GFR SERPL CREATININE-BSD FRML MDRD: >90 ML/MIN/1.73M2
GLUCOSE SERPL-MCNC: 113 MG/DL (ref 70–99)
HCT VFR BLD AUTO: 37.1 % (ref 40–53)
HGB BLD-MCNC: 13.3 G/DL (ref 13.3–17.7)
IMM GRANULOCYTES # BLD: 0 10E3/UL
IMM GRANULOCYTES NFR BLD: 0 %
LYMPHOCYTES # BLD AUTO: 2 10E3/UL (ref 0.8–5.3)
LYMPHOCYTES NFR BLD AUTO: 21 %
MCH RBC QN AUTO: 30.2 PG (ref 26.5–33)
MCHC RBC AUTO-ENTMCNC: 35.8 G/DL (ref 31.5–36.5)
MCV RBC AUTO: 84 FL (ref 78–100)
MONOCYTES # BLD AUTO: 1.1 10E3/UL (ref 0–1.3)
MONOCYTES NFR BLD AUTO: 12 %
NEUTROPHILS # BLD AUTO: 6.3 10E3/UL (ref 1.6–8.3)
NEUTROPHILS NFR BLD AUTO: 66 %
NRBC # BLD AUTO: 0 10E3/UL
NRBC BLD AUTO-RTO: 0 /100
PLATELET # BLD AUTO: 352 10E3/UL (ref 150–450)
POTASSIUM SERPL-SCNC: 4.1 MMOL/L (ref 3.4–5.3)
PROT SERPL-MCNC: 7.2 G/DL (ref 6.4–8.3)
RBC # BLD AUTO: 4.4 10E6/UL (ref 4.4–5.9)
SODIUM SERPL-SCNC: 138 MMOL/L (ref 136–145)
WBC # BLD AUTO: 9.6 10E3/UL (ref 4–11)

## 2023-08-25 PROCEDURE — 80053 COMPREHEN METABOLIC PANEL: CPT | Performed by: EMERGENCY MEDICINE

## 2023-08-25 PROCEDURE — 250N000009 HC RX 250: Performed by: EMERGENCY MEDICINE

## 2023-08-25 PROCEDURE — 250N000011 HC RX IP 250 OP 636: Performed by: EMERGENCY MEDICINE

## 2023-08-25 PROCEDURE — 250N000013 HC RX MED GY IP 250 OP 250 PS 637: Performed by: EMERGENCY MEDICINE

## 2023-08-25 PROCEDURE — 36415 COLL VENOUS BLD VENIPUNCTURE: CPT | Performed by: EMERGENCY MEDICINE

## 2023-08-25 PROCEDURE — 85025 COMPLETE CBC W/AUTO DIFF WBC: CPT | Performed by: EMERGENCY MEDICINE

## 2023-08-25 RX ORDER — DIAZEPAM 5 MG
5-20 TABLET ORAL EVERY 30 MIN PRN
Status: DISCONTINUED | OUTPATIENT
Start: 2023-08-25 | End: 2023-08-25 | Stop reason: HOSPADM

## 2023-08-25 RX ORDER — MULTIPLE VITAMINS W/ MINERALS TAB 9MG-400MCG
1 TAB ORAL DAILY
Status: DISCONTINUED | OUTPATIENT
Start: 2023-08-25 | End: 2023-08-25 | Stop reason: HOSPADM

## 2023-08-25 RX ORDER — DIAZEPAM 5 MG
5 TABLET ORAL ONCE
Status: COMPLETED | OUTPATIENT
Start: 2023-08-25 | End: 2023-08-25

## 2023-08-25 RX ORDER — FOLIC ACID 1 MG/1
1 TABLET ORAL DAILY
Status: DISCONTINUED | OUTPATIENT
Start: 2023-08-25 | End: 2023-08-25 | Stop reason: HOSPADM

## 2023-08-25 RX ADMIN — FOLIC ACID: 5 INJECTION, SOLUTION INTRAMUSCULAR; INTRAVENOUS; SUBCUTANEOUS at 02:27

## 2023-08-25 RX ADMIN — DIAZEPAM 10 MG: 5 TABLET ORAL at 11:47

## 2023-08-25 RX ADMIN — DIAZEPAM 5 MG: 5 TABLET ORAL at 13:19

## 2023-08-25 RX ADMIN — DIAZEPAM 10 MG: 5 TABLET ORAL at 08:58

## 2023-08-25 RX ADMIN — FOLIC ACID 1 MG: 1 TABLET ORAL at 08:54

## 2023-08-25 RX ADMIN — THIAMINE HCL TAB 100 MG 100 MG: 100 TAB at 08:53

## 2023-08-25 RX ADMIN — MULTIPLE VITAMINS W/ MINERALS TAB 1 TABLET: TAB at 08:54

## 2023-08-25 RX ADMIN — DIAZEPAM 10 MG: 5 TABLET ORAL at 06:26

## 2023-08-25 ASSESSMENT — ACTIVITIES OF DAILY LIVING (ADL)
ADLS_ACUITY_SCORE: 37
ADLS_ACUITY_SCORE: 39
ADLS_ACUITY_SCORE: 37
ADLS_ACUITY_SCORE: 37

## 2023-08-25 NOTE — ED TRIAGE NOTES
Here with his mother pt sates he is really sad and here for ETOH withdraw, unsure when his last drink was thinks it may have been yesterday or the day before. Pt admits he has been drinking for 3-4 months and was sober for 9 months prior. Hx of seizures about a month ago. Drinking 750 mL of vodka daily.     Denies SI.      Admits to marijunaa  use denies other substances,

## 2023-08-25 NOTE — TELEPHONE ENCOUNTER
Still awaiting utox to be collected.    MSSA Score- 10    8:39 AM Paged Dr. Sue    9:03 AM Intake to follow up with ED to confirm if patient would still like to admit to detox.    9:04 AM [facility] ED- Per call to patients Nurse pt would like to be treated in the ED and released.    9:16 AM Informed Dr. Sue that pt does not want to admit to detox.    12:23 PM Patient removed from work list Intake no longer following for detox admission.

## 2023-08-25 NOTE — ED PROVIDER NOTES
I received brief signout from my colleague at 7 AM regarding this patient tentative plan for admission to detox.  I received an update from the nurse several hours later stating the patient no longer wants to detox.  I did go see the patient.  Presently he is vitally stable.  He is oriented, ambulating and tolerating fluids.  I believe him to be high risk for detox.  Presently he is in a normal state of mind and has capacity.  I discussed risks of severe withdrawal including death, seizures or permanent injury.  Patient has declined detox at this time and is requested dismissal from the emergency department.  I have strongly encouraged him to seek close outpatient follow-up or call or return emergently should he have any need.     Gilles Severino MD  08/25/23 [id number]

## 2023-08-25 NOTE — DISCHARGE INSTRUCTIONS
No sorry to hear that you change your mind regarding coming into the detox unit.  I believe you are high risk for significant withdrawals.  We are certainly available 24/7 should you have change or wish to seek detox we will happily help you at any time.  I would strongly encouraged to continue to work with your primary care physician or outside provider to aid in reduction of alcohol intake.  Please call or return emergently with any change, progression or worsening of symptoms.

## 2023-08-25 NOTE — ED TRIAGE NOTES
Triage Assessment       Row Name 08/24/23 4645       Triage Assessment (Adult)    Airway WDL WDL       Respiratory WDL    Respiratory WDL WDL       Cardiac WDL    Cardiac WDL WDL

## 2023-08-25 NOTE — MEDICATION SCRIBE - ADMISSION MEDICATION HISTORY
Medication Scribe Admission Medication History    Admission medication history is complete. The information provided in this note is only as accurate as the sources available at the time of the update.    Medication reconciliation/reorder completed by provider prior to medication history? No    Information Source(s): Patient and CareEverywhere/SureScripts via in-person    Pertinent Information: N/A    Changes made to PTA medication list:  Added: None  Deleted: vitamin b1, sucralfate  Changed: None    Medication Affordability:       Allergies reviewed with patient and updates made in EHR: yes    Medication History Completed By: Zhane Benz 8/25/2023 9:17 AM    Prior to Admission medications    Medication Sig Last Dose Taking? Auth Provider Long Term End Date   cloNIDine (CATAPRES) 0.1 MG tablet Take 0.1 mg by mouth 2 times daily Hold the dose if blood pressure is 90/60 or lower. Then recheck in 30 minutes to an hour. Past Week Yes Reported, Patient No    gabapentin (NEURONTIN) 600 MG tablet Take 1 tablet (600 mg) by mouth 3 times daily for 30 days 8/24/2023 at pm Yes Patty Reddy MD Yes 8/26/23   hydrOXYzine (ATARAX) 25 MG tablet Take 1-2 tablets (25-50 mg) by mouth 3 times daily as needed for itching 8/24/2023 Yes Ava Lew MD No    LORazepam (ATIVAN) 0.5 MG tablet Take 1 tablet (0.5 mg) by mouth every 6 hours as needed for anxiety Past Month Yes Alysha Rubalcava PA-C     naltrexone (DEPADE/REVIA) 50 MG tablet Take 50 mg by mouth daily Past Week Yes Reported, Patient No    nicotine polacrilex (NICORETTE) 4 MG gum Place 1 each (4 mg) inside cheek every hour as needed for other (nicotine withdrawal symptoms) Past Month Yes Ava Lew MD     traZODone (DESYREL) 50 MG tablet Take 1 tablet (50 mg) by mouth nightly as needed for sleep (may repeat after 60 minutes) 8/23/2023 at pm  Ava Lew MD Yes

## 2023-08-25 NOTE — TELEPHONE ENCOUNTER
S: [facility] Eduardo , Provider Júnior calling at 07:08 with clinical on a 38 year old/Male presenting for alcohol detox.     B: Pt presents for ETOH detox.   Currently reports drinking 1L for past 3 months.  Sober for 9 months, relapsed 3 months ago  Patient reports last use was PTA.  Pt SHERITA: 0.281  Pt  denies hx of DT  Pt  denies hx of seizures. Last seizure: N/A  Pt endorsing the following symptoms of withdrawal: Tremulous  MSSA Score: Placed on protocol - pt just starting to exhibit withdrawal sxs    Pt denies acute mental health or medical concerns.   Pt endorses other drug use: Cannabis Amount/frequency:  Intermittent use    Does Pt have a detox care plan in [facility]? No  Does pt present with specific needs, assistive devices, or exclusionary criteria? None  Is the patient ambulating, eating and drinking in the ED? Yes    A: Pt meets criteria to be presented for IP detox admission. Patient is voluntary    COVID Symptoms: No  If yes, COVID test required   Utox: Ordered, not yet collected  CMP: Abnormalities: Chloride 94 / Anion Gap 17 / Creatinine 0.65 / Glucose 113 / AST 47  CBC: Abnormalities: Hematocrit 37.1  HCG: N/A     R: Patient cleared and ready for behavioral bed placement: Yes    Pt is meeting criteria for presentation to 3A/CD    Does Patient need a Transfer Center request created? No, Pt is located within [facility] ED, [facility] ED, or [place] ED    [facility]

## 2023-08-25 NOTE — ED PROVIDER NOTES
"    [facility] EMERGENCY DEPARTMENT ([facility])    8/24/23      ED PROVIDER NOTE   ED 17 1:39 AM   History     Chief Complaint   Patient presents with    Drug / Alcohol Assessment     The history is provided by the patient and medical records.     Nikhil Rios is a 38 year old male with history of severe alcohol use disorder since 2018, prior alcohol withdrawal seizures who presents to the Emergency Department with alcohol intoxication.  He started drinking daily 5 years ago, had had 9 months of sobriety before relapsing 3 months ago.  He has been drinking 750 mL of liquor daily. He is brought here by his mother.  He states that he is not seeking detox but \"would like help.\" He feels very anxious, shaky, trembling, \"the usual symptoms of freaking alcohol.\" He states \"this shit's just gotta stop.\"  When asked what sort of help he is seeking he states that he would like some Valium and to be discharged home.  He is adamant that he does not want detox or treatment at this time, states \"I can't do it.\" He's done [facility] twice. He wants some valium and then leave. Mother would like for patient to start the detox process, in case he changes his mind.  He occasionally smokes marijuana, otherwise no tobacco or drug use.  He notes detox at [facility] in [place] that is very nice. No hypertension, diabetes, or other medical issues.  He endorses history of alcohol withdrawal seizures. As for history of DTs, he states maybe once when he was hallucinating a ball.     Past Medical History  Past Medical History:   Diagnosis Date    Alcohol abuse     Alcohol abuse     Alcohol withdrawal (H)     Depressive disorder     Family history of diabetes mellitus 11/9/2007    HLD (hyperlipidemia)     HTN (hypertension)      Past Surgical History:   Procedure Laterality Date    NO HISTORY OF SURGERY      OTHER SURGICAL HISTORY      none     cloNIDine (CATAPRES) 0.1 MG tablet  gabapentin (NEURONTIN) 600 MG " tablet  hydrOXYzine (ATARAX) 25 MG tablet  LORazepam (ATIVAN) 0.5 MG tablet  naltrexone (DEPADE/REVIA) 50 MG tablet  nicotine polacrilex (NICORETTE) 4 MG gum  sucralfate (CARAFATE) 1 GM tablet  thiamine (B-1) 100 MG tablet  traZODone (DESYREL) 50 MG tablet      Allergies   Allergen Reactions    Gramineae Pollens Itching    Pollen Extract Rash     grass tree pollen     Family History  Family History   Problem Relation Age of Onset    Coronary Artery Disease Father     Substance Abuse Maternal Grandfather     Mental Illness Brother     Schizophrenia Brother     Schizophrenia Maternal Aunt      Social History   Social History     Tobacco Use    Smoking status: Some Days     Packs/day: 0.25     Types: Cigars, Cigarettes    Smokeless tobacco: Never    Tobacco comments:     occasional   Substance Use Topics    Alcohol use: Yes     Alcohol/week: 17.0 standard drinks of alcohol     Types: 17 Shots of liquor per week     Comment: Drinks 750ml/day or 17 shots/day; hx of withdrawl seizures    Drug use: Not Currently     Types: Marijuana         A medically appropriate review of systems was performed with pertinent positives and negatives noted in the HPI, and all other systems negative.    Physical Exam   BP: 137/82  Pulse: 97  Temp: 98.2  F (36.8  C)  Resp: 18  Weight: 77.1 kg (170 lb)  SpO2: 98 %  Physical Exam  Vitals and nursing note reviewed.   Constitutional:       General: He is in acute distress.      Appearance: Normal appearance.      Comments: Tremulous, slurred speech, unsteady gait, consistent with EtOH intoxication   HENT:      Head: Normocephalic.      Nose: Nose normal.   Eyes:      Pupils: Pupils are equal, round, and reactive to light.   Cardiovascular:      Rate and Rhythm: Normal rate and regular rhythm.   Pulmonary:      Effort: Pulmonary effort is normal.   Abdominal:      General: There is no distension.   Musculoskeletal:         General: No deformity. Normal range of motion.      Cervical back: Normal  range of motion.   Skin:     General: Skin is warm.   Neurological:      Mental Status: He is alert and oriented to person, place, and time.   Psychiatric:         Mood and Affect: Mood normal.           ED Course, Procedures, & Data         Results for orders placed or performed during the hospital encounter of 08/24/23   Comprehensive metabolic panel     Status: Abnormal   Result Value Ref Range    Sodium 138 136 - 145 mmol/L    Potassium 4.1 3.4 - 5.3 mmol/L    Chloride 94 (L) 98 - 107 mmol/L    Carbon Dioxide (CO2) 27 22 - 29 mmol/L    Anion Gap 17 (H) 7 - 15 mmol/L    Urea Nitrogen 11.1 6.0 - 20.0 mg/dL    Creatinine 0.65 (L) 0.67 - 1.17 mg/dL    Calcium 9.4 8.6 - 10.0 mg/dL    Glucose 113 (H) 70 - 99 mg/dL    Alkaline Phosphatase 82 40 - 129 U/L    AST 47 (H) 0 - 45 U/L    ALT 40 0 - 70 U/L    Protein Total 7.2 6.4 - 8.3 g/dL    Albumin 4.8 3.5 - 5.2 g/dL    Bilirubin Total 0.3 <=1.2 mg/dL    GFR Estimate >90 >60 mL/min/1.73m2   CBC with platelets and differential     Status: Abnormal   Result Value Ref Range    WBC Count 9.6 4.0 - 11.0 10e3/uL    RBC Count 4.40 4.40 - 5.90 10e6/uL    Hemoglobin 13.3 13.3 - 17.7 g/dL    Hematocrit 37.1 (L) 40.0 - 53.0 %    MCV 84 78 - 100 fL    MCH 30.2 26.5 - 33.0 pg    MCHC 35.8 31.5 - 36.5 g/dL    RDW 15.0 10.0 - 15.0 %    Platelet Count 352 150 - 450 10e3/uL    % Neutrophils 66 %    % Lymphocytes 21 %    % Monocytes 12 %    % Eosinophils 0 %    % Basophils 1 %    % Immature Granulocytes 0 %    NRBCs per 100 WBC 0 <1 /100    Absolute Neutrophils 6.3 1.6 - 8.3 10e3/uL    Absolute Lymphocytes 2.0 0.8 - 5.3 10e3/uL    Absolute Monocytes 1.1 0.0 - 1.3 10e3/uL    Absolute Eosinophils 0.0 0.0 - 0.7 10e3/uL    Absolute Basophils 0.1 0.0 - 0.2 10e3/uL    Absolute Immature Granulocytes 0.0 <=0.4 10e3/uL    Absolute NRBCs 0.0 10e3/uL   Alcohol breath test POCT     Status: Abnormal   Result Value Ref Range    Alcohol Breath Test 0.281 (A) 0.00 - 0.01   CBC with platelets differential      Status: Abnormal    Narrative    The following orders were created for panel order CBC with platelets differential.  Procedure                               Abnormality         Status                     ---------                               -----------         ------                     CBC with platelets and d...[[id number]]  Abnormal            Final result                 Please view results for these tests on the individual orders.     Medications   diazepam (VALIUM) tablet 5-20 mg (10 mg Oral $Given 8/25/23 0626)   thiamine (B-1) tablet 100 mg (has no administration in time range)   folic acid (FOLVITE) tablet 1 mg (has no administration in time range)   multivitamin w/minerals (THERA-VIT-M) tablet 1 tablet (has no administration in time range)   dextrose 5% and 0.45% NaCl 1,000 mL with Infuvite Adult 10 mL, thiamine 100 mg, folic acid 1 mg infusion ( Intravenous Stopped 8/25/23 0321)     Labs Ordered and Resulted from Time of ED Arrival to Time of ED Departure   COMPREHENSIVE METABOLIC PANEL - Abnormal       Result Value    Sodium 138      Potassium 4.1      Chloride 94 (*)     Carbon Dioxide (CO2) 27      Anion Gap 17 (*)     Urea Nitrogen 11.1      Creatinine 0.65 (*)     Calcium 9.4      Glucose 113 (*)     Alkaline Phosphatase 82      AST 47 (*)     ALT 40      Protein Total 7.2      Albumin 4.8      Bilirubin Total 0.3      GFR Estimate >90     CBC WITH PLATELETS AND DIFFERENTIAL - Abnormal    WBC Count 9.6      RBC Count 4.40      Hemoglobin 13.3      Hematocrit 37.1 (*)     MCV 84      MCH 30.2      MCHC 35.8      RDW 15.0      Platelet Count 352      % Neutrophils 66      % Lymphocytes 21      % Monocytes 12      % Eosinophils 0      % Basophils 1      % Immature Granulocytes 0      NRBCs per 100 WBC 0      Absolute Neutrophils 6.3      Absolute Lymphocytes 2.0      Absolute Monocytes 1.1      Absolute Eosinophils 0.0      Absolute Basophils 0.1      Absolute Immature Granulocytes 0.0       Absolute NRBCs 0.0     ALCOHOL BREATH TEST POCT - Abnormal    Alcohol Breath Test 0.281 (*)      No orders to display          Critical care was not performed.     Medical Decision Making  The patient's presentation was of high complexity (an acute health issue posing potential threat to life or bodily function).    The patient's evaluation involved:  review of 3+ test result(s) ordered prior to this encounter (see separate area of note for details)    The patient's management necessitated moderate risk (prescription drug management including medications given in the ED) and further care after sign-out to Dr. Severino (see their note for further management).    Assessment & Plan    Patient presents seeking detox from alcohol.  Drinks approximately 1 L of hard alcohol per day for the past 3 months.  Breathalyzer on arrival 0.281.  Exam consistent with alcohol intoxication.  No evidence of trauma or coingestion.  Labs ordered/reviewed.  Metabolic panel shows normal electrolytes and normal renal function.  No significant transaminitis.  CBC without leukocytosis or anemia.  Patient is medically cleared.    Patient was initially hesitant to inpatient detox but ultimately would like to saty. Started on the MSSA protocol.  Signout given to the intake team.      I have reviewed the nursing notes. I have reviewed the findings, diagnosis, plan and need for follow up with the patient.    New Prescriptions    No medications on file       Final diagnoses:   Alcohol abuse     I, Tequila Banegas, am serving as a trained medical scribe to document services personally performed by Gilles Callejas DO based on the provider's statements to me on August 25, 2023.  This document has been checked and approved by the attending provider.    I, Gilles Callejas DO, was physically present and have reviewed and verified the accuracy of this note documented by Tequila Banegas, medical scribe.      Gilles Callejas DO   [facility] EMERGENCY  DEPARTMENT  8/24/2023     Gilles Callejas, DO  08/26/23 0449

## 2023-08-28 ENCOUNTER — TELEPHONE (OUTPATIENT)
Dept: BEHAVIORAL HEALTH | Facility: CLINIC | Age: 38
End: 2023-08-28

## 2023-08-28 ENCOUNTER — HOSPITAL ENCOUNTER (INPATIENT)
Facility: CLINIC | Age: 38
LOS: 2 days | Discharge: HOME | DRG: 897 | End: 2023-08-30
Attending: EMERGENCY MEDICINE | Admitting: PSYCHIATRY & NEUROLOGY
Payer: COMMERCIAL

## 2023-08-28 DIAGNOSIS — F10.10 ALCOHOL ABUSE: ICD-10-CM

## 2023-08-28 DIAGNOSIS — F10.229 ALCOHOL DEPENDENCE WITH INTOXICATION WITH COMPLICATION (H): ICD-10-CM

## 2023-08-28 DIAGNOSIS — F33.1 MODERATE EPISODE OF RECURRENT MAJOR DEPRESSIVE DISORDER (H): Primary | ICD-10-CM

## 2023-08-28 LAB
ALBUMIN SERPL BCG-MCNC: 5 G/DL (ref 3.5–5.2)
ALCOHOL BREATH TEST: 0.28 (ref 0–0.01)
ALP SERPL-CCNC: 84 U/L (ref 40–129)
ALT SERPL W P-5'-P-CCNC: 65 U/L (ref 0–70)
AMPHETAMINES UR QL SCN: ABNORMAL
ANION GAP SERPL CALCULATED.3IONS-SCNC: 18 MMOL/L (ref 7–15)
AST SERPL W P-5'-P-CCNC: 91 U/L (ref 0–45)
BARBITURATES UR QL SCN: ABNORMAL
BASOPHILS # BLD AUTO: 0.1 10E3/UL (ref 0–0.2)
BASOPHILS NFR BLD AUTO: 2 %
BENZODIAZ UR QL SCN: ABNORMAL
BILIRUB SERPL-MCNC: 0.4 MG/DL
BUN SERPL-MCNC: 10.7 MG/DL (ref 6–20)
BZE UR QL SCN: ABNORMAL
CALCIUM SERPL-MCNC: 9 MG/DL (ref 8.6–10)
CANNABINOIDS UR QL SCN: ABNORMAL
CHLORIDE SERPL-SCNC: 99 MMOL/L (ref 98–107)
CREAT SERPL-MCNC: 0.7 MG/DL (ref 0.67–1.17)
DEPRECATED HCO3 PLAS-SCNC: 24 MMOL/L (ref 22–29)
EOSINOPHIL # BLD AUTO: 0.1 10E3/UL (ref 0–0.7)
EOSINOPHIL NFR BLD AUTO: 1 %
ERYTHROCYTE [DISTWIDTH] IN BLOOD BY AUTOMATED COUNT: 14.9 % (ref 10–15)
GFR SERPL CREATININE-BSD FRML MDRD: >90 ML/MIN/1.73M2
GLUCOSE SERPL-MCNC: 158 MG/DL (ref 70–99)
HCT VFR BLD AUTO: 41 % (ref 40–53)
HGB BLD-MCNC: 14.4 G/DL (ref 13.3–17.7)
IMM GRANULOCYTES # BLD: 0 10E3/UL
IMM GRANULOCYTES NFR BLD: 0 %
LYMPHOCYTES # BLD AUTO: 2.4 10E3/UL (ref 0.8–5.3)
LYMPHOCYTES NFR BLD AUTO: 46 %
MAGNESIUM SERPL-MCNC: 1.4 MG/DL (ref 1.7–2.3)
MCH RBC QN AUTO: 29.7 PG (ref 26.5–33)
MCHC RBC AUTO-ENTMCNC: 35.1 G/DL (ref 31.5–36.5)
MCV RBC AUTO: 85 FL (ref 78–100)
MONOCYTES # BLD AUTO: 0.3 10E3/UL (ref 0–1.3)
MONOCYTES NFR BLD AUTO: 6 %
NEUTROPHILS # BLD AUTO: 2.3 10E3/UL (ref 1.6–8.3)
NEUTROPHILS NFR BLD AUTO: 45 %
NRBC # BLD AUTO: 0 10E3/UL
NRBC BLD AUTO-RTO: 0 /100
OPIATES UR QL SCN: ABNORMAL
PHOSPHATE SERPL-MCNC: 2.5 MG/DL (ref 2.5–4.5)
PLATELET # BLD AUTO: 314 10E3/UL (ref 150–450)
POTASSIUM SERPL-SCNC: 3.3 MMOL/L (ref 3.4–5.3)
PROT SERPL-MCNC: 7.4 G/DL (ref 6.4–8.3)
RBC # BLD AUTO: 4.85 10E6/UL (ref 4.4–5.9)
SODIUM SERPL-SCNC: 141 MMOL/L (ref 136–145)
WBC # BLD AUTO: 5.1 10E3/UL (ref 4–11)

## 2023-08-28 PROCEDURE — 250N000013 HC RX MED GY IP 250 OP 250 PS 637: Performed by: EMERGENCY MEDICINE

## 2023-08-28 PROCEDURE — 96366 THER/PROPH/DIAG IV INF ADDON: CPT | Performed by: EMERGENCY MEDICINE

## 2023-08-28 PROCEDURE — HZ2ZZZZ DETOXIFICATION SERVICES FOR SUBSTANCE ABUSE TREATMENT: ICD-10-PCS | Performed by: NURSE PRACTITIONER

## 2023-08-28 PROCEDURE — 128N000004 HC R&B CD ADULT

## 2023-08-28 PROCEDURE — 250N000009 HC RX 250: Performed by: EMERGENCY MEDICINE

## 2023-08-28 PROCEDURE — 99285 EMERGENCY DEPT VISIT HI MDM: CPT | Mod: 25 | Performed by: EMERGENCY MEDICINE

## 2023-08-28 PROCEDURE — 99285 EMERGENCY DEPT VISIT HI MDM: CPT | Performed by: EMERGENCY MEDICINE

## 2023-08-28 PROCEDURE — 250N000013 HC RX MED GY IP 250 OP 250 PS 637: Performed by: NURSE PRACTITIONER

## 2023-08-28 PROCEDURE — 80307 DRUG TEST PRSMV CHEM ANLYZR: CPT | Performed by: EMERGENCY MEDICINE

## 2023-08-28 PROCEDURE — 80053 COMPREHEN METABOLIC PANEL: CPT | Performed by: EMERGENCY MEDICINE

## 2023-08-28 PROCEDURE — 250N000011 HC RX IP 250 OP 636: Performed by: EMERGENCY MEDICINE

## 2023-08-28 PROCEDURE — 93005 ELECTROCARDIOGRAM TRACING: CPT

## 2023-08-28 PROCEDURE — 83735 ASSAY OF MAGNESIUM: CPT | Performed by: NURSE PRACTITIONER

## 2023-08-28 PROCEDURE — 84100 ASSAY OF PHOSPHORUS: CPT | Performed by: NURSE PRACTITIONER

## 2023-08-28 PROCEDURE — 96365 THER/PROPH/DIAG IV INF INIT: CPT | Performed by: EMERGENCY MEDICINE

## 2023-08-28 PROCEDURE — 85014 HEMATOCRIT: CPT | Performed by: EMERGENCY MEDICINE

## 2023-08-28 PROCEDURE — 82075 ASSAY OF BREATH ETHANOL: CPT | Performed by: EMERGENCY MEDICINE

## 2023-08-28 PROCEDURE — 36415 COLL VENOUS BLD VENIPUNCTURE: CPT | Performed by: NURSE PRACTITIONER

## 2023-08-28 PROCEDURE — 36415 COLL VENOUS BLD VENIPUNCTURE: CPT | Performed by: EMERGENCY MEDICINE

## 2023-08-28 RX ORDER — POTASSIUM CHLORIDE 20MEQ/15ML
20 LIQUID (ML) ORAL ONCE
Status: COMPLETED | OUTPATIENT
Start: 2023-08-28 | End: 2023-08-28

## 2023-08-28 RX ORDER — LORAZEPAM 1 MG/1
1-2 TABLET ORAL EVERY 30 MIN PRN
Status: DISCONTINUED | OUTPATIENT
Start: 2023-08-28 | End: 2023-08-28

## 2023-08-28 RX ORDER — ACETAMINOPHEN 325 MG/1
325 TABLET ORAL EVERY 4 HOURS PRN
Status: DISCONTINUED | OUTPATIENT
Start: 2023-08-28 | End: 2023-08-30 | Stop reason: HOSPADM

## 2023-08-28 RX ORDER — HYDROXYZINE HYDROCHLORIDE 25 MG/1
25-50 TABLET, FILM COATED ORAL 3 TIMES DAILY PRN
Status: DISCONTINUED | OUTPATIENT
Start: 2023-08-28 | End: 2023-08-29

## 2023-08-28 RX ORDER — MULTIPLE VITAMINS W/ MINERALS TAB 9MG-400MCG
1 TAB ORAL DAILY
Status: DISCONTINUED | OUTPATIENT
Start: 2023-08-29 | End: 2023-08-30 | Stop reason: HOSPADM

## 2023-08-28 RX ORDER — ATENOLOL 50 MG/1
50 TABLET ORAL DAILY PRN
Status: DISCONTINUED | OUTPATIENT
Start: 2023-08-28 | End: 2023-08-30 | Stop reason: HOSPADM

## 2023-08-28 RX ORDER — TRAZODONE HYDROCHLORIDE 50 MG/1
50 TABLET, FILM COATED ORAL
Status: DISCONTINUED | OUTPATIENT
Start: 2023-08-28 | End: 2023-08-29

## 2023-08-28 RX ORDER — DIAZEPAM 5 MG
5-20 TABLET ORAL EVERY 30 MIN PRN
Status: DISCONTINUED | OUTPATIENT
Start: 2023-08-28 | End: 2023-08-28

## 2023-08-28 RX ORDER — FOLIC ACID 1 MG/1
1 TABLET ORAL DAILY
Status: DISCONTINUED | OUTPATIENT
Start: 2023-08-29 | End: 2023-08-30 | Stop reason: HOSPADM

## 2023-08-28 RX ORDER — MULTIPLE VITAMINS W/ MINERALS TAB 9MG-400MCG
1 TAB ORAL DAILY
Status: DISCONTINUED | OUTPATIENT
Start: 2023-08-29 | End: 2023-08-28

## 2023-08-28 RX ORDER — LORAZEPAM 2 MG/ML
1-2 INJECTION INTRAMUSCULAR EVERY 30 MIN PRN
Status: DISCONTINUED | OUTPATIENT
Start: 2023-08-28 | End: 2023-08-28

## 2023-08-28 RX ORDER — DIAZEPAM 5 MG
5-20 TABLET ORAL EVERY 30 MIN PRN
Status: DISCONTINUED | OUTPATIENT
Start: 2023-08-28 | End: 2023-08-30 | Stop reason: HOSPADM

## 2023-08-28 RX ORDER — GABAPENTIN 600 MG/1
600 TABLET ORAL 3 TIMES DAILY
Status: DISCONTINUED | OUTPATIENT
Start: 2023-08-28 | End: 2023-08-30 | Stop reason: HOSPADM

## 2023-08-28 RX ADMIN — DIAZEPAM 10 MG: 5 TABLET ORAL at 17:03

## 2023-08-28 RX ADMIN — DIAZEPAM 10 MG: 5 TABLET ORAL at 11:05

## 2023-08-28 RX ADMIN — POTASSIUM CHLORIDE 20 MEQ: 40 SOLUTION ORAL at 08:57

## 2023-08-28 RX ADMIN — TRAZODONE HYDROCHLORIDE 50 MG: 50 TABLET ORAL at 22:35

## 2023-08-28 RX ADMIN — DIAZEPAM 10 MG: 5 TABLET ORAL at 13:24

## 2023-08-28 RX ADMIN — DIAZEPAM 10 MG: 5 TABLET ORAL at 18:50

## 2023-08-28 RX ADMIN — DIAZEPAM 10 MG: 5 TABLET ORAL at 08:57

## 2023-08-28 RX ADMIN — DIAZEPAM 10 MG: 5 TABLET ORAL at 06:52

## 2023-08-28 RX ADMIN — DIAZEPAM 10 MG: 5 TABLET ORAL at 22:05

## 2023-08-28 RX ADMIN — FOLIC ACID: 5 INJECTION, SOLUTION INTRAMUSCULAR; INTRAVENOUS; SUBCUTANEOUS at 06:50

## 2023-08-28 RX ADMIN — HYDROXYZINE HYDROCHLORIDE 50 MG: 25 TABLET, FILM COATED ORAL at 22:34

## 2023-08-28 RX ADMIN — DIAZEPAM 5 MG: 5 TABLET ORAL at 14:43

## 2023-08-28 RX ADMIN — GABAPENTIN 600 MG: 600 TABLET, FILM COATED ORAL at 20:17

## 2023-08-28 RX ADMIN — DIAZEPAM 10 MG: 5 TABLET ORAL at 12:05

## 2023-08-28 ASSESSMENT — ACTIVITIES OF DAILY LIVING (ADL)
ADLS_ACUITY_SCORE: 37
WALKING_OR_CLIMBING_STAIRS_DIFFICULTY: NO
TOILETING_ISSUES: NO
ADLS_ACUITY_SCORE: 37
NUMBER_OF_TIMES_PATIENT_HAS_FALLEN_WITHIN_LAST_SIX_MONTHS: 1
ADLS_ACUITY_SCORE: 37
ADLS_ACUITY_SCORE: 40
DIFFICULTY_EATING/SWALLOWING: NO
ADLS_ACUITY_SCORE: 37
ADLS_ACUITY_SCORE: 40
ADLS_ACUITY_SCORE: 37
ADLS_ACUITY_SCORE: 40
DRESSING/BATHING_DIFFICULTY: NO
CONCENTRATING,_REMEMBERING_OR_MAKING_DECISIONS_DIFFICULTY: NO
DOING_ERRANDS_INDEPENDENTLY_DIFFICULTY: NO
ADLS_ACUITY_SCORE: 37
CHANGE_IN_FUNCTIONAL_STATUS_SINCE_ONSET_OF_CURRENT_ILLNESS/INJURY: NO
FALL_HISTORY_WITHIN_LAST_SIX_MONTHS: YES
WEAR_GLASSES_OR_BLIND: NO

## 2023-08-28 NOTE — PROGRESS NOTES
"3A Admission Note    S = Situation:   Nikhil Rios is a 38 year old year old male with a chief complaint of Alcohol Intoxication    Voluntary admission to [facility] for alcohol withdrawal and detox.   Has had several admission to  for detox before. Last 3A admission 6/2023    B  = Background:     Pt reports 9 months of sobriety, relapsed about 3 months ago, drinking vodka. For the last 10 days drinking approximately 750ml of vodka daily. Pt reports his stressor is working 2 jobs. Feels overworked. So he turned back to drinking again. States his sleep is poor except he takes trazodone and hydroxyzine.     Pt states he has been to inpt treatments 5 times and out pt treatments 2 times.   Feels like inpatient treatment did not help him as much as the out pt. He  felt locked up and away from friends and family.     Pt denies suicidal ideation. No urge to self harm. No depression or anxiety.       In the ED, pt received replacement for K 3.3, had a bolus of iv fluid.  SHERITA of 0.28, denied hx of DTs  Endorses hx of withdrawal seizures. Utox pos for benzos. Pt has PTA ativan, but reports taking it occasionally.   AST 91. Anion gap 18.   Pt scored high on MSSA in the ED and received a total of 65mg valium since coming to the ED.      A  =  Assessment:   During admission interview, pt affect was flat, but he was cooperative with the admission process.   MSSA on admission at 1725 was 7, no valium given, then at 1845, pt presented with total body shakes, irritation, \" I want some valium.... I feel like I'm going to have a seizure if I don't get some....\"   1850: MSSA 13, 10mg valium given.     BP (!) 147/80 (Patient Position: Sitting)   Pulse 88   Temp 98.8  F (37.1  C) (Oral)   Resp 18   Ht 1.778 m (5' 10\")   Wt 74.8 kg (165 lb)   SpO2 98%   BMI 23.68 kg/m       R =   Request or Recommendation:   Alcohol withdrawal will be monitored and treated using MSSA with valium prn.  Pt will meet with psychiatry, internal medicine, and " case management tomorrow.  At the time of admission, pt reports discharge plan is detox only.

## 2023-08-28 NOTE — ED TRIAGE NOTES
Patient seeking detox for ETOH. Drinks daily. Seizure and hallucination hx.      Triage Assessment       Row Name 08/28/23 0556       Triage Assessment (Adult)    Airway WDL WDL       Respiratory WDL    Respiratory WDL WDL       Skin Circulation/Temperature WDL    Skin Circulation/Temperature WDL WDL       Cardiac WDL    Cardiac WDL WDL       Peripheral/Neurovascular WDL    Peripheral Neurovascular WDL WDL       Cognitive/Neuro/Behavioral WDL    Cognitive/Neuro/Behavioral WDL WDL

## 2023-08-28 NOTE — TELEPHONE ENCOUNTER
S: [facility] , Provider Mayelin calling at 10:47AM with clinical on a 38 year old/Male presenting for alcohol detox.       B: Pt presents for ETOH detox.   Currently reports drinking 750-liter hard for 3 months.    Patient reports last use was just before arrival.  Pt SHERITA: .281  Pt  denies hx of DT  Pt  endorses hx of seizures. Last seizure:  nothing in the last year  Pt endorsing the following symptoms of withdrawal: Shaky and Anxious- pt received valium and is doing better  MSSA Score: 16    Pt denies acute mental health or medical concerns but informs hx of hallucinations. No SI. Hx of high blood pressure/cholesterol.   Pt endorses other drug use: Cannabis Amount/frequency:  on occasion    Does Pt have a detox care plan in [facility]? No  Does pt present with specific needs, assistive devices, or exclusionary criteria? None  Is the patient ambulating, eating and drinking in the ED? Yes    A: Pt meets criteria to be presented for IP detox admission. Patient is voluntary    COVID Symptoms: No  If yes, COVID test required   Utox: In process  CMP: Abnormalities: Potassium 3.3, Anion Gap 18, Glucose 158, AST 91  CBC: WNL  HCG: N/A     R: Patient cleared and ready for behavioral bed placement: Yes    Pt is meeting criteria for presentation to 3A/CD    Does Patient need a Transfer Center request created? No, Pt is located within [facility] ED, [facility] ED, or [facility] ED     11:10a Intake called Provider Remy to review pt for admission to Unit 3A. Provider informed pt accepted for admission. Intake to update work lists.          11:58a Intake called Unit 3A CRN to inform pt is in queue for the unit. CRN unavailable, Intake to CB.      12:19p Intake called Unit CRN ([facility]) to inform pt is in queue for the unit. CRN informed they will call for report when they can admit pt.         12:37p Intake called [facility] ED Nurse Chen to inform pt is in queue for unit. Unit  3A CRN informed they will call for report when they can admit  pt.         12:38p Intake notes all work lists updated with pt's acceptance.

## 2023-08-28 NOTE — PROGRESS NOTES
08/28/23 8082   Patient Belongings   Did you bring any home meds/supplements to the hospital?  No   Patient Belongings other (see comments)  (storage bin, med room bin)   Belongings Search Yes   Clothing Search Yes   Second Staff Sachin Marquez     Storage bin: shoes, cigarettes, lighter, sunglasses  Med room bin: cell phone  NO WALLET  A               Admission:  I am responsible for any personal items that are not sent to the safe or pharmacy.  [facility] is not responsible for loss, theft or damage of any property in my possession.    Signature:  _________________________________ Date: _______  Time: _____                                              Staff Signature:  ____________________________ Date: ________  Time: _____      2nd Staff person, if patient is unable/unwilling to sign:    Signature: ________________________________ Date: ________  Time: _____     Discharge:  [facility] has returned all of my personal belongings:    Signature: _________________________________ Date: ________  Time: _____                                          Staff Signature:  ____________________________ Date: ________  Time: _____

## 2023-08-28 NOTE — ED PROVIDER NOTES
ED Provider Note  [facility]      History     Chief Complaint   Patient presents with    Alcohol Intoxication     HPI  Nikhil Rios is a 38 year old male with history of severe alcohol use disorder since 2018, previous alcohol withdrawal seizures, presents the ED for evaluation of alcohol intoxication.  States he is interested in detox.  He had been sober for 9 months and then relapsed 3 months ago.  Has been drinking at 750ml to 1 L of hard alcohol per day.  His last drink was just prior to arrival.  He was seen in the ED on 824/23 for the same symptoms but ultimately left prior to going to detox.  He regrets this and states he is very motivated to undergo detox.  He denies any medical complaints.  Does not have any mental health complaints.  Endorses occasional marijuana.  Denies drug use.    Past Medical History  Past Medical History:   Diagnosis Date    Alcohol abuse     Alcohol abuse     Alcohol withdrawal (H)     Depressive disorder     Family history of diabetes mellitus 11/9/2007    HLD (hyperlipidemia)     HTN (hypertension)      Past Surgical History:   Procedure Laterality Date    NO HISTORY OF SURGERY      OTHER SURGICAL HISTORY      none     cloNIDine (CATAPRES) 0.1 MG tablet  gabapentin (NEURONTIN) 600 MG tablet  hydrOXYzine (ATARAX) 25 MG tablet  LORazepam (ATIVAN) 0.5 MG tablet  naltrexone (DEPADE/REVIA) 50 MG tablet  nicotine polacrilex (NICORETTE) 4 MG gum  traZODone (DESYREL) 50 MG tablet      Allergies   Allergen Reactions    Gramineae Pollens Itching    Pollen Extract Rash     grass tree pollen     Family History  Family History   Problem Relation Age of Onset    Coronary Artery Disease Father     Substance Abuse Maternal Grandfather     Mental Illness Brother     Schizophrenia Brother     Schizophrenia Maternal Aunt      Social History   Social History     Tobacco Use    Smoking status: Some Days     Packs/day: 0.25     Types: Cigars, Cigarettes    Smokeless tobacco:  Never    Tobacco comments:     occasional   Substance Use Topics    Alcohol use: Yes     Alcohol/week: 17.0 standard drinks of alcohol     Types: 17 Shots of liquor per week     Comment: Drinks 750ml/day or 17 shots/day; hx of withdrawl seizures    Drug use: Not Currently     Types: Marijuana         A medically appropriate review of systems was performed with pertinent positives and negatives noted in the HPI, and all other systems negative.    Physical Exam   BP: (!) 142/95  Pulse: (!) 128  Temp: 97.5  F (36.4  C)  Resp: 18  SpO2: 97 %  Physical Exam  Vitals and nursing note reviewed.   Constitutional:       General: He is not in acute distress.     Appearance: Normal appearance.   HENT:      Head: Normocephalic.      Nose: Nose normal.   Eyes:      Pupils: Pupils are equal, round, and reactive to light.   Cardiovascular:      Rate and Rhythm: Normal rate and regular rhythm.   Pulmonary:      Effort: Pulmonary effort is normal.   Abdominal:      General: There is no distension.   Musculoskeletal:         General: No deformity. Normal range of motion.      Cervical back: Normal range of motion.   Skin:     General: Skin is warm.   Neurological:      Mental Status: He is alert and oriented to person, place, and time.   Psychiatric:         Mood and Affect: Mood normal.         ED Course, Procedures, & Data           Results for orders placed or performed during the hospital encounter of 08/28/23   Alcohol breath test POCT     Status: Abnormal   Result Value Ref Range    Alcohol Breath Test 0.281 (A) 0.00 - 0.01   CBC with platelets differential     Status: None ()    Narrative    The following orders were created for panel order CBC with platelets differential.  Procedure                               Abnormality         Status                     ---------                               -----------         ------                     CBC with platelets and d...[[id number]]                                                "    Please view results for these tests on the individual orders.     Medications   dextrose 5% and 0.45% NaCl 1,000 mL with Infuvite Adult 10 mL, thiamine 100 mg, folic acid 1 mg infusion (has no administration in time range)   diazepam (VALIUM) tablet 5-20 mg (has no administration in time range)     Labs Ordered and Resulted from Time of ED Arrival to Time of ED Departure   ALCOHOL BREATH TEST POCT - Abnormal       Result Value    Alcohol Breath Test 0.281 (*)    COMPREHENSIVE METABOLIC PANEL   CBC WITH PLATELETS AND DIFFERENTIAL     No orders to display          Critical care was not performed.     Medical Decision Making  The patient's presentation was of high complexity (an acute health issue posing potential threat to life or bodily function).    The patient's evaluation involved:  ordering and/or review of 3+ test(s) in this encounter (see separate area of note for details)    The patient's management necessitated further care after sign-out to Dr Dick (see their note for further management).    Assessment & Plan    Patient presents to the ED seeking detox from alcohol.  On arrival, he has a alcohol breath test of 0.281.  He is tachycardic at 128.  He is tremulous.  He states \"I am a severe alcoholic and I tend to withdrawal at 0.3\".  He says that he is motivated for detox.  He denies any medical or mental health complaints.    As of now, detox unit is full.  However, likely discharges at 8 or 9 this morning.  We will check basic labs including CBC, CMP, and UDS.  Will give banana bag and started on MSSA protocol.    Signed out to morning provider plan to follow-up on labs and discuss with intake for detox placement.      I have reviewed the nursing notes. I have reviewed the findings, diagnosis, plan and need for follow up with the patient.    New Prescriptions    No medications on file       Final diagnoses:   None       Gilles Callejas DO  [facility] EMERGENCY DEPARTMENT  8/28/2023   "   Gilles Callejas,   08/28/23 0640

## 2023-08-29 LAB
ALBUMIN SERPL BCG-MCNC: 4.8 G/DL (ref 3.5–5.2)
ALP SERPL-CCNC: 78 U/L (ref 40–129)
ALT SERPL W P-5'-P-CCNC: 57 U/L (ref 0–70)
ANION GAP SERPL CALCULATED.3IONS-SCNC: 11 MMOL/L (ref 7–15)
AST SERPL W P-5'-P-CCNC: 65 U/L (ref 0–45)
ATRIAL RATE - MUSE: 42 BPM
BASOPHILS # BLD AUTO: 0.1 10E3/UL (ref 0–0.2)
BASOPHILS NFR BLD AUTO: 1 %
BILIRUB SERPL-MCNC: 1 MG/DL
BUN SERPL-MCNC: 11.2 MG/DL (ref 6–20)
CALCIUM SERPL-MCNC: 10.8 MG/DL (ref 8.6–10)
CHLORIDE SERPL-SCNC: 98 MMOL/L (ref 98–107)
CHOLEST SERPL-MCNC: 272 MG/DL
CREAT SERPL-MCNC: 0.75 MG/DL (ref 0.67–1.17)
DEPRECATED HCO3 PLAS-SCNC: 29 MMOL/L (ref 22–29)
DIASTOLIC BLOOD PRESSURE - MUSE: NORMAL MMHG
EOSINOPHIL # BLD AUTO: 0.2 10E3/UL (ref 0–0.7)
EOSINOPHIL NFR BLD AUTO: 3 %
ERYTHROCYTE [DISTWIDTH] IN BLOOD BY AUTOMATED COUNT: 14.6 % (ref 10–15)
FOLATE SERPL-MCNC: 13.5 NG/ML (ref 4.6–34.8)
GFR SERPL CREATININE-BSD FRML MDRD: >90 ML/MIN/1.73M2
GGT SERPL-CCNC: 77 U/L (ref 8–61)
GLUCOSE SERPL-MCNC: 85 MG/DL (ref 70–99)
HBA1C MFR BLD: 5.1 %
HCT VFR BLD AUTO: 38.7 % (ref 40–53)
HDLC SERPL-MCNC: 129 MG/DL
HGB BLD-MCNC: 13.6 G/DL (ref 13.3–17.7)
IMM GRANULOCYTES # BLD: 0 10E3/UL
IMM GRANULOCYTES NFR BLD: 0 %
INTERPRETATION ECG - MUSE: NORMAL
LDLC SERPL CALC-MCNC: 132 MG/DL
LYMPHOCYTES # BLD AUTO: 1.6 10E3/UL (ref 0.8–5.3)
LYMPHOCYTES NFR BLD AUTO: 32 %
MCH RBC QN AUTO: 30.2 PG (ref 26.5–33)
MCHC RBC AUTO-ENTMCNC: 35.1 G/DL (ref 31.5–36.5)
MCV RBC AUTO: 86 FL (ref 78–100)
MONOCYTES # BLD AUTO: 0.4 10E3/UL (ref 0–1.3)
MONOCYTES NFR BLD AUTO: 7 %
NEUTROPHILS # BLD AUTO: 2.7 10E3/UL (ref 1.6–8.3)
NEUTROPHILS NFR BLD AUTO: 57 %
NONHDLC SERPL-MCNC: 143 MG/DL
NRBC # BLD AUTO: 0 10E3/UL
NRBC BLD AUTO-RTO: 0 /100
P AXIS - MUSE: 25 DEGREES
PLATELET # BLD AUTO: 243 10E3/UL (ref 150–450)
POTASSIUM SERPL-SCNC: 4.1 MMOL/L (ref 3.4–5.3)
POTASSIUM SERPL-SCNC: 4.1 MMOL/L (ref 3.4–5.3)
PR INTERVAL - MUSE: 142 MS
PROT SERPL-MCNC: 7 G/DL (ref 6.4–8.3)
QRS DURATION - MUSE: 96 MS
QT - MUSE: 516 MS
QTC - MUSE: 430 MS
R AXIS - MUSE: 75 DEGREES
RBC # BLD AUTO: 4.51 10E6/UL (ref 4.4–5.9)
SODIUM SERPL-SCNC: 138 MMOL/L (ref 136–145)
SYSTOLIC BLOOD PRESSURE - MUSE: NORMAL MMHG
T AXIS - MUSE: 76 DEGREES
TRIGL SERPL-MCNC: 55 MG/DL
TSH SERPL DL<=0.005 MIU/L-ACNC: 2.23 UIU/ML (ref 0.3–4.2)
VENTRICULAR RATE- MUSE: 42 BPM
VIT B12 SERPL-MCNC: 851 PG/ML (ref 232–1245)
WBC # BLD AUTO: 4.8 10E3/UL (ref 4–11)

## 2023-08-29 PROCEDURE — 80053 COMPREHEN METABOLIC PANEL: CPT | Performed by: NURSE PRACTITIONER

## 2023-08-29 PROCEDURE — 82746 ASSAY OF FOLIC ACID SERUM: CPT | Performed by: NURSE PRACTITIONER

## 2023-08-29 PROCEDURE — 250N000013 HC RX MED GY IP 250 OP 250 PS 637: Performed by: PHYSICIAN ASSISTANT

## 2023-08-29 PROCEDURE — 84443 ASSAY THYROID STIM HORMONE: CPT | Performed by: NURSE PRACTITIONER

## 2023-08-29 PROCEDURE — 93010 ELECTROCARDIOGRAM REPORT: CPT | Performed by: INTERNAL MEDICINE

## 2023-08-29 PROCEDURE — 82977 ASSAY OF GGT: CPT | Performed by: NURSE PRACTITIONER

## 2023-08-29 PROCEDURE — 80061 LIPID PANEL: CPT | Performed by: NURSE PRACTITIONER

## 2023-08-29 PROCEDURE — 36415 COLL VENOUS BLD VENIPUNCTURE: CPT | Performed by: NURSE PRACTITIONER

## 2023-08-29 PROCEDURE — 250N000013 HC RX MED GY IP 250 OP 250 PS 637: Performed by: NURSE PRACTITIONER

## 2023-08-29 PROCEDURE — 250N000013 HC RX MED GY IP 250 OP 250 PS 637: Performed by: PSYCHIATRY & NEUROLOGY

## 2023-08-29 PROCEDURE — 82607 VITAMIN B-12: CPT | Performed by: NURSE PRACTITIONER

## 2023-08-29 PROCEDURE — 128N000004 HC R&B CD ADULT

## 2023-08-29 PROCEDURE — H2032 ACTIVITY THERAPY, PER 15 MIN: HCPCS

## 2023-08-29 PROCEDURE — 85025 COMPLETE CBC W/AUTO DIFF WBC: CPT | Performed by: NURSE PRACTITIONER

## 2023-08-29 PROCEDURE — 83036 HEMOGLOBIN GLYCOSYLATED A1C: CPT | Performed by: NURSE PRACTITIONER

## 2023-08-29 PROCEDURE — 99222 1ST HOSP IP/OBS MODERATE 55: CPT | Mod: AI | Performed by: PHYSICIAN ASSISTANT

## 2023-08-29 RX ORDER — LANOLIN ALCOHOL/MO/W.PET/CERES
6 CREAM (GRAM) TOPICAL
Status: DISCONTINUED | OUTPATIENT
Start: 2023-08-29 | End: 2023-08-30 | Stop reason: HOSPADM

## 2023-08-29 RX ORDER — MAGNESIUM OXIDE 400 MG/1
400 TABLET ORAL 2 TIMES DAILY
Status: COMPLETED | OUTPATIENT
Start: 2023-08-29 | End: 2023-08-29

## 2023-08-29 RX ORDER — BUSPIRONE HYDROCHLORIDE 5 MG/1
5 TABLET ORAL 3 TIMES DAILY
Status: DISCONTINUED | OUTPATIENT
Start: 2023-08-29 | End: 2023-08-30 | Stop reason: HOSPADM

## 2023-08-29 RX ORDER — GABAPENTIN 300 MG/1
300 CAPSULE ORAL 3 TIMES DAILY
COMMUNITY
End: 2023-10-13

## 2023-08-29 RX ORDER — HYDROXYZINE HYDROCHLORIDE 25 MG/1
25-50 TABLET, FILM COATED ORAL 3 TIMES DAILY PRN
Status: DISCONTINUED | OUTPATIENT
Start: 2023-08-29 | End: 2023-08-30 | Stop reason: HOSPADM

## 2023-08-29 RX ADMIN — MULTIPLE VITAMINS W/ MINERALS TAB 1 TABLET: TAB at 08:52

## 2023-08-29 RX ADMIN — MAGNESIUM OXIDE TAB 400 MG (241.3 MG ELEMENTAL MG) 400 MG: 400 (241.3 MG) TAB at 20:24

## 2023-08-29 RX ADMIN — BUSPIRONE HYDROCHLORIDE 5 MG: 5 TABLET ORAL at 20:24

## 2023-08-29 RX ADMIN — BUSPIRONE HYDROCHLORIDE 5 MG: 5 TABLET ORAL at 14:48

## 2023-08-29 RX ADMIN — Medication 6 MG: at 22:35

## 2023-08-29 RX ADMIN — GABAPENTIN 600 MG: 600 TABLET, FILM COATED ORAL at 14:48

## 2023-08-29 RX ADMIN — THIAMINE HCL TAB 100 MG 100 MG: 100 TAB at 08:52

## 2023-08-29 RX ADMIN — GABAPENTIN 600 MG: 600 TABLET, FILM COATED ORAL at 20:24

## 2023-08-29 RX ADMIN — FOLIC ACID 1 MG: 1 TABLET ORAL at 08:52

## 2023-08-29 RX ADMIN — GABAPENTIN 600 MG: 600 TABLET, FILM COATED ORAL at 08:52

## 2023-08-29 RX ADMIN — DIAZEPAM 10 MG: 5 TABLET ORAL at 08:52

## 2023-08-29 RX ADMIN — HYDROXYZINE HYDROCHLORIDE 50 MG: 25 TABLET, FILM COATED ORAL at 22:36

## 2023-08-29 RX ADMIN — MAGNESIUM OXIDE TAB 400 MG (241.3 MG ELEMENTAL MG) 400 MG: 400 (241.3 MG) TAB at 08:52

## 2023-08-29 ASSESSMENT — ACTIVITIES OF DAILY LIVING (ADL)
ADLS_ACUITY_SCORE: 40
DRESS: INDEPENDENT
ADLS_ACUITY_SCORE: 40
HYGIENE/GROOMING: INDEPENDENT
ADLS_ACUITY_SCORE: 40
ORAL_HYGIENE: INDEPENDENT
ADLS_ACUITY_SCORE: 40
ORAL_HYGIENE: INDEPENDENT
ADLS_ACUITY_SCORE: 40
LAUNDRY: WITH SUPERVISION
ADLS_ACUITY_SCORE: 40
ADLS_ACUITY_SCORE: 40
DRESS: SCRUBS (BEHAVIORAL HEALTH)
ADLS_ACUITY_SCORE: 40
ADLS_ACUITY_SCORE: 40
HYGIENE/GROOMING: INDEPENDENT
LAUNDRY: WITH SUPERVISION
ADLS_ACUITY_SCORE: 40

## 2023-08-29 NOTE — PROGRESS NOTES
Behavioral Team Discussion: (8/29/2023)    Continued Stay Criteria/Rationale: Patient admitted for  Alcohol  Use Disorder.  Plan: The following services will be provided to the patient; psychiatric assessment, medication management, therapeutic milieu, individual and group support, and skills groups.   Participants: 3A Provider: Dr. Daniel Sue MD; 3A RN: Parveen Mike RN; 3A CM's:  Jv GARJEDA [facility] .  Summary/Recommendation: Providers will assess today for treatment recommendations, discharge planning, and aftercare plans. Jv GRAJEDA [facility] CM will meet with pt for discharge planning.   Medical/Physical: None  Precautions:   Behavioral Orders   Procedures    Code 1 - Restrict to Unit    Routine Programming     As clinically indicated    Status 15     Every 15 minutes.    Withdrawal precautions     Rationale for change in precautions or plan: N/A  Progress: No Change.    ASAM Dimension Scale Ratings:  Dimension 1: 3 Client tolerates and mo with withdrawal discomfort poorly. Client has severe intoxication, such that the client endangers self or others, or intoxication has not abated with less intensive levels of services. Client displays severe signs and symptoms; or risk of severe, but manageable withdrawal; or withdrawal worsening despite detox at less intensive level.  Dimension 2: 1 Client tolerates and mo with physical discomfort and is able to get the services that the client needs.  Dimension 3: 2 Client has difficulty with impulse control and lacks coping skills. Client has thoughts of suicide or harm to others without means; however, the thoughts may interfere with participation in some treatment activities. Client has difficulty functioning in significant life areas. Client has moderate symptoms of emotional, behavioral, or cognitive problems. Client is able to participate in most treatment activities.  Dimension 4: 3 Client displays inconsistent compliance, minimal awareness of either  the client's addiction or mental disorder, and is minimally cooperative.  Dimension 5: 3 Client has poor recognition and understanding of relapse and recidivism issues and displays moderately high vulnerability for further substance use or mental health problems. Client has few coping skills and rarely applies coping skills.  Dimension 6: 3 Client is not engaged in structured, meaningful activity and the client's peers, family, significant other, and living environment are unsupportive, or there is significant criminal justice system involvement.

## 2023-08-29 NOTE — PLAN OF CARE
"  Problem: Alcohol Withdrawal  Goal: Alcohol Withdrawal Symptom Control  Outcome: Progressing   Goal Outcome Evaluation:           Pt continues to be monitored for alcohol withdrawal symptoms.  MSSA 3 and 5 not requiring medication for withdrawal.  Pt was sleeping fine, appeared comfortable with no tremors or shake noted on approach.    Pt, however, said his symptoms were \"still bad.\"  He was educated on reason he won't be medicated with pulse at 50.   Will recheck at 0600.       0530: B/P: 126/72, T: 98, P: 48, R: 16     Pt reporting sweats and anxiety  Hospitalist updated via text page.  "

## 2023-08-29 NOTE — DISCHARGE INSTRUCTIONS
Behavioral Discharge Planning and Instructions  THANK YOU FOR CHOOSING [facility]  3A  [phone]    Summary: You were admitted to Station 3A on 8/28/2023 for detoxification from alcohol.  A medical exam was performed that included lab work. You have met with a  and opted to attend treatment.  Please take care and make your recovery a daily priority, Nikhil!  It was a pleasure working with you and the entire treatment team here wishes you the very best in your recovery!     Recommendation:  Attend and complete residential treatment and follow all recommendations     Main Diagnoses:  Per Dr. Mark Alberto MD;  Hypomagnesemia  Anxiety  Depression  Alcoholic Fatty Disease  Sinus Bradycardia  Dyslipidemia    Major Treatments, Procedures and Findings:  You were treated for alcohol detoxification using valium administered based on the [facility] protocol. You completed a chemical dependency assessment. You had labs drawn and those results were reviewed with you. Please take a copy of your lab work with you to your next primary care provider appointment.    Symptoms to Report:  If you experience more anxiety, confusion, sleeplessness, deep sadness or thoughts of suicide, notify your treatment team or notify your primary care provider. IF ANY OF THE SYMPTOMS YOU ARE EXPERIENCING ARE A MEDICAL EMERGENCY CALL 911 IMMEDIATELY.     Lifestyle Adjustment: Adjust your lifestyle to get enough sleep, relaxation, exercise and good nutrition. Continue to develop healthy coping skills to decrease stress and promote a sober living environment. Do not use mood altering substances including alcohol, illegal drugs or addictive medications other than what is currently prescribed.     Disposition: [facility]. Pt will have to follow up for an intake date and time.     [facility] [address]  [phone]  [phone]    Call and follow up for an  intake time and date     Facts about COVID19 at www.cdc.gov/COVID19 and www.[place].gov/covid19    Keeping hands clean is one of the most important steps we can take to avoid getting sick and spreading germs to others.  Please wash your hands frequently and lather with soap for at least 20 seconds!    Follow-up Appointment:  Dr Graham   9/19/23 at 8.40am  [place] [address], [place], [place] [id number] [phone]    Psychiatrist- Will make one after discharge.    Recovery apps for your phone to locate current in person and zoom recovery meetings  [place] - meeting palmira  AA  - meeting palmira  Meeting guide - meeting palmira  Quick NA meeting - meeting palmira  Nimisha- has various apps    Resources:  Some AA/NA meetings are being held online however most have returned to in-person or a hybrid combination please check online to verify time.  Need Support Now? If you or someone you know is struggling or in crisis, help is available. Call or text PrintLess Plans or chat payever  AA meetings search for them at: https://aa-intergroup.org (worldwide meeting listings)  AA meetings for [place] area can be found online at: https://a[place].org (click local online meetings listings)  NA meetings for [place] area can be found online at: https://www.nam[place].org  (click find a meeting)  AA and NA Sponsors are excellent resources for support and you can find one at any support group meeting.   Alcoholics Anonymous (https://aa.org/): for information 24 hours/day  AA Intergroup service office in [place] (http://www.aastpaul.org/) 121.524.5185  AA Intergroup service office in [facility]: [phone]. (http://www.aamin[place].org/)  Narcotics Anonymous (www.nam[place].org) (327) 931-2304  https://aafairviewriverside.org/meetings  SMART Recovery - self management for addiction recovery:  www.smartrecovery.org  Pathways ~ A Health Crisis Resource & Support Center:   988.138.7407.  https://prescribetoprevent.org/patient-education/videos/  http://www.harmreduction.org  [facility] [phone]  Support Group:  AA/NA and Sponsor/support.  National [place] on [place] Illness (www.[place].veronica.org): 539.348.5181 or 449-298-6786.  Alcoholics Anonymous (www.alcoholics-anonymous.org): Check your phone book for your local chapter.  Suicide Awareness Voices of Education (SAVE) (www.save.org): 156-336-HCMI (5617)  National Suicide Prevention Line (www.mentalhealthmn.org): 128-659-ERGH (1494)  Mental Health Consumer/Survivor Network of [place] (www.mhcsn.net): 892.764.8356 or 560-791-6473  Mental Health Association of [place] (www.mentalhealth.org): 151.816.5246 or 763-662-1376   Substance Abuse and Mental Health Services (www.samhsa.gov)  [place] Opioid Prevention Coalition: www.opioidcoalition.org    [place] Recovery Connection ([facility])  [facility] connects people seeking recovery to resources that help foster and sustain long-term recovery.  Whether you are seeking resources for treatment, transportation, housing, job training, education, health care or other pathways to recovery, [facility] is a great place to start.  609.912.6890.  www.[place].[employer]    Great Pod casts for nutrition and wellness  Listen on Apple Podcasts  Dishing Up Nutrition   [employer]   Nutrition       Understand the connection between what you eat and how you feel. Hosted by licensed nutritionists and dietitians from [employer] we share practical, real-life solutions for healthier living through nutrition.     General Medication Instructions:   See your medication sheet(s) for instructions.   Take all medications as prescribed.  Make no changes unless your primary care provider suggests them.   Go to all your primary care provider visits.  Be sure to have all your required lab tests. This way, your medicines can be refilled on time.  Do not use any forms of alcohol.    Please  Note:  If you have any questions at anytime after you are discharged please call [facility] detox unit 3AW at [phone].  [facility], Behavioral Intake [phone]  Medical Records call [phone]  Outpatient Behavioral Intake call [phone]  LP+ Wait List/Bed Availability call [phone]    Please remember to take all of your behavioral discharge planning and lab paperwork to any follow up appointments, it contains your lab results, diagnosis, medication list and discharge recommendations.      THANK YOU FOR CHOOSING [facility]

## 2023-08-29 NOTE — PLAN OF CARE
"Goal Outcome Evaluation:    Plan of Care Reviewed With: patient Plan of Care Reviewed With: patient    Overall Patient Progress: improvingOverall Patient Progress: improving    Outcome Evaluation: Pt remains in alcohol withdrawal monitoring but improving day to day.    MSSA scores today are 8 and 4, 10mg po valium administered x 1. Pt denies hallucinations, no visible or reported sweatiness, reports overall good appetite and appears only minimally tremulous today. Pt reports \"I usually have a quick turnaround\" for his detox. Total valium administered since arrival to the hospital = 95mg (65mg in ER, 30mg on 3A).    Pt endorses a 10 day relapse after having 9 months of sobriety. States stayed sober while attending outpatient treatment at [facility]. Reports wanting treatment once detox is complete. Pt endorses anxiety 7 of 10 in severity. Denies depression. Denies suicidal ideation plans or intent.    Will continue to monitor and intervene as warranted.   "

## 2023-08-29 NOTE — SIGNIFICANT EVENT
Significant Event Note    Time of event: 5:43 AM August 29, 2023    Description of event:  Received call from nurse about patient's hR being low. Was 50 in the middle of the night. Now 48 awake. Patient admitted for alcohol withdrawal. Last Valium was at 2205.    Plan:  Plan to obtain EKG to ensure that this is sinus bradycardia.  Hold off on current dose of valium. If sinus bradycardia, will reassess HR off valium at 8am.  RN to page provide once EKG done    Discussed with: bedside nurse    JOHN RYDER MD

## 2023-08-29 NOTE — H&P
Mr. Nikhil Rios is a 38 year old man who presents to [facility] to detox from alcohol in the contest of Depression, anxiety, and PTSD.  From the ED:  Nikhil Rios is a 38 year old male with history of severe alcohol use disorder since 2018, previous alcohol withdrawal seizures, presents the ED for evaluation of alcohol intoxication.  States he is interested in detox.  He had been sober for 9 months and then relapsed 3 months ago.  Has been drinking at 750ml to 1 L of hard alcohol per day.  His last drink was just prior to arrival.  He was seen in the ED on 824/23 for the same symptoms but ultimately left prior to going to detox.  He regrets this and states he is very motivated to undergo detox.  He denies any medical complaints.  Does not have any mental health complaints.  Endorses occasional marijuana.  Denies drug use.     Alcohol onset problem use at age 32 with relationship stress.  He has had 3 residential treatments with little sobriety, and has recently been sober 9 months with OP programming.  He has failed Vivatrol, oral naltrexone, and Campral.      Anxiety and depression diagnoses, see OP notes from [facility].  Trauma from having his father die in front of him at age 16 and trying to revive him with CPR.       Past meds:  Lexpro (nausea)    Current:  Gabapentin for anxiety    Recent Results (from the past 168 hour(s))   Alcohol breath test POCT    Collection Time: 08/24/23 11:59 PM   Result Value Ref Range    Alcohol Breath Test 0.281 (A) 0.00 - 0.01   Comprehensive metabolic panel    Collection Time: 08/25/23  2:21 AM   Result Value Ref Range    Sodium 138 136 - 145 mmol/L    Potassium 4.1 3.4 - 5.3 mmol/L    Chloride 94 (L) 98 - 107 mmol/L    Carbon Dioxide (CO2) 27 22 - 29 mmol/L    Anion Gap 17 (H) 7 - 15 mmol/L    Urea Nitrogen 11.1 6.0 - 20.0 mg/dL    Creatinine 0.65 (L) 0.67 - 1.17 mg/dL    Calcium 9.4 8.6 - 10.0 mg/dL    Glucose 113 (H) 70 - 99 mg/dL    Alkaline Phosphatase 82 40 - 129  U/L    AST 47 (H) 0 - 45 U/L    ALT 40 0 - 70 U/L    Protein Total 7.2 6.4 - 8.3 g/dL    Albumin 4.8 3.5 - 5.2 g/dL    Bilirubin Total 0.3 <=1.2 mg/dL    GFR Estimate >90 >60 mL/min/1.73m2   CBC with platelets and differential    Collection Time: 08/25/23  2:21 AM   Result Value Ref Range    WBC Count 9.6 4.0 - 11.0 10e3/uL    RBC Count 4.40 4.40 - 5.90 10e6/uL    Hemoglobin 13.3 13.3 - 17.7 g/dL    Hematocrit 37.1 (L) 40.0 - 53.0 %    MCV 84 78 - 100 fL    MCH 30.2 26.5 - 33.0 pg    MCHC 35.8 31.5 - 36.5 g/dL    RDW 15.0 10.0 - 15.0 %    Platelet Count 352 150 - 450 10e3/uL    % Neutrophils 66 %    % Lymphocytes 21 %    % Monocytes 12 %    % Eosinophils 0 %    % Basophils 1 %    % Immature Granulocytes 0 %    NRBCs per 100 WBC 0 <1 /100    Absolute Neutrophils 6.3 1.6 - 8.3 10e3/uL    Absolute Lymphocytes 2.0 0.8 - 5.3 10e3/uL    Absolute Monocytes 1.1 0.0 - 1.3 10e3/uL    Absolute Eosinophils 0.0 0.0 - 0.7 10e3/uL    Absolute Basophils 0.1 0.0 - 0.2 10e3/uL    Absolute Immature Granulocytes 0.0 <=0.4 10e3/uL    Absolute NRBCs 0.0 10e3/uL   Alcohol breath test POCT    Collection Time: 08/28/23  6:00 AM   Result Value Ref Range    Alcohol Breath Test 0.281 (A) 0.00 - 0.01   Comprehensive metabolic panel    Collection Time: 08/28/23  6:44 AM   Result Value Ref Range    Sodium 141 136 - 145 mmol/L    Potassium 3.3 (L) 3.4 - 5.3 mmol/L    Chloride 99 98 - 107 mmol/L    Carbon Dioxide (CO2) 24 22 - 29 mmol/L    Anion Gap 18 (H) 7 - 15 mmol/L    Urea Nitrogen 10.7 6.0 - 20.0 mg/dL    Creatinine 0.70 0.67 - 1.17 mg/dL    Calcium 9.0 8.6 - 10.0 mg/dL    Glucose 158 (H) 70 - 99 mg/dL    Alkaline Phosphatase 84 40 - 129 U/L    AST 91 (H) 0 - 45 U/L    ALT 65 0 - 70 U/L    Protein Total 7.4 6.4 - 8.3 g/dL    Albumin 5.0 3.5 - 5.2 g/dL    Bilirubin Total 0.4 <=1.2 mg/dL    GFR Estimate >90 >60 mL/min/1.73m2   CBC with platelets and differential    Collection Time: 08/28/23  6:44 AM   Result Value Ref Range    WBC Count 5.1 4.0  - 11.0 10e3/uL    RBC Count 4.85 4.40 - 5.90 10e6/uL    Hemoglobin 14.4 13.3 - 17.7 g/dL    Hematocrit 41.0 40.0 - 53.0 %    MCV 85 78 - 100 fL    MCH 29.7 26.5 - 33.0 pg    MCHC 35.1 31.5 - 36.5 g/dL    RDW 14.9 10.0 - 15.0 %    Platelet Count 314 150 - 450 10e3/uL    % Neutrophils 45 %    % Lymphocytes 46 %    % Monocytes 6 %    % Eosinophils 1 %    % Basophils 2 %    % Immature Granulocytes 0 %    NRBCs per 100 WBC 0 <1 /100    Absolute Neutrophils 2.3 1.6 - 8.3 10e3/uL    Absolute Lymphocytes 2.4 0.8 - 5.3 10e3/uL    Absolute Monocytes 0.3 0.0 - 1.3 10e3/uL    Absolute Eosinophils 0.1 0.0 - 0.7 10e3/uL    Absolute Basophils 0.1 0.0 - 0.2 10e3/uL    Absolute Immature Granulocytes 0.0 <=0.4 10e3/uL    Absolute NRBCs 0.0 10e3/uL   Drug abuse screen 1 urine (ED)    Collection Time: 08/28/23 10:33 AM   Result Value Ref Range    Amphetamines Urine Screen Negative Screen Negative    Barbituates Urine Screen Negative Screen Negative    Benzodiazepine Urine Screen Positive (A) Screen Negative    Cannabinoids Urine Screen Negative Screen Negative    Cocaine Urine Screen Negative Screen Negative    Opiates Urine Screen Negative Screen Negative   Magnesium    Collection Time: 08/28/23  7:49 PM   Result Value Ref Range    Magnesium 1.4 (L) 1.7 - 2.3 mg/dL   Phosphorus    Collection Time: 08/28/23  7:49 PM   Result Value Ref Range    Phosphorus 2.5 2.5 - 4.5 mg/dL   EKG 12-lead, complete    Collection Time: 08/29/23  6:23 AM   Result Value Ref Range    Systolic Blood Pressure  mmHg    Diastolic Blood Pressure  mmHg    Ventricular Rate 42 BPM    Atrial Rate 42 BPM    AL Interval 142 ms    QRS Duration 96 ms     ms    QTc 430 ms    P Axis 25 degrees    R AXIS 75 degrees    T Axis 76 degrees    Interpretation ECG       Sinus bradycardia with sinus arrhythmia  Cannot rule out Anterior infarct , age undetermined  Abnormal ECG  When compared with ECG of 25-JUL-2023 12:09,  Vent. rate has decreased BY  48 BPM  Nonspecific T  wave abnormality no longer evident in Inferior leads  Confirmed by Tiffanie Vargas ([id number]) on 8/29/2023 9:47:48 AM     GGT    Collection Time: 08/29/23  7:51 AM   Result Value Ref Range    GGT 77 (H) 8 - 61 U/L   Comprehensive metabolic panel    Collection Time: 08/29/23  7:51 AM   Result Value Ref Range    Sodium 138 136 - 145 mmol/L    Potassium 4.1 3.4 - 5.3 mmol/L    Chloride 98 98 - 107 mmol/L    Carbon Dioxide (CO2) 29 22 - 29 mmol/L    Anion Gap 11 7 - 15 mmol/L    Urea Nitrogen 11.2 6.0 - 20.0 mg/dL    Creatinine 0.75 0.67 - 1.17 mg/dL    Calcium 10.8 (H) 8.6 - 10.0 mg/dL    Glucose 85 70 - 99 mg/dL    Alkaline Phosphatase 78 40 - 129 U/L    AST 65 (H) 0 - 45 U/L    ALT 57 0 - 70 U/L    Protein Total 7.0 6.4 - 8.3 g/dL    Albumin 4.8 3.5 - 5.2 g/dL    Bilirubin Total 1.0 <=1.2 mg/dL    GFR Estimate >90 >60 mL/min/1.73m2   Hemoglobin A1c    Collection Time: 08/29/23  7:51 AM   Result Value Ref Range    Hemoglobin A1C 5.1 <5.7 %   Lipid panel    Collection Time: 08/29/23  7:51 AM   Result Value Ref Range    Cholesterol 272 (H) <200 mg/dL    Triglycerides 55 <150 mg/dL    Direct Measure  >=40 mg/dL    LDL Cholesterol Calculated 132 (H) <=100 mg/dL    Non HDL Cholesterol 143 (H) <130 mg/dL   TSH with free T4 reflex and/or T3 as indicated    Collection Time: 08/29/23  7:51 AM   Result Value Ref Range    TSH 2.23 0.30 - 4.20 uIU/mL   Vitamin B12    Collection Time: 08/29/23  7:51 AM   Result Value Ref Range    Vitamin B12 851 232 - 1,245 pg/mL   Folate    Collection Time: 08/29/23  7:51 AM   Result Value Ref Range    Folic Acid 13.5 4.6 - 34.8 ng/mL   CBC with platelets and differential    Collection Time: 08/29/23  7:51 AM   Result Value Ref Range    WBC Count 4.8 4.0 - 11.0 10e3/uL    RBC Count 4.51 4.40 - 5.90 10e6/uL    Hemoglobin 13.6 13.3 - 17.7 g/dL    Hematocrit 38.7 (L) 40.0 - 53.0 %    MCV 86 78 - 100 fL    MCH 30.2 26.5 - 33.0 pg    MCHC 35.1 31.5 - 36.5 g/dL    RDW 14.6 10.0 - 15.0 %    Platelet  Count 243 150 - 450 10e3/uL    % Neutrophils 57 %    % Lymphocytes 32 %    % Monocytes 7 %    % Eosinophils 3 %    % Basophils 1 %    % Immature Granulocytes 0 %    NRBCs per 100 WBC 0 <1 /100    Absolute Neutrophils 2.7 1.6 - 8.3 10e3/uL    Absolute Lymphocytes 1.6 0.8 - 5.3 10e3/uL    Absolute Monocytes 0.4 0.0 - 1.3 10e3/uL    Absolute Eosinophils 0.2 0.0 - 0.7 10e3/uL    Absolute Basophils 0.1 0.0 - 0.2 10e3/uL    Absolute Immature Granulocytes 0.0 <=0.4 10e3/uL    Absolute NRBCs 0.0 10e3/uL   Potassium    Collection Time: 08/29/23  7:51 AM   Result Value Ref Range    Potassium 4.1 3.4 - 5.3 mmol/L     General appearance: good  Alert.   Affect: fair, teary at discussion of PTSD  Mood: fair    Speech:  normal.   Eye contact:  good.    Psychomotor behavior: normal  Gait: steady   Abnormal movements:  none  Delusions: none  Hallucinations:  none  Thoughts: linear  Associations: intact  Judgement: fair  Insight: fair  Cognitions: intact in conversation  Memory:  intact in conversation  Orientation: normal    Not suicidal.    Imp:  Alcohol use disorder, severe, recurrent, with withdrawal, complicated by MDD, SILVANA, PTSD    Plan:  MSSA with Valium  2,  Start Buspar    Current Facility-Administered Medications   Medication    acetaminophen (TYLENOL) tablet 325 mg    [Held by provider] atenolol (TENORMIN) tablet 50 mg    busPIRone (BUSPAR) tablet 5 mg    diazepam (VALIUM) tablet 5-20 mg    folic acid (FOLVITE) tablet 1 mg    gabapentin (NEURONTIN) tablet 600 mg    hydrOXYzine (ATARAX) tablet 25-50 mg    magnesium oxide (MAG-OX) tablet 400 mg    melatonin tablet 6 mg    multivitamin w/minerals (THERA-VIT-M) tablet 1 tablet    thiamine (B-1) tablet 100 mg     Facility-Administered Medications Ordered in Other Encounters   Medication    Self Administer Medications: Behavioral Services

## 2023-08-29 NOTE — PROGRESS NOTES
Triage & Transition Services, [address]     Nikhil Rios  August 29, 2023    Insurance: [employer]      Legal Status: Volentary      SUDs Assessment Status: Will complete paperwork      ROIs on file: None      Living Situation: Lives with his mother      Current Providers and Supports:  Limited     Encounter: Pt and writer met in his room and discussed treatment options. Pt is interested in [facility]      Collateral: None      Consulted with Treatment Team on Patient s plan of care.          Current Plan: Pt will complete paperwork and assessment will be faxed to facilities pt is wanting to attend for treatment      RN updated.    CITLALLI ROMERO  Triage & Transition Services - Mental Health and Addiction Service Line  [address] - Adult Inpatient Addiction Psychiatry Unit

## 2023-08-29 NOTE — PROVIDER NOTIFICATION
Update Dianna with internal medicine about pt bradycardia and EKG along with his hypomagnesia. Dianna to review chart now and see patient later this morning.

## 2023-08-29 NOTE — PLAN OF CARE
Behavioral Team Discussion: (8/29/2023)    Continued Stay Criteria/Rationale: Patient admitted for  Alcohol Use Disorder.  Plan: The following services will be provided to the patient; psychiatric assessment, medication management, therapeutic milieu, individual and group support, and skills groups.   Participants: 3A Provider: Dr. Mark Alberto MD; 3A RN: Parveen Mike, RN; 3A CM's: Diego Monaco.  Summary/Recommendation: Providers will assess today for treatment recommendations, discharge planning, and aftercare plans. CM will meet with pt for discharge planning.   Medical/Physical: Patient denies any medical or physical concerns at this time.  Precautions:   Behavioral Orders   Procedures    Code 1 - Restrict to Unit    Routine Programming     As clinically indicated    Seizure precautions    Status 15     Every 15 minutes.    Withdrawal precautions     Rationale for change in precautions or plan: N/A  Progress: Initial.    ASAM Dimension Scale Ratings:  Dimension 1: 3 Client tolerates and mo with withdrawal discomfort poorly. Client has severe intoxication, such that the client endangers self or others, or intoxication has not abated with less intensive levels of services. Client displays severe signs and symptoms; or risk of severe, but manageable withdrawal; or withdrawal worsening despite detox at less intensive level.  Dimension 2: 1 Client tolerates and mo with physical discomfort and is able to get the services that the client needs.  Dimension 3: 2 Client has difficulty with impulse control and lacks coping skills. Client has thoughts of suicide or harm to others without means; however, the thoughts may interfere with participation in some treatment activities. Client has difficulty functioning in significant life areas. Client has moderate symptoms of emotional, behavioral, or cognitive problems. Client is able to participate in most treatment activities.  Dimension 4: 3 Client displays inconsistent  compliance, minimal awareness of either the client's addiction or mental disorder, and is minimally cooperative.  Dimension 5: 3 Client has poor recognition and understanding of relapse and recidivism issues and displays moderately high vulnerability for further substance use or mental health problems. Client has few coping skills and rarely applies coping skills.  Dimension 6: 3 Client is not engaged in structured, meaningful activity and the client's peers, family, significant other, and living environment are unsupportive, or there is significant criminal justice system involvement.

## 2023-08-29 NOTE — PROGRESS NOTES
Type Of Assessment: Inpatient Substance Use Comprehensive Assessment    Referral Source:  [id number]  MRN: [id number]    DATE OF SERVICE: 2023  Date of previous MATHEW Assessment: N/A   Patient confirmed identity through two factor verification: Full Legal Name and     PATIENT'S NAME: Nikhil Rios  Age: 38 year old  Last 4 SSN: [id number]  Sex: male   Gender Identity: male  Sexual Orientation: Heterosexual  Cultural Background: No, Denies any cultural influences or concerns that need to be considered for treatment  YOB: 1985  Current Address:   [address] [place] [address]  Patient Phone Number:  [phone]   Patient's E-Mail Contact:  [email or web address]  Funding: [employer]  PMI: [id number]  Emergency Contact: ON file   DAANES information was provided to patient and patient does not want a copy.     Telemedicine Visit: The patient's condition can be safely assessed and treated via synchronous audio and visual telemedicine encounter.    Reason for Telemedicine Visit:  In person   Originating Site (Patient Location): [address] [address]  Distant Site (Provider Location): [facility]   Consent:  The patient/guardian has verbally consented to: the potential risks and benefits of telemedicine (video visit) versus in person care; bill my insurance or make self-payment for services provided; and responsibility for payment of non-covered services.   Mode of Communication:  Video Conference via  In person     START TIME:1140  END TIME: 1320    As the provider I attest to compliance with applicable laws and regulations related to telemedicine.   Nikhil Rios was seen for a substance use disorder consult on 2023 by CITLALLI ROMERO.    Reason for Substance Use Disorder Consult:  obtain sobriety and a better life     Are you currently having severe withdrawal symptoms that are  putting yourself or others in danger? No  Are you currently having severe medical problems that require immediate attention? No  Are you currently having severe emotional or behavioral problems that are putting yourself or others at risk of harm? No    Have you participated in prior substance use disorder evaluations? No   Have you ever been to detox, inpatient or outpatient treatment for substance related use? List previous treatment: No   Have you ever had a gambling problem or had treatment for compulsive gambling? No  Have you ever felt the need to bet more and more money? N/A  Have you ever had to lie to people important to you about how much you gambled? N/A    Patient is in active withdrawal, but is currently admitted to [facility] for medical detoxification and withdrawal monitoring and is not an imminent safety risk to self or others, and may proceed with the assessment interview  Comprehensive Substance Use History   X X = Primary Drug Used Age of First Use    Pattern of Substance Use   (heaviest use in life and a use history within the past year if applicable) (DSM-5: Sx #3) Date /  Quantity of last use if within the past 30 days Withdrawal Potential?   Method of use  (Oral, smoked, snorted, IV, etc)   x Alcohol   16 Started using with his friend that would get it from his mom and on the weekends. Currently drinks a 750 ml a day  8.27.23 no Oral    Marijuana/Hashish   No use        Cocaine/Crack No use        Meth/Amphetamines   No use        Heroin   No use        Other Opiates/Synthetics   No use        Inhalants  No use        Benzodiazepines   No use        Hallucinogens   No use        Barbiturates/Sedatives/Hypnotics   No use        Over-the-Counter Drugs   No use        Other   No use       x Nicotine   9 Seems to smoke when drinking  8.27.23 no Smoke     Withdrawal symptoms: Have you had any of the following withdrawal symptoms?  Sweating (Rapid Pulse)  Shaky / Jittery /  Tremors  Diminished Appetite  Anxiety / Worried    Have you experienced any cravings?  Yes    Have you had periods of abstinence?  Yes   What was your longest period? 9 months in the past after attending an IOP     Any circumstances that lead to relapse? Working two jobs and feeling over whelmed     What activities have you engaged in when using alcohol/other drugs that could be hazardous to you or others?  The patient denied engaging in any of the above dangerous activities when using alcohol and/or drugs.    A description of any risk-taking behavior, including behavior that puts the client at risk of exposure to blood-borne or sexually transmitted diseases: Pt denies any at this time     Arrests and legal interventions related to substance use: Pt denies any at this time     A description of how the patient's use affected their ability to function appropriately in a work setting: pt states that he is on a leave of absence     A description of how the patient's use affected their ability to function appropriately in an educational setting: N/A    Leisure time activities that are associated with substance use: Pt states that he likes to cook and work in the garden and golf    Do you think your substance use has become a problem for you? He agrees he has a substance abuse problem.    MEDICAL HISTORY  Physical or medical concerns or diagnoses: None     Do you have any current medical treatment needs not being addressed by inpatient treatment?  none    Do you need a referral for a medical provider? no    Current medications: Patient reports current meds as:   No outpatient medications have been marked as taking for the 8/28/23 encounter (Hospital Encounter).         Are you pregnant? NA, Male    Do you have any specific physical needs/accommodations? No    MENTAL HEALTH HISTORY:  Have you ever had  hospitalizations or treatment for mental health illness: No    Mental health history, including diagnosis and symptoms,  and the effect on the client's ability to function: Pt states that he has never been officially diagnosed     Current mental health treatment including psychotropic medication needed to maintain stability: (Note: The assessment must utilize screening tools approved by the commissioner pursuant to section 245.4863 to identify whether the client screens positive for co-occurring disorders): None     GAIN-SS Tool:      2023     8:00 AM   When was the last time that you had significant problems...   with feeling very trapped, lonely, sad, blue, depressed or hopeless about the future? Past month   with sleep trouble, such as bad dreams, sleeping restlessly, or falling asleep during the day? Past Month   with feeling very anxious, nervous, tense, scared, panicked or like something bad was going to happen? Past month   with becoming very distressed & upset when something reminded you of the past? Past month   with thinking about ending your life or committing suicide? Never         2023     8:00 AM   When was the last time that you did the following things 2 or more times?   Lied or conned to get things you wanted or to avoid having to do something? Past month   Had a hard time paying attention at school, work or home? Past month   Had a hard time listening to instructions at school, work or home? Past month   Were a bully or threatened other people? Never   Started physical fights with other people? Never       Have you ever been verbally, emotionally, physically or sexually abused?   No    Family history of substance use and misuse: Pt states that he has a couple great grand father  from alcohol    The patient's desire for family involvement in the treatment program: not at this time   Level of family support: he will keep them informed     Social network in relation to expected support for recovery: Pt states that everyone in his life would be support for him     Are you currently in a significant  relationship? No    Do you have any children (include living arrangements/custody/contact)?:  none    What is your current living situation? Pt states that he lives alone and will stay with his mother at her house     Are you employed/attending school? Leave of absence     SUMMARY:  Ability to understand written treatment materials: Yes  Ability to understand patient rules and patient rights: Yes  Does the patient recognize needs related to substance use and is willing to follow treatment recommendations: Yes  Does the patient have an opioid use disorder:  does not have a history of opiate use.    ASAM Dimension Scale Ratings:    Dimension 1 -  Acute Intoxication/Withdrawal: 0 - No Problem  Dimension 2 - Biomedical: 1 - Minor Problem  Dimension 3 - Emotional/Behavioral/Cognitive Conditions: 1 - Minor Problem  Dimension 4 - Readiness to Change:  3 - Severe Problem  Dimension 5 - Relapse/Continued Use/ Continued Problem Potential: 4 - Extreme Problem  Dimension 6 - Recovery Environment:  4 - Extreme Problem    Category of Substance Severity (ICD-10 Code / DSM 5 Code)     Alcohol Use Disorder Severe  (10.20) (303.90)   Cannabis Use Disorder The patient does not meet the criteria for a Cannabis use disorder.   Hallucinogen Use Disorder The patient does not meet the criteria for a Hallucinogen use disorder.   Inhalant Use Disorder The patient does not meet the criteria for an Inhalant use disorder.   Opioid Use Disorder The patient does not meet the criteria for an Opioid use disorder.   Sedative, Hypnotic, or Anxiolytic Use Disorder The patient does not meet the criteria for a Sedative/Hypnotic use disorder.   Stimulant Related Disorder The patient does not meet the criteria for a Stimulant use disorder.   Tobacco Use Disorder The patient does not meet the criteria for a Tobacco use disorder.   Other (or unknown) Substance Use Disorder The patient does not meet the criteria for a Other (or unknown) Substance use  disorder.     A problematic pattern of alcohol/drug use leading to clinically significant impairment or distress, as manifested by at least two of the following, occurring within a 12-month period:    1.) Alcohol/drug is often taken in larger amounts or over a longer period than was intended.  2.) There is a persistent desire or unsuccessful efforts to cut down or control alcohol/drug use  3.) A great deal of time is spent in activities necessary to obtain alcohol, use alcohol, or recover from its effects.  4.) Craving, or a strong desire or urge to use alcohol/drug  5.) Recurrent alcohol/drug use resulting in a failure to fulfill major role obligations at work, school or home.  6.) Continued alcohol use despite having persistent or recurrent social or interpersonal problems caused or exacerbated by the effects of alcohol/drug.  7.) Important social, occupational, or recreational activities are given up or reduced because of alcohol/drug use.  8.) Recurrent alcohol/drug use in situations in which it is physically hazardous.  9.) Alcohol/drug use is continued despite knowledge of having a persistent or recurrent physical or psychological problem that is likely to have been caused or exacerbated by alcohol.    Specify if: In early remission:  After full criteria for alcohol/drug use disorder were previously met, none of the criteria for alcohol/drug use disorder have been met for at least 3 months but for less than 12 months (with the exception that Criterion A4,  Craving or a strong desire or urge to use alcohol/drug  may be met).     In sustained remission:   After full criteria for alcohol use disorder were previously met, non of the criteria for alcohol/drug use disorder have been met at any time during a period of 12 months or longer (with the exception that Criterion A4,  Craving or strong desire or urge to use alcohol/drug  may be met).     Specify if:   This additional specifier is used if the individual is in  an environment where access to alcohol is restricted.    Mild: Presence of 2-3 symptoms  Moderate: Presence of 4-5 symptoms  Severe: Presence of 6 or more symptoms    Collateral information: MATHEW Collateral Info: Sufficient information is obtained from the patient to support diagnosis and recommendations. Contact with a collateral sources is not required.    Recommendations: Pt would be best served in a residential treatment     Clinical Substantiation:  Pt has been to the emergency department a number of times with a SHERITA over .600. Pt has been to treatment a couple of time with an Out patient programs but has not been able to obtain longtime sobriety.     Referrals/ Alternatives:  [facility]   [address] [place] [id number]  [phone]  [phone]         MATHEW consult completed by: SARA SMITH [facility].  Phone Number: [phone]  E-mail Address: [email or web address]  [address] [place] [address]     *Due to regulation of Title 42 of the Code of Federal Regulations (CFR) Part 2: Confidentiality laws apply to this note and the information wherein.  Thus, this note cannot be copy and pasted into any other health care staff's note nor can it be included in general medical records sent to ANY outside agency without the patient's written consent.

## 2023-08-29 NOTE — PHARMACY-ADMISSION MEDICATION HISTORY
Pharmacist Admission Medication History    Admission medication history is complete. The information provided in this note is only as accurate as the sources available at the time of the update.    Medication reconciliation/reorder completed by provider prior to medication history? Yes    Information Source(s): Patient via in-person    Pertinent Information: None     Changes made to PTA medication list:  Added: None  Deleted:   Trazodone 50 mg at bedtime PRN   Lorazepam 0.5 mg Q6h PRN   Naltrexone 50 mg daily   Changed:   Gabapentin 600 mg TID -> 300 mg TID   Clonidine 0.1 mg BID -> 0.1 mg BID PRN     Medication Affordability:       Allergies reviewed with patient and updates made in EHR: yes    Medication History Completed By: FAB WARREN [facility] 8/29/2023 2:19 PM    Prior to Admission medications    Medication Sig Last Dose Taking? Auth Provider Long Term End Date   cloNIDine (CATAPRES) 0.1 MG tablet Take 0.1 mg by mouth 2 times daily as needed (Anxiety) Past Week Yes Reported, Patient No    gabapentin (NEURONTIN) 300 MG capsule Take 300 mg by mouth 3 times daily Past Week Yes Unknown, Entered By History No    hydrOXYzine (ATARAX) 25 MG tablet Take 1-2 tablets (25-50 mg) by mouth 3 times daily as needed for itching Past Week Yes Ava Lew MD No

## 2023-08-29 NOTE — PLAN OF CARE
Goal Outcome Evaluation:    EKG ordered for low pulse. Pt is aware. Will page MD when done.      Addendum: EKG result paged to hospitalistx 2.

## 2023-08-29 NOTE — CONSULTS
[facility]  Consult Note - Hospitalist Service  Date of Admission:  8/28/2023  Consult Requested by: Margoth Flores APRN CNP   Reason for Consult: routine medical co-management for detox    Assessment & Plan   Nikhil Rios is a 38 year old male admitted on 8/28/2023 to [facility] for alcohol detox. He has a history of depression, anxiety, fatty liver, hypertension, dyslipidemia.    Alcohol dependence and withdrawal  - Management per psychiatry:    - MSSA Valium protocol   - agree with vitamin supplementation including of thiamine and folate   - PRN tylenol, Atarax, trazodone   - gabapentin 600 mg TID   - counseling, group therapies, community resources    Sinus bradycardia   Patient has not had any pertinent symptoms including chest pain, dizziness, lightheadedness, presyncope, palpitations.  Patient denies history of bradycardia.  Heart rate went down to 42 this morning, normalized again to 60s to 70s.  Patient is on clonidine PTA, was started by psychiatrist.  EKG obtained showed sinus bradycardia, possible ST elevations in leads V3 to V6, upon my review these elevations are slight and less concerning as patient has had no symptoms. Patient has no known history of ACS or cardiac disease. An ECHO performed in 7/25 (for chest pain at that time) showed EF 50-55%, no other abnormalities.   -Will not continue PTA clonidine at this time  -Hold as needed atenolol  -Consider stopping trazodone if bradycardia continues.  -Continue monitoring vitals per unit protocol.   -Please inform medicine if HR drops <45 again or if patient develops symptoms of chest pain, dizziness, lightheadedness, presyncope, palpitations    Hypokalemia, replaced in ED and resolved  Hypomagnesemia - replace with mag Ox 400 mg x2 today. - recheck magnesium tomorrow    Dyslipidemia not on chronic medications    Alcoholic fatty liver disease  Elevated AST, within a variable baseline. No need for further  monitoring at this time.     Depression  Anxiety  - management per psychiatry   - gabapentin 600 mg TID   - PRN Valium and hydroxyzine as above    Medicine service will continue to follow peripherally, for monitoring of magnesium, heart rate.     The patient's care was discussed with patient, bedside nurse, and primary team. Further recommendations communicated to primary team via this note.       Clinically Significant Risk Factors Present on Admission        # Hypokalemia: Lowest K = 3.3 mmol/L in last 2 days, will replace as needed     # Hypomagnesemia: Lowest Mg = 1.4 mg/dL in last 2 days, will replace as needed       # Hypertension: Home medication list includes antihypertensive(s)                 Dianna Villavicencio PA-C  Hospitalist Service  Securely message with [employer] (more info)  Text page via [facility] Paging/Directory   ______________________________________________________________________    Chief Complaint   Anxious    History is obtained from the patient    History of Present Illness   Nikhil Rios is a 38 year old male who is seen on the detox unit for medical evaluation.  Patient reports he overall feels much improved compared to yesterday.  He does endorse continued anxiety and tremulousness that are mild.  He denies any recent chest pain, palpitations, dizziness, lightheadedness, presyncope or syncope.  He is unsure of where his heart rate is at at baseline.  He denies any history of cardiac disease or ACS.  He otherwise denies recent fevers, chills, cough, shortness of breath, abdominal pain, changes to his urine or stool, vomiting.      Past Medical History    Past Medical History:   Diagnosis Date    Alcohol abuse     Alcohol abuse     Alcohol withdrawal (H)     Depressive disorder     Family history of diabetes mellitus 11/9/2007    HLD (hyperlipidemia)     HTN (hypertension)        Past Surgical History   Past Surgical History:   Procedure Laterality Date    NO HISTORY OF SURGERY      OTHER SURGICAL  HISTORY      none       Medications   Medications Prior to Admission   Medication Sig Dispense Refill Last Dose    cloNIDine (CATAPRES) 0.1 MG tablet Take 0.1 mg by mouth 2 times daily Hold the dose if blood pressure is 90/60 or lower. Then recheck in 30 minutes to an hour.       gabapentin (NEURONTIN) 600 MG tablet Take 1 tablet (600 mg) by mouth 3 times daily for 30 days 90 tablet 0     hydrOXYzine (ATARAX) 25 MG tablet Take 1-2 tablets (25-50 mg) by mouth 3 times daily as needed for itching 30 tablet 0     LORazepam (ATIVAN) 0.5 MG tablet Take 1 tablet (0.5 mg) by mouth every 6 hours as needed for anxiety 6 tablet 0     naltrexone (DEPADE/REVIA) 50 MG tablet Take 50 mg by mouth daily       nicotine polacrilex (NICORETTE) 4 MG gum Place 1 each (4 mg) inside cheek every hour as needed for other (nicotine withdrawal symptoms) 30 each 0     traZODone (DESYREL) 50 MG tablet Take 1 tablet (50 mg) by mouth nightly as needed for sleep (may repeat after 60 minutes) 30 tablet 0           Review of Systems    The 10 point Review of Systems is negative other than noted in the HPI.     Physical Exam   Vital Signs: Temp: (!) 95.3  F (35.2  C) Temp src: Oral BP: (!) 148/95 Pulse: 68   Resp: 16 SpO2: 100 % O2 Device: None (Room air)    Weight: 165 lbs 0 oz    GENERAL: adult male seen ambulating and sitting comfortably. NAD.   NEURO / PSYCH: Alert, converses appropriately. No focal deficits. Moves all extremities.   HEENT: Anicteric sclera. PERRL.   CV: RRR. S1, S2. No murmurs appreciated.    RESPIRATORY: Effort normal. Lungs CTAB with no wheezing, rales, rhonchi.   GI: Abdomen soft and non distended with bowel sounds active. No TTP or guarding.   MSK: no gross deformities, appropriate gait.   EXTREMITIES: nonedematous  SKIN: No jaundice. No rashes or lesions to exposed areas.     Medical Decision Making       60 MINUTES SPENT BY ME on the date of service doing chart review, history, exam, documentation & further activities per  the note.      Data     I have personally reviewed the following data over the past 24 hrs:    4.8  \   13.6   / 243     138 98 11.2 /  85   4.1; 4.1 29 0.75 \     ALT: 57 AST: 65 (H) AP: 78 TBILI: 1.0   ALB: 4.8 TOT PROTEIN: 7.0 LIPASE: N/A     TSH: 2.23 T4: N/A A1C: 5.1

## 2023-08-29 NOTE — PROGRESS NOTES
"   08/29/23 1700   Group Therapy Session   Time Session Began 1645   Time Session Ended 1730   Total Time (minutes) 45   Total # Attendees 5   Group Type expressive therapy   Group Topic Covered coping skills/lifestyle management;disease of addiction/choices in recovery   Group Session Detail \"Hold On\"   Patient Response/Contribution cooperative with task   Patient Participation Detail In response to original live songs shared by Music Therapist on themes of Belonging, Acceptance and Change, pts wrote reflectively and shared on these topics in their lives.   Engaged participation.         "

## 2023-08-29 NOTE — PLAN OF CARE
"  Problem: Plan of Care - These are the overarching goals to be used throughout the patient stay.    Goal: Optimal Comfort and Wellbeing  Intervention: Provide Person-Centered Care  Recent Flowsheet Documentation  Taken 8/29/2023 1600 by Estephanie Carlin RN  Trust Relationship/Rapport:   care explained   choices provided   emotional support provided   empathic listening provided   questions answered   questions encouraged   reassurance provided   thoughts/feelings acknowledged     Problem: Alcohol Withdrawal  Goal: Alcohol Withdrawal Symptom Control  Outcome: Progressing  Goal: Optimal Neurologic Function  Outcome: Progressing   Goal Outcome Evaluation:    Plan of Care Reviewed With: patient          15:00-23:30  Patient alert and oriented x4. Calm, cooperative and pleasant upon approach. Reported \"Yesterday was pretty rough but today I am doing great\"   He is positive and optimistic. Active in the milieu. Would like to discuss with his provider discharging home with outpatient program versus inpatient.   Denied anxiety, depression, suicidal or homicidal thoughts and hallucinations.   Hygiene appropriate but untidy.     Continues MSSA Q4H:   16:00 score 5  BP (!) 150/88 (BP Location: Left arm, Patient Position: Sitting)   Pulse 71   Temp 98.1  F (36.7  C) (Oral)   Resp 16   Ht 1.778 m (5' 10\")   Wt 74.8 kg (165 lb)   SpO2 99%   BMI 23.68 kg/m        20:00 score 4  Blood pressure (!) 145/88, pulse 70, temperature 98.6  F (37  C), temperature source Oral, resp. rate 16, height 1.778 m (5' 10\"), weight 74.8 kg (165 lb), SpO2 99 %.     Discharge:   Pending stabilization and safe discharge planning     Patient has gone more than 24h without valium and with MSSA score less than 8. Patient was removed from detox protocol per unit policy.      "

## 2023-08-30 VITALS
SYSTOLIC BLOOD PRESSURE: 130 MMHG | BODY MASS INDEX: 23.62 KG/M2 | DIASTOLIC BLOOD PRESSURE: 87 MMHG | HEIGHT: 70 IN | RESPIRATION RATE: 16 BRPM | OXYGEN SATURATION: 100 % | HEART RATE: 91 BPM | WEIGHT: 165 LBS | TEMPERATURE: 96.5 F

## 2023-08-30 LAB — MAGNESIUM SERPL-MCNC: 2.6 MG/DL (ref 1.7–2.3)

## 2023-08-30 PROCEDURE — 250N000013 HC RX MED GY IP 250 OP 250 PS 637: Performed by: PSYCHIATRY & NEUROLOGY

## 2023-08-30 PROCEDURE — 250N000013 HC RX MED GY IP 250 OP 250 PS 637: Performed by: NURSE PRACTITIONER

## 2023-08-30 PROCEDURE — 83735 ASSAY OF MAGNESIUM: CPT | Performed by: PHYSICIAN ASSISTANT

## 2023-08-30 PROCEDURE — 36415 COLL VENOUS BLD VENIPUNCTURE: CPT | Performed by: PHYSICIAN ASSISTANT

## 2023-08-30 RX ORDER — MULTIPLE VITAMINS W/ MINERALS TAB 9MG-400MCG
1 TAB ORAL DAILY
Qty: 90 TABLET | Refills: 3 | Status: SHIPPED | OUTPATIENT
Start: 2023-08-31 | End: 2023-10-13

## 2023-08-30 RX ORDER — FOLIC ACID 1 MG/1
1 TABLET ORAL DAILY
Qty: 90 TABLET | Refills: 3 | Status: SHIPPED | OUTPATIENT
Start: 2023-08-30 | End: 2023-11-24

## 2023-08-30 RX ORDER — LANOLIN ALCOHOL/MO/W.PET/CERES
100 CREAM (GRAM) TOPICAL DAILY
Qty: 90 TABLET | Refills: 3 | Status: SHIPPED | OUTPATIENT
Start: 2023-08-31 | End: 2023-10-13

## 2023-08-30 RX ORDER — BUSPIRONE HYDROCHLORIDE 5 MG/1
5 TABLET ORAL 3 TIMES DAILY
Qty: 90 TABLET | Refills: 3 | Status: SHIPPED | OUTPATIENT
Start: 2023-08-30

## 2023-08-30 RX ADMIN — MULTIPLE VITAMINS W/ MINERALS TAB 1 TABLET: TAB at 08:40

## 2023-08-30 RX ADMIN — GABAPENTIN 600 MG: 600 TABLET, FILM COATED ORAL at 08:40

## 2023-08-30 RX ADMIN — BUSPIRONE HYDROCHLORIDE 5 MG: 5 TABLET ORAL at 08:40

## 2023-08-30 RX ADMIN — FOLIC ACID 1 MG: 1 TABLET ORAL at 08:00

## 2023-08-30 RX ADMIN — THIAMINE HCL TAB 100 MG 100 MG: 100 TAB at 08:40

## 2023-08-30 ASSESSMENT — ACTIVITIES OF DAILY LIVING (ADL)
HYGIENE/GROOMING: INDEPENDENT
ORAL_HYGIENE: INDEPENDENT
ADLS_ACUITY_SCORE: 40
ADLS_ACUITY_SCORE: 40
LAUNDRY: WITH SUPERVISION
DRESS: INDEPENDENT
ADLS_ACUITY_SCORE: 40

## 2023-08-30 NOTE — DISCHARGE SUMMARY
"Mr. Nikhil Rios is a 38 year old man admitted to [facility] to detox from alcohol.  From the H&P:  Mr. Nikhil Rios is a 38 year old man who presents to [facility] to detox from alcohol in the contest of Depression, anxiety, and PTSD.  From the ED:  Nikhil Rios is a 38 year old male with history of severe alcohol use disorder since 2018, previous alcohol withdrawal seizures, presents the ED for evaluation of alcohol intoxication.  States he is interested in detox.  He had been sober for 9 months and then relapsed 3 months ago.  Has been drinking at 750ml to 1 L of hard alcohol per day.  His last drink was just prior to arrival.  He was seen in the ED on 824/23 for the same symptoms but ultimately left prior to going to detox.  He regrets this and states he is very motivated to undergo detox.  He denies any medical complaints.  Does not have any mental health complaints.  Endorses occasional marijuana.  Denies drug use.      Alcohol onset problem use at age 32 with relationship stress.  He has had 3 residential treatments with little sobriety, and has recently been sober 9 months with OP programming.  He has failed Vivatrol, oral naltrexone, and Campral.       Anxiety and depression diagnoses, see OP notes from [facility].  Trauma from having his father die in front of him at age 16 and trying to revive him with CPR.      Hospital course:  He completed detox and was discharged to home with sober mother pending treatment.  Buspar was started for anxiety.  8/30: Blood pressure 130/87, pulse 91, temperature (!) 96.5  F (35.8  C), temperature source Temporal, resp. rate 16, height 1.778 m (5' 10\"), weight 74.8 kg (165 lb), SpO2 100 %.  8/30: General appearance: good  Alert.   Affect: good  Mood: good   Speech:  normal.   Eye contact:  good.    Psychomotor behavior: normal  Gait: steady   Abnormal movements:  none  Delusions: none  Hallucinations:  denied  Thoughts: logical  Associations: intact  Judgement: " good  Insight: good  Cognitions: intact in conversation  Memory:  intact in conversation  Orientation: normal    Not suicidal.    Imp:  Alcohol use disorder, severe, recurrent, with withdrawal, complicated by MDD, SLIVANA, PTSD     Recent Results (from the past 168 hour(s))   Alcohol breath test POCT    Collection Time: 08/24/23 11:59 PM   Result Value Ref Range    Alcohol Breath Test 0.281 (A) 0.00 - 0.01   Comprehensive metabolic panel    Collection Time: 08/25/23  2:21 AM   Result Value Ref Range    Sodium 138 136 - 145 mmol/L    Potassium 4.1 3.4 - 5.3 mmol/L    Chloride 94 (L) 98 - 107 mmol/L    Carbon Dioxide (CO2) 27 22 - 29 mmol/L    Anion Gap 17 (H) 7 - 15 mmol/L    Urea Nitrogen 11.1 6.0 - 20.0 mg/dL    Creatinine 0.65 (L) 0.67 - 1.17 mg/dL    Calcium 9.4 8.6 - 10.0 mg/dL    Glucose 113 (H) 70 - 99 mg/dL    Alkaline Phosphatase 82 40 - 129 U/L    AST 47 (H) 0 - 45 U/L    ALT 40 0 - 70 U/L    Protein Total 7.2 6.4 - 8.3 g/dL    Albumin 4.8 3.5 - 5.2 g/dL    Bilirubin Total 0.3 <=1.2 mg/dL    GFR Estimate >90 >60 mL/min/1.73m2   CBC with platelets and differential    Collection Time: 08/25/23  2:21 AM   Result Value Ref Range    WBC Count 9.6 4.0 - 11.0 10e3/uL    RBC Count 4.40 4.40 - 5.90 10e6/uL    Hemoglobin 13.3 13.3 - 17.7 g/dL    Hematocrit 37.1 (L) 40.0 - 53.0 %    MCV 84 78 - 100 fL    MCH 30.2 26.5 - 33.0 pg    MCHC 35.8 31.5 - 36.5 g/dL    RDW 15.0 10.0 - 15.0 %    Platelet Count 352 150 - 450 10e3/uL    % Neutrophils 66 %    % Lymphocytes 21 %    % Monocytes 12 %    % Eosinophils 0 %    % Basophils 1 %    % Immature Granulocytes 0 %    NRBCs per 100 WBC 0 <1 /100    Absolute Neutrophils 6.3 1.6 - 8.3 10e3/uL    Absolute Lymphocytes 2.0 0.8 - 5.3 10e3/uL    Absolute Monocytes 1.1 0.0 - 1.3 10e3/uL    Absolute Eosinophils 0.0 0.0 - 0.7 10e3/uL    Absolute Basophils 0.1 0.0 - 0.2 10e3/uL    Absolute Immature Granulocytes 0.0 <=0.4 10e3/uL    Absolute NRBCs 0.0 10e3/uL   Alcohol breath test POCT     Collection Time: 08/28/23  6:00 AM   Result Value Ref Range    Alcohol Breath Test 0.281 (A) 0.00 - 0.01   Comprehensive metabolic panel    Collection Time: 08/28/23  6:44 AM   Result Value Ref Range    Sodium 141 136 - 145 mmol/L    Potassium 3.3 (L) 3.4 - 5.3 mmol/L    Chloride 99 98 - 107 mmol/L    Carbon Dioxide (CO2) 24 22 - 29 mmol/L    Anion Gap 18 (H) 7 - 15 mmol/L    Urea Nitrogen 10.7 6.0 - 20.0 mg/dL    Creatinine 0.70 0.67 - 1.17 mg/dL    Calcium 9.0 8.6 - 10.0 mg/dL    Glucose 158 (H) 70 - 99 mg/dL    Alkaline Phosphatase 84 40 - 129 U/L    AST 91 (H) 0 - 45 U/L    ALT 65 0 - 70 U/L    Protein Total 7.4 6.4 - 8.3 g/dL    Albumin 5.0 3.5 - 5.2 g/dL    Bilirubin Total 0.4 <=1.2 mg/dL    GFR Estimate >90 >60 mL/min/1.73m2   CBC with platelets and differential    Collection Time: 08/28/23  6:44 AM   Result Value Ref Range    WBC Count 5.1 4.0 - 11.0 10e3/uL    RBC Count 4.85 4.40 - 5.90 10e6/uL    Hemoglobin 14.4 13.3 - 17.7 g/dL    Hematocrit 41.0 40.0 - 53.0 %    MCV 85 78 - 100 fL    MCH 29.7 26.5 - 33.0 pg    MCHC 35.1 31.5 - 36.5 g/dL    RDW 14.9 10.0 - 15.0 %    Platelet Count 314 150 - 450 10e3/uL    % Neutrophils 45 %    % Lymphocytes 46 %    % Monocytes 6 %    % Eosinophils 1 %    % Basophils 2 %    % Immature Granulocytes 0 %    NRBCs per 100 WBC 0 <1 /100    Absolute Neutrophils 2.3 1.6 - 8.3 10e3/uL    Absolute Lymphocytes 2.4 0.8 - 5.3 10e3/uL    Absolute Monocytes 0.3 0.0 - 1.3 10e3/uL    Absolute Eosinophils 0.1 0.0 - 0.7 10e3/uL    Absolute Basophils 0.1 0.0 - 0.2 10e3/uL    Absolute Immature Granulocytes 0.0 <=0.4 10e3/uL    Absolute NRBCs 0.0 10e3/uL   Drug abuse screen 1 urine (ED)    Collection Time: 08/28/23 10:33 AM   Result Value Ref Range    Amphetamines Urine Screen Negative Screen Negative    Barbituates Urine Screen Negative Screen Negative    Benzodiazepine Urine Screen Positive (A) Screen Negative    Cannabinoids Urine Screen Negative Screen Negative    Cocaine Urine Screen  Negative Screen Negative    Opiates Urine Screen Negative Screen Negative   Magnesium    Collection Time: 08/28/23  7:49 PM   Result Value Ref Range    Magnesium 1.4 (L) 1.7 - 2.3 mg/dL   Phosphorus    Collection Time: 08/28/23  7:49 PM   Result Value Ref Range    Phosphorus 2.5 2.5 - 4.5 mg/dL   EKG 12-lead, complete    Collection Time: 08/29/23  6:23 AM   Result Value Ref Range    Systolic Blood Pressure  mmHg    Diastolic Blood Pressure  mmHg    Ventricular Rate 42 BPM    Atrial Rate 42 BPM    IN Interval 142 ms    QRS Duration 96 ms     ms    QTc 430 ms    P Axis 25 degrees    R AXIS 75 degrees    T Axis 76 degrees    Interpretation ECG       Sinus bradycardia with sinus arrhythmia  Cannot rule out Anterior infarct , age undetermined  Abnormal ECG  When compared with ECG of 25-JUL-2023 12:09,  Vent. rate has decreased BY  48 BPM  Nonspecific T wave abnormality no longer evident in Inferior leads  Confirmed by Tiffanie Vargas ([id number]) on 8/29/2023 9:47:48 AM     GGT    Collection Time: 08/29/23  7:51 AM   Result Value Ref Range    GGT 77 (H) 8 - 61 U/L   Comprehensive metabolic panel    Collection Time: 08/29/23  7:51 AM   Result Value Ref Range    Sodium 138 136 - 145 mmol/L    Potassium 4.1 3.4 - 5.3 mmol/L    Chloride 98 98 - 107 mmol/L    Carbon Dioxide (CO2) 29 22 - 29 mmol/L    Anion Gap 11 7 - 15 mmol/L    Urea Nitrogen 11.2 6.0 - 20.0 mg/dL    Creatinine 0.75 0.67 - 1.17 mg/dL    Calcium 10.8 (H) 8.6 - 10.0 mg/dL    Glucose 85 70 - 99 mg/dL    Alkaline Phosphatase 78 40 - 129 U/L    AST 65 (H) 0 - 45 U/L    ALT 57 0 - 70 U/L    Protein Total 7.0 6.4 - 8.3 g/dL    Albumin 4.8 3.5 - 5.2 g/dL    Bilirubin Total 1.0 <=1.2 mg/dL    GFR Estimate >90 >60 mL/min/1.73m2   Hemoglobin A1c    Collection Time: 08/29/23  7:51 AM   Result Value Ref Range    Hemoglobin A1C 5.1 <5.7 %   Lipid panel    Collection Time: 08/29/23  7:51 AM   Result Value Ref Range    Cholesterol 272 (H) <200 mg/dL    Triglycerides 55 <150  mg/dL    Direct Measure  >=40 mg/dL    LDL Cholesterol Calculated 132 (H) <=100 mg/dL    Non HDL Cholesterol 143 (H) <130 mg/dL   TSH with free T4 reflex and/or T3 as indicated    Collection Time: 08/29/23  7:51 AM   Result Value Ref Range    TSH 2.23 0.30 - 4.20 uIU/mL   Vitamin B12    Collection Time: 08/29/23  7:51 AM   Result Value Ref Range    Vitamin B12 851 232 - 1,245 pg/mL   Folate    Collection Time: 08/29/23  7:51 AM   Result Value Ref Range    Folic Acid 13.5 4.6 - 34.8 ng/mL   CBC with platelets and differential    Collection Time: 08/29/23  7:51 AM   Result Value Ref Range    WBC Count 4.8 4.0 - 11.0 10e3/uL    RBC Count 4.51 4.40 - 5.90 10e6/uL    Hemoglobin 13.6 13.3 - 17.7 g/dL    Hematocrit 38.7 (L) 40.0 - 53.0 %    MCV 86 78 - 100 fL    MCH 30.2 26.5 - 33.0 pg    MCHC 35.1 31.5 - 36.5 g/dL    RDW 14.6 10.0 - 15.0 %    Platelet Count 243 150 - 450 10e3/uL    % Neutrophils 57 %    % Lymphocytes 32 %    % Monocytes 7 %    % Eosinophils 3 %    % Basophils 1 %    % Immature Granulocytes 0 %    NRBCs per 100 WBC 0 <1 /100    Absolute Neutrophils 2.7 1.6 - 8.3 10e3/uL    Absolute Lymphocytes 1.6 0.8 - 5.3 10e3/uL    Absolute Monocytes 0.4 0.0 - 1.3 10e3/uL    Absolute Eosinophils 0.2 0.0 - 0.7 10e3/uL    Absolute Basophils 0.1 0.0 - 0.2 10e3/uL    Absolute Immature Granulocytes 0.0 <=0.4 10e3/uL    Absolute NRBCs 0.0 10e3/uL   Potassium    Collection Time: 08/29/23  7:51 AM   Result Value Ref Range    Potassium 4.1 3.4 - 5.3 mmol/L   Magnesium    Collection Time: 08/30/23  8:51 AM   Result Value Ref Range    Magnesium 2.6 (H) 1.7 - 2.3 mg/dL        Review of your medicines        UNREVIEWED medicines. Ask your doctor about these medicines        Dose / Directions   cloNIDine 0.1 MG tablet  Commonly known as: CATAPRES      Dose: 0.1 mg  Take 0.1 mg by mouth 2 times daily as needed (Anxiety)  Refills: 0            START taking        Dose / Directions   busPIRone 5 MG tablet  Commonly known as:  BUSPAR  Used for: Moderate episode of recurrent major depressive disorder (H)      Dose: 5 mg  Take 1 tablet (5 mg) by mouth 3 times daily  Quantity: 90 tablet  Refills: 3     folic acid 1 MG tablet  Commonly known as: FOLVITE  Used for: Alcohol dependence with intoxication with complication (H)      Dose: 1 mg  Take 1 tablet (1 mg) by mouth daily  Quantity: 90 tablet  Refills: 3     multivitamin w/minerals tablet  Used for: Alcohol dependence with intoxication with complication (H)      Dose: 1 tablet  Start taking on: August 31, 2023  Take 1 tablet by mouth daily  Quantity: 90 tablet  Refills: 3     thiamine 100 MG tablet  Commonly known as: B-1  Used for: Alcohol dependence with intoxication with complication (H)      Dose: 100 mg  Start taking on: August 31, 2023  Take 1 tablet (100 mg) by mouth daily  Quantity: 90 tablet  Refills: 3            CONTINUE these medicines which have NOT CHANGED        Dose / Directions   gabapentin 300 MG capsule  Commonly known as: NEURONTIN      Dose: 300 mg  Take 300 mg by mouth 3 times daily  Refills: 0     hydrOXYzine 25 MG tablet  Commonly known as: ATARAX  Used for: Generalized anxiety disorder, Alcohol use disorder, severe, dependence (H)      Dose: 25-50 mg  Take 1-2 tablets (25-50 mg) by mouth 3 times daily as needed for itching  Quantity: 30 tablet  Refills: 0               Where to get your medicines        These medications were sent to [id number] - [facility] [place] [address] AT [facility] [address]  [address], [facility] [address]      Phone: [phone]   busPIRone 5 MG tablet  folic acid 1 MG tablet  multivitamin w/minerals tablet  thiamine 100 MG tablet

## 2023-08-30 NOTE — PLAN OF CARE
Problem: Plan of Care - These are the overarching goals to be used throughout the patient stay.    Goal: Optimal Comfort and Wellbeing  Outcome: Progressing   Goal Outcome Evaluation:    Plan of Care Reviewed With: patient        Pt has been on the MSSA protocol for alcohol detox and has not scored since  to require Valium since 8/29 at 8.52 am, pt was therefore taken out of detox for alcohol this shift, he scored MSSA 4 this AM, denies psych symptoms, pain, endorsing a good appetite, would like to discharge to home and attend the fair with mom, he made a PCP appointment on 9/19 at 8.40am. No SI/HI, pt social and visible in the milieu.  Meds to be picked up at [facility], pharmacy, denies SI/HI,  patient will be with sober mom till he goes for treatment, pt set up  transport.

## 2023-08-30 NOTE — PLAN OF CARE
Problem: Alcohol Withdrawal  Goal: Alcohol Withdrawal Symptom Control  Outcome: Progressing   Goal Outcome Evaluation:         Pt is in alcohol detox on MSSA with prn valium.   Pt appears to be improving. Denied withdrawal symptoms.  No c/o pain or anxiety. MSSA 3. No valium given. Last valium received yesterday on the AM shift at 0900. Slept well.

## 2023-08-30 NOTE — PROGRESS NOTES
Triage & Transition Services, [address]     Nikhil Rios  August 30, 2023    Insurance: [employer]       Legal Status: Volentary       SUDs Assessment Status: Completed       ROIs on file: [facility]        Living Situation: Lives with his mother off and on       Current Providers and Supports:  Limited      Encounter: Pt is interested in [facility], they informed us that it was a week or so wait to attend treatment and pt stated that he would be able to stay at his mothers house until he was able to get into treatment         Collateral: None       Consulted with Treatment Team on Patient s plan of care.            Current Plan: Pt completed assessment with writer and faxed it to [facility] in [place]      RN updated.    CITLALLI ROMERO  Triage & Transition Services - Mental Health and Addiction Service Line  [facility] - Adult Inpatient Addiction Psychiatry Unit

## 2023-09-01 ENCOUNTER — PATIENT OUTREACH (OUTPATIENT)
Dept: CARE COORDINATION | Facility: CLINIC | Age: 38
End: 2023-09-01
Payer: COMMERCIAL

## 2023-09-01 NOTE — PROGRESS NOTES
Clinic Care Coordination Contact  [facility]       Clinical Data: Care Coordinator Outreach  Outreach attempted x 2.  Unable to leave a message  Plan: Care Coordinator will make no further outreaches at this time.    SEGUN Smith   Social Work Clinic Care Coordinator   [facility]  PH: [phone]  [email or web address]

## 2023-09-06 NOTE — DISCHARGE INSTRUCTIONS
Behavioral Discharge Planning and Instructions  THANK YOU FOR CHOOSING [address]  [phone]    Summary: You were admitted to [facility] on 10/31/2020 for detoxification from alcohol.  A medical exam was performed that included lab work. You have met with a  and opted to return to [facility] w/ sober housing.  Please take care and make your recovery a daily priority, Nikhil! It was a pleasure working with you and the entire treatment team here wishes you the very best in your recovery!     Recommendation:  Return to [facility] treatment at [facility] and follow all recommendations of your treatment providers.      Main Diagnoses:  Per Dr. Miguel MD;  1.  Alcohol use disorder, severe, with alcohol withdrawal.     2.  Major depressive disorder, recurrent, moderate severity.   3.  Unspecified anxiety disorder.     Major Treatments, Procedures and Findings:  You were treated for alcohol detoxification using the medication, Valium. You have declined a chemical dependency assessment. You had labs drawn and those results were reviewed with you. Please take a copy of your lab work with you to your next primary care physician appointment.    Symptoms to Report:  If you experience more anxiety, confusion, sleeplessness, deep sadness or thoughts of suicide, notify your treatment team or notify your primary care physician. IF ANY OF THE SYMPTOMS YOU ARE EXPERIENCING ARE A MEDICAL EMERGENCY CALL 911 IMMEDIATELY.     Lifestyle Adjustment: Adjust your lifestyle to get enough sleep, relaxation, exercise and good nutrition. Continue to develop healthy coping skills to decrease stress and promote a sober living environment. Do not use mood altering substances including alcohol, illegal drugs or addictive medications other than what is currently prescribed.     Disposition: Home    Treatment Follow-Up:   [facility]  Address: [address] [place] [id number]  Phone:  Physical Therapy Visit    Visit Type: Daily Treatment Note  Visit: 19  Referring Provider: Ophelia Sandoval DO  Medical Diagnosis (from order): Diagnosis Information    Diagnosis  723.1, 338.29 (ICD-9-CM) - M54.2, G89.29 (ICD-10-CM) - Chronic neck pain  724.2, 338.29 (ICD-9-CM) - M54.50, G89.29 (ICD-10-CM) - Chronic low back pain without sciatica, unspecified back pain laterality         SUBJECTIVE                                                                                                               Patient reports that the pinch feeling from last week has calmed down. She asks about planks and push ups at the gym and if doing this on her knees is better or worse than on her hands.  Functional Change: As above    Pain / Symptoms  - Pain rating (out of 10): Current: 4 (Tightness)       OBJECTIVE                                                                                                                      Palpation  Right  - Thoracic Paraspinals: tenderness and trigger point  - Lumbar Paraspinals: tenderness and trigger point  - Gluteus Dmitri: tenderness and hypertonic  - Gluteus Medius: tenderness and hypertonic  - Gluteus Minimus: hypertonic and tenderness  - Piriformis: hypertonic and tenderness  - Tensor Fasciae Latae: tenderness, trigger point and hypertonic  - Adductor Brevis: hypertonic, tenderness and trigger point  - Adductor Longus: trigger point, tenderness and hypertonic  - Adductor Derick: hypertonic, tenderness and trigger point  - Biceps Femoris: hypertonic, tender and trigger point               Treatment     Manual Therapy   Performed following manual therapy interventions with skilled technique following receiving verbal consent from patient in order to facilitate optimal functional mobility:  -Silicone cupping performed to bilateral thoracic and lumbar paraspinals, right gluteals and right piriformis, right hip adductors, TFL and biceps femoris to increase interstitial space and  [phone]  About: Located in the heart of the [facility] community, [facility] has served more than 40,000 individuals over the past fifty plus years. [facility] utilizes evidence-based practices designed to treat co-occurring substance use and mental health disorders. These include but are not limited to: group and individual counseling, gender-sensitive programming, individualized lengths of stay, recovery management skills, a connection to community resources and trauma work (i.e. Pro-longed exposure therapy, Accelerated Resolution Therapy, trauma skills groups).     Vivitrol Appointment:   Appointment date/time: Tuesday, November 3rd @ 2:40 PM  Provider/Clinic: Dr. Nuñez  Address: [facility]  Phone: [address] [facility], [place] [address]  Fax: [phone]    Vivitrol  Indication: Vivitrol  (naltrexone for extended-release injectable suspension) is a prescription medicine used for the:      Treatment of alcohol dependence in patients who are able to abstain from alcohol in an outpatient setting. Patients should not be actively drinking at the time of initial Vivitrol administration    Prevention of relapse to opioid dependence, following opioid detoxification    Patients must be opioid-free for a minimum of 7-10 days before starting Vivitrol treatment.    Vivitrol should be part of a comprehensive management program that includes psychosocial support     Psychologist Appointment:  Appointment Date/Time: Thursday, 11/5/20, please follow up with [facility] to clarify time of appointment.    Facts about COVID19 at www.cdc.gov/COVID19 and www.[place].gov/covid19    Keeping hands clean is one of the most important steps we can take to avoid getting sick and spreading germs to others.  Please wash your hands frequently and lather with soap for at least 20 seconds!    Follow-up Appointment:   ~ Please make a follow-up appointment with Primary Care Provider within one week of discharge ~  *Bring a copy of  desensify the nociceptors and promote mobility.  -Soft tissue mobilization to right gluteals/pirformis, right hip adductors, TFL and biceps femoris to decrease tissue tension and decrease pain  -Instructed patient during manual therapy in modifications for plank exercises to decrease irritation of low back and improve core strength.    Skilled input: verbal instruction/cues, tactile instruction/cues and as detailed above    Writer verbally educated and received verbal consent for hand placement, positioning of patient, and techniques to be performed today from patient for therapist position for techniques, clothing adjustments for techniques and hand placement and palpation for techniques as described above and how they are pertinent to the patient's plan of care.  Home Exercise Program  Frog stretch    Access Code: [email or web address]  - Physiologic Sigh  - 1 x daily - 2-3 minutes  - Supine Diaphragmatic Breathing  - 1 x daily - 2-3 minutes  - Supine Butterfly Groin Stretch  - 1 x daily - 7 x weekly - 2 sets - 30-45 second hold  - Seated Piriformis Stretch with Trunk Bend  - 1 x daily - 7 x weekly - 2 sets - 30-45 second hold  - Supine Pelvic Floor Stretch  - 1 x daily - 7 x weekly - 2 sets - 30-45 second hold  - Prone Gluteal Sets  - 1 x daily - 7 x weekly - 2 sets - 15 reps  - Single Leg Stance  - 1 x daily - 5-7 x weekly - 2-3 sets - 30-60 seconds hold  - Supine Transversus Abdominis Bracing with Leg Extension  - 1 x daily - 5-7 x weekly - 2 sets - 15 reps  - Hooklying Isometric Clamshell  - 1 x daily - 5-7 x weekly - 1-2 sets - 15 reps - 5 hold  - Hip Flexor Stretch at Edge of Bed  - 1 x daily - 7 x weekly - 2 sets - 30-45 second hold     Patient Education  - AAH: Urge Control_Bladder  - AAH: Bladder Irritants_Bladder      ASSESSMENT                                                                                                            Patient presents to this session with report of improvement in discomfort since last  session regarding her tightness. She does continues to demonstrate tenderness and tension in the muscles listed above. Improvement noted in right hip adductors today specifically. May consider dry needling to thoracic paraspinals in the future due to difficulty with releasing trigger points in this area. Patient will continue to benefit from skilled physical therapy in order to decrease low back discomfort in order to allow her to participate in daily and functional activities without pain.  Pain/symptoms after session (out of 10): 0  Education:   - Results of above outlined education: Verbalizes understanding and Demonstrates understanding    PLAN                                                                                                                           Suggestions for next session as indicated: Progress per plan of care, hip adductor, gluteal and paraspinal manual therapy, review and progress home exercise program     Therapy procedure time and total treatment time can be found documented on the Time Entry flowsheet.   your labs with you.      Recovery apps for your phone to locate current in person and zoom recovery meetings  Pink Treasure - meeting palmira  AA  - meeting palmira  Meeting guide - meeting palmira  Quick NA meeting - meeting palmira  Baltachriskarol- has various apps    Resources:  *due to covid-19 most AA/NA meetings are being held online*  AA meetings can be found online; search for them at: http://aa-intergroup.org/directory.php  AA meetings via ZOOM for [place] area can be found online at: https://aaminneaCymtec Systems.org/find-a-meeting/holiday-closings/  NA meetings via ZOOM for [place] area can be found online at: https://sites.Intelligent Fingerprinting.com/view/[place]regionofnarcoticsanonymous/home?authuser=2  AA/NA and Sponsors are excellent resources for support and you can find one at any support group meeting.   Alcoholics Anonymous (www.alcoholics-anonymous.org): for local information 24 hours/day  AA Intergroup service office in [place] (http://www.aastpaul.org/) [phone]  AA Intergroup service office in [facility]: [phone]. (http://www.aaminneaAiMeiWeiis.org/)  Narcotics Anonymous (www.nam[place].org) (152) 926-7485  https://aafairviewriverside.org/meetings  SMART Recovery - self management for addiction recovery:  www.smartrecovery.org  Pathways ~ A Health Crisis Resource & Support Center:  461.436.4920.  https://prescribetoprevent.org/patient-education/videos/  http://www.harmreduction.org  [facility] [phone]  Support Group:  AA/NA and Sponsor/support.  National [place] on Mental Illness (www.[place].veronica.org): 926.992.9993 or 999-466-6060.  Alcoholics Anonymous (www.alcoholics-anonymous.org): Check your phone book for your local chapter.  Suicide Awareness Voices of Education (SAVE) (www.save.org): 785-529-WFQZ (0768)  National Suicide Prevention Line (www.mentalhealthmn.org): 043-822-HMNW (4433)  Mental Health Consumer/Survivor Network of [place] (www.mhcsn.net): 730-924-8284 or 112-720-4119  Mental Health Association of [place]  (www.mentalhealth.org): 637.647.3550 or 196-523-9481   Substance Abuse and Mental Health Services (www.samhsa.gov)  [place] Opioid Prevention Coalition: www.opioidcoalition.org    [place] Recovery Connection ([facility])  [facility] connects people seeking recovery to resources that help foster and sustain long-term recovery.  Whether you are seeking resources for treatment, transportation, housing, job training, education, health care or other pathways to recovery, [facility] is a great place to start.  [phone].  www.minnesotarecAnySource Mediay.[place] Connections    Great Pod casts for nutrition and wellness  Listen on Apple Podcasts  Dishing Up Nutrition   [employer]   Nutrition       Understand the connection between what you eat and how you feel. Hosted by licensed nutritionists and dietitians from [employer] [facility] [employer] [facility] we share practical, real-life solutions for healthier living through nutrition.     General Medication Instructions:   See your medication sheet(s) for instructions.   Take all medications as prescribed.  Make no changes unless your doctor suggests them.   Go to all your doctor visits.  Be sure to have all your required lab tests. This way, your medicines can be refilled on time.  Do not use any forms of alcohol.    Please Note:  If you have any questions at anytime after you are discharged please call [facility] detox unit [facility] at [phone].  [facility], Behavioral Intake [phone]  Medical Records call [phone]  Outpatient Behavioral Intake call [phone]  LP+ Wait List/Bed Availability call [phone]    Please remember to take all of your behavioral discharge planning and lab paperwork to any follow up appointments, it contains your lab results, diagnosis, medication list and discharge recommendations.      THANK YOU FOR CHOOSING [facility]

## 2023-09-10 ENCOUNTER — HOSPITAL ENCOUNTER (EMERGENCY)
Facility: HOSPITAL | Age: 38
Discharge: HOME | End: 2023-09-10
Attending: EMERGENCY MEDICINE | Admitting: EMERGENCY MEDICINE
Payer: COMMERCIAL

## 2023-09-10 VITALS
TEMPERATURE: 97.7 F | HEIGHT: 70 IN | HEART RATE: 81 BPM | WEIGHT: 170 LBS | BODY MASS INDEX: 24.34 KG/M2 | OXYGEN SATURATION: 91 % | RESPIRATION RATE: 12 BRPM | DIASTOLIC BLOOD PRESSURE: 59 MMHG | SYSTOLIC BLOOD PRESSURE: 107 MMHG

## 2023-09-10 DIAGNOSIS — F10.220 ACUTE ALCOHOLIC INTOXICATION IN ALCOHOLISM WITHOUT COMPLICATION (H): ICD-10-CM

## 2023-09-10 LAB
ALBUMIN SERPL BCG-MCNC: 5.1 G/DL (ref 3.5–5.2)
ALP SERPL-CCNC: 65 U/L (ref 40–129)
ALT SERPL W P-5'-P-CCNC: 29 U/L (ref 0–70)
ANION GAP SERPL CALCULATED.3IONS-SCNC: 18 MMOL/L (ref 7–15)
AST SERPL W P-5'-P-CCNC: 53 U/L (ref 0–45)
BASOPHILS # BLD AUTO: 0.1 10E3/UL (ref 0–0.2)
BASOPHILS NFR BLD AUTO: 1 %
BILIRUB DIRECT SERPL-MCNC: <0.2 MG/DL (ref 0–0.3)
BILIRUB SERPL-MCNC: 0.3 MG/DL
BUN SERPL-MCNC: 14.4 MG/DL (ref 6–20)
CALCIUM SERPL-MCNC: 9.2 MG/DL (ref 8.6–10)
CHLORIDE SERPL-SCNC: 102 MMOL/L (ref 98–107)
CREAT SERPL-MCNC: 0.77 MG/DL (ref 0.67–1.17)
DEPRECATED HCO3 PLAS-SCNC: 24 MMOL/L (ref 22–29)
EGFRCR SERPLBLD CKD-EPI 2021: >90 ML/MIN/1.73M2
EOSINOPHIL # BLD AUTO: 0 10E3/UL (ref 0–0.7)
EOSINOPHIL NFR BLD AUTO: 0 %
ERYTHROCYTE [DISTWIDTH] IN BLOOD BY AUTOMATED COUNT: 15.7 % (ref 10–15)
ETHANOL SERPL-MCNC: 0.51 G/DL
GLUCOSE SERPL-MCNC: 121 MG/DL (ref 70–99)
HCT VFR BLD AUTO: 41.9 % (ref 40–53)
HGB BLD-MCNC: 14.7 G/DL (ref 13.3–17.7)
IMM GRANULOCYTES # BLD: 0 10E3/UL
IMM GRANULOCYTES NFR BLD: 0 %
LYMPHOCYTES # BLD AUTO: 2.3 10E3/UL (ref 0.8–5.3)
LYMPHOCYTES NFR BLD AUTO: 36 %
MAGNESIUM SERPL-MCNC: 2 MG/DL (ref 1.7–2.3)
MCH RBC QN AUTO: 29.4 PG (ref 26.5–33)
MCHC RBC AUTO-ENTMCNC: 35.1 G/DL (ref 31.5–36.5)
MCV RBC AUTO: 84 FL (ref 78–100)
MONOCYTES # BLD AUTO: 0.3 10E3/UL (ref 0–1.3)
MONOCYTES NFR BLD AUTO: 5 %
NEUTROPHILS # BLD AUTO: 3.7 10E3/UL (ref 1.6–8.3)
NEUTROPHILS NFR BLD AUTO: 58 %
NRBC # BLD AUTO: 0 10E3/UL
NRBC BLD AUTO-RTO: 0 /100
PLATELET # BLD AUTO: 227 10E3/UL (ref 150–450)
POTASSIUM SERPL-SCNC: 3.9 MMOL/L (ref 3.4–5.3)
PROT SERPL-MCNC: 7.5 G/DL (ref 6.4–8.3)
RBC # BLD AUTO: 5 10E6/UL (ref 4.4–5.9)
SODIUM SERPL-SCNC: 144 MMOL/L (ref 136–145)
WBC # BLD AUTO: 6.5 10E3/UL (ref 4–11)

## 2023-09-10 PROCEDURE — 258N000003 HC RX IP 258 OP 636: Performed by: EMERGENCY MEDICINE

## 2023-09-10 PROCEDURE — 82248 BILIRUBIN DIRECT: CPT | Performed by: EMERGENCY MEDICINE

## 2023-09-10 PROCEDURE — 36415 COLL VENOUS BLD VENIPUNCTURE: CPT | Performed by: EMERGENCY MEDICINE

## 2023-09-10 PROCEDURE — 250N000011 HC RX IP 250 OP 636: Performed by: EMERGENCY MEDICINE

## 2023-09-10 PROCEDURE — 250N000009 HC RX 250: Performed by: EMERGENCY MEDICINE

## 2023-09-10 PROCEDURE — 83735 ASSAY OF MAGNESIUM: CPT | Performed by: EMERGENCY MEDICINE

## 2023-09-10 PROCEDURE — 99284 EMERGENCY DEPT VISIT MOD MDM: CPT | Mod: 25

## 2023-09-10 PROCEDURE — 96365 THER/PROPH/DIAG IV INF INIT: CPT

## 2023-09-10 PROCEDURE — 85025 COMPLETE CBC W/AUTO DIFF WBC: CPT | Performed by: EMERGENCY MEDICINE

## 2023-09-10 PROCEDURE — 82077 ASSAY SPEC XCP UR&BREATH IA: CPT | Performed by: EMERGENCY MEDICINE

## 2023-09-10 RX ORDER — ONDANSETRON 4 MG/1
4 TABLET, ORALLY DISINTEGRATING ORAL ONCE
Status: COMPLETED | OUTPATIENT
Start: 2023-09-10 | End: 2023-09-10

## 2023-09-10 RX ADMIN — ONDANSETRON 4 MG: 4 TABLET, ORALLY DISINTEGRATING ORAL at 18:23

## 2023-09-10 RX ADMIN — FOLIC ACID: 5 INJECTION, SOLUTION INTRAMUSCULAR; INTRAVENOUS; SUBCUTANEOUS at 18:23

## 2023-09-10 ASSESSMENT — ACTIVITIES OF DAILY LIVING (ADL)
ADLS_ACUITY_SCORE: 37
ADLS_ACUITY_SCORE: 37

## 2023-09-10 NOTE — ED PROVIDER NOTES
"  Emergency Department Encounter     Evaluation Date & Time:   9/10/2023  4:27 PM    CHIEF COMPLAINT:  Alcohol Intoxication      Triage Note:Pt states that his last drink was 1100 this morning. Pt states that he feels like he is withdrawing. Hx of withdrawal seizure. Pt endorses anxiety, tremors, nausea, headache, etc.      Triage Assessment       Row Name 09/10/23 0785       Triage Assessment (Adult)    Airway WDL WDL       Respiratory WDL    Respiratory WDL WDL       Skin Circulation/Temperature WDL    Skin Circulation/Temperature WDL WDL       Cardiac WDL    Cardiac WDL WDL       Peripheral/Neurovascular WDL    Peripheral Neurovascular WDL WDL       Cognitive/Neuro/Behavioral WDL    Cognitive/Neuro/Behavioral WDL WDL                    Impression and Plan       FINAL IMPRESSION:    ICD-10-CM    1. Acute alcoholic intoxication in alcoholism without complication (H)  F10.220           ED COURSE & MEDICAL DECISION MAKIN:24 PM I met with the patient and mother and performed my initial physical exam. I spoke with them about the workup going forward including alcohol intoxication.     38 year old male, history of chronic alcohol abuse associated with previous withdrawal seizures, fatty liver, pancreatitis and mood disorder, who presents with his mother for concerns of alcohol withdrawal.     Patient reports that his last drink was last night (~10-11pm), but he did have a little alcohol this morning to try to prevent withdrawal. He states that he is shaky with nausea, mild headache and just feels \"unwell\". He states that he is sad and tries to medicate with alcohol, but denies any suicidal ideation.     Mother reports that he has an appointment with his psychiatrist tomorrow and an orientation for outpatient alcohol treatment.     On exam, patient appears intoxicated without tongue fasciculations or hand tremors to suggest alcohol withdrawal and I do not think Ativan is indicated.     IV access established, blood " sent for labs and a banana bag was initiated.  Patient given ODT Zofran.    Blood alcohol level returned elevated at 0.51.    Labs otherwise remarkable for no leukocytosis, anemia, significant electrolyte derangements or renal impairment.  Hepatic panel with mildly elevated AST (53 - likely secondary to alcohol abuse) with no laboratory evidence of biliary obstruction.    Patient is tolerating po and independently ambulatory.     Patient to be discharged to home, however his mother left the Emergency Department. I called and left a voice mail for his mother that she may return to the ED to pick him up.      Patient advised to follow-up tomorrow with his psychiatrist, as previously arranged.  He also was advised to keep his orientation for his outpatient alcohol treatment.      Patient stable throughout ED course.      At the conclusion of the encounter I discussed the results of all the tests and the disposition. The questions were answered. The patient acknowledged understanding and was agreeable with the care plan.      Medical Decision Making    History:  Supplemental history from: Documented in chart, if applicable Mother - SEE HPI  External Record(s) reviewed: Other: 8/24/23 [facility] emergency department, 8/28/23 [facility]     Work Up:  Chart documentation includes differential considered and any EKGs or imaging independently interpreted by provider, where specified.  In additional to work up documented, I considered the following work up: Documented in chart, if applicable.    External consultation:  Discussion of management with another provider: Documented in chart, if applicable    Complicating factors:  Care impacted by chronic illness: Other: chronic alcoholism  Care affected by social determinants of health: Access to Medical Care and Alcohol Abuse and/or Recreational Drug Use    Disposition considerations: Discharge. No recommendations on prescription strength medication(s). I considered admission,  "but ultimately discharged patient nothing to suggest alcohol withdrawal.      MEDICATIONS GIVEN IN THE EMERGENCY DEPARTMENT:  Medications   sodium chloride 0.9 % 1,000 mL with Infuvite Adult 10 mL, thiamine 100 mg, folic acid 1 mg infusion ( Intravenous $New Bag 9/10/23 1823)   ondansetron (ZOFRAN ODT) ODT tab 4 mg (4 mg Oral $Given 9/10/23 1823)       NEW PRESCRIPTIONS STARTED AT TODAY'S ED VISIT:  New Prescriptions    No medications on file       HPI     The history is provided by the patient. No  was used.        Nikhil Rios is a 38 year old male, history of alcohol use disorder, alcohol-induced acute pancreatitis, alcoholic fatty liver, alcohol-induced mood disorder, chemical dependency and alcohol withdrawal with seizures, who presents to this ED via private car with his mother for evaluation of alcohol intoxication.    Per chart review: Patient was seen on 8/24/23-8/25/23 at [facility] Ed for evaluation of alcohol abuse. Labs showed no acute abnormalities. Started on MSSA protocol.   Patient was admitted to [facility] on 8/28/23-8/30/23 for detox of alcohol. Started on Buspar for anxiety. Completed detox and was discharged home with sober mother pending treatment.     Patient came in today due to concerns for alcohol withdrawal. He reports that his last drink was last night (~10-11pm), although he had a small amount of alcohol this morning to try to prevent withdrawal. He states that he is shaky, has a slight headache, some nausea and just feels \"unwell\" overall. He claims he \"screwed up\" and just wants to feel better.     Neuro exam is normal. I do not suspect ICH, traumatic SAH or mass lesion and do not think emergent imaging is indicated.    Mother reports that patient fell around one week ago and he did hit his head. Patient reports that he scraped his right knee in the fall, but does not think he sustained injury otherwise.    Patient mentions that " "he is \"just a sad person\" and uses alcohol as a band-aid. He denies any thoughts of wanting to harm himself.     He is currently \"mostly,\" living at his mother's home. Mom reports that he has an appointment with his psychiatrist tomorrow and an orientation for outpatient alcohol treatment.     Patient denies any other physical pain (chest pain, abdominal pain).      Medical History     Past Medical History:   Diagnosis Date    Alcohol abuse     Alcohol abuse     Alcohol withdrawal (H)     Depressive disorder     Family history of diabetes mellitus 11/9/2007    HLD (hyperlipidemia)     HTN (hypertension)        Past Surgical History:   Procedure Laterality Date    NO HISTORY OF SURGERY      OTHER SURGICAL HISTORY      none       Family History   Problem Relation Age of Onset    Coronary Artery Disease Father     Substance Abuse Maternal Grandfather     Mental Illness Brother     Schizophrenia Brother     Schizophrenia Maternal Aunt        Social History     Tobacco Use    Smoking status: Some Days     Packs/day: 0.25     Types: Cigars, Cigarettes    Smokeless tobacco: Never    Tobacco comments:     occasional   Substance Use Topics    Alcohol use: Yes     Alcohol/week: 17.0 standard drinks of alcohol     Types: 17 Shots of liquor per week     Comment: Drinks 750ml/day or 17 shots/day; hx of withdrawl seizures    Drug use: Not Currently     Types: Marijuana       busPIRone (BUSPAR) 5 MG tablet  cloNIDine (CATAPRES) 0.1 MG tablet  folic acid (FOLVITE) 1 MG tablet  gabapentin (NEURONTIN) 300 MG capsule  hydrOXYzine (ATARAX) 25 MG tablet  multivitamin w/minerals (THERA-VIT-M) tablet  thiamine (B-1) 100 MG tablet        Physical Exam     First Vitals:  Patient Vitals for the past 24 hrs:   BP Temp Temp src Pulse Resp SpO2 Height Weight   09/10/23 1831 116/68 -- -- 85 12 91 % -- --   09/10/23 1802 102/58 -- -- 99 (!) 44 96 % -- --   09/10/23 1732 120/68 -- -- 95 12 93 % -- --   09/10/23 1623 110/81 97.7  F (36.5  C) " "Temporal (!) 125 24 95 % 1.778 m (5' 10\") 77.1 kg (170 lb)       PHYSICAL EXAM:   Physical Exam    GENERAL: Appears intoxicated with some slurring of his speech; he is otherwise awake and alert.  Occasionally tearful, but otherwise in no acute distress.   HEENT: Normocephalic, atraumatic. Pupils equal, round and reactive. Conjunctiva normal. EOMI with horizontal nystagmus.  NECK: No stridor.  PULMONARY: Symmetrical breath sounds without distress.  Lungs clear to auscultation bilaterally without wheezes, rhonchi or rales.  CARDIO: Borderline tachycardic rate with regular rhythm.  No significant murmur, rub or gallop.  Radial pulses strong and symmetrical.  ABDOMINAL: Abdomen soft, non-distended and non-tender to palpation.   EXTREMITIES: No lower extremity swelling or edema.      NEURO: Alert and oriented to person, place and time.  Cranial nerves grossly intact.  Strength 5/5 BL upper and lower extremities with sensation to light touch grossly intact.  No tongue fasciculations or hand tremors.  PSYCH: Appears intoxicated.       Results     LAB:  All pertinent labs reviewed and interpreted  Labs Ordered and Resulted from Time of ED Arrival to Time of ED Departure   BASIC METABOLIC PANEL - Abnormal       Result Value    Sodium 144      Potassium 3.9      Chloride 102      Carbon Dioxide (CO2) 24      Anion Gap 18 (*)     Urea Nitrogen 14.4      Creatinine 0.77      Calcium 9.2      Glucose 121 (*)     GFR Estimate >90     HEPATIC FUNCTION PANEL - Abnormal    Protein Total 7.5      Albumin 5.1      Bilirubin Total 0.3      Alkaline Phosphatase 65      AST 53 (*)     ALT 29      Bilirubin Direct <0.20     ETHYL ALCOHOL LEVEL - Abnormal    Alcohol ethyl 0.51 (*)    CBC WITH PLATELETS AND DIFFERENTIAL - Abnormal    WBC Count 6.5      RBC Count 5.00      Hemoglobin 14.7      Hematocrit 41.9      MCV 84      MCH 29.4      MCHC 35.1      RDW 15.7 (*)     Platelet Count 227      % Neutrophils 58      % Lymphocytes 36      % " Monocytes 5      % Eosinophils 0      % Basophils 1      % Immature Granulocytes 0      NRBCs per 100 WBC 0      Absolute Neutrophils 3.7      Absolute Lymphocytes 2.3      Absolute Monocytes 0.3      Absolute Eosinophils 0.0      Absolute Basophils 0.1      Absolute Immature Granulocytes 0.0      Absolute NRBCs 0.0     MAGNESIUM - Normal    Magnesium 2.0           I, Alejandrina Dumont, am serving as a scribe to document services personally performed by Kristina Box MD based on my observation and the provider's statements to me. I, Kristina Box MD attest that Alejandrina Dumont is acting in a scribe capacity, has observed my performance of the services and has documented them in accordance with my direction.    Kristina Box MD  Emergency Medicine  [facility] EMERGENCY DEPARTMENT           Kristina Box MD  09/10/23 [id number]

## 2023-09-10 NOTE — DISCHARGE INSTRUCTIONS
Follow-up with your psychiatrist tomorrow.    Complete your orientation tomorrow for your outpatient alcohol treatment.

## 2023-09-10 NOTE — ED TRIAGE NOTES
Pt states that his last drink was 1100 this morning. Pt states that he feels like he is withdrawing. Hx of withdrawal seizure. Pt endorses anxiety, tremors, nausea, headache, etc.      Triage Assessment       Row Name 09/10/23 0059       Triage Assessment (Adult)    Airway WDL WDL       Respiratory WDL    Respiratory WDL WDL       Skin Circulation/Temperature WDL    Skin Circulation/Temperature WDL WDL       Cardiac WDL    Cardiac WDL WDL       Peripheral/Neurovascular WDL    Peripheral Neurovascular WDL WDL       Cognitive/Neuro/Behavioral WDL    Cognitive/Neuro/Behavioral WDL WDL

## 2023-09-11 ENCOUNTER — VIRTUAL VISIT (OUTPATIENT)
Dept: PSYCHOLOGY | Facility: CLINIC | Age: 38
End: 2023-09-11
Payer: COMMERCIAL

## 2023-09-11 DIAGNOSIS — F33.1 MAJOR DEPRESSIVE DISORDER, RECURRENT EPISODE, MODERATE (H): Primary | ICD-10-CM

## 2023-09-11 PROCEDURE — 90834 PSYTX W PT 45 MINUTES: CPT | Mod: VID | Performed by: PSYCHOLOGIST

## 2023-09-12 NOTE — PROGRESS NOTES
"    [facility]   Mental Health & Addiction Services     Progress Note - Initial Visit    Patient  Name:  Nikhil Rios Date: 23         Service Type: Individual     Visit Start Time: 9:10am Visit End Time: 9:59am    Visit #: 1 49 minutes    Attendees: Client attended alone    Service Modality:  Phone Visit:      Provider verified identity through the following two step process.  Patient provided:  Patient     Telephone Visit: The patient's condition can be safely assessed and treated via synchronous audio telemedicine encounter.      Reason for Audio Telemedicine Visit: Services only offered telehealth    Originating Site (Patient Location): Patient's home    Distant Site (Provider Location): Provider Remote Setting- Home Office    Consent:  The patient/guardian has verbally consented to:     1. The potential risks and benefits of telemedicine (telephone visit) versus in person care;    The patient has been notified of the following:      \"We have found that certain health care needs can be provided without the need for a face to face visit.  This service lets us provide the care you need with a phone conversation.       I will have full access to your [facility] medical record during this entire phone call.   I will be taking notes for your medical record.      Since this is like an office visit, we will bill your insurance company for this service.       There are potential benefits and risks of telephone visits (e.g. limits to patient confidentiality) that differ from in-person visits.?Confidentiality still applies for telephone services, and nobody will record the visit.  It is important to be in a quiet, private space that is free of distractions (including cell phone or other devices) during the visit.??      If during the course of the call I believe a telephone visit is not appropriate, you will not be charged for this service\"     Consent has been obtained for this service by " care team member: Yes        DATA:   Interactive Complexity: No   Crisis: No     Presenting Concerns/  Current Stressors:   Client presented for an ADHD consultation.  Client acknowledged drinking alcohol at 6:30am the morning of the consultation.      ASSESSMENT:  Mental Status Assessment:  Appearance:   Unable to observe via telephone visit.   Eye Contact:   Unable to observe via telephone visit.    Psychomotor Behavior: Unable to observe via telephone visit.    Attitude:   Cooperative   Orientation:   All  Speech   Rate / Production: Talkative   Volume:  Normal   Mood:    Sad   Affect:    Unable to observe via telephone visit.    Thought Content:  Clear   Thought Form:  Coherent  Tangential   Insight:    Poor       Safety Issues and Plan for Safety and Risk Management:   Patient denies current fears or concerns for personal safety.  Patient denies current or recent suicidal ideation or behaviors.  Patient denies current or recent homicidal ideation or behaviors.  Patient denies current or recent self injurious behavior or ideation.  Patient denies other safety concerns.  Recommended that patient call 911 or go to the local ED should there be a change in any of these risk factors.  Patient reports there are no firearms in the house.      Via this phone session, the client denied multiple times, any safety issues (SI, SIB, HI, urges).    Diagnostic Criteria:  Major Depressive Disorder   - Depressed mood. Note: In children and adolescents, can be irritable mood.     - Diminished interest or pleasure in all, or almost all, activities.    - Psychomotor activity retardation.    - Fatigue or loss of energy.    - Diminished ability to think or concentrate, or indecisiveness.   B) The symptoms cause clinically significant distress or impairment in social, occupational, or other important areas of functioning  C) The episode is not attributable to the physiological effects of a substance or to another medical  condition      DSM5 Diagnoses: (Sustained by DSM5 Criteria Listed Above)  Diagnoses: 296.32 (F33.1) Major Depressive Disorder, Recurrent Episode, Moderate _ and With melancholic features  (Provisional)  Psychosocial & Contextual Factors: Client recently in ER for intoxication; Stays with his mom.  Assessments completed prior to visit:  The following assessments were completed by patient for this visit:  PHQ9:       1/20/2020     4:00 PM 2/19/2020    10:00 AM 2/25/2020     1:00 PM 7/14/2023     8:00 AM 8/7/2023     3:42 PM   PHQ-9 SCORE   PHQ-9 Total Score MyChart     15 (Moderately severe depression)   PHQ-9 Total Score 5 0 1 12 15       WHODAS 2.0 (12 item):        No data to display              Intervention:   Psychoeducation; Skill review/identification & recommendation; Pattern recognition; Socratic Questioning; Active listening, validation, and other rapport building skills; Answered questions; Consulted on path of treatment; Problem solved  Collateral Reports Completed:  None      PLAN: (Homework, other):  1. Provider will continue Diagnostic Assessment.  Patient was given the following to do until next session:  Client will not be tested at this time due to substance use issues (alcohol).  Client agreed that he would go to detox when his mom got home from work.  He will then talk to workers at detox about finding an inpatient Treatment Program to address his drinking (alcohol).  Once he has been sober for 1 year, and if issues continue to persist that could be attributed to ADHD, the client is encouraged to contact this provider to discuss the possibility of administering an ADHD assessment.    2. Provider recommended the following referrals: None.      3.  Suicide Risk and Safety Concerns were assessed for Nikhil Rios.    Patient meets the following risk assessment and triage: Patient denied any current/recent/lifetime history of suicidal ideation and/or behaviors.  No safety plan indicated at this time.        Mitchell Santiago PsyD  September 11, 2023

## 2023-09-13 ENCOUNTER — HOSPITAL ENCOUNTER (EMERGENCY)
Facility: HOSPITAL | Age: 38
Discharge: HOME | End: 2023-09-13
Attending: STUDENT IN AN ORGANIZED HEALTH CARE EDUCATION/TRAINING PROGRAM | Admitting: STUDENT IN AN ORGANIZED HEALTH CARE EDUCATION/TRAINING PROGRAM
Payer: COMMERCIAL

## 2023-09-13 VITALS
OXYGEN SATURATION: 96 % | HEIGHT: 70 IN | SYSTOLIC BLOOD PRESSURE: 127 MMHG | WEIGHT: 170 LBS | RESPIRATION RATE: 20 BRPM | HEART RATE: 88 BPM | TEMPERATURE: 97.6 F | BODY MASS INDEX: 24.34 KG/M2 | DIASTOLIC BLOOD PRESSURE: 81 MMHG

## 2023-09-13 DIAGNOSIS — F10.920 ALCOHOLIC INTOXICATION WITHOUT COMPLICATION (H): ICD-10-CM

## 2023-09-13 LAB
ALBUMIN SERPL BCG-MCNC: 4.9 G/DL (ref 3.5–5.2)
ALP SERPL-CCNC: 69 U/L (ref 40–129)
ALT SERPL W P-5'-P-CCNC: 113 U/L (ref 0–70)
ANION GAP SERPL CALCULATED.3IONS-SCNC: 18 MMOL/L (ref 7–15)
AST SERPL W P-5'-P-CCNC: 189 U/L (ref 0–45)
BASOPHILS # BLD AUTO: 0.1 10E3/UL (ref 0–0.2)
BASOPHILS NFR BLD AUTO: 1 %
BILIRUB DIRECT SERPL-MCNC: <0.2 MG/DL (ref 0–0.3)
BILIRUB SERPL-MCNC: 0.5 MG/DL
BUN SERPL-MCNC: 13.7 MG/DL (ref 6–20)
CALCIUM SERPL-MCNC: 8.8 MG/DL (ref 8.6–10)
CHLORIDE SERPL-SCNC: 98 MMOL/L (ref 98–107)
CREAT SERPL-MCNC: 0.74 MG/DL (ref 0.67–1.17)
DEPRECATED HCO3 PLAS-SCNC: 27 MMOL/L (ref 22–29)
EGFRCR SERPLBLD CKD-EPI 2021: >90 ML/MIN/1.73M2
EOSINOPHIL # BLD AUTO: 0 10E3/UL (ref 0–0.7)
EOSINOPHIL NFR BLD AUTO: 1 %
ERYTHROCYTE [DISTWIDTH] IN BLOOD BY AUTOMATED COUNT: 15.7 % (ref 10–15)
ETHANOL SERPL-MCNC: 0.35 G/DL
GLUCOSE SERPL-MCNC: 131 MG/DL (ref 70–99)
HCT VFR BLD AUTO: 41.7 % (ref 40–53)
HGB BLD-MCNC: 14.4 G/DL (ref 13.3–17.7)
IMM GRANULOCYTES # BLD: 0 10E3/UL
IMM GRANULOCYTES NFR BLD: 0 %
LYMPHOCYTES # BLD AUTO: 1.6 10E3/UL (ref 0.8–5.3)
LYMPHOCYTES NFR BLD AUTO: 24 %
MAGNESIUM SERPL-MCNC: 2.1 MG/DL (ref 1.7–2.3)
MCH RBC QN AUTO: 29.2 PG (ref 26.5–33)
MCHC RBC AUTO-ENTMCNC: 34.5 G/DL (ref 31.5–36.5)
MCV RBC AUTO: 85 FL (ref 78–100)
MONOCYTES # BLD AUTO: 0.3 10E3/UL (ref 0–1.3)
MONOCYTES NFR BLD AUTO: 5 %
NEUTROPHILS # BLD AUTO: 4.6 10E3/UL (ref 1.6–8.3)
NEUTROPHILS NFR BLD AUTO: 69 %
NRBC # BLD AUTO: 0 10E3/UL
NRBC BLD AUTO-RTO: 0 /100
PLATELET # BLD AUTO: 144 10E3/UL (ref 150–450)
POTASSIUM SERPL-SCNC: 4 MMOL/L (ref 3.4–5.3)
PROT SERPL-MCNC: 7.4 G/DL (ref 6.4–8.3)
RBC # BLD AUTO: 4.93 10E6/UL (ref 4.4–5.9)
SODIUM SERPL-SCNC: 143 MMOL/L (ref 136–145)
WBC # BLD AUTO: 6.6 10E3/UL (ref 4–11)

## 2023-09-13 PROCEDURE — 85025 COMPLETE CBC W/AUTO DIFF WBC: CPT | Performed by: STUDENT IN AN ORGANIZED HEALTH CARE EDUCATION/TRAINING PROGRAM

## 2023-09-13 PROCEDURE — 82248 BILIRUBIN DIRECT: CPT | Performed by: STUDENT IN AN ORGANIZED HEALTH CARE EDUCATION/TRAINING PROGRAM

## 2023-09-13 PROCEDURE — 96375 TX/PRO/DX INJ NEW DRUG ADDON: CPT

## 2023-09-13 PROCEDURE — 80053 COMPREHEN METABOLIC PANEL: CPT | Performed by: STUDENT IN AN ORGANIZED HEALTH CARE EDUCATION/TRAINING PROGRAM

## 2023-09-13 PROCEDURE — 82077 ASSAY SPEC XCP UR&BREATH IA: CPT | Performed by: STUDENT IN AN ORGANIZED HEALTH CARE EDUCATION/TRAINING PROGRAM

## 2023-09-13 PROCEDURE — 250N000011 HC RX IP 250 OP 636: Mod: JZ | Performed by: STUDENT IN AN ORGANIZED HEALTH CARE EDUCATION/TRAINING PROGRAM

## 2023-09-13 PROCEDURE — 96374 THER/PROPH/DIAG INJ IV PUSH: CPT

## 2023-09-13 PROCEDURE — 258N000003 HC RX IP 258 OP 636: Performed by: STUDENT IN AN ORGANIZED HEALTH CARE EDUCATION/TRAINING PROGRAM

## 2023-09-13 PROCEDURE — 83735 ASSAY OF MAGNESIUM: CPT | Performed by: STUDENT IN AN ORGANIZED HEALTH CARE EDUCATION/TRAINING PROGRAM

## 2023-09-13 PROCEDURE — 99284 EMERGENCY DEPT VISIT MOD MDM: CPT | Mod: 25

## 2023-09-13 PROCEDURE — 96361 HYDRATE IV INFUSION ADD-ON: CPT

## 2023-09-13 PROCEDURE — 36415 COLL VENOUS BLD VENIPUNCTURE: CPT | Performed by: STUDENT IN AN ORGANIZED HEALTH CARE EDUCATION/TRAINING PROGRAM

## 2023-09-13 PROCEDURE — 93005 ELECTROCARDIOGRAM TRACING: CPT | Performed by: STUDENT IN AN ORGANIZED HEALTH CARE EDUCATION/TRAINING PROGRAM

## 2023-09-13 RX ORDER — LORAZEPAM 2 MG/ML
1 INJECTION INTRAMUSCULAR ONCE
Status: COMPLETED | OUTPATIENT
Start: 2023-09-13 | End: 2023-09-13

## 2023-09-13 RX ORDER — ONDANSETRON 2 MG/ML
4 INJECTION INTRAMUSCULAR; INTRAVENOUS ONCE
Status: COMPLETED | OUTPATIENT
Start: 2023-09-13 | End: 2023-09-13

## 2023-09-13 RX ADMIN — SODIUM CHLORIDE 1000 ML: 9 INJECTION, SOLUTION INTRAVENOUS at 07:01

## 2023-09-13 RX ADMIN — LORAZEPAM 1 MG: 2 INJECTION INTRAMUSCULAR; INTRAVENOUS at 07:58

## 2023-09-13 RX ADMIN — ONDANSETRON 4 MG: 2 INJECTION INTRAMUSCULAR; INTRAVENOUS at 07:01

## 2023-09-13 ASSESSMENT — ACTIVITIES OF DAILY LIVING (ADL): ADLS_ACUITY_SCORE: 37

## 2023-09-13 NOTE — DISCHARGE INSTRUCTIONS
We discussed having you go to detox which you declined  Follow up with your clinic provider (they should recheck your liver labs which were increased today)  Return to the Emergency Room with seizures, unable to keep down foods/fluids, suicidal thoughts or other worsening symptoms or concerns

## 2023-09-13 NOTE — ED PROVIDER NOTES
NAME: Nikhil Rios  AGE: 38 year old male  YOB: 1985  MRN: [id number]  EVALUATION DATE & TIME: 2023  6:14 AM    PCP: Lisandro Graham  ED PROVIDER: Donna Ayala MD.    Chief Complaint   Patient presents with    Alcohol Intoxication       FINAL IMPRESSION:  1. Alcoholic intoxication without complication (H)      MEDICAL DECISION MAKIN:48 AM I met with the patient, obtained history, performed an initial exam, and discussed options and plan for diagnostics and treatment here in the ED.   7:48 AM The patient would like Ativan.  8:15 AM The patient is refusing detox.     MDM: 38-year-old male with history of alcohol who presents with alcohol intoxication. He is known to the department and was seen here a few days ago. Patient reports he has been continuing to drink heavily and is feeling like he is in withdrawal with some nausea, vomiting and feeling shaky.  His vitals are reassuring.  He is minimally tremulous, otherwise no signs of concerning alcohol withdrawal and reassuring vitals. EKG shows sinus rhythm without concerning ST changes and normal Qtc. He was given fluids, zofran and 1 dose of ativan. He was able to tolerate PO and vitals remained reassuring. His ETOH level returned at 0.35. I do not feel he is concerning withdrawal requiring hospital admission. His labs show mild increase in LFTs from prior, no elevated bili or alk phos and no abdominal tenderness on exam - I suspect this is related to alcohol use and do not feel warrants RUQ US or CT abd/pelvis. He has mild thrombocytopenia and AG both likely from alcohol use. No bleeding symptoms or signs of trauma. We discussed sending the patient to detox and he was initially in agreement but then refused and wanted to discharge home.  Recommended close outpatient follow-up and discussed the importance of seeking help for alcohol use. Strict return precautions discussed and patient is in agreement with plan, endorses understanding and his  "questions were answered.    Medical Decision Making    History:  Supplemental history from: Family Member/Significant Other  External Record(s) reviewed: Outpatient Record: [facility] ED    Work Up:  Chart documentation includes differential considered and any EKGs or imaging interpreted by provider.  In additional to work up documented, I considered the following work up: Documented in chart, if applicable.    External consultation:  Discussion of management with another provider: Documented in chart, if applicable    Complicating factors:  Care impacted by chronic illness: Other: Alcohol dependence  Care affected by social determinants of health: Alcohol Abuse and/or Recreational Drug Use    Disposition considerations: Discharge. No recommendations on prescription strength medication(s). I considered admission, but ultimately discharged patient given reassuring vitals, able to tolerate PO, refusing detox.    MEDICATIONS GIVEN IN THE EMERGENCY:  Medications   sodium chloride 0.9% BOLUS 1,000 mL (0 mLs Intravenous Stopped 9/13/23 0820)   ondansetron (ZOFRAN) injection 4 mg (4 mg Intravenous $Given 9/13/23 0701)   LORazepam (ATIVAN) injection 1 mg (1 mg Intravenous $Given 9/13/23 0758)       NEW PRESCRIPTIONS STARTED AT TODAY'S ER VISIT:  New Prescriptions    No medications on file        =================================================================  HPI    Patient information was obtained from: patient and mother  Use of : N/A      Nikhil Rios is a 38 year old male with a past medical history of alcohol-induced pancreatitis, alcohol dependence, and SILVANA who presents with alcohol intoxication.     On chart review, patient with multiple prior ED visits, most recently 9/10/23 for evaluation of acute alcoholic intoxication after relapsing 3 months ago, he did not appear in withdrawal and was discharged home. He is here because \"I am withdrawing\". He vomited once in triage but states \"it was just Pepsi, I " "wanted something sweet\". He also had Haribo gummy bears. He now complains of thirst. He is currently on the list for detox and was supposed to see a psychiatrist but cannot remember if he say them. He does not have abdominal pain and says that he does not have pancreatitis stating \"I've had that before\".     He denies abdominal pain, chest pain, difficulty breathing, blood in vomit or stool, or any other complaints at this time.       REVIEW OF SYSTEMS   All systems reviewed, please see HPI for pertinent findings    PAST MEDICAL HISTORY:  Past Medical History:   Diagnosis Date    Alcohol abuse     Alcohol abuse     Alcohol withdrawal (H)     Depressive disorder     Family history of diabetes mellitus 11/9/2007    HLD (hyperlipidemia)     HTN (hypertension)        PAST SURGICAL HISTORY:  Past Surgical History:   Procedure Laterality Date    NO HISTORY OF SURGERY      OTHER SURGICAL HISTORY      none       CURRENT MEDICATIONS:    No current facility-administered medications for this encounter.    Current Outpatient Medications:     busPIRone (BUSPAR) 5 MG tablet, Take 1 tablet (5 mg) by mouth 3 times daily, Disp: 90 tablet, Rfl: 3    cloNIDine (CATAPRES) 0.1 MG tablet, Take 0.1 mg by mouth 2 times daily as needed (Anxiety), Disp: , Rfl:     folic acid (FOLVITE) 1 MG tablet, Take 1 tablet (1 mg) by mouth daily, Disp: 90 tablet, Rfl: 3    gabapentin (NEURONTIN) 300 MG capsule, Take 300 mg by mouth 3 times daily, Disp: , Rfl:     hydrOXYzine (ATARAX) 25 MG tablet, Take 1-2 tablets (25-50 mg) by mouth 3 times daily as needed for itching, Disp: 30 tablet, Rfl: 0    multivitamin w/minerals (THERA-VIT-M) tablet, Take 1 tablet by mouth daily, Disp: 90 tablet, Rfl: 3    thiamine (B-1) 100 MG tablet, Take 1 tablet (100 mg) by mouth daily, Disp: 90 tablet, Rfl: 3    Facility-Administered Medications Ordered in Other Encounters:     Self Administer Medications: Behavioral Services, , Does not apply, See Admin Instructions, " "Kenny Forte MD    ALLERGIES:  Allergies   Allergen Reactions    Gramineae Pollens Itching    Pollen Extract Rash     grass tree pollen       FAMILY HISTORY:  Family History   Problem Relation Age of Onset    Coronary Artery Disease Father     Substance Abuse Maternal Grandfather     Mental Illness Brother     Schizophrenia Brother     Schizophrenia Maternal Aunt        SOCIAL HISTORY:   Social History     Socioeconomic History    Marital status: Single   Occupational History    Occupation: Rental property owner   Tobacco Use    Smoking status: Some Days     Packs/day: 0.25     Types: Cigars, Cigarettes    Smokeless tobacco: Never    Tobacco comments:     occasional   Substance and Sexual Activity    Alcohol use: Yes     Alcohol/week: 17.0 standard drinks of alcohol     Types: 17 Shots of liquor per week     Comment: Drinks 750ml/day or 17 shots/day; hx of withdrawl seizures    Drug use: Not Currently     Types: Marijuana    Sexual activity: Not Currently     Social Determinants of Health     Transportation Needs: No Transportation Needs (7/21/2021)    PRAPARE - Transportation     Lack of Transportation (Medical): No     Lack of Transportation (Non-Medical): No   Stress: Stress Concern Present (7/21/2021)    [employer] [place] [employer] - Occupational Stress Questionnaire     Feeling of Stress : Very much   Housing Stability: Unknown (7/21/2021)    Housing Stability Vital Sign     Unable to Pay for Housing in the Last Year: No     Unstable Housing in the Last Year: No       PHYSICAL EXAM:    Vitals: /61   Pulse 87   Temp 97.6  F (36.4  C) (Temporal)   Resp 25   Ht 1.778 m (5' 10\")   Wt 77.1 kg (170 lb)   SpO2 97%   BMI 24.39 kg/m     Constitutional: Well developed, well nourished. No acute distress.  HENT: Normocephalic, atraumatic. Neck-gross ROM intact. No tongue fasciculations  Eyes: Pupils mid-range, sclera white  Respiratory: CTAB, no respiratory distress, speaks full sentences " easily.  Cardiovascular: Normal heart rate, regular rhythm  GI: Soft, not distended, not tender to palpation, no guarding  Musculoskeletal: Moving all 4 extremities intentionally and without pain. No obvious deformity.  Skin: Warm, dry, no rash.  Neurologic: Alert & oriented, Cns grossly intact, speech clear, no focal deficits noted    LAB:  All pertinent labs reviewed and interpreted.  Labs Ordered and Resulted from Time of ED Arrival to Time of ED Departure   BASIC METABOLIC PANEL - Abnormal       Result Value    Sodium 143      Potassium 4.0      Chloride 98      Carbon Dioxide (CO2) 27      Anion Gap 18 (*)     Urea Nitrogen 13.7      Creatinine 0.74      Calcium 8.8      Glucose 131 (*)     GFR Estimate >90     HEPATIC FUNCTION PANEL - Abnormal    Protein Total 7.4      Albumin 4.9      Bilirubin Total 0.5      Alkaline Phosphatase 69       (*)      (*)     Bilirubin Direct <0.20     ETHYL ALCOHOL LEVEL - Abnormal    Alcohol ethyl 0.35 (*)    CBC WITH PLATELETS AND DIFFERENTIAL - Abnormal    WBC Count 6.6      RBC Count 4.93      Hemoglobin 14.4      Hematocrit 41.7      MCV 85      MCH 29.2      MCHC 34.5      RDW 15.7 (*)     Platelet Count 144 (*)     % Neutrophils 69      % Lymphocytes 24      % Monocytes 5      % Eosinophils 1      % Basophils 1      % Immature Granulocytes 0      NRBCs per 100 WBC 0      Absolute Neutrophils 4.6      Absolute Lymphocytes 1.6      Absolute Monocytes 0.3      Absolute Eosinophils 0.0      Absolute Basophils 0.1      Absolute Immature Granulocytes 0.0      Absolute NRBCs 0.0     MAGNESIUM - Normal    Magnesium 2.1         RADIOLOGY:  No orders to display       EKG:   Performed at: September 13, 2023, 6:56 AM, [facility] EMERGENCY DEPARTMENT    Impression: Normal sinus rhythm  Rate: 83 BPM  Rhythm: Normal sinus rhythm  QRS Interval: 100 ms  QTc Interval: 408/479 ms  Comparison: Compared to ECG from 29-AUG-2023.  I have independently  reviewed and interpreted the EKG(s) documented above.     PROCEDURES:   Procedures     I, Harjit Shook, am serving as a scribe to document services personally performed by Dr. Donna Ayala based on my observation and the provider's statements to me. I, Donna Ayala MD attest that Harjit Shook is acting in a scribe capacity, has observed my performance of the services and has documented them in accordance with my direction.    Donna Ayala M.D.  Emergency Medicine  [facility] EMERGENCY DEPARTMENT  [address] [place] [id number]  [phone]  Dept: [phone]     Donna Ayala MD  09/13/23 0855       Donna Ayala MD  09/13/23 0970

## 2023-09-13 NOTE — ED TRIAGE NOTES
Pt presents with his Mother with alcohol intoxication/withdrawal.  Pt states last drink last night at 8pm, Pt is vomiting and tremulous. Smells very strongly of alcohol.     Triage Assessment       Row Name 09/13/23 0609       Triage Assessment (Adult)    Airway WDL WDL       Respiratory WDL    Respiratory WDL WDL       Skin Circulation/Temperature WDL    Skin Circulation/Temperature WDL WDL       Cardiac WDL    Cardiac WDL WDL       Peripheral/Neurovascular WDL    Peripheral Neurovascular WDL WDL       Cognitive/Neuro/Behavioral WDL    Cognitive/Neuro/Behavioral WDL arousability;level of consciousness;mood/behavior    Level of Consciousness alert    Arousal Level opens eyes spontaneously    Mood/Behavior anxious;hyperactive (agitated, impulsive);restless

## 2023-09-13 NOTE — ED NOTES
"Patient stated he was planning to go to detox in \"a couple of weeks.\" Writer informed patient we would be able to call and facilitate with [facility] detox. Patient refused, stating he would not go and would handle it himself.     "

## 2023-09-14 LAB
ATRIAL RATE - MUSE: 83 BPM
DIASTOLIC BLOOD PRESSURE - MUSE: NORMAL MMHG
INTERPRETATION ECG - MUSE: NORMAL
P AXIS - MUSE: 63 DEGREES
PR INTERVAL - MUSE: 150 MS
QRS DURATION - MUSE: 100 MS
QT - MUSE: 408 MS
QTC - MUSE: 479 MS
R AXIS - MUSE: 75 DEGREES
SYSTOLIC BLOOD PRESSURE - MUSE: NORMAL MMHG
T AXIS - MUSE: 66 DEGREES
VENTRICULAR RATE- MUSE: 83 BPM

## 2023-09-17 ENCOUNTER — HOSPITAL ENCOUNTER (EMERGENCY)
Facility: HOSPITAL | Age: 38
Discharge: HOME | End: 2023-09-17
Attending: STUDENT IN AN ORGANIZED HEALTH CARE EDUCATION/TRAINING PROGRAM | Admitting: STUDENT IN AN ORGANIZED HEALTH CARE EDUCATION/TRAINING PROGRAM
Payer: COMMERCIAL

## 2023-09-17 VITALS
OXYGEN SATURATION: 91 % | RESPIRATION RATE: 18 BRPM | WEIGHT: 170 LBS | TEMPERATURE: 98.5 F | DIASTOLIC BLOOD PRESSURE: 94 MMHG | BODY MASS INDEX: 24.39 KG/M2 | SYSTOLIC BLOOD PRESSURE: 149 MMHG | HEART RATE: 98 BPM

## 2023-09-17 DIAGNOSIS — F10.90 ALCOHOL USE DISORDER: ICD-10-CM

## 2023-09-17 LAB
ALBUMIN SERPL BCG-MCNC: 5 G/DL (ref 3.5–5.2)
ALBUMIN SERPL BCG-MCNC: 5 G/DL (ref 3.5–5.2)
ALP SERPL-CCNC: 81 U/L (ref 40–129)
ALP SERPL-CCNC: 81 U/L (ref 40–129)
ALT SERPL W P-5'-P-CCNC: 206 U/L (ref 0–70)
ALT SERPL W P-5'-P-CCNC: 206 U/L (ref 0–70)
ANION GAP SERPL CALCULATED.3IONS-SCNC: 18 MMOL/L (ref 7–15)
ANION GAP SERPL CALCULATED.3IONS-SCNC: 18 MMOL/L (ref 7–15)
AST SERPL W P-5'-P-CCNC: 346 U/L (ref 0–45)
AST SERPL W P-5'-P-CCNC: 346 U/L (ref 0–45)
BASOPHILS # BLD AUTO: 0.1 10E3/UL (ref 0–0.2)
BASOPHILS NFR BLD AUTO: 2 %
BILIRUB DIRECT SERPL-MCNC: <0.2 MG/DL (ref 0–0.3)
BILIRUB SERPL-MCNC: 0.5 MG/DL
BILIRUB SERPL-MCNC: 0.5 MG/DL
BUN SERPL-MCNC: 12.1 MG/DL (ref 6–20)
BUN SERPL-MCNC: 12.1 MG/DL (ref 6–20)
CALCIUM SERPL-MCNC: 8.6 MG/DL (ref 8.6–10)
CALCIUM SERPL-MCNC: 8.6 MG/DL (ref 8.6–10)
CHLORIDE SERPL-SCNC: 96 MMOL/L (ref 98–107)
CHLORIDE SERPL-SCNC: 96 MMOL/L (ref 98–107)
CREAT SERPL-MCNC: 0.64 MG/DL (ref 0.67–1.17)
CREAT SERPL-MCNC: 0.64 MG/DL (ref 0.67–1.17)
DEPRECATED HCO3 PLAS-SCNC: 27 MMOL/L (ref 22–29)
DEPRECATED HCO3 PLAS-SCNC: 27 MMOL/L (ref 22–29)
EGFRCR SERPLBLD CKD-EPI 2021: >90 ML/MIN/1.73M2
EGFRCR SERPLBLD CKD-EPI 2021: >90 ML/MIN/1.73M2
EOSINOPHIL # BLD AUTO: 0 10E3/UL (ref 0–0.7)
EOSINOPHIL NFR BLD AUTO: 1 %
ERYTHROCYTE [DISTWIDTH] IN BLOOD BY AUTOMATED COUNT: 15.4 % (ref 10–15)
ETHANOL SERPL-MCNC: 0.52 G/DL
GLUCOSE SERPL-MCNC: 144 MG/DL (ref 70–99)
GLUCOSE SERPL-MCNC: 144 MG/DL (ref 70–99)
HCT VFR BLD AUTO: 42.8 % (ref 40–53)
HGB BLD-MCNC: 14.8 G/DL (ref 13.3–17.7)
IMM GRANULOCYTES # BLD: 0 10E3/UL
IMM GRANULOCYTES NFR BLD: 0 %
LYMPHOCYTES # BLD AUTO: 1.7 10E3/UL (ref 0.8–5.3)
LYMPHOCYTES NFR BLD AUTO: 47 %
MCH RBC QN AUTO: 28.8 PG (ref 26.5–33)
MCHC RBC AUTO-ENTMCNC: 34.6 G/DL (ref 31.5–36.5)
MCV RBC AUTO: 83 FL (ref 78–100)
MONOCYTES # BLD AUTO: 0.2 10E3/UL (ref 0–1.3)
MONOCYTES NFR BLD AUTO: 5 %
NEUTROPHILS # BLD AUTO: 1.6 10E3/UL (ref 1.6–8.3)
NEUTROPHILS NFR BLD AUTO: 45 %
NRBC # BLD AUTO: 0 10E3/UL
NRBC BLD AUTO-RTO: 0 /100
PLATELET # BLD AUTO: 77 10E3/UL (ref 150–450)
POTASSIUM SERPL-SCNC: 3.8 MMOL/L (ref 3.4–5.3)
POTASSIUM SERPL-SCNC: 3.8 MMOL/L (ref 3.4–5.3)
PROT SERPL-MCNC: 7.5 G/DL (ref 6.4–8.3)
PROT SERPL-MCNC: 7.5 G/DL (ref 6.4–8.3)
RBC # BLD AUTO: 5.13 10E6/UL (ref 4.4–5.9)
SODIUM SERPL-SCNC: 141 MMOL/L (ref 136–145)
SODIUM SERPL-SCNC: 141 MMOL/L (ref 136–145)
WBC # BLD AUTO: 3.6 10E3/UL (ref 4–11)

## 2023-09-17 PROCEDURE — 85025 COMPLETE CBC W/AUTO DIFF WBC: CPT | Performed by: EMERGENCY MEDICINE

## 2023-09-17 PROCEDURE — 96360 HYDRATION IV INFUSION INIT: CPT

## 2023-09-17 PROCEDURE — 258N000003 HC RX IP 258 OP 636: Performed by: STUDENT IN AN ORGANIZED HEALTH CARE EDUCATION/TRAINING PROGRAM

## 2023-09-17 PROCEDURE — 99283 EMERGENCY DEPT VISIT LOW MDM: CPT | Mod: 25

## 2023-09-17 PROCEDURE — 82077 ASSAY SPEC XCP UR&BREATH IA: CPT | Performed by: EMERGENCY MEDICINE

## 2023-09-17 PROCEDURE — 82248 BILIRUBIN DIRECT: CPT | Performed by: EMERGENCY MEDICINE

## 2023-09-17 PROCEDURE — 36415 COLL VENOUS BLD VENIPUNCTURE: CPT | Performed by: EMERGENCY MEDICINE

## 2023-09-17 PROCEDURE — 80053 COMPREHEN METABOLIC PANEL: CPT | Performed by: EMERGENCY MEDICINE

## 2023-09-17 PROCEDURE — 80048 BASIC METABOLIC PNL TOTAL CA: CPT | Performed by: STUDENT IN AN ORGANIZED HEALTH CARE EDUCATION/TRAINING PROGRAM

## 2023-09-17 RX ADMIN — SODIUM CHLORIDE 1000 ML: 9 INJECTION, SOLUTION INTRAVENOUS at 22:45

## 2023-09-17 ASSESSMENT — ACTIVITIES OF DAILY LIVING (ADL): ADLS_ACUITY_SCORE: 37

## 2023-09-18 NOTE — ED PROVIDER NOTES
EMERGENCY DEPARTMENT ENCOUNTER      NAME: Nikhil Rios  AGE: 38 year old male  YOB: 1985  MRN: [id number]  EVALUATION DATE & TIME: 9/17/2023  9:59 PM    PCP: Lisandro Graham    ED PROVIDER: Salu Engel MD      Chief Complaint   Patient presents with    Alcohol Intoxication         FINAL IMPRESSION:  No diagnosis found.      ED COURSE & MEDICAL DECISION MAKING:    Pertinent Labs & Imaging studies reviewed. (See chart for details)  38 year old male presents to the Emergency Department for evaluation of intoxication.  Exam patient 38-year-old male with history of alcohol use disorder and reports a complicated alcohol draw presents emergency department for evaluation of alcohol intoxication.  Patient self denies any specific complaints.  States has been drinking daily at least 1/5 of vodka since May and last drink was several hours before arrival to emergency department.  States he is set up for initiation of detox with an appointment this upcoming week on Tuesday.  Denies any nausea or vomiting.  No chest pain or shortness of breath.  No fevers at home.    Did obtain some collateral information from mother who was concerned because patient mostly has been lying around and seems more fatigued and usual for the past day or so.  However she notes that he has not had any specific complaint such as abdominal pain nausea vomiting or fevers.    On examination here patient is well-appearing in no acute distress.  No external signs of trauma other than a faint several day old appearing bruise underneath the right eye.  EOMs intact pupils equal and reactive.  Moist mucous membranes.  No tongue fasciculations.  Lungs are clear without wheezes rales.  Abdomen soft and benign without any guarding or rigidity.  No hand tremor normal coordination.  Does not clinically appear to be in alcohol withdrawal.    Patient endorses drinking alcohol and has a history of alcohol withdrawal but is clinically well-appearing at this  point in time.  No specific complaints but will obtain screening labs to evaluate for any significant underlying metabolic derangements.    Labs reviewed and interpreted myself.  Chemistry shows a transaminitis in line with the patient's alcohol use disorder but no bilirubin elevation his abdomen is soft and benign.  Otherwise electrolytes within normal limits.  CBC shows mild neutropenia and normal hemoglobin.  Thrombocytopenia is also noted likely related to alcoholic disease. his ethanol is markedly elevated  .    Patient was observed until clinically sober in the emergency department is able to safely ambulate.  He has a sober ride home and will be discharged with plans to follow-up with detox in next 1 to 2 days.       11:00 PM I met and evaluated the patient.   11:13 PM Update from RN, patient wanting to leave the ED. He is able to ambulate without difficulty. He will call his mother for a ride home.    Medical Decision Making    History:  Supplemental history from: EMS  External Record(s) reviewed: Documented in chart, if applicable.    Work Up:  Chart documentation includes differential considered and any EKGs or imaging independently interpreted by provider, where specified.  In additional to work up documented, I considered the following work up: Documented in chart, if applicable.    External consultation:  Discussion of management with another provider: Documented in chart, if applicable    Complicating factors:  Care impacted by chronic illness: N/A  Care affected by social determinants of health: Alcohol Abuse and/or Recreational Drug Use    Disposition considerations: Discharge. No recommendations on prescription strength medication(s). See documentation for any additional details.       At the conclusion of the encounter I discussed the results of all of the tests and the disposition. The questions were answered. The patient or family acknowledged understanding and was agreeable with the care plan.  "        MEDICATIONS GIVEN IN THE EMERGENCY:  Medications   sodium chloride 0.9% BOLUS 1,000 mL (1,000 mLs Intravenous $New Bag 9/17/23 7163)       NEW PRESCRIPTIONS STARTED AT TODAY'S ER VISIT  New Prescriptions    No medications on file          =================================================================    HPI    Patient information was obtained from: Patient, EMS Report    Use of : N/A      Nikhil Rios is a 38 year old male with a pertinent history of alcohol abuse with alcohol withdrawal seizures and alcoholic-induced pancreatitis who presents to this ED by EMS for evaluation of an alcohol problem.    Per EMS report, the patient's mother called EMS because the patient has been drinking excessively for some time and he is not eating or drinking much at home. He spends most of his time on the couch. Last drink was 2000.    The patient reports he has been drinking every day since he relapsed in mid-May. He typically drinks 750 ml of vodka daily. He states he has a spot reserved in detox for Tuesday and his mother has enough alcohol at the house to get him through until he can go to detox. The patient states his last drink was at 1600 this afternoon. The patient reports a history of withdrawal seizures.    The patient reports he \"felt like poop\" earlier today and currently feels shaky. He denies any nausea, vomiting, abdominal pain, fever, chills, chest pain, shortness of breath, or any other complaints at this time.    REVIEW OF SYSTEMS   Refer to the [facility]    PAST MEDICAL HISTORY:  Past Medical History:   Diagnosis Date    Alcohol abuse     Alcohol abuse     Alcohol withdrawal (H)     Depressive disorder     Family history of diabetes mellitus 11/9/2007    HLD (hyperlipidemia)     HTN (hypertension)        PAST SURGICAL HISTORY:  Past Surgical History:   Procedure Laterality Date    NO HISTORY OF SURGERY      OTHER SURGICAL HISTORY      none           CURRENT MEDICATIONS:    busPIRone (BUSPAR) 5 MG " tablet  cloNIDine (CATAPRES) 0.1 MG tablet  folic acid (FOLVITE) 1 MG tablet  gabapentin (NEURONTIN) 300 MG capsule  hydrOXYzine (ATARAX) 25 MG tablet  multivitamin w/minerals (THERA-VIT-M) tablet  thiamine (B-1) 100 MG tablet        ALLERGIES:  Allergies   Allergen Reactions    Gramineae Pollens Itching    Pollen Extract Rash     grass tree pollen       FAMILY HISTORY:  Family History   Problem Relation Age of Onset    Coronary Artery Disease Father     Substance Abuse Maternal Grandfather     Mental Illness Brother     Schizophrenia Brother     Schizophrenia Maternal Aunt        SOCIAL HISTORY:   Social History     Socioeconomic History    Marital status: Single   Occupational History    Occupation: Rental property owner   Tobacco Use    Smoking status: Some Days     Packs/day: 0.25     Types: Cigars, Cigarettes    Smokeless tobacco: Never    Tobacco comments:     occasional   Substance and Sexual Activity    Alcohol use: Yes     Alcohol/week: 17.0 standard drinks of alcohol     Types: 17 Shots of liquor per week     Comment: Drinks 750ml/day or 17 shots/day; hx of withdrawl seizures    Drug use: Not Currently     Types: Marijuana    Sexual activity: Not Currently     Social Determinants of Health     Transportation Needs: No Transportation Needs (7/21/2021)    PRAPARE - Transportation     Lack of Transportation (Medical): No     Lack of Transportation (Non-Medical): No   Stress: Stress Concern Present (7/21/2021)    [employer] - Occupational Stress Questionnaire     Feeling of Stress : Very much   Housing Stability: Unknown (7/21/2021)    Housing Stability Vital Sign     Unable to Pay for Housing in the Last Year: No     Unstable Housing in the Last Year: No       VITALS:  BP (!) 149/94   Pulse 98   Temp 98.5  F (36.9  C) (Oral)   Resp 18   Wt 77.1 kg (170 lb)   SpO2 91%   BMI 24.39 kg/m      PHYSICAL EXAM    Constitutional: Well developed, Well nourished, NAD,  HENT:  Normocephalic, Atraumatic,  mucous membranes moist, no tongue fasciculations, no scleral icterus  Neck- trachea midline, No stridor.    Eyes:EOMI, Conjunctiva normal, No discharge.   Respiratory: Normal breath sounds, No respiratory distress, No wheezing.    Cardiovascular: Normal heart rate, Regular rhythm,  No murmurs,   Abdominal: Soft, No tenderness, No rebound or guarding.   No palpable masses  Musculoskeletal: no deformity or malalignment   Integument: Warm, Dry, No erythema, No rash.   Neurologic: Alert & oriented x 3, no slurred speech,  no hand tremor, normal coordination  Psychiatric: Affect normal, Cooperative.      LAB:  All pertinent labs reviewed and interpreted.  Results for orders placed or performed during the hospital encounter of 09/17/23   Comprehensive metabolic panel   Result Value Ref Range    Sodium 141 136 - 145 mmol/L    Potassium 3.8 3.4 - 5.3 mmol/L    Chloride 96 (L) 98 - 107 mmol/L    Carbon Dioxide (CO2) 27 22 - 29 mmol/L    Anion Gap 18 (H) 7 - 15 mmol/L    Urea Nitrogen 12.1 6.0 - 20.0 mg/dL    Creatinine 0.64 (L) 0.67 - 1.17 mg/dL    Calcium 8.6 8.6 - 10.0 mg/dL    Glucose 144 (H) 70 - 99 mg/dL    Alkaline Phosphatase 81 40 - 129 U/L     (H) 0 - 45 U/L     (H) 0 - 70 U/L    Protein Total 7.5 6.4 - 8.3 g/dL    Albumin 5.0 3.5 - 5.2 g/dL    Bilirubin Total 0.5 <=1.2 mg/dL    GFR Estimate >90 >60 mL/min/1.73m2   Ethyl Alcohol Level   Result Value Ref Range    Alcohol ethyl 0.52 (HH) <=0.01 g/dL   CBC with platelets and differential   Result Value Ref Range    WBC Count 3.6 (L) 4.0 - 11.0 10e3/uL    RBC Count 5.13 4.40 - 5.90 10e6/uL    Hemoglobin 14.8 13.3 - 17.7 g/dL    Hematocrit 42.8 40.0 - 53.0 %    MCV 83 78 - 100 fL    MCH 28.8 26.5 - 33.0 pg    MCHC 34.6 31.5 - 36.5 g/dL    RDW 15.4 (H) 10.0 - 15.0 %    Platelet Count 77 (L) 150 - 450 10e3/uL    % Neutrophils 45 %    % Lymphocytes 47 %    % Monocytes 5 %    % Eosinophils 1 %    % Basophils 2 %    % Immature  Granulocytes 0 %    NRBCs per 100 WBC 0 <1 /100    Absolute Neutrophils 1.6 1.6 - 8.3 10e3/uL    Absolute Lymphocytes 1.7 0.8 - 5.3 10e3/uL    Absolute Monocytes 0.2 0.0 - 1.3 10e3/uL    Absolute Eosinophils 0.0 0.0 - 0.7 10e3/uL    Absolute Basophils 0.1 0.0 - 0.2 10e3/uL    Absolute Immature Granulocytes 0.0 <=0.4 10e3/uL    Absolute NRBCs 0.0 10e3/uL   Basic metabolic panel   Result Value Ref Range    Sodium 141 136 - 145 mmol/L    Potassium 3.8 3.4 - 5.3 mmol/L    Chloride 96 (L) 98 - 107 mmol/L    Carbon Dioxide (CO2) 27 22 - 29 mmol/L    Anion Gap 18 (H) 7 - 15 mmol/L    Urea Nitrogen 12.1 6.0 - 20.0 mg/dL    Creatinine 0.64 (L) 0.67 - 1.17 mg/dL    Calcium 8.6 8.6 - 10.0 mg/dL    Glucose 144 (H) 70 - 99 mg/dL    GFR Estimate >90 >60 mL/min/1.73m2   Hepatic function panel   Result Value Ref Range    Protein Total 7.5 6.4 - 8.3 g/dL    Albumin 5.0 3.5 - 5.2 g/dL    Bilirubin Total 0.5 <=1.2 mg/dL    Alkaline Phosphatase 81 40 - 129 U/L     (H) 0 - 45 U/L     (H) 0 - 70 U/L    Bilirubin Direct <0.20 0.00 - 0.30 mg/dL       RADIOLOGY:  Reviewed all pertinent imaging. Please see official radiology report.  No orders to display       EKG:    None    PROCEDURES:   None      I, Rebel Santiago, am serving as a scribe to document services personally performed by Saul Engel MD based on my observation and the provider's statements to me. I, Saul Engel MD, attest that Rebel Santiago is acting in a scribe capacity, has observed my performance of the services and has documented them in accordance with my direction.    Saul Engel MD  [facility] EMERGENCY DEPARTMENT  [address] [place] [id number] [phone]      Saul Engel MD  09/18/23 4350

## 2023-09-18 NOTE — ED NOTES
Pt informed writer that he wants to go home now. Informed Toro Rice regarding this an writer was informed that pt has to ambulate.

## 2023-09-18 NOTE — ED TRIAGE NOTES
Pt's mother called related pt drinking a pint of vodka a day. Pt just lying on the couch, not eating or moving. Pt's last drink was at 8pm. Per pt he had seizures alcohol withdrawal. No c/o headache, c/o nausea but no vomiting. Pt is supposed to go to detox this coming Thursday.     Triage Assessment       Row Name 09/17/23 0432       Triage Assessment (Adult)    Airway WDL WDL       Respiratory WDL    Respiratory WDL WDL       Skin Circulation/Temperature WDL    Skin Circulation/Temperature WDL WDL       Cardiac WDL    Cardiac WDL WDL       Peripheral/Neurovascular WDL    Peripheral Neurovascular WDL WDL       Cognitive/Neuro/Behavioral WDL    Cognitive/Neuro/Behavioral WDL WDL       Agency Coma Scale    Best Eye Response 4-->(E4) spontaneous    Best Motor Response 6-->(M6) obeys commands    Best Verbal Response 5-->(V5) oriented    Agency Coma Scale Score 15

## 2023-09-19 ENCOUNTER — APPOINTMENT (OUTPATIENT)
Dept: CT IMAGING | Facility: HOSPITAL | Age: 38
DRG: 896 | End: 2023-09-19
Attending: EMERGENCY MEDICINE
Payer: COMMERCIAL

## 2023-09-19 ENCOUNTER — HOSPITAL ENCOUNTER (INPATIENT)
Facility: HOSPITAL | Age: 38
LOS: 3 days | Discharge: HOME | DRG: 896 | End: 2023-09-22
Attending: EMERGENCY MEDICINE | Admitting: INTERNAL MEDICINE
Payer: COMMERCIAL

## 2023-09-19 DIAGNOSIS — G93.40 ACUTE ENCEPHALOPATHY: ICD-10-CM

## 2023-09-19 DIAGNOSIS — E87.29 INCREASED ANION GAP METABOLIC ACIDOSIS: ICD-10-CM

## 2023-09-19 DIAGNOSIS — F10.929 ALCOHOLIC INTOXICATION WITH COMPLICATION (H): ICD-10-CM

## 2023-09-19 DIAGNOSIS — R79.89 ABNORMAL LIVER FUNCTION TEST: ICD-10-CM

## 2023-09-19 LAB
ALBUMIN SERPL BCG-MCNC: 4.8 G/DL (ref 3.5–5.2)
ALP SERPL-CCNC: 79 U/L (ref 40–129)
ALT SERPL W P-5'-P-CCNC: 173 U/L (ref 0–70)
AMMONIA PLAS-SCNC: 24 UMOL/L (ref 16–60)
ANION GAP SERPL CALCULATED.3IONS-SCNC: 19 MMOL/L (ref 7–15)
AST SERPL W P-5'-P-CCNC: 242 U/L (ref 0–45)
B-OH-BUTYR SERPL-SCNC: <0.18 MMOL/L
BASE EXCESS BLDV CALC-SCNC: 6.9 MMOL/L
BASOPHILS # BLD AUTO: 0.1 10E3/UL (ref 0–0.2)
BASOPHILS NFR BLD AUTO: 1 %
BILIRUB SERPL-MCNC: 0.5 MG/DL
BUN SERPL-MCNC: 12.6 MG/DL (ref 6–20)
CALCIUM SERPL-MCNC: 8.7 MG/DL (ref 8.6–10)
CHLORIDE SERPL-SCNC: 96 MMOL/L (ref 98–107)
CREAT SERPL-MCNC: 0.64 MG/DL (ref 0.67–1.17)
DEPRECATED HCO3 PLAS-SCNC: 25 MMOL/L (ref 22–29)
EGFRCR SERPLBLD CKD-EPI 2021: >90 ML/MIN/1.73M2
EOSINOPHIL # BLD AUTO: 0 10E3/UL (ref 0–0.7)
EOSINOPHIL NFR BLD AUTO: 0 %
ERYTHROCYTE [DISTWIDTH] IN BLOOD BY AUTOMATED COUNT: 15.8 % (ref 10–15)
ETHANOL SERPL-MCNC: 0.53 G/DL
FOLATE SERPL-MCNC: 15.1 NG/ML (ref 4.6–34.8)
GLUCOSE SERPL-MCNC: 115 MG/DL (ref 70–99)
HCO3 BLDV-SCNC: 32 MMOL/L (ref 24–30)
HCT VFR BLD AUTO: 40.2 % (ref 40–53)
HGB BLD-MCNC: 14.3 G/DL (ref 13.3–17.7)
IMM GRANULOCYTES # BLD: 0 10E3/UL
IMM GRANULOCYTES NFR BLD: 0 %
INR PPP: 0.92 (ref 0.85–1.15)
LACTATE SERPL-SCNC: 2.4 MMOL/L (ref 0.7–2)
LACTATE SERPL-SCNC: 2.8 MMOL/L (ref 0.7–2)
LYMPHOCYTES # BLD AUTO: 1.4 10E3/UL (ref 0.8–5.3)
LYMPHOCYTES NFR BLD AUTO: 26 %
MAGNESIUM SERPL-MCNC: 2 MG/DL (ref 1.7–2.3)
MAGNESIUM SERPL-MCNC: 2.1 MG/DL (ref 1.7–2.3)
MCH RBC QN AUTO: 29.1 PG (ref 26.5–33)
MCHC RBC AUTO-ENTMCNC: 35.6 G/DL (ref 31.5–36.5)
MCV RBC AUTO: 82 FL (ref 78–100)
MONOCYTES # BLD AUTO: 0.4 10E3/UL (ref 0–1.3)
MONOCYTES NFR BLD AUTO: 6 %
NEUTROPHILS # BLD AUTO: 3.7 10E3/UL (ref 1.6–8.3)
NEUTROPHILS NFR BLD AUTO: 67 %
NRBC # BLD AUTO: 0 10E3/UL
NRBC BLD AUTO-RTO: 0 /100
OXYHGB MFR BLDV: 89.6 % (ref 70–75)
PCO2 BLDV: 51 MM HG (ref 35–50)
PH BLDV: 7.4 [PH] (ref 7.35–7.45)
PHOSPHATE SERPL-MCNC: 3.7 MG/DL (ref 2.5–4.5)
PLATELET # BLD AUTO: 62 10E3/UL (ref 150–450)
PO2 BLDV: 69 MM HG (ref 25–47)
POTASSIUM SERPL-SCNC: 4 MMOL/L (ref 3.4–5.3)
PROT SERPL-MCNC: 7.3 G/DL (ref 6.4–8.3)
RBC # BLD AUTO: 4.91 10E6/UL (ref 4.4–5.9)
SAO2 % BLDV: 91.1 % (ref 70–75)
SODIUM SERPL-SCNC: 140 MMOL/L (ref 136–145)
VIT B12 SERPL-MCNC: 1035 PG/ML (ref 232–1245)
WBC # BLD AUTO: 5.5 10E3/UL (ref 4–11)

## 2023-09-19 PROCEDURE — 250N000011 HC RX IP 250 OP 636: Mod: JZ | Performed by: INTERNAL MEDICINE

## 2023-09-19 PROCEDURE — 82607 VITAMIN B-12: CPT | Performed by: EMERGENCY MEDICINE

## 2023-09-19 PROCEDURE — 96361 HYDRATE IV INFUSION ADD-ON: CPT

## 2023-09-19 PROCEDURE — 85025 COMPLETE CBC W/AUTO DIFF WBC: CPT | Performed by: EMERGENCY MEDICINE

## 2023-09-19 PROCEDURE — 70450 CT HEAD/BRAIN W/O DYE: CPT

## 2023-09-19 PROCEDURE — 250N000013 HC RX MED GY IP 250 OP 250 PS 637: Performed by: EMERGENCY MEDICINE

## 2023-09-19 PROCEDURE — HZ2ZZZZ DETOXIFICATION SERVICES FOR SUBSTANCE ABUSE TREATMENT: ICD-10-PCS | Performed by: INTERNAL MEDICINE

## 2023-09-19 PROCEDURE — 83605 ASSAY OF LACTIC ACID: CPT | Performed by: INTERNAL MEDICINE

## 2023-09-19 PROCEDURE — 83735 ASSAY OF MAGNESIUM: CPT | Performed by: INTERNAL MEDICINE

## 2023-09-19 PROCEDURE — 250N000013 HC RX MED GY IP 250 OP 250 PS 637: Performed by: INTERNAL MEDICINE

## 2023-09-19 PROCEDURE — C9113 INJ PANTOPRAZOLE SODIUM, VIA: HCPCS | Mod: JZ | Performed by: EMERGENCY MEDICINE

## 2023-09-19 PROCEDURE — 99285 EMERGENCY DEPT VISIT HI MDM: CPT | Mod: 25

## 2023-09-19 PROCEDURE — 82010 KETONE BODYS QUAN: CPT | Performed by: EMERGENCY MEDICINE

## 2023-09-19 PROCEDURE — 258N000003 HC RX IP 258 OP 636: Performed by: INTERNAL MEDICINE

## 2023-09-19 PROCEDURE — 84425 ASSAY OF VITAMIN B-1: CPT | Performed by: EMERGENCY MEDICINE

## 2023-09-19 PROCEDURE — 82746 ASSAY OF FOLIC ACID SERUM: CPT | Performed by: EMERGENCY MEDICINE

## 2023-09-19 PROCEDURE — 82805 BLOOD GASES W/O2 SATURATION: CPT | Performed by: EMERGENCY MEDICINE

## 2023-09-19 PROCEDURE — 93005 ELECTROCARDIOGRAM TRACING: CPT | Performed by: EMERGENCY MEDICINE

## 2023-09-19 PROCEDURE — 84100 ASSAY OF PHOSPHORUS: CPT | Performed by: INTERNAL MEDICINE

## 2023-09-19 PROCEDURE — 85610 PROTHROMBIN TIME: CPT | Performed by: EMERGENCY MEDICINE

## 2023-09-19 PROCEDURE — 99223 1ST HOSP IP/OBS HIGH 75: CPT | Performed by: INTERNAL MEDICINE

## 2023-09-19 PROCEDURE — 82077 ASSAY SPEC XCP UR&BREATH IA: CPT | Performed by: EMERGENCY MEDICINE

## 2023-09-19 PROCEDURE — 258N000003 HC RX IP 258 OP 636: Performed by: EMERGENCY MEDICINE

## 2023-09-19 PROCEDURE — 80053 COMPREHEN METABOLIC PANEL: CPT | Performed by: EMERGENCY MEDICINE

## 2023-09-19 PROCEDURE — 120N000001 HC R&B MED SURG/OB

## 2023-09-19 PROCEDURE — 36415 COLL VENOUS BLD VENIPUNCTURE: CPT | Performed by: INTERNAL MEDICINE

## 2023-09-19 PROCEDURE — 96375 TX/PRO/DX INJ NEW DRUG ADDON: CPT

## 2023-09-19 PROCEDURE — 36415 COLL VENOUS BLD VENIPUNCTURE: CPT | Performed by: EMERGENCY MEDICINE

## 2023-09-19 PROCEDURE — 72125 CT NECK SPINE W/O DYE: CPT

## 2023-09-19 PROCEDURE — 250N000009 HC RX 250: Performed by: EMERGENCY MEDICINE

## 2023-09-19 PROCEDURE — 96365 THER/PROPH/DIAG IV INF INIT: CPT

## 2023-09-19 PROCEDURE — 250N000011 HC RX IP 250 OP 636: Performed by: EMERGENCY MEDICINE

## 2023-09-19 PROCEDURE — 82140 ASSAY OF AMMONIA: CPT | Performed by: EMERGENCY MEDICINE

## 2023-09-19 PROCEDURE — 83735 ASSAY OF MAGNESIUM: CPT | Performed by: EMERGENCY MEDICINE

## 2023-09-19 RX ORDER — LORAZEPAM 0.5 MG/1
1-2 TABLET ORAL EVERY 30 MIN PRN
Status: DISCONTINUED | OUTPATIENT
Start: 2023-09-19 | End: 2023-09-22 | Stop reason: HOSPADM

## 2023-09-19 RX ORDER — OXYCODONE HYDROCHLORIDE 5 MG/1
5 TABLET ORAL EVERY 4 HOURS PRN
Status: DISCONTINUED | OUTPATIENT
Start: 2023-09-19 | End: 2023-09-22 | Stop reason: HOSPADM

## 2023-09-19 RX ORDER — MULTIPLE VITAMINS W/ MINERALS TAB 9MG-400MCG
1 TAB ORAL DAILY
Status: DISCONTINUED | OUTPATIENT
Start: 2023-09-20 | End: 2023-09-22 | Stop reason: HOSPADM

## 2023-09-19 RX ORDER — GABAPENTIN 300 MG/1
600 CAPSULE ORAL EVERY 8 HOURS
Status: DISCONTINUED | OUTPATIENT
Start: 2023-09-22 | End: 2023-09-22 | Stop reason: HOSPADM

## 2023-09-19 RX ORDER — ONDANSETRON 2 MG/ML
4 INJECTION INTRAMUSCULAR; INTRAVENOUS EVERY 6 HOURS PRN
Status: DISCONTINUED | OUTPATIENT
Start: 2023-09-19 | End: 2023-09-22 | Stop reason: HOSPADM

## 2023-09-19 RX ORDER — FAMOTIDINE 20 MG/1
20 TABLET, FILM COATED ORAL 2 TIMES DAILY
Status: DISCONTINUED | OUTPATIENT
Start: 2023-09-19 | End: 2023-09-19

## 2023-09-19 RX ORDER — DIPHENHYDRAMINE HYDROCHLORIDE 50 MG/ML
50 INJECTION INTRAMUSCULAR; INTRAVENOUS ONCE
Status: COMPLETED | OUTPATIENT
Start: 2023-09-19 | End: 2023-09-19

## 2023-09-19 RX ORDER — NALOXONE HYDROCHLORIDE 0.4 MG/ML
0.4 INJECTION, SOLUTION INTRAMUSCULAR; INTRAVENOUS; SUBCUTANEOUS
Status: DISCONTINUED | OUTPATIENT
Start: 2023-09-19 | End: 2023-09-22 | Stop reason: HOSPADM

## 2023-09-19 RX ORDER — OLANZAPINE 5 MG/1
5-10 TABLET, ORALLY DISINTEGRATING ORAL EVERY 6 HOURS PRN
Status: DISCONTINUED | OUTPATIENT
Start: 2023-09-19 | End: 2023-09-22 | Stop reason: HOSPADM

## 2023-09-19 RX ORDER — SODIUM CHLORIDE 9 MG/ML
INJECTION, SOLUTION INTRAVENOUS CONTINUOUS
Status: DISCONTINUED | OUTPATIENT
Start: 2023-09-19 | End: 2023-09-19

## 2023-09-19 RX ORDER — FOLIC ACID 1 MG/1
1 TABLET ORAL DAILY
Status: DISCONTINUED | OUTPATIENT
Start: 2023-09-20 | End: 2023-09-22 | Stop reason: HOSPADM

## 2023-09-19 RX ORDER — GABAPENTIN 300 MG/1
300 CAPSULE ORAL ONCE
Status: COMPLETED | OUTPATIENT
Start: 2023-09-19 | End: 2023-09-19

## 2023-09-19 RX ORDER — GABAPENTIN 400 MG/1
1200 CAPSULE ORAL ONCE
Status: COMPLETED | OUTPATIENT
Start: 2023-09-19 | End: 2023-09-19

## 2023-09-19 RX ORDER — GABAPENTIN 100 MG/1
100 CAPSULE ORAL EVERY 8 HOURS
Status: DISCONTINUED | OUTPATIENT
Start: 2023-09-26 | End: 2023-09-22 | Stop reason: HOSPADM

## 2023-09-19 RX ORDER — GABAPENTIN 300 MG/1
300 CAPSULE ORAL ONCE
Status: DISCONTINUED | OUTPATIENT
Start: 2023-09-19 | End: 2023-09-19

## 2023-09-19 RX ORDER — NICOTINE 21 MG/24HR
1 PATCH, TRANSDERMAL 24 HOURS TRANSDERMAL DAILY
Status: DISCONTINUED | OUTPATIENT
Start: 2023-09-19 | End: 2023-09-22 | Stop reason: HOSPADM

## 2023-09-19 RX ORDER — SODIUM CHLORIDE 9 MG/ML
INJECTION, SOLUTION INTRAVENOUS CONTINUOUS
Status: ACTIVE | OUTPATIENT
Start: 2023-09-19 | End: 2023-09-20

## 2023-09-19 RX ORDER — AMOXICILLIN 250 MG
1 CAPSULE ORAL 2 TIMES DAILY PRN
Status: DISCONTINUED | OUTPATIENT
Start: 2023-09-19 | End: 2023-09-22 | Stop reason: HOSPADM

## 2023-09-19 RX ORDER — GABAPENTIN 300 MG/1
900 CAPSULE ORAL EVERY 8 HOURS
Status: DISCONTINUED | OUTPATIENT
Start: 2023-09-19 | End: 2023-09-22 | Stop reason: HOSPADM

## 2023-09-19 RX ORDER — LIDOCAINE 40 MG/G
CREAM TOPICAL
Status: DISCONTINUED | OUTPATIENT
Start: 2023-09-19 | End: 2023-09-22 | Stop reason: HOSPADM

## 2023-09-19 RX ORDER — ONDANSETRON 4 MG/1
4 TABLET, ORALLY DISINTEGRATING ORAL EVERY 6 HOURS PRN
Status: DISCONTINUED | OUTPATIENT
Start: 2023-09-19 | End: 2023-09-22 | Stop reason: HOSPADM

## 2023-09-19 RX ORDER — NALOXONE HYDROCHLORIDE 0.4 MG/ML
0.2 INJECTION, SOLUTION INTRAMUSCULAR; INTRAVENOUS; SUBCUTANEOUS
Status: DISCONTINUED | OUTPATIENT
Start: 2023-09-19 | End: 2023-09-22 | Stop reason: HOSPADM

## 2023-09-19 RX ORDER — HALOPERIDOL 5 MG/ML
2.5-5 INJECTION INTRAMUSCULAR EVERY 6 HOURS PRN
Status: DISCONTINUED | OUTPATIENT
Start: 2023-09-19 | End: 2023-09-22 | Stop reason: HOSPADM

## 2023-09-19 RX ORDER — GABAPENTIN 300 MG/1
300 CAPSULE ORAL EVERY 8 HOURS
Status: DISCONTINUED | OUTPATIENT
Start: 2023-09-24 | End: 2023-09-22 | Stop reason: HOSPADM

## 2023-09-19 RX ORDER — LORAZEPAM 2 MG/ML
1-2 INJECTION INTRAMUSCULAR EVERY 30 MIN PRN
Status: DISCONTINUED | OUTPATIENT
Start: 2023-09-19 | End: 2023-09-22 | Stop reason: HOSPADM

## 2023-09-19 RX ORDER — AMOXICILLIN 250 MG
2 CAPSULE ORAL 2 TIMES DAILY PRN
Status: DISCONTINUED | OUTPATIENT
Start: 2023-09-19 | End: 2023-09-22 | Stop reason: HOSPADM

## 2023-09-19 RX ORDER — HYDROXYZINE HYDROCHLORIDE 25 MG/1
25 TABLET, FILM COATED ORAL 3 TIMES DAILY PRN
COMMUNITY
End: 2023-11-24

## 2023-09-19 RX ORDER — PANTOPRAZOLE SODIUM 40 MG/1
40 TABLET, DELAYED RELEASE ORAL DAILY
COMMUNITY
End: 2023-09-29

## 2023-09-19 RX ORDER — PANTOPRAZOLE SODIUM 40 MG/1
40 TABLET, DELAYED RELEASE ORAL
Status: DISCONTINUED | OUTPATIENT
Start: 2023-09-20 | End: 2023-09-22 | Stop reason: HOSPADM

## 2023-09-19 RX ORDER — CLONIDINE HYDROCHLORIDE 0.1 MG/1
0.1 TABLET ORAL EVERY 8 HOURS
Status: DISCONTINUED | OUTPATIENT
Start: 2023-09-19 | End: 2023-09-22 | Stop reason: HOSPADM

## 2023-09-19 RX ADMIN — CLONIDINE HYDROCHLORIDE 0.1 MG: 0.1 TABLET ORAL at 16:07

## 2023-09-19 RX ADMIN — PANTOPRAZOLE SODIUM 40 MG: 40 INJECTION, POWDER, FOR SOLUTION INTRAVENOUS at 08:53

## 2023-09-19 RX ADMIN — POTASSIUM CHLORIDE: 2 INJECTION, SOLUTION, CONCENTRATE INTRAVENOUS at 10:58

## 2023-09-19 RX ADMIN — DIPHENHYDRAMINE HYDROCHLORIDE 50 MG: 50 INJECTION, SOLUTION INTRAMUSCULAR; INTRAVENOUS at 11:23

## 2023-09-19 RX ADMIN — LORAZEPAM 2 MG: 2 INJECTION INTRAMUSCULAR; INTRAVENOUS at 21:00

## 2023-09-19 RX ADMIN — LORAZEPAM 1 MG: 0.5 TABLET ORAL at 18:15

## 2023-09-19 RX ADMIN — LORAZEPAM 1 MG: 2 INJECTION INTRAMUSCULAR; INTRAVENOUS at 16:06

## 2023-09-19 RX ADMIN — SODIUM CHLORIDE: 9 INJECTION, SOLUTION INTRAVENOUS at 14:14

## 2023-09-19 RX ADMIN — GABAPENTIN 300 MG: 300 CAPSULE ORAL at 08:53

## 2023-09-19 RX ADMIN — Medication 5 MG: at 23:52

## 2023-09-19 RX ADMIN — SODIUM CHLORIDE: 9 INJECTION, SOLUTION INTRAVENOUS at 08:53

## 2023-09-19 RX ADMIN — LORAZEPAM 2 MG: 2 INJECTION INTRAMUSCULAR; INTRAVENOUS at 23:38

## 2023-09-19 RX ADMIN — GABAPENTIN 1200 MG: 400 CAPSULE ORAL at 16:07

## 2023-09-19 RX ADMIN — CLONIDINE HYDROCHLORIDE 0.1 MG: 0.1 TABLET ORAL at 21:00

## 2023-09-19 RX ADMIN — NICOTINE 1 PATCH: 14 PATCH, EXTENDED RELEASE TRANSDERMAL at 14:28

## 2023-09-19 RX ADMIN — GABAPENTIN 900 MG: 300 CAPSULE ORAL at 21:00

## 2023-09-19 RX ADMIN — ONDANSETRON 4 MG: 2 INJECTION INTRAMUSCULAR; INTRAVENOUS at 14:25

## 2023-09-19 RX ADMIN — PROCHLORPERAZINE EDISYLATE 5 MG: 5 INJECTION INTRAMUSCULAR; INTRAVENOUS at 11:25

## 2023-09-19 ASSESSMENT — ACTIVITIES OF DAILY LIVING (ADL)
ADLS_ACUITY_SCORE: 37

## 2023-09-19 ASSESSMENT — ENCOUNTER SYMPTOMS: APPETITE CHANGE: 1

## 2023-09-19 NOTE — H&P
ADMISSION HISTORY & PHYSICAL      Lisandro Graham, [phone]  ASSESSMENT AND PLAN:  38 year old male with a history of alcohol dependence, withdrawal seizures, tobacco use, GERD and anemia who presents with acute toxic metabolic encephalopathy related to alcohol intoxication and impending withdrawal.      Acute toxic metabolic encephalopathy secondary to alcohol intoxication  Impending alcohol withdrawal   Gabapentin taper, CIWA protocol  IV vitamins followed by oral replacement   PT/OT   Plan for chemical dependency treatment outpatient   CT head and CT spine without acute findings     Anion gap metabolic acidosis   Possibly lactic acidosis    ABG reviewed    Lactic acid- pending    Ketones normal    Trend    Transaminitis   Likely 2/2 alcohol use   Trend      Tobacco dependence   Nicotine patch    GERD-resume home Protonix    Anemia likely secondary to alcohol dependency    Thrombocytopenia likely 2/2 alcoholism    PLT 62     DVT prophylaxis: SCDs- low platelets     Disposition:  -Anticipated Length of Stay in midnights and medical necessity (including a midnight in the Emergency Department after triage if applicable): 3 days   -Discharge barriers: resolution of withdrawals    Clinically Significant Risk Factors Present on Admission             # Anion Gap Metabolic Acidosis: Highest Anion Gap = 19 mmol/L in last 2 days, will monitor and treat as appropriate    # Thrombocytopenia: Lowest platelets = 62 in last 2 days, will monitor for bleeding   # Hypertension: Home medication list includes antihypertensive(s)                     CHIEF COMPLAINT:  Alcohol intoxication     HISTORY OF PRESENTING ILLNESS:  Patient is a 38 year old male with history significant for alcohol dependence, withdrawal seizures, tobacco use, GERD and anemia who presents with acute toxic metabolic encephalopathy related to alcohol intoxication and impending withdrawal.  Patient with a long history of alcohol dependence.  Reportedly  relapsed in May 2023.  Family was planning to bring to [place] for chemical dependency treatment today.  He was refused entry due to altered mental status and presented to the emergency department.  States last drink was at 530 this morning.  On my exam patient is difficult to arouse with sternal rub.  Does appear comfortable and has no respiratory distress.  Answers questions minimally.    PMH/PSH:  Patient Active Problem List   Diagnosis    Alcohol-induced mood disorder (H)    ACS (acute coronary syndrome) (H)    Chemical dependency (H)    Alcohol-induced acute pancreatitis    Alcohol-induced insomnia (H)    Allergic rhinitis    Seasonal allergies    Urticaria    Alcohol use disorder, severe, dependence (H)    Hypokalemia    Transaminitis    Alcoholic fatty liver    Generalized anxiety disorder    Moderate episode of recurrent major depressive disorder (H)    Hypomagnesemia    Thrombocytopenia (H)    Chronic intractable headache    Constipation    Decreased vision of left eye    Dysesthesia    Internal hemorrhoids    Keratosis pilaris    Localized superficial swelling, mass, or lump    Normocytic anemia    Pruritus    Snoring    Epigastric pain    Fatigue    Elevated LFTs    Alcohol withdrawal syndrome without complication (H)    Alcohol dependence with intoxication with complication (H)    Alcohol abuse    Acute encephalopathy       ALLERGIES:  Allergies   Allergen Reactions    Gramineae Pollens Itching    Pollen Extract Rash     grass tree pollen       MEDICATIONS:  Reviewed.  Current Facility-Administered Medications   Medication    cloNIDine (CATAPRES) tablet 0.1 mg    famotidine (PEPCID) tablet 20 mg    [START ON 9/20/2023] folic acid (FOLVITE) tablet 1 mg    gabapentin (NEURONTIN) capsule 1,200 mg    [START ON 9/26/2023] gabapentin (NEURONTIN) capsule 100 mg    [START ON 9/24/2023] gabapentin (NEURONTIN) capsule 300 mg    [START ON 9/22/2023] gabapentin (NEURONTIN) capsule 600 mg    gabapentin (NEURONTIN)  capsule 900 mg    OLANZapine zydis (zyPREXA) ODT tab 5-10 mg    Or    haloperidol lactate (HALDOL) injection 2.5-5 mg    lidocaine (LMX4) cream    lidocaine 1 % 0.1-1 mL    LORazepam (ATIVAN) tablet 1-2 mg    Or    LORazepam (ATIVAN) injection 1-2 mg    melatonin tablet 5 mg    [START ON 9/20/2023] multivitamin w/minerals (THERA-VIT-M) tablet 1 tablet    naloxone (NARCAN) injection 0.2 mg    Or    naloxone (NARCAN) injection 0.4 mg    Or    naloxone (NARCAN) injection 0.2 mg    Or    naloxone (NARCAN) injection 0.4 mg    nicotine (NICODERM CQ) 14 MG/24HR 24 hr patch 1 patch    nicotine Patch in Place    ondansetron (ZOFRAN ODT) ODT tab 4 mg    Or    ondansetron (ZOFRAN) injection 4 mg    oxyCODONE (ROXICODONE) tablet 5 mg    senna-docusate (SENOKOT-S/PERICOLACE) 8.6-50 MG per tablet 1 tablet    Or    senna-docusate (SENOKOT-S/PERICOLACE) 8.6-50 MG per tablet 2 tablet    sodium chloride (PF) 0.9% PF flush 3 mL    sodium chloride (PF) 0.9% PF flush 3 mL    sodium chloride 0.9 % 1,000 mL with Infuvite Adult 10 mL, thiamine 100 mg, folic acid 1 mg infusion    sodium chloride 0.9% infusion    [START ON 9/20/2023] thiamine (B-1) tablet 100 mg     Current Outpatient Medications   Medication    busPIRone (BUSPAR) 5 MG tablet    cloNIDine (CATAPRES) 0.1 MG tablet    folic acid (FOLVITE) 1 MG tablet    gabapentin (NEURONTIN) 300 MG capsule    hydrOXYzine (ATARAX) 25 MG tablet    multivitamin w/minerals (THERA-VIT-M) tablet    pantoprazole (PROTONIX) 40 MG EC tablet    thiamine (B-1) 100 MG tablet     Facility-Administered Medications Ordered in Other Encounters   Medication    Self Administer Medications: Behavioral Services       SOCIAL HISTORY:  Social History     Socioeconomic History    Marital status: Single     Spouse name: Not on file    Number of children: Not on file    Years of education: Not on file    Highest education level: Not on file   Occupational History    Occupation: Rental property owner   Tobacco Use     Smoking status: Some Days     Packs/day: 0.25     Types: Cigars, Cigarettes    Smokeless tobacco: Never    Tobacco comments:     occasional   Substance and Sexual Activity    Alcohol use: Yes     Alcohol/week: 17.0 standard drinks of alcohol     Types: 17 Shots of liquor per week     Comment: Drinks 750ml/day or 17 shots/day; hx of withdrawl seizures    Drug use: Not Currently     Types: Marijuana    Sexual activity: Not Currently   Other Topics Concern    Not on file   Social History Narrative    Not on file     Social Determinants of Health     Financial Resource Strain: Not on file   Food Insecurity: Not on file   Transportation Needs: No Transportation Needs (7/21/2021)    PRAPARE - Transportation     Lack of Transportation (Medical): No     Lack of Transportation (Non-Medical): No   Physical Activity: Not on file   Stress: Stress Concern Present (7/21/2021)    [employer] - Occupational Stress Questionnaire     Feeling of Stress : Very much   Social Connections: Not on file   Intimate Partner Violence: Not on file   Housing Stability: Unknown (7/21/2021)    Housing Stability Vital Sign     Unable to Pay for Housing in the Last Year: No     Number of Places Lived in the Last Year: Not on file     Unstable Housing in the Last Year: No       FAMILY HISTORY:  Family History   Problem Relation Age of Onset    Coronary Artery Disease Father     Substance Abuse Maternal Grandfather     Mental Illness Brother     Schizophrenia Brother     Schizophrenia Maternal Aunt         ROS:  12 point review of systems is reviewed and is negative except for what has already been mention in the history of presenting illness.    PHYSICAL EXAM:  /83   Pulse 78   Temp 97.8  F (36.6  C) (Tympanic)   Resp 17   Wt 77.1 kg (170 lb)   SpO2 96%   BMI 24.39 kg/m    No intake/output data recorded.  No intake/output data recorded.  GENRL: Sleeping during exam but awakens shortly with sternal rub. Not in  acute distress. Lying in bed   HEENT: no lymphadenopathy or thyromegaly  CHEST: Clear to auscultation bilaterally. No wheezes, rhonchi or crackles. Breathing easily   HEART: Regular rate and rhythm, S1S2 auscultated. No murmurs  ABDMN: Soft. Non-tender, non-distended. No organomegaly. No guarding or rigidity. Bowel sounds present   EXTRM: No pedal edema, DP pulses 2+.   NEURO: Unable to fully assess as patient is not interactive long enough during physical exam.    PSYCH: Unable to fully assess   INTGM: No skin rash, no cyanosis or clubbing      Medical Decision Making       Evaluated at bedside. Adjusted home medications and initiated CIWA protocol. Vitamins ordered. Reviewed labs and imaging studies.           DIAGNOSTIC DATA:  Recent Results (from the past 24 hour(s))   Ammonia    Collection Time: 09/19/23  8:49 AM   Result Value Ref Range    Ammonia 24 16 - 60 umol/L   Comprehensive metabolic panel    Collection Time: 09/19/23  8:49 AM   Result Value Ref Range    Sodium 140 136 - 145 mmol/L    Potassium 4.0 3.4 - 5.3 mmol/L    Chloride 96 (L) 98 - 107 mmol/L    Carbon Dioxide (CO2) 25 22 - 29 mmol/L    Anion Gap 19 (H) 7 - 15 mmol/L    Urea Nitrogen 12.6 6.0 - 20.0 mg/dL    Creatinine 0.64 (L) 0.67 - 1.17 mg/dL    Calcium 8.7 8.6 - 10.0 mg/dL    Glucose 115 (H) 70 - 99 mg/dL    Alkaline Phosphatase 79 40 - 129 U/L     (H) 0 - 45 U/L     (H) 0 - 70 U/L    Protein Total 7.3 6.4 - 8.3 g/dL    Albumin 4.8 3.5 - 5.2 g/dL    Bilirubin Total 0.5 <=1.2 mg/dL    GFR Estimate >90 >60 mL/min/1.73m2   INR    Collection Time: 09/19/23  8:49 AM   Result Value Ref Range    INR 0.92 0.85 - 1.15   Ethyl Alcohol Level    Collection Time: 09/19/23  8:49 AM   Result Value Ref Range    Alcohol ethyl 0.53 (HH) <=0.01 g/dL   CBC with platelets and differential    Collection Time: 09/19/23  8:49 AM   Result Value Ref Range    WBC Count 5.5 4.0 - 11.0 10e3/uL    RBC Count 4.91 4.40 - 5.90 10e6/uL    Hemoglobin 14.3 13.3 -  17.7 g/dL    Hematocrit 40.2 40.0 - 53.0 %    MCV 82 78 - 100 fL    MCH 29.1 26.5 - 33.0 pg    MCHC 35.6 31.5 - 36.5 g/dL    RDW 15.8 (H) 10.0 - 15.0 %    Platelet Count 62 (L) 150 - 450 10e3/uL    % Neutrophils 67 %    % Lymphocytes 26 %    % Monocytes 6 %    % Eosinophils 0 %    % Basophils 1 %    % Immature Granulocytes 0 %    NRBCs per 100 WBC 0 <1 /100    Absolute Neutrophils 3.7 1.6 - 8.3 10e3/uL    Absolute Lymphocytes 1.4 0.8 - 5.3 10e3/uL    Absolute Monocytes 0.4 0.0 - 1.3 10e3/uL    Absolute Eosinophils 0.0 0.0 - 0.7 10e3/uL    Absolute Basophils 0.1 0.0 - 0.2 10e3/uL    Absolute Immature Granulocytes 0.0 <=0.4 10e3/uL    Absolute NRBCs 0.0 10e3/uL   Magnesium    Collection Time: 09/19/23  8:49 AM   Result Value Ref Range    Magnesium 2.1 1.7 - 2.3 mg/dL   Blood gas venous    Collection Time: 09/19/23 10:57 AM   Result Value Ref Range    pH Venous 7.40 7.35 - 7.45    pCO2 Venous 51 (H) 35 - 50 mm Hg    pO2 Venous 69 (H) 25 - 47 mm Hg    Bicarbonate Venous 32 (H) 24 - 30 mmol/L    Base Excess/Deficit 6.9   mmol/L    Oxyhemoglobin Venous 89.6 (H) 70.0 - 75.0 %    O2 Sat, Venous 91.1 (H) 70.0 - 75.0 %   Ketone Beta-Hydroxybutyrate Quantitative    Collection Time: 09/19/23 10:57 AM   Result Value Ref Range    Ketone (Beta-Hydroxybutyrate) Quantitative <0.18 <=0.30 mmol/L     All lab studies reviewed personally  Radiology report reviewed.      Nena Aponte DO  Hospitalist [facility]  Text page via [email or web address] Paging/Directory

## 2023-09-19 NOTE — ED PROVIDER NOTES
Emergency Department Encounter     Evaluation Date & Time:   2023  7:56 AM    CHIEF COMPLAINT:  Alcohol Problem      Triage Note:Patient arrives by private car with his mother for alcohol abuse.  Reports they tried to bring him to detox and they refused him until his alcohol level came down.  Last drink was 0530 this am. Reports history of withdrawal seizures. Also reports a fall last night.            FINAL IMPRESSION:    ICD-10-CM    1. Acute encephalopathy  G93.40       2. Alcoholic intoxication with complication (H)  F10.929       3. Increased anion gap metabolic acidosis  E87.29       4. Abnormal liver function test  R79.89           Impression and Plan     ED COURSE & MEDICAL DECISION MAKIN:18 AM Introduced myself to the patient, obtained history of present illness, and performed initial physical exam at this time.     ED Course as of 23 1203   Tue Sep 19, 2023   0837 I saw the patient.  He does seem altered but is able to answer questions appropriately.  He becomes hypoxic when resting slightly and so nursing staff is placing him on oxygen and we will do a chest x-ray to make sure that he does not have an associated aspiration pneumonia which he is at high risk for because of the heavy alcohol abuse.  Otherwise he also has had recent falls with signs of recent head injury with a right-sided periorbital contusion so because of his abnormal neuro examination will need CT imaging of his head and C-spine although he denies any neck or back pain he is does appear significantly intoxicated at this time which may limit his ability to feel any discomfort there.  Additionally will look for electrolyte imbalances, liver dysfunction or elevated ammonia.  His abnormal arm movements appear more consistent with an ataxic/myoclonic type appearance that goes more with encephalopathy than it does with alcohol withdrawal especially in the absence of significant tachycardia at rest, fine tremors, or tongue  fasciculations.  Will need admission to the hospital for alcohol withdrawal evaluation/monitoring as well as the current intoxication and signs of encephalopathy.   0839 He does tell me that he will often start to withdrawal at a level of 300 or above and does have at least a history of alcohol withdrawal seizures though none yet with this episode.  So, I am going to start with a dose of gabapentin and see how he tolerates that.  Protonix for stomach protection though he denies any symptoms of peptic ulcer disease or gastritis.   0842 I did place him on falls precautions as he does appear ataxic even moving in bed, and advised the nurse he will need one-to-one assist whenever he is up.  He should not be left alone in the bathroom due to risk of falls.  Otherwise his EKG does not show any severe signs of electrolyte imbalances.   1013 Obviously his alcohol level is severely elevated but his liver function tests are if anything slightly improved from previous visits without potassium or magnesium derangements but he has developed an anion gap and likely has alcoholic ketoacidosis.  Will add on ketones and VBG.   1015 His imaging is reviewed.   1140 He was resting here.  While resting his heart rate was normal, and there are no tremulousness noted.  After I woke him up and retested his neuro exam he still is quite ataxic and so is not safe to discharge to detox.  Will admit for further monitoring of his alcohol withdrawal.  In the meantime after he was woken up he complained of some nausea so he was given a dose of Compazine and Benadryl and since he tolerated the initial gabapentin with just resting I am going to give an additional dose.  He declined transfer.  Incidentally I did look at his recent notes and he was never noted to be ataxic even while intoxicated   1200 Patient admitted.  Discussed m/s tele       At the conclusion of the encounter I discussed the results of all the tests and the disposition. The  questions were answered. The patient or family acknowledged understanding and was agreeable with the care plan.          0 minutes of critical care time        MEDICATIONS GIVEN IN THE EMERGENCY DEPARTMENT:  Medications   sodium chloride 0.9% infusion ( Intravenous $New Bag 9/19/23 0853)   gabapentin (NEURONTIN) capsule 300 mg (has no administration in time range)   cloNIDine (CATAPRES) tablet 0.1 mg (has no administration in time range)   OLANZapine zydis (zyPREXA) ODT tab 5-10 mg (has no administration in time range)     Or   haloperidol lactate (HALDOL) injection 2.5-5 mg (has no administration in time range)   melatonin tablet 5 mg (has no administration in time range)   gabapentin (NEURONTIN) tablet 1,200 mg (has no administration in time range)   gabapentin (NEURONTIN) capsule 900 mg (has no administration in time range)   gabapentin (NEURONTIN) capsule 600 mg (has no administration in time range)   gabapentin (NEURONTIN) capsule 300 mg (has no administration in time range)   gabapentin (NEURONTIN) capsule 100 mg (has no administration in time range)   LORazepam (ATIVAN) tablet 1-2 mg (has no administration in time range)     Or   LORazepam (ATIVAN) injection 1-2 mg (has no administration in time range)   sodium chloride 0.9 % 1,000 mL with Infuvite Adult 10 mL, thiamine 100 mg, folic acid 1 mg infusion (has no administration in time range)   thiamine (B-1) tablet 100 mg (has no administration in time range)   folic acid (FOLVITE) tablet 1 mg (has no administration in time range)   multivitamin w/minerals (THERA-VIT-M) tablet 1 tablet (has no administration in time range)   pantoprazole (PROTONIX) IV push injection 40 mg (40 mg Intravenous $Given 9/19/23 0853)   gabapentin (NEURONTIN) capsule 300 mg (300 mg Oral $Given 9/19/23 0853)   dextrose 5% and 0.45% NaCl 1,000 mL with Infuvite Adult 10 mL, thiamine 100 mg, folic acid 1 mg, potassium chloride 20 mEq, magnesium sulfate 2 g infusion ( Intravenous $New Bag  9/19/23 1058)   prochlorperazine (COMPAZINE) injection 5 mg (5 mg Intravenous $Given 9/19/23 1125)   diphenhydrAMINE (BENADRYL) injection 50 mg (50 mg Intravenous $Given 9/19/23 1123)       NEW PRESCRIPTIONS STARTED AT TODAY'S ED VISIT:  New Prescriptions    No medications on file       HPI     The history is provided by the patient and a relative. No  was used.     Nikhil Rios is a 38 year old male with a pertinent history of alcoholic fatty liver, alcohol-induced pancreatitis, alcohol use disorder, hypokalemia, hypomagnesemia, thrombocytopenia, alcohol withdrawal syndrome, SILVANA who presents to this ED by private car for evaluation of alcohol abuse.    The patient explains that they had gone to [facility] in [place] were he has a bed for detox, but was turned down for today has he had a alcohol level of 0.499 with a breathalyzer. He states he is unable to walk, thirsty, and is craving sugar. He denies nay black/bloody stools or vomiting.    Patient states he normally drinks about ~750 mls of vodka daily.    Per mother, the patient was sober for 9 months, but in May 2023 relapsed following the anniversary of his father's passing. Since then the patient insisted on managing the alcohol consumption by himself. She notes that the patient has been eating less recently.     REVIEW OF SYSTEMS:  Review of Systems   Constitutional:  Positive for appetite change (decreased).   Musculoskeletal:  Positive for gait problem (disbalance and difficulty walking).   All other systems reviewed and are negative.    remainder of systems are all otherwise negative.        Medical History     Past Medical History:   Diagnosis Date    Alcohol abuse     Alcohol abuse     Alcohol withdrawal (H)     Depressive disorder     Family history of diabetes mellitus 11/9/2007    HLD (hyperlipidemia)     HTN (hypertension)        Past Surgical History:   Procedure Laterality Date    NO HISTORY OF SURGERY       OTHER SURGICAL HISTORY      none       Family History   Problem Relation Age of Onset    Coronary Artery Disease Father     Substance Abuse Maternal Grandfather     Mental Illness Brother     Schizophrenia Brother     Schizophrenia Maternal Aunt        Social History     Tobacco Use    Smoking status: Some Days     Packs/day: 0.25     Types: Cigars, Cigarettes    Smokeless tobacco: Never    Tobacco comments:     occasional   Substance Use Topics    Alcohol use: Yes     Alcohol/week: 17.0 standard drinks of alcohol     Types: 17 Shots of liquor per week     Comment: Drinks 750ml/day or 17 shots/day; hx of withdrawl seizures    Drug use: Not Currently     Types: Marijuana       busPIRone (BUSPAR) 5 MG tablet  cloNIDine (CATAPRES) 0.1 MG tablet  folic acid (FOLVITE) 1 MG tablet  gabapentin (NEURONTIN) 300 MG capsule  hydrOXYzine (ATARAX) 25 MG tablet  multivitamin w/minerals (THERA-VIT-M) tablet  pantoprazole (PROTONIX) 40 MG EC tablet  thiamine (B-1) 100 MG tablet        Physical Exam     First Vitals:  Patient Vitals for the past 24 hrs:   BP Temp Temp src Pulse Resp SpO2 Weight   09/19/23 1000 -- -- -- 78 17 96 % --   09/19/23 0900 -- -- -- 75 14 98 % --   09/19/23 0802 131/83 -- -- 82 -- 96 % --   09/19/23 0750 125/86 97.8  F (36.6  C) Tympanic 109 18 97 % 77.1 kg (170 lb)       PHYSICAL EXAM:   Constitutional:   Lying down in bed   HENT:  Normocephalic, posterior pharynx wnl, mucous membranes dark pink and tacky tongue   Eyes:  PERRL, EOMI, Conjunctiva normal, No discharge, no scleral icterus. Resolving right periorbital contusion. Eyes are dilated at 7 mm.   Respiratory:  Breathing easily  Cardiovascular:  rrr nl s1s2 0 murmurs, rubs, or gallops.  Peripheral pulses dp, pt, and radial are wnl.  No peripheral edema   GI:  Bowel sounds normal, Soft, No tenderness, No flank tenderness, nondistended.  :No CVA tenderness.   Musculoskeletal:  Moves all extremities.  No erythematous or swollen major joints,    Integument:  Normal  Lymphatic:  No cervical lymphadenopathy  Neurologic:  Alert & oriented x 3, Normal motor function, Normal sensory function, No focal deficits noted. Normal speech. Myoclonic movements in arms, no fine tremor.   Psychiatric:  Affect normal, Judgment normal, Mood normal.     Results     LAB AND RADIOLOGY:  All pertinent labs reviewed and interpreted  Results for orders placed or performed during the hospital encounter of 09/19/23   Head CT w/o contrast     Status: None    Narrative    EXAM: CT HEAD W/O CONTRAST  LOCATION: [facility]  DATE: 9/19/2023    INDICATION: Alcohol abuse, appears encephalopathic. Frequent falls, right sided periorbital contusion  COMPARISON: 08/15/2023  TECHNIQUE: Routine CT Head without IV contrast. Multiplanar reformats. Dose reduction techniques were used.    FINDINGS:  INTRACRANIAL CONTENTS: No intracranial hemorrhage, extraaxial collection, or mass effect.  No CT evidence of acute infarct. Mild presumed chronic small vessel ischemic changes. Mild generalized volume loss. No hydrocephalus.     VISUALIZED ORBITS/SINUSES/MASTOIDS: No intraorbital abnormality. Mild bilateral maxillary sinus mucosal thickening/mucous retention cysts. No middle ear or mastoid effusion.    BONES/SOFT TISSUES: No acute abnormality.      Impression    IMPRESSION:  1.  No acute intracranial process.   Cervical spine CT w/o contrast     Status: None    Narrative    EXAM: CT CERVICAL SPINE W/O CONTRAST  LOCATION: [facility]  DATE: 9/19/2023    INDICATION: frequent falls, intoxicated  COMPARISON: 06/18/2022  TECHNIQUE: Routine CT Cervical Spine without IV contrast. Multiplanar reformats. Dose reduction techniques were used.    FINDINGS:  VERTEBRA: Normal vertebral body heights and alignment. No acute fracture or posttraumatic subluxation. Chronic posttraumatic ossicle versus soft tissue calcification posterior to the left C5-C6 facet joint. Small  endplate osteophytes are present at C4-C5   and C5-C6.    CANAL/FORAMINA: No canal or neural foraminal stenosis.    PARASPINAL: No extraspinal abnormality.      Impression    IMPRESSION:  1.  No fracture or posttraumatic subluxation.  2.  No high-grade spinal canal or neural foraminal stenosis.   Ammonia     Status: Normal   Result Value Ref Range    Ammonia 24 16 - 60 umol/L   Comprehensive metabolic panel     Status: Abnormal   Result Value Ref Range    Sodium 140 136 - 145 mmol/L    Potassium 4.0 3.4 - 5.3 mmol/L    Chloride 96 (L) 98 - 107 mmol/L    Carbon Dioxide (CO2) 25 22 - 29 mmol/L    Anion Gap 19 (H) 7 - 15 mmol/L    Urea Nitrogen 12.6 6.0 - 20.0 mg/dL    Creatinine 0.64 (L) 0.67 - 1.17 mg/dL    Calcium 8.7 8.6 - 10.0 mg/dL    Glucose 115 (H) 70 - 99 mg/dL    Alkaline Phosphatase 79 40 - 129 U/L     (H) 0 - 45 U/L     (H) 0 - 70 U/L    Protein Total 7.3 6.4 - 8.3 g/dL    Albumin 4.8 3.5 - 5.2 g/dL    Bilirubin Total 0.5 <=1.2 mg/dL    GFR Estimate >90 >60 mL/min/1.73m2   INR     Status: Normal   Result Value Ref Range    INR 0.92 0.85 - 1.15   Ethyl Alcohol Level     Status: Abnormal   Result Value Ref Range    Alcohol ethyl 0.53 (HH) <=0.01 g/dL   CBC with platelets and differential     Status: Abnormal   Result Value Ref Range    WBC Count 5.5 4.0 - 11.0 10e3/uL    RBC Count 4.91 4.40 - 5.90 10e6/uL    Hemoglobin 14.3 13.3 - 17.7 g/dL    Hematocrit 40.2 40.0 - 53.0 %    MCV 82 78 - 100 fL    MCH 29.1 26.5 - 33.0 pg    MCHC 35.6 31.5 - 36.5 g/dL    RDW 15.8 (H) 10.0 - 15.0 %    Platelet Count 62 (L) 150 - 450 10e3/uL    % Neutrophils 67 %    % Lymphocytes 26 %    % Monocytes 6 %    % Eosinophils 0 %    % Basophils 1 %    % Immature Granulocytes 0 %    NRBCs per 100 WBC 0 <1 /100    Absolute Neutrophils 3.7 1.6 - 8.3 10e3/uL    Absolute Lymphocytes 1.4 0.8 - 5.3 10e3/uL    Absolute Monocytes 0.4 0.0 - 1.3 10e3/uL    Absolute Eosinophils 0.0 0.0 - 0.7 10e3/uL    Absolute Basophils 0.1 0.0 -  0.2 10e3/uL    Absolute Immature Granulocytes 0.0 <=0.4 10e3/uL    Absolute NRBCs 0.0 10e3/uL   Magnesium     Status: Normal   Result Value Ref Range    Magnesium 2.1 1.7 - 2.3 mg/dL   Blood gas venous     Status: Abnormal   Result Value Ref Range    pH Venous 7.40 7.35 - 7.45    pCO2 Venous 51 (H) 35 - 50 mm Hg    pO2 Venous 69 (H) 25 - 47 mm Hg    Bicarbonate Venous 32 (H) 24 - 30 mmol/L    Base Excess/Deficit 6.9   mmol/L    Oxyhemoglobin Venous 89.6 (H) 70.0 - 75.0 %    O2 Sat, Venous 91.1 (H) 70.0 - 75.0 %   Ketone Beta-Hydroxybutyrate Quantitative     Status: Normal   Result Value Ref Range    Ketone (Beta-Hydroxybutyrate) Quantitative <0.18 <=0.30 mmol/L   CBC with platelets differential     Status: Abnormal    Narrative    The following orders were created for panel order CBC with platelets differential.  Procedure                               Abnormality         Status                     ---------                               -----------         ------                     CBC with platelets and d...[[id number]]  Abnormal            Final result                 Please view results for these tests on the individual orders.   Urine Drugs of Abuse Screen No     Status: None ()    Narrative    The following orders were created for panel order Urine Drugs of Abuse Screen No.  Procedure                               Abnormality         Status                     ---------                               -----------         ------                     Drug Abuse Screen Qual U...[[id number]]                                                   Please view results for these tests on the individual orders.         ECG:    Performed at: 19-Sep-2023 08:42     Impression: Rightward axis, borderline ECG    Rate: 77  Rhythm: Sinus  Axis: 69 96 79  MO Interval: 152  QRS Interval: 102  QTc Interval: 461  ST Changes: None  Comparison: 13-Sep-2023  No significant change was found    I have independently reviewed and interpreted the  EKS(s) documented above    PROCEDURES:  Procedures:      [facility] Documentation     Medical Decision Making    History:  Supplemental history from: Documented in chart, if applicable and Family Member/Significant Other  External Record(s) reviewed: Documented in chart, if applicable.    Work Up:  Chart documentation includes differential considered and any EKGs or imaging independently interpreted by provider, where specified.  In additional to work up documented, I considered the following work up: Documented in chart, if applicable.    External consultation:  Discussion of management with another provider: Documented in chart, if applicable    Complicating factors:  Care impacted by chronic illness: Mental Health  Care affected by social determinants of health: Alcohol Abuse and/or Recreational Drug Use    Disposition considerations: Admit.         The creation of this record is based on the scribe s observations of the work being performed by Archana Pierce and the provider s statements to them. This document has been checked and approved by MD Phyllis Michele MD  Emergency Medicine  [facility] EMERGENCY DEPARTMENT         Phyllis Reinoso MD  09/19/23 4847

## 2023-09-19 NOTE — ED TRIAGE NOTES
Patient arrives by private car with his mother for alcohol abuse.  Reports they tried to bring him to detox and they refused him until his alcohol level came down.  Last drink was 0530 this am. Reports history of withdrawal seizures. Also reports a fall last night.

## 2023-09-19 NOTE — PLAN OF CARE
"Goal Outcome Evaluation:      Plan of Care Reviewed With: patient    Overall Patient Progress: no changeOverall Patient Progress: no change         Problem: Plan of Care - These are the overarching goals to be used throughout the patient stay.    Goal: Patient-Specific Goal (Individualized)  Description: You can add care plan individualizations to a care plan. Examples of Individualization might be:  \"Parent requests to be called daily at 9am for status\", \"I have a hard time hearing out of my right ear\", or \"Do not touch me to wake me up as it startles me\".  Outcome: Not Progressing     Problem: Plan of Care - These are the overarching goals to be used throughout the patient stay.    Goal: Absence of Hospital-Acquired Illness or Injury  Outcome: Not Progressing  Intervention: Identify and Manage Fall Risk  Recent Flowsheet Documentation  Taken 9/19/2023 1400 by Melisa Garcia RN  Safety Promotion/Fall Prevention: nonskid shoes/slippers when out of bed  Intervention: Prevent Skin Injury  Recent Flowsheet Documentation  Taken 9/19/2023 1400 by Melisa Garcia RN  Body Position: position changed independently  Taken 9/19/2023 1300 by Melisa Garcia RN  Body Position: position changed independently     Problem: Plan of Care - These are the overarching goals to be used throughout the patient stay.    Goal: Absence of Hospital-Acquired Illness or Injury  Intervention: Identify and Manage Fall Risk  Recent Flowsheet Documentation  Taken 9/19/2023 1400 by Melisa Garcia RN  Safety Promotion/Fall Prevention: nonskid shoes/slippers when out of bed   VSS. Patient complaining of nausea, Zofran given via IV. No episode of vomiting noted. CIWA score was 5.    "

## 2023-09-19 NOTE — PHARMACY-ADMISSION MEDICATION HISTORY
Pharmacist Admission Medication History    Admission medication history is complete. The information provided in this note is only as accurate as the sources available at the time of the update.    Medication reconciliation/reorder completed by provider prior to medication history? No    Information Source(s): Family member and CareEverywhere/SureScripts via phone    Pertinent Information: Spoke with patient's mother to complete med history. She believes that all of the medications on PTA list are what patient is supposed to be taking, but that he hasn't taken anything except for hydroxyzine in weeks. Of note, patient does have recent fills for naltrexone and disulfiram, however, mom does not believe he has used either.     Changes made to PTA medication list:  Added: Pantoprazole per dispense history  Deleted: None  Changed: None    Allergies reviewed with patient and updates made in EHR: yes    Medication History Completed By: GÉNESIS NIELSEN [facility] 9/19/2023 9:47 AM    Prior to Admission medications    Medication Sig Last Dose Taking? Auth Provider Long Term End Date   busPIRone (BUSPAR) 5 MG tablet Take 1 tablet (5 mg) by mouth 3 times daily Unknown Yes Mark Alberto MD Yes    cloNIDine (CATAPRES) 0.1 MG tablet Take 0.1 mg by mouth 2 times daily as needed (Anxiety) Unknown Yes Reported, Patient No    folic acid (FOLVITE) 1 MG tablet Take 1 tablet (1 mg) by mouth daily Unknown Yes Mark Alberto MD No    gabapentin (NEURONTIN) 300 MG capsule Take 300 mg by mouth 3 times daily Unknown Yes Unknown, Entered By History No    hydrOXYzine (ATARAX) 25 MG tablet Take 1-2 tablets (25-50 mg) by mouth 3 times daily as needed for anxiety Past Week Yes Ava Lew MD No    multivitamin w/minerals (THERA-VIT-M) tablet Take 1 tablet by mouth daily Unknown Yes Mark Alberto MD     pantoprazole (PROTONIX) 40 MG EC tablet Take 40 mg by mouth daily Unknown Yes Unknown, Entered By History No    thiamine (B-1) 100 MG tablet  Take 1 tablet (100 mg) by mouth daily Unknown Yes Mark Alberto MD

## 2023-09-19 NOTE — PLAN OF CARE
"Pt denies pain and shortness of breath. Very anxious, agitated, tremorous, prn ativan administered 2x per CIWA protocol.  Pt very thirsty, drank several juice and water, jello, applesauce, and most of dinner after ativan and lessening of nausea. \"I don't want to die\" and \"I don't want to have seizures again\". Requests ativan, this writer discussed with patient that the nurse will use a score to determine dosage and timing, patient stated understanding and is cooperative with this writer. Currently sleeping comfortably.  "

## 2023-09-19 NOTE — PLAN OF CARE
Pt denies pain, nausea, shortness of breath. Hgb recheck scheduled later tonight. Two soft red/brown/clot stools passed this 4 hour shift, pt states this is 'much less than at home'. Pleasant and cooperative with care team.

## 2023-09-20 PROBLEM — E87.20 METABOLIC ACIDOSIS: Status: ACTIVE | Noted: 2023-09-20

## 2023-09-20 LAB
ALBUMIN SERPL BCG-MCNC: 4.5 G/DL (ref 3.5–5.2)
ALBUMIN UR-MCNC: 70 MG/DL
ALP SERPL-CCNC: 69 U/L (ref 40–129)
ALT SERPL W P-5'-P-CCNC: 136 U/L (ref 0–70)
AMPHETAMINES UR QL SCN: ABNORMAL
ANION GAP SERPL CALCULATED.3IONS-SCNC: 12 MMOL/L (ref 7–15)
APPEARANCE UR: CLEAR
AST SERPL W P-5'-P-CCNC: 163 U/L (ref 0–45)
BARBITURATES UR QL SCN: ABNORMAL
BASOPHILS # BLD AUTO: 0 10E3/UL (ref 0–0.2)
BASOPHILS NFR BLD AUTO: 1 %
BENZODIAZ UR QL SCN: ABNORMAL
BILIRUB DIRECT SERPL-MCNC: 0.32 MG/DL (ref 0–0.3)
BILIRUB SERPL-MCNC: 1 MG/DL
BILIRUB UR QL STRIP: NEGATIVE
BUN SERPL-MCNC: 12 MG/DL (ref 6–20)
BZE UR QL SCN: ABNORMAL
CALCIUM SERPL-MCNC: 9.5 MG/DL (ref 8.6–10)
CANNABINOIDS UR QL SCN: ABNORMAL
CHLORIDE SERPL-SCNC: 96 MMOL/L (ref 98–107)
COLOR UR AUTO: YELLOW
CREAT SERPL-MCNC: 0.64 MG/DL (ref 0.67–1.17)
DEPRECATED HCO3 PLAS-SCNC: 27 MMOL/L (ref 22–29)
EGFRCR SERPLBLD CKD-EPI 2021: >90 ML/MIN/1.73M2
EOSINOPHIL # BLD AUTO: 0.1 10E3/UL (ref 0–0.7)
EOSINOPHIL NFR BLD AUTO: 1 %
ERYTHROCYTE [DISTWIDTH] IN BLOOD BY AUTOMATED COUNT: 16 % (ref 10–15)
FENTANYL UR QL: ABNORMAL
GLUCOSE SERPL-MCNC: 102 MG/DL (ref 70–99)
GLUCOSE UR STRIP-MCNC: 70 MG/DL
HCT VFR BLD AUTO: 35.1 % (ref 40–53)
HGB BLD-MCNC: 12.1 G/DL (ref 13.3–17.7)
HGB UR QL STRIP: NEGATIVE
IMM GRANULOCYTES # BLD: 0 10E3/UL
IMM GRANULOCYTES NFR BLD: 0 %
KETONES UR STRIP-MCNC: NEGATIVE MG/DL
LEUKOCYTE ESTERASE UR QL STRIP: NEGATIVE
LYMPHOCYTES # BLD AUTO: 1.5 10E3/UL (ref 0.8–5.3)
LYMPHOCYTES NFR BLD AUTO: 32 %
MCH RBC QN AUTO: 29 PG (ref 26.5–33)
MCHC RBC AUTO-ENTMCNC: 34.5 G/DL (ref 31.5–36.5)
MCV RBC AUTO: 84 FL (ref 78–100)
MONOCYTES # BLD AUTO: 0.3 10E3/UL (ref 0–1.3)
MONOCYTES NFR BLD AUTO: 6 %
MUCOUS THREADS #/AREA URNS LPF: PRESENT /LPF
NEUTROPHILS # BLD AUTO: 2.8 10E3/UL (ref 1.6–8.3)
NEUTROPHILS NFR BLD AUTO: 60 %
NITRATE UR QL: NEGATIVE
NRBC # BLD AUTO: 0 10E3/UL
NRBC BLD AUTO-RTO: 0 /100
OPIATES UR QL SCN: ABNORMAL
PCP QUAL URINE (ROCHE): ABNORMAL
PH UR STRIP: 6.5 [PH] (ref 5–7)
PLATELET # BLD AUTO: 53 10E3/UL (ref 150–450)
POTASSIUM SERPL-SCNC: 4.1 MMOL/L (ref 3.4–5.3)
PROT SERPL-MCNC: 6.5 G/DL (ref 6.4–8.3)
RBC # BLD AUTO: 4.17 10E6/UL (ref 4.4–5.9)
RBC URINE: <1 /HPF
SODIUM SERPL-SCNC: 135 MMOL/L (ref 136–145)
SP GR UR STRIP: 1.02 (ref 1–1.03)
UROBILINOGEN UR STRIP-MCNC: <2 MG/DL
WBC # BLD AUTO: 4.7 10E3/UL (ref 4–11)
WBC URINE: 1 /HPF

## 2023-09-20 PROCEDURE — 85025 COMPLETE CBC W/AUTO DIFF WBC: CPT | Performed by: INTERNAL MEDICINE

## 2023-09-20 PROCEDURE — 120N000001 HC R&B MED SURG/OB

## 2023-09-20 PROCEDURE — 81001 URINALYSIS AUTO W/SCOPE: CPT | Performed by: EMERGENCY MEDICINE

## 2023-09-20 PROCEDURE — 80307 DRUG TEST PRSMV CHEM ANLYZR: CPT | Performed by: EMERGENCY MEDICINE

## 2023-09-20 PROCEDURE — 250N000011 HC RX IP 250 OP 636: Performed by: INTERNAL MEDICINE

## 2023-09-20 PROCEDURE — 36415 COLL VENOUS BLD VENIPUNCTURE: CPT | Performed by: INTERNAL MEDICINE

## 2023-09-20 PROCEDURE — 250N000013 HC RX MED GY IP 250 OP 250 PS 637: Performed by: INTERNAL MEDICINE

## 2023-09-20 PROCEDURE — 82310 ASSAY OF CALCIUM: CPT | Performed by: INTERNAL MEDICINE

## 2023-09-20 PROCEDURE — 99232 SBSQ HOSP IP/OBS MODERATE 35: CPT | Performed by: INTERNAL MEDICINE

## 2023-09-20 PROCEDURE — 82248 BILIRUBIN DIRECT: CPT | Performed by: INTERNAL MEDICINE

## 2023-09-20 PROCEDURE — 258N000003 HC RX IP 258 OP 636: Performed by: INTERNAL MEDICINE

## 2023-09-20 RX ORDER — HYDROXYZINE HYDROCHLORIDE 25 MG/1
25 TABLET, FILM COATED ORAL 3 TIMES DAILY PRN
Status: DISCONTINUED | OUTPATIENT
Start: 2023-09-20 | End: 2023-09-22 | Stop reason: HOSPADM

## 2023-09-20 RX ORDER — BUSPIRONE HYDROCHLORIDE 5 MG/1
5 TABLET ORAL 3 TIMES DAILY
Status: DISCONTINUED | OUTPATIENT
Start: 2023-09-20 | End: 2023-09-22 | Stop reason: HOSPADM

## 2023-09-20 RX ADMIN — LORAZEPAM 1 MG: 2 INJECTION INTRAMUSCULAR; INTRAVENOUS at 18:15

## 2023-09-20 RX ADMIN — Medication 5 MG: at 19:31

## 2023-09-20 RX ADMIN — CLONIDINE HYDROCHLORIDE 0.1 MG: 0.1 TABLET ORAL at 11:42

## 2023-09-20 RX ADMIN — GABAPENTIN 900 MG: 300 CAPSULE ORAL at 03:49

## 2023-09-20 RX ADMIN — BUSPIRONE HYDROCHLORIDE 5 MG: 5 TABLET ORAL at 13:50

## 2023-09-20 RX ADMIN — CLONIDINE HYDROCHLORIDE 0.1 MG: 0.1 TABLET ORAL at 03:50

## 2023-09-20 RX ADMIN — GABAPENTIN 900 MG: 300 CAPSULE ORAL at 19:40

## 2023-09-20 RX ADMIN — LORAZEPAM 2 MG: 2 INJECTION INTRAMUSCULAR; INTRAVENOUS at 05:21

## 2023-09-20 RX ADMIN — LORAZEPAM 2 MG: 0.5 TABLET ORAL at 07:42

## 2023-09-20 RX ADMIN — THIAMINE HCL TAB 100 MG 100 MG: 100 TAB at 07:41

## 2023-09-20 RX ADMIN — LORAZEPAM 2 MG: 0.5 TABLET ORAL at 11:45

## 2023-09-20 RX ADMIN — GABAPENTIN 900 MG: 300 CAPSULE ORAL at 11:43

## 2023-09-20 RX ADMIN — Medication 1 TABLET: at 07:41

## 2023-09-20 RX ADMIN — LORAZEPAM 2 MG: 2 INJECTION INTRAMUSCULAR; INTRAVENOUS at 03:10

## 2023-09-20 RX ADMIN — BUSPIRONE HYDROCHLORIDE 5 MG: 5 TABLET ORAL at 19:40

## 2023-09-20 RX ADMIN — SODIUM CHLORIDE: 9 INJECTION, SOLUTION INTRAVENOUS at 07:41

## 2023-09-20 RX ADMIN — LORAZEPAM 1 MG: 2 INJECTION INTRAMUSCULAR; INTRAVENOUS at 19:36

## 2023-09-20 RX ADMIN — LORAZEPAM 2 MG: 2 INJECTION INTRAMUSCULAR; INTRAVENOUS at 13:50

## 2023-09-20 RX ADMIN — LORAZEPAM 1 MG: 2 INJECTION INTRAMUSCULAR; INTRAVENOUS at 15:56

## 2023-09-20 RX ADMIN — LORAZEPAM 1 MG: 0.5 TABLET ORAL at 09:36

## 2023-09-20 RX ADMIN — Medication 5 MG: at 03:10

## 2023-09-20 RX ADMIN — PANTOPRAZOLE SODIUM 40 MG: 40 TABLET, DELAYED RELEASE ORAL at 07:42

## 2023-09-20 RX ADMIN — BUSPIRONE HYDROCHLORIDE 5 MG: 5 TABLET ORAL at 07:53

## 2023-09-20 RX ADMIN — FOLIC ACID 1 MG: 1 TABLET ORAL at 07:41

## 2023-09-20 RX ADMIN — CLONIDINE HYDROCHLORIDE 0.1 MG: 0.1 TABLET ORAL at 19:33

## 2023-09-20 RX ADMIN — HYDROXYZINE HYDROCHLORIDE 25 MG: 25 TABLET, FILM COATED ORAL at 07:41

## 2023-09-20 ASSESSMENT — ACTIVITIES OF DAILY LIVING (ADL)
ADLS_ACUITY_SCORE: 37
ADLS_ACUITY_SCORE: 39
ADLS_ACUITY_SCORE: 37
ADLS_ACUITY_SCORE: 22
ADLS_ACUITY_SCORE: 37
ADLS_ACUITY_SCORE: 39
ADLS_ACUITY_SCORE: 37
ADLS_ACUITY_SCORE: 39
DEPENDENT_IADLS:: INDEPENDENT

## 2023-09-20 NOTE — PROGRESS NOTES
[facility]    Medicine Progress Note - Hospitalist Service    Date of Admission:  9/19/2023    Assessment & Plan      38 year old male with a history of alcohol dependence and withdrawal seizures, who presents with acute toxic metabolic encephalopathy related to alcohol intoxication and impending withdrawal.       Acute toxic metabolic encephalopathy secondary to alcohol intoxication  Alcohol withdrawal  Presented with significantly elevated alcohol level with altered mental status.  CT head and CT spine without acute findings   Developed anxiety, agitation, tremor after admission  - Clonidine, Gabapentin taper, and Ativan per [facility] protocol  - Multivitamins, Folic acid and thiamine.   - Plan for chemical dependency treatment. He is admitted to [facility]                 Anion gap metabolic acidosis: Has lactic acidosis. Monitor.        Transaminitis: 2/2 alcohol use. Monitor.     Tobacco dependence: Nicotine patch     GERD: resume home Protonix     Anemia: likely secondary to alcohol dependency     Thrombocytopenia: likely 2/2 alcoholism. Monitor                    Diet: Combination Diet Regular Diet Adult    DVT Prophylaxis: Low Risk/Ambulatory with no VTE prophylaxis indicated  Mars Catheter: Not present  Lines: None     Cardiac Monitoring: ACTIVE order. Indication: alcohol withdrawal  Code Status:  Full code    Clinically Significant Risk Factors Present on Admission             # Anion Gap Metabolic Acidosis: Highest Anion Gap = 19 mmol/L in last 2 days, will monitor and treat as appropriate    # Thrombocytopenia: Lowest platelets = 53 in last 2 days, will monitor for bleeding   # Hypertension: Home medication list includes antihypertensive(s)                 Disposition Plan     Expected Discharge Date: 09/21/2023                  Ye Szymanski MD  Hospitalist Service  [facility]  Securely message with [employer] (more info)  Text page via [employer]  Paging/Directory   ______________________________________________________________________    Interval History   Patient continues to have a lot of hand tremor. He reports no nausea, vomiting, abdominal pain. He is able to have normal oral intake.     Physical Exam   Vital Signs: Temp: 98.9  F (37.2  C) Temp src: Oral BP: 120/80 Pulse: 69   Resp: 21 SpO2: 99 % O2 Device: None (Room air)    Weight: 170 lbs 0 oz    General appearance: not in acute distress. Bilateral hand tremor  HEENT: PERRL, EOMI  Lungs: Clear breath sounds in bilateral lung fields  Cardiovascular: Regular rate and rhythm, normal S1-S2  Abdomen: Soft, non tender, no distension  Musculoskeletal: No joint swelling  Skin: No rash and no edema  Neurology: AAO ×3.  Cranial nerves II - XII normal.  Normal muscle strength in all four extremities.     Medical Decision Making       46 MINUTES SPENT BY ME on the date of service doing chart review, history, exam, documentation & further activities per the note.      Data     I have personally reviewed the following data over the past 24 hrs:    4.7  \   12.1 (L)   / 53 (L)     135 (L) 96 (L) 12.0 /  102 (H)   4.1 27 0.64 (L) \     ALT: 136 (H) AST: 163 (H) AP: 69 TBILI: 1.0   ALB: 4.5 TOT PROTEIN: 6.5 LIPASE: N/A     Procal: N/A CRP: N/A Lactic Acid: 2.8 (H)         Imaging results reviewed over the past 24 hrs:   No results found for this or any previous visit (from the past 24 hour(s)).

## 2023-09-20 NOTE — PROGRESS NOTES
Holding PRN ativan, Pt scored 14 but showing some light sedation, nodding off during assessment but opens eyes to touch, will reassess    Pt sleeping and snoring on follow up    1740: CIWA scored 8, IV ativan given, tremors have been persistent but improving, Pt still reports fullness/foggy feeling in head, prickly feeling has gone but numbness in feet persists, anxiety has improved. Pt was sitting up and eating dinner earlier, now sleeping. Pt family visited this evening.

## 2023-09-20 NOTE — CONSULTS
Care Management Initial Consult    General Information  Assessment completed with: Patient, pt  Type of CM/SW Visit: Initial Assessment    Primary Care Provider verified and updated as needed: Yes   Readmission within the last 30 days: no previous admission in last 30 days      Reason for Consult: discharge planning  Advance Care Planning: Advance Care Planning Reviewed: no concerns identified          Communication Assessment  Patient's communication style: spoken language (English or Bilingual)             Cognitive  Cognitive/Neuro/Behavioral: mood/behavior                      Living Environment:   People in home: alone     Current living Arrangements:        Able to return to prior arrangements:         Family/Social Support:  Care provided by:    Provides care for:    Marital Status: Single             Description of Support System:           Current Resources:   Patient receiving home care services: No     Community Resources: OP Mental Health  Equipment currently used at home:    Supplies currently used at home: None    Employment/Financial:  Employment Status:          Financial Concerns: No concerns identified   Referral to Financial Worker: No       Does the patient's insurance plan have a 3 day qualifying hospital stay waiver?  No    Lifestyle & Psychosocial Needs:  Social Determinants of Health     Food Insecurity: Not on file   Depression: At risk (8/7/2023)    PHQ-2     PHQ-2 Score: 6   Housing Stability: Unknown (7/21/2021)    Housing Stability Vital Sign     Unable to Pay for Housing in the Last Year: No     Number of Places Lived in the Last Year: Not on file     Unstable Housing in the Last Year: No   Tobacco Use: High Risk (9/13/2023)    Patient History     Smoking Tobacco Use: Some Days     Smokeless Tobacco Use: Never     Passive Exposure: Not on file   Financial Resource Strain: Not on file   Alcohol Use: Heavy Drinker (7/21/2021)    AUDIT-C     Frequency of Alcohol Consumption: 4 or more  times a week     Average Number of Drinks: 10 or more     Frequency of Binge Drinking: Daily or almost daily   Transportation Needs: No Transportation Needs (7/21/2021)    PRAPARE - Transportation     Lack of Transportation (Medical): No     Lack of Transportation (Non-Medical): No   Physical Activity: Not on file   Interpersonal Safety: Not on file   Stress: Stress Concern Present (7/21/2021)    [employer] [employer] - Occupational Stress Questionnaire     Feeling of Stress : Very much   Social Connections: Not on file       Functional Status:  Prior to admission patient needed assistance:   Dependent ADLs:: Independent  Dependent IADLs:: Independent  Assesssment of Functional Status: Not at  functional baseline    Mental Health Status:  Mental Health Status: No Current Concerns       Chemical Dependency Status:                Values/Beliefs:  Spiritual, Cultural Beliefs, Oriental orthodox Practices, Values that affect care:                 Additional Information:  Assessed, lives in his own house and has roommates, no svcs and goal is discharge to [facility] [phone] in [facility] Thursday, CM left  w/admissions to confirm. CM to follow for discharge needs.        Abdulkadir Blair RN

## 2023-09-21 ENCOUNTER — APPOINTMENT (OUTPATIENT)
Dept: PHYSICAL THERAPY | Facility: HOSPITAL | Age: 38
DRG: 896 | End: 2023-09-21
Attending: INTERNAL MEDICINE
Payer: COMMERCIAL

## 2023-09-21 ENCOUNTER — APPOINTMENT (OUTPATIENT)
Dept: OCCUPATIONAL THERAPY | Facility: HOSPITAL | Age: 38
DRG: 896 | End: 2023-09-21
Attending: INTERNAL MEDICINE
Payer: COMMERCIAL

## 2023-09-21 LAB — GLUCOSE BLDC GLUCOMTR-MCNC: 107 MG/DL (ref 70–99)

## 2023-09-21 PROCEDURE — 250N000013 HC RX MED GY IP 250 OP 250 PS 637: Performed by: INTERNAL MEDICINE

## 2023-09-21 PROCEDURE — 97162 PT EVAL MOD COMPLEX 30 MIN: CPT | Mod: GP

## 2023-09-21 PROCEDURE — 97166 OT EVAL MOD COMPLEX 45 MIN: CPT | Mod: GO

## 2023-09-21 PROCEDURE — 99232 SBSQ HOSP IP/OBS MODERATE 35: CPT | Performed by: INTERNAL MEDICINE

## 2023-09-21 PROCEDURE — 97116 GAIT TRAINING THERAPY: CPT | Mod: GP

## 2023-09-21 PROCEDURE — 97530 THERAPEUTIC ACTIVITIES: CPT | Mod: GO

## 2023-09-21 PROCEDURE — 120N000001 HC R&B MED SURG/OB

## 2023-09-21 RX ORDER — CARBOXYMETHYLCELLULOSE SODIUM 5 MG/ML
1 SOLUTION/ DROPS OPHTHALMIC 4 TIMES DAILY PRN
Status: DISCONTINUED | OUTPATIENT
Start: 2023-09-21 | End: 2023-09-22 | Stop reason: HOSPADM

## 2023-09-21 RX ADMIN — CLONIDINE HYDROCHLORIDE 0.1 MG: 0.1 TABLET ORAL at 21:30

## 2023-09-21 RX ADMIN — GABAPENTIN 900 MG: 300 CAPSULE ORAL at 20:34

## 2023-09-21 RX ADMIN — HYDROXYZINE HYDROCHLORIDE 25 MG: 25 TABLET, FILM COATED ORAL at 23:06

## 2023-09-21 RX ADMIN — LORAZEPAM 1 MG: 0.5 TABLET ORAL at 00:47

## 2023-09-21 RX ADMIN — BUSPIRONE HYDROCHLORIDE 5 MG: 5 TABLET ORAL at 15:30

## 2023-09-21 RX ADMIN — Medication 1 TABLET: at 08:28

## 2023-09-21 RX ADMIN — LORAZEPAM 1 MG: 0.5 TABLET ORAL at 20:34

## 2023-09-21 RX ADMIN — THIAMINE HCL TAB 100 MG 100 MG: 100 TAB at 08:28

## 2023-09-21 RX ADMIN — BUSPIRONE HYDROCHLORIDE 5 MG: 5 TABLET ORAL at 20:37

## 2023-09-21 RX ADMIN — CLONIDINE HYDROCHLORIDE 0.1 MG: 0.1 TABLET ORAL at 04:04

## 2023-09-21 RX ADMIN — LORAZEPAM 2 MG: 0.5 TABLET ORAL at 11:30

## 2023-09-21 RX ADMIN — Medication 5 MG: at 23:06

## 2023-09-21 RX ADMIN — GABAPENTIN 900 MG: 300 CAPSULE ORAL at 04:04

## 2023-09-21 RX ADMIN — HYDROXYZINE HYDROCHLORIDE 25 MG: 25 TABLET, FILM COATED ORAL at 11:30

## 2023-09-21 RX ADMIN — GABAPENTIN 900 MG: 300 CAPSULE ORAL at 11:16

## 2023-09-21 RX ADMIN — PANTOPRAZOLE SODIUM 40 MG: 40 TABLET, DELAYED RELEASE ORAL at 06:33

## 2023-09-21 RX ADMIN — LORAZEPAM 2 MG: 0.5 TABLET ORAL at 16:11

## 2023-09-21 RX ADMIN — BUSPIRONE HYDROCHLORIDE 5 MG: 5 TABLET ORAL at 08:28

## 2023-09-21 RX ADMIN — CLONIDINE HYDROCHLORIDE 0.1 MG: 0.1 TABLET ORAL at 11:17

## 2023-09-21 RX ADMIN — HYDROXYZINE HYDROCHLORIDE 25 MG: 25 TABLET, FILM COATED ORAL at 00:47

## 2023-09-21 RX ADMIN — FOLIC ACID 1 MG: 1 TABLET ORAL at 08:28

## 2023-09-21 RX ADMIN — Medication 1 DROP: at 15:36

## 2023-09-21 RX ADMIN — LORAZEPAM 1 MG: 0.5 TABLET ORAL at 08:29

## 2023-09-21 ASSESSMENT — ACTIVITIES OF DAILY LIVING (ADL)
ADLS_ACUITY_SCORE: 22

## 2023-09-21 NOTE — PROGRESS NOTES
Care Management Follow Up    Length of Stay (days): 2    Expected Discharge Date: 09/22/2023     Concerns to be Addressed:     Discharge planning / CD resources   Patient plan of care discussed at interdisciplinary rounds: Yes    Anticipated Discharge Disposition:  Home  with outpatient CD treatment     Anticipated Discharge Services:    Anticipated Discharge DME:      Patient/family educated on Medicare website which has current facility and service quality ratings:    Education Provided on the Discharge Plan:  yes  Patient/Family in Agreement with the Plan:  yes    Referrals Placed by CM/SW:    Private pay costs discussed: Not applicable    Additional Information:  SW met with pt in pt room and introduced self and role. Pt reports he has arranged for outpatient CD treatment at [facility] in [place]. He declined having a CD assessment stating he had a meeting at 3 PM on Monday. Pt reports that his plan to not drink alcohol includes contacting his roommate or his friend, or getting back into going to the gym to work out. Pt reported when he exercises more he makes healthier choices. Pt hopes to discharge tomorrow or next day if he is doing better. Pt discussed his history and would like to talk to a grief counselor. He requested writer print out a list of therapists who help with grief.     Marlyn Rajan MSW

## 2023-09-21 NOTE — PROGRESS NOTES
09/21/23 1050   Appointment Info   Signing Clinician's Name / Credentials (PT) Kimber Rubalcava DPT   Living Environment   People in Home friend(s)   Current Living Arrangements house   Home Accessibility stairs to enter home;stairs within home   Number of Stairs, Main Entrance 1   Stair Railings, Main Entrance none   Number of Stairs, Within Home, Primary greater than 10 stairs  (laundry in basement)   Stair Railings, Within Home, Primary railings safe and in good condition   Transportation Anticipated car, drives self;family or friend will provide   Self-Care   Usual Activity Tolerance excellent   Current Activity Tolerance moderate   Regular Exercise Yes   Activity/Exercise Type running/jogging;strength training;swimming   Equipment Currently Used at Home none   General Information   Onset of Illness/Injury or Date of Surgery 09/19/23   Referring Physician Nena Aponte,    Patient/Family Therapy Goals Statement (PT) none   Pertinent History of Current Problem (include personal factors and/or comorbidities that impact the POC) 38 year old male with a history of alcohol dependence and withdrawal seizures, who presents with acute toxic metabolic encephalopathy related to alcohol intoxication and impending withdrawal.   Strength (Manual Muscle Testing)   Strength (Manual Muscle Testing) strength is WFL   Bed Mobility   Bed Mobility no deficits identified   Transfers   Transfers sit-stand transfer   Sit-Stand Transfer   Sit-Stand [place] (Transfers) supervision   Gait/Stairs (Locomotion)   [place] Level (Gait) contact guard   Assistive Device (Gait) walker, front-wheeled   Distance in Feet 50'   Pattern (Gait) step-through   Deviations/Abnormal Patterns (Gait) gait speed decreased   Clinical Impression   Criteria for Skilled Therapeutic Intervention Yes, treatment indicated   PT Diagnosis (PT) Impaired functional mobiltiy   Influenced by the following impairments Balance defecits   Functional  limitations due to impairments Impaired transfers, gait   Clinical Presentation (PT Evaluation Complexity) Stable/Uncomplicated   Clinical Presentation Rationale Presents as diagnosed   Clinical Decision Making (Complexity) moderate complexity   Planned Therapy Interventions (PT) balance training;gait training;home exercise program;stair training;transfer training   Anticipated Equipment Needs at Discharge (PT) walker, rolling   Risk & Benefits of therapy have been explained patient   PT Total Evaluation Time   PT Eval, Moderate Complexity Minutes (92179) 10   Physical Therapy Goals   PT Frequency Daily   PT Predicted Duration/Target Date for Goal Attainment 09/28/23   PT Goals Transfers;Gait;Stairs   PT: Transfers Independent;Sit to/from stand;Bed to/from chair   PT: Gait Independent;Greater than 200 feet   PT: Stairs Modified independent;Greater than 10 stairs   PT Discharge Planning   PT Plan progress transfers, amb without AD, stairs   PT Discharge Recommendation (DC Rec) home  (Pt plans on d/c to chem dep facility.)   PT Rationale for DC Rec Close to baseline mobility. Ax1 due to balance defecits at this time.   PT Brief overview of current status Amb 375' with CGA with/out AD.   Total Session Time   Total Session Time (sum of timed and untimed services) 10       Kimber Rubalcava, PT, DPT  9/21/2023

## 2023-09-21 NOTE — PROGRESS NOTES
Update: CM rec'd voicemail from [facility], it appears that he will not be able to go to [facility] for treatment, as it is ONLY a DETOX facility and there is NO Treatment there. Also, there is no actual bed held for him for detox services. If pt should need further detox services then we can reach out to them (if there is a bed available, then they can take him), otherwise pt will need to find a different detox facility. As of right now, per the message that Dorinda left, her understanding is that the pt was being detoxified at hospital so that if and when pt is being discharged, he and his mother was been given resources for treatment facility.

## 2023-09-21 NOTE — PROGRESS NOTES
"   09/21/23 0838   Appointment Info   Signing Clinician's Name / Credentials (OT) Kimber Euceda, OTR/L   Living Environment   People in Home other (see comments)  (roommate)   Current Living Arrangements house   Home Accessibility stairs to enter home;stairs within home   Number of Stairs, Main Entrance 1   Living Environment Comments Pt planning to discharge to treatment facility.   Self-Care   Regular Exercise Yes   Activity/Exercise Type running/jogging;strength training  (Per pt, runs 50 miles/week and goes to the gym daily)   Equipment Currently Used at Home none   Fall history within last six months yes   Number of times patient has fallen within last six months 1   Activity/Exercise/Self-Care Comment Pt independent with ADLs at baseline.   Instrumental Activities of Daily Living (IADL)   IADL Comments Pt independent with IADLs, works in sales.   General Information   Onset of Illness/Injury or Date of Surgery 09/19/23   Referring Physician Nena Aponte DO   Patient/Family Therapy Goal Statement (OT) none stated   Additional Occupational Profile Info/Pertinent History of Current Problem Per chart review, pt \"is 38 year old male with a history of alcohol dependence and withdrawal seizures, who presents with acute toxic metabolic encephalopathy related to alcohol intoxication and impending withdrawal.\"   Existing Precautions/Restrictions fall   Cognitive Status Examination   Cognitive Status Comments Pt with decreased safety awareness, impulsive.   Pain Assessment   Patient Currently in Pain No   Range of Motion Comprehensive   General Range of Motion no range of motion deficits identified   Strength Comprehensive (MMT)   Comment, General Manual Muscle Testing (MMT) Assessment Generalized weakness noted functionally.   Coordination   Coordination Comments Tremors, shakiness with movement noted functionally.   Bed Mobility   Bed Mobility supine-sit;sit-supine   Supine-Sit [place] (Bed Mobility) " supervision   Sit-Supine [place] (Bed Mobility) supervision   Transfers   Transfers sit-stand transfer;toilet transfer   Sit-Stand Transfer   Sit-Stand [place] (Transfers) contact guard   Assistive Device (Sit-Stand Transfers) walker, front-wheeled   Toilet Transfer   Type (Toilet Transfer) sit-stand;stand-sit   [place] Level (Toilet Transfer) minimum assist (75% patient effort)   Balance   Balance Comments Pt unsteady on feet with frequent minor LOB, CGA/Min A for balance using FWW, Min A for balance no AD.   Activities of Daily Living   BADL Assessment/Intervention lower body dressing   Lower Body Dressing Assessment/Training   Position (Lower Body Dressing) edge of bed sitting   Comment, (Lower Body Dressing) extended time d/t tremor   [place] Level (Lower Body Dressing) set up;supervision   Clinical Impression   Criteria for Skilled Therapeutic Interventions Met (OT) Yes, treatment indicated   OT Diagnosis Impaired ability to perform ADLs, IADLs, and functional mobility.   Influenced by the following impairments acute toxic metabolic encephalopathy   OT Problem List-Impairments impacting ADL problems related to;balance;cognition;mobility;motor control;strength;postural control   Assessment of Occupational Performance 3-5 Performance Deficits   Identified Performance Deficits functional mobility, toileting, lower body dressing, cognition   Planned Therapy Interventions (OT) ADL retraining;IADL retraining;balance training;cognition;motor coordination training;transfer training;strengthening;home program guidelines;progressive activity/exercise;risk factor education   Clinical Decision Making Complexity (OT) moderate complexity   Risk & Benefits of therapy have been explained evaluation/treatment results reviewed;care plan/treatment goals reviewed;risks/benefits reviewed;current/potential barriers reviewed;participants voiced agreement with care plan;participants included;patient   Clinical  Impression Comments Pt would benefit from skilled OT services to promote ability to perform ADLs, IADLs, and functional mobility.   OT Total Evaluation Time   OT Eval, Moderate Complexity Minutes (32288) 10   OT Goals   Therapy Frequency (OT) Daily   OT Predicted Duration/Target Date for Goal Attainment 09/28/23   OT Goals Lower Body Dressing;Toilet Transfer/Toileting;Transfers   OT: Lower Body Dressing Independent;including set-up/clothing retrieval   OT: Transfer Independent   OT: Toilet Transfer/Toileting Independent;toilet transfer;cleaning and garment management   Interventions   Interventions Quick Adds Therapeutic Activity   Therapeutic Activities   Therapeutic Activity Minutes (31893) 10   Symptoms noted during/after treatment fatigue   Treatment Detail/Skilled Intervention Pt ambulated in hallway using ' with CGA/Min A, frequent minor LOB and cueing for safety and pacing needed. Pt ambulated in room to/from bathroom no AD with Min A.   OT Discharge Planning   OT Plan safety with functional mobility, balance with IADLs, light home management   OT Discharge Recommendation (DC Rec)   (Pt is planning to discharge from hospital to treatment facility.)   OT Rationale for DC Rec Pt currently requiring CGA/Min A for mobility and ADLs d/t impaired balance, pending balance continues to improve as symptoms subside, pt will likely be safe to discharge to treatment facility.   OT Brief overview of current status CGA/Min A functional mobility and ADLs   Total Session Time   Timed Code Treatment Minutes 10   Total Session Time (sum of timed and untimed services) 20

## 2023-09-21 NOTE — PROGRESS NOTES
[facility]    Medicine Progress Note - Hospitalist Service    Date of Admission:  9/19/2023    Assessment & Plan      38 year old male with a history of alcohol dependence and withdrawal seizures, who presents with acute toxic metabolic encephalopathy related to alcohol intoxication and impending withdrawal.       Acute toxic metabolic encephalopathy secondary to alcohol intoxication  Alcohol withdrawal  Presented with significantly elevated alcohol level with altered mental status.  CT head and CT spine without acute findings   Developed anxiety, agitation, tremor after admission  - Clonidine, Gabapentin taper, and Ativan per [facility] protocol  - Multivitamins, Folic acid and thiamine.   - He no longer accepted by [facility] detox center. Plan to discharge home and follow up outpatient CD treatment at [facility] in [place].   - Social  work consulted and input appreciated                 Anion gap metabolic acidosis: Has lactic acidosis. Resolved.        Transaminitis: 2/2 alcohol use. Monitor.     Tobacco dependence: Nicotine patch     GERD: resume home Protonix     Anemia: likely secondary to alcohol dependency     Thrombocytopenia: likely 2/2 alcoholism. Monitor                    Diet: Combination Diet Regular Diet Adult    DVT Prophylaxis: Low Risk/Ambulatory with no VTE prophylaxis indicated  Mars Catheter: Not present  Lines: None     Cardiac Monitoring: None  Code Status:  Full code    Clinically Significant Risk Factors                  # Thrombocytopenia: Lowest platelets = 53 in last 2 days, will monitor for bleeding                       Disposition Plan      Expected Discharge Date: 09/22/2023        Discharge Comments: [facility]- has outpt treatment scheduled on Monday 9/25          Ye Szymanski MD  Hospitalist Service  [facility]  Securely message with [employer] (more info)  Text page via [employer] Paging/Directory    ______________________________________________________________________    Interval History   Patient continues to have a lot of hand tremor and anxiety. He reports no nausea, vomiting, abdominal pain. He is able to have normal oral intake.     Physical Exam   Vital Signs: Temp: 98.5  F (36.9  C) Temp src: Oral BP: 125/87 Pulse: 62   Resp: 20 SpO2: 99 % O2 Device: None (Room air)    Weight: 170 lbs 0 oz    General appearance: not in acute distress. Bilateral hand tremor  HEENT: PERRL, EOMI  Lungs: Clear breath sounds in bilateral lung fields  Cardiovascular: Regular rate and rhythm, normal S1-S2  Abdomen: Soft, non tender, no distension  Musculoskeletal: No joint swelling  Skin: No rash and no edema  Neurology: AAO ×3.  Cranial nerves II - XII normal.  Normal muscle strength in all four extremities.     Medical Decision Making       46 MINUTES SPENT BY ME on the date of service doing chart review, history, exam, documentation & further activities per the note.      Data       Imaging results reviewed over the past 24 hrs:   No results found for this or any previous visit (from the past 24 hour(s)).

## 2023-09-21 NOTE — PLAN OF CARE
Goal Outcome Evaluation:         Pt. Alert and oriented. CIWA performed as ordered. Tremors are present.Lorazepam 1 mg as per score.  Plans to go AMA if the physician does not let him go tomorrow. Looks forward to going to outpatient chemical dependency treatment at discharge.

## 2023-09-21 NOTE — PLAN OF CARE
Problem: Alcohol Withdrawal  Goal: Alcohol Withdrawal Symptom Control  Outcome: Progressing     Problem: Alcohol Withdrawal  Goal: Optimal Neurologic Function  Outcome: Progressing   Goal Outcome Evaluation:       CIWA scored 8 at 1900H and scored 12 at 0037. Ativan administered per protocol. Continuous to have tremors on hands but improving. Have some mild headache and numbness on feet. Patient remain calm and cooperative. Denies auditory or visual hallucination. No significant changes noted this shift.

## 2023-09-22 ENCOUNTER — APPOINTMENT (OUTPATIENT)
Dept: OCCUPATIONAL THERAPY | Facility: HOSPITAL | Age: 38
DRG: 896 | End: 2023-09-22
Payer: COMMERCIAL

## 2023-09-22 VITALS
OXYGEN SATURATION: 97 % | HEART RATE: 65 BPM | RESPIRATION RATE: 18 BRPM | SYSTOLIC BLOOD PRESSURE: 134 MMHG | DIASTOLIC BLOOD PRESSURE: 88 MMHG | TEMPERATURE: 97.8 F | WEIGHT: 170 LBS | BODY MASS INDEX: 24.39 KG/M2

## 2023-09-22 LAB
ATRIAL RATE - MUSE: 77 BPM
DIASTOLIC BLOOD PRESSURE - MUSE: 83 MMHG
INTERPRETATION ECG - MUSE: NORMAL
P AXIS - MUSE: 69 DEGREES
PR INTERVAL - MUSE: 152 MS
QRS DURATION - MUSE: 102 MS
QT - MUSE: 408 MS
QTC - MUSE: 461 MS
R AXIS - MUSE: 96 DEGREES
SYSTOLIC BLOOD PRESSURE - MUSE: 131 MMHG
T AXIS - MUSE: 79 DEGREES
VENTRICULAR RATE- MUSE: 77 BPM
VIT B1 PYROPHOSHATE BLD-SCNC: 162 NMOL/L

## 2023-09-22 PROCEDURE — 250N000013 HC RX MED GY IP 250 OP 250 PS 637: Performed by: HOSPITALIST

## 2023-09-22 PROCEDURE — 97535 SELF CARE MNGMENT TRAINING: CPT | Mod: GO

## 2023-09-22 PROCEDURE — 250N000013 HC RX MED GY IP 250 OP 250 PS 637: Performed by: INTERNAL MEDICINE

## 2023-09-22 PROCEDURE — 99239 HOSP IP/OBS DSCHRG MGMT >30: CPT | Performed by: INTERNAL MEDICINE

## 2023-09-22 PROCEDURE — 97530 THERAPEUTIC ACTIVITIES: CPT | Mod: GO

## 2023-09-22 RX ADMIN — Medication 1 TABLET: at 09:25

## 2023-09-22 RX ADMIN — THIAMINE HCL TAB 100 MG 100 MG: 100 TAB at 09:25

## 2023-09-22 RX ADMIN — LORAZEPAM 1 MG: 0.5 TABLET ORAL at 09:43

## 2023-09-22 RX ADMIN — BUSPIRONE HYDROCHLORIDE 5 MG: 5 TABLET ORAL at 09:26

## 2023-09-22 RX ADMIN — NICOTINE 1 PATCH: 14 PATCH, EXTENDED RELEASE TRANSDERMAL at 09:25

## 2023-09-22 RX ADMIN — PANTOPRAZOLE SODIUM 40 MG: 40 TABLET, DELAYED RELEASE ORAL at 06:09

## 2023-09-22 RX ADMIN — NICOTINE POLACRILEX 2 MG: 2 GUM, CHEWING BUCCAL at 03:13

## 2023-09-22 RX ADMIN — CLONIDINE HYDROCHLORIDE 0.1 MG: 0.1 TABLET ORAL at 06:08

## 2023-09-22 RX ADMIN — FOLIC ACID 1 MG: 1 TABLET ORAL at 09:25

## 2023-09-22 RX ADMIN — GABAPENTIN 900 MG: 300 CAPSULE ORAL at 03:13

## 2023-09-22 ASSESSMENT — ACTIVITIES OF DAILY LIVING (ADL)
ADLS_ACUITY_SCORE: 22

## 2023-09-22 NOTE — DISCHARGE SUMMARY
[facility]  Hospitalist Discharge Summary      Date of Admission:  9/19/2023  Date of Discharge:  9/22/2023  Discharging Provider: Ye Szymanski MD  Discharge Service: Hospitalist Service    Discharge Diagnoses     Principal Problem:    Alcohol dependence with intoxication with complication (H)  Active Problems:    Transaminitis    Alcohol withdrawal syndrome without complication (H)    Acute encephalopathy    Metabolic acidosis       Clinically Significant Risk Factors          Follow-ups Needed After Discharge   Follow-up Appointments     Follow-up and recommended labs and tests       Follow up with [facility] on 9/25 for your CD assessment as scheduled.          Discharge Disposition   Discharged to home  Condition at discharge: Stable    Hospital Course      Nikhil Rios is a 38 year old male with a history of alcohol dependence and withdrawal seizures, who presented to ER on 9/19/23 with acute toxic metabolic encephalopathy related to alcohol intoxication and impending withdrawal.       Acute toxic metabolic encephalopathy secondary to alcohol intoxication  Alcohol withdrawal  Patient presented with significantly elevated alcohol level with altered mental status. He also had lactic acidosis and transaminitis. CT head and CT spine had no acute findings. He developed anxiety, agitation, and tremor after admission. He was treated with Clonidine, Gabapentin taper, and Ativan per [facility] protocol. Patient became stable. His lactic acidosis resolved. The initial plan was to discharge him to the [facility] detox center. However, by the time he became medically stable. He was no longer accepted by the facility. Patient was discharged home on 9/22/23. He plans to follow up outpatient CD treatment at [facility] in [place].                  Consultations This Hospital Stay   CARE MANAGEMENT / SOCIAL WORK IP CONSULT  OCCUPATIONAL THERAPY ADULT IP CONSULT  PHYSICAL THERAPY ADULT IP CONSULT    Code Status    Prior    Time Spent on this Encounter   I, Ye Szymanski MD, personally saw the patient today and spent greater than 30 minutes discharging this patient.       Ye Szymanski MD  [address] [address]  Phone: [phone]  Fax: [phone]  ______________________________________________________________________    Physical Exam   Vital Signs: Temp: 97.8  F (36.6  C) Temp src: Oral BP: 134/88 Pulse: 65   Resp: 18 SpO2: 97 % O2 Device: None (Room air)    Weight: 170 lbs 0 oz    General appearance: not in acute distress  HEENT: PERRL, EOMI  Lungs: Clear breath sounds in bilateral lung fields  Cardiovascular: Regular rate and rhythm, normal S1-S2  Abdomen: Soft, non tender, no distension  Musculoskeletal: No joint swelling  Skin: No rash and no edema  Neurology: AAO ×3.  Cranial nerves II - XII normal.  Normal muscle strength in all four extremities.        Primary Care Physician   Lisandro Graham    Discharge Orders      Reason for your hospital stay    Alcohol intoxication and withdrawal     Follow-up and recommended labs and tests     Follow up with [facility] on [facility] for your CD assessment as scheduled.     Activity    Your activity upon discharge: activity as tolerated     Diet    Follow this diet upon discharge: Regular Diet Adult       Significant Results and Procedures   Most Recent 3 CBC's:  Recent Labs   Lab Test 09/24/23  0640 09/23/23  0307 09/20/23  0725   WBC 5.4 8.4 4.7   HGB 12.9* 14.4 12.1*   MCV 86 85 84   * 108* 53*     Most Recent 3 BMP's:  Recent Labs   Lab Test 09/24/23  0640 09/23/23  0307 09/21/23  0723 09/20/23  0725   * 137  --  135*   POTASSIUM 4.1 4.1  --  4.1   CHLORIDE 97* 100  --  96*   CO2 25 21*  --  27   BUN 8.6 11.6  --  12.0   CR 0.65* 0.74  --  0.64*   ANIONGAP 13 16*  --  12   KEELEY 9.4 9.6  --  9.5   GLC 83 89 107* 102*       EXAM: CT HEAD W/O CONTRAST  LOCATION: [facility]  DATE:  9/19/2023     INDICATION: Alcohol abuse, appears encephalopathic. Frequent falls, right sided periorbital contusion  COMPARISON: 08/15/2023  TECHNIQUE: Routine CT Head without IV contrast. Multiplanar reformats. Dose reduction techniques were used.     FINDINGS:  INTRACRANIAL CONTENTS: No intracranial hemorrhage, extraaxial collection, or mass effect.  No CT evidence of acute infarct. Mild presumed chronic small vessel ischemic changes. Mild generalized volume loss. No hydrocephalus.      VISUALIZED ORBITS/SINUSES/MASTOIDS: No intraorbital abnormality. Mild bilateral maxillary sinus mucosal thickening/mucous retention cysts. No middle ear or mastoid effusion.     BONES/SOFT TISSUES: No acute abnormality.      EXAM: CT CERVICAL SPINE W/O CONTRAST  LOCATION: [facility]  DATE: 9/19/2023     INDICATION: frequent falls, intoxicated  COMPARISON: 06/18/2022  TECHNIQUE: Routine CT Cervical Spine without IV contrast. Multiplanar reformats. Dose reduction techniques were used.     FINDINGS:  VERTEBRA: Normal vertebral body heights and alignment. No acute fracture or posttraumatic subluxation. Chronic posttraumatic ossicle versus soft tissue calcification posterior to the left C5-C6 facet joint. Small endplate osteophytes are present at C4-C5   and C5-C6.     CANAL/FORAMINA: No canal or neural foraminal stenosis.     PARASPINAL: No extraspinal abnormality.                                                                    IMPRESSION:  1.  No fracture or posttraumatic subluxation.  2.  No high-grade spinal canal or neural foraminal stenosis.      EXAM: CT ABDOMEN PELVIS W CONTRAST  LOCATION: [facility]  DATE: 9/23/2023     INDICATION: Acute pancreatitis, lipase greater than 3000  COMPARISON: 07/25/2023.  TECHNIQUE: CT scan of the abdomen and pelvis was performed following injection of IV contrast. Multiplanar reformats were obtained. Dose reduction techniques were  used.  CONTRAST: 90ml Isovue 370     FINDINGS:   LOWER CHEST: Normal.     HEPATOBILIARY: Hepatic steatosis. Normal gallbladder and bile ducts.     PANCREAS: Pancreas is moderately edematous consistent with acute pancreatitis. Small chronic calcifications in the uncinate process related to chronic pancreatitis. Otherwise homogeneous pancreatic parenchymal enhancement.     SPLEEN: Normal.     ADRENAL GLANDS: Normal.     KIDNEYS/BLADDER: Normal.     BOWEL: Normal. No obstruction or inflammation.     LYMPH NODES: Normal.     VASCULATURE: Unremarkable.     PELVIC ORGANS: Normal.     MUSCULOSKELETAL: Transitional lumbosacral anatomy.                                                                      IMPRESSION:   1.  Acute pancreatitis.  2.  Hepatic steatosis          Discharge Medications   Current Discharge Medication List        CONTINUE these medications which have NOT CHANGED    Details   busPIRone (BUSPAR) 5 MG tablet Take 1 tablet (5 mg) by mouth 3 times daily  Qty: 90 tablet, Refills: 3    Associated Diagnoses: Moderate episode of recurrent major depressive disorder (H)      cloNIDine (CATAPRES) 0.1 MG tablet Take 0.1 mg by mouth 2 times daily as needed (Anxiety)      folic acid (FOLVITE) 1 MG tablet Take 1 tablet (1 mg) by mouth daily  Qty: 90 tablet, Refills: 3    Associated Diagnoses: Alcohol dependence with intoxication with complication (H)      gabapentin (NEURONTIN) 300 MG capsule Take 300 mg by mouth 3 times daily      hydrOXYzine (ATARAX) 25 MG tablet Take 25 mg by mouth 3 times daily as needed for anxiety      multivitamin w/minerals (THERA-VIT-M) tablet Take 1 tablet by mouth daily  Qty: 90 tablet, Refills: 3    Associated Diagnoses: Alcohol dependence with intoxication with complication (H)      pantoprazole (PROTONIX) 40 MG EC tablet Take 40 mg by mouth daily      thiamine (B-1) 100 MG tablet Take 1 tablet (100 mg) by mouth daily  Qty: 90 tablet, Refills: 3    Associated Diagnoses: Alcohol  dependence with intoxication with complication (H)           Allergies   Allergies   Allergen Reactions    Gramineae Pollens Itching    Pollen Extract Rash     grass tree pollen

## 2023-09-22 NOTE — PROVIDER NOTIFICATION
CC (Jan) text paged via [employer] at 0765- d/t pt requesting nicorette gum. Pt declining patch.     Addendum: MD placed order for gum

## 2023-09-22 NOTE — PROGRESS NOTES
Care Management Discharge Note    Discharge Date: 2023       Discharge Disposition:  Home    Discharge Services:  pt has arranged for outpatient CD treatment through [facility] in [place]     Discharge DME:      Discharge Transportation: car, drives self, family or friend will provide    Private pay costs discussed: Not applicable    Does the patient's insurance plan have a 3 day qualifying hospital stay waiver?  No    PAS Confirmation Code:  NA  Patient/family educated on Medicare website which has current facility and service quality ratings:  NA    Education Provided on the Discharge Plan:  yes  Persons Notified of Discharge Plans: pt, bedside jamelese  Patient/Family in Agreement with the Plan:  yes    Handoff Referral Completed: Yes    Additional Information:  SW met with pt in pt room, he has plans for a friend to transport him home, and he is planning on his appointment at [facility] on  for his CD assessment. YUSRA provided pt with a list of local therapists as he requested that can work on grief counseling. Pt appreciative on list and states that grief therapy will help him quit drinking. Pt stated his father  right in front of him.     RANJAN Cardona

## 2023-09-22 NOTE — PLAN OF CARE
Occupational Therapy Discharge Summary    Reason for therapy discharge:    Discharged to home.    Progress towards therapy goal(s). See goals on Care Plan in [facility] electronic health record for goal details.  Goals partially met.  Barriers to achieving goals:   discharge from facility.    Therapy recommendation(s):    No further therapy is recommended.    Kimber Euceda, OTR/L 9/22/23

## 2023-09-22 NOTE — PLAN OF CARE
Discharge paperwork was gone over with patient. Patient did not have any questions. Verbalized he has an appointment on 9/25 in [place] for CD.

## 2023-09-22 NOTE — PLAN OF CARE
Physical Therapy Discharge Summary    Reason for therapy discharge:    Discharged to home.    Progress towards therapy goal(s). See goals on Care Plan in [facility] electronic health record for goal details.  Goals partially met.  Barriers to achieving goals:   discharge from facility.    Therapy recommendation(s):    No further therapy is recommended.      Kimber Rubalcava, PT, DPT  9/22/2023

## 2023-09-22 NOTE — PLAN OF CARE
Goal Outcome Evaluation:      Plan of Care Reviewed With: patient    Overall Patient Progress: no changeOverall Patient Progress: no change         ASSUMED CARES: [id number].   STATUS: Pt admitted 9/19 for Acute Encpeh. ETOH Abuse and W/d. Hx W/d seizures. GERD. Anemia.   NEURO: A/o x 4. CIWA 8, 6, 7 & 7. + Tremors. +Anxiety- Current smoker, but refusing Nicotine patch- Paged team regarding Nicorette gum- ordered and given.   VS: VSS on RA.   ACTIVITY: up with SBA/IND  PAIN: Denies  CARDIAC: Denies CP.   RESP: Denies SOB  GI/: Voiding spontaneously.   DIET: Regular  SKIN: No adjustments made.   LDA'S: L PIV SL.   LABS: Plt 53. Elevated Liver Enzymes.   CHANGES THIS SHIFT: Nicorette gum ordered PRN. Pt ambulated on unit IND. Minimal sleep overnight.   POC: Cont with POC. Pt would like to discharge today- plans to complete OP CD at [facility]- Meeting scheduled for 9/25 at 1500. Call light within reach.

## 2023-09-23 ENCOUNTER — HOSPITAL ENCOUNTER (INPATIENT)
Facility: HOSPITAL | Age: 38
LOS: 1 days | Discharge: HOME | DRG: 439 | End: 2023-09-24
Attending: EMERGENCY MEDICINE | Admitting: HOSPITALIST
Payer: COMMERCIAL

## 2023-09-23 ENCOUNTER — APPOINTMENT (OUTPATIENT)
Dept: CT IMAGING | Facility: HOSPITAL | Age: 38
DRG: 439 | End: 2023-09-23
Attending: EMERGENCY MEDICINE
Payer: COMMERCIAL

## 2023-09-23 DIAGNOSIS — R10.13 EPIGASTRIC PAIN: ICD-10-CM

## 2023-09-23 DIAGNOSIS — K85.20 ALCOHOL-INDUCED ACUTE PANCREATITIS, UNSPECIFIED COMPLICATION STATUS: ICD-10-CM

## 2023-09-23 DIAGNOSIS — F10.129 ALCOHOL ABUSE WITH INTOXICATION (H): ICD-10-CM

## 2023-09-23 LAB
ALBUMIN SERPL BCG-MCNC: 4.9 G/DL (ref 3.5–5.2)
ALP SERPL-CCNC: 84 U/L (ref 40–129)
ALT SERPL W P-5'-P-CCNC: 102 U/L (ref 0–70)
ANION GAP SERPL CALCULATED.3IONS-SCNC: 16 MMOL/L (ref 7–15)
AST SERPL W P-5'-P-CCNC: 83 U/L (ref 0–45)
BASOPHILS # BLD AUTO: 0 10E3/UL (ref 0–0.2)
BASOPHILS NFR BLD AUTO: 0 %
BILIRUB SERPL-MCNC: 0.4 MG/DL
BUN SERPL-MCNC: 11.6 MG/DL (ref 6–20)
CALCIUM SERPL-MCNC: 9.6 MG/DL (ref 8.6–10)
CHLORIDE SERPL-SCNC: 100 MMOL/L (ref 98–107)
CREAT SERPL-MCNC: 0.74 MG/DL (ref 0.67–1.17)
DEPRECATED HCO3 PLAS-SCNC: 21 MMOL/L (ref 22–29)
EGFRCR SERPLBLD CKD-EPI 2021: >90 ML/MIN/1.73M2
EOSINOPHIL # BLD AUTO: 0.2 10E3/UL (ref 0–0.7)
EOSINOPHIL NFR BLD AUTO: 3 %
ERYTHROCYTE [DISTWIDTH] IN BLOOD BY AUTOMATED COUNT: 15.9 % (ref 10–15)
GLUCOSE SERPL-MCNC: 89 MG/DL (ref 70–99)
HCT VFR BLD AUTO: 41.6 % (ref 40–53)
HGB BLD-MCNC: 14.4 G/DL (ref 13.3–17.7)
IMM GRANULOCYTES # BLD: 0 10E3/UL
IMM GRANULOCYTES NFR BLD: 0 %
LIPASE SERPL-CCNC: >3000 U/L (ref 13–60)
LYMPHOCYTES # BLD AUTO: 1.9 10E3/UL (ref 0.8–5.3)
LYMPHOCYTES NFR BLD AUTO: 23 %
MAGNESIUM SERPL-MCNC: 2.2 MG/DL (ref 1.7–2.3)
MCH RBC QN AUTO: 29.3 PG (ref 26.5–33)
MCHC RBC AUTO-ENTMCNC: 34.6 G/DL (ref 31.5–36.5)
MCV RBC AUTO: 85 FL (ref 78–100)
MONOCYTES # BLD AUTO: 0.9 10E3/UL (ref 0–1.3)
MONOCYTES NFR BLD AUTO: 10 %
NEUTROPHILS # BLD AUTO: 5.3 10E3/UL (ref 1.6–8.3)
NEUTROPHILS NFR BLD AUTO: 64 %
NRBC # BLD AUTO: 0 10E3/UL
NRBC BLD AUTO-RTO: 0 /100
PHOSPHATE SERPL-MCNC: 4.9 MG/DL (ref 2.5–4.5)
PLATELET # BLD AUTO: 108 10E3/UL (ref 150–450)
POTASSIUM SERPL-SCNC: 4.1 MMOL/L (ref 3.4–5.3)
PROT SERPL-MCNC: 7.6 G/DL (ref 6.4–8.3)
RBC # BLD AUTO: 4.91 10E6/UL (ref 4.4–5.9)
SODIUM SERPL-SCNC: 137 MMOL/L (ref 136–145)
WBC # BLD AUTO: 8.4 10E3/UL (ref 4–11)

## 2023-09-23 PROCEDURE — 96365 THER/PROPH/DIAG IV INF INIT: CPT | Mod: 59

## 2023-09-23 PROCEDURE — 84100 ASSAY OF PHOSPHORUS: CPT | Performed by: HOSPITALIST

## 2023-09-23 PROCEDURE — 250N000011 HC RX IP 250 OP 636: Mod: JZ | Performed by: HOSPITALIST

## 2023-09-23 PROCEDURE — 120N000001 HC R&B MED SURG/OB

## 2023-09-23 PROCEDURE — 96361 HYDRATE IV INFUSION ADD-ON: CPT

## 2023-09-23 PROCEDURE — 258N000003 HC RX IP 258 OP 636: Performed by: EMERGENCY MEDICINE

## 2023-09-23 PROCEDURE — 96376 TX/PRO/DX INJ SAME DRUG ADON: CPT

## 2023-09-23 PROCEDURE — 250N000011 HC RX IP 250 OP 636: Mod: JZ | Performed by: EMERGENCY MEDICINE

## 2023-09-23 PROCEDURE — 96375 TX/PRO/DX INJ NEW DRUG ADDON: CPT

## 2023-09-23 PROCEDURE — 999N000127 HC STATISTIC PERIPHERAL IV START W US GUIDANCE

## 2023-09-23 PROCEDURE — 74177 CT ABD & PELVIS W/CONTRAST: CPT

## 2023-09-23 PROCEDURE — 99207 PR NO BILLABLE SERVICE THIS VISIT: CPT | Performed by: HOSPITALIST

## 2023-09-23 PROCEDURE — 250N000013 HC RX MED GY IP 250 OP 250 PS 637: Performed by: HOSPITALIST

## 2023-09-23 PROCEDURE — 250N000009 HC RX 250: Performed by: EMERGENCY MEDICINE

## 2023-09-23 PROCEDURE — 83735 ASSAY OF MAGNESIUM: CPT | Performed by: EMERGENCY MEDICINE

## 2023-09-23 PROCEDURE — 96366 THER/PROPH/DIAG IV INF ADDON: CPT

## 2023-09-23 PROCEDURE — C9113 INJ PANTOPRAZOLE SODIUM, VIA: HCPCS | Mod: JZ | Performed by: EMERGENCY MEDICINE

## 2023-09-23 PROCEDURE — 250N000013 HC RX MED GY IP 250 OP 250 PS 637: Performed by: INTERNAL MEDICINE

## 2023-09-23 PROCEDURE — 85025 COMPLETE CBC W/AUTO DIFF WBC: CPT | Performed by: EMERGENCY MEDICINE

## 2023-09-23 PROCEDURE — 99222 1ST HOSP IP/OBS MODERATE 55: CPT | Mod: AI | Performed by: HOSPITALIST

## 2023-09-23 PROCEDURE — 250N000013 HC RX MED GY IP 250 OP 250 PS 637: Performed by: EMERGENCY MEDICINE

## 2023-09-23 PROCEDURE — 83690 ASSAY OF LIPASE: CPT | Performed by: EMERGENCY MEDICINE

## 2023-09-23 PROCEDURE — 80053 COMPREHEN METABOLIC PANEL: CPT | Performed by: EMERGENCY MEDICINE

## 2023-09-23 PROCEDURE — 36415 COLL VENOUS BLD VENIPUNCTURE: CPT | Performed by: EMERGENCY MEDICINE

## 2023-09-23 PROCEDURE — 99285 EMERGENCY DEPT VISIT HI MDM: CPT | Mod: 25

## 2023-09-23 RX ORDER — ONDANSETRON 2 MG/ML
4 INJECTION INTRAMUSCULAR; INTRAVENOUS EVERY 30 MIN PRN
Status: DISCONTINUED | OUTPATIENT
Start: 2023-09-23 | End: 2023-09-24 | Stop reason: HOSPADM

## 2023-09-23 RX ORDER — PROCHLORPERAZINE 25 MG
25 SUPPOSITORY, RECTAL RECTAL EVERY 12 HOURS PRN
Status: DISCONTINUED | OUTPATIENT
Start: 2023-09-23 | End: 2023-09-24 | Stop reason: HOSPADM

## 2023-09-23 RX ORDER — FLUMAZENIL 0.1 MG/ML
0.2 INJECTION, SOLUTION INTRAVENOUS
Status: DISCONTINUED | OUTPATIENT
Start: 2023-09-23 | End: 2023-09-24 | Stop reason: HOSPADM

## 2023-09-23 RX ORDER — HYDROMORPHONE HCL IN WATER/PF 6 MG/30 ML
0.2 PATIENT CONTROLLED ANALGESIA SYRINGE INTRAVENOUS
Status: DISCONTINUED | OUTPATIENT
Start: 2023-09-23 | End: 2023-09-24 | Stop reason: HOSPADM

## 2023-09-23 RX ORDER — OXYCODONE HYDROCHLORIDE 5 MG/1
5 TABLET ORAL EVERY 4 HOURS PRN
Status: DISCONTINUED | OUTPATIENT
Start: 2023-09-23 | End: 2023-09-24 | Stop reason: HOSPADM

## 2023-09-23 RX ORDER — HYDROXYZINE HYDROCHLORIDE 25 MG/1
25 TABLET, FILM COATED ORAL EVERY 6 HOURS PRN
Status: DISCONTINUED | OUTPATIENT
Start: 2023-09-23 | End: 2023-09-24

## 2023-09-23 RX ORDER — HALOPERIDOL 5 MG/ML
2.5-5 INJECTION INTRAMUSCULAR EVERY 6 HOURS PRN
Status: DISCONTINUED | OUTPATIENT
Start: 2023-09-23 | End: 2023-09-24 | Stop reason: HOSPADM

## 2023-09-23 RX ORDER — SODIUM CHLORIDE, SODIUM LACTATE, POTASSIUM CHLORIDE, CALCIUM CHLORIDE 600; 310; 30; 20 MG/100ML; MG/100ML; MG/100ML; MG/100ML
INJECTION, SOLUTION INTRAVENOUS CONTINUOUS
Status: DISCONTINUED | OUTPATIENT
Start: 2023-09-23 | End: 2023-09-24

## 2023-09-23 RX ORDER — DIAZEPAM 10 MG/2ML
5-10 INJECTION, SOLUTION INTRAMUSCULAR; INTRAVENOUS EVERY 30 MIN PRN
Status: DISCONTINUED | OUTPATIENT
Start: 2023-09-23 | End: 2023-09-24 | Stop reason: HOSPADM

## 2023-09-23 RX ORDER — IOPAMIDOL 755 MG/ML
90 INJECTION, SOLUTION INTRAVASCULAR ONCE
Status: COMPLETED | OUTPATIENT
Start: 2023-09-23 | End: 2023-09-23

## 2023-09-23 RX ORDER — NALOXONE HYDROCHLORIDE 0.4 MG/ML
0.2 INJECTION, SOLUTION INTRAMUSCULAR; INTRAVENOUS; SUBCUTANEOUS
Status: DISCONTINUED | OUTPATIENT
Start: 2023-09-23 | End: 2023-09-24 | Stop reason: HOSPADM

## 2023-09-23 RX ORDER — PROCHLORPERAZINE MALEATE 10 MG
10 TABLET ORAL EVERY 6 HOURS PRN
Status: DISCONTINUED | OUTPATIENT
Start: 2023-09-23 | End: 2023-09-24 | Stop reason: HOSPADM

## 2023-09-23 RX ORDER — NALOXONE HYDROCHLORIDE 0.4 MG/ML
0.4 INJECTION, SOLUTION INTRAMUSCULAR; INTRAVENOUS; SUBCUTANEOUS
Status: DISCONTINUED | OUTPATIENT
Start: 2023-09-23 | End: 2023-09-24 | Stop reason: HOSPADM

## 2023-09-23 RX ORDER — MAGNESIUM HYDROXIDE/ALUMINUM HYDROXICE/SIMETHICONE 120; 1200; 1200 MG/30ML; MG/30ML; MG/30ML
15 SUSPENSION ORAL ONCE
Status: COMPLETED | OUTPATIENT
Start: 2023-09-23 | End: 2023-09-23

## 2023-09-23 RX ORDER — DIAZEPAM 5 MG
10 TABLET ORAL EVERY 30 MIN PRN
Status: DISCONTINUED | OUTPATIENT
Start: 2023-09-23 | End: 2023-09-24 | Stop reason: HOSPADM

## 2023-09-23 RX ORDER — ONDANSETRON 4 MG/1
4 TABLET, ORALLY DISINTEGRATING ORAL EVERY 6 HOURS PRN
Status: DISCONTINUED | OUTPATIENT
Start: 2023-09-23 | End: 2023-09-24 | Stop reason: HOSPADM

## 2023-09-23 RX ORDER — HYDROXYZINE HYDROCHLORIDE 50 MG/1
50 TABLET, FILM COATED ORAL EVERY 6 HOURS PRN
Status: DISCONTINUED | OUTPATIENT
Start: 2023-09-23 | End: 2023-09-24

## 2023-09-23 RX ORDER — HYDROMORPHONE HCL IN WATER/PF 6 MG/30 ML
0.4 PATIENT CONTROLLED ANALGESIA SYRINGE INTRAVENOUS
Status: DISCONTINUED | OUTPATIENT
Start: 2023-09-23 | End: 2023-09-24 | Stop reason: HOSPADM

## 2023-09-23 RX ORDER — ONDANSETRON 2 MG/ML
4 INJECTION INTRAMUSCULAR; INTRAVENOUS EVERY 6 HOURS PRN
Status: DISCONTINUED | OUTPATIENT
Start: 2023-09-23 | End: 2023-09-24 | Stop reason: HOSPADM

## 2023-09-23 RX ORDER — OLANZAPINE 5 MG/1
5-10 TABLET, ORALLY DISINTEGRATING ORAL EVERY 6 HOURS PRN
Status: DISCONTINUED | OUTPATIENT
Start: 2023-09-23 | End: 2023-09-24 | Stop reason: HOSPADM

## 2023-09-23 RX ORDER — SUCRALFATE ORAL 1 G/10ML
1 SUSPENSION ORAL ONCE
Status: COMPLETED | OUTPATIENT
Start: 2023-09-23 | End: 2023-09-23

## 2023-09-23 RX ORDER — HYDROMORPHONE HYDROCHLORIDE 1 MG/ML
0.5 INJECTION, SOLUTION INTRAMUSCULAR; INTRAVENOUS; SUBCUTANEOUS EVERY 30 MIN PRN
Status: DISCONTINUED | OUTPATIENT
Start: 2023-09-23 | End: 2023-09-24 | Stop reason: HOSPADM

## 2023-09-23 RX ADMIN — Medication 5 MG: at 23:51

## 2023-09-23 RX ADMIN — HYDROXYZINE HYDROCHLORIDE 50 MG: 50 TABLET, FILM COATED ORAL at 21:36

## 2023-09-23 RX ADMIN — OXYCODONE HYDROCHLORIDE 5 MG: 5 TABLET ORAL at 17:25

## 2023-09-23 RX ADMIN — IOPAMIDOL 90 ML: 755 INJECTION, SOLUTION INTRAVENOUS at 04:30

## 2023-09-23 RX ADMIN — DIAZEPAM 10 MG: 10 TABLET ORAL at 11:09

## 2023-09-23 RX ADMIN — SODIUM CHLORIDE, POTASSIUM CHLORIDE, SODIUM LACTATE AND CALCIUM CHLORIDE: 600; 310; 30; 20 INJECTION, SOLUTION INTRAVENOUS at 15:06

## 2023-09-23 RX ADMIN — SUCRALFATE 1 G: 1 SUSPENSION ORAL at 03:13

## 2023-09-23 RX ADMIN — HYDROMORPHONE HYDROCHLORIDE 0.5 MG: 1 INJECTION, SOLUTION INTRAMUSCULAR; INTRAVENOUS; SUBCUTANEOUS at 12:01

## 2023-09-23 RX ADMIN — FAMOTIDINE 20 MG: 10 INJECTION, SOLUTION INTRAVENOUS at 20:12

## 2023-09-23 RX ADMIN — SODIUM CHLORIDE, POTASSIUM CHLORIDE, SODIUM LACTATE AND CALCIUM CHLORIDE: 600; 310; 30; 20 INJECTION, SOLUTION INTRAVENOUS at 06:36

## 2023-09-23 RX ADMIN — FAMOTIDINE 20 MG: 10 INJECTION, SOLUTION INTRAVENOUS at 03:14

## 2023-09-23 RX ADMIN — SODIUM CHLORIDE, POTASSIUM CHLORIDE, SODIUM LACTATE AND CALCIUM CHLORIDE 1000 ML: 600; 310; 30; 20 INJECTION, SOLUTION INTRAVENOUS at 22:09

## 2023-09-23 RX ADMIN — FOLIC ACID: 5 INJECTION, SOLUTION INTRAMUSCULAR; INTRAVENOUS; SUBCUTANEOUS at 04:41

## 2023-09-23 RX ADMIN — SODIUM CHLORIDE 1000 ML: 9 INJECTION, SOLUTION INTRAVENOUS at 03:08

## 2023-09-23 RX ADMIN — PANTOPRAZOLE SODIUM 40 MG: 40 INJECTION, POWDER, FOR SOLUTION INTRAVENOUS at 03:14

## 2023-09-23 RX ADMIN — HYDROMORPHONE HYDROCHLORIDE 0.5 MG: 1 INJECTION, SOLUTION INTRAMUSCULAR; INTRAVENOUS; SUBCUTANEOUS at 04:39

## 2023-09-23 RX ADMIN — ALUMINUM HYDROXIDE, MAGNESIUM HYDROXIDE, AND SIMETHICONE 15 ML: 200; 200; 20 SUSPENSION ORAL at 03:13

## 2023-09-23 RX ADMIN — HYDROMORPHONE HYDROCHLORIDE 0.4 MG: 0.2 INJECTION, SOLUTION INTRAMUSCULAR; INTRAVENOUS; SUBCUTANEOUS at 06:15

## 2023-09-23 RX ADMIN — DIAZEPAM 5 MG: 5 INJECTION, SOLUTION INTRAMUSCULAR; INTRAVENOUS at 08:19

## 2023-09-23 ASSESSMENT — ACTIVITIES OF DAILY LIVING (ADL)
CONCENTRATING,_REMEMBERING_OR_MAKING_DECISIONS_DIFFICULTY: NO
ADLS_ACUITY_SCORE: 37
TOILETING_ISSUES: NO
DOING_ERRANDS_INDEPENDENTLY_DIFFICULTY: NO
FALL_HISTORY_WITHIN_LAST_SIX_MONTHS: YES
ADLS_ACUITY_SCORE: 37
ADLS_ACUITY_SCORE: 37
WEAR_GLASSES_OR_BLIND: NO
DIFFICULTY_EATING/SWALLOWING: NO
ADLS_ACUITY_SCORE: 20
NUMBER_OF_TIMES_PATIENT_HAS_FALLEN_WITHIN_LAST_SIX_MONTHS: 1
WALKING_OR_CLIMBING_STAIRS_DIFFICULTY: NO
ADLS_ACUITY_SCORE: 37
ADLS_ACUITY_SCORE: 37
ADLS_ACUITY_SCORE: 20
ADLS_ACUITY_SCORE: 20
ADLS_ACUITY_SCORE: 37
CHANGE_IN_FUNCTIONAL_STATUS_SINCE_ONSET_OF_CURRENT_ILLNESS/INJURY: NO
DRESSING/BATHING_DIFFICULTY: NO
ADLS_ACUITY_SCORE: 37
DIFFICULTY_COMMUNICATING: NO
DEPENDENT_IADLS:: INDEPENDENT
ADLS_ACUITY_SCORE: 35

## 2023-09-23 ASSESSMENT — ENCOUNTER SYMPTOMS
VOMITING: 0
DIARRHEA: 0
ABDOMINAL PAIN: 1

## 2023-09-23 NOTE — PROGRESS NOTES
Pt seen and examined and orders reviewed.  Please see Dr. Montoya's note from earlier today for details.       #Acute alcoholic pancreatitis - symptomatically improving.  Start clears.  Oral pain meds.        Sammie Miller MD, [facility]  Internal Medicine Hospitalist  9/23/2023

## 2023-09-23 NOTE — ED TRIAGE NOTES
Patient arrives from home with mom.   C/o sudden onset upper abdominal pain that started at approximately 1800 yesterday evening.     States he drank approximately 750mL's of vodka starting at around 11am yesterday morning.

## 2023-09-23 NOTE — MEDICATION SCRIBE - ADMISSION MEDICATION HISTORY
Medication Scribe Admission Medication History    Admission medication history is complete. The information provided in this note is only as accurate as the sources available at the time of the update.    Medication reconciliation/reorder completed by provider prior to medication history? No    Information Source(s): Patient via in-person    Pertinent Information: Patient informed that he doesn't remember to take medications everyday, hasn't found a rhythm he can keep.     Changes made to PTA medication list:  Added: None  Deleted: None  Changed: None    Medication Affordability:  Not including over the counter (OTC) medications, was there a time in the past 3 months when you did not take your medications as prescribed because of cost?: No    Allergies reviewed with patient and updates made in EHR: yes    Medication History Completed By: Rosalee King 9/23/2023 6:37 AM    Prior to Admission medications    Medication Sig Last Dose Taking? Auth Provider Long Term End Date   busPIRone (BUSPAR) 5 MG tablet Take 1 tablet (5 mg) by mouth 3 times daily Past Week at am Yes Mark Alberto MD Yes    cloNIDine (CATAPRES) 0.1 MG tablet Take 0.1 mg by mouth 2 times daily as needed (Anxiety) Past Week at prn Yes Reported, Patient No    folic acid (FOLVITE) 1 MG tablet Take 1 tablet (1 mg) by mouth daily Past Week at prn Yes Mark Alberto MD No    gabapentin (NEURONTIN) 300 MG capsule Take 300 mg by mouth 3 times daily 9/21/2023 at pm Yes Unknown, Entered By History No    multivitamin w/minerals (THERA-VIT-M) tablet Take 1 tablet by mouth daily 9/21/2023 at am Yes Mark Alberto MD     thiamine (B-1) 100 MG tablet Take 1 tablet (100 mg) by mouth daily Past Week at prn Yes Mark Alberto MD     hydrOXYzine (ATARAX) 25 MG tablet Take 25 mg by mouth 3 times daily as needed for anxiety 9/21/2023 at pm prn  Unknown, Entered By History No    pantoprazole (PROTONIX) 40 MG EC tablet Take 40 mg by mouth daily  Patient not taking: Reported  on 9/23/2023 Not Taking at on-hand  Unknown, Entered By History No

## 2023-09-23 NOTE — PLAN OF CARE
Problem: Plan of Care - These are the overarching goals to be used throughout the patient stay.    Goal: Plan of Care Review  Description: The Plan of Care Review/Shift note should be completed every shift.  The Outcome Evaluation is a brief statement about your assessment that the patient is improving, declining, or no change.  This information will be displayed automatically on your shift note.  Outcome: Progressing     Problem: Alcohol Withdrawal  Goal: Alcohol Withdrawal Symptom Control  Outcome: Progressing   Goal Outcome Evaluation:    Patient denied pain since admission.  Reported facial itching.  Stated my eyes, nose and entire face is itching.  MD notified.  Hydroxyzine obtained and administered.    CIWA scores - 5 and 4.  Tolerated clear liquids.    Up to bathroom independently.  Continue to monitor.  Temp: 98.4  F (36.9  C) Temp src: Oral BP: (!) 135/94 Pulse: 59   Resp: 16 SpO2: 96 % O2 Device: None (Room air)

## 2023-09-23 NOTE — CONSULTS
"Care Management Initial Consult    General Information  Assessment completed with: Nikhil Jordan  Type of CM/SW Visit: Initial Assessment    Primary Care Provider verified and updated as needed: Yes   Readmission within the last 30 days: previous discharge plan unsuccessful (9/19/23 - 9/22/23)   Return Category: Medically unsuccessful treatment plan  Reason for Consult: discharge planning, substance use concerns  Advance Care Planning: Advance Care Planning Reviewed: no concerns identified          Communication Assessment  Patient's communication style: spoken language (English or Bilingual)          Living Environment:   People in home: other (see comments) (\"I own my own house and I live there with a roommate. But lately I have been staying at my Mom's house with her\".)     Current living Arrangements: house      Able to return to prior arrangements: yes       Family/Social Support:  Care provided by: self  Provides care for: no one     Parent(s) (\"mother Veronique\")          Description of Support System: Supportive, Involved    Support Assessment: Adequate family and caregiver support, Adequate social supports, Patient communicates needs well met    Current Resources:   Patient receiving home care services: No     Community Resources: OP Mental Health, Other (see comment) (\"I was recently at [facility]. I am set up to go to [email or web address] in [place] on Mon 9/25/23 at 1500 for treatment. It is an ILP of 32 hours/week\".)  Equipment currently used at home: none  Supplies currently used at home: Other (\"reading glasses\")    Employment/Financial:  Employment Status: unemployed     Employment/ Comments: \"no  history\"  Financial Concerns:     Referral to Financial Worker: No       Does the patient's insurance plan have a 3 day qualifying hospital stay waiver?  No    Lifestyle & Psychosocial Needs:  Social Determinants of Health     Food Insecurity: Not on file   Depression: At risk " "(8/7/2023)    PHQ-2     PHQ-2 Score: 6   Housing Stability: Unknown (7/21/2021)    Housing Stability Vital Sign     Unable to Pay for Housing in the Last Year: No     Number of Places Lived in the Last Year: Not on file     Unstable Housing in the Last Year: No   Tobacco Use: High Risk (9/23/2023)    Patient History     Smoking Tobacco Use: Some Days     Smokeless Tobacco Use: Never     Passive Exposure: Not on file   Financial Resource Strain: Not on file   Alcohol Use: Heavy Drinker (7/21/2021)    AUDIT-C     Frequency of Alcohol Consumption: 4 or more times a week     Average Number of Drinks: 10 or more     Frequency of Binge Drinking: Daily or almost daily   Transportation Needs: No Transportation Needs (7/21/2021)    PRAPARE - Transportation     Lack of Transportation (Medical): No     Lack of Transportation (Non-Medical): No   Physical Activity: Not on file   Interpersonal Safety: Not on file   Stress: Stress Concern Present (7/21/2021)    Citizen of the [place] of Occupational Health - Occupational Stress Questionnaire     Feeling of Stress : Very much   Social Connections: Not on file       Functional Status:  Prior to admission patient needed assistance:   Dependent ADLs:: Independent, Ambulation-no assistive device  Dependent IADLs:: Independent  Assesssment of Functional Status: At functional baseline    Mental Health Status:  Mental Health Status: Past Concern  Mental Health Management: Medication, Individual Therapy, Psychiatrist    Chemical Dependency Status:  Chemical Dependency Status: Current Concern  Chemical Dependency Management: Previous treatment          Values/Beliefs:  Spiritual, Cultural Beliefs, Moravian Practices, Values that affect care: yes  Description of Beliefs that Will Affect Care: Sikh            Additional Information:  Nikhil has been staying with his Mom in her house lately. \"I own my own house and I live there with a roommate. But lately I have been staying at my Mom's house " "with her\". He is independent with ADLs and IADLs at baseline. He is unemployed. He drives.    \"I am set up to go to [facility] in [place] on Mon 9/25/23 at 1500 for CD treatment. It is an ILP of 32 hours/week\".     Mother to transport at discharge.    CM to follow for medical progression of care, discharge recommendations, and final discharge plan.    Tori Nayak RN    "

## 2023-09-23 NOTE — ED PROVIDER NOTES
NAME: Nikhil Rios  AGE: 38 year old male  YOB: 1985  MRN: [id number]  EVALUATION DATE & TIME: No admission date for patient encounter.    PCP: Lisandro Graham    ED PROVIDER: Sean Magana M.D.      Chief Complaint   Patient presents with    Abdominal Pain     FINAL IMPRESSION:  1. Epigastric pain    2. Alcohol-induced acute pancreatitis, unspecified complication status    3. Alcohol abuse with intoxication (H)      MEDICAL DECISION MAKIN:49 AM I met with the patient, obtained history, performed an initial exam, and discussed options and plan for diagnostics and treatment here in the ED.   Patient was clinically assessed and consented to treatment. After assessment, medical decision making and workup were discussed with the patient. The patient was agreeable to plan for testing, workup, and treatment.  Pertinent Labs & Imaging studies reviewed. (See chart for details)  3:51 AM I updated the patient on imaging results. We discussed plan of admission.   4:40 AM patient discussed with Dr. Montoya hospitalist service for admission.    Medical Decision Making    History:  Supplemental history from: Documented in chart, if applicable  External Record(s) reviewed: Documented in chart, if applicable. and Inpatient Record: Patient was admitted at [facility] from -. See HPI for more information.    Work Up:  Chart documentation includes differential considered and any EKGs or imaging independently interpreted by provider, where specified.  In additional to work up documented, I considered the following work up: Documented in chart, if applicable.    External consultation:  Discussion of management with another provider: Documented in chart, if applicable    Complicating factors:  Care impacted by chronic illness: Hyperlipidemia, Hypertension, and Mental Health, Alcohol Abuse  Care affected by social determinants of health: Alcohol Abuse and/or Recreational Drug  Use    Disposition considerations: Discharge. I prescribed additional prescription strength medication(s) as charted. See documentation for any additional details.    Nikhil Rios is a 38 year old male who presents with abdominal pain.   Differential diagnosis includes but not limited to alcohol tox occasion, alcoholic gastritis, pancreatitis, dehydration, alcohol withdrawal.  Patient well-known to the ER for alcohol abuse.  Patient intoxicated appearing and reports drinking 750 mL of vodka stopping may be about 8:00.  Patient does not appear to be tremulous or in withdrawal but is tachycardic.  Patient reporting epigastric pain which could be alcoholic gastritis which has had in the past however minimal tenderness on exam.  Patient will have labs to evaluate for any pancreatitis and give IV fluids, Pepcid, PPI, GI cocktail and Carafate.  We will plan to reevaluate patient's symptoms and labs for need for any imaging.  CBC unremarkable for any acute changes.  Hemoglobin stable and electrolytes otherwise normal although patient does have some alcohol ketoacidosis.  Most concerning is patient's lipase which is significantly elevated greater than 3000 patient patient's lipase has been slightly up in the past but just above normal and never this high.  Patient likely with new onset acute alcoholic pancreatitis and concerning for necrotic pancreatitis.  Patient will plan for CT scan and I did recommend admission.  Patient would like to go treatment on Monday in 3 days but at this time given the elevated lipase I would recommend admission for n.p.o. status/bowel rest, IV fluids, pain medication, and recheck of labs.  CT scan did not show any necrotic tissue or pseudocyst but did show findings of acute pancreatitis.  Patient will be given pain control, IV fluids, and recommended for bowel rest and admission.    0 minutes of critical care time    MEDICATIONS GIVEN IN THE EMERGENCY:  Medications   ondansetron (ZOFRAN)  injection 4 mg (has no administration in time range)   lactated ringers infusion (has no administration in time range)   HYDROmorphone (PF) (DILAUDID) injection 0.5 mg (0.5 mg Intravenous $Given 9/23/23 0439)   sodium chloride 0.9% BOLUS 1,000 mL (0 mLs Intravenous Stopped 9/23/23 0451)   famotidine (PEPCID) injection 20 mg (20 mg Intravenous $Given 9/23/23 0314)   pantoprazole (PROTONIX) IV push injection 40 mg (40 mg Intravenous $Given 9/23/23 0314)   alum & mag hydroxide-simethicone (MAALOX) suspension 15 mL (15 mLs Oral $Given 9/23/23 0313)   sucralfate (CARAFATE) suspension 1 g (1 g Oral $Given 9/23/23 0313)   sodium chloride 0.9 % 1,000 mL with Infuvite Adult 10 mL, thiamine 100 mg, folic acid 1 mg infusion ( Intravenous $New Bag 9/23/23 0441)   iopamidol (ISOVUE-370) solution 90 mL (90 mLs Intravenous $Given 9/23/23 0430)       NEW PRESCRIPTIONS STARTED AT TODAY'S ER VISIT:  New Prescriptions    No medications on file          =================================================================    HPI    Patient information was obtained from: Patient    Use of : N/A       Nikhil Rios is a 38 year old male with a past medical history of alcohol abuse, hyperlipidemia, hypertension, alcohol abuse, depressive disorder, and SILVANA, who presents to the ED by private car with a concern of abdominal pain.    Per chart review, patient was admitted at [facility] from 9/19-9/22, with a concern of alcohol intoxication and withdrawal. CT head and spine were reassuring. Had elevated alcohol levels with altered mental status. Developed anxiety, agitation, and temor after admission. Given Clonidine, Gabapentin taper, and Ativan. Not accepted by [facility]. Plan to follow up at [facility] in [place].      Patient reports an onset of upper abdominal pain which occurred yesterday, even before consuming alcohol later in the evening at 7:15 PM. He started to feel more pain at 8:00  PM. Patient took advil which alleviated his pain for a bit and slept. However, he woke up again with pain. Patient denies vomiting and diarrhea. No other medical concerns.    REVIEW OF SYSTEMS   Review of Systems   Gastrointestinal:  Positive for abdominal pain (upper). Negative for diarrhea and vomiting.   All other systems reviewed and are negative.     PAST MEDICAL HISTORY:  Past Medical History:   Diagnosis Date    Alcohol abuse     Alcohol abuse     Alcohol withdrawal (H)     Depressive disorder     Family history of diabetes mellitus 11/9/2007    HLD (hyperlipidemia)     HTN (hypertension)        PAST SURGICAL HISTORY:  Past Surgical History:   Procedure Laterality Date    NO HISTORY OF SURGERY      OTHER SURGICAL HISTORY      none       CURRENT MEDICATIONS:      Current Facility-Administered Medications:     HYDROmorphone (PF) (DILAUDID) injection 0.5 mg, 0.5 mg, Intravenous, Q30 Min PRN, Sean Magana MD, 0.5 mg at 09/23/23 0439    lactated ringers infusion, , Intravenous, Continuous, Sean Magana MD    ondansetron (ZOFRAN) injection 4 mg, 4 mg, Intravenous, Q30 Min PRN, Sean Magana MD    Current Outpatient Medications:     busPIRone (BUSPAR) 5 MG tablet, Take 1 tablet (5 mg) by mouth 3 times daily, Disp: 90 tablet, Rfl: 3    cloNIDine (CATAPRES) 0.1 MG tablet, Take 0.1 mg by mouth 2 times daily as needed (Anxiety), Disp: , Rfl:     folic acid (FOLVITE) 1 MG tablet, Take 1 tablet (1 mg) by mouth daily, Disp: 90 tablet, Rfl: 3    gabapentin (NEURONTIN) 300 MG capsule, Take 300 mg by mouth 3 times daily, Disp: , Rfl:     hydrOXYzine (ATARAX) 25 MG tablet, Take 25 mg by mouth 3 times daily as needed for anxiety, Disp: , Rfl:     multivitamin w/minerals (THERA-VIT-M) tablet, Take 1 tablet by mouth daily, Disp: 90 tablet, Rfl: 3    pantoprazole (PROTONIX) 40 MG EC tablet, Take 40 mg by mouth daily, Disp: , Rfl:     thiamine (B-1) 100 MG tablet, Take 1 tablet (100 mg)  "by mouth daily, Disp: 90 tablet, Rfl: 3    Facility-Administered Medications Ordered in Other Encounters:     Self Administer Medications: Behavioral Services, , Does not apply, See Admin Instructions, Kenny Forte MD    ALLERGIES:  Allergies   Allergen Reactions    Gramineae Pollens Itching    Pollen Extract Rash     grass tree pollen       FAMILY HISTORY:  Family History   Problem Relation Age of Onset    Coronary Artery Disease Father     Substance Abuse Maternal Grandfather     Mental Illness Brother     Schizophrenia Brother     Schizophrenia Maternal Aunt        SOCIAL HISTORY:   Social History     Socioeconomic History    Marital status: Single   Occupational History    Occupation: Rental property owner   Tobacco Use    Smoking status: Some Days     Packs/day: 0.25     Types: Cigars, Cigarettes    Smokeless tobacco: Never    Tobacco comments:     occasional   Substance and Sexual Activity    Alcohol use: Yes     Alcohol/week: 17.0 standard drinks of alcohol     Types: 17 Shots of liquor per week     Comment: Drinks 750ml/day or 17 shots/day; hx of withdrawl seizures    Drug use: Not Currently     Types: Marijuana    Sexual activity: Not Currently     Social Determinants of Health     Transportation Needs: No Transportation Needs (7/21/2021)    PRAPARE - Transportation     Lack of Transportation (Medical): No     Lack of Transportation (Non-Medical): No   Stress: Stress Concern Present (7/21/2021)    [place] [employer] [employer] - Occupational Stress Questionnaire     Feeling of Stress : Very much   Housing Stability: Unknown (7/21/2021)    Housing Stability Vital Sign     Unable to Pay for Housing in the Last Year: No     Unstable Housing in the Last Year: No     PHYSICAL EXAM:    Vitals: /51   Pulse 80   Temp 98.2  F (36.8  C) (Temporal)   Resp 18   Ht 1.778 m (5' 10\")   Wt 77.1 kg (170 lb)   SpO2 96%   BMI 24.39 kg/m     Physical Exam  Vitals and nursing note reviewed. "   Constitutional:       General: He is not in acute distress.     Appearance: He is well-developed and normal weight. He is not ill-appearing or toxic-appearing.   HENT:      Head: Normocephalic and atraumatic.   Cardiovascular:      Rate and Rhythm: Regular rhythm. Tachycardia present.      Heart sounds: Normal heart sounds.   Pulmonary:      Effort: Pulmonary effort is normal. No respiratory distress.      Breath sounds: Normal breath sounds.   Abdominal:      General: Abdomen is flat. Bowel sounds are normal.      Palpations: Abdomen is soft.      Tenderness: There is abdominal tenderness in the epigastric area. There is no right CVA tenderness, left CVA tenderness, guarding or rebound.      Hernia: No hernia is present.   Skin:     General: Skin is warm and dry.      Coloration: Skin is not jaundiced.   Neurological:      Mental Status: He is alert and oriented to person, place, and time.   Psychiatric:         Speech: Speech is slurred.         Behavior: Behavior is slowed.        LAB:  All pertinent labs reviewed and interpreted.  Labs Ordered and Resulted from Time of ED Arrival to Time of ED Departure   COMPREHENSIVE METABOLIC PANEL - Abnormal       Result Value    Sodium 137      Potassium 4.1      Chloride 100      Carbon Dioxide (CO2) 21 (*)     Anion Gap 16 (*)     Urea Nitrogen 11.6      Creatinine 0.74      Calcium 9.6      Glucose 89      Alkaline Phosphatase 84      AST 83 (*)      (*)     Protein Total 7.6      Albumin 4.9      Bilirubin Total 0.4      GFR Estimate >90     LIPASE - Abnormal    Lipase >3,000 (*)    CBC WITH PLATELETS AND DIFFERENTIAL - Abnormal    WBC Count 8.4      RBC Count 4.91      Hemoglobin 14.4      Hematocrit 41.6      MCV 85      MCH 29.3      MCHC 34.6      RDW 15.9 (*)     Platelet Count 108 (*)     % Neutrophils 64      % Lymphocytes 23      % Monocytes 10      % Eosinophils 3      % Basophils 0      % Immature Granulocytes 0      NRBCs per 100 WBC 0      Absolute  Neutrophils 5.3      Absolute Lymphocytes 1.9      Absolute Monocytes 0.9      Absolute Eosinophils 0.2      Absolute Basophils 0.0      Absolute Immature Granulocytes 0.0      Absolute NRBCs 0.0     MAGNESIUM - Normal    Magnesium 2.2         RADIOLOGY:  CT Abdomen Pelvis w Contrast   Final Result   IMPRESSION:    1.  Acute pancreatitis.   2.  Hepatic steatosis.        PROCEDURES:   Procedures     I, Treasure Blair, am serving as a scribe to document services personally performed by Dr. Sean Magana  based on my observation and the provider's statements to me. I, Sean Magana MD attest that Antonionawaf Blair is acting in a scribe capacity, has observed my performance of the services and has documented them in accordance with my direction.      Sean Magana M.D.  Emergency Medicine  [facility] Emergency Department       Sean Magana MD  09/23/23 2882

## 2023-09-23 NOTE — ED NOTES
Patient put on call light to indicate that the hospitalist said he could start on a clear liquid diet. Writer gave patient water and sprite and educated on the importance of drinking slowly to see how he will tolerate. Patient verbalizes understanding.

## 2023-09-23 NOTE — H&P
[facility]    History and Physical - Hospitalist Service       Date of Admission:  9/23/2023    Assessment & Plan      Nikhil Rios is a 38 year old male admitted on 9/23/2023. He was brought to the ED for evaluation of abdominal pain    #Alcohol induced pancreatitis  -Lipase > 3000  -CT abdomen pelvis showed moderately edematous pancreas consistent with acute pancreatitis; small chronic calcifications in the uncinate process related to chronic pancreatitis  -Nothing by mouth  -Pain and nausea management  -LR at 125 mL/h    #Alcohol abuse and dependence  -Just discharged on 9/22/2023 after treatment of alcohol intoxication, toxic metabolic encephalopathy and impending withdrawals  -No longer accepted at [facility] for rehab  -Monitor for withdrawals with CIWA  -Multivitamin, thiamine, folic acid    #High anion gap metabolic acidosis  -Secondary to alcohol ketoacidosis  -IV hydration  -Monitor metabolic panel    #Abnormal LFTs  -CT showed hepatic steatosis    #Thrombocytopenia  -Stable platelet count  -Secondary to alcohol abuse  -Monitor platelets    #Tobacco abuse  -Smokes 5 cigarettes daily  -Cessation education     Diet: NPO for Medical/Clinical Reasons Except for: No Exceptions    DVT Prophylaxis: Ambulate every shift  Mars Catheter: Not present  Lines: None     Cardiac Monitoring: None  Code Status: Full Code          Disposition Plan      Expected Discharge Date: 09/25/2023                  Ernie Montoya MD  Hospitalist Service  [facility]  Securely message with [employer] (more info)  Text page via [employer] Paging/Directory     ______________________________________________________________________    Chief Complaint   Abdominal pain    History is obtained from the patient, electronic health record, and emergency department physician    History of Present Illness   Nikhil Rios is a 38 year old male who was brought to the emergency department by his mother for  evaluation of abdominal pain.  Past medical history of alcohol abuse and dependence, alcohol withdrawal seizures, depression, anxiety, GERD, hyperlipidemia, hypertension.  Patient was admitted here from 9/19 through 9/22/2023 for alcohol intoxication, toxic metabolic encephalopathy and impending withdrawals.  Reportedly, patient is no longer accepted at [facility] detox.  He lives with a roommate but often is at his mother's who lives nearby down the road.  He smokes 5 cigarettes/day.  He started drinking after discharge a total of 750 mL of vodka.  Around 6 PM, he experienced epigastric abdominal pain with radiation to the back.      Past Medical History    Past Medical History:   Diagnosis Date    Alcohol abuse     Alcohol abuse     Alcohol withdrawal (H)     Depressive disorder     Family history of diabetes mellitus 11/9/2007    HLD (hyperlipidemia)     HTN (hypertension)        Past Surgical History   Past Surgical History:   Procedure Laterality Date    NO HISTORY OF SURGERY      OTHER SURGICAL HISTORY      none       Prior to Admission Medications   Prior to Admission Medications   Prescriptions Last Dose Informant Patient Reported? Taking?   busPIRone (BUSPAR) 5 MG tablet   No No   Sig: Take 1 tablet (5 mg) by mouth 3 times daily   cloNIDine (CATAPRES) 0.1 MG tablet   Yes No   Sig: Take 0.1 mg by mouth 2 times daily as needed (Anxiety)   folic acid (FOLVITE) 1 MG tablet   No No   Sig: Take 1 tablet (1 mg) by mouth daily   gabapentin (NEURONTIN) 300 MG capsule   Yes No   Sig: Take 300 mg by mouth 3 times daily   hydrOXYzine (ATARAX) 25 MG tablet   Yes No   Sig: Take 25 mg by mouth 3 times daily as needed for anxiety   multivitamin w/minerals (THERA-VIT-M) tablet   No No   Sig: Take 1 tablet by mouth daily   pantoprazole (PROTONIX) 40 MG EC tablet   Yes No   Sig: Take 40 mg by mouth daily   thiamine (B-1) 100 MG tablet   No No   Sig: Take 1 tablet (100 mg) by mouth daily      Facility-Administered Medications:  None           Physical Exam   Vital Signs: Temp: 98.2  F (36.8  C) Temp src: Temporal BP: 114/63 Pulse: 76   Resp: 18 SpO2: 94 % O2 Device: None (Room air)    Weight: 170 lbs 0 oz    Constitutional: awake and alert  Respiratory: no increased work of breathing, good air exchange, and clear to auscultation  Cardiovascular: regular rate and rhythm and normal S1 and S2  GI: normal bowel sounds, soft, and tenderness noted in the epigastric region  Skin: no bruising or bleeding  Musculoskeletal: no lower extremity pitting edema present  Neurologic: Mental Status Exam:  Level of Alertness:   awake    Medical Decision Making       40 MINUTES SPENT BY ME on the date of service doing chart review, history, exam, documentation & further activities per the note.  MANAGEMENT DISCUSSED with the following over the past 24 hours: Patient       Data     I have personally reviewed the following data over the past 24 hrs:    8.4  \   14.4   / 108 (L)     137 100 11.6 /  89   4.1 21 (L) 0.74 \     ALT: 102 (H) AST: 83 (H) AP: 84 TBILI: 0.4   ALB: 4.9 TOT PROTEIN: 7.6 LIPASE: >3,000 (H)       Imaging results reviewed over the past 24 hrs:   Recent Results (from the past 24 hour(s))   CT Abdomen Pelvis w Contrast    Narrative    EXAM: CT ABDOMEN PELVIS W CONTRAST  LOCATION: [facility]  DATE: 9/23/2023    INDICATION: Acute pancreatitis, lipase greater than 3000  COMPARISON: 07/25/2023.  TECHNIQUE: CT scan of the abdomen and pelvis was performed following injection of IV contrast. Multiplanar reformats were obtained. Dose reduction techniques were used.  CONTRAST: 90ml Isovue 370    FINDINGS:   LOWER CHEST: Normal.    HEPATOBILIARY: Hepatic steatosis. Normal gallbladder and bile ducts.    PANCREAS: Pancreas is moderately edematous consistent with acute pancreatitis. Small chronic calcifications in the uncinate process related to chronic pancreatitis. Otherwise homogeneous pancreatic parenchymal  enhancement.    SPLEEN: Normal.    ADRENAL GLANDS: Normal.    KIDNEYS/BLADDER: Normal.    BOWEL: Normal. No obstruction or inflammation.    LYMPH NODES: Normal.    VASCULATURE: Unremarkable.    PELVIC ORGANS: Normal.    MUSCULOSKELETAL: Transitional lumbosacral anatomy.      Impression    IMPRESSION:   1.  Acute pancreatitis.  2.  Hepatic steatosis.

## 2023-09-24 VITALS
HEIGHT: 70 IN | SYSTOLIC BLOOD PRESSURE: 162 MMHG | HEART RATE: 83 BPM | TEMPERATURE: 99.1 F | RESPIRATION RATE: 17 BRPM | DIASTOLIC BLOOD PRESSURE: 93 MMHG | BODY MASS INDEX: 24.46 KG/M2 | OXYGEN SATURATION: 98 % | WEIGHT: 170.86 LBS

## 2023-09-24 LAB
ALBUMIN SERPL BCG-MCNC: 4.4 G/DL (ref 3.5–5.2)
ALP SERPL-CCNC: 81 U/L (ref 40–129)
ALT SERPL W P-5'-P-CCNC: 74 U/L (ref 0–70)
ANION GAP SERPL CALCULATED.3IONS-SCNC: 13 MMOL/L (ref 7–15)
AST SERPL W P-5'-P-CCNC: 45 U/L (ref 0–45)
BILIRUB SERPL-MCNC: 0.9 MG/DL
BUN SERPL-MCNC: 8.6 MG/DL (ref 6–20)
CALCIUM SERPL-MCNC: 9.4 MG/DL (ref 8.6–10)
CHLORIDE SERPL-SCNC: 97 MMOL/L (ref 98–107)
CREAT SERPL-MCNC: 0.65 MG/DL (ref 0.67–1.17)
DEPRECATED HCO3 PLAS-SCNC: 25 MMOL/L (ref 22–29)
EGFRCR SERPLBLD CKD-EPI 2021: >90 ML/MIN/1.73M2
ERYTHROCYTE [DISTWIDTH] IN BLOOD BY AUTOMATED COUNT: 15.7 % (ref 10–15)
GLUCOSE SERPL-MCNC: 83 MG/DL (ref 70–99)
HCT VFR BLD AUTO: 37.9 % (ref 40–53)
HGB BLD-MCNC: 12.9 G/DL (ref 13.3–17.7)
LIPASE SERPL-CCNC: 242 U/L (ref 13–60)
MCH RBC QN AUTO: 29.2 PG (ref 26.5–33)
MCHC RBC AUTO-ENTMCNC: 34 G/DL (ref 31.5–36.5)
MCV RBC AUTO: 86 FL (ref 78–100)
PLATELET # BLD AUTO: 133 10E3/UL (ref 150–450)
POTASSIUM SERPL-SCNC: 4.1 MMOL/L (ref 3.4–5.3)
PROT SERPL-MCNC: 7 G/DL (ref 6.4–8.3)
RBC # BLD AUTO: 4.42 10E6/UL (ref 4.4–5.9)
SODIUM SERPL-SCNC: 135 MMOL/L (ref 136–145)
WBC # BLD AUTO: 5.4 10E3/UL (ref 4–11)

## 2023-09-24 PROCEDURE — 99239 HOSP IP/OBS DSCHRG MGMT >30: CPT | Performed by: HOSPITALIST

## 2023-09-24 PROCEDURE — 250N000013 HC RX MED GY IP 250 OP 250 PS 637: Performed by: HOSPITALIST

## 2023-09-24 PROCEDURE — 85027 COMPLETE CBC AUTOMATED: CPT | Performed by: HOSPITALIST

## 2023-09-24 PROCEDURE — 250N000011 HC RX IP 250 OP 636: Performed by: HOSPITALIST

## 2023-09-24 PROCEDURE — 250N000009 HC RX 250: Performed by: HOSPITALIST

## 2023-09-24 PROCEDURE — 80053 COMPREHEN METABOLIC PANEL: CPT | Performed by: HOSPITALIST

## 2023-09-24 PROCEDURE — 258N000003 HC RX IP 258 OP 636: Performed by: HOSPITALIST

## 2023-09-24 PROCEDURE — 36415 COLL VENOUS BLD VENIPUNCTURE: CPT | Performed by: HOSPITALIST

## 2023-09-24 PROCEDURE — 83690 ASSAY OF LIPASE: CPT | Performed by: HOSPITALIST

## 2023-09-24 RX ORDER — FOLIC ACID 1 MG/1
1 TABLET ORAL DAILY
Status: DISCONTINUED | OUTPATIENT
Start: 2023-09-24 | End: 2023-09-24 | Stop reason: HOSPADM

## 2023-09-24 RX ORDER — PANTOPRAZOLE SODIUM 40 MG/1
40 TABLET, DELAYED RELEASE ORAL DAILY
Status: DISCONTINUED | OUTPATIENT
Start: 2023-09-24 | End: 2023-09-24 | Stop reason: HOSPADM

## 2023-09-24 RX ORDER — HYDROXYZINE HYDROCHLORIDE 25 MG/1
25 TABLET, FILM COATED ORAL 3 TIMES DAILY PRN
Status: DISCONTINUED | OUTPATIENT
Start: 2023-09-24 | End: 2023-09-24 | Stop reason: HOSPADM

## 2023-09-24 RX ORDER — BUSPIRONE HYDROCHLORIDE 5 MG/1
5 TABLET ORAL 3 TIMES DAILY
Status: DISCONTINUED | OUTPATIENT
Start: 2023-09-24 | End: 2023-09-24 | Stop reason: HOSPADM

## 2023-09-24 RX ORDER — GABAPENTIN 300 MG/1
300 CAPSULE ORAL 3 TIMES DAILY
Status: DISCONTINUED | OUTPATIENT
Start: 2023-09-24 | End: 2023-09-24 | Stop reason: HOSPADM

## 2023-09-24 RX ORDER — CLONIDINE HYDROCHLORIDE 0.1 MG/1
0.1 TABLET ORAL 2 TIMES DAILY PRN
Status: DISCONTINUED | OUTPATIENT
Start: 2023-09-24 | End: 2023-09-24 | Stop reason: HOSPADM

## 2023-09-24 RX ORDER — MULTIPLE VITAMINS W/ MINERALS TAB 9MG-400MCG
1 TAB ORAL DAILY
Status: DISCONTINUED | OUTPATIENT
Start: 2023-09-24 | End: 2023-09-24 | Stop reason: HOSPADM

## 2023-09-24 RX ADMIN — THIAMINE HCL TAB 100 MG 100 MG: 100 TAB at 08:50

## 2023-09-24 RX ADMIN — Medication 1 TABLET: at 08:50

## 2023-09-24 RX ADMIN — BUSPIRONE HYDROCHLORIDE 5 MG: 5 TABLET ORAL at 09:32

## 2023-09-24 RX ADMIN — PANTOPRAZOLE SODIUM 40 MG: 40 TABLET, DELAYED RELEASE ORAL at 08:50

## 2023-09-24 RX ADMIN — FOLIC ACID: 5 INJECTION, SOLUTION INTRAMUSCULAR; INTRAVENOUS; SUBCUTANEOUS at 06:13

## 2023-09-24 RX ADMIN — FAMOTIDINE 20 MG: 10 INJECTION, SOLUTION INTRAVENOUS at 08:53

## 2023-09-24 RX ADMIN — FOLIC ACID 1 MG: 1 TABLET ORAL at 08:50

## 2023-09-24 RX ADMIN — GABAPENTIN 300 MG: 300 CAPSULE ORAL at 08:50

## 2023-09-24 ASSESSMENT — ACTIVITIES OF DAILY LIVING (ADL)
ADLS_ACUITY_SCORE: 20

## 2023-09-24 NOTE — PROGRESS NOTES
Pt is anticipated to begin treatment tomorrow, 9/25.  At 1500. Pt will stay with mother until tomorrow prior to admissions to treatment. Mother to transport.  10:03 AM    VICKY Gutierrez  9/24/2023

## 2023-09-24 NOTE — PLAN OF CARE
"  Problem: Plan of Care - These are the overarching goals to be used throughout the patient stay.    Goal: Plan of Care Review  Description: The Plan of Care Review/Shift note should be completed every shift.  The Outcome Evaluation is a brief statement about your assessment that the patient is improving, declining, or no change.  This information will be displayed automatically on your shift note.  Outcome: Progressing  Goal: Patient-Specific Goal (Individualized)  Description: You can add care plan individualizations to a care plan. Examples of Individualization might be:  \"Parent requests to be called daily at 9am for status\", \"I have a hard time hearing out of my right ear\", or \"Do not touch me to wake me up as it startles me\".  Outcome: Progressing  Goal: Optimal Comfort and Wellbeing  Outcome: Progressing     Problem: Alcohol Withdrawal  Goal: Alcohol Withdrawal Symptom Control  Outcome: Progressing  Goal: Optimal Neurologic Function  Outcome: Progressing  Goal: Readiness for Change Identified  Outcome: Progressing   Goal Outcome Evaluation:         Patient alert and oriented. VSS. On room air. No complaints of pain. IVF infusing. CIWA scores 4,1, and 2. Patient up independently to bathroom.    Gisella Castellanos RN                "

## 2023-09-24 NOTE — DISCHARGE SUMMARY
[facility] MEDICINE  DISCHARGE SUMMARY     Primary Care Physician: Lisandro Graham  Admission Date: 9/23/2023   Discharge Provider: Sammie Miller MD Discharge Date: 9/24/2023   Diet:   Active Diet and Nourishment Order   Procedures     Low Fat Diet (up to 50g)     Diet       Code Status: Full Code   Activity: DCACTIVITY: Activity as tolerated        Condition at Discharge: Stable     REASON FOR PRESENTATION(See Admission Note for Details)     Abdominal pain    PRINCIPAL & ACTIVE DISCHARGE DIAGNOSES     Principal Problem:    Alcohol abuse with intoxication (H)  Active Problems:    Epigastric pain    Alcohol-induced acute pancreatitis, unspecified complication status      PENDING LABS     Unresulted Labs Ordered in the Past 30 Days of this Admission       No orders found for last 31 day(s).              PROCEDURES ( this hospitalization only)          RECOMMENDATIONS TO OUTPATIENT PROVIDER FOR F/U VISIT     Follow-up Appointments     Follow-up and recommended labs and tests       Follow up with primary care provider, Lisandro Graham, within 7 days for   hospital follow- up.      Follow-up with chemical dependency treatment.  Absolutely no alcohol as   this will cause pancreatitis to recur.              DISPOSITION     Home    SUMMARY OF HOSPITAL COURSE:      38M with alcohol abuse disorder a/w acute alcohol induced pancreatiits.  Improved rapidly with supportive care and Mr. Rios will be discharged to home    #Alcoholic pancreatitis - rapid improvement in pain and lipase.    #Alcohol abuse disorder - has follow-up tomorrow for CD treatment.  Advised of strict abstinence    Discharge Medications with Med changes:     Current Discharge Medication List        CONTINUE these medications which have NOT CHANGED    Details   busPIRone (BUSPAR) 5 MG tablet Take 1 tablet (5 mg) by mouth 3 times daily  Qty: 90 tablet, Refills: 3    Associated Diagnoses: Moderate episode of recurrent major  depressive disorder (H)      cloNIDine (CATAPRES) 0.1 MG tablet Take 0.1 mg by mouth 2 times daily as needed (Anxiety)      folic acid (FOLVITE) 1 MG tablet Take 1 tablet (1 mg) by mouth daily  Qty: 90 tablet, Refills: 3    Associated Diagnoses: Alcohol dependence with intoxication with complication (H)      gabapentin (NEURONTIN) 300 MG capsule Take 300 mg by mouth 3 times daily      multivitamin w/minerals (THERA-VIT-M) tablet Take 1 tablet by mouth daily  Qty: 90 tablet, Refills: 3    Associated Diagnoses: Alcohol dependence with intoxication with complication (H)      thiamine (B-1) 100 MG tablet Take 1 tablet (100 mg) by mouth daily  Qty: 90 tablet, Refills: 3    Associated Diagnoses: Alcohol dependence with intoxication with complication (H)      hydrOXYzine (ATARAX) 25 MG tablet Take 25 mg by mouth 3 times daily as needed for anxiety      pantoprazole (PROTONIX) 40 MG EC tablet Take 40 mg by mouth daily                   Rationale for medication changes:            Consults     CARE MANAGEMENT / SOCIAL WORK IP CONSULT    Immunizations given this encounter     Most Recent Immunizations   Administered Date(s) Administered     COVID-19 MONOVALENT 12+ (Pfizer) 09/20/2021     Meningococcal ACWY (Menactra ) 06/01/2005     Pneumococcal 23 valent 09/20/2021     TD,PF 7+ (Tenivac) 11/07/2016     TDAP (Adacel,Boostrix) 11/26/2008     Td (Adult), Adsorbed 11/07/2016   Deferred Date(s) Deferred     Influenza Vaccine >6 months (Alfuria,Fluzone) 03/30/2019           Anticoagulation Information            SIGNIFICANT IMAGING FINDINGS     Results for orders placed or performed during the hospital encounter of 09/23/23   CT Abdomen Pelvis w Contrast    Impression    IMPRESSION:   1.  Acute pancreatitis.  2.  Hepatic steatosis.         Discharge Orders        Reason for your hospital stay    You were admitted with alcohol induced pancreatitis.     Follow-up and recommended labs and tests     Follow up with primary care  provider, Lisandro Graham, within 7 days for hospital follow- up.      Follow-up with chemical dependency treatment.  Absolutely no alcohol as this will cause pancreatitis to recur.     Activity    Your activity upon discharge: activity as tolerated     Diet    Follow this diet upon discharge: Orders Placed This Encounter      Low Fat Diet (up to 50g)       Examination   Physical Exam   Temp:  [97.8  F (36.6  C)-99.1  F (37.3  C)] 99.1  F (37.3  C)  Pulse:  [56-86] 83  Resp:  [16-17] 17  BP: (105-162)/(59-94) 162/93  SpO2:  [92 %-99 %] 98 %  Wt Readings from Last 1 Encounters:   09/23/23 77.5 kg (170 lb 13.7 oz)       No pain meds overnight.  Tolerating PO.  Abdomen soft and non tender.  Eager to go home      Please see EMR for more detailed significant labs, imaging, consultant notes etc.    I, Sammie Miller MD, personally saw the patient today and spent greater than 30 minutes discharging this patient.    Sammie Miller MD  [facility]    CC:Lisandro Graham

## 2023-09-24 NOTE — PLAN OF CARE
Shift from 0700 to 1930-    Problem: Plan of Care - These are the overarching goals to be used throughout the patient stay.    Goal: Plan of Care Review  Description: The Plan of Care Review/Shift note should be completed every shift.  The Outcome Evaluation is a brief statement about your assessment that the patient is improving, declining, or no change.  This information will be displayed automatically on your shift note.  Outcome: Met       Goal Outcome Evaluation:    Patient denies pain. IVF Sl'd. Tolerated low fiber diet.  Eager to discharge.   Ambulating in hallway.  Discharging soon.

## 2023-09-25 ENCOUNTER — HOSPITAL ENCOUNTER (EMERGENCY)
Facility: HOSPITAL | Age: 38
Discharge: HOME | End: 2023-09-25
Attending: EMERGENCY MEDICINE | Admitting: EMERGENCY MEDICINE
Payer: COMMERCIAL

## 2023-09-25 VITALS
DIASTOLIC BLOOD PRESSURE: 57 MMHG | OXYGEN SATURATION: 98 % | RESPIRATION RATE: 18 BRPM | SYSTOLIC BLOOD PRESSURE: 95 MMHG | HEART RATE: 89 BPM | HEIGHT: 70 IN | BODY MASS INDEX: 24.05 KG/M2 | TEMPERATURE: 97.8 F | WEIGHT: 168 LBS

## 2023-09-25 DIAGNOSIS — S01.81XA CHIN LACERATION, INITIAL ENCOUNTER: ICD-10-CM

## 2023-09-25 PROCEDURE — 99283 EMERGENCY DEPT VISIT LOW MDM: CPT

## 2023-09-25 PROCEDURE — 12011 RPR F/E/E/N/L/M 2.5 CM/<: CPT

## 2023-09-25 ASSESSMENT — ACTIVITIES OF DAILY LIVING (ADL): ADLS_ACUITY_SCORE: 35

## 2023-09-25 NOTE — ED PROVIDER NOTES
EMERGENCY DEPARTMENT ENCOUNTER      NAME: Nikhil Rios  AGE: 38 year old male  YOB: 1985  MRN: [id number]  EVALUATION DATE & TIME: No admission date for patient encounter.    PCP: Lisandro Graham    ED PROVIDER: Tim Trevizo MD      Chief Complaint   Patient presents with    Laceration         FINAL IMPRESSION:  Chin laceration      ED COURSE & MEDICAL DECISION MAKING:    Pertinent Labs & Imaging studies reviewed. (See chart for details)  38 year old male presents to the Emergency Department for evaluation of a laceration to chin.  Patient reports tripping over family cat just prior to arrival.  Denies any loss of consciousness.  Reports no malocclusion or neck pain.  Evaluation reveals a 2 cm full-thickness laceration along the anterior right chin area.  Good translocation of jaw.  Neck nontender.  We will proceed with laceration repair.  No indications for imaging.    8:44 AM I introduced myself to the patient, obtained patient history, performed a physical exam, and discussed plan for ED workup including potential diagnostic laboratory/imaging studies and interventions.  9:25 AM.  Family medicine resident proceeding with closure of the laceration.  We will monitor closely.   10:02 AM.  Laceration closed under my direct supervision.  6 sutures placed.    At the conclusion of the encounter I discussed the results of all of the tests and the disposition. The questions were answered. The patient or family acknowledged understanding and was agreeable with the care plan.     Medical Decision Making    History:  Supplemental history from: Documented in chart, if applicable  External Record(s) reviewed: Documented in chart, if applicable.    Work Up:  Chart documentation includes differential considered and any EKGs or imaging independently interpreted by provider.  In additional to work up documented, I considered the following work up: Documented in chart, if applicable.    External  consultation:  Discussion of management with another provider: Documented in chart, if applicable    Complicating factors:  Care impacted by chronic illness: Hyperlipidemia and Hypertension  Care affected by social determinants of health: Alcohol Abuse and/or Recreational Drug Use    Disposition considerations: Discharge. No recommendations on prescription strength medication(s). See documentation for any additional details.      0 minutes of critical care time     MEDICATIONS GIVEN IN THE EMERGENCY:  Medications - No data to display    NEW PRESCRIPTIONS STARTED AT TODAY'S ER VISIT  Current Discharge Medication List             =================================================================    HPI    Patient information was obtained from: Patient    Use of : N/A      Nikhil Rios is a 38 year old male with a pertinent history of HTN, HLD, and alcohol abuse who presents to this ED via walk in for evaluation of a chin laceration.  Patient reports tripping over his family cat this morning.  Fell striking his chin creating small laceration.  Denies any headaches, neck pain, malocclusion.    Per chart review, the patient was admitted to [facility] from 9/23/23 to 9/24/23 (31 hours) for alcohol induced pancreatitis. While admitted the patient had rapid improvement of his symptoms with Zofran, Compazine, Pepcid, Dilaudid, and Valium. Patient was discharged to home with plan for outpatient follow-up with CD treatment tomorrow.    Per [facility], last Tdap was 11/7/16.    REVIEW OF SYSTEMS   Review of Systems    PAST MEDICAL HISTORY:  Past Medical History:   Diagnosis Date    Alcohol abuse     Alcohol abuse     Alcohol withdrawal (H)     Depressive disorder     Family history of diabetes mellitus 11/9/2007    HLD (hyperlipidemia)     HTN (hypertension)        PAST SURGICAL HISTORY:  Past Surgical History:   Procedure Laterality Date    NO HISTORY OF SURGERY      OTHER SURGICAL HISTORY      none  "          CURRENT MEDICATIONS:    busPIRone (BUSPAR) 5 MG tablet  cloNIDine (CATAPRES) 0.1 MG tablet  folic acid (FOLVITE) 1 MG tablet  gabapentin (NEURONTIN) 300 MG capsule  hydrOXYzine (ATARAX) 25 MG tablet  multivitamin w/minerals (THERA-VIT-M) tablet  pantoprazole (PROTONIX) 40 MG EC tablet  thiamine (B-1) 100 MG tablet        ALLERGIES:  Allergies   Allergen Reactions    Gramineae Pollens Itching    Pollen Extract Rash     grass tree pollen       FAMILY HISTORY:  Family History   Problem Relation Age of Onset    Coronary Artery Disease Father     Substance Abuse Maternal Grandfather     Mental Illness Brother     Schizophrenia Brother     Schizophrenia Maternal Aunt        SOCIAL HISTORY:   Social History     Socioeconomic History    Marital status: Single   Occupational History    Occupation: Rental property owner   Tobacco Use    Smoking status: Some Days     Packs/day: 0.25     Types: Cigars, Cigarettes    Smokeless tobacco: Never    Tobacco comments:     occasional   Substance and Sexual Activity    Alcohol use: Yes     Alcohol/week: 17.0 standard drinks of alcohol     Types: 17 Shots of liquor per week     Comment: Drinks 750ml/day or 17 shots/day; hx of withdrawl seizures    Drug use: Not Currently     Types: Marijuana    Sexual activity: Not Currently     Social Determinants of Health     Transportation Needs: No Transportation Needs (7/21/2021)    PRAPARE - Transportation     Lack of Transportation (Medical): No     Lack of Transportation (Non-Medical): No   Stress: Stress Concern Present (7/21/2021)    [employer] [place] - Occupational Stress Questionnaire     Feeling of Stress : Very much   Housing Stability: Unknown (7/21/2021)    Housing Stability Vital Sign     Unable to Pay for Housing in the Last Year: No     Unstable Housing in the Last Year: No       VITALS:  BP 95/57   Pulse 89   Temp 97.8  F (36.6  C) (Oral)   Resp 18   Ht 1.778 m (5' 10\")   Wt 76.2 kg (168 lb)   " "SpO2 98%   BMI 24.11 kg/m      PHYSICAL EXAM    VITAL SIGNS: BP 95/57   Pulse 89   Temp 97.8  F (36.6  C) (Oral)   Resp 18   Ht 1.778 m (5' 10\")   Wt 76.2 kg (168 lb)   SpO2 98%   BMI 24.11 kg/m      Constitutional:  Awake, alert, in minimal distress  HENT:  Normocephalic, Atraumatic. Bilateral external ears normal. Oropharynx moist. Nose normal. Neck- Normal range of motion with no guarding, No midline cervical tenderness, Supple, No stridor.  2 cm full-thickness laceration to the chin slightly right of midline  Eyes:  PERRL, EOMI with no signs of entrapment, Conjunctiva normal, No discharge.   Musculoskeletal:  Intact distal pulses, No edema. Good range of motion in all major joints. No tenderness to palpation or major deformities noted.  Integument:  Warm, Dry, No erythema, No rash.   Neurologic:  Alert & oriented, Normal motor function, Normal sensory function, No focal deficits noted.   Psychiatric:  Affect normal, Judgment normal, Mood normal.      PROCEDURES:   PROCEDURE: Laceration Repair   INDICATIONS: Laceration   PROCEDURE PROVIDER: Family medicine resident/   SITE:   Chin   TYPE/SIZE: simple, clean, and no foreign body visualized  2 cm (total length)   FUNCTIONAL ASSESSMENT: Distal sensation, circulation, and motor intact   MEDICATION: 3 mLs of 1% lidocaine without epinephrine   PREPARATION: Normal saline   DEBRIDEMENT: None   CLOSURE:  5-0 rapidly absorbing plain gut    Total number of sutures/staples placed: 6          I, Rebel Santiago, am serving as a scribe to document services personally performed by Tim Trevizo MD based on my observation and the provider's statements to me. I, Tim Trevizo MD, attest that Rebel Santiago is acting in a scribe capacity, has observed my performance of the services and has documented them in accordance with my direction.    Tim Trevizo MD  [facility] EMERGENCY DEPARTMENT  [address] " [id number]  [phone]       Tim Trevizo MD  09/25/23 1004

## 2023-09-25 NOTE — ED TRIAGE NOTES
Amb to triage.  Pt states tripped over cat this morning about 0600 and sustained lac to area under chin.  Bleeding controlled PTA.  Pt denies pain in neck to c-spine palpation.  Denies LOC.  Denies pain     Triage Assessment       Row Name 09/25/23 0800       Triage Assessment (Adult)    Airway WDL WDL       Respiratory WDL    Respiratory WDL WDL       Skin Circulation/Temperature WDL    Skin Circulation/Temperature WDL WDL       Cardiac WDL    Cardiac WDL WDL       Peripheral/Neurovascular WDL    Peripheral Neurovascular WDL WDL       Cognitive/Neuro/Behavioral WDL    Cognitive/Neuro/Behavioral WDL WDL

## 2023-09-29 ENCOUNTER — HOSPITAL ENCOUNTER (INPATIENT)
Facility: HOSPITAL | Age: 38
LOS: 4 days | Discharge: HOME | DRG: 897 | End: 2023-10-03
Attending: EMERGENCY MEDICINE | Admitting: STUDENT IN AN ORGANIZED HEALTH CARE EDUCATION/TRAINING PROGRAM
Payer: COMMERCIAL

## 2023-09-29 DIAGNOSIS — F10.930 ALCOHOL WITHDRAWAL SEIZURE WITHOUT COMPLICATION (H): ICD-10-CM

## 2023-09-29 DIAGNOSIS — R56.9 ALCOHOL WITHDRAWAL SEIZURE WITHOUT COMPLICATION (H): ICD-10-CM

## 2023-09-29 DIAGNOSIS — F10.920 ALCOHOLIC INTOXICATION WITHOUT COMPLICATION (H): ICD-10-CM

## 2023-09-29 LAB
ALBUMIN SERPL BCG-MCNC: 5 G/DL (ref 3.5–5.2)
ALP SERPL-CCNC: 96 U/L (ref 40–129)
ALT SERPL W P-5'-P-CCNC: 72 U/L (ref 0–70)
ANION GAP SERPL CALCULATED.3IONS-SCNC: 20 MMOL/L (ref 7–15)
AST SERPL W P-5'-P-CCNC: 67 U/L (ref 0–45)
BASOPHILS # BLD AUTO: 0.1 10E3/UL (ref 0–0.2)
BASOPHILS NFR BLD AUTO: 3 %
BILIRUB SERPL-MCNC: 0.4 MG/DL
BUN SERPL-MCNC: 10.8 MG/DL (ref 6–20)
CALCIUM SERPL-MCNC: 9 MG/DL (ref 8.6–10)
CHLORIDE SERPL-SCNC: 98 MMOL/L (ref 98–107)
CREAT SERPL-MCNC: 0.83 MG/DL (ref 0.67–1.17)
CREAT SERPL-MCNC: 0.83 MG/DL (ref 0.67–1.17)
DEPRECATED HCO3 PLAS-SCNC: 24 MMOL/L (ref 22–29)
EGFRCR SERPLBLD CKD-EPI 2021: >90 ML/MIN/1.73M2
EGFRCR SERPLBLD CKD-EPI 2021: >90 ML/MIN/1.73M2
EOSINOPHIL # BLD AUTO: 0 10E3/UL (ref 0–0.7)
EOSINOPHIL NFR BLD AUTO: 1 %
ERYTHROCYTE [DISTWIDTH] IN BLOOD BY AUTOMATED COUNT: 16.3 % (ref 10–15)
ETHANOL SERPL-MCNC: 0.43 G/DL
GLUCOSE SERPL-MCNC: 98 MG/DL (ref 70–99)
HCT VFR BLD AUTO: 42.1 % (ref 40–53)
HGB BLD-MCNC: 14.6 G/DL (ref 13.3–17.7)
HOLD SPECIMEN: NORMAL
IMM GRANULOCYTES # BLD: 0 10E3/UL
IMM GRANULOCYTES NFR BLD: 0 %
LIPASE SERPL-CCNC: 71 U/L (ref 13–60)
LYMPHOCYTES # BLD AUTO: 2.3 10E3/UL (ref 0.8–5.3)
LYMPHOCYTES NFR BLD AUTO: 54 %
MAGNESIUM SERPL-MCNC: 1.7 MG/DL (ref 1.7–2.3)
MAGNESIUM SERPL-MCNC: 1.9 MG/DL (ref 1.7–2.3)
MCH RBC QN AUTO: 29.5 PG (ref 26.5–33)
MCHC RBC AUTO-ENTMCNC: 34.7 G/DL (ref 31.5–36.5)
MCV RBC AUTO: 85 FL (ref 78–100)
MONOCYTES # BLD AUTO: 0.4 10E3/UL (ref 0–1.3)
MONOCYTES NFR BLD AUTO: 8 %
NEUTROPHILS # BLD AUTO: 1.5 10E3/UL (ref 1.6–8.3)
NEUTROPHILS NFR BLD AUTO: 34 %
NRBC # BLD AUTO: 0 10E3/UL
NRBC BLD AUTO-RTO: 0 /100
PHOSPHATE SERPL-MCNC: 2.4 MG/DL (ref 2.5–4.5)
PLATELET # BLD AUTO: 530 10E3/UL (ref 150–450)
POTASSIUM SERPL-SCNC: 4 MMOL/L (ref 3.4–5.3)
PROT SERPL-MCNC: 7.9 G/DL (ref 6.4–8.3)
RBC # BLD AUTO: 4.95 10E6/UL (ref 4.4–5.9)
SODIUM SERPL-SCNC: 142 MMOL/L (ref 135–145)
WBC # BLD AUTO: 4.3 10E3/UL (ref 4–11)

## 2023-09-29 PROCEDURE — 120N000001 HC R&B MED SURG/OB

## 2023-09-29 PROCEDURE — 85025 COMPLETE CBC W/AUTO DIFF WBC: CPT | Performed by: EMERGENCY MEDICINE

## 2023-09-29 PROCEDURE — 36415 COLL VENOUS BLD VENIPUNCTURE: CPT | Performed by: STUDENT IN AN ORGANIZED HEALTH CARE EDUCATION/TRAINING PROGRAM

## 2023-09-29 PROCEDURE — 84100 ASSAY OF PHOSPHORUS: CPT | Performed by: STUDENT IN AN ORGANIZED HEALTH CARE EDUCATION/TRAINING PROGRAM

## 2023-09-29 PROCEDURE — 80053 COMPREHEN METABOLIC PANEL: CPT | Performed by: EMERGENCY MEDICINE

## 2023-09-29 PROCEDURE — 99406 BEHAV CHNG SMOKING 3-10 MIN: CPT | Performed by: STUDENT IN AN ORGANIZED HEALTH CARE EDUCATION/TRAINING PROGRAM

## 2023-09-29 PROCEDURE — 258N000003 HC RX IP 258 OP 636: Performed by: EMERGENCY MEDICINE

## 2023-09-29 PROCEDURE — 83735 ASSAY OF MAGNESIUM: CPT | Performed by: EMERGENCY MEDICINE

## 2023-09-29 PROCEDURE — 99285 EMERGENCY DEPT VISIT HI MDM: CPT | Mod: 25

## 2023-09-29 PROCEDURE — 250N000011 HC RX IP 250 OP 636: Performed by: STUDENT IN AN ORGANIZED HEALTH CARE EDUCATION/TRAINING PROGRAM

## 2023-09-29 PROCEDURE — 82077 ASSAY SPEC XCP UR&BREATH IA: CPT | Performed by: EMERGENCY MEDICINE

## 2023-09-29 PROCEDURE — 250N000011 HC RX IP 250 OP 636: Performed by: EMERGENCY MEDICINE

## 2023-09-29 PROCEDURE — HZ2ZZZZ DETOXIFICATION SERVICES FOR SUBSTANCE ABUSE TREATMENT: ICD-10-PCS | Performed by: STUDENT IN AN ORGANIZED HEALTH CARE EDUCATION/TRAINING PROGRAM

## 2023-09-29 PROCEDURE — 99223 1ST HOSP IP/OBS HIGH 75: CPT | Mod: 25 | Performed by: STUDENT IN AN ORGANIZED HEALTH CARE EDUCATION/TRAINING PROGRAM

## 2023-09-29 PROCEDURE — 96374 THER/PROPH/DIAG INJ IV PUSH: CPT

## 2023-09-29 PROCEDURE — 250N000011 HC RX IP 250 OP 636: Mod: JZ | Performed by: EMERGENCY MEDICINE

## 2023-09-29 PROCEDURE — 96361 HYDRATE IV INFUSION ADD-ON: CPT

## 2023-09-29 PROCEDURE — 250N000013 HC RX MED GY IP 250 OP 250 PS 637: Performed by: EMERGENCY MEDICINE

## 2023-09-29 PROCEDURE — 83690 ASSAY OF LIPASE: CPT | Performed by: EMERGENCY MEDICINE

## 2023-09-29 PROCEDURE — 250N000013 HC RX MED GY IP 250 OP 250 PS 637: Performed by: STUDENT IN AN ORGANIZED HEALTH CARE EDUCATION/TRAINING PROGRAM

## 2023-09-29 PROCEDURE — 83735 ASSAY OF MAGNESIUM: CPT | Performed by: STUDENT IN AN ORGANIZED HEALTH CARE EDUCATION/TRAINING PROGRAM

## 2023-09-29 PROCEDURE — 36415 COLL VENOUS BLD VENIPUNCTURE: CPT | Performed by: EMERGENCY MEDICINE

## 2023-09-29 PROCEDURE — 96375 TX/PRO/DX INJ NEW DRUG ADDON: CPT

## 2023-09-29 RX ORDER — ONDANSETRON 2 MG/ML
4 INJECTION INTRAMUSCULAR; INTRAVENOUS EVERY 6 HOURS PRN
Status: DISCONTINUED | OUTPATIENT
Start: 2023-09-29 | End: 2023-10-03 | Stop reason: HOSPADM

## 2023-09-29 RX ORDER — GABAPENTIN 300 MG/1
300 CAPSULE ORAL EVERY 8 HOURS
Status: DISCONTINUED | OUTPATIENT
Start: 2023-10-05 | End: 2023-10-02

## 2023-09-29 RX ORDER — MULTIPLE VITAMINS W/ MINERALS TAB 9MG-400MCG
1 TAB ORAL DAILY
Status: DISCONTINUED | OUTPATIENT
Start: 2023-09-30 | End: 2023-10-03 | Stop reason: HOSPADM

## 2023-09-29 RX ORDER — SUCRALFATE 1 G/1
1 TABLET ORAL ONCE
Status: COMPLETED | OUTPATIENT
Start: 2023-09-29 | End: 2023-09-29

## 2023-09-29 RX ORDER — LORAZEPAM 2 MG/ML
1-2 INJECTION INTRAMUSCULAR EVERY 30 MIN PRN
Status: DISCONTINUED | OUTPATIENT
Start: 2023-09-29 | End: 2023-10-03 | Stop reason: HOSPADM

## 2023-09-29 RX ORDER — CLONIDINE HYDROCHLORIDE 0.1 MG/1
0.1 TABLET ORAL EVERY 8 HOURS
Status: DISCONTINUED | OUTPATIENT
Start: 2023-09-29 | End: 2023-10-02

## 2023-09-29 RX ORDER — ONDANSETRON 4 MG/1
4 TABLET, ORALLY DISINTEGRATING ORAL EVERY 6 HOURS PRN
Status: DISCONTINUED | OUTPATIENT
Start: 2023-09-29 | End: 2023-10-03 | Stop reason: HOSPADM

## 2023-09-29 RX ORDER — GABAPENTIN 300 MG/1
600 CAPSULE ORAL EVERY 8 HOURS
Status: DISCONTINUED | OUTPATIENT
Start: 2023-10-03 | End: 2023-10-02

## 2023-09-29 RX ORDER — GABAPENTIN 300 MG/1
900 CAPSULE ORAL EVERY 8 HOURS
Status: DISCONTINUED | OUTPATIENT
Start: 2023-09-30 | End: 2023-10-02

## 2023-09-29 RX ORDER — ONDANSETRON 2 MG/ML
4 INJECTION INTRAMUSCULAR; INTRAVENOUS EVERY 30 MIN PRN
Status: DISCONTINUED | OUTPATIENT
Start: 2023-09-29 | End: 2023-10-03 | Stop reason: HOSPADM

## 2023-09-29 RX ORDER — FLUMAZENIL 0.1 MG/ML
0.2 INJECTION, SOLUTION INTRAVENOUS
Status: DISCONTINUED | OUTPATIENT
Start: 2023-09-29 | End: 2023-10-03 | Stop reason: HOSPADM

## 2023-09-29 RX ORDER — LORAZEPAM 2 MG/ML
1 INJECTION INTRAMUSCULAR ONCE
Status: COMPLETED | OUTPATIENT
Start: 2023-09-29 | End: 2023-09-29

## 2023-09-29 RX ORDER — ACETAMINOPHEN 650 MG/1
650 SUPPOSITORY RECTAL EVERY 6 HOURS PRN
Status: DISCONTINUED | OUTPATIENT
Start: 2023-09-30 | End: 2023-10-03 | Stop reason: HOSPADM

## 2023-09-29 RX ORDER — FOLIC ACID 1 MG/1
1 TABLET ORAL DAILY
Status: DISCONTINUED | OUTPATIENT
Start: 2023-09-30 | End: 2023-10-03 | Stop reason: HOSPADM

## 2023-09-29 RX ORDER — OLANZAPINE 5 MG/1
5-10 TABLET, ORALLY DISINTEGRATING ORAL EVERY 6 HOURS PRN
Status: DISCONTINUED | OUTPATIENT
Start: 2023-09-29 | End: 2023-10-03 | Stop reason: HOSPADM

## 2023-09-29 RX ORDER — PROCHLORPERAZINE 25 MG
25 SUPPOSITORY, RECTAL RECTAL EVERY 12 HOURS PRN
Status: DISCONTINUED | OUTPATIENT
Start: 2023-09-29 | End: 2023-10-03 | Stop reason: HOSPADM

## 2023-09-29 RX ORDER — GABAPENTIN 300 MG/1
1200 CAPSULE ORAL ONCE
Status: COMPLETED | OUTPATIENT
Start: 2023-09-29 | End: 2023-09-29

## 2023-09-29 RX ORDER — ACETAMINOPHEN 325 MG/1
650 TABLET ORAL EVERY 6 HOURS PRN
Status: DISCONTINUED | OUTPATIENT
Start: 2023-09-29 | End: 2023-09-29

## 2023-09-29 RX ORDER — ACETAMINOPHEN 325 MG/1
650 TABLET ORAL EVERY 6 HOURS PRN
Status: DISCONTINUED | OUTPATIENT
Start: 2023-09-30 | End: 2023-10-03 | Stop reason: HOSPADM

## 2023-09-29 RX ORDER — HALOPERIDOL 5 MG/ML
2.5-5 INJECTION INTRAMUSCULAR EVERY 6 HOURS PRN
Status: DISCONTINUED | OUTPATIENT
Start: 2023-09-29 | End: 2023-10-03 | Stop reason: HOSPADM

## 2023-09-29 RX ORDER — LORAZEPAM 1 MG/1
1-2 TABLET ORAL EVERY 30 MIN PRN
Status: DISCONTINUED | OUTPATIENT
Start: 2023-09-29 | End: 2023-10-03 | Stop reason: HOSPADM

## 2023-09-29 RX ORDER — ENOXAPARIN SODIUM 100 MG/ML
40 INJECTION SUBCUTANEOUS EVERY 24 HOURS
Status: DISCONTINUED | OUTPATIENT
Start: 2023-09-29 | End: 2023-10-03 | Stop reason: HOSPADM

## 2023-09-29 RX ORDER — ACETAMINOPHEN 650 MG/1
650 SUPPOSITORY RECTAL EVERY 6 HOURS PRN
Status: DISCONTINUED | OUTPATIENT
Start: 2023-09-29 | End: 2023-09-29

## 2023-09-29 RX ORDER — GABAPENTIN 100 MG/1
100 CAPSULE ORAL EVERY 8 HOURS
Status: DISCONTINUED | OUTPATIENT
Start: 2023-10-07 | End: 2023-10-02

## 2023-09-29 RX ORDER — MULTIVITAMIN,THERAPEUTIC
1 TABLET ORAL ONCE
Status: COMPLETED | OUTPATIENT
Start: 2023-09-29 | End: 2023-09-29

## 2023-09-29 RX ORDER — PROCHLORPERAZINE MALEATE 10 MG
10 TABLET ORAL EVERY 6 HOURS PRN
Status: DISCONTINUED | OUTPATIENT
Start: 2023-09-29 | End: 2023-10-03 | Stop reason: HOSPADM

## 2023-09-29 RX ORDER — MAGNESIUM HYDROXIDE/ALUMINUM HYDROXICE/SIMETHICONE 120; 1200; 1200 MG/30ML; MG/30ML; MG/30ML
15 SUSPENSION ORAL ONCE
Status: COMPLETED | OUTPATIENT
Start: 2023-09-29 | End: 2023-09-29

## 2023-09-29 RX ORDER — LORAZEPAM 2 MG/ML
1 INJECTION INTRAMUSCULAR ONCE
Status: DISCONTINUED | OUTPATIENT
Start: 2023-09-29 | End: 2023-09-29

## 2023-09-29 RX ADMIN — SODIUM CHLORIDE 1000 ML: 9 INJECTION, SOLUTION INTRAVENOUS at 09:01

## 2023-09-29 RX ADMIN — LORAZEPAM 1 MG: 2 INJECTION INTRAMUSCULAR; INTRAVENOUS at 17:46

## 2023-09-29 RX ADMIN — LORAZEPAM 1 MG: 0.5 TABLET ORAL at 22:50

## 2023-09-29 RX ADMIN — ONDANSETRON 4 MG: 2 INJECTION INTRAMUSCULAR; INTRAVENOUS at 09:02

## 2023-09-29 RX ADMIN — ENOXAPARIN SODIUM 40 MG: 40 INJECTION SUBCUTANEOUS at 22:04

## 2023-09-29 RX ADMIN — LORAZEPAM 1 MG: 2 INJECTION INTRAMUSCULAR; INTRAVENOUS at 14:13

## 2023-09-29 RX ADMIN — ALUMINUM HYDROXIDE, MAGNESIUM HYDROXIDE, AND SIMETHICONE 15 ML: 200; 200; 20 SUSPENSION ORAL at 08:58

## 2023-09-29 RX ADMIN — LORAZEPAM 1 MG: 2 INJECTION INTRAMUSCULAR; INTRAVENOUS at 09:29

## 2023-09-29 RX ADMIN — CLONIDINE HYDROCHLORIDE 0.1 MG: 0.1 TABLET ORAL at 22:03

## 2023-09-29 RX ADMIN — LORAZEPAM 2 MG: 2 INJECTION INTRAMUSCULAR; INTRAVENOUS at 22:05

## 2023-09-29 RX ADMIN — SUCRALFATE 1 G: 1 TABLET ORAL at 08:59

## 2023-09-29 RX ADMIN — FAMOTIDINE 20 MG: 10 INJECTION, SOLUTION INTRAVENOUS at 09:01

## 2023-09-29 RX ADMIN — GABAPENTIN 1200 MG: 300 CAPSULE ORAL at 22:05

## 2023-09-29 RX ADMIN — THERA TABS 1 TABLET: TAB at 08:59

## 2023-09-29 RX ADMIN — LORAZEPAM 1 MG: 2 INJECTION INTRAMUSCULAR; INTRAVENOUS at 20:39

## 2023-09-29 ASSESSMENT — ACTIVITIES OF DAILY LIVING (ADL)
ADLS_ACUITY_SCORE: 37

## 2023-09-29 NOTE — PROGRESS NOTES
Spoke to patient, he is agreeable to going to detox facility and does not want to go [facility] or [facility]. [facility] no longer has detox beds available. CM left 2 messages with [facility] and one message with [facility] inpatient.     In order for patient to be accepted into [facility] or [facility], he would need a comprehensive CD evaluation and that would need to be done prior to them accepting.    For Detox, [facility] does Detox Only and can do a comprehensive assmt for [facility] before admission to [facility] (they only do outpatient rehab and PHP, partial Hospitalization which is a sober house living and group program).  and does [facility] has an inpatient facility in [place] but would require the CD eval that recommends an inpatient level of services.     Pt is agreeable to detox and rehab.

## 2023-09-29 NOTE — ED PROVIDER NOTES
"EMERGENCY DEPARTMENT ENCOUNTER      NAME: Nikhil Rios  AGE: 38 year old male  YOB: 1985  MRN: [id number]  EVALUATION DATE & TIME: 2023  8:09 AM    PCP: Lisandro Graham    ED PROVIDER: Kal Carson M.D.      Chief Complaint   Patient presents with    Alcohol Intoxication         FINAL IMPRESSION:  No diagnosis found.      ED COURSE & MEDICAL DECISION MAKIN:55 AM Repeat exam is benign.  No tachycardia or hypertension, no tremors.  10:39 AM patient has attempted to call his mother and roommate.  Exam is benign with no tremors or tachycardia.  11:28 AM Per nursing report both the mother and roommate refused to come pick the patient up.  Care manager will attempt to find placement in detox.  12:05 PM Spoke with nurse and Care Manager about sending the patient to detox center. At this time, patient is in agreement with this plan.           Pertinent Labs & Imaging studies reviewed. (See chart for details)  38 year old male presents to the Emergency Department for evaluation of alcohol intoxication. Patient appears non toxic with stable vitals signs, patient is afebrile with no tachycardia or hypoxia, no increased work of breathing.'s are clear, abdomen is benign, there is no outward signs of new trauma.  Patient denies any new falls or trauma.  Per review the medical record I did review her discharge summary from [facility] written on 2023 where the patient was admitted for alcohol abuse with intoxication and alcohol induced acute pancreatitis.  Apparently, per review of the hospital course patient rapidly improved following n.p.o. status and IV fluids.  Patient states that he does have \"help\" scheduled and when offered detox today he deferred.  He denies any homicidal or suicidal ideations.  Certainly concern for alcohol intoxication, pancreatitis, that said abdomen is benign with no significant epigastric tenderness, no other focal tenderness on abdominal " exam.  Denies any falls or trauma no outward signs of trauma to suggest deeper skull fracture, intracranial bleed, cervical fracture dislocation, intrathoracic, intra-abdominal or extremity trauma.  At this time considered CT imaging or x-ray imaging however with no reports of trauma and benign exam no indication for emergent imaging studies at this time.  We will obtain screening labs including lipase and the patient was treated symptomatically with Zofran, fluids, Carafate, Pepcid and multivitamin.    Reassessment: Labs by my independent interpretation showed no acute concerning findings, did note lipase of 71 which is certainly improved from prior and more around the patient's baseline, no epigastric tenderness here to suggest pancreatitis.  Did note slightly elevated ALT and AST which is likely related to patient's alcohol use.  No signs of acute kidney injury with a creatinine of 0.83, no signs of infection with no fever and white blood cell count of 4.3, no signs of anemia with hemoglobin 14.6.  Did note elevated alcohol level at 0.43.  Repeat exam is benign and patient had no significant tachycardia or tremulousness though did require some Ativan.  Again, per nursing report patient's mother and roommate refused to act as sober chaperone.  Discussed patient case with care manager who at this time is helping arrange transfer to detox center and then outpatient chemical dependency treatment, if this is unsuccessful and the patient is otherwise clinically sober can discharge and close outpatient follow-up.  If patient goes into withdrawal, will recommend admission.    Medical Decision Making    History:  Supplemental history from: Documented in chart, if applicable  External Record(s) reviewed: Documented in chart, if applicable.    Work Up:  Chart documentation includes differential considered and any EKGs or imaging independently interpreted by provider, where specified.  In additional to work up documented, I  "considered the following work up: Documented in chart, if applicable.    External consultation:  Discussion of management with another provider: Documented in chart, if applicable    Complicating factors:  Care impacted by chronic illness: N/A  Care affected by social determinants of health: Alcohol Abuse and/or Recreational Drug Use    Disposition considerations: Admission considered. Patient was signed out to the oncoming physician, disposition pending.          At the conclusion of the encounter I discussed the results of all of the tests and the disposition. The questions were answered and return precautions provided. The patient or family acknowledged understanding and was agreeable with the care plan.         MEDICATIONS GIVEN IN THE EMERGENCY:  Medications - No data to display    NEW PRESCRIPTIONS STARTED AT TODAY'S ER VISIT  New Prescriptions    No medications on file            =================================================================    HPI    Patient information was obtained from: patient    Use of Intrepreter: N/A         Nikhil Rios is a 38 year old male who presents alcohol intoxication.  Patient is a poor historian.  States that he called EMS secondary to \"not feeling well.\"  He is unable to provide any clarity in regards to what he means by not feeling well.  States symptoms started approximately 1 hour ago states he has been drinking recently, last drink was approximately 2 hours ago.  He otherwise denies any new pain, falls or trauma, chest pain, shortness of breath, vomiting, change in bowel or bladder habits, or focal weakness.      REVIEW OF SYSTEMS   Constitutional:  Denies fever, chills  Respiratory:  Denies productive cough or increased work of breathing  Cardiovascular:  Denies chest pain, palpitations  GI:  Denies abdominal pain, nausea, vomiting, or change in bowel or bladder habits   Musculoskeletal:  Denies any new muscle/joint swelling  Skin:  Denies rash   Neurologic:  Denies " focal weakness  All systems negative except as marked.     PAST MEDICAL HISTORY:  Past Medical History:   Diagnosis Date    Alcohol abuse     Alcohol abuse     Alcohol withdrawal (H)     Depressive disorder     Family history of diabetes mellitus 11/9/2007    HLD (hyperlipidemia)     HTN (hypertension)        PAST SURGICAL HISTORY:  Past Surgical History:   Procedure Laterality Date    NO HISTORY OF SURGERY      OTHER SURGICAL HISTORY      none         CURRENT MEDICATIONS:    Prior to Admission medications    Medication Sig Start Date End Date Taking? Authorizing Provider   busPIRone (BUSPAR) 5 MG tablet Take 1 tablet (5 mg) by mouth 3 times daily 8/30/23   Mark Alberto MD   cloNIDine (CATAPRES) 0.1 MG tablet Take 0.1 mg by mouth 2 times daily as needed (Anxiety) 7/6/23   Reported, Patient   folic acid (FOLVITE) 1 MG tablet Take 1 tablet (1 mg) by mouth daily 8/30/23   Mark Alberto MD   gabapentin (NEURONTIN) 300 MG capsule Take 300 mg by mouth 3 times daily    Unknown, Entered By History   hydrOXYzine (ATARAX) 25 MG tablet Take 25 mg by mouth 3 times daily as needed for anxiety    Unknown, Entered By History   multivitamin w/minerals (THERA-VIT-M) tablet Take 1 tablet by mouth daily 8/31/23   Mark Alberto MD   pantoprazole (PROTONIX) 40 MG EC tablet Take 40 mg by mouth daily  Patient not taking: Reported on 9/23/2023    Unknown, Entered By History   thiamine (B-1) 100 MG tablet Take 1 tablet (100 mg) by mouth daily 8/31/23   Mark Alberto MD        ALLERGIES:  Allergies   Allergen Reactions    Gramineae Pollens Itching    Pollen Extract Rash     grass tree pollen       FAMILY HISTORY:  Family History   Problem Relation Age of Onset    Coronary Artery Disease Father     Substance Abuse Maternal Grandfather     Mental Illness Brother     Schizophrenia Brother     Schizophrenia Maternal Aunt        SOCIAL HISTORY:   Social History     Socioeconomic History    Marital status: Single   Occupational History     Occupation: Rental property owner   Tobacco Use    Smoking status: Some Days     Packs/day: 0.25     Types: Cigars, Cigarettes    Smokeless tobacco: Never    Tobacco comments:     occasional   Substance and Sexual Activity    Alcohol use: Yes     Alcohol/week: 17.0 standard drinks of alcohol     Types: 17 Shots of liquor per week     Comment: Drinks 750ml/day or 17 shots/day; hx of withdrawl seizures    Drug use: Not Currently     Types: Marijuana    Sexual activity: Not Currently     Social Determinants of Health     Transportation Needs: No Transportation Needs (7/21/2021)    PRAPARE - Transportation     Lack of Transportation (Medical): No     Lack of Transportation (Non-Medical): No   Stress: Stress Concern Present (7/21/2021)    [employer] - Occupational Stress Questionnaire     Feeling of Stress : Very much   Housing Stability: Unknown (7/21/2021)    Housing Stability Vital Sign     Unable to Pay for Housing in the Last Year: No     Unstable Housing in the Last Year: No       VITALS:  Patient Vitals for the past 24 hrs:   BP Temp Temp src Pulse Resp SpO2   09/29/23 0811 108/72 98.2  F (36.8  C) Oral 90 20 94 %        PHYSICAL EXAM    Constitutional:  Awake, alert, in no apparent respiratory distress, appears intoxicated  HENT:  Normocephalic, healing laceration to the inferior chin. Bilateral external ears normal. Oropharynx moist. Nose normal. Neck- Normal range of motion with no guarding, No midline cervical tenderness, Supple, No stridor.   Eyes:  PERRL, EOMI with no signs of entrapment, Conjunctiva normal, No discharge.   Respiratory:  Normal breath sounds, No respiratory distress, No wheezing.    Cardiovascular:  Normal heart rate, Normal rhythm, No appreciable rubs or gallops.   GI:  Soft, No tenderness, No distension, No palpable masses  Musculoskeletal:  Intact distal pulses, No edema. Good range of motion in all major joints. No tenderness to palpation or major  deformities noted.  Integument:  Warm, Dry, No erythema, No rash.   Neurologic:  Alert & oriented, Normal motor function, Normal sensory function, No focal deficits noted.   Psychiatric: Appears anxious and upset    LAB:  All pertinent labs reviewed and interpreted.       RADIOLOGY:  No orders to display          EKG:      I have independently reviewed and interpreted the EKG(s) documented above.    PROCEDURES:            I, KAL CARSON MD, am serving as a scribe to document services personally performed by Kal Carson MD, based on my observation and the provider's statements to me. I, Kal Carson MD attest that KAL CARSON MD is acting in a scribe capacity, has observed my performance of the services and has documented them in accordance with my direction.    Kal Carson M.D.  Emergency Medicine  [facility] EMERGENCY DEPARTMENT  [address] [address]  [phone]  Dept: [phone]       Kal Carson MD  09/29/23 6925

## 2023-09-29 NOTE — ED NOTES
Bed: JNED-28  Expected date: 9/29/23  Expected time: 8:03 AM  Means of arrival: Ambulance  Comments:  [facility]  38M  EtOH w/d

## 2023-09-29 NOTE — PROGRESS NOTES
Update: No response from [facility], left 3 voicemails.    Updated mom via voicemail. Left 3 voicemails and no response.    Pt was able to connect with Blossom at [facility] inpatient and is able to make appointment for Tuesday 6pm. Again, pt needs to be sober when he arrives for treatment.     At this time, awaiting a detox facility to accept him for detox. Pt may end up completing detox here.

## 2023-09-29 NOTE — ED TRIAGE NOTES
"Patient arrives via [facility]. Patient has history of alcohol use disorder, was discharged from this ED on 9/25 for similar, and went home and started consuming alcohol. Last drink was around midnight. Patient states that he was drinking vodka and he had approx 750 ml yesterday.   A&O x4. Mildly shakey, no seizures, denies any pain    Patient states that he does not want to be connected to detox services. He came in because \"he doesn't feel good\"     Triage Assessment       Row Name 09/29/23 0815       Triage Assessment (Adult)    Airway WDL WDL       Respiratory WDL    Respiratory WDL WDL       Skin Circulation/Temperature WDL    Skin Circulation/Temperature WDL WDL       Cardiac WDL    Cardiac WDL WDL       Peripheral/Neurovascular WDL    Peripheral Neurovascular WDL WDL                    "

## 2023-09-29 NOTE — ED NOTES
eMERGENCY dEPARTMENT PROGRESS NOTE         ED COURSE AND MEDICAL DECISION MAKING  Patient was signed out to me by Dr. Carson at 2:16 PM      Nikhil Rios is a 38 year old male who presents for alcohol intoxication    ED Course as of 09/30/23 1416   Fri Sep 29, 2023   1517 Patient was signed out to me at change of shift pending detox placement. Abdulkadir, , is looking for chem dep afterward.    1624 Patient is getting ready to go to detox.   1636 We can not get a detox to accept the patient.   1941 Patient's mom refuses to take him home.  He does have a bed for inpatient alcohol treatment on Tuesday.  He does not want to drink.  He does not have a place to go.  We cannot get him into detox.  I will get him admitted to the hospital here for alcohol withdrawal.   2035 I discussed plan for admission with Dr. Arce with hospitalist service.  She was comfortable with a med telemetry inpatient admission for alcohol withdrawal.         LAB  Pertinent labs results reviewed   Labs Ordered and Resulted from Time of ED Arrival to Time of ED Departure   COMPREHENSIVE METABOLIC PANEL - Abnormal       Result Value    Sodium 142      Potassium 4.0      Carbon Dioxide (CO2) 24      Anion Gap 20 (*)     Urea Nitrogen 10.8      Creatinine 0.83      GFR Estimate >90      Calcium 9.0      Chloride 98      Glucose 98      Alkaline Phosphatase 96      AST 67 (*)     ALT 72 (*)     Protein Total 7.9      Albumin 5.0      Bilirubin Total 0.4     LIPASE - Abnormal    Lipase 71 (*)    ETHYL ALCOHOL LEVEL - Abnormal    Alcohol ethyl 0.43 (*)    CBC WITH PLATELETS AND DIFFERENTIAL - Abnormal    WBC Count 4.3      RBC Count 4.95      Hemoglobin 14.6      Hematocrit 42.1      MCV 85      MCH 29.5      MCHC 34.7      RDW 16.3 (*)     Platelet Count 530 (*)     % Neutrophils 34      % Lymphocytes 54      % Monocytes 8      % Eosinophils 1      % Basophils 3      % Immature Granulocytes 0      NRBCs per 100 WBC 0      Absolute Neutrophils  1.5 (*)     Absolute Lymphocytes 2.3      Absolute Monocytes 0.4      Absolute Eosinophils 0.0      Absolute Basophils 0.1      Absolute Immature Granulocytes 0.0      Absolute NRBCs 0.0     MAGNESIUM - Normal    Magnesium 1.9     CREATININE - Normal    Creatinine 0.83      GFR Estimate >90         FINAL IMPRESSION    1. Alcoholic intoxication without complication (H)    2. Alcohol withdrawal seizure without complication (H)         Daisy Jj MD  09/30/23 1431

## 2023-09-29 NOTE — PHARMACY-ADMISSION MEDICATION HISTORY
Pharmacist Admission Medication History    Admission medication history is complete. The information provided in this note is only as accurate as the sources available at the time of the update.    Medication reconciliation/reorder completed by provider prior to medication history? No    Information Source(s): Patient and CareEverywhere/SureScripts via in-person     Allergies reviewed with patient and updates made in EHR: yes    Medication History Completed By: Zahra Nayak RP 9/29/2023 12:20 PM    Prior to Admission medications    Medication Sig Last Dose Taking? Auth Provider Long Term End Date   busPIRone (BUSPAR) 5 MG tablet Take 1 tablet (5 mg) by mouth 3 times daily Past Week Yes Mark Alberto MD Yes    cloNIDine (CATAPRES) 0.1 MG tablet Take 0.1 mg by mouth 2 times daily as needed (Anxiety) Past Week Yes Reported, Patient No    folic acid (FOLVITE) 1 MG tablet Take 1 tablet (1 mg) by mouth daily Past Month Yes Mark Alberto MD No    gabapentin (NEURONTIN) 300 MG capsule Take 300 mg by mouth 3 times daily Past Week Yes Unknown, Entered By History No    hydrOXYzine (ATARAX) 25 MG tablet Take 25 mg by mouth 3 times daily as needed for anxiety Past Month Yes Unknown, Entered By History No    multivitamin w/minerals (THERA-VIT-M) tablet Take 1 tablet by mouth daily Past Month Yes Mark Alberto MD     thiamine (B-1) 100 MG tablet Take 1 tablet (100 mg) by mouth daily Past Month Yes Mark Alberto MD

## 2023-09-29 NOTE — ED NOTES
Writer spoke with patient's mother. She states that the patient is not welcome back to her home and that the patient can't go to his own home due to issues with the roommate (roommate is currently maintaining sobriety and patient is not wanting to compromise that person's sobriety by being in that environment).   The mother stated that the patient has been speaking about and willing to go into a sober facility to detox and help him to maintain sobriety. She has been in contact with [facility] in [place] ([phone]) and they are trying to find him placement. She has also been in contact with [facility] ([phone]).     Due to the ongoing efforts of getting patient into a facility, the mother would like to have care manager help to facilitate the patient's move into a detox/sober facility.

## 2023-09-29 NOTE — PROGRESS NOTES
[facility] inpatient in [place]: Blossom Johnson called [phone], she can potentially take pt on Tuesday pending an in-person meeting for treatment and pt can not be intoxicated when he arrives for his appointment or admission to treatment. [facility] states that pt has a CD evaluation from August that will be sufficient.     Primary RN updated and will call Blossom for pt to talk to her.

## 2023-09-30 LAB
ALBUMIN SERPL BCG-MCNC: 4.3 G/DL (ref 3.5–5.2)
ALP SERPL-CCNC: 76 U/L (ref 40–129)
ALT SERPL W P-5'-P-CCNC: 61 U/L (ref 0–70)
ANION GAP SERPL CALCULATED.3IONS-SCNC: 9 MMOL/L (ref 7–15)
AST SERPL W P-5'-P-CCNC: 58 U/L (ref 0–45)
BILIRUB SERPL-MCNC: 0.8 MG/DL
BUN SERPL-MCNC: 10.3 MG/DL (ref 6–20)
CALCIUM SERPL-MCNC: 9.4 MG/DL (ref 8.6–10)
CHLORIDE SERPL-SCNC: 98 MMOL/L (ref 98–107)
CREAT SERPL-MCNC: 0.8 MG/DL (ref 0.67–1.17)
DEPRECATED HCO3 PLAS-SCNC: 30 MMOL/L (ref 22–29)
EGFRCR SERPLBLD CKD-EPI 2021: >90 ML/MIN/1.73M2
ERYTHROCYTE [DISTWIDTH] IN BLOOD BY AUTOMATED COUNT: 16 % (ref 10–15)
GLUCOSE SERPL-MCNC: 104 MG/DL (ref 70–99)
HCT VFR BLD AUTO: 35.8 % (ref 40–53)
HGB BLD-MCNC: 12.1 G/DL (ref 13.3–17.7)
MCH RBC QN AUTO: 29.2 PG (ref 26.5–33)
MCHC RBC AUTO-ENTMCNC: 33.8 G/DL (ref 31.5–36.5)
MCV RBC AUTO: 87 FL (ref 78–100)
PLATELET # BLD AUTO: 400 10E3/UL (ref 150–450)
POTASSIUM SERPL-SCNC: 4.2 MMOL/L (ref 3.4–5.3)
PROT SERPL-MCNC: 6.5 G/DL (ref 6.4–8.3)
RBC # BLD AUTO: 4.14 10E6/UL (ref 4.4–5.9)
SODIUM SERPL-SCNC: 137 MMOL/L (ref 135–145)
WBC # BLD AUTO: 4 10E3/UL (ref 4–11)

## 2023-09-30 PROCEDURE — 99232 SBSQ HOSP IP/OBS MODERATE 35: CPT | Performed by: STUDENT IN AN ORGANIZED HEALTH CARE EDUCATION/TRAINING PROGRAM

## 2023-09-30 PROCEDURE — 96372 THER/PROPH/DIAG INJ SC/IM: CPT | Performed by: STUDENT IN AN ORGANIZED HEALTH CARE EDUCATION/TRAINING PROGRAM

## 2023-09-30 PROCEDURE — 80053 COMPREHEN METABOLIC PANEL: CPT | Performed by: STUDENT IN AN ORGANIZED HEALTH CARE EDUCATION/TRAINING PROGRAM

## 2023-09-30 PROCEDURE — G0378 HOSPITAL OBSERVATION PER HR: HCPCS

## 2023-09-30 PROCEDURE — 120N000001 HC R&B MED SURG/OB

## 2023-09-30 PROCEDURE — 85027 COMPLETE CBC AUTOMATED: CPT | Performed by: STUDENT IN AN ORGANIZED HEALTH CARE EDUCATION/TRAINING PROGRAM

## 2023-09-30 PROCEDURE — 36415 COLL VENOUS BLD VENIPUNCTURE: CPT | Performed by: STUDENT IN AN ORGANIZED HEALTH CARE EDUCATION/TRAINING PROGRAM

## 2023-09-30 PROCEDURE — 250N000011 HC RX IP 250 OP 636: Mod: JZ | Performed by: STUDENT IN AN ORGANIZED HEALTH CARE EDUCATION/TRAINING PROGRAM

## 2023-09-30 PROCEDURE — 250N000013 HC RX MED GY IP 250 OP 250 PS 637: Performed by: STUDENT IN AN ORGANIZED HEALTH CARE EDUCATION/TRAINING PROGRAM

## 2023-09-30 RX ADMIN — LORAZEPAM 2 MG: 2 INJECTION INTRAMUSCULAR; INTRAVENOUS at 15:02

## 2023-09-30 RX ADMIN — THIAMINE HCL TAB 100 MG 100 MG: 100 TAB at 07:58

## 2023-09-30 RX ADMIN — LORAZEPAM 1 MG: 0.5 TABLET ORAL at 20:30

## 2023-09-30 RX ADMIN — CLONIDINE HYDROCHLORIDE 0.1 MG: 0.1 TABLET ORAL at 12:30

## 2023-09-30 RX ADMIN — LORAZEPAM 1 MG: 2 INJECTION INTRAMUSCULAR; INTRAVENOUS at 00:39

## 2023-09-30 RX ADMIN — LORAZEPAM 2 MG: 2 INJECTION INTRAMUSCULAR; INTRAVENOUS at 18:19

## 2023-09-30 RX ADMIN — LORAZEPAM 1 MG: 2 INJECTION INTRAMUSCULAR; INTRAVENOUS at 23:41

## 2023-09-30 RX ADMIN — LORAZEPAM 1 MG: 2 INJECTION INTRAMUSCULAR; INTRAVENOUS at 18:57

## 2023-09-30 RX ADMIN — LORAZEPAM 2 MG: 2 INJECTION INTRAMUSCULAR; INTRAVENOUS at 13:32

## 2023-09-30 RX ADMIN — CLONIDINE HYDROCHLORIDE 0.1 MG: 0.1 TABLET ORAL at 20:30

## 2023-09-30 RX ADMIN — LORAZEPAM 2 MG: 2 INJECTION INTRAMUSCULAR; INTRAVENOUS at 06:48

## 2023-09-30 RX ADMIN — FOLIC ACID 1 MG: 1 TABLET ORAL at 07:58

## 2023-09-30 RX ADMIN — GABAPENTIN 900 MG: 300 CAPSULE ORAL at 04:36

## 2023-09-30 RX ADMIN — ENOXAPARIN SODIUM 40 MG: 40 INJECTION SUBCUTANEOUS at 20:30

## 2023-09-30 RX ADMIN — GABAPENTIN 900 MG: 300 CAPSULE ORAL at 20:30

## 2023-09-30 RX ADMIN — CLONIDINE HYDROCHLORIDE 0.1 MG: 0.1 TABLET ORAL at 04:36

## 2023-09-30 RX ADMIN — Medication 1 TABLET: at 07:58

## 2023-09-30 RX ADMIN — LORAZEPAM 2 MG: 2 INJECTION INTRAMUSCULAR; INTRAVENOUS at 10:59

## 2023-09-30 RX ADMIN — GABAPENTIN 900 MG: 300 CAPSULE ORAL at 12:30

## 2023-09-30 RX ADMIN — LORAZEPAM 1 MG: 2 INJECTION INTRAMUSCULAR; INTRAVENOUS at 22:26

## 2023-09-30 RX ADMIN — LORAZEPAM 2 MG: 2 INJECTION INTRAMUSCULAR; INTRAVENOUS at 17:41

## 2023-09-30 RX ADMIN — LORAZEPAM 1 MG: 0.5 TABLET ORAL at 04:36

## 2023-09-30 RX ADMIN — LORAZEPAM 1 MG: 0.5 TABLET ORAL at 08:11

## 2023-09-30 RX ADMIN — LORAZEPAM 2 MG: 2 INJECTION INTRAMUSCULAR; INTRAVENOUS at 11:55

## 2023-09-30 ASSESSMENT — ACTIVITIES OF DAILY LIVING (ADL)
ADLS_ACUITY_SCORE: 37

## 2023-09-30 NOTE — UTILIZATION REVIEW
"Inpatient to Observation note:    Admission Status; Secondary Review Determination     Under the authority of the Utilization Management Committee, the utilization review process indicated a secondary review on the above patient.  The review outcome is based on review of the medical records, discussions with staff, and applying clinical experience noted on the date of the review.          (x) Observation Status Appropriate - This patient does not meet hospital inpatient criteria and is placed in observation status. If this patient's primary payer is Medicare and was admitted as an inpatient, Condition Code 44 should be used and patient status changed to \"observation\".     RATIONALE FOR DETERMINATION     38 years old male with past medical history significant for alcohol abuse/dependence pancreatitis presented with alcohol intoxication.  Patient admitted for alcohol intoxication treatment and possible alcohol withdrawal.  Patient agrees to go to inpatient chemical dependency treatment program.      The severity of illness, intensity of service provided, expected LOS and risk for adverse outcome make the care appropriate for further observation; however, doesn't meet criteria for hospital inpatient admission. Dr Beebe  notified of this determination.        The information on this document is developed by the utilization review team in order for the business office to ensure compliance.  This only denotes the appropriateness of proper admission status and does not reflect the quality of care rendered.         The definitions of Inpatient Status and Observation Status used in making the determination above are those provided in the CMS Coverage Manual, Chapter 1 and Chapter 6, section 70.4.      Sincerely,  Bryson Jonas MD  Utilization Review  Physician Advisor  [facility].   "

## 2023-09-30 NOTE — PLAN OF CARE
Goal Outcome Evaluation:    Patient alert and oriented with VSS on RA. On CIWA protocol scoring 15 to 6. PRN ativan given per protocol. Patient called out this morning reporting a significant increase in anxiety and irritability, visually appeared to be very uncomfortable. Did void once patient arrived to room. Adequate oral intake, denies any N/V. Stated being excited for treatment. Call light in reach and bed alarm on.

## 2023-09-30 NOTE — PROGRESS NOTES
Care Management Follow Up    Length of Stay (days): 1    Expected Discharge Date: 10/01/2023     Concerns to be Addressed:     patient desiring CD treatment upon discharge from the hospital  Patient plan of care discussed at interdisciplinary rounds: Yes    Anticipated Discharge Disposition:  CD treatment     Anticipated Discharge Services:  CD treatment  Anticipated Discharge DME:  none    Patient/family educated on Medicare website which has current facility and service quality ratings:  n/a  Education Provided on the Discharge Plan:    Patient/Family in Agreement with the Plan:      Referrals Placed by CM/SW:  CD treatment  Private pay costs discussed: Not applicable    Additional Information:  Patient is agreeable to going to a detox facility and then Chemical Dependency treatment. The Care Manager who worked with him in the Emergency Department yesterday looked into patient's options for detox and treatment and notes:   No response from [facility], left 3 voicemails.  Updated mom via voicemail. Left 3 voicemails and no response.  [facility] inpatient in [place]: Blossom Johnson called [phone], she can potentially take pt on Tuesday pending an in-person meeting for treatment and pt can not be intoxicated when he arrives for his appointment or admission to treatment. Blossom states that pt has a CD evaluation from August that will be sufficient. Pt was able to connect with Blossom at [facility] inpatient and is able to make appointment for Tuesday 6pm. Again, pt needs to be sober when he arrives for treatment.      At this time, awaiting a detox facility to accept him for detox. Pt may end up completing detox here. Is able to be seen at [facility] on Tuesday at 6pm.    NINA Ramos

## 2023-09-30 NOTE — PROGRESS NOTES
[facility]    Medicine Progress Note - Hospitalist Service    Date of Admission:  9/29/2023    Assessment & Plan      Nikhil Rios is a 38M presenting for alcohol intoxication and anticipated alcohol withdrawal; pmhx includes alcohol abuse/dependence, alcohol withdrawal, pancreatitis; admitted for alcohol withdrawal.    Alcohol dependence  Alcohol withdrawal  -Presents with acute withdrawal, previous hx of seizure, but noICU/intubation. EtOH ingestion estimated as 12-15 drinks/day, last ingestion 9/28 @ 10pm. Denies any other alcohol containing substances.  -Telemetry  -Mg/phos/K trending  -electrolyte repletion per protocols  -gabapentin per protocol  -ativan per protocols/CIWA  -multivitamins  -zofran/compazine IV/PO PRN  -CM with rule 25 evaluation     Elevated transaminases  -Stable from 1 week prior, in setting of known alcohol dependence. Synthetic liver function intact.  -CMP trend  -No tylenol in intoxication phase, may be considered starting 9/30    Thrombocytosis  -Elevated in acute setting, recently cytopenic.  -CBC trend- improving    Tobacco dependence  -Estimated 1-5cigaretttes/day, estimated 8pk/yr hx. Has NRT at home.   - NRT gum while inpatient.       Diet: Regular Diet Adult    DVT Prophylaxis: Enoxaparin (Lovenox) SQ  Mars Catheter: Not present  Lines: None     Cardiac Monitoring: ACTIVE order. Indication: Drug overdose (24 hours)  Code Status: Full Code      Clinically Significant Risk Factors Present on Admission             # Anion Gap Metabolic Acidosis: Highest Anion Gap = 20 mmol/L in last 2 days, will monitor and treat as appropriate      # Hypertension: Home medication list includes antihypertensive(s)                 Disposition Plan      Expected Discharge Date: 10/01/2023                  Shayla Beebe MD  Hospitalist Service  [facility]  Securely message with [employer] (more info)  Text page via [employer] Paging/Directory    ______________________________________________________________________    Interval History   Patient is new to me today.  Patient is seen and examined at bedside.  Has tremors. Insect crawling sensations. No nausea, vomiting.     Physical Exam   Vital Signs: Temp: 97.6  F (36.4  C) Temp src: Oral BP: 126/79 Pulse: 73   Resp: 18 SpO2: 97 % O2 Device: None (Room air)    Weight: 0 lbs 0 oz    GEN: Alert and oriented. Not in acute distress.  HEENT: Atraumatic, mucous membrane- moist and pink.  Chest: Bilateral air entry.  CVS: S1S2 regular.   Abdomen: Soft. Non-tender, non-distended. No organomegaly. No guarding or rigidity. Bowel sounds active.   Extremities: No pedal edema.  CNS: tremors  Skin: no cyanosis or clubbing.     Medical Decision Making             Data

## 2023-09-30 NOTE — H&P
[facility]    History and Physical - Hospitalist Service       Date of Admission:  9/29/2023    Assessment & Plan      Nikhil Rios is a 38M presenting for alcohol intoxication and anticipated alcohol withdrawal; pmhx includes alcohol abuse/dependence, alcohol withdrawal, pancreatitis; admitted for alcohol withdrawal.    #alcohol abuse  #alcohol dependence  #alcohol withdrawal  Presents with acute withdrawal, previous hx of seizure, but noICU/intubation. EtOH ingestion estimated as 12-15 drinks/day, last ingestion 9/28 @ 10pm. Denies any other alcohol containing substances.  -Telemetry  -Mg/phos/K trending  -electrolyte repletion per protocols  -gabapentin per protocol  -ativan per protocols/CIWA  -multivitamins  -zofran/compazine IV/PO PRN  -CM with rule 25 evaluation     #elevated transaminases  Stable from 1 week prior, in setting of known alcohol dependence. Synthetic liver function intact.  -CMP trend  -No tylenol in intoxication phase, may be considered starting 9/30    #thrombocytosis  Elevated in acute setting, recently cytopenic.  -CBC trend    #tobacco dependence  Estimated 1-5cigaretttes/day, estimated 8pk/yr hx. Has NRT at home. Discussed additional reduction/cessation on day of admission for 3 minutes, will proceed with NRT gum while inpatient.       Diet: Combination Diet Full Liquid    DVT Prophylaxis: Enoxaparin (Lovenox) SQ  Mars Catheter: Not present  Lines: None     Cardiac Monitoring: ACTIVE order. Indication: Drug overdose (24 hours)  Code Status: Full Code    Clinically Significant Risk Factors Present on Admission             # Anion Gap Metabolic Acidosis: Highest Anion Gap = 20 mmol/L in last 2 days, will monitor and treat as appropriate        # Hypertension: Home medication list includes antihypertensive(s)                 Disposition Plan      Expected Discharge Date: 10/01/2023                  Jb Wilder MD  Hospitalist Service  [facility]  [facility]  Securely message with [employer] (more info)  Text page via [employer] Paging/Directory     ______________________________________________________________________    Chief Complaint   Alcohol intoxication    History is obtained from the patient    History of Present Illness   Nikhil Rios is a 38M presenting for alcohol intoxication and anticipated alcohol withdrawal; pmhx includes alcohol abuse/dependence, alcohol withdrawal, pancreatitis; admitted for alcohol withdrawal.    Generalized weakness, malaise for the past 24 hours.  Has been drinking an estimated 12-15 standardize alcoholic drinks daily, or 750 mL over 1 to 2 days.  His last alcoholic ingestion was on 9/28 at approximately 10 PM.  He has had headache, fidgetiness, tremors.  Has not had any seizure, no vomiting.  He has outpatient plans for inpatient substance use treatment, scheduled for 10/3.      Past Medical History    Past Medical History:   Diagnosis Date    Alcohol abuse     Alcohol abuse     Alcohol withdrawal (H)     Depressive disorder     Family history of diabetes mellitus 11/9/2007    HLD (hyperlipidemia)     HTN (hypertension)        Past Surgical History   Past Surgical History:   Procedure Laterality Date    NO HISTORY OF SURGERY      OTHER SURGICAL HISTORY      none       Prior to Admission Medications   Prior to Admission Medications   Prescriptions Last Dose Informant Patient Reported? Taking?   busPIRone (BUSPAR) 5 MG tablet Past Week Self No Yes   Sig: Take 1 tablet (5 mg) by mouth 3 times daily   cloNIDine (CATAPRES) 0.1 MG tablet Past Week Self Yes Yes   Sig: Take 0.1 mg by mouth 2 times daily as needed (Anxiety)   folic acid (FOLVITE) 1 MG tablet Past Month Self No Yes   Sig: Take 1 tablet (1 mg) by mouth daily   gabapentin (NEURONTIN) 300 MG capsule Past Week Self Yes Yes   Sig: Take 300 mg by mouth 3 times daily   hydrOXYzine (ATARAX) 25 MG tablet Past Month Self Yes Yes   Sig: Take 25 mg by mouth 3 times daily as needed  for anxiety   multivitamin w/minerals (THERA-VIT-M) tablet Past Month Self No Yes   Sig: Take 1 tablet by mouth daily   thiamine (B-1) 100 MG tablet Past Month Self No Yes   Sig: Take 1 tablet (100 mg) by mouth daily      Facility-Administered Medications: None        Review of Systems    The 10 point Review of Systems is negative other than noted in the HPI or here.      Physical Exam   Vital Signs: Temp: 98.2  F (36.8  C) Temp src: Oral BP: 114/68 Pulse: 105   Resp: 20 SpO2: 98 % O2 Device: None (Room air)    Weight: 0 lbs 0 oz    Constitutional: awake, alert, cooperative, no apparent distress, and appears stated age  Respiratory: CTAB  Cardiovascular: tachycardic, no m/g/r  GI: soft, nontender, nondistended  Musculoskeletal: shoulder/elbow flexion/extension 5/5 bilaterally and symmetric  Neurologic: AOx4, CN II-XII intact, no focal deficits. Tremulous    Medical Decision Making       45 MINUTES SPENT BY ME on the date of service doing chart review, history, exam, documentation & further activities per the note.  MANAGEMENT DISCUSSED with the following over the past 24 hours: patient, mother   NOTE(S)/MEDICAL RECORDS REVIEWED over the past 24 hours: discharge summaries, ED visits  Tests ORDERED & REVIEWED in the past 24 hours:  - See lab/imaging results included in the data section of the note      Data     I have personally reviewed the following data over the past 24 hrs:    4.3  \   14.6   / 530 (H)     142 98 10.8 /  98   4.0 24 0.83; 0.83 \     ALT: 72 (H) AST: 67 (H) AP: 96 TBILI: 0.4   ALB: 5.0 TOT PROTEIN: 7.9 LIPASE: 71 (H)       Imaging results reviewed over the past 24 hrs:   No results found for this or any previous visit (from the past 24 hour(s)).

## 2023-09-30 NOTE — ED NOTES
"[facility]  ED Nurse Handoff Report    ED Chief complaint: Alcohol Intoxication  . ED Diagnosis:   Final diagnoses:   Alcoholic intoxication without complication (H)   Alcohol withdrawal seizure without complication (H)       Allergies:   Allergies   Allergen Reactions    Gramineae Pollens Itching    Pollen Extract Rash     grass tree pollen       Code Status: Full Code    Activity level - Baseline/Home:  Regular Activity  Activity Level - Current:   Up with assist.   Lift room needed: No.   Bariatric: No   Needed: No   Isolation: No.   Infection: Not Applicable.     Respiratory status: Room air    Vital Signs (within 30 minutes):   Vitals:    09/29/23 1900 09/29/23 1915 09/29/23 1930 09/29/23 2045   BP:    116/70   BP Location:    Left arm   Patient Position:    Supine   Cuff Size:    Adult Regular   Pulse: 70 70 87 77   Resp:       Temp:       TempSrc:       SpO2: 99% 100% 95% 94%       Cardiac Rhythm:  ,      Pain level:    Patient confused: No.   Patient Falls Risk: arm band in place, patient and family education, activity supervised, and room door open.   Elimination Status: Bladder was scanned at 2000 PM; bladder scan volume was: 1150 mL     Patient Report - Initial Complaint: Pt presents to ED via EMS this AM c/o \"not feeling well\" Very intoxicated which he is well known to our department for.   Focused Assessment: ABCs intact, VSS, Chronic alcoholic, frequent visits to ER along with admission for alcohol withdrawal. Hx of withdrawal seizure. Lives at home with a roommate who is currently sober, pt is not able to return to his mother's house as she is not willing to take responsibility for him at this time. Per pt and , he has an admission date of Tuesday October 3rd at [facility] in [place] for inpatient rehab. Patient admits to drinking 750mls of Vodka daily. Last drink approximately midnight. CIWA at 1930 was 13. Given Ativan several times " throughout day by previous RN, initially was very somnolent to the point he needed some oxygen. Currently maintaining O2 sats without need for O2 intervention. He has ate and drank without nausea. Hasn't had a urination occurrence since arrival to ER, mother states that this is very normal for him. Bladder scan was approximately 1150 mls, he is refusing to be straight cathed. Aox4, baseline alcohol intoxication for him.     Abnormal Results:   Labs Ordered and Resulted from Time of ED Arrival to Time of ED Departure   COMPREHENSIVE METABOLIC PANEL - Abnormal       Result Value    Sodium 142      Potassium 4.0      Carbon Dioxide (CO2) 24      Anion Gap 20 (*)     Urea Nitrogen 10.8      Creatinine 0.83      GFR Estimate >90      Calcium 9.0      Chloride 98      Glucose 98      Alkaline Phosphatase 96      AST 67 (*)     ALT 72 (*)     Protein Total 7.9      Albumin 5.0      Bilirubin Total 0.4     LIPASE - Abnormal    Lipase 71 (*)    ETHYL ALCOHOL LEVEL - Abnormal    Alcohol ethyl 0.43 (*)    CBC WITH PLATELETS AND DIFFERENTIAL - Abnormal    WBC Count 4.3      RBC Count 4.95      Hemoglobin 14.6      Hematocrit 42.1      MCV 85      MCH 29.5      MCHC 34.7      RDW 16.3 (*)     Platelet Count 530 (*)     % Neutrophils 34      % Lymphocytes 54      % Monocytes 8      % Eosinophils 1      % Basophils 3      % Immature Granulocytes 0      NRBCs per 100 WBC 0      Absolute Neutrophils 1.5 (*)     Absolute Lymphocytes 2.3      Absolute Monocytes 0.4      Absolute Eosinophils 0.0      Absolute Basophils 0.1      Absolute Immature Granulocytes 0.0      Absolute NRBCs 0.0     MAGNESIUM - Normal    Magnesium 1.9          No orders to display       Treatments provided: IV Ativan, fluids, food/liquids  Family Comments: Mother Veronique is very supportive but may be an enabler. She would like him to get help and states she will not allow him back to her house tonight.  OBS brochure/video discussed/provided to patient:  No  ED  Medications:   Medications   ondansetron (ZOFRAN) injection 4 mg (4 mg Intravenous $Given 9/29/23 0902)   sodium chloride 0.9% BOLUS 1,000 mL (0 mLs Intravenous Stopped 9/29/23 1412)   multivitamin, therapeutic (THERA-VIT) tablet 1 tablet (1 tablet Oral $Given 9/29/23 0859)   famotidine (PEPCID) injection 20 mg (20 mg Intravenous $Given 9/29/23 0901)   sucralfate (CARAFATE) tablet 1 g (1 g Oral $Given 9/29/23 0859)   alum & mag hydroxide-simethicone (MAALOX) suspension 15 mL (15 mLs Oral $Given 9/29/23 0858)   LORazepam (ATIVAN) injection 1 mg (1 mg Intravenous $Given 9/29/23 0929)   LORazepam (ATIVAN) injection 1 mg (1 mg Intravenous $Given 9/29/23 1413)   LORazepam (ATIVAN) injection 1 mg (1 mg Intravenous $Given 9/29/23 1746)   LORazepam (ATIVAN) injection 1 mg (1 mg Intravenous $Given 9/29/23 2039)       Drips infusing:  No  For the majority of the shift this patient was Green.   Interventions performed were IV fluids/meds, monitoring.    Sepsis treatment initiated: No    Cares/treatment/interventions/medications to be completed following ED care: Unknown, watch for withdrawal and implement interventions as ordered by hospitalist    ED Nurse Name: Ghislaine Koenig RN  9:05 PM

## 2023-09-30 NOTE — PLAN OF CARE
PRIMARY DIAGNOSIS: Alcohol withdrawal  OUTPATIENT/OBSERVATION GOALS TO BE MET BEFORE DISCHARGE:  ADLs back to baseline: Yes    Activity and level of assistance: Up with standby assistance.    Pain status: Pain free.    Return to near baseline physical activity: Yes     Discharge Planner Nurse   Safe discharge environment identified: Yes, detox facility/treatment  Barriers to discharge: Yes, waiting for placement       Entered by: Fahad Ya RN 09/30/2023 1:22 PM     Pt is alert and oriented x4, able to communicate needs, standby assist. Pt scoring 11-14 CIWA this morning and afternoon, most prominent symptoms are tremors, anxiousness, and restlessness, Pt also states having some numbness in the toes. Pt tolerating diet, no complaints of nausea/vomiting.     Pt had scored 11 CIWA at 1534, did not want another dose of ativan at that time stated he was feeling a bit better than before and wanted to try to sleep, will continue to assess

## 2023-10-01 LAB
ALBUMIN SERPL BCG-MCNC: 4.2 G/DL (ref 3.5–5.2)
ALP SERPL-CCNC: 74 U/L (ref 40–129)
ALT SERPL W P-5'-P-CCNC: 58 U/L (ref 0–70)
ANION GAP SERPL CALCULATED.3IONS-SCNC: 11 MMOL/L (ref 7–15)
AST SERPL W P-5'-P-CCNC: 46 U/L (ref 0–45)
BASOPHILS # BLD AUTO: 0.1 10E3/UL (ref 0–0.2)
BASOPHILS NFR BLD AUTO: 3 %
BILIRUB SERPL-MCNC: 0.5 MG/DL
BUN SERPL-MCNC: 7.4 MG/DL (ref 6–20)
CALCIUM SERPL-MCNC: 9.5 MG/DL (ref 8.6–10)
CHLORIDE SERPL-SCNC: 101 MMOL/L (ref 98–107)
CREAT SERPL-MCNC: 0.84 MG/DL (ref 0.67–1.17)
DEPRECATED HCO3 PLAS-SCNC: 27 MMOL/L (ref 22–29)
EGFRCR SERPLBLD CKD-EPI 2021: >90 ML/MIN/1.73M2
EOSINOPHIL # BLD AUTO: 0.2 10E3/UL (ref 0–0.7)
EOSINOPHIL NFR BLD AUTO: 4 %
ERYTHROCYTE [DISTWIDTH] IN BLOOD BY AUTOMATED COUNT: 16.2 % (ref 10–15)
GLUCOSE SERPL-MCNC: 96 MG/DL (ref 70–99)
HCT VFR BLD AUTO: 38.3 % (ref 40–53)
HGB BLD-MCNC: 12.7 G/DL (ref 13.3–17.7)
IMM GRANULOCYTES # BLD: 0 10E3/UL
IMM GRANULOCYTES NFR BLD: 1 %
LYMPHOCYTES # BLD AUTO: 1.5 10E3/UL (ref 0.8–5.3)
LYMPHOCYTES NFR BLD AUTO: 31 %
MAGNESIUM SERPL-MCNC: 1.9 MG/DL (ref 1.7–2.3)
MCH RBC QN AUTO: 29.3 PG (ref 26.5–33)
MCHC RBC AUTO-ENTMCNC: 33.2 G/DL (ref 31.5–36.5)
MCV RBC AUTO: 88 FL (ref 78–100)
MONOCYTES # BLD AUTO: 0.6 10E3/UL (ref 0–1.3)
MONOCYTES NFR BLD AUTO: 13 %
NEUTROPHILS # BLD AUTO: 2.4 10E3/UL (ref 1.6–8.3)
NEUTROPHILS NFR BLD AUTO: 48 %
NRBC # BLD AUTO: 0 10E3/UL
NRBC BLD AUTO-RTO: 0 /100
PHOSPHATE SERPL-MCNC: 3.7 MG/DL (ref 2.5–4.5)
PLATELET # BLD AUTO: 359 10E3/UL (ref 150–450)
POTASSIUM SERPL-SCNC: 4.8 MMOL/L (ref 3.4–5.3)
PROT SERPL-MCNC: 6.7 G/DL (ref 6.4–8.3)
RBC # BLD AUTO: 4.34 10E6/UL (ref 4.4–5.9)
SODIUM SERPL-SCNC: 139 MMOL/L (ref 135–145)
WBC # BLD AUTO: 4.8 10E3/UL (ref 4–11)

## 2023-10-01 PROCEDURE — 36415 COLL VENOUS BLD VENIPUNCTURE: CPT | Performed by: STUDENT IN AN ORGANIZED HEALTH CARE EDUCATION/TRAINING PROGRAM

## 2023-10-01 PROCEDURE — 80053 COMPREHEN METABOLIC PANEL: CPT | Performed by: STUDENT IN AN ORGANIZED HEALTH CARE EDUCATION/TRAINING PROGRAM

## 2023-10-01 PROCEDURE — 85025 COMPLETE CBC W/AUTO DIFF WBC: CPT | Performed by: STUDENT IN AN ORGANIZED HEALTH CARE EDUCATION/TRAINING PROGRAM

## 2023-10-01 PROCEDURE — 99232 SBSQ HOSP IP/OBS MODERATE 35: CPT | Performed by: STUDENT IN AN ORGANIZED HEALTH CARE EDUCATION/TRAINING PROGRAM

## 2023-10-01 PROCEDURE — 84100 ASSAY OF PHOSPHORUS: CPT | Performed by: STUDENT IN AN ORGANIZED HEALTH CARE EDUCATION/TRAINING PROGRAM

## 2023-10-01 PROCEDURE — 250N000013 HC RX MED GY IP 250 OP 250 PS 637: Performed by: STUDENT IN AN ORGANIZED HEALTH CARE EDUCATION/TRAINING PROGRAM

## 2023-10-01 PROCEDURE — 120N000001 HC R&B MED SURG/OB

## 2023-10-01 PROCEDURE — 250N000011 HC RX IP 250 OP 636: Mod: JZ | Performed by: STUDENT IN AN ORGANIZED HEALTH CARE EDUCATION/TRAINING PROGRAM

## 2023-10-01 PROCEDURE — G0378 HOSPITAL OBSERVATION PER HR: HCPCS

## 2023-10-01 PROCEDURE — 83735 ASSAY OF MAGNESIUM: CPT | Performed by: STUDENT IN AN ORGANIZED HEALTH CARE EDUCATION/TRAINING PROGRAM

## 2023-10-01 RX ORDER — HYDROXYZINE HYDROCHLORIDE 25 MG/1
50 TABLET, FILM COATED ORAL
Status: DISCONTINUED | OUTPATIENT
Start: 2023-10-01 | End: 2023-10-03 | Stop reason: HOSPADM

## 2023-10-01 RX ADMIN — LORAZEPAM 2 MG: 2 INJECTION INTRAMUSCULAR; INTRAVENOUS at 15:45

## 2023-10-01 RX ADMIN — THIAMINE HCL TAB 100 MG 100 MG: 100 TAB at 08:44

## 2023-10-01 RX ADMIN — LORAZEPAM 2 MG: 2 INJECTION INTRAMUSCULAR; INTRAVENOUS at 06:53

## 2023-10-01 RX ADMIN — ENOXAPARIN SODIUM 40 MG: 40 INJECTION SUBCUTANEOUS at 21:15

## 2023-10-01 RX ADMIN — GABAPENTIN 900 MG: 300 CAPSULE ORAL at 05:34

## 2023-10-01 RX ADMIN — CLONIDINE HYDROCHLORIDE 0.1 MG: 0.1 TABLET ORAL at 05:34

## 2023-10-01 RX ADMIN — CLONIDINE HYDROCHLORIDE 0.1 MG: 0.1 TABLET ORAL at 13:09

## 2023-10-01 RX ADMIN — LORAZEPAM 1 MG: 0.5 TABLET ORAL at 05:34

## 2023-10-01 RX ADMIN — LORAZEPAM 1 MG: 2 INJECTION INTRAMUSCULAR; INTRAVENOUS at 08:56

## 2023-10-01 RX ADMIN — LORAZEPAM 2 MG: 2 INJECTION INTRAMUSCULAR; INTRAVENOUS at 18:33

## 2023-10-01 RX ADMIN — LORAZEPAM 2 MG: 2 INJECTION INTRAMUSCULAR; INTRAVENOUS at 02:50

## 2023-10-01 RX ADMIN — FOLIC ACID 1 MG: 1 TABLET ORAL at 08:44

## 2023-10-01 RX ADMIN — Medication 1 TABLET: at 08:44

## 2023-10-01 RX ADMIN — HYDROXYZINE HYDROCHLORIDE 50 MG: 25 TABLET, FILM COATED ORAL at 00:29

## 2023-10-01 RX ADMIN — LORAZEPAM 1 MG: 2 INJECTION INTRAMUSCULAR; INTRAVENOUS at 04:24

## 2023-10-01 RX ADMIN — GABAPENTIN 900 MG: 300 CAPSULE ORAL at 13:09

## 2023-10-01 RX ADMIN — LORAZEPAM 1 MG: 2 INJECTION INTRAMUSCULAR; INTRAVENOUS at 13:09

## 2023-10-01 RX ADMIN — LORAZEPAM 2 MG: 2 INJECTION INTRAMUSCULAR; INTRAVENOUS at 21:18

## 2023-10-01 RX ADMIN — LORAZEPAM 1 MG: 2 INJECTION INTRAMUSCULAR; INTRAVENOUS at 19:54

## 2023-10-01 RX ADMIN — GABAPENTIN 900 MG: 300 CAPSULE ORAL at 21:15

## 2023-10-01 RX ADMIN — CLONIDINE HYDROCHLORIDE 0.1 MG: 0.1 TABLET ORAL at 21:15

## 2023-10-01 ASSESSMENT — ACTIVITIES OF DAILY LIVING (ADL)
ADLS_ACUITY_SCORE: 37
ADLS_ACUITY_SCORE: 33
ADLS_ACUITY_SCORE: 37
ADLS_ACUITY_SCORE: 33
ADLS_ACUITY_SCORE: 37
ADLS_ACUITY_SCORE: 33
ADLS_ACUITY_SCORE: 33
ADLS_ACUITY_SCORE: 37
ADLS_ACUITY_SCORE: 33
ADLS_ACUITY_SCORE: 37
ADLS_ACUITY_SCORE: 33
ADLS_ACUITY_SCORE: 37

## 2023-10-01 NOTE — PROGRESS NOTES
Care Management Follow Up     Length of Stay (days): 1     Expected Discharge Date: 10/02/2023     Concerns to be Addressed:     patient desiring CD treatment upon discharge from the hospital  Patient plan of care discussed at interdisciplinary rounds: Yes     Anticipated Discharge Disposition:  CD treatment     Anticipated Discharge Services:  CD treatment  Anticipated Discharge DME:  none     Patient/family educated on Medicare website which has current facility and service quality ratings:  n/a  Education Provided on the Discharge Plan:    Patient/Family in Agreement with the Plan:       Referrals Placed by CM/SW:  CD treatment  Private pay costs discussed: Not applicable     Additional Information:  Chart reviewed. No significant events overnight. Patient is still being monitored and treated for withdrawal symptoms.     Patient is agreeable to being assessed for Chemical Dependency treatment. Care Management was in touch with [facility] inpatient in [place], Blossom Khadar phone [phone]. She indicated she can potentially take pt on Tuesday and that pt cannot be intoxicated when he arrives for his appointment or admission to treatment. Blossom states that pt had a CD evaluation from August that will be sufficient for entry into their program. Pt was able to connect with Blossom at [facility] and was able to make appointment for Tuesday 6pm. Again, pt needs to be sober when he arrives for treatment.      ANGELA RamosSW

## 2023-10-01 NOTE — PLAN OF CARE
Problem: Plan of Care - These are the overarching goals to be used throughout the patient stay.    Goal: Plan of Care Review  Description: The Plan of Care Review/Shift note should be completed every shift.  The Outcome Evaluation is a brief statement about your assessment that the patient is improving, declining, or no change.  This information will be displayed automatically on your shift note.  Outcome: Progressing   Goal Outcome Evaluation:      Patient has been without nausea and pain, but have been scoring CIWA see flowsheets. Several IV and PO prns given, primary complaints are tremors, anxiety, sweating, and agitation. Bedtime prn obtained for anxiety and given. Using call light appropriately when in need of medication. Slept on and off throughout night.      Kanchan Wolf RN

## 2023-10-01 NOTE — PLAN OF CARE
[id number]  Patient Aox4. Vss on RA. Tele NSR, heartrate 50-60s. Denies chest pain, n/v and sob. Up independently, steady gait. CIWAs; prn ativan given, see MAR. Mg/phos rechecks in am.

## 2023-10-01 NOTE — PROGRESS NOTES
[facility]    Medicine Progress Note - Hospitalist Service    Date of Admission:  9/29/2023    Assessment & Plan      Nikhil Rios is a 38M presenting for alcohol intoxication and anticipated alcohol withdrawal; pmhx includes alcohol abuse/dependence, alcohol withdrawal, pancreatitis; admitted for alcohol withdrawal.    Alcohol dependence  Alcohol withdrawal  -Presents with acute withdrawal, previous hx of seizure, but noICU/intubation. EtOH ingestion estimated as 12-15 drinks/day, last ingestion 9/28 @ 10pm. Denies any other alcohol containing substances.  -Telemetry  -Mg/phos/K trending  -electrolyte repletion per protocols  -gabapentin per protocol  -ativan per protocols/CIWA  -multivitamins  -zofran/compazine IV/PO PRN  -CM with rule 25 evaluation     Elevated transaminases  -Stable from 1 week prior, in setting of known alcohol dependence. Synthetic liver function intact.  -CMP trend  -No tylenol in intoxication phase, may be considered starting 9/30    Thrombocytosis resolving  -Elevated in acute setting, recently cytopenic.  -CBC trend    Anemia, mild  - no e/o bleeding    Tobacco dependence  -Estimated 1-5cigaretttes/day, estimated 8pk/yr hx. Has NRT at home.   - NRT gum while inpatient.       Diet: Regular Diet Adult    DVT Prophylaxis: Enoxaparin (Lovenox) SQ  Mars Catheter: Not present  Lines: None     Cardiac Monitoring: ACTIVE order. Indication: Drug overdose (24 hours)  Code Status: Full Code      Clinically Significant Risk Factors Present on Admission                    # Hypertension: Home medication list includes antihypertensive(s)                 Disposition Plan     Expected Discharge Date: 10/01/2023                  Shayla Beebe MD  Hospitalist Service  [facility]  Securely message with [employer] (more info)  Text page via [facility] Paging/Directory   ______________________________________________________________________    Interval History    Patient is seen and examined at bedside.  Has anxiety. No nausea, vomiting.     Physical Exam   Vital Signs: Temp: 98.2  F (36.8  C) Temp src: Oral BP: 122/75 Pulse: 56   Resp: 18 SpO2: 98 % O2 Device: None (Room air)    Weight: 0 lbs 0 oz    GEN: Alert and oriented. Not in acute distress.  HEENT: Atraumatic, mucous membrane- moist and pink.  Chest: Bilateral air entry.  CVS: S1S2 regular.   Abdomen: Soft. Non-tender, non-distended. No organomegaly. No guarding or rigidity. Bowel sounds active.   Extremities: No pedal edema.  CNS: tremors  Skin: no cyanosis or clubbing.     Medical Decision Making             Data

## 2023-10-01 NOTE — PLAN OF CARE
"PRIMARY DIAGNOSIS: \"GENERIC\" NURSING  OUTPATIENT/OBSERVATION GOALS TO BE MET BEFORE DISCHARGE:  ADLs back to baseline: Yes    Activity and level of assistance: Ambulating independently.    Pain status: Pain free.    Return to near baseline physical activity: Yes     Discharge Planner Nurse   Safe discharge environment identified: Yes  Barriers to discharge: Yes       Entered by: Arjun Hoang RN 10/01/2023 2:49 PM   CIWA socre 10. Pt reports mostly anxiety and diaphoresis.  Patient is independent in room.   Please review provider order for any additional goals.   Nurse to notify provider when observation goals have been met and patient is ready for discharge.                        "

## 2023-10-01 NOTE — UTILIZATION REVIEW
Inpatient appropriate    Admission Status; Secondary Review Determination       Under the authority of the Utilization Management Committee, the utilization review process indicated a secondary review on the above patient. The review outcome is based on review of the medical records, discussions with staff, and applying clinical experience noted on the date of the review.     (x) Inpatient Status Appropriate - This patient's medical care is consistent with medical management for inpatient care and reasonable inpatient medical practice.     RATIONALE FOR DETERMINATION   38 years old male with past medical history significant for alcohol abuse/dependence pancreatitis presented with alcohol intoxication. Patient admitted for alcohol intoxication treatment and possible alcohol withdrawal.  Pt cont to have active withdraws requiring doses of IV benzodiazepine pain even beyond 48 hr observational treatment period.     At the time of admission with the information available to the attending physician more than 2 nights Hospital complex care was anticipated, based on patient risk of adverse outcome if treated as outpatient and complex care required. Inpatient admission is appropriate based on the Medicare guidelines.     The information on this document is developed by the utilization review team in order for the business office to ensure compliance. This only denotes the appropriateness of proper admission status and does not reflect the quality of care rendered.   The definitions of Inpatient Status and Observation Status used in making the determination above are those provided in the CMS Coverage Manual, Chapter 1 and Chapter 6, section 70.4.   Sincerely,   Bryson Jonas MD  Utilization Review  Physician Advisor  [facility].

## 2023-10-01 NOTE — PLAN OF CARE
"PRIMARY DIAGNOSIS: \"GENERIC\" NURSING  OUTPATIENT/OBSERVATION GOALS TO BE MET BEFORE DISCHARGE:  ADLs back to baseline: No    Activity and level of assistance: Ambulating independently.    Pain status: Pain free.    Return to near baseline physical activity: Yes     Discharge Planner Nurse   Safe discharge environment identified: Yes  Barriers to discharge: Yes       Entered by: Arjun Hoang RN 10/01/2023 10:54 AM   CI WA score 9.  Patient received lorazepam 1 mg IV. Patient at 100% at breakfast. Resting in bed.   Please review provider order for any additional goals.   Nurse to notify provider when observation goals have been met and patient is ready for discharge.                        "

## 2023-10-02 ENCOUNTER — VIRTUAL VISIT (OUTPATIENT)
Dept: PSYCHOLOGY | Facility: CLINIC | Age: 38
End: 2023-10-02
Payer: COMMERCIAL

## 2023-10-02 DIAGNOSIS — F33.1 MAJOR DEPRESSIVE DISORDER, RECURRENT EPISODE, MODERATE (H): Primary | ICD-10-CM

## 2023-10-02 LAB
ALBUMIN SERPL BCG-MCNC: 4.5 G/DL (ref 3.5–5.2)
ALP SERPL-CCNC: 75 U/L (ref 40–129)
ALT SERPL W P-5'-P-CCNC: 56 U/L (ref 0–70)
ANION GAP SERPL CALCULATED.3IONS-SCNC: 9 MMOL/L (ref 7–15)
AST SERPL W P-5'-P-CCNC: 39 U/L (ref 0–45)
BASOPHILS # BLD AUTO: 0.2 10E3/UL (ref 0–0.2)
BASOPHILS NFR BLD AUTO: 2 %
BILIRUB SERPL-MCNC: 0.4 MG/DL
BUN SERPL-MCNC: 7.2 MG/DL (ref 6–20)
CALCIUM SERPL-MCNC: 9.8 MG/DL (ref 8.6–10)
CHLORIDE SERPL-SCNC: 101 MMOL/L (ref 98–107)
CREAT SERPL-MCNC: 0.86 MG/DL (ref 0.67–1.17)
DEPRECATED HCO3 PLAS-SCNC: 29 MMOL/L (ref 22–29)
EGFRCR SERPLBLD CKD-EPI 2021: >90 ML/MIN/1.73M2
EOSINOPHIL # BLD AUTO: 0.3 10E3/UL (ref 0–0.7)
EOSINOPHIL NFR BLD AUTO: 4 %
ERYTHROCYTE [DISTWIDTH] IN BLOOD BY AUTOMATED COUNT: 16.1 % (ref 10–15)
GLUCOSE SERPL-MCNC: 101 MG/DL (ref 70–99)
HCT VFR BLD AUTO: 39.7 % (ref 40–53)
HGB BLD-MCNC: 13.2 G/DL (ref 13.3–17.7)
IMM GRANULOCYTES # BLD: 0 10E3/UL
IMM GRANULOCYTES NFR BLD: 0 %
LYMPHOCYTES # BLD AUTO: 1.5 10E3/UL (ref 0.8–5.3)
LYMPHOCYTES NFR BLD AUTO: 21 %
MAGNESIUM SERPL-MCNC: 1.8 MG/DL (ref 1.7–2.3)
MCH RBC QN AUTO: 29.1 PG (ref 26.5–33)
MCHC RBC AUTO-ENTMCNC: 33.2 G/DL (ref 31.5–36.5)
MCV RBC AUTO: 87 FL (ref 78–100)
MONOCYTES # BLD AUTO: 0.7 10E3/UL (ref 0–1.3)
MONOCYTES NFR BLD AUTO: 10 %
NEUTROPHILS # BLD AUTO: 4.7 10E3/UL (ref 1.6–8.3)
NEUTROPHILS NFR BLD AUTO: 63 %
NRBC # BLD AUTO: 0 10E3/UL
NRBC BLD AUTO-RTO: 0 /100
PHOSPHATE SERPL-MCNC: 4 MG/DL (ref 2.5–4.5)
PLATELET # BLD AUTO: 411 10E3/UL (ref 150–450)
POTASSIUM SERPL-SCNC: 4.4 MMOL/L (ref 3.4–5.3)
PROT SERPL-MCNC: 7.1 G/DL (ref 6.4–8.3)
RBC # BLD AUTO: 4.54 10E6/UL (ref 4.4–5.9)
SODIUM SERPL-SCNC: 139 MMOL/L (ref 135–145)
WBC # BLD AUTO: 7.4 10E3/UL (ref 4–11)

## 2023-10-02 PROCEDURE — 99207 PR NO BILLABLE SERVICE THIS VISIT: CPT | Performed by: PSYCHOLOGIST

## 2023-10-02 PROCEDURE — 83735 ASSAY OF MAGNESIUM: CPT | Performed by: STUDENT IN AN ORGANIZED HEALTH CARE EDUCATION/TRAINING PROGRAM

## 2023-10-02 PROCEDURE — 250N000013 HC RX MED GY IP 250 OP 250 PS 637: Performed by: HOSPITALIST

## 2023-10-02 PROCEDURE — 120N000001 HC R&B MED SURG/OB

## 2023-10-02 PROCEDURE — 36415 COLL VENOUS BLD VENIPUNCTURE: CPT | Performed by: STUDENT IN AN ORGANIZED HEALTH CARE EDUCATION/TRAINING PROGRAM

## 2023-10-02 PROCEDURE — 250N000013 HC RX MED GY IP 250 OP 250 PS 637: Performed by: STUDENT IN AN ORGANIZED HEALTH CARE EDUCATION/TRAINING PROGRAM

## 2023-10-02 PROCEDURE — 80053 COMPREHEN METABOLIC PANEL: CPT | Performed by: STUDENT IN AN ORGANIZED HEALTH CARE EDUCATION/TRAINING PROGRAM

## 2023-10-02 PROCEDURE — 99232 SBSQ HOSP IP/OBS MODERATE 35: CPT | Performed by: HOSPITALIST

## 2023-10-02 PROCEDURE — 84100 ASSAY OF PHOSPHORUS: CPT | Performed by: STUDENT IN AN ORGANIZED HEALTH CARE EDUCATION/TRAINING PROGRAM

## 2023-10-02 PROCEDURE — 85025 COMPLETE CBC W/AUTO DIFF WBC: CPT | Performed by: STUDENT IN AN ORGANIZED HEALTH CARE EDUCATION/TRAINING PROGRAM

## 2023-10-02 PROCEDURE — 250N000011 HC RX IP 250 OP 636: Performed by: STUDENT IN AN ORGANIZED HEALTH CARE EDUCATION/TRAINING PROGRAM

## 2023-10-02 RX ORDER — CLONIDINE HYDROCHLORIDE 0.1 MG/1
0.1 TABLET ORAL 2 TIMES DAILY
Status: DISCONTINUED | OUTPATIENT
Start: 2023-10-02 | End: 2023-10-03 | Stop reason: HOSPADM

## 2023-10-02 RX ADMIN — LORAZEPAM 2 MG: 2 INJECTION INTRAMUSCULAR; INTRAVENOUS at 05:30

## 2023-10-02 RX ADMIN — FOLIC ACID 1 MG: 1 TABLET ORAL at 08:33

## 2023-10-02 RX ADMIN — LORAZEPAM 1 MG: 2 INJECTION INTRAMUSCULAR; INTRAVENOUS at 08:30

## 2023-10-02 RX ADMIN — LORAZEPAM 2 MG: 2 INJECTION INTRAMUSCULAR; INTRAVENOUS at 16:33

## 2023-10-02 RX ADMIN — LORAZEPAM 2 MG: 2 INJECTION INTRAMUSCULAR; INTRAVENOUS at 17:26

## 2023-10-02 RX ADMIN — LORAZEPAM 1 MG: 2 INJECTION INTRAMUSCULAR; INTRAVENOUS at 20:54

## 2023-10-02 RX ADMIN — ENOXAPARIN SODIUM 40 MG: 40 INJECTION SUBCUTANEOUS at 20:37

## 2023-10-02 RX ADMIN — HYDROXYZINE HYDROCHLORIDE 50 MG: 25 TABLET, FILM COATED ORAL at 20:36

## 2023-10-02 RX ADMIN — CLONIDINE HYDROCHLORIDE 0.1 MG: 0.1 TABLET ORAL at 21:40

## 2023-10-02 RX ADMIN — CLONIDINE HYDROCHLORIDE 0.1 MG: 0.1 TABLET ORAL at 05:21

## 2023-10-02 RX ADMIN — LORAZEPAM 2 MG: 2 INJECTION INTRAMUSCULAR; INTRAVENOUS at 13:13

## 2023-10-02 RX ADMIN — THIAMINE HCL TAB 100 MG 100 MG: 100 TAB at 08:33

## 2023-10-02 RX ADMIN — LORAZEPAM 1 MG: 0.5 TABLET ORAL at 11:59

## 2023-10-02 RX ADMIN — LORAZEPAM 1 MG: 2 INJECTION INTRAMUSCULAR; INTRAVENOUS at 13:56

## 2023-10-02 RX ADMIN — GABAPENTIN 900 MG: 300 CAPSULE ORAL at 05:20

## 2023-10-02 RX ADMIN — Medication 1 TABLET: at 08:33

## 2023-10-02 RX ADMIN — LORAZEPAM 1 MG: 0.5 TABLET ORAL at 10:08

## 2023-10-02 RX ADMIN — LORAZEPAM 1 MG: 0.5 TABLET ORAL at 15:19

## 2023-10-02 RX ADMIN — LORAZEPAM 1 MG: 0.5 TABLET ORAL at 19:30

## 2023-10-02 RX ADMIN — LORAZEPAM 1 MG: 2 INJECTION INTRAMUSCULAR; INTRAVENOUS at 18:16

## 2023-10-02 ASSESSMENT — ACTIVITIES OF DAILY LIVING (ADL)
FALL_HISTORY_WITHIN_LAST_SIX_MONTHS: YES
TOILETING_ISSUES: NO
ADLS_ACUITY_SCORE: 33
ADLS_ACUITY_SCORE: 20
DIFFICULTY_EATING/SWALLOWING: NO
CONCENTRATING,_REMEMBERING_OR_MAKING_DECISIONS_DIFFICULTY: NO
ADLS_ACUITY_SCORE: 33
ADLS_ACUITY_SCORE: 20
CHANGE_IN_FUNCTIONAL_STATUS_SINCE_ONSET_OF_CURRENT_ILLNESS/INJURY: NO
DOING_ERRANDS_INDEPENDENTLY_DIFFICULTY: NO
ADLS_ACUITY_SCORE: 33
ADLS_ACUITY_SCORE: 20
ADLS_ACUITY_SCORE: 33
WALKING_OR_CLIMBING_STAIRS_DIFFICULTY: NO
DRESSING/BATHING_DIFFICULTY: NO
NUMBER_OF_TIMES_PATIENT_HAS_FALLEN_WITHIN_LAST_SIX_MONTHS: 2
WEAR_GLASSES_OR_BLIND: NO
ADLS_ACUITY_SCORE: 20

## 2023-10-02 NOTE — PLAN OF CARE
Problem: Alcohol Withdrawal  Goal: Alcohol Withdrawal Symptom Control  Outcome: Progressing  Goal: Optimal Neurologic Function  Outcome: Progressing  Goal: Readiness for Change Identified  Outcome: Progressing   Goal Outcome Evaluation:       Patient alert and oriented, able to communicate needs. Denies nausea/ vomiting or headache. Sinus august on Tele. On CIWA assessment. Ativan administered per protocol see flowsheet. Vitals stable. Slept majority of the night

## 2023-10-02 NOTE — PROGRESS NOTES
[facility]    Medicine Progress Note - Hospitalist Service    Date of Admission:  9/29/2023    Assessment & Plan   38 year old male presented on 9/29 for alcohol intoxication and anticipated alcohol withdrawal. Past medical history includes alcohol abuse/dependence, alcohol withdrawal, pancreatitis. Started on CIWA with PRN ativan, and gabapentin and clonidine.    -Continue CIWA with PRN ativan. Assessing for outpatient alcohol program. Likely discharge in 24 hours.    Alcohol dependence  Alcohol withdrawal  Presented with acute withdrawal, previous hx of seizure, but no ICU/intubation. EtOH ingestion estimated as 12-15 drinks/day, last ingestion 9/28 @ 10pm. Denies any other alcohol containing substances.  -Still showing some withdrawal, treated with PRN ativan  -Stop Gabapentin, taper Clonidine  -MVI, Thiamine  -Patient and CM working on disposition, likely outpatient alcohol program    Tobacco dependence  -Estimated 1-5cigaretttes/day, estimated 8pk/yr hx. Has NRT at home.   - NRT gum while inpatient.    Resolved Problems  Elevated transaminases  Stable from 1 week prior, in setting of known alcohol dependence. Synthetic liver function intact.    Thrombocytosis  Elevated in acute setting, recently cytopenic.    Anemia, mild       Diet: Regular Diet Adult    DVT Prophylaxis: Enoxaparin (Lovenox) SQ  Mars Catheter: Not present  Lines: None     Cardiac Monitoring: None  Code Status: Full Code      Clinically Significant Risk Factors                                    Disposition Plan     Expected Discharge Date: 10/03/2023        Discharge Comments: actively withdrawing--PRN ativan consistently          Tripp Solomon MD  Hospitalist Service  [facility]  Securely message with [employer] (more info)  Text page via [employer] Paging/Directory   ______________________________________________________________________    Interval History   No acute events overnight.     Patient  seen and evaluated at bedside.     Doing okay, denies any chest pain, shortness of breath or abdominal pain.     Spoke about disposition and he brought up [facility] program which he feels he may do well with. States he called them this morning.    Physical Exam   Vital Signs: Temp: 98.2  F (36.8  C) Temp src: Oral BP: 127/80 Pulse: 62   Resp: 18 SpO2: 98 % O2 Device: None (Room air)    Weight: 0 lbs 0 oz    General: NAD  CV: +S1/S2, no pitting edema  Respiratory: clear to auscultation bilaterally  GI: soft, non-tender, ND  Neuro: alert    Medical Decision Making       45 MINUTES SPENT BY ME on the date of service doing chart review, history, exam, documentation & further activities per the note.      Data     I have personally reviewed the following data over the past 24 hrs:    7.4  \   13.2 (L)   / 411     139 101 7.2 /  101 (H)   4.4 29 0.86 \     ALT: 56 AST: 39 AP: 75 TBILI: 0.4   ALB: 4.5 TOT PROTEIN: 7.1 LIPASE: N/A     Imaging results reviewed over the past 24 hrs:   No results found for this or any previous visit (from the past 24 hour(s)).

## 2023-10-02 NOTE — PROGRESS NOTES
"NOTE:  Client scheduled this session to ask \"What's next?\"  I informed him about our agreement from last ADHD testing consult that he would seek Inpatient treatment for his addiction and once he had 1 year of sobriety/being clean, if he still was experience difficulties that were could be ADHD, to contact me to evaluate for testing at that time.  He agreed.  No billing for today as we only met from 9:07am - 9:09am.      Mitchell Santiago PsyD, LP  October 2, 2023  "

## 2023-10-02 NOTE — CONSULTS
10/2/2023  Pt completed the MATHEW CA Update today. There is an MITRA on file for [facility] and pt's MATHEW CA Update was sent there today.      Recommendations:   1)  Complete an Intensive Outpatient CD Treatment Program with lodging.  2)  Abstain from all mood-altering chemicals unless prescribed by a licensed provider.   3)  Attend, at minimum, 2 weekly support group meetings, such as 12 step based (AA/NA), [facility] Recovery, [facility]izations, and/or [facility] meetings.     4)  Actively work with a male mentor/sponsor on a weekly basis.   5)  Follow all the recommendations of your treatment/medical providers.    Clinical Substantiation:    Pt did not enter MATHEW treatment after his last MATHEW CA was completed on 8/29/23. Pt appears motivated to enter tx programming at [facility].    Referrals/ Alternatives:  [facility] (IOP with sober housing options)  [address] [place] [address]  Phone: [phone]  fax: [phone]  [email or web address]    DAANES Assessment ID:  Assessment ID: [id number]    MATHEW consult completed by:   Roxann Koenig MA, [facility]  E-mail Address: [email or web address] [email or web address] [place] [address]     *Due to regulation of Title 42 of the Code of Federal Regulations (CFR) Part 2: Confidentiality laws apply to this note and the information wherein.  Thus, this note cannot be copy and pasted into any other health care staff's note nor can it be included in general medical records sent to ANY outside agency without the patient's written consent.

## 2023-10-02 NOTE — PROGRESS NOTES
"[address] [address]      Assessment and Placement Summary Update     Patient name:   Nikhil Rios   Patient phone: [phone] (home)    Last #:   [id number]   : 1985      PMI #: [id number]    Patient address:   [address] [place] [address]     Date of Original Assessment / Last Update: 23 Update Assessment Date: 10/2/2023   Updated by:   CITLALLI More    E-mail: [email or web address]   Referred by:   [facility] / phone number: nA   Referral to:   [facility]   NPI: NPI unknown   Summary:  This patient had a IP Substance Use Disorder assessment on 2023 at [facility] in [place], [place] completed by CITLALLI Mcintosh.  INSIDE: The patient's IP Substance Use Disorder assessment completed on 23 is in the patient's electronic medical record in Epic in the Chart Review section under the Notes/Trans Tab.    Reason for today's update: Pt is interested in MATHEW treatment at this time because \"I am unemployed and I'm been a slave to the bottle and it is time to break the cycle and get into a new environment.\"    Per 23 H&P:  Nikhil Rios is a 38M presenting for alcohol intoxication and anticipated alcohol withdrawal; pmhx includes alcohol abuse/dependence, alcohol withdrawal, pancreatitis; admitted for alcohol withdrawal.     Generalized weakness, malaise for the past 24 hours.  Has been drinking an estimated 12-15 standardize alcoholic drinks daily, or 750 mL over 1 to 2 days.  His last alcoholic ingestion was on  at approximately 10 PM.  He has had headache, fidgetiness, tremors.  Has not had any seizure, no vomiting.  He has outpatient plans for inpatient substance use treatment, scheduled for 10/3.        Substance Use History Update:              Comprehensive Substance Use History   X X = Primary Drug Used Age of First Use    " Pattern of Substance Use   (heaviest use in life and a use history within the past year if applicable) (DSM-5: Sx #3) Date /  Quantity of last use if within the past 30 days Withdrawal Potential?    Method of use  (Oral, smoked, snorted, IV, etc)   x Alcohol   16 Per 10/2/23 Update:  Past month: drinking a 750ml of vodka per day.    Per 23 MATHEW CA:  Started using with his friend that would get it from his mom and on the weekends. Currently drinks a 750 ml a day   Last use: 23  ~750ml no Oral     Marijuana/Hashish   No use             Cocaine/Crack No use             Meth/Amphetamines   No use             Heroin   No use             Other Opiates/Synthetics   No use             Inhalants  No use             Benzodiazepines   No use             Hallucinogens   No use             Barbiturates/Sedatives/Hypnotics   No use             Over-the-Counter Drugs   No use             Other   No use           x Nicotine   9 Per 10/2/23 Update:  Cigarettes: 1-5 per day.    Per 23 MATHEW CA:  Seems to smoke when drinking  23 no Smoke        Dimension: Severity Rating/ Reason for Changes from Previous Assessment:  Dimension I: Acute intoxication/Withdrawal potential     Previous ratin Current ratin   Comments:   No changes. Pt's alcohol withdrawal symptoms are being addressed while he is in the hospital.     Dimension II: Biomedical Conditions and Complications     Previous ratin Current ratin   Comments:   No changes.    Patient Active Problem List   Diagnosis    Alcohol-induced mood disorder (H)    ACS (acute coronary syndrome) (H)    Chemical dependency (H)    Alcohol-induced acute pancreatitis    Alcohol-induced insomnia (H)    Allergic rhinitis    Seasonal allergies    Urticaria    Alcohol use disorder, severe, dependence (H)    Hypokalemia    Transaminitis    Alcoholic fatty liver    Generalized anxiety disorder    Moderate episode of recurrent major depressive disorder (H)     Hypomagnesemia    Thrombocytopenia (H24)    Chronic intractable headache    Constipation    Decreased vision of left eye    Dysesthesia    Internal hemorrhoids    Keratosis pilaris    Localized superficial swelling, mass, or lump    Normocytic anemia    Pruritus    Snoring    Epigastric pain    Fatigue    Elevated LFTs    Alcohol withdrawal syndrome without complication (H)    Alcohol dependence with intoxication with complication (H)    Alcohol abuse    Acute encephalopathy    Metabolic acidosis    Alcohol abuse with intoxication (H24)    Alcohol-induced acute pancreatitis, unspecified complication status    Chin laceration, initial encounter    Alcohol withdrawal seizure without complication (H)    Alcoholic intoxication without complication (H24)     Current Facility-Administered Medications   Medication    acetaminophen (TYLENOL) tablet 650 mg    Or    acetaminophen (TYLENOL) Suppository 650 mg    cloNIDine (CATAPRES) tablet 0.1 mg    enoxaparin ANTICOAGULANT (LOVENOX) injection 40 mg    flumazenil (ROMAZICON) injection 0.2 mg    folic acid (FOLVITE) tablet 1 mg    OLANZapine zydis (zyPREXA) ODT tab 5-10 mg    Or    haloperidol lactate (HALDOL) injection 2.5-5 mg    hydrOXYzine (ATARAX) tablet 50 mg    LORazepam (ATIVAN) tablet 1-2 mg    Or    LORazepam (ATIVAN) injection 1-2 mg    melatonin tablet 1 mg    melatonin tablet 5 mg    multivitamin w/minerals (THERA-VIT-M) tablet 1 tablet    nicotine (NICORETTE) gum 2 mg    ondansetron (ZOFRAN ODT) ODT tab 4 mg    Or    ondansetron (ZOFRAN) injection 4 mg    ondansetron (ZOFRAN) injection 4 mg    prochlorperazine (COMPAZINE) injection 10 mg    Or    prochlorperazine (COMPAZINE) tablet 10 mg    Or    prochlorperazine (COMPAZINE) suppository 25 mg    thiamine (B-1) tablet 100 mg     Facility-Administered Medications Ordered in Other Encounters   Medication    Self Administer Medications: Behavioral Services      Dimension III: Emotional/Behavioral/Cognitive      Previous ratin Current ratin   Comments:   No changes.     Dimension IV: Readiness for Change     Previous rating:   3 Current rating:   3   Comments:   No changes.     Dimension V: Relapse/Continued Use/Continued problem potential     Previous ratin Current rating:   3   Comments:   Pt is interested in IOP with Lodging at this time.     Dimension VI: Recovery environment    Previous ratin Current rating:   3   Comments:   Pt is currently unemployed.       Summary of Assessment Update and Recommendations:   What was the outcome of last referral?  He did not follow the recommendations of the last treatment.     Reason for changes in the Risk Description since last assessment? Pt is no longer being referred to IP MATHEW treatment, but to IOP with lodging at this time.     Recommendation and rationale for current request and significant issues that need to be addressed:    Recommendations:   1)  Complete an Intensive Outpatient CD Treatment Program with lodging.  2)  Abstain from all mood-altering chemicals unless prescribed by a licensed provider.   3)  Attend, at minimum, 2 weekly support group meetings, such as 12 step based (AA/NA), SMART Recovery, Health Realizations, and/or [facility] meetings.     4)  Actively work with a male mentor/sponsor on a weekly basis.   5)  Follow all the recommendations of your treatment/medical providers.    Clinical Substantiation:    Pt did not enter MATHEW treatment after his last MATHEW CA was completed on 23. Pt appears motivated to enter tx programming at [facility].    Referrals/ Alternatives:  [facility] ([facility] with sober housing options)  [address] [place] [address]  Phone: [phone]  fax: [phone]  [email or web address]    DAANES Assessment ID:  Assessment ID: [id number]    AMTHEW consult completed by:   Roxann Koenig MA, [facility]  E-mail Address: [email or web address]  Services Consult & [place] [address]     *Due to regulation of Title 42 of the Code of Federal Regulations (CFR) Part 2: Confidentiality laws apply to this note and the information wherein.  Thus, this note cannot be copy and pasted into any other health care staff's note nor can it be included in general medical records sent to ANY outside agency without the patient's written consent.       Type Of Assessment: Inpatient Substance Use Comprehensive Assessment     Referral Source:  [id number]  MRN:   [id number]     DATE OF SERVICE:  2023  Date of previous MATHEW Assessment: N/A   Patient confirmed identity through two factor verification: Full Legal Name and      PATIENT'S NAME:    Nikhil Rios  Age: 38 year old  Last 4 SSN: [id number]  Sex: male   Gender Identity: male  Sexual Orientation: Heterosexual  Cultural Background: No, Denies any cultural influences or concerns that need to be considered for treatment  YOB: 1985  Current Address:   [address] [place] [address]  Patient Phone Number:  [phone]   Patient's E-Mail Contact:  [email or web address]  Funding: [employer]  PMI: [id number]  Emergency Contact: ON file   DAANES information was provided to patient and patient does not want a copy.      Telemedicine Visit: The patient's condition can be safely assessed and treated via synchronous audio and visual telemedicine encounter.    Reason for Telemedicine Visit:  In person   Originating Site (Patient Location): [address] [address]  Distant Site (Provider Location): [facility]   Consent:  The patient/guardian has verbally consented to: the potential risks and benefits of telemedicine (video visit) versus in person care; bill my insurance or make self-payment for services provided; and  responsibility for payment of non-covered services.   Mode of Communication:  Video Conference via  In person      START TIME:1140  END TIME: 1320     As the provider I attest to compliance with applicable laws and regulations related to telemedicine.   Nikhil Rios was seen for a substance use disorder [facility]     Reason for Substance Use Disorder Consult:  obtain sobriety and a better life      Are you currently having severe withdrawal symptoms that are putting yourself or others in danger? No  Are you currently having severe medical problems that require immediate attention? No  Are you currently having severe emotional or behavioral problems that are putting yourself or others at risk of harm? No     Have you participated in prior substance use disorder evaluations? No   Have you ever been to detox, inpatient or outpatient treatment for substance related use? List previous treatment: No   Have you ever had a gambling problem or had treatment for compulsive gambling? No  Have you ever felt the need to bet more and more money? N/A  Have you ever had to lie to people important to you about how much you gambled? N/A     Patient is in active withdrawal, but is currently admitted to [facility] for medical detoxification and withdrawal monitoring and is not an imminent safety risk to self or others, and may proceed with the assessment interview            Comprehensive Substance Use History   X X = Primary Drug Used Age of First Use    Pattern of Substance Use   (heaviest use in life and a use history within the past year if applicable) (DSM-5: Sx #3) Date /  Quantity of last use if within the past 30 days Withdrawal Potential?    Method of use  (Oral, smoked, snorted, IV, etc)   x Alcohol   16 Started using with his friend that would get it from his mom and on the weekends. Currently drinks a 750 ml a day  8.27.23 no Oral     Marijuana/Hashish   No use              Cocaine/Crack No use             Meth/Amphetamines   No use             Heroin   No use             Other Opiates/Synthetics   No use             Inhalants  No use             Benzodiazepines   No use             Hallucinogens   No use             Barbiturates/Sedatives/Hypnotics   No use             Over-the-Counter Drugs   No use             Other   No use           x Nicotine   9 Seems to smoke when drinking  8.27.23 no Smoke      Withdrawal symptoms: Have you had any of the following withdrawal symptoms?  Sweating (Rapid Pulse)  Shaky / Jittery / Tremors  Diminished Appetite  Anxiety / Worried     Have you experienced any cravings?  Yes     Have you had periods of abstinence?  Yes   What was your longest period? 9 months in the past after attending an IOP      Any circumstances that lead to relapse? Working two jobs and feeling over whelmed      What activities have you engaged in when using alcohol/other drugs that could be hazardous to you or others?  The patient denied engaging in any of the above dangerous activities when using alcohol and/or drugs.     A description of any risk-taking behavior, including behavior that puts the client at risk of exposure to blood-borne or sexually transmitted diseases: Pt denies any at this time      Arrests and legal interventions related to substance use: Pt denies any at this time     A description of how the patient's use affected their ability to function appropriately in a work setting: pt states that he is on a leave of absence      A description of how the patient's use affected their ability to function appropriately in an educational setting: N/A     Leisure time activities that are associated with substance use: Pt states that he likes to cook and work in the garden and golf     Do you think your substance use has become a problem for you? He agrees he has a substance abuse problem.     MEDICAL HISTORY  Physical or medical concerns or diagnoses: None      Do you  have any current medical treatment needs not being addressed by inpatient treatment?  none     Do you need a referral for a medical provider? no     Current medications: Patient reports current meds as:   No outpatient medications have been marked as taking for the 8/28/23 encounter (Hospital Encounter).          Are you pregnant? NA, Male     Do you have any specific physical needs/accommodations? No     MENTAL HEALTH HISTORY:  Have you ever had  hospitalizations or treatment for mental health illness: No     Mental health history, including diagnosis and symptoms, and the effect on the client's ability to function: Pt states that he has never been officially diagnosed      Current mental health treatment including psychotropic medication needed to maintain stability: (Note: The assessment must utilize screening tools approved by the commissioner pursuant to section 245.4863 to identify whether the client screens positive for co-occurring disorders): None      GAIN-SS Tool:       7/14/2023     8:00 AM   When was the last time that you had significant problems...   with feeling very trapped, lonely, sad, blue, depressed or hopeless about the future? Past month   with sleep trouble, such as bad dreams, sleeping restlessly, or falling asleep during the day? Past Month   with feeling very anxious, nervous, tense, scared, panicked or like something bad was going to happen? Past month   with becoming very distressed & upset when something reminded you of the past? Past month   with thinking about ending your life or committing suicide? Never           7/14/2023     8:00 AM   When was the last time that you did the following things 2 or more times?   Lied or conned to get things you wanted or to avoid having to do something? Past month   Had a hard time paying attention at school, work or home? Past month   Had a hard time listening to instructions at school, work or home? Past month   Were a bully or threatened other  people? Never   Started physical fights with other people? Never         Have you ever been verbally, emotionally, physically or sexually abused?   No     Family history of substance use and misuse: Pt states that he has a couple great grand father  from alcohol     The patient's desire for family involvement in the treatment program: not at this time   Level of family support: he will keep them informed      Social network in relation to expected support for recovery: Pt states that everyone in his life would be support for him      Are you currently in a significant relationship? No     Do you have any children (include living arrangements/custody/contact)?:  none     What is your current living situation? Pt states that he lives alone and will stay with his mother at her house      Are you employed/attending school? Leave of absence      SUMMARY:  Ability to understand written treatment materials: Yes  Ability to understand patient rules and patient rights: Yes  Does the patient recognize needs related to substance use and is willing to follow treatment recommendations: Yes  Does the patient have an opioid use disorder:  does not have a history of opiate use.     ASAM Dimension Scale Ratings:     Dimension 1 -  Acute Intoxication/Withdrawal: 0 - No Problem  Dimension 2 - Biomedical: 1 - Minor Problem  Dimension 3 - Emotional/Behavioral/Cognitive Conditions: 1 - Minor Problem  Dimension 4 - Readiness to Change:  3 - Severe Problem  Dimension 5 - Relapse/Continued Use/ Continued Problem Potential: 4 - Extreme Problem  Dimension 6 - Recovery Environment:  4 - Extreme Problem     Category of Substance Severity (ICD-10 Code / DSM 5 Code)      Alcohol Use Disorder Severe  (10.20) (303.90)   Cannabis Use Disorder The patient does not meet the criteria for a Cannabis use disorder.   Hallucinogen Use Disorder The patient does not meet the criteria for a Hallucinogen use disorder.   Inhalant Use Disorder The patient  does not meet the criteria for an Inhalant use disorder.   Opioid Use Disorder The patient does not meet the criteria for an Opioid use disorder.   Sedative, Hypnotic, or Anxiolytic Use Disorder The patient does not meet the criteria for a Sedative/Hypnotic use disorder.   Stimulant Related Disorder The patient does not meet the criteria for a Stimulant use disorder.   Tobacco Use Disorder The patient does not meet the criteria for a Tobacco use disorder.   Other (or unknown) Substance Use Disorder The patient does not meet the criteria for a Other (or unknown) Substance use disorder.      A problematic pattern of alcohol/drug use leading to clinically significant impairment or distress, as manifested by at least two of the following, occurring within a 12-month period:     1.) Alcohol/drug is often taken in larger amounts or over a longer period than was intended.  2.) There is a persistent desire or unsuccessful efforts to cut down or control alcohol/drug use  3.) A great deal of time is spent in activities necessary to obtain alcohol, use alcohol, or recover from its effects.  4.) Craving, or a strong desire or urge to use alcohol/drug  5.) Recurrent alcohol/drug use resulting in a failure to fulfill major role obligations at work, school or home.  6.) Continued alcohol use despite having persistent or recurrent social or interpersonal problems caused or exacerbated by the effects of alcohol/drug.  7.) Important social, occupational, or recreational activities are given up or reduced because of alcohol/drug use.  8.) Recurrent alcohol/drug use in situations in which it is physically hazardous.  9.) Alcohol/drug use is continued despite knowledge of having a persistent or recurrent physical or psychological problem that is likely to have been caused or exacerbated by alcohol.     Specify if: In early remission:  After full criteria for alcohol/drug use disorder were previously met, none of the criteria for  alcohol/drug use disorder have been met for at least 3 months but for less than 12 months (with the exception that Criterion A4,  Craving or a strong desire or urge to use alcohol/drug  may be met).      In sustained remission:   After full criteria for alcohol use disorder were previously met, non of the criteria for alcohol/drug use disorder have been met at any time during a period of 12 months or longer (with the exception that Criterion A4,  Craving or strong desire or urge to use alcohol/drug  may be met).      Specify if:   This additional specifier is used if the individual is in an environment where access to alcohol is restricted.     Mild: Presence of 2-3 symptoms  Moderate: Presence of 4-5 symptoms  Severe: Presence of 6 or more symptoms     Collateral information: MATHEW Collateral Info: Sufficient information is obtained from the patient to support diagnosis and recommendations. Contact with a collateral sources is not required.     Recommendations: Pt would be best served in a residential treatment      Clinical Substantiation:  Pt has been to the emergency department a number of times with a SHERITA over .600. Pt has been to treatment a couple of time with an Out patient programs but has not been able to obtain longtime sobriety.      Referrals/ Alternatives:  [facility]   [address] [place] [address]  [phone]  [phone]           MATHEW consult completed by: SARA SMITH [facility].  Phone Number: [phone]  E-mail Address: [email or web address] and [email or web address] Evaluation Department  [address] [place] [address]     *Due to regulation of Title 42 of the Code of Federal Regulations (CFR) Part 2: Confidentiality laws apply to this note and the information wherein.  Thus, this note cannot be copy and pasted into any other health care staff's note nor can it be included in general medical records sent to ANY outside  agency without the patient's written consent.

## 2023-10-02 NOTE — PLAN OF CARE
Problem: Plan of Care - These are the overarching goals to be used throughout the patient stay.    Goal: Absence of Hospital-Acquired Illness or Injury  Outcome: Progressing  Intervention: Identify and Manage Fall Risk  Recent Flowsheet Documentation  Taken 10/2/2023 0816 by Alejandrina Bhardwaj, RN  Safety Promotion/Fall Prevention:   room organization consistent   safety round/check completed  Goal: Optimal Comfort and Wellbeing  Outcome: Progressing  Goal: Readiness for Transition of Care  Intervention: Mutually Develop Transition Plan  Recent Flowsheet Documentation  Taken 10/2/2023 0800 by Alejandrnia Bhardwaj, RN  Equipment Currently Used at Home: none     Problem: Alcohol Withdrawal  Goal: Alcohol Withdrawal Symptom Control  Outcome: Progressing  Goal: Readiness for Change Identified  Outcome: Progressing   Goal Outcome Evaluation: Patient is alert, oriented, and cooperative. Has been medicated x5 for CIWAs ranging 10-13. Scores are due to tremors, anxiety, numb feet, and physical activity. Continuing to monitor.

## 2023-10-02 NOTE — PROGRESS NOTES
"Care Management Follow Up     Length of Stay (days): 2     Expected Discharge Date: 10/03/2023     Concerns to be Addressed:     patient desiring CD treatment upon discharge from the hospital  Patient plan of care discussed at interdisciplinary rounds: Yes     Anticipated Discharge Disposition:  CD treatment     Anticipated Discharge Services:  CD treatment  Anticipated Discharge DME:  none     Patient/family educated on Medicare website which has current facility and service quality ratings:  n/a  Education Provided on the Discharge Plan:    Patient/Family in Agreement with the Plan:       Referrals Placed by CM/SW:  CD treatment  Private pay costs discussed: Not applicable     Additional Information:  Social work met with pt this morning to discuss discharge to CD treatment once bed becomes available. Per SW note 10/1 [facility] inpatient in [place] may have a bed 10/3. SW left message for [facility] (P:[phone]). Pt cannot be intoxicated when he arrives for his appointment or admission to treatment.     When talking with pt about transfer, pt noted that he is interested in discharging to [facility] if bed is available. Pt stated that he will call when the open at 0900; SW to follow up with pt at 0930. SW informed pt that if bed is available at [facility] and pt is stable to transfer, goal would be for him to admit to said facility; pt understanding.  8:06 AM    SW received call from [facility] stating that they are not able to take new admissions or the next 2 weeks due to \"unforseen circumstances.\" YUSRA met with pt who stated that He has been working with [facility] and goal now go to [facility] while living in sober housing. Rule 25 was completed last admission and needs to be updated which will happen today at 1430 with [facility]. YUSRA spoke with Vanda from [facility] ([phone]) to confirm plan for IOP who stated that she has been in communication with him but before scheduling intake, admission " team needs rule 25. SW will fax updated rule 25 once completed to [phone]. Vanda stated that pt will need to arrange sober living himself, and t inform  that he is working with [facility] to enroll in IOP as soon as possible. SW following.  2:21 PM    VICKY Gutierrez  10/2/2023

## 2023-10-03 VITALS
TEMPERATURE: 98.2 F | HEART RATE: 78 BPM | BODY MASS INDEX: 24.23 KG/M2 | DIASTOLIC BLOOD PRESSURE: 71 MMHG | SYSTOLIC BLOOD PRESSURE: 116 MMHG | OXYGEN SATURATION: 97 % | WEIGHT: 168.9 LBS | RESPIRATION RATE: 16 BRPM

## 2023-10-03 LAB
MAGNESIUM SERPL-MCNC: 1.9 MG/DL (ref 1.7–2.3)
PHOSPHATE SERPL-MCNC: 3.5 MG/DL (ref 2.5–4.5)

## 2023-10-03 PROCEDURE — 250N000013 HC RX MED GY IP 250 OP 250 PS 637: Performed by: HOSPITALIST

## 2023-10-03 PROCEDURE — 250N000013 HC RX MED GY IP 250 OP 250 PS 637: Performed by: STUDENT IN AN ORGANIZED HEALTH CARE EDUCATION/TRAINING PROGRAM

## 2023-10-03 PROCEDURE — 84100 ASSAY OF PHOSPHORUS: CPT | Performed by: HOSPITALIST

## 2023-10-03 PROCEDURE — 99239 HOSP IP/OBS DSCHRG MGMT >30: CPT | Performed by: HOSPITALIST

## 2023-10-03 PROCEDURE — 83735 ASSAY OF MAGNESIUM: CPT | Performed by: HOSPITALIST

## 2023-10-03 PROCEDURE — 250N000011 HC RX IP 250 OP 636: Performed by: STUDENT IN AN ORGANIZED HEALTH CARE EDUCATION/TRAINING PROGRAM

## 2023-10-03 PROCEDURE — 36415 COLL VENOUS BLD VENIPUNCTURE: CPT | Performed by: HOSPITALIST

## 2023-10-03 RX ADMIN — FOLIC ACID 1 MG: 1 TABLET ORAL at 08:59

## 2023-10-03 RX ADMIN — CLONIDINE HYDROCHLORIDE 0.1 MG: 0.1 TABLET ORAL at 08:59

## 2023-10-03 RX ADMIN — LORAZEPAM 1 MG: 2 INJECTION INTRAMUSCULAR; INTRAVENOUS at 04:24

## 2023-10-03 RX ADMIN — Medication 1 TABLET: at 08:59

## 2023-10-03 RX ADMIN — THIAMINE HCL TAB 100 MG 100 MG: 100 TAB at 09:00

## 2023-10-03 ASSESSMENT — ACTIVITIES OF DAILY LIVING (ADL)
ADLS_ACUITY_SCORE: 20

## 2023-10-03 NOTE — CARE PLAN
Assessment ANDREA PAT on 7, scheduled meds given, IV removed. No PRN requested. Patient education completed. Patient heading to after care for ETOH

## 2023-10-03 NOTE — DISCHARGE SUMMARY
[facility]  Hospitalist Discharge Summary      Date of Admission:  9/29/2023  Date of Discharge:  10/3/2023  Discharging Provider: Tripp Solomon MD  Discharge Service: Hospitalist Service    Discharge Diagnoses   Alcohol dependence  Alcohol withdrawal    Follow-ups Needed After Discharge   Follow-up Appointments     Follow-up and recommended labs and tests       Follow up with primary care provider, Lisandro Graham, within 7 days for   hospital follow- up.  No follow up labs or test are needed.            Unresulted Labs Ordered in the Past 30 Days of this Admission       No orders found from 8/30/2023 to 9/30/2023.        These results will be followed up by NA    Discharge Disposition   Discharged home and plan for outpatient intake into [facility] in few days  Condition at discharge: Stable    Hospital Course   38 year old male presented on 9/29 for alcohol intoxication and anticipated alcohol withdrawal. Past medical history includes alcohol abuse/dependence, alcohol withdrawal, pancreatitis. Started on CIWA with PRN ativan, and gabapentin and clonidine. Alcohol withdrawal relatively uncomplicated and was doing well on day of discharge. Discharged home with plan for outpatient follow up with alcohol recovery program.    Alcohol dependence  Alcohol withdrawal  Presented with acute withdrawal, previous hx of seizure, but no ICU/intubation. EtOH ingestion estimated as 12-15 drinks/day, last ingestion 9/28 @ 10pm.   -Plan for follow up with [facility] as outpatient for intake in few days after discharge; will stay with family prior to this    Tobacco dependence  Estimated 1-5 cigarettes/day, estimated 8pk/yr hx. Has NRT at home.     Resolved Problems  Elevated transaminases  Stable from 1 week prior, in setting of known alcohol dependence. Synthetic liver function intact.    Thrombocytosis  Elevated in acute setting, recently cytopenic.    Anemia, mild    Consultations This  Hospital Stay   CARE MANAGEMENT / SOCIAL WORK IP CONSULT  CHEMICAL DEPENDENCY IP CONSULT    Code Status   Full Code    Time Spent on this Encounter   I, Tripp Solomon MD, personally saw the patient today and spent greater than 30 minutes discharging this patient.       Tripp Solomon MD  [address] [address]  Phone: [phone]  Fax: [phone]  ______________________________________________________________________    Physical Exam   Vital Signs: Temp: 98.2  F (36.8  C) Temp src: Oral BP: 116/71 Pulse: 78   Resp: 16 SpO2: 97 % O2 Device: None (Room air)    Weight: 168 lbs 14.4 oz    General: appears comfortable, laying in bed  CV: +S1/S2, no pitting edema  Respiratory: CTABL  GI: soft, non-tender, ND  Neuro: alert       Primary Care Physician   Lisandro Graham    Discharge Orders      Reason for your hospital stay    Alcohol withdrawal and you were treated with medication to help with the withdrawal     Follow-up and recommended labs and tests     Follow up with primary care provider, Lisandro Graham, within 7 days for hospital follow- up.  No follow up labs or test are needed.     Activity    Your activity upon discharge: activity as tolerated     Diet    Follow this diet upon discharge:  Regular Diet Adult       Significant Results and Procedures   Most Recent 3 CBC's:  Recent Labs   Lab Test 10/02/23  0701 10/01/23  0649 09/30/23  0850   WBC 7.4 4.8 4.0   HGB 13.2* 12.7* 12.1*   MCV 87 88 87    359 400     Most Recent 3 BMP's:  Recent Labs   Lab Test 10/02/23  0701 10/01/23  0649 09/30/23  0850    139 137   POTASSIUM 4.4 4.8 4.2   CHLORIDE 101 101 98   CO2 29 27 30*   BUN 7.2 7.4 10.3   CR 0.86 0.84 0.80   ANIONGAP 9 11 9   KEELEY 9.8 9.5 9.4   * 96 104*     Most Recent 2 LFT's:  Recent Labs   Lab Test 10/02/23  0701 10/01/23  0649   AST 39 46*   ALT 56 58   ALKPHOS 75 74   BILITOTAL 0.4 0.5     Most Recent 3 INR's:  Recent  Labs   Lab Test 09/19/23  0849 07/25/23  1205 05/20/23  1201   INR 0.92 0.86 0.88   ,   Results for orders placed or performed during the hospital encounter of 09/23/23   CT Abdomen Pelvis w Contrast    Narrative    EXAM: CT ABDOMEN PELVIS W CONTRAST  LOCATION: [facility]  DATE: 9/23/2023    INDICATION: Acute pancreatitis, lipase greater than 3000  COMPARISON: 07/25/2023.  TECHNIQUE: CT scan of the abdomen and pelvis was performed following injection of IV contrast. Multiplanar reformats were obtained. Dose reduction techniques were used.  CONTRAST: 90ml Isovue 370    FINDINGS:   LOWER CHEST: Normal.    HEPATOBILIARY: Hepatic steatosis. Normal gallbladder and bile ducts.    PANCREAS: Pancreas is moderately edematous consistent with acute pancreatitis. Small chronic calcifications in the uncinate process related to chronic pancreatitis. Otherwise homogeneous pancreatic parenchymal enhancement.    SPLEEN: Normal.    ADRENAL GLANDS: Normal.    KIDNEYS/BLADDER: Normal.    BOWEL: Normal. No obstruction or inflammation.    LYMPH NODES: Normal.    VASCULATURE: Unremarkable.    PELVIC ORGANS: Normal.    MUSCULOSKELETAL: Transitional lumbosacral anatomy.      Impression    IMPRESSION:   1.  Acute pancreatitis.  2.  Hepatic steatosis.       Discharge Medications   Current Discharge Medication List        CONTINUE these medications which have NOT CHANGED    Details   busPIRone (BUSPAR) 5 MG tablet Take 1 tablet (5 mg) by mouth 3 times daily  Qty: 90 tablet, Refills: 3    Associated Diagnoses: Moderate episode of recurrent major depressive disorder (H)      cloNIDine (CATAPRES) 0.1 MG tablet Take 0.1 mg by mouth 2 times daily as needed (Anxiety)      folic acid (FOLVITE) 1 MG tablet Take 1 tablet (1 mg) by mouth daily  Qty: 90 tablet, Refills: 3    Associated Diagnoses: Alcohol dependence with intoxication with complication (H)      gabapentin (NEURONTIN) 300 MG capsule Take 300 mg by mouth 3 times  daily      hydrOXYzine (ATARAX) 25 MG tablet Take 25 mg by mouth 3 times daily as needed for anxiety      multivitamin w/minerals (THERA-VIT-M) tablet Take 1 tablet by mouth daily  Qty: 90 tablet, Refills: 3    Associated Diagnoses: Alcohol dependence with intoxication with complication (H)      thiamine (B-1) 100 MG tablet Take 1 tablet (100 mg) by mouth daily  Qty: 90 tablet, Refills: 3    Associated Diagnoses: Alcohol dependence with intoxication with complication (H)           Allergies   Allergies   Allergen Reactions    Gramineae Pollens Itching    Pollen Extract Rash     grass tree pollen

## 2023-10-03 NOTE — PLAN OF CARE
Problem: Alcohol Withdrawal  Goal: Alcohol Withdrawal Symptom Control  Outcome: Progressing     Problem: Plan of Care - These are the overarching goals to be used throughout the patient stay.    Goal: Optimal Comfort and Wellbeing  Outcome: Progressing   Goal Outcome Evaluation:    Patient alert and oriented with VSS on RA. CIWA scoring 7 - 10 overnight, prn ativan given as ordered. Up IND in room voiding adequately. Able to make needs known, call light in reach. Will continue to monitor.

## 2023-10-03 NOTE — PROGRESS NOTES
Care Management Discharge Note     Length of Stay (days): 2     Expected Discharge Date: 10/03/2023     Concerns to be Addressed:     patient desiring CD treatment upon discharge from the hospital  Patient plan of care discussed at interdisciplinary rounds: Yes     Anticipated Discharge Disposition:  CD treatment     Anticipated Discharge Services:  CD treatment  Anticipated Discharge DME:  none     Patient/family educated on Medicare website which has current facility and service quality ratings:  n/a  Education Provided on the Discharge Plan:  yes  Patient/Family in Agreement with the Plan:  yes     Referrals Placed by CM/SW:  CD treatment  Private pay costs discussed: Not applicable     Additional Information:  YUSRA spoke with Vanda from [facility] ([phone]) stating that pt is able to now set up intake assessment to get enrolled with IOP program as rule 25 was completed and sent to her yesterday via email at [phone]. At this time pt potentially is able to be fit in for intake Friday, 10/6. Pt stated that he will stay with mom until intake is completed. Once intake is completed, pt is able to move into sober home through the [place]; Christa able to assist at [facility] with housing placement. At this time pt contracted for safety and stated that he is able to abstain from alcohol with assistance from resources given by YUSRA as well as support from [facility] and family. Cab voucher to be given when discharge orders placed.  10:18 AM    VICKY Gutierrez  10/2/2023

## 2023-10-05 ENCOUNTER — PATIENT OUTREACH (OUTPATIENT)
Dept: CARE COORDINATION | Facility: CLINIC | Age: 38
End: 2023-10-05
Payer: COMMERCIAL

## 2023-10-05 NOTE — PROGRESS NOTES
Clinic Care Coordination Contact  [facility]/Voicemail       Clinical Data: Care Coordinator Outreach  Outreach attempted x 1.  Left message on patient's voicemail with call back information and requested return call. Writer left direct number.    Plan: Care Coordinator will attempt to reach patient again tomorrow.    SEGUN Smith   Social Work Clinic Care Coordinator   [facility]  PH: [phone]  [email or web address]

## 2023-10-05 NOTE — PROGRESS NOTES
"Pt requested to speak with a  regarding mental health concerns. CHW connected with YUSRA Smith to outreach to patient.     Clinic Care Coordination Contact  [facility]: Post-Discharge Note  SITUATION                                                      Admission:    Admission Date: 09/29/23   Reason for Admission: Alcohol dependence, Alcohol withdrawal  Discharge:   Discharge Date: 10/03/23  Discharge Diagnosis: Alcohol withdrawal seizure without complication (H), Alcoholic intoxication without complication (H)    BACKGROUND                                                      Per hospital discharge summary and inpatient provider notes:    Nikhil Rios is a 38M presenting for alcohol intoxication and anticipated alcohol withdrawal; pmhx includes alcohol abuse/dependence, alcohol withdrawal, pancreatitis; admitted for alcohol withdrawal.     Generalized weakness, malaise for the past 24 hours.  Has been drinking an estimated 12-15 standardize alcoholic drinks daily, or 750 mL over 1 to 2 days.  His last alcoholic ingestion was on 9/28 at approximately 10 PM.  He has had headache, fidgetiness, tremors.  Has not had any seizure, no vomiting.  He has outpatient plans for inpatient substance use treatment, scheduled for 10/3.    ASSESSMENT      Discharge Assessment  How are you doing now that you are home?: \"Could be better. Just mental health. Get me in touch with mental health. I watched my dad drop and die in front of me. My phone is locked and not working so I haven't been able to use it for 7 hours. It's locked so you can call me but I can't call you. I don't know I'm going to be sad anyways.\"  How are your symptoms? (Red Flag symptoms escalate to triage hotline per guidelines): Unchanged  Do you feel your condition is stable enough to be safe at home until your provider visit?: No (see comment) (Unknown)  Does the patient have their discharge instructions? : Unknown  Does the patient have " questions regarding their discharge instructions? : No  Were you started on any new medications or were there changes to any of your previous medications? : No  Does the patient have all of their medications?: Yes  Do you have questions regarding any of your medications? : No  Do you have all of your needed medical supplies or equipment (DME)?  (i.e. oxygen tank, CPAP, cane, etc.): Yes  Discharge follow-up appointment scheduled within 14 calendar days? : No  Is patient agreeable to assistance with scheduling? : No    Post-op ([facility]A Only)  If the patient had a surgery or procedure, do they have any questions for a nurse?: No    PLAN                                                      Outpatient Plan:     Follow-ups Needed After Discharge  Follow-up Appointments     Follow-up and recommended labs and tests       Follow up with primary care provider, Lisandro Graham, within 7 days for   hospital follow- up.  No follow up labs or test are needed.      No future appointments.      For any urgent concerns, please contact our 24 hour nurse triage line: [phone] ([phone])         KEATON Mckeon  [phone]  [facility]

## 2023-10-06 ENCOUNTER — HOSPITAL ENCOUNTER (EMERGENCY)
Facility: HOSPITAL | Age: 38
Discharge: HOME | End: 2023-10-06
Attending: EMERGENCY MEDICINE | Admitting: EMERGENCY MEDICINE
Payer: COMMERCIAL

## 2023-10-06 ENCOUNTER — APPOINTMENT (OUTPATIENT)
Dept: CT IMAGING | Facility: HOSPITAL | Age: 38
End: 2023-10-06
Attending: EMERGENCY MEDICINE
Payer: COMMERCIAL

## 2023-10-06 VITALS
DIASTOLIC BLOOD PRESSURE: 84 MMHG | HEIGHT: 70 IN | RESPIRATION RATE: 18 BRPM | TEMPERATURE: 97.6 F | OXYGEN SATURATION: 100 % | SYSTOLIC BLOOD PRESSURE: 135 MMHG | WEIGHT: 168 LBS | BODY MASS INDEX: 24.05 KG/M2 | HEART RATE: 137 BPM

## 2023-10-06 DIAGNOSIS — F10.920 ALCOHOLIC INTOXICATION WITHOUT COMPLICATION (H): ICD-10-CM

## 2023-10-06 LAB
ALBUMIN SERPL BCG-MCNC: 4.9 G/DL (ref 3.5–5.2)
ALP SERPL-CCNC: 78 U/L (ref 40–129)
ALT SERPL W P-5'-P-CCNC: 64 U/L (ref 0–70)
ANION GAP SERPL CALCULATED.3IONS-SCNC: 21 MMOL/L (ref 7–15)
AST SERPL W P-5'-P-CCNC: 44 U/L (ref 0–45)
BILIRUB DIRECT SERPL-MCNC: <0.2 MG/DL (ref 0–0.3)
BILIRUB SERPL-MCNC: 0.2 MG/DL
BUN SERPL-MCNC: 14.1 MG/DL (ref 6–20)
CALCIUM SERPL-MCNC: 9.5 MG/DL (ref 8.6–10)
CHLORIDE SERPL-SCNC: 99 MMOL/L (ref 98–107)
CREAT SERPL-MCNC: 0.77 MG/DL (ref 0.67–1.17)
DEPRECATED HCO3 PLAS-SCNC: 22 MMOL/L (ref 22–29)
EGFRCR SERPLBLD CKD-EPI 2021: >90 ML/MIN/1.73M2
ERYTHROCYTE [DISTWIDTH] IN BLOOD BY AUTOMATED COUNT: 16.1 % (ref 10–15)
ETHANOL SERPL-MCNC: 0.24 G/DL
GLUCOSE SERPL-MCNC: 162 MG/DL (ref 70–99)
HCT VFR BLD AUTO: 41.1 % (ref 40–53)
HGB BLD-MCNC: 14.2 G/DL (ref 13.3–17.7)
LIPASE SERPL-CCNC: 69 U/L (ref 13–60)
MCH RBC QN AUTO: 29.6 PG (ref 26.5–33)
MCHC RBC AUTO-ENTMCNC: 34.5 G/DL (ref 31.5–36.5)
MCV RBC AUTO: 86 FL (ref 78–100)
PLATELET # BLD AUTO: 285 10E3/UL (ref 150–450)
POTASSIUM SERPL-SCNC: 3.7 MMOL/L (ref 3.4–5.3)
PROT SERPL-MCNC: 7.5 G/DL (ref 6.4–8.3)
RBC # BLD AUTO: 4.79 10E6/UL (ref 4.4–5.9)
SODIUM SERPL-SCNC: 142 MMOL/L (ref 135–145)
WBC # BLD AUTO: 6.3 10E3/UL (ref 4–11)

## 2023-10-06 PROCEDURE — 82248 BILIRUBIN DIRECT: CPT | Performed by: EMERGENCY MEDICINE

## 2023-10-06 PROCEDURE — 258N000003 HC RX IP 258 OP 636: Performed by: EMERGENCY MEDICINE

## 2023-10-06 PROCEDURE — 96374 THER/PROPH/DIAG INJ IV PUSH: CPT

## 2023-10-06 PROCEDURE — 85027 COMPLETE CBC AUTOMATED: CPT | Performed by: EMERGENCY MEDICINE

## 2023-10-06 PROCEDURE — 70450 CT HEAD/BRAIN W/O DYE: CPT

## 2023-10-06 PROCEDURE — 99285 EMERGENCY DEPT VISIT HI MDM: CPT | Mod: 25

## 2023-10-06 PROCEDURE — 96361 HYDRATE IV INFUSION ADD-ON: CPT

## 2023-10-06 PROCEDURE — 36415 COLL VENOUS BLD VENIPUNCTURE: CPT | Performed by: EMERGENCY MEDICINE

## 2023-10-06 PROCEDURE — 96375 TX/PRO/DX INJ NEW DRUG ADDON: CPT

## 2023-10-06 PROCEDURE — 82077 ASSAY SPEC XCP UR&BREATH IA: CPT | Performed by: EMERGENCY MEDICINE

## 2023-10-06 PROCEDURE — 80053 COMPREHEN METABOLIC PANEL: CPT | Performed by: EMERGENCY MEDICINE

## 2023-10-06 PROCEDURE — 250N000011 HC RX IP 250 OP 636: Performed by: EMERGENCY MEDICINE

## 2023-10-06 PROCEDURE — 83690 ASSAY OF LIPASE: CPT | Performed by: EMERGENCY MEDICINE

## 2023-10-06 RX ORDER — LORAZEPAM 2 MG/ML
2 INJECTION INTRAMUSCULAR ONCE
Status: COMPLETED | OUTPATIENT
Start: 2023-10-06 | End: 2023-10-06

## 2023-10-06 RX ORDER — ONDANSETRON 2 MG/ML
4 INJECTION INTRAMUSCULAR; INTRAVENOUS ONCE
Status: COMPLETED | OUTPATIENT
Start: 2023-10-06 | End: 2023-10-06

## 2023-10-06 RX ADMIN — LORAZEPAM 2 MG: 2 INJECTION INTRAMUSCULAR; INTRAVENOUS at 06:51

## 2023-10-06 RX ADMIN — SODIUM CHLORIDE 1000 ML: 9 INJECTION, SOLUTION INTRAVENOUS at 06:52

## 2023-10-06 RX ADMIN — ONDANSETRON 4 MG: 2 INJECTION INTRAMUSCULAR; INTRAVENOUS at 06:56

## 2023-10-06 ASSESSMENT — ACTIVITIES OF DAILY LIVING (ADL): ADLS_ACUITY_SCORE: 37

## 2023-10-06 NOTE — ED NOTES
"Patient unable to find someone to drive him home, \"why can't I go with uber, it will be 5 mins and  I will be home\" MD updated  " 8

## 2023-10-06 NOTE — ED NOTES
"Patient states mother at work, \"can't I take a uber home\" informed him he is intoxicated,BAL 0.24. he needs a responsible person to take him home, [employer] man is not going to  be responsible for you. Informed him I  would have MD talk  to him  "

## 2023-10-06 NOTE — DISCHARGE INSTRUCTIONS
Follow-up with your outpatient alcohol treatment program as planned and return to the ER right away for any worsening symptoms or other concerns.

## 2023-10-06 NOTE — ED PROVIDER NOTES
EMERGENCY DEPARTMENT ENCOUnter      NAME: Nikhil Rios  AGE: 38 year old male  YOB: 1985  MRN: [id number]  EVALUATION DATE & TIME: 10/6/2023  6:19 AM    PCP: Lisandro Graham    ED PROVIDER: Basilio Medrano DO      Chief Complaint   Patient presents with    Withdrawal     ETOH         FINAL IMPRESSION:  1. Alcoholic intoxication without complication (H24)          ED COURSE & MEDICAL DECISION MAKIN:27 AM I met with the patient for the initial interview and physical examination. Discussed plan for treatment and workup in the ED.     7:54 AM the nurse reports the patient would like to go home.   8:13 AM I rechecked the patient and talked with him about going home or other options.   8:57 AM We discussed the plan for discharge and the patient is agreeable. Reviewed supportive cares, symptomatic treatment, outpatient follow up, and reasons to return to the Emergency Department. All questions and concerns were addressed. Patient to be discharged by ED RN.        The patient presented to the emergency department today due to concerns about alcohol intoxication.  He has a long history of alcohol abuse and has been seen in this ER many times before for the same.  He was initially quite tremulous and anxious.  He was given IV fluids and 1 dose of IV Ativan.  This completely resolved his symptoms.  He was watched for several hours and was clinically sober at the time of discharge.  He was encouraged to follow-up with his outpatient alcohol treatment program.        Medical Decision Making    History:  Supplemental history from: Documented in chart, if applicable  External Record(s) reviewed: Documented in chart, if applicable. and Inpatient Record: [facility] 2310/3/23     Work Up:  Chart documentation includes differential considered and any EKGs or imaging independently interpreted by provider, where specified.  In additional to work up documented, I considered the  "following work up: Documented in chart, if applicable.    External consultation:  Discussion of management with another provider: Documented in chart, if applicable    Complicating factors:  Care impacted by chronic illness: Other: alcohol withdrawal and alcoholic pancreatitis  Care affected by social determinants of health: Alcohol Abuse and/or Recreational Drug Use    Disposition considerations: Discharge. No recommendations on prescription strength medication(s). See documentation for any additional details.        At the conclusion of the encounter I discussed the results of all of the tests and the disposition. The questions were answered. The patient or family acknowledged understanding and was agreeable with the care plan.         MEDICATIONS GIVEN IN THE EMERGENCY:  Medications   sodium chloride 0.9% BOLUS 1,000 mL (0 mLs Intravenous Stopped 10/6/23 0750)   ondansetron (ZOFRAN) injection 4 mg (4 mg Intravenous $Given 10/6/23 0656)   LORazepam (ATIVAN) injection 2 mg (2 mg Intravenous $Given 10/6/23 0684)              =================================================================    HPI        Nikhil Rios is a 38 year old male with a pertinent history of alcohol pancreatitis, Alcohol abuse, alcohol withdrawal,  who presents to this ED via personal vehicle with mother for evaluation of alcohol withdrawal.     Per chart review, patient was admitted at [facility] from 9/29-10/3 for alcohol dependence and withdrawal. Plan was to follow-up with [facility] as outpatient after discharge and to stay with family until then.     Patient reports he is in withdrawal from alcohol, last drink at 6 PM last night. Endorses feeling very thirst, tremulous, and pain in his left forearm and left face as he thinks he hit them earlier while he was \"bouncing around the house.\" Patient states it hurst to open up his mouth when he eats.     Patient had a plan recently with a plan to go to [facility] in [place] or [facility] in [place], but it wasn't connected and patient didn't go. Patient usually detoxes at [place], but he is on the wait list there right now.       PAST MEDICAL HISTORY:  Past Medical History:   Diagnosis Date    Alcohol abuse     Alcohol abuse     Alcohol withdrawal (H)     Depressive disorder     Family history of diabetes mellitus 11/9/2007    HLD (hyperlipidemia)     HTN (hypertension)        PAST SURGICAL HISTORY:  Past Surgical History:   Procedure Laterality Date    NO HISTORY OF SURGERY      OTHER SURGICAL HISTORY      none           CURRENT MEDICATIONS:    busPIRone (BUSPAR) 5 MG tablet  cloNIDine (CATAPRES) 0.1 MG tablet  folic acid (FOLVITE) 1 MG tablet  gabapentin (NEURONTIN) 300 MG capsule  hydrOXYzine (ATARAX) 25 MG tablet  multivitamin w/minerals (THERA-VIT-M) tablet  thiamine (B-1) 100 MG tablet        ALLERGIES:  Allergies   Allergen Reactions    Gramineae Pollens Itching    Pollen Extract Rash     grass tree pollen       FAMILY HISTORY:  Family History   Problem Relation Age of Onset    Coronary Artery Disease Father     Substance Abuse Maternal Grandfather     Mental Illness Brother     Schizophrenia Brother     Schizophrenia Maternal Aunt        SOCIAL HISTORY:   Social History     Socioeconomic History    Marital status: Single     Spouse name: None    Number of children: None    Years of education: None    Highest education level: None   Occupational History    Occupation: Rental property owner   Tobacco Use    Smoking status: Some Days     Packs/day: 0.25     Types: Cigars, Cigarettes    Smokeless tobacco: Never    Tobacco comments:     occasional   Substance and Sexual Activity    Alcohol use: Yes     Alcohol/week: 17.0 standard drinks of alcohol     Types: 17 Shots of liquor per week     Comment: Drinks 750ml/day or 17 shots/day; hx of withdrawl seizures    Drug use: Not Currently     Types: Marijuana    Sexual activity: Not Currently     Social Determinants of Health  "    Transportation Needs: No Transportation Needs (7/21/2021)    PRAPARE - Transportation     Lack of Transportation (Medical): No     Lack of Transportation (Non-Medical): No   Stress: Stress Concern Present (7/21/2021)    [employer] - Occupational Stress Questionnaire     Feeling of Stress : Very much   Housing Stability: Unknown (7/21/2021)    Housing Stability Vital Sign     Unable to Pay for Housing in the Last Year: No     Unstable Housing in the Last Year: No       VITALS:  Patient Vitals for the past 24 hrs:   BP Temp Temp src Pulse Resp SpO2 Height Weight   10/06/23 0844 135/84 -- -- (!) 137 -- 100 % -- --   10/06/23 0834 129/76 -- -- 97 -- 100 % -- --   10/06/23 0819 139/82 -- -- 107 -- 98 % -- --   10/06/23 0756 128/75 -- -- 97 -- 97 % -- --   10/06/23 0746 130/85 -- -- 96 -- 100 % -- --   10/06/23 0617 119/68 97.6  F (36.4  C) Oral (!) 136 18 100 % 1.778 m (5' 10\") 76.2 kg (168 lb)       PHYSICAL EXAM    Constitutional:  Anxious appearing, no obvious signs of traumatic injury  HENT:  Normocephalic, Atraumatic, Oropharynx moist, Nose normal.   Eyes:  EOMI, Conjunctiva normal, No discharge.   Respiratory:  Normal breath sounds, No respiratory distress, No wheezing, No chest tenderness.   Cardiovascular:  tachycardic, Normal rhythm, No murmurs  GI:  Soft, No tenderness, No guarding, No CVA tenderness.   Musculoskeletal:  No tenderness to palpation or major deformities noted.   Extremities: No lower extremity edema.  Neurologic:  Alert & oriented x 3, No focal deficits noted. Tremulous       LAB:  All pertinent labs reviewed and interpreted.  Results for orders placed or performed during the hospital encounter of 10/06/23              CBC with platelets   Result Value Ref Range    WBC Count 6.3 4.0 - 11.0 10e3/uL    RBC Count 4.79 4.40 - 5.90 10e6/uL    Hemoglobin 14.2 13.3 - 17.7 g/dL    Hematocrit 41.1 40.0 - 53.0 %    MCV 86 78 - 100 fL    MCH 29.6 26.5 - 33.0 pg    MCHC 34.5 " 31.5 - 36.5 g/dL    RDW 16.1 (H) 10.0 - 15.0 %    Platelet Count 285 150 - 450 10e3/uL   Basic metabolic panel   Result Value Ref Range    Sodium 142 135 - 145 mmol/L    Potassium 3.7 3.4 - 5.3 mmol/L    Chloride 99 98 - 107 mmol/L    Carbon Dioxide (CO2) 22 22 - 29 mmol/L    Anion Gap 21 (H) 7 - 15 mmol/L    Urea Nitrogen 14.1 6.0 - 20.0 mg/dL    Creatinine 0.77 0.67 - 1.17 mg/dL    GFR Estimate >90 >60 mL/min/1.73m2    Calcium 9.5 8.6 - 10.0 mg/dL    Glucose 162 (H) 70 - 99 mg/dL   Hepatic function panel   Result Value Ref Range    Protein Total 7.5 6.4 - 8.3 g/dL    Albumin 4.9 3.5 - 5.2 g/dL    Bilirubin Total 0.2 <=1.2 mg/dL    Alkaline Phosphatase 78 40 - 129 U/L    AST 44 0 - 45 U/L    ALT 64 0 - 70 U/L    Bilirubin Direct <0.20 0.00 - 0.30 mg/dL   Result Value Ref Range    Lipase 69 (H) 13 - 60 U/L   Ethyl Alcohol Level   Result Value Ref Range    Alcohol ethyl 0.24 (H) <=0.01 g/dL       RADIOLOGY:  I have independently reviewed and interpreted the above imaging, pending the final radiology read.  CT Head w/o Contrast   Final Result   IMPRESSION:   1.  Normal head CT.                                  I, Shelby Zaman, am serving as a scribe to document services personally performed by Dr. Medrano based on my observation and the provider's statements to me. I, Basilio Medrano, DO attest that Shelby Zaman is acting in a scribe capacity, has observed my performance of the services and has documented them in accordance with my direction.    Basilio Medrano DO  Emergency Medicine  [facility] EMERGENCY DEPARTMENT  [facility] [facility] [address] [phone]  Dept: [phone]     Basilio Medrano MD  10/06/23 3530

## 2023-10-06 NOTE — ED TRIAGE NOTES
"Pt is brought in by his mother \"alcohol withdrawal\". Pt also reports \"many falls\" this past godwin. Pt states last alcohol drink was yesterday @ 6 pm. Pt unsteady on his feet. Pt denies pain \"unless I eat\".     Triage Assessment       Row Name 10/06/23 0610       Triage Assessment (Adult)    Airway WDL WDL       Respiratory WDL    Respiratory WDL WDL       Skin Circulation/Temperature WDL    Skin Circulation/Temperature WDL X  scratch on nose       Cardiac WDL    Cardiac WDL X  tachycardic in 130's       Peripheral/Neurovascular WDL    Peripheral Neurovascular WDL WDL       Cognitive/Neuro/Behavioral WDL    Cognitive/Neuro/Behavioral WDL WDL       Reji Coma Scale    Best Eye Response 4-->(E4) spontaneous    Best Motor Response 6-->(M6) obeys commands    Best Verbal Response 5-->(V5) oriented    Reji Coma Scale Score 15                    "

## 2023-10-06 NOTE — ED NOTES
"Patient has returned from CT, states he feels well, no nausea,headache, minimal tremor. Heart rate down to  90. \"Ready to go home. I am going to my mom's house, my car is there\" MD updated. Plan to call mother to update  "

## 2023-10-09 ENCOUNTER — HOSPITAL ENCOUNTER (EMERGENCY)
Facility: HOSPITAL | Age: 38
Discharge: HOME | End: 2023-10-09
Attending: EMERGENCY MEDICINE | Admitting: EMERGENCY MEDICINE
Payer: COMMERCIAL

## 2023-10-09 VITALS
DIASTOLIC BLOOD PRESSURE: 72 MMHG | SYSTOLIC BLOOD PRESSURE: 119 MMHG | TEMPERATURE: 98.4 F | RESPIRATION RATE: 20 BRPM | HEART RATE: 105 BPM | OXYGEN SATURATION: 97 %

## 2023-10-09 DIAGNOSIS — F10.920 ALCOHOLIC INTOXICATION WITHOUT COMPLICATION (H): ICD-10-CM

## 2023-10-09 LAB
ALBUMIN SERPL BCG-MCNC: 5 G/DL (ref 3.5–5.2)
ALP SERPL-CCNC: 74 U/L (ref 40–129)
ALT SERPL W P-5'-P-CCNC: 42 U/L (ref 0–70)
ANION GAP SERPL CALCULATED.3IONS-SCNC: 17 MMOL/L (ref 7–15)
AST SERPL W P-5'-P-CCNC: 46 U/L (ref 0–45)
BASO+EOS+MONOS # BLD AUTO: ABNORMAL 10*3/UL
BASO+EOS+MONOS NFR BLD AUTO: ABNORMAL %
BASOPHILS # BLD AUTO: 0.1 10E3/UL (ref 0–0.2)
BASOPHILS NFR BLD AUTO: 2 %
BILIRUB SERPL-MCNC: 0.3 MG/DL
BUN SERPL-MCNC: 10.5 MG/DL (ref 6–20)
CALCIUM SERPL-MCNC: 9.5 MG/DL (ref 8.6–10)
CHLORIDE SERPL-SCNC: 100 MMOL/L (ref 98–107)
CREAT SERPL-MCNC: 0.65 MG/DL (ref 0.67–1.17)
DEPRECATED HCO3 PLAS-SCNC: 23 MMOL/L (ref 22–29)
EGFRCR SERPLBLD CKD-EPI 2021: >90 ML/MIN/1.73M2
EOSINOPHIL # BLD AUTO: 0.1 10E3/UL (ref 0–0.7)
EOSINOPHIL NFR BLD AUTO: 2 %
ERYTHROCYTE [DISTWIDTH] IN BLOOD BY AUTOMATED COUNT: 16.8 % (ref 10–15)
ETHANOL SERPL-MCNC: 0.42 G/DL
GLUCOSE SERPL-MCNC: 120 MG/DL (ref 70–99)
HCT VFR BLD AUTO: 38.8 % (ref 40–53)
HGB BLD-MCNC: 13.5 G/DL (ref 13.3–17.7)
IMM GRANULOCYTES # BLD: 0 10E3/UL
IMM GRANULOCYTES NFR BLD: 1 %
LIPASE SERPL-CCNC: 77 U/L (ref 13–60)
LYMPHOCYTES # BLD AUTO: 2.3 10E3/UL (ref 0.8–5.3)
LYMPHOCYTES NFR BLD AUTO: 53 %
MCH RBC QN AUTO: 29.5 PG (ref 26.5–33)
MCHC RBC AUTO-ENTMCNC: 34.8 G/DL (ref 31.5–36.5)
MCV RBC AUTO: 85 FL (ref 78–100)
MONOCYTES # BLD AUTO: 0.3 10E3/UL (ref 0–1.3)
MONOCYTES NFR BLD AUTO: 8 %
NEUTROPHILS # BLD AUTO: 1.5 10E3/UL (ref 1.6–8.3)
NEUTROPHILS NFR BLD AUTO: 34 %
NRBC # BLD AUTO: 0 10E3/UL
NRBC BLD AUTO-RTO: 0 /100
PLATELET # BLD AUTO: 257 10E3/UL (ref 150–450)
POTASSIUM SERPL-SCNC: 3.7 MMOL/L (ref 3.4–5.3)
PROT SERPL-MCNC: 7.3 G/DL (ref 6.4–8.3)
RBC # BLD AUTO: 4.58 10E6/UL (ref 4.4–5.9)
SODIUM SERPL-SCNC: 140 MMOL/L (ref 135–145)
WBC # BLD AUTO: 4.4 10E3/UL (ref 4–11)

## 2023-10-09 PROCEDURE — 250N000011 HC RX IP 250 OP 636: Mod: JZ | Performed by: EMERGENCY MEDICINE

## 2023-10-09 PROCEDURE — 96374 THER/PROPH/DIAG INJ IV PUSH: CPT

## 2023-10-09 PROCEDURE — 80053 COMPREHEN METABOLIC PANEL: CPT | Performed by: EMERGENCY MEDICINE

## 2023-10-09 PROCEDURE — 82077 ASSAY SPEC XCP UR&BREATH IA: CPT | Performed by: EMERGENCY MEDICINE

## 2023-10-09 PROCEDURE — 83690 ASSAY OF LIPASE: CPT | Performed by: EMERGENCY MEDICINE

## 2023-10-09 PROCEDURE — 96375 TX/PRO/DX INJ NEW DRUG ADDON: CPT

## 2023-10-09 PROCEDURE — 258N000003 HC RX IP 258 OP 636: Performed by: EMERGENCY MEDICINE

## 2023-10-09 PROCEDURE — 250N000013 HC RX MED GY IP 250 OP 250 PS 637: Performed by: EMERGENCY MEDICINE

## 2023-10-09 PROCEDURE — 99284 EMERGENCY DEPT VISIT MOD MDM: CPT | Mod: 25

## 2023-10-09 PROCEDURE — 36415 COLL VENOUS BLD VENIPUNCTURE: CPT | Performed by: EMERGENCY MEDICINE

## 2023-10-09 PROCEDURE — 85004 AUTOMATED DIFF WBC COUNT: CPT | Performed by: EMERGENCY MEDICINE

## 2023-10-09 PROCEDURE — 96361 HYDRATE IV INFUSION ADD-ON: CPT

## 2023-10-09 RX ORDER — CLONIDINE HYDROCHLORIDE 0.1 MG/1
0.1 TABLET ORAL ONCE
Status: COMPLETED | OUTPATIENT
Start: 2023-10-09 | End: 2023-10-09

## 2023-10-09 RX ORDER — ONDANSETRON 2 MG/ML
4 INJECTION INTRAMUSCULAR; INTRAVENOUS ONCE
Status: COMPLETED | OUTPATIENT
Start: 2023-10-09 | End: 2023-10-09

## 2023-10-09 RX ORDER — LORAZEPAM 2 MG/ML
1 INJECTION INTRAMUSCULAR EVERY 30 MIN PRN
Status: DISCONTINUED | OUTPATIENT
Start: 2023-10-09 | End: 2023-10-09 | Stop reason: HOSPADM

## 2023-10-09 RX ADMIN — LORAZEPAM 1 MG: 2 INJECTION INTRAMUSCULAR; INTRAVENOUS at 07:05

## 2023-10-09 RX ADMIN — CLONIDINE HYDROCHLORIDE 0.1 MG: 0.1 TABLET ORAL at 07:05

## 2023-10-09 RX ADMIN — ONDANSETRON 4 MG: 2 INJECTION INTRAMUSCULAR; INTRAVENOUS at 07:07

## 2023-10-09 RX ADMIN — SODIUM CHLORIDE 1000 ML: 9 INJECTION, SOLUTION INTRAVENOUS at 06:50

## 2023-10-09 ASSESSMENT — ENCOUNTER SYMPTOMS
VOMITING: 0
ABDOMINAL PAIN: 0

## 2023-10-09 ASSESSMENT — ACTIVITIES OF DAILY LIVING (ADL)
ADLS_ACUITY_SCORE: 37

## 2023-10-09 NOTE — ED PROVIDER NOTES
EMERGENCY DEPARTMENT ENCOUNTER      NAME: Nikhil Rios  AGE: 38 year old male  YOB: 1985  MRN: [id number]  EVALUATION DATE & TIME: 10/9/2023  6:18 AM    PCP: Lisandro Graham    ED PROVIDER: Basilio Gonzalez M.D.      Chief Complaint   Patient presents with    Alcohol Problem         FINAL IMPRESSION:  1.  Acute alcohol withdrawal symptoms.  2.  Chronic alcoholism.      ED COURSE & MEDICAL DECISION MAKIN:23 AM  I met with the patient to gather history and to perform my initial exam. We discussed plans for the ED course, including diagnostic testing and treatment. PPE worn: cloth mask.  Patient is a longstanding alcoholic.  ER visit last on  and went home.  Admission from  to October 3 for alcoholism.  History recurrent pancreatitis.  Patient notes binging heavily above his norm over the last week and number supposed he is trying to wean himself.  Speaking with the patient he notes that this is never worked in the past.  He endorses withdrawal symptoms at this time and feels anxious and jittery.  Denies current hallucinations or seizing.  Blood pressure and heart rate are noted to be mildly elevated.  Everything is still pending on this patient.  Treatment for his condition still pending.  This will be signed out to the morning ED physician at 7 AM.      Pertinent Labs & Imaging studies reviewed. (See chart for details)  38 year old male presents to the Emergency Department for evaluation of alcohol problem    At the conclusion of the encounter I discussed the results of all of the tests and the disposition. The questions were answered. The patient or family acknowledged understanding and was agreeable with the care plan.              Medical Decision Making    History:  Supplemental history from: Documented in chart, if applicable  External Record(s) reviewed: Both inpatient and outpatient [facility] records were reviewed.    Work Up:  Chart documentation includes differential  considered and any EKGs or imaging independently interpreted by provider, where specified.  Differential diagnosis includes chronic alcoholism, alcohol intoxication, alcohol withdrawal, electrolyte imbalances, pancreatitis, etc.  In additional to work up documented, I considered the following work up: Documented in chart, if applicable.    External consultation:  Discussion of management with another provider: Documented in chart, if applicable    Complicating factors:  Care impacted by chronic illness: Mental Health and Other: alcohol withdrawal seizures  Care affected by social determinants of health: Alcohol Abuse and/or Recreational Drug Use    Disposition considerations: Everything is still pending on this patient.  Results and response to treatment will determine discharge versus admission.          MEDICATIONS GIVEN IN THE EMERGENCY:  Medications   sodium chloride 0.9% BOLUS 1,000 mL (has no administration in time range)   cloNIDine (CATAPRES) tablet 0.1 mg (has no administration in time range)   ondansetron (ZOFRAN) injection 4 mg (has no administration in time range)   LORazepam (ATIVAN) injection 1 mg (has no administration in time range)       NEW PRESCRIPTIONS STARTED AT TODAY'S ER VISIT  New Prescriptions    No medications on file          =================================================================    HPI    Patient information was obtained from: Patient    Use of : N/A        Nikhil Rios is a 38 year old male with a pertinent history of alcohol dependence and alcohol withdrawal syndrome seizures without complication, alcohol-induced mood disorder, SILVANA, and acute encephalopathy who presents to this ED via walk-in for evaluation of alcohol problem.    Per chart review patient was admitted here from 9/29/23-10/3/23 (~1 week ago) for alcohol intoxication. He had presented with acute withdrawals which were relatively uncomplicated during his admission. He was started on CIWA with PRN  ativan, and gabapentin and clonidine. Plan was to follow up with [facility] as outpatient.     Patient reports that he has been weaning of alcohol and his has not been working.   Per nurse triage note he had been drinking hard alcohol for the past week and has been trying to wean himself off it since. Patient appeared restless.    He denied any abdominal pain or vomiting.He does not identify any waxing or waning symptoms otherwise, exacerbating or alleviating features, associated symptoms except as mentioned.     REVIEW OF SYSTEMS   Review of Systems   Gastrointestinal:  Negative for abdominal pain and vomiting.   Psychiatric/Behavioral:          Positive for alcohol withdrawal related restlessness   All other systems reviewed and are negative.       PAST MEDICAL HISTORY:  Past Medical History:   Diagnosis Date    Alcohol abuse     Alcohol abuse     Alcohol withdrawal (H)     Depressive disorder     Family history of diabetes mellitus 11/9/2007    HLD (hyperlipidemia)     HTN (hypertension)        PAST SURGICAL HISTORY:  Past Surgical History:   Procedure Laterality Date    NO HISTORY OF SURGERY      OTHER SURGICAL HISTORY      none           CURRENT MEDICATIONS:    busPIRone (BUSPAR) 5 MG tablet  cloNIDine (CATAPRES) 0.1 MG tablet  folic acid (FOLVITE) 1 MG tablet  gabapentin (NEURONTIN) 300 MG capsule  hydrOXYzine (ATARAX) 25 MG tablet  multivitamin w/minerals (THERA-VIT-M) tablet  thiamine (B-1) 100 MG tablet        ALLERGIES:  Allergies   Allergen Reactions    Gramineae Pollens Itching    Pollen Extract Rash     grass tree pollen       FAMILY HISTORY:  Family History   Problem Relation Age of Onset    Coronary Artery Disease Father     Substance Abuse Maternal Grandfather     Mental Illness Brother     Schizophrenia Brother     Schizophrenia Maternal Aunt        SOCIAL HISTORY:   Social History     Socioeconomic History    Marital status: Single   Occupational History    Occupation: Rental property owner    Tobacco Use    Smoking status: Some Days     Packs/day: 0.25     Types: Cigars, Cigarettes    Smokeless tobacco: Never    Tobacco comments:     occasional   Substance and Sexual Activity    Alcohol use: Yes     Alcohol/week: 17.0 standard drinks of alcohol     Types: 17 Shots of liquor per week     Comment: Drinks 750ml/day or 17 shots/day; hx of withdrawl seizures    Drug use: Not Currently     Types: Marijuana    Sexual activity: Not Currently     Social Determinants of Health     Transportation Needs: No Transportation Needs (7/21/2021)    PRAPARE - Transportation     Lack of Transportation (Medical): No     Lack of Transportation (Non-Medical): No   Stress: Stress Concern Present (7/21/2021)    [employer] - Occupational Stress Questionnaire     Feeling of Stress : Very much   Housing Stability: Unknown (7/21/2021)    Housing Stability Vital Sign     Unable to Pay for Housing in the Last Year: No     Unstable Housing in the Last Year: No   Uses tobacco and heavy alcohol.  No drugs.    VITALS:  /81   Pulse 91   Temp 98.4  F (36.9  C) (Oral)   Resp 20   SpO2 96%     PHYSICAL EXAM    Vital Signs:  /81   Pulse 91   Temp 98.4  F (36.9  C) (Oral)   Resp 20   SpO2 96%   General:  On entering the room he is in no apparent distress.    Neck:  Neck supple with full range of motion and nontender.    Back:  Back and spine are nontender.  No costovertebral angle tenderness.    HEENT:  Oropharynx clear with moist mucous membranes.  HEENT unremarkable.    Pulmonary:  Chest clear to auscultation without rhonchi rales or wheezing.    Cardiovascular:  Cardiac regular rate and rhythm without murmurs rubs or gallops.  Slight tachycardia in triage at 111.  Currently 91.  Blood pressure also came down to 128/81.  Abdomen:  Abdomen soft nontender.  There is no rebound or guarding.    Muskuloskeletal:  He moves all 4 without any difficulty and has normal neurovascular exams.   Extremities without clubbing, cyanosis, or edema.  Legs and calves are nontender.    Neuro:  He is alert and oriented ×3 and moves all extremities symmetrically.  Does not appear visibly tremorous.  Psych:  Normal affect.    Skin:  Unremarkable and warm and dry.       LAB:  All pertinent labs reviewed and interpreted.  Labs Ordered and Resulted from Time of ED Arrival to Time of ED Departure - No data to display    RADIOLOGY:  Reviewed all pertinent imaging. Please see official radiology report.  No orders to display              EKG:          PROCEDURES:         I, Alfredo Bradley , am serving as a scribe to document services personally performed by Dr. Gonzalez based on my observation and the provider's statements to me. I, Basilio Gonzalez MD attest that Alfredo Bradley  is acting in a scribe capacity, has observed my performance of the services and has documented them in accordance with my direction.    Basilio Gonzalez M.D.  Emergency Medicine  [facility] EMERGENCY DEPARTMENT  [facility] [facility] [address]  [phone]  Dept: [phone]       Basilio Gonzalez MD  10/09/23 0625

## 2023-10-09 NOTE — ED NOTES
Pt alert and oriented x4. Ambulated with a steady gait. Discharged to home in er. Pt had phone, keys, and wallet.

## 2023-10-09 NOTE — ED NOTES
Pt on call light stating he is ready to call an Uber and go home. Pt has his own cell phone and is oriented x4. Provider updated.

## 2023-10-09 NOTE — ED TRIAGE NOTES
Pt has been reportedly drinking hard alcohol for the past 1 week and has been trying to wean himself.  Pt states withdrawal symptoms starting tonight.

## 2023-10-09 NOTE — DISCHARGE INSTRUCTIONS
Follow-up with your primary care doctor and return to the ER for any worsening symptoms or other concerns.

## 2023-10-13 ENCOUNTER — HOSPITAL ENCOUNTER (INPATIENT)
Facility: HOSPITAL | Age: 38
LOS: 3 days | Discharge: LEFT BEFORE DISCHARGE | DRG: 894 | End: 2023-10-16
Attending: EMERGENCY MEDICINE | Admitting: INTERNAL MEDICINE
Payer: COMMERCIAL

## 2023-10-13 ENCOUNTER — APPOINTMENT (OUTPATIENT)
Dept: CT IMAGING | Facility: HOSPITAL | Age: 38
DRG: 894 | End: 2023-10-13
Attending: EMERGENCY MEDICINE
Payer: COMMERCIAL

## 2023-10-13 DIAGNOSIS — F10.129 ALCOHOL ABUSE WITH INTOXICATION (H): ICD-10-CM

## 2023-10-13 PROBLEM — R11.2 NAUSEA WITH VOMITING: Status: ACTIVE | Noted: 2023-10-13

## 2023-10-13 PROBLEM — F41.8 ANXIETY WITH DEPRESSION: Status: ACTIVE | Noted: 2023-10-13

## 2023-10-13 PROBLEM — K70.10 ALCOHOLIC HEPATITIS WITHOUT ASCITES (H): Status: ACTIVE | Noted: 2023-10-13

## 2023-10-13 PROBLEM — K92.0 HEMATEMESIS: Status: ACTIVE | Noted: 2023-10-13

## 2023-10-13 LAB
ABO/RH(D): NORMAL
ALBUMIN SERPL BCG-MCNC: 5 G/DL (ref 3.5–5.2)
ALP SERPL-CCNC: 99 U/L (ref 40–129)
ALT SERPL W P-5'-P-CCNC: 51 U/L (ref 0–70)
ANION GAP SERPL CALCULATED.3IONS-SCNC: 20 MMOL/L (ref 7–15)
ANTIBODY SCREEN: NEGATIVE
APTT PPP: 25 SECONDS (ref 22–38)
AST SERPL W P-5'-P-CCNC: 84 U/L (ref 0–45)
BASO+EOS+MONOS # BLD AUTO: ABNORMAL 10*3/UL
BASO+EOS+MONOS NFR BLD AUTO: ABNORMAL %
BASOPHILS # BLD AUTO: 0.1 10E3/UL (ref 0–0.2)
BASOPHILS NFR BLD AUTO: 1 %
BILIRUB SERPL-MCNC: 0.5 MG/DL
BUN SERPL-MCNC: 12.2 MG/DL (ref 6–20)
CALCIUM SERPL-MCNC: 8.8 MG/DL (ref 8.6–10)
CHLORIDE SERPL-SCNC: 95 MMOL/L (ref 98–107)
CREAT SERPL-MCNC: 0.7 MG/DL (ref 0.67–1.17)
DEPRECATED HCO3 PLAS-SCNC: 26 MMOL/L (ref 22–29)
EGFRCR SERPLBLD CKD-EPI 2021: >90 ML/MIN/1.73M2
EOSINOPHIL # BLD AUTO: 0 10E3/UL (ref 0–0.7)
EOSINOPHIL NFR BLD AUTO: 0 %
ERYTHROCYTE [DISTWIDTH] IN BLOOD BY AUTOMATED COUNT: 16.6 % (ref 10–15)
ETHANOL SERPL-MCNC: 0.6 G/DL
GLUCOSE SERPL-MCNC: 130 MG/DL (ref 70–99)
HCT VFR BLD AUTO: 46.3 % (ref 40–53)
HGB BLD-MCNC: 15.8 G/DL (ref 13.3–17.7)
IMM GRANULOCYTES # BLD: 0 10E3/UL
IMM GRANULOCYTES NFR BLD: 0 %
INR PPP: 0.97 (ref 0.85–1.15)
LIPASE SERPL-CCNC: 96 U/L (ref 13–60)
LYMPHOCYTES # BLD AUTO: 1.3 10E3/UL (ref 0.8–5.3)
LYMPHOCYTES NFR BLD AUTO: 14 %
MAGNESIUM SERPL-MCNC: 2 MG/DL (ref 1.7–2.3)
MCH RBC QN AUTO: 29 PG (ref 26.5–33)
MCHC RBC AUTO-ENTMCNC: 34.1 G/DL (ref 31.5–36.5)
MCV RBC AUTO: 85 FL (ref 78–100)
MONOCYTES # BLD AUTO: 0.4 10E3/UL (ref 0–1.3)
MONOCYTES NFR BLD AUTO: 4 %
NEUTROPHILS # BLD AUTO: 7.7 10E3/UL (ref 1.6–8.3)
NEUTROPHILS NFR BLD AUTO: 81 %
NRBC # BLD AUTO: 0 10E3/UL
NRBC BLD AUTO-RTO: 0 /100
PHOSPHATE SERPL-MCNC: 2.9 MG/DL (ref 2.5–4.5)
PLATELET # BLD AUTO: 174 10E3/UL (ref 150–450)
POTASSIUM SERPL-SCNC: 3.8 MMOL/L (ref 3.4–5.3)
PROT SERPL-MCNC: 8 G/DL (ref 6.4–8.3)
RBC # BLD AUTO: 5.44 10E6/UL (ref 4.4–5.9)
SODIUM SERPL-SCNC: 141 MMOL/L (ref 135–145)
SPECIMEN EXPIRATION DATE: NORMAL
WBC # BLD AUTO: 9.5 10E3/UL (ref 4–11)

## 2023-10-13 PROCEDURE — 96375 TX/PRO/DX INJ NEW DRUG ADDON: CPT

## 2023-10-13 PROCEDURE — 258N000003 HC RX IP 258 OP 636: Performed by: EMERGENCY MEDICINE

## 2023-10-13 PROCEDURE — 85025 COMPLETE CBC W/AUTO DIFF WBC: CPT | Performed by: EMERGENCY MEDICINE

## 2023-10-13 PROCEDURE — 36415 COLL VENOUS BLD VENIPUNCTURE: CPT | Performed by: EMERGENCY MEDICINE

## 2023-10-13 PROCEDURE — HZ2ZZZZ DETOXIFICATION SERVICES FOR SUBSTANCE ABUSE TREATMENT: ICD-10-PCS | Performed by: EMERGENCY MEDICINE

## 2023-10-13 PROCEDURE — 250N000011 HC RX IP 250 OP 636: Mod: JZ | Performed by: INTERNAL MEDICINE

## 2023-10-13 PROCEDURE — 96376 TX/PRO/DX INJ SAME DRUG ADON: CPT

## 2023-10-13 PROCEDURE — 83735 ASSAY OF MAGNESIUM: CPT | Performed by: EMERGENCY MEDICINE

## 2023-10-13 PROCEDURE — 85610 PROTHROMBIN TIME: CPT | Performed by: EMERGENCY MEDICINE

## 2023-10-13 PROCEDURE — C9113 INJ PANTOPRAZOLE SODIUM, VIA: HCPCS | Mod: JZ | Performed by: EMERGENCY MEDICINE

## 2023-10-13 PROCEDURE — 86850 RBC ANTIBODY SCREEN: CPT | Performed by: EMERGENCY MEDICINE

## 2023-10-13 PROCEDURE — 250N000013 HC RX MED GY IP 250 OP 250 PS 637: Performed by: INTERNAL MEDICINE

## 2023-10-13 PROCEDURE — 99285 EMERGENCY DEPT VISIT HI MDM: CPT | Mod: 25

## 2023-10-13 PROCEDURE — 96361 HYDRATE IV INFUSION ADD-ON: CPT

## 2023-10-13 PROCEDURE — 250N000013 HC RX MED GY IP 250 OP 250 PS 637: Performed by: EMERGENCY MEDICINE

## 2023-10-13 PROCEDURE — 86901 BLOOD TYPING SEROLOGIC RH(D): CPT | Performed by: EMERGENCY MEDICINE

## 2023-10-13 PROCEDURE — 84100 ASSAY OF PHOSPHORUS: CPT | Performed by: EMERGENCY MEDICINE

## 2023-10-13 PROCEDURE — 250N000011 HC RX IP 250 OP 636: Mod: JZ | Performed by: EMERGENCY MEDICINE

## 2023-10-13 PROCEDURE — 120N000001 HC R&B MED SURG/OB

## 2023-10-13 PROCEDURE — 85730 THROMBOPLASTIN TIME PARTIAL: CPT | Performed by: EMERGENCY MEDICINE

## 2023-10-13 PROCEDURE — 82077 ASSAY SPEC XCP UR&BREATH IA: CPT | Performed by: EMERGENCY MEDICINE

## 2023-10-13 PROCEDURE — 258N000003 HC RX IP 258 OP 636: Performed by: INTERNAL MEDICINE

## 2023-10-13 PROCEDURE — 72125 CT NECK SPINE W/O DYE: CPT

## 2023-10-13 PROCEDURE — 80053 COMPREHEN METABOLIC PANEL: CPT | Performed by: EMERGENCY MEDICINE

## 2023-10-13 PROCEDURE — 99223 1ST HOSP IP/OBS HIGH 75: CPT | Performed by: INTERNAL MEDICINE

## 2023-10-13 PROCEDURE — 96374 THER/PROPH/DIAG INJ IV PUSH: CPT

## 2023-10-13 PROCEDURE — 83690 ASSAY OF LIPASE: CPT | Performed by: EMERGENCY MEDICINE

## 2023-10-13 PROCEDURE — 70450 CT HEAD/BRAIN W/O DYE: CPT

## 2023-10-13 RX ORDER — DIAZEPAM 10 MG/2ML
5-10 INJECTION, SOLUTION INTRAMUSCULAR; INTRAVENOUS EVERY 30 MIN PRN
Status: DISCONTINUED | OUTPATIENT
Start: 2023-10-13 | End: 2023-10-16 | Stop reason: HOSPADM

## 2023-10-13 RX ORDER — DIAZEPAM 10 MG
10 TABLET ORAL EVERY 30 MIN PRN
Status: DISCONTINUED | OUTPATIENT
Start: 2023-10-13 | End: 2023-10-16 | Stop reason: HOSPADM

## 2023-10-13 RX ORDER — MULTIPLE VITAMINS W/ MINERALS TAB 9MG-400MCG
1 TAB ORAL DAILY
Status: DISCONTINUED | OUTPATIENT
Start: 2023-10-13 | End: 2023-10-16 | Stop reason: HOSPADM

## 2023-10-13 RX ORDER — SODIUM CHLORIDE 9 MG/ML
INJECTION, SOLUTION INTRAVENOUS CONTINUOUS
Status: DISCONTINUED | OUTPATIENT
Start: 2023-10-13 | End: 2023-10-15

## 2023-10-13 RX ORDER — ONDANSETRON 2 MG/ML
4 INJECTION INTRAMUSCULAR; INTRAVENOUS EVERY 6 HOURS PRN
Status: DISCONTINUED | OUTPATIENT
Start: 2023-10-13 | End: 2023-10-16 | Stop reason: HOSPADM

## 2023-10-13 RX ORDER — HYDROXYZINE HYDROCHLORIDE 25 MG/1
50 TABLET, FILM COATED ORAL EVERY 6 HOURS PRN
Status: DISCONTINUED | OUTPATIENT
Start: 2023-10-14 | End: 2023-10-14 | Stop reason: DRUGHIGH

## 2023-10-13 RX ORDER — PANTOPRAZOLE SODIUM 40 MG/1
40 TABLET, DELAYED RELEASE ORAL
Status: DISCONTINUED | OUTPATIENT
Start: 2023-10-13 | End: 2023-10-16 | Stop reason: HOSPADM

## 2023-10-13 RX ORDER — BUSPIRONE HYDROCHLORIDE 5 MG/1
5 TABLET ORAL 3 TIMES DAILY
Status: DISCONTINUED | OUTPATIENT
Start: 2023-10-14 | End: 2023-10-16 | Stop reason: HOSPADM

## 2023-10-13 RX ORDER — FOLIC ACID 1 MG/1
1 TABLET ORAL DAILY
Status: DISCONTINUED | OUTPATIENT
Start: 2023-10-13 | End: 2023-10-16 | Stop reason: HOSPADM

## 2023-10-13 RX ORDER — ONDANSETRON 4 MG/1
4 TABLET, ORALLY DISINTEGRATING ORAL EVERY 6 HOURS PRN
Status: DISCONTINUED | OUTPATIENT
Start: 2023-10-13 | End: 2023-10-16 | Stop reason: HOSPADM

## 2023-10-13 RX ORDER — CLONIDINE HYDROCHLORIDE 0.1 MG/1
0.1 TABLET ORAL 2 TIMES DAILY PRN
Status: DISCONTINUED | OUTPATIENT
Start: 2023-10-13 | End: 2023-10-16 | Stop reason: HOSPADM

## 2023-10-13 RX ORDER — DIAZEPAM 10 MG
10 TABLET ORAL 2 TIMES DAILY
Status: DISCONTINUED | OUTPATIENT
Start: 2023-10-13 | End: 2023-10-14

## 2023-10-13 RX ADMIN — PANTOPRAZOLE SODIUM 40 MG: 20 TABLET, DELAYED RELEASE ORAL at 16:15

## 2023-10-13 RX ADMIN — SODIUM CHLORIDE: 9 INJECTION, SOLUTION INTRAVENOUS at 14:44

## 2023-10-13 RX ADMIN — DIAZEPAM 5 MG: 5 INJECTION, SOLUTION INTRAMUSCULAR; INTRAVENOUS at 16:33

## 2023-10-13 RX ADMIN — DIAZEPAM 10 MG: 5 INJECTION, SOLUTION INTRAMUSCULAR; INTRAVENOUS at 19:32

## 2023-10-13 RX ADMIN — DIAZEPAM 10 MG: 10 TABLET ORAL at 11:13

## 2023-10-13 RX ADMIN — FOLIC ACID 1 MG: 1 TABLET ORAL at 10:11

## 2023-10-13 RX ADMIN — SODIUM CHLORIDE 1000 ML: 9 INJECTION, SOLUTION INTRAVENOUS at 08:11

## 2023-10-13 RX ADMIN — ONDANSETRON 4 MG: 2 INJECTION INTRAMUSCULAR; INTRAVENOUS at 21:28

## 2023-10-13 RX ADMIN — DIAZEPAM 5 MG: 5 INJECTION, SOLUTION INTRAMUSCULAR; INTRAVENOUS at 09:36

## 2023-10-13 RX ADMIN — DIAZEPAM 10 MG: 10 TABLET ORAL at 13:50

## 2023-10-13 RX ADMIN — Medication 1 TABLET: at 14:44

## 2023-10-13 RX ADMIN — THIAMINE HCL TAB 100 MG 100 MG: 100 TAB at 10:11

## 2023-10-13 RX ADMIN — DIAZEPAM 10 MG: 5 INJECTION, SOLUTION INTRAMUSCULAR; INTRAVENOUS at 17:52

## 2023-10-13 RX ADMIN — ONDANSETRON 4 MG: 2 INJECTION INTRAMUSCULAR; INTRAVENOUS at 15:24

## 2023-10-13 RX ADMIN — DIAZEPAM 10 MG: 5 INJECTION, SOLUTION INTRAMUSCULAR; INTRAVENOUS at 20:27

## 2023-10-13 RX ADMIN — DIAZEPAM 10 MG: 5 INJECTION, SOLUTION INTRAMUSCULAR; INTRAVENOUS at 21:27

## 2023-10-13 RX ADMIN — PANTOPRAZOLE SODIUM 80 MG: 40 INJECTION, POWDER, FOR SOLUTION INTRAVENOUS at 09:45

## 2023-10-13 ASSESSMENT — ACTIVITIES OF DAILY LIVING (ADL)
DEPENDENT_IADLS:: INDEPENDENT
ADLS_ACUITY_SCORE: 37
ADLS_ACUITY_SCORE: 37
ADLS_ACUITY_SCORE: 22
ADLS_ACUITY_SCORE: 37
ADLS_ACUITY_SCORE: 37
ADLS_ACUITY_SCORE: 22
ADLS_ACUITY_SCORE: 37
ADLS_ACUITY_SCORE: 22

## 2023-10-13 NOTE — ED NOTES
"Pt wakes from sleep when name called, pt provided cup of pop on ice.  Pt denies having any nausea, denies needing any food/crackers at this time, Pt states, \" I need Ativan.\" Pt reminded of plan for reassessment of withdrawal symptoms and PRN medications.   "

## 2023-10-13 NOTE — ED PROVIDER NOTES
"EMERGENCY DEPARTMENT ENCOUNTER      NAME: Nikhil Rios  AGE: 38 year old male  YOB: 1985  MRN: [id number]  EVALUATION DATE & TIME: 10/13/2023  7:24 AM    PCP: Lisandro Graham    ED PROVIDER: Cece Dias MD      Chief Complaint   Patient presents with    Vomiting    Hematemesis         FINAL IMPRESSION:  1. Alcohol abuse with intoxication (H24)          ED COURSE & MEDICAL DECISION MAKING:    Pertinent Labs & Imaging studies reviewed. (See chart for details)    7:38 AM I introduced myself to the patient, obtained patient history, performed a physical exam, and discussed plan for ED workup including potential diagnostic laboratory/imaging studies and interventions.    38 year old male with a pertinent history of pancreatitis, anxiety, alcohol abuse, alcohol withdrawal seizures who presents to this ED for evaluation of alcohol intoxication and vomiting.  Patient initially states that he \"is having alcohol withdrawal\" but appears acutely intoxicated on exam.  Has no focal deficits.  Does have signs of multiple prior falls with healing abrasions scattered about his body.  Does not have any bony tenderness of the extremities chest wall.  No abdominal tenderness.  Denies any abdominal pain.  Apparently was found to have some vomit next to him by EMS that they question whether there could be a very small amount of possible coffee ground.  He does not have any known varices per mom.  Does have some history of gastritis.  Has not had any vomiting here.  Consider the potential of upper GI bleed including ulcer versus varices however with coffee-ground felt that varices would be less likely if not portia hematemesis.  As he has had multiple falls and cannot tell me when he last fell and he is intoxicated here will obtain CT of the head as well as cervical spine for further evaluation.  Otherwise he does not have any tenderness to palpation of the C-spine thoracic lumbar spine in the midline.  And no other " signs of acute injury or pain or tenderness.  Has no focal deficits but does appear intoxicated.  CT of the head and cervical spine are unremarkable.  Laboratory studies obtained.  Blood alcohol level is elevated at 0.6.  This would explain his appearance.  However he is awake and alert and answering questions.  CMP largely unrevealing.  CBC with a normal blood cell count of 9.5 with a hemoglobin of 15.8.  APTT and INR within normal limits.  Hemoglobin being 15.8 do not feel that he is having any significant GI bleeding at this time.  Lipase slightly elevated at 96 however no abdominal pain or tenderness at this time.  Magnesium within normal limits.  Type and screen was obtained.  He was given oral thiamine, folic acid, and multivitamin.  Did order CIWA protocol and had discussed with the nurse did not give this until he is clearly in withdrawal with the fact that his alcohol level is 0.6.  Does have history of seizures we will monitor closely for this.  He was given 80 mg of IV Protonix for the potential of the possible coffee-ground emesis but again have low suspicion that this is a true GI bleed with his overall presentation.  He has been hemodynamically stable with mild tachycardia.  Was given IV fluids here.  Do feel he warrants admission for safe detox with his seizure history and he and his mother are in agreement with this plan.  I spoke to the medicine team who excepted the patient for admission.  He was admitted in stable condition.         At the conclusion of the encounter I discussed the results of all of the tests and the disposition. The questions were answered. The patient or family acknowledged understanding and was agreeable with the care plan.       Medical Decision Making    History:  Supplemental history from: Documented in chart, if applicable  External Record(s) reviewed: Documented in chart, if applicable.    Work Up:  Chart documentation includes differential considered and any EKGs or  imaging independently interpreted by provider.  In additional to work up documented, I considered the following work up: Documented in chart, if applicable.    External consultation:  Discussion of management with another provider: Documented in chart, if applicable    Complicating factors:  Care impacted by chronic illness: Mental Health  Care affected by social determinants of health: Alcohol Abuse and/or Recreational Drug Use    Disposition considerations: Admit.      MEDICATIONS GIVEN IN THE EMERGENCY:  Medications   thiamine (B-1) tablet 100 mg (100 mg Oral $Given 10/13/23 1011)   folic acid (FOLVITE) tablet 1 mg (1 mg Oral $Given 10/13/23 1011)   multivitamin w/minerals (THERA-VIT-M) tablet 1 tablet (has no administration in time range)   diazepam (VALIUM) tablet 10 mg (10 mg Oral $Given 10/13/23 1113)     Or   diazepam (VALIUM) injection 5-10 mg ( Intravenous See Alternative 10/13/23 1113)   pantoprazole (PROTONIX) EC tablet 40 mg (40 mg Oral Not Given 10/13/23 1029)   sodium chloride 0.9% BOLUS 1,000 mL (0 mLs Intravenous Stopped 10/13/23 1116)   pantoprazole (PROTONIX) IV push injection 80 mg (80 mg Intravenous $Given 10/13/23 0945)       NEW PRESCRIPTIONS STARTED AT TODAY'S ER VISIT  New Prescriptions    No medications on file          =================================================================    HPI    Patient information was obtained from: patient, EMS, patient's mother    Use of : N/A         Nikhil Rios is a 38 year old male with a pertinent history of pancreatitis, anxiety, alcohol abuse, alcohol withdrawal seizures who presents to this ED for evaluation of alcohol intoxication and vomiting.    Per EMS, patient's vomit looked like possible coffee grounds. Patient reports alcohol withdrawals. He states he drank last night and says he usually drinks 750 daily. He says he vomited blood once 3 years ago but did not get worked up for this. Patient has had seizures from withdrawals  multiple times in the past.  He cannot tell me when the last time was but does not believe its been recent.  Per patient's mother, he lives with her and was in the ED last week. They plan on sending him to alcohol treatment next week on Tuesday but he needs detox prior to this. She says he falls regularly. Patient denies any chest pain, shortness of breath, abdominal pain, diarrhea, melena, hematochezia.    Per mom, the patient has been drinking heavily and falls multiple times related to his alcohol intoxication.  Patient cannot tell me when he last fell.  Mom states that he has not had any known varices in the past.  He is prescribed omeprazole for gastritis.  No history of ulcers per mom.  Mom denies noting any seizures since he has been staying with her over the past couple weeks.    Chart Review: 10/9/23 ED visit at [facility] for alcohol withdrawals.  He elected to be discharged.    REVIEW OF SYSTEMS   Review of Systems   Pertinent positives and negatives are documented in the HPI. All other systems reviewed and are negative.      PAST MEDICAL HISTORY:  Past Medical History:   Diagnosis Date    Alcohol abuse     Alcohol withdrawal (H)     Depressive disorder     Family history of diabetes mellitus 11/09/2007    HLD (hyperlipidemia)     HTN (hypertension)        PAST SURGICAL HISTORY:  Past Surgical History:   Procedure Laterality Date    NO HISTORY OF SURGERY      OTHER SURGICAL HISTORY      none           CURRENT MEDICATIONS:    busPIRone (BUSPAR) 5 MG tablet  cloNIDine (CATAPRES) 0.1 MG tablet  folic acid (FOLVITE) 1 MG tablet  gabapentin (NEURONTIN) 300 MG capsule  hydrOXYzine (ATARAX) 25 MG tablet  multivitamin w/minerals (THERA-VIT-M) tablet  thiamine (B-1) 100 MG tablet        ALLERGIES:  Allergies   Allergen Reactions    Gramineae Pollens Itching    Pollen Extract Rash     grass tree pollen       FAMILY HISTORY:  Family History   Problem Relation Age of Onset    Coronary Artery Disease Father      "Substance Abuse Maternal Grandfather     Mental Illness Brother     Schizophrenia Brother     Schizophrenia Maternal Aunt        SOCIAL HISTORY:   Social History     Socioeconomic History    Marital status: Single   Occupational History    Occupation: Rental property owner   Tobacco Use    Smoking status: Some Days     Packs/day: .25     Types: Cigars, Cigarettes    Smokeless tobacco: Never    Tobacco comments:     occasional   Substance and Sexual Activity    Alcohol use: Yes     Alcohol/week: 17.0 standard drinks of alcohol     Types: 17 Shots of liquor per week     Comment: Drinks 750ml/day or 17 shots/day; hx of withdrawl seizures    Drug use: Not Currently     Types: Marijuana    Sexual activity: Not Currently     Social Determinants of Health     Transportation Needs: No Transportation Needs (7/21/2021)    PRAPARE - Transportation     Lack of Transportation (Medical): No     Lack of Transportation (Non-Medical): No   Stress: Stress Concern Present (7/21/2021)    [employer] - Occupational Stress Questionnaire     Feeling of Stress : Very much   Housing Stability: Unknown (7/21/2021)    Housing Stability Vital Sign     Unable to Pay for Housing in the Last Year: No     Unstable Housing in the Last Year: No       VITALS:  /73   Pulse 98   Temp 98.1  F (36.7  C) (Oral)   Resp 16   Ht 1.778 m (5' 10\")   Wt 77.1 kg (170 lb)   SpO2 97%   BMI 24.39 kg/m      PHYSICAL EXAM    Physical Exam  Constitutional: Appears intoxicated, awake and alert and answering questions  HENT: Normocephalic, Atraumatic, Bilateral external ears normal, Oropharynx normal, mucous membranes moist, Nose normal. Neck- Normal range of motion, No midline cervical spine tenderness, Supple, No stridor.    Eyes: PERRL, EOMI, Conjunctiva normal, No discharge.   Respiratory: Normal breath sounds, No respiratory distress, No wheezing or crackles, Speaks in full sentences easily.    Cardiovascular: Normal heart " rate, Regular rhythm, No murmurs, No rubs, No gallops. 2+ radial pulses bilaterally. No reproducible chest tenderness.    GI: Bowel sounds normal, Soft, No tenderness, No masses, No rebound or guarding. No CVA tenderness bilaterally   Musculoskeletal: 2+ DP pulses. No notable lower extremity edema. Good range of motion in all major joints. No tenderness to palpation or major deformities noted. No midline tenderness of the CTLS spine.    Integument: Warm, Dry, multiple areas of healing abrasions with evidence of multiple falls as reported per mom  Neurologic: Alert & oriented x 3, does appear intoxicated, 5/5 strength in all 4 extremities bilaterally. Sensation intact to light touch in all 4 extremities and the face bilaterally. No focal deficits noted.   Psychiatric: Appears intoxicated.  Cooperative.      LAB:  All pertinent labs reviewed and interpreted.  Results for orders placed or performed during the hospital encounter of 10/13/23   Head CT w/o contrast    Impression    IMPRESSION:    1.  No acute traumatic intracranial abnormality.  2.  Mild generalized cerebral parenchymal volume loss, greater than expected for patient's age.   Cervical spine CT w/o contrast    Impression    IMPRESSION:    1.  No acute fracture or traumatic malalignment of the cervical spine.  2.  No high-grade spinal canal or neural foraminal stenosis.   Comprehensive metabolic panel   Result Value Ref Range    Sodium 141 135 - 145 mmol/L    Potassium 3.8 3.4 - 5.3 mmol/L    Carbon Dioxide (CO2) 26 22 - 29 mmol/L    Anion Gap 20 (H) 7 - 15 mmol/L    Urea Nitrogen 12.2 6.0 - 20.0 mg/dL    Creatinine 0.70 0.67 - 1.17 mg/dL    GFR Estimate >90 >60 mL/min/1.73m2    Calcium 8.8 8.6 - 10.0 mg/dL    Chloride 95 (L) 98 - 107 mmol/L    Glucose 130 (H) 70 - 99 mg/dL    Alkaline Phosphatase 99 40 - 129 U/L    AST 84 (H) 0 - 45 U/L    ALT 51 0 - 70 U/L    Protein Total 8.0 6.4 - 8.3 g/dL    Albumin 5.0 3.5 - 5.2 g/dL    Bilirubin Total 0.5 <=1.2 mg/dL    Result Value Ref Range    INR 0.97 0.85 - 1.15   Partial thromboplastin time   Result Value Ref Range    aPTT 25 22 - 38 Seconds   Result Value Ref Range    Lipase 96 (H) 13 - 60 U/L   Result Value Ref Range    Magnesium 2.0 1.7 - 2.3 mg/dL   Ethyl Alcohol Level   Result Value Ref Range    Alcohol ethyl 0.60 (HH) <=0.01 g/dL   CBC with platelets and differential   Result Value Ref Range    WBC Count 9.5 4.0 - 11.0 10e3/uL    RBC Count 5.44 4.40 - 5.90 10e6/uL    Hemoglobin 15.8 13.3 - 17.7 g/dL    Hematocrit 46.3 40.0 - 53.0 %    MCV 85 78 - 100 fL    MCH 29.0 26.5 - 33.0 pg    MCHC 34.1 31.5 - 36.5 g/dL    RDW 16.6 (H) 10.0 - 15.0 %    Platelet Count 174 150 - 450 10e3/uL    % Neutrophils 81 %    % Lymphocytes 14 %    % Monocytes 4 %    Mids % (Monos, Eos, Basos)      % Eosinophils 0 %    % Basophils 1 %    % Immature Granulocytes 0 %    NRBCs per 100 WBC 0 <1 /100    Absolute Neutrophils 7.7 1.6 - 8.3 10e3/uL    Absolute Lymphocytes 1.3 0.8 - 5.3 10e3/uL    Absolute Monocytes 0.4 0.0 - 1.3 10e3/uL    Mids Abs (Monos, Eos, Basos)      Absolute Eosinophils 0.0 0.0 - 0.7 10e3/uL    Absolute Basophils 0.1 0.0 - 0.2 10e3/uL    Absolute Immature Granulocytes 0.0 <=0.4 10e3/uL    Absolute NRBCs 0.0 10e3/uL   Result Value Ref Range    Phosphorus 2.9 2.5 - 4.5 mg/dL   Adult Type and Screen   Result Value Ref Range    ABO/RH(D) O POS     Antibody Screen Negative Negative    SPECIMEN EXPIRATION DATE [id number]        RADIOLOGY:  Reviewed all pertinent imaging. Please see official radiology report.  Cervical spine CT w/o contrast   Final Result   IMPRESSION:      1.  No acute fracture or traumatic malalignment of the cervical spine.   2.  No high-grade spinal canal or neural foraminal stenosis.      Head CT w/o contrast   Final Result   IMPRESSION:      1.  No acute traumatic intracranial abnormality.   2.  Mild generalized cerebral parenchymal volume loss, greater than expected for patient's age.              eal  [facility] Documentation:   CMS Diagnoses:               I, Kim Connell, am serving as a scribe to document services personally performed by Cece Dias MD based on my observation and the provider's statements to me. I, Cece Dias MD, attest that Kim Connell is acting in a scribe capacity, has observed my performance of the services and has documented them in accordance with my direction.    Cece Dias MD  [facility] EMERGENCY DEPARTMENT  [address] [place] [id number]  [phone]       Cece Dias MD  10/13/23 1637

## 2023-10-13 NOTE — ED NOTES
Pt vomiting 200 MLS tan emesis after taking PO MVI, Dr. Hernandez notified and this RN waiting for anti emetic to be ordered. Dr Hernandez also notified via text of need for medication order.

## 2023-10-13 NOTE — ED NOTES
Dr. Hernandez acknowledges receiving text:  regarding need for orders as discussed at bedside earlier today with pt, including orders for Ativan.

## 2023-10-13 NOTE — ED TRIAGE NOTES
Pt biba, mother called ems when pt was excessively intoxicated and paramedics found scant amount of coffee ground emesis on floor next to pt, no known trauma, no blood thinners.      Triage Assessment (Adult)       Row Name 10/13/23 0731          Triage Assessment    Airway WDL WDL        Respiratory WDL    Respiratory WDL WDL        Skin Circulation/Temperature WDL    Skin Circulation/Temperature WDL WDL        Cardiac WDL    Cardiac WDL WDL        Peripheral/Neurovascular WDL    Peripheral Neurovascular WDL WDL        Cognitive/Neuro/Behavioral WDL    Cognitive/Neuro/Behavioral WDL WDL

## 2023-10-13 NOTE — PHARMACY-ADMISSION MEDICATION HISTORY
Pharmacist Admission Medication History    Admission medication history is complete. The information provided in this note is only as accurate as the sources available at the time of the update.    Information Source(s): Patient and Hospital records via in-person    Pertinent Information:     Patient stated that he took his medications this past week, but can not recall the exact day.    Changes made to PTA medication list:  Added: None  Deleted: None  Changed: None         Allergies reviewed with patient and updates made in EHR: yes    Medication History Completed By: Erasto Salmeron [facility] 10/13/2023 11:17 AM    PTA Med List   Medication Sig Last Dose    busPIRone (BUSPAR) 5 MG tablet Take 1 tablet (5 mg) by mouth 3 times daily Unknown    cloNIDine (CATAPRES) 0.1 MG tablet Take 0.1 mg by mouth 2 times daily as needed (Anxiety) Unknown    folic acid (FOLVITE) 1 MG tablet Take 1 tablet (1 mg) by mouth daily Unknown    gabapentin (NEURONTIN) 300 MG capsule Take 300 mg by mouth 3 times daily Unknown    hydrOXYzine (ATARAX) 25 MG tablet Take 25 mg by mouth 3 times daily as needed for anxiety Unknown    multivitamin w/minerals (THERA-VIT-M) tablet Take 1 tablet by mouth daily Unknown    thiamine (B-1) 100 MG tablet Take 1 tablet (100 mg) by mouth daily Unknown

## 2023-10-13 NOTE — ED NOTES
Pt asking for water, pt reminded to not drink fluids after recent emesis, pt reminded to allow anti emetic to become effective before continuing to drink po fluids.

## 2023-10-13 NOTE — ED NOTES
"Pt states, \" I want more ativan, when can I get more Ativan.\" Pt educated on plan for further withdrawal assessments to guide use of PRN medications.  "

## 2023-10-13 NOTE — ED NOTES
Pt insists upon getting more water and Meds by IV route only.  Pt has now consumed 10 oz water x 3 glasses in past 90 minutes.

## 2023-10-13 NOTE — ED NOTES
Bed: JNED-24  Expected date: 10/13/23  Expected time: 7:12 AM  Means of arrival: Ambulance  Comments:  WBL - 38yom- ETOH, coffee ground emesis, vss

## 2023-10-13 NOTE — CONSULTS
"Care Management Initial Consult    General Information  Assessment completed with: Nikhil Jordan  Type of CM/SW Visit: Initial Assessment    Primary Care Provider verified and updated as needed: Yes   Readmission within the last 30 days: previous discharge plan unsuccessful (9/19/23 - 9/23/23, 9/23/23 - 9/24/23 and 9/29/23 - 10/3/23.)   Return Category: Progression of disease  Reason for Consult: discharge planning, substance use concerns  Advance Care Planning: Advance Care Planning Reviewed: no concerns identified          Communication Assessment  Patient's communication style: spoken language (English or Bilingual)             Cognitive  Cognitive/Neuro/Behavioral: WDL                      Living Environment:   People in home: other (see comments) (\"I own my own house and I live there with a roommate. But lately I have been staying at my Mom's house with her\".)     Current living Arrangements: house      Able to return to prior arrangements: yes       Family/Social Support:  Care provided by: self  Provides care for: no one     Parent(s)          Description of Support System: Supportive, Involved    Support Assessment: Adequate family and caregiver support, Adequate social supports, Patient communicates needs well met    Current Resources:   Patient receiving home care services: No     Community Resources: OP Mental Health, Other (see comment) (\"I was recently at [facility]. I am set up to go to [facility] in [place] on Mon 9/25/23\". He didn't go. \"I have a bed at [facility] for Tue 10/17/23\".)  Equipment currently used at home: none  Supplies currently used at home: Other (\"reading glasses\")    Employment/Financial:  Employment Status: unemployed        Financial Concerns:     Referral to Financial Worker: No       Does the patient's insurance plan have a 3 day qualifying hospital stay waiver?  No    Lifestyle & Psychosocial Needs:  Social Determinants of Health     Food Insecurity: Not " "on file   Depression: At risk (8/7/2023)    PHQ-2     PHQ-2 Score: 6   Housing Stability: Unknown (7/21/2021)    Housing Stability Vital Sign     Unable to Pay for Housing in the Last Year: No     Number of Places Lived in the Last Year: Not on file     Unstable Housing in the Last Year: No   Tobacco Use: High Risk (10/13/2023)    Patient History     Smoking Tobacco Use: Some Days     Smokeless Tobacco Use: Never     Passive Exposure: Not on file   Financial Resource Strain: Not on file   Alcohol Use: Alcohol Misuse (7/21/2021)    AUDIT-C     Frequency of Alcohol Consumption: 4 or more times a week     Average Number of Drinks: 10 or more     Frequency of Binge Drinking: Daily or almost daily   Transportation Needs: No Transportation Needs (7/21/2021)    PRAPARE - Transportation     Lack of Transportation (Medical): No     Lack of Transportation (Non-Medical): No   Physical Activity: Not on file   Interpersonal Safety: Not on file   Stress: Stress Concern Present (7/21/2021)    [employer] - Occupational Stress Questionnaire     Feeling of Stress : Very much   Social Connections: Not on file       Functional Status:  Prior to admission patient needed assistance:   Dependent ADLs:: Independent, Ambulation-no assistive device  Dependent IADLs:: Independent  Assesssment of Functional Status: At functional baseline    Mental Health Status:          Chemical Dependency Status:  Chemical Dependency Status: Current Concern  Chemical Dependency Management: Previous treatment (I asked him if he was interested in treatment he states, \"of course I am not interested in being sober\".)          Values/Beliefs:  Spiritual, Cultural Beliefs, Sikh Practices, Values that affect care: yes  Description of Beliefs that Will Affect Care: Yarsanism            Additional Information:  Nikhil has been staying with his Mom in her house lately. \"I own my own house and I live there with a roommate. But lately I " "have been staying at my Mom's house with her\". He is independent with ADLs and IADLs at baseline. He is unemployed. He drives.    He was admitted 9/19/23 - 9/23/23 and returned the same day for another admission 9/23/23 - 9/24/23 and 9/29/23 - 10/3/23 and also has multiple other ER visits as well in this time frame. On the 9/23 admission he told me, \"I am set up to go to [facility] in [place] on Mon 9/25/23 at 1500 for CD treatment. It is an ILP of 32 hours/week\". He did not go. On the 9/29 admission the CD [facility] set up for him to go to [facility]. He did not go. He now today tells me, \"I have a bed at [facility] for Tue 10/17/23\". I asked him if he was interested in treatment he states, \"of course I am NOT interested in being sober\".    Mom or friend to transport at discharge.    CM to follow for medical progression of care, discharge recommendations, and final discharge plan.    Tori Nayak RN    "

## 2023-10-13 NOTE — ED NOTES
"Pt states, \"When can I have more meds, and I need more water and more pop. I want Ativan and I want it IV.\" Pt reminded of Valium oral administration per emar and time for oral meds to become effective.  "

## 2023-10-13 NOTE — ED NOTES
Called pt's mom, Veronique Rios, at [phone]. Let her know that pt is being admitted to room 129.

## 2023-10-13 NOTE — H&P
"[facility]    History and Physical  Hospitalist       Date of Admission:  10/13/2023    Assessment & Plan   38 year old male with past medical hx of alcohol abuse/dependence, alcohol withdrawal, pancreatitis, who presents with nausea, vomiting, and hematemesis.    Summary:    Principal Problem:    Alcohol dependence with withdrawal with complication (H)  Active Problems:    Hematemesis    Alcoholic hepatitis without ascites (H28)    Nausea with vomiting       Plan: has inpatient bed scheduled for 10/17/23 at [facility] in [place]. Will treat alcohol withdrawal symptoms with ativan, PPI, valium prn and keep comfortable until then, patient agrees with plan.    DVT Prophylaxis: Low Risk/Ambulatory with no VTE prophylaxis indicated  Code Status: Full Code    Disposition: Expected discharge in 4 days to [facility] for alcohol chem dependency  for inpatient bed.    Wesley Renteria MD  Pager: [phone]  Cell Phone:  [phone]     Primary Care Physician   Lisandro Graham    Chief Complaint   Nausea, vomiting, hematemesis.    History is obtained from Patient    History of Present Illness   Nikhil Rios is a 38 year old male who presents with nausea, vomiting, hematemesis. Per EMS, vomit contained a \"scant\" amount of coffee ground emesis on floor next to patient. History of alcohol dependence with withdrawal, most recently admitted at [facility] on 9/29/23 and discharged 10/3/23 for alcohol dependence and withdrawal.     Endorses generalized weakness, malaise, headache, tremors, nausea. Typically consumes 750 ml of vodka over 1-2 days. Last alcohol ingestion unclear, ETOH 0.6. Otherwise denies seizure, abdominal pain, chest pain, shortness of breath. Patient adamant he would like to stop drinking alcohol. Living at Mother's house in [place], [place].    Afebrile, /91, , AST 84, lipase 96, chloride 95, Hgb 15.8, ETOH 0.6, anion gap 20    CT Head W/O " Contrast:  1.  No acute traumatic intracranial abnormality.  2.  Mild generalized cerebral parenchymal volume loss, greater than expected for patient's age.    CT Cervical Spine W/O Contrast:  1.  No acute fracture or traumatic malalignment of the cervical spine.  2.  No high-grade spinal canal or neural foraminal stenosis.    PAST MEDICAL HISTORY    Past Medical History:   Diagnosis Date    Alcohol abuse     Alcohol abuse     Alcohol withdrawal (H)     Depressive disorder     Family history of diabetes mellitus 11/9/2007    HLD (hyperlipidemia)     HTN (hypertension)         PAST SURGICAL HISTORY    Past Surgical History:   Procedure Laterality Date    NO HISTORY OF SURGERY      OTHER SURGICAL HISTORY      none        Prior to Admission Medications   Prior to Admission Medications   Prescriptions Last Dose Informant Patient Reported? Taking?   busPIRone (BUSPAR) 5 MG tablet Unknown Self No Yes   Sig: Take 1 tablet (5 mg) by mouth 3 times daily   cloNIDine (CATAPRES) 0.1 MG tablet Unknown Self Yes Yes   Sig: Take 0.1 mg by mouth 2 times daily as needed (Anxiety)   folic acid (FOLVITE) 1 MG tablet Unknown Self No Yes   Sig: Take 1 tablet (1 mg) by mouth daily   gabapentin (NEURONTIN) 300 MG capsule Unknown Self Yes Yes   Sig: Take 300 mg by mouth 3 times daily   hydrOXYzine (ATARAX) 25 MG tablet Unknown Self Yes Yes   Sig: Take 25 mg by mouth 3 times daily as needed for anxiety   multivitamin w/minerals (THERA-VIT-M) tablet Unknown Self No Yes   Sig: Take 1 tablet by mouth daily   thiamine (B-1) 100 MG tablet Unknown Self No Yes   Sig: Take 1 tablet (100 mg) by mouth daily      Facility-Administered Medications: None     Allergies   Allergies   Allergen Reactions    Gramineae Pollens Itching    Pollen Extract Rash     grass tree pollen       SOCIAL HISTORY    Social History     Social History Narrative    Not on file      Social History     Tobacco Use    Smoking status: Some Days     Packs/day: .25     Types:  Cigars, Cigarettes    Smokeless tobacco: Never    Tobacco comments:     occasional   Substance Use Topics    Alcohol use: Yes     Alcohol/week: 17.0 standard drinks of alcohol     Types: 17 Shots of liquor per week     Comment: Drinks 750ml/day or 17 shots/day; hx of withdrawl seizures    Drug use: Not Currently     Types: Marijuana        FAMILY HISTORY    Family History   Problem Relation Age of Onset    Coronary Artery Disease Father     Substance Abuse Maternal Grandfather     Mental Illness Brother     Schizophrenia Brother     Schizophrenia Maternal Aunt         Review of Systems   Review of systems not obtained due to patient factors - confusion      PHYSICAL EXAM     Temp: 98.1  F (36.7  C) Temp src: Oral BP: 127/73 Pulse: 98   Resp: 16 SpO2: 97 % O2 Device: None (Room air)    Vital Signs with Ranges  Temp:  [98.1  F (36.7  C)] 98.1  F (36.7  C)  Pulse:  [] 98  Resp:  [16-22] 16  BP: (125-136)/(73-91) 127/73  SpO2:  [94 %-98 %] 97 %  170 lbs 0 oz    Constitutional: Anxious appearing. Awake, alert, cooperative, moderate distress.  Eyes: Conjunctiva and pupils examined, pupils dilated bilaterally.  HEENT: Dry mucous membranes, normal dentition.  Respiratory: Clear to auscultation bilaterally, no crackles or wheezing.  Cardiovascular: Regular rate and rhythm, normal S1 and S2, and no murmur noted, no carotid bruits.  No ankle edema.   GI: Soft, non-distended, non-tender, normal bowel sounds.  Lymph/Hematologic: No anterior cervical, supraclavicular or axillary adenopathy.  Skin: No rashes, no cyanosis.   Musculoskeletal: No joint swelling, erythema or tenderness.  Neurologic: Alert, Ox2, Cranial nerves 2-12 intact, no focal weakness or numbness. Visibly tremulous.   Psychiatric:  Very anxious.    Data   Data reviewed today:  I personally reviewed no images or EKG's today.  Recent Labs   Lab 10/13/23  0808 10/09/23  0649   WBC 9.5 4.4   HGB 15.8 13.5   MCV 85 85    257   INR 0.97  --     140    POTASSIUM 3.8 3.7   CHLORIDE 95* 100   CO2 26 23   BUN 12.2 10.5   CR 0.70 0.65*   ANIONGAP 20* 17*   KEELEY 8.8 9.5   * 120*   ALBUMIN 5.0 5.0   PROTTOTAL 8.0 7.3   BILITOTAL 0.5 0.3   ALKPHOS 99 74   ALT 51 42   AST 84* 46*   LIPASE 96* 77*       Imaging:  Recent Results (from the past 24 hour(s))   Head CT w/o contrast    Narrative    EXAM: CT HEAD W/O CONTRAST  LOCATION: [facility]  DATE: 10/13/2023    INDICATION: fall ETOH  COMPARISON: Head CT: 07/15/2022.  TECHNIQUE: Routine CT Head without IV contrast. Multiplanar reformats. Dose reduction techniques were used.    FINDINGS:  INTRACRANIAL CONTENTS: No intracranial hemorrhage, extraaxial collection, or mass effect.  No CT evidence of acute infarct. Normal parenchymal attenuation. Mild generalized volume loss, greater than expected for patient's age. No hydrocephalus.     VISUALIZED ORBITS/SINUSES/MASTOIDS: No intraorbital abnormality. Mild mucosal thickening scattered about the paranasal sinuses. No middle ear or mastoid effusion.    BONES/SOFT TISSUES: No acute abnormality.      Impression    IMPRESSION:    1.  No acute traumatic intracranial abnormality.  2.  Mild generalized cerebral parenchymal volume loss, greater than expected for patient's age.   Cervical spine CT w/o contrast    Narrative    EXAM: CT CERVICAL SPINE W/O CONTRAST  LOCATION: [facility]  DATE: 10/13/2023    INDICATION: injury  COMPARISON: Cervical spine CT: 06/18/2022.  TECHNIQUE: Routine CT Cervical Spine without IV contrast. Multiplanar reformats. Dose reduction techniques were used.    FINDINGS:  VERTEBRA: No acute fracture. Alignment is normal. Similar prominent inferiorly directed ventral osteophyte arising from the right aspect of the C4 inferior endplate.    CANAL/FORAMINA: Mild multilevel degenerative disc disease. Multilevel facet arthropathy, which is most pronounced and moderate on the left at C4-C5 and C5-C6. No high-grade  spinal canal or neural foraminal stenosis.    PARASPINAL: No extraspinal abnormality.      Impression    IMPRESSION:    1.  No acute fracture or traumatic malalignment of the cervical spine.  2.  No high-grade spinal canal or neural foraminal stenosis.

## 2023-10-13 NOTE — ED NOTES
"[facility] ED Handoff Report    ED Chief Complaint: alcohol intoxication    ED Diagnosis:  (F10.129) Alcohol abuse with intoxication (H24)  Comment: n/a  Plan: n/a       PMH:    Past Medical History:   Diagnosis Date    Alcohol abuse     Alcohol withdrawal (H)     Depressive disorder     Family history of diabetes mellitus 11/09/2007    HLD (hyperlipidemia)     HTN (hypertension)         Code Status:  Prior     Falls Risk: Yes Band: Applied    Current Living Situation/Residence: lives with a significant other     Elimination Status: Continent: Yes     Activity Level: 2 assist    Patients Preferred Language:  English     Needed: No    Vital Signs:  /83   Pulse 105   Temp 98.7  F (37.1  C) (Oral)   Resp 12   Ht 1.778 m (5' 10\")   Wt 77.1 kg (170 lb)   SpO2 97%   BMI 24.39 kg/m       Cardiac Rhythm:SR to ST    Pain Score: 0/10    Is the Patient Confused:  Yes    Last Food or Drink: 10/13/23 at 1700 (fluid only)    Focused Assessment:  Pt come is with alcohol intoxication (@.60) and withdrawal. CIWA at 1633 is 10, 5mg IV valium given. VS improved with HR going from 125 to 88. Awakens easily. Lungs clear. Skin warm and dry. Oriented x 3, KRAMER. Frequently comes in with ETOH problems (came in 5 times over September and October). Lives at home with mom. Pt answers questions but is slow to answer. Pt got zofran at shift change (around 1515 for nausea). Pt feels much better after the zofran. Saturations 96% after valium.    Tests Performed: Done: Labs    Treatments Provided:  Scheduled valium and prn for withdrawal    Family Dynamics/Concerns: No    Family Updated On Visitor Policy: Yes    Plan of Care Communicated to Family: Yes    Will call mom    Belongings Checklist Done and Signed by Patient: Yes    Medications sent with patient: n/a    Covid: asymptomatic , not ordered    Additional Information: n/a    RN: Roxann Andrew RN   10/13/2023 5:01 PM      "

## 2023-10-14 LAB
ALBUMIN UR-MCNC: 600 MG/DL
AMPHETAMINES UR QL SCN: ABNORMAL
ANION GAP SERPL CALCULATED.3IONS-SCNC: 12 MMOL/L (ref 7–15)
APPEARANCE UR: CLEAR
BARBITURATES UR QL SCN: ABNORMAL
BENZODIAZ UR QL SCN: ABNORMAL
BILIRUB UR QL STRIP: NEGATIVE
BUN SERPL-MCNC: 12.5 MG/DL (ref 6–20)
BZE UR QL SCN: ABNORMAL
CALCIUM SERPL-MCNC: 8.6 MG/DL (ref 8.6–10)
CANNABINOIDS UR QL SCN: ABNORMAL
CHLORIDE SERPL-SCNC: 96 MMOL/L (ref 98–107)
COLOR UR AUTO: YELLOW
CREAT SERPL-MCNC: 0.68 MG/DL (ref 0.67–1.17)
DEPRECATED HCO3 PLAS-SCNC: 24 MMOL/L (ref 22–29)
EGFRCR SERPLBLD CKD-EPI 2021: >90 ML/MIN/1.73M2
FENTANYL UR QL: ABNORMAL
GLUCOSE SERPL-MCNC: 95 MG/DL (ref 70–99)
GLUCOSE UR STRIP-MCNC: 30 MG/DL
HGB BLD-MCNC: 11 G/DL (ref 13.3–17.7)
HGB UR QL STRIP: ABNORMAL
HYALINE CASTS: 12 /LPF
KETONES UR STRIP-MCNC: NEGATIVE MG/DL
LEUKOCYTE ESTERASE UR QL STRIP: NEGATIVE
MUCOUS THREADS #/AREA URNS LPF: PRESENT /LPF
NITRATE UR QL: NEGATIVE
OPIATES UR QL SCN: ABNORMAL
PCP QUAL URINE (ROCHE): ABNORMAL
PH UR STRIP: 6.5 [PH] (ref 5–7)
POTASSIUM SERPL-SCNC: 3.8 MMOL/L (ref 3.4–5.3)
RBC URINE: 0 /HPF
SODIUM SERPL-SCNC: 132 MMOL/L (ref 135–145)
SP GR UR STRIP: 1.03 (ref 1–1.03)
UROBILINOGEN UR STRIP-MCNC: <2 MG/DL
WBC URINE: 2 /HPF

## 2023-10-14 PROCEDURE — 250N000013 HC RX MED GY IP 250 OP 250 PS 637: Performed by: EMERGENCY MEDICINE

## 2023-10-14 PROCEDURE — 99232 SBSQ HOSP IP/OBS MODERATE 35: CPT | Performed by: INTERNAL MEDICINE

## 2023-10-14 PROCEDURE — 258N000003 HC RX IP 258 OP 636: Performed by: INTERNAL MEDICINE

## 2023-10-14 PROCEDURE — 120N000001 HC R&B MED SURG/OB

## 2023-10-14 PROCEDURE — 82435 ASSAY OF BLOOD CHLORIDE: CPT | Performed by: INTERNAL MEDICINE

## 2023-10-14 PROCEDURE — 81003 URINALYSIS AUTO W/O SCOPE: CPT | Performed by: EMERGENCY MEDICINE

## 2023-10-14 PROCEDURE — 250N000011 HC RX IP 250 OP 636: Performed by: EMERGENCY MEDICINE

## 2023-10-14 PROCEDURE — 36415 COLL VENOUS BLD VENIPUNCTURE: CPT | Performed by: INTERNAL MEDICINE

## 2023-10-14 PROCEDURE — 250N000011 HC RX IP 250 OP 636: Mod: JZ | Performed by: INTERNAL MEDICINE

## 2023-10-14 PROCEDURE — 85018 HEMOGLOBIN: CPT | Performed by: INTERNAL MEDICINE

## 2023-10-14 PROCEDURE — 250N000013 HC RX MED GY IP 250 OP 250 PS 637: Performed by: INTERNAL MEDICINE

## 2023-10-14 PROCEDURE — 80307 DRUG TEST PRSMV CHEM ANLYZR: CPT | Performed by: EMERGENCY MEDICINE

## 2023-10-14 RX ORDER — TRAZODONE HYDROCHLORIDE 50 MG/1
100 TABLET, FILM COATED ORAL AT BEDTIME
Status: DISCONTINUED | OUTPATIENT
Start: 2023-10-14 | End: 2023-10-16 | Stop reason: HOSPADM

## 2023-10-14 RX ORDER — DIAZEPAM 10 MG
10 TABLET ORAL 2 TIMES DAILY
Status: DISCONTINUED | OUTPATIENT
Start: 2023-10-14 | End: 2023-10-15

## 2023-10-14 RX ORDER — QUETIAPINE FUMARATE 25 MG/1
25 TABLET, FILM COATED ORAL 3 TIMES DAILY PRN
Status: DISCONTINUED | OUTPATIENT
Start: 2023-10-14 | End: 2023-10-15

## 2023-10-14 RX ORDER — HYDROXYZINE HYDROCHLORIDE 25 MG/1
50 TABLET, FILM COATED ORAL EVERY 4 HOURS PRN
Status: DISCONTINUED | OUTPATIENT
Start: 2023-10-14 | End: 2023-10-16 | Stop reason: HOSPADM

## 2023-10-14 RX ORDER — ACETAMINOPHEN 650 MG/1
650 SUPPOSITORY RECTAL EVERY 6 HOURS PRN
Status: DISCONTINUED | OUTPATIENT
Start: 2023-10-14 | End: 2023-10-16 | Stop reason: HOSPADM

## 2023-10-14 RX ORDER — AMOXICILLIN 250 MG
1 CAPSULE ORAL 2 TIMES DAILY PRN
Status: DISCONTINUED | OUTPATIENT
Start: 2023-10-14 | End: 2023-10-16 | Stop reason: HOSPADM

## 2023-10-14 RX ORDER — AMOXICILLIN 250 MG
2 CAPSULE ORAL 2 TIMES DAILY PRN
Status: DISCONTINUED | OUTPATIENT
Start: 2023-10-14 | End: 2023-10-16 | Stop reason: HOSPADM

## 2023-10-14 RX ORDER — DIAZEPAM 5 MG
5 TABLET ORAL 3 TIMES DAILY
Status: DISCONTINUED | OUTPATIENT
Start: 2023-10-14 | End: 2023-10-14

## 2023-10-14 RX ORDER — BISACODYL 10 MG
10 SUPPOSITORY, RECTAL RECTAL DAILY PRN
Status: DISCONTINUED | OUTPATIENT
Start: 2023-10-14 | End: 2023-10-16 | Stop reason: HOSPADM

## 2023-10-14 RX ORDER — POLYETHYLENE GLYCOL 3350 17 G/17G
17 POWDER, FOR SOLUTION ORAL DAILY PRN
Status: DISCONTINUED | OUTPATIENT
Start: 2023-10-14 | End: 2023-10-16 | Stop reason: HOSPADM

## 2023-10-14 RX ORDER — ACETAMINOPHEN 325 MG/1
650 TABLET ORAL EVERY 6 HOURS PRN
Status: DISCONTINUED | OUTPATIENT
Start: 2023-10-14 | End: 2023-10-16 | Stop reason: HOSPADM

## 2023-10-14 RX ORDER — OLOPATADINE HYDROCHLORIDE 1 MG/ML
1 SOLUTION/ DROPS OPHTHALMIC 2 TIMES DAILY
Status: DISCONTINUED | OUTPATIENT
Start: 2023-10-14 | End: 2023-10-16 | Stop reason: HOSPADM

## 2023-10-14 RX ADMIN — DIAZEPAM 10 MG: 10 TABLET ORAL at 20:14

## 2023-10-14 RX ADMIN — DIAZEPAM 10 MG: 5 INJECTION, SOLUTION INTRAMUSCULAR; INTRAVENOUS at 01:42

## 2023-10-14 RX ADMIN — SODIUM CHLORIDE: 9 INJECTION, SOLUTION INTRAVENOUS at 14:57

## 2023-10-14 RX ADMIN — HYDROXYZINE HYDROCHLORIDE 50 MG: 25 TABLET, FILM COATED ORAL at 10:06

## 2023-10-14 RX ADMIN — THIAMINE HCL TAB 100 MG 100 MG: 100 TAB at 08:12

## 2023-10-14 RX ADMIN — ONDANSETRON 4 MG: 2 INJECTION INTRAMUSCULAR; INTRAVENOUS at 06:24

## 2023-10-14 RX ADMIN — DIAZEPAM 10 MG: 5 INJECTION, SOLUTION INTRAMUSCULAR; INTRAVENOUS at 01:03

## 2023-10-14 RX ADMIN — Medication 1 TABLET: at 08:12

## 2023-10-14 RX ADMIN — BUSPIRONE HYDROCHLORIDE 5 MG: 5 TABLET ORAL at 08:12

## 2023-10-14 RX ADMIN — PANTOPRAZOLE SODIUM 40 MG: 20 TABLET, DELAYED RELEASE ORAL at 06:26

## 2023-10-14 RX ADMIN — DIAZEPAM 10 MG: 10 TABLET ORAL at 16:26

## 2023-10-14 RX ADMIN — DIAZEPAM 10 MG: 10 TABLET ORAL at 05:42

## 2023-10-14 RX ADMIN — ONDANSETRON 4 MG: 4 TABLET, ORALLY DISINTEGRATING ORAL at 13:04

## 2023-10-14 RX ADMIN — TRAZODONE HYDROCHLORIDE 100 MG: 50 TABLET ORAL at 20:14

## 2023-10-14 RX ADMIN — DIAZEPAM 10 MG: 10 TABLET ORAL at 13:04

## 2023-10-14 RX ADMIN — FOLIC ACID 1 MG: 1 TABLET ORAL at 08:12

## 2023-10-14 RX ADMIN — DIAZEPAM 10 MG: 10 TABLET ORAL at 20:13

## 2023-10-14 RX ADMIN — DIAZEPAM 10 MG: 10 TABLET ORAL at 10:59

## 2023-10-14 RX ADMIN — DIAZEPAM 10 MG: 10 TABLET ORAL at 02:24

## 2023-10-14 RX ADMIN — DIAZEPAM 10 MG: 5 INJECTION, SOLUTION INTRAMUSCULAR; INTRAVENOUS at 04:26

## 2023-10-14 RX ADMIN — DIAZEPAM 10 MG: 10 TABLET ORAL at 08:11

## 2023-10-14 RX ADMIN — HYDROXYZINE HYDROCHLORIDE 50 MG: 25 TABLET, FILM COATED ORAL at 20:14

## 2023-10-14 RX ADMIN — PANTOPRAZOLE SODIUM 40 MG: 20 TABLET, DELAYED RELEASE ORAL at 16:26

## 2023-10-14 RX ADMIN — SODIUM CHLORIDE: 9 INJECTION, SOLUTION INTRAVENOUS at 04:19

## 2023-10-14 RX ADMIN — BUSPIRONE HYDROCHLORIDE 5 MG: 5 TABLET ORAL at 13:04

## 2023-10-14 RX ADMIN — BUSPIRONE HYDROCHLORIDE 5 MG: 5 TABLET ORAL at 20:14

## 2023-10-14 RX ADMIN — OLOPATADINE HYDROCHLORIDE 1 DROP: 1 SOLUTION OPHTHALMIC at 20:41

## 2023-10-14 ASSESSMENT — ACTIVITIES OF DAILY LIVING (ADL)
ADLS_ACUITY_SCORE: 22
ADLS_ACUITY_SCORE: 28
ADLS_ACUITY_SCORE: 22
ADLS_ACUITY_SCORE: 28
ADLS_ACUITY_SCORE: 28
ADLS_ACUITY_SCORE: 22
ADLS_ACUITY_SCORE: 28

## 2023-10-14 NOTE — PROGRESS NOTES
"CLINICAL NUTRITION SERVICES - ASSESSMENT NOTE     Nutrition Prescription    RECOMMENDATIONS FOR MDs/PROVIDERS TO ORDER:    Malnutrition Status:    Not noted    Recommendations already ordered by Registered Dietitian (RD):  None at this time    Future/Additional Recommendations:  Follow for LOS     REASON FOR ASSESSMENT  Nikhil Rios is a/an 38 year old male assessed by the dietitian for Admission Nutrition Risk Screen for positive poor appetite and weight loss \"unsure\"    Weight hx review, no weight loss noted. Eating 100% of a regular diet.    ANTHROPOMETRICS  Height: 177.8 cm (5' 10\")  Most Recent Weight: 77.1 kg (170 lb)    IBW: 75.5 kg  BMI: Normal BMI  Weight History:   Wt Readings from Last 20 Encounters:   10/13/23 77.1 kg (170 lb)   10/06/23 76.2 kg (168 lb)   10/02/23 76.6 kg (168 lb 14.4 oz)   09/25/23 76.2 kg (168 lb)   09/23/23 77.5 kg (170 lb 13.7 oz)   09/19/23 77.1 kg (170 lb)   09/17/23 77.1 kg (170 lb)   09/13/23 77.1 kg (170 lb)   09/10/23 77.1 kg (170 lb)   08/24/23 77.1 kg (170 lb)   08/15/23 77.1 kg (170 lb)   08/14/23 77.1 kg (170 lb)   08/13/23 74.8 kg (165 lb)   07/23/23 74.8 kg (165 lb)   07/21/23 74.8 kg (165 lb)   06/03/23 76.2 kg (167 lb 15.9 oz)   06/01/23 72.6 kg (160 lb)   05/24/23 76.2 kg (168 lb)   05/21/23 79.4 kg (175 lb)   05/20/23 79.4 kg (175 lb)     INTERVENTIONS  Implementation  Follow for LOS    "

## 2023-10-14 NOTE — PROGRESS NOTES
Care Management Follow Up    Length of Stay (days): 1    Expected Discharge Date: 10/15/2023     Concerns to be Addressed:    Discharge planning   Patient plan of care discussed at interdisciplinary rounds: Yes    Anticipated Discharge Disposition:  [facility]    Education Provided on the Discharge Plan:  yes  Patient/Family in Agreement with the Plan:  yes    Private pay costs discussed: Not applicable    Additional Information:  Pts mom, Veronique, requested to speak with SW outside pt room. Veronique reports that pt has a bed at [facility] on Tuesday, 10/17. She reports that she is hoping the hospital can coordinate with [facility] so pt can go door to door to the treatment program at discharge. She reports that [facility] had noted pt needed to be coming from [facility] (per Veronique the pt utilizes them often) or another detox which she believes the hospital would be. She reports that she has the phone number for the , Gilda ([phone]), and has left messages but hasn't gotten a call back. She reports that when she called the main number she got directed to Gilda's phone.    YUSRA met in pts room with pt and Gilda. Discussed the above information with pt who reports that he is planning to go to [facility]. Pt signed release of information for [facility] so that SW can communicate with them about the plan and send records as needed. Veronique reports concern that they might not hold the bed if pt is not ready for discharge on Tuesday. SW discussed plan to reach out to [facility] today and if unable to reach someone due to the weekend that YUSRA would try again on Monday. Pt agreeable.    Veronique asks about mental health services as well. She reports that pt wants to get established with a psychiatrist and therapist but does not have either currently. Pt confirms he saw a therapist he liked a few times but missed an appointment and now has not heard back  when he has reached out to them. Veronique reports plan to find the therapists information and get it to the SW. Discussed that [facility] has a large network of mental health providers that pt can likely get connected to through the treatment program. Pt reports understanding.    Pt denies other needs or questions at this time. YUSRA placed call to Gilda at [facility] and left general VM requesting call back. YUSRA attempted to call general number at [facility] but no answer there either. YUSRA will plan to follow up with Aurelio on Monday.    NINA Fernandes

## 2023-10-14 NOTE — PLAN OF CARE
Goal Outcome Evaluation:      Plan of Care Reviewed With: patient, parent          Problem: Adult Inpatient Plan of Care  Goal: Optimal Comfort and Wellbeing  Outcome: Progressing      L PIV NS running at 125ml/hr. N/V zofran given. IV Valium given 5x on shift. CIWA score went from 21 to 16 but then had another episode of vomiting. Patient does not know how he got some of his bruises, CT negative. VSS, alert and oriented times 4

## 2023-10-14 NOTE — PROGRESS NOTES
[facility]    Hospitalist Progress Note    Assessment & Plan   38 year old male who was admitted on 10/13/2023 for alcohol chem dependence and withdrawal.    Impression:   Principal Problem:    Alcohol dependence with withdrawal with complication (H)  Active Problems:    Hematemesis    Alcoholic hepatitis without ascites (H28)    Nausea with vomiting    Anxiety with depression  Anemia   -- Hgb 15.8 10/13 to 11.2 10/14 -- suspect dilutional     Plan:   inpatient bed at [facility] in [place] for 10/17/23. Treat alcohol withdrawal symptoms, valium changed to 5 mg TID, and keep comfortable until then.    DVT Prophylaxis: Low Risk/Ambulatory with no VTE prophylaxis indicated  Code Status: Full Code    Disposition: Expected discharge in 3 days to [facility] for alcohol chem dependency for inpatient bed.    Wesley Renteria MD  Pager [phone]  Cell Phone [phone]  Text Page (7am to 6pm)    Interval History   Patient uncomfortable during night, 1 episode of vomiting, no hematemesis. Tremulous and chills, no fever. Drinking ok, has not eaten. PRN IV and oral diazepam, and Zofran administered. Otherwise denies auditory or visual hallucinations, abdominal pain, headache, seizure.     Physical Exam   Temp: 98.8  F (37.1  C) Temp src: Oral BP: 122/73 Pulse: 71   Resp: 18 SpO2: 96 % O2 Device: None (Room air)    Vitals:    10/13/23 0729   Weight: 77.1 kg (170 lb)     Vital Signs with Ranges  Temp:  [98.1  F (36.7  C)-99.4  F (37.4  C)] 98.8  F (37.1  C)  Pulse:  [] 71  Resp:  [11-22] 18  BP: (122-141)/(62-87) 122/73  SpO2:  [93 %-99 %] 96 %  I/O last 3 completed shifts:  In: 2560 [P.O.:1560; IV Piggyback:1000]  Out: 850 [Urine:850]    # Pain Assessment:      10/13/2023    11:32 PM   Current Pain Score   Patient currently in pain? denies   Nikhil s pain level was assessed and he currently denies pain.        Constitutional: Anxious appearing, uncomfortable. Awake,  alert, cooperative.   Respiratory: Clear to auscultation bilaterally, no crackles or wheezing  Cardiovascular: Regular rate and rhythm, normal S1 and S2, and no murmur noted  GI: Normal bowel sounds, soft, non-distended, non-tender  Extrem: No calf tenderness, no ankle edema  Neuro: Ox3, no focal motor or sensory deficits. Tremulous.    Medications    sodium chloride 125 mL/hr at 10/14/23 0419      busPIRone  5 mg Oral TID    diazepam  5 mg Oral TID    folic acid  1 mg Oral Daily    multivitamin w/minerals  1 tablet Oral Daily    pantoprazole  40 mg Oral BID AC    thiamine  100 mg Oral Daily    traZODone  100 mg Oral At Bedtime       Data   Recent Labs   Lab 10/14/23  0653 10/13/23  0808 10/09/23  0649   WBC  --  9.5 4.4   HGB 11.0* 15.8 13.5   MCV  --  85 85   PLT  --  174 257   INR  --  0.97  --    * 141 140   POTASSIUM 3.8 3.8 3.7   CHLORIDE 96* 95* 100   CO2 24 26 23   BUN 12.5 12.2 10.5   CR 0.68 0.70 0.65*   ANIONGAP 12 20* 17*   KEELEY 8.6 8.8 9.5   GLC 95 130* 120*   ALBUMIN  --  5.0 5.0   PROTTOTAL  --  8.0 7.3   BILITOTAL  --  0.5 0.3   ALKPHOS  --  99 74   ALT  --  51 42   AST  --  84* 46*   LIPASE  --  96* 77*       Imaging:   Recent Results (from the past 24 hour(s))   Head CT w/o contrast    Narrative    EXAM: CT HEAD W/O CONTRAST  LOCATION: [facility]  DATE: 10/13/2023    INDICATION: fall ETOH  COMPARISON: Head CT: 07/15/2022.  TECHNIQUE: Routine CT Head without IV contrast. Multiplanar reformats. Dose reduction techniques were used.    FINDINGS:  INTRACRANIAL CONTENTS: No intracranial hemorrhage, extraaxial collection, or mass effect.  No CT evidence of acute infarct. Normal parenchymal attenuation. Mild generalized volume loss, greater than expected for patient's age. No hydrocephalus.     VISUALIZED ORBITS/SINUSES/MASTOIDS: No intraorbital abnormality. Mild mucosal thickening scattered about the paranasal sinuses. No middle ear or mastoid effusion.    BONES/SOFT TISSUES:  No acute abnormality.      Impression    IMPRESSION:    1.  No acute traumatic intracranial abnormality.  2.  Mild generalized cerebral parenchymal volume loss, greater than expected for patient's age.   Cervical spine CT w/o contrast    Narrative    EXAM: CT CERVICAL SPINE W/O CONTRAST  LOCATION: [facility]  DATE: 10/13/2023    INDICATION: injury  COMPARISON: Cervical spine CT: 06/18/2022.  TECHNIQUE: Routine CT Cervical Spine without IV contrast. Multiplanar reformats. Dose reduction techniques were used.    FINDINGS:  VERTEBRA: No acute fracture. Alignment is normal. Similar prominent inferiorly directed ventral osteophyte arising from the right aspect of the C4 inferior endplate.    CANAL/FORAMINA: Mild multilevel degenerative disc disease. Multilevel facet arthropathy, which is most pronounced and moderate on the left at C4-C5 and C5-C6. No high-grade spinal canal or neural foraminal stenosis.    PARASPINAL: No extraspinal abnormality.      Impression    IMPRESSION:    1.  No acute fracture or traumatic malalignment of the cervical spine.  2.  No high-grade spinal canal or neural foraminal stenosis.

## 2023-10-14 NOTE — PLAN OF CARE
Problem: Alcohol Withdrawal  Goal: Alcohol Withdrawal Symptom Control  Outcome: Progressing  Goal: Optimal Neurologic Function  Outcome: Progressing  Goal: Readiness for Change Identified  Outcome: Progressing   Goal Outcome Evaluation:  Alert and oriented x4. Continuous to have tremors, tingling and numbness on both hands. PRN IV and oral Diazepam given as per CIWA score. PRN Zofran given for vomiting.  No episodes of hallucination or seizure this shift.

## 2023-10-14 NOTE — PROGRESS NOTES
Delta Hgb Pts Hgb on 10/13 was 15.8 this am was 11.0. Will alert the MD of the new finding reported form the lab.    Addendum: 10/14/2023- paged Dr. Renteria about delta Hgb at this time, [phone].

## 2023-10-15 PROBLEM — K92.0 HEMATEMESIS: Status: RESOLVED | Noted: 2023-10-13 | Resolved: 2023-10-15

## 2023-10-15 PROBLEM — K29.21 ALCOHOLIC GASTRITIS WITH HEMORRHAGE: Status: ACTIVE | Noted: 2023-10-15

## 2023-10-15 LAB
AST SERPL W P-5'-P-CCNC: 59 U/L (ref 0–45)
ERYTHROCYTE [DISTWIDTH] IN BLOOD BY AUTOMATED COUNT: 16 % (ref 10–15)
HCT VFR BLD AUTO: 34.7 % (ref 40–53)
HGB BLD-MCNC: 11.8 G/DL (ref 13.3–17.7)
MAGNESIUM SERPL-MCNC: 1.7 MG/DL (ref 1.7–2.3)
MCH RBC QN AUTO: 29.6 PG (ref 26.5–33)
MCHC RBC AUTO-ENTMCNC: 34 G/DL (ref 31.5–36.5)
MCV RBC AUTO: 87 FL (ref 78–100)
PLATELET # BLD AUTO: 68 10E3/UL (ref 150–450)
POTASSIUM SERPL-SCNC: 3.4 MMOL/L (ref 3.4–5.3)
RBC # BLD AUTO: 3.99 10E6/UL (ref 4.4–5.9)
WBC # BLD AUTO: 5.9 10E3/UL (ref 4–11)

## 2023-10-15 PROCEDURE — 250N000013 HC RX MED GY IP 250 OP 250 PS 637: Performed by: INTERNAL MEDICINE

## 2023-10-15 PROCEDURE — 36415 COLL VENOUS BLD VENIPUNCTURE: CPT | Performed by: INTERNAL MEDICINE

## 2023-10-15 PROCEDURE — 250N000011 HC RX IP 250 OP 636: Mod: JZ | Performed by: INTERNAL MEDICINE

## 2023-10-15 PROCEDURE — 250N000013 HC RX MED GY IP 250 OP 250 PS 637: Performed by: EMERGENCY MEDICINE

## 2023-10-15 PROCEDURE — 84132 ASSAY OF SERUM POTASSIUM: CPT | Performed by: INTERNAL MEDICINE

## 2023-10-15 PROCEDURE — 83735 ASSAY OF MAGNESIUM: CPT | Performed by: INTERNAL MEDICINE

## 2023-10-15 PROCEDURE — 258N000003 HC RX IP 258 OP 636: Performed by: INTERNAL MEDICINE

## 2023-10-15 PROCEDURE — 99232 SBSQ HOSP IP/OBS MODERATE 35: CPT | Performed by: INTERNAL MEDICINE

## 2023-10-15 PROCEDURE — 84450 TRANSFERASE (AST) (SGOT): CPT | Performed by: INTERNAL MEDICINE

## 2023-10-15 PROCEDURE — 120N000001 HC R&B MED SURG/OB

## 2023-10-15 PROCEDURE — 85027 COMPLETE CBC AUTOMATED: CPT | Performed by: INTERNAL MEDICINE

## 2023-10-15 RX ORDER — POTASSIUM CHLORIDE 1500 MG/1
40 TABLET, EXTENDED RELEASE ORAL ONCE
Qty: 2 TABLET | Refills: 0 | Status: COMPLETED | OUTPATIENT
Start: 2023-10-15 | End: 2023-10-15

## 2023-10-15 RX ORDER — MAGNESIUM SULFATE HEPTAHYDRATE 40 MG/ML
2 INJECTION, SOLUTION INTRAVENOUS ONCE
Status: COMPLETED | OUTPATIENT
Start: 2023-10-15 | End: 2023-10-15

## 2023-10-15 RX ORDER — DIAZEPAM 5 MG
5 TABLET ORAL 2 TIMES DAILY
Status: DISCONTINUED | OUTPATIENT
Start: 2023-10-15 | End: 2023-10-16 | Stop reason: HOSPADM

## 2023-10-15 RX ADMIN — DIAZEPAM 10 MG: 10 TABLET ORAL at 17:42

## 2023-10-15 RX ADMIN — THIAMINE HCL TAB 100 MG 100 MG: 100 TAB at 09:10

## 2023-10-15 RX ADMIN — OLOPATADINE HYDROCHLORIDE 1 DROP: 1 SOLUTION OPHTHALMIC at 09:11

## 2023-10-15 RX ADMIN — PANTOPRAZOLE SODIUM 40 MG: 20 TABLET, DELAYED RELEASE ORAL at 17:42

## 2023-10-15 RX ADMIN — MAGNESIUM SULFATE HEPTAHYDRATE 2 G: 40 INJECTION, SOLUTION INTRAVENOUS at 14:26

## 2023-10-15 RX ADMIN — BUSPIRONE HYDROCHLORIDE 5 MG: 5 TABLET ORAL at 20:30

## 2023-10-15 RX ADMIN — OLOPATADINE HYDROCHLORIDE 1 DROP: 1 SOLUTION OPHTHALMIC at 20:30

## 2023-10-15 RX ADMIN — BUSPIRONE HYDROCHLORIDE 5 MG: 5 TABLET ORAL at 14:46

## 2023-10-15 RX ADMIN — Medication 1 TABLET: at 09:10

## 2023-10-15 RX ADMIN — DIAZEPAM 5 MG: 5 TABLET ORAL at 20:30

## 2023-10-15 RX ADMIN — DIAZEPAM 10 MG: 10 TABLET ORAL at 11:47

## 2023-10-15 RX ADMIN — SODIUM CHLORIDE: 9 INJECTION, SOLUTION INTRAVENOUS at 02:47

## 2023-10-15 RX ADMIN — DIAZEPAM 10 MG: 10 TABLET ORAL at 06:35

## 2023-10-15 RX ADMIN — DIAZEPAM 10 MG: 10 TABLET ORAL at 09:10

## 2023-10-15 RX ADMIN — DIAZEPAM 10 MG: 10 TABLET ORAL at 14:26

## 2023-10-15 RX ADMIN — FOLIC ACID 1 MG: 1 TABLET ORAL at 09:10

## 2023-10-15 RX ADMIN — PANTOPRAZOLE SODIUM 40 MG: 20 TABLET, DELAYED RELEASE ORAL at 06:36

## 2023-10-15 RX ADMIN — BUSPIRONE HYDROCHLORIDE 5 MG: 5 TABLET ORAL at 09:10

## 2023-10-15 RX ADMIN — TRAZODONE HYDROCHLORIDE 100 MG: 50 TABLET ORAL at 22:28

## 2023-10-15 RX ADMIN — POTASSIUM CHLORIDE 40 MEQ: 1500 TABLET, EXTENDED RELEASE ORAL at 14:26

## 2023-10-15 RX ADMIN — HYDROXYZINE HYDROCHLORIDE 50 MG: 25 TABLET, FILM COATED ORAL at 22:28

## 2023-10-15 RX ADMIN — HYDROXYZINE HYDROCHLORIDE 50 MG: 25 TABLET, FILM COATED ORAL at 11:47

## 2023-10-15 ASSESSMENT — ACTIVITIES OF DAILY LIVING (ADL)
ADLS_ACUITY_SCORE: 28
ADLS_ACUITY_SCORE: 24
ADLS_ACUITY_SCORE: 28
ADLS_ACUITY_SCORE: 24
ADLS_ACUITY_SCORE: 24
ADLS_ACUITY_SCORE: 28
ADLS_ACUITY_SCORE: 24
ADLS_ACUITY_SCORE: 28

## 2023-10-15 NOTE — PROGRESS NOTES
Notified MD at 0650 AM regarding lab results.      Spoke with: Tina via Vocera page     Orders  MD updated on  abnormal platelet count of 68 down from 174 on 10/13 .    Comments: MD updated

## 2023-10-15 NOTE — PLAN OF CARE
Problem: Alcohol Withdrawal  Goal: Alcohol Withdrawal Symptom Control  Outcome: Progressing  Goal: Readiness for Change Identified  Outcome: Progressing     Problem: Fall Injury Risk  Goal: Absence of Fall and Fall-Related Injury  Outcome: Progressing  Intervention: Promote Injury-Free Environment  Recent Flowsheet Documentation  Safety Promotion/Fall Prevention:   activity supervised   clutter free environment maintained   increased rounding and observation   nonskid shoes/slippers when out of bed   room door open   safety round/check completed   treat underlying cause   Goal Outcome Evaluation:       Ciwa score remains 7-8, 0630 CIWA 8, prn valium given, next assessment at 0830. Otherwise Pt slept well overnight. A/Ox4, VSS on RA. IVF infusing NS at 75 mL/hr. No  seizure witnessed. No acute events reported overnight.

## 2023-10-15 NOTE — PLAN OF CARE
"  Problem: Adult Inpatient Plan of Care  Goal: Plan of Care Review  Description: The Plan of Care Review/Shift note should be completed every shift.  The Outcome Evaluation is a brief statement about your assessment that the patient is improving, declining, or no change.  This information will be displayed automatically on your shift  note.  Outcome: Progressing   Goal Outcome Evaluation:                Alert and oriented x4.   Continuous to have tremors. tingling and numbness on both hands has improved per the pt . Anxious mpst of shift.  intermittent nausea, but pt declined prn Zofran  No episodes of hallucination or seizure this shift.   Denied pain all shift.     Pt is impulsive, but cooperative. Refused to use gait belt pt is  shaky. Uses a walker for stability to bathroom, helps to keep pt more steady on his feet.     CIWA score throughout the shift was 5-8. PRN oral Diazepam given as per CIWA score.      PIV left arm infiltrated New PIV placed 20g in right arm for mag bump. IV fluids dc'd this shift.   No BM this shift, pt reports diarrhea has resolved    Pt c/o itchy eyes. Eye gtt given as per MD order. Pt states helpful. He noted that \"my eyes look so much better today, they are not as red and crusty as yesterday\".     K+ this am 3.4- 40 meq PO bump given as per MD order  Mg+ this am 1.7- 2 gm IV bump given as per MD order  Hgb this am 11.8     Pt stated yesterday that he is agreeable to inpt treatment and SW is aware and working on setting this up for the pt still.    Addendum: 1849  Pt ate very well this shift. He stated that he is feeling much better. He ate breakfast, a big lunch and then ordered another main meal for lunch.  He had his mom go to the cafeteria to get him a stuffed pepper after that and ate that as well.  He just ordered his dinner now.            "

## 2023-10-15 NOTE — PROGRESS NOTES
[facility]    Hospitalist Progress Note    Assessment & Plan   38 year old male who was admitted on 10/13/2023 for alcohol chem dependence and withdrawal.     Impression:   Principal Problem:    Alcohol dependence with withdrawal with complication (H)   -- better, will taper scheduled valium.       Alcoholic hepatitis without ascites (H28)   --  10/13 AST 84 to 59 10/15      Nausea with vomiting   -- improved, will stop IV fluids      Anxiety with depression   -- trazodone 100 mg at bedtime, has Buspar ordered      Alcoholic gastritis with hemorrhage      Anemia   -- 10/14 Hgb 11.2 to 11.8 10/15      Thrombocytopenia   -- platelets 68 10/15      Hypokalema    Hypomagnesemia   -- replace Mag and Potassium       Plan:  left voicemail for his mom, he is doing well.     DVT Prophylaxis: Low Risk/Ambulatory with no VTE prophylaxis indicated  Code Status: Full Code    Disposition: Expected discharge to [facility] for alcohol chem dependency for inpatient bed on Tuesday 10/17/23    Wesley Renteria MD  Pager [phone]  Cell Phone [phone]  Text Page (7am to 6pm)    Interval History   Able to sleep better, no vomiting. Had 5 episodes of brown and green diarrhea, denies melena. Eating and drinking ok. Endorses pain with swallowing due to dry heaving, on Protonix.     Physical Exam   Temp: 98.5  F (36.9  C) Temp src: Oral BP: 131/82 Pulse: 67   Resp: 20 SpO2: 95 % O2 Device: None (Room air)    Vitals:    10/13/23 0729   Weight: 77.1 kg (170 lb)     Vital Signs with Ranges  Temp:  [98.4  F (36.9  C)-99.1  F (37.3  C)] 98.5  F (36.9  C)  Pulse:  [67-72] 67  Resp:  [18-22] 20  BP: (130-140)/(80-91) 131/82  SpO2:  [95 %-98 %] 95 %  I/O last 3 completed shifts:  In: 2143 [P.O.:960; I.V.:1183]  Out: -     # Pain Assessment:      10/15/2023    11:48 AM   Current Pain Score   Patient currently in pain? jimy Tejeda s pain level was assessed and he currently denies pain.         Constitutional:  Anxious appearing, uncomfortable. Awake, alert, cooperative Respiratory: Clear to auscultation bilaterally, no crackles or wheezing  Cardiovascular: Regular rate and rhythm, normal S1 and S2, and no murmur noted  GI: Normal bowel sounds, soft, non-distended, non-tender  Extrem: No calf tenderness, no ankle edema  Neuro: Ox3, no focal motor or sensory deficits. Mildly Tremulous (improved from yesterday)    Medications    sodium chloride 75 mL/hr at 10/15/23 1100      busPIRone  5 mg Oral TID    diazepam  10 mg Oral BID    folic acid  1 mg Oral Daily    multivitamin w/minerals  1 tablet Oral Daily    olopatadine  1 drop Both Eyes BID    pantoprazole  40 mg Oral BID AC    thiamine  100 mg Oral Daily    traZODone  100 mg Oral At Bedtime       Data   Recent Labs   Lab 10/15/23  0609 10/14/23  0653 10/13/23  0808 10/09/23  0649   WBC 5.9  --  9.5 4.4   HGB 11.8* 11.0* 15.8 13.5   MCV 87  --  85 85   PLT 68*  --  174 257   INR  --   --  0.97  --    NA  --  132* 141 140   POTASSIUM  --  3.8 3.8 3.7   CHLORIDE  --  96* 95* 100   CO2  --  24 26 23   BUN  --  12.5 12.2 10.5   CR  --  0.68 0.70 0.65*   ANIONGAP  --  12 20* 17*   KEELEY  --  8.6 8.8 9.5   GLC  --  95 130* 120*   ALBUMIN  --   --  5.0 5.0   PROTTOTAL  --   --  8.0 7.3   BILITOTAL  --   --  0.5 0.3   ALKPHOS  --   --  99 74   ALT  --   --  51 42   AST 59*  --  84* 46*   LIPASE  --   --  96* 77*       Imaging:   No results found for this or any previous visit (from the past 24 hour(s)).

## 2023-10-15 NOTE — PLAN OF CARE
Problem: Adult Inpatient Plan of Care  Goal: Plan of Care Review  Description: The Plan of Care Review/Shift note should be completed every shift.  The Outcome Evaluation is a brief statement about your assessment that the patient is improving, declining, or no change.  This information will be displayed automatically on your shift  note.  Outcome: Progressing   Goal Outcome Evaluation:             Alert and oriented x4.   Continuous to have tremors, tingling and numbness on both hands. Anxious.  PRN Zofran given x 1 this shift for intermittent nausea.    No episodes of hallucination or seizure this shift.   Denied pain all shift.    Pt is impulsive, but cooperative. Refused to use gait belt pt is  shaky. This writer obtained a walker for stability to bathroom from [facility]. Pt using and helps to keep pt more steady on his feet.    CIWA score throughout the shift was 4-8. PRN oral Diazepam given as per CIWA score.     PIV right arm P/I, NS running at 75 ml/hr cont    Pt up to bathroom multiple times this shift, having loose BM's greater than x 5 watery diarrhea, brown in color. No black stools noted.     Pt c/o itchy eyes. Eye gtt order obtained and MD scheduled this.    Na+ this am 132  WBC-9.5    Pt states that he is agreeable to inpt treatment and SW is aware and working on setting this up for the pt.

## 2023-10-16 VITALS
DIASTOLIC BLOOD PRESSURE: 85 MMHG | HEIGHT: 70 IN | BODY MASS INDEX: 24.34 KG/M2 | SYSTOLIC BLOOD PRESSURE: 131 MMHG | OXYGEN SATURATION: 97 % | WEIGHT: 170 LBS | HEART RATE: 84 BPM | RESPIRATION RATE: 18 BRPM | TEMPERATURE: 99.8 F

## 2023-10-16 LAB
ERYTHROCYTE [DISTWIDTH] IN BLOOD BY AUTOMATED COUNT: 16 % (ref 10–15)
HCT VFR BLD AUTO: 34.6 % (ref 40–53)
HGB BLD-MCNC: 11.9 G/DL (ref 13.3–17.7)
MCH RBC QN AUTO: 30.1 PG (ref 26.5–33)
MCHC RBC AUTO-ENTMCNC: 34.4 G/DL (ref 31.5–36.5)
MCV RBC AUTO: 87 FL (ref 78–100)
PLATELET # BLD AUTO: 80 10E3/UL (ref 150–450)
POTASSIUM SERPL-SCNC: 3.6 MMOL/L (ref 3.4–5.3)
RBC # BLD AUTO: 3.96 10E6/UL (ref 4.4–5.9)
WBC # BLD AUTO: 6 10E3/UL (ref 4–11)

## 2023-10-16 PROCEDURE — 84132 ASSAY OF SERUM POTASSIUM: CPT | Performed by: INTERNAL MEDICINE

## 2023-10-16 PROCEDURE — 85027 COMPLETE CBC AUTOMATED: CPT | Performed by: INTERNAL MEDICINE

## 2023-10-16 PROCEDURE — 250N000013 HC RX MED GY IP 250 OP 250 PS 637: Performed by: INTERNAL MEDICINE

## 2023-10-16 PROCEDURE — 250N000013 HC RX MED GY IP 250 OP 250 PS 637: Performed by: EMERGENCY MEDICINE

## 2023-10-16 PROCEDURE — 99232 SBSQ HOSP IP/OBS MODERATE 35: CPT | Performed by: HOSPITALIST

## 2023-10-16 PROCEDURE — 36415 COLL VENOUS BLD VENIPUNCTURE: CPT | Performed by: INTERNAL MEDICINE

## 2023-10-16 RX ORDER — CALCIUM CARBONATE 500 MG/1
500 TABLET, CHEWABLE ORAL DAILY PRN
Status: DISCONTINUED | OUTPATIENT
Start: 2023-10-16 | End: 2023-10-16 | Stop reason: HOSPADM

## 2023-10-16 RX ADMIN — OLOPATADINE HYDROCHLORIDE 1 DROP: 1 SOLUTION OPHTHALMIC at 08:36

## 2023-10-16 RX ADMIN — BUSPIRONE HYDROCHLORIDE 5 MG: 5 TABLET ORAL at 08:35

## 2023-10-16 RX ADMIN — THIAMINE HCL TAB 100 MG 100 MG: 100 TAB at 08:35

## 2023-10-16 RX ADMIN — PANTOPRAZOLE SODIUM 40 MG: 20 TABLET, DELAYED RELEASE ORAL at 06:55

## 2023-10-16 RX ADMIN — FOLIC ACID 1 MG: 1 TABLET ORAL at 08:35

## 2023-10-16 RX ADMIN — BUSPIRONE HYDROCHLORIDE 5 MG: 5 TABLET ORAL at 13:25

## 2023-10-16 RX ADMIN — DIAZEPAM 5 MG: 5 TABLET ORAL at 08:35

## 2023-10-16 RX ADMIN — Medication 1 TABLET: at 08:35

## 2023-10-16 ASSESSMENT — ACTIVITIES OF DAILY LIVING (ADL)
ADLS_ACUITY_SCORE: 22
ADLS_ACUITY_SCORE: 24
ADLS_ACUITY_SCORE: 22
ADLS_ACUITY_SCORE: 24
ADLS_ACUITY_SCORE: 22

## 2023-10-16 NOTE — PLAN OF CARE
Problem: Alcohol Withdrawal  Goal: Alcohol Withdrawal Symptom Control  Outcome: Progressing  Goal: Optimal Neurologic Function  Outcome: Progressing  Goal: Readiness for Change Identified  Outcome: Progressing     Problem: Electrolyte Imbalance  Goal: Electrolyte Balance  Outcome: Progressing   Goal Outcome Evaluation:       Pt slept well overnight. A/Ox4, VSS on RA. remains anxious with hand tremors. on CIWA protocol. Ambulates independently with a walker. K/Mg+ monitoring. Continues to report itchy eyes, has eye gtts scheduled  BID, encouraged to use warm washcloth. Overall, Pt report good improvement and is ready to discharge to treatment center Tuesday.

## 2023-10-16 NOTE — DISCHARGE SUMMARY
.The patient has decided to leave AMA - we are unable to convince the patient to stay.      On examination, the patient has a normal mental status and understands their condition. Discussed with the patient the risks of leaving including complications of alcohol withdrawal, seizure, injuries, permanent disability and/or death. The patient has had an opportunity to ask questions about their medical condition. The patient has been informed that they may return for care at any time, and has been recommended to follow up with their primary care provider Lisandro Graham  or clinic as soon as possible. We have answered all their questions.  Patient signed the AMA form prior to leaving.       Jeanne Coyne MD   Hospitalist

## 2023-10-16 NOTE — PLAN OF CARE
Problem: Adult Inpatient Plan of Care  Goal: Optimal Comfort and Wellbeing  Outcome: Progressing     Problem: Adult Inpatient Plan of Care  Goal: Readiness for Transition of Care  Outcome: Progressing     Problem: Alcohol Withdrawal  Goal: Alcohol Withdrawal Symptom Control  Outcome: Progressing     Problem: Fall Injury Risk  Goal: Absence of Fall and Fall-Related Injury  Outcome: Progressing     Problem: Fall Injury Risk  Goal: Absence of Fall and Fall-Related Injury  Intervention: Identify and Manage Contributors  Recent Flowsheet Documentation  Taken 10/16/2023 8130 by Miranda Reagan RN  Medication Review/Management:   medications reviewed   high-risk medications identified   Goal Outcome Evaluation:pt alert and oriented, denied pain, CIWA-AR score of 3, pt has visible tremors, scheduled 5 mg diazepam given, ate 100% of breakfast, Olopatadine scheduled twice daily, pt aware.Pt has been threaten to leave AMA.    Pt left AMA around 1:48 pm. IV taken off.

## 2023-10-16 NOTE — PROGRESS NOTES
[address] from Operating Room to PACU Report received from [address] and Refugia Madura CRNA regarding [facility]. Surgeon(s): 
Melchor Hemphill MD  And Procedure(s) (LRB): ENDOSCOPIC RETROGRADE CHOLANGIOPANCREATOGRAPHY (ERCP) INSERT STENT,BILIARY/PANC [00430 (CPT®)]  OK ERCP REMOVE CALCULI/DEBRIS BILIARY/PANCREAS DUCT [95983 (CPT®)]  OK ERCP W/SPHINCTEROTOMY/PAPILLOTOMY [87688 (CPT®)]  OK X-RAY BILE/PANCREAS ENDOSCOPY    (N/A)  confirmed  
with allergies, drains and dressings discussed. Anesthesia type, drugs, patient history, complications, estimated blood loss, vital signs, intake and output, and last pain medication, lines and temperature were reviewed. [address] patient on 100% O2 face tent. Patient saturation only 90% on 6L NC O2.  
 
1535 Called Dr Latha Cuevas to come to bedside to see patient. Patient with labored breathing and congested wet cough and crackles. [address] ordered by Dr Latha Cuevas. 8654 Dr Latha Cuevas and Dr Carmina Sotelo at bedside. Patient remains on 100% O2 face tent. o2 saturations 94%. Dr Nick Villatoro updated on patient condition and need for urgent HD.  
 
1615 Dr Nick Villatoro called back said to notify HD nurse of room number on PCU and they will come to do HD. Order obtained for 2mg IV Bumex at this time. 1620 Medicated with 2 mg IV Bumex per Dr Nick Villatoro. [address] [facility] [address] Carmina Sotelo back to check on patient. Updated on plan to transfer patient to PCU and receive HD at bedside. Awaiting PCU bed at this time. [address] Ivan Jiménez called. Made her aware that patient will be going to room 2269 when room available. Will call dialysis when leaving PACU.  
 
1715 Patient resting at this time. On 50% face tent. O2 saturations 95-98%. Patient says she feels a little better but still short of breath and weak. Denies pain. Repositioned on stretcher. [facility] Updated patient's daughter Joann Menjivar on patient condition and plan of care and transfer to room 2269 once bed available. YUSRA spoke to Gilda from [facility] treatment program. She said if pt is medically cleared to discharge they can accept pt at 11 am to the treatment center in [place].   YUSRA left voicemail for pt mother to discuss transportation and discharge. YUSRA spoke to pt mother Veronique, she said she has packed a bag for pt, and is able to provide door to door tomorrow but would prefer medical transport.     YUSRA met with pt in pt room to discuss discharge to [facility] treatment in [place]. He is agreeable to going there but states he has things to do prior to being admitted, haircut and packing.  YUSRA updated MD with pt request.     Pt wanted to leave hospital AMA, YUSRA and MD met with pt to discuss her recommendation to stay until medically discharged. Pt walked out of facility. YUSRA spoke to Gilda from [facility], ([phone]) She will talk to pt, and may be willing to admit him tomorrow to treatment center as planned.     RANJAN Cardona    1815 Patient oxygen saturations % on 50% face tent . Placed patient on 6L NC O2 with humidification. Awaiting bed assignment on PCU. TRANSFER - OUT REPORT: 
 
Verbal report given to Al RN(name) on Thalia Ricketts  being transferred to PCU(unit) for routine post - op Report consisted of patients Situation, Background, Assessment and  
Recommendations(SBAR). Information from the following report(s) SBAR, Kardex, OR Summary, Procedure Summary, Intake/Output and MAR was reviewed with the receiving nurse. Opportunity for questions and clarification was provided. Patient currently on 4L NC O2 with O2 saturations % Patient transported with: 
 Monitor O2 @ 4 liters Registered Nurse Tech

## 2023-10-16 NOTE — PROGRESS NOTES
[facility]    Medicine Progress Note - Hospitalist Service    Date of Admission:  10/13/2023    Assessment & Plan   38 year old male who was admitted on 10/13/2023 for alcohol chem dependence and withdrawal.      Impression:   Principal Problem:    Alcohol dependence with withdrawal with complication (H)              -- better, on valium 5mg BID and prn. Stop schedule valium tomorrow..        Alcoholic hepatitis without ascites (H28)              --  10/13 AST 84 to 59 10/15       Nausea with vomiting              -- improved.       Anxiety with depression              -- trazodone 100 mg at bedtime, has scheduled Buspar ordered       Alcoholic gastritis with hemorrhage - PPI, TUMS prn ordered       Anemia              -- 10/14 Hgb 11.2 to 11.9 10/16       Thrombocytopenia              -- Platelets 80 10/16       Hypokalema    Hypomagnesemia              -- Replace Mag and Potassium           Diet: Combination Diet Regular Diet Adult  Room Service    DVT Prophylaxis: Pneumatic Compression Devices  Mars Catheter: Not present  Lines: None     Cardiac Monitoring: None  Code Status: Full Code      Clinically Significant Risk Factors                # Thrombocytopenia: Lowest platelets = 68 in last 2 days, will monitor for bleeding                     Disposition Plan      Expected Discharge Date: 10/17/2023      Destination: home  Discharge Comments: Inpt- rehab            Jeanne Coyne MD  Hospitalist Service  [facility]  Securely message with [employer] (more info)  Text page via [employer] Paging/Directory   ______________________________________________________________________    Interval History   .  Patient is new to me. Chart reviewed and events noted.  Patient seen and examined.   In bed, reports some retrosternal burning. No N/V. No abd pain. Some itching and watering b/l eyes. Denies hallucinations, delusions.       Physical Exam   Vital Signs: Temp: 99.8  F (37.7  C)  Temp src: Oral BP: 131/85 Pulse: 84   Resp: 18 SpO2: 97 % O2 Device: None (Room air)    Weight: 170 lbs 0 oz    General: Pleasant, NAD  HEENT:EOMI, AT,NC  CVS:RRR, no edema  RS:CTAB  Abd: Soft, NT,ND  Neurology:Grossly normal  Ext: B/l UE tremors +  Psy:Approrpiate affect      Medical Decision Making       >35  MINUTES SPENT BY ME on the date of service doing chart review, history, exam, documentation & further activities per the note.  MANAGEMENT DISCUSSED with the following over the past 24 hours: Patient, SW/Cm       Data     I have personally reviewed the following data over the past 24 hrs:    6.0  \   11.9 (L)   / 80 (L)     N/A N/A N/A /  N/A   3.6 N/A N/A \

## 2023-10-17 NOTE — PROGRESS NOTES
Pt mother called CM to see if I could let her know what time pt could go to the treatment center. CM opened chart and per previous CM note it said pt can admit at 1100. This was relayed to pt mom.

## 2023-11-23 ENCOUNTER — HOSPITAL ENCOUNTER (OUTPATIENT)
Facility: HOSPITAL | Age: 38
Setting detail: OBSERVATION
Discharge: HOME | End: 2023-11-24
Attending: EMERGENCY MEDICINE | Admitting: EMERGENCY MEDICINE
Payer: COMMERCIAL

## 2023-11-23 DIAGNOSIS — F10.929 ALCOHOL INTOXICATION (H): ICD-10-CM

## 2023-11-23 DIAGNOSIS — F10.20 ALCOHOL USE DISORDER, SEVERE, DEPENDENCE (H): Primary | ICD-10-CM

## 2023-11-23 LAB
ANION GAP SERPL CALCULATED.3IONS-SCNC: 15 MMOL/L (ref 7–15)
BUN SERPL-MCNC: 10.6 MG/DL (ref 6–20)
CALCIUM SERPL-MCNC: 9.7 MG/DL (ref 8.6–10)
CHLORIDE SERPL-SCNC: 101 MMOL/L (ref 98–107)
CREAT SERPL-MCNC: 0.8 MG/DL (ref 0.67–1.17)
DEPRECATED HCO3 PLAS-SCNC: 28 MMOL/L (ref 22–29)
EGFRCR SERPLBLD CKD-EPI 2021: >90 ML/MIN/1.73M2
ERYTHROCYTE [DISTWIDTH] IN BLOOD BY AUTOMATED COUNT: 13.2 % (ref 10–15)
ETHANOL SERPL-MCNC: 0.43 G/DL
GLUCOSE SERPL-MCNC: 104 MG/DL (ref 70–99)
HCT VFR BLD AUTO: 45.3 % (ref 40–53)
HGB BLD-MCNC: 15.8 G/DL (ref 13.3–17.7)
MAGNESIUM SERPL-MCNC: 2.1 MG/DL (ref 1.7–2.3)
MCH RBC QN AUTO: 30.2 PG (ref 26.5–33)
MCHC RBC AUTO-ENTMCNC: 34.9 G/DL (ref 31.5–36.5)
MCV RBC AUTO: 87 FL (ref 78–100)
PLATELET # BLD AUTO: 323 10E3/UL (ref 150–450)
POTASSIUM SERPL-SCNC: 4 MMOL/L (ref 3.4–5.3)
RBC # BLD AUTO: 5.23 10E6/UL (ref 4.4–5.9)
SODIUM SERPL-SCNC: 144 MMOL/L (ref 135–145)
WBC # BLD AUTO: 6.2 10E3/UL (ref 4–11)

## 2023-11-23 PROCEDURE — 80048 BASIC METABOLIC PNL TOTAL CA: CPT | Performed by: PHYSICIAN ASSISTANT

## 2023-11-23 PROCEDURE — 250N000011 HC RX IP 250 OP 636: Performed by: PHYSICIAN ASSISTANT

## 2023-11-23 PROCEDURE — 85027 COMPLETE CBC AUTOMATED: CPT | Performed by: PHYSICIAN ASSISTANT

## 2023-11-23 PROCEDURE — 96361 HYDRATE IV INFUSION ADD-ON: CPT

## 2023-11-23 PROCEDURE — 96376 TX/PRO/DX INJ SAME DRUG ADON: CPT

## 2023-11-23 PROCEDURE — 99285 EMERGENCY DEPT VISIT HI MDM: CPT | Mod: 25

## 2023-11-23 PROCEDURE — 82077 ASSAY SPEC XCP UR&BREATH IA: CPT | Performed by: PHYSICIAN ASSISTANT

## 2023-11-23 PROCEDURE — 258N000003 HC RX IP 258 OP 636: Performed by: PHYSICIAN ASSISTANT

## 2023-11-23 PROCEDURE — 99223 1ST HOSP IP/OBS HIGH 75: CPT | Performed by: STUDENT IN AN ORGANIZED HEALTH CARE EDUCATION/TRAINING PROGRAM

## 2023-11-23 PROCEDURE — 83735 ASSAY OF MAGNESIUM: CPT | Performed by: PHYSICIAN ASSISTANT

## 2023-11-23 PROCEDURE — 96374 THER/PROPH/DIAG INJ IV PUSH: CPT

## 2023-11-23 PROCEDURE — 36415 COLL VENOUS BLD VENIPUNCTURE: CPT | Performed by: PHYSICIAN ASSISTANT

## 2023-11-23 RX ORDER — PROCHLORPERAZINE 25 MG
25 SUPPOSITORY, RECTAL RECTAL EVERY 12 HOURS PRN
Status: DISCONTINUED | OUTPATIENT
Start: 2023-11-23 | End: 2023-11-24 | Stop reason: HOSPADM

## 2023-11-23 RX ORDER — HALOPERIDOL 5 MG/ML
2.5-5 INJECTION INTRAMUSCULAR EVERY 6 HOURS PRN
Status: DISCONTINUED | OUTPATIENT
Start: 2023-11-23 | End: 2023-11-24 | Stop reason: ALTCHOICE

## 2023-11-23 RX ORDER — FOLIC ACID 1 MG/1
1 TABLET ORAL DAILY
Status: DISCONTINUED | OUTPATIENT
Start: 2023-11-24 | End: 2023-11-24 | Stop reason: HOSPADM

## 2023-11-23 RX ORDER — GABAPENTIN 300 MG/1
600 CAPSULE ORAL EVERY 8 HOURS
Status: DISCONTINUED | OUTPATIENT
Start: 2023-11-27 | End: 2023-11-24 | Stop reason: ALTCHOICE

## 2023-11-23 RX ORDER — POLYETHYLENE GLYCOL 3350 17 G/17G
17 POWDER, FOR SOLUTION ORAL 2 TIMES DAILY PRN
Status: DISCONTINUED | OUTPATIENT
Start: 2023-11-23 | End: 2023-11-24 | Stop reason: HOSPADM

## 2023-11-23 RX ORDER — PROCHLORPERAZINE MALEATE 10 MG
10 TABLET ORAL EVERY 6 HOURS PRN
Status: DISCONTINUED | OUTPATIENT
Start: 2023-11-23 | End: 2023-11-24 | Stop reason: HOSPADM

## 2023-11-23 RX ORDER — ONDANSETRON 4 MG/1
4 TABLET, ORALLY DISINTEGRATING ORAL EVERY 6 HOURS PRN
Status: DISCONTINUED | OUTPATIENT
Start: 2023-11-23 | End: 2023-11-24 | Stop reason: HOSPADM

## 2023-11-23 RX ORDER — DIAZEPAM 5 MG
10 TABLET ORAL EVERY 30 MIN PRN
Status: DISCONTINUED | OUTPATIENT
Start: 2023-11-23 | End: 2023-11-24 | Stop reason: ALTCHOICE

## 2023-11-23 RX ORDER — GABAPENTIN 100 MG/1
100 CAPSULE ORAL EVERY 8 HOURS
Status: DISCONTINUED | OUTPATIENT
Start: 2023-12-01 | End: 2023-11-24 | Stop reason: ALTCHOICE

## 2023-11-23 RX ORDER — MULTIPLE VITAMINS W/ MINERALS TAB 9MG-400MCG
1 TAB ORAL DAILY
Status: DISCONTINUED | OUTPATIENT
Start: 2023-11-24 | End: 2023-11-24 | Stop reason: HOSPADM

## 2023-11-23 RX ORDER — DIAZEPAM 10 MG/2ML
5-10 INJECTION, SOLUTION INTRAMUSCULAR; INTRAVENOUS EVERY 30 MIN PRN
Status: DISCONTINUED | OUTPATIENT
Start: 2023-11-23 | End: 2023-11-24 | Stop reason: ALTCHOICE

## 2023-11-23 RX ORDER — GABAPENTIN 300 MG/1
300 CAPSULE ORAL EVERY 8 HOURS
Status: DISCONTINUED | OUTPATIENT
Start: 2023-11-29 | End: 2023-11-24

## 2023-11-23 RX ORDER — LORAZEPAM 2 MG/ML
1 INJECTION INTRAMUSCULAR ONCE
Status: COMPLETED | OUTPATIENT
Start: 2023-11-23 | End: 2023-11-23

## 2023-11-23 RX ORDER — LORAZEPAM 2 MG/ML
2 INJECTION INTRAMUSCULAR ONCE
Status: COMPLETED | OUTPATIENT
Start: 2023-11-23 | End: 2023-11-23

## 2023-11-23 RX ORDER — GABAPENTIN 300 MG/1
900 CAPSULE ORAL EVERY 8 HOURS
Status: DISCONTINUED | OUTPATIENT
Start: 2023-11-24 | End: 2023-11-24 | Stop reason: ALTCHOICE

## 2023-11-23 RX ORDER — ENOXAPARIN SODIUM 100 MG/ML
40 INJECTION SUBCUTANEOUS EVERY 24 HOURS
Status: DISCONTINUED | OUTPATIENT
Start: 2023-11-24 | End: 2023-11-24 | Stop reason: HOSPADM

## 2023-11-23 RX ORDER — FLUMAZENIL 0.1 MG/ML
0.2 INJECTION, SOLUTION INTRAVENOUS
Status: DISCONTINUED | OUTPATIENT
Start: 2023-11-23 | End: 2023-11-24 | Stop reason: HOSPADM

## 2023-11-23 RX ORDER — OLANZAPINE 5 MG/1
5-10 TABLET, ORALLY DISINTEGRATING ORAL EVERY 6 HOURS PRN
Status: DISCONTINUED | OUTPATIENT
Start: 2023-11-23 | End: 2023-11-24 | Stop reason: ALTCHOICE

## 2023-11-23 RX ORDER — GABAPENTIN 300 MG/1
1200 CAPSULE ORAL ONCE
Status: COMPLETED | OUTPATIENT
Start: 2023-11-24 | End: 2023-11-24

## 2023-11-23 RX ORDER — ONDANSETRON 2 MG/ML
4 INJECTION INTRAMUSCULAR; INTRAVENOUS EVERY 6 HOURS PRN
Status: DISCONTINUED | OUTPATIENT
Start: 2023-11-23 | End: 2023-11-24 | Stop reason: HOSPADM

## 2023-11-23 RX ADMIN — LORAZEPAM 1 MG: 2 INJECTION INTRAMUSCULAR; INTRAVENOUS at 22:04

## 2023-11-23 RX ADMIN — SODIUM CHLORIDE 1000 ML: 9 INJECTION, SOLUTION INTRAVENOUS at 21:32

## 2023-11-23 RX ADMIN — LORAZEPAM 2 MG: 2 INJECTION INTRAMUSCULAR; INTRAVENOUS at 22:55

## 2023-11-23 ASSESSMENT — LIFESTYLE VARIABLES
TREMOR: NO TREMOR
TOTAL SCORE: 4
ANXIETY: 3
TREMOR: NO TREMOR
NAUSEA AND VOMITING: NO NAUSEA AND NO VOMITING
TOTAL SCORE: 7
AGITATION: SOMEWHAT MORE THAN NORMAL ACTIVITY
NAUSEA AND VOMITING: MILD NAUSEA WITH NO VOMITING
TACTILE DISTURBANCES: VERY MILD ITCHING, PINS AND NEEDLES, BURNING OR NUMBNESS
ANXIETY: NO ANXIETY, AT EASE
HEADACHE, FULLNESS IN HEAD: VERY MILD
VISUAL DISTURBANCES: VERY MILD SENSITIVITY
AGITATION: SOMEWHAT MORE THAN NORMAL ACTIVITY
ORIENTATION AND CLOUDING OF SENSORIUM: ORIENTED AND CAN DO SERIAL ADDITIONS
PAROXYSMAL SWEATS: NO SWEAT VISIBLE
PAROXYSMAL SWEATS: NO SWEAT VISIBLE
AUDITORY DISTURBANCES: NOT PRESENT
AUDITORY DISTURBANCES: NOT PRESENT
HEADACHE, FULLNESS IN HEAD: VERY MILD
ORIENTATION AND CLOUDING OF SENSORIUM: ORIENTED AND CAN DO SERIAL ADDITIONS
VISUAL DISTURBANCES: VERY MILD SENSITIVITY

## 2023-11-23 ASSESSMENT — ACTIVITIES OF DAILY LIVING (ADL)
ADLS_ACUITY_SCORE: 37
ADLS_ACUITY_SCORE: 37

## 2023-11-24 VITALS
DIASTOLIC BLOOD PRESSURE: 76 MMHG | SYSTOLIC BLOOD PRESSURE: 122 MMHG | HEART RATE: 74 BPM | BODY MASS INDEX: 24.39 KG/M2 | WEIGHT: 170 LBS | TEMPERATURE: 98.1 F | OXYGEN SATURATION: 97 % | RESPIRATION RATE: 18 BRPM

## 2023-11-24 LAB
AMPHETAMINES UR QL SCN: NORMAL
ANION GAP SERPL CALCULATED.3IONS-SCNC: 14 MMOL/L (ref 7–15)
BARBITURATES UR QL SCN: NORMAL
BENZODIAZ UR QL SCN: NORMAL
BUN SERPL-MCNC: 11.2 MG/DL (ref 6–20)
BZE UR QL SCN: NORMAL
CALCIUM SERPL-MCNC: 8.6 MG/DL (ref 8.6–10)
CANNABINOIDS UR QL SCN: NORMAL
CHLORIDE SERPL-SCNC: 104 MMOL/L (ref 98–107)
CREAT SERPL-MCNC: 0.83 MG/DL (ref 0.67–1.17)
DEPRECATED HCO3 PLAS-SCNC: 24 MMOL/L (ref 22–29)
EGFRCR SERPLBLD CKD-EPI 2021: >90 ML/MIN/1.73M2
ERYTHROCYTE [DISTWIDTH] IN BLOOD BY AUTOMATED COUNT: 13.2 % (ref 10–15)
FENTANYL UR QL: NORMAL
GLUCOSE SERPL-MCNC: 92 MG/DL (ref 70–99)
HCT VFR BLD AUTO: 38.7 % (ref 40–53)
HGB BLD-MCNC: 13 G/DL (ref 13.3–17.7)
MCH RBC QN AUTO: 29.9 PG (ref 26.5–33)
MCHC RBC AUTO-ENTMCNC: 33.6 G/DL (ref 31.5–36.5)
MCV RBC AUTO: 89 FL (ref 78–100)
OPIATES UR QL SCN: NORMAL
PCP QUAL URINE (ROCHE): NORMAL
PLATELET # BLD AUTO: 241 10E3/UL (ref 150–450)
POTASSIUM SERPL-SCNC: 4.2 MMOL/L (ref 3.4–5.3)
RBC # BLD AUTO: 4.35 10E6/UL (ref 4.4–5.9)
SODIUM SERPL-SCNC: 142 MMOL/L (ref 135–145)
WBC # BLD AUTO: 6 10E3/UL (ref 4–11)

## 2023-11-24 PROCEDURE — 96375 TX/PRO/DX INJ NEW DRUG ADDON: CPT

## 2023-11-24 PROCEDURE — 250N000013 HC RX MED GY IP 250 OP 250 PS 637: Performed by: STUDENT IN AN ORGANIZED HEALTH CARE EDUCATION/TRAINING PROGRAM

## 2023-11-24 PROCEDURE — 250N000011 HC RX IP 250 OP 636: Mod: JZ | Performed by: STUDENT IN AN ORGANIZED HEALTH CARE EDUCATION/TRAINING PROGRAM

## 2023-11-24 PROCEDURE — 82310 ASSAY OF CALCIUM: CPT | Performed by: STUDENT IN AN ORGANIZED HEALTH CARE EDUCATION/TRAINING PROGRAM

## 2023-11-24 PROCEDURE — G0378 HOSPITAL OBSERVATION PER HR: HCPCS

## 2023-11-24 PROCEDURE — 99238 HOSP IP/OBS DSCHRG MGMT 30/<: CPT | Performed by: INTERNAL MEDICINE

## 2023-11-24 PROCEDURE — 250N000013 HC RX MED GY IP 250 OP 250 PS 637: Performed by: INTERNAL MEDICINE

## 2023-11-24 PROCEDURE — 96372 THER/PROPH/DIAG INJ SC/IM: CPT | Performed by: STUDENT IN AN ORGANIZED HEALTH CARE EDUCATION/TRAINING PROGRAM

## 2023-11-24 PROCEDURE — 36415 COLL VENOUS BLD VENIPUNCTURE: CPT | Performed by: STUDENT IN AN ORGANIZED HEALTH CARE EDUCATION/TRAINING PROGRAM

## 2023-11-24 PROCEDURE — 80307 DRUG TEST PRSMV CHEM ANLYZR: CPT | Performed by: PHYSICIAN ASSISTANT

## 2023-11-24 PROCEDURE — 85027 COMPLETE CBC AUTOMATED: CPT | Performed by: STUDENT IN AN ORGANIZED HEALTH CARE EDUCATION/TRAINING PROGRAM

## 2023-11-24 RX ORDER — GABAPENTIN 100 MG/1
200 CAPSULE ORAL 3 TIMES DAILY
Start: 2023-11-24

## 2023-11-24 RX ORDER — GABAPENTIN 100 MG/1
200 CAPSULE ORAL 3 TIMES DAILY
Status: DISCONTINUED | OUTPATIENT
Start: 2023-11-24 | End: 2023-11-24 | Stop reason: HOSPADM

## 2023-11-24 RX ORDER — MULTIPLE VITAMINS W/ MINERALS TAB 9MG-400MCG
1 TAB ORAL DAILY
Qty: 30 TABLET | Refills: 0 | Status: SHIPPED | OUTPATIENT
Start: 2023-11-25 | End: 2023-12-25

## 2023-11-24 RX ORDER — LANOLIN ALCOHOL/MO/W.PET/CERES
100 CREAM (GRAM) TOPICAL DAILY
Qty: 30 TABLET | Refills: 0 | Status: SHIPPED | OUTPATIENT
Start: 2023-11-25 | End: 2023-12-25

## 2023-11-24 RX ORDER — ONDANSETRON 4 MG/1
4 TABLET, ORALLY DISINTEGRATING ORAL EVERY 8 HOURS PRN
COMMUNITY

## 2023-11-24 RX ORDER — NALTREXONE HYDROCHLORIDE 50 MG/1
50 TABLET, FILM COATED ORAL DAILY
COMMUNITY

## 2023-11-24 RX ORDER — HYDROXYZINE PAMOATE 50 MG/1
50 CAPSULE ORAL 3 TIMES DAILY PRN
COMMUNITY
Start: 2023-10-24

## 2023-11-24 RX ORDER — FOLIC ACID 1 MG/1
1 TABLET ORAL DAILY
Qty: 30 TABLET | Refills: 0 | Status: SHIPPED | OUTPATIENT
Start: 2023-11-25 | End: 2023-12-25

## 2023-11-24 RX ADMIN — Medication 1 TABLET: at 08:39

## 2023-11-24 RX ADMIN — GABAPENTIN 900 MG: 300 CAPSULE ORAL at 08:38

## 2023-11-24 RX ADMIN — GABAPENTIN 1200 MG: 300 CAPSULE ORAL at 00:18

## 2023-11-24 RX ADMIN — THIAMINE HCL TAB 100 MG 100 MG: 100 TAB at 08:38

## 2023-11-24 RX ADMIN — FOLIC ACID 1 MG: 1 TABLET ORAL at 08:38

## 2023-11-24 RX ADMIN — ENOXAPARIN SODIUM 40 MG: 40 INJECTION SUBCUTANEOUS at 08:38

## 2023-11-24 RX ADMIN — DIAZEPAM 5 MG: 5 INJECTION, SOLUTION INTRAMUSCULAR; INTRAVENOUS at 00:14

## 2023-11-24 RX ADMIN — GABAPENTIN 200 MG: 100 CAPSULE ORAL at 12:06

## 2023-11-24 ASSESSMENT — LIFESTYLE VARIABLES
ANXIETY: 3
AUDITORY DISTURBANCES: NOT PRESENT
HEADACHE, FULLNESS IN HEAD: NOT PRESENT
TACTILE DISTURBANCES: VERY MILD ITCHING, PINS AND NEEDLES, BURNING OR NUMBNESS
VISUAL DISTURBANCES: NOT PRESENT
NAUSEA AND VOMITING: MILD NAUSEA WITH NO VOMITING
NAUSEA AND VOMITING: 3
AGITATION: SOMEWHAT MORE THAN NORMAL ACTIVITY
TOTAL SCORE: 13
TOTAL SCORE: 5
TACTILE DISTURBANCES: VERY MILD ITCHING, PINS AND NEEDLES, BURNING OR NUMBNESS
ORIENTATION AND CLOUDING OF SENSORIUM: ORIENTED AND CAN DO SERIAL ADDITIONS
ANXIETY: MODERATELY ANXIOUS, OR GUARDED, SO ANXIETY IS INFERRED
TREMOR: NO TREMOR
AGITATION: NORMAL ACTIVITY
TREMOR: 2
HEADACHE, FULLNESS IN HEAD: NOT PRESENT
ORIENTATION AND CLOUDING OF SENSORIUM: ORIENTED AND CAN DO SERIAL ADDITIONS
PAROXYSMAL SWEATS: NO SWEAT VISIBLE
AUDITORY DISTURBANCES: NOT PRESENT
PAROXYSMAL SWEATS: NO SWEAT VISIBLE
VISUAL DISTURBANCES: MILD SENSITIVITY

## 2023-11-24 ASSESSMENT — ACTIVITIES OF DAILY LIVING (ADL)
ADLS_ACUITY_SCORE: 37
ADLS_ACUITY_SCORE: 39
DEPENDENT_IADLS:: INDEPENDENT
ADLS_ACUITY_SCORE: 39
ADLS_ACUITY_SCORE: 37

## 2023-11-24 NOTE — ED TRIAGE NOTES
"Pt to ED via private vehicle with his mother     Pt comes with complaint with alcohol intoxication and n/v. Pt states that he is supposed to go to detox this evening at 2000. States that he was worried he wouldn't be able to stay there due to his blood alcohol so he had his mom drive him to the hospital for \"a breathalyzer.\"  Has been binge drinking since Thursday after arriving home from a 30 day inpatient treatment program    Last drink 1800 tonight. Due to no liquor stores open has been drinking Listerine tonight     Triage Assessment (Adult)       Row Name 11/23/23 1901          Triage Assessment    Airway WDL WDL        Respiratory WDL    Respiratory WDL WDL        Skin Circulation/Temperature WDL    Skin Circulation/Temperature WDL WDL        Cardiac WDL    Cardiac WDL WDL        Peripheral/Neurovascular WDL    Peripheral Neurovascular WDL WDL        Cognitive/Neuro/Behavioral WDL    Cognitive/Neuro/Behavioral WDL WDL                     "

## 2023-11-24 NOTE — ED PROVIDER NOTES
ED PROVIDER NOTE    EMERGENCY DEPARTMENT ENCOUNTER      NAME: Nikhil Rios  AGE: 38 year old male  YOB: 1985  MRN: [id number]  EVALUATION DATE & TIME: 11/23/2023  7:07 PM    PCP: Lisandro Graham    ED PROVIDER: Thais Morales PA-C      Chief Complaint   Patient presents with    Alcohol Intoxication    Nausea & Vomiting         FINAL IMPRESSION:  1. Alcohol intoxication (H24)          MEDICAL DECISION MAKING:    Pertinent Labs & Imaging studies reviewed. (See chart for details)  38 year old male with a h/o alcohol abuse presenting with acute alcohol intoxication, nausea and vomiting.  He and his mother were on his way to detox at [facility] however he drank Listerine this afternoon as he did not have any alcohol and after this felt unwell and had an episode of emesis prompting them to come to the ER first.    Here vitals are stable. Clinically appears intoxicated but no acute distress.  No signs of withdrawal at this time.  Presentation most consistent with acute alcohol intoxication.  We check lab work and alcohol level was 0.43.  Electrolytes reassuring.  No signs of an acidosis.    Patient hydrated, tolerating oral intake.  Discussed discharge to detox facility but unfortunately they were no longer excepting patients this evening but would take him tomorrow.  Patient has had previous seizures and withdrawals at even high levels of alcohol.  Mother concern for safety at home and given his severe alcoholism and high risk for withdrawal complications, decision made to observe him in the hospital overnight.  We did give him a few doses of Ativan for withdrawal although he seems to get more agitated, stating he is going through withdrawal rather than show clear objective evidence with tachycardia, diaphoresis or tremors.    At the conclusion of the encounter I discussed the results of all of the tests and the disposition. The questions were answered. The patient or family acknowledged understanding and was  "agreeable with the care plan.     Medical Decision Making    History:  Supplemental history from: Documented in chart, if applicable and Family Member/Significant Other  External Record(s) reviewed: Documented in chart, if applicable.    Work Up:  Chart documentation includes differential considered and any EKGs or imaging independently interpreted by provider, where specified.  In additional to work up documented, I considered the following work up: Documented in chart, if applicable.    External consultation:  Discussion of management with another provider: Documented in chart, if applicable    Complicating factors:  Care impacted by chronic illness: Mental Health  Care affected by social determinants of health: Alcohol Abuse and/or Recreational Drug Use    Disposition considerations: Admit.          ED COURSE  8:08 PM  Met and evaluated patient. Discussed ED plan.   10:31 PM Rechecked patient and discussed plan of care.  10:40 PM Staffed patient with Dr. Mane Zamora MD  11:00 PM Spoke with alextDr. Wilder for admission      MEDICATIONS GIVEN IN THE EMERGENCY:  Medications - No data to display    NEW PRESCRIPTIONS STARTED AT TODAY'S ER VISIT  New Prescriptions    No medications on file          =================================================================    HPI    Patient information was obtained from: Patient and Patient's mother    Nikhil Rios is a 38 year old male, with a history of alcohol induced pancreatitis, alcoholic fatty liver, alcoholic gastritis with hemorrhage, alcoholic hepatitis, and alcohol withdrawal syndrome, who presents with alcohol intoxication and associated nausea and vomiting. The patient states that he was discharged from in-patient detox on 11/16. States he relapsed later that same day. Reports last drinking liquor sometime between 1000 and 1300 today. Around 1700 today drank a pint sized amount of Listerine, out of \"desperation.\" Endorses nausea, with one episode of " vomiting this morning and a second while in ED     Patient was supposed to go to [facility] detox around  today, but patient and family were concerned that his blood EtOH would be too high. He wanted to come to the ED to get his levels checked in addition to being seen for his nausea and vomiting, then trying to go to [facility]. Patient's mother states that they had called [facility] prior to coming to ED in efforts to hold patient's bed, but they have not received a call back yet.     REVIEW OF SYSTEMS   See HPI, otherwise all other systems reviewed and are negative    PAST MEDICAL HISTORY:  Past Medical History:   Diagnosis Date    Alcohol abuse     Alcohol withdrawal (H)     Depressive disorder     Family history of diabetes mellitus 2007    HLD (hyperlipidemia)     HTN (hypertension)        PAST SURGICAL HISTORY:  Past Surgical History:   Procedure Laterality Date    NO HISTORY OF SURGERY      OTHER SURGICAL HISTORY      none           CURRENT MEDICATIONS:    No current facility-administered medications for this encounter.    Current Outpatient Medications:     busPIRone (BUSPAR) 5 MG tablet, Take 1 tablet (5 mg) by mouth 3 times daily, Disp: 90 tablet, Rfl: 3    cloNIDine (CATAPRES) 0.1 MG tablet, Take 0.1 mg by mouth 2 times daily as needed (Anxiety), Disp: , Rfl:     folic acid (FOLVITE) 1 MG tablet, Take 1 tablet (1 mg) by mouth daily, Disp: 90 tablet, Rfl: 3    hydrOXYzine (ATARAX) 25 MG tablet, Take 25 mg by mouth 3 times daily as needed for anxiety, Disp: , Rfl:     Facility-Administered Medications Ordered in Other Encounters:     Self Administer Medications: Behavioral Services, , Does not apply, See Admin Instructions, Kenny Forte MD    ALLERGIES:  Allergies   Allergen Reactions    Gramineae Pollens Itching    Pollen Extract Rash     grass tree pollen       FAMILY HISTORY:  Family History   Problem Relation Age of Onset    Coronary Artery Disease Father          age 57 of [place]    Diabetes  Type 2  Father     Mental Illness Brother     Schizophrenia Brother     Substance Abuse Maternal Grandfather     Schizophrenia Maternal Aunt          VITALS:  Vitals:    11/23/23 1901   BP: 129/85   Pulse: 97   Resp: 18   Temp: 98.6  F (37  C)   TempSrc: Oral   SpO2: 96%   Weight: 77.1 kg (170 lb)       PHYSICAL EXAM    General Appearance:  Alert, cooperative, no distress, appears stated age  HENT: Normocephalic without obvious deformity, atraumatic. Mucous membranes moist   Eyes: Conjunctiva clear, Lids normal. No discharge.   Respiratory: No distress. Lungs clear to ausculation bilaterally. No wheezes, rhonchi or stridor  Cardiovascular: Regular rate and rhythm, no murmur. Normal cap refill. No peripheral edema  GI: Abdomen soft, nontender  Musculoskeletal: Moving all extremities. No gross deformities  Integument: Warm, dry, no rashes or lesions  Neurologic: Alert and orientated x3. No focal deficits. No tremors  Psych: Intoxicated but cooperative. Tearful at times.         LAB:  Labs Ordered and Resulted from Time of ED Arrival to Time of ED Departure   BASIC METABOLIC PANEL - Abnormal       Result Value    Sodium 144      Potassium 4.0      Chloride 101      Carbon Dioxide (CO2) 28      Anion Gap 15      Urea Nitrogen 10.6      Creatinine 0.80      GFR Estimate >90      Calcium 9.7      Glucose 104 (*)    ETHYL ALCOHOL LEVEL - Abnormal    Alcohol ethyl 0.43 (*)    CBC WITH PLATELETS - Normal    WBC Count 6.2      RBC Count 5.23      Hemoglobin 15.8      Hematocrit 45.3      MCV 87      MCH 30.2      MCHC 34.9      RDW 13.2      Platelet Count 323     MAGNESIUM - Normal    Magnesium 2.1     URINE DRUG SCREEN PANEL   BASIC METABOLIC PANEL   CBC WITH PLATELETS       RADIOLOGY:  No orders to display       Archana HOPSON, am serving as a scribe to document services personally performed by Thais Morales PA-C based on my observation and the provider's statements to me. IThais PA-C attest that Archana Pierce is  acting in a scribe capacity, has observed my performance of the services and has documented them in accordance with my direction.    Thais Morales PA-C   Emergency Medicine             Thais Morales PA-C  11/24/23 0017

## 2023-11-24 NOTE — CONSULTS
Care Management Initial Consult    General Information  Assessment completed with: Patient, patient  Type of CM/SW Visit: Initial Assessment    Primary Care Provider verified and updated as needed: Yes   Readmission within the last 30 days: no previous admission in last 30 days      Reason for Consult: substance use concerns  Advance Care Planning:            Communication Assessment  Patient's communication style: spoken language (English or Bilingual)             Cognitive  Cognitive/Neuro/Behavioral: .WDL except, speech  Level of Consciousness: alert  Arousal Level: arouses to voice  Orientation: oriented x 4  Mood/Behavior: calm, cooperative, anxious     Speech: slurred    Living Environment:   People in home: parent(s)     Current living Arrangements: house      Able to return to prior arrangements: no       Family/Social Support:  Care provided by: self  Provides care for: no one  Marital Status: Single  Parent(s)          Description of Support System: Supportive         Current Resources:   Patient receiving home care services:       Community Resources: OP Mental Health, Chemical Dependency Services  Equipment currently used at home:    Supplies currently used at home:      Employment/Financial:  Employment Status:          Financial Concerns:             Does the patient's insurance plan have a 3 day qualifying hospital stay waiver?  No    Lifestyle & Psychosocial Needs:  Social Determinants of Health     Food Insecurity: Not on file   Depression: At risk (8/7/2023)    PHQ-2     PHQ-2 Score: 6   Housing Stability: Unknown (7/21/2021)    Housing Stability Vital Sign     Unable to Pay for Housing in the Last Year: No     Number of Places Lived in the Last Year: Not on file     Unstable Housing in the Last Year: No   Tobacco Use: High Risk (10/13/2023)    Patient History     Smoking Tobacco Use: Some Days     Smokeless Tobacco Use: Never     Passive Exposure: Not on file   Financial Resource Strain: Not on  "file   Alcohol Use: Alcohol Misuse (7/21/2021)    AUDIT-C     Frequency of Alcohol Consumption: 4 or more times a week     Average Number of Drinks: 10 or more     Frequency of Binge Drinking: Daily or almost daily   Transportation Needs: No Transportation Needs (7/21/2021)    PRAPARE - Transportation     Lack of Transportation (Medical): No     Lack of Transportation (Non-Medical): No   Physical Activity: Not on file   Interpersonal Safety: Not on file   Stress: Stress Concern Present (7/21/2021)    Citizen of [place] [employer] [employer] - Occupational Stress Questionnaire     Feeling of Stress : Very much   Social Connections: Not on file       Functional Status:  Prior to admission patient needed assistance:   Dependent ADLs:: Independent  Dependent IADLs:: Independent       Mental Health Status:  Mental Health Status: Current Concern  Mental Health Management: Individual Therapy    Chemical Dependency Status:  Chemical Dependency Status: Current Concern  Chemical Dependency Management: Previous treatment          Values/Beliefs:  Spiritual, Cultural Beliefs, Restorationist Practices, Values that affect care:                 Additional Information:   met with patient in his room to introduce self, SW role and review discharge plan.  Patient reports he has an intake at "[facility] today, has to be there before 8pm. He states he plans and wants to go to this facility today. He reports he knows the owner fo [facility] and had an intervention by [facility] ([facility]   Pt reports he was at an inpatient treatment facility and relapsed a week ago. Prior to that, patient reports 9 months sobriety. Pt reports having been to inpatient treatment 5 times in the past and 4/5 times he relapses the next day. Today he states \"it's either go to the liquor store or the ER.\" SW addressed pt's unsuccessful attempts to live in the community without a treatment program or structure in " place. He reports he has no cravings at treatment centers. He also states he has plans to go to [facility] outpatient program which is a part of [facility]. Patient likely needs a supportive sober living environment for the long term and he is aware of this. Patient reports he also recently started mental health therapy with a clinic in [place] ([facility]  Patient reports his withdrawal is significantly improved today and feels better compared with withdrawals in the past. He reports he has had a successful career in the past and people held a lot of high standards for him and now endorses the current standard most people have for him is to stop drinking.   From hospital, patient states his mother plans to pick him and he is plans to be at [facility] by 8pm. He denies other needs or resources from .       Kamilla Carrillo, JOHNSW

## 2023-11-24 NOTE — PROGRESS NOTES
"PRIMARY DIAGNOSIS: \"GENERIC\" NURSING  OUTPATIENT/OBSERVATION GOALS TO BE MET BEFORE DISCHARGE:  ADLs back to baseline: Yes    Activity and level of assistance: Up with standby assistance.    Pain status: Pain free.    Return to near baseline physical activity: Yes     Discharge Planner Nurse   Safe discharge environment identified: Yes  Barriers to discharge: Yes       Entered by: Lluvia Aguilar RN 11/24/2023 4:02 AM     Please review provider order for any additional goals.   Nurse to notify provider when observation goals have been met and patient is ready for discharge.    Pt A&Ox4, drowsy. Pt denies pain. Pt has slurred speech. CIWA: 3, denies n/v, reported mild anxiety. Calm and pleasant. Urinal given to pt at bedside, urine sample still needs to be collected. Pt is up w/ SBA, has unsteady gait. Pt refused to have cardiac monitoring on.  Pt resting in bed at this time.  "

## 2023-11-24 NOTE — MEDICATION SCRIBE - ADMISSION MEDICATION HISTORY
Medication Scribe Admission Medication History    Admission medication history is complete. The information provided in this note is only as accurate as the sources available at the time of the update.    Information Source(s): Patient via in-person    Pertinent Information:     Changes made to PTA medication list:  Added: Naltrexone 50 mg tablet, Nicotine 4 mg gum, Ondansetron 4 mg odt tablet (per fill history and patient confirmed)  Deleted: Folic acid 1 mg(per patient)  Changed: None    Medication Affordability:  Not including over the counter (OTC) medications, was there a time in the past 3 months when you did not take your medications as prescribed because of cost?: No    Allergies reviewed with patient and updates made in EHR: yes    Medication History Completed By: Lizeth De 11/24/2023 12:44 AM    PTA Med List   Medication Sig Last Dose    busPIRone (BUSPAR) 5 MG tablet Take 1 tablet (5 mg) by mouth 3 times daily Past Week at unknown    cloNIDine (CATAPRES) 0.1 MG tablet Take 0.1 mg by mouth 2 times daily as needed (Anxiety) Unknown at prn    hydrOXYzine (VISTARIL) 50 MG capsule Take 50 mg by mouth 3 times daily as needed for anxiety Unknown at prn    naltrexone (DEPADE/REVIA) 50 MG tablet Take 50 mg by mouth daily Past Week at unknown    nicotine polacrilex (NICORETTE) 4 MG gum Take 4 mg by mouth every hour as needed for smoking cessation Chew 1 piece every 1-2 hours (minimum of 9 per day) for 6 weeks. Then 1 every 2-4 hours for 3 weeks, then 1 every 4-8 hours for 3 weeks Unknown at prn    ondansetron (ZOFRAN ODT) 4 MG ODT tab Take 4 mg by mouth every 8 hours as needed for nausea Unknown at prn

## 2023-11-24 NOTE — PLAN OF CARE
Goal Outcome Evaluation:      Plan of Care Reviewed With: patient  Overall Patient Progress: improving    Patient will be discharging with his mother. Patient is going out to lunch and then will go to [facility] at 1600 for detox/treatment. IV removed. Education provided. AVS printed. Questions/concerns were encouraged, provided,and addressed. Patient seems ready and confident about plan.

## 2023-11-24 NOTE — H&P
[facility]    History and Physical - Hospitalist Service       Date of Admission:  11/23/2023    Assessment & Plan      Nikhil Rios is a 38M presenting for alcohol intoxication and anticipated alcohol withdrawal; pmhx includes alcohol abuse/dependence, alcohol withdrawal, pancreatitis; admitted for alcohol intoxication, impending withdrawal.     #alcohol dependence  #alcohol intoxication  Presents with acute intoxication, prior hx of seizure, but no ICU/intubation. EtOH ingestion estimated as 12-15 drinks/day, last ingestion 11/23 @ 6pm. Denies any other alcohol containing substances. Has scheduled chem dep/detox for 11/24.  -Mg/phos/K trending  -electrolyte repletion per protocols  -gabapentin per protocol  -valium per protocols/CIWA  -multivitamins  -zofran/compazine IV/PO PRN  -CM with rule 25 evaluation       Observation Goals: -diagnostic tests and consults completed and resulted, -vital signs normal or at patient baseline, Nurse to notify provider when observation goals have been met and patient is ready for discharge.  Diet: Combination Diet Renal Diet (dialysis)  DVT Prophylaxis: Enoxaparin (Lovenox) SQ  Mars Catheter: Not present  Lines: None     Cardiac Monitoring: None  Code Status: Full Code    Clinically Significant Risk Factors Present on Admission                  # Hypertension: Home medication list includes antihypertensive(s)                 Disposition Plan      Expected Discharge Date: 11/24/2023                  Jb Wilder MD  Hospitalist Service  [facility]  Securely message with IEV (more info)  Text page via [employer] Paging/Directory     ______________________________________________________________________    Chief Complaint   Alcohol intoxication    History is obtained from the patient    History of Present Illness   Nikhil Rios is a 38M presenting for alcohol intoxication and anticipated alcohol withdrawal; pmhx includes alcohol  abuse/dependence, alcohol withdrawal, pancreatitis; admitted for alcohol intoxication, impending withdrawal.     Previous admissions at [facility] for alcohol intoxication and withdrawal, most recent on 10/13 to 10/16.  He had gone to an inpatient alcohol dependence treatment program and reports that after leaving program he began drinking again on day 1.  He continues to drink an estimated 12-15 standardize drinks in an episode his last ingestion was at 6 PM on 11/23.  They had attempted to transfer him to a detox facility, however on the drive there started to feel more tremulousness and agitated, feeling like he had a potential withdrawal coming up.  Was brought to [facility] due to concerns of potential withdrawal.      Past Medical History    Past Medical History:   Diagnosis Date    Alcohol abuse     Alcohol withdrawal (H)     Depressive disorder     Family history of diabetes mellitus 11/09/2007    HLD (hyperlipidemia)     HTN (hypertension)        Past Surgical History   Past Surgical History:   Procedure Laterality Date    NO HISTORY OF SURGERY      OTHER SURGICAL HISTORY      none       Prior to Admission Medications   Prior to Admission Medications   Prescriptions Last Dose Informant Patient Reported? Taking?   busPIRone (BUSPAR) 5 MG tablet  Self No No   Sig: Take 1 tablet (5 mg) by mouth 3 times daily   cloNIDine (CATAPRES) 0.1 MG tablet  Self Yes No   Sig: Take 0.1 mg by mouth 2 times daily as needed (Anxiety)   folic acid (FOLVITE) 1 MG tablet  Self No No   Sig: Take 1 tablet (1 mg) by mouth daily   hydrOXYzine (ATARAX) 25 MG tablet  Self Yes No   Sig: Take 25 mg by mouth 3 times daily as needed for anxiety      Facility-Administered Medications: None        Review of Systems    The 10 point Review of Systems is negative other than noted in the HPI or here.      Physical Exam   Vital Signs: Temp: 98.6  F (37  C) Temp src: Oral BP: 119/73 Pulse: 95   Resp: 18 SpO2: 97 % O2 Device: None (Room air)     Weight: 170 lbs 0 oz    Constitutional: awake, alert, cooperative, no apparent distress, and appears stated age  Respiratory: CTAB  Cardiovascular: RRR no m/g/r  GI: soft, nontender, nondistended  Musculoskeletal: motor strength is 5 out of 5 all extremities bilaterally  Neurologic: AOx4, no focal deficits.    Medical Decision Making       45 MINUTES SPENT BY ME on the date of service doing chart review, history, exam, documentation & further activities per the note.  MANAGEMENT DISCUSSED with the following over the past 24 hours: patient, mother   NOTE(S)/MEDICAL RECORDS REVIEWED over the past 24 hours: discharge summaries, outpatient FM  Tests ORDERED & REVIEWED in the past 24 hours:  - See lab/imaging results included in the data section of the note      Data     I have personally reviewed the following data over the past 24 hrs:    6.2  \   15.8   / 323     144 101 10.6 /  104 (H)   4.0 28 0.80 \       Imaging results reviewed over the past 24 hrs:   No results found for this or any previous visit (from the past 24 hour(s)).

## 2023-11-24 NOTE — ED NOTES
Emergency Department Midlevel Supervisory Note     I personally saw the patient and performed a substantive portion of the visit including all aspects of the medical decision making.    ED Course:   Thais Morales PA-C staffed patient with me. I agree with their assessment and plan of management, and I will see the patient.  1100 I met with the patient to introduce myself, gather additional history, perform my initial exam, and discuss the plan.     Brief HPI:     Nikhil Rios is a 38 year old male who presents for evaluation of nausea vomiting acute alcohol intoxication and concern for alcohol withdrawal.  Patient with history of very heavy EtOH abuse and alcohol dependence issues.  Also past medical history of multiple complicated alcohol withdrawal issues.  Presents with his mother due to alcohol related issues.  Was post to go to detox but ultimately ended up in the emergency department due to his symptomatology.  Please see REZA note for further detail.    Brief Physical Exam: /73   Pulse 95   Temp 98.6  F (37  C) (Oral)   Resp 18   Wt 77.1 kg (170 lb)   SpO2 97%   BMI 24.39 kg/m    Constitutional: Adult male, cooperative, appears older than stated age, appears acutely intoxicated.  EYES: Conjunctivae clear  HENT:  Atraumatic  Respiratory:  Respirations even, unlabored, in no acute respiratory distress  Cardiovascular: Tachycardic  GI: Soft, non-distended, non-tender  Musculoskeletal:  Moves all 4 extremities equally, grossly symmetrical strength  Integument: Warm & dry. No appreciable rash, erythema.  Neurologic: Patient is demonstrating sequela of acute intoxication.  Mildly somnolent.  Cooperative.  No focal deficits.    MDM:  Patient presenting to emergency department severely intoxicated with no no safe discharge plan high risk of complicated EtOH withdrawal.  We medicate the patient symptoms in the emergency department.  He was starting to demonstrate some signs of withdrawal over the course of  his emergency department stay and ultimately after discussions with his mother and encountering difficulties in securing a safe discharge plan with the patient can safely detox we ultimately decided to admit the patient to the hospital out of concern for his EtOH withdrawal risk factors and the development of early stages of withdrawal here in the emergency department.  Please see REZA note for further details.       1. Alcohol intoxication (H24)        Labs and Imaging:  Results for orders placed or performed during the hospital encounter of 11/23/23   CBC with platelets   Result Value Ref Range    WBC Count 6.2 4.0 - 11.0 10e3/uL    RBC Count 5.23 4.40 - 5.90 10e6/uL    Hemoglobin 15.8 13.3 - 17.7 g/dL    Hematocrit 45.3 40.0 - 53.0 %    MCV 87 78 - 100 fL    MCH 30.2 26.5 - 33.0 pg    MCHC 34.9 31.5 - 36.5 g/dL    RDW 13.2 10.0 - 15.0 %    Platelet Count 323 150 - 450 10e3/uL   Basic metabolic panel   Result Value Ref Range    Sodium 144 135 - 145 mmol/L    Potassium 4.0 3.4 - 5.3 mmol/L    Chloride 101 98 - 107 mmol/L    Carbon Dioxide (CO2) 28 22 - 29 mmol/L    Anion Gap 15 7 - 15 mmol/L    Urea Nitrogen 10.6 6.0 - 20.0 mg/dL    Creatinine 0.80 0.67 - 1.17 mg/dL    GFR Estimate >90 >60 mL/min/1.73m2    Calcium 9.7 8.6 - 10.0 mg/dL    Glucose 104 (H) 70 - 99 mg/dL   Result Value Ref Range    Magnesium 2.1 1.7 - 2.3 mg/dL   Alcohol level blood   Result Value Ref Range    Alcohol ethyl 0.43 (HH) <=0.01 g/dL     I have reviewed the relevant laboratory and radiology studies    Procedures:  I was present for the key portions of procedures documented in midlevel note, see midlevel note for further details.        Mane Zamora MD  [facility] EMERGENCY DEPARTMENT  [address] [place] [address] [id number] [phone]      Mane Zamora MD  11/23/23 [id number]

## 2023-11-24 NOTE — PROGRESS NOTES
PRIMARY DIAGNOSIS: TOXICOLOGY EXPOSURE  OUTPATIENT/OBSERVATION GOALS TO BE MET BEFORE DISCHARGE:  1. Stable metabolic panel and electrolytes: Yes    2. Improved drug lab values (if applicable): Yes    3. Stable telemetry: N/A  - patient refused tele  Interpretation of cardiac rhythm per telemetry tech (if applicable): patient refused tele    Discharge Planner Nurse   Safe discharge environment identified: Yes - patient intends to go to [facility]  Barriers to discharge: Yes - patient just thinking of detox at this time        Entered by: Mar Lyons RN 11/24/2023 10:22 AM    Patient alert and oriented x4. Last CIWA at 0838 was 4 - patient mentioned nausea and anxiety - gave some Mari Acosta has not complained of nausea since. This author has found that a sugary drink of Lemon Lime Twist or Ginger Ale usually satisfies the sugar craving many people with alcohol issues experience. VSS. Urine Sample was obtained from patient - results are pending.

## 2023-11-24 NOTE — DISCHARGE SUMMARY
[facility]  Hospitalist Discharge Summary      Date of Admission:  11/23/2023  Date of Discharge:  11/24/2023  Discharging Provider: Keri Brady MD  Discharge Service: Hospitalist Service    Discharge Diagnoses   Principal Problem:    Alcohol intoxication (H24)      Clinically Significant Risk Factors          Follow-ups Needed After Discharge   Follow-up Appointments     Follow-up and recommended labs and tests       Follow up with primary care provider, Lisandro Graham, within 7 days            Unresulted Labs Ordered in the Past 30 Days of this Admission       No orders found for last 31 day(s).            Discharge Disposition   Discharged to home  Condition at discharge: Stable    Hospital Course   Nikhil Rios is a 38M presenting for alcohol intoxication and anticipated alcohol withdrawal; pmhx includes alcohol abuse/dependence, alcohol withdrawal, pancreatitis; admitted for alcohol intoxication, impending withdrawal.      #alcohol dependence  #alcohol intoxication  Presents with acute intoxication, prior hx of seizure, but no ICU/intubation. - Has scheduled chem dep/detox for 11/24, Sw met and dicussed with patient and he already has arranged intake at [facility] today he needs to be there before 8PM and declines needs or other resources  - discussed gabapentin with patient and he has a huge supply at home so will recommend 200mg TID for now and use his home supply  - patient feels comfortable going home not scoring high on CIWA       Consultations This Hospital Stay   CARE MANAGEMENT / SOCIAL WORK IP CONSULT    Code Status   Full Code    Time Spent on this Encounter          Keri Brady MD  [facility] EMERGENCY DEPARTMENT  [address] [place] [id number]  Phone: [phone]  ______________________________________________________________________    Physical Exam   Vital Signs: Temp: 98.1  F (36.7  C) Temp src: Oral BP: 122/76  Pulse: 74   Resp: 18 SpO2: 97 % O2 Device: None (Room air)    Weight: 170 lbs 0 oz  Physical Exam  Constitutional:       Appearance: Normal appearance.   HENT:      Head: Normocephalic and atraumatic.   Cardiovascular:      Rate and Rhythm: Normal rate and regular rhythm.      Pulses: Normal pulses.      Heart sounds: Normal heart sounds.   Pulmonary:      Effort: Pulmonary effort is normal.   Abdominal:      General: Bowel sounds are normal. There is no distension.      Palpations: Abdomen is soft.      Tenderness: There is no abdominal tenderness. There is no guarding.   Musculoskeletal:         General: Normal range of motion.      Right lower leg: No edema.      Left lower leg: No edema.   Skin:     General: Skin is warm and dry.      Capillary Refill: Capillary refill takes less than 2 seconds.   Neurological:      General: No focal deficit present.      Mental Status: He is alert and oriented to person, place, and time. Mental status is at baseline.   Psychiatric:         Mood and Affect: Mood normal.              Primary Care Physician   Lisandro Graham    Discharge Orders      Reason for your hospital stay    Principal Problem:    Alcohol intoxication (H24)     Follow-up and recommended labs and tests     Follow up with primary care provider, Lisandro Graham, within 7 days     Activity    Your activity upon discharge: activity as tolerated     Discharge Instructions    Continue with follow up at [facility] as you have already previously arranged     Diet    Follow this diet upon discharge: Orders Placed This Encounter  Regular diet       Significant Results and Procedures   Most Recent 3 CBC's:  Recent Labs   Lab Test 11/24/23  0837 11/23/23  2057 10/16/23  0635   WBC 6.0 6.2 6.0   HGB 13.0* 15.8 11.9*   MCV 89 87 87    323 80*     Most Recent 3 BMP's:  Recent Labs   Lab Test 11/24/23  0837 11/23/23  2057 10/16/23  0635 10/15/23  0609 10/14/23  0653    144  --   --  132*    POTASSIUM 4.2 4.0 3.6   < > 3.8   CHLORIDE 104 101  --   --  96*   CO2 24 28  --   --  24   BUN 11.2 10.6  --   --  12.5   CR 0.83 0.80  --   --  0.68   ANIONGAP 14 15  --   --  12   KEELEY 8.6 9.7  --   --  8.6   GLC 92 104*  --   --  95    < > = values in this interval not displayed.     Most Recent 2 LFT's:  Recent Labs   Lab Test 10/15/23  0609 10/13/23  0808 10/09/23  0649   AST 59* 84* 46*   ALT  --  51 42   ALKPHOS  --  99 74   BILITOTAL  --  0.5 0.3     Most Recent 3 INR's:  Recent Labs   Lab Test 10/13/23  0808 09/19/23  0849 07/25/23  1205   INR 0.97 0.92 0.86     Most Recent Urinalysis:  Recent Labs   Lab Test 10/14/23  0614 08/21/21  1503 04/03/21  1231   COLOR Yellow   < > Colorless   APPEARANCE Clear   < > Clear   URINEGLC 30*   < > Negative   URINEBILI Negative   < > Negative   URINEKETONE Negative   < > Negative   SG 1.027   < > 1.005   UBLD 0.5 mg/dL*   < > Negative   URINEPH 6.5   < > 6.5   PROTEIN 600*   < > Negative   UROBILINOGEN  --   --  <2.0 mg/dL   NITRITE Negative   < > Negative   LEUKEST Negative   < > Negative   RBCU 0   < >  --    WBCU 2   < >  --     < > = values in this interval not displayed.   ,   Results for orders placed or performed during the hospital encounter of 10/13/23   Head CT w/o contrast    Narrative    EXAM: CT HEAD W/O CONTRAST  LOCATION: [facility]  DATE: 10/13/2023    INDICATION: fall ETOH  COMPARISON: Head CT: 07/15/2022.  TECHNIQUE: Routine CT Head without IV contrast. Multiplanar reformats. Dose reduction techniques were used.    FINDINGS:  INTRACRANIAL CONTENTS: No intracranial hemorrhage, extraaxial collection, or mass effect.  No CT evidence of acute infarct. Normal parenchymal attenuation. Mild generalized volume loss, greater than expected for patient's age. No hydrocephalus.     VISUALIZED ORBITS/SINUSES/MASTOIDS: No intraorbital abnormality. Mild mucosal thickening scattered about the paranasal sinuses. No middle ear or mastoid  effusion.    BONES/SOFT TISSUES: No acute abnormality.      Impression    IMPRESSION:    1.  No acute traumatic intracranial abnormality.  2.  Mild generalized cerebral parenchymal volume loss, greater than expected for patient's age.   Cervical spine CT w/o contrast    Narrative    EXAM: CT CERVICAL SPINE W/O CONTRAST  LOCATION: [facility]  DATE: 10/13/2023    INDICATION: injury  COMPARISON: Cervical spine CT: 06/18/2022.  TECHNIQUE: Routine CT Cervical Spine without IV contrast. Multiplanar reformats. Dose reduction techniques were used.    FINDINGS:  VERTEBRA: No acute fracture. Alignment is normal. Similar prominent inferiorly directed ventral osteophyte arising from the right aspect of the C4 inferior endplate.    CANAL/FORAMINA: Mild multilevel degenerative disc disease. Multilevel facet arthropathy, which is most pronounced and moderate on the left at C4-C5 and C5-C6. No high-grade spinal canal or neural foraminal stenosis.    PARASPINAL: No extraspinal abnormality.      Impression    IMPRESSION:    1.  No acute fracture or traumatic malalignment of the cervical spine.  2.  No high-grade spinal canal or neural foraminal stenosis.       Discharge Medications   Current Discharge Medication List        START taking these medications    Details   folic acid (FOLVITE) 1 MG tablet Take 1 tablet (1 mg) by mouth daily for 30 days  Qty: 30 tablet, Refills: 0    Associated Diagnoses: Alcohol use disorder, severe, dependence (H)      gabapentin (NEURONTIN) 100 MG capsule Take 2 capsules (200 mg) by mouth 3 times daily    Comments: Patient already has a supply of gabapentin at home  Associated Diagnoses: Alcohol use disorder, severe, dependence (H)      multivitamin w/minerals (THERA-VIT-M) tablet Take 1 tablet by mouth daily for 30 days  Qty: 30 tablet, Refills: 0    Associated Diagnoses: Alcohol use disorder, severe, dependence (H)      thiamine (B-1) 100 MG tablet Take 1 tablet (100 mg) by  mouth daily for 30 days  Qty: 30 tablet, Refills: 0    Associated Diagnoses: Alcohol use disorder, severe, dependence (H)           CONTINUE these medications which have NOT CHANGED    Details   busPIRone (BUSPAR) 5 MG tablet Take 1 tablet (5 mg) by mouth 3 times daily  Qty: 90 tablet, Refills: 3    Associated Diagnoses: Moderate episode of recurrent major depressive disorder (H)      cloNIDine (CATAPRES) 0.1 MG tablet Take 0.1 mg by mouth 2 times daily as needed (Anxiety)      hydrOXYzine (VISTARIL) 50 MG capsule Take 50 mg by mouth 3 times daily as needed for anxiety      naltrexone (DEPADE/REVIA) 50 MG tablet Take 50 mg by mouth daily      nicotine polacrilex (NICORETTE) 4 MG gum Take 4 mg by mouth every hour as needed for smoking cessation Chew 1 piece every 1-2 hours (minimum of 9 per day) for 6 weeks. Then 1 every 2-4 hours for 3 weeks, then 1 every 4-8 hours for 3 weeks      ondansetron (ZOFRAN ODT) 4 MG ODT tab Take 4 mg by mouth every 8 hours as needed for nausea           Allergies   Allergies   Allergen Reactions    Gramineae Pollens Itching    Pollen Extract Rash     grass tree pollen

## 2023-11-24 NOTE — ED NOTES
Pt is accompanied by his mother and states that he had a 750 mL vodka and a bottle (pint) of Listerine.

## 2023-11-25 ENCOUNTER — PATIENT OUTREACH (OUTPATIENT)
Dept: CARE COORDINATION | Facility: CLINIC | Age: 38
End: 2023-11-25
Payer: COMMERCIAL

## 2023-11-25 NOTE — PROGRESS NOTES
[facility] Care Resource [place]    Background: Transitional Care Management program identified per system criteria and reviewed by [facility] team for possible outreach.    Assessment: Upon chart review, [facility] Team member will not proceed with patient outreach related to this episode of Transitional Care Management program due to reason below:    Patient has discharged to a Memory Care, Long-term Care, Assisted Living or Group Home where patient is receiving on-site support with their daily cares, including support with hospital follow up plan. Patient has discharged to [facility].     Plan: Transitional Care Management episode addressed appropriately per reason noted above.      Marianna Truong RN  [facility], [facility]    *Connected Care Resource Team does NOT follow patient ongoing. Referrals are identified based on internal discharge reports and the outreach is to ensure patient has an understanding of their discharge instructions.

## 2024-06-19 ENCOUNTER — OFFICE VISIT (OUTPATIENT)
Dept: FAMILY MEDICINE | Facility: CLINIC | Age: 39
End: 2024-06-19
Payer: COMMERCIAL

## 2024-06-19 VITALS
RESPIRATION RATE: 16 BRPM | TEMPERATURE: 97.9 F | SYSTOLIC BLOOD PRESSURE: 110 MMHG | OXYGEN SATURATION: 99 % | HEART RATE: 58 BPM | DIASTOLIC BLOOD PRESSURE: 70 MMHG

## 2024-06-19 DIAGNOSIS — I88.9 SUBMANDIBULAR LYMPHADENITIS: Primary | ICD-10-CM

## 2024-06-19 DIAGNOSIS — K05.10 GINGIVOSTOMATITIS: ICD-10-CM

## 2024-06-19 LAB
DEPRECATED S PYO AG THROAT QL EIA: NEGATIVE
GROUP A STREP BY PCR: NOT DETECTED

## 2024-06-19 PROCEDURE — 87651 STREP A DNA AMP PROBE: CPT | Performed by: FAMILY MEDICINE

## 2024-06-19 PROCEDURE — 87529 HSV DNA AMP PROBE: CPT | Performed by: FAMILY MEDICINE

## 2024-06-19 PROCEDURE — 99204 OFFICE O/P NEW MOD 45 MIN: CPT | Performed by: FAMILY MEDICINE

## 2024-06-19 RX ORDER — VALACYCLOVIR HYDROCHLORIDE 1 G/1
1000 TABLET, FILM COATED ORAL 2 TIMES DAILY
Qty: 20 TABLET | Refills: 0 | Status: SHIPPED | OUTPATIENT
Start: 2024-06-19 | End: 2024-06-29

## 2024-06-19 NOTE — PATIENT INSTRUCTIONS
Augmentin twice daily for 10 days to treat potential bacterial infection of mouth and/or the lymph nodes.     Valacyclovir twice daily for 10 days for possible initial herpes infection.   Herpes swab is pending. A negative result is not definitive - meaning you could still have it - but a positive test is certain.     See handouts for additional information.     Return in 3 days if not improving at all, sooner if worse.     Tylenol and ibuprofen as needed for pain.     Mild salt water rinses as needed (instructions in handout).

## 2024-06-20 LAB
HSV1 DNA SPEC QL NAA+PROBE: DETECTED
HSV2 DNA SPEC QL NAA+PROBE: NOT DETECTED

## 2024-06-20 NOTE — PROGRESS NOTES
Assessment/Plan:   1. Submandibular lymphadenitis  - Streptococcus A Rapid Screen w/Reflex to PCR - Clinic Collect  - Group A Streptococcus PCR Throat Swab  - amoxicillin-clavulanate (AUGMENTIN) 875-125 MG tablet; Take 1 tablet by mouth 2 times daily for 10 days  Dispense: 20 tablet; Refill: 0  2. Gingivostomatitis  - HSV Types 1 and 2 Qualitative PCR CSF  - valACYclovir (VALTREX) 1000 mg tablet; Take 1 tablet (1,000 mg) by mouth 2 times daily for 10 days  Dispense: 20 tablet; Refill: 0    Acute tender bilateral lymphadenopathy for almost 2 weeks and now gingivostomatitis as well.   Differential includes a bacterial infection from recent dental visit and potential water contamination while traveling in [place] vs various viral stomatitis etiologies. Herpes seems the most likely given the locations of lesions and the adenopathy.   Swab was obtained for HSV PCR.   RST negative.     Augmentin twice daily for 10 days to treat potential bacterial infection of mouth and/or the lymph nodes.     Valacyclovir twice daily for 10 days for possible initial herpes infection.   Herpes swab is pending. A negative result is not definitive - meaning you could still have it - but a positive test is certain.     See handouts for additional information.     Return in 3 days if not improving at all, sooner if worse.     Tylenol and ibuprofen as needed for pain.     Mild salt water rinses as needed (instructions in handout).   Based on results, medications can be simplified if indicated.     I discussed red flag symptoms, return precautions to clinic/ER and follow up care with patient/parent.  Expected clinical course, symptomatic cares advised. Questions answered. Patient/parent amenable with plan.  Time: total time on day of visit 33 minutes spent in chart review, obtaining patient history and physical exam, reviewing labs, medication management, shared decision making and coordination of care.         Subjective:     Nikhil Rios  is a 38 year old male who presents for evaluation of sores in his mouth.   Illness started about 1.5 weeks ago with swollen lymph nodes under his jaw bilaterally.   He then developed fatigue and gum pain, noted when flossing which he is very regular at doing.   Then he noted sores on the top of his mouth and a canker sore at the side of his tongue.   No fever, no ST (or very little)   He had been at the dentist 5/16/24 to have a cavity refilled and did receive novocaine.   He then went to [place] with his girlfriend for 3 weeks.   The symptoms did start while he was traveling, worse or at least persistent since he returned.   No prior or family history of cold sores.   No herpes history.   He is not aware that GF has had herpes or cold sores.   He did not use sterile water for brushing teeth while traveling.     Allergies   Allergen Reactions    Gramineae Pollens Itching    Pollen Extract Rash     grass tree pollen     Current Outpatient Medications   Medication Sig Dispense Refill    amoxicillin-clavulanate (AUGMENTIN) 875-125 MG tablet Take 1 tablet by mouth 2 times daily for 10 days 20 tablet 0    busPIRone (BUSPAR) 5 MG tablet Take 1 tablet (5 mg) by mouth 3 times daily 90 tablet 3    cloNIDine (CATAPRES) 0.1 MG tablet Take 0.1 mg by mouth 2 times daily as needed (Anxiety)      gabapentin (NEURONTIN) 100 MG capsule Take 2 capsules (200 mg) by mouth 3 times daily      hydrOXYzine (VISTARIL) 50 MG capsule Take 50 mg by mouth 3 times daily as needed for anxiety      naltrexone (DEPADE/REVIA) 50 MG tablet Take 50 mg by mouth daily      nicotine polacrilex (NICORETTE) 4 MG gum Take 4 mg by mouth every hour as needed for smoking cessation Chew 1 piece every 1-2 hours (minimum of 9 per day) for 6 weeks. Then 1 every 2-4 hours for 3 weeks, then 1 every 4-8 hours for 3 weeks      ondansetron (ZOFRAN ODT) 4 MG ODT tab Take 4 mg by mouth every 8 hours as needed for nausea      valACYclovir (VALTREX) 1000 mg tablet  Take 1 tablet (1,000 mg) by mouth 2 times daily for 10 days 20 tablet 0     No current facility-administered medications for this visit.     Facility-Administered Medications Ordered in Other Visits   Medication Dose Route Frequency Provider Last Rate Last Admin    Self Administer Medications: Behavioral Services   Does not apply See Admin Instructions Kenny Forte MD         Patient Active Problem List   Diagnosis    Alcohol-induced mood disorder (H)    ACS (acute coronary syndrome) (H)    Chemical dependency (H)    Alcohol dependence with withdrawal with complication (H)    Alcohol-induced acute pancreatitis    Alcohol-induced insomnia (H)    Allergic rhinitis    Seasonal allergies    Urticaria    Alcohol use disorder, severe, dependence (H)    Hypokalemia    Transaminitis    Alcoholic fatty liver    Generalized anxiety disorder    Moderate episode of recurrent major depressive disorder (H)    Hypomagnesemia    Thrombocytopenia (H24)    Chronic intractable headache    Constipation    Decreased vision of left eye    Dysesthesia    Internal hemorrhoids    Keratosis pilaris    Localized superficial swelling, mass, or lump    Normocytic anemia    Pruritus    Snoring    Epigastric pain    Fatigue    Elevated LFTs    Alcohol withdrawal syndrome without complication (H)    Alcohol dependence with intoxication with complication (H)    Alcohol abuse    Acute encephalopathy    Metabolic acidosis    Alcohol-induced acute pancreatitis, unspecified complication status    Chin laceration, initial encounter    Alcohol withdrawal seizure without complication (H)    Alcoholic intoxication without complication (H24)    Alcoholic hepatitis without ascites (H28)    Nausea with vomiting    Anxiety with depression    Alcoholic gastritis with hemorrhage    Alcohol intoxication (H24)       Objective:     /70   Pulse 58   Temp 97.9  F (36.6  C) (Oral)   Resp 16   SpO2 99%     Physical    General Appearance: Alert,  pleasant, no distress, aVSS  Head: Normocephalic, without obvious abnormality, atraumatic  Eyes: Conjunctivae are normal.   Ears: Normal TMs and external ear canals, both ears  Nose: No significant congestion.  Throat: Throat is normal.  No exudate.  There are vesicular lesions on side of tongue and roof of mouth.   The gums are inflamed and swollen.   Neck: tender enlarged bilateral submandubular adenopathy  Lungs: Clear to auscultation bilaterally, respirations unlabored  Heart: Regular rate and rhythm  Skin: Skin color, texture, turgor normal, no rashes or lesions  Psychiatric: Patient has a normal mood and affect.         Results for orders placed or performed in visit on 06/19/24   Streptococcus A Rapid Screen w/Reflex to PCR - Clinic Collect     Status: Normal    Specimen: Throat; Swab   Result Value Ref Range    Group A Strep antigen Negative Negative   Group A Streptococcus PCR Throat Swab     Status: Normal    Specimen: Throat; Swab   Result Value Ref Range    Group A strep by PCR Not Detected Not Detected    Narrative    The Xpert Xpress Strep A test, performed on the [employer], is a rapid, qualitative in vitro diagnostic test for the detection of Streptococcus pyogenes (Group A ß-hemolytic Streptococcus, Strep A) in throat swab specimens from patients with signs and symptoms of pharyngitis. The Xpert Xpress Strep A test can be used as an aid in the diagnosis of Group A Streptococcal pharyngitis. The assay is not intended to monitor treatment for Group A Streptococcus infections. The Xpert Xpress Strep A test utilizes an automated real-time polymerase chain reaction (PCR) to detect Streptococcus pyogenes DNA.       This note has been dictated in part using voice recognition software.  Any grammatical or context distortions are unintentional and inherent to the software.  Please feel free to contact me directly for clarification if needed.

## 2024-12-29 ENCOUNTER — HEALTH MAINTENANCE LETTER (OUTPATIENT)
Age: 39
End: 2024-12-29